# Patient Record
Sex: MALE | Race: WHITE | HISPANIC OR LATINO | Employment: OTHER | URBAN - METROPOLITAN AREA
[De-identification: names, ages, dates, MRNs, and addresses within clinical notes are randomized per-mention and may not be internally consistent; named-entity substitution may affect disease eponyms.]

---

## 2018-02-21 ENCOUNTER — APPOINTMENT (EMERGENCY)
Dept: RADIOLOGY | Facility: HOSPITAL | Age: 48
End: 2018-02-21
Payer: COMMERCIAL

## 2018-02-21 ENCOUNTER — APPOINTMENT (EMERGENCY)
Dept: CT IMAGING | Facility: HOSPITAL | Age: 48
End: 2018-02-21
Payer: COMMERCIAL

## 2018-02-21 ENCOUNTER — HOSPITAL ENCOUNTER (EMERGENCY)
Facility: HOSPITAL | Age: 48
Discharge: HOME | End: 2018-02-21
Attending: EMERGENCY MEDICINE | Admitting: EMERGENCY MEDICINE
Payer: COMMERCIAL

## 2018-02-21 VITALS
SYSTOLIC BLOOD PRESSURE: 120 MMHG | HEIGHT: 66 IN | BODY MASS INDEX: 31.34 KG/M2 | DIASTOLIC BLOOD PRESSURE: 76 MMHG | HEART RATE: 79 BPM | RESPIRATION RATE: 16 BRPM | WEIGHT: 195 LBS | TEMPERATURE: 99.6 F | OXYGEN SATURATION: 97 %

## 2018-02-21 DIAGNOSIS — R07.9 CHEST PAIN: Primary | ICD-10-CM

## 2018-02-21 DIAGNOSIS — S29.011A INTERCOSTAL MUSCLE STRAIN: ICD-10-CM

## 2018-02-21 LAB
ALBUMIN SERPL BCP-MCNC: 3.6 G/DL (ref 3.5–5)
ALP SERPL-CCNC: 117 U/L (ref 46–116)
ALT SERPL W P-5'-P-CCNC: 24 U/L (ref 12–78)
ANION GAP SERPL CALCULATED.3IONS-SCNC: 8 MMOL/L (ref 4–13)
APTT PPP: 32 SECONDS (ref 23–35)
AST SERPL W P-5'-P-CCNC: 21 U/L (ref 5–45)
ATRIAL RATE: 80 BPM
ATRIAL RATE: 80 BPM
ATRIAL RATE: 97 BPM
BASOPHILS # BLD MANUAL: 0.03 THOUSAND/UL (ref 0–0.1)
BASOPHILS NFR MAR MANUAL: 1 % (ref 0–1)
BILIRUB SERPL-MCNC: 0.2 MG/DL (ref 0.2–1)
BUN SERPL-MCNC: 28 MG/DL (ref 5–25)
CALCIUM SERPL-MCNC: 8.7 MG/DL (ref 8.3–10.1)
CHLORIDE SERPL-SCNC: 103 MMOL/L (ref 100–108)
CO2 SERPL-SCNC: 27 MMOL/L (ref 21–32)
CREAT SERPL-MCNC: 1.38 MG/DL (ref 0.6–1.3)
DEPRECATED D DIMER PPP: 885 NG/ML (FEU) (ref 0–424)
EOSINOPHIL # BLD MANUAL: 0.03 THOUSAND/UL (ref 0–0.4)
EOSINOPHIL NFR BLD MANUAL: 1 % (ref 0–6)
ERYTHROCYTE [DISTWIDTH] IN BLOOD BY AUTOMATED COUNT: 13.3 % (ref 11.6–15.1)
GFR SERPL CREATININE-BSD FRML MDRD: 68 ML/MIN/1.73SQ M
GLUCOSE SERPL-MCNC: 115 MG/DL (ref 65–140)
HCT VFR BLD AUTO: 33 % (ref 36.5–49.3)
HGB BLD-MCNC: 11.1 G/DL (ref 12–17)
INR PPP: 1.44 (ref 0.86–1.16)
LACTATE SERPL-SCNC: 0.8 MMOL/L (ref 0.5–2)
LIPASE SERPL-CCNC: 147 U/L (ref 73–393)
LYMPHOCYTES # BLD AUTO: 0.88 THOUSAND/UL (ref 0.6–4.47)
LYMPHOCYTES # BLD AUTO: 27 % (ref 14–44)
MAGNESIUM SERPL-MCNC: 1.5 MG/DL (ref 1.6–2.6)
MCH RBC QN AUTO: 29.7 PG (ref 26.8–34.3)
MCHC RBC AUTO-ENTMCNC: 33.6 G/DL (ref 31.4–37.4)
MCV RBC AUTO: 88 FL (ref 82–98)
MONOCYTES # BLD AUTO: 0.29 THOUSAND/UL (ref 0–1.22)
MONOCYTES NFR BLD: 9 % (ref 4–12)
NEUTROPHILS # BLD MANUAL: 1.86 THOUSAND/UL (ref 1.85–7.62)
NEUTS BAND NFR BLD MANUAL: 2 % (ref 0–8)
NEUTS SEG NFR BLD AUTO: 55 % (ref 43–75)
NRBC BLD AUTO-RTO: 0 /100 WBCS
P AXIS: 54 DEGREES
P AXIS: 60 DEGREES
P AXIS: 64 DEGREES
PLATELET # BLD AUTO: 218 THOUSANDS/UL (ref 149–390)
PLATELET BLD QL SMEAR: ADEQUATE
PMV BLD AUTO: 10.1 FL (ref 8.9–12.7)
POTASSIUM SERPL-SCNC: 3.8 MMOL/L (ref 3.5–5.3)
PR INTERVAL: 154 MS
PR INTERVAL: 162 MS
PR INTERVAL: 166 MS
PROT SERPL-MCNC: 7.3 G/DL (ref 6.4–8.2)
PROTHROMBIN TIME: 17.9 SECONDS (ref 12.1–14.4)
QRS AXIS: 60 DEGREES
QRS AXIS: 68 DEGREES
QRS AXIS: 97 DEGREES
QRSD INTERVAL: 96 MS
QRSD INTERVAL: 96 MS
QRSD INTERVAL: 98 MS
QT INTERVAL: 336 MS
QT INTERVAL: 354 MS
QT INTERVAL: 356 MS
QTC INTERVAL: 408 MS
QTC INTERVAL: 410 MS
QTC INTERVAL: 426 MS
RBC # BLD AUTO: 3.74 MILLION/UL (ref 3.88–5.62)
SODIUM SERPL-SCNC: 138 MMOL/L (ref 136–145)
T WAVE AXIS: 33 DEGREES
T WAVE AXIS: 35 DEGREES
T WAVE AXIS: 36 DEGREES
TOTAL CELLS COUNTED SPEC: 100
TROPONIN I SERPL-MCNC: <0.02 NG/ML
TROPONIN I SERPL-MCNC: <0.02 NG/ML
TSH SERPL DL<=0.05 MIU/L-ACNC: 3.75 UIU/ML (ref 0.36–3.74)
VARIANT LYMPHS # BLD AUTO: 5 %
VENTRICULAR RATE: 80 BPM
VENTRICULAR RATE: 80 BPM
VENTRICULAR RATE: 97 BPM
WBC # BLD AUTO: 3.26 THOUSAND/UL (ref 4.31–10.16)

## 2018-02-21 PROCEDURE — 84443 ASSAY THYROID STIM HORMONE: CPT | Performed by: EMERGENCY MEDICINE

## 2018-02-21 PROCEDURE — 85610 PROTHROMBIN TIME: CPT | Performed by: EMERGENCY MEDICINE

## 2018-02-21 PROCEDURE — 86308 HETEROPHILE ANTIBODY SCREEN: CPT | Performed by: EMERGENCY MEDICINE

## 2018-02-21 PROCEDURE — 93005 ELECTROCARDIOGRAM TRACING: CPT

## 2018-02-21 PROCEDURE — 80053 COMPREHEN METABOLIC PANEL: CPT | Performed by: EMERGENCY MEDICINE

## 2018-02-21 PROCEDURE — 83605 ASSAY OF LACTIC ACID: CPT | Performed by: EMERGENCY MEDICINE

## 2018-02-21 PROCEDURE — 83690 ASSAY OF LIPASE: CPT | Performed by: EMERGENCY MEDICINE

## 2018-02-21 PROCEDURE — 85007 BL SMEAR W/DIFF WBC COUNT: CPT | Performed by: EMERGENCY MEDICINE

## 2018-02-21 PROCEDURE — 84484 ASSAY OF TROPONIN QUANT: CPT | Performed by: EMERGENCY MEDICINE

## 2018-02-21 PROCEDURE — 83735 ASSAY OF MAGNESIUM: CPT | Performed by: EMERGENCY MEDICINE

## 2018-02-21 PROCEDURE — 71046 X-RAY EXAM CHEST 2 VIEWS: CPT

## 2018-02-21 PROCEDURE — 85730 THROMBOPLASTIN TIME PARTIAL: CPT | Performed by: EMERGENCY MEDICINE

## 2018-02-21 PROCEDURE — 85379 FIBRIN DEGRADATION QUANT: CPT | Performed by: EMERGENCY MEDICINE

## 2018-02-21 PROCEDURE — 93010 ELECTROCARDIOGRAM REPORT: CPT | Performed by: INTERNAL MEDICINE

## 2018-02-21 PROCEDURE — 36415 COLL VENOUS BLD VENIPUNCTURE: CPT | Performed by: EMERGENCY MEDICINE

## 2018-02-21 PROCEDURE — 99285 EMERGENCY DEPT VISIT HI MDM: CPT

## 2018-02-21 PROCEDURE — 96361 HYDRATE IV INFUSION ADD-ON: CPT

## 2018-02-21 PROCEDURE — 85027 COMPLETE CBC AUTOMATED: CPT | Performed by: EMERGENCY MEDICINE

## 2018-02-21 PROCEDURE — 96376 TX/PRO/DX INJ SAME DRUG ADON: CPT

## 2018-02-21 PROCEDURE — 71275 CT ANGIOGRAPHY CHEST: CPT

## 2018-02-21 PROCEDURE — 70450 CT HEAD/BRAIN W/O DYE: CPT

## 2018-02-21 PROCEDURE — 96374 THER/PROPH/DIAG INJ IV PUSH: CPT

## 2018-02-21 RX ORDER — KETOROLAC TROMETHAMINE 30 MG/ML
15 INJECTION, SOLUTION INTRAMUSCULAR; INTRAVENOUS ONCE
Status: COMPLETED | OUTPATIENT
Start: 2018-02-21 | End: 2018-02-21

## 2018-02-21 RX ADMIN — SODIUM CHLORIDE 1000 ML: 0.9 INJECTION, SOLUTION INTRAVENOUS at 14:50

## 2018-02-21 RX ADMIN — SODIUM CHLORIDE 1000 ML: 0.9 INJECTION, SOLUTION INTRAVENOUS at 18:18

## 2018-02-21 RX ADMIN — KETOROLAC TROMETHAMINE 15 MG: 30 INJECTION, SOLUTION INTRAMUSCULAR at 14:48

## 2018-02-21 RX ADMIN — IOHEXOL 85 ML: 350 INJECTION, SOLUTION INTRAVENOUS at 19:54

## 2018-02-21 RX ADMIN — KETOROLAC TROMETHAMINE 15 MG: 30 INJECTION, SOLUTION INTRAMUSCULAR at 15:59

## 2018-02-21 NOTE — ED PROVIDER NOTES
History  Chief Complaint   Patient presents with    Chest Pain     Pt chest pain x1 day, radiating to back  Pt reports neck surgery on January 5th, continues to be on thinners  HPI  61-year-old [facility] American male with a chief complaint of chest pain, back pain and head pain  Patient has a history of neck surgery in January  Patient is in a cervical collar and is able to ambulate around the room  Patient is coming from the bathroom and uses a cane for ambulation  None       Past Medical History:   Diagnosis Date    Cervical mass     Lupus        Past Surgical History:   Procedure Laterality Date    NECK SURGERY         History reviewed  No pertinent family history  I have reviewed and agree with the history as documented  Social History   Substance Use Topics    Smoking status: Never Smoker    Smokeless tobacco: Never Used    Alcohol use No        Review of Systems   Constitutional: Negative for chills and fever  HENT: Negative for congestion and rhinorrhea          + C-collar   Eyes: Negative for discharge and visual disturbance  Respiratory: Negative for shortness of breath and wheezing  Cardiovascular: Positive for chest pain  Negative for palpitations  Gastrointestinal: Negative for abdominal pain and vomiting  Endocrine: Negative for polydipsia and polyuria  Genitourinary: Negative for dysuria and hematuria  Musculoskeletal: Positive for back pain  Negative for arthralgias, gait problem and neck stiffness  Skin: Negative for rash and wound  Neurological: Negative for dizziness and headaches  Psychiatric/Behavioral: Negative for confusion and suicidal ideas         Physical Exam  ED Triage Vitals   Temperature Pulse Respirations Blood Pressure SpO2   02/21/18 1329 02/21/18 1329 02/21/18 1329 02/21/18 1332 02/21/18 1329   99 6 °F (37 6 °C) 104 19 123/68 98 %      Temp Source Heart Rate Source Patient Position - Orthostatic VS BP Location FiO2 (%)   02/21/18 1329 02/21/18 1329 02/21/18 1329 02/21/18 1329 --   Oral Monitor Sitting Left arm       Pain Score       02/21/18 1329       7           Orthostatic Vital Signs  Vitals:    02/21/18 1329 02/21/18 1332 02/21/18 1415 02/21/18 1700   BP:  123/68 107/63 127/76   Pulse: 104  97 82   Patient Position - Orthostatic VS: Sitting  Sitting Lying       Physical Exam   Constitutional: He is oriented to person, place, and time  He appears well-developed and well-nourished  75-year-old [facility] American male walking out of the bathroom to the stretcher using a cane for ambulation and in a C-collar  HENT:   Head: Normocephalic and atraumatic  Mouth/Throat: Oropharynx is clear and moist    Eyes: EOM are normal  Pupils are equal, round, and reactive to light  Neck: Normal range of motion  Neck supple  Cardiovascular: Normal rate, regular rhythm and normal heart sounds  Pulmonary/Chest: Effort normal  No respiratory distress  He has no wheezes  He has no rales  He exhibits tenderness (Positive tenderness to the intercostal muscles of the left anterior chest)  Abdominal: Soft  Bowel sounds are normal  He exhibits no distension  There is no tenderness  There is no rebound and no guarding  Musculoskeletal: Normal range of motion  Neurological: He is alert and oriented to person, place, and time  No cranial nerve deficit or sensory deficit  He exhibits normal muscle tone  Coordination ( patient uses a cane to ambulate) abnormal    Patient has a C-collar in place   Skin: Skin is warm and dry  Psychiatric: He has a normal mood and affect  Nursing note and vitals reviewed        ED Medications  Medications   sodium chloride 0 9 % bolus 1,000 mL (1,000 mL Intravenous New Bag 2/21/18 1818)   ketorolac (TORADOL) injection 15 mg (15 mg Intravenous Given 2/21/18 1448)   sodium chloride 0 9 % bolus 1,000 mL (0 mL Intravenous Stopped 2/21/18 1550)   ketorolac (TORADOL) injection 15 mg (15 mg Intravenous Given 2/21/18 1559) Diagnostic Studies  Results Reviewed     Procedure Component Value Units Date/Time    D-Dimer [[id number]]     Lab Status:  No result Specimen:  Blood     Troponin I [[id number]]     Lab Status:  No result Specimen:  Blood     Troponin I [[id number]]  (Normal) Collected:  02/21/18 1708    Lab Status:  Final result Specimen:  Blood from Arm, Right Updated:  02/21/18 1753     Troponin I <0 02 ng/mL     Narrative:         Siemens Chemistry analyzer 99% cutoff is > 0 04 ng/mL in network labs    o cTnI 99% cutoff is useful only when applied to patients in the clinical setting of myocardial ischemia  o cTnI 99% cutoff should be interpreted in the context of clinical history, ECG findings and possibly cardiac imaging to establish correct diagnosis  o cTnI 99% cutoff may be suggestive but clearly not indicative of a coronary event without the clinical setting of myocardial ischemia  Mononucleosis screen [[id number]] Collected:  02/21/18 1548    Lab Status: In process Specimen:  Blood from Arm, Left Updated:  02/21/18 1553    TSH [[id number]]  (Abnormal) Collected:  02/21/18 1409    Lab Status:  Final result Specimen:  Blood from Arm, Right Updated:  02/21/18 1525     TSH 3RD GENERATON 3 750 (H) uIU/mL     Narrative:         Patients undergoing fluorescein dye angiography may retain small amounts of fluorescein in the body for 48-72 hours post procedure  Samples containing fluorescein can produce falsely depressed TSH values  If the patient had this procedure,a specimen should be resubmitted post fluorescein clearance      Magnesium [[id number]]  (Abnormal) Collected:  02/21/18 1409    Lab Status:  Final result Specimen:  Blood from Arm, Right Updated:  02/21/18 1525     Magnesium 1 5 (L) mg/dL     Lipase [[id number]]  (Normal) Collected:  02/21/18 1409    Lab Status:  Final result Specimen:  Blood from Arm, Right Updated:  02/21/18 1525     Lipase 147 u/L     Lactic acid, plasma [[id number]]  (Normal) Collected:  02/21/18 9165 Lab Status:  Final result Specimen:  Blood from Arm, Right Updated:  02/21/18 1521     LACTIC ACID 0 8 mmol/L     Narrative:         Result may be elevated if tourniquet was used during collection  Protime-INR [[id number]]  (Abnormal) Collected:  02/21/18 1409    Lab Status:  Final result Specimen:  Blood from Arm, Right Updated:  02/21/18 1503     Protime 17 9 (H) seconds      INR 1 44 (H)    APTT [[id number]]  (Normal) Collected:  02/21/18 1409    Lab Status:  Final result Specimen:  Blood from Arm, Right Updated:  02/21/18 1503     PTT 32 seconds     Narrative: Therapeutic Heparin Range = 60-90 seconds    CBC and differential [[id number]]  (Abnormal) Collected:  02/21/18 1409    Lab Status:  Final result Specimen:  Blood from Arm, Right Updated:  02/21/18 1502     WBC 3 26 (L) Thousand/uL      RBC 3 74 (L) Million/uL      Hemoglobin 11 1 (L) g/dL      Hematocrit 33 0 (L) %      MCV 88 fL      MCH 29 7 pg      MCHC 33 6 g/dL      RDW 13 3 %      MPV 10 1 fL      Platelets 459 Thousands/uL      nRBC 0 /100 WBCs     Narrative: This is an appended report  These results have been appended to a previously verified report  Troponin I [[id number]]  (Normal) Collected:  02/21/18 1409    Lab Status:  Final result Specimen:  Blood from Arm, Right Updated:  02/21/18 1436     Troponin I <0 02 ng/mL     Narrative:         Siemens Chemistry analyzer 99% cutoff is > 0 04 ng/mL in network labs    o cTnI 99% cutoff is useful only when applied to patients in the clinical setting of myocardial ischemia  o cTnI 99% cutoff should be interpreted in the context of clinical history, ECG findings and possibly cardiac imaging to establish correct diagnosis  o cTnI 99% cutoff may be suggestive but clearly not indicative of a coronary event without the clinical setting of myocardial ischemia      Comprehensive metabolic panel [[id number]]  (Abnormal) Collected:  02/21/18 1409    Lab Status:  Final result Specimen:  Blood from Arm, Right Updated:  02/21/18 1434     Sodium 138 mmol/L      Potassium 3 8 mmol/L      Chloride 103 mmol/L      CO2 27 mmol/L      Anion Gap 8 mmol/L      BUN 28 (H) mg/dL      Creatinine 1 38 (H) mg/dL      Glucose 115 mg/dL      Calcium 8 7 mg/dL      AST 21 U/L      ALT 24 U/L      Alkaline Phosphatase 117 (H) U/L      Total Protein 7 3 g/dL      Albumin 3 6 g/dL      Total Bilirubin 0 20 mg/dL      eGFR 68 ml/min/1 73sq m     Narrative:         National Kidney Disease Education Program recommendations are as follows:  GFR calculation is accurate only with a steady state creatinine  Chronic Kidney disease less than 60 ml/min/1 73 sq  meters  Kidney failure less than 15 ml/min/1 73 sq  meters  CT head without contrast   Final Result by Pollo Verde MD (02/21 1746)      No acute intracranial abnormality  Workstation performed: [id number]         X-ray chest 2 views   Final Result by Len Darden DO (02/21 1526)   Lungs are suboptimally aerated with some vascular crowding noted  No consolidation or effusion  No pulmonary edema  Workstation performed: [id number]                    Procedures  ECG 12 Lead Documentation  Date/Time: 2/21/2018 2:50 PM  Performed by: Blayne Rodriguez by: Priscilla Gandara     ECG reviewed by me, the ED Provider: yes    Patient location:  ED  Previous ECG:     Previous ECG:  Unavailable  Interpretation:     Interpretation: normal    Rate:     ECG rate assessment: normal    Rhythm:     Rhythm: sinus rhythm    ST segments:     ST segments:  Normal  Q waves:     Q waves:  III and aVF               Phone Contacts  ED Phone Contact    ED Course  ED Course as of Feb 21 1836 Wed Feb 21, 2018   1434 XR chest 2 views        5:55 p m :  I re-evaluated patient  Patient states he is feeling much better    Patient was instructed to follow up with his cardiologist at [facility] and to take Tylenol 650 milligrams every 4 hours     6:35 PM:  Care signed out to Dr Chely Meza - will await D-Dimer, and if + will scan                    MDM  CritCare Time     Differential diagnosis includes:  1  Chest pain  2  Back pain  3  Head pain  4   Status post cervical surgery  5  Difficulty ambulating    Disposition  Final diagnoses:   Chest pain   Intercostal muscle strain     Time reflects when diagnosis was documented in both MDM as applicable and the Disposition within this note     Time User Action Codes Description Comment    2/21/2018  5:58 PM Guille Costa [R07 [id number]] Chest pain     2/21/2018  5:58 PM Guille Costa [[id number]] Intercostal muscle strain       ED Disposition     ED Disposition Condition Comment    Discharge  Elena Kumar discharge to home/self care  Condition at discharge: Good        Follow-up Information     Follow up With Specialties Details Why Contact Info    Omar Rose MD Family Medicine In 1 week  [address]  [phone]          Patient's Medications    No medications on file     No discharge procedures on file      ED Provider  Electronically Signed by                     Alyse Carey DO  02/21/18 [address] Mario DO  02/21/18 2478

## 2018-02-21 NOTE — DISCHARGE INSTRUCTIONS
Chest Pain, Ambulatory Care   GENERAL INFORMATION:   Chest pain  can be caused by a range of conditions, from not serious to life-threatening  It may be caused by a heart attack or a blood clot in your lungs  Sometimes chest pain or pressure is caused by poor blood flow to your heart (angina)  Infection, inflammation, or a fracture in the bones or cartilage in your chest can cause pain or discomfort  Chest pain can also be a symptom of a digestive problem, such as acid reflux or a stomach ulcer  Common symptoms include the following:   · Fever or sweating     · Nausea or vomiting     · Shortness of breath     · Discomfort or pressure that spreads from your chest to your back, jaw, or arm     · A racing or slow heartbeat     · Feeling weak, tired, or faint  Seek immediate care for the following symptoms:   · Any of the following signs of a heart attack:      ¨ Squeezing, pressure, or pain in your chest that lasts longer than 5 minutes or returns    ¨ Discomfort or pain in your back, neck, jaw, stomach, or arm     ¨ Trouble breathing     ¨ Nausea or vomiting    ¨ Lightheadedness or a sudden cold sweat, especially with trouble breathing         · Chest discomfort that gets worse, even with medicine    · Coughing or vomiting blood    · Black or bloody bowel movements     · Vomiting that does not stop, or pain when you swallow  Treatment for chest pain  may include medicine to treat your symptoms while he determines the cause of your chest pain  You may also need any of the following:  · Antiplatelets , such as aspirin, help prevent blood clots  Take your antiplatelet medicine exactly as directed  These medicines make it more likely for you to bleed or bruise  If you are told to take aspirin, do not take acetaminophen or ibuprofen instead  · Prescription pain medicine  may be given  Ask how to take this medicine safely  Do not smoke: If you smoke, it is never too late to quit   Smoking increases your risk for a heart attack and other heart and lung conditions  Ask your healthcare provider for information about how to stop smoking if you need help  Follow up with your healthcare provider as directed: You may need more tests  You may be referred to a specialist, such as a cardiologist or gastroenterologist  Write down your questions so you remember to ask them during your visits  CARE AGREEMENT:   You have the right to help plan your care  Learn about your health condition and how it may be treated  Discuss treatment options with your caregivers to decide what care you want to receive  You always have the right to refuse treatment  The above information is an  only  It is not intended as medical advice for individual conditions or treatments  Talk to your doctor, nurse or pharmacist before following any medical regimen to see if it is safe and effective for you  © 2014 [address] is for End User's use only and may not be sold, redistributed or otherwise used for commercial purposes  All illustrations and images included in CareNotes® are the copyrighted property of A D [employer] M [employer]  or Jareth Cabrales  Muscle Strain   WHAT YOU NEED TO KNOW:   A muscle strain is a twist, pull, or tear of a muscle or tendon  A tendon is a strong elastic tissue that connects a muscle to a bone  Signs of a strained muscle include bruising and swelling over the area, pain with movement, and loss of strength  DISCHARGE INSTRUCTIONS:   Return to the emergency department if:   · You suddenly cannot feel or move your injured muscle  Contact your healthcare provider if:   · Your pain and swelling worsen or do not go away  · You have questions or concerns about your condition or care  Medicines:   · NSAIDs  help decrease swelling and pain or fever  This medicine is available with or without a doctor's order   NSAIDs can cause stomach bleeding or kidney problems in certain people  If you take blood thinner medicine, always ask your healthcare provider if NSAIDs are safe for you  Always read the medicine label and follow directions  · Muscle relaxers  help decrease pain and muscle spasms  · Take your medicine as directed  Contact your healthcare provider if you think your medicine is not helping or if you have side effects  Tell him of her if you are allergic to any medicine  Keep a list of the medicines, vitamins, and herbs you take  Include the amounts, and when and why you take them  Bring the list or the pill bottles to follow-up visits  Carry your medicine list with you in case of an emergency  Follow up with your healthcare provider as directed: Your healthcare provider may suggest that you have a follow-up visit before you go back to your usual activity  Write down your questions so you remember to ask them during your visits  Self-care:   · 3 to 7 days after the injury:  Use Rest, Ice, Compression, and Elevation (RICE) to help stop bruising and decrease pain and swelling  ¨ Rest:  Rest your muscle to allow your injury to heal  When the pain decreases, begin normal, slow movements  For mild and moderate muscle strains, you should rest your muscles for about 2 days  However, if you have a severe muscle strain, you should rest for 10 to 14 days  You may need to use crutches to walk if your muscle strain is in your legs or lower body  ¨ Ice:  Put an ice pack on the injured area  Put a towel between the ice pack and your skin  Do not put the ice pack directly on your skin  You can use a package of frozen peas instead of an ice pack  ¨ Compression:  You may need to wrap an elastic bandage around the area to decrease swelling  It should be tight enough for you to feel support  Do not wrap it too tightly  ¨ Elevation:  Keep the injured muscle raised above your heart if possible   For example if you have a strain of your lower leg muscle, lie down and prop your leg up on pillows  This helps decrease pain and swelling  · 3 to 21 days after the injury:  Start to slowly and regularly exercise your muscle  This will help it heal  If you feel pain, decrease how hard you are exercising  · 1 to 6 weeks after the injury:  Stretch the injured muscle  Hold the stretch for about 30 seconds  Do this 4 times a day  You may stretch the muscle until you feel a slight pull  Stop stretching if you feel pain  · 2 weeks to 6 months after the injury:  The goal of this phase is to return to the activity you were doing before the injury happened, without hurting the muscle again  · 3 weeks to 6 months after the injury:  Keep stretching and strengthening your muscles to avoid injury  Slowly increase the time and distance that you exercise  You may have signs and symptoms of muscle strain 6 months after the injury, even if you do things to help it heal  In this case, you may need surgery on the muscle  © 2017 [address] Information is for End User's use only and may not be sold, redistributed or otherwise used for commercial purposes  All illustrations and images included in CareNotes® are the copyrighted property of A [employer] M [employer]  or Jareth Cabrales  The above information is an  only  It is not intended as medical advice for individual conditions or treatments  Talk to your doctor, nurse or pharmacist before following any medical regimen to see if it is safe and effective for you

## 2018-02-22 LAB — HETEROPH AB SER QL: NEGATIVE

## 2018-06-06 ENCOUNTER — HOSPITAL ENCOUNTER (EMERGENCY)
Facility: HOSPITAL | Age: 48
Discharge: HOME | End: 2018-06-06
Attending: EMERGENCY MEDICINE | Admitting: EMERGENCY MEDICINE
Payer: COMMERCIAL

## 2018-06-06 VITALS
HEART RATE: 62 BPM | OXYGEN SATURATION: 100 % | SYSTOLIC BLOOD PRESSURE: 122 MMHG | DIASTOLIC BLOOD PRESSURE: 73 MMHG | WEIGHT: 204.59 LBS | RESPIRATION RATE: 20 BRPM | TEMPERATURE: 98.7 F

## 2018-06-06 DIAGNOSIS — D64.9 ANEMIA: ICD-10-CM

## 2018-06-06 DIAGNOSIS — R79.89 ELEVATED SERUM CREATININE: Primary | ICD-10-CM

## 2018-06-06 LAB
ALBUMIN SERPL BCP-MCNC: 3.4 G/DL (ref 3.5–5)
ALP SERPL-CCNC: 93 U/L (ref 46–116)
ALT SERPL W P-5'-P-CCNC: 15 U/L (ref 12–78)
ANION GAP SERPL CALCULATED.3IONS-SCNC: 12 MMOL/L (ref 4–13)
AST SERPL W P-5'-P-CCNC: 15 U/L (ref 5–45)
BASOPHILS # BLD MANUAL: 0 THOUSAND/UL (ref 0–0.1)
BASOPHILS NFR MAR MANUAL: 0 % (ref 0–1)
BILIRUB SERPL-MCNC: 0.2 MG/DL (ref 0.2–1)
BUN SERPL-MCNC: 29 MG/DL (ref 5–25)
CALCIUM SERPL-MCNC: 8.2 MG/DL (ref 8.3–10.1)
CHLORIDE SERPL-SCNC: 108 MMOL/L (ref 100–108)
CO2 SERPL-SCNC: 22 MMOL/L (ref 21–32)
CREAT SERPL-MCNC: 1.76 MG/DL (ref 0.6–1.3)
EOSINOPHIL # BLD MANUAL: 0.03 THOUSAND/UL (ref 0–0.4)
EOSINOPHIL NFR BLD MANUAL: 1 % (ref 0–6)
ERYTHROCYTE [DISTWIDTH] IN BLOOD BY AUTOMATED COUNT: 13.3 % (ref 11.6–15.1)
GFR SERPL CREATININE-BSD FRML MDRD: 51 ML/MIN/1.73SQ M
GLUCOSE SERPL-MCNC: 84 MG/DL (ref 65–140)
HCT VFR BLD AUTO: 27.2 % (ref 36.5–49.3)
HGB BLD-MCNC: 8.9 G/DL (ref 12–17)
LYMPHOCYTES # BLD AUTO: 0.3 THOUSAND/UL (ref 0.6–4.47)
LYMPHOCYTES # BLD AUTO: 10 % (ref 14–44)
MCH RBC QN AUTO: 29.6 PG (ref 26.8–34.3)
MCHC RBC AUTO-ENTMCNC: 32.7 G/DL (ref 31.4–37.4)
MCV RBC AUTO: 90 FL (ref 82–98)
MONOCYTES # BLD AUTO: 0.68 THOUSAND/UL (ref 0–1.22)
MONOCYTES NFR BLD: 23 % (ref 4–12)
NEUTROPHILS # BLD MANUAL: 1.92 THOUSAND/UL (ref 1.85–7.62)
NEUTS SEG NFR BLD AUTO: 65 % (ref 43–75)
NRBC BLD AUTO-RTO: 0 /100 WBCS
PLATELET # BLD AUTO: 190 THOUSANDS/UL (ref 149–390)
PLATELET BLD QL SMEAR: ADEQUATE
PMV BLD AUTO: 9.9 FL (ref 8.9–12.7)
POTASSIUM SERPL-SCNC: 4.7 MMOL/L (ref 3.5–5.3)
PROT SERPL-MCNC: 6.7 G/DL (ref 6.4–8.2)
RBC # BLD AUTO: 3.01 MILLION/UL (ref 3.88–5.62)
SODIUM SERPL-SCNC: 142 MMOL/L (ref 136–145)
TOTAL CELLS COUNTED SPEC: 100
VARIANT LYMPHS # BLD AUTO: 1 %
WBC # BLD AUTO: 2.95 THOUSAND/UL (ref 4.31–10.16)

## 2018-06-06 PROCEDURE — 85027 COMPLETE CBC AUTOMATED: CPT | Performed by: PHYSICIAN ASSISTANT

## 2018-06-06 PROCEDURE — 96360 HYDRATION IV INFUSION INIT: CPT

## 2018-06-06 PROCEDURE — 85007 BL SMEAR W/DIFF WBC COUNT: CPT | Performed by: PHYSICIAN ASSISTANT

## 2018-06-06 PROCEDURE — 93005 ELECTROCARDIOGRAM TRACING: CPT

## 2018-06-06 PROCEDURE — 82272 OCCULT BLD FECES 1-3 TESTS: CPT

## 2018-06-06 PROCEDURE — 80053 COMPREHEN METABOLIC PANEL: CPT | Performed by: PHYSICIAN ASSISTANT

## 2018-06-06 PROCEDURE — 36415 COLL VENOUS BLD VENIPUNCTURE: CPT | Performed by: PHYSICIAN ASSISTANT

## 2018-06-06 PROCEDURE — 96361 HYDRATE IV INFUSION ADD-ON: CPT

## 2018-06-06 PROCEDURE — 99284 EMERGENCY DEPT VISIT MOD MDM: CPT

## 2018-06-06 RX ADMIN — SODIUM CHLORIDE 1000 ML: 0.9 INJECTION, SOLUTION INTRAVENOUS at 11:45

## 2018-06-06 NOTE — ED PROVIDER NOTES
History  Chief Complaint   Patient presents with    Back Pain     Pt c/o lower back pain and B/L leg pain/numbness/tingling x 1 year  Pt states pain has been worse over last week  Pt denies any recent injury or trauma  Mónica Luna is a 46 y o  male w PMH DVT, HTN, lupus, chronic back pain who presents for evaluation of hypotension  Pt reported blood pressure was low last night  Was 80/53  Normally 220 systolic  He does take lisinopril at night only  This morning blood pressure low 1 100s  He does not feel lightheaded or dizzy  No chest pain or palpitations  No headache  No lightheadedness or dizziness  No abdominal pain, nausea, vomiting, diarrhea or constipation  He reports he has been drinking less water  He is unable to tell me why  He still has a good appetite  He is complaining of chronic back pain that is slightly worse today than usual but he has had this for years  He has dealt with low back pain with radiation into the legs and associated numbness and paresthesias for which she is on multiple medications  There is no bladder or bowel incontinence or saddle anesthesia  There is no new injury or trauma  None       Past Medical History:   Diagnosis Date    Depression     [facility]VT [facility]deep venous thrombosis) ([facility] [address] )     Hypertension     Lupus     Renal disorder        Past Surgical History:   Procedure Laterality Date    CERVICAL SPINE SURGERY         History reviewed  No pertinent family history  I have reviewed and agree with the history as documented  Social History   Substance Use Topics    Smoking status: Never Smoker    Smokeless tobacco: Never Used    Alcohol use No        Review of Systems   Constitutional: Negative for activity change, chills, diaphoresis, fatigue and fever  HENT: Negative for congestion and rhinorrhea  Eyes: Negative for pain  Respiratory: Negative for cough, chest tightness, shortness of breath and wheezing      Cardiovascular: Negative for chest pain and palpitations  Gastrointestinal: Negative for abdominal distention, constipation, diarrhea, nausea and vomiting  Genitourinary: Negative for difficulty urinating and dysuria  Musculoskeletal: Positive for back pain (chronic)  Negative for arthralgias and myalgias  Neurological: Negative for dizziness, weakness, light-headedness and headaches  Psychiatric/Behavioral: The patient is not nervous/anxious  Physical Exam  Physical Exam   Constitutional: He is oriented to person, place, and time  He appears well-developed and well-nourished  No distress  HENT:   Head: Normocephalic and atraumatic  Eyes: Pupils are equal, round, and reactive to light  Neck: Normal range of motion  Neck supple  No tracheal deviation present  Cardiovascular: Normal rate, regular rhythm, normal heart sounds and intact distal pulses  Exam reveals no gallop and no friction rub  No murmur heard  Pulmonary/Chest: Effort normal and breath sounds normal  No respiratory distress  He has no wheezes  He has no rales  Abdominal: Soft  Bowel sounds are normal  He exhibits no distension and no mass  There is no tenderness  There is no guarding  Musculoskeletal: Normal range of motion  He exhibits no edema or deformity  Some lumbar tenderness to palpation but no step-off or deformity, some paraspinal lumbar tenderness to palpation  Neurological: He is alert and oriented to person, place, and time  GCS 15, nonfocal exam, normal motor and sensory function, normal sensation to light touch equal in all extremities  Normal 5/5 strength in all muscle groups of bilateral upper and lower extremities  Normal clear fluent speech  Skin: Skin is warm and dry  He is not diaphoretic  Psychiatric: He has a normal mood and affect  His behavior is normal    Nursing note and vitals reviewed        Vital Signs  ED Triage Vitals [06/06/18 1022]   Temperature Pulse Respirations Blood Pressure SpO2   98 7 °F (37 1 °C) 75 20 110/58 100 %      Temp Source Heart Rate Source Patient Position - Orthostatic VS BP Location FiO2 (%)   Oral Monitor Lying Right arm --      Pain Score       7           Vitals:    06/06/18 1022 06/06/18 1413   BP: 110/58 119/78   Pulse: 75 64   Patient Position - Orthostatic VS: Lying Sitting       Visual Acuity      ED Medications  Medications   sodium chloride 0 9 % bolus 1,000 mL (0 mL Intravenous Stopped 6/6/18 1419)       Diagnostic Studies  Results Reviewed     Procedure Component Value Units Date/Time    CBC and differential [[id number]]  (Abnormal) Collected:  06/06/18 1146    Lab Status:  Final result Specimen:  Blood from Arm, Right Updated:  06/06/18 1233     WBC 2 95 (L) Thousand/uL      RBC 3 01 (L) Million/uL      Hemoglobin 8 9 (L) g/dL      Hematocrit 27 2 (L) %      MCV 90 fL      MCH 29 6 pg      MCHC 32 7 g/dL      RDW 13 3 %      MPV 9 9 fL      Platelets 110 Thousands/uL      nRBC 0 /100 WBCs     Comprehensive metabolic panel [[id number]]  (Abnormal) Collected:  06/06/18 1145    Lab Status:  Final result Specimen:  Blood from Arm, Right Updated:  06/06/18 1206     Sodium 142 mmol/L      Potassium 4 7 mmol/L      Chloride 108 mmol/L      CO2 22 mmol/L      Anion Gap 12 mmol/L      BUN 29 (H) mg/dL      Creatinine 1 76 (H) mg/dL      Glucose 84 mg/dL      Calcium 8 2 (L) mg/dL      AST 15 U/L      ALT 15 U/L      Alkaline Phosphatase 93 U/L      Total Protein 6 7 g/dL      Albumin 3 4 (L) g/dL      Total Bilirubin 0 20 mg/dL      eGFR 51 ml/min/1 73sq m     Narrative:         National Kidney Disease Education Program recommendations are as follows:  GFR calculation is accurate only with a steady state creatinine  Chronic Kidney disease less than 60 ml/min/1 73 sq  meters  Kidney failure less than 15 ml/min/1 73 sq  meters                   No orders to display              Procedures  Procedures       Phone Contacts  ED Phone Contact    ED Course MDM  Number of Diagnoses or Management Options  Anemia:   Elevated serum creatinine:   Diagnosis management comments: DDX includes but not ltd to:   Per nursing triage reports seems like chief complaint is back pain but really on evaluation the patient seems like he is most worried about the blood pressure  The back pain is worse than his usual but it is a chronic issue and there is no new trauma or injury to necessitate workup  No concern for cauda equina  No ambulatory dysfunction  Do not suspect fracture  His blood pressure here is low normal  He does report he has been drinking slightly less than usual so we will check some electrolytes, give IV fluid and re-evaluate patient  Plan is to obtain:  CBC to check for anemia, leukocytosis, hydration status  Chemistry panel to check for lyte abnormalities, organ function   EKG/trop to check for ischemic changes     Based on results:  Checked guiac sample with RN erika Natarajan present  Pt had brown stool that was heme negative  Discussed abnormal kidney function but he does have a hx of renal impairment although I cannot see his old Cr  He also reports history of anemia but denies issue w GI bleed in past and he is unaware of last Hg  Unable to see old records  However he is stable  He is not hypotensive here  Reports able to follow w PCP which is reasonable  Return parameters discussed  Pt requires f/u as an outpt  Pt expresses understanding w above treatment plan  All questions answered prior to d/c  Portions of the record may have been created with voice recognition software   Occasional wrong word or "sound a like" substitutions may have occurred due to the inherent limitations of voice recognition software   Read the chart carefully and recognize, using context, where substitutions have occurred        CritCare Time    Disposition  Final diagnoses:   Elevated serum creatinine   Anemia     Time reflects when diagnosis was documented in both MDM as applicable and the Disposition within this note     Time User Action Codes Description Comment    6/6/2018  3:53 PM Caity Cough Add [R79 89] Elevated serum creatinine     6/6/2018  3:53 PM Caity Cough Add [D64 9] Anemia       ED Disposition     ED Disposition Condition Comment    Discharge  Sandra  discharge to home/self care  Condition at discharge: Good        Follow-up Information     Follow up With Specialties Details Why Contact Info Additional Information    Praful Toro MD  Call in 1 day  [address] [facility] [phone] [facility] Emergency Department Emergency Medicine  If symptoms worsen [address] ED, [address] [place] [address], [facility]          Patient's Medications    No medications on file     No discharge procedures on file      ED Provider  Electronically Signed by           Mckenzie Perez PA-C  06/06/18 6001

## 2018-06-06 NOTE — ED NOTES
D/c and follow up reviewed with pt  Cane use off unit with steady gait        Kelly Nicole RN  78/44/75 7210

## 2018-06-06 NOTE — DISCHARGE INSTRUCTIONS
Anemia   WHAT YOU NEED TO KNOW:   Anemia is a low number of red blood cells or a low amount of hemoglobin in your red blood cells  Hemoglobin is a protein that helps carry oxygen throughout your body  Red blood cells use iron to create hemoglobin  Anemia may develop if your body does not have enough iron  It may also develop if your body does not make enough red blood cells or they die faster than your body can make them  DISCHARGE INSTRUCTIONS:   Call 911 or have someone call 911 for any of the following:   · You lose consciousness  · You have severe chest pain  Return to the emergency department if:   · You have dark or bloody bowel movements  Contact your healthcare provider if:   · Your symptoms are worse, even after treatment  · You have questions or concerns about your condition or care  Medicines:   · Iron or folic acid supplements  help increase your red blood cell and hemoglobin levels  · Vitamin B12 injections  may help boost your red blood cell level and decrease your symptoms  Ask your healthcare provider how to inject B12  · Take your medicine as directed  Contact your healthcare provider if you think your medicine is not helping or if you have side effects  Tell him of her if you are allergic to any medicine  Keep a list of the medicines, vitamins, and herbs you take  Include the amounts, and when and why you take them  Bring the list or the pill bottles to follow-up visits  Carry your medicine list with you in case of an emergency  Prevent anemia:  Eat healthy foods rich in iron and vitamin C  Nuts, meat, dark leafy green vegetables, and beans are high in iron and protein  Vitamin C helps your body absorb iron  Foods rich in vitamin C include oranges and other citrus fruits  Ask your healthcare provider for a list of other foods that are high in iron or vitamin C  Ask if you need to be on a special diet     Follow up with your healthcare provider as directed:  Write down your questions so you remember to ask them during your visits  © 2017 [address] Information is for End User's use only and may not be sold, redistributed or otherwise used for commercial purposes  All illustrations and images included in CareNotes® are the copyrighted property of [employer]  or Jareth Cabrales  The above information is an  only  It is not intended as medical advice for individual conditions or treatments  Talk to your doctor, nurse or pharmacist before following any medical regimen to see if it is safe and effective for you  Chronic Kidney Disease, Ambulatory Care   GENERAL INFORMATION:   Chronic kidney disease  is the gradual and permanent loss of kidney function  Normally, the kidneys turn fluid, chemicals, and waste from your blood into urine  When you have chronic kidney disease (CKD), your kidneys do not function properly  CKD may worsen over time and lead to kidney failure  Common symptoms include the following:   · Changes in how often you need to urinate    · Swelling in your arms, legs, or feet    · Shortness of breath    · Fatigue or weakness    · Bad or bitter taste in your mouth    · Nausea, vomiting, or loss of appetite  Seek immediate care for the following symptoms:   · Heart is beating faster than normal for you    · Confused and drowsiness    · Seizure    · Sudden chest pain or shortness of breath  Treatment for chronic kidney disease:  Medicines may be given to decrease blood pressure and get rid of extra fluid  You may also receive medicine to manage health conditions that may occur with CKD  Dialysis is a treatment to remove chemicals and waste from your blood when your kidneys can no longer do this  Surgery may be needed to create an arteriovenous fistula (AVF) in your arm or insert a catheter into your abdomen so that you can receive dialysis  A kidney transplant may be done if your CKD becomes severe    Manage chronic kidney disease:   · Maintain a healthy weight  Ask your healthcare provider how much you should weigh  Ask him to help you create a weight loss plan if you are overweight  · Exercise 30 to 60 minutes a day, 4 to 7 times a week, or as directed  Ask about the best exercise plan for you  Regular exercise can help you manage CKD, high blood pressure, and diabetes  · Follow your healthcare provider's advice about what to eat and drink  He may tell you to eat food low in sodium (salt), potassium, phosphorus, or protein  You may need to see a dietitian if you need help planning meals  · Limit alcohol  Ask how much alcohol is safe for you to drink  A drink of alcohol is 12 ounces of beer, 5 ounces of wine, or 1½ ounces of liquor  · Do not smoke  Smoking harms your kidneys  If you smoke, it is never too late to quit  Ask for information if you need help quitting  · Ask your healthcare provider if you need vaccines  Infections such as pneumonia, influenza, and hepatitis can be more harmful or more likely to occur when you have CKD  Vaccines reduce your risk of infection with these viruses  Follow up with your healthcare provider as directed:  Write down your questions so you remember to ask them during your visits  CARE AGREEMENT:   You have the right to help plan your care  Learn about your health condition and how it may be treated  Discuss treatment options with your caregivers to decide what care you want to receive  You always have the right to refuse treatment  The above information is an  only  It is not intended as medical advice for individual conditions or treatments  Talk to your doctor, nurse or pharmacist before following any medical regimen to see if it is safe and effective for you  © 2014 [address] is for End User's use only and may not be sold, redistributed or otherwise used for commercial purposes   All illustrations and images included in CareNotes® are the copyrighted property of [employer]  or Jareth Cabrales  Impaired Kidney Function   AMBULATORY CARE:   Impaired kidney function  is when your kidneys are not working as well as they should  Normally, kidneys remove fluid, chemicals, and waste from your blood  These wastes are removed from your body in the urine made by your kidneys  If impaired kidney function is not treated or gets worse, it may lead to long-term kidney disease or kidney failure  Seek care immediately if:   · You have fluid buildup in your legs  · You have trouble breathing  · You urinate less than you normally do  · You have dark colored urine  Contact your healthcare provider if:   · You have a fever  · You have abdominal or low back pain  · Your skin is itchy or you have a rash  · You have nausea, vomit repeatedly, or have severe diarrhea  · You have fatigue or muscle weakness  · You have hiccups that will not stop  · You have questions or concerns about your condition or care  Support kidney function:   · Manage other health conditions  such as diabetes, high blood pressure, or heart disease  These conditions stress your kidneys  · Talk to your healthcare provider before you take over-the-counter-medicine  NSAIDs, stomach medicine, or laxatives may harm your kidneys  · Limit alcohol  Ask how much alcohol is safe for you to drink  A drink of alcohol is 12 ounces of beer, 5 ounces of wine, or 1½ ounces of liquor  · Do not smoke  Nicotine can damage blood vessels and make it more difficult to manage your impaired kidney function  Smoking also harms your kidneys  Do not use e-cigarettes or smokeless tobacco in place of cigarettes or to help you quit  They still contain nicotine  Ask your healthcare provider for information if you currently smoke and need help quitting    Follow up with your healthcare provider as directed:  Write down your questions so you remember to ask them during your visits  © 2017 [address] Information is for End User's use only and may not be sold, redistributed or otherwise used for commercial purposes  All illustrations and images included in CareNotes® are the copyrighted property of A [employer] M [employer] Inc  or Jareth Cabrales  The above information is an  only  It is not intended as medical advice for individual conditions or treatments  Talk to your doctor, nurse or pharmacist before following any medical regimen to see if it is safe and effective for you

## 2018-06-07 LAB
ATRIAL RATE: 66 BPM
P AXIS: 61 DEGREES
PR INTERVAL: 154 MS
QRS AXIS: 69 DEGREES
QRSD INTERVAL: 104 MS
QT INTERVAL: 360 MS
QTC INTERVAL: 377 MS
T WAVE AXIS: 40 DEGREES
VENTRICULAR RATE: 66 BPM

## 2018-06-07 PROCEDURE — 93010 ELECTROCARDIOGRAM REPORT: CPT | Performed by: INTERNAL MEDICINE

## 2018-07-26 ENCOUNTER — APPOINTMENT (EMERGENCY)
Dept: ULTRASOUND IMAGING | Facility: HOSPITAL | Age: 48
End: 2018-07-26
Payer: COMMERCIAL

## 2018-07-26 ENCOUNTER — HOSPITAL ENCOUNTER (EMERGENCY)
Facility: HOSPITAL | Age: 48
Discharge: HOME | End: 2018-07-26
Admitting: EMERGENCY MEDICINE
Payer: COMMERCIAL

## 2018-07-26 VITALS
SYSTOLIC BLOOD PRESSURE: 112 MMHG | OXYGEN SATURATION: 98 % | TEMPERATURE: 99.1 F | HEIGHT: 66 IN | DIASTOLIC BLOOD PRESSURE: 57 MMHG | RESPIRATION RATE: 18 BRPM | WEIGHT: 193.34 LBS | HEART RATE: 89 BPM | BODY MASS INDEX: 31.07 KG/M2

## 2018-07-26 DIAGNOSIS — L03.116 CELLULITIS OF LEFT LEG: ICD-10-CM

## 2018-07-26 DIAGNOSIS — I80.02 THROMBOPHLEBITIS OF SUPERFICIAL VEINS OF LEFT LOWER EXTREMITY: Primary | ICD-10-CM

## 2018-07-26 PROCEDURE — 99284 EMERGENCY DEPT VISIT MOD MDM: CPT

## 2018-07-26 PROCEDURE — 93971 EXTREMITY STUDY: CPT | Performed by: SURGERY

## 2018-07-26 PROCEDURE — 93971 EXTREMITY STUDY: CPT

## 2018-07-26 RX ORDER — LISINOPRIL 40 MG/1
40 TABLET ORAL DAILY
COMMUNITY
End: 2018-07-31 | Stop reason: HOSPADM

## 2018-07-26 RX ORDER — MYCOPHENOLATE MOFETIL 500 MG/1
500 TABLET ORAL EVERY 12 HOURS SCHEDULED
COMMUNITY

## 2018-07-26 RX ORDER — BACLOFEN 20 MG/1
20 TABLET ORAL 3 TIMES DAILY
COMMUNITY

## 2018-07-26 RX ORDER — DIAPER,BRIEF,INFANT-TODD,DISP
1 EACH MISCELLANEOUS 2 TIMES DAILY
Status: ON HOLD | COMMUNITY
End: 2018-07-28 | Stop reason: ALTCHOICE

## 2018-07-26 RX ORDER — TRAMADOL HYDROCHLORIDE 50 MG/1
50 TABLET ORAL EVERY 6 HOURS PRN
COMMUNITY

## 2018-07-26 RX ORDER — ESCITALOPRAM OXALATE 10 MG/1
10 TABLET ORAL DAILY
COMMUNITY

## 2018-07-26 RX ORDER — PREDNISONE 1 MG/1
5 TABLET ORAL DAILY
COMMUNITY

## 2018-07-26 RX ORDER — BETAMETHASONE DIPROPIONATE 0.5 MG/G
1 CREAM TOPICAL 2 TIMES DAILY
COMMUNITY

## 2018-07-26 RX ORDER — FUROSEMIDE 40 MG/1
40 TABLET ORAL 2 TIMES DAILY
Status: ON HOLD | COMMUNITY
End: 2018-07-31

## 2018-07-26 RX ORDER — TRIAMCINOLONE ACETONIDE 1 MG/G
1 CREAM TOPICAL 2 TIMES DAILY
Status: ON HOLD | COMMUNITY
End: 2018-07-28 | Stop reason: ALTCHOICE

## 2018-07-26 RX ORDER — CEPHALEXIN 500 MG/1
500 CAPSULE ORAL 3 TIMES DAILY
Qty: 30 CAPSULE | Refills: 0 | Status: ON HOLD | OUTPATIENT
Start: 2018-07-26 | End: 2018-07-31

## 2018-07-26 RX ORDER — TAMSULOSIN HYDROCHLORIDE 0.4 MG/1
0.8 CAPSULE ORAL
COMMUNITY

## 2018-07-26 RX ORDER — HYDROCODONE BITARTRATE AND ACETAMINOPHEN 5; 325 MG/1; MG/1
1 TABLET ORAL EVERY 6 HOURS PRN
Qty: 12 TABLET | Refills: 0 | Status: ON HOLD | OUTPATIENT
Start: 2018-07-26 | End: 2018-07-28 | Stop reason: SINTOL

## 2018-07-26 RX ORDER — GABAPENTIN 600 MG/1
600 TABLET ORAL 3 TIMES DAILY
COMMUNITY

## 2018-07-26 RX ORDER — POTASSIUM CHLORIDE 20 MEQ/1
20 TABLET, EXTENDED RELEASE ORAL 2 TIMES DAILY
COMMUNITY
End: 2018-07-31 | Stop reason: HOSPADM

## 2018-07-26 RX ORDER — CYCLOBENZAPRINE HCL 10 MG
10 TABLET ORAL 3 TIMES DAILY PRN
COMMUNITY
End: 2018-08-23 | Stop reason: HOSPADM

## 2018-07-26 RX ORDER — CARVEDILOL 12.5 MG/1
12.5 TABLET ORAL 2 TIMES DAILY WITH MEALS
COMMUNITY

## 2018-07-26 RX ORDER — SILDENAFIL CITRATE 20 MG/1
20 TABLET ORAL 3 TIMES DAILY
Status: ON HOLD | COMMUNITY
End: 2019-07-19 | Stop reason: ALTCHOICE

## 2018-07-26 RX ORDER — OXYCODONE HYDROCHLORIDE 5 MG/1
5 TABLET ORAL EVERY 4 HOURS PRN
Status: ON HOLD | COMMUNITY
End: 2018-07-28 | Stop reason: SINTOL

## 2018-07-26 RX ORDER — BETAMETHASONE DIPROPIONATE 0.5 MG/G
1 OINTMENT TOPICAL 2 TIMES DAILY
COMMUNITY

## 2018-07-26 RX ORDER — HYDROXYCHLOROQUINE SULFATE 200 MG/1
200 TABLET, FILM COATED ORAL 2 TIMES DAILY
Status: ON HOLD | COMMUNITY
End: 2019-07-19 | Stop reason: ALTCHOICE

## 2018-07-26 NOTE — ED PROVIDER NOTES
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 presents to the emergency department with chief complaint of left calf pain  Onset of symptoms reported as 2 days ago  Location of symptoms reported as the left calf  Quality is reported as sharp cramping pain  Severity reported as severe listed as 9/10 on the pain scale  Associated symptoms:  Denies chest pain  Denies dizziness or syncope  Denies hemoptysis, cough or shortness of breath  Denies joint swelling or redness  Positive for rash to lower extremities which is chronic from lupus  Denies fevers or chills  Denies inability to walk  Positive for pain with ambulation  Modifying factors:  Patient reports walking and weight-bearing exacerbates pain  Context:  Denies any fall injury or trauma to the area  Reports the has pain to the posterior part of the calf which he has had in the past which was related to DVT  Currently not on anticoagulation  Has been taking baclofen without improvement of symptoms  Medical summary: reviewed past visits via [facility], patient last seen on June 6, 2018 in the emergency department for evaluation treatment of low back pain  Follows with primary care physician through [address] provided by:  Patient   used: No    Leg Pain   Associated symptoms: no back pain, no fatigue, no fever and no neck pain        Prior to Admission Medications   Prescriptions Last Dose Informant Patient Reported?  Taking?   baclofen 20 mg tablet  Self Yes Yes   Sig: Take 20 mg by mouth 2 (two) times a day   betamethasone dipropionate (DIPROSONE) 0 05 % cream  Self Yes Yes   Sig: Apply 1 application topically 2 (two) times a day   betamethasone, augmented, (DIPROLENE) 0 05 % ointment  Self Yes Yes   Sig: Apply 1 application topically 2 (two) times a day   carvedilol (COREG) 12 5 mg tablet  Self Yes Yes   Sig: Take 12 5 mg by mouth 2 (two) times a day with meals   cyclobenzaprine (FLEXERIL) 10 mg tablet  Self Yes Yes   Sig: Take 10 mg by mouth 3 (three) times a day as needed for muscle spasms   escitalopram (LEXAPRO) 10 mg tablet  Self Yes Yes   Sig: Take 10 mg by mouth daily   furosemide (LASIX) 40 mg tablet  Self Yes Yes   Sig: Take 40 mg by mouth 2 (two) times a day   gabapentin (NEURONTIN) 600 MG tablet   Yes Yes   Sig: Take 600 mg by mouth daily   hydrocortisone 1 % cream  Self Yes Yes   Sig: Apply 1 application topically 2 (two) times a day   hydrocortisone 1 % ointment   Yes Yes   Sig: Apply 1 application topically 2 (two) times a day   hydroxychloroquine (PLAQUENIL) 200 mg tablet  Self Yes Yes   Sig: Take 200 mg by mouth 2 (two) times a day   lisinopril (ZESTRIL) 40 mg tablet  Self Yes Yes   Sig: Take 40 mg by mouth daily   mycophenolate (CELLCEPT) 500 mg tablet   Yes Yes   Sig: Take 500 mg by mouth every 12 (twelve) hours   oxyCODONE (ROXICODONE) 5 mg immediate release tablet   Yes Yes   Sig: Take 5 mg by mouth every 4 (four) hours as needed for moderate pain   potassium chloride (K-DUR,KLOR-CON) 20 mEq tablet   Yes Yes   Sig: Take 20 mEq by mouth 2 (two) times a day   predniSONE 5 mg tablet   Yes Yes   Sig: Take 5 mg by mouth daily   rivaroxaban (XARELTO) 20 mg tablet   Yes Yes   Sig: Take 20 mg by mouth daily with dinner   sildenafil (REVATIO) 20 mg tablet   Yes Yes   Sig: Take 20 mg by mouth 3 (three) times a day   tamsulosin (FLOMAX) 0 4 mg  Self Yes Yes   Sig: Take 0 8 mg by mouth Medrol Dose Pack scheduling ONLY   traMADol (ULTRAM) 50 mg tablet   Yes Yes   Sig: Take 50 mg by mouth every 6 (six) hours as needed for moderate pain   triamcinolone (KENALOG) 0 1 % cream   Yes Yes   Sig: Apply 1 application topically 2 (two) times a day Facility-Administered Medications: None       Past Medical History:   Diagnosis Date    Depression     [facility])     Hypertension     Lupus     Renal disorder        Past Surgical History:   Procedure Laterality Date    CERVICAL SPINE SURGERY         No family history on file  I have reviewed and agree with the history as documented  Social History   Substance Use Topics    Smoking status: Never Smoker    Smokeless tobacco: Never Used    Alcohol use No        Review of Systems   Constitutional: Negative for activity change, appetite change, chills, diaphoresis, fatigue, fever and unexpected weight change  HENT: Negative for congestion, dental problem, drooling, ear discharge, ear pain, facial swelling, hearing loss, mouth sores, nosebleeds, postnasal drip, rhinorrhea, sinus pain, sinus pressure, sneezing, sore throat, tinnitus, trouble swallowing and voice change  Eyes: Negative for photophobia, pain, discharge, redness, itching and visual disturbance  Respiratory: Negative for apnea, cough, choking, chest tightness, shortness of breath, wheezing and stridor  Cardiovascular: Negative for chest pain, palpitations and leg swelling  Gastrointestinal: Negative for abdominal distention, abdominal pain, anal bleeding, blood in stool, constipation, diarrhea, nausea, rectal pain and vomiting  Endocrine: Negative for cold intolerance, heat intolerance, polydipsia, polyphagia and polyuria  Genitourinary: Negative for difficulty urinating, dysuria, flank pain, frequency, hematuria and urgency  Musculoskeletal: Positive for myalgias  Negative for arthralgias, back pain, gait problem, joint swelling, neck pain and neck stiffness  Skin: Negative for color change, pallor, rash and wound  Allergic/Immunologic: Negative for environmental allergies, food allergies and immunocompromised state     Neurological: Negative for dizziness, tremors, seizures, syncope, facial asymmetry, speech difficulty, weakness, light-headedness, numbness and headaches  Hematological: Negative for adenopathy  Does not bruise/bleed easily  Psychiatric/Behavioral: Negative for agitation, confusion and hallucinations  The patient is not nervous/anxious  All other systems reviewed and are negative  Physical Exam  Physical Exam   Constitutional: He is oriented to person, place, and time  He appears well-developed and well-nourished  No distress  /57 (BP Location: Right arm)   Pulse 89   Temp 99 1 °F (37 3 °C) (Oral)   Resp 18   Ht 5' 6" (1 676 m)   Wt 87 7 kg (193 lb 5 5 oz)   SpO2 98%   BMI 31 21 kg/m²    HENT:   Head: Normocephalic and atraumatic  Right Ear: External ear normal    Left Ear: External ear normal    Nose: Nose normal    Mouth/Throat: Oropharynx is clear and moist  No oropharyngeal exudate  Eyes: Conjunctivae and EOM are normal  Pupils are equal, round, and reactive to light  Right eye exhibits no discharge  Left eye exhibits no discharge  No scleral icterus  Neck: Normal range of motion  Neck supple  No JVD present  No tracheal deviation present  No thyromegaly present  Cardiovascular: Normal rate, regular rhythm and intact distal pulses  Pulmonary/Chest: Effort normal and breath sounds normal  No stridor  No respiratory distress  He has no wheezes  He has no rales  He exhibits no tenderness  Abdominal: Soft  Bowel sounds are normal  He exhibits no distension and no mass  There is no tenderness  There is no rebound and no guarding  No hernia  Musculoskeletal: Normal range of motion  He exhibits tenderness  He exhibits no edema or deformity  There is tenderness to palpation to the left posterior proximal calf  There are multiple circular discoid type rash lesions, red in color, slightly raised but with notable small superficial ulcerations noted throughout the left and right lower extremity  Appears consistent with lupus rash     Lymphadenopathy: He has no cervical adenopathy  Neurological: He is alert and oriented to person, place, and time  He displays normal reflexes  No cranial nerve deficit or sensory deficit  He exhibits normal muscle tone  Coordination normal    Skin: Skin is warm and dry  Capillary refill takes less than 2 seconds  Rash noted  He is not diaphoretic  No erythema  No pallor  Psychiatric: He has a normal mood and affect  His behavior is normal  Judgment and thought content normal    Nursing note and vitals reviewed  Vital Signs  ED Triage Vitals [07/26/18 0827]   Temperature Pulse Respirations Blood Pressure SpO2   99 1 °F (37 3 °C) 89 18 112/57 98 %      Temp Source Heart Rate Source Patient Position - Orthostatic VS BP Location FiO2 (%)   Oral Monitor Sitting Right arm --      Pain Score       9           Vitals:    07/26/18 0827 07/26/18 1040   BP: 112/57    Pulse: 89 89   Patient Position - Orthostatic VS: Sitting Lying       Visual Acuity      ED Medications  Medications - No data to display    Diagnostic Studies  Results Reviewed     None                 VAS lower limb venous duplex study, unilateral/limited    (Results Pending)              Procedures  Procedures       Phone Contacts  ED Phone Contact    ED Course                               MDM  Number of Diagnoses or Management Options  Cellulitis of left leg: new and requires workup  Thrombophlebitis of superficial veins of left lower extremity: new and requires workup  Diagnosis management comments: Differential diagnosis includes but is not limited to cellulitis, DVT, superficial thrombophlebitis, joint infection, musculoskeletal calf pain, muscle spasm, arthritis, rheumatoid arthritis, flare of lupus, tendinitis  Plan check ultrasound to rule out DVT  Ultrasound results reviewed  There are small chronic appearing superficial system clots  Do not extend proximally above the knee  Nonocclusive and noted in the saphenous vein    Discussed all results with patient at bedside  Patient currently not on blood thinners  Avoiding NSAIDs due to history of stomach problems due to lupus in the past   Discussed will treat with analgesics, warm compresses  Patient was noted to have a left inguinal lymph node  Due to small ulcerations from lupus rash will treat with course of cephalexin for presumed early cellulitis  Discussed outpatientFollow up with primary care physician in 2-3 days for recheck  Discussed warm compresses and elevation  Reviewed reasons to return to ed  Patient verbalized understanding of diagnosis and agreement with discharge plan of care as well as understanding of reasons to return to ed  Standard narcotic precautions given  Amount and/or Complexity of Data Reviewed  Tests in the radiology section of CPT®: ordered and reviewed  Discussion of test results with the performing providers: yes  Obtain history from someone other than the patient: yes (Family member at bedside)  Review and summarize past medical records: yes  Independent visualization of images, tracings, or specimens: yes    Patient Progress  Patient progress: stable    CritCare Time    Disposition  Final diagnoses: Thrombophlebitis of superficial veins of left lower extremity   Cellulitis of left leg     Time reflects when diagnosis was documented in both MDM as applicable and the Disposition within this note     Time User Action Codes Description Comment    7/26/2018 10:27 AM Yany Birch Add [I80 02] Thrombophlebitis of superficial veins of left lower extremity     7/26/2018 10:27 AM Yany Birch Add [E28 367] Cellulitis of left leg       ED Disposition     ED Disposition Condition Comment    Discharge  [facility]st discharge to home/self care      Condition at discharge: Stable        Follow-up Information     Follow up With Specialties Details Why Contact Info Additional Asher Velasco MD  Call in 1 day for further evaluation of symptoms [address]       [address] [place] [address] Emergency Department Emergency Medicine Go to If symptoms worsen [address] [facility] [address] [phone] [place] ED, [facility], [facility], [address] [place] [address], [id number]          Discharge Medication List as of 7/26/2018 10:29 AM      START taking these medications    Details   cephalexin (KEFLEX) 500 mg capsule Take 1 capsule (500 mg total) by mouth 3 (three) times a day for 10 days, Starting Thu 7/26/2018, Until Sun 8/5/2018, Print      HYDROcodone-acetaminophen (NORCO) 5-325 mg per tablet Take 1 tablet by mouth every 6 (six) hours as needed (leg pain/initial rx ) for up to 3 days Max Daily Amount: 4 tablets, Starting Thu 7/26/2018, Until Sun 7/29/2018, Print         CONTINUE these medications which have NOT CHANGED    Details   baclofen 20 mg tablet Take 20 mg by mouth 2 (two) times a day, Historical Med      betamethasone dipropionate (DIPROSONE) 0 05 % cream Apply 1 application topically 2 (two) times a day, Historical Med      betamethasone, augmented, (DIPROLENE) 0 05 % ointment Apply 1 application topically 2 (two) times a day, Historical Med      carvedilol (COREG) 12 5 mg tablet Take 12 5 mg by mouth 2 (two) times a day with meals, Historical Med      cyclobenzaprine (FLEXERIL) 10 mg tablet Take 10 mg by mouth 3 (three) times a day as needed for muscle spasms, Historical Med      escitalopram (LEXAPRO) 10 mg tablet Take 10 mg by mouth daily, Historical Med      furosemide (LASIX) 40 mg tablet Take 40 mg by mouth 2 (two) times a day, Historical Med      gabapentin (NEURONTIN) 600 MG tablet Take 600 mg by mouth daily, Historical Med      hydrocortisone 1 % cream Apply 1 application topically 2 (two) times a day, Historical Med      hydrocortisone 1 % ointment Apply 1 application topically 2 (two) times a day, Historical Med      hydroxychloroquine (PLAQUENIL) 200 mg tablet Take 200 mg by mouth 2 (two) times a day, Historical Med      lisinopril (ZESTRIL) 40 mg tablet Take 40 mg by mouth daily, Historical Med      mycophenolate (CELLCEPT) 500 mg tablet Take 500 mg by mouth every 12 (twelve) hours, Historical Med      oxyCODONE (ROXICODONE) 5 mg immediate release tablet Take 5 mg by mouth every 4 (four) hours as needed for moderate pain, Historical Med      potassium chloride (K-DUR,KLOR-CON) 20 mEq tablet Take 20 mEq by mouth 2 (two) times a day, Historical Med      predniSONE 5 mg tablet Take 5 mg by mouth daily, Historical Med      rivaroxaban (XARELTO) 20 mg tablet Take 20 mg by mouth daily with dinner, Historical Med      sildenafil (REVATIO) 20 mg tablet Take 20 mg by mouth 3 (three) times a day, Historical Med      tamsulosin (FLOMAX) 0 4 mg Take 0 8 mg by mouth Medrol Dose Pack scheduling ONLY, Historical Med      traMADol (ULTRAM) 50 mg tablet Take 50 mg by mouth every 6 (six) hours as needed for moderate pain, Historical Med      triamcinolone (KENALOG) 0 1 % cream Apply 1 application topically 2 (two) times a day, Historical Med           No discharge procedures on file      ED Provider  Electronically Signed by           Ham Brown PA-C  07/26/18 6776

## 2018-07-26 NOTE — ED NOTES
Provider at bedside discussing results with patient and family        Emerson Santos RN  07/26/18 1017

## 2018-07-26 NOTE — DISCHARGE INSTRUCTIONS
Superficial Thrombophlebitis   WHAT YOU NEED TO KNOW:   Superficial thrombophlebitis (STP) is inflammation of a vein just under your skin (superficial vein)  The inflammation causes a blood clot to form in your vein  STP most often happens in your leg but may also happen in your arm  DISCHARGE INSTRUCTIONS:   Call 911 for any of the following:   · You feel lightheaded, short of breath, and have chest pain  · You cough up blood  Return to the emergency department if:   · Your leg or arm turns pale or blue  · Your leg or arm feels hot or cold  · Your arm or leg feels warm, tender, and painful  It may look swollen and red  Contact your healthcare provider or hematologist if:   · Your symptoms return after treatment  · You have questions or concerns about your condition or care  Medicines: You may  need any of the following:  · Antibiotics  may be given to treat a bacterial infection  · NSAIDs , such as ibuprofen, help decrease swelling, pain, and fever  NSAIDs can cause stomach bleeding or kidney problems in certain people  If you take blood thinner medicine, always ask your healthcare provider if NSAIDs are safe for you  Always read the medicine label and follow directions  · Blood thinners    help prevent blood clots  Examples of blood thinners include heparin and warfarin  Clots can cause strokes, heart attacks, and death  The following are general safety guidelines to follow while you are taking a blood thinner:    ¨ Watch for bleeding and bruising while you take blood thinners  Watch for bleeding from your gums or nose  Watch for blood in your urine and bowel movements  Use a soft washcloth on your skin, and a soft toothbrush to brush your teeth  This can keep your skin and gums from bleeding  If you shave, use an electric shaver  Do not play contact sports  ¨ Tell your dentist and other healthcare providers that you take anticoagulants   Wear a bracelet or necklace that says you take this medicine  ¨ Do not start or stop any medicines unless your healthcare provider tells you to  Many medicines cannot be used with blood thinners  ¨ Tell your healthcare provider right away if you forget to take the medicine, or if you take too much  ¨ Warfarin  is a blood thinner that you may need to take  The following are things you should be aware of if you take warfarin  § Foods and medicines can affect the amount of warfarin in your blood  Do not make major changes to your diet while you take warfarin  Warfarin works best when you eat about the same amount of vitamin K every day  Vitamin K is found in green leafy vegetables and certain other foods  Ask for more information about what to eat when you are taking warfarin  § You will need to see your healthcare provider for follow-up visits when you are on warfarin  You will need regular blood tests  These tests are used to decide how much medicine you need  · Antiplatelets , such as aspirin, help prevent blood clots  Take your antiplatelet medicine exactly as directed  These medicines make it more likely for you to bleed or bruise  If you are told to take aspirin, do not take acetaminophen or ibuprofen instead  · Take your medicine as directed  Contact your healthcare provider if you think your medicine is not helping or if you have side effects  Tell him of her if you are allergic to any medicine  Keep a list of the medicines, vitamins, and herbs you take  Include the amounts, and when and why you take them  Bring the list or the pill bottles to follow-up visits  Carry your medicine list with you in case of an emergency  Follow up with your healthcare provider as directed:  Write down your questions so you remember to ask them during your visits  Manage your symptoms and prevent STP:  STP can increase your risk for a blood clot in deeper veins in your arms or legs   It can also increase your risk for a blood clot in your lungs  Do the following to decrease your risk for more blood clots and manage your symptoms:  · Wear pressure stockings as directed  Pressure stockings improve blood flow and help prevent clots in your legs  Wear the stockings during the day  Do not wear them when you sleep  · Elevate your leg or arm above the level of your heart as often as you can  This will help decrease swelling and pain  Prop your leg or arm on pillows or blankets to keep it elevated comfortably  · Apply a warm compress to your arm or leg  This will help decrease swelling and pain  Wet a washcloth in warm water  Do not  use hot water  Apply the warm compress for 10 minutes  Repeat this 4 times each day  · Maintain a healthy weight  This will help decrease your risk for another blood clot  Ask your healthcare provider how much you should weigh  Ask him or her to help you create a weight loss plan if you are overweight  · Do not smoke  Nicotine and other chemicals in cigarettes and cigars can damage blood vessels and increase your risk for blood clots  Ask your healthcare provider for information if you currently smoke and need help to quit  E-cigarettes or smokeless tobacco still contain nicotine  Talk to your healthcare provider before you use these products  · Stay active  Activity helps prevent blood clots  Do not sit for more than an hour  If you travel by car or work at a desk, move and stretch in your seat several times each hour  In an airplane, get up and walk every hour  Exercise your legs while you are sitting by raising and lowering your heels  Keep your toes on the floor while you do this  You can also raise and lower your toes while keeping your heels on the floor  Also tighten and release your leg muscles while you are sitting  · Exercise regularly  Exercise can help increase your blood flow and prevent a blood clot  Walking is a good low-impact exercise   Talk to your healthcare provider about the best exercise plan for you  · Do not inject illegal drugs  Talk to your healthcare provider if you use IV drugs and need help to quit  © 2017 [address] [employer] Information is for End User's use only and may not be sold, redistributed or otherwise used for commercial purposes  All illustrations and images included in CareNotes® are the copyrighted property of [employer]  or Jareth Cabrales  The above information is an  only  It is not intended as medical advice for individual conditions or treatments  Talk to your doctor, nurse or pharmacist before following any medical regimen to see if it is safe and effective for you  Cellulitis   WHAT YOU NEED TO KNOW:   Cellulitis is a skin infection caused by bacteria  Cellulitis may go away on its own or you may need treatment  Your healthcare provider may draw a [place] around the outside edges of your cellulitis  If your cellulitis spreads, your healthcare provider will see it outside of the [place]  DISCHARGE INSTRUCTIONS:   Call 911 if:   · You have sudden trouble breathing or chest pain  Return to the emergency department if:   · Your wound gets larger and more painful  · You feel a crackling under your skin when you touch it  · You have purple dots or bumps on your skin, or you see bleeding under your skin  · You have new swelling and pain in your legs  · The red, warm, swollen area gets larger  · You see red streaks coming from the infected area  Contact your healthcare provider if:   · You have a fever  · Your fever or pain does not go away or gets worse  · The area does not get smaller after 2 days of antibiotics  · Your skin is flaking or peeling off  · You have questions or concerns about your condition or care  Medicines:   · Antibiotics  help treat the bacterial infection  · NSAIDs , such as ibuprofen, help decrease swelling, pain, and fever   NSAIDs can cause stomach bleeding or kidney problems in certain people  If you take blood thinner medicine, always ask if NSAIDs are safe for you  Always read the medicine label and follow directions  Do not give these medicines to children under 10months of age without direction from your child's healthcare provider  · Acetaminophen  decreases pain and fever  It is available without a doctor's order  Ask how much to take and how often to take it  Follow directions  Read the labels of all other medicines you are using to see if they also contain acetaminophen, or ask your doctor or pharmacist  Acetaminophen can cause liver damage if not taken correctly  Do not use more than 4 grams (4,000 milligrams) total of acetaminophen in one day  · Take your medicine as directed  Contact your healthcare provider if you think your medicine is not helping or if you have side effects  Tell him or her if you are allergic to any medicine  Keep a list of the medicines, vitamins, and herbs you take  Include the amounts, and when and why you take them  Bring the list or the pill bottles to follow-up visits  Carry your medicine list with you in case of an emergency  Self-care:   · Elevate the area above the level of your heart  as often as you can  This will help decrease swelling and pain  Prop the area on pillows or blankets to keep it elevated comfortably  · Clean the area daily until the wound scabs over  Gently wash the area with soap and water  Pat dry  Use dressings as directed  · Place cool or warm, wet cloths on the area as directed  Use clean cloths and clean water  Leave it on the area until the cloth is room temperature  Pat the area dry with a clean, dry cloth  The cloths may help decrease pain  Prevent cellulitis:   · Do not scratch bug bites or areas of injury  You increase your risk for cellulitis by scratching these areas  · Do not share personal items, such as towels, clothing, and razors       · Clean exercise equipment  with germ-killing  before and after you use it  · Wash your hands often  Use soap and water  Wash your hands after you use the bathroom, change a child's diapers, or sneeze  Wash your hands before you prepare or eat food  Use lotion to prevent dry, cracked skin  · Wear pressure stockings as directed  You may be told to wear the stockings if you have peripheral edema  The stockings improve blood flow and decrease swelling  · Treat athlete's foot  This can help prevent the spread of a bacterial skin infection  Follow up with your healthcare provider within 3 days, or as directed: Your healthcare provider will check if your cellulitis is getting better  You may need different medicine  Write down your questions so you remember to ask them during your visits  © 2017 [employer] [employer] Information is for End User's use only and may not be sold, redistributed or otherwise used for commercial purposes  All illustrations and images included in CareNotes® are the copyrighted property of [employer]  or Jareth Cabrales  The above information is an  only  It is not intended as medical advice for individual conditions or treatments  Talk to your doctor, nurse or pharmacist before following any medical regimen to see if it is safe and effective for you

## 2018-07-28 ENCOUNTER — HOSPITAL ENCOUNTER (INPATIENT)
Facility: HOSPITAL | Age: 48
LOS: 3 days | Discharge: HOME | DRG: 383 | End: 2018-07-31
Attending: EMERGENCY MEDICINE | Admitting: INTERNAL MEDICINE
Payer: COMMERCIAL

## 2018-07-28 ENCOUNTER — APPOINTMENT (EMERGENCY)
Dept: CT IMAGING | Facility: HOSPITAL | Age: 48
DRG: 383 | End: 2018-07-28
Payer: COMMERCIAL

## 2018-07-28 DIAGNOSIS — Z86.718 HISTORY OF DVT (DEEP VEIN THROMBOSIS): ICD-10-CM

## 2018-07-28 DIAGNOSIS — R05.9 COUGH: ICD-10-CM

## 2018-07-28 DIAGNOSIS — M79.605 PAIN OF LEFT LOWER EXTREMITY: ICD-10-CM

## 2018-07-28 DIAGNOSIS — E87.5 HYPERKALEMIA: Primary | ICD-10-CM

## 2018-07-28 DIAGNOSIS — L03.116 CELLULITIS OF LEFT LEG: ICD-10-CM

## 2018-07-28 LAB
ABO GROUP BLD: NORMAL
ANION GAP SERPL CALCULATED.3IONS-SCNC: 7 MMOL/L (ref 4–13)
ANISOCYTOSIS BLD QL SMEAR: PRESENT
APTT PPP: 37 SECONDS (ref 24–36)
BASOPHILS # BLD MANUAL: 0 THOUSAND/UL (ref 0–0.1)
BASOPHILS NFR MAR MANUAL: 0 % (ref 0–1)
BLD GP AB SCN SERPL QL: NEGATIVE
BUN SERPL-MCNC: 42 MG/DL (ref 5–25)
CALCIUM SERPL-MCNC: 8.9 MG/DL (ref 8.3–10.1)
CHLORIDE SERPL-SCNC: 103 MMOL/L (ref 100–108)
CO2 SERPL-SCNC: 24 MMOL/L (ref 21–32)
CREAT SERPL-MCNC: 1.71 MG/DL (ref 0.6–1.3)
EOSINOPHIL # BLD MANUAL: 0 THOUSAND/UL (ref 0–0.4)
EOSINOPHIL NFR BLD MANUAL: 0 % (ref 0–6)
ERYTHROCYTE [DISTWIDTH] IN BLOOD BY AUTOMATED COUNT: 13.9 % (ref 11.6–15.1)
GFR SERPL CREATININE-BSD FRML MDRD: 53 ML/MIN/1.73SQ M
GLUCOSE SERPL-MCNC: 101 MG/DL (ref 65–140)
GLUCOSE SERPL-MCNC: 106 MG/DL (ref 65–140)
HCT VFR BLD AUTO: 31.9 % (ref 36.5–49.3)
HGB BLD-MCNC: 10.2 G/DL (ref 12–17)
INR PPP: 1.42 (ref 0.86–1.17)
LYMPHOCYTES # BLD AUTO: 0.68 THOUSAND/UL (ref 0.6–4.47)
LYMPHOCYTES # BLD AUTO: 11 % (ref 14–44)
MCH RBC QN AUTO: 30.1 PG (ref 26.8–34.3)
MCHC RBC AUTO-ENTMCNC: 32 G/DL (ref 31.4–37.4)
MCV RBC AUTO: 94 FL (ref 82–98)
MONOCYTES # BLD AUTO: 0.68 THOUSAND/UL (ref 0–1.22)
MONOCYTES NFR BLD: 11 % (ref 4–12)
NEUTROPHILS # BLD MANUAL: 4.83 THOUSAND/UL (ref 1.85–7.62)
NEUTS SEG NFR BLD AUTO: 78 % (ref 43–75)
NRBC BLD AUTO-RTO: 0 /100 WBCS
PLATELET # BLD AUTO: 286 THOUSANDS/UL (ref 149–390)
PLATELET BLD QL SMEAR: ADEQUATE
PMV BLD AUTO: 10.1 FL (ref 8.9–12.7)
POTASSIUM SERPL-SCNC: 6.8 MMOL/L (ref 3.5–5.3)
POTASSIUM SERPL-SCNC: 6.8 MMOL/L (ref 3.5–5.3)
PROTHROMBIN TIME: 17.2 SECONDS (ref 11.8–14.2)
RBC # BLD AUTO: 3.39 MILLION/UL (ref 3.88–5.62)
RH BLD: POSITIVE
SODIUM SERPL-SCNC: 134 MMOL/L (ref 136–145)
SPECIMEN EXPIRATION DATE: NORMAL
TOTAL CELLS COUNTED SPEC: 100
TROPONIN I SERPL-MCNC: <0.02 NG/ML
WBC # BLD AUTO: 6.19 THOUSAND/UL (ref 4.31–10.16)

## 2018-07-28 PROCEDURE — 99285 EMERGENCY DEPT VISIT HI MDM: CPT

## 2018-07-28 PROCEDURE — 71275 CT ANGIOGRAPHY CHEST: CPT

## 2018-07-28 PROCEDURE — 86850 RBC ANTIBODY SCREEN: CPT | Performed by: EMERGENCY MEDICINE

## 2018-07-28 PROCEDURE — 93005 ELECTROCARDIOGRAM TRACING: CPT

## 2018-07-28 PROCEDURE — 86901 BLOOD TYPING SEROLOGIC RH(D): CPT | Performed by: EMERGENCY MEDICINE

## 2018-07-28 PROCEDURE — 85730 THROMBOPLASTIN TIME PARTIAL: CPT | Performed by: EMERGENCY MEDICINE

## 2018-07-28 PROCEDURE — 85027 COMPLETE CBC AUTOMATED: CPT | Performed by: EMERGENCY MEDICINE

## 2018-07-28 PROCEDURE — 85007 BL SMEAR W/DIFF WBC COUNT: CPT | Performed by: EMERGENCY MEDICINE

## 2018-07-28 PROCEDURE — 84132 ASSAY OF SERUM POTASSIUM: CPT | Performed by: EMERGENCY MEDICINE

## 2018-07-28 PROCEDURE — 82948 REAGENT STRIP/BLOOD GLUCOSE: CPT

## 2018-07-28 PROCEDURE — 86900 BLOOD TYPING SEROLOGIC ABO: CPT | Performed by: EMERGENCY MEDICINE

## 2018-07-28 PROCEDURE — 36415 COLL VENOUS BLD VENIPUNCTURE: CPT | Performed by: EMERGENCY MEDICINE

## 2018-07-28 PROCEDURE — 85610 PROTHROMBIN TIME: CPT | Performed by: EMERGENCY MEDICINE

## 2018-07-28 PROCEDURE — 84484 ASSAY OF TROPONIN QUANT: CPT | Performed by: EMERGENCY MEDICINE

## 2018-07-28 PROCEDURE — 80048 BASIC METABOLIC PNL TOTAL CA: CPT | Performed by: EMERGENCY MEDICINE

## 2018-07-28 RX ORDER — DEXTROSE MONOHYDRATE 25 G/50ML
25 INJECTION, SOLUTION INTRAVENOUS ONCE
Status: COMPLETED | OUTPATIENT
Start: 2018-07-28 | End: 2018-07-28

## 2018-07-28 RX ORDER — DEXTROSE MONOHYDRATE 50 MG/ML
50 INJECTION, SOLUTION INTRAVENOUS CONTINUOUS
Status: DISCONTINUED | OUTPATIENT
Start: 2018-07-28 | End: 2018-07-29

## 2018-07-28 RX ORDER — SODIUM POLYSTYRENE SULFONATE 15 G/60ML
15 SUSPENSION ORAL; RECTAL ONCE
Status: COMPLETED | OUTPATIENT
Start: 2018-07-28 | End: 2018-07-28

## 2018-07-28 RX ADMIN — SODIUM POLYSTYRENE SULFONATE 15 G: 15 SUSPENSION ORAL; RECTAL at 22:05

## 2018-07-28 RX ADMIN — IOHEXOL 85 ML: 350 INJECTION, SOLUTION INTRAVENOUS at 21:38

## 2018-07-28 RX ADMIN — INSULIN HUMAN 10 UNITS: 100 INJECTION, SOLUTION PARENTERAL at 22:08

## 2018-07-28 RX ADMIN — DEXTROSE MONOHYDRATE 25 ML: 25 INJECTION, SOLUTION INTRAVENOUS at 22:07

## 2018-07-28 RX ADMIN — DEXTROSE 50 ML/HR: 5 SOLUTION INTRAVENOUS at 22:08

## 2018-07-28 NOTE — ED PROVIDER NOTES
History  Chief Complaint   Patient presents with    Leg Pain     Pt presents to ED with severe L leg pain and bruising x 1 week  Pt states he cannot ambulate on leg      Progressive L leg pain, seen here this week for same although sxs worsened since that time  On xarelto, compliant w same  No fever or chills but c/o cough that is new as well as dyspnea which is worse w exertion  No recent injury or trauma to leg  Pain worse w movement, better w rest  Currently symptomatic  Prior to Admission Medications   Prescriptions Last Dose Informant Patient Reported? Taking?    HYDROcodone-acetaminophen (NORCO) 5-325 mg per tablet   No No   Sig: Take 1 tablet by mouth every 6 (six) hours as needed (leg pain/initial rx ) for up to 3 days Max Daily Amount: 4 tablets   baclofen 20 mg tablet  Self Yes No   Sig: Take 20 mg by mouth 2 (two) times a day   betamethasone dipropionate (DIPROSONE) 0 05 % cream  Self Yes No   Sig: Apply 1 application topically 2 (two) times a day   betamethasone, augmented, (DIPROLENE) 0 05 % ointment  Self Yes No   Sig: Apply 1 application topically 2 (two) times a day   carvedilol (COREG) 12 5 mg tablet  Self Yes No   Sig: Take 12 5 mg by mouth 2 (two) times a day with meals   cephalexin (KEFLEX) 500 mg capsule   No No   Sig: Take 1 capsule (500 mg total) by mouth 3 (three) times a day for 10 days   cyclobenzaprine (FLEXERIL) 10 mg tablet  Self Yes No   Sig: Take 10 mg by mouth 3 (three) times a day as needed for muscle spasms   escitalopram (LEXAPRO) 10 mg tablet  Self Yes No   Sig: Take 10 mg by mouth daily   furosemide (LASIX) 40 mg tablet  Self Yes No   Sig: Take 40 mg by mouth 2 (two) times a day   gabapentin (NEURONTIN) 600 MG tablet   Yes No   Sig: Take 600 mg by mouth daily   hydrocortisone 1 % cream  Self Yes No   Sig: Apply 1 application topically 2 (two) times a day   hydrocortisone 1 % ointment   Yes No   Sig: Apply 1 application topically 2 (two) times a day hydroxychloroquine (PLAQUENIL) 200 mg tablet  Self Yes No   Sig: Take 200 mg by mouth 2 (two) times a day   lisinopril (ZESTRIL) 40 mg tablet  Self Yes No   Sig: Take 40 mg by mouth daily   mycophenolate (CELLCEPT) 500 mg tablet   Yes No   Sig: Take 500 mg by mouth every 12 (twelve) hours   oxyCODONE (ROXICODONE) 5 mg immediate release tablet   Yes No   Sig: Take 5 mg by mouth every 4 (four) hours as needed for moderate pain   potassium chloride (K-DUR,KLOR-CON) 20 mEq tablet   Yes No   Sig: Take 20 mEq by mouth 2 (two) times a day   predniSONE 5 mg tablet   Yes No   Sig: Take 5 mg by mouth daily   rivaroxaban (XARELTO) 20 mg tablet   Yes No   Sig: Take 20 mg by mouth daily with dinner   sildenafil (REVATIO) 20 mg tablet   Yes No   Sig: Take 20 mg by mouth 3 (three) times a day   tamsulosin (FLOMAX) 0 4 mg  Self Yes No   Sig: Take 0 8 mg by mouth Medrol Dose Pack scheduling ONLY   traMADol (ULTRAM) 50 mg tablet   Yes No   Sig: Take 50 mg by mouth every 6 (six) hours as needed for moderate pain   triamcinolone (KENALOG) 0 1 % cream   Yes No   Sig: Apply 1 application topically 2 (two) times a day      Facility-Administered Medications: None       Past Medical History:   Diagnosis Date    Cervical mass     Depression     DVT (deep venous thrombosis) (HCC)     Hypertension     Lupus     Renal disorder        Past Surgical History:   Procedure Laterality Date    CERVICAL SPINE SURGERY      NECK SURGERY         History reviewed  No pertinent family history  I have reviewed and agree with the history as documented  Social History   Substance Use Topics    Smoking status: Never Smoker    Smokeless tobacco: Never Used    Alcohol use No        Review of Systems   Constitutional: Positive for fatigue  Negative for chills, diaphoresis and fever  Respiratory: Positive for cough and shortness of breath  Negative for apnea and chest tightness  Cardiovascular: Positive for leg swelling   Negative for chest pain and palpitations  Gastrointestinal: Negative for nausea and vomiting  Genitourinary: Negative for difficulty urinating, frequency and hematuria  Musculoskeletal: Negative for arthralgias and myalgias  Skin: Negative for color change, pallor and rash  Neurological: Negative for dizziness, seizures, weakness and headaches  Hematological: Negative for adenopathy  Does not bruise/bleed easily  Physical Exam  Physical Exam   Constitutional: He is oriented to person, place, and time  He appears well-developed and well-nourished  No distress  HENT:   Head: Normocephalic and atraumatic  Eyes: EOM are normal  Pupils are equal, round, and reactive to light  Neck: Normal range of motion  Neck supple  No JVD present  Cardiovascular: Normal rate and regular rhythm  Pulmonary/Chest: Effort normal and breath sounds normal  No stridor  Musculoskeletal: Normal range of motion  He exhibits no deformity  Symmetric swelling and tenderness L leg, normal distal perfusion and sensation   Neurological: He is alert and oriented to person, place, and time  No cranial nerve deficit or sensory deficit  He exhibits normal muscle tone  Coordination normal    Skin: Skin is warm and dry  Capillary refill takes less than 2 seconds  He is not diaphoretic  Nursing note and vitals reviewed        Vital Signs  ED Triage Vitals [07/28/18 1840]   Temperature Pulse Respirations Blood Pressure SpO2   98 7 °F (37 1 °C) 94 17 101/64 96 %      Temp Source Heart Rate Source Patient Position - Orthostatic VS BP Location FiO2 (%)   Oral Monitor Lying Right arm --      Pain Score       Worst Possible Pain           Vitals:    07/28/18 1840 07/28/18 2123   BP: 101/64 116/67   Pulse: 94 77   Patient Position - Orthostatic VS: Lying Lying       Visual Acuity      ED Medications  Medications   insulin regular (HumuLIN R,NovoLIN R) injection 10 Units (not administered)   dextrose 50 % IV solution 25 mL (not administered) dextrose 5 % infusion (not administered)   sodium polystyrene sulfonate (KAYEXALATE) oral suspension 15 g (not administered)   iohexol (OMNIPAQUE) 350 MG/ML injection (MULTI-DOSE) 85 mL (85 mL Intravenous Given 7/28/18 2138)       Diagnostic Studies  Results Reviewed     Procedure Component Value Units Date/Time    Fingerstick Glucose (POCT) [[id number]]  (Normal) Collected:  07/28/18 2123    Lab Status:  Final result Updated:  07/28/18 2125     POC Glucose 106 mg/dl     Potassium [[id number]]  (Abnormal) Collected:  07/28/18 2022    Lab Status:  Final result Specimen:  Blood from Arm, Left Updated:  07/28/18 2054     Potassium 6 8 (HH) mmol/L     Troponin I [[id number]]  (Normal) Collected:  07/28/18 1932    Lab Status:  Final result Specimen:  Blood from Arm, Right Updated:  07/28/18 2008     Troponin I <0 02 ng/mL     Basic metabolic panel [[id number]]  (Abnormal) Collected:  07/28/18 1932    Lab Status:  Final result Specimen:  Blood from Arm, Right Updated:  07/28/18 2006     Sodium 134 (L) mmol/L      Potassium 6 8 (HH) mmol/L      Chloride 103 mmol/L      CO2 24 mmol/L      Anion Gap 7 mmol/L      BUN 42 (H) mg/dL      Creatinine 1 71 (H) mg/dL      Glucose 101 mg/dL      Calcium 8 9 mg/dL      eGFR 53 ml/min/1 73sq m     Narrative:         National Kidney Disease Education Program recommendations are as follows:  GFR calculation is accurate only with a steady state creatinine  Chronic Kidney disease less than 60 ml/min/1 73 sq  meters  Kidney failure less than 15 ml/min/1 73 sq  meters      CBC and differential [[id number]]  (Abnormal) Collected:  07/28/18 1932    Lab Status:  Final result Specimen:  Blood from Arm, Right Updated:  07/28/18 2006     WBC 6 19 Thousand/uL      RBC 3 39 (L) Million/uL      Hemoglobin 10 2 (L) g/dL      Hematocrit 31 9 (L) %      MCV 94 fL      MCH 30 1 pg      MCHC 32 0 g/dL      RDW 13 9 %      MPV 10 1 fL      Platelets 817 Thousands/uL      nRBC 0 /100 WBCs     APTT [[id number]] (Abnormal) Collected:  07/28/18 1932    Lab Status:  Final result Specimen:  Blood from Arm, Right Updated:  07/28/18 2000     PTT 37 (H) seconds     Protime-INR [[id number]]  (Abnormal) Collected:  07/28/18 1932    Lab Status:  Final result Specimen:  Blood from Arm, Right Updated:  07/28/18 2000     Protime 17 2 (H) seconds      INR 1 42 (H)                 CTA ED chest PE study    (Results Pending)              Procedures  ECG 12 Lead Documentation  Date/Time: 7/28/2018 8:57 PM  Performed by: Yas Cloud  Authorized by: Kathleen POOL     Indications / Diagnosis:  Cough  ECG reviewed by me, the ED Provider: yes    Patient location:  ED  Previous ECG:     Previous ECG:  Compared to current  Rate:     ECG rate:  85    ECG rate assessment: normal    Comments:      Inferior Q waves, not significantly changed from prior  No acute ischemic changes  CriticalCare Time  Performed by: Yas Cloud  Authorized by: Yas Cloud     Critical care provider statement:     Critical care time (minutes):  35    Critical care was necessary to treat or prevent imminent or life-threatening deterioration of the following conditions:  Metabolic crisis  Comments:      tx for hyperkalemia           Phone Contacts  ED Phone Contact    ED Course                               MDM  Number of Diagnoses or Management Options     Amount and/or Complexity of Data Reviewed  Clinical lab tests: reviewed and ordered  Tests in the radiology section of CPT®: ordered and reviewed  Tests in the medicine section of CPT®: ordered and reviewed  Discuss the patient with other providers: yes      The patient presented with a condition in which there was a high probability of imminent or life-threatening deterioration, and critical care services (excluding separately billable procedures) totalled 30-74 minutes          Disposition  Final diagnoses:   Hyperkalemia   Pain of left lower extremity   History of DVT (deep vein thrombosis)   Cough     Time reflects when diagnosis was documented in both MDM as applicable and the Disposition within this note     Time User Action Codes Description Comment    7/28/2018  9:45 PM Jilda Genevieve Add [E87 5] Hyperkalemia     7/28/2018  9:45 PM Jilda Genevieve Add [M79 605] Pain of left lower extremity     7/28/2018  9:45 PM Jilda Genevieve Add [J03 [id number]] History of DVT (deep vein thrombosis)     7/28/2018  9:45 PM Janny Delgado Add [R05] Cough       ED Disposition     ED Disposition Condition Comment    Admit  Case was discussed with Dr Aaron Gavin and the patient's admission status was agreed to be Admission Status: inpatient status to the service of Dr Aaron Gavin   Follow-up Information    None         Patient's Medications   Discharge Prescriptions    No medications on file     No discharge procedures on file      ED Provider  Electronically Signed by           Magaly Walker MD  07/28/18 0572

## 2018-07-29 PROBLEM — N18.30 CHRONIC KIDNEY DISEASE, STAGE 3 (HCC): Status: ACTIVE | Noted: 2018-07-29

## 2018-07-29 PROBLEM — M32.14 LUPUS NEPHRITIS (HCC): Status: ACTIVE | Noted: 2018-07-29

## 2018-07-29 PROBLEM — R80.1 PERSISTENT PROTEINURIA: Status: ACTIVE | Noted: 2018-07-29

## 2018-07-29 PROBLEM — Z86.718 HISTORY OF DVT (DEEP VEIN THROMBOSIS): Status: ACTIVE | Noted: 2018-07-29

## 2018-07-29 PROBLEM — I10 HYPERTENSION: Status: ACTIVE | Noted: 2018-07-29

## 2018-07-29 PROBLEM — E87.5 HYPERKALEMIA: Status: ACTIVE | Noted: 2018-07-29

## 2018-07-29 PROBLEM — M32.9 LUPUS (SYSTEMIC LUPUS ERYTHEMATOSUS) (HCC): Status: ACTIVE | Noted: 2018-07-29

## 2018-07-29 PROBLEM — L02.416 CELLULITIS AND ABSCESS OF LEFT LOWER EXTREMITY: Status: ACTIVE | Noted: 2018-07-29

## 2018-07-29 PROBLEM — L03.116 CELLULITIS AND ABSCESS OF LEFT LOWER EXTREMITY: Status: ACTIVE | Noted: 2018-07-29

## 2018-07-29 PROBLEM — N17.9 ACUTE KIDNEY INJURY (HCC): Status: ACTIVE | Noted: 2018-07-29

## 2018-07-29 LAB
ANION GAP SERPL CALCULATED.3IONS-SCNC: 9 MMOL/L (ref 4–13)
ANION GAP SERPL CALCULATED.3IONS-SCNC: 9 MMOL/L (ref 4–13)
ATRIAL RATE: 85 BPM
BACTERIA UR QL AUTO: NORMAL /HPF
BILIRUB UR QL STRIP: NEGATIVE
BUN SERPL-MCNC: 37 MG/DL (ref 5–25)
BUN SERPL-MCNC: 39 MG/DL (ref 5–25)
C3 SERPL-MCNC: 123 MG/DL (ref 90–180)
C4 SERPL-MCNC: 53 MG/DL (ref 10–40)
CALCIUM SERPL-MCNC: 8.6 MG/DL (ref 8.3–10.1)
CALCIUM SERPL-MCNC: 9.1 MG/DL (ref 8.3–10.1)
CHLORIDE SERPL-SCNC: 103 MMOL/L (ref 100–108)
CHLORIDE SERPL-SCNC: 103 MMOL/L (ref 100–108)
CK SERPL-CCNC: 107 U/L (ref 39–308)
CLARITY UR: CLEAR
CO2 SERPL-SCNC: 23 MMOL/L (ref 21–32)
CO2 SERPL-SCNC: 23 MMOL/L (ref 21–32)
COLOR UR: YELLOW
CREAT SERPL-MCNC: 1.62 MG/DL (ref 0.6–1.3)
CREAT SERPL-MCNC: 1.77 MG/DL (ref 0.6–1.3)
CREAT UR-MCNC: 129 MG/DL
GFR SERPL CREATININE-BSD FRML MDRD: 51 ML/MIN/1.73SQ M
GFR SERPL CREATININE-BSD FRML MDRD: 56 ML/MIN/1.73SQ M
GLUCOSE SERPL-MCNC: 103 MG/DL (ref 65–140)
GLUCOSE SERPL-MCNC: 98 MG/DL (ref 65–140)
GLUCOSE UR STRIP-MCNC: NEGATIVE MG/DL
HGB UR QL STRIP.AUTO: NEGATIVE
KETONES UR STRIP-MCNC: NEGATIVE MG/DL
LEUKOCYTE ESTERASE UR QL STRIP: NEGATIVE
NITRITE UR QL STRIP: NEGATIVE
NON-SQ EPI CELLS URNS QL MICRO: NORMAL /HPF
P AXIS: 51 DEGREES
PH UR STRIP.AUTO: 6 [PH] (ref 4.5–8)
POTASSIUM SERPL-SCNC: 5.5 MMOL/L (ref 3.5–5.3)
POTASSIUM SERPL-SCNC: 5.5 MMOL/L (ref 3.5–5.3)
POTASSIUM SERPL-SCNC: 5.8 MMOL/L (ref 3.5–5.3)
PR INTERVAL: 146 MS
PROT UR STRIP-MCNC: NEGATIVE MG/DL
PROT UR-MCNC: 18 MG/DL
PROT/CREAT UR: 0.14 MG/G{CREAT} (ref 0–0.1)
QRS AXIS: 74 DEGREES
QRSD INTERVAL: 86 MS
QT INTERVAL: 314 MS
QTC INTERVAL: 373 MS
RBC #/AREA URNS AUTO: NORMAL /HPF
SODIUM SERPL-SCNC: 135 MMOL/L (ref 136–145)
SODIUM SERPL-SCNC: 135 MMOL/L (ref 136–145)
SP GR UR STRIP.AUTO: 1.01 (ref 1–1.03)
T WAVE AXIS: 12 DEGREES
UROBILINOGEN UR QL STRIP.AUTO: 0.2 E.U./DL
VENTRICULAR RATE: 85 BPM
WBC #/AREA URNS AUTO: NORMAL /HPF

## 2018-07-29 PROCEDURE — 86038 ANTINUCLEAR ANTIBODIES: CPT | Performed by: INTERNAL MEDICINE

## 2018-07-29 PROCEDURE — 86225 DNA ANTIBODY NATIVE: CPT | Performed by: INTERNAL MEDICINE

## 2018-07-29 PROCEDURE — 86162 COMPLEMENT TOTAL (CH50): CPT | Performed by: INTERNAL MEDICINE

## 2018-07-29 PROCEDURE — 87205 SMEAR GRAM STAIN: CPT | Performed by: INTERNAL MEDICINE

## 2018-07-29 PROCEDURE — 82550 ASSAY OF CK (CPK): CPT | Performed by: INTERNAL MEDICINE

## 2018-07-29 PROCEDURE — 99232 SBSQ HOSP IP/OBS MODERATE 35: CPT | Performed by: FAMILY MEDICINE

## 2018-07-29 PROCEDURE — 84132 ASSAY OF SERUM POTASSIUM: CPT | Performed by: INTERNAL MEDICINE

## 2018-07-29 PROCEDURE — 80048 BASIC METABOLIC PNL TOTAL CA: CPT | Performed by: INTERNAL MEDICINE

## 2018-07-29 PROCEDURE — 81001 URINALYSIS AUTO W/SCOPE: CPT | Performed by: INTERNAL MEDICINE

## 2018-07-29 PROCEDURE — 99223 1ST HOSP IP/OBS HIGH 75: CPT | Performed by: INTERNAL MEDICINE

## 2018-07-29 PROCEDURE — 86160 COMPLEMENT ANTIGEN: CPT | Performed by: INTERNAL MEDICINE

## 2018-07-29 PROCEDURE — 99255 IP/OBS CONSLTJ NEW/EST HI 80: CPT | Performed by: INTERNAL MEDICINE

## 2018-07-29 PROCEDURE — 84156 ASSAY OF PROTEIN URINE: CPT | Performed by: INTERNAL MEDICINE

## 2018-07-29 PROCEDURE — 82570 ASSAY OF URINE CREATININE: CPT | Performed by: INTERNAL MEDICINE

## 2018-07-29 PROCEDURE — 93010 ELECTROCARDIOGRAM REPORT: CPT | Performed by: INTERNAL MEDICINE

## 2018-07-29 RX ORDER — CARVEDILOL 6.25 MG/1
6.25 TABLET ORAL 2 TIMES DAILY WITH MEALS
Status: DISCONTINUED | OUTPATIENT
Start: 2018-07-29 | End: 2018-07-31 | Stop reason: HOSPADM

## 2018-07-29 RX ORDER — TAMSULOSIN HYDROCHLORIDE 0.4 MG/1
0.8 CAPSULE ORAL
Status: DISCONTINUED | OUTPATIENT
Start: 2018-07-29 | End: 2018-07-31 | Stop reason: HOSPADM

## 2018-07-29 RX ORDER — CARVEDILOL 12.5 MG/1
12.5 TABLET ORAL 2 TIMES DAILY WITH MEALS
Status: DISCONTINUED | OUTPATIENT
Start: 2018-07-29 | End: 2018-07-29

## 2018-07-29 RX ORDER — FUROSEMIDE 10 MG/ML
40 INJECTION INTRAMUSCULAR; INTRAVENOUS ONCE
Status: COMPLETED | OUTPATIENT
Start: 2018-07-29 | End: 2018-07-29

## 2018-07-29 RX ORDER — BACLOFEN 10 MG/1
20 TABLET ORAL 2 TIMES DAILY
Status: DISCONTINUED | OUTPATIENT
Start: 2018-07-29 | End: 2018-07-31 | Stop reason: HOSPADM

## 2018-07-29 RX ORDER — TRAMADOL HYDROCHLORIDE 50 MG/1
50 TABLET ORAL EVERY 6 HOURS PRN
Status: DISCONTINUED | OUTPATIENT
Start: 2018-07-29 | End: 2018-07-31 | Stop reason: HOSPADM

## 2018-07-29 RX ORDER — HYDROXYCHLOROQUINE SULFATE 200 MG/1
200 TABLET, FILM COATED ORAL 2 TIMES DAILY
Status: DISCONTINUED | OUTPATIENT
Start: 2018-07-29 | End: 2018-07-31 | Stop reason: HOSPADM

## 2018-07-29 RX ORDER — MYCOPHENOLATE MOFETIL 250 MG/1
1000 CAPSULE ORAL EVERY 12 HOURS SCHEDULED
Status: DISCONTINUED | OUTPATIENT
Start: 2018-07-29 | End: 2018-07-31 | Stop reason: HOSPADM

## 2018-07-29 RX ORDER — ESCITALOPRAM OXALATE 10 MG/1
10 TABLET ORAL DAILY
Status: DISCONTINUED | OUTPATIENT
Start: 2018-07-29 | End: 2018-07-31 | Stop reason: HOSPADM

## 2018-07-29 RX ORDER — SILDENAFIL CITRATE 20 MG/1
20 TABLET ORAL 3 TIMES DAILY
Status: DISCONTINUED | OUTPATIENT
Start: 2018-07-29 | End: 2018-07-30

## 2018-07-29 RX ORDER — GABAPENTIN 300 MG/1
300 CAPSULE ORAL DAILY
Status: DISCONTINUED | OUTPATIENT
Start: 2018-07-29 | End: 2018-07-31 | Stop reason: HOSPADM

## 2018-07-29 RX ORDER — SODIUM POLYSTYRENE SULFONATE 15 G/60ML
15 SUSPENSION ORAL; RECTAL ONCE
Status: COMPLETED | OUTPATIENT
Start: 2018-07-29 | End: 2018-07-29

## 2018-07-29 RX ORDER — MYCOPHENOLATE MOFETIL 250 MG/1
500 CAPSULE ORAL EVERY 12 HOURS SCHEDULED
Status: DISCONTINUED | OUTPATIENT
Start: 2018-07-29 | End: 2018-07-29

## 2018-07-29 RX ORDER — BETAMETHASONE DIPROPIONATE 0.5 MG/G
1 CREAM TOPICAL 2 TIMES DAILY
Status: DISCONTINUED | OUTPATIENT
Start: 2018-07-29 | End: 2018-07-31 | Stop reason: HOSPADM

## 2018-07-29 RX ORDER — OXYCODONE HYDROCHLORIDE 5 MG/1
5 TABLET ORAL EVERY 4 HOURS PRN
Status: DISCONTINUED | OUTPATIENT
Start: 2018-07-29 | End: 2018-07-31 | Stop reason: HOSPADM

## 2018-07-29 RX ORDER — ACETAMINOPHEN 325 MG/1
650 TABLET ORAL EVERY 6 HOURS PRN
Status: DISCONTINUED | OUTPATIENT
Start: 2018-07-29 | End: 2018-07-31 | Stop reason: HOSPADM

## 2018-07-29 RX ORDER — PREDNISONE 1 MG/1
5 TABLET ORAL DAILY
Status: DISCONTINUED | OUTPATIENT
Start: 2018-07-29 | End: 2018-07-31 | Stop reason: HOSPADM

## 2018-07-29 RX ORDER — CYCLOBENZAPRINE HCL 10 MG
10 TABLET ORAL 3 TIMES DAILY PRN
Status: DISCONTINUED | OUTPATIENT
Start: 2018-07-29 | End: 2018-07-31 | Stop reason: HOSPADM

## 2018-07-29 RX ADMIN — CARVEDILOL 12.5 MG: 12.5 TABLET, FILM COATED ORAL at 10:09

## 2018-07-29 RX ADMIN — TRAMADOL HYDROCHLORIDE 50 MG: 50 TABLET, COATED ORAL at 00:53

## 2018-07-29 RX ADMIN — CEFAZOLIN SODIUM 1000 MG: 1 SOLUTION INTRAVENOUS at 00:55

## 2018-07-29 RX ADMIN — SILDENAFIL 20 MG: 20 TABLET, FILM COATED ORAL at 10:21

## 2018-07-29 RX ADMIN — MYCOPHENOLATE MOFETIL 500 MG: 250 CAPSULE ORAL at 00:53

## 2018-07-29 RX ADMIN — HYDROXYCHLOROQUINE SULFATE 200 MG: 200 TABLET, FILM COATED ENTERAL at 17:07

## 2018-07-29 RX ADMIN — OXYCODONE HYDROCHLORIDE 5 MG: 5 TABLET ORAL at 03:08

## 2018-07-29 RX ADMIN — SILDENAFIL 20 MG: 20 TABLET, FILM COATED ORAL at 22:00

## 2018-07-29 RX ADMIN — BETAMETHASONE DIPROPIONATE 1 APPLICATION: 0.5 CREAM TOPICAL at 17:07

## 2018-07-29 RX ADMIN — HYDROXYCHLOROQUINE SULFATE 200 MG: 200 TABLET, FILM COATED ENTERAL at 10:12

## 2018-07-29 RX ADMIN — TAMSULOSIN HYDROCHLORIDE 0.8 MG: 0.4 CAPSULE ORAL at 00:52

## 2018-07-29 RX ADMIN — SODIUM POLYSTYRENE SULFONATE 15 G: 15 SUSPENSION ORAL; RECTAL at 10:08

## 2018-07-29 RX ADMIN — ESCITALOPRAM OXALATE 10 MG: 10 TABLET ORAL at 10:09

## 2018-07-29 RX ADMIN — MYCOPHENOLATE MOFETIL 500 MG: 250 CAPSULE ORAL at 10:08

## 2018-07-29 RX ADMIN — PREDNISONE 5 MG: 5 TABLET ORAL at 10:09

## 2018-07-29 RX ADMIN — BACLOFEN 20 MG: 10 TABLET ORAL at 17:06

## 2018-07-29 RX ADMIN — FUROSEMIDE 40 MG: 10 INJECTION, SOLUTION INTRAMUSCULAR; INTRAVENOUS at 11:51

## 2018-07-29 RX ADMIN — CEFAZOLIN SODIUM 1000 MG: 1 SOLUTION INTRAVENOUS at 10:10

## 2018-07-29 RX ADMIN — BACLOFEN 20 MG: 10 TABLET ORAL at 10:08

## 2018-07-29 RX ADMIN — OXYCODONE HYDROCHLORIDE 5 MG: 5 TABLET ORAL at 22:03

## 2018-07-29 RX ADMIN — RIVAROXABAN 20 MG: 20 TABLET, FILM COATED ORAL at 17:17

## 2018-07-29 RX ADMIN — SILDENAFIL 20 MG: 20 TABLET, FILM COATED ORAL at 17:17

## 2018-07-29 RX ADMIN — CEFAZOLIN SODIUM 1000 MG: 1 SOLUTION INTRAVENOUS at 17:06

## 2018-07-29 RX ADMIN — GABAPENTIN 300 MG: 300 CAPSULE ORAL at 10:09

## 2018-07-29 RX ADMIN — SILDENAFIL 20 MG: 20 TABLET, FILM COATED ORAL at 01:00

## 2018-07-29 RX ADMIN — TAMSULOSIN HYDROCHLORIDE 0.8 MG: 0.4 CAPSULE ORAL at 22:02

## 2018-07-29 RX ADMIN — BETAMETHASONE DIPROPIONATE 1 APPLICATION: 0.5 CREAM TOPICAL at 10:10

## 2018-07-29 NOTE — ASSESSMENT & PLAN NOTE
Baseline kidney function 1 2 to 1 3 back in February  Currently 1 7  Will hold Lasix  Gentle hydration

## 2018-07-29 NOTE — ASSESSMENT & PLAN NOTE
Patient has a diagnosis of lupus x3 years involving his skin and renal system  Patient sees Dr Suman Ugalde for his nephrology needs  He rheumatologist is also from [facility] but he does not know the name  · Continue Neurontin, CellCept and Plaquenil  · Nephrology consult in a m

## 2018-07-29 NOTE — ASSESSMENT & PLAN NOTE
Patient on Lasix with supplementation unclear how he may have developed worsening hyperkalemia as he states he took only what he was supposed to take  Patient does have a history of lupus likely with nephropathy  Patient given insulin and dextrose and a dose of Kayexalate in the ED  EKG reviewed no EKG changes  · Repeat potassium level now  · Nephrology consult in a m

## 2018-07-29 NOTE — ASSESSMENT & PLAN NOTE
Baseline kidney function 1 2 to 1 3 back in February  Currently 1 7  Looking back in July it was 1 7 as well    Continue to monitor closely

## 2018-07-29 NOTE — ASSESSMENT & PLAN NOTE
Patient has a diagnosis of lupus x3 years involving his skin and renal system  Patient sees Dr Nica Plaza for his nephrology needs  He rheumatologist is also from [facility] but he does not know the name  · Continue Neurontin, CellCept and Plaquenil  · Nephrology consult in a m

## 2018-07-29 NOTE — ASSESSMENT & PLAN NOTE
Present on admission acute kidney injury  Appears that the patient's creatinine is usually down in the 1 2 range  Currently at 1 71  Will hold Lasix and request Nephrology consultation in a m

## 2018-07-29 NOTE — CONSULTS
Consultation - Nephrology   Rory Dahiana 46 y o  male MRN: [id number]  Unit/Bed#: -01 Encounter: [id number]    ASSESSMENT and PLAN:    Hyperkalemia  -likely due to oral potassium chloride and lisinopril in the setting of chronic kidney disease with recent worsening of renal function  Agree with holding both oral potassium chloride as well as lisinopril  Will give Lasix IV 40 mg to enhance renal excretion of potassium  Patient also received oral Kayexalate which should help  Recheck potassium level today afternoon  - If potassium is elevated to more than 6 0 would repeat conservative medical management for hyperkalemia  No indication for hemodialysis at this time   -placed on low-potassium diet  -check CPK level with patient complaining of pain in the left calf  Acute kidney injury on Chronic kidney disease stage 3  - Patient has lupus nephritis diagnosed with biopsy in 2015 November as per rheumatology note from [facility]  Biopsy report not available to me at this time     -Renal function was stable at creatinine 1 3-1 4 till April 2018  Renal function worsened in June 2018 but has been stable since then around creatinine 1 7  Reason for slight worsening of renal function in June is not clear at this time  It could be progression of chronic kidney disease    -will do workup to rule out cause of worsening renal function  Will check postvoid residual, check UA with microscopy, also check C3-C4 anti double-stranded DNA to rule out active lupus and flare of lupus nephritis  Check renal u/s    -patient also underwent CTA after admission which could contribute to slightly worsening of renal function from possible contrast induced nephropathy but does not explain why the creatinine was elevated to 1 7 at the time of admission     Would recommend avoiding further nephrotoxic agent and dose all medication for GFR  Closely monitor renal function      Lupus nephritis  -patient had class 5 lupus nephritis as per rheumatology note  -patient is on CellCept, prednisone and Plaquenil  Dose of cellcept confirmed with patient and he was placed on CellCept 1000 mg twice daily  Continue prednisone 5 mg daily  Primary hypertension  -blood pressure is currently controlled  Recommend avoiding hypotensive episode  Agree with holding ACE inhibitor  Decrease dose of carvedilol as blood pressure currently on the lower side  Anemia   -hemoglobin stable  Last hemoglobin was 10 2  Proteinuria  - P/c ratio was 188 mg in march 2018  Will recheck  Patient had nephrotic range proteinuria in the past   Will consider resuming ACE inhibitor if renal function remains stable and once hyperkalemia resolved  Pain left lower extremity  -left calf is tender  Duplex negative for DVT   Patient is on Xarelto for history of DVT  Patient is currently on antibiotic per primary team     Discussed with primary team       HISTORY OF PRESENT ILLNESS:  Requesting Physician: Ayaz Graham MD  Reason for Consult: MARK/ hyperkalemia     Sravani Marshall is a 46 y o  male past history of chronic kidney disease stage 3 (follows with [facility] Nephrology as well as Rheumatology), SLE with lupus nephritis class 5 with history of nephrotic range proteinuria diagnosed on kidney biopsy in November 2015 and underwent induction with Cytoxan x4 course  finished on May 2016 as per rheumatology note, 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 per rheumatology note), patient maintained on CellCept since that time,  history of CellCept induced neutropenia for which dose of CellCept was decreased, history of anemia, hypertension,   who was admitted to [facility] after presenting with persistent left leg pain  Patient has been on oral Keflex for last 1 week for possible cellulitis of the leg but showing no improvement  Dopplers done after admission were found to be negative   Patient also underwent CTA of chest which was negative for PE  Nephrology consulted for elevated potassium and acute kidney injury  Admission potassium was 6 8  Patient was on oral potassium chloride as well as lisinopril at home  Patient received Kayexalate 15 g last night as well as insulin and dextrose  Potassium today morning was 5 8  Patient has been ordered another dose of Kayexalate by the primary team   No EKG changes suggestive of hyperkalemia  Admission creatinine on July 28 was 1 7, EGFR 53  Baseline creatinine appears to be 1 3-1 4  Renal function has got worse with creatinine elevated since June 2018  Elevated creatinine is moderate severity  Creatinine improved to 1 6 today morning and on repeat blood work it is again 1 77  Baseline creatinine from February 2018 was 1 38, creatinine from [facility] labs from February 2018 to April 2018 was 1 3 -1 4 on multiple blood work, creatinine in June 2018 was 1 76  Creatinine from 2017 was 0 9 to 1 1  Rodo Escobar Patient follows up with Nephrology at [facility]  As per nephrology note from Dr Ariana Champion, dose of cellcept was decreased to 1 5 gm daily in June after discussion with rheumatologist due to finding of anemia but patient mentions that currently his taking 1000 mg twice daily  Will defer dose adjustment to outpatient rheumatology as well as outpatient Nephrology  Out patient rheumatologist at [facility] is Dr Jael Negro  Currently patient complaining of pain in the left calf and not improving with outpatient abx  Denies NSAIDs  Denies any urinary complaints           PAST MEDICAL HISTORY:  Past Medical History:   Diagnosis Date    Cervical mass     Depression     DVT (deep venous thrombosis) (HCC)     Hypertension     Lupus     Renal disorder        PAST SURGICAL HISTORY:  Past Surgical History:   Procedure Laterality Date    CERVICAL SPINE SURGERY      NECK SURGERY         SOCIAL HISTORY:  History   Alcohol Use No     History   Drug Use No     History   Smoking Status    Never Smoker Smokeless Tobacco    Never Used       FAMILY HISTORY:  History reviewed  No pertinent family history      ALLERGIES:  No Known Allergies    MEDICATIONS:    Current Facility-Administered Medications:     acetaminophen (TYLENOL) tablet 650 mg, 650 mg, Oral, Q6H PRN, Blue Adame MD    baclofen tablet 20 mg, 20 mg, Oral, BID, Blue Adame MD    betamethasone dipropionate (DIPROSONE) 0 60 % cream 1 application, 1 application, Topical, BID, Blue Adame MD    carvedilol (COREG) tablet 12 5 mg, 12 5 mg, Oral, BID With Meals, Blue Adame MD    ceFAZolin (ANCEF) IVPB (premix) 1,000 mg, 1,000 mg, Intravenous, Q8H, Michael Ibrahim MD, Stopped at 07/29/18 0125    cyclobenzaprine (FLEXERIL) tablet 10 mg, 10 mg, Oral, TID PRN, Blue Adame MD    escitalopram (LEXAPRO) tablet 10 mg, 10 mg, Oral, Daily, Blue Adame MD    gabapentin (NEURONTIN) capsule 300 mg, 300 mg, Oral, Daily, Blue Adame MD    hydroxychloroquine (PLAQUENIL) tablet 200 mg, 200 mg, Oral, BID, Blue Adame MD    mycophenolate (CELLCEPT) capsule 500 mg, 500 mg, Oral, Q12H [address], Blue Adame MD, 500 mg at 07/29/18 0053    oxyCODONE (ROXICODONE) IR tablet 5 mg, 5 mg, Oral, Q4H PRN, Blue Adame MD, 5 mg at 07/29/18 0308    predniSONE tablet 5 mg, 5 mg, Oral, Daily, Blue Adame MD    rivaroxaban (XARELTO) tablet 20 mg, 20 mg, Oral, Daily With Daylin Ham MD    sildenafil (REVATIO) tablet 20 mg, 20 mg, Oral, TID, Blue Adame MD, 20 mg at 07/29/18 0100    sodium polystyrene sulfonate (KAYEXALATE) oral suspension 15 g, 15 g, Oral, Once, Michael Ibrahim MD    tamsulosin (FLOMAX) capsule 0 8 mg, 0 8 mg, Oral, Once HS, Blue Adame MD, 0 8 mg at 07/29/18 0052    traMADol (ULTRAM) tablet 50 mg, 50 mg, Oral, Q6H PRN, Blue Adame MD, 50 mg at 07/29/18 0053    REVIEW OF SYSTEMS:   Review of Systems   Constitutional: Negative for activity change, appetite change, chills, diaphoresis, fatigue and fever  HENT: Negative for congestion, facial swelling and nosebleeds  Eyes: Negative for pain and visual disturbance  Respiratory: Negative for cough, chest tightness and shortness of breath  Cardiovascular: Negative for chest pain and palpitations  Gastrointestinal: Negative for abdominal distention, abdominal pain, diarrhea, nausea and vomiting  Genitourinary: Negative for difficulty urinating, dysuria, flank pain, frequency and hematuria  Musculoskeletal: Negative for arthralgias, back pain and joint swelling  Pain left leg   Skin: Negative for rash  Neurological: Negative for dizziness, seizures, syncope, weakness and headaches  Psychiatric/Behavioral: Negative for agitation and confusion  The patient is not nervous/anxious  All the systems were reviewed and were negative except as documented on the HPI  PHYSICAL EXAM:  Current Weight: Weight - Scale: 87 7 kg (193 lb 5 5 oz)  First Weight: Weight - Scale: 86 2 kg (190 lb)  Vitals:    07/28/18 2231 07/28/18 2300 07/29/18 0300 07/29/18 0700   BP: 132/68 132/68 116/67 118/59   BP Location: Left arm Left arm Right arm Left arm   Pulse: 81 81 86 (!) 116   Resp: 17 17 17 18   Temp: 99 3 °F (37 4 °C) 99 3 °F (37 4 °C) 98 8 °F (37 1 °C) 98 8 °F (37 1 °C)   TempSrc: Oral Oral Oral Oral   SpO2: 99% 99% 95% 100%   Weight:       Height:           Intake/Output Summary (Last 24 hours) at 07/29/18 0950  Last data filed at 07/29/18 0900   Gross per 24 hour   Intake           528 33 ml   Output              700 ml   Net          -171 67 ml     Physical Exam   Constitutional: He is oriented to person, place, and time  Vital signs are normal  He appears well-developed  No distress  HENT:   Head: Normocephalic and atraumatic  Mouth/Throat: Oropharynx is clear and moist    Eyes: Conjunctivae are normal  Pupils are equal, round, and reactive to light  No scleral icterus  Neck: Neck supple  No thyromegaly present  Cardiovascular: Normal rate, regular rhythm and normal heart sounds  Exam reveals no friction rub  No murmur heard  Pulmonary/Chest: Effort normal and breath sounds normal  No respiratory distress  He has no wheezes  He has no rales  Abdominal: Soft  Bowel sounds are normal  He exhibits no distension  There is no tenderness  Musculoskeletal: He exhibits no edema or deformity  Tenderness over left, slight increased temperature  Lymphadenopathy:     He has no cervical adenopathy  Neurological: He is alert and oriented to person, place, and time  He is not disoriented  Skin: He is not diaphoretic  No cyanosis  No pallor  Nails show no clubbing  Psychiatric: He has a normal mood and affect  His mood appears not anxious  His affect is not inappropriate  Invasive Devices:        Lab Results:     Results from last 7 days  Lab Units 07/29/18  0529 07/29/18  0045 07/28/18 2022 07/28/18  1932   WBC Thousand/uL  --   --   --  6 19   HEMOGLOBIN g/dL  --   --   --  10 2*   HEMATOCRIT %  --   --   --  31 9*   PLATELETS Thousands/uL  --   --   --  286   SODIUM mmol/L 135* 135*  --  134*   POTASSIUM mmol/L 5 8* 5 5* 6 8* 6 8*   CHLORIDE mmol/L 103 103  --  103   CO2 mmol/L 23 23  --  24   BUN mg/dL 37* 39*  --  42*   CREATININE mg/dL 1 77* 1 62*  --  1 71*   CALCIUM mg/dL 8 6 9 1  --  8 9   GLUCOSE RANDOM mg/dL 98 103  --  101       Other Studies: CTA - CHEST WITH IV CONTRAST - PULMONARY ANGIOGRAM     INDICATION:   Chest pain, acute, pulmonary origin  Chest pain, acute, PE suspected, high pretest prob      COMPARISON: 2/21/2018      TECHNIQUE: CTA examination of the chest was performed using angiographic technique according to a protocol specifically tailored to evaluate for pulmonary embolism  Axial, sagittal, and coronal 2D reformatted images were created from the source data and   submitted for interpretation  In addition, coronal 3D MIP postprocessing was performed on the acquisition scanner     Radiation dose length product (DLP) for this visit:  599 mGy-cm   This examination, like all CT scans performed in the [facility], was performed utilizing techniques to minimize radiation dose exposure, including the use of iterative   reconstruction and automated exposure control      IV Contrast:  85 mL of iohexol (OMNIPAQUE)     FINDINGS:     PULMONARY ARTERIAL TREE:  No pulmonary embolus is seen       LUNGS:  Lungs are clear  There is no tracheal or endobronchial lesion      PLEURA:  Unremarkable      HEART/AORTA:  Unremarkable for patient's age      MEDIASTINUM AND JAVIER:  Unremarkable      CHEST WALL AND LOWER NECK:   Unremarkable      VISUALIZED STRUCTURES IN THE UPPER ABDOMEN:  Unremarkable      OSSEOUS STRUCTURES:  No acute fracture or destructive osseous lesion      IMPRESSION:     No pulmonary embolism or aortic dissection  No effusion, airspace disease, or pneumothorax         CONCLUSION:     Impression:  RIGHT LOWER LIMB LIMITED:  Evaluation shows no evidence of thrombus in the common femoral vein  Doppler evaluation shows a normal response to augmentation maneuvers  LEFT LOWER LIMB:  No evidence of acute or chronic deep vein thrombosis  No evidence of superficial thrombophlebitis noted  Doppler evaluation shows a normal response to augmentation maneuvers  Popliteal, posterior tibial and anterior tibial arterial Doppler waveforms are  triphasic  Tech Note:  There is an echogenic structure located in the left inguinal region  This  structure measures approximately 3 cm and is consistent with enlarged lymph  nodes  Portions of the record may have been created with voice recognition software  Occasional wrong word or "sound a like" substitutions may have occurred due to the inherent limitations of voice recognition software  Read the chart carefully and recognize, using context, where substitutions have occurred  If you have any questions, please contact the dictating provider

## 2018-07-29 NOTE — PROGRESS NOTES
Progress Note - Florencio Hassan 1970, 46 y o  male MRN: [id number]    Unit/Bed#: -01 Encounter: [id number]    Primary Care Provider: Candice Guallpa MD   Date and time admitted to hospital: 7/28/2018  6:35 PM        Acute kidney injury [facility])   Assessment & Plan    Baseline kidney function 1 2 to 1 3 back in February  Currently 1 7  Looking back in July it was 1 7 as well  Continue to monitor closely        History of DVT (deep vein thrombosis)   Assessment & Plan    Patient is already on Xarelto  Lupus nephritis [facility])   Assessment & Plan    Present on admission acute kidney injury  Appears that the patient's creatinine is usually down in the 1 2 range  Currently at 1 71  Will hold Lasix and request Nephrology consultation in a   Lupus (systemic lupus erythematosus) ([facility] [address] )   Assessment & Plan    Patient has a diagnosis of lupus x3 years involving his skin and renal system  Patient sees Dr Fidencio Peterson for his nephrology needs  He rheumatologist is also from [facility] but he does not know the name  · Continue Neurontin, CellCept and Plaquenil  · Nephrology consult in a   Hyperkalemia   Assessment & Plan    Patient on Lasix with supplementation unclear how he may have developed worsening hyperkalemia as he states he took only what he was supposed to take  Patient does have a history of lupus likely with nephropathy  Patient given insulin and dextrose and a dose of Kayexalate in the ED  EKG reviewed no EKG changes  · Repeat potassium level now  · Nephrology consult in a   * Cellulitis and abscess of left lower extremity   Assessment & Plan    Patient seen in the emergency room 2 days ago for similar pain and redness of the left limb consistent with cellulitis  He was discharged home on antibiotic therapy, which was Keflex  Patient returns with persistent symptoms found to have elevated potassium    · Ancef  · Vascular studies negative for DVT  · CT negative for PE            VTE Pharmacologic Prophylaxis:   Pharmacologic: Rivaroxaban (Xarelto)  Mechanical VTE Prophylaxis in Place: Yes    Patient Centered Rounds: I have performed bedside rounds with nursing staff today  Discussions with Specialists or Other Care Team Provider:  Nephrology    Education and Discussions with Family / Patient:  Patient    Time Spent for Care: 20 minutes  More than 50% of total time spent on counseling and coordination of care as described above  Current Length of Stay: 1 day(s)    Current Patient Status: Inpatient   Certification Statement: The patient will continue to require additional inpatient hospital stay due to Having cellulitis failing outpatient management as well as hyperkalemia    Discharge Plan:  Patient will likely be here another 1-2 days    Code Status: Level 1 - Full Code      Subjective:   Patient seen and examined  Complains of leg pain and redness    Objective:     Vitals:   Temp (24hrs), Av °F (37 2 °C), Min:98 7 °F (37 1 °C), Max:99 3 °F (37 4 °C)    HR:  [] 116  Resp:  [16-18] 18  BP: (101-132)/(59-68) 118/59  SpO2:  [95 %-100 %] 100 %  Body mass index is 31 21 kg/m²  Input and Output Summary (last 24 hours):        Intake/Output Summary (Last 24 hours) at 18 [id number]  Last data filed at 18 0900   Gross per 24 hour   Intake           528 33 ml   Output              700 ml   Net          -171 67 ml       Physical Exam:     Physical Exam  (   General Appearance:    Alert, cooperative, no distress, appears stated age                               Lungs:     Clear to auscultation bilaterally, respirations unlabored       Heart:    Regular rate and rhythm, S1 and S2 normal, no murmur, rub    or gallop   Abdomen:     Soft, non-tender, bowel sounds active all four quadrants,     no masses, no organomegaly           Extremities:   Extremities normal, atraumatic, no cyanosis or edema   Pulses:   2+ and symmetric all extremities   Skin: Mild redness and erythema in the left lower extremity       Additional Data:     Labs:      Results from last 7 days  Lab Units 07/28/18  1932   WBC Thousand/uL 6 19   HEMOGLOBIN g/dL 10 2*   HEMATOCRIT % 31 9*   PLATELETS Thousands/uL 286   LYMPHO PCT % 11*   MONO PCT MAN % 11   EOSINO PCT MANUAL % 0       Results from last 7 days  Lab Units 07/29/18  0529   SODIUM mmol/L 135*   POTASSIUM mmol/L 5 8*   CHLORIDE mmol/L 103   CO2 mmol/L 23   BUN mg/dL 37*   CREATININE mg/dL 1 77*   CALCIUM mg/dL 8 6   GLUCOSE RANDOM mg/dL 98       Results from last 7 days  Lab Units 07/28/18  1932   INR  1 42*       Results from last 7 days  Lab Units 07/28/18  2123   POC GLUCOSE mg/dl 106             * I Have Reviewed All Lab Data Listed Above  * Additional Pertinent Lab Tests Reviewed:  Jamie 64 Admission Reviewed      Recent Cultures (last 7 days):           Last 24 Hours Medication List:     Current Facility-Administered Medications:  acetaminophen 650 mg Oral Q6H PRN Mayuri Fraire MD    baclofen 20 mg Oral BID Mayuri Fraire MD    betamethasone dipropionate 1 application Topical BID Mayuri Fraire MD    carvedilol 12 5 mg Oral BID With Meals Mayuri Fraire MD    cefazolin 1,000 mg Intravenous Q8H Neno Craig MD Last Rate: Stopped (07/29/18 0125)   cyclobenzaprine 10 mg Oral TID PRN Mayuri Fraire MD    escitalopram 10 mg Oral Daily Mayuri Fraire MD    gabapentin 300 mg Oral Daily Mayuri Fraire MD    hydroxychloroquine 200 mg Oral BID Mayuri Fraire MD    mycophenolate 500 mg Oral Q12H [address] Mayuri Fraire MD    oxyCODONE 5 mg Oral Q4H PRN Mayuri Fraire MD    predniSONE 5 mg Oral Daily Mayuri Fraire MD    rivaroxaban 20 mg Oral Daily With Juan Wells MD    sildenafil 20 mg Oral TID Mayuri Fraire MD    sodium polystyrene sulfonate 15 g Oral Once Neno Craig MD    tamsulosin 0 8 mg Oral Once HS Mayuri Fraire MD    traMADol 50 mg Oral Q6H PRN Tami GUTIERREZ Deondre Mcguire MD         Today, Patient Was Seen By: Olinda Laughlin MD    ** Please Note: Dictation voice to text software may have been used in the creation of this document   **

## 2018-07-29 NOTE — H&P
H&P- Rosalina Paulino 1970, 46 y o  male MRN: [id number]    Unit/Bed#: -01 Encounter: [id number]    Primary Care Provider: Jeimy Spencer MD   Date and time admitted to hospital: 7/28/2018  6:35 PM        * Cellulitis and abscess of left lower extremity   Assessment & Plan    Patient seen in the emergency room 2 days ago for similar pain and redness of the left limb consistent with cellulitis  He was discharged home on antibiotic therapy, which was Keflex  Patient returns with persistent symptoms found to have elevated potassium  · Ancef  · Vascular studies negative for DVT  · CT negative for PE        Lupus (systemic lupus erythematosus) ([facility] [address] )   Assessment & Plan    Patient has a diagnosis of lupus x3 years involving his skin and renal system  Patient sees Dr Precious Zhang for his nephrology needs  He rheumatologist is also from [facility] [place] but he does not know the name  · Continue Neurontin, CellCept and Plaquenil  · Nephrology consult in a   Hyperkalemia   Assessment & Plan    Patient on Lasix with supplementation unclear how he may have developed worsening hyperkalemia as he states he took only what he was supposed to take  Patient does have a history of lupus likely with nephropathy  Patient given insulin and dextrose and a dose of Kayexalate in the ED  EKG reviewed no EKG changes  · Repeat potassium level now  · Nephrology consult in a   Lupus nephritis [facility])   Assessment & Plan    Present on admission acute kidney injury  Appears that the patient's creatinine is usually down in the 1 2 range  Currently at 1 71  Will hold Lasix and request Nephrology consultation in a   Acute kidney injury [facility])   Assessment & Plan    Baseline kidney function 1 2 to 1 3 back in February  Currently 1 7  Will hold Lasix  Gentle hydration              VTE Prophylaxis:  Xarelto  / reason for no mechanical VTE prophylaxis Cellulitis   Code Status:  Full code  POLST: POLST form is not discussed and not completed at this time  Discussion with family:  None    Anticipated Length of Stay:  Patient will be admitted on an Inpatient basis with an anticipated length of stay of  greater than 2 midnights  Justification for Hospital Stay:  Needs workup for cellulitis and acute kidney injury hyperkalemia    Total Time for Visit, including Counseling / Coordination of Care: 1 hour  Greater than 50% of this total time spent on direct patient counseling and coordination of care  Chief Complaint:   Leg pain    History of Present Illness:    Aaron Hill is a 46 y o  male who presents with persistent left leg pain  Patient was seen in the emergency room 1 week prior to admission  He was given Keflex for cellulitis  He showed no improvement and continued to have pain  Dopplers of the lower extremities show no obvious DVT CT scan of the chest showed no obvious PE  Patient also was noted to have hyperkalemia on admission with a potassium of 6 8  Patient was treated with Kayexalate and glucose with insulin  He showed no acute EKG changes and no arrhythmias  Patient has a known history of lupus and follows with [facility]  He was diagnosed 3 years ago and currently is taking oral medications for this condition  Patient was admitted to the hospital for further monitoring of potassium and treatment with Ancef for the left lower extremity       Review of Systems:    Review of Systems   Constitutional: Negative for appetite change, chills, diaphoresis, fatigue, fever and unexpected weight change  HENT: Negative for dental problem, ear discharge, ear pain, facial swelling, hearing loss, mouth sores, nosebleeds and rhinorrhea  Eyes: Negative for pain, discharge, redness and visual disturbance  Respiratory: Negative for cough, chest tightness, shortness of breath and wheezing  Cardiovascular: Negative for chest pain, palpitations and leg swelling  Gastrointestinal: Negative for abdominal distention, abdominal pain, blood in stool, constipation, diarrhea, nausea and vomiting  Endocrine: Negative for cold intolerance, heat intolerance, polydipsia, polyphagia and polyuria  Genitourinary: Negative for dysuria, frequency, hematuria and urgency  Musculoskeletal: Negative for arthralgias and back pain  Skin: Negative for rash and wound  Visualized ago, and multiple skin wounds to the lower extremity consistent with his discoid lupus   Neurological: Negative for dizziness, weakness, light-headedness, numbness and headaches  Hematological: Negative for adenopathy  Does not bruise/bleed easily  Psychiatric/Behavioral: Negative for confusion and dysphoric mood  Past Medical and Surgical History:     Past Medical History:   Diagnosis Date    Cervical mass     Depression     DVT (deep venous thrombosis) (HCC)     Hypertension     Lupus     Renal disorder        Past Surgical History:   Procedure Laterality Date    CERVICAL SPINE SURGERY      NECK SURGERY         Meds/Allergies:    Prior to Admission medications    Medication Sig Start Date End Date Taking?  Authorizing Provider   baclofen 20 mg tablet Take 20 mg by mouth 2 (two) times a day   Yes Historical Provider, MD   betamethasone dipropionate (DIPROSONE) 0 05 % cream Apply 1 application topically 2 (two) times a day   Yes Historical Provider, MD   carvedilol (COREG) 12 5 mg tablet Take 12 5 mg by mouth 2 (two) times a day with meals   Yes Historical Provider, MD   cephalexin (KEFLEX) 500 mg capsule Take 1 capsule (500 mg total) by mouth 3 (three) times a day for 10 days 7/26/18 8/5/18 Yes Lam Ivan PA-C   cyclobenzaprine (FLEXERIL) 10 mg tablet Take 10 mg by mouth 3 (three) times a day as needed for muscle spasms   Yes Historical Provider, MD   escitalopram (LEXAPRO) 10 mg tablet Take 10 mg by mouth daily   Yes Historical Provider, MD   furosemide (LASIX) 40 mg tablet Take 40 mg by mouth 2 (two) times a day   Yes Historical Provider, MD   gabapentin (NEURONTIN) 600 MG tablet Take 600 mg by mouth daily   Yes Historical Provider, MD   lisinopril (ZESTRIL) 40 mg tablet Take 40 mg by mouth daily   Yes Historical Provider, MD   mycophenolate (CELLCEPT) 500 mg tablet Take 500 mg by mouth every 12 (twelve) hours   Yes Historical Provider, MD   potassium chloride (K-DUR,KLOR-CON) 20 mEq tablet Take 20 mEq by mouth 2 (two) times a day   Yes Historical Provider, MD   predniSONE 5 mg tablet Take 5 mg by mouth daily   Yes Historical Provider, MD   rivaroxaban (XARELTO) 20 mg tablet Take 20 mg by mouth daily with dinner   Yes Historical Provider, MD   sildenafil (REVATIO) 20 mg tablet Take 20 mg by mouth 3 (three) times a day   Yes Historical Provider, MD   tamsulosin (FLOMAX) 0 4 mg Take 0 8 mg by mouth Medrol Dose Pack scheduling ONLY   Yes Historical Provider, MD   traMADol (ULTRAM) 50 mg tablet Take 50 mg by mouth every 6 (six) hours as needed for moderate pain   Yes Historical Provider, MD   betamethasone, augmented, (DIPROLENE) 0 05 % ointment Apply 1 application topically 2 (two) times a day    Historical Provider, MD   hydroxychloroquine (PLAQUENIL) 200 mg tablet Take 200 mg by mouth 2 (two) times a day    Historical Provider, MD     I have reviewed home medications with patient personally      Allergies: No Known Allergies    Social History:     Marital Status:   Patient Pre-hospital Living Situation:  At home  Patient Pre-hospital Level of Mobility:  No restrictions  Patient Pre-hospital Diet Restrictions:  No restriction  Substance Use History:   History   Alcohol Use No     History   Smoking Status    Never Smoker   Smokeless Tobacco    Never Used     History   Drug Use No       Family History:    Unknown    Physical Exam:     Vitals:   Blood Pressure: 132/68 (07/28/18 2300)  Pulse: 81 (07/28/18 2300)  Temperature: 99 3 °F (37 4 °C) (07/28/18 2300)  Temp Source: Oral (07/28/18 2300)  Respirations: 17 (07/28/18 2300)  Height: 5' 6" (167 6 cm) (07/28/18 2226)  Weight - Scale: 87 7 kg (193 lb 5 5 oz) (07/28/18 2226)  SpO2: 99 % (07/28/18 2300)    Physical Exam   Constitutional: He is oriented to person, place, and time  He appears well-developed  No distress  HENT:   Head: Normocephalic and atraumatic  Right Ear: External ear normal    Left Ear: External ear normal    Nose: Nose normal    Mouth/Throat: Oropharynx is clear and moist  No oropharyngeal exudate  Eyes: Conjunctivae and EOM are normal  Pupils are equal, round, and reactive to light  Right eye exhibits no discharge  Left eye exhibits no discharge  No scleral icterus  Neck: Normal range of motion  Neck supple  No JVD present  No thyromegaly present  Cardiovascular: Normal rate, regular rhythm, normal heart sounds and intact distal pulses  Exam reveals no gallop and no friction rub  No murmur heard  Pulmonary/Chest: Effort normal and breath sounds normal  No respiratory distress  He has no wheezes  He has no rales  Abdominal: Soft  Bowel sounds are normal  He exhibits no distension  There is no tenderness  There is no rebound and no guarding  Musculoskeletal: Normal range of motion  He exhibits no edema or deformity  Lymphadenopathy:     He has no cervical adenopathy  Neurological: He is alert and oriented to person, place, and time  He has normal reflexes  No cranial nerve deficit  He exhibits normal muscle tone  Skin: Skin is warm and dry  No rash noted  He is not diaphoretic  No erythema  Visualized go in skin changes consistent with discoid lupus on both legs  Left leg however has a cellulitis without drainage   Psychiatric: He has a normal mood and affect  Vitals reviewed  Additional Data:     Lab Results: I have personally reviewed pertinent reports          Results from last 7 days  Lab Units 07/28/18 1932   WBC Thousand/uL 6 19   HEMOGLOBIN g/dL 10 2*   HEMATOCRIT % 31 9*   PLATELETS Thousands/uL 286   LYMPHO PCT % 11*   MONO PCT MAN % 11   EOSINO PCT MANUAL % 0       Results from last 7 days  Lab Units 07/28/18 2022 07/28/18  1932   SODIUM mmol/L  --  134*   POTASSIUM mmol/L 6 8* 6 8*   CHLORIDE mmol/L  --  103   CO2 mmol/L  --  24   BUN mg/dL  --  42*   CREATININE mg/dL  --  1 71*   CALCIUM mg/dL  --  8 9   GLUCOSE RANDOM mg/dL  --  101       Results from last 7 days  Lab Units 07/28/18  1932   INR  1 42*       Results from last 7 days  Lab Units 07/28/18  2123   POC GLUCOSE mg/dl 106           Imaging: I have personally reviewed pertinent reports  CTA ED chest PE study   Final Result by Jimy Brooks MD (07/28 2213)      No pulmonary embolism or aortic dissection  No effusion, airspace disease, or pneumothorax  Workstation performed: [id number]             EKG, Pathology, and Other Studies Reviewed on Admission:   · EKG:  Normal sinus rhythm rate of 80 no acute ST T-wave changes    Allscripts / Epic Records Reviewed: Yes     ** Please Note: This note has been constructed using a voice recognition system   **

## 2018-07-29 NOTE — ASSESSMENT & PLAN NOTE
Patient seen in the emergency room 2 days ago for similar pain and redness of the left limb consistent with cellulitis  He was discharged home on antibiotic therapy, which was Keflex  Patient returns with persistent symptoms found to have elevated potassium    · Ancef  · Vascular studies negative for DVT  · CT negative for PE

## 2018-07-29 NOTE — CASE MANAGEMENT
Initial Clinical Review    Admission: Date/Time/Statement: 7/28/18 @ 2132     Orders Placed This Encounter   Procedures    Inpatient Admission (expected length of stay for this patient is greater than two midnights)     Standing Status:   Standing     Number of Occurrences:   1     Order Specific Question:   Admitting Physician     Answer:   Renetta Purcell [[id number]]     Order Specific Question:   Level of Care     Answer:   Med Surg [[id number]]     Order Specific Question:   Estimated length of stay     Answer:   More than 2 Midnights     Order Specific Question:   Certification     Answer:   I certify that inpatient services are medically necessary for this patient for a duration of greater than two midnights  See H&P and MD Progress Notes for additional information about the patient's course of treatment  ED: Date/Time/Mode of Arrival:   ED Arrival Information     Expected Arrival Acuity Means of Arrival Escorted By Service Admission Type    - 7/28/2018 18:32 Urgent Walk-In Family Member General Medicine Urgent    Arrival Complaint    LEG BRUISING          Chief Complaint:   Chief Complaint   Patient presents with    Leg Pain     Pt presents to ED with severe L leg pain and bruising x 1 week  Pt states he cannot ambulate on leg        History of Illness:     Emily Watkins is a 46 y o  male who presents with persistent left leg pain  Patient was seen in the emergency room 1 week prior to admission  He was given Keflex for cellulitis  He showed no improvement and continued to have pain  Dopplers of the lower extremities show no obvious DVT CT scan of the chest showed no obvious PE  Patient also was noted to have hyperkalemia on admission with a potassium of 6 8  Patient was treated with Kayexalate and glucose with insulin  He showed no acute EKG changes and no arrhythmias  Patient has a known history of lupus and follows with [facility]    He was diagnosed 3 years ago and currently is taking oral medications for this condition  Patient was admitted to the hospital for further monitoring of potassium and treatment with Ancef for the left lower extremity           ED Vital Signs:   ED Triage Vitals [07/28/18 1840]   Temperature Pulse Respirations Blood Pressure SpO2   98 7 °F (37 1 °C) 94 17 101/64 96 %      Worst Possible Pain       07/28/18 87 7 kg (193 lb 5 5 oz)       Abnormal Labs/Diagnostic Test Results:     Lab Units 07/28/18  1932   HEMOGLOBIN g/dL 10 2*   HEMATOCRIT % 31 9*   LYMPHO PCT % 11*     Lab Units 07/28/18 2022 07/28/18 1932   SODIUM mmol/L  --  134*   POTASSIUM mmol/L 6 8* 6 8*   BUN mg/dL  --  42*   CREATININE mg/dL  --  1 71*     ED Treatment:   Medication Administration from 07/28/2018 1831 to 07/28/2018 2226       Date/Time Order Dose Route     07/28/2018 2208 insulin regular (HumuLIN R,NovoLIN R) injection 10 Units 10 Units Intravenous     07/28/2018 2207 dextrose 50 % IV solution 25 mL 25 mL Intravenous     07/28/2018 2208 dextrose 5 % infusion 50 mL/hr Intravenous     07/28/2018 2205 sodium polystyrene sulfonate (KAYEXALATE) oral suspension 15 g 15 g Oral       Past Medical/Surgical History:     Past Medical History:    Cervical mass    Depression    DVT (deep venous thrombosis) (HCC)    Hypertension    Lupus    Renal disorder       Admitting Diagnosis: Cough [R05]  Hyperkalemia [E87 5]  Leg swelling [M79 89]  History of DVT (deep vein thrombosis) [Z86 718]  Pain of left lower extremity [M79 605]    Age/Sex: 46 y o  male    Assessment/Plan:    * Cellulitis and abscess of left lower extremity   Assessment & Plan     Patient seen in the emergency room 2 days ago for similar pain and redness of the left limb consistent with cellulitis  He was discharged home on antibiotic therapy, which was Keflex  Patient returns with persistent symptoms found to have elevated potassium    · Ancef  · Vascular studies negative for DVT  · CT negative for PE          Lupus (systemic lupus erythematosus) ([facility] [address] )   Assessment & Plan     Patient has a diagnosis of lupus x3 years involving his skin and renal system  Patient sees Dr Jumana Wilson for his nephrology needs  He rheumatologist is also from [facility] but he does not know the name  · Continue Neurontin, CellCept and Plaquenil  · Nephrology consult in a            Hyperkalemia   Assessment & Plan     Patient on Lasix with supplementation unclear how he may have developed worsening hyperkalemia as he states he took only what he was supposed to take  Patient does have a history of lupus likely with nephropathy  Patient given insulin and dextrose and a dose of Kayexalate in the ED  EKG reviewed no EKG changes  · Repeat potassium level now  · Nephrology consult in a            Lupus nephritis [facility])   Assessment & Plan     Present on admission acute kidney injury  Appears that the patient's creatinine is usually down in the 1 2 range  Currently at 1 71  Will hold Lasix and request Nephrology consultation in [facility]           Acute kidney injury [facility])   Assessment & Plan     Baseline kidney function 1 2 to 1 3 back in February  Currently 1 7  Will hold Lasix  Gentle hydration  CONSULT NEPHROLOGY    Hyperkalemia  -likely due to oral potassium chloride and lisinopril in the setting of chronic kidney disease with recent worsening of renal function  Agree with holding both oral potassium chloride as well as lisinopril  Will give Lasix IV 40 mg to enhance renal excretion of potassium  Patient also received oral Kayexalate which should help  Recheck potassium level today afternoon  - If potassium is elevated to more than 6 0 would repeat conservative medical management for hyperkalemia    No indication for hemodialysis at this time   -placed on low-potassium diet  -check CPK level with patient complaining of pain in the left calf      Acute kidney injury on Chronic kidney disease stage 3  - Patient has lupus nephritis diagnosed with biopsy in 2015 November as per rheumatology note from [facility] [place]  Biopsy report not available to me at this time     -Renal function was stable at creatinine 1 3-1 4 till April 2018  Renal function worsened in June 2018 but has been stable since then around creatinine 1 7  Reason for slight worsening of renal function in June is not clear at this time  It could be progression of chronic kidney disease    -will do workup to rule out cause of worsening renal function  Will check postvoid residual, check UA with microscopy, also check C3-C4 anti double-stranded DNA to rule out active lupus and flare of lupus nephritis  Check renal u/s    -patient also underwent CTA after admission which could contribute to slightly worsening of renal function from possible contrast induced nephropathy but does not explain why the creatinine was elevated to 1 7 at the time of admission     Would recommend avoiding further nephrotoxic agent and dose all medication for GFR  Closely monitor renal function      Lupus nephritis  -patient had class 5 lupus nephritis as per rheumatology note  -patient is on CellCept, prednisone and Plaquenil  Dose of cellcept confirmed with patient and he was placed on CellCept 1000 mg twice daily  Continue prednisone 5 mg daily      Primary hypertension  -blood pressure is currently controlled  Recommend avoiding hypotensive episode  Agree with holding ACE inhibitor  Decrease dose of carvedilol as blood pressure currently on the lower side      Anemia   -hemoglobin stable  Last hemoglobin was 10 2      Proteinuria  - P/c ratio was 188 mg in march 2018  Will recheck  Patient had nephrotic range proteinuria in the past   Will consider resuming ACE inhibitor if renal function remains stable and once hyperkalemia resolved      Pain left lower extremity  -left calf is tender  Duplex negative for DVT   Patient is on Xarelto for history of DVT    Patient is currently on antibiotic per primary team         Admission Orders:        US RETROPERITONEAL   TELEMETRY  RENAL   POTASSIUM 2 GM DIET       Scheduled Meds:     acetaminophen 650 mg Oral Q6H PRN   baclofen 20 mg Oral BID   betamethasone dipropionate 1 application Topical BID   carvedilol 6 25 mg Oral BID With Meals   cefazolin 1,000 mg Intravenous Q8H   cyclobenzaprine 10 mg Oral TID PRN   escitalopram 10 mg Oral Daily   gabapentin 300 mg Oral Daily   hydroxychloroquine 200 mg Oral BID   mycophenolate 1,000 mg Oral Q12H YOUSUF   oxyCODONE 5 mg Oral Q4H PRN   predniSONE 5 mg Oral Daily   rivaroxaban 20 mg Oral Daily With Dinner   sildenafil 20 mg Oral TID   tamsulosin 0 8 mg Oral Once HS   traMADol 50 mg Oral Q6H PRN

## 2018-07-30 ENCOUNTER — APPOINTMENT (INPATIENT)
Dept: RADIOLOGY | Facility: HOSPITAL | Age: 48
DRG: 383 | End: 2018-07-30
Payer: COMMERCIAL

## 2018-07-30 ENCOUNTER — APPOINTMENT (INPATIENT)
Dept: ULTRASOUND IMAGING | Facility: HOSPITAL | Age: 48
DRG: 383 | End: 2018-07-30
Payer: COMMERCIAL

## 2018-07-30 LAB
ANION GAP SERPL CALCULATED.3IONS-SCNC: 8 MMOL/L (ref 4–13)
ANISOCYTOSIS BLD QL SMEAR: PRESENT
ATRIAL RATE: 64 BPM
BASOPHILS # BLD MANUAL: 0 THOUSAND/UL (ref 0–0.1)
BASOPHILS NFR MAR MANUAL: 0 % (ref 0–1)
BUN SERPL-MCNC: 43 MG/DL (ref 5–25)
CALCIUM SERPL-MCNC: 8.7 MG/DL (ref 8.3–10.1)
CHLORIDE SERPL-SCNC: 101 MMOL/L (ref 100–108)
CO2 SERPL-SCNC: 23 MMOL/L (ref 21–32)
CREAT SERPL-MCNC: 1.92 MG/DL (ref 0.6–1.3)
CRP SERPL QL: 69 MG/L
EOSINOPHIL # BLD MANUAL: 0.06 THOUSAND/UL (ref 0–0.4)
EOSINOPHIL NFR BLD MANUAL: 1 % (ref 0–6)
EOSINOPHIL NFR URNS MANUAL: 0 %
ERYTHROCYTE [DISTWIDTH] IN BLOOD BY AUTOMATED COUNT: 13.3 % (ref 11.6–15.1)
ERYTHROCYTE [SEDIMENTATION RATE] IN BLOOD: 75 MM/HOUR (ref 0–10)
GFR SERPL CREATININE-BSD FRML MDRD: 46 ML/MIN/1.73SQ M
GLUCOSE SERPL-MCNC: 101 MG/DL (ref 65–140)
HCT VFR BLD AUTO: 28.4 % (ref 36.5–49.3)
HGB BLD-MCNC: 9 G/DL (ref 12–17)
LYMPHOCYTES # BLD AUTO: 0.41 THOUSAND/UL (ref 0.6–4.47)
LYMPHOCYTES # BLD AUTO: 7 % (ref 14–44)
MCH RBC QN AUTO: 29.8 PG (ref 26.8–34.3)
MCHC RBC AUTO-ENTMCNC: 31.7 G/DL (ref 31.4–37.4)
MCV RBC AUTO: 94 FL (ref 82–98)
METAMYELOCYTES NFR BLD MANUAL: 1 % (ref 0–1)
MONOCYTES # BLD AUTO: 0.65 THOUSAND/UL (ref 0–1.22)
MONOCYTES NFR BLD: 11 % (ref 4–12)
MYELOCYTES NFR BLD MANUAL: 1 % (ref 0–1)
NEUTROPHILS # BLD MANUAL: 4.65 THOUSAND/UL (ref 1.85–7.62)
NEUTS SEG NFR BLD AUTO: 79 % (ref 43–75)
NRBC BLD AUTO-RTO: 0 /100 WBCS
P AXIS: 61 DEGREES
PLATELET # BLD AUTO: 259 THOUSANDS/UL (ref 149–390)
PLATELET BLD QL SMEAR: ADEQUATE
PMV BLD AUTO: 10 FL (ref 8.9–12.7)
POTASSIUM SERPL-SCNC: 5.2 MMOL/L (ref 3.5–5.3)
PR INTERVAL: 150 MS
PROCALCITONIN SERPL-MCNC: 0.06 NG/ML
QRS AXIS: 33 DEGREES
QRSD INTERVAL: 88 MS
QT INTERVAL: 362 MS
QTC INTERVAL: 373 MS
RBC # BLD AUTO: 3.02 MILLION/UL (ref 3.88–5.62)
RYE IGE QN: NEGATIVE
SODIUM SERPL-SCNC: 132 MMOL/L (ref 136–145)
T WAVE AXIS: 36 DEGREES
TOTAL CELLS COUNTED SPEC: 100
VENTRICULAR RATE: 64 BPM
WBC # BLD AUTO: 5.88 THOUSAND/UL (ref 4.31–10.16)

## 2018-07-30 PROCEDURE — 80048 BASIC METABOLIC PNL TOTAL CA: CPT | Performed by: FAMILY MEDICINE

## 2018-07-30 PROCEDURE — 87147 CULTURE TYPE IMMUNOLOGIC: CPT | Performed by: PHYSICIAN ASSISTANT

## 2018-07-30 PROCEDURE — 85007 BL SMEAR W/DIFF WBC COUNT: CPT | Performed by: PHYSICIAN ASSISTANT

## 2018-07-30 PROCEDURE — 85027 COMPLETE CBC AUTOMATED: CPT | Performed by: PHYSICIAN ASSISTANT

## 2018-07-30 PROCEDURE — 93005 ELECTROCARDIOGRAM TRACING: CPT

## 2018-07-30 PROCEDURE — 83935 ASSAY OF URINE OSMOLALITY: CPT | Performed by: INTERNAL MEDICINE

## 2018-07-30 PROCEDURE — 99232 SBSQ HOSP IP/OBS MODERATE 35: CPT | Performed by: INTERNAL MEDICINE

## 2018-07-30 PROCEDURE — 84145 PROCALCITONIN (PCT): CPT | Performed by: PHYSICIAN ASSISTANT

## 2018-07-30 PROCEDURE — 73630 X-RAY EXAM OF FOOT: CPT

## 2018-07-30 PROCEDURE — 76770 US EXAM ABDO BACK WALL COMP: CPT

## 2018-07-30 PROCEDURE — 99233 SBSQ HOSP IP/OBS HIGH 50: CPT | Performed by: PHYSICIAN ASSISTANT

## 2018-07-30 PROCEDURE — 86140 C-REACTIVE PROTEIN: CPT | Performed by: PHYSICIAN ASSISTANT

## 2018-07-30 PROCEDURE — 73610 X-RAY EXAM OF ANKLE: CPT

## 2018-07-30 PROCEDURE — 84300 ASSAY OF URINE SODIUM: CPT | Performed by: INTERNAL MEDICINE

## 2018-07-30 PROCEDURE — 93010 ELECTROCARDIOGRAM REPORT: CPT | Performed by: INTERNAL MEDICINE

## 2018-07-30 PROCEDURE — 85652 RBC SED RATE AUTOMATED: CPT | Performed by: PHYSICIAN ASSISTANT

## 2018-07-30 PROCEDURE — 87040 BLOOD CULTURE FOR BACTERIA: CPT | Performed by: PHYSICIAN ASSISTANT

## 2018-07-30 PROCEDURE — 82436 ASSAY OF URINE CHLORIDE: CPT | Performed by: INTERNAL MEDICINE

## 2018-07-30 RX ORDER — SODIUM CHLORIDE 9 MG/ML
250 INJECTION, SOLUTION INTRAVENOUS ONCE
Status: COMPLETED | OUTPATIENT
Start: 2018-07-30 | End: 2018-07-30

## 2018-07-30 RX ADMIN — ESCITALOPRAM OXALATE 10 MG: 10 TABLET ORAL at 08:32

## 2018-07-30 RX ADMIN — CARVEDILOL 6.25 MG: 6.25 TABLET, FILM COATED ORAL at 17:43

## 2018-07-30 RX ADMIN — MYCOPHENOLATE MOFETIL 1000 MG: 250 CAPSULE ORAL at 00:24

## 2018-07-30 RX ADMIN — CEFAZOLIN SODIUM 1000 MG: 1 SOLUTION INTRAVENOUS at 08:32

## 2018-07-30 RX ADMIN — CARVEDILOL 6.25 MG: 6.25 TABLET, FILM COATED ORAL at 08:32

## 2018-07-30 RX ADMIN — CEFEPIME HYDROCHLORIDE 2000 MG: 2 INJECTION, POWDER, FOR SOLUTION INTRAVENOUS at 12:33

## 2018-07-30 RX ADMIN — BACLOFEN 20 MG: 10 TABLET ORAL at 17:43

## 2018-07-30 RX ADMIN — ACETAMINOPHEN 650 MG: 325 TABLET, FILM COATED ORAL at 08:37

## 2018-07-30 RX ADMIN — SODIUM CHLORIDE 250 ML/HR: 0.9 INJECTION, SOLUTION INTRAVENOUS at 12:33

## 2018-07-30 RX ADMIN — TAMSULOSIN HYDROCHLORIDE 0.8 MG: 0.4 CAPSULE ORAL at 21:28

## 2018-07-30 RX ADMIN — OXYCODONE HYDROCHLORIDE 5 MG: 5 TABLET ORAL at 11:36

## 2018-07-30 RX ADMIN — MYCOPHENOLATE MOFETIL 1000 MG: 250 CAPSULE ORAL at 21:28

## 2018-07-30 RX ADMIN — MYCOPHENOLATE MOFETIL 1000 MG: 250 CAPSULE ORAL at 08:32

## 2018-07-30 RX ADMIN — CEFAZOLIN SODIUM 1000 MG: 1 SOLUTION INTRAVENOUS at 00:18

## 2018-07-30 RX ADMIN — RIVAROXABAN 20 MG: 20 TABLET, FILM COATED ORAL at 17:51

## 2018-07-30 RX ADMIN — GABAPENTIN 300 MG: 300 CAPSULE ORAL at 08:32

## 2018-07-30 RX ADMIN — PREDNISONE 5 MG: 5 TABLET ORAL at 08:32

## 2018-07-30 RX ADMIN — HYDROXYCHLOROQUINE SULFATE 200 MG: 200 TABLET, FILM COATED ENTERAL at 17:44

## 2018-07-30 RX ADMIN — BACLOFEN 20 MG: 10 TABLET ORAL at 08:32

## 2018-07-30 RX ADMIN — HYDROXYCHLOROQUINE SULFATE 200 MG: 200 TABLET, FILM COATED ENTERAL at 08:30

## 2018-07-30 RX ADMIN — BETAMETHASONE DIPROPIONATE 1 APPLICATION: 0.5 CREAM TOPICAL at 17:44

## 2018-07-30 RX ADMIN — BETAMETHASONE DIPROPIONATE 1 APPLICATION: 0.5 CREAM TOPICAL at 08:31

## 2018-07-30 NOTE — ASSESSMENT & PLAN NOTE
· Patient seen in the emergency room 7/26 for pain and redness of the left limb consistent with cellulitis  He was discharged home on Keflex, presented to the ER 7/28 with persistent symptoms and found to have hyperkalemia  · Patient now with fever, will discontinue Ancef and start cefepime for pseudomonal coverage  I am going to get an x-ray of the ankle/foot, possibly an MRI to rule out osteomyelitis  There is a scab on the medial malleolus, I was not able to express anything, however will consider getting wound culture if able  Will check blood culture x2 now  Monitor fever      · Vascular studies negative for DVT, however did no inguinal lymphadenopathy  · CT negative for PE  · Recheck CBC, obtain ESR/CRP and procalcitonin to guide therapy

## 2018-07-30 NOTE — PROGRESS NOTES
Progress Note - Aaron Come 1970, 46 y o  male MRN: [id number]    Unit/Bed#: -01 Encounter: [id number]    Primary Care Provider: Dariel Mendez MD   Date and time admitted to hospital: 7/28/2018  6:35 PM        * Cellulitis and abscess of left lower extremity   Assessment & Plan    · Patient seen in the emergency room 7/26 for pain and redness of the left limb consistent with cellulitis  He was discharged home on Keflex, presented to the ER 7/28 with persistent symptoms and found to have hyperkalemia  · Patient now with fever, will discontinue Ancef and start cefepime for pseudomonal coverage  I am going to get an x-ray of the ankle/foot, possibly an MRI to rule out osteomyelitis  There is a scab on the medial malleolus, I was not able to express anything, however will consider getting wound culture if able  Will check blood culture x2 now  Monitor fever  · Vascular studies negative for DVT, however did no inguinal lymphadenopathy  · CT negative for PE  · Recheck CBC, obtain ESR/CRP and procalcitonin to guide therapy        Hyperkalemia   Assessment & Plan    · Suspected secondary to ACE-inhibitor with potassium supplement use, patient received insulin/D50 as well as Lasix, potassium now within range  · Monitor, nephrology following  · Telemetry with some peaked Tw still, will continue to monitor and obtain EKG new        Acute kidney injury [facility])   Assessment & Plan    · Baseline kidney function 1 2 to 1 3 back in February  Currently 1 92     · Continue to monitor closely, nephrology following  · Avoid nephrotoxins  · Renally adjust medications        History of DVT (deep vein thrombosis)   Assessment & Plan    · Patient is on Xarelto, venous duplex (-)        Lupus (systemic lupus erythematosus) ([facility] [address] )   Assessment & Plan    · Patient has a diagnosis of lupus x3 years involving his skin and renal system  Patient sees Dr Erwin Blank for his nephrology needs    He rheumatologist is also from [facility] but he does not know the name  · Continue Neurontin, CellCept and Plaquenil  · Nephrology following, renal ultrasound unremarkable        Hypertension   Assessment & Plan    · Blood pressure is actually soft, patient asymptomatic  · Holding parameters for BP meds  · Continue to monitor, will consider bolus          VTE Pharmacologic Prophylaxis:   Pharmacologic: Rivaroxaban (Xarelto)  Mechanical VTE Prophylaxis in Place: No infection    Patient Centered Rounds: I have performed bedside rounds with nursing staff today  Discussions with Specialists or Other Care Team Provider: nephro notes reviewed     Education and Discussions with Family / Patient: d/w patient re: infection and concern given new fever despite abx     Time Spent for Care: 45 minutes  More than 50% of total time spent on counseling and coordination of care as described above  Current Length of Stay: 2 day(s)    Current Patient Status: Inpatient   Certification Statement: The patient will continue to require additional inpatient hospital stay due to await imaging of the LLE for further planning/treatment    Discharge Plan: not cleared     Code Status: Level 1 - Full Code      Subjective:   Patient seen out of bed to chair, he did have a fever this morning and feels that that is better after Tylenol  He still has pain in the left ankle, occasionally and uses some clear fluid  It still more swollen than normal and feels hot  He has had a cough since prior to admission, is nonproductive and unchanged  Denies shortness of breath or palpitations otherwise  Denies any significant abdominal discomfort, no muscle cramps  Objective:     Vitals:   Temp (24hrs), Av 1 °F (37 3 °C), Min:98 1 °F (36 7 °C), Max:101 2 °F (38 4 °C)    HR:  [79-90] 80  Resp:  [16-18] 16  BP: ()/(47-77) 90/47  SpO2:  [94 %-98 %] 94 %  Body mass index is 31 21 kg/m²       Input and Output Summary (last 24 hours): Intake/Output Summary (Last 24 hours) at 07/30/18 1141  Last data filed at 07/30/18 0902   Gross per 24 hour   Intake              560 ml   Output             1100 ml   Net             -540 ml       Physical Exam:     Physical Exam   Constitutional: Vital signs are normal  He appears well-developed and well-nourished  No distress  Cardiovascular: Normal rate, regular rhythm, S1 normal, S2 normal, normal heart sounds and intact distal pulses  No murmur heard  Pulmonary/Chest: Effort normal and breath sounds normal  No respiratory distress  He has no wheezes  He has no rhonchi  He has no rales  Abdominal: Soft  Bowel sounds are normal  He exhibits no distension  There is no tenderness  Musculoskeletal: He exhibits edema  Skin: He is not diaphoretic  Psychiatric: He has a normal mood and affect  Nursing note and vitals reviewed  Additional Data:     Labs:      Results from last 7 days  Lab Units 07/28/18  1932   WBC Thousand/uL 6 19   HEMOGLOBIN g/dL 10 2*   HEMATOCRIT % 31 9*   PLATELETS Thousands/uL 286   LYMPHO PCT % 11*   MONO PCT MAN % 11   EOSINO PCT MANUAL % 0       Results from last 7 days  Lab Units 07/30/18  0435   SODIUM mmol/L 132*   POTASSIUM mmol/L 5 2   CHLORIDE mmol/L 101   CO2 mmol/L 23   BUN mg/dL 43*   CREATININE mg/dL 1 92*   CALCIUM mg/dL 8 7   GLUCOSE RANDOM mg/dL 101       Results from last 7 days  Lab Units 07/28/18  1932   INR  1 42*       Results from last 7 days  Lab Units 07/28/18  2123   POC GLUCOSE mg/dl 106             * I Have Reviewed All Lab Data Listed Above  * Additional Pertinent Lab Tests Reviewed:  All [place] Admission Reviewed    Imaging:    Imaging Reports Reviewed Today Include:  Venous duplex, EKG, bladder/kidney ultrasound, CTA  Imaging Personally Reviewed by Myself Includes:  Telemetry - sinus arrhythmia, questionable peaked Tw    Recent Cultures (last 7 days):           Last 24 Hours Medication List:     Current Facility-Administered Medications:  acetaminophen 650 mg Oral Q6H PRN Scooby Peres MD   baclofen 20 mg Oral BID Scooby Peres MD   betamethasone dipropionate 1 application Topical BID Scooby Peres MD   carvedilol 6 25 mg Oral BID With Meals Hubert Morrow MD   cefepime 2,000 mg Intravenous Q24H Claribel Arreguin PA-C   cyclobenzaprine 10 mg Oral TID PRN Scooby Peres MD   escitalopram 10 mg Oral Daily Scooby Peres MD   gabapentin 300 mg Oral Daily Scooby Peres MD   hydroxychloroquine 200 mg Oral BID Scooby Peres MD   mycophenolate 1,000 mg Oral Q12H Lan German MD   oxyCODONE 5 mg Oral Q4H PRN Scooby Peres MD   predniSONE 5 mg Oral Daily Scooby Peres MD   rivaroxaban 20 mg Oral Daily With Scott Corley MD   tamsulosin 0 8 mg Oral Once HS Scooby Peres MD   traMADol 50 mg Oral Q6H PRN Scooby Peres MD        Today, Patient Was Seen By: Taz Blount PA-C    ** Please Note: Dictation voice to text software may have been used in the creation of this document   **

## 2018-07-30 NOTE — PROGRESS NOTES
NEPHROLOGY PROGRESS NOTE    Patient: Mattie Erwin               Sex: male          DOA: 7/28/2018  6:35 PM   YOB: 1970        Age:  46 y o         LOS:  LOS: 2 days       HPI     Patient admitted with cellulitis of right foot and has history of lupus nephritis    SUBJECTIVE     Feeling better still has swelling of the leg  No other acute complaint    No shortness of breath no chest pain no palpitation    CURRENT MEDICATIONS       Current Facility-Administered Medications:     acetaminophen (TYLENOL) tablet 650 mg, 650 mg, Oral, Q6H PRN, Rosario Mcleod MD, 650 mg at 07/30/18 6151    baclofen tablet 20 mg, 20 mg, Oral, BID, Rosario Mcleod MD, 20 mg at 07/30/18 1180    betamethasone dipropionate (DIPROSONE) 6 63 % cream 1 application, 1 application, Topical, BID, Rosario Mcleod MD, 1 application at 56/60/98 0831    carvedilol (COREG) tablet 6 25 mg, 6 25 mg, Oral, BID With Meals, Sheron Merritt MD, 6 25 mg at 07/30/18 8586    cefepime (MAXIPIME) 2,000 mg in dextrose 5 % 50 mL IVPB, 2,000 mg, Intravenous, Q24H, Claribel Arreugin PA-C, Last Rate: 100 mL/hr at 07/30/18 1233, 2,000 mg at 07/30/18 1233    cyclobenzaprine (FLEXERIL) tablet 10 mg, 10 mg, Oral, TID PRN, Rosario Mcleod MD    escitalopram (LEXAPRO) tablet 10 mg, 10 mg, Oral, Daily, Rosario Mcleod MD, 10 mg at 07/30/18 9888    gabapentin (NEURONTIN) capsule 300 mg, 300 mg, Oral, Daily, Rosario Mcleod MD, 300 mg at 07/30/18 3714    hydroxychloroquine (PLAQUENIL) tablet 200 mg, 200 mg, Oral, BID, Rosario Mcleod MD, 200 mg at 07/30/18 0830    mycophenolate (CELLCEPT) capsule 1,000 mg, 1,000 mg, Oral, Q12H [address], Sheron Merritt MD, 1,000 mg at 07/30/18 9943    oxyCODONE (ROXICODONE) IR tablet 5 mg, 5 mg, Oral, Q4H PRN, Rosario Mcleod MD, 5 mg at 07/30/18 1136    predniSONE tablet 5 mg, 5 mg, Oral, Daily, Rosario Mcleod MD, 5 mg at 07/30/18 7472    rivaroxaban (XARELTO) tablet 20 mg, 20 mg, Oral, Daily With Dinner, Burgess Sophie MD, 20 mg at 07/29/18 1717    tamsulosin (FLOMAX) capsule 0 8 mg, 0 8 mg, Oral, Once HS, Burgess Sophie MD, 0 8 mg at 07/29/18 2202    traMADol (ULTRAM) tablet 50 mg, 50 mg, Oral, Q6H PRN, Burgess Sophie MD, 50 mg at 07/29/18 0053    OBJECTIVE     Current Weight: Weight - Scale: 87 7 kg (193 lb 5 5 oz)  Vitals:    07/30/18 1500   BP: 122/61   Pulse: 76   Resp: 18   Temp: 98 5 °F (36 9 °C)   SpO2: 98%       Intake/Output Summary (Last 24 hours) at 07/30/18 1620  Last data filed at 07/30/18 0902   Gross per 24 hour   Intake              560 ml   Output              700 ml   Net             -140 ml       PHYSICAL EXAMINATION     Physical Exam   Constitutional: He is oriented to person, place, and time  He appears well-developed  No distress  HENT:   Head: Normocephalic and atraumatic  Mouth/Throat: Oropharynx is clear and moist    Eyes: Conjunctivae and EOM are normal  Pupils are equal, round, and reactive to light  No scleral icterus  Neck: Normal range of motion  Neck supple  No JVD present  Cardiovascular: Normal rate, regular rhythm and normal heart sounds  Pulmonary/Chest: Effort normal and breath sounds normal  No respiratory distress  He has no wheezes  He has no rales  Abdominal: Soft  Bowel sounds are normal  There is no tenderness  Musculoskeletal: Normal range of motion  He exhibits edema  Neurological: He is alert and oriented to person, place, and time  Skin: Skin is warm  No rash noted  Psychiatric: He has a normal mood and affect   His behavior is normal         LAB RESULTS       Results from last 7 days  Lab Units 07/30/18  1219 07/30/18  0435 07/29/18  1549 07/29/18  0529 07/29/18  0045 07/28/18 2022 07/28/18  1932   WBC Thousand/uL 5 88  --   --   --   --   --  6 19   HEMOGLOBIN g/dL 9 0*  --   --   --   --   --  10 2*   HEMATOCRIT % 28 4*  --   --   --   --   --  31 9*   PLATELETS Thousands/uL 259  --   --   --   --   --  286   SODIUM mmol/L  --  132* --  135* 135*  --  134*   POTASSIUM mmol/L  --  5 2 5 5* 5 8* 5 5* 6 8* 6 8*   CHLORIDE mmol/L  --  101  --  103 103  --  103   CO2 mmol/L  --  23  --  23 23  --  24   BUN mg/dL  --  43*  --  37* 39*  --  42*   CREATININE mg/dL  --  1 92*  --  1 77* 1 62*  --  1 71*   EGFR ml/min/1 73sq m  --  46  --  51 56  --  53   CALCIUM mg/dL  --  8 7  --  8 6 9 1  --  8 9   GLUCOSE RANDOM mg/dL  --  101  --  98 103  --  101       RADIOLOGY RESULTS      No results found for this or any previous visit  Results for orders placed during the hospital encounter of 02/21/18   X-ray chest 2 views    Narrative CHEST     INDICATION: chest pain    COMPARISON:  None    EXAM PERFORMED/VIEWS:  XR CHEST PA & LATERAL    IMAGES:  2    FINDINGS:  Surgical clips in the right axilla  Lungs are suboptimally aerated  No consolidation or effusion  Cardiac size top normal   Vascular markings are slightly prominent  Findings are likely exaggerated by vascular crowding  Visualized osseous structures appear within normal limits for the patient's age  Postop changes cervical spine  Impression Lungs are suboptimally aerated with some vascular crowding noted  No consolidation or effusion  No pulmonary edema  Workstation performed: [id number]       No results found for this or any previous visit  No results found for this or any previous visit  No results found for this or any previous visit  No results found for this or any previous visit  PLAN / RECOMMENDATIONS      Acute kidney injury:  Likely secondary to contrast ATN  Will check urine electrolytes to rule out any volume related problem    Cellulitis of the leg: On antibiotic which will continue    Lupus nephritis:  Baseline creatinine seems to be 1 4 Will continue to monitor    Hypertension:  Very well controlled    Carmen Lang MD  Nephrology  7/30/2018        Portions of the record may have been created with voice recognition software   Occasional wrong word or "sound a like" substitutions may have occurred due to the inherent limitations of voice recognition software  Read the chart carefully and recognize, using context, where substitutions have occurred

## 2018-07-30 NOTE — ASSESSMENT & PLAN NOTE
· Suspected secondary to ACE-inhibitor with potassium supplement use, patient received insulin/D50 as well as Lasix, potassium now within range  · Monitor, nephrology following  · Telemetry with some peaked Tw still, will continue to monitor and obtain EKG new

## 2018-07-30 NOTE — ASSESSMENT & PLAN NOTE
· Patient has a diagnosis of lupus x3 years involving his skin and renal system  Patient sees Dr Nica Plaza for his nephrology needs  [place] [facility]    · Continue Neurontin, CellCept and Plaquenil  · Nephrology following, renal ultrasound unremarkable

## 2018-07-30 NOTE — ASSESSMENT & PLAN NOTE
· Baseline kidney function 1 2 to 1 3 back in February Currently 1 92     · Continue to monitor closely, nephrology following  · Avoid nephrotoxins  · Renally adjust medications

## 2018-07-30 NOTE — ASSESSMENT & PLAN NOTE
· Blood pressure is actually soft, patient asymptomatic  · Holding parameters for BP meds  · Continue to monitor, will consider bolus

## 2018-07-31 VITALS
WEIGHT: 193.34 LBS | DIASTOLIC BLOOD PRESSURE: 61 MMHG | RESPIRATION RATE: 18 BRPM | TEMPERATURE: 99 F | HEART RATE: 78 BPM | HEIGHT: 66 IN | OXYGEN SATURATION: 94 % | SYSTOLIC BLOOD PRESSURE: 123 MMHG | BODY MASS INDEX: 31.07 KG/M2

## 2018-07-31 LAB
ANION GAP SERPL CALCULATED.3IONS-SCNC: 9 MMOL/L (ref 4–13)
BASOPHILS # BLD AUTO: 0.01 THOUSANDS/ΜL (ref 0–0.1)
BASOPHILS NFR BLD AUTO: 0 % (ref 0–1)
BUN SERPL-MCNC: 40 MG/DL (ref 5–25)
CALCIUM SERPL-MCNC: 8.8 MG/DL (ref 8.3–10.1)
CH50 SERPL-ACNC: >60 U/ML
CHLORIDE SERPL-SCNC: 102 MMOL/L (ref 100–108)
CHLORIDE UR-SCNC: 18 MMOL/L (ref 10–330)
CO2 SERPL-SCNC: 21 MMOL/L (ref 21–32)
CREAT SERPL-MCNC: 1.47 MG/DL (ref 0.6–1.3)
DSDNA AB SER-ACNC: 16 IU/ML (ref 0–9)
EOSINOPHIL # BLD AUTO: 0 THOUSAND/ΜL (ref 0–0.61)
EOSINOPHIL NFR BLD AUTO: 0 % (ref 0–6)
ERYTHROCYTE [DISTWIDTH] IN BLOOD BY AUTOMATED COUNT: 13.2 % (ref 11.6–15.1)
GFR SERPL CREATININE-BSD FRML MDRD: 63 ML/MIN/1.73SQ M
GLUCOSE SERPL-MCNC: 99 MG/DL (ref 65–140)
HCT VFR BLD AUTO: 27.6 % (ref 36.5–49.3)
HGB BLD-MCNC: 9 G/DL (ref 12–17)
IMM GRANULOCYTES # BLD AUTO: 0.04 THOUSAND/UL (ref 0–0.2)
IMM GRANULOCYTES NFR BLD AUTO: 1 % (ref 0–2)
LYMPHOCYTES # BLD AUTO: 0.65 THOUSANDS/ΜL (ref 0.6–4.47)
LYMPHOCYTES NFR BLD AUTO: 13 % (ref 14–44)
MCH RBC QN AUTO: 30.2 PG (ref 26.8–34.3)
MCHC RBC AUTO-ENTMCNC: 32.6 G/DL (ref 31.4–37.4)
MCV RBC AUTO: 93 FL (ref 82–98)
MONOCYTES # BLD AUTO: 0.79 THOUSAND/ΜL (ref 0.17–1.22)
MONOCYTES NFR BLD AUTO: 16 % (ref 4–12)
NEUTROPHILS # BLD AUTO: 3.5 THOUSANDS/ΜL (ref 1.85–7.62)
NEUTS SEG NFR BLD AUTO: 70 % (ref 43–75)
NRBC BLD AUTO-RTO: 0 /100 WBCS
OSMOLALITY UR: 473 MMOL/KG
PLATELET # BLD AUTO: 259 THOUSANDS/UL (ref 149–390)
PMV BLD AUTO: 9.7 FL (ref 8.9–12.7)
POTASSIUM SERPL-SCNC: 4.8 MMOL/L (ref 3.5–5.3)
PROCALCITONIN SERPL-MCNC: 0.08 NG/ML
RBC # BLD AUTO: 2.98 MILLION/UL (ref 3.88–5.62)
SODIUM 24H UR-SCNC: 36 MOL/L
SODIUM SERPL-SCNC: 132 MMOL/L (ref 136–145)
URATE SERPL-MCNC: 9.2 MG/DL (ref 4.2–8)
WBC # BLD AUTO: 4.99 THOUSAND/UL (ref 4.31–10.16)

## 2018-07-31 PROCEDURE — 84145 PROCALCITONIN (PCT): CPT | Performed by: PHYSICIAN ASSISTANT

## 2018-07-31 PROCEDURE — 85025 COMPLETE CBC W/AUTO DIFF WBC: CPT | Performed by: PHYSICIAN ASSISTANT

## 2018-07-31 PROCEDURE — 84550 ASSAY OF BLOOD/URIC ACID: CPT | Performed by: INTERNAL MEDICINE

## 2018-07-31 PROCEDURE — 80048 BASIC METABOLIC PNL TOTAL CA: CPT | Performed by: PHYSICIAN ASSISTANT

## 2018-07-31 PROCEDURE — 99232 SBSQ HOSP IP/OBS MODERATE 35: CPT | Performed by: INTERNAL MEDICINE

## 2018-07-31 PROCEDURE — 99239 HOSP IP/OBS DSCHRG MGMT >30: CPT | Performed by: PHYSICIAN ASSISTANT

## 2018-07-31 RX ORDER — FUROSEMIDE 40 MG/1
20 TABLET ORAL DAILY
Refills: 0
Start: 2018-07-31

## 2018-07-31 RX ORDER — CEPHALEXIN 500 MG/1
500 CAPSULE ORAL EVERY 6 HOURS SCHEDULED
Qty: 30 CAPSULE | Refills: 0
Start: 2018-07-31 | End: 2018-08-01

## 2018-07-31 RX ADMIN — PREDNISONE 5 MG: 5 TABLET ORAL at 09:42

## 2018-07-31 RX ADMIN — GABAPENTIN 300 MG: 300 CAPSULE ORAL at 09:41

## 2018-07-31 RX ADMIN — CARVEDILOL 6.25 MG: 6.25 TABLET, FILM COATED ORAL at 09:41

## 2018-07-31 RX ADMIN — OXYCODONE HYDROCHLORIDE 5 MG: 5 TABLET ORAL at 12:16

## 2018-07-31 RX ADMIN — OXYCODONE HYDROCHLORIDE 5 MG: 5 TABLET ORAL at 06:02

## 2018-07-31 RX ADMIN — CEFEPIME HYDROCHLORIDE 2000 MG: 2 INJECTION, POWDER, FOR SOLUTION INTRAVENOUS at 12:16

## 2018-07-31 RX ADMIN — BETAMETHASONE DIPROPIONATE 1 APPLICATION: 0.5 CREAM TOPICAL at 09:42

## 2018-07-31 RX ADMIN — OXYCODONE HYDROCHLORIDE 5 MG: 5 TABLET ORAL at 00:45

## 2018-07-31 RX ADMIN — BACLOFEN 20 MG: 10 TABLET ORAL at 09:42

## 2018-07-31 RX ADMIN — HYDROXYCHLOROQUINE SULFATE 200 MG: 200 TABLET, FILM COATED ENTERAL at 09:41

## 2018-07-31 RX ADMIN — MYCOPHENOLATE MOFETIL 1000 MG: 250 CAPSULE ORAL at 09:42

## 2018-07-31 RX ADMIN — ESCITALOPRAM OXALATE 10 MG: 10 TABLET ORAL at 09:41

## 2018-07-31 NOTE — PLAN OF CARE
Problem: DISCHARGE PLANNING - CARE MANAGEMENT  Goal: Discharge to post-acute care or home with appropriate resources  INTERVENTIONS:  - Conduct assessment to determine patient/family and health care team treatment goals, and need for post-acute services based on payer coverage, community resources, and patient preferences, and barriers to discharge  - Address psychosocial, clinical, and financial barriers to discharge as identified in assessment in conjunction with the patient/family and health care team  - Arrange appropriate level of post-acute services according to patients   needs and preference and payer coverage in collaboration with the physician and health care team  - Communicate with and update the patient/family, physician, and health care team regarding progress on the discharge plan  - Arrange appropriate transportation to post-acute venues  Outcome: Progressing  Pt reported that he is independent and has family support  Pt has no CM needs at this time  CM will continue to assess needs prior to discharge

## 2018-07-31 NOTE — ASSESSMENT & PLAN NOTE
· Patient has a diagnosis of lupus involving his skin and renal system  Patient sees Dr Rita Dillard at [facility] for his nephrology needs  He rheumatologist is also from [facility] but he does not know the name    · Continue Neurontin, CellCept and Plaquenil  · Nephrology following, renal ultrasound unremarkable

## 2018-07-31 NOTE — PROGRESS NOTES
NEPHROLOGY PROGRESS NOTE    Patient: Verner Saras               Sex: male          DOA: 7/28/2018  6:35 PM   YOB: 1970        Age:  46 y o         LOS:  LOS: 3 days       HPI     Patient with CKD stage 3 due to lupus nephritis admitted with cellulitis of right foot    SUBJECTIVE     Feeling much better  Denies any complaint except some pain at the right foot  Foot is still swollen    No shortness of breath no chest pain no palpitation    CURRENT MEDICATIONS       Current Facility-Administered Medications:     acetaminophen (TYLENOL) tablet 650 mg, 650 mg, Oral, Q6H PRN, Blair Griffiths MD, 650 mg at 07/30/18 3051    baclofen tablet 20 mg, 20 mg, Oral, BID, Blair Griffiths MD, 20 mg at 07/31/18 3973    betamethasone dipropionate (DIPROSONE) 5 83 % cream 1 application, 1 application, Topical, BID, Blair Griffiths MD, 1 application at 25/26/06 6469    carvedilol (COREG) tablet 6 25 mg, 6 25 mg, Oral, BID With Meals, Sheila Carrasquillo MD, 6 25 mg at 07/31/18 0941    cefepime (MAXIPIME) 2,000 mg in dextrose 5 % 50 mL IVPB, 2,000 mg, Intravenous, Q24H, Claribel Arreguin PA-C, Last Rate: 100 mL/hr at 07/30/18 1233, 2,000 mg at 07/30/18 1233    cyclobenzaprine (FLEXERIL) tablet 10 mg, 10 mg, Oral, TID PRN, Blair Griffiths MD    escitalopram (LEXAPRO) tablet 10 mg, 10 mg, Oral, Daily, Blair Griffiths MD, 10 mg at 07/31/18 0941    gabapentin (NEURONTIN) capsule 300 mg, 300 mg, Oral, Daily, Blair Griffiths MD, 300 mg at 07/31/18 0941    hydroxychloroquine (PLAQUENIL) tablet 200 mg, 200 mg, Oral, BID, Blair Griffiths MD, 200 mg at 07/31/18 0941    mycophenolate (CELLCEPT) capsule 1,000 mg, 1,000 mg, Oral, Q12H [facility], Sheila Carrasquillo MD, 1,000 mg at 07/31/18 0438    oxyCODONE (ROXICODONE) IR tablet 5 mg, 5 mg, Oral, Q4H PRN, Blair Griffiths MD, 5 mg at 07/31/18 0602    predniSONE tablet 5 mg, 5 mg, Oral, Daily, Blair Griffiths MD, 5 mg at 07/31/18 0942    rivaroxaban (XARELTO) tablet 20 mg, 20 mg, Oral, Daily With Alvin Parker MD, 20 mg at 07/30/18 1751    tamsulosin [facility]) capsule 0 8 mg, 0 8 mg, Oral, Once HS, Verenice Moreira MD, 0 8 mg at 07/30/18 2128    traMADol (ULTRAM) tablet 50 mg, 50 mg, Oral, Q6H PRN, Verenice Moreira MD, 50 mg at 07/29/18 0053    OBJECTIVE     Current Weight: Weight - Scale: 87 7 kg (193 lb 5 5 oz)  Vitals:    07/31/18 1100   BP: 123/61   Pulse: 78   Resp: 18   Temp: 99 °F (37 2 °C)   SpO2: 94%       Intake/Output Summary (Last 24 hours) at 07/31/18 1202  Last data filed at 07/31/18 1106   Gross per 24 hour   Intake              720 ml   Output             2260 ml   Net            -1540 ml       PHYSICAL EXAMINATION     Physical Exam   Constitutional: He is oriented to person, place, and time  He appears well-developed  No distress  HENT:   Head: Normocephalic  Mouth/Throat: Oropharynx is clear and moist    Eyes: Conjunctivae are normal  No scleral icterus  Neck: Neck supple  No JVD present  Cardiovascular: Normal rate and normal heart sounds  Pulmonary/Chest: Effort normal  He has no wheezes  Abdominal: Soft  There is no tenderness  Musculoskeletal: Normal range of motion  He exhibits edema  Neurological: He is alert and oriented to person, place, and time  Skin: Skin is warm  No rash noted  Psychiatric: He has a normal mood and affect   His behavior is normal         LAB RESULTS       Results from last 7 days  Lab Units 07/31/18  0504 07/30/18  1219 07/30/18  0435 07/29/18  1549 07/29/18  0529 07/29/18  0045 07/28/18  2022 07/28/18  1932   WBC Thousand/uL 4 99 5 88  --   --   --   --   --  6 19   HEMOGLOBIN g/dL 9 0* 9 0*  --   --   --   --   --  10 2*   HEMATOCRIT % 27 6* 28 4*  --   --   --   --   --  31 9*   PLATELETS Thousands/uL 259 259  --   --   --   --   --  286   SODIUM mmol/L 132*  --  132*  --  135* 135*  --  134*   POTASSIUM mmol/L 4 8  --  5 2 5 5* 5 8* 5 5* 6 8* 6 8*   CHLORIDE mmol/L 102  --  101  --  103 103  --  103 CO2 mmol/L 21  --  23  --  23 23  --  24   BUN mg/dL 40*  --  43*  --  37* 39*  --  42*   CREATININE mg/dL 1 47*  --  1 92*  --  1 77* 1 62*  --  1 71*   EGFR ml/min/1 73sq m 63  --  46  --  51 56  --  53   CALCIUM mg/dL 8 8  --  8 7  --  8 6 9 1  --  8 9   GLUCOSE RANDOM mg/dL 99  --  101  --  98 103  --  101       RADIOLOGY RESULTS      No results found for this or any previous visit  Results for orders placed during the hospital encounter of 02/21/18   X-ray chest 2 views    Narrative CHEST     INDICATION: chest pain    COMPARISON:  None    EXAM PERFORMED/VIEWS:  XR CHEST PA & LATERAL    IMAGES:  2    FINDINGS:  Surgical clips in the right axilla  Lungs are suboptimally aerated  No consolidation or effusion  Cardiac size top normal   Vascular markings are slightly prominent  Findings are likely exaggerated by vascular crowding  Visualized osseous structures appear within normal limits for the patient's age  Postop changes cervical spine  Impression Lungs are suboptimally aerated with some vascular crowding noted  No consolidation or effusion  No pulmonary edema  Workstation performed: [id number]       No results found for this or any previous visit  No results found for this or any previous visit  No results found for this or any previous visit  No results found for this or any previous visit  PLAN / RECOMMENDATIONS      Acute kidney injury:  Resolved with hydration  Possibly due to contrast ATN with CTA  Will advised to avoid any more contrast unless it is absolutely necessary    CKD stage 3:  Baseline creatinine is 1 5 and it is at baseline right now  Advised to follow up with his primary doctor and nephrologist    Hyperkalemia resolved likely secondary to ACE-inhibitor +potassium replacement in face of acute kidney injury    Will advised to avoid potassium as well as ACE-inhibitor at this point until being seen by primary nephrologist    Disposition:  Can be discharged renal point of view    Stacy Juarez MD  Nephrology  7/31/2018        Portions of the record may have been created with voice recognition software  Occasional wrong word or "sound a like" substitutions may have occurred due to the inherent limitations of voice recognition software  Read the chart carefully and recognize, using context, where substitutions have occurred

## 2018-07-31 NOTE — DISCHARGE SUMMARY
Discharge- Karthik Proper 1970, 46 y o  male MRN: [id number]    Unit/Bed#: -01 Encounter: [id number]    Primary Care Provider: Mima Malhotra MD   Date and time admitted to hospital: 7/28/2018  6:35 PM        * Cellulitis and abscess of left lower extremity   Assessment & Plan    · Patient seen in the emergency room 7/26 for pain and redness of the left limb consistent with cellulitis  He was discharged home on Keflex, presented to the ER 7/28 with persistent symptoms and found to have hyperkalemia      · Isolated fever 7/30, x-ray ankle and foot shows no evidence for deep infection  · Vascular studies negative for DVT, however did note inguinal lymphadenopathy   · CT negative for PE  · CBC with no leukocytosis, procalcitonin normal, patient afebrile since yesterday and feeling systemically well:  Stable for discharge with outpatient follow-up, patient has received 6 of 7 days total IV antibiotics, complete out 1 more day oral on discharge        Hyperkalemia   Assessment & Plan    · Suspected secondary to ACE-inhibitor with potassium supplement use, patient received insulin/D50 as well as Lasix, potassium now within range  · Follow-up with primary nephrologist outpatient, stable for discharge  · Continue to hold ACE-inhibitor and potassium supplement on discharge        Acute kidney injury [facility])   Assessment & Plan    · Baseline kidney function 1 5 per Nephrology, currently 1 47  · Continue to hold ACE-inhibitor, potassium supplement on discharge; resume Lasix at 20 mg daily, lower dose  · Suspected ATN related to IV contrast  · Avoid nephrotoxins  · Renally adjust medications as needed        Chronic kidney disease, stage 3   Assessment & Plan    · Back to baseline        History of DVT (deep vein thrombosis)   Assessment & Plan    · Patient is on Xarelto, venous duplex neg        Lupus (systemic lupus erythematosus) ([facility] [address] )   Assessment & Plan    · Patient has a diagnosis of lupus involving his skin and renal system  Patient sees Dr Sarita Sommer at [facility] for his nephrology needs  He rheumatologist is also from [facility] but he does not know the name  · Continue Neurontin, CellCept and Plaquenil  · Nephrology following, renal ultrasound unremarkable        Hypertension   Assessment & Plan    · Blood pressure stable at discharge  · Required small IV bolus on 7/30 for soft BP, since resolved            Discharging Physician / Practitioner: Mariana Edouard PA-C  PCP: Evens Lujan MD  Admission Date:   Admission Orders     Ordered        07/28/18 2132  Inpatient Admission (expected length of stay for this patient is greater than two midnights)  Once             Discharge Date: 07/31/18    Disposition:      HOME    Reason for Admission: cellulitis, hyperkalemia    Discharge Diagnoses:     Please see assessment and plan section above for further details regarding discharge diagnoses  Resolved Problems  Date Reviewed: 7/31/2018    None          Consultations During Hospital Stay:  · Nephrology    Procedures Performed:     ·      Medication Adjustments and Discharge Medications:  · Summary of Medication Adjustments made as a result of this hospitalization:  Complete 1 more day of oral Keflex; decrease Lasix to 20 mg daily  · Medications being temporarily held (include recommended restart time):  Lisinopril, potassium  · Discharge Medication List: See after visit summary for reconciled discharge medications  Diet Recommendations at Discharge:  Diet -        Diet Orders            Start     Ordered    07/29/18 0020  Diet Renal; Potassium 2 GM  Diet effective now     Question Answer Comment   Diet Type Renal    Renal Potassium 2 GM    RD to adjust diet per protocol? Yes        07/29/18 0022            Significant Findings / Test Results:     XR foot 3+ vw left   Final Result by Benjamen Bamberger, DO (07/30 1318)   No soft tissue gas to suggest an abscess     No focal bony resorption to confirm osteomyelitis  Workstation performed: [id number]         XR ankle 3+ vw left   Final Result by Jimy Henson DO (07/30 1311)   Bone mineralization is within normal limits for patient's age  No acute fracture or dislocation  Soft tissue swelling at the medial ankle without soft tissue gas to confirm an abscess or focal bony resorption to suggest osteomyelitis  If osteomyelitis    is clinically suspected: Recommend follow-up radioisotope labeled white blood cell scan or MRI  Workstation performed: [id number]         US kidney and bladder   Final Result by Gerald Woods MD (07/30 2980)      1  Right upper pole simple cyst   Otherwise unremarkable kidneys and bladder  2   Tiny echogenic foci within the spleen incidentally noted though normal spleen size and no corresponding lesions seen on prior CT  This is of uncertain etiology and clinical significance  Workstation performed: [id number]         CTA ED chest PE study   Final Result by Edyth Seip, MD (07/28 2213)      No pulmonary embolism or aortic dissection  No effusion, airspace disease, or pneumothorax  Workstation performed: [id number]           · Procalcitonin normal  · Venous duplex negative, he is already on Xarelto  · CTA negative for PE    Incidental Findings:   ·      Test Results Pending at Discharge (will require follow up): · Final blood culture     Outpatient Tests Requested:  ·     Complications:      Hospital Course:     Kelley West is a 46 y o  male patient who originally presented to the hospital on 7/28/2018 due to persistent lower extremity edema, found to be hyperkalemic on presentation  Patient was admitted for cardiac monitoring and reversal of electrolyte abnormalities  Nephrology consult, patient received insulin/D50 as well as Kayexalate with improvement of potassium  He also received Lasix, did developed mild acute kidney injury which improved by time of discharge  Suspected related to IV contrast       Patient will follow up with his outpatient nephrologist and PCP on discharge  He needs 1 more day of oral Keflex  Procalcitonin normal   Suspect patient has chronic edema which leads to chronic skin changes, rather than acute infection  Blood culture still pending at time of discharge, will need to be followed up with his PCP  Condition at Discharge: stable     Discharge Day Visit / Exam:     Subjective:  Patient seen examined, he wants to go home  Denies acute change in the lower extremity swelling, no discharge  Denies subjective fevers or chills  Feels well, seems to be tolerating antibiotics well, denying any GI upset  Vitals: Blood Pressure: 123/61 (07/31/18 1100)  Pulse: 78 (07/31/18 1100)  Temperature: 99 °F (37 2 °C) (07/31/18 1100)  Temp Source: Oral (07/31/18 1100)  Respirations: 18 (07/31/18 1100)  Height: 5' 6" (167 6 cm) (07/28/18 2226)  Weight - Scale: 87 7 kg (193 lb 5 5 oz) (07/28/18 2226)  SpO2: 94 % (07/31/18 1100)  Exam:   Physical Exam   Constitutional: Vital signs are normal  He appears well-developed and well-nourished  No distress  Cardiovascular: Normal rate, regular rhythm, S1 normal, S2 normal, normal heart sounds and intact distal pulses  No murmur heard  Pulmonary/Chest: Effort normal and breath sounds normal  No respiratory distress  He has no wheezes  He has no rhonchi  He has no rales  Abdominal: Soft  Bowel sounds are normal  He exhibits no distension  There is no tenderness  Musculoskeletal: He exhibits edema and tenderness  unchanged   Psychiatric: He has a normal mood and affect  Nursing note and vitals reviewed  Discussion with Family: none present     Goals of Care Discussions:  · Code Status at Discharge: Level 1 - Full Code  · OK to Rehospitalize if Needed?  Yes    Discharge instructions/Information to patient and family:   See after visit summary section titled Discharge Instructions for information provided to patient and family  Planned Readmission: none      Discharge Statement:  I spent approximately 30 minutes discharging the patient  This time was spent on the day of discharge  I had direct contact with the patient on the day of discharge  Greater than 50% of the total time was spent examining patient, answering all patient questions, arranging and discussing plan of care with patient as well as directly providing post-discharge instructions  Additional time then spent on discharge activities      ** Please Note: This note has been constructed using a voice recognition system **

## 2018-07-31 NOTE — ASSESSMENT & PLAN NOTE
· Suspected secondary to ACE-inhibitor with potassium supplement use, patient received insulin/D50 as well as Lasix, potassium now within range  · Follow-up with primary nephrologist outpatient, stable for discharge  · Continue to hold ACE-inhibitor and potassium supplement on discharge

## 2018-07-31 NOTE — ASSESSMENT & PLAN NOTE
· Baseline kidney function 1 5 per Nephrology, currently 1 47  · Continue to hold ACE-inhibitor, potassium supplement on discharge; resume Lasix at 20 mg daily, lower dose  · Suspected ATN related to IV contrast  · Avoid nephrotoxins  · Renally adjust medications as needed

## 2018-07-31 NOTE — ASSESSMENT & PLAN NOTE
· Patient seen in the emergency room 7/26 for pain and redness of the left limb consistent with cellulitis  He was discharged home on Keflex, presented to the ER 7/28 with persistent symptoms and found to have hyperkalemia      · Isolated fever 7/30, x-ray ankle and foot shows no evidence for deep infection  · Vascular studies negative for DVT, however did note inguinal lymphadenopathy   · CT negative for PE  · CBC with no leukocytosis, procalcitonin normal, patient afebrile since yesterday and feeling systemically well:  Stable for discharge with outpatient follow-up, patient has received 6 of 7 days total IV antibiotics, complete out 1 more day oral on discharge

## 2018-07-31 NOTE — SOCIAL WORK
Met with pt at bedside  Pt alert  Pt reported that he lives in a two story home with his wife and daughter  He sated that there about 15 steps to the upper floor  He stated that he uses a cane at home  He said that he is ADL independent  He mentioned that he does not work  He stated that he does not drive  He said his wife will pick him up upon discharge  He said that he has no hx of SNF  He said that he has had [facility] in the past, but unsure of name of agency  He stated that he uses a Pharmacy in [facility] but cannot remember the name at this time  He said that he understands his medications and takes them on his own without assistance  He said that he sees his PCP regularly and makes his own appointments  He stated that he is currently seeing Nephrologist, but is not on dialysis  He stated that he has no depression or anxiety currently  He said that he does not drink or smoke  Consult received for advance directives options  He stated that he does not have a POA  Advanced Directive paperwork provided to pt  CM reviewed discharge planning process including the following: identifying help at home, patient preference for discharge planning needs, pharmacy preference, and availability of treatment team to discuss questions or concerns patient and/or family may have regarding understanding medications and recognizing signs and symptoms once discharged  CM also encouraged patient to follow up with all recommended appointments after discharge  Patient advised of importance for patient and family to participate in managing patients medical well being  CM name and role reviewed and Discharge Checklist provided

## 2018-08-01 ENCOUNTER — TELEPHONE (OUTPATIENT)
Dept: OTHER | Facility: HOSPITAL | Age: 48
End: 2018-08-01

## 2018-08-01 NOTE — PROGRESS NOTES
Overnight covering hospitalist note -     Received call from microbiology lab around 11 PM regarding this patient who was discharged earlier this afternoon by the daytime team   Notified that of blood cultures drawn on 7/30, 1 of 2 bottles growing gram-positive cocci in clusters while 2nd bottle is negative thus far  May likely be a contaminant  Will notify day shift team in the morning  Upon quick review of hospital course, patient was diagnosed with left lower extremity cellulitis and received IV antibiotics with prescription for oral regimen to complete at home  As aforementioned, will notify day shift team in the morning for further consideration of management and/or bring the patient back to the hospital if necessary

## 2018-08-03 LAB
BACTERIA BLD CULT: ABNORMAL
GRAM STN SPEC: ABNORMAL

## 2018-08-03 NOTE — PROGRESS NOTES
F/u to report of positive blood cultures  Coagulase negative staff  Likely contaminant   No further intervention

## 2018-08-04 LAB — BACTERIA BLD CULT: NORMAL

## 2018-08-10 ENCOUNTER — HOSPITAL ENCOUNTER (EMERGENCY)
Facility: HOSPITAL | Age: 48
Discharge: HOME | End: 2018-08-10
Admitting: EMERGENCY MEDICINE
Payer: COMMERCIAL

## 2018-08-10 VITALS
RESPIRATION RATE: 16 BRPM | DIASTOLIC BLOOD PRESSURE: 61 MMHG | WEIGHT: 193.34 LBS | BODY MASS INDEX: 31.21 KG/M2 | HEART RATE: 87 BPM | TEMPERATURE: 99.4 F | OXYGEN SATURATION: 94 % | SYSTOLIC BLOOD PRESSURE: 120 MMHG

## 2018-08-10 DIAGNOSIS — I87.2 CHRONIC STASIS DERMATITIS: ICD-10-CM

## 2018-08-10 DIAGNOSIS — R60.0 LOWER EXTREMITY EDEMA: Primary | ICD-10-CM

## 2018-08-10 DIAGNOSIS — I89.0 LYMPHEDEMA OF LEFT LOWER EXTREMITY: ICD-10-CM

## 2018-08-10 LAB
ALBUMIN SERPL BCP-MCNC: 2.9 G/DL (ref 3.5–5)
ALP SERPL-CCNC: 93 U/L (ref 46–116)
ALT SERPL W P-5'-P-CCNC: 29 U/L (ref 12–78)
ANION GAP SERPL CALCULATED.3IONS-SCNC: 9 MMOL/L (ref 4–13)
AST SERPL W P-5'-P-CCNC: 31 U/L (ref 5–45)
BASOPHILS # BLD AUTO: 0.03 THOUSANDS/ΜL (ref 0–0.1)
BASOPHILS NFR BLD AUTO: 1 % (ref 0–1)
BILIRUB SERPL-MCNC: 0.2 MG/DL (ref 0.2–1)
BUN SERPL-MCNC: 22 MG/DL (ref 5–25)
CALCIUM SERPL-MCNC: 8.7 MG/DL (ref 8.3–10.1)
CHLORIDE SERPL-SCNC: 99 MMOL/L (ref 100–108)
CO2 SERPL-SCNC: 22 MMOL/L (ref 21–32)
CREAT SERPL-MCNC: 1.26 MG/DL (ref 0.6–1.3)
CRP SERPL QL: 33.2 MG/L
EOSINOPHIL # BLD AUTO: 0.01 THOUSAND/ΜL (ref 0–0.61)
EOSINOPHIL NFR BLD AUTO: 0 % (ref 0–6)
ERYTHROCYTE [DISTWIDTH] IN BLOOD BY AUTOMATED COUNT: 13.2 % (ref 11.6–15.1)
ERYTHROCYTE [SEDIMENTATION RATE] IN BLOOD: 89 MM/HOUR (ref 0–10)
GFR SERPL CREATININE-BSD FRML MDRD: 76 ML/MIN/1.73SQ M
GLUCOSE SERPL-MCNC: 93 MG/DL (ref 65–140)
HCT VFR BLD AUTO: 28.8 % (ref 36.5–49.3)
HGB BLD-MCNC: 9.3 G/DL (ref 12–17)
IMM GRANULOCYTES # BLD AUTO: 0.05 THOUSAND/UL (ref 0–0.2)
IMM GRANULOCYTES NFR BLD AUTO: 1 % (ref 0–2)
LACTATE SERPL-SCNC: 1 MMOL/L (ref 0.5–2)
LYMPHOCYTES # BLD AUTO: 0.67 THOUSANDS/ΜL (ref 0.6–4.47)
LYMPHOCYTES NFR BLD AUTO: 15 % (ref 14–44)
MCH RBC QN AUTO: 29.6 PG (ref 26.8–34.3)
MCHC RBC AUTO-ENTMCNC: 32.3 G/DL (ref 31.4–37.4)
MCV RBC AUTO: 92 FL (ref 82–98)
MONOCYTES # BLD AUTO: 0.61 THOUSAND/ΜL (ref 0.17–1.22)
MONOCYTES NFR BLD AUTO: 14 % (ref 4–12)
NEUTROPHILS # BLD AUTO: 3.1 THOUSANDS/ΜL (ref 1.85–7.62)
NEUTS SEG NFR BLD AUTO: 69 % (ref 43–75)
NRBC BLD AUTO-RTO: 0 /100 WBCS
PLATELET # BLD AUTO: 421 THOUSANDS/UL (ref 149–390)
PMV BLD AUTO: 9.5 FL (ref 8.9–12.7)
POTASSIUM SERPL-SCNC: 5.4 MMOL/L (ref 3.5–5.3)
PROCALCITONIN SERPL-MCNC: <0.05 NG/ML
PROT SERPL-MCNC: 7.6 G/DL (ref 6.4–8.2)
RBC # BLD AUTO: 3.14 MILLION/UL (ref 3.88–5.62)
SODIUM SERPL-SCNC: 130 MMOL/L (ref 136–145)
WBC # BLD AUTO: 4.47 THOUSAND/UL (ref 4.31–10.16)

## 2018-08-10 PROCEDURE — 99255 IP/OBS CONSLTJ NEW/EST HI 80: CPT | Performed by: INTERNAL MEDICINE

## 2018-08-10 PROCEDURE — 85652 RBC SED RATE AUTOMATED: CPT | Performed by: NURSE PRACTITIONER

## 2018-08-10 PROCEDURE — 85025 COMPLETE CBC W/AUTO DIFF WBC: CPT | Performed by: NURSE PRACTITIONER

## 2018-08-10 PROCEDURE — 99284 EMERGENCY DEPT VISIT MOD MDM: CPT

## 2018-08-10 PROCEDURE — 84145 PROCALCITONIN (PCT): CPT | Performed by: NURSE PRACTITIONER

## 2018-08-10 PROCEDURE — 86140 C-REACTIVE PROTEIN: CPT | Performed by: NURSE PRACTITIONER

## 2018-08-10 PROCEDURE — 80053 COMPREHEN METABOLIC PANEL: CPT | Performed by: NURSE PRACTITIONER

## 2018-08-10 PROCEDURE — 96374 THER/PROPH/DIAG INJ IV PUSH: CPT

## 2018-08-10 PROCEDURE — 83605 ASSAY OF LACTIC ACID: CPT | Performed by: NURSE PRACTITIONER

## 2018-08-10 PROCEDURE — 36415 COLL VENOUS BLD VENIPUNCTURE: CPT | Performed by: NURSE PRACTITIONER

## 2018-08-10 PROCEDURE — 96361 HYDRATE IV INFUSION ADD-ON: CPT

## 2018-08-10 RX ORDER — KETOROLAC TROMETHAMINE 30 MG/ML
15 INJECTION, SOLUTION INTRAMUSCULAR; INTRAVENOUS ONCE
Status: COMPLETED | OUTPATIENT
Start: 2018-08-10 | End: 2018-08-10

## 2018-08-10 RX ADMIN — SODIUM CHLORIDE 1000 ML: 0.9 INJECTION, SOLUTION INTRAVENOUS at 06:40

## 2018-08-10 RX ADMIN — KETOROLAC TROMETHAMINE 15 MG: 30 INJECTION, SOLUTION INTRAMUSCULAR at 06:40

## 2018-08-10 NOTE — CONSULTS
Consultation - Infectious Disease   Arun Riddle 46 y o  male MRN: [id number]  Unit/Bed#: ED 28 Encounter: [id number]      IMPRESSION & RECOMMENDATIONS:   Impression/Recommendations: This is a 46 y o  male, with underlying SLE, with venous stasis and chronic leg ulcers, came to the ER with the current left leg edema, erythema and pain  He has no fever or leukocytosis  1   Bilateral leg edema, erythema and pain/tenderness, more pronounced on left  Patient has no fever or leukocytosis  Clinical exam is consistent with venous stasis with poorly controlled leg edema rather than cellulitis  Patient is Ronak Myles during recent hospitalization was most likely secondary to aggressive leg elevation as inpatient, rather than due to antibiotic  Patient does not elevate legs at home  He does not use compression stocking  I discussed with him in great detail regarding to keep legs elevated at home and use compression stocking when he is up and about  No indication for antibiotic at the present time  No further antibiotic necessary  2   Venous stasis with chronic leg ulcers and poorly controlled leg edema  Steroid for SLE probably contributes to this  Regardless, patient needs to elevate legs at home and use compression stocking, as in above  Aggressive leg elevation at home  Use compression stocking went up and about  3  SLE  Patient is on stable medication regimen    Previous hospitalization records reviewed in detail  Discussed with patient in detail regarding above plan  Discussed with ER staff  Okay for discharge from ID viewpoint    Thank you for this consultation  We will follow along with you  HISTORY OF PRESENT ILLNESS:  Reason for Consult:  Left leg edema, erythema and tenderness  HPI: Arun Riddle is a 46 y o  male, with underlying SLE and venous stasis with chronic leg ulcers, was admitted here from 07/28 on to 7/31 for left leg edema, erythema and pain    He had no fever or leukocytosis  Procalcitonin was normal  Patient was treated empirically with a course of antibiotic, IV 1st then transition to p o  Keflex at discharge  Also, during hospitalization, he was kept bed-bound with legs elevated  At discharge, his left leg was improved  However, at home, he started having similar symptoms again     Patient saw his PCP and was refer back to the ER earlier today for evaluation  On presentation here, he has similar changes the left leg  Again, he had no fever or leukocytosis  We asked to evaluate the patient  Patient has bilateral leg edema and ulcerations  It is just that the left leg is more pronounced  Patient has compression stockings but does not use some at home  He does not keep his legs elevated in a regular basis  In fact, he often sits with the legs dangling down  No fever or chills  REVIEW OF SYSTEMS:  A complete 12 point system-based review was done  Except for what is noted in HPI above, ROS of systems is otherwise negative  PAST MEDICAL HISTORY:  Past Medical History:   Diagnosis Date    Cervical mass     Depression     DVT (deep venous thrombosis) (HCC)     Hypertension     Lupus     Renal disorder      Past Surgical History:   Procedure Laterality Date    CERVICAL SPINE SURGERY      NECK SURGERY       Problem list reviewed  FAMILY HISTORY:  Non-contributory    SOCIAL HISTORY:  History   Alcohol Use No     History   Drug Use No     History   Smoking Status    Never Smoker   Smokeless Tobacco    Never Used       ALLERGIES:  No Known Allergies    MEDICATIONS:  All current active medications have been reviewed  No antibiotic      PHYSICAL EXAM:  Vitals:  HR:  [] 87  Resp:  [16-19] 16  BP: (114-143)/(58-82) 120/61  SpO2:  [92 %-100 %] 94 %  Temp (24hrs), Av 4 °F (37 4 °C), Min:99 4 °F (37 4 °C), Max:99 4 °F (37 4 °C)  Current: Temperature: 99 4 °F (37 4 °C)     Physical Exam:  General:  Well-nourished, well-developed, in no acute distress  Awake, alert and oriented x 3  Eyes:  Conjunctive clear with no hemorrhages or effusions  Oropharynx:  No ulcers, no lesions, pharynx benign, no tonsillitis  Neck:  Supple, no lymphadenopathy, no mass, nontender  Lungs:  Expansion symmetric, no rales, no wheezing, no accessory muscle use  Cardiac:  Regular rate and rhythm, normal S1, normal S2, no murmurs  Abdomen:  Soft, nondistended, non-tender, no HSM  Extremities:  1+ edema in right leg  2+ edema left leg  Chronic appearing superficial ulcers on leg  No drainage  Mild erythema/edema/tenderness in bilateral legs, more pronounced on left  Skin:  No rashes, no ulcers  Neurological:  Moves all four extremities spontaneously, sensation grossly intact    LABS, IMAGING, & OTHER STUDIES:  Lab Results:  I have personally reviewed pertinent labs  Results from last 7 days  Lab Units 08/10/18  0642   SODIUM mmol/L 130*   POTASSIUM mmol/L 5 4*   CHLORIDE mmol/L 99*   CO2 mmol/L 22   ANION GAP mmol/L 9   BUN mg/dL 22   CREATININE mg/dL 1 26   EGFR ml/min/1 73sq m 76   GLUCOSE RANDOM mg/dL 93   CALCIUM mg/dL 8 7   AST U/L 31   ALT U/L 29   ALK PHOS U/L 93   TOTAL PROTEIN g/dL 7 6   BILIRUBIN TOTAL mg/dL 0 20       Results from last 7 days  Lab Units 08/10/18  0642   WBC Thousand/uL 4 47   HEMOGLOBIN g/dL 9 3*   PLATELETS Thousands/uL 421*           Imaging Studies:   I have personally reviewed pertinent imaging study reports and images in PACS  EKG, Pathology, and Other Studies:   I have personally reviewed pertinent reports

## 2018-08-10 NOTE — ED NOTES
Pt given menu to order breakfast   I & D will be by around noon to evaluate patient      Peterson Nogueira RN  08/10/18 1037

## 2018-08-10 NOTE — DISCHARGE INSTRUCTIONS
Lymphedema   WHAT YOU NEED TO KNOW:   There is no cure for lymphedema  Treatment can relieve symptoms and prevent lymphedema from getting worse  DISCHARGE INSTRUCTIONS:   Contact your healthcare provider or lymphedema specialist if:   · You have a fever or chills  · You have an open area of skin that looks red or swollen, or drains pus  · Your symptoms, such as swelling or pain, get worse  · Your arms or legs feel heavy, or you cannot move them  · Your skin becomes hard, thick, or rough  · You have a skin wound that will not heal      · Your shoes, clothes, or jewelry feel tighter  · You have questions or concerns about your condition or care  Self-care:   · Elevate  your arm or leg above the level of your heart as often as you can  This will help decrease swelling and pain  Prop your arm or leg on pillows or blankets to keep it elevated comfortably  · Wear compression socks, sleeves, or bandages  as directed  Compression devices must be fitted by a healthcare provider  Compression devices may need to be replaced every 3 to 6 months  · Exercise  can help you maintain or regain function of your arm or leg  Ask your healthcare provider what type of exercise to do and how often to do it  Start slow, take breaks, and gradually do more each day  Do not do vigorous, repeated exercises  Watch for changes in your arm or leg during exercise  Stop and rest if you have swelling, increased pain, or heaviness  Elevate your arm or leg above the level of your heart  · Change your position often  to help move lymphatic fluid through your body  Do not sit or  one position for more than 30 minutes  Do not cross your legs when you sit  These actions can cause lymphatic fluid to buildup  · Maintain a healthy weight  Ask your healthcare provider what you should weigh  Weight loss may improve your symptoms   If you need to lose weight, your healthcare provider can help you create a weight loss program   Prevent infection with proper skin care:  A skin infection can make lymphedema worse  Do the following to decrease your risk for a skin infection in your arm or leg with lymphedema:  · Wash your skin gently and dry it well  Use a mild soap to wash your skin  Gently pat your skin dry after you bathe  Apply a mild cream or lotion to moisturize your skin and prevent dryness or cracking  Keep your feet clean and dry  · Protect your skin from injury  Wear gloves when you garden and wash dishes  Cut your nails straight across to prevent injury to your fingers and toes  Use sunscreen and insect repellant to avoid burns and punctures  Wear slippers in the house  Wear shoes when you go outside  · Check your skin every day for signs of infection  Signs of infection include redness, swelling, increased heat, or pus  You may also have a fever or chills  · Care for cuts, scratches or burns  Apply antibiotic ointment to cuts and other small breaks in the skin  Apply a cold pack or cold water to a burn for 15 minutes  Then wash it with soap and water  Cover scratches, cuts, or burns with a clean, dry gauze or bandage as directed  Keep the area clean and dry  · Tell healthcare providers that you have lymphedema  Tell them not to do, IVs, blood draws, and blood pressure readings in the arm or leg with lymphedema  If there is lymphedema in both arms, ask them to take your blood pressure on your leg  Do not allow flu shots or vaccinations in your arm with lymphedema  Follow up with your healthcare provider or lymphedema specialist as directed: You will need regular visits so healthcare providers can examine the affected areas  You may also be referred to a clinic that specializes in lymphedema treatment  Write down your questions so you remember to ask them during your visits    © 2017 [employer] Information is for End User's use only and may not be sold, redistributed or otherwise used for commercial purposes  All illustrations and images included in CareNotes® are the copyrighted property of [employer]  or Jareth Cabrales  The above information is an  only  It is not intended as medical advice for individual conditions or treatments  Talk to your doctor, nurse or pharmacist before following any medical regimen to see if it is safe and effective for you  Venous Insufficiency   AMBULATORY CARE:   Venous insufficiency  is a condition that prevents blood from flowing out of your legs and back to your heart  Veins contain valves that help blood flow in one direction  Venous insufficiency means the valves do not close correctly or fully  Blood flows back and pools in your leg  This can cause problems such as varicose veins  Venous insufficiency may also be called chronic venous insufficiency or venous stasis  Common signs and symptoms:   · Visible veins on your legs that may be small and red or large, thick, and blue    · Swelling in your ankles or calves    · Changes in skin color, such as dark or purple skin    · An ulcer (open sore) on your leg    · Leg pain that is worse when you are menstruating (women) or when you stand, and better when you elevate your legs    · Burning or itching    · Cramps that happen at night    · Thick, hard skin on your legs and ankles    · Feeling of heaviness in your legs  Seek care immediately if:   · You have a wound that does not heal or is infected  · You have an injury that has broken your skin and caused your varicose veins to bleed  · Your leg is swollen and hard  · You have pain in your leg that does not go away or gets worse  · Your legs or feet are turning blue or black  · Your leg feels warm, tender, and painful  It may look swollen and red  Contact your healthcare provider if:   · You have a fever  · You have varicose veins and they are painful      · You have new or worsening leg pain, swelling, or redness  · You have new or worsening ulcers or other sores on your leg  · You have questions or concerns about your condition or care  Treatment  may include any of the following:  · Medicine  may be given to improve blood flow  The medicines may thin your blood or reduce swelling to help blood flow  You may also need medicine to treat a bacterial infection  · Ablation  is a procedure used to close varicose veins  A catheter is guided until it is near the vein  A device will then be guided to the area  The device may produce energy through radiofrequency or a laser  The energy creates heat that will close the blood vessel  · Sclerotherapy  is a procedure used to fade visible veins  Your healthcare provider will inject a liquid into a spider vein or varicose vein  The liquid causes irritation in the vein  The vein swells and sticks together  Your body will then absorb the vein  · Surgery  may be needed if other treatments do not work  Surgery may be used to repair a leg vein valve or to clip or tie off a vein so blood cannot flow through it  You may need to have a veins removed during surgery called stripping  Surgery may be used to bypass (go around) the damaged vein  Blood will flow through a vein transplanted from another part of your body  Manage your symptoms:   · Wear pressure stockings as directed  Pressure stockings help keep blood from pooling in your leg veins  Your healthcare provider can prescribe stockings that are right for you  Do not buy over-the-counter pressure stockings unless your healthcare provider says it is okay  They may not fit correctly or may have elastic that cuts off your circulation  Ask your healthcare provider when to start wearing pressure stockings and how long to wear them each day  · Do not sit or stand for long periods of time  If you have to sit for a long time, flex and extend your legs, feet, and ankles   Do this about 10 times every 30 minutes to help keep blood flowing  If you have to stand for a long time, take breaks and sit with your legs elevated  · Elevate your legs  Elevate your legs above the level of your heart to reduce swelling  Your healthcare provider may recommend that you keep your legs elevated for 30 minutes at a time  You may need to do this 3 to 4 times per day, or more if your healthcare provider recommends  · Do not smoke  Nicotine and other chemicals in cigarettes and cigars can cause blood vessel damage  Ask your healthcare provider for information if you currently smoke and need help to quit  E-cigarettes or smokeless tobacco still contain nicotine  Talk to your healthcare provider before you use these products  · Reach or maintain a healthy weight  Extra weight can make venous insufficiency worse  Ask your healthcare provider how much you should weigh  He can help you create a weight loss plan if you need to lose weight  · Exercise as directed  Walking can help increase blood flow in your calves  Ask your healthcare provider how much exercise you need each day and which exercises are best for you  · Care for your skin  Keep your skin clean  Do not use any soaps or lotions that may dry your skin  For example, do not use products that contain fragrance or alcohol  If you have a skin ulcer, your healthcare provider may recommend a wet-to-dry bandage  To do this, apply a wet bandage to your wound and allow it to dry  This will help remove drainage from your wound each time you change the bandage  Your healthcare provider will tell you how often to change your bandage and which kind of bandage to use  Check your wound for signs of infection, such as swelling or pus  · Go to physical therapy (PT) as directed  A physical therapist can help you increase movement and range of motion in your legs    Follow up with your healthcare provider as directed:  Write down your questions so you remember to ask them during your visits  © 2017 [facility] Information is for End User's use only and may not be sold, redistributed or otherwise used for commercial purposes  All illustrations and images included in CareNotes® are the copyrighted property of [employer]  or Jareth Cabrales  The above information is an  only  It is not intended as medical advice for individual conditions or treatments  Talk to your doctor, nurse or pharmacist before following any medical regimen to see if it is safe and effective for you

## 2018-08-10 NOTE — ED PROVIDER NOTES
History  Chief Complaint   Patient presents with    Leg Swelling     Pt reports of left leg pain with swelling started approx 2 weeks ago  52-year-old male patient presenting here with recurrence of his left lower extremity cellulitis  He has chronic lupus rash that probably has been predisposing him to developing cellulitis  States that his leg has become warm red and swollen over the last few days never completely resolved since his previous admission but now has continued to worsen  He denies systemic symptoms he does have a few areas of weeping  Has had previous DVT but is currently taking Xarelto  He supposed to be wearing compression stockings and he states that he has been but apparently took them off few days ago  Prior to Admission Medications   Prescriptions Last Dose Informant Patient Reported?  Taking?   baclofen 20 mg tablet  Self Yes No   Sig: Take 20 mg by mouth 2 (two) times a day   betamethasone dipropionate (DIPROSONE) 0 05 % cream  Self Yes No   Sig: Apply 1 application topically 2 (two) times a day   betamethasone, augmented, (DIPROLENE) 0 05 % ointment  Self Yes No   Sig: Apply 1 application topically 2 (two) times a day   carvedilol (COREG) 12 5 mg tablet  Self Yes No   Sig: Take 12 5 mg by mouth 2 (two) times a day with meals   cyclobenzaprine (FLEXERIL) 10 mg tablet  Self Yes No   Sig: Take 10 mg by mouth 3 (three) times a day as needed for muscle spasms   escitalopram (LEXAPRO) 10 mg tablet  Self Yes No   Sig: Take 10 mg by mouth daily   furosemide (LASIX) 40 mg tablet   No No   Sig: Take 0 5 tablets (20 mg total) by mouth daily   gabapentin (NEURONTIN) 600 MG tablet   Yes No   Sig: Take 600 mg by mouth daily   hydroxychloroquine (PLAQUENIL) 200 mg tablet  Self Yes No   Sig: Take 200 mg by mouth 2 (two) times a day   mycophenolate (CELLCEPT) 500 mg tablet   Yes No   Sig: Take 1,000 mg by mouth every 12 (twelve) hours     predniSONE 5 mg tablet   Yes No   Sig: Take 5 mg by mouth daily   rivaroxaban (XARELTO) 20 mg tablet   Yes No   Sig: Take 20 mg by mouth daily with dinner   sildenafil (REVATIO) 20 mg tablet   Yes No   Sig: Take 20 mg by mouth 3 (three) times a day   tamsulosin (FLOMAX) 0 4 mg  Self Yes No   Sig: Take 0 8 mg by mouth Medrol Dose Pack scheduling ONLY   traMADol (ULTRAM) 50 mg tablet   Yes No   Sig: Take 50 mg by mouth every 6 (six) hours as needed for moderate pain      Facility-Administered Medications: None       Past Medical History:   Diagnosis Date    Cervical mass     Depression     DVT (deep venous thrombosis) (HCC)     Hypertension     Lupus     Renal disorder        Past Surgical History:   Procedure Laterality Date    CERVICAL SPINE SURGERY      NECK SURGERY         No family history on file  I have reviewed and agree with the history as documented  Social History   Substance Use Topics    Smoking status: Never Smoker    Smokeless tobacco: Never Used    Alcohol use No        Review of Systems   Constitutional: Negative for diaphoresis, fatigue and fever  HENT: Negative for congestion, ear pain, nosebleeds and sore throat  Eyes: Negative for photophobia, pain, discharge and visual disturbance  Respiratory: Negative for cough, choking, chest tightness, shortness of breath and wheezing  Cardiovascular: Negative for chest pain and palpitations  Gastrointestinal: Negative for abdominal distention, abdominal pain, diarrhea and vomiting  Genitourinary: Negative for dysuria, flank pain and frequency  Musculoskeletal: Negative for back pain, gait problem and joint swelling  Skin: Positive for rash  Negative for color change  Neurological: Negative for dizziness, syncope and headaches  Psychiatric/Behavioral: Negative for behavioral problems and confusion  The patient is not nervous/anxious  All other systems reviewed and are negative        Physical Exam  Physical Exam   Constitutional: He is oriented to person, place, and time  He appears well-developed and well-nourished  HENT:   Head: Normocephalic and atraumatic  Eyes: Pupils are equal, round, and reactive to light  Neck: Normal range of motion  Neck supple  Cardiovascular: Normal rate, regular rhythm, normal heart sounds and normal pulses  PMI is not displaced  Pulmonary/Chest: Effort normal and breath sounds normal  No respiratory distress  Abdominal: Soft  He exhibits no distension  There is no guarding  Musculoskeletal: Normal range of motion  Lymphadenopathy:     He has no cervical adenopathy  Neurological: He is alert and oriented to person, place, and time  Skin: Skin is warm and dry  Rash (Left lower extremity cellulitis  Bilateral lower extremity lupus rash) noted  He is not diaphoretic  No pallor  Psychiatric: He has a normal mood and affect  Vitals reviewed  Vital Signs  ED Triage Vitals   Temperature Pulse Respirations Blood Pressure SpO2   08/10/18 0551 08/10/18 0551 08/10/18 0551 08/10/18 0551 08/10/18 0551   99 4 °F (37 4 °C) 104 19 143/82 100 %      Temp Source Heart Rate Source Patient Position - Orthostatic VS BP Location FiO2 (%)   08/10/18 0551 08/10/18 0551 08/10/18 1033 08/10/18 1033 --   Oral Monitor Sitting Right arm       Pain Score       08/10/18 0551       Worst Possible Pain           Vitals:    08/10/18 0551 08/10/18 0815 08/10/18 1033 08/10/18 1229   BP: 143/82 128/66 114/58 120/61   Pulse: 104 90 89 87   Patient Position - Orthostatic VS:   Sitting Sitting       Visual Acuity      ED Medications  Medications   sodium chloride 0 9 % bolus 1,000 mL (0 mL Intravenous Stopped 8/10/18 0817)   ketorolac (TORADOL) injection 15 mg (15 mg Intravenous Given 8/10/18 0640)       Diagnostic Studies  Results Reviewed     Procedure Component Value Units Date/Time    Procalcitonin [[id number]] Collected:  08/10/18 1034    Lab Status:   In process Specimen:  Blood from Arm, Left Updated:  08/10/18 1037    Sedimentation rate, automated [[id number]]  (Abnormal) Collected:  08/10/18 0642    Lab Status:  Final result Specimen:  Blood from Arm, Right Updated:  08/10/18 1032     Sed Rate 89 (H) mm/hour     C-reactive protein [[id number]]  (Abnormal) Collected:  08/10/18 9213    Lab Status:  Final result Specimen:  Blood from Arm, Right Updated:  08/10/18 1011     CRP 33 2 (H) mg/L     Comprehensive metabolic panel [[id number]]  (Abnormal) Collected:  08/10/18 3021    Lab Status:  Final result Specimen:  Blood from Arm, Right Updated:  08/10/18 0716     Sodium 130 (L) mmol/L      Potassium 5 4 (H) mmol/L      Chloride 99 (L) mmol/L      CO2 22 mmol/L      Anion Gap 9 mmol/L      BUN 22 mg/dL      Creatinine 1 26 mg/dL      Glucose 93 mg/dL      Calcium 8 7 mg/dL      AST 31 U/L      ALT 29 U/L      Alkaline Phosphatase 93 U/L      Total Protein 7 6 g/dL      Albumin 2 9 (L) g/dL      Total Bilirubin 0 20 mg/dL      eGFR 76 ml/min/1 73sq m     Narrative:         National Kidney Disease Education Program recommendations are as follows:  GFR calculation is accurate only with a steady state creatinine  Chronic Kidney disease less than 60 ml/min/1 73 sq  meters  Kidney failure less than 15 ml/min/1 73 sq  meters  Lactic acid, plasma [[id number]]  (Normal) Collected:  08/10/18 0642    Lab Status:  Final result Specimen:  Blood from Arm, Right Updated:  08/10/18 0713     LACTIC ACID 1 0 mmol/L     Narrative:         Result may be elevated if tourniquet was used during collection      CBC and differential [[id number]]  (Abnormal) Collected:  08/10/18 0642    Lab Status:  Final result Specimen:  Blood from Arm, Right Updated:  08/10/18 0706     WBC 4 47 Thousand/uL      RBC 3 14 (L) Million/uL      Hemoglobin 9 3 (L) g/dL      Hematocrit 28 8 (L) %      MCV 92 fL      MCH 29 6 pg      MCHC 32 3 g/dL      RDW 13 2 %      MPV 9 5 fL      Platelets 887 (H) Thousands/uL      nRBC 0 /100 WBCs      Neutrophils Relative 69 %      Immat GRANS % 1 %      Lymphocytes Relative 15 %      Monocytes Relative 14 (H) %      Eosinophils Relative 0 %      Basophils Relative 1 %      Neutrophils Absolute 3 10 Thousands/µL      Immature Grans Absolute 0 05 Thousand/uL      Lymphocytes Absolute 0 67 Thousands/µL      Monocytes Absolute 0 61 Thousand/µL      Eosinophils Absolute 0 01 Thousand/µL      Basophils Absolute 0 03 Thousands/µL                  No orders to display              Procedures  Procedures       Phone Contacts  ED Phone Contact    ED Course  ED Course as of Aug 10 1237   Fri Aug 10, 2018   7543 A spoke with Infectious Disease  Dr Kemal Arvizu will come and evaluate the patient between 12 and 1 o'clock  At that point we will make a decision whether to admit or not  MDM  Number of Diagnoses or Management Options  Chronic stasis dermatitis: new and requires workup  Lower extremity edema: new and requires workup  Lymphedema of left lower extremity: new and requires workup  Diagnosis management comments: Infectious disease Dr Latia Maki came to see the patient and we have determined that this is likely not acute cellulitis but rather chronic stasis dermatitis in lymphedema  Patient should be wearing compression stockings at all times when his legs are dangling  Reinforce the importance of compression stockings with the patient         Amount and/or Complexity of Data Reviewed  Clinical lab tests: reviewed and ordered  Review and summarize past medical records: yes  Independent visualization of images, tracings, or specimens: yes    Patient Progress  Patient progress: stable    CritCare Time    Disposition  Final diagnoses:   Lower extremity edema   Chronic stasis dermatitis   Lymphedema of left lower extremity     Time reflects when diagnosis was documented in both MDM as applicable and the Disposition within this note     Time User Action Codes Description Comment    8/10/2018  9:16 AM Flakito Post Add [R60 0] Lower extremity edema     8/10/2018 12:32 PM Flakito Post Add [I87 2] Chronic stasis dermatitis     8/10/2018 12:33 PM Terrence Kerr Add [I89 0] Lymphedema of left lower extremity       ED Disposition     ED Disposition Condition Comment    Discharge  [facility] discharge to home/self care  Condition at discharge: Good        Follow-up Information     Follow up With Specialties Details Why Contact Info    Galo Jimenez MD  Schedule an appointment as soon as possible for a visit For Continued Evaluation [address] [place] [address]  N  [phone]            Patient's Medications   Discharge Prescriptions    No medications on file     No discharge procedures on file      ED Provider  Electronically Signed by           CORBY Soto  08/10/18 5102

## 2018-08-10 NOTE — ED NOTES
Discharge instructions and follow up care reviewed  Pt with no questions or concerns at this time   Pt ambulatory off unit with steady gait      Abisai Gnozalez RN  08/10/18 [id number]

## 2018-08-11 ENCOUNTER — TELEPHONE (OUTPATIENT)
Dept: OTHER | Facility: HOSPITAL | Age: 48
End: 2018-08-11

## 2018-08-20 ENCOUNTER — HOSPITAL ENCOUNTER (INPATIENT)
Facility: HOSPITAL | Age: 48
LOS: 3 days | Discharge: HOME | DRG: 425 | End: 2018-08-23
Attending: EMERGENCY MEDICINE | Admitting: GENERAL PRACTICE
Payer: COMMERCIAL

## 2018-08-20 DIAGNOSIS — K92.1 MELENA: ICD-10-CM

## 2018-08-20 DIAGNOSIS — N17.9 AKI (ACUTE KIDNEY INJURY) (HCC): ICD-10-CM

## 2018-08-20 DIAGNOSIS — N18.30 ANEMIA IN STAGE 3 CHRONIC KIDNEY DISEASE (HCC): ICD-10-CM

## 2018-08-20 DIAGNOSIS — M32.14 LUPUS NEPHRITIS (HCC): ICD-10-CM

## 2018-08-20 DIAGNOSIS — E87.5 HYPERKALEMIA: Primary | ICD-10-CM

## 2018-08-20 DIAGNOSIS — D63.1 ANEMIA IN STAGE 3 CHRONIC KIDNEY DISEASE (HCC): ICD-10-CM

## 2018-08-20 PROBLEM — N18.9 ACUTE-ON-CHRONIC KIDNEY INJURY (HCC): Status: ACTIVE | Noted: 2018-08-20

## 2018-08-20 LAB
ALBUMIN SERPL BCP-MCNC: 3.3 G/DL (ref 3.5–5)
ALP SERPL-CCNC: 116 U/L (ref 46–116)
ALT SERPL W P-5'-P-CCNC: 27 U/L (ref 12–78)
ANION GAP SERPL CALCULATED.3IONS-SCNC: 7 MMOL/L (ref 4–13)
AST SERPL W P-5'-P-CCNC: 21 U/L (ref 5–45)
ATRIAL RATE: 79 BPM
BASOPHILS # BLD MANUAL: 0 THOUSAND/UL (ref 0–0.1)
BASOPHILS NFR MAR MANUAL: 0 % (ref 0–1)
BILIRUB DIRECT SERPL-MCNC: 0.06 MG/DL (ref 0–0.2)
BILIRUB SERPL-MCNC: 0.2 MG/DL (ref 0.2–1)
BUN SERPL-MCNC: 78 MG/DL (ref 5–25)
CALCIUM SERPL-MCNC: 9 MG/DL (ref 8.3–10.1)
CHLORIDE SERPL-SCNC: 102 MMOL/L (ref 100–108)
CO2 SERPL-SCNC: 22 MMOL/L (ref 21–32)
CREAT SERPL-MCNC: 2.09 MG/DL (ref 0.6–1.3)
EOSINOPHIL # BLD MANUAL: 0 THOUSAND/UL (ref 0–0.4)
EOSINOPHIL NFR BLD MANUAL: 0 % (ref 0–6)
ERYTHROCYTE [DISTWIDTH] IN BLOOD BY AUTOMATED COUNT: 13.4 % (ref 11.6–15.1)
GFR SERPL CREATININE-BSD FRML MDRD: 41 ML/MIN/1.73SQ M
GLUCOSE SERPL-MCNC: 100 MG/DL (ref 65–140)
HCT VFR BLD AUTO: 26 % (ref 36.5–49.3)
HGB BLD-MCNC: 8.4 G/DL (ref 12–17)
LYMPHOCYTES # BLD AUTO: 0.48 THOUSAND/UL (ref 0.6–4.47)
LYMPHOCYTES # BLD AUTO: 10 % (ref 14–44)
MCH RBC QN AUTO: 30.2 PG (ref 26.8–34.3)
MCHC RBC AUTO-ENTMCNC: 32.3 G/DL (ref 31.4–37.4)
MCV RBC AUTO: 94 FL (ref 82–98)
MONOCYTES # BLD AUTO: 0.19 THOUSAND/UL (ref 0–1.22)
MONOCYTES NFR BLD: 4 % (ref 4–12)
NEUTROPHILS # BLD MANUAL: 4.12 THOUSAND/UL (ref 1.85–7.62)
NEUTS BAND NFR BLD MANUAL: 6 % (ref 0–8)
NEUTS SEG NFR BLD AUTO: 80 % (ref 43–75)
NRBC BLD AUTO-RTO: 0 /100 WBCS
P AXIS: 66 DEGREES
PLATELET # BLD AUTO: 396 THOUSANDS/UL (ref 149–390)
PLATELET BLD QL SMEAR: ADEQUATE
PMV BLD AUTO: 9.4 FL (ref 8.9–12.7)
POTASSIUM SERPL-SCNC: 6.1 MMOL/L (ref 3.5–5.3)
PR INTERVAL: 148 MS
PROT SERPL-MCNC: 7.9 G/DL (ref 6.4–8.2)
QRS AXIS: 82 DEGREES
QRSD INTERVAL: 84 MS
QT INTERVAL: 312 MS
QTC INTERVAL: 357 MS
RBC # BLD AUTO: 2.78 MILLION/UL (ref 3.88–5.62)
SODIUM SERPL-SCNC: 131 MMOL/L (ref 136–145)
T WAVE AXIS: 55 DEGREES
TOTAL CELLS COUNTED SPEC: 100
TROPONIN I SERPL-MCNC: <0.02 NG/ML
VENTRICULAR RATE: 79 BPM
WBC # BLD AUTO: 4.79 THOUSAND/UL (ref 4.31–10.16)

## 2018-08-20 PROCEDURE — 30233N1 TRANSFUSION OF NONAUTOLOGOUS RED BLOOD CELLS INTO PERIPHERAL VEIN, PERCUTANEOUS APPROACH: ICD-10-PCS | Performed by: FAMILY MEDICINE

## 2018-08-20 PROCEDURE — 96375 TX/PRO/DX INJ NEW DRUG ADDON: CPT

## 2018-08-20 PROCEDURE — 96365 THER/PROPH/DIAG IV INF INIT: CPT

## 2018-08-20 PROCEDURE — 94760 N-INVAS EAR/PLS OXIMETRY 1: CPT

## 2018-08-20 PROCEDURE — 80076 HEPATIC FUNCTION PANEL: CPT | Performed by: PHYSICIAN ASSISTANT

## 2018-08-20 PROCEDURE — 93005 ELECTROCARDIOGRAM TRACING: CPT

## 2018-08-20 PROCEDURE — 84484 ASSAY OF TROPONIN QUANT: CPT | Performed by: PHYSICIAN ASSISTANT

## 2018-08-20 PROCEDURE — 85007 BL SMEAR W/DIFF WBC COUNT: CPT | Performed by: PHYSICIAN ASSISTANT

## 2018-08-20 PROCEDURE — 94644 CONT INHLJ TX 1ST HOUR: CPT

## 2018-08-20 PROCEDURE — 93010 ELECTROCARDIOGRAM REPORT: CPT | Performed by: INTERNAL MEDICINE

## 2018-08-20 PROCEDURE — 36415 COLL VENOUS BLD VENIPUNCTURE: CPT | Performed by: PHYSICIAN ASSISTANT

## 2018-08-20 PROCEDURE — 96361 HYDRATE IV INFUSION ADD-ON: CPT

## 2018-08-20 PROCEDURE — 85027 COMPLETE CBC AUTOMATED: CPT | Performed by: PHYSICIAN ASSISTANT

## 2018-08-20 PROCEDURE — 80048 BASIC METABOLIC PNL TOTAL CA: CPT | Performed by: PHYSICIAN ASSISTANT

## 2018-08-20 RX ORDER — DEXTROSE MONOHYDRATE 25 G/50ML
50 INJECTION, SOLUTION INTRAVENOUS ONCE
Status: COMPLETED | OUTPATIENT
Start: 2018-08-20 | End: 2018-08-20

## 2018-08-20 RX ORDER — SODIUM CHLORIDE FOR INHALATION 0.9 %
3 VIAL, NEBULIZER (ML) INHALATION ONCE
Status: DISCONTINUED | OUTPATIENT
Start: 2018-08-20 | End: 2018-08-23 | Stop reason: HOSPADM

## 2018-08-20 RX ADMIN — INSULIN HUMAN 10 UNITS: 100 INJECTION, SOLUTION PARENTERAL at 22:28

## 2018-08-20 RX ADMIN — DEXTROSE MONOHYDRATE 50 ML: 25 INJECTION, SOLUTION INTRAVENOUS at 22:34

## 2018-08-20 RX ADMIN — Medication 1 G: at 22:47

## 2018-08-20 RX ADMIN — ALBUTEROL SULFATE 10 MG: 2.5 SOLUTION RESPIRATORY (INHALATION) at 22:34

## 2018-08-20 RX ADMIN — SODIUM CHLORIDE 1000 ML: 0.9 INJECTION, SOLUTION INTRAVENOUS at 21:29

## 2018-08-20 NOTE — ED NOTES
Received call from [facility]  [place]    Dr Jostin Alfredo sending patient for follow up of abnormal lab value - potassium 6 8     Sandra Epperson RN  08/20/18 1909

## 2018-08-21 PROBLEM — D63.1 ANEMIA IN CHRONIC KIDNEY DISEASE: Status: ACTIVE | Noted: 2018-08-21

## 2018-08-21 PROBLEM — N18.9 ANEMIA IN CHRONIC KIDNEY DISEASE: Status: ACTIVE | Noted: 2018-08-21

## 2018-08-21 PROBLEM — K92.1 MELENA: Status: ACTIVE | Noted: 2018-08-21

## 2018-08-21 LAB
ABO GROUP BLD: NORMAL
ANION GAP SERPL CALCULATED.3IONS-SCNC: 5 MMOL/L (ref 4–13)
ANION GAP SERPL CALCULATED.3IONS-SCNC: 6 MMOL/L (ref 4–13)
ANION GAP SERPL CALCULATED.3IONS-SCNC: 9 MMOL/L (ref 4–13)
BASOPHILS # BLD AUTO: 0.01 THOUSANDS/ΜL (ref 0–0.1)
BASOPHILS NFR BLD AUTO: 0 % (ref 0–1)
BLD GP AB SCN SERPL QL: NEGATIVE
BUN SERPL-MCNC: 45 MG/DL (ref 5–25)
BUN SERPL-MCNC: 64 MG/DL (ref 5–25)
BUN SERPL-MCNC: 73 MG/DL (ref 5–25)
CALCIUM SERPL-MCNC: 7.1 MG/DL (ref 8.3–10.1)
CALCIUM SERPL-MCNC: 8.7 MG/DL (ref 8.3–10.1)
CALCIUM SERPL-MCNC: 8.8 MG/DL (ref 8.3–10.1)
CHLORIDE SERPL-SCNC: 106 MMOL/L (ref 100–108)
CHLORIDE SERPL-SCNC: 107 MMOL/L (ref 100–108)
CHLORIDE SERPL-SCNC: 115 MMOL/L (ref 100–108)
CHLORIDE UR-SCNC: 97 MMOL/L (ref 10–330)
CO2 SERPL-SCNC: 16 MMOL/L (ref 21–32)
CO2 SERPL-SCNC: 20 MMOL/L (ref 21–32)
CO2 SERPL-SCNC: 21 MMOL/L (ref 21–32)
CREAT SERPL-MCNC: 1.07 MG/DL (ref 0.6–1.3)
CREAT SERPL-MCNC: 1.52 MG/DL (ref 0.6–1.3)
CREAT SERPL-MCNC: 1.76 MG/DL (ref 0.6–1.3)
CREAT UR-MCNC: 50.9 MG/DL
EOSINOPHIL # BLD AUTO: 0.03 THOUSAND/ΜL (ref 0–0.61)
EOSINOPHIL NFR BLD AUTO: 1 % (ref 0–6)
ERYTHROCYTE [DISTWIDTH] IN BLOOD BY AUTOMATED COUNT: 13.8 % (ref 11.6–15.1)
GFR SERPL CREATININE-BSD FRML MDRD: 51 ML/MIN/1.73SQ M
GFR SERPL CREATININE-BSD FRML MDRD: 61 ML/MIN/1.73SQ M
GFR SERPL CREATININE-BSD FRML MDRD: 93 ML/MIN/1.73SQ M
GLUCOSE SERPL-MCNC: 103 MG/DL (ref 65–140)
GLUCOSE SERPL-MCNC: 142 MG/DL (ref 65–140)
GLUCOSE SERPL-MCNC: 87 MG/DL (ref 65–140)
HCT VFR BLD AUTO: 22 % (ref 36.5–49.3)
HCT VFR BLD AUTO: 23.4 % (ref 36.5–49.3)
HEMOCCULT STL QL: NEGATIVE
HGB BLD-MCNC: 6.8 G/DL (ref 12–17)
HGB BLD-MCNC: 7.3 G/DL (ref 12–17)
IMM GRANULOCYTES # BLD AUTO: 0.07 THOUSAND/UL (ref 0–0.2)
IMM GRANULOCYTES NFR BLD AUTO: 2 % (ref 0–2)
LYMPHOCYTES # BLD AUTO: 0.65 THOUSANDS/ΜL (ref 0.6–4.47)
LYMPHOCYTES NFR BLD AUTO: 19 % (ref 14–44)
MCH RBC QN AUTO: 29.7 PG (ref 26.8–34.3)
MCHC RBC AUTO-ENTMCNC: 31.2 G/DL (ref 31.4–37.4)
MCV RBC AUTO: 95 FL (ref 82–98)
MONOCYTES # BLD AUTO: 0.62 THOUSAND/ΜL (ref 0.17–1.22)
MONOCYTES NFR BLD AUTO: 18 % (ref 4–12)
NEUTROPHILS # BLD AUTO: 2.06 THOUSANDS/ΜL (ref 1.85–7.62)
NEUTS SEG NFR BLD AUTO: 60 % (ref 43–75)
NRBC BLD AUTO-RTO: 0 /100 WBCS
OSMOLALITY UR: 453 MMOL/KG
PLATELET # BLD AUTO: 321 THOUSANDS/UL (ref 149–390)
PMV BLD AUTO: 9.2 FL (ref 8.9–12.7)
POTASSIUM SERPL-SCNC: 5.4 MMOL/L (ref 3.5–5.3)
POTASSIUM SERPL-SCNC: 5.9 MMOL/L (ref 3.5–5.3)
POTASSIUM SERPL-SCNC: 5.9 MMOL/L (ref 3.5–5.3)
POTASSIUM UR-SCNC: 35.8 MMOL/L (ref 1–300)
RBC # BLD AUTO: 2.46 MILLION/UL (ref 3.88–5.62)
RH BLD: POSITIVE
SODIUM 24H UR-SCNC: 80 MOL/L
SODIUM SERPL-SCNC: 132 MMOL/L (ref 136–145)
SODIUM SERPL-SCNC: 133 MMOL/L (ref 136–145)
SODIUM SERPL-SCNC: 140 MMOL/L (ref 136–145)
SPECIMEN EXPIRATION DATE: NORMAL
WBC # BLD AUTO: 3.44 THOUSAND/UL (ref 4.31–10.16)

## 2018-08-21 PROCEDURE — 85018 HEMOGLOBIN: CPT | Performed by: FAMILY MEDICINE

## 2018-08-21 PROCEDURE — 99255 IP/OBS CONSLTJ NEW/EST HI 80: CPT | Performed by: INTERNAL MEDICINE

## 2018-08-21 PROCEDURE — 87205 SMEAR GRAM STAIN: CPT | Performed by: INTERNAL MEDICINE

## 2018-08-21 PROCEDURE — C9113 INJ PANTOPRAZOLE SODIUM, VIA: HCPCS | Performed by: FAMILY MEDICINE

## 2018-08-21 PROCEDURE — 80048 BASIC METABOLIC PNL TOTAL CA: CPT | Performed by: FAMILY MEDICINE

## 2018-08-21 PROCEDURE — 99223 1ST HOSP IP/OBS HIGH 75: CPT | Performed by: FAMILY MEDICINE

## 2018-08-21 PROCEDURE — 82272 OCCULT BLD FECES 1-3 TESTS: CPT | Performed by: FAMILY MEDICINE

## 2018-08-21 PROCEDURE — 82570 ASSAY OF URINE CREATININE: CPT | Performed by: INTERNAL MEDICINE

## 2018-08-21 PROCEDURE — 99284 EMERGENCY DEPT VISIT MOD MDM: CPT

## 2018-08-21 PROCEDURE — 86920 COMPATIBILITY TEST SPIN: CPT

## 2018-08-21 PROCEDURE — 86850 RBC ANTIBODY SCREEN: CPT | Performed by: FAMILY MEDICINE

## 2018-08-21 PROCEDURE — 84133 ASSAY OF URINE POTASSIUM: CPT | Performed by: INTERNAL MEDICINE

## 2018-08-21 PROCEDURE — 82436 ASSAY OF URINE CHLORIDE: CPT | Performed by: INTERNAL MEDICINE

## 2018-08-21 PROCEDURE — 85025 COMPLETE CBC W/AUTO DIFF WBC: CPT | Performed by: PHYSICIAN ASSISTANT

## 2018-08-21 PROCEDURE — 86900 BLOOD TYPING SEROLOGIC ABO: CPT | Performed by: FAMILY MEDICINE

## 2018-08-21 PROCEDURE — 83935 ASSAY OF URINE OSMOLALITY: CPT | Performed by: INTERNAL MEDICINE

## 2018-08-21 PROCEDURE — P9021 RED BLOOD CELLS UNIT: HCPCS

## 2018-08-21 PROCEDURE — 80048 BASIC METABOLIC PNL TOTAL CA: CPT | Performed by: PHYSICIAN ASSISTANT

## 2018-08-21 PROCEDURE — 86901 BLOOD TYPING SEROLOGIC RH(D): CPT | Performed by: FAMILY MEDICINE

## 2018-08-21 PROCEDURE — 84300 ASSAY OF URINE SODIUM: CPT | Performed by: INTERNAL MEDICINE

## 2018-08-21 PROCEDURE — 85014 HEMATOCRIT: CPT | Performed by: FAMILY MEDICINE

## 2018-08-21 RX ORDER — ONDANSETRON 2 MG/ML
4 INJECTION INTRAMUSCULAR; INTRAVENOUS EVERY 6 HOURS PRN
Status: DISCONTINUED | OUTPATIENT
Start: 2018-08-21 | End: 2018-08-23 | Stop reason: HOSPADM

## 2018-08-21 RX ORDER — HYDROXYCHLOROQUINE SULFATE 200 MG/1
200 TABLET, FILM COATED ORAL 2 TIMES DAILY
Status: DISCONTINUED | OUTPATIENT
Start: 2018-08-21 | End: 2018-08-23 | Stop reason: HOSPADM

## 2018-08-21 RX ORDER — CARVEDILOL 12.5 MG/1
12.5 TABLET ORAL 2 TIMES DAILY WITH MEALS
Status: DISCONTINUED | OUTPATIENT
Start: 2018-08-21 | End: 2018-08-23 | Stop reason: HOSPADM

## 2018-08-21 RX ORDER — GABAPENTIN 300 MG/1
600 CAPSULE ORAL DAILY
Status: DISCONTINUED | OUTPATIENT
Start: 2018-08-21 | End: 2018-08-23 | Stop reason: HOSPADM

## 2018-08-21 RX ORDER — MYCOPHENOLATE MOFETIL 250 MG/1
500 CAPSULE ORAL EVERY 12 HOURS SCHEDULED
Status: DISCONTINUED | OUTPATIENT
Start: 2018-08-21 | End: 2018-08-21

## 2018-08-21 RX ORDER — ACETAMINOPHEN 325 MG/1
650 TABLET ORAL EVERY 6 HOURS PRN
Status: DISCONTINUED | OUTPATIENT
Start: 2018-08-21 | End: 2018-08-23 | Stop reason: HOSPADM

## 2018-08-21 RX ORDER — FUROSEMIDE 20 MG/1
20 TABLET ORAL DAILY
Status: DISCONTINUED | OUTPATIENT
Start: 2018-08-21 | End: 2018-08-21

## 2018-08-21 RX ORDER — ESCITALOPRAM OXALATE 10 MG/1
10 TABLET ORAL DAILY
Status: DISCONTINUED | OUTPATIENT
Start: 2018-08-21 | End: 2018-08-23 | Stop reason: HOSPADM

## 2018-08-21 RX ORDER — SODIUM CHLORIDE 9 MG/ML
100 INJECTION, SOLUTION INTRAVENOUS CONTINUOUS
Status: DISCONTINUED | OUTPATIENT
Start: 2018-08-21 | End: 2018-08-22

## 2018-08-21 RX ORDER — SILDENAFIL CITRATE 20 MG/1
20 TABLET ORAL 3 TIMES DAILY
Status: DISCONTINUED | OUTPATIENT
Start: 2018-08-21 | End: 2018-08-23 | Stop reason: HOSPADM

## 2018-08-21 RX ORDER — TRAMADOL HYDROCHLORIDE 50 MG/1
50 TABLET ORAL EVERY 6 HOURS PRN
Status: DISCONTINUED | OUTPATIENT
Start: 2018-08-21 | End: 2018-08-23 | Stop reason: HOSPADM

## 2018-08-21 RX ORDER — TAMSULOSIN HYDROCHLORIDE 0.4 MG/1
0.8 CAPSULE ORAL
Status: DISCONTINUED | OUTPATIENT
Start: 2018-08-21 | End: 2018-08-23 | Stop reason: HOSPADM

## 2018-08-21 RX ORDER — BACLOFEN 10 MG/1
20 TABLET ORAL 2 TIMES DAILY
Status: DISCONTINUED | OUTPATIENT
Start: 2018-08-21 | End: 2018-08-21

## 2018-08-21 RX ORDER — CYCLOBENZAPRINE HCL 10 MG
10 TABLET ORAL 3 TIMES DAILY PRN
Status: DISCONTINUED | OUTPATIENT
Start: 2018-08-21 | End: 2018-08-21

## 2018-08-21 RX ORDER — PREDNISONE 1 MG/1
5 TABLET ORAL DAILY
Status: DISCONTINUED | OUTPATIENT
Start: 2018-08-21 | End: 2018-08-21

## 2018-08-21 RX ADMIN — TAMSULOSIN HYDROCHLORIDE 0.8 MG: 0.4 CAPSULE ORAL at 21:26

## 2018-08-21 RX ADMIN — SODIUM CHLORIDE 100 ML/HR: 0.9 INJECTION, SOLUTION INTRAVENOUS at 13:51

## 2018-08-21 RX ADMIN — FUROSEMIDE 20 MG: 20 TABLET ORAL at 09:07

## 2018-08-21 RX ADMIN — SODIUM CHLORIDE 100 ML/HR: 0.9 INJECTION, SOLUTION INTRAVENOUS at 00:59

## 2018-08-21 RX ADMIN — SODIUM CHLORIDE 8 MG/HR: 9 INJECTION, SOLUTION INTRAVENOUS at 19:05

## 2018-08-21 RX ADMIN — ONDANSETRON 4 MG: 2 INJECTION INTRAMUSCULAR; INTRAVENOUS at 11:09

## 2018-08-21 RX ADMIN — SODIUM CHLORIDE, SODIUM LACTATE, POTASSIUM CHLORIDE, AND CALCIUM CHLORIDE 250 ML: .6; .31; .03; .02 INJECTION, SOLUTION INTRAVENOUS at 11:53

## 2018-08-21 RX ADMIN — HYDROXYCHLOROQUINE SULFATE 200 MG: 200 TABLET, FILM COATED ENTERAL at 09:03

## 2018-08-21 RX ADMIN — TAMSULOSIN HYDROCHLORIDE 0.8 MG: 0.4 CAPSULE ORAL at 02:26

## 2018-08-21 RX ADMIN — BACLOFEN 20 MG: 10 TABLET ORAL at 08:50

## 2018-08-21 RX ADMIN — ESCITALOPRAM OXALATE 10 MG: 10 TABLET ORAL at 08:50

## 2018-08-21 RX ADMIN — SILDENAFIL 20 MG: 20 TABLET, FILM COATED ORAL at 08:51

## 2018-08-21 RX ADMIN — SILDENAFIL 20 MG: 20 TABLET, FILM COATED ORAL at 21:26

## 2018-08-21 RX ADMIN — TRAMADOL HYDROCHLORIDE 50 MG: 50 TABLET, COATED ORAL at 06:05

## 2018-08-21 RX ADMIN — PREDNISONE 5 MG: 5 TABLET ORAL at 08:51

## 2018-08-21 RX ADMIN — CARVEDILOL 12.5 MG: 12.5 TABLET, FILM COATED ORAL at 08:50

## 2018-08-21 RX ADMIN — TRAMADOL HYDROCHLORIDE 50 MG: 50 TABLET, COATED ORAL at 18:58

## 2018-08-21 RX ADMIN — GABAPENTIN 600 MG: 300 CAPSULE ORAL at 08:51

## 2018-08-21 RX ADMIN — MYCOPHENOLATE MOFETIL 500 MG: 250 CAPSULE ORAL at 08:49

## 2018-08-21 NOTE — CONSULTS
Consultation - Nephrology   Henrik Munroe 46 y o  male MRN: [id number]  Unit/Bed#: -01 Encounter: [id number]    Referring PHYSICIAN:  Octavia Gonzalez     REASON FOR THE CONSULTATION:  Acute on chronic renal failure as well as hyperkalemia    DATE OF CONSULTATION:  August 21, 2018    ADMISSION DIAGNOSIS: Acute-on-chronic kidney injury ([facility] )     CHIEF COMPLAINT     33-year-old gentleman with history of lupus nephritis stage III CKD being followed by nephrologist in [facility] came to the hospital request of primary care physician because of persistent hyperkalemia and I am being consulted for the same    HPI     Patient was recently hospitalized foot infection and was treated and sent home  At that time also had a CKD  According the patient recently he was started on lisinopril though I cannot find any document  He was also advised to cut back diuretic dose  Recent blood test showed hyperkalemia  Patient still complaining of lot of pain in both lower extremity  Denies taking any nonsteroidal pain killer  No chest pain no palpitation or shortness of Breath    He denies taking any food which is high in potassium  PAST MEDICAL HISTORY     Past Medical History:   Diagnosis Date    Cervical mass     Depression     DVT (deep venous thrombosis) ([facility] )     Hypertension     Lupus     Renal disorder        PAST SURGICAL HISTORY     Past Surgical History:   Procedure Laterality Date    APPENDECTOMY      CERVICAL SPINE SURGERY      NECK SURGERY         ALLERGIES     No Known Allergies    SOCIAL HISTORY     History   Alcohol Use No     History   Drug Use No     History   Smoking Status    Never Smoker   Smokeless Tobacco    Never Used       FAMILY HISTORY     History reviewed  No pertinent family history      CURRENT MEDICATIONS       Current Facility-Administered Medications:     acetaminophen (TYLENOL) tablet 650 mg, 650 mg, Oral, Q6H PRN, Octavia Gonzalez PA-C    baclofen tablet 20 mg, 20 mg, Oral, BID, Benjamen Rice, PA-C, 20 mg at 08/21/18 0850    carvedilol (COREG) tablet 12 5 mg, 12 5 mg, Oral, BID With Meals, Benjamen Rice, PA-C, 12 5 mg at 08/21/18 0850    cyclobenzaprine (FLEXERIL) tablet 10 mg, 10 mg, Oral, TID PRN, Benjamen Rice, PA-C    escitalopram (LEXAPRO) tablet 10 mg, 10 mg, Oral, Daily, Benjamen Rice, PA-C, 10 mg at 08/21/18 0850    furosemide (LASIX) tablet 20 mg, 20 mg, Oral, Daily, Benjamen Rice, PA-C, 20 mg at 08/21/18 0907    gabapentin (NEURONTIN) capsule 600 mg, 600 mg, Oral, Daily, Benjamen Rice, PA-C, 600 mg at 08/21/18 0252    hydroxychloroquine (PLAQUENIL) tablet 200 mg, 200 mg, Oral, BID, Benjamen Rice, PA-C, 200 mg at 08/21/18 0903    [COMPLETED] albuterol inhalation solution 10 mg, 10 mg, Nebulization, Once, 10 mg at 08/20/18 2234 **AND** ipratropium (ATROVENT) 0 02 % inhalation solution 1 mg, 1 mg, Nebulization, Once **AND** sodium chloride 0 9 % inhalation solution 3 mL, 3 mL, Nebulization, Once, Cesar Snyder PA-C    lactated ringers bolus 250 mL, 250 mL, Intravenous, Once, Tamar Talamantes MD, Last Rate: 250 mL/hr at 08/21/18 1153, 250 mL at 08/21/18 1153    mycophenolate (CELLCEPT) capsule 500 mg, 500 mg, Oral, Q12H [address], Benjamen Rice, PA-C, 500 mg at 08/21/18 0849    ondansetron (ZOFRAN) injection 4 mg, 4 mg, Intravenous, Q6H PRN, Benjamen Rice, PA-C, 4 mg at 08/21/18 1109    predniSONE tablet 5 mg, 5 mg, Oral, Daily, Benjamen Rice, PA-C, 5 mg at 08/21/18 5493    rivaroxaban (XARELTO) tablet 20 mg, 20 mg, Oral, Daily With Dinner, Tracey Alejandre PA-C    sildenafil (REVATIO) tablet 20 mg, 20 mg, Oral, TID, Tracey Alejandre PA-C, 20 mg at 08/21/18 0851    sodium chloride 0 9 % infusion, 100 mL/hr, Intravenous, Continuous, Tracey Alejandre PA-C, Last Rate: 100 mL/hr at 08/21/18 0059, 100 mL/hr at 08/21/18 0059    tamsulosin (FLOMAX) capsule 0 8 mg, 0 8 mg, Oral, Once HS, Tracey Alejandre PA-C, 0 8 mg at 08/21/18 0226    traMADol (ULTRAM) tablet 50 mg, 50 mg, Oral, Q6H PRN, Amber Dinero PA-C, 50 mg at 08/21/18 7421    REVIEW OF SYSTEMS     Review of Systems   Constitutional: Positive for fatigue  Negative for activity change and appetite change  HENT: Negative for congestion, ear discharge, rhinorrhea and sinus pain  Eyes: Negative for photophobia, pain and visual disturbance  Respiratory: Negative for apnea, cough, choking, chest tightness and shortness of breath  Cardiovascular: Positive for leg swelling  Negative for chest pain and palpitations  Gastrointestinal: Positive for nausea  Negative for abdominal distention, abdominal pain, blood in stool, constipation, diarrhea and vomiting  Endocrine: Negative for heat intolerance and polyphagia  Genitourinary: Negative for difficulty urinating, flank pain and urgency  Musculoskeletal: Positive for arthralgias, back pain and gait problem  Negative for neck pain and neck stiffness  Skin: Negative for color change and wound  Allergic/Immunologic: Negative for food allergies and immunocompromised state  Neurological: Positive for weakness and light-headedness  Negative for seizures, facial asymmetry and headaches  Hematological: Negative for adenopathy  Does not bruise/bleed easily  Psychiatric/Behavioral: Negative for self-injury and suicidal ideas         LAB RESULTS          Results from last 7 days  Lab Units 08/21/18  0543 08/21/18  0123 08/20/18  2128   WBC Thousand/uL 3 44*  --  4 79   HEMOGLOBIN g/dL 7 3*  --  8 4*   HEMATOCRIT % 23 4*  --  26 0*   PLATELETS Thousands/uL 321  --  396*   SODIUM mmol/L 133* 132* 131*   POTASSIUM mmol/L 5 9* 5 9* 6 1*   CHLORIDE mmol/L 107 106 102   CO2 mmol/L 20* 21 22   BUN mg/dL 64* 73* 78*   CREATININE mg/dL 1 52* 1 76* 2 09*   EGFR ml/min/1 73sq m 61 51 41   CALCIUM mg/dL 8 7 8 8 9 0   TOTAL PROTEIN g/dL  --   --  7 9   GLUCOSE RANDOM mg/dL 103 142* 100       I have personally reviewed the old medical records and patient's previously known baseline creatinine level is ~ 1 26    RADIOLOGY RESULTS     No results found for this or any previous visit  Results for orders placed during the hospital encounter of 02/21/18   X-ray chest 2 views    Narrative CHEST     INDICATION: chest pain    COMPARISON:  None    EXAM PERFORMED/VIEWS:  XR CHEST PA & LATERAL    IMAGES:  2    FINDINGS:  Surgical clips in the right axilla  Lungs are suboptimally aerated  No consolidation or effusion  Cardiac size top normal   Vascular markings are slightly prominent  Findings are likely exaggerated by vascular crowding  Visualized osseous structures appear within normal limits for the patient's age  Postop changes cervical spine  Impression Lungs are suboptimally aerated with some vascular crowding noted  No consolidation or effusion  No pulmonary edema  Workstation performed: [id number]       No results found for this or any previous visit  No results found for this or any previous visit  No results found for this or any previous visit  No results found for this or any previous visit  OBJECTIVE     Current Weight: Weight - Scale: 89 4 kg (197 lb 1 5 oz)  Vitals:    08/21/18 1149   BP: (!) 86/60   Pulse: 72   Resp: 20   Temp: 98 4 °F (36 9 °C)   SpO2:        Intake/Output Summary (Last 24 hours) at 08/21/18 1206  Last data filed at 08/21/18 0900   Gross per 24 hour   Intake             2200 ml   Output              500 ml   Net             1700 ml       PHYSICAL EXAMINATION     Physical Exam   Constitutional: He is oriented to person, place, and time  He appears well-developed  No distress  HENT:   Head: Normocephalic and atraumatic  Mouth/Throat: Oropharynx is clear and moist    Eyes: Conjunctivae and EOM are normal  Pupils are equal, round, and reactive to light  No scleral icterus  Neck: Normal range of motion  Neck supple  No JVD present     Cardiovascular: Normal rate, regular rhythm and normal heart sounds  No murmur heard  Pulmonary/Chest: Effort normal and breath sounds normal  No respiratory distress  He has no wheezes  He has no rales  Abdominal: Soft  Bowel sounds are normal  He exhibits no distension and no mass  There is no tenderness  Musculoskeletal: Normal range of motion  He exhibits no edema  Neurological: He is alert and oriented to person, place, and time  Skin: Skin is warm  No rash noted  Psychiatric: He has a normal mood and affect  His behavior is normal         PLAN / RECOMMENDATIONS      Acute kidney injury:  Etiology unclear possible ACE-inhibitor though I am not sure about history  It is improving and I will continue to monitor  Will check urine electrolytes to assess volume status as well as for etiology of hyperkalemia    Hyperkalemia:  May be secondary to ACE-inhibitor  Will hold any ACE-inhibitor or ARB blocker because of that  Again will check urine electrolytes to assess new potassium loss    Lupus nephritis:  He is seeing outpatient nephrologist at [facility] and on medication for that    Hypertension:  Blood pressure actually running on lower side and may need hydration    Anemia:  Requiring blood transfusion and that may help the kidney by improving volume status  Etiology of any may not clear    Thank you for the consultation to participate in patient's care  I have personally discussed my plan with the referring physician  Little Jones MD  Nephrology  8/21/2018        Portions of the record may have been created with voice recognition software  Occasional wrong word or "sound a like" substitutions may have occurred due to the inherent limitations of voice recognition software  Read the chart carefully and recognize, using context, where substitutions have occurred

## 2018-08-21 NOTE — CASE MANAGEMENT
Initial Clinical Review    Admission: Date/Time/Statement: 8/20/18 @ 2314     Orders Placed This Encounter   Procedures    Inpatient Admission     Standing Status:   Standing     Number of Occurrences:   1     Order Specific Question:   Admitting Physician     Answer:   Asha Pineda [[id number]]     Order Specific Question:   Level of Care     Answer:   Med Surg [16]     Order Specific Question:   Estimated length of stay     Answer:   More than 2 Midnights     Order Specific Question:   Certification     Answer:   I certify that inpatient services are medically necessary for this patient for a duration of greater than two midnights  See H&P and MD Progress Notes for additional information about the patient's course of treatment  ED: Date/Time/Mode of Arrival:   ED Arrival Information     Expected Arrival Acuity Means of Arrival Escorted By Service Admission Type    - 8/20/2018 20:21 Urgent Walk-In Family Member General Medicine Urgent    Arrival Complaint    Abnormal Lab value          Chief Complaint:   Chief Complaint   Patient presents with    Abnormal Lab     Patient sent by physician due to high potassium level on labs  Report lab value is 6 8       History of Illness:   46 y o  male who presents with at the request of his PCP  (seen in office today for c/o weakness both legs, unstable gait, loss of weight)     Outpatient lab testing revealed a potassium level of 6 8  Pt was treated with albuterol, calcium, insulin in the ED     Pt has history of lupus and lupus nephritis    08/21/18 1149  98 4 °F (36 9 °C)  72  20   86/60  --  --  --   08/21/18 1110  98 9 °F (37 2 °C)  55  20   76/46  --  --  --   08/21/18 0900  --  --  --  --  --  None (Room air)  --   08/21/18 0849  --  86  --  112/64  --  --  --   08/21/18 0700  98 2 °F (36 8 °C)  97  18  107/56  97 %  None (Room air)  Lying   08/21/18 0023  98 1 °F (36 7 °C)  96  18  119/60  100 %  None (Room air)  Lying   08/20/18 2330  --   110  -- 112/56  100 %  --  --   08/20/18 2257  --  --  --  --  --  None (Room air)  --   08/20/18 2245  --  85  --  122/69  100 %  --  --   08/20/18 2237  --  --  --  --  99 %  --  --   08/20/18 2030  98 1 °F (36 7 °C)   107  16  108/56  100 %  None (Room air)       89 4 kg (197 lb 1 5 oz)    Abnormal Labs/Diagnostic Test Results:  Na  131  132  133  K  6 1   5 9   5 9  Bun  78    73    64  CRT   2 09   1 76   1 52   hgb  8 4   7 3  hct  26 0   23 4    ED Treatment:   Medication Administration from 08/20/2018 1803 to 08/21/2018 0022       Date/Time Order Dose Route Action Action by Comments                08/20/2018 2129 sodium chloride 0 9 % bolus 1,000 mL 1,000 mL Intravenous [address] Kiki Pascal RN      08/20/2018 2234 albuterol inhalation solution 10 mg 10 mg Nebulization Given Indy Carl RN Given by RT                            08/20/2018 2228 insulin regular (HumuLIN R,NovoLIN R) injection 10 Units 10 Units Intravenous Given Indy Carl RN      08/20/2018 2234 dextrose 50 % IV solution 50 mL 50 mL Intravenous Given Indy Carl RN      08/20/2018 2247 calcium gluconate 1 g in sodium chloride 0 9 % 100 mL IVPB 1 g Intravenous New Bag Indy Carl RN           Past Medical/Surgical History:    Active Ambulatory Problems     Diagnosis Date Noted    Hyperkalemia 07/29/2018    Lupus [facility]systemic lupus erythematosus) ([facility] ) 07/29/2018    Lupus nephritis ([facility] ) 07/29/2018    Cellulitis and abscess of left lower extremity 07/29/2018    Hypertension 07/29/2018    History of DVT (deep vein thrombosis) 07/29/2018    Acute kidney injury ([facility] ) 07/29/2018    Chronic kidney disease, stage 3 07/29/2018    Persistent proteinuria 07/29/2018     Resolved Ambulatory Problems     Diagnosis Date Noted    No Resolved Ambulatory Problems     Past Medical History:   Diagnosis Date    Cervical mass     Depression     DVT (deep venous thrombosis) (HCC)     Hypertension     Lupus     Renal disorder Admitting Diagnosis: Hyperkalemia [E87 5]  Lupus nephritis (HCC) [M32 14]  Abnormal laboratory test [R89 9]  MARK (acute kidney injury) ([facility] ) [N17 9]    Assessment/Plan:   Case of 57-year-old male with significant past medical history lupus nephritis who came to the emergency department and do a an increase of elevation of potassium  At this point patient is complaining of some short of breath and generalized malaise  Hyperkalemia:  Setting of chronic kidney disease  Follow BMP  Anemia of chronic condition with decreased hemoglobin: Patient found to have decrease hemoglobin reason why he will be transfused  Continue Xarelto and Home pain medication      Acute-on-chronic kidney injury ([facility] )   Assessment & Plan     IV fluids, Recheck BMP          Lupus nephritis ([facility] )   Assessment & Plan     Consult nephrology  Continue prednisone, plaquenil, cellcept          Hyperkalemia   Assessment & Plan     Recheck BMP          Hypertension   Assessment & Plan     Continue coreg                VTE Prophylaxis: Heparin  / sequential compression device      Patient will be admitted on an Inpatient basis with an anticipated length of stay of  More than 2 midnights     Justification for Hospital Stay: nephrology evaluation    Admission Orders:  Admit telemetry  Sequential compression device to b/l LE  transufe PRBC  Urine studies  Up w/assist - spot pulse ox  Vs q 3 hrs  IVF NS @ 100      Scheduled Meds:   Current Facility-Administered Medications:  acetaminophen 650 mg Oral Q6H PRN Xenia Moncada PA-C    baclofen 20 mg Oral BID Xenia Moncada PA-C    carvedilol 12 5 mg Oral BID With Meals Xenia Moncada PA-C    cyclobenzaprine 10 mg Oral TID PRN Xenia Moncada PA-C    escitalopram 10 mg Oral Daily Xenia Moncada PA-C    furosemide 20 mg Oral Daily Xenia Moncada PA-C    gabapentin 600 mg Oral Daily Xenia Moncada PA-C    hydroxychloroquine 200 mg Oral BID Xenia Moncada PA-C    ipratropium 1 mg Nebulization Once Hanane Dunham PA-C    And        sodium chloride 3 mL Nebulization Once Hanane Dunham PA-C    lactated ringers 250 mL Intravenous Once Justin Gaines MD Last Rate: 250 mL (08/21/18 1153)   mycophenolate 500 mg Oral Q12H [address] Rise Calk, PA-C    ondansetron 4 mg Intravenous Q6H PRN Rise Calk, PA-C    predniSONE 5 mg Oral Daily Rise Calk, PA-C    rivaroxaban 20 mg Oral Daily With [employer] , PA-C    sildenafil 20 mg Oral TID Rise Calk, PA-C    sodium chloride 100 mL/hr Intravenous Continuous Rise Calk, PA-C Last Rate: 100 mL/hr (08/21/18 0059)   tamsulosin 0 8 mg Oral Once HS Rise Calk, PA-C    traMADol 50 mg Oral Q6H PRN Rise Calk, PA-C      Continuous Infusions:   sodium chloride 100 mL/hr Last Rate: 100 mL/hr (08/21/18 0059)         8/21/2018  250 cc IV LR given @ 1153  zofran IV @ 6927      8/21  NEPHROLOGY  Patient was recently hospitalized foot infection and was treated and sent home  At that time also had a CKD  According the patient recently he was started on lisinopril though I cannot find any document  He was also advised to cut back diuretic dose  Recent blood test showed hyperkalemia     Patient still complaining of lot of pain in both lower extremity  Denies taking any nonsteroidal pain killer  No chest pain no palpitation or shortness of Breath    He denies taking any food which is high in potassium      Acute kidney injury:  Etiology unclear possible ACE-inhibitor though I am not sure about history  It is improving and I will continue to monitor  Will check urine electrolytes to assess volume status as well as for etiology of hyperkalemia    Hyperkalemia:  May be secondary to ACE-inhibitor  Will hold any ACE-inhibitor or ARB blocker because of that    Again will check urine electrolytes to assess new potassium loss    Lupus nephritis:  He is seeing outpatient nephrologist at [facility] and on medication for that    Hypertension:  Blood pressure actually running on lower side and may need hydration    Anemia:  Requiring blood transfusion and that may help the kidney by improving volume status

## 2018-08-21 NOTE — H&P
H&P- Verner Saras 1970, 46 y o  male MRN: [id number]    Unit/Bed#: -01 Encounter: [id number]    Primary Care Provider: Soha Carr MD   Date and time admitted to hospital: 8/20/2018  8:48 PM        * Acute-on-chronic kidney injury [facility])   Assessment & Plan    IV fluids, Recheck BMP        Lupus nephritis ([address] )   Assessment & Plan    Consult nephrology  Continue prednisone, plaquenil, cellcept        Hyperkalemia   Assessment & Plan    Recheck BMP        Hypertension   Assessment & Plan    Continue coreg            VTE Prophylaxis: Heparin  / sequential compression device   Code Status: Full  POLST: There is no POLST form on file for this patient (pre-hospital)  Discussion with family: There is no family present for discussion  Anticipated Length of Stay:  Patient will be admitted on an Inpatient basis with an anticipated length of stay of  More than 2 midnights  Justification for Hospital Stay: nephrology evaluation    Total Time for Visit, including Counseling / Coordination of Care: 30 minutes  Greater than 50% of this total time spent on direct patient counseling and coordination of care  Chief Complaint:   Abnormal lab value    History of Present Illness:    Verner Saras is a 46 y o  male who presents with at the request of his PCP  Outpatient lab testing revealed a potassium level of 6 8  Pt was treated with albuterol, calcium, insulin in the ED  Pt has no complaints of chest pain, SOB, pain, dizziness  Pt has history of lupus and lupus nephritis  Review of Systems:    Review of Systems   Musculoskeletal: Positive for joint swelling  All other systems reviewed and are negative        Past Medical and Surgical History:     Past Medical History:   Diagnosis Date    Cervical mass     Depression     DVT (deep venous thrombosis) (HCC)     Hypertension     Lupus     Renal disorder        Past Surgical History:   Procedure Laterality Date    APPENDECTOMY      CERVICAL SPINE SURGERY      NECK SURGERY         Meds/Allergies:    Prior to Admission medications    Medication Sig Start Date End Date Taking? Authorizing Provider   baclofen 20 mg tablet Take 20 mg by mouth 2 (two) times a day    Historical Provider, MD   betamethasone dipropionate (DIPROSONE) 0 05 % cream Apply 1 application topically 2 (two) times a day    Historical Provider, MD shethasone, augmented, (DIPROLENE) 0 05 % ointment Apply 1 application topically 2 (two) times a day    Historical Provider, MD   carvedilol (COREG) 12 5 mg tablet Take 12 5 mg by mouth 2 (two) times a day with meals    Historical Provider, MD   cyclobenzaprine (FLEXERIL) 10 mg tablet Take 10 mg by mouth 3 (three) times a day as needed for muscle spasms    Historical Provider, MD   escitalopram (LEXAPRO) 10 mg tablet Take 10 mg by mouth daily    Historical Provider, MD   furosemide (LASIX) 40 mg tablet Take 0 5 tablets (20 mg total) by mouth daily 7/31/18   Peter Arreguin PA-C   gabapentin (NEURONTIN) 600 MG tablet Take 600 mg by mouth daily    Historical Provider, MD   hydroxychloroquine (PLAQUENIL) 200 mg tablet Take 200 mg by mouth 2 (two) times a day    Historical Provider, MD   mycophenolate (CELLCEPT) 500 mg tablet Take 1,000 mg by mouth every 12 (twelve) hours      Historical Provider, MD   predniSONE 5 mg tablet Take 5 mg by mouth daily    Historical Provider, MD   rivaroxaban (XARELTO) 20 mg tablet Take 20 mg by mouth daily with dinner    Historical Provider, MD   sildenafil (REVATIO) 20 mg tablet Take 20 mg by mouth 3 (three) times a day    Historical Provider, MD   tamsulosin (FLOMAX) 0 4 mg Take 0 8 mg by mouth Medrol Dose Pack scheduling ONLY    Historical Provider, MD   traMADol (ULTRAM) 50 mg tablet Take 50 mg by mouth every 6 (six) hours as needed for moderate pain    Historical Provider, MD     I have reviewed home medications with patient personally      Allergies: No Known Allergies    Social History:     Marital Status: /Civil Union   Occupation: disabled  Patient Pre-hospital Living Situation: lives at home  Patient Pre-hospital Level of Mobility: ambulatory  Patient Pre-hospital Diet Restrictions: none  Substance Use History:   History   Alcohol Use No     History   Smoking Status    Never Smoker   Smokeless Tobacco    Never Used     History   Drug Use No       Family History:    History reviewed  No pertinent family history  Physical Exam:     Vitals:   Blood Pressure: 119/60 (08/21/18 0023)  Pulse: 96 (08/21/18 0023)  Temperature: 98 1 °F (36 7 °C) (08/21/18 0023)  Temp Source: Oral (08/21/18 0023)  Respirations: 18 (08/21/18 0023)  Height: 5' 6" (167 6 cm) (08/21/18 0023)  Weight - Scale: 89 4 kg (197 lb 1 5 oz) (08/21/18 0023)  SpO2: 100 % (08/21/18 0023)    Physical Exam   Constitutional: He is oriented to person, place, and time  He appears well-developed and well-nourished  HENT:   Head: Normocephalic and atraumatic  Right Ear: External ear normal    Left Ear: External ear normal    Nose: Nose normal    Mouth/Throat: Oropharynx is clear and moist    Eyes: Conjunctivae and EOM are normal  Pupils are equal, round, and reactive to light  Neck: Normal range of motion  Cardiovascular: Normal rate and regular rhythm  Pulmonary/Chest: Effort normal and breath sounds normal    Abdominal: Soft  Bowel sounds are normal    Musculoskeletal: Normal range of motion  He exhibits edema  Neurological: He is alert and oriented to person, place, and time  Skin: Skin is warm and dry  Psychiatric: He has a normal mood and affect  His behavior is normal  Judgment and thought content normal    Vitals reviewed  Additional Data:     Lab Results: I have personally reviewed pertinent reports          Results from last 7 days  Lab Units 08/20/18  2128   WBC Thousand/uL 4 79   HEMOGLOBIN g/dL 8 4*   HEMATOCRIT % 26 0*   PLATELETS Thousands/uL 396*   LYMPHO PCT % 10*   MONO PCT MAN % 4   EOSINO PCT MANUAL % 0 Results from last 7 days  Lab Units 08/21/18  0123 08/20/18  2128   SODIUM mmol/L 132* 131*   POTASSIUM mmol/L 5 9* 6 1*   CHLORIDE mmol/L 106 102   CO2 mmol/L 21 22   BUN mg/dL 73* 78*   CREATININE mg/dL 1 76* 2 09*   CALCIUM mg/dL 8 8 9 0   TOTAL PROTEIN g/dL  --  7 9   BILIRUBIN TOTAL mg/dL  --  0 20   ALK PHOS U/L  --  116   ALT U/L  --  27   AST U/L  --  21   GLUCOSE RANDOM mg/dL 142* 100                   Imaging: I have personally reviewed pertinent reports  No orders to display       EKG, Pathology, and Other Studies Reviewed on Admission:   · EKG: NSR, peaked T waves    Allscripts / Epic Records Reviewed: Yes     ** Please Note: This note has been constructed using a voice recognition system   **

## 2018-08-21 NOTE — PLAN OF CARE
DISCHARGE PLANNING     Discharge to home or other facility with appropriate resources Not Progressing        INFECTION - ADULT     Absence or prevention of progression during hospitalization Not Progressing        Knowledge Deficit     Patient/family/caregiver demonstrates understanding of disease process, treatment plan, medications, and discharge instructions Not Progressing        PAIN - ADULT     Verbalizes/displays adequate comfort level or baseline comfort level Not Progressing        Potential for Falls     Patient will remain free of falls Not Progressing        SAFETY ADULT     Maintain or return to baseline ADL function Not Progressing     Maintain or return mobility status to optimal level Not Progressing

## 2018-08-21 NOTE — ED PROVIDER NOTES
History  Chief Complaint   Patient presents with    Abnormal Lab     Patient sent by physician due to high potassium level on labs  Report lab value is 108     57-year-old male here for abnormal laboratory finding  Had chemistry performed today and reportedly his potassium was elevated at 6 8  He had the same issue about 2-3 weeks ago  At that time the suspect was related to his potassium supplement which she is taking for his Lasix  Since then he has stopped his potassium supplement  He only complains of his hands feeling cold otherwise he has no complaints including any muscle aches cramping chest pain or shortness of breath abdominal pain  No lightheadedness or dizziness  At this time is resting comfortably in bed in no distress  He has CKD stage 3, currently not on dialysis  History provided by:  Patient   used: No    Evaluation of Abnormal Diagnostic Test   Time since result:  Earlier today  Patient referred by:  Specialist  Resulting agency:  External  Result type: chemistry    Chemistry:     Potassium:  High      Prior to Admission Medications   Prescriptions Last Dose Informant Patient Reported?  Taking?   baclofen 20 mg tablet  Self Yes No   Sig: Take 20 mg by mouth 2 (two) times a day   betamethasone dipropionate (DIPROSONE) 0 05 % cream  Self Yes No   Sig: Apply 1 application topically 2 (two) times a day   betamethasone, augmented, (DIPROLENE) 0 05 % ointment  Self Yes No   Sig: Apply 1 application topically 2 (two) times a day   carvedilol (COREG) 12 5 mg tablet  Self Yes No   Sig: Take 12 5 mg by mouth 2 (two) times a day with meals   cyclobenzaprine (FLEXERIL) 10 mg tablet  Self Yes No   Sig: Take 10 mg by mouth 3 (three) times a day as needed for muscle spasms   escitalopram (LEXAPRO) 10 mg tablet  Self Yes No   Sig: Take 10 mg by mouth daily   furosemide (LASIX) 40 mg tablet   No No   Sig: Take 0 5 tablets (20 mg total) by mouth daily   gabapentin (NEURONTIN) 600 MG tablet   Yes No   Sig: Take 600 mg by mouth daily   hydroxychloroquine (PLAQUENIL) 200 mg tablet  Self Yes No   Sig: Take 200 mg by mouth 2 (two) times a day   mycophenolate (CELLCEPT) 500 mg tablet   Yes No   Sig: Take 1,000 mg by mouth every 12 (twelve) hours     predniSONE 5 mg tablet   Yes No   Sig: Take 5 mg by mouth daily   rivaroxaban (XARELTO) 20 mg tablet   Yes No   Sig: Take 20 mg by mouth daily with dinner   sildenafil (REVATIO) 20 mg tablet   Yes No   Sig: Take 20 mg by mouth 3 (three) times a day   tamsulosin (FLOMAX) 0 4 mg  Self Yes No   Sig: Take 0 8 mg by mouth Medrol Dose Pack scheduling ONLY   traMADol (ULTRAM) 50 mg tablet   Yes No   Sig: Take 50 mg by mouth every 6 (six) hours as needed for moderate pain      Facility-Administered Medications: None       Past Medical History:   Diagnosis Date    Cervical mass     Depression     DVT (deep venous thrombosis) (HCC)     Hypertension     Lupus     Renal disorder        Past Surgical History:   Procedure Laterality Date    APPENDECTOMY      CERVICAL SPINE SURGERY      NECK SURGERY         History reviewed  No pertinent family history  I have reviewed and agree with the history as documented  Social History   Substance Use Topics    Smoking status: Never Smoker    Smokeless tobacco: Never Used    Alcohol use No        Review of Systems   Constitutional: Negative for activity change, appetite change, chills, diaphoresis, fatigue, fever and unexpected weight change  HENT: Negative for congestion, rhinorrhea, sinus pressure, sore throat and trouble swallowing  Eyes: Negative for photophobia and visual disturbance  Respiratory: Negative for apnea, cough, choking, chest tightness, shortness of breath, wheezing and stridor  Cardiovascular: Negative for chest pain, palpitations and leg swelling  Gastrointestinal: Negative for abdominal distention, abdominal pain, blood in stool, constipation, diarrhea, nausea and vomiting  Genitourinary: Negative for decreased urine volume, difficulty urinating, dysuria, enuresis, flank pain, frequency, hematuria and urgency  Musculoskeletal: Negative for arthralgias, myalgias, neck pain and neck stiffness  Skin: Negative for color change, pallor, rash and wound  Allergic/Immunologic: Negative  Neurological: Negative for dizziness, tremors, syncope, weakness, light-headedness, numbness and headaches  Hematological: Negative  Psychiatric/Behavioral: Negative  All other systems reviewed and are negative  Physical Exam  Physical Exam   Constitutional: He is oriented to person, place, and time  He appears well-developed and well-nourished  Non-toxic appearance  He does not have a sickly appearance  He does not appear ill  No distress  HENT:   Head: Normocephalic and atraumatic  Eyes: EOM and lids are normal  Pupils are equal, round, and reactive to light  Neck: Normal range of motion  Neck supple  Cardiovascular: Normal rate, regular rhythm, S1 normal, S2 normal, normal heart sounds, intact distal pulses and normal pulses  Exam reveals no gallop, no distant heart sounds, no friction rub and no decreased pulses  No murmur heard  Pulses:       Radial pulses are 2+ on the right side, and 2+ on the left side  Pulmonary/Chest: Effort normal and breath sounds normal  No accessory muscle usage  No apnea, no tachypnea and no bradypnea  No respiratory distress  He has no decreased breath sounds  He has no wheezes  He has no rhonchi  He has no rales  Abdominal: Soft  Normal appearance  He exhibits no distension  There is no tenderness  There is no rigidity, no rebound and no guarding  Musculoskeletal: Normal range of motion  He exhibits no edema, tenderness or deformity  Neurological: He is alert and oriented to person, place, and time  No cranial nerve deficit  GCS eye subscore is 4  GCS verbal subscore is 5  GCS motor subscore is 6  GCS 15  AAOx3   Ambulating in department without difficulty  CN II-XII grossly intact  No focal neuro deficits  Skin: Skin is warm, dry and intact  No rash noted  He is not diaphoretic  No erythema  No pallor  Psychiatric: His speech is normal    Nursing note and vitals reviewed        Vital Signs  ED Triage Vitals [08/20/18 2030]   Temperature Pulse Respirations Blood Pressure SpO2   98 1 °F (36 7 °C) (!) 107 16 108/56 100 %      Temp Source Heart Rate Source Patient Position - Orthostatic VS BP Location FiO2 (%)   Oral Monitor Sitting Left arm --      Pain Score       7           Vitals:    08/20/18 2030 08/20/18 2245 08/20/18 2330 08/21/18 0023   BP: 108/56 122/69 112/56 119/60   Pulse: (!) 107 85 (!) 110 96   Patient Position - Orthostatic VS: Sitting   Lying       Visual Acuity      ED Medications  Medications   albuterol inhalation solution 10 mg (10 mg Nebulization Given 8/20/18 2234)     And   ipratropium (ATROVENT) 0 02 % inhalation solution 1 mg ( Nebulization Canceled Entry 8/20/18 2236)     And   sodium chloride 0 9 % inhalation solution 3 mL ( Nebulization Canceled Entry 8/20/18 2236)   rivaroxaban (XARELTO) tablet 20 mg (not administered)   sildenafil (REVATIO) tablet 20 mg (not administered)   tamsulosin (FLOMAX) capsule 0 8 mg (not administered)   traMADol (ULTRAM) tablet 50 mg (not administered)   predniSONE tablet 5 mg (not administered)   mycophenolate (CELLCEPT) capsule 500 mg (not administered)   hydroxychloroquine (PLAQUENIL) tablet 200 mg (not administered)   gabapentin (NEURONTIN) capsule 600 mg (not administered)   furosemide (LASIX) tablet 20 mg (not administered)   escitalopram (LEXAPRO) tablet 10 mg (not administered)   cyclobenzaprine (FLEXERIL) tablet 10 mg (not administered)   carvedilol (COREG) tablet 12 5 mg (not administered)   baclofen tablet 20 mg (not administered)   sodium chloride 0 9 % infusion (100 mL/hr Intravenous New Bag 8/21/18 0059)   ondansetron (ZOFRAN) injection 4 mg (not administered) acetaminophen (TYLENOL) tablet 650 mg (not administered)   sodium chloride 0 9 % bolus 1,000 mL (0 mL Intravenous Stopped 8/20/18 2229)   insulin regular (HumuLIN R,NovoLIN R) injection 10 Units (10 Units Intravenous Given 8/20/18 2228)   dextrose 50 % IV solution 50 mL (50 mL Intravenous Given 8/20/18 2234)   calcium gluconate 1 g in sodium chloride 0 9 % 100 mL IVPB (1 g Intravenous New Bag 8/20/18 2247)       Diagnostic Studies  Results Reviewed     Procedure Component Value Units Date/Time    CBC and differential [[id number]]  (Abnormal) Collected:  08/20/18 2128    Lab Status:  Final result Specimen:  Blood from Arm, Right Updated:  08/20/18 2216     WBC 4 79 Thousand/uL      RBC 2 78 (L) Million/uL      Hemoglobin 8 4 (L) g/dL      Hematocrit 26 0 (L) %      MCV 94 fL      MCH 30 2 pg      MCHC 32 3 g/dL      RDW 13 4 %      MPV 9 4 fL      Platelets 540 (H) Thousands/uL      nRBC 0 /100 WBCs     Narrative: This is an appended report  These results have been appended to a previously verified report  Troponin I [[id number]]  (Normal) Collected:  08/20/18 2128    Lab Status:  Final result Specimen:  Blood from Arm, Right Updated:  08/20/18 2152     Troponin I <0 02 ng/mL     Basic metabolic panel [[id number]]  (Abnormal) Collected:  08/20/18 2128    Lab Status:  Final result Specimen:  Blood from Arm, Right Updated:  08/20/18 2149     Sodium 131 (L) mmol/L      Potassium 6 1 (H) mmol/L      Chloride 102 mmol/L      CO2 22 mmol/L      Anion Gap 7 mmol/L      BUN 78 (H) mg/dL      Creatinine 2 09 (H) mg/dL      Glucose 100 mg/dL      Calcium 9 0 mg/dL      eGFR 41 ml/min/1 73sq m     Narrative:         National Kidney Disease Education Program recommendations are as follows:  GFR calculation is accurate only with a steady state creatinine  Chronic Kidney disease less than 60 ml/min/1 73 sq  meters  Kidney failure less than 15 ml/min/1 73 sq  meters      Hepatic function panel [[id number]]  (Abnormal) Collected: 08/20/18 2128    Lab Status:  Final result Specimen:  Blood from Arm, Right Updated:  08/20/18 2149     Total Bilirubin 0 20 mg/dL      Bilirubin, Direct 0 06 mg/dL      Alkaline Phosphatase 116 U/L      AST 21 U/L      ALT 27 U/L      Total Protein 7 9 g/dL      Albumin 3 3 (L) g/dL                  No orders to display              Procedures  ECG 12 Lead Documentation  Date/Time: 8/20/2018 10:04 PM  Performed by: Destiny Valencia by: Janessa Mancini     Indications / Diagnosis:  Hyperkalemia  ECG reviewed by me, the ED Provider: yes    Patient location:  ED  Previous ECG:     Previous ECG:  Compared to current    Comparison ECG info:  Jul 30, 2018    Similarity:  Changes noted    Comparison to cardiac monitor: Yes    Interpretation:     Interpretation: abnormal    Quality:     Tracing quality:  Limited by artifact  Rate:     ECG rate:  79    ECG rate assessment: normal    Rhythm:     Rhythm: sinus rhythm    Ectopy:     Ectopy: none    QRS:     QRS axis:  Normal    QRS intervals:  Normal  Conduction:     Conduction: normal    ST segments:     ST segments:  Normal  T waves:     T waves: peaked      Peaked:  II, V5, V6 and V4             Phone Contacts  ED Phone Contact    ED Course         HEART Risk Score      Most Recent Value   History  0 Filed at: 08/20/2018 2230   ECG  1 Filed at: 08/20/2018 2230   Age  1 Filed at: 08/20/2018 2230   Risk Factors  2 Filed at: 08/20/2018 2230   Troponin  0 Filed at: 08/20/2018 2230   Heart Score Risk Calculator   History  0 Filed at: 08/20/2018 2230   ECG  1 Filed at: 08/20/2018 2230   Age  1 Filed at: 08/20/2018 2230   Risk Factors  2 Filed at: 08/20/2018 2230   Troponin  0 Filed at: 08/20/2018 2230   HEART Score  4 Filed at: 08/20/2018 2230   HEART Score  4 Filed at: 08/20/2018 2230                            MDM  Number of Diagnoses or Management Options  MARK (acute kidney injury) ([facility] [address] ): new and requires workup  Hyperkalemia: new and requires workup  Diagnosis management comments: DDX including but not limited to: ACS, MI, rhabdomyolysis, hyperkalemia, falsely elevated potassium, MARK  Plan: cardiac workup, EKG, repeat potassium       Amount and/or Complexity of Data Reviewed  Clinical lab tests: ordered and reviewed    Risk of Complications, Morbidity, and/or Mortality  Presenting problems: moderate  Management options: low  General comments: 43-year-old male with reportedly high potassium  Confirmed here in the ED  He is given insulin, amp of D50, calcium for his EKG changes  Does have achy eye as well  Does have anemia, likely chronic  He has no source of active hemorrhaging  Doubt active bleeding  Given his hyperkalemia and EKG changes will admit to the hospital   Discussed with Internal Medicine who agrees with plan  He is stable at time of admission and he agrees with current plan      Patient Progress  Patient progress: stable    CritCare Time    Disposition  Final diagnoses:   Hyperkalemia   MARK (acute kidney injury) ([place] [address] )     Time reflects when diagnosis was documented in both MDM as applicable and the Disposition within this note     Time User Action Codes Description Comment    8/20/2018 10:05 PM Devan Chu Add [E87 5] Hyperkalemia     8/20/2018 10:06 PM Gemini Rosanna GUTIERREZ Add [N17 9] MARK (acute kidney injury) ([place] [address] )     8/20/2018 11:17 PM Roxi Lai Add [M32 14] Lupus nephritis ([facility] )     8/20/2018 11:17 PM Roxi Lai Modify [M32 14] Lupus nephritis [facility])       ED Disposition     None      Follow-up Information    None         Current Discharge Medication List      CONTINUE these medications which have NOT CHANGED    Details   baclofen 20 mg tablet Take 20 mg by mouth 2 (two) times a day      betamethasone dipropionate (DIPROSONE) 0 05 % cream Apply 1 application topically 2 (two) times a day      betamethasone, augmented, (DIPROLENE) 0 05 % ointment Apply 1 application topically 2 (two) times a day      carvedilol (COREG) 12 5 mg tablet Take 12 5 mg by mouth 2 (two) times a day with meals      cyclobenzaprine (FLEXERIL) 10 mg tablet Take 10 mg by mouth 3 (three) times a day as needed for muscle spasms      escitalopram (LEXAPRO) 10 mg tablet Take 10 mg by mouth daily      furosemide (LASIX) 40 mg tablet Take 0 5 tablets (20 mg total) by mouth daily  Refills: 0    Associated Diagnoses: Hyperkalemia      gabapentin (NEURONTIN) 600 MG tablet Take 600 mg by mouth daily      hydroxychloroquine (PLAQUENIL) 200 mg tablet Take 200 mg by mouth 2 (two) times a day      mycophenolate (CELLCEPT) 500 mg tablet Take 1,000 mg by mouth every 12 (twelve) hours        predniSONE 5 mg tablet Take 5 mg by mouth daily      rivaroxaban (XARELTO) 20 mg tablet Take 20 mg by mouth daily with dinner      sildenafil (REVATIO) 20 mg tablet Take 20 mg by mouth 3 (three) times a day      tamsulosin (FLOMAX) 0 4 mg Take 0 8 mg by mouth Medrol Dose Pack scheduling ONLY      traMADol (ULTRAM) 50 mg tablet Take 50 mg by mouth every 6 (six) hours as needed for moderate pain           No discharge procedures on file      ED Provider  Electronically Signed by           Jensen Fox PA-C  08/21/18 1691

## 2018-08-22 ENCOUNTER — ANESTHESIA EVENT (INPATIENT)
Dept: PERIOP | Facility: HOSPITAL | Age: 48
DRG: 425 | End: 2018-08-22
Payer: COMMERCIAL

## 2018-08-22 ENCOUNTER — ANESTHESIA (INPATIENT)
Dept: PERIOP | Facility: HOSPITAL | Age: 48
DRG: 425 | End: 2018-08-22
Payer: COMMERCIAL

## 2018-08-22 LAB
ABO GROUP BLD BPU: NORMAL
ABO GROUP BLD BPU: NORMAL
ANION GAP SERPL CALCULATED.3IONS-SCNC: 8 MMOL/L (ref 4–13)
BASOPHILS # BLD AUTO: 0.01 THOUSANDS/ΜL (ref 0–0.1)
BASOPHILS NFR BLD AUTO: 0 % (ref 0–1)
BPU ID: NORMAL
BPU ID: NORMAL
BUN SERPL-MCNC: 29 MG/DL (ref 5–25)
CALCIUM SERPL-MCNC: 6 MG/DL (ref 8.3–10.1)
CHLORIDE SERPL-SCNC: 117 MMOL/L (ref 100–108)
CO2 SERPL-SCNC: 15 MMOL/L (ref 21–32)
CREAT SERPL-MCNC: 0.74 MG/DL (ref 0.6–1.3)
CROSSMATCH: NORMAL
CROSSMATCH: NORMAL
EOSINOPHIL # BLD AUTO: 0.04 THOUSAND/ΜL (ref 0–0.61)
EOSINOPHIL NFR BLD AUTO: 1 % (ref 0–6)
EOSINOPHIL NFR URNS MANUAL: 0 %
ERYTHROCYTE [DISTWIDTH] IN BLOOD BY AUTOMATED COUNT: 15 % (ref 11.6–15.1)
GFR SERPL CREATININE-BSD FRML MDRD: 124 ML/MIN/1.73SQ M
GLUCOSE SERPL-MCNC: 63 MG/DL (ref 65–140)
HCT VFR BLD AUTO: 17.5 % (ref 36.5–49.3)
HCT VFR BLD AUTO: 25.7 % (ref 36.5–49.3)
HCT VFR BLD AUTO: 34.3 % (ref 36.5–49.3)
HGB BLD-MCNC: 11.2 G/DL (ref 12–17)
HGB BLD-MCNC: 5.4 G/DL (ref 12–17)
HGB BLD-MCNC: 7.9 G/DL (ref 12–17)
IMM GRANULOCYTES # BLD AUTO: 0.06 THOUSAND/UL (ref 0–0.2)
IMM GRANULOCYTES NFR BLD AUTO: 2 % (ref 0–2)
INR PPP: 1.09 (ref 0.86–1.17)
LYMPHOCYTES # BLD AUTO: 0.54 THOUSANDS/ΜL (ref 0.6–4.47)
LYMPHOCYTES NFR BLD AUTO: 17 % (ref 14–44)
MCH RBC QN AUTO: 29.5 PG (ref 26.8–34.3)
MCHC RBC AUTO-ENTMCNC: 30.7 G/DL (ref 31.4–37.4)
MCV RBC AUTO: 96 FL (ref 82–98)
MONOCYTES # BLD AUTO: 0.57 THOUSAND/ΜL (ref 0.17–1.22)
MONOCYTES NFR BLD AUTO: 18 % (ref 4–12)
NEUTROPHILS # BLD AUTO: 1.99 THOUSANDS/ΜL (ref 1.85–7.62)
NEUTS SEG NFR BLD AUTO: 62 % (ref 43–75)
NRBC BLD AUTO-RTO: 0 /100 WBCS
PHOSPHATE SERPL-MCNC: 2.1 MG/DL (ref 2.7–4.5)
PLATELET # BLD AUTO: 210 THOUSANDS/UL (ref 149–390)
PMV BLD AUTO: 9.4 FL (ref 8.9–12.7)
POTASSIUM SERPL-SCNC: 3.7 MMOL/L (ref 3.5–5.3)
PROTHROMBIN TIME: 14 SECONDS (ref 11.8–14.2)
RBC # BLD AUTO: 2.68 MILLION/UL (ref 3.88–5.62)
SODIUM SERPL-SCNC: 140 MMOL/L (ref 136–145)
UNIT DISPENSE STATUS: NORMAL
UNIT DISPENSE STATUS: NORMAL
UNIT PRODUCT CODE: NORMAL
UNIT PRODUCT CODE: NORMAL
UNIT RH: NORMAL
UNIT RH: NORMAL
URATE SERPL-MCNC: 6.3 MG/DL (ref 4.2–8)
WBC # BLD AUTO: 3.21 THOUSAND/UL (ref 4.31–10.16)

## 2018-08-22 PROCEDURE — 99254 IP/OBS CNSLTJ NEW/EST MOD 60: CPT | Performed by: INTERNAL MEDICINE

## 2018-08-22 PROCEDURE — 80048 BASIC METABOLIC PNL TOTAL CA: CPT | Performed by: INTERNAL MEDICINE

## 2018-08-22 PROCEDURE — 0DB68ZX EXCISION OF STOMACH, VIA NATURAL OR ARTIFICIAL OPENING ENDOSCOPIC, DIAGNOSTIC: ICD-10-PCS | Performed by: INTERNAL MEDICINE

## 2018-08-22 PROCEDURE — 88342 IMHCHEM/IMCYTCHM 1ST ANTB: CPT | Performed by: PATHOLOGY

## 2018-08-22 PROCEDURE — 99232 SBSQ HOSP IP/OBS MODERATE 35: CPT | Performed by: FAMILY MEDICINE

## 2018-08-22 PROCEDURE — 88313 SPECIAL STAINS GROUP 2: CPT | Performed by: PATHOLOGY

## 2018-08-22 PROCEDURE — 43239 EGD BIOPSY SINGLE/MULTIPLE: CPT | Performed by: INTERNAL MEDICINE

## 2018-08-22 PROCEDURE — P9016 RBC LEUKOCYTES REDUCED: HCPCS

## 2018-08-22 PROCEDURE — C9113 INJ PANTOPRAZOLE SODIUM, VIA: HCPCS | Performed by: FAMILY MEDICINE

## 2018-08-22 PROCEDURE — 88305 TISSUE EXAM BY PATHOLOGIST: CPT | Performed by: PATHOLOGY

## 2018-08-22 PROCEDURE — 85014 HEMATOCRIT: CPT | Performed by: PHYSICIAN ASSISTANT

## 2018-08-22 PROCEDURE — 85025 COMPLETE CBC W/AUTO DIFF WBC: CPT | Performed by: INTERNAL MEDICINE

## 2018-08-22 PROCEDURE — 84550 ASSAY OF BLOOD/URIC ACID: CPT | Performed by: INTERNAL MEDICINE

## 2018-08-22 PROCEDURE — 84100 ASSAY OF PHOSPHORUS: CPT | Performed by: INTERNAL MEDICINE

## 2018-08-22 PROCEDURE — 85610 PROTHROMBIN TIME: CPT | Performed by: PHYSICIAN ASSISTANT

## 2018-08-22 PROCEDURE — 85018 HEMOGLOBIN: CPT | Performed by: PHYSICIAN ASSISTANT

## 2018-08-22 RX ORDER — PANTOPRAZOLE SODIUM 40 MG/1
40 TABLET, DELAYED RELEASE ORAL
Status: DISCONTINUED | OUTPATIENT
Start: 2018-08-22 | End: 2018-08-23 | Stop reason: HOSPADM

## 2018-08-22 RX ORDER — METOCLOPRAMIDE HYDROCHLORIDE 5 MG/ML
10 INJECTION INTRAMUSCULAR; INTRAVENOUS ONCE
Status: COMPLETED | OUTPATIENT
Start: 2018-08-22 | End: 2018-08-22

## 2018-08-22 RX ORDER — SUCRALFATE ORAL 1 G/10ML
1000 SUSPENSION ORAL EVERY 6 HOURS SCHEDULED
Status: DISCONTINUED | OUTPATIENT
Start: 2018-08-22 | End: 2018-08-23 | Stop reason: HOSPADM

## 2018-08-22 RX ORDER — DEXTROSE AND SODIUM CHLORIDE 5; .45 G/100ML; G/100ML
100 INJECTION, SOLUTION INTRAVENOUS CONTINUOUS
Status: DISCONTINUED | OUTPATIENT
Start: 2018-08-22 | End: 2018-08-22

## 2018-08-22 RX ORDER — SODIUM CHLORIDE, SODIUM LACTATE, POTASSIUM CHLORIDE, CALCIUM CHLORIDE 600; 310; 30; 20 MG/100ML; MG/100ML; MG/100ML; MG/100ML
INJECTION, SOLUTION INTRAVENOUS CONTINUOUS PRN
Status: DISCONTINUED | OUTPATIENT
Start: 2018-08-22 | End: 2018-08-22 | Stop reason: SURG

## 2018-08-22 RX ORDER — DEXTROSE MONOHYDRATE 25 G/50ML
25 INJECTION, SOLUTION INTRAVENOUS ONCE
Status: COMPLETED | OUTPATIENT
Start: 2018-08-22 | End: 2018-08-22

## 2018-08-22 RX ORDER — FENTANYL CITRATE 50 UG/ML
25 INJECTION, SOLUTION INTRAMUSCULAR; INTRAVENOUS EVERY 4 HOURS PRN
Status: DISCONTINUED | OUTPATIENT
Start: 2018-08-22 | End: 2018-08-23 | Stop reason: HOSPADM

## 2018-08-22 RX ORDER — PROPOFOL 10 MG/ML
INJECTION, EMULSION INTRAVENOUS AS NEEDED
Status: DISCONTINUED | OUTPATIENT
Start: 2018-08-22 | End: 2018-08-22 | Stop reason: SURG

## 2018-08-22 RX ADMIN — SILDENAFIL 20 MG: 20 TABLET, FILM COATED ORAL at 12:50

## 2018-08-22 RX ADMIN — CARVEDILOL 12.5 MG: 12.5 TABLET, FILM COATED ORAL at 15:59

## 2018-08-22 RX ADMIN — PANTOPRAZOLE SODIUM 40 MG: 40 TABLET, DELAYED RELEASE ORAL at 15:59

## 2018-08-22 RX ADMIN — LIDOCAINE HYDROCHLORIDE 100 MG: 20 INJECTION, SOLUTION INTRAVENOUS at 10:27

## 2018-08-22 RX ADMIN — ONDANSETRON 4 MG: 2 INJECTION INTRAMUSCULAR; INTRAVENOUS at 20:37

## 2018-08-22 RX ADMIN — HYDROXYCHLOROQUINE SULFATE 200 MG: 200 TABLET, FILM COATED ENTERAL at 08:56

## 2018-08-22 RX ADMIN — BISACODYL 10 MG: 5 TABLET, COATED ORAL at 15:59

## 2018-08-22 RX ADMIN — PROPOFOL 20 MG: 10 INJECTION, EMULSION INTRAVENOUS at 10:31

## 2018-08-22 RX ADMIN — TAMSULOSIN HYDROCHLORIDE 0.8 MG: 0.4 CAPSULE ORAL at 22:29

## 2018-08-22 RX ADMIN — SODIUM CHLORIDE 100 ML/HR: 0.9 INJECTION, SOLUTION INTRAVENOUS at 00:43

## 2018-08-22 RX ADMIN — SUCRALFATE 1000 MG: 1 SUSPENSION ORAL at 13:05

## 2018-08-22 RX ADMIN — METOCLOPRAMIDE 10 MG: 5 INJECTION, SOLUTION INTRAMUSCULAR; INTRAVENOUS at 09:02

## 2018-08-22 RX ADMIN — SILDENAFIL 20 MG: 20 TABLET, FILM COATED ORAL at 15:59

## 2018-08-22 RX ADMIN — SUCRALFATE 1000 MG: 1 SUSPENSION ORAL at 17:27

## 2018-08-22 RX ADMIN — HYDROXYCHLOROQUINE SULFATE 200 MG: 200 TABLET, FILM COATED ENTERAL at 17:27

## 2018-08-22 RX ADMIN — ESCITALOPRAM OXALATE 10 MG: 10 TABLET ORAL at 12:49

## 2018-08-22 RX ADMIN — TRAMADOL HYDROCHLORIDE 50 MG: 50 TABLET, COATED ORAL at 01:03

## 2018-08-22 RX ADMIN — CARVEDILOL 12.5 MG: 12.5 TABLET, FILM COATED ORAL at 07:55

## 2018-08-22 RX ADMIN — FENTANYL CITRATE 25 MCG: 50 INJECTION, SOLUTION INTRAMUSCULAR; INTRAVENOUS at 00:09

## 2018-08-22 RX ADMIN — PROPOFOL 130 MG: 10 INJECTION, EMULSION INTRAVENOUS at 10:28

## 2018-08-22 RX ADMIN — SODIUM CHLORIDE 8 MG/HR: 9 INJECTION, SOLUTION INTRAVENOUS at 03:16

## 2018-08-22 RX ADMIN — GABAPENTIN 600 MG: 300 CAPSULE ORAL at 12:50

## 2018-08-22 RX ADMIN — DEXTROSE MONOHYDRATE 25 ML: 25 INJECTION, SOLUTION INTRAVENOUS at 07:28

## 2018-08-22 RX ADMIN — TRAMADOL HYDROCHLORIDE 50 MG: 50 TABLET, COATED ORAL at 22:29

## 2018-08-22 RX ADMIN — DEXTROSE AND SODIUM CHLORIDE 100 ML/HR: 5; .45 INJECTION, SOLUTION INTRAVENOUS at 07:54

## 2018-08-22 RX ADMIN — POLYETHYLENE GLYCOL 3350, SODIUM SULFATE ANHYDROUS, SODIUM BICARBONATE, SODIUM CHLORIDE, POTASSIUM CHLORIDE 4000 ML: 236; 22.74; 6.74; 5.86; 2.97 POWDER, FOR SOLUTION ORAL at 17:27

## 2018-08-22 RX ADMIN — SODIUM CHLORIDE, SODIUM LACTATE, POTASSIUM CHLORIDE, AND CALCIUM CHLORIDE: .6; .31; .03; .02 INJECTION, SOLUTION INTRAVENOUS at 10:22

## 2018-08-22 RX ADMIN — FENTANYL CITRATE 25 MCG: 50 INJECTION, SOLUTION INTRAMUSCULAR; INTRAVENOUS at 06:30

## 2018-08-22 RX ADMIN — SILDENAFIL 20 MG: 20 TABLET, FILM COATED ORAL at 22:29

## 2018-08-22 RX ADMIN — PROPOFOL 20 MG: 10 INJECTION, EMULSION INTRAVENOUS at 10:34

## 2018-08-22 RX ADMIN — TRAMADOL HYDROCHLORIDE 50 MG: 50 TABLET, COATED ORAL at 08:04

## 2018-08-22 NOTE — OP NOTE
**** GI/ENDOSCOPY REPORT ****     PATIENT NAME: January Warren - VISIT ID:  Patient ID: [id number] YOB: 1970     INTRODUCTION: Esophagogastroduodenoscopy - A 46 male patient presents for   an inpatient Esophagogastroduodenoscopy at [facility]  INDICATIONS: Melena  CONSENT: The benefits, risks, and alternatives to the procedure were   discussed and informed consent was obtained from the patient  PREPARATION:  EKG, pulse, pulse oximetry and blood pressure were monitored   throughout the procedure  The patient was kept NPO for eight hours prior   to the procedure  ASA Classification: Class 2 - Patient has mild to   moderate systemic disturbance that may or may not be related to the   disorder requiring surgery  MEDICATIONS: MAC anesthesia  PROCEDURE:  The endoscope was passed without difficulty through the mouth   under direct visualization and advanced to the 2nd portion of the   duodenum  The scope was withdrawn and the mucosa was carefully examined  FINDINGS:   Esophagus: LA Class A, erosive, reflux-induced esophagitis was   found in the esophagus  Stomach: Mild non-erosive gastritis was found in   the body of the stomach and antrum  There was multiple medium-sized areas   of erosion in the antrum  They was not bleeding and showed no bleeding   stigmata  Multiple biopsies was taken  Duodenum: There was evidence of   duodenitis in the duodenal bulb and 2nd portion of the duodenum  COMPLICATIONS: There were no complications  IMPRESSIONS: Esophagitis seen  Mild non-erosive gastritis found in the   body of the stomach and antrum  Areas of erosion found in the antrum  Multiple biopsies taken  Duodenitis in the duodenal bulb and 2nd portion   of the duodenum  RECOMMENDATIONS: Colonoscopy tomorrow  Anti-reflux measures: Raise the   head of the bed 4 to 6 inches  Avoid smoking  Avoid excess coffee, tea or   other caffeinated beverages   Avoid garments that fit tightly through the   abdomen  Avoid eating before bed  PPI BID  Monitor CBC  ESTIMATED BLOOD LOSS: None  PATHOLOGY SPECIMENS: Multiple biopsies taken  Associated finding: Erosion  Yes     PROCEDURE CODES: 50637 - EGD flexible; with biopsy     ICD-9 Codes: 578 1 Blood in stool 535 4 Other specified gastritides 535 60   Duodenitis, without mention of hemorrhage     ICD-10 Codes: K92 1 Melena K20 9 Esophagitis, unspecified K29 Gastritis   and duodenitis R19 8 Other specified symptoms and signs involving the   digestive system and abdomen K29 Gastritis and duodenitis     PERFORMED BY: SCOTTIE Rock  on 08/22/2018  Version 1, electronically signed by SCOTTIE Davison  on   08/22/2018 at 10:38

## 2018-08-22 NOTE — ANESTHESIA POSTPROCEDURE EVALUATION
Post-Op Assessment Note      CV Status:  Stable    Hydration Status:  Stable    PONV Controlled:  None    Airway Patency:  Patent    Post Op Vitals Reviewed: Yes          Staff: CRNA           BP   106/55   Temp      Pulse 71   Resp   16   SpO2   98% on 2LNC   Post procedure VS noted above, SV non obstructed

## 2018-08-22 NOTE — ASSESSMENT & PLAN NOTE
Improved after packed red blood cell transfusion with acute exacerbation likely secondary to acute blood loss

## 2018-08-22 NOTE — ASSESSMENT & PLAN NOTE
Stable  Patient home medication was discontinue due acute upper GI bleeding - prednisone  -Continue plaquenil

## 2018-08-22 NOTE — PROGRESS NOTES
Patient returned to room post EGD, AAO X3 no distress noted  Returned to bed at this time vital signs taken and documented  Orders reviewed

## 2018-08-22 NOTE — ASSESSMENT & PLAN NOTE
Significant drop of hemoglobin in the setting most likely acute upper GI bleeding secondary to  -continue Protonix infusion  -on call to upper endoscopy today

## 2018-08-22 NOTE — ANESTHESIA PREPROCEDURE EVALUATION
Review of Systems/Medical History  Patient summary reviewed        Cardiovascular  EKG reviewed, Hypertension , DVT   Pulmonary  Negative pulmonary ROS        GI/Hepatic  Negative GI/hepatic ROS          Negative  ROS        Endo/Other    Obesity    GYN  Negative gynecology ROS          Hematology  Anemia ,     Musculoskeletal  Negative musculoskeletal ROS        Neurology  Negative neurology ROS      Psychology   Depression ,              Physical Exam    Airway    Mallampati score: II  TM Distance: >3 FB  Neck ROM: full     Dental       Cardiovascular  Cardiovascular exam normal    Pulmonary  Pulmonary exam normal     Other Findings        Anesthesia Plan  ASA Score- 2     Anesthesia Type- IV sedation with anesthesia with ASA Monitors  Additional Monitors:   Airway Plan:         Plan Factors-    Induction- intravenous  Postoperative Plan-     Informed Consent- Anesthetic plan and risks discussed with patient  I personally reviewed this patient with the CRNA  Discussed and agreed on the Anesthesia Plan with the CRNA  Galo Kumar

## 2018-08-22 NOTE — PROGRESS NOTES
Progress Note - Rory Talbot 1970, 46 y o  male MRN: [id number]    Unit/Bed#: -01 Encounter: [id number]    Primary Care Provider: Mercedes Poon MD   Date and time admitted to hospital: 2018  8:48 PM        * Acute-on-chronic kidney injury [facility])   Assessment & Plan    Improved        Melena   Assessment & Plan    Significant drop of hemoglobin in the setting most likely acute upper GI bleeding secondary to  -continue Protonix infusion  -on call to upper endoscopy today        Anemia in chronic kidney disease   Assessment & Plan    Improved after packed red blood cell transfusion with acute exacerbation likely secondary to acute blood loss        Hypertension   Assessment & Plan    Continue coreg        Lupus nephritis ([facility] [place] [address] )   Assessment & Plan    Stable  Patient home medication was discontinue due acute upper GI bleeding - prednisone  -Continue plaquenil        Hyperkalemia   Assessment & Plan    Improved          VTE Pharmacologic Prophylaxis:   Pharmacologic: Pharmacologic VTE Prophylaxis contraindicated due to GI Bleeding  Mechanical VTE Prophylaxis in Place: Yes    Patient Centered Rounds: I have performed bedside rounds with nursing staff today  Discussions with Specialists or Other Care Team Provider:     Education and Discussions with Family / Patient: patient     Time Spent for Care: 20 minutes  More than 50% of total time spent on counseling and coordination of care as described above      Current Length of Stay: 2 day(s)    Current Patient Status: Inpatient   Certification Statement: The patient will continue to require additional inpatient hospital stay due to Acute above condition    Discharge Plan:  It depend on clinical course most likely next 24 hours  Code Status: Level 1 - Full Code      Subjective:   Patient said he feels better after blood transfusion, No more episode of melena    Objective:     Vitals:   Temp (24hrs), Av 5 °F (36 9 °C), Min:98 1 °F (36 7 °C), Max:98 9 °F (37 2 °C)    HR:  [55-81] 73  Resp:  [18-20] 20  BP: ()/(46-73) 136/66  SpO2:  [96 %-99 %] 99 %  Body mass index is 31 81 kg/m²  Input and Output Summary (last 24 hours): Intake/Output Summary (Last 24 hours) at 08/22/18 0919  Last data filed at 08/22/18 0700   Gross per 24 hour   Intake          4188 76 ml   Output             1650 ml   Net          2538 76 ml       Physical Exam:     Physical Exam   Constitutional: He is oriented to person, place, and time  No distress  HENT:   Head: Normocephalic and atraumatic  Right Ear: External ear normal    Left Ear: External ear normal    Mouth/Throat: Oropharynx is clear and moist    Neck: Normal range of motion  Neck supple  No JVD present  No tracheal deviation present  No thyromegaly present  Cardiovascular: Normal rate, regular rhythm, normal heart sounds and intact distal pulses  Exam reveals no gallop and no friction rub  No murmur heard  Pulmonary/Chest: No respiratory distress  He has no wheezes  He has no rales  He exhibits no tenderness  Abdominal: He exhibits no distension and no mass  There is tenderness  There is guarding  There is no rebound  Musculoskeletal: He exhibits no edema, tenderness or deformity  Lymphadenopathy:     He has no cervical adenopathy  Neurological: He is alert and oriented to person, place, and time  He has normal reflexes  No cranial nerve deficit  Coordination normal    Skin: Skin is warm  He is not diaphoretic  Psychiatric: He has a normal mood and affect         Additional Data:     Labs:      Results from last 7 days  Lab Units 08/22/18  0434   WBC Thousand/uL 3 21*   HEMOGLOBIN g/dL 7 9*   HEMATOCRIT % 25 7*   PLATELETS Thousands/uL 210   NEUTROS PCT % 62   LYMPHS PCT % 17   MONOS PCT % 18*   EOS PCT % 1       Results from last 7 days  Lab Units 08/22/18  0434  08/20/18  2128   SODIUM mmol/L 140  < > 131*   POTASSIUM mmol/L 3 7  < > 6 1*   CHLORIDE mmol/L 117*  < > 102   CO2 mmol/L 15*  < > 22   BUN mg/dL 29*  < > 78*   CREATININE mg/dL 0 74  < > 2 09*   CALCIUM mg/dL 6 0*  < > 9 0   TOTAL PROTEIN g/dL  --   --  7 9   BILIRUBIN TOTAL mg/dL  --   --  0 20   ALK PHOS U/L  --   --  116   ALT U/L  --   --  27   AST U/L  --   --  21   GLUCOSE RANDOM mg/dL 63*  < > 100   < > = values in this interval not displayed  * I Have Reviewed All Lab Data Listed Above  * Additional Pertinent Lab Tests Reviewed: All Labs Within Last 24 Hours Reviewed    Imaging:    Imaging Reports Reviewed Today Include:  Imaging Personally Reviewed by Myself Includes:      Recent Cultures (last 7 days):           Last 24 Hours Medication List:     Current Facility-Administered Medications:  acetaminophen 650 mg Oral Q6H PRN Megan Card, PA-C    carvedilol 12 5 mg Oral BID With Meals Megan Card, PA-C    dextrose 5 % and sodium chloride 0 45 % 100 mL/hr Intravenous Continuous Megan Card, PA-C Last Rate: 100 mL/hr (08/22/18 0754)   escitalopram 10 mg Oral Daily Megan Card, PA-C    fentanyl citrate (PF) 25 mcg Intravenous Q4H PRN Megan Card, PA-C    gabapentin 600 mg Oral Daily Megan Card, PA-C    hydroxychloroquine 200 mg Oral BID Megan Card, PA-C    ipratropium 1 mg Nebulization Once Vonzella Junk, PA-C    And        sodium chloride 3 mL Nebulization Once Vonzella Junk, PA-C    ondansetron 4 mg Intravenous Q6H PRN Megan Card, PA-C    pantoprozole (PROTONIX) infusion (Continuous) 8 mg/hr Intravenous Continuous Gomez Bay MD Last Rate: 8 mg/hr (08/22/18 0316)   sildenafil 20 mg Oral TID Megan Card, PA-C    tamsulosin 0 8 mg Oral Once HS Megan Card, PA-C    traMADol 50 mg Oral Q6H PRN Megan Card, PA-C         Today, Patient Was Seen By: Jerson Estrella MD    ** Please Note: Dragon 360 Dictation voice to text software may have been used in the creation of this document   **

## 2018-08-22 NOTE — CONSULTS
Consultation -  [facility]  Boo Ace 46 y o  male MRN: [id number]  Unit/Bed#: -01 Encounter: [id number]        Consults    Reason for Consult / Principal Problem: Melena, Acute Blood Loss Anemia    HPI: Mr Fernando Sharma is a 47 yo M with a PMH of lupus nephritis, hx DVT on xarelto, CKD, HTN, presenting due to high potassium noted as outpatient  He was noted to be anemia on admission as well as multiple episodes of melena which he noticed yesterday  He denies associated abdominal pain or vomiting but was nauseous yesterday  He denies any hematochezia  He had a colonoscopy about 3 years ago which was normal  He has never had an EGD  His last dose of xarelto was on Monday  He has been taking NSAIDs regularly  He believes he has had a 10 lb weight loss in the past week  He denies a history of PUD  Currently he feels well  His last melanotic BM was at 4AM  He denies feeling lightheaded or dizzy currently  REVIEW OF SYSTEMS: Negative except for as stated above    Historical Information   Past Medical History:   Diagnosis Date    Cervical mass     Depression     DVT (deep venous thrombosis) (HCC)     Hypertension     Lupus     Renal disorder      Past Surgical History:   Procedure Laterality Date    APPENDECTOMY      CERVICAL SPINE SURGERY      NECK SURGERY       Social History   History   Alcohol Use No     History   Drug Use No     History   Smoking Status    Never Smoker   Smokeless Tobacco    Never Used     History reviewed  No pertinent family history      Meds/Allergies     Prescriptions Prior to Admission   Medication    baclofen 20 mg tablet    betamethasone dipropionate (DIPROSONE) 0 05 % cream    betamethasone, augmented, (DIPROLENE) 0 05 % ointment    carvedilol (COREG) 12 5 mg tablet    cyclobenzaprine (FLEXERIL) 10 mg tablet    escitalopram (LEXAPRO) 10 mg tablet    furosemide (LASIX) 40 mg tablet    gabapentin (NEURONTIN) 600 MG tablet    hydroxychloroquine (PLAQUENIL) 200 mg tablet    mycophenolate (CELLCEPT) 500 mg tablet    predniSONE 5 mg tablet    rivaroxaban (XARELTO) 20 mg tablet    sildenafil (REVATIO) 20 mg tablet    tamsulosin (FLOMAX) 0 4 mg    traMADol (ULTRAM) 50 mg tablet     Current Facility-Administered Medications   Medication Dose Route Frequency    acetaminophen (TYLENOL) tablet 650 mg  650 mg Oral Q6H PRN    carvedilol (COREG) tablet 12 5 mg  12 5 mg Oral BID With Meals    dextrose 5 % and sodium chloride 0 45 % infusion  100 mL/hr Intravenous Continuous    escitalopram (LEXAPRO) tablet 10 mg  10 mg Oral Daily    fentanyl citrate (PF) 100 MCG/2ML 25 mcg  25 mcg Intravenous Q4H PRN    gabapentin (NEURONTIN) capsule 600 mg  600 mg Oral Daily    hydroxychloroquine (PLAQUENIL) tablet 200 mg  200 mg Oral BID    ipratropium (ATROVENT) 0 02 % inhalation solution 1 mg  1 mg Nebulization Once    And    sodium chloride 0 9 % inhalation solution 3 mL  3 mL Nebulization Once    metoclopramide (REGLAN) injection 10 mg  10 mg Intravenous Once    ondansetron (ZOFRAN) injection 4 mg  4 mg Intravenous Q6H PRN    pantoprazole (PROTONIX) 80 mg in sodium chloride 0 9 % 100 mL infusion  8 mg/hr Intravenous Continuous    sildenafil (REVATIO) tablet 20 mg  20 mg Oral TID    tamsulosin (FLOMAX) capsule 0 8 mg  0 8 mg Oral Once HS    traMADol (ULTRAM) tablet 50 mg  50 mg Oral Q6H PRN       No Known Allergies        Objective     Blood pressure 136/66, pulse 73, temperature 98 3 °F (36 8 °C), temperature source Oral, resp  rate 20, height 5' 6" (1 676 m), weight 89 4 kg (197 lb 1 5 oz), SpO2 99 %        Intake/Output Summary (Last 24 hours) at 08/22/18 0859  Last data filed at 08/22/18 0700   Gross per 24 hour   Intake          4428 76 ml   Output             1650 ml   Net          2778 76 ml         PHYSICAL EXAM:      General Appearance:   Alert, oriented x3, no acute distress   HENT[de-identified]  Eyes:   Normocephalic, atraumatic  Anicteric   Neck:  Supple, symmetrical, trachea midline   Lungs:   CTA bilaterally, no respiratory distress   Heart[de-identified]   RRR, no murmur   Abdomen:   Non-distended, soft, BS active, NTTP   Rectal:   Deferred    Extremities:  No cyanosis or LE edema    Pulses:  2+ and symmetric all extremities    Skin:  No jaundice or pallor     Lab Results:     Results from last 7 days  Lab Units 08/22/18  0434   WBC Thousand/uL 3 21*   HEMOGLOBIN g/dL 7 9*   HEMATOCRIT % 25 7*   PLATELETS Thousands/uL 210   NEUTROS PCT % 62   LYMPHS PCT % 17   MONOS PCT % 18*   EOS PCT % 1       Results from last 7 days  Lab Units 08/22/18  0434  08/20/18  2128   SODIUM mmol/L 140  < > 131*   POTASSIUM mmol/L 3 7  < > 6 1*   CHLORIDE mmol/L 117*  < > 102   CO2 mmol/L 15*  < > 22   BUN mg/dL 29*  < > 78*   CREATININE mg/dL 0 74  < > 2 09*   CALCIUM mg/dL 6 0*  < > 9 0   TOTAL PROTEIN g/dL  --   --  7 9   BILIRUBIN TOTAL mg/dL  --   --  0 20   ALK PHOS U/L  --   --  116   ALT U/L  --   --  27   AST U/L  --   --  21   GLUCOSE RANDOM mg/dL 63*  < > 100   < > = values in this interval not displayed  Imaging Studies: I have personally reviewed pertinent imaging studies  No results found  ASSESSMENT and PLAN:      Acute Blood Loss Anemia  Melena  - Hb 7 3 on admission; s/p 4 U PRBC with Hb no 7 9 concerning for active bleeding  - Hypotension overnight which responded to resuscitation with blood and fluid; currently normotensive and HD stable  - Continue protonix drip  - Reglan 10 mg IV now to empty stomach  - EGD this morning for further evaluation; suspect he has an NSAID induced ulcer, the differential also includes dieulafoy lesion v AVM v malignancy given his weight loss  - Keep NPO  - Continue to hold xarelto, last dose on Monday 8/20  - Recheck H/H and check INR now  - Maintain current type and screen  - Avoid NSAIDs      Patient will be seen and examined by Dr Suhail Manley  All griffith medical decisions were made by Dr Suhail Manley   Thank you for allowing us to participate in the care of this patient  We will follow with you closely

## 2018-08-23 ENCOUNTER — ANESTHESIA (INPATIENT)
Dept: PERIOP | Facility: HOSPITAL | Age: 48
DRG: 425 | End: 2018-08-23
Payer: COMMERCIAL

## 2018-08-23 VITALS
BODY MASS INDEX: 31.68 KG/M2 | HEART RATE: 81 BPM | SYSTOLIC BLOOD PRESSURE: 136 MMHG | HEIGHT: 66 IN | TEMPERATURE: 98.1 F | WEIGHT: 197.09 LBS | DIASTOLIC BLOOD PRESSURE: 79 MMHG | RESPIRATION RATE: 18 BRPM | OXYGEN SATURATION: 98 %

## 2018-08-23 LAB
ABO GROUP BLD BPU: NORMAL
ABO GROUP BLD BPU: NORMAL
ANION GAP SERPL CALCULATED.3IONS-SCNC: 10 MMOL/L (ref 4–13)
ANION GAP SERPL CALCULATED.3IONS-SCNC: 9 MMOL/L (ref 4–13)
BASOPHILS # BLD AUTO: 0.01 THOUSANDS/ΜL (ref 0–0.1)
BASOPHILS # BLD AUTO: 0.02 THOUSANDS/ΜL (ref 0–0.1)
BASOPHILS NFR BLD AUTO: 0 % (ref 0–1)
BASOPHILS NFR BLD AUTO: 1 % (ref 0–1)
BPU ID: NORMAL
BPU ID: NORMAL
BUN SERPL-MCNC: 18 MG/DL (ref 5–25)
BUN SERPL-MCNC: 21 MG/DL (ref 5–25)
CALCIUM SERPL-MCNC: 8.9 MG/DL (ref 8.3–10.1)
CALCIUM SERPL-MCNC: 9.1 MG/DL (ref 8.3–10.1)
CHLORIDE SERPL-SCNC: 107 MMOL/L (ref 100–108)
CHLORIDE SERPL-SCNC: 108 MMOL/L (ref 100–108)
CO2 SERPL-SCNC: 20 MMOL/L (ref 21–32)
CO2 SERPL-SCNC: 21 MMOL/L (ref 21–32)
CREAT SERPL-MCNC: 0.97 MG/DL (ref 0.6–1.3)
CREAT SERPL-MCNC: 0.98 MG/DL (ref 0.6–1.3)
CROSSMATCH: NORMAL
CROSSMATCH: NORMAL
EOSINOPHIL # BLD AUTO: 0.02 THOUSAND/ΜL (ref 0–0.61)
EOSINOPHIL # BLD AUTO: 0.03 THOUSAND/ΜL (ref 0–0.61)
EOSINOPHIL NFR BLD AUTO: 1 % (ref 0–6)
EOSINOPHIL NFR BLD AUTO: 1 % (ref 0–6)
ERYTHROCYTE [DISTWIDTH] IN BLOOD BY AUTOMATED COUNT: 14.8 % (ref 11.6–15.1)
ERYTHROCYTE [DISTWIDTH] IN BLOOD BY AUTOMATED COUNT: 15.2 % (ref 11.6–15.1)
GFR SERPL CREATININE-BSD FRML MDRD: 103 ML/MIN/1.73SQ M
GFR SERPL CREATININE-BSD FRML MDRD: 105 ML/MIN/1.73SQ M
GLUCOSE SERPL-MCNC: 105 MG/DL (ref 65–140)
GLUCOSE SERPL-MCNC: 96 MG/DL (ref 65–140)
HCT VFR BLD AUTO: 34.8 % (ref 36.5–49.3)
HCT VFR BLD AUTO: 37.1 % (ref 36.5–49.3)
HGB BLD-MCNC: 11.5 G/DL (ref 12–17)
HGB BLD-MCNC: 12.1 G/DL (ref 12–17)
IMM GRANULOCYTES # BLD AUTO: 0.06 THOUSAND/UL (ref 0–0.2)
IMM GRANULOCYTES # BLD AUTO: 0.06 THOUSAND/UL (ref 0–0.2)
IMM GRANULOCYTES NFR BLD AUTO: 2 % (ref 0–2)
IMM GRANULOCYTES NFR BLD AUTO: 2 % (ref 0–2)
LYMPHOCYTES # BLD AUTO: 0.51 THOUSANDS/ΜL (ref 0.6–4.47)
LYMPHOCYTES # BLD AUTO: 0.54 THOUSANDS/ΜL (ref 0.6–4.47)
LYMPHOCYTES NFR BLD AUTO: 16 % (ref 14–44)
LYMPHOCYTES NFR BLD AUTO: 16 % (ref 14–44)
MCH RBC QN AUTO: 29 PG (ref 26.8–34.3)
MCH RBC QN AUTO: 29.3 PG (ref 26.8–34.3)
MCHC RBC AUTO-ENTMCNC: 32.6 G/DL (ref 31.4–37.4)
MCHC RBC AUTO-ENTMCNC: 33 G/DL (ref 31.4–37.4)
MCV RBC AUTO: 88 FL (ref 82–98)
MCV RBC AUTO: 90 FL (ref 82–98)
MONOCYTES # BLD AUTO: 0.74 THOUSAND/ΜL (ref 0.17–1.22)
MONOCYTES # BLD AUTO: 0.77 THOUSAND/ΜL (ref 0.17–1.22)
MONOCYTES NFR BLD AUTO: 22 % (ref 4–12)
MONOCYTES NFR BLD AUTO: 25 % (ref 4–12)
NEUTROPHILS # BLD AUTO: 1.76 THOUSANDS/ΜL (ref 1.85–7.62)
NEUTROPHILS # BLD AUTO: 2.01 THOUSANDS/ΜL (ref 1.85–7.62)
NEUTS SEG NFR BLD AUTO: 55 % (ref 43–75)
NEUTS SEG NFR BLD AUTO: 59 % (ref 43–75)
NRBC BLD AUTO-RTO: 0 /100 WBCS
NRBC BLD AUTO-RTO: 0 /100 WBCS
PLATELET # BLD AUTO: 274 THOUSANDS/UL (ref 149–390)
PLATELET # BLD AUTO: 278 THOUSANDS/UL (ref 149–390)
PMV BLD AUTO: 9.2 FL (ref 8.9–12.7)
PMV BLD AUTO: 9.4 FL (ref 8.9–12.7)
POTASSIUM SERPL-SCNC: 4.5 MMOL/L (ref 3.5–5.3)
POTASSIUM SERPL-SCNC: 4.7 MMOL/L (ref 3.5–5.3)
RBC # BLD AUTO: 3.96 MILLION/UL (ref 3.88–5.62)
RBC # BLD AUTO: 4.13 MILLION/UL (ref 3.88–5.62)
SODIUM SERPL-SCNC: 137 MMOL/L (ref 136–145)
SODIUM SERPL-SCNC: 138 MMOL/L (ref 136–145)
UNIT DISPENSE STATUS: NORMAL
UNIT DISPENSE STATUS: NORMAL
UNIT PRODUCT CODE: NORMAL
UNIT PRODUCT CODE: NORMAL
UNIT RH: NORMAL
UNIT RH: NORMAL
WBC # BLD AUTO: 3.14 THOUSAND/UL (ref 4.31–10.16)
WBC # BLD AUTO: 3.39 THOUSAND/UL (ref 4.31–10.16)

## 2018-08-23 PROCEDURE — 99232 SBSQ HOSP IP/OBS MODERATE 35: CPT | Performed by: INTERNAL MEDICINE

## 2018-08-23 PROCEDURE — 99232 SBSQ HOSP IP/OBS MODERATE 35: CPT | Performed by: FAMILY MEDICINE

## 2018-08-23 PROCEDURE — 85025 COMPLETE CBC W/AUTO DIFF WBC: CPT | Performed by: FAMILY MEDICINE

## 2018-08-23 PROCEDURE — 80048 BASIC METABOLIC PNL TOTAL CA: CPT | Performed by: INTERNAL MEDICINE

## 2018-08-23 PROCEDURE — 99239 HOSP IP/OBS DSCHRG MGMT >30: CPT | Performed by: FAMILY MEDICINE

## 2018-08-23 PROCEDURE — 85025 COMPLETE CBC W/AUTO DIFF WBC: CPT | Performed by: INTERNAL MEDICINE

## 2018-08-23 PROCEDURE — 0DJD8ZZ INSPECTION OF LOWER INTESTINAL TRACT, VIA NATURAL OR ARTIFICIAL OPENING ENDOSCOPIC: ICD-10-PCS | Performed by: INTERNAL MEDICINE

## 2018-08-23 PROCEDURE — 80048 BASIC METABOLIC PNL TOTAL CA: CPT | Performed by: FAMILY MEDICINE

## 2018-08-23 PROCEDURE — 45378 DIAGNOSTIC COLONOSCOPY: CPT | Performed by: INTERNAL MEDICINE

## 2018-08-23 RX ORDER — SUCRALFATE ORAL 1 G/10ML
1000 SUSPENSION ORAL EVERY 6 HOURS SCHEDULED
Qty: 420 ML | Refills: 0 | Status: SHIPPED | OUTPATIENT
Start: 2018-08-23 | End: 2018-08-24 | Stop reason: HOSPADM

## 2018-08-23 RX ORDER — PANTOPRAZOLE SODIUM 40 MG/1
40 TABLET, DELAYED RELEASE ORAL
Qty: 30 TABLET | Refills: 3 | Status: ON HOLD | OUTPATIENT
Start: 2018-08-23 | End: 2019-07-19 | Stop reason: ALTCHOICE

## 2018-08-23 RX ORDER — PROPOFOL 10 MG/ML
INJECTION, EMULSION INTRAVENOUS AS NEEDED
Status: DISCONTINUED | OUTPATIENT
Start: 2018-08-23 | End: 2018-08-23 | Stop reason: SURG

## 2018-08-23 RX ORDER — LIDOCAINE HYDROCHLORIDE 10 MG/ML
INJECTION, SOLUTION INFILTRATION; PERINEURAL AS NEEDED
Status: DISCONTINUED | OUTPATIENT
Start: 2018-08-23 | End: 2018-08-23 | Stop reason: SURG

## 2018-08-23 RX ORDER — SODIUM CHLORIDE, SODIUM LACTATE, POTASSIUM CHLORIDE, CALCIUM CHLORIDE 600; 310; 30; 20 MG/100ML; MG/100ML; MG/100ML; MG/100ML
INJECTION, SOLUTION INTRAVENOUS CONTINUOUS PRN
Status: DISCONTINUED | OUTPATIENT
Start: 2018-08-23 | End: 2018-08-23 | Stop reason: SURG

## 2018-08-23 RX ADMIN — PROPOFOL 20 MG: 10 INJECTION, EMULSION INTRAVENOUS at 14:54

## 2018-08-23 RX ADMIN — SUCRALFATE 1000 MG: 1 SUSPENSION ORAL at 05:02

## 2018-08-23 RX ADMIN — PANTOPRAZOLE SODIUM 40 MG: 40 TABLET, DELAYED RELEASE ORAL at 15:58

## 2018-08-23 RX ADMIN — GABAPENTIN 600 MG: 300 CAPSULE ORAL at 08:16

## 2018-08-23 RX ADMIN — PANTOPRAZOLE SODIUM 40 MG: 40 TABLET, DELAYED RELEASE ORAL at 05:02

## 2018-08-23 RX ADMIN — SUCRALFATE 1000 MG: 1 SUSPENSION ORAL at 00:00

## 2018-08-23 RX ADMIN — ESCITALOPRAM OXALATE 10 MG: 10 TABLET ORAL at 08:16

## 2018-08-23 RX ADMIN — PROPOFOL 80 MG: 10 INJECTION, EMULSION INTRAVENOUS at 14:47

## 2018-08-23 RX ADMIN — CARVEDILOL 12.5 MG: 12.5 TABLET, FILM COATED ORAL at 15:58

## 2018-08-23 RX ADMIN — SILDENAFIL 20 MG: 20 TABLET, FILM COATED ORAL at 15:58

## 2018-08-23 RX ADMIN — HYDROXYCHLOROQUINE SULFATE 200 MG: 200 TABLET, FILM COATED ENTERAL at 08:16

## 2018-08-23 RX ADMIN — PROPOFOL 20 MG: 10 INJECTION, EMULSION INTRAVENOUS at 14:52

## 2018-08-23 RX ADMIN — SILDENAFIL 20 MG: 20 TABLET, FILM COATED ORAL at 08:15

## 2018-08-23 RX ADMIN — CARVEDILOL 12.5 MG: 12.5 TABLET, FILM COATED ORAL at 07:56

## 2018-08-23 RX ADMIN — SODIUM CHLORIDE, SODIUM LACTATE, POTASSIUM CHLORIDE, AND CALCIUM CHLORIDE: .6; .31; .03; .02 INJECTION, SOLUTION INTRAVENOUS at 14:30

## 2018-08-23 RX ADMIN — PROPOFOL 20 MG: 10 INJECTION, EMULSION INTRAVENOUS at 14:50

## 2018-08-23 RX ADMIN — LIDOCAINE HYDROCHLORIDE 50 MG: 10 INJECTION, SOLUTION INFILTRATION; PERINEURAL at 14:47

## 2018-08-23 NOTE — ANESTHESIA PREPROCEDURE EVALUATION
Review of Systems/Medical History  Patient summary reviewed        Cardiovascular  EKG reviewed, Hypertension , DVT   Pulmonary  Negative pulmonary ROS        GI/Hepatic  Negative GI/hepatic ROS          Negative  ROS        Endo/Other  Negative endo/other ROS   Obesity    GYN  Negative gynecology ROS          Hematology  Negative hematology ROS Anemia ,     Musculoskeletal  Negative musculoskeletal ROS        Neurology  Negative neurology ROS      Psychology   Negative psychology ROS Depression ,              Physical Exam    Airway    Mallampati score: II  TM Distance: >3 FB  Neck ROM: full     Dental       Cardiovascular  Cardiovascular exam normal    Pulmonary  Pulmonary exam normal     Other Findings        Anesthesia Plan  ASA Score- 2     Anesthesia Type- IV sedation with anesthesia with ASA Monitors  Additional Monitors:   Airway Plan:         Plan Factors-    Induction- intravenous  Postoperative Plan-     Informed Consent- Anesthetic plan and risks discussed with patient  I personally reviewed this patient with the CRNA  Discussed and agreed on the Anesthesia Plan with the CRNA  Kellee Bradley

## 2018-08-23 NOTE — PROGRESS NOTES
Pt has approximately 500mL left in bowel prep, bowel movements are watery, cloudy, and yellow     Clif Gaffney RN  9:41 PM  08/22/18

## 2018-08-23 NOTE — ANESTHESIA POSTPROCEDURE EVALUATION
Post-Op Assessment Note      CV Status:  Stable    Mental Status:  Alert and awake    Hydration Status:  Euvolemic    PONV Controlled:  Controlled    Airway Patency:  Patent    Post Op Vitals Reviewed: Yes          Staff: CRNA           /55 (08/23/18 1458)    Temp 99 °F (37 2 °C) (08/23/18 1458)    Pulse 82 (08/23/18 1458)   Resp 16 (08/23/18 1458)    SpO2 96 % (08/23/18 1458)

## 2018-08-23 NOTE — PLAN OF CARE
Problem: DISCHARGE PLANNING - CARE MANAGEMENT  Goal: Discharge to post-acute care or home with appropriate resources  INTERVENTIONS:  - Conduct assessment to determine patient/family and health care team treatment goals, and need for post-acute services based on payer coverage, community resources, and patient preferences, and barriers to discharge  - Address psychosocial, clinical, and financial barriers to discharge as identified in assessment in conjunction with the patient/family and health care team  - Arrange appropriate level of post-acute services according to patient's   needs and preference and payer coverage in collaboration with the physician and health care team  - Communicate with and update the patient/family, physician, and health care team regarding progress on the discharge plan  - Arrange appropriate transportation to post-acute venues   Outcome: Completed Date Met: 08/23/18  No CM needs upon discharge

## 2018-08-23 NOTE — ASSESSMENT & PLAN NOTE
Significant drop of hemoglobin in the setting most likely acute upper GI bleeding secondary   It was likely to Mycophenolate previous   -continue Protonix infusion  -on call to colonoscopy today

## 2018-08-23 NOTE — SOCIAL WORK
Met with pt at bedside  Pt alert  30 day readmit  Pt reported that he came back to the hospital due to legs and feet swelling  Pt reported that he lives at home with his wife  He reported that he is ADL independent  He said that he uses a walker  He said taht he has no hx of SNF or VNA  He uses [facility] in [place]  He said that his wife will pick him up upon discharge  CM reviewed discharge planning process including the following: identifying help at home, patient preference for discharge planning needs, pharmacy preference, and availability of treatment team to discuss questions or concerns patient and/or family may have regarding understanding medications and recognizing signs and symptoms once discharged  CM also encouraged patient to follow up with all recommended appointments after discharge  Patient advised of importance for patient and family to participate in managing patients medical well being  CM name and role reviewed  Discharge Checklist reviewed and CM will continue to monitor for progress toward discharge goals in nursing and provider rounds

## 2018-08-23 NOTE — OP NOTE
COLONOSCOPY    PROCEDURE: Colonoscopy    INDICATIONS: Melena    POST-OP DIAGNOSIS: See the impression below    SEDATION: Monitored anesthesia care, check anesthesia records    PHYSICAL EXAM:  Vitals:    08/23/18 1318   BP: 142/88   Pulse: 73   Resp: 14   Temp: 99 6 °F (37 6 °C)   SpO2: 98%     Body mass index is 31 81 kg/m²  General: NAD  Heart: S1 & S2 normal, RRR  Lungs: CTA, No rales or rhonchi  Abdomen: Soft, nontender, nondistended, good bowel sounds    CONSENT:  Informed consent was obtained for the procedure, including sedation after explaining the risks and benefits of the procedure  Risks including but not limited to bleeding, perforation, infection, aspiration were discussed in detail  Also explained about less than 100%$ sensitivity with the exam and other alternatives  PREPARATION:   EKG tracing, pulse oximetry, blood pressure were monitored throughout the procedure  Patient was identified by myself both verbally and by visual inspection of ID band  DESCRIPTION:   Patient was placed in the left lateral decubitus position and was sedated with the above medication  Digital rectal examination was performed  The colonoscope was introduced in to the anal canal and advanced up to cecum, which was identified by the appendiceal orifice and IC valve  A careful inspection was made as the colonoscope was withdrawn, including a retroflexed view of the rectum; findings and interventions are described below  Appropriate photodocumentation was obtained  The quality of the colonic preparation was excellent      FINDINGS:  The cecum ascending colon hepatic flexure transverse colon splenic flexure descending colon sigmoid colon and rectum were normal  No bleeding was noted and bile was noted throughout the bowel         IMPRESSIONS:    Suspect upper GI bleed or small bowel bleeding that has resolved    RECOMMENDATIONS:  Iron replacement  Diet as tolerated  Restart blood thinner tomorrow  We will plan an outpatient video capsule endoscopy  Discharge per slim; at the present time I will sign off please re-consult as needed    COMPLICATIONS:  None; patient tolerated the procedure well    DISPOSITION: PACU  CONDITION: Stable    Nickolas Vazquez MD  8/23/2018,2:57 PM

## 2018-08-23 NOTE — PROGRESS NOTES
NEPHROLOGY PROGRESS NOTE    Patient: Zena Osman               Sex: male          DOA: 8/20/2018  8:48 PM   YOB: 1970        Age:  46 y o         LOS:  LOS: 3 days       HPI      patient lupus nephritis came in with hyperkalemia and anemia    SUBJECTIVE      feeling much better  Status post EGD and a colonoscopy with negative workup  ACE-inhibitor was hold on admission with improvement in kidney function as well as potassium      CURRENT MEDICATIONS       Current Facility-Administered Medications:     acetaminophen (TYLENOL) tablet 650 mg, 650 mg, Oral, Q6H PRN, Rise Calk, PA-C    carvedilol (COREG) tablet 12 5 mg, 12 5 mg, Oral, BID With Meals, Rise Calk, PA-C, 12 5 mg at 08/23/18 0756    escitalopram (LEXAPRO) tablet 10 mg, 10 mg, Oral, Daily, Rise Calk, PA-C, 10 mg at 08/23/18 0816    fentanyl citrate (PF) 100 MCG/2ML 25 mcg, 25 mcg, Intravenous, Q4H PRN, Rise Calk, PA-C, 25 mcg at 08/22/18 0630    gabapentin (NEURONTIN) capsule 600 mg, 600 mg, Oral, Daily, Rise Calk, PA-C, 600 mg at 08/23/18 0816    hydroxychloroquine (PLAQUENIL) tablet 200 mg, 200 mg, Oral, BID, Rise Calk, PA-C, 200 mg at 08/23/18 0816    [COMPLETED] albuterol inhalation solution 10 mg, 10 mg, Nebulization, Once, 10 mg at 08/20/18 2234 **AND** ipratropium (ATROVENT) 0 02 % inhalation solution 1 mg, 1 mg, Nebulization, Once **AND** sodium chloride 0 9 % inhalation solution 3 mL, 3 mL, Nebulization, Once, Hanane Dunham PA-C    ondansetron [facility]) injection 4 mg, 4 mg, Intravenous, Q6H PRN, Rise Calk, PA-C, 4 mg at 08/22/18 2037    pantoprazole (PROTONIX) EC tablet 40 mg, 40 mg, Oral, BID AC, Harvey Jeans III, MD, 40 mg at 08/23/18 0502    sildenafil (REVATIO) tablet 20 mg, 20 mg, Oral, TID, Rise Calk, PA-C, 20 mg at 08/23/18 0815    sucralfate (CARAFATE) oral suspension 1,000 mg, 1,000 mg, Oral, Q6H [address], Justin Gaines MD, 1,000 mg at 08/23/18 0502    tamsulosin (FLOMAX) capsule 0 8 mg, 0 8 mg, Oral, Once HS, Octavia Gonzalez PA-C, 0 8 mg at 08/22/18 2229    traMADol (ULTRAM) tablet 50 mg, 50 mg, Oral, Q6H PRN, Octavia Gonzalez PA-C, 50 mg at 08/22/18 2229    OBJECTIVE     Current Weight: Weight - Scale: 89 4 kg (197 lb 1 5 oz)  Vitals:    08/23/18 1537   BP: 136/79   Pulse: 81   Resp: 18   Temp: 98 1 °F (36 7 °C)   SpO2: 98%       Intake/Output Summary (Last 24 hours) at 08/23/18 1549  Last data filed at 08/23/18 1454   Gross per 24 hour   Intake             1330 ml   Output                0 ml   Net             1330 ml       PHYSICAL EXAMINATION     Physical Exam   Constitutional: He is oriented to person, place, and time  He appears well-developed  No distress  HENT:   Head: Normocephalic  Mouth/Throat: Oropharynx is clear and moist    Eyes: Conjunctivae are normal  No scleral icterus  Neck: Neck supple  No JVD present  Cardiovascular: Normal rate and normal heart sounds  Pulmonary/Chest: Effort normal  He has no wheezes  Abdominal: Soft  There is no tenderness  Musculoskeletal: Normal range of motion  He exhibits no edema  Neurological: He is alert and oriented to person, place, and time  Skin: Skin is warm  No rash noted  Psychiatric: He has a normal mood and affect   His behavior is normal         LAB RESULTS       Results from last 7 days  Lab Units 08/23/18  0455 08/23/18  0019 08/22/18  6336 08/22/18  0434 08/21/18  2358 08/21/18  1619 08/21/18  0543 08/21/18  0123 08/20/18  2128   WBC Thousand/uL 3 14* 3 39*  --  3 21*  --   --  3 44*  --  4 79   HEMOGLOBIN g/dL 11 5* 12 1 11 2* 7 9* 5 4* 6 8* 7 3*  --  8 4*   HEMATOCRIT % 34 8* 37 1 34 3* 25 7* 17 5* 22 0* 23 4*  --  26 0*   PLATELETS Thousands/uL 274 278  --  210  --   --  321  --  396*   SODIUM mmol/L 138 137  --  140  --  140 133* 132* 131*   POTASSIUM mmol/L 4 5 4 7  --  3 7  --  5 4* 5 9* 5 9* 6 1*   CHLORIDE mmol/L 108 107  --  117*  --  115* 107 106 102   CO2 mmol/L 20* 21  --  15*  --  16* 20* 21 22   BUN mg/dL 18 21  --  29*  --  45* 64* 73* 78*   CREATININE mg/dL 0 98 0 97  --  0 74  --  1 07 1 52* 1 76* 2 09*   EGFR ml/min/1 73sq m 103 105  --  124  --  93 61 51 41   CALCIUM mg/dL 8 9 9 1  --  6 0*  --  7 1* 8 7 8 8 9 0   PHOSPHORUS mg/dL  --   --   --  2 1*  --   --   --   --   --    TOTAL PROTEIN g/dL  --   --   --   --   --   --   --   --  7 9   GLUCOSE RANDOM mg/dL 105 96  --  63*  --  87 103 142* 100       RADIOLOGY RESULTS      No results found for this or any previous visit  Results for orders placed during the hospital encounter of 02/21/18   X-ray chest 2 views    Narrative CHEST     INDICATION: chest pain    COMPARISON:  None    EXAM PERFORMED/VIEWS:  XR CHEST PA & LATERAL    IMAGES:  2    FINDINGS:  Surgical clips in the right axilla  Lungs are suboptimally aerated  No consolidation or effusion  Cardiac size top normal   Vascular markings are slightly prominent  Findings are likely exaggerated by vascular crowding  Visualized osseous structures appear within normal limits for the patient's age  Postop changes cervical spine  Impression Lungs are suboptimally aerated with some vascular crowding noted  No consolidation or effusion  No pulmonary edema  Workstation performed: [id number]       No results found for this or any previous visit  No results found for this or any previous visit  No results found for this or any previous visit  No results found for this or any previous visit  PLAN / RECOMMENDATIONS        Hyperkalemia:  Improved     Acute kidney injury: Better at this point in baseline    Will advise avoiding ACE-inhibitor in view of recurrent hyperkalemia as well as acute kidney injury he is going to follow up with his primary nephrologist in [facility]    Anemia:  Seems to be stable status post EGD and colonoscopy    Saima Simpson MD  Nephrology  8/23/2018        Portions of the record may have been created with voice recognition software  Occasional wrong word or "sound a like" substitutions may have occurred due to the inherent limitations of voice recognition software  Read the chart carefully and recognize, using context, where substitutions have occurred

## 2018-08-23 NOTE — PLAN OF CARE

## 2018-08-23 NOTE — DISCHARGE INSTRUCTIONS
Colonoscopy      FINDINGS:  The cecum ascending colon hepatic flexure transverse colon splenic flexure descending colon sigmoid colon and rectum were normal  No bleeding was noted and bile was noted throughout the bowel         IMPRESSIONS:    Suspect upper GI bleed or small bowel bleeding that has resolved  Colonoscopy   WHAT YOU NEED TO KNOW:   A colonoscopy is a procedure to examine the inside of your colon (intestine) with a scope  Polyps or tissue growths may have been removed during your colonoscopy  It is normal to feel bloated and to have some abdominal discomfort  You should be passing gas  If you have hemorrhoids or you had polyps removed, you may have a small amount of bleeding  DISCHARGE INSTRUCTIONS:   Seek care immediately if:   · You have a large amount of bright red blood in your bowel movements  · Your abdomen is hard and firm and you have severe pain  · You have sudden trouble breathing  Contact your healthcare provider if:   · You develop a rash or hives  · You have a fever within 24 hours of your procedure       · You have not had a bowel movement for 3 days after your procedure  · You have questions or concerns about your condition or care  Activity:   · Do not lift, strain, or run  for 3 days after your procedure  · Rest after your procedure  You have been given medicine to relax you  Do not  drive or make important decisions until the day after your procedure  Return to your normal activity as directed  · Relieve gas and discomfort from bloating  by lying on your right side with a heating pad on your abdomen  You may need to take short walks to help the gas move out  Eat small meals until bloating is relieved  If you had polyps removed: For 7 days after your procedure:  · Do not  take aspirin  · Do not  go on long car rides  Follow up with your healthcare provider as directed:  Write down your questions so you remember to ask them during your visits     © 2017 [employer] is for End User's use only and may not be sold, redistributed or otherwise used for commercial purposes  All illustrations and images included in CareNotes® are the copyrighted property of A [employer] M [employer] Inc  or Jareth Cabrales  The above information is an  only  It is not intended as medical advice for individual conditions or treatments  Talk to your doctor, nurse or pharmacist before following any medical regimen to see if it is safe and effective for you

## 2018-08-23 NOTE — PROGRESS NOTES
Progress Note - Soto Oropeza 1970, 46 y o  male MRN: [id number]    Unit/Bed#: -01 Encounter: [id number]    Primary Care Provider: Evens Lujan MD   Date and time admitted to hospital: 2018  8:48 PM        * Acute-on-chronic kidney injury [facility])   Assessment & Plan    Improved        Melena   Assessment & Plan    Significant drop of hemoglobin in the setting most likely acute upper GI bleeding secondary  It was likely to Mycophenolate previous   -continue Protonix infusion  -on call to colonoscopy today        Anemia in chronic kidney disease   Assessment & Plan    Improved after packed red blood cell transfusion with acute exacerbation likely secondary to acute blood loss        Hypertension   Assessment & Plan    Stable  Continue coreg        Lupus nephritis ([facility] [address] )   Assessment & Plan    Stable  Patient home medication was discontinue due acute upper GI bleeding - prednisone  -Continue plaquenil        Hyperkalemia   Assessment & Plan    Improved          VTE Pharmacologic Prophylaxis:   Pharmacologic: Pharmacologic VTE Prophylaxis contraindicated due to GI Bleeding  Mechanical VTE Prophylaxis in Place: Yes    Patient Centered Rounds: I have performed bedside rounds with nursing staff today  Discussions with Specialists or Other Care Team Provider:     Education and Discussions with Family / Patient: patient     Time Spent for Care: 20 minutes  More than 50% of total time spent on counseling and coordination of care as described above  Current Length of Stay: 3 day(s)    Current Patient Status: Inpatient   Certification Statement: The patient will continue to require additional inpatient hospital stay due to acute above conditions    Discharge Plan: depend on clinical course    Code Status: Level 1 - Full Code      Subjective:   Patient states he is better, no complains       Objective:     Vitals:   Temp (24hrs), Av 3 °F (36 8 °C), Min:97 3 °F (36 3 °C), Max:99 5 °F (37 5 °C)    HR:  [64-87] 87  Resp:  [12-20] 18  BP: (106-139)/(55-83) 135/83  SpO2:  [94 %-100 %] 100 %  Body mass index is 31 81 kg/m²  Input and Output Summary (last 24 hours): Intake/Output Summary (Last 24 hours) at 08/23/18 0950  Last data filed at 08/22/18 2101   Gross per 24 hour   Intake             1330 ml   Output              500 ml   Net              830 ml       Physical Exam:     Physical Exam   Constitutional: He is oriented to person, place, and time  No distress  Cardiovascular: Normal rate, regular rhythm, normal heart sounds and intact distal pulses  Exam reveals no gallop and no friction rub  No murmur heard  Pulmonary/Chest: Effort normal  No respiratory distress  He has no wheezes  He has no rales  He exhibits no tenderness  Abdominal: Soft  He exhibits no distension and no mass  There is no tenderness  There is no rebound and no guarding  Musculoskeletal: He exhibits no edema, tenderness or deformity  Neurological: He is alert and oriented to person, place, and time  He has normal reflexes  No cranial nerve deficit  Coordination normal    Skin: Skin is warm  He is not diaphoretic  Psychiatric: He has a normal mood and affect         Additional Data:     Labs:      Results from last 7 days  Lab Units 08/23/18  0455   WBC Thousand/uL 3 14*   HEMOGLOBIN g/dL 11 5*   HEMATOCRIT % 34 8*   PLATELETS Thousands/uL 274   NEUTROS PCT % 55   LYMPHS PCT % 16   MONOS PCT % 25*   EOS PCT % 1       Results from last 7 days  Lab Units 08/23/18  0455  08/20/18  2128   SODIUM mmol/L 138  < > 131*   POTASSIUM mmol/L 4 5  < > 6 1*   CHLORIDE mmol/L 108  < > 102   CO2 mmol/L 20*  < > 22   BUN mg/dL 18  < > 78*   CREATININE mg/dL 0 98  < > 2 09*   CALCIUM mg/dL 8 9  < > 9 0   TOTAL PROTEIN g/dL  --   --  7 9   BILIRUBIN TOTAL mg/dL  --   --  0 20   ALK PHOS U/L  --   --  116   ALT U/L  --   --  27   AST U/L  --   --  21   GLUCOSE RANDOM mg/dL 105  < > 100   < > = values in this interval not displayed  Results from last 7 days  Lab Units 08/22/18  0918   INR  1 09       * I Have Reviewed All Lab Data Listed Above  * Additional Pertinent Lab Tests Reviewed: All Labs Within Last 24 Hours Reviewed    Imaging:    Imaging Reports Reviewed Today Include:   Imaging Personally Reviewed by Myself Includes:      Recent Cultures (last 7 days):           Last 24 Hours Medication List:     Current Facility-Administered Medications:  acetaminophen 650 mg Oral Q6H PRN Em Lee PA-C   carvedilol 12 5 mg Oral BID With Meals Em Lee PA-C   escitalopram 10 mg Oral Daily Em Lee PA-C   fentanyl citrate (PF) 25 mcg Intravenous Q4H PRN Em Lee PA-C   gabapentin 600 mg Oral Daily Em Lee PA-C   hydroxychloroquine 200 mg Oral BID Em Lee PA-C   ipratropium 1 mg Nebulization Once Vicenta Everett PA-C   And       sodium chloride 3 mL Nebulization Once Vicenta Everett PA-C   ondansetron 4 mg Intravenous Q6H PRN Em Lee PA-C   pantoprazole 40 mg Oral BID AC Padmini Teran III, MD   sildenafil 20 mg Oral TID Em Lee PA-C   sucralfate 1,000 mg Oral Q6H [facility] Napoleon Falcon MD   tamsulosin 0 8 mg Oral Once HS Em Lee PA-C   traMADol 50 mg Oral Q6H PRN Em Lee PA-C        Today, Patient Was Seen By: Napoleon Falcon MD    ** Please Note: Dragon 360 Dictation voice to text software may have been used in the creation of this document   **

## 2018-08-24 ENCOUNTER — TELEPHONE (OUTPATIENT)
Dept: GASTROENTEROLOGY | Facility: CLINIC | Age: 48
End: 2018-08-24

## 2018-08-24 NOTE — TELEPHONE ENCOUNTER
----- Message from Katarzyna Gorman MD sent at 8/24/2018 10:55 AM EDT -----  PLS CALL HIM AND TELL HIM PATH IS NEGATIVE;SCHEDULE VCE

## 2018-08-24 NOTE — ASSESSMENT & PLAN NOTE
Significant drop of hemoglobin in the setting most likely acute upper GI bleeding secondary  It was likely to Mycophenolate previous    Upper endoscopy and colonoscopy was unremarkable  -continue Protonix

## 2018-08-24 NOTE — TELEPHONE ENCOUNTER
lmom on spouses ER contact pt numbers go staight to vm and it's not set up yet      Did ask for a call back for a valid number to schedule pill cam

## 2018-08-24 NOTE — DISCHARGE SUMMARY
Discharge- Tyler Gil 1970, 46 y o  male MRN: [id number]    Unit/Bed#: -01 Encounter: [id number]    Primary Care Provider: Emil Sigala MD   Date and time admitted to hospital: 8/20/2018  8:48 PM        * Acute-on-chronic kidney injury [facility])   Assessment & Plan    Improved        Melena   Assessment & Plan    Significant drop of hemoglobin in the setting most likely acute upper GI bleeding secondary  It was likely to Mycophenolate previous   Upper endoscopy and colonoscopy was unremarkable  -continue Protonix          Anemia in chronic kidney disease   Assessment & Plan    Improved after packed red blood cell transfusion with acute exacerbation likely secondary to acute blood loss        Lupus nephritis (HCC)   Assessment & Plan    Stable  Patient home medication was discontinue due acute upper GI bleeding - prednisone  -Continue plaquenil        Hyperkalemia   Assessment & Plan    Improved            Disposition:     Home       Consultations During Hospital Stay:  · Gastroenterology     Procedures Performed:     · Colonoscopy  · Upper endoscopy     Significant Findings / Test Results:     ·     Incidental Findings:   ·     Test Results Pending at Discharge (will require follow up):   ·      Outpatient Tests Requested:    Complications:      Hospital Course:     Tyler Gil is a 46 y o  male patient who originally presented to the hospital on 8/20/2018 due to MARK and hyperkalemia, patient was found to have low hemoglobin reason why he got different blood transfusion and GI evaluation with upper and colonoscopy evaluation with no positive finding  Patient has been clinical stable  Condition at Discharge: stable     Discharge Day Visit / Exam:     * Please refer to separate progress note for these details *    Discussion with Family:       Discharge instructions/Information to patient and family:   See after visit summary for information provided to patient and family        Provisions for Follow-Up Care:  See after visit summary for information related to follow-up care and any pertinent home health orders  Planned Readmission:   Discharge Statement:  I spent 30 minutes discharging the patient  This time was spent on the day of discharge  I had direct contact with the patient on the day of discharge  Greater than 50% of the total time was spent examining patient, answering all patient questions, arranging and discussing plan of care with patient as well as directly providing post-discharge instructions  Additional time then spent on discharge activities  Discharge Medications:  See after visit summary for reconciled discharge medications provided to patient and family  ** Please Note: This note has been constructed using a voice recognition system   **

## 2018-08-30 ENCOUNTER — TELEPHONE (OUTPATIENT)
Dept: GASTROENTEROLOGY | Facility: CLINIC | Age: 48
End: 2018-08-30

## 2018-08-30 NOTE — TELEPHONE ENCOUNTER
Flaco Ham from [employer] [facility] Case Management and Dr Alexis Shoemaker office would like Dr Elian Jean to fax an order for Bianca Ellington,  1970 for a pill cam procedure and would like the results from the colon and EGD performed on 2018 faxed to [phone] to Flaco Ham attention   Thank you

## 2018-08-30 NOTE — TELEPHONE ENCOUNTER
Tried to call phone number [phone], could not leave a message    Sent fax over with reports, as far as order for pill cam, we can not provide a order, being that we are not following this pt care due to not accepting his insurance  Wrote a note on fax cover sheet explaining since I could not get through to office

## 2019-05-04 ENCOUNTER — APPOINTMENT (EMERGENCY)
Dept: RADIOLOGY | Facility: HOSPITAL | Age: 49
End: 2019-05-04
Payer: COMMERCIAL

## 2019-05-04 ENCOUNTER — HOSPITAL ENCOUNTER (EMERGENCY)
Facility: HOSPITAL | Age: 49
Discharge: HOME | End: 2019-05-04
Attending: EMERGENCY MEDICINE | Admitting: EMERGENCY MEDICINE
Payer: COMMERCIAL

## 2019-05-04 VITALS
TEMPERATURE: 98.8 F | BODY MASS INDEX: 29.48 KG/M2 | OXYGEN SATURATION: 97 % | RESPIRATION RATE: 18 BRPM | DIASTOLIC BLOOD PRESSURE: 64 MMHG | WEIGHT: 183.42 LBS | HEIGHT: 66 IN | SYSTOLIC BLOOD PRESSURE: 107 MMHG | HEART RATE: 104 BPM

## 2019-05-04 DIAGNOSIS — J18.9 CAP (COMMUNITY ACQUIRED PNEUMONIA): Primary | ICD-10-CM

## 2019-05-04 LAB
ALBUMIN SERPL BCP-MCNC: 3.3 G/DL (ref 3.5–5)
ALP SERPL-CCNC: 104 U/L (ref 46–116)
ALT SERPL W P-5'-P-CCNC: 28 U/L (ref 12–78)
ANION GAP SERPL CALCULATED.3IONS-SCNC: 9 MMOL/L (ref 4–13)
ANION GAP SERPL CALCULATED.3IONS-SCNC: 9 MMOL/L (ref 4–13)
APTT PPP: 39 SECONDS (ref 26–38)
AST SERPL W P-5'-P-CCNC: 27 U/L (ref 5–45)
BASE EX.OXY STD BLDV CALC-SCNC: 71.9 % (ref 60–80)
BASE EXCESS BLDV CALC-SCNC: -1.5 MMOL/L
BASOPHILS # BLD AUTO: 0.02 THOUSANDS/ΜL (ref 0–0.1)
BASOPHILS NFR BLD AUTO: 0 % (ref 0–1)
BILIRUB SERPL-MCNC: 0.4 MG/DL (ref 0.2–1)
BILIRUB UR QL STRIP: NEGATIVE
BUN SERPL-MCNC: 19 MG/DL (ref 5–25)
BUN SERPL-MCNC: 21 MG/DL (ref 5–25)
CALCIUM SERPL-MCNC: 8 MG/DL (ref 8.3–10.1)
CALCIUM SERPL-MCNC: 8.3 MG/DL (ref 8.3–10.1)
CHLORIDE SERPL-SCNC: 103 MMOL/L (ref 100–108)
CHLORIDE SERPL-SCNC: 98 MMOL/L (ref 100–108)
CLARITY UR: CLEAR
CO2 SERPL-SCNC: 24 MMOL/L (ref 21–32)
CO2 SERPL-SCNC: 26 MMOL/L (ref 21–32)
COLOR UR: YELLOW
CREAT SERPL-MCNC: 1.29 MG/DL (ref 0.6–1.3)
CREAT SERPL-MCNC: 1.47 MG/DL (ref 0.6–1.3)
EOSINOPHIL # BLD AUTO: 0.01 THOUSAND/ΜL (ref 0–0.61)
EOSINOPHIL NFR BLD AUTO: 0 % (ref 0–6)
ERYTHROCYTE [DISTWIDTH] IN BLOOD BY AUTOMATED COUNT: 14.9 % (ref 11.6–15.1)
GFR SERPL CREATININE-BSD FRML MDRD: 63 ML/MIN/1.73SQ M
GFR SERPL CREATININE-BSD FRML MDRD: 74 ML/MIN/1.73SQ M
GLUCOSE SERPL-MCNC: 122 MG/DL (ref 65–140)
GLUCOSE SERPL-MCNC: 131 MG/DL (ref 65–140)
GLUCOSE UR STRIP-MCNC: NEGATIVE MG/DL
HCO3 BLDV-SCNC: 22.5 MMOL/L (ref 24–30)
HCT VFR BLD AUTO: 28.4 % (ref 36.5–49.3)
HGB BLD-MCNC: 9.2 G/DL (ref 12–17)
HGB UR QL STRIP.AUTO: NEGATIVE
IMM GRANULOCYTES # BLD AUTO: 0.07 THOUSAND/UL (ref 0–0.2)
IMM GRANULOCYTES NFR BLD AUTO: 1 % (ref 0–2)
INR PPP: 1.35 (ref 0.86–1.17)
KETONES UR STRIP-MCNC: NEGATIVE MG/DL
LACTATE SERPL-SCNC: 2 MMOL/L (ref 0.5–2)
LEUKOCYTE ESTERASE UR QL STRIP: NEGATIVE
LYMPHOCYTES # BLD AUTO: 0.66 THOUSANDS/ΜL (ref 0.6–4.47)
LYMPHOCYTES NFR BLD AUTO: 7 % (ref 14–44)
MCH RBC QN AUTO: 29.9 PG (ref 26.8–34.3)
MCHC RBC AUTO-ENTMCNC: 32.4 G/DL (ref 31.4–37.4)
MCV RBC AUTO: 92 FL (ref 82–98)
MONOCYTES # BLD AUTO: 0.74 THOUSAND/ΜL (ref 0.17–1.22)
MONOCYTES NFR BLD AUTO: 8 % (ref 4–12)
NEUTROPHILS # BLD AUTO: 7.39 THOUSANDS/ΜL (ref 1.85–7.62)
NEUTS SEG NFR BLD AUTO: 84 % (ref 43–75)
NITRITE UR QL STRIP: NEGATIVE
NRBC BLD AUTO-RTO: 0 /100 WBCS
O2 CT BLDV-SCNC: 10.7 ML/DL
PCO2 BLDV: 35.2 MM HG (ref 42–50)
PH BLDV: 7.42 [PH] (ref 7.3–7.4)
PH UR STRIP.AUTO: 5.5 [PH]
PLATELET # BLD AUTO: 309 THOUSANDS/UL (ref 149–390)
PMV BLD AUTO: 10.3 FL (ref 8.9–12.7)
PO2 BLDV: 39.2 MM HG (ref 35–45)
POTASSIUM SERPL-SCNC: 3.5 MMOL/L (ref 3.5–5.3)
POTASSIUM SERPL-SCNC: 3.8 MMOL/L (ref 3.5–5.3)
PROT SERPL-MCNC: 7.5 G/DL (ref 6.4–8.2)
PROT UR STRIP-MCNC: NEGATIVE MG/DL
PROTHROMBIN TIME: 16.6 SECONDS (ref 11.8–14.2)
RBC # BLD AUTO: 3.08 MILLION/UL (ref 3.88–5.62)
SODIUM SERPL-SCNC: 133 MMOL/L (ref 136–145)
SODIUM SERPL-SCNC: 136 MMOL/L (ref 136–145)
SP GR UR STRIP.AUTO: <=1.005 (ref 1–1.03)
UROBILINOGEN UR QL STRIP.AUTO: 0.2 E.U./DL
WBC # BLD AUTO: 8.89 THOUSAND/UL (ref 4.31–10.16)

## 2019-05-04 PROCEDURE — 96365 THER/PROPH/DIAG IV INF INIT: CPT

## 2019-05-04 PROCEDURE — 80053 COMPREHEN METABOLIC PANEL: CPT | Performed by: PHYSICIAN ASSISTANT

## 2019-05-04 PROCEDURE — 83605 ASSAY OF LACTIC ACID: CPT | Performed by: PHYSICIAN ASSISTANT

## 2019-05-04 PROCEDURE — 71046 X-RAY EXAM CHEST 2 VIEWS: CPT

## 2019-05-04 PROCEDURE — 81003 URINALYSIS AUTO W/O SCOPE: CPT | Performed by: PHYSICIAN ASSISTANT

## 2019-05-04 PROCEDURE — 99284 EMERGENCY DEPT VISIT MOD MDM: CPT

## 2019-05-04 PROCEDURE — 85025 COMPLETE CBC W/AUTO DIFF WBC: CPT | Performed by: PHYSICIAN ASSISTANT

## 2019-05-04 PROCEDURE — 99283 EMERGENCY DEPT VISIT LOW MDM: CPT | Performed by: PHYSICIAN ASSISTANT

## 2019-05-04 PROCEDURE — 85730 THROMBOPLASTIN TIME PARTIAL: CPT | Performed by: PHYSICIAN ASSISTANT

## 2019-05-04 PROCEDURE — 96361 HYDRATE IV INFUSION ADD-ON: CPT

## 2019-05-04 PROCEDURE — 84145 PROCALCITONIN (PCT): CPT | Performed by: PHYSICIAN ASSISTANT

## 2019-05-04 PROCEDURE — 36415 COLL VENOUS BLD VENIPUNCTURE: CPT | Performed by: PHYSICIAN ASSISTANT

## 2019-05-04 PROCEDURE — 80048 BASIC METABOLIC PNL TOTAL CA: CPT | Performed by: PHYSICIAN ASSISTANT

## 2019-05-04 PROCEDURE — 82805 BLOOD GASES W/O2 SATURATION: CPT | Performed by: PHYSICIAN ASSISTANT

## 2019-05-04 PROCEDURE — 87040 BLOOD CULTURE FOR BACTERIA: CPT | Performed by: PHYSICIAN ASSISTANT

## 2019-05-04 PROCEDURE — 85610 PROTHROMBIN TIME: CPT | Performed by: PHYSICIAN ASSISTANT

## 2019-05-04 RX ORDER — ALBUTEROL SULFATE 90 UG/1
2 AEROSOL, METERED RESPIRATORY (INHALATION) EVERY 6 HOURS PRN
Qty: 1 INHALER | Refills: 0 | Status: ON HOLD | OUTPATIENT
Start: 2019-05-04 | End: 2019-07-19 | Stop reason: ALTCHOICE

## 2019-05-04 RX ORDER — ACETAMINOPHEN 325 MG/1
650 TABLET ORAL ONCE
Status: COMPLETED | OUTPATIENT
Start: 2019-05-04 | End: 2019-05-04

## 2019-05-04 RX ORDER — CEFTRIAXONE 2 G/50ML
2000 INJECTION, SOLUTION INTRAVENOUS ONCE
Status: COMPLETED | OUTPATIENT
Start: 2019-05-04 | End: 2019-05-04

## 2019-05-04 RX ORDER — DOXYCYCLINE HYCLATE 100 MG
100 TABLET ORAL 2 TIMES DAILY
Qty: 14 TABLET | Refills: 0 | Status: SHIPPED | OUTPATIENT
Start: 2019-05-04 | End: 2019-05-11

## 2019-05-04 RX ORDER — BENZONATATE 200 MG/1
200 CAPSULE ORAL 3 TIMES DAILY PRN
Qty: 21 CAPSULE | Refills: 0 | Status: SHIPPED | OUTPATIENT
Start: 2019-05-04 | End: 2019-05-11

## 2019-05-04 RX ADMIN — SODIUM CHLORIDE 2000 ML: 0.9 INJECTION, SOLUTION INTRAVENOUS at 16:37

## 2019-05-04 RX ADMIN — ACETAMINOPHEN 650 MG: 325 TABLET, FILM COATED ORAL at 16:01

## 2019-05-04 RX ADMIN — CEFTRIAXONE 2000 MG: 2 INJECTION, SOLUTION INTRAVENOUS at 16:53

## 2019-05-04 RX ADMIN — AZITHROMYCIN MONOHYDRATE 500 MG: 500 INJECTION, POWDER, LYOPHILIZED, FOR SOLUTION INTRAVENOUS at 18:23

## 2019-05-05 LAB — PROCALCITONIN SERPL-MCNC: 0.06 NG/ML

## 2019-05-08 ENCOUNTER — APPOINTMENT (EMERGENCY)
Dept: RADIOLOGY | Facility: HOSPITAL | Age: 49
End: 2019-05-08
Payer: COMMERCIAL

## 2019-05-08 ENCOUNTER — HOSPITAL ENCOUNTER (EMERGENCY)
Facility: HOSPITAL | Age: 49
Discharge: HOME | End: 2019-05-08
Admitting: EMERGENCY MEDICINE
Payer: COMMERCIAL

## 2019-05-08 VITALS
SYSTOLIC BLOOD PRESSURE: 109 MMHG | BODY MASS INDEX: 30.25 KG/M2 | WEIGHT: 187.39 LBS | TEMPERATURE: 98.8 F | RESPIRATION RATE: 20 BRPM | OXYGEN SATURATION: 94 % | DIASTOLIC BLOOD PRESSURE: 62 MMHG | HEART RATE: 100 BPM

## 2019-05-08 DIAGNOSIS — J20.9 ACUTE BRONCHITIS: Primary | ICD-10-CM

## 2019-05-08 LAB
ANION GAP SERPL CALCULATED.3IONS-SCNC: 12 MMOL/L (ref 4–13)
BASOPHILS # BLD AUTO: 0.02 THOUSANDS/ΜL (ref 0–0.1)
BASOPHILS NFR BLD AUTO: 0 % (ref 0–1)
BUN SERPL-MCNC: 14 MG/DL (ref 5–25)
CALCIUM SERPL-MCNC: 8.4 MG/DL (ref 8.3–10.1)
CHLORIDE SERPL-SCNC: 103 MMOL/L (ref 100–108)
CO2 SERPL-SCNC: 22 MMOL/L (ref 21–32)
CREAT SERPL-MCNC: 1.43 MG/DL (ref 0.6–1.3)
EOSINOPHIL # BLD AUTO: 0.02 THOUSAND/ΜL (ref 0–0.61)
EOSINOPHIL NFR BLD AUTO: 0 % (ref 0–6)
ERYTHROCYTE [DISTWIDTH] IN BLOOD BY AUTOMATED COUNT: 15 % (ref 11.6–15.1)
GFR SERPL CREATININE-BSD FRML MDRD: 65 ML/MIN/1.73SQ M
GLUCOSE SERPL-MCNC: 129 MG/DL (ref 65–140)
HCT VFR BLD AUTO: 27 % (ref 36.5–49.3)
HGB BLD-MCNC: 8.7 G/DL (ref 12–17)
IMM GRANULOCYTES # BLD AUTO: 0.1 THOUSAND/UL (ref 0–0.2)
IMM GRANULOCYTES NFR BLD AUTO: 1 % (ref 0–2)
LACTATE SERPL-SCNC: 2.1 MMOL/L (ref 0.5–2)
LYMPHOCYTES # BLD AUTO: 0.84 THOUSANDS/ΜL (ref 0.6–4.47)
LYMPHOCYTES NFR BLD AUTO: 9 % (ref 14–44)
MCH RBC QN AUTO: 30.1 PG (ref 26.8–34.3)
MCHC RBC AUTO-ENTMCNC: 32.2 G/DL (ref 31.4–37.4)
MCV RBC AUTO: 93 FL (ref 82–98)
MONOCYTES # BLD AUTO: 0.83 THOUSAND/ΜL (ref 0.17–1.22)
MONOCYTES NFR BLD AUTO: 9 % (ref 4–12)
NEUTROPHILS # BLD AUTO: 7.15 THOUSANDS/ΜL (ref 1.85–7.62)
NEUTS SEG NFR BLD AUTO: 81 % (ref 43–75)
NRBC BLD AUTO-RTO: 0 /100 WBCS
PLATELET # BLD AUTO: 272 THOUSANDS/UL (ref 149–390)
PMV BLD AUTO: 10 FL (ref 8.9–12.7)
POTASSIUM SERPL-SCNC: 3.6 MMOL/L (ref 3.5–5.3)
RBC # BLD AUTO: 2.89 MILLION/UL (ref 3.88–5.62)
SODIUM SERPL-SCNC: 137 MMOL/L (ref 136–145)
WBC # BLD AUTO: 8.96 THOUSAND/UL (ref 4.31–10.16)

## 2019-05-08 PROCEDURE — 80048 BASIC METABOLIC PNL TOTAL CA: CPT | Performed by: PHYSICIAN ASSISTANT

## 2019-05-08 PROCEDURE — 71046 X-RAY EXAM CHEST 2 VIEWS: CPT

## 2019-05-08 PROCEDURE — 83605 ASSAY OF LACTIC ACID: CPT | Performed by: PHYSICIAN ASSISTANT

## 2019-05-08 PROCEDURE — 96374 THER/PROPH/DIAG INJ IV PUSH: CPT

## 2019-05-08 PROCEDURE — 94640 AIRWAY INHALATION TREATMENT: CPT

## 2019-05-08 PROCEDURE — 85025 COMPLETE CBC W/AUTO DIFF WBC: CPT | Performed by: PHYSICIAN ASSISTANT

## 2019-05-08 PROCEDURE — 36415 COLL VENOUS BLD VENIPUNCTURE: CPT | Performed by: PHYSICIAN ASSISTANT

## 2019-05-08 PROCEDURE — 99284 EMERGENCY DEPT VISIT MOD MDM: CPT

## 2019-05-08 PROCEDURE — 99284 EMERGENCY DEPT VISIT MOD MDM: CPT | Performed by: PHYSICIAN ASSISTANT

## 2019-05-08 RX ORDER — ALBUTEROL SULFATE 2.5 MG/3ML
2.5 SOLUTION RESPIRATORY (INHALATION) ONCE
Status: COMPLETED | OUTPATIENT
Start: 2019-05-08 | End: 2019-05-08

## 2019-05-08 RX ORDER — PREDNISONE 20 MG/1
40 TABLET ORAL DAILY
Qty: 10 TABLET | Refills: 0 | Status: SHIPPED | OUTPATIENT
Start: 2019-05-08 | End: 2019-05-13

## 2019-05-08 RX ORDER — METHYLPREDNISOLONE SODIUM SUCCINATE 125 MG/2ML
125 INJECTION, POWDER, LYOPHILIZED, FOR SOLUTION INTRAMUSCULAR; INTRAVENOUS ONCE
Status: COMPLETED | OUTPATIENT
Start: 2019-05-08 | End: 2019-05-08

## 2019-05-08 RX ADMIN — ALBUTEROL SULFATE 2.5 MG: 2.5 SOLUTION RESPIRATORY (INHALATION) at 06:50

## 2019-05-08 RX ADMIN — METHYLPREDNISOLONE SODIUM SUCCINATE 125 MG: 125 INJECTION, POWDER, FOR SOLUTION INTRAMUSCULAR; INTRAVENOUS at 06:52

## 2019-05-09 LAB
BACTERIA BLD CULT: NORMAL
BACTERIA BLD CULT: NORMAL

## 2019-07-17 ENCOUNTER — HOSPITAL ENCOUNTER (OUTPATIENT)
Facility: HOSPITAL | Age: 49
Setting detail: OBSERVATION
Discharge: HOME HEALTH | End: 2019-07-19
Attending: EMERGENCY MEDICINE | Admitting: HOSPITALIST
Payer: COMMERCIAL

## 2019-07-17 ENCOUNTER — APPOINTMENT (EMERGENCY)
Dept: CT IMAGING | Facility: HOSPITAL | Age: 49
End: 2019-07-17
Payer: COMMERCIAL

## 2019-07-17 ENCOUNTER — APPOINTMENT (EMERGENCY)
Dept: RADIOLOGY | Facility: HOSPITAL | Age: 49
End: 2019-07-17
Payer: COMMERCIAL

## 2019-07-17 DIAGNOSIS — R07.9 CHEST PAIN: Primary | ICD-10-CM

## 2019-07-17 DIAGNOSIS — M32.9 SYSTEMIC LUPUS ERYTHEMATOSUS, UNSPECIFIED SLE TYPE, UNSPECIFIED ORGAN INVOLVEMENT STATUS (HCC): ICD-10-CM

## 2019-07-17 DIAGNOSIS — M32.14 LUPUS NEPHRITIS (HCC): ICD-10-CM

## 2019-07-17 DIAGNOSIS — R06.02 SHORTNESS OF BREATH: ICD-10-CM

## 2019-07-17 LAB
ALBUMIN SERPL BCP-MCNC: 3.6 G/DL (ref 3.5–5)
ALP SERPL-CCNC: 137 U/L (ref 46–116)
ALT SERPL W P-5'-P-CCNC: 14 U/L (ref 12–78)
ANION GAP SERPL CALCULATED.3IONS-SCNC: 8 MMOL/L (ref 4–13)
AST SERPL W P-5'-P-CCNC: 16 U/L (ref 5–45)
BASOPHILS # BLD MANUAL: 0.07 THOUSAND/UL (ref 0–0.1)
BASOPHILS NFR MAR MANUAL: 2 % (ref 0–1)
BILIRUB SERPL-MCNC: 0.2 MG/DL (ref 0.2–1)
BUN SERPL-MCNC: 36 MG/DL (ref 5–25)
CALCIUM SERPL-MCNC: 8.8 MG/DL (ref 8.3–10.1)
CHLORIDE SERPL-SCNC: 103 MMOL/L (ref 100–108)
CO2 SERPL-SCNC: 27 MMOL/L (ref 21–32)
CREAT SERPL-MCNC: 1.54 MG/DL (ref 0.6–1.3)
EOSINOPHIL # BLD MANUAL: 0.07 THOUSAND/UL (ref 0–0.4)
EOSINOPHIL NFR BLD MANUAL: 2 % (ref 0–6)
ERYTHROCYTE [DISTWIDTH] IN BLOOD BY AUTOMATED COUNT: 14.2 % (ref 11.6–15.1)
GFR SERPL CREATININE-BSD FRML MDRD: 60 ML/MIN/1.73SQ M
GLUCOSE SERPL-MCNC: 71 MG/DL (ref 65–140)
HCT VFR BLD AUTO: 38.5 % (ref 36.5–49.3)
HGB BLD-MCNC: 13.1 G/DL (ref 12–17)
LYMPHOCYTES # BLD AUTO: 0.97 THOUSAND/UL (ref 0.6–4.47)
LYMPHOCYTES # BLD AUTO: 29 % (ref 14–44)
MCH RBC QN AUTO: 29.9 PG (ref 26.8–34.3)
MCHC RBC AUTO-ENTMCNC: 34 G/DL (ref 31.4–37.4)
MCV RBC AUTO: 88 FL (ref 82–98)
MONOCYTES # BLD AUTO: 0.77 THOUSAND/UL (ref 0–1.22)
MONOCYTES NFR BLD: 23 % (ref 4–12)
NEUTROPHILS # BLD MANUAL: 1.41 THOUSAND/UL (ref 1.85–7.62)
NEUTS SEG NFR BLD AUTO: 42 % (ref 43–75)
NRBC BLD AUTO-RTO: 0 /100 WBCS
NT-PROBNP SERPL-MCNC: 49 PG/ML
PLATELET # BLD AUTO: 251 THOUSANDS/UL (ref 149–390)
PLATELET BLD QL SMEAR: ADEQUATE
PMV BLD AUTO: 10.2 FL (ref 8.9–12.7)
POTASSIUM SERPL-SCNC: 3.6 MMOL/L (ref 3.5–5.3)
PROT SERPL-MCNC: 7.7 G/DL (ref 6.4–8.2)
RBC # BLD AUTO: 4.38 MILLION/UL (ref 3.88–5.62)
SODIUM SERPL-SCNC: 138 MMOL/L (ref 136–145)
TOTAL CELLS COUNTED SPEC: 100
TROPONIN I SERPL-MCNC: <0.02 NG/ML
VARIANT LYMPHS # BLD AUTO: 2 %
WBC # BLD AUTO: 3.35 THOUSAND/UL (ref 4.31–10.16)

## 2019-07-17 PROCEDURE — 96375 TX/PRO/DX INJ NEW DRUG ADDON: CPT

## 2019-07-17 PROCEDURE — 85007 BL SMEAR W/DIFF WBC COUNT: CPT | Performed by: EMERGENCY MEDICINE

## 2019-07-17 PROCEDURE — 99285 EMERGENCY DEPT VISIT HI MDM: CPT

## 2019-07-17 PROCEDURE — 71046 X-RAY EXAM CHEST 2 VIEWS: CPT

## 2019-07-17 PROCEDURE — 99284 EMERGENCY DEPT VISIT MOD MDM: CPT | Performed by: EMERGENCY MEDICINE

## 2019-07-17 PROCEDURE — 36415 COLL VENOUS BLD VENIPUNCTURE: CPT

## 2019-07-17 PROCEDURE — 85027 COMPLETE CBC AUTOMATED: CPT | Performed by: EMERGENCY MEDICINE

## 2019-07-17 PROCEDURE — 84484 ASSAY OF TROPONIN QUANT: CPT | Performed by: EMERGENCY MEDICINE

## 2019-07-17 PROCEDURE — 80053 COMPREHEN METABOLIC PANEL: CPT | Performed by: EMERGENCY MEDICINE

## 2019-07-17 PROCEDURE — 83880 ASSAY OF NATRIURETIC PEPTIDE: CPT | Performed by: EMERGENCY MEDICINE

## 2019-07-17 PROCEDURE — 96361 HYDRATE IV INFUSION ADD-ON: CPT

## 2019-07-17 PROCEDURE — 71275 CT ANGIOGRAPHY CHEST: CPT

## 2019-07-17 PROCEDURE — 93005 ELECTROCARDIOGRAM TRACING: CPT

## 2019-07-17 PROCEDURE — 96374 THER/PROPH/DIAG INJ IV PUSH: CPT

## 2019-07-17 RX ORDER — LISINOPRIL 40 MG/1
40 TABLET ORAL DAILY
COMMUNITY

## 2019-07-17 RX ORDER — MORPHINE SULFATE 4 MG/ML
4 INJECTION, SOLUTION INTRAMUSCULAR; INTRAVENOUS ONCE
Status: COMPLETED | OUTPATIENT
Start: 2019-07-17 | End: 2019-07-17

## 2019-07-17 RX ORDER — ONDANSETRON 2 MG/ML
4 INJECTION INTRAMUSCULAR; INTRAVENOUS ONCE
Status: COMPLETED | OUTPATIENT
Start: 2019-07-17 | End: 2019-07-17

## 2019-07-17 RX ADMIN — IOHEXOL 85 ML: 350 INJECTION, SOLUTION INTRAVENOUS at 21:34

## 2019-07-17 RX ADMIN — SODIUM CHLORIDE 1000 ML: 0.9 INJECTION, SOLUTION INTRAVENOUS at 22:34

## 2019-07-17 RX ADMIN — MORPHINE SULFATE 4 MG: 4 INJECTION INTRAVENOUS at 21:43

## 2019-07-17 RX ADMIN — ONDANSETRON 4 MG: 2 INJECTION INTRAMUSCULAR; INTRAVENOUS at 21:43

## 2019-07-18 ENCOUNTER — APPOINTMENT (OUTPATIENT)
Dept: NON INVASIVE DIAGNOSTICS | Facility: HOSPITAL | Age: 49
End: 2019-07-18
Payer: COMMERCIAL

## 2019-07-18 ENCOUNTER — APPOINTMENT (OUTPATIENT)
Dept: NUCLEAR MEDICINE | Facility: HOSPITAL | Age: 49
End: 2019-07-18
Payer: COMMERCIAL

## 2019-07-18 ENCOUNTER — APPOINTMENT (OUTPATIENT)
Dept: ULTRASOUND IMAGING | Facility: HOSPITAL | Age: 49
End: 2019-07-18
Payer: COMMERCIAL

## 2019-07-18 PROBLEM — R07.9 CHEST PAIN: Status: ACTIVE | Noted: 2019-07-18

## 2019-07-18 LAB
ANION GAP SERPL CALCULATED.3IONS-SCNC: 4 MMOL/L (ref 4–13)
ARRHY DURING EX: NORMAL
ATRIAL RATE: 102 BPM
ATRIAL RATE: 68 BPM
BUN SERPL-MCNC: 34 MG/DL (ref 5–25)
CALCIUM SERPL-MCNC: 8.5 MG/DL (ref 8.3–10.1)
CHEST PAIN STATEMENT: NORMAL
CHLORIDE SERPL-SCNC: 107 MMOL/L (ref 100–108)
CHLORIDE UR-SCNC: 135 MMOL/L
CHOLEST SERPL-MCNC: 125 MG/DL (ref 50–200)
CO2 SERPL-SCNC: 28 MMOL/L (ref 21–32)
CREAT SERPL-MCNC: 1.59 MG/DL (ref 0.6–1.3)
CREAT UR-MCNC: 106 MG/DL
GFR SERPL CREATININE-BSD FRML MDRD: 50 ML/MIN/1.73SQ M
GLUCOSE P FAST SERPL-MCNC: 96 MG/DL (ref 65–99)
GLUCOSE SERPL-MCNC: 96 MG/DL (ref 65–140)
HDLC SERPL-MCNC: 28 MG/DL (ref 40–60)
LDLC SERPL CALC-MCNC: 53 MG/DL (ref 0–100)
MAX DIASTOLIC BP: 69 MMHG
MAX HEART RATE: 98 BPM
MAX PREDICTED HEART RATE: 169 BPM
MAX. SYSTOLIC BP: 114 MMHG
NONHDLC SERPL-MCNC: 97 MG/DL
OSMOLALITY UR: 637 MMOL/KG
P AXIS: 65 DEGREES
P AXIS: 69 DEGREES
POTASSIUM SERPL-SCNC: 4.5 MMOL/L (ref 3.5–5.3)
PR INTERVAL: 156 MS
PR INTERVAL: 156 MS
PROTOCOL NAME: NORMAL
QRS AXIS: 138 DEGREES
QRS AXIS: 81 DEGREES
QRSD INTERVAL: 100 MS
QRSD INTERVAL: 96 MS
QT INTERVAL: 332 MS
QT INTERVAL: 402 MS
QTC INTERVAL: 427 MS
QTC INTERVAL: 432 MS
REASON FOR TERMINATION: NORMAL
SODIUM 24H UR-SCNC: 99 MOL/L
SODIUM SERPL-SCNC: 139 MMOL/L (ref 136–145)
T WAVE AXIS: 42 DEGREES
T WAVE AXIS: 56 DEGREES
TARGET HR FORMULA: NORMAL
TEST INDICATION: NORMAL
TIME IN EXERCISE PHASE: NORMAL
TRIGL SERPL-MCNC: 220 MG/DL
TROPONIN I SERPL-MCNC: <0.02 NG/ML
TROPONIN I SERPL-MCNC: <0.02 NG/ML
VENTRICULAR RATE: 102 BPM
VENTRICULAR RATE: 68 BPM

## 2019-07-18 PROCEDURE — 93017 CV STRESS TEST TRACING ONLY: CPT

## 2019-07-18 PROCEDURE — 80061 LIPID PANEL: CPT | Performed by: HOSPITALIST

## 2019-07-18 PROCEDURE — 87205 SMEAR GRAM STAIN: CPT | Performed by: INTERNAL MEDICINE

## 2019-07-18 PROCEDURE — 99203 OFFICE O/P NEW LOW 30 MIN: CPT | Performed by: INTERNAL MEDICINE

## 2019-07-18 PROCEDURE — 36415 COLL VENOUS BLD VENIPUNCTURE: CPT | Performed by: HOSPITALIST

## 2019-07-18 PROCEDURE — 99245 OFF/OP CONSLTJ NEW/EST HI 55: CPT | Performed by: INTERNAL MEDICINE

## 2019-07-18 PROCEDURE — 78452 HT MUSCLE IMAGE SPECT MULT: CPT

## 2019-07-18 PROCEDURE — 93018 CV STRESS TEST I&R ONLY: CPT | Performed by: INTERNAL MEDICINE

## 2019-07-18 PROCEDURE — 93010 ELECTROCARDIOGRAM REPORT: CPT | Performed by: INTERNAL MEDICINE

## 2019-07-18 PROCEDURE — 76770 US EXAM ABDO BACK WALL COMP: CPT

## 2019-07-18 PROCEDURE — 78452 HT MUSCLE IMAGE SPECT MULT: CPT | Performed by: INTERNAL MEDICINE

## 2019-07-18 PROCEDURE — 82570 ASSAY OF URINE CREATININE: CPT | Performed by: INTERNAL MEDICINE

## 2019-07-18 PROCEDURE — 80048 BASIC METABOLIC PNL TOTAL CA: CPT | Performed by: HOSPITALIST

## 2019-07-18 PROCEDURE — 93306 TTE W/DOPPLER COMPLETE: CPT | Performed by: INTERNAL MEDICINE

## 2019-07-18 PROCEDURE — 83935 ASSAY OF URINE OSMOLALITY: CPT | Performed by: INTERNAL MEDICINE

## 2019-07-18 PROCEDURE — 93016 CV STRESS TEST SUPVJ ONLY: CPT | Performed by: INTERNAL MEDICINE

## 2019-07-18 PROCEDURE — 99220 PR INITIAL OBSERVATION CARE/DAY 70 MINUTES: CPT | Performed by: HOSPITALIST

## 2019-07-18 PROCEDURE — 82436 ASSAY OF URINE CHLORIDE: CPT | Performed by: INTERNAL MEDICINE

## 2019-07-18 PROCEDURE — 84484 ASSAY OF TROPONIN QUANT: CPT | Performed by: HOSPITALIST

## 2019-07-18 PROCEDURE — A9502 TC99M TETROFOSMIN: HCPCS

## 2019-07-18 PROCEDURE — 84300 ASSAY OF URINE SODIUM: CPT | Performed by: INTERNAL MEDICINE

## 2019-07-18 PROCEDURE — 93306 TTE W/DOPPLER COMPLETE: CPT

## 2019-07-18 RX ORDER — METOLAZONE 2.5 MG/1
2.5 TABLET ORAL DAILY
COMMUNITY

## 2019-07-18 RX ORDER — LISINOPRIL 20 MG/1
40 TABLET ORAL DAILY
Status: DISCONTINUED | OUTPATIENT
Start: 2019-07-18 | End: 2019-07-18

## 2019-07-18 RX ORDER — POTASSIUM CHLORIDE 20 MEQ/1
20 TABLET, EXTENDED RELEASE ORAL DAILY
COMMUNITY

## 2019-07-18 RX ORDER — BACLOFEN 10 MG/1
20 TABLET ORAL 2 TIMES DAILY
Status: DISCONTINUED | OUTPATIENT
Start: 2019-07-18 | End: 2019-07-19 | Stop reason: HOSPADM

## 2019-07-18 RX ORDER — GABAPENTIN 300 MG/1
600 CAPSULE ORAL DAILY
Status: DISCONTINUED | OUTPATIENT
Start: 2019-07-18 | End: 2019-07-19 | Stop reason: HOSPADM

## 2019-07-18 RX ORDER — HYDROXYCHLOROQUINE SULFATE 200 MG/1
200 TABLET, FILM COATED ORAL 2 TIMES DAILY
Status: DISCONTINUED | OUTPATIENT
Start: 2019-07-18 | End: 2019-07-19 | Stop reason: HOSPADM

## 2019-07-18 RX ORDER — HYDROXYZINE HYDROCHLORIDE 10 MG/1
10 TABLET, FILM COATED ORAL EVERY 6 HOURS PRN
COMMUNITY

## 2019-07-18 RX ORDER — TAMSULOSIN HYDROCHLORIDE 0.4 MG/1
0.8 CAPSULE ORAL
Status: DISCONTINUED | OUTPATIENT
Start: 2019-07-18 | End: 2019-07-19 | Stop reason: HOSPADM

## 2019-07-18 RX ORDER — SODIUM CHLORIDE 9 MG/ML
100 INJECTION, SOLUTION INTRAVENOUS CONTINUOUS
Status: DISCONTINUED | OUTPATIENT
Start: 2019-07-18 | End: 2019-07-19 | Stop reason: HOSPADM

## 2019-07-18 RX ORDER — CARVEDILOL 12.5 MG/1
12.5 TABLET ORAL 2 TIMES DAILY WITH MEALS
Status: DISCONTINUED | OUTPATIENT
Start: 2019-07-18 | End: 2019-07-19 | Stop reason: HOSPADM

## 2019-07-18 RX ORDER — ONDANSETRON 2 MG/ML
4 INJECTION INTRAMUSCULAR; INTRAVENOUS EVERY 6 HOURS PRN
Status: DISCONTINUED | OUTPATIENT
Start: 2019-07-18 | End: 2019-07-19 | Stop reason: HOSPADM

## 2019-07-18 RX ORDER — TRAMADOL HYDROCHLORIDE 50 MG/1
50 TABLET ORAL EVERY 6 HOURS PRN
Status: DISCONTINUED | OUTPATIENT
Start: 2019-07-18 | End: 2019-07-19 | Stop reason: HOSPADM

## 2019-07-18 RX ORDER — MYCOPHENOLATE MOFETIL 250 MG/1
500 CAPSULE ORAL EVERY 12 HOURS SCHEDULED
Status: DISCONTINUED | OUTPATIENT
Start: 2019-07-18 | End: 2019-07-19 | Stop reason: HOSPADM

## 2019-07-18 RX ORDER — ESCITALOPRAM OXALATE 10 MG/1
10 TABLET ORAL DAILY
Status: DISCONTINUED | OUTPATIENT
Start: 2019-07-18 | End: 2019-07-19 | Stop reason: HOSPADM

## 2019-07-18 RX ORDER — PREDNISONE 1 MG/1
5 TABLET ORAL DAILY
Status: DISCONTINUED | OUTPATIENT
Start: 2019-07-18 | End: 2019-07-19 | Stop reason: HOSPADM

## 2019-07-18 RX ADMIN — SODIUM CHLORIDE 100 ML/HR: 0.9 INJECTION, SOLUTION INTRAVENOUS at 11:33

## 2019-07-18 RX ADMIN — HYDROXYCHLOROQUINE SULFATE 200 MG: 200 TABLET, FILM COATED ORAL at 09:27

## 2019-07-18 RX ADMIN — RIVAROXABAN 20 MG: 20 TABLET, FILM COATED ORAL at 01:16

## 2019-07-18 RX ADMIN — GABAPENTIN 600 MG: 300 CAPSULE ORAL at 09:26

## 2019-07-18 RX ADMIN — TRAMADOL HYDROCHLORIDE 50 MG: 50 TABLET, COATED ORAL at 21:24

## 2019-07-18 RX ADMIN — TAMSULOSIN HYDROCHLORIDE 0.8 MG: 0.4 CAPSULE ORAL at 21:18

## 2019-07-18 RX ADMIN — REGADENOSON 0.4 MG: 0.08 INJECTION, SOLUTION INTRAVENOUS at 13:52

## 2019-07-18 RX ADMIN — TAMSULOSIN HYDROCHLORIDE 0.8 MG: 0.4 CAPSULE ORAL at 01:16

## 2019-07-18 RX ADMIN — MYCOPHENOLATE MOFETIL 500 MG: 250 CAPSULE ORAL at 09:26

## 2019-07-18 RX ADMIN — TRAMADOL HYDROCHLORIDE 50 MG: 50 TABLET, COATED ORAL at 01:20

## 2019-07-18 RX ADMIN — ESCITALOPRAM OXALATE 10 MG: 10 TABLET ORAL at 09:26

## 2019-07-18 RX ADMIN — HYDROXYCHLOROQUINE SULFATE 200 MG: 200 TABLET, FILM COATED ORAL at 17:28

## 2019-07-18 RX ADMIN — MYCOPHENOLATE MOFETIL 500 MG: 250 CAPSULE ORAL at 01:16

## 2019-07-18 RX ADMIN — CARVEDILOL 12.5 MG: 12.5 TABLET, FILM COATED ORAL at 16:12

## 2019-07-18 RX ADMIN — SODIUM CHLORIDE 100 ML/HR: 0.9 INJECTION, SOLUTION INTRAVENOUS at 16:13

## 2019-07-18 RX ADMIN — MYCOPHENOLATE MOFETIL 500 MG: 250 CAPSULE ORAL at 21:18

## 2019-07-18 RX ADMIN — RIVAROXABAN 20 MG: 20 TABLET, FILM COATED ORAL at 16:12

## 2019-07-18 RX ADMIN — BACLOFEN 20 MG: 10 TABLET ORAL at 17:28

## 2019-07-18 RX ADMIN — PREDNISONE 5 MG: 5 TABLET ORAL at 09:26

## 2019-07-18 RX ADMIN — BACLOFEN 20 MG: 10 TABLET ORAL at 09:26

## 2019-07-18 NOTE — SOCIAL WORK
Cm met with patient at bedside, patient alert and oriented during interview  Patient reports residing in a private home with his significant other and two children 11Y and 12Y  Patient reports using a rolling walker to ambulate due to having neuropathy  Patient mentioned suffering from neuropathy for 4 years which requires assistance with some ADL's  Patient reports his wife or taxi transports him to MD appointments and he fills his prescriptions [facility]  [place] with no co-payment barriers  Patient reports his wife can make medical decisions on his behalf if needed, denies any MH/SA hx  Patient is interested in vna, no preference in agency, vna referral sent  obs letter reviewed with patient, patient reports understanding did not sign  CM reviewed discharge planning process including the following: identifying help at home, patient preference for discharge planning needs, pharmacy preference, and availability of treatment team to discuss questions or concerns patient and/or family may have regarding understanding medications and recognizing signs and symptoms once discharged  CM also encouraged patient to follow up with all recommended appointments after discharge  Patient advised of importance for patient and family to participate in managing patients medical well being

## 2019-07-18 NOTE — UTILIZATION REVIEW
Initial Clinical Review    Admission: Date/Time/Statement:      ADMIT OBS on  7/17  @  2342       Orders Placed This Encounter   Procedures    Place in Observation     Standing Status:   Standing     Number of Occurrences:   1     Order Specific Question:   Admitting Physician     Answer:   Melodie Melissa [[id number]]     Order Specific Question:   Level of Care     Answer:   Med Surg [[id number]]     Order Specific Question:   Bed request comments     Answer:   tele     ED Arrival Information     Expected Arrival [address] Ale[address]andra Kumar of Arrival Escorted By Service Admission Type    - 7/17/2019 20:00 Urgent Ambulance [address] Urgent    Arrival Complaint    -        Chief Complaint   Patient presents with    Chest Pain     pt states to have new onset of chest pain that started around 1730 tonight  pt denies cardiac hx, states to have had "surgery for DVT's approx one month ago" pt c/o cough, denies SOB  pt received 324 aspirin via EMS     Assessment/Plan:   43 yo male to ED from home via BLS;  Admitted OBS status to [facility] (w/telemetry)  Level Of Care,  For r/o ACS  Pt w/h/o lupus nephritis (on prednisone)  DVT (on xarelto) and CKD stage 3  Admission crt 1 54  (crt 1 0  on 5/10/19)   Will monitor on telemetry, trend trops, consult cardiology, repeat EKG in am;  Will Hold lasix due to elevated Crt on admission and IV dye given in ER       7/18  Cardiology Consult:  Chest pain, concerning for ischemia  Will get nuclear stress test rule out ischemia  Continue beta-blocker for now    7/18  Nephrology Consult  H/o ATN from IV contrast;  received contrast yesterday  Crt abeba from 1 54 to 1 59 today     FUP urine lytes, RENAL US, cont IVF,  Cont HOLD  Lisinopril and Lasix held;  Cont cell cept and prednisone        ED Triage Vitals [07/17/19 2005]   Temperature Pulse Respirations Blood Pressure SpO2   98 4 °F (36 9 °C) 92 18 114/77 99 %      Temp Source Heart Rate Source Patient Position - Orthostatic VS BP Location FiO2 (%)   Oral Monitor Sitting Left arm --      Pain Score       8        Wt Readings from Last 1 Encounters:   07/17/19 88 5 kg (195 lb 1 7 oz)     Additional Vital Signs:   07/18/19 0411 70  18  97/64  98 %   07/17/19 2308 79  18  93/56  97 %   07/17/19 2233 84  16  102/64  98 %  rm air        Pertinent Labs/Diagnostic Test Results:   7/17  CTA  Chest:  Neg for PE,  No acute process,   5 mm Rt lower lobe pulmonary nodule      7/17  EKG:  Normal sinus rhythm, no acute ischemic changes, Q-waves inferiorly  7/18  ECHO - pending  7/18  Chol 125,  Tri 220,  HDL 28,  Non HDL  97,  LDL  53    Results from last 7 days   Lab Units 07/17/19 2010   WBC Thousand/uL 3 35*   HEMOGLOBIN g/dL 13 1   HEMATOCRIT % 38 5   PLATELETS Thousands/uL 251         Results from last 7 days   Lab Units 07/18/19 0410 07/17/19 2010   SODIUM mmol/L 139 138   POTASSIUM mmol/L 4 5 3 6   CHLORIDE mmol/L 107 103   CO2 mmol/L 28 27   ANION GAP mmol/L 4 8   BUN mg/dL 34* 36*   CREATININE mg/dL 1 59* 1 54*   EGFR ml/min/1 73sq m 50 60   CALCIUM mg/dL 8 5 8 8     Results from last 7 days   Lab Units 07/17/19 2010   AST U/L 16   ALT U/L 14   ALK PHOS U/L 137*   TOTAL PROTEIN g/dL 7 7   ALBUMIN g/dL 3 6   TOTAL BILIRUBIN mg/dL 0 20     Results from last 7 days   Lab Units 07/18/19 0410 07/17/19 2010   GLUCOSE RANDOM mg/dL 96 71     Results from last 7 days   Lab Units 07/18/19  0410 07/17/19  2359 07/17/19 2010   TROPONIN I ng/mL <0 02 <0 02 <0 02     Results from last 7 days   Lab Units 07/17/19 2010   NT-PRO BNP pg/mL 49     Results from last 7 days   Lab Units 07/17/19 2010   TOTAL COUNTED  100     ED Treatment:   Medication Administration from 07/17/2019 2000 to 07/18/2019 0846       Date/Time Order Dose Route Action     07/17/2019 2143 ondansetron (ZOFRAN) injection 4 mg 4 mg Intravenous Given     07/17/2019 2143 morphine (PF) 4 mg/mL injection 4 mg 4 mg Intravenous Given     07/17/2019 9617 sodium chloride 0 9 % bolus 1,000 mL 1,000 mL Intravenous New Bag     07/18/2019 0116 mycophenolate (CELLCEPT) capsule 500 mg 500 mg Oral Given     07/18/2019 0116 rivaroxaban (XARELTO) tablet 20 mg 20 mg Oral Given     07/18/2019 0116 tamsulosin (FLOMAX) capsule 0 8 mg 0 8 mg Oral Given     07/18/2019 0120 traMADol (ULTRAM) tablet 50 mg 50 mg Oral Given        Past Medical History:   Diagnosis Date    Cervical mass     Depression     DVT (deep venous thrombosis) (HCC)     Hypertension     Lupus ([facility] )     Renal disorder      Present on Admission:   [facility]   Lupus (systemic lupus erythematosus) (HCC)   Hypertension   Chronic kidney disease, stage 3 (HCC)   Chest pain   Anemia in chronic kidney disease      Admitting Diagnosis: Chest pain [R07 9]  Age/Sex: 44 y o  male  Admission Orders:    Telemetry; Consult cardiology and nephrology;  ECHO  (7/18  Ordered stress test)     Current Facility-Administered Medications:  baclofen 20 mg Oral BID Farhad Teran MD   carvedilol 12 5 mg Oral BID With Meals Farhad Teran MD   escitalopram 10 mg Oral Daily Farhad Teran, MD   gabapentin 600 mg Oral Daily Farhad Teran MD   hydroxychloroquine 200 mg Oral BID Farhad Teran MD   lisinopril 40 mg Oral Daily Farhad Teran MD   mycophenolate 500 mg Oral Q12H Alfred Marrero MD   ondansetron 4 mg Intravenous Q6H PRN Farhad Teran MD   predniSONE 5 mg Oral Daily Farhad Teran MD   rivaroxaban 20 mg Oral Daily With Gabi Echavarria MD   tamsulosin 0 8 mg Oral Once HS Farhad Teran MD   traMADol 50 mg Oral Q6H PRN Farhad Teran MD       Network Utilization Review Department  Phone: [phone]; Fax [phone]  [email or web address]  ATTENTION: Please call with any questions or concerns to [phone]  and carefully listen to the prompts so that you are directed to the right person     Send all requests for admission clinical reviews, approved or denied determinations and any other requests to fax [phone]   All voicemails are confidential

## 2019-07-18 NOTE — ED PROVIDER NOTES
History  Chief Complaint   Patient presents with    Chest Pain     pt states to have new onset of chest pain that started around 1730 tonight  pt denies cardiac hx, states to have had "surgery for DVT's approx one month ago" pt c/o cough, denies SOB  pt received 324 aspirin via EMS     68-year-old male presents with chest pain that started at 17:30 tonight  He denies exertion at the time of chest pain onset  He states that the pain has gotten worse since it came on  Is a central chest pain, radiates into the left arm, jaw  Associated with shortness of breath  Associated with nausea without vomiting  Admits to cough, clear phlegm, no fevers or chills  He has no diaphoresis  Past medical history is concerning that 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 Taking?    albuterol (PROVENTIL HFA,VENTOLIN HFA) 90 mcg/act inhaler   No No   Sig: Inhale 2 puffs every 6 (six) hours as needed for wheezing or shortness of breath   baclofen 20 mg tablet  Self Yes No   Sig: Take 20 mg by mouth 2 (two) times a day   betamethasone dipropionate (DIPROSONE) 0 05 % cream  Self Yes No   Sig: Apply 1 application topically 2 (two) times a day   betamethasone, augmented, (DIPROLENE) 0 05 % ointment  Self Yes No   Sig: Apply 1 application topically 2 (two) times a day   carvedilol (COREG) 12 5 mg tablet  Self Yes Yes   Sig: Take 12 5 mg by mouth 2 (two) times a day with meals   escitalopram (LEXAPRO) 10 mg tablet  Self Yes No   Sig: Take 10 mg by mouth daily   furosemide (LASIX) 40 mg tablet   No No   Sig: Take 0 5 tablets (20 mg total) by mouth daily   gabapentin (NEURONTIN) 600 MG tablet   Yes Yes   Sig: Take 600 mg by mouth daily   hydroxychloroquine (PLAQUENIL) 200 mg tablet  Self Yes No   Sig: Take 200 mg by mouth 2 (two) times a day   lisinopril (ZESTRIL) 40 mg tablet   Yes Yes   Sig: Take 40 mg by mouth daily   mycophenolate (CELLCEPT) 500 mg tablet   Yes Yes   Sig: Take 1,000 mg by mouth every 12 (twelve) hours     pantoprazole (PROTONIX) 40 mg tablet   No No   Sig: Take 1 tablet (40 mg total) by mouth 2 (two) times a day before meals   predniSONE 5 mg tablet   Yes No   Sig: Take 5 mg by mouth daily   rivaroxaban (XARELTO) 20 mg tablet   Yes Yes   Sig: Take 20 mg by mouth daily with dinner   sildenafil (REVATIO) 20 mg tablet   Yes No   Sig: Take 20 mg by mouth 3 (three) times a day   tamsulosin (FLOMAX) 0 4 mg  Self Yes Yes   Sig: Take 0 8 mg by mouth Medrol Dose Pack scheduling ONLY   traMADol (ULTRAM) 50 mg tablet   Yes Yes   Sig: Take 50 mg by mouth every 6 (six) hours as needed for moderate pain      Facility-Administered Medications: None       Past Medical History:   Diagnosis Date    Cervical mass     Depression     DVT (deep venous thrombosis) (HCC)     Hypertension     Lupus (HCC)     Renal disorder        Past Surgical History:   Procedure Laterality Date    APPENDECTOMY      CERVICAL SPINE SURGERY      COLONOSCOPY N/A 8/23/2018    Procedure: COLONOSCOPY;  Surgeon: Jesu Pink MD;  Location: [facility]; Service: Gastroenterology    ESOPHAGOGASTRODUODENOSCOPY N/A 8/22/2018    Procedure: ESOPHAGOGASTRODUODENOSCOPY (EGD); Surgeon: Jesu Pink MD;  Location: [facility]; Service: Gastroenterology    NECK SURGERY         History reviewed  No pertinent family history  I have reviewed and agree with the history as documented  Social History     Tobacco Use    Smoking status: Never Smoker    Smokeless tobacco: Never Used   Substance Use Topics    Alcohol use: No    Drug use: No        Review of Systems   Constitutional: Negative for chills, fatigue and fever     HENT: Negative for congestion and sore throat  Respiratory: Positive for cough, chest tightness and shortness of breath  Negative for wheezing  Cardiovascular: Positive for chest pain and leg swelling (Bilateral, recent DVT removal surgery)  Negative for palpitations  Gastrointestinal: Negative for abdominal pain, constipation, diarrhea, nausea and vomiting  Genitourinary: Negative for dysuria and flank pain  Musculoskeletal: Negative for back pain and neck pain  Skin: Positive for color change ( ecchymosis to bilateral lower extremity)  Negative for rash  Allergic/Immunologic: Negative for immunocompromised state  Neurological: Negative for dizziness, syncope, weakness, light-headedness and headaches  Psychiatric/Behavioral: Negative for confusion  Physical Exam  Physical Exam   Constitutional: He is oriented to person, place, and time  He appears well-developed and well-nourished  Non-toxic appearance  He does not appear ill  He appears distressed  HENT:   Head: Normocephalic and atraumatic  Eyes: Pupils are equal, round, and reactive to light  Conjunctivae and EOM are normal  Right eye exhibits no discharge  Left eye exhibits no discharge  No scleral icterus  Neck: Normal range of motion  Neck supple  No JVD present  Cardiovascular: Regular rhythm, normal heart sounds and intact distal pulses  Tachycardia present  Exam reveals no gallop and no friction rub  No murmur heard  Pulmonary/Chest: Effort normal and breath sounds normal  No respiratory distress  He has no wheezes  He has no rhonchi  He has no rales  He exhibits no tenderness  Abdominal: Soft  Bowel sounds are normal  He exhibits no distension  There is no tenderness  There is no rebound and no guarding  Musculoskeletal: Normal range of motion  He exhibits no deformity  Right lower leg: He exhibits tenderness and edema (Ecchymosis)  Left lower leg: He exhibits tenderness and edema ( ecchymosis)  Neurological: He is alert and oriented to person, place, and time  No cranial nerve deficit  Skin: Skin is warm and dry  Ecchymosis ( bilateral lower extremity) noted  No rash noted  He is not diaphoretic  No erythema  No pallor  Psychiatric: He has a normal mood and affect  His behavior is normal    Vitals reviewed  Vital Signs  ED Triage Vitals [07/17/19 2005]   Temperature Pulse Respirations Blood Pressure SpO2   98 4 °F (36 9 °C) 92 18 114/77 99 %      Temp Source Heart Rate Source Patient Position - Orthostatic VS BP Location FiO2 (%)   Oral Monitor Sitting Left arm --      Pain Score       8           Vitals:    07/17/19 2005 07/17/19 2233 07/17/19 2308   BP: 114/77 102/64 93/56   Pulse: 92 84 79   Patient Position - Orthostatic VS: Sitting Sitting Lying         Visual Acuity      ED Medications  Medications   ondansetron (ZOFRAN) injection 4 mg (4 mg Intravenous Given 7/17/19 2143)   morphine (PF) 4 mg/mL injection 4 mg (4 mg Intravenous Given 7/17/19 2143)   iohexol (OMNIPAQUE) 350 MG/ML injection (MULTI-DOSE) 85 mL (85 mL Intravenous Given 7/17/19 2134)   sodium chloride 0 9 % bolus 1,000 mL (0 mL Intravenous Stopped 7/17/19 2359)       Diagnostic Studies  Results Reviewed     Procedure Component Value Units Date/Time    Troponin I [[id number]] Collected:  07/17/19 2359    Lab Status:   In process Specimen:  Blood from Arm, Right Updated:  07/18/19 0002    NT-BNP PRO (BNP for [place], [place], [place], [place], [place], [facility], , [place] campuses) [[id number]]  (Normal) Collected:  07/17/19 2010    Lab Status:  Final result Specimen:  Blood from Arm, Left Updated:  07/17/19 2328     NT-proBNP 49 pg/mL     CBC and differential [[id number]]  (Abnormal) Collected:  07/17/19 2010    Lab Status:  Final result Specimen:  Blood from Arm, Left Updated:  07/17/19 2104     WBC 3 35 Thousand/uL      RBC 4 38 Million/uL      Hemoglobin 13 1 g/dL      Hematocrit 38 5 %      MCV 88 fL      MCH 29 9 pg      MCHC 34 0 g/dL      RDW 14 2 %      MPV 10 2 fL      Platelets 349 Thousands/uL      nRBC 0 /100 WBCs     Narrative: This is an appended report  These results have been appended to a previously verified report  Troponin I [[id number]]  (Normal) Collected:  07/17/19 2010    Lab Status:  Final result Specimen:  Blood from Arm, Left Updated:  07/17/19 2043     Troponin I <0 02 ng/mL     Comprehensive metabolic panel [[id number]]  (Abnormal) Collected:  07/17/19 2010    Lab Status:  Final result Specimen:  Blood from Arm, Left Updated:  07/17/19 2039     Sodium 138 mmol/L      Potassium 3 6 mmol/L      Chloride 103 mmol/L      CO2 27 mmol/L      ANION GAP 8 mmol/L      BUN 36 mg/dL      Creatinine 1 54 mg/dL      Glucose 71 mg/dL      Calcium 8 8 mg/dL      AST 16 U/L      ALT 14 U/L      Alkaline Phosphatase 137 U/L      Total Protein 7 7 g/dL      Albumin 3 6 g/dL      Total Bilirubin 0 20 mg/dL      eGFR 60 ml/min/1 73sq m     Narrative:       [facility] guidelines for Chronic Kidney Disease (CKD):     Stage 1 with normal or high GFR (GFR > 90 mL/min/1 73 square meters)    Stage 2 Mild CKD (GFR = 60-89 mL/min/1 73 square meters)    Stage 3A Moderate CKD (GFR = 45-59 mL/min/1 73 square meters)    Stage 3B Moderate CKD (GFR = 30-44 mL/min/1 73 square meters)    Stage 4 Severe CKD (GFR = 15-29 mL/min/1 73 square meters)    Stage 5 End Stage CKD (GFR <15 mL/min/1 73 square meters)  Note: GFR calculation is accurate only with a steady state creatinine                 CTA ED chest PE Study   ED Interpretation by Maria Guadalupe Han DO (07/17 2209)       1  No evidence of pulmonary embolism  2   5 mm right lower lobe pulmonary nodule  Based on current Fleischner Society 2017 Guidelines on incidental pulmonary nodule, no routine follow-up is needed if the patient is considered low risk for lung cancer  If the patient is considered high risk    for lung cancer, 12 month follow-up non-contrast chest CT is recommended  Final Result by Carrie Tomlin MD (07/17 2201)      1  No evidence of pulmonary embolism  2   5 mm right lower lobe pulmonary nodule  Based on current Fleischner Society 2017 Guidelines on incidental pulmonary nodule, no routine follow-up is needed if the patient is considered low risk for lung cancer  If the patient is considered high risk    for lung cancer, 12 month follow-up non-contrast chest CT is recommended  The study was marked in EPIC for significant notification  Workstation performed: [id number]         XR chest 2 views    (Results Pending)              Procedures  ECG 12 Lead Documentation Only  Date/Time: 7/17/2019 8:02 AM  Performed by: Amanda Franklin DO  Authorized by: Amanda Franklin DO     Indications / Diagnosis:  Chest pain  ECG reviewed by me, the ED Provider: yes    Patient location:  ED  Previous ECG:     Previous ECG:  Compared to current    Comparison ECG info:  August 20, 2018    Similarity:  Changes noted    Comparison to cardiac monitor: Yes    Interpretation:     Interpretation: abnormal    Rate:     ECG rate:  102    ECG rate assessment: tachycardic    Rhythm:     Rhythm: sinus tachycardia    Ectopy:     Ectopy: none    QRS:     QRS axis:  Normal    QRS intervals:  Normal  Conduction:     Conduction: normal    ST segments:     ST segments:  Normal  T waves:     T waves: normal    Comments:      S1 q3 T3           ED Course  ED Course as of Jul 18 0012   Wed Jul 17, 2019   2320 Patient is offered delta troponin discharge verses admission for cardiac observation  Patient is still having chest pain and feels more comfortable being admitted for cardiac observation  Heart score of 4  page placed to slim for admission              HEART Risk Score      Most Recent Value   History  1 Filed at: 07/17/2019 2209   ECG  1 Filed at: 07/17/2019 2209   Age  1 Filed at: 07/17/2019 2209   Risk Factors  1 Filed at: 07/17/2019 2209   Troponin  0 Filed at: 07/17/2019 2209 Heart Score Risk Calculator   History  1 Filed at: 07/17/2019 2209   ECG  1 Filed at: 07/17/2019 2209   Age  1 Filed at: 07/17/2019 2209   Risk Factors  1 Filed at: 07/17/2019 2209   Troponin  0 Filed at: 07/17/2019 2209   HEART Score  4 Filed at: 07/17/2019 2209   HEART Score  4 Filed at: 07/17/2019 2209                      Wells' Criteria for PE      Most Recent Value   Wells' Criteria for PE   Clinical signs and symptoms of DVT  3 Filed at: 07/17/2019 2058   PE is primary diagnosis or equally likely  3 Filed at: 07/17/2019 2058   HR >100  1 5 Filed at: 07/17/2019 2058   Immobilization at least 3 days or Surgery in the previous 4 weeks  1 5 Filed at: 07/17/2019 2058   Previous, objectively diagnosed PE or DVT  1 5 Filed at: 07/17/2019 2058   Hemoptysis  0 Filed at: 07/17/2019 2058   Malignancy with treatment within 6 months or palliative  0 Filed at: 07/17/2019 2058   Wells' Criteria Total  10 5 Filed at: 07/17/2019 2058            [facility]  Number of Diagnoses or Management Options  Chest pain:   Shortness of breath:   Diagnosis management comments: Chest pain and shortness of breath  PE scan is negative  ACS workup is negative however considering a heart score of 4, the patient will be admitted for continued cardiac observation and continued cardiac testing         Amount and/or Complexity of Data Reviewed  Clinical lab tests: ordered and reviewed  Tests in the radiology section of CPT®: ordered and reviewed        Disposition  Final diagnoses:   Chest pain   Shortness of breath     Time reflects when diagnosis was documented in both MDM as applicable and the Disposition within this note     Time User Action Codes Description Comment    7/17/2019 11:41 PM Rakel Bro Add [R07 9] Chest pain     7/17/2019 11:41 PM Kelley Bro Add [R06 02] Shortness of breath       ED Disposition     ED Disposition Condition Date/Time Comment    Admit Stable Wed Jul 17, 2019 11:41 PM Case was discussed with Sudha Riddle [facility]) and the patient's admission status was agreed to be Admission Status: observation status to the service of Dr Sudha Riddle [facility])   Follow-up Information    None         Patient's Medications   Discharge Prescriptions    No medications on file     No discharge procedures on file      ED Provider  Electronically Signed by           Yolande Day DO  07/18/19 0223

## 2019-07-18 NOTE — PLAN OF CARE
Problem: PAIN - ADULT  Goal: Verbalizes/displays adequate comfort level or baseline comfort level  Description  Interventions:  - Encourage patient to monitor pain and request assistance  - Assess pain using appropriate pain scale  - Administer analgesics based on type and severity of pain and evaluate response  - Implement non-pharmacological measures as appropriate and evaluate response  - Consider cultural and social influences on pain and pain management  - Notify physician/advanced practitioner if interventions unsuccessful or patient reports new pain  Outcome: Progressing     Problem: INFECTION - ADULT  Goal: Absence or prevention of progression during hospitalization  Description  INTERVENTIONS:  - Assess and monitor for signs and symptoms of infection  - Monitor lab/diagnostic results  - Monitor all insertion sites, i e  indwelling lines, tubes, and drains  - Monitor endotracheal (as able) and nasal secretions for changes in amount and color  - [place] appropriate cooling/warming therapies per order  - Administer medications as ordered  - Instruct and encourage patient and family to use good hand hygiene technique  - Identify and instruct in appropriate isolation precautions for identified infection/condition  Outcome: Progressing  Goal: Absence of fever/infection during neutropenic period  Description  INTERVENTIONS:  - Monitor WBC  - Implement neutropenic guidelines  Outcome: Progressing     Problem: SAFETY ADULT  Goal: Patient will remain free of falls  Description  INTERVENTIONS:  - Assess patient frequently for physical needs  -  Identify cognitive and physical deficits and behaviors that affect risk of falls    -  [place] fall precautions as indicated by assessment   - Educate patient/family on patient safety including physical limitations  - Instruct patient to call for assistance with activity based on assessment  - Modify environment to reduce risk of injury  - Consider OT/PT consult to assist with strengthening/mobility  Outcome: Progressing  Goal: Maintain or return to baseline ADL function  Description  INTERVENTIONS:  -  Assess patient's ability to carry out ADLs; assess patient's baseline for ADL function and identify physical deficits which impact ability to perform ADLs (bathing, care of mouth/teeth, toileting, grooming, dressing, etc )  - Assess/evaluate cause of self-care deficits   - Assess range of motion  - Assess patient's mobility; develop plan if impaired  - Assess patient's need for assistive devices and provide as appropriate  - Encourage maximum independence but intervene and supervise when necessary  ¯ Involve family in performance of ADLs  ¯ Assess for home care needs following discharge   ¯ Request OT consult to assist with ADL evaluation and planning for discharge  ¯ Provide patient education as appropriate  Outcome: Progressing  Goal: Maintain or return mobility status to optimal level  Description  INTERVENTIONS:  - Assess patient's baseline mobility status (ambulation, transfers, stairs, etc )    - Identify cognitive and physical deficits and behaviors that affect mobility  - Identify mobility aids required to assist with transfers and/or ambulation (gait belt, sit-to-stand, lift, walker, cane, etc )  - [place] fall precautions as indicated by assessment  - Record patient progress and toleration of activity level on Mobility SBAR; progress patient to next Phase/Stage  - Instruct patient to call for assistance with activity based on assessment  - Request Rehabilitation consult to assist with strengthening/weightbearing, etc   Outcome: Progressing     Problem: DISCHARGE PLANNING  Goal: Discharge to home or other facility with appropriate resources  Description  INTERVENTIONS:  - Identify barriers to discharge w/patient and caregiver  - Arrange for needed discharge resources and transportation as appropriate  - Identify discharge learning needs (meds, wound care, etc )  - Arrange for interpretive services to assist at discharge as needed  - Refer to Case Management Department for coordinating discharge planning if the patient needs post-hospital services based on physician/advanced practitioner order or complex needs related to functional status, cognitive ability, or social support system  Outcome: Progressing     Problem: Knowledge Deficit  Goal: Patient/family/caregiver demonstrates understanding of disease process, treatment plan, medications, and discharge instructions  Description  Complete learning assessment and assess knowledge base  Interventions:  - Provide teaching at level of understanding  - Provide teaching via preferred learning methods  Outcome: Progressing     Problem: CARDIOVASCULAR - ADULT  Goal: Maintains optimal cardiac output and hemodynamic stability  Description  INTERVENTIONS:  - Monitor I/O, vital signs and rhythm  - Monitor for S/S and trends of decreased cardiac output i e  bleeding, hypotension  - Administer and titrate ordered vasoactive medications to optimize hemodynamic stability  - Assess quality of pulses, skin color and temperature  - Assess for signs of decreased coronary artery perfusion - ex   Angina  - Instruct patient to report change in severity of symptoms  Outcome: Progressing  Goal: Absence of cardiac dysrhythmias or at baseline rhythm  Description  INTERVENTIONS:  - Continuous cardiac monitoring, monitor vital signs, obtain 12 lead EKG if indicated  - Administer antiarrhythmic and heart rate control medications as ordered  - Monitor electrolytes and administer replacement therapy as ordered  Outcome: Progressing     Problem: METABOLIC, FLUID AND ELECTROLYTES - ADULT  Goal: Electrolytes maintained within normal limits  Description  INTERVENTIONS:  - Monitor labs and assess patient for signs and symptoms of electrolyte imbalances  - Administer electrolyte replacement as ordered  - Monitor response to electrolyte replacements, including repeat lab results as appropriate  - Instruct patient on fluid and nutrition as appropriate  Outcome: Progressing  Goal: Fluid balance maintained  Description  INTERVENTIONS:  - Monitor labs and assess for signs and symptoms of volume excess or deficit  - Monitor I/O and WT  - Instruct patient on fluid and nutrition as appropriate  Outcome: Progressing  Goal: Glucose maintained within target range  Description  INTERVENTIONS:  - Monitor Blood Glucose as ordered  - Assess for signs and symptoms of hyperglycemia and hypoglycemia  - Administer ordered medications to maintain glucose within target range  - Assess nutritional intake and initiate nutrition service referral as needed  Outcome: Progressing     Problem: SKIN/TISSUE INTEGRITY - ADULT  Goal: Skin integrity remains intact  Description  INTERVENTIONS  - Identify patients at risk for skin breakdown  - Assess and monitor skin integrity  - Assess and monitor nutrition and hydration status  - Monitor labs (i e  albumin)  - Assess for incontinence   - Turn and reposition patient  - Assist with mobility/ambulation  - Relieve pressure over bony prominences  - Avoid friction and shearing  - Provide appropriate hygiene as needed including keeping skin clean and dry  - Evaluate need for skin moisturizer/barrier cream  - Collaborate with interdisciplinary team (i e  Nutrition, Rehabilitation, etc )   - Patient/family teaching  Outcome: Progressing  Goal: Incision(s), wounds(s) or drain site(s) healing without S/S of infection  Description  INTERVENTIONS  - Assess and document risk factors for skin impairment   - Assess and document dressing, incision, wound bed, drain sites and surrounding tissue  - Initiate Nutrition services consult and/or wound management as needed  Outcome: Progressing  Goal: Oral mucous membranes remain intact  Description  INTERVENTIONS  - Assess oral mucosa and hygiene practices  - Implement preventative oral hygiene regimen  - Implement oral medicated treatments as ordered  - Initiate Nutrition services referral as needed  Outcome: Progressing     Problem: HEMATOLOGIC - ADULT  Goal: Maintains hematologic stability  Description  INTERVENTIONS  - Assess for signs and symptoms of bleeding or hemorrhage  - Monitor labs  - Administer supportive blood products/factors as ordered and appropriate  Outcome: Progressing     Problem: MUSCULOSKELETAL - ADULT  Goal: Maintain or return mobility to safest level of function  Description  INTERVENTIONS:  - Assess patient's ability to carry out ADLs; assess patient's baseline for ADL function and identify physical deficits which impact ability to perform ADLs (bathing, care of mouth/teeth, toileting, grooming, dressing, etc )  - Assess/evaluate cause of self-care deficits   - Assess range of motion  - Assess patient's mobility; develop plan if impaired  - Assess patient's need for assistive devices and provide as appropriate  - Encourage maximum independence but intervene and supervise when necessary  - Involve family in performance of ADLs  - Assess for home care needs following discharge   - Request OT consult to assist with ADL evaluation and planning for discharge  - Provide patient education as appropriate  Outcome: Progressing  Goal: Maintain proper alignment of affected body part  Description  INTERVENTIONS:  - Support, maintain and protect limb and body alignment  - Provide pt/fam with appropriate education  Outcome: Progressing

## 2019-07-18 NOTE — H&P
History and Physical - [facility]    Patient Information: Jena Mosqueda 44 y o  male MRN: [id number]  Unit/Bed#: ED 10 Encounter: [id number]  Admitting Physician: India Blanco MD  PCP: Gema Blancas MD  Date of Admission:  07/18/19    Assessment/Plan:    Hospital Problem List:     Principal Problem:    Chest pain  Active Problems:    Lupus [facility]systemic lupus erythematosus) ([facility] )    Lupus nephritis ([facility] )    Hypertension    History of DVT (deep vein thrombosis)    Chronic kidney disease, stage 3 ([facility] )    Anemia in chronic kidney disease    Present on Admission:   [facility] ([facility] )   Lupus (systemic lupus erythematosus) ([facility] )   Hypertension   Chronic kidney disease, stage 3 ([facility] )   Chest pain   Anemia in chronic kidney disease      Plan for the Primary Problem(s):  · Chest pain  · Assigned observation status for further workup and management, initial EKG without ischemic changes common issue troponin negative, check serial cardiac markers, follow-up EKG, given history of lupus will check echo, monitor on telemetry, patient CT PE protocol emergency department that was negative    Plan for Additional Problems:   · Lupus nephritis:  Continue Plaquenil, CellCept and prednisone as taken as an outpatient  · Acute on chronic stage 3 kidney disease:   Will hold Lasix overnight given mild bump in creatinine and IV dye received in the emergency department, repeat BMP in the a m , if creatinine is stable should resume Lasix  · Essential hypertension:  Patient med list reviewed, continue carvedilol as listed, resume lisinopril morning if creatinine stable  · History of DVT:  On Xarelto  · Anemia chronic kidney disease:  Hemoglobin above transfusion threshold    VTE Prophylaxis: Rivaroxaban (Xarelto)  / sequential compression device   Code Status:  Full code  POLST: There is no POLST form on file for this patient (pre-hospital)    Anticipated Length of Stay:  Patient will be admitted on an Observation basis with an anticipated length of stay of  less than 2 midnights  Justification for Hospital Stay:  Rule out ACS, 2D echo    Total Time for Visit, including Counseling / Coordination of Care: 45 minutes  Greater than 50% of this total time spent on direct patient counseling and coordination of care  Chief Complaint:   Chest pain    History of Present Illness:    Kelley West is a 44 y o  male with history of hypertension, DVT, lupus with lupus nephritis, anemia and chronic kidney disease presents emergency department with complaint of midsternal chest pain that began on 7/17 at 5:30 p m  Lynita Ped Patient reports that he was not doing anything in particular when pain started  He denies associated diaphoresis, shortness of breath  He does report lightheadedness  He denies any changes in his medications  He denies fever, chills, sweats, nausea, vomiting, diarrhea, change in urine habits  In the emergency department patient received a CT PE protocol that was negative for PE or acute pulmonary process  His initial troponin was negative, his EKG is without evidence of ischemia and essentially unchanged compared to prior  He has been assigned observation status for chest pain rule out  Review of Systems:  No fever, chills or sweats  No change in urine or bowel habits  No headache, focal weakness  No abdominal pain, nausea or vomiting    All systems are reviewed  Positive as per history of presenting illness  Patient answered no to all other questions  Past Medical and Surgical History:     Past Medical History:   Diagnosis Date    Cervical mass     Depression     [facility]     Hypertension     [facility]     Renal disorder        Past Surgical History:   Procedure Laterality Date    APPENDECTOMY      CERVICAL SPINE SURGERY      COLONOSCOPY N/A 8/23/2018    Procedure: COLONOSCOPY;  Surgeon: Justin Gandara MD;  Location: [facility];   Service: Gastroenterology    ESOPHAGOGASTRODUODENOSCOPY N/A 8/22/2018    Procedure: ESOPHAGOGASTRODUODENOSCOPY (EGD); Surgeon: Taz Voss MD;  Location: [facility]; Service: Gastroenterology    NECK SURGERY         Meds/Allergies:    Prior to Admission medications    Medication Sig Start Date End Date Taking?  Authorizing Provider   carvedilol (COREG) 12 5 mg tablet Take 12 5 mg by mouth 2 (two) times a day with meals   Yes Historical Provider, MD   gabapentin (NEURONTIN) 600 MG tablet Take 600 mg by mouth daily   Yes Historical Provider, MD   lisinopril (ZESTRIL) 40 mg tablet Take 40 mg by mouth daily   Yes Historical Provider, MD   mycophenolate (CELLCEPT) 500 mg tablet Take 1,000 mg by mouth every 12 (twelve) hours     Yes Historical Provider, MD   rivaroxaban (XARELTO) 20 mg tablet Take 20 mg by mouth daily with dinner   Yes Historical Provider, MD   tamsulosin (FLOMAX) 0 4 mg Take 0 8 mg by mouth Medrol Dose Pack scheduling ONLY   Yes Historical Provider, MD   traMADol (ULTRAM) 50 mg tablet Take 50 mg by mouth every 6 (six) hours as needed for moderate pain   Yes Historical Provider, MD   albuterol (PROVENTIL HFA,VENTOLIN HFA) 90 mcg/act inhaler Inhale 2 puffs every 6 (six) hours as needed for wheezing or shortness of breath 5/4/19   Radha Marley PA-C   baclofen 20 mg tablet Take 20 mg by mouth 2 (two) times a day    Historical Provider, MD   betamethasone dipropionate (DIPROSONE) 0 05 % cream Apply 1 application topically 2 (two) times a day    Historical Provider, MD   betamethasone, augmented, (DIPROLENE) 0 05 % ointment Apply 1 application topically 2 (two) times a day    Historical Provider, MD   escitalopram (LEXAPRO) 10 mg tablet Take 10 mg by mouth daily    Historical Provider, MD   furosemide (LASIX) 40 mg tablet Take 0 5 tablets (20 mg total) by mouth daily 7/31/18   Vincent Arreguin PA-C   hydroxychloroquine (PLAQUENIL) 200 mg tablet Take 200 mg by mouth 2 (two) times a day    Historical Provider, MD   pantoprazole (PROTONIX) 40 mg tablet Take 1 tablet (40 mg total) by mouth 2 (two) times a day before meals 8/23/18   Lonnie Schroeder MD   predniSONE 5 mg tablet Take 5 mg by mouth daily    Historical Provider, MD   sildenafil (REVATIO) 20 mg tablet Take 20 mg by mouth 3 (three) times a day    Historical Provider, MD     I have reviewed home medications using allscripts  Allergies: No Known Allergies    Social History:     Marital Status: /Civil Union   Substance Use History:   Social History     Substance and Sexual Activity   Alcohol Use No     Social History     Tobacco Use   Smoking Status Never Smoker   Smokeless Tobacco Never Used     Social History     Substance and Sexual Activity   Drug Use No       Family History:    non-contributory      Physical Exam:  Vitals:   Blood Pressure: 93/56 (07/17/19 2308)  Pulse: 79 (07/17/19 2308)  Temperature: 98 4 °F (36 9 °C) (07/17/19 2005)  Temp Source: Oral (07/17/19 2005)  Respirations: 18 (07/17/19 2308)  Weight - Scale: 88 5 kg (195 lb 1 7 oz) (07/17/19 2005)  SpO2: 97 % (07/17/19 2308)    Vital signs are reviewed as above  No distress, awake and alert  Sclera anicteric  Dry mucous membranes  Butterfly rash involving the cheeks and bridge of his nose  Regular rate rhythm  Clear bilaterally, no wheezes or rhonchi  Soft, positive bowel sounds  No peripheral edema, chronic stasis changes  Distal pulses intact  Calm and cooperative  Move all extremities            Additional Data:     Lab Results: I have personally reviewed pertinent reports        Results from last 7 days   Lab Units 07/17/19 2010   WBC Thousand/uL 3 35*   HEMOGLOBIN g/dL 13 1   HEMATOCRIT % 38 5   PLATELETS Thousands/uL 251   LYMPHO PCT % 29   MONO PCT % 23*   EOS PCT % 2     Results from last 7 days   Lab Units 07/17/19 2010   POTASSIUM mmol/L 3 6   CHLORIDE mmol/L 103   CO2 mmol/L 27   BUN mg/dL 36*   CREATININE mg/dL 1 54*   CALCIUM mg/dL 8 8   ALK PHOS U/L 137* ALT U/L 14   AST U/L 16           Imaging: I have personally reviewed pertinent reports  Cta Ed Chest Pe Study    Result Date: 7/17/2019  Narrative: CTA - CHEST WITH IV CONTRAST - PULMONARY ANGIOGRAM INDICATION:   Chest pain  Recent deep vein thrombosis    COMPARISON: CT of the chest on 7/28/2018  TECHNIQUE: CTA examination of the chest was performed using angiographic technique according to a protocol specifically tailored to evaluate for pulmonary embolism  Axial, sagittal, and coronal 2D reformatted images were created from the source data and  submitted for interpretation  In addition, coronal 3D MIP postprocessing was performed on the acquisition scanner  Radiation dose length product (DLP) for this visit:  501 mGy-cm   This examination, like all CT scans performed in the [facility], was performed utilizing techniques to minimize radiation dose exposure, including the use of iterative reconstruction and automated exposure control  IV Contrast:  85 mL of iohexol (OMNIPAQUE)  was administered intravenously without immediate adverse reaction  FINDINGS: PULMONARY ARTERIAL TREE:  No pulmonary embolus is seen  LUNGS:  5 mm right lower lobe pulmonary nodule (series 2, image 131)  No focal consolidation  The central airways are patent  PLEURA:  Unremarkable  HEART/GREAT VESSELS:  Unremarkable for patient's age  MEDIASTINUM AND JAVIER:  Unremarkable  CHEST WALL AND LOWER NECK:   Right axillary clips  VISUALIZED STRUCTURES IN THE UPPER ABDOMEN:  Right renal cyst  OSSEOUS STRUCTURES:  No acute fracture or destructive osseous lesion  Stable mild compression deformities of T7 and T8  Impression: 1  No evidence of pulmonary embolism  2   5 mm right lower lobe pulmonary nodule  Based on current Fleischner Society 2017 Guidelines on incidental pulmonary nodule, no routine follow-up is needed if the patient is considered low risk for lung cancer    If the patient is considered high risk for lung cancer, 12 month follow-up non-contrast chest CT is recommended  The study was marked in EPIC for significant notification  Workstation performed: [id number]       EKG, Pathology, and Other Studies Reviewed on Admission:   · EKG:  Normal sinus rhythm, no acute ischemic changes, Q-waves inferiorly    Allscripts Records Reviewed: Yes     ** Please Note: Dragon 360 Dictation voice to text software may have been used in the creation of this document   **

## 2019-07-18 NOTE — ASSESSMENT & PLAN NOTE
· Cr up to 1 59 today  · Patient has a history of Lupus nephritis, CKD, and history of ATN from IV contrast   He received contrast yesterday  · Follow up urine lytes, renal ultrasound  · IVFs  · Nephrology input appreciated  · Lisinopril and Lasix held

## 2019-07-18 NOTE — ASSESSMENT & PLAN NOTE
· Patient presented with CP with radiation to the neck and worse with exertion that began yesterday  Currently, he is CP free  · CTA chest negative for PE, 5mm noted pulmonary nodule noted (outpatient follow up recommended to patient)  · Troponins x 3 negative  EKG showed sinus tachycardia  · He has a history of HTN and Lupus  · Continue Telemetry  · Check Echo and Stress test   · Cardiology input appreciated

## 2019-07-18 NOTE — PROGRESS NOTES
Progress Note - Emeterio Pass 1970, 44 y o  male MRN: [id number]    Unit/Bed#: JOHNY Encounter: [id number]    Primary Care Provider: Indira Peralta MD   Date and time admitted to hospital: 7/17/2019  8:01 PM    Post-admission note    * Chest pain  Assessment & Plan  · Patient presented with CP with radiation to the neck and worse with exertion that began yesterday  Currently, he is CP free  · CTA chest negative for PE, 5mm noted pulmonary nodule noted (outpatient follow up recommended to patient)  · Troponins x 3 negative  EKG showed sinus tachycardia  · He has a history of HTN and Lupus  · Continue Telemetry  · Check Echo and Stress test   · Cardiology input appreciated  MARK (acute kidney injury) ([facility] )  Assessment & Plan  · Cr up to 1 59 today  · Patient has a history of Lupus nephritis, CKD, and history of ATN from IV contrast   He received contrast yesterday  · Follow up urine lytes, renal ultrasound  · IVFs  · Nephrology input appreciated  · Lisinopril and Lasix held  History of DVT (deep vein thrombosis)  Assessment & Plan  · On Xarelto  Hypertension  Assessment & Plan  · On Coreg  · Lisinopril and Lasix held due to MARK  Lupus (systemic lupus erythematosus) (HCC)  Assessment & Plan  · On Cellcept and Prednisone  S: Patient reports he developed chest pain yesterday  Described as a pressure and worse with exertion  Radiated to jaw  Currently, he is pain free  No SOB  No nausea or vomiting  No abdominal pain  O: Gen: 45 y/o male in NAD  HEENT: NCAT  CV: S1S2, RRR  Lungs: CTAB  Abdomen: Soft, NT, +BS  Ext: No clubbing/cyanosis  Neuro: AOx3

## 2019-07-18 NOTE — CONSULTS
Consultation - Cardiology   Feng Cr 44 y o  male MRN: [id number]  Unit/Bed#: ED 10 Encounter: [id number]  07/18/19  10:10 AM    Assessment/ Plan:  1- Chest pain  Troponins negative x3  EKG showing sinus tachycardia  CTA PE study negative  TTE pending  Will order pharmacologic stress test to assess for reversible ischemia  Further recommendations will be based on test results  2- Hypertension, soft  Continue Coreg 12 5mg BID and Lisinopril 40mg daily  Continue monitoring  3- Chronic venous insufficiency/ History DVT  Continue Xarelto  PE study negative  History of Present Illness   Physician Requesting Consult: Ranjeet Valdez MD  Reason for Consult / Principal Problem: Chest pain  HPI: Feng Cr is a 44y o  year old male with history of lupus nephritis on immunosuppressants, CKD 3, hypertension, DVT on Xarelto, anemia of chronic disease complains of centralized chest pain occurring at rest yesterday evening  Patient felt sweaty and had some shortness of breath with chest pain  Denies aggravating and allieving factors including exertion  No prior history of CV disease but does see vascular surgery for AAA, DVT and chronic venous insufficiency and chronic leg wounds, states he is not very active at home due to this  Former smoker, denies etoh use  Inpatient consult to Cardiology  Consult performed by: Gee Alexandra PA-C  Consult ordered by: Cherelle Haji PA-C          EKG: Sinus tachycardia      Review of Systems   Constitutional: Positive for diaphoresis  Negative for appetite change, chills, fatigue and fever  Respiratory: Positive for chest tightness and shortness of breath  Negative for cough  Cardiovascular: Negative for chest pain, palpitations and leg swelling  Gastrointestinal: Negative for diarrhea, nausea and vomiting  Endocrine: Negative for cold intolerance and heat intolerance  Genitourinary: Negative for difficulty urinating, dysuria and enuresis  Musculoskeletal: Negative for arthralgias, back pain and gait problem  Allergic/Immunologic: Negative for environmental allergies and food allergies  Neurological: Negative for dizziness, facial asymmetry and headaches  Hematological: Negative for adenopathy  Does not bruise/bleed easily  Psychiatric/Behavioral: Negative for agitation, behavioral problems and confusion  Historical Information   Past Medical History:   Diagnosis Date    Cervical mass     Depression     DVT (deep venous thrombosis) (HCC)     Hypertension     Lupus ([address] )     Renal disorder      Past Surgical History:   Procedure Laterality Date    APPENDECTOMY      CERVICAL SPINE SURGERY      COLONOSCOPY N/A 8/23/2018    Procedure: COLONOSCOPY;  Surgeon: Maryana Marquez MD;  Location: [facility]; Service: Gastroenterology    ESOPHAGOGASTRODUODENOSCOPY N/A 8/22/2018    Procedure: ESOPHAGOGASTRODUODENOSCOPY (EGD); Surgeon: Maryana Marquez MD;  Location: [facility]; Service: Gastroenterology    NECK SURGERY       Social History     Substance and Sexual Activity   Alcohol Use No     Social History     Substance and Sexual Activity   Drug Use No     Social History     Tobacco Use   Smoking Status Never Smoker   Smokeless Tobacco Never Used       Family History: History reviewed  No pertinent family history      Meds/Allergies   current meds:   Current Facility-Administered Medications   Medication Dose Route Frequency    baclofen tablet 20 mg  20 mg Oral BID    carvedilol (COREG) tablet 12 5 mg  12 5 mg Oral BID With Meals    escitalopram (LEXAPRO) tablet 10 mg  10 mg Oral Daily    gabapentin (NEURONTIN) capsule 600 mg  600 mg Oral Daily    hydroxychloroquine (PLAQUENIL) tablet 200 mg  200 mg Oral BID    lisinopril (ZESTRIL) tablet 40 mg  40 mg Oral Daily    mycophenolate (CELLCEPT) capsule 500 mg  500 mg Oral Q12H [facility]    ondansetron (ZOFRAN) injection 4 mg  4 mg Intravenous Q6H PRN    predniSONE tablet 5 mg 5 mg Oral Daily    rivaroxaban (XARELTO) tablet 20 mg  20 mg Oral Daily With Dinner    tamsulosin (FLOMAX) capsule 0 8 mg  0 8 mg Oral Once HS    traMADol (ULTRAM) tablet 50 mg  50 mg Oral Q6H PRN     No Known Allergies    Objective   Vitals: Blood pressure 99/63, pulse 62, temperature 98 4 °F (36 9 °C), temperature source Oral, resp  rate 18, weight 88 5 kg (195 lb 1 7 oz), SpO2 98 %  , Body mass index is 31 49 kg/m² ,   Orthostatic Blood Pressures      Most Recent Value   Blood Pressure  99/63 filed at 07/18/2019 0931   Patient Position - Orthostatic VS  Lying filed at 07/18/2019 0045          Systolic (33IOX), CEQ:056 , Min:93 , QIE:380     Diastolic (39YNS), CPN:33, Min:56, Max:77        Intake/Output Summary (Last 24 hours) at 7/18/2019 1010  Last data filed at 7/18/2019 0416  Gross per 24 hour   Intake    Output 1000 ml   Net -1000 ml       Invasive Devices     Peripheral Intravenous Line            Peripheral IV 07/17/19 Left Antecubital 1 day                    Physical Exam:  GEN: Alert and oriented x 3, in no acute distress  Well appearing and well nourished  HEENT: Sclera anicteric, conjunctivae pink, mucous membranes moist  Oropharynx clear  NECK: Supple, no carotid bruits, no significant JVD  Trachea midline, no thyromegaly  HEART: Regular rhythm, normal S1 and S2, no murmurs, clicks, gallops or rubs  PMI nondisplaced, no thrills  LUNGS: Clear to auscultation bilaterally; no wheezes, rales, or rhonchi  No increased work of breathing or signs of respiratory distress  ABDOMEN: Soft, nontender, nondistended, normoactive bowel sounds  EXTREMITIES: Skin warm and well perfused, no clubbing, cyanosis, or edema  NEURO: No focal findings  Normal speech  Mood and affect normal    SKIN: Normal without suspicious lesions on exposed skin        Lab Results:     Troponins:   Results from last 7 days   Lab Units 07/18/19  0410 07/17/19  2359 07/17/19 2010   TROPONIN I ng/mL <0 02 <0 02 <0 02       CBC with diff:   Results from last 7 days   Lab Units 07/17/19 2010   WBC Thousand/uL 3 35*   HEMOGLOBIN g/dL 13 1   HEMATOCRIT % 38 5   MCV fL 88   PLATELETS Thousands/uL 251   MCH pg 29 9   MCHC g/dL 34 0   RDW % 14 2   MPV fL 10 2   NRBC AUTO /100 WBCs 0         CMP:   Results from last 7 days   Lab Units 07/18/19  0410 07/17/19 2010   POTASSIUM mmol/L 4 5 3 6   CHLORIDE mmol/L 107 103   CO2 mmol/L 28 27   BUN mg/dL 34* 36*   CREATININE mg/dL 1 59* 1 54*   CALCIUM mg/dL 8 5 8 8   AST U/L  --  16   ALT U/L  --  14   ALK PHOS U/L  --  137*   EGFR ml/min/1 73sq m 50 60

## 2019-07-18 NOTE — PHYSICIAN ADVISOR
Current patient class: Observation  The patient is currently on Hospital Day: 2 at [address]        The patient was admitted to the hospital  on N/A at N/A for the following diagnosis:  Shortness of breath [R06 02]  Lupus nephritis (HCC) [M32 14]  Chest pain [R07 9]  Systemic lupus erythematosus, unspecified SLE type, unspecified organ involvement status ([address] ) [M32 9]     After review of the relevant documentation, labs, vital signs and test results, the patient is most appropriate for OBSERVATION STATUS- see below  Rationale is as follows: The patient is a 78-year-old male who presented to the hospital on July 17, 2019  At this point he has been under observation class for less than 24 hours  He presented with chest pain  He has lupus nephritis  CTA was negative for pulmonary embolism  Troponin values have been negative  Today July 18th the patient is chest pain-free  His creatinine did increase to 1 59  Nephrology has been consulted  He is on intravenous fluids  The patient appeared euvolemic but there was possible volume depletion as well as the fact that he had the CTA with contrast   He has chronic kidney disease and has fluctuating creatinine values in the system  I recommend maintaining observation class  If the patient cannot be discharged tomorrow because of acute on chronic kidney insufficiency requiring continued intravenous fluids or hospital management then I recommend change to inpatient class on July 19th  This would also be appropriate if the patient is medically unstable for discharge for other reasons      The patients vitals on arrival were ED Triage Vitals [07/17/19 2005]   Temperature Pulse Respirations Blood Pressure SpO2   98 4 °F (36 9 °C) 92 18 114/77 99 %      Temp Source Heart Rate Source Patient Position - Orthostatic VS BP Location FiO2 (%)   Oral Monitor Sitting Left arm --      Pain Score       8           Past Medical History: Diagnosis Date    Cervical mass     Depression     DVT (deep venous thrombosis) (HCC)     Hypertension     [address] [address]     Renal disorder      Past Surgical History:   Procedure Laterality Date    APPENDECTOMY      CERVICAL SPINE SURGERY      COLONOSCOPY N/A 8/23/2018    Procedure: COLONOSCOPY;  Surgeon: Taz Voss MD;  [facility]; Service: Gastroenterology    ESOPHAGOGASTRODUODENOSCOPY N/A 8/22/2018    Procedure: ESOPHAGOGASTRODUODENOSCOPY (EGD); Surgeon: Taz Voss MD;  [facility]; Service: Gastroenterology    NECK SURGERY      VASCULAR SURGERY             Consults have been placed to:   IP CONSULT TO CARDIOLOGY  IP CONSULT TO NEPHROLOGY    Vitals:    07/18/19 0930 07/18/19 0931 07/18/19 1100 07/18/19 1500   BP: 99/58 99/63 103/65    BP Location:  Left arm Left arm    Pulse:  62 69    Resp:  18 16    Temp:  98 4 °F (36 9 °C)     TempSrc:  Oral     SpO2:  98% 96%    Weight:    85 8 kg (189 lb 2 5 oz)   Height:    5' 6" (1 676 m)       Most recent labs:    Recent Labs     07/17/19 2010 07/18/19  0410   WBC 3 35*  --   --    HGB 13 1  --   --    HCT 38 5  --   --      --   --    K 3 6  --  4 5   CALCIUM 8 8  --  8 5   BUN 36*  --  34*   CREATININE 1 54*  --  1 59*   TROPONINI <0 02   < > <0 02   AST 16  --   --    ALT 14  --   --    ALKPHOS 137*  --   --     < > = values in this interval not displayed         Scheduled Meds:  Current Facility-Administered Medications:  baclofen 20 mg Oral BID Sarah Cabello MD    carvedilol 12 5 mg Oral BID With Meals Sarah Cabello MD    escitalopram 10 mg Oral Daily Sarah Cabello MD    gabapentin 600 mg Oral Daily Sarah Cabello MD    hydroxychloroquine 200 mg Oral BID Sarah Cabello MD    mycophenolate 500 mg Oral Q12H Lillian Vazquez MD    ondansetron 4 mg Intravenous Q6H PRN Sarah Cabello MD    predniSONE 5 mg Oral Daily Sarah Cabello MD    rivaroxaban 20 mg Oral Daily With Tonja Griffin MD    sodium chloride 100 mL/hr Intravenous Continuous David Magallanes MD Last Rate: 100 mL/hr (07/18/19 1133)   tamsulosin 0 8 mg Oral Once HS Geryl Bernheim, MD    traMADol 50 mg Oral Q6H PRN Geryl Bernheim, MD      Continuous Infusions:  sodium chloride 100 mL/hr Last Rate: 100 mL/hr (07/18/19 1133)     PRN Meds: ondansetron    traMADol

## 2019-07-19 VITALS
HEART RATE: 69 BPM | SYSTOLIC BLOOD PRESSURE: 103 MMHG | TEMPERATURE: 98.4 F | OXYGEN SATURATION: 96 % | RESPIRATION RATE: 16 BRPM | BODY MASS INDEX: 30.47 KG/M2 | HEIGHT: 66 IN | WEIGHT: 189.6 LBS | DIASTOLIC BLOOD PRESSURE: 65 MMHG

## 2019-07-19 LAB
25(OH)D3 SERPL-MCNC: 18 NG/ML (ref 30–100)
ANION GAP SERPL CALCULATED.3IONS-SCNC: 6 MMOL/L (ref 4–13)
BACTERIA UR QL AUTO: ABNORMAL /HPF
BASOPHILS # BLD AUTO: 0.02 THOUSANDS/ΜL (ref 0–0.1)
BASOPHILS NFR BLD AUTO: 1 % (ref 0–1)
BILIRUB UR QL STRIP: NEGATIVE
BUN SERPL-MCNC: 23 MG/DL (ref 5–25)
CALCIUM SERPL-MCNC: 8.8 MG/DL (ref 8.3–10.1)
CHLORIDE SERPL-SCNC: 106 MMOL/L (ref 100–108)
CLARITY UR: CLEAR
CO2 SERPL-SCNC: 27 MMOL/L (ref 21–32)
COLOR UR: YELLOW
CREAT SERPL-MCNC: 1.14 MG/DL (ref 0.6–1.3)
EOSINOPHIL # BLD AUTO: 0.03 THOUSAND/ΜL (ref 0–0.61)
EOSINOPHIL NFR BLD AUTO: 1 % (ref 0–6)
EOSINOPHIL NFR URNS MANUAL: 0 %
ERYTHROCYTE [DISTWIDTH] IN BLOOD BY AUTOMATED COUNT: 14.7 % (ref 11.6–15.1)
GFR SERPL CREATININE-BSD FRML MDRD: 74 ML/MIN/1.73SQ M
GLUCOSE P FAST SERPL-MCNC: 107 MG/DL (ref 65–99)
GLUCOSE SERPL-MCNC: 107 MG/DL (ref 65–140)
GLUCOSE UR STRIP-MCNC: NEGATIVE MG/DL
HCT VFR BLD AUTO: 34.9 % (ref 36.5–49.3)
HGB BLD-MCNC: 11.4 G/DL (ref 12–17)
HGB UR QL STRIP.AUTO: ABNORMAL
IMM GRANULOCYTES # BLD AUTO: 0.02 THOUSAND/UL (ref 0–0.2)
IMM GRANULOCYTES NFR BLD AUTO: 1 % (ref 0–2)
KETONES UR STRIP-MCNC: NEGATIVE MG/DL
LEUKOCYTE ESTERASE UR QL STRIP: NEGATIVE
LYMPHOCYTES # BLD AUTO: 0.9 THOUSANDS/ΜL (ref 0.6–4.47)
LYMPHOCYTES NFR BLD AUTO: 35 % (ref 14–44)
MCH RBC QN AUTO: 29.6 PG (ref 26.8–34.3)
MCHC RBC AUTO-ENTMCNC: 32.7 G/DL (ref 31.4–37.4)
MCV RBC AUTO: 91 FL (ref 82–98)
MONOCYTES # BLD AUTO: 0.42 THOUSAND/ΜL (ref 0.17–1.22)
MONOCYTES NFR BLD AUTO: 16 % (ref 4–12)
NEUTROPHILS # BLD AUTO: 1.19 THOUSANDS/ΜL (ref 1.85–7.62)
NEUTS SEG NFR BLD AUTO: 46 % (ref 43–75)
NITRITE UR QL STRIP: NEGATIVE
NON-SQ EPI CELLS URNS QL MICRO: ABNORMAL /HPF
NRBC BLD AUTO-RTO: 0 /100 WBCS
PH UR STRIP.AUTO: 6 [PH]
PHOSPHATE SERPL-MCNC: 3 MG/DL (ref 2.7–4.5)
PLATELET # BLD AUTO: 205 THOUSANDS/UL (ref 149–390)
PMV BLD AUTO: 9.9 FL (ref 8.9–12.7)
POTASSIUM SERPL-SCNC: 4.8 MMOL/L (ref 3.5–5.3)
PROT UR STRIP-MCNC: NEGATIVE MG/DL
PTH-INTACT SERPL-MCNC: 82 PG/ML (ref 18.4–80.1)
RBC # BLD AUTO: 3.85 MILLION/UL (ref 3.88–5.62)
RBC #/AREA URNS AUTO: ABNORMAL /HPF
SODIUM SERPL-SCNC: 139 MMOL/L (ref 136–145)
SP GR UR STRIP.AUTO: 1.01 (ref 1–1.03)
URATE SERPL-MCNC: 9.8 MG/DL (ref 4.2–8)
UROBILINOGEN UR QL STRIP.AUTO: 0.2 E.U./DL
WBC # BLD AUTO: 2.58 THOUSAND/UL (ref 4.31–10.16)
WBC #/AREA URNS AUTO: ABNORMAL /HPF

## 2019-07-19 PROCEDURE — 84100 ASSAY OF PHOSPHORUS: CPT | Performed by: INTERNAL MEDICINE

## 2019-07-19 PROCEDURE — 99225 PR SBSQ OBSERVATION CARE/DAY 25 MINUTES: CPT | Performed by: INTERNAL MEDICINE

## 2019-07-19 PROCEDURE — 83970 ASSAY OF PARATHORMONE: CPT | Performed by: INTERNAL MEDICINE

## 2019-07-19 PROCEDURE — 85025 COMPLETE CBC W/AUTO DIFF WBC: CPT | Performed by: INTERNAL MEDICINE

## 2019-07-19 PROCEDURE — 82306 VITAMIN D 25 HYDROXY: CPT | Performed by: INTERNAL MEDICINE

## 2019-07-19 PROCEDURE — 84550 ASSAY OF BLOOD/URIC ACID: CPT | Performed by: INTERNAL MEDICINE

## 2019-07-19 PROCEDURE — 80048 BASIC METABOLIC PNL TOTAL CA: CPT | Performed by: INTERNAL MEDICINE

## 2019-07-19 PROCEDURE — 99217 PR OBSERVATION CARE DISCHARGE MANAGEMENT: CPT | Performed by: PHYSICIAN ASSISTANT

## 2019-07-19 PROCEDURE — 81001 URINALYSIS AUTO W/SCOPE: CPT | Performed by: INTERNAL MEDICINE

## 2019-07-19 RX ADMIN — SODIUM CHLORIDE 100 ML/HR: 0.9 INJECTION, SOLUTION INTRAVENOUS at 01:57

## 2019-07-19 RX ADMIN — HYDROXYCHLOROQUINE SULFATE 200 MG: 200 TABLET, FILM COATED ORAL at 10:19

## 2019-07-19 RX ADMIN — ESCITALOPRAM OXALATE 10 MG: 10 TABLET ORAL at 08:36

## 2019-07-19 RX ADMIN — CARVEDILOL 12.5 MG: 12.5 TABLET, FILM COATED ORAL at 08:30

## 2019-07-19 RX ADMIN — TRAMADOL HYDROCHLORIDE 50 MG: 50 TABLET, COATED ORAL at 11:36

## 2019-07-19 RX ADMIN — BACLOFEN 20 MG: 10 TABLET ORAL at 08:36

## 2019-07-19 RX ADMIN — PREDNISONE 5 MG: 5 TABLET ORAL at 08:36

## 2019-07-19 RX ADMIN — MYCOPHENOLATE MOFETIL 500 MG: 250 CAPSULE ORAL at 08:36

## 2019-07-19 RX ADMIN — GABAPENTIN 600 MG: 300 CAPSULE ORAL at 08:36

## 2019-07-19 NOTE — ASSESSMENT & PLAN NOTE
· Cr elevated to 1 59 and improved today with IVFs to 1  14   Etiology suspected to be from volume depletion and contrast induced nephropathy  · Patient has a history of Lupus nephritis, CKD  · Renal ultrasound showed a stable small right renal cyst   · Nephrology input appreciated  · He can be discharged today after receiving 500cc of additional IVFs  · Lisinopril and Lasix held during admission but can be resumed tomorrow

## 2019-07-19 NOTE — PLAN OF CARE
Problem: PAIN - ADULT  Goal: Verbalizes/displays adequate comfort level or baseline comfort level  Description  Interventions:  - Encourage patient to monitor pain and request assistance  - Assess pain using appropriate pain scale  - Administer analgesics based on type and severity of pain and evaluate response  - Implement non-pharmacological measures as appropriate and evaluate response  - Consider cultural and social influences on pain and pain management  - Notify physician/advanced practitioner if interventions unsuccessful or patient reports new pain  Outcome: Adequate for Discharge     Problem: INFECTION - ADULT  Goal: Absence or prevention of progression during hospitalization  Description  INTERVENTIONS:  - Assess and monitor for signs and symptoms of infection  - Monitor lab/diagnostic results  - Monitor all insertion sites, i e  indwelling lines, tubes, and drains  - Monitor endotracheal (as able) and nasal secretions for changes in amount and color  - [place] appropriate cooling/warming therapies per order  - Administer medications as ordered  - Instruct and encourage patient and family to use good hand hygiene technique  - Identify and instruct in appropriate isolation precautions for identified infection/condition  Outcome: Adequate for Discharge  Goal: Absence of fever/infection during neutropenic period  Description  INTERVENTIONS:  - Monitor WBC  - Implement neutropenic guidelines  Outcome: Adequate for Discharge     Problem: SAFETY ADULT  Goal: Patient will remain free of falls  Description  INTERVENTIONS:  - Assess patient frequently for physical needs  -  Identify cognitive and physical deficits and behaviors that affect risk of falls    -  [place] fall precautions as indicated by assessment   - Educate patient/family on patient safety including physical limitations  - Instruct patient to call for assistance with activity based on assessment  - Modify environment to reduce risk of injury  - Consider OT/PT consult to assist with strengthening/mobility  Outcome: Adequate for Discharge  Goal: Maintain or return to baseline ADL function  Description  INTERVENTIONS:  -  Assess patient's ability to carry out ADLs; assess patient's baseline for ADL function and identify physical deficits which impact ability to perform ADLs (bathing, care of mouth/teeth, toileting, grooming, dressing, etc )  - Assess/evaluate cause of self-care deficits   - Assess range of motion  - Assess patient's mobility; develop plan if impaired  - Assess patient's need for assistive devices and provide as appropriate  - Encourage maximum independence but intervene and supervise when necessary  ¯ Involve family in performance of ADLs  ¯ Assess for home care needs following discharge   ¯ Request OT consult to assist with ADL evaluation and planning for discharge  ¯ Provide patient education as appropriate  Outcome: Adequate for Discharge  Goal: Maintain or return mobility status to optimal level  Description  INTERVENTIONS:  - Assess patient's baseline mobility status (ambulation, transfers, stairs, etc )    - Identify cognitive and physical deficits and behaviors that affect mobility  - Identify mobility aids required to assist with transfers and/or ambulation (gait belt, sit-to-stand, lift, walker, cane, etc )  - [place] fall precautions as indicated by assessment  - Record patient progress and toleration of activity level on Mobility SBAR; progress patient to next Phase/Stage  - Instruct patient to call for assistance with activity based on assessment  - Request Rehabilitation consult to assist with strengthening/weightbearing, etc   Outcome: Adequate for Discharge     Problem: DISCHARGE PLANNING  Goal: Discharge to home or other facility with appropriate resources  Description  INTERVENTIONS:  - Identify barriers to discharge w/patient and caregiver  - Arrange for needed discharge resources and transportation as appropriate  - Identify discharge learning needs (meds, wound care, etc )  - Arrange for interpretive services to assist at discharge as needed  - Refer to Case Management Department for coordinating discharge planning if the patient needs post-hospital services based on physician/advanced practitioner order or complex needs related to functional status, cognitive ability, or social support system  Outcome: Adequate for Discharge     Problem: Knowledge Deficit  Goal: Patient/family/caregiver demonstrates understanding of disease process, treatment plan, medications, and discharge instructions  Description  Complete learning assessment and assess knowledge base  Interventions:  - Provide teaching at level of understanding  - Provide teaching via preferred learning methods  Outcome: Adequate for Discharge     Problem: CARDIOVASCULAR - ADULT  Goal: Maintains optimal cardiac output and hemodynamic stability  Description  INTERVENTIONS:  - Monitor I/O, vital signs and rhythm  - Monitor for S/S and trends of decreased cardiac output i e  bleeding, hypotension  - Administer and titrate ordered vasoactive medications to optimize hemodynamic stability  - Assess quality of pulses, skin color and temperature  - Assess for signs of decreased coronary artery perfusion - ex   Angina  - Instruct patient to report change in severity of symptoms  Outcome: Adequate for Discharge  Goal: Absence of cardiac dysrhythmias or at baseline rhythm  Description  INTERVENTIONS:  - Continuous cardiac monitoring, monitor vital signs, obtain 12 lead EKG if indicated  - Administer antiarrhythmic and heart rate control medications as ordered  - Monitor electrolytes and administer replacement therapy as ordered  Outcome: Adequate for Discharge     Problem: METABOLIC, FLUID AND ELECTROLYTES - ADULT  Goal: Electrolytes maintained within normal limits  Description  INTERVENTIONS:  - Monitor labs and assess patient for signs and symptoms of electrolyte imbalances  - Administer electrolyte replacement as ordered  - Monitor response to electrolyte replacements, including repeat lab results as appropriate  - Instruct patient on fluid and nutrition as appropriate  Outcome: Adequate for Discharge  Goal: Fluid balance maintained  Description  INTERVENTIONS:  - Monitor labs and assess for signs and symptoms of volume excess or deficit  - Monitor I/O and WT  - Instruct patient on fluid and nutrition as appropriate  Outcome: Adequate for Discharge  Goal: Glucose maintained within target range  Description  INTERVENTIONS:  - Monitor Blood Glucose as ordered  - Assess for signs and symptoms of hyperglycemia and hypoglycemia  - Administer ordered medications to maintain glucose within target range  - Assess nutritional intake and initiate nutrition service referral as needed  Outcome: Adequate for Discharge     Problem: SKIN/TISSUE INTEGRITY - ADULT  Goal: Skin integrity remains intact  Description  INTERVENTIONS  - Identify patients at risk for skin breakdown  - Assess and monitor skin integrity  - Assess and monitor nutrition and hydration status  - Monitor labs (i e  albumin)  - Assess for incontinence   - Turn and reposition patient  - Assist with mobility/ambulation  - Relieve pressure over bony prominences  - Avoid friction and shearing  - Provide appropriate hygiene as needed including keeping skin clean and dry  - Evaluate need for skin moisturizer/barrier cream  - Collaborate with interdisciplinary team (i e  Nutrition, Rehabilitation, etc )   - Patient/family teaching  Outcome: Adequate for Discharge  Goal: Incision(s), wounds(s) or drain site(s) healing without S/S of infection  Description  INTERVENTIONS  - Assess and document risk factors for skin impairment   - Assess and document dressing, incision, wound bed, drain sites and surrounding tissue  - Initiate Nutrition services consult and/or wound management as needed  Outcome: Adequate for Discharge  Goal: Oral mucous membranes remain intact  Description  INTERVENTIONS  - Assess oral mucosa and hygiene practices  - Implement preventative oral hygiene regimen  - Implement oral medicated treatments as ordered  - Initiate Nutrition services referral as needed  Outcome: Adequate for Discharge     Problem: HEMATOLOGIC - ADULT  Goal: Maintains hematologic stability  Description  INTERVENTIONS  - Assess for signs and symptoms of bleeding or hemorrhage  - Monitor labs  - Administer supportive blood products/factors as ordered and appropriate  Outcome: Adequate for Discharge     Problem: MUSCULOSKELETAL - ADULT  Goal: Maintain or return mobility to safest level of function  Description  INTERVENTIONS:  - Assess patient's ability to carry out ADLs; assess patient's baseline for ADL function and identify physical deficits which impact ability to perform ADLs (bathing, care of mouth/teeth, toileting, grooming, dressing, etc )  - Assess/evaluate cause of self-care deficits   - Assess range of motion  - Assess patient's mobility; develop plan if impaired  - Assess patient's need for assistive devices and provide as appropriate  - Encourage maximum independence but intervene and supervise when necessary  - Involve family in performance of ADLs  - Assess for home care needs following discharge   - Request OT consult to assist with ADL evaluation and planning for discharge  - Provide patient education as appropriate  Outcome: Adequate for Discharge  Goal: Maintain proper alignment of affected body part  Description  INTERVENTIONS:  - Support, maintain and protect limb and body alignment  - Provide pt/fam with appropriate education  Outcome: Adequate for Discharge     Problem: Prexisting or High Potential for Compromised Skin Integrity  Goal: Skin integrity is maintained or improved  Description  INTERVENTIONS:  - Identify patients at risk for skin breakdown  - Assess and monitor skin integrity  - Assess and monitor nutrition and hydration status  - Monitor labs (i e  albumin)  - Assess for incontinence   - Turn and reposition patient  - Assist with mobility/ambulation  - Relieve pressure over bony prominences  - Avoid friction and shearing  - Provide appropriate hygiene as needed including keeping skin clean and dry  - Evaluate need for skin moisturizer/barrier cream  - Collaborate with interdisciplinary team (i e  Nutrition, Rehabilitation, etc )   - Patient/family teaching  Outcome: Adequate for Discharge

## 2019-07-19 NOTE — ASSESSMENT & PLAN NOTE
· Patient presented with CP with radiation to the neck which resolved  · CTA was chest negative for PE, a 5mm noted pulmonary nodule noted (outpatient follow up recommended to patient with PCP for repeat CT of the chest in 12 months)  · Troponins x 3 negative  EKG showed sinus tachycardia  · Echo showed an EF of 60%, no RWMA, trace MR and TR  · Stress test was normal and negative for ischemia  · Cardiology input appreciated  · He is stable for discharge to home today  · Consider referral to GI if he develops recurrent CP (has prior history of esophagitis on last EGD)  · Recommended to closely follow up with PCP

## 2019-07-19 NOTE — PLAN OF CARE
Problem: PAIN - ADULT  Goal: Verbalizes/displays adequate comfort level or baseline comfort level  Description  Interventions:  - Encourage patient to monitor pain and request assistance  - Assess pain using appropriate pain scale  - Administer analgesics based on type and severity of pain and evaluate response  - Implement non-pharmacological measures as appropriate and evaluate response  - Consider cultural and social influences on pain and pain management  - Notify physician/advanced practitioner if interventions unsuccessful or patient reports new pain  7/19/2019 1334 by Isai Walker RN  Outcome: Completed  7/19/2019 1333 by Isai Walker RN  Outcome: Adequate for Discharge     Problem: INFECTION - ADULT  Goal: Absence or prevention of progression during hospitalization  Description  INTERVENTIONS:  - Assess and monitor for signs and symptoms of infection  - Monitor lab/diagnostic results  - Monitor all insertion sites, i e  indwelling lines, tubes, and drains  - Monitor endotracheal (as able) and nasal secretions for changes in amount and color  - Harrison appropriate cooling/warming therapies per order  - Administer medications as ordered  - Instruct and encourage patient and family to use good hand hygiene technique  - Identify and instruct in appropriate isolation precautions for identified infection/condition  7/19/2019 1334 by Isai Walker RN  Outcome: Completed  7/19/2019 1333 by Isai Walker RN  Outcome: Adequate for Discharge  Goal: Absence of fever/infection during neutropenic period  Description  INTERVENTIONS:  - Monitor WBC  - Implement neutropenic guidelines  7/19/2019 1334 by Isai Walker RN  Outcome: Completed  7/19/2019 1333 by Isai Walker RN  Outcome: Adequate for Discharge     Problem: SAFETY ADULT  Goal: Patient will remain free of falls  Description  INTERVENTIONS:  - Assess patient frequently for physical needs  -  Identify cognitive and physical deficits and behaviors that affect risk of falls    -  Wing fall precautions as indicated by assessment   - Educate patient/family on patient safety including physical limitations  - Instruct patient to call for assistance with activity based on assessment  - Modify environment to reduce risk of injury  - Consider OT/PT consult to assist with strengthening/mobility  7/19/2019 1334 by Isai Walker RN  Outcome: Completed  7/19/2019 1333 by Isai Walker RN  Outcome: Adequate for Discharge  Goal: Maintain or return to baseline ADL function  Description  INTERVENTIONS:  -  Assess patient's ability to carry out ADLs; assess patient's baseline for ADL function and identify physical deficits which impact ability to perform ADLs (bathing, care of mouth/teeth, toileting, grooming, dressing, etc )  - Assess/evaluate cause of self-care deficits   - Assess range of motion  - Assess patient's mobility; develop plan if impaired  - Assess patient's need for assistive devices and provide as appropriate  - Encourage maximum independence but intervene and supervise when necessary  ¯ Involve family in performance of ADLs  ¯ Assess for home care needs following discharge   ¯ Request OT consult to assist with ADL evaluation and planning for discharge  ¯ Provide patient education as appropriate  7/19/2019 1334 by Isai Walker RN  Outcome: Completed  7/19/2019 1333 by Isai Walker RN  Outcome: Adequate for Discharge  Goal: Maintain or return mobility status to optimal level  Description  INTERVENTIONS:  - Assess patient's baseline mobility status (ambulation, transfers, stairs, etc )    - Identify cognitive and physical deficits and behaviors that affect mobility  - Identify mobility aids required to assist with transfers and/or ambulation (gait belt, sit-to-stand, lift, walker, cane, etc )  - Wing fall precautions as indicated by assessment  - Record patient progress and toleration of activity level on Mobility SBAR; progress patient to next Phase/Stage  - Instruct patient to call for assistance with activity based on assessment  - Request Rehabilitation consult to assist with strengthening/weightbearing, etc   7/19/2019 1334 by Joaquin Vail RN  Outcome: Completed  7/19/2019 1333 by Joaquin Vail RN  Outcome: Adequate for Discharge     Problem: DISCHARGE PLANNING  Goal: Discharge to home or other facility with appropriate resources  Description  INTERVENTIONS:  - Identify barriers to discharge w/patient and caregiver  - Arrange for needed discharge resources and transportation as appropriate  - Identify discharge learning needs (meds, wound care, etc )  - Arrange for interpretive services to assist at discharge as needed  - Refer to Case Management Department for coordinating discharge planning if the patient needs post-hospital services based on physician/advanced practitioner order or complex needs related to functional status, cognitive ability, or social support system  7/19/2019 1334 by Joaquin Vail RN  Outcome: Completed  7/19/2019 1333 by Joaquin Vail RN  Outcome: Adequate for Discharge     Problem: Knowledge Deficit  Goal: Patient/family/caregiver demonstrates understanding of disease process, treatment plan, medications, and discharge instructions  Description  Complete learning assessment and assess knowledge base    Interventions:  - Provide teaching at level of understanding  - Provide teaching via preferred learning methods  7/19/2019 1334 by Joaquin Vail RN  Outcome: Completed  7/19/2019 1333 by Joaquin Vail RN  Outcome: Adequate for Discharge     Problem: CARDIOVASCULAR - ADULT  Goal: Maintains optimal cardiac output and hemodynamic stability  Description  INTERVENTIONS:  - Monitor I/O, vital signs and rhythm  - Monitor for S/S and trends of decreased cardiac output i e  bleeding, hypotension  - Administer and titrate ordered vasoactive medications to optimize hemodynamic stability  - Assess quality of pulses, skin color and temperature  - Assess for signs of decreased coronary artery perfusion - ex   Angina  - Instruct patient to report change in severity of symptoms  7/19/2019 1334 by Víctor Zhu RN  Outcome: Completed  7/19/2019 1333 by Víctor Zhu RN  Outcome: Adequate for Discharge  Goal: Absence of cardiac dysrhythmias or at baseline rhythm  Description  INTERVENTIONS:  - Continuous cardiac monitoring, monitor vital signs, obtain 12 lead EKG if indicated  - Administer antiarrhythmic and heart rate control medications as ordered  - Monitor electrolytes and administer replacement therapy as ordered  7/19/2019 1334 by Víctor Zhu RN  Outcome: Completed  7/19/2019 1333 by Víctor Zhu RN  Outcome: Adequate for Discharge     Problem: METABOLIC, FLUID AND ELECTROLYTES - ADULT  Goal: Electrolytes maintained within normal limits  Description  INTERVENTIONS:  - Monitor labs and assess patient for signs and symptoms of electrolyte imbalances  - Administer electrolyte replacement as ordered  - Monitor response to electrolyte replacements, including repeat lab results as appropriate  - Instruct patient on fluid and nutrition as appropriate  7/19/2019 1334 by Víctor Zhu RN  Outcome: Completed  7/19/2019 1333 by Víctor Zhu RN  Outcome: Adequate for Discharge  Goal: Fluid balance maintained  Description  INTERVENTIONS:  - Monitor labs and assess for signs and symptoms of volume excess or deficit  - Monitor I/O and WT  - Instruct patient on fluid and nutrition as appropriate  7/19/2019 1334 by Víctor Zhu RN  Outcome: Completed  7/19/2019 1333 by Víctor Zhu RN  Outcome: Adequate for Discharge  Goal: Glucose maintained within target range  Description  INTERVENTIONS:  - Monitor Blood Glucose as ordered  - Assess for signs and symptoms of hyperglycemia and hypoglycemia  - Administer ordered medications to maintain glucose within target range  - Assess nutritional intake and initiate nutrition service referral as needed  7/19/2019 1334 by Angelina Muñoz RN  Outcome: Completed  7/19/2019 1333 by Angelina Muñoz RN  Outcome: Adequate for Discharge     Problem: SKIN/TISSUE INTEGRITY - ADULT  Goal: Skin integrity remains intact  Description  INTERVENTIONS  - Identify patients at risk for skin breakdown  - Assess and monitor skin integrity  - Assess and monitor nutrition and hydration status  - Monitor labs (i e  albumin)  - Assess for incontinence   - Turn and reposition patient  - Assist with mobility/ambulation  - Relieve pressure over bony prominences  - Avoid friction and shearing  - Provide appropriate hygiene as needed including keeping skin clean and dry  - Evaluate need for skin moisturizer/barrier cream  - Collaborate with interdisciplinary team (i e  Nutrition, Rehabilitation, etc )   - Patient/family teaching  7/19/2019 1334 by Angelina Muñoz RN  Outcome: Completed  7/19/2019 1333 by Angelina Muñoz RN  Outcome: Adequate for Discharge  Goal: Incision(s), wounds(s) or drain site(s) healing without S/S of infection  Description  INTERVENTIONS  - Assess and document risk factors for skin impairment   - Assess and document dressing, incision, wound bed, drain sites and surrounding tissue  - Initiate Nutrition services consult and/or wound management as needed  7/19/2019 1334 by Angelina Muñoz RN  Outcome: Completed  7/19/2019 1333 by Angelina Muñoz RN  Outcome: Adequate for Discharge  Goal: Oral mucous membranes remain intact  Description  INTERVENTIONS  - Assess oral mucosa and hygiene practices  - Implement preventative oral hygiene regimen  - Implement oral medicated treatments as ordered  - Initiate Nutrition services referral as needed  7/19/2019 1334 by Angelina Muñoz RN  Outcome: Completed  7/19/2019 1333 by Angelina Muñoz RN  Outcome: Adequate for Discharge     Problem: HEMATOLOGIC - ADULT  Goal: Maintains hematologic stability  Description  INTERVENTIONS  - Assess for signs and symptoms of bleeding or hemorrhage  - Monitor labs  - Administer supportive blood products/factors as ordered and appropriate  7/19/2019 1334 by Bridgette Ku RN  Outcome: Completed  7/19/2019 1333 by Bridgette Ku RN  Outcome: Adequate for Discharge     Problem: MUSCULOSKELETAL - ADULT  Goal: Maintain or return mobility to safest level of function  Description  INTERVENTIONS:  - Assess patient's ability to carry out ADLs; assess patient's baseline for ADL function and identify physical deficits which impact ability to perform ADLs (bathing, care of mouth/teeth, toileting, grooming, dressing, etc )  - Assess/evaluate cause of self-care deficits   - Assess range of motion  - Assess patient's mobility; develop plan if impaired  - Assess patient's need for assistive devices and provide as appropriate  - Encourage maximum independence but intervene and supervise when necessary  - Involve family in performance of ADLs  - Assess for home care needs following discharge   - Request OT consult to assist with ADL evaluation and planning for discharge  - Provide patient education as appropriate  7/19/2019 1334 by Bridgette Ku RN  Outcome: Completed  7/19/2019 1333 by Bridgette Ku RN  Outcome: Adequate for Discharge  Goal: Maintain proper alignment of affected body part  Description  INTERVENTIONS:  - Support, maintain and protect limb and body alignment  - Provide pt/fam with appropriate education  7/19/2019 1334 by Bridgette uK RN  Outcome: Completed  7/19/2019 1333 by Bridgette Ku RN  Outcome: Adequate for Discharge     Problem: Prexisting or High Potential for Compromised Skin Integrity  Goal: Skin integrity is maintained or improved  Description  INTERVENTIONS:  - Identify patients at risk for skin breakdown  - Assess and monitor skin integrity  - Assess and monitor nutrition and hydration status  - Monitor labs (i e  albumin)  - Assess for incontinence   - Turn and reposition patient  - Assist with mobility/ambulation  - Relieve pressure over bony prominences  - Avoid friction and shearing  - Provide appropriate hygiene as needed including keeping skin clean and dry  - Evaluate need for skin moisturizer/barrier cream  - Collaborate with interdisciplinary team (i e  Nutrition, Rehabilitation, etc )   - Patient/family teaching  7/19/2019 1334 by Ade Escobar RN  Outcome: Completed  7/19/2019 1333 by Ade Escobar, RN  Outcome: Adequate for Discharge

## 2019-07-19 NOTE — PROGRESS NOTES
NEPHROLOGY PROGRESS NOTE    Patient: Beatris Valdes               Sex: male          DOA: 7/17/2019  8:01 PM   YOB: 1970        Age:  44 y o         LOS:  LOS: 0 days   7/19/2019    REASON FOR THE CONSULTATION:    Acute kidney injury      SUBJECTIVE     Patient is seen and examined next to the bedside  Feels well  Remains on IV fluids  Happy that serum creatinine has improved  CURRENT MEDICATIONS       Current Facility-Administered Medications:     baclofen tablet 20 mg, 20 mg, Oral, BID, Sarah Tariq MD, 20 mg at 07/19/19 0836    carvedilol (COREG) tablet 12 5 mg, 12 5 mg, Oral, BID With Meals, Sarah Tariq MD, 12 5 mg at 07/19/19 0830    escitalopram (LEXAPRO) tablet 10 mg, 10 mg, Oral, Daily, Sarah Tariq MD, 10 mg at 07/19/19 0836    gabapentin (NEURONTIN) capsule 600 mg, 600 mg, Oral, Daily, Sarah Tariq MD, 600 mg at 07/19/19 0836    hydroxychloroquine (PLAQUENIL) tablet 200 mg, 200 mg, Oral, BID, Sarah Tariq MD, 200 mg at 07/18/19 1728    mycophenolate (CELLCEPT) capsule 500 mg, 500 mg, Oral, Q12H [address], Sarah Tariq MD, 500 mg at 07/19/19 0836    ondansetron (ZOFRAN) injection 4 mg, 4 mg, Intravenous, Q6H PRN, Sarah Tariq MD    predniSONE tablet 5 mg, 5 mg, Oral, Daily, Sarah Tariq MD, 5 mg at 07/19/19 0836    rivaroxaban (XARELTO) tablet 20 mg, 20 mg, Oral, Daily With Henrik Perez MD, 20 mg at 07/18/19 1612    sodium chloride 0 9 % infusion, 100 mL/hr, Intravenous, Continuous, Jerrod Carrizales MD, Last Rate: 100 mL/hr at 07/19/19 0157, 100 mL/hr at 07/19/19 0157    tamsulosin (FLOMAX) capsule 0 8 mg, 0 8 mg, Oral, Once HS, Sarah Tariq MD, 0 8 mg at 07/18/19 2118    traMADol (ULTRAM) tablet 50 mg, 50 mg, Oral, Q6H PRN, Sarah Tariq MD, 50 mg at 07/18/19 2124    REVIEW OF SYSTEMS     Review of Systems   Constitutional: Negative  HENT: Negative  Eyes: Negative  Respiratory: Negative  Cardiovascular: Negative  Gastrointestinal: Negative  Endocrine: Negative  Genitourinary: Negative  Musculoskeletal: Negative  Skin: Negative  Allergic/Immunologic: Negative  Neurological: Negative  Hematological: Negative  All other systems reviewed and are negative  OBJECTIVE     Current Weight: Weight - Scale: 86 kg (189 lb 9 5 oz)  Vitals:    07/18/19 1100   BP: 103/65   Pulse: 69   Resp: 16   Temp:    SpO2: 96%     Body mass index is 30 6 kg/m²  Intake/Output Summary (Last 24 hours) at 7/19/2019 1013  Last data filed at 7/19/2019 0300  Gross per 24 hour   Intake 300 ml   Output 625 ml   Net -325 ml       PHYSICAL EXAMINATION     Physical Exam   Constitutional: He is oriented to person, place, and time  He appears well-developed and well-nourished  HENT:   Head: Normocephalic and atraumatic  Eyes: Pupils are equal, round, and reactive to light  Neck: Neck supple  Cardiovascular: Normal rate, regular rhythm and normal heart sounds  Pulmonary/Chest: Effort normal    Abdominal: Soft  Bowel sounds are normal    Musculoskeletal: Normal range of motion  Neurological: He is alert and oriented to person, place, and time  Skin: Skin is warm  Psychiatric: He has a normal mood and affect  LAB RESULTS     Results from last 7 days   Lab Units 07/19/19  0632 07/18/19  0410 07/17/19 2010   WBC Thousand/uL 2 58*  --  3 35*   HEMOGLOBIN g/dL 11 4*  --  13 1   HEMATOCRIT % 34 9*  --  38 5   PLATELETS Thousands/uL 205  --  251   POTASSIUM mmol/L 4 8 4 5 3 6   CHLORIDE mmol/L 106 107 103   CO2 mmol/L 27 28 27   BUN mg/dL 23 34* 36*   CREATININE mg/dL 1 14 1 59* 1 54*   EGFR ml/min/1 73sq m 74 50 60   CALCIUM mg/dL 8 8 8 5 8 8   PHOSPHORUS mg/dL 3 0  --   --            RADIOLOGY RESULTS      No results found for this or any previous visit    Results for orders placed during the hospital encounter of 07/17/19   XR chest 2 views    Narrative CHEST     INDICATION:   Chest Pain     COMPARISON:  Chest    5/8/2019    EXAM PERFORMED/VIEWS:  XR CHEST PA & LATERAL      FINDINGS:    Cardiomediastinal silhouette appears unremarkable  The lungs are clear  No pneumothorax or pleural effusion  Osseous structures appear within normal limits for patient age  There are postsurgical changes of the lower cervical spine  Impression No acute cardiopulmonary disease  Workstation performed: [id number]         ASSESSMENT/PLAN     61-year-old male with past medical history of lupus nephritis, hypertension who presents with chest pain and was found to have Rick  1  Acute kidney injury:  Etiology may have been loss of autoregulation induced by lisinopril in the setting of volume depletion due to Lasix along with contrast induced nephropathy  Patient was aggressively hydrated with IV fluids resulting in improvement in renal function back to baseline  Serum creatinine stands at 1 1     2  Chest pain:  Resolved  CTA did not reveal any pulmonary embolism  3  Lupus nephritis:  Remains on CellCept and prednisone  4  Hypertension:  Blood pressure noted to be soft  Holding Coreg for systolic blood pressure less than 110  Can resume lisinopril Lasix in a m  Antonieta Ormond Patient can be discharged from my point of view after receiving an additional 500 cc fluids          Christo Morales MD  Nephrology  7/19/2019

## 2019-07-19 NOTE — DISCHARGE SUMMARY
Discharge- Arun Riddle 1970, 44 y o  male MRN: [id number]    Unit/Bed#: -01 Encounter: [id number]    Primary Care Provider: Ramila Madera MD   Date and time admitted to hospital: 7/17/2019  8:01 PM        * Chest pain  Assessment & Plan  · Patient presented with CP with radiation to the neck which resolved  · CTA was chest negative for PE, a 5mm noted pulmonary nodule noted (outpatient follow up recommended to patient with PCP for repeat CT of the chest in 12 months)  · Troponins x 3 negative  EKG showed sinus tachycardia  · Echo showed an EF of 60%, no RWMA, trace MR and TR  · Stress test was normal and negative for ischemia  · Cardiology input appreciated  · He is stable for discharge to home today  · Consider referral to GI if he develops recurrent CP (has prior history of esophagitis on last EGD)  · Recommended to closely follow up with PCP  MARK (acute kidney injury) ([facility] [address] )  Assessment & Plan  · Cr elevated to 1 59 and improved today with IVFs to 1  14   Etiology suspected to be from volume depletion and contrast induced nephropathy  · Patient has a history of Lupus nephritis, CKD  · Renal ultrasound showed a stable small right renal cyst   · Nephrology input appreciated  · He can be discharged today after receiving 500cc of additional IVFs  · Lisinopril and Lasix held during admission but can be resumed tomorrow  History of DVT (deep vein thrombosis)  Assessment & Plan  · On Xarelto  Hypertension  Assessment & Plan  · On Coreg  · Lisinopril and Lasix held due to MARK  He can resume these medications tomorrow  Lupus (systemic lupus erythematosus) (HCC)  Assessment & Plan  · On Cellcept and Prednisone      Discharging Physician / Practitioner: Everlyn Sandhoff, PA-C  PCP: Ramila Madera MD  Admission Date:   Admission Orders (From admission, onward)    Ordered        07/17/19 2341  Place in Observation  Once             Discharge Date: 07/19/19    Resolved Problems Date Reviewed: 7/19/2019    None          Consultations During Hospital Stay:  · Cardiology  · Nephrology    Procedures Performed:   · NM Stress test which was normal and negative for ischemia  Significant Findings / Test Results:   · CTA chest was negative for PE, a 5mm noted pulmonary nodule noted (outpatient follow up recommended to patient with PCP for repeat CT of the chest in 12 months)  · Troponins x 3 negative  EKG showed sinus tachycardia  · Echo showed an EF of 60%, no RWMA, trace MR and TR  · Stress test was normal and negative for ischemia  · Renal ultrasound showed a small stable right renal cyst   · Cr of 1 59 improved to 1 14 on discharge  Incidental Findings:   · 5 mm pulmonary nodule on CT scan - repeat CT in 12 months by PCP recommended and personally discussed with patient  Test Results Pending at Discharge (will require follow up): · None     Outpatient Tests Requested:  · Follow up with PCP and Nephrology  · Repeat Chest CT in 12 months for pulmonary nodule  Complications:  Possible component of contrast induced nephropathy which improved    Reason for Admission: Chest pain    Hospital Course:     Florencio Hassan is a 44 y o  male patient who originally presented to the hospital on 7/17/2019 due to chest pain  CTA was performed which showed no evidence of a pulmonary embolism  He was monitored on Telemetry and serial troponins were negative  Cardiology was consulted  Patient underwent stress test which was normal and negative for ischemia  He also had an echo which showed left ventricular function to be normal and no significant valvular disease  His chest pain resolved with no recurrences  Patient was also known to have MARK likely in setting of volume depletion contrast induced nephropathy  Nephrology was consulted  He received IV fluids and his creatinine improved  He is stable for discharge to home today with close follow-up as an outpatient      Please see above list of diagnoses and related plan for additional information  Condition at Discharge: stable     Discharge Day Visit / Exam:     Subjective:  Patient reports he feels well and is eager for discharge home  No further chest pain  No SOB  No nausea or vomiting  No abdominal pain  Vitals: Blood Pressure: 103/65 (07/18/19 1100)  Pulse: 69 (07/18/19 1100)  Temperature: 98 4 °F (36 9 °C) (07/18/19 0931)  Temp Source: Oral (07/18/19 0931)  Respirations: 16 (07/18/19 1100)  Height: 5' 6" (167 6 cm) (07/18/19 1500)  Weight - Scale: 86 kg (189 lb 9 5 oz) (07/19/19 0600)  SpO2: 96 % (07/18/19 1100)  Exam:   Physical Exam   Constitutional: He is oriented to person, place, and time  No distress  HENT:   Head: Normocephalic and atraumatic  Cardiovascular: Normal rate and regular rhythm  No murmur heard  Pulmonary/Chest: Effort normal and breath sounds normal  No respiratory distress  He has no wheezes  Abdominal: Soft  Bowel sounds are normal  He exhibits no distension  There is no tenderness  Musculoskeletal:   Chronic venous insufficiency changes  Neurological: He is alert and oriented to person, place, and time  Skin: He is not diaphoretic  Vitals reviewed  Discharge instructions/Information to patient and family:   See after visit summary for information provided to patient and family  Provisions for Follow-Up Care:  See after visit summary for information related to follow-up care and any pertinent home health orders  Disposition:     Home    For Discharges to [facility]:   · Not Applicable to this Patient - Not Applicable to this Patient    Planned Readmission: None     Discharge Statement:  I spent 45 minutes discharging the patient  This time was spent on the day of discharge  I had direct contact with the patient on the day of discharge   Greater than 50% of the total time was spent examining patient, answering all patient questions, arranging and discussing plan of care with patient as well as directly providing post-discharge instructions  Additional time then spent on discharge activities  Discharge Medications:  See after visit summary for reconciled discharge medications provided to patient and family        ** Please Note: This note has been constructed using a voice recognition system **

## 2019-09-20 ENCOUNTER — TRANSCRIBE ORDERS (OUTPATIENT)
Dept: NEUROSURGERY | Facility: CLINIC | Age: 49
End: 2019-09-20

## 2019-09-20 DIAGNOSIS — M54.50 LOWER BACK PAIN: Primary | ICD-10-CM

## 2019-11-04 ENCOUNTER — CONSULT (OUTPATIENT)
Dept: NEUROSURGERY | Facility: CLINIC | Age: 49
End: 2019-11-04
Payer: COMMERCIAL

## 2019-11-04 VITALS
WEIGHT: 106 LBS | HEIGHT: 66 IN | TEMPERATURE: 99 F | BODY MASS INDEX: 17.04 KG/M2 | DIASTOLIC BLOOD PRESSURE: 70 MMHG | SYSTOLIC BLOOD PRESSURE: 118 MMHG

## 2019-11-04 DIAGNOSIS — M54.50 LOWER BACK PAIN: ICD-10-CM

## 2019-11-04 DIAGNOSIS — M62.81 MUSCLE WEAKNESS (GENERALIZED): Primary | ICD-10-CM

## 2019-11-04 PROCEDURE — 99204 OFFICE O/P NEW MOD 45 MIN: CPT | Performed by: PHYSICIAN ASSISTANT

## 2019-11-04 NOTE — ASSESSMENT & PLAN NOTE
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

## 2019-11-04 NOTE — PROGRESS NOTES
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"  · Left pronator drift on exam, abnormal left finger to nose  · Proximal muscle weakness; legs>arms  · Tried PT and injections with no benefit    Imaging:  · MRI lumbar spine, 10/9/19: moderate generalized DDD and levoscoliosis    Plan:  · There is no pathology on his lumbar MRI that is contributing to his symptoms  · Recommend MRI brain with and without contrast to evaluate for demyelinating disease and/or cerebellar lesion  · Return after imaging to review; patient will likely need a referral to a new neurologist for a full workup       Diagnoses and all orders for this visit:    Muscle weakness (generalized)  -     MRI brain with and without contrast; Future    Lower back pain  -     Ambulatory referral to Neurosurgery  -     MRI brain with and without contrast; Future            CHIEF COMPLAINT    Chief Complaint   Patient presents with    Consult     Low back pain        HISTORY    45 yo male with a 3 year history of progressive muscle weakness and pain  He states his pain is from his low back and radiates down both legs to his feet  He experiences numbness and tingling in his feet  He uses a walker for ambulation assistance but still falls very frequently, fall twice in the past 2 months  He does have a history of TIA 6 months ago but no stroke  He was diagnosed with spastic paraparesis by a neurologist at [facility] and is on gabapentin and baclofen   He sought a second opinion at [facility] who agreed with the original diagnosis and offered no further plan  On MRI lumbar spine, there is no pathology to explain his proximal muscle pain and weakness, and his inability to walk  In addition, he has a left pronator drift and left abnormal finger to nose  I would like to order an MRI brain with and without contrast to evaluate for a cerebellar lesion as well as demyelinating disease given the rapid nature of his disability  We will see him back after his MRI  REVIEW OF SYSTEMS    Review of Systems   Musculoskeletal: Positive for arthralgias (pain in both legs ), back pain (low back pain ) and gait problem (walker)  Pain in both feet      Neurological: Positive for tremors, weakness and numbness  Negative for dizziness, seizures, syncope, facial asymmetry, speech difficulty, light-headedness and headaches  Tingling both feet      All other systems reviewed and are negative          Meds/Allergies     Current Outpatient Medications   Medication Sig Dispense Refill    baclofen 20 mg tablet Take 20 mg by mouth 3 (three) times a day       betamethasone dipropionate (DIPROSONE) 0 05 % cream Apply 1 application topically 2 (two) times a day      betamethasone, augmented, (DIPROLENE) 0 05 % ointment Apply 1 application topically 2 (two) times a day      carvedilol (COREG) 12 5 mg tablet Take 12 5 mg by mouth 2 (two) times a day with meals      escitalopram (LEXAPRO) 10 mg tablet Take 10 mg by mouth daily      furosemide (LASIX) 40 mg tablet Take 0 5 tablets (20 mg total) by mouth daily  0    gabapentin (NEURONTIN) 600 MG tablet Take 600 mg by mouth 3 (three) times a day       hydrOXYzine HCL (ATARAX) 10 mg tablet Take 10 mg by mouth every 6 (six) hours as needed for itching      lisinopril (ZESTRIL) 40 mg tablet Take 40 mg by mouth daily      metolazone (ZAROXOLYN) 2 5 mg tablet Take 2 5 mg by mouth daily      mycophenolate (CELLCEPT) 500 mg tablet Take 500 mg by mouth every 12 (twelve) hours       potassium chloride (K-DUR,KLOR-CON) 20 mEq tablet Take 20 mEq by mouth daily      predniSONE 5 mg tablet Take 5 mg by mouth daily      rivaroxaban (XARELTO) 20 mg tablet Take 20 mg by mouth daily with dinner      tamsulosin (FLOMAX) 0 4 mg Take 0 8 mg by mouth Medrol Dose Pack scheduling ONLY      traMADol (ULTRAM) 50 mg tablet Take 50 mg by mouth every 6 (six) hours as needed for moderate pain       No current facility-administered medications for this visit  No Known Allergies    PAST HISTORY    Past Medical History:   Diagnosis Date    Cervical mass     Depression     DVT (deep venous thrombosis) (HCC)     Hypertension     Lupus (HCC)     Renal disorder        Past Surgical History:   Procedure Laterality Date    APPENDECTOMY      CERVICAL SPINE SURGERY      COLONOSCOPY N/A 8/23/2018    Procedure: COLONOSCOPY;  Surgeon: Demetrice Gordillo MD;  Location: [facility]; Service: Gastroenterology    ESOPHAGOGASTRODUODENOSCOPY N/A 8/22/2018    Procedure: ESOPHAGOGASTRODUODENOSCOPY (EGD); Surgeon: Demetrice Gordillo MD;  Location: [facility]; Service: Gastroenterology    NECK SURGERY      VASCULAR SURGERY         Social History     Tobacco Use    Smoking status: Never Smoker    Smokeless tobacco: Never Used   Substance Use Topics    Alcohol use: Never     Frequency: Never    Drug use: Never       Family History   Problem Relation Age of Onset    Heart disease Mother          Above history personally reviewed  EXAM    Vitals:Blood pressure 118/70, temperature 99 °F (37 2 °C), temperature source Temporal, height 5' 6" (1 676 m), weight 48 1 kg (106 lb)  ,Body mass index is 17 11 kg/m²  Physical Exam   Constitutional: He is oriented to person, place, and time  He appears well-developed and well-nourished  HENT:   Head: Normocephalic and atraumatic  Eyes: Pupils are equal, round, and reactive to light  EOM are normal    Neck: Normal range of motion  Cardiovascular: Normal rate     Pulmonary/Chest: Effort normal  No respiratory distress  Abdominal: Soft  Musculoskeletal: Normal range of motion  Neurological: He is alert and oriented to person, place, and time  He has an abnormal Finger-Nose-Finger Test (abnormal left side)  Reflex Scores:       Tricep reflexes are 2+ on the right side and 2+ on the left side  Bicep reflexes are 2+ on the right side and 2+ on the left side  Brachioradialis reflexes are 2+ on the right side and 2+ on the left side  Patellar reflexes are 2+ on the right side and 2+ on the left side  Achilles reflexes are 2+ on the right side and 2+ on the left side  Skin: Skin is warm and dry  Psychiatric: He has a normal mood and affect  His speech is normal and behavior is normal  Judgment and thought content normal    Nursing note and vitals reviewed  Neurologic Exam     Mental Status   Oriented to person, place, and time  Recall at 5 minutes: recalls 3 of 3 objects  Follows 2 step commands  Attention: normal  Concentration: normal    Speech: speech is normal   Level of consciousness: alert  Knowledge: good  Able to perform simple calculations  Able to name object  Able to repeat  Normal comprehension  Cranial Nerves   Cranial nerves II through XII intact  CN III, IV, VI   Pupils are equal, round, and reactive to light    Extraocular motions are normal      Motor Exam   Muscle bulk: decreased  Overall muscle tone: decreased  Right arm pronator drift: absent  Left arm pronator drift: present    Strength   Right deltoid: 4/5  Left deltoid: 4/5  Right biceps: 4/5  Left biceps: 4/5  Right triceps: 4/5  Left triceps: 4/5  Right wrist flexion: 5/5  Left wrist flexion: 5/5  Right wrist extension: 5/5  Left wrist extension: 5/5  Right interossei: 5/5  Left interossei: 5/5  Right iliopsoas: 2/5  Left iliopsoas: 2/5  Right quadriceps: 3/5  Left quadriceps: 3/5  Right hamstring: 3/5  Left hamstring: 3/5  Right anterior tibial: 5/5  Left anterior tibial: 5/5  Right posterior tibial: 5/5  Left posterior tibial: 5/5    Sensory Exam   Light touch normal    Pinprick normal      Gait, Coordination, and Reflexes     Gait  Gait: shuffling (narrow gait, shuffling, dragging both feet, uses walker)    Coordination   Finger to nose coordination: abnormal (abnormal left side)    Tremor   Intention tremor: present    Reflexes   Right brachioradialis: 2+  Left brachioradialis: 2+  Right biceps: 2+  Left biceps: 2+  Right triceps: 2+  Left triceps: 2+  Right patellar: 2+  Left patellar: 2+  Right achilles: 2+  Left achilles: 2+  Right : 2+  Left : 2+  Right Rendon: absent  Left Rendon: absent  Right ankle clonus: absent  Left ankle clonus: absent        MEDICAL DECISION MAKING    Imaging Studies:     MRI lumbar spine w/o, 10/9/19: moderate DDD with levoscoliosis    I have personally reviewed pertinent reports     and I have personally reviewed pertinent films in PACS

## 2019-11-21 ENCOUNTER — DOCUMENTATION (OUTPATIENT)
Dept: NEUROSURGERY | Facility: CLINIC | Age: 49
End: 2019-11-21

## 2019-11-21 ENCOUNTER — HOSPITAL ENCOUNTER (OUTPATIENT)
Dept: MRI IMAGING | Facility: HOSPITAL | Age: 49
Discharge: HOME | End: 2019-11-21
Payer: COMMERCIAL

## 2019-11-21 DIAGNOSIS — M54.50 LOWER BACK PAIN: ICD-10-CM

## 2019-11-21 DIAGNOSIS — M62.81 MUSCLE WEAKNESS (GENERALIZED): ICD-10-CM

## 2019-11-21 PROCEDURE — A9585 GADOBUTROL INJECTION: HCPCS | Performed by: PHYSICIAN ASSISTANT

## 2019-11-21 PROCEDURE — 70553 MRI BRAIN STEM W/O & W/DYE: CPT

## 2019-11-21 RX ADMIN — GADOBUTROL 8 ML: 604.72 INJECTION INTRAVENOUS at 12:00

## 2019-12-06 ENCOUNTER — TELEPHONE (OUTPATIENT)
Dept: NEUROLOGY | Facility: CLINIC | Age: 49
End: 2019-12-06

## 2019-12-06 ENCOUNTER — OFFICE VISIT (OUTPATIENT)
Dept: NEUROSURGERY | Facility: CLINIC | Age: 49
End: 2019-12-06
Payer: COMMERCIAL

## 2019-12-06 VITALS
BODY MASS INDEX: 32.02 KG/M2 | DIASTOLIC BLOOD PRESSURE: 78 MMHG | SYSTOLIC BLOOD PRESSURE: 100 MMHG | WEIGHT: 199.2 LBS | HEIGHT: 66 IN | HEART RATE: 104 BPM | TEMPERATURE: 99 F

## 2019-12-06 DIAGNOSIS — R26.9 UNSPECIFIED ABNORMALITIES OF GAIT AND MOBILITY: ICD-10-CM

## 2019-12-06 DIAGNOSIS — M62.81 MUSCLE WEAKNESS (GENERALIZED): Primary | ICD-10-CM

## 2019-12-06 PROCEDURE — 99214 OFFICE O/P EST MOD 30 MIN: CPT | Performed by: PHYSICIAN ASSISTANT

## 2019-12-06 NOTE — TELEPHONE ENCOUNTER
Best contact number for patient:  [phone]    Emergency Contact name and number:[phone]     Referring provider:    Referring provider Telephone:________Neuro surgery________    Primary Care Provider Name:     Reason for Appointment/Dx: gait/movement disorder     Neurology Location patient would like to be seen: [facility] received? Yes                                                 Records Received? Yes    Have you ever seen another Neurologist? No    Insurance Information    Insurance Name: [employer]     ID/Policy #:    Secondary Insurance:    ID/Policy#: Workman's Comp/ Accident/ School  Information      Workman's Comp/Accident/School related?  No    If yes name of Insurance company:    Date of Injury:    Open Claim:no    [address] Name and Telephone Number:    Notes:                   Appointment date: _________________02/27/2020____________

## 2019-12-06 NOTE — ASSESSMENT & PLAN NOTE
2-3 year history of chronic low back pain and bilateral leg pain, progressive proximal muscle weakness, inability to walk, and falls  · Patient follows with neurology at [address] [place] [address], diagnosed with spastic paraplegia  · Uses walker with continued frequent falls  · Has tried PT and injections with no relief  · MRI C/T/L with no pathology to explain his symptoms  · Left pronator drift and abnormal finger to nose left side    Imaging:  · MRI brain w/wo, 11/21/19:  White matter changes suggestive of chronic microangiopathy    Plan:  · There is no pathology on his imaging to explain his deteriorating exam   · Referral to Dr Floyd Fernandez in neurology for workup of movement disorder  · Return as needed

## 2019-12-06 NOTE — PROGRESS NOTES
Neurosurgery Office Note  Isaias Reveles 44 y o  male MRN: [id number]      Assessment/Plan     Muscle weakness (generalized)  2-3 year history of chronic low back pain and bilateral leg pain, progressive proximal muscle weakness, inability to walk, and falls  · Patient follows with neurology at [facility], diagnosed with spastic paraplegia  · Uses walker with continued frequent falls  · Has tried PT and injections with no relief  · MRI C/T/L with no pathology to explain his symptoms  · Left pronator drift and abnormal finger to nose left side    Imaging:  · MRI brain w/wo, 11/21/19: White matter changes suggestive of chronic microangiopathy    Plan:  · There is no pathology on his imaging to explain his deteriorating exam   · Referral to Dr Abhinav Lam in neurology for workup of movement disorder  · Return as needed       Diagnoses and all orders for this visit:    Muscle weakness (generalized)  -     Ambulatory referral to Neurology; Future    Unspecified abnormalities of gait and mobility  -     Ambulatory referral to Neurology; Future            CHIEF COMPLAINT    Chief Complaint   Patient presents with    Follow-up     Muscle weakness        HISTORY    43 yo male with a 3 year history of progressive muscle weakness and pain  He states his pain is from his low back and radiates down both legs to his feet  He experiences numbness and tingling in his feet  He uses a walker for ambulation assistance but still falls very frequently, fall twice in the past 2 months  He does have a history of TIA 6 months ago but no stroke  He was diagnosed with spastic paraparesis by a neurologist at [facility] and is on gabapentin and baclofen  He sought a second opinion at [facility] who agreed with the original diagnosis and offered no further plan       On MRI C/T/L spine, there is no pathology to explain his proximal muscle pain and weakness, and his inability to walk  In addition, he has a left pronator drift and left abnormal finger to nose   At his last visit I ordered an MRI brain to evaluate for a cerebellar lesion or some other intracranial pathology  This was completed and is essentially normal apart from some chronic white matter changes  Unfortunately there is nothing more I can offer him  I would like him to see a movement disorder specialist in our neurology group for further workup  He agreed with this plan and we will set him up to see Dr Tiffani Alaniz  He will return to our office as needed  REVIEW OF SYSTEMS    Review of Systems   Constitutional: Negative  HENT: Negative  Eyes: Negative  Respiratory: Negative  Cardiovascular: Negative  Gastrointestinal: Negative  Endocrine: Negative  Genitourinary: Negative  Musculoskeletal: Positive for back pain (low back pain ) and gait problem (uses walker )  Negative for arthralgias, joint swelling, myalgias, neck pain and neck stiffness  Pain both feet      Allergic/Immunologic: Negative  Neurological: Positive for tremors, light-headedness and numbness (both feet )  Negative for dizziness, seizures, syncope, facial asymmetry, speech difficulty, weakness and headaches  Hematological: Negative  Psychiatric/Behavioral: Negative            Meds/Allergies     Current Outpatient Medications   Medication Sig Dispense Refill    baclofen 20 mg tablet Take 20 mg by mouth 3 (three) times a day       betamethasone dipropionate (DIPROSONE) 0 05 % cream Apply 1 application topically 2 (two) times a day      betamethasone, augmented, (DIPROLENE) 0 05 % ointment Apply 1 application topically 2 (two) times a day      carvedilol (COREG) 12 5 mg tablet Take 12 5 mg by mouth 2 (two) times a day with meals      escitalopram (LEXAPRO) 10 mg tablet Take 10 mg by mouth daily      furosemide (LASIX) 40 mg tablet Take 0 5 tablets (20 mg total) by mouth daily  0    gabapentin (NEURONTIN) 600 MG tablet Take 600 mg by mouth 3 (three) times a day       hydrOXYzine HCL (ATARAX) 10 mg tablet Take 10 mg by mouth every 6 (six) hours as needed for itching      lisinopril (ZESTRIL) 40 mg tablet Take 40 mg by mouth daily      metolazone (ZAROXOLYN) 2 5 mg tablet Take 2 5 mg by mouth daily      mycophenolate (CELLCEPT) 500 mg tablet Take 500 mg by mouth every 12 (twelve) hours       potassium chloride (K-DUR,KLOR-CON) 20 mEq tablet Take 20 mEq by mouth daily      predniSONE 5 mg tablet Take 5 mg by mouth daily      rivaroxaban (XARELTO) 20 mg tablet Take 20 mg by mouth daily with dinner      tamsulosin (FLOMAX) 0 4 mg Take 0 8 mg by mouth Medrol Dose Pack scheduling ONLY      traMADol (ULTRAM) 50 mg tablet Take 50 mg by mouth every 6 (six) hours as needed for moderate pain       No current facility-administered medications for this visit  No Known Allergies    PAST HISTORY    Past Medical History:   Diagnosis Date    Cervical mass     Depression     DVT (deep venous thrombosis) (HCC)     Hypertension     Lupus (HCC)     Renal disorder        Past Surgical History:   Procedure Laterality Date    APPENDECTOMY      CERVICAL SPINE SURGERY      COLONOSCOPY N/A 8/23/2018    Procedure: COLONOSCOPY;  Surgeon: Diogo Ramirez MD;  Location: [facility]; Service: Gastroenterology    ESOPHAGOGASTRODUODENOSCOPY N/A 8/22/2018    Procedure: ESOPHAGOGASTRODUODENOSCOPY (EGD); Surgeon: Diogo Ramirez MD;  Location: [facility]; Service: Gastroenterology    NECK SURGERY      VASCULAR SURGERY         Social History     Tobacco Use    Smoking status: Never Smoker    Smokeless tobacco: Never Used   Substance Use Topics    Alcohol use: Never     Frequency: Never    Drug use: Never       Family History   Problem Relation Age of Onset    Heart disease Mother          Above history personally reviewed  EXAM    Vitals:Blood pressure 100/78, pulse 104, temperature 99 °F (37 2 °C), temperature source Temporal, height 5' 6" (1 676 m), weight 90 4 kg (199 lb 3 2 oz)  ,Body mass index is 32 15 kg/m²  Physical Exam   Constitutional: He is oriented to person, place, and time  He appears well-developed and well-nourished  HENT:   Head: Normocephalic and atraumatic  Eyes: Pupils are equal, round, and reactive to light  EOM are normal    Neck: Normal range of motion  Cardiovascular: Normal rate  Pulmonary/Chest: Effort normal  No respiratory distress  Abdominal: Soft  Musculoskeletal: Normal range of motion  Neurological: He is alert and oriented to person, place, and time  He has an abnormal Finger-Nose-Finger Test (left side)  Reflex Scores:       Tricep reflexes are 2+ on the right side and 2+ on the left side  Bicep reflexes are 2+ on the right side and 2+ on the left side  Brachioradialis reflexes are 2+ on the right side and 2+ on the left side  Patellar reflexes are 2+ on the right side and 2+ on the left side  Achilles reflexes are 2+ on the right side and 2+ on the left side  Skin: Skin is warm and dry  Psychiatric: He has a normal mood and affect  His speech is normal and behavior is normal  Judgment and thought content normal    Nursing note and vitals reviewed  Neurologic Exam     Mental Status   Oriented to person, place, and time  Recall at 5 minutes: recalls 3 of 3 objects  Follows 2 step commands  Attention: normal  Concentration: normal    Speech: speech is normal   Level of consciousness: alert  Knowledge: good  Able to perform simple calculations  Able to name object  Able to repeat  Normal comprehension  Cranial Nerves   Cranial nerves II through XII intact  CN III, IV, VI   Pupils are equal, round, and reactive to light    Extraocular motions are normal      Motor Exam   Muscle bulk: decreased  Overall muscle tone: decreased  Right arm pronator drift: absent  Left arm pronator drift: present    Strength   Right deltoid: 4/5  Left deltoid: 4/5  Right biceps: 4/5  Left biceps: 4/5  Right triceps: 4/5  Left triceps: 4/5  Right wrist flexion: 5/5  Left wrist flexion: 5/5  Right wrist extension: 5/5  Left wrist extension: 5/5  Right interossei: 5/5  Left interossei: 5/5  Right iliopsoas: 2/5  Left iliopsoas: 2/5  Right quadriceps: 3/5  Left quadriceps: 3/5  Right hamstring: 3/5  Left hamstring: 3/5  Right anterior tibial: 5/5  Left anterior tibial: 5/5  Right posterior tibial: 5/5  Left posterior tibial: 5/5    Sensory Exam   Light touch normal    Pinprick normal    DST and JPS intact bilaterally     Gait, Coordination, and Reflexes     Gait  Gait: shuffling (narrow, crosses feet, drags feet, uses walker)    Coordination   Finger to nose coordination: abnormal (left side)    Tremor   Resting tremor: absent    Reflexes   Right brachioradialis: 2+  Left brachioradialis: 2+  Right biceps: 2+  Left biceps: 2+  Right triceps: 2+  Left triceps: 2+  Right patellar: 2+  Left patellar: 2+  Right achilles: 2+  Left achilles: 2+  Right : 2+  Left : 2+  Right Rendon: absent  Left Rendon: absent  Right ankle clonus: absent  Left ankle clonus: absent        MEDICAL DECISION MAKING    Imaging Studies:     Mri Brain With And Without Contrast    Result Date: 11/22/2019  Narrative: MRI BRAIN WITH AND WITHOUT CONTRAST INDICATION: M54 5: Low back pain M62 81: Muscle weakness (generalized)  COMPARISON:  CT brain dated 2/21/2018 TECHNIQUE: Sagittal T1, axial T2, axial FLAIR, axial T1, axial Steward, axial diffusion  Sagittal, axial T1 postcontrast   Axial bravo postcontrast with coronal reconstructions  IV Contrast:  8 mL of gadobutrol injection (MULTI-DOSE)  IMAGE QUALITY:   Diagnostic  FINDINGS: BRAIN PARENCHYMA:  There is no discrete mass, mass effect or midline shift  There is no intracranial hemorrhage  Normal posterior fossa  Diffusion imaging is unremarkable    Scattered hyperintensities on T2/FLAIR imaging are noted in the periventricular and subcortical white matter demonstrating an appearance that is statistically most likely to represent moderate microangiopathic change for patient of this age  Postcontrast imaging of the brain demonstrates no abnormal enhancement  VENTRICLES:  Ventricles and extra-axial CSF spaces are prominent commensurate with the degree of volume loss  No hydrocephalus  No acute extra-axial hemorrhage  SELLA AND PITUITARY GLAND:  Normal  ORBITS:  Normal  PARANASAL SINUSES:  Moderate mucosal thickening of the ethmoid air cells  VASCULATURE:  Evaluation of the major intracranial vasculature demonstrates appropriate flow voids  CALVARIUM AND SKULL BASE:  Normal  EXTRACRANIAL SOFT TISSUES:  Normal      Impression: White matter changes suggestive of chronic microangiopathy  No acute intracranial pathology  Workstation performed: [id number]       I have personally reviewed pertinent reports     and I have personally reviewed pertinent films in PACS

## 2019-12-13 ENCOUNTER — APPOINTMENT (EMERGENCY)
Dept: RADIOLOGY | Facility: HOSPITAL | Age: 49
End: 2019-12-13
Payer: COMMERCIAL

## 2019-12-13 ENCOUNTER — HOSPITAL ENCOUNTER (EMERGENCY)
Facility: HOSPITAL | Age: 49
Discharge: HOME | End: 2019-12-13
Attending: EMERGENCY MEDICINE | Admitting: EMERGENCY MEDICINE
Payer: COMMERCIAL

## 2019-12-13 VITALS
RESPIRATION RATE: 18 BRPM | WEIGHT: 195 LBS | OXYGEN SATURATION: 97 % | BODY MASS INDEX: 31.34 KG/M2 | TEMPERATURE: 98 F | HEIGHT: 66 IN | HEART RATE: 94 BPM | DIASTOLIC BLOOD PRESSURE: 66 MMHG | SYSTOLIC BLOOD PRESSURE: 100 MMHG

## 2019-12-13 DIAGNOSIS — S62.001A NONDISPLACED FRACTURE OF RIGHT SCAPHOID BONE: Primary | ICD-10-CM

## 2019-12-13 PROCEDURE — 73110 X-RAY EXAM OF WRIST: CPT

## 2019-12-13 PROCEDURE — 99283 EMERGENCY DEPT VISIT LOW MDM: CPT

## 2019-12-13 PROCEDURE — 99284 EMERGENCY DEPT VISIT MOD MDM: CPT | Performed by: EMERGENCY MEDICINE

## 2019-12-13 NOTE — ED PROVIDER NOTES
History  Chief Complaint   Patient presents with    Hand Pain     Pt co bilateral hand pain  Pt reports having a fall few months ago in which he landed on his hands  Pt states "I am supposed to have surgery in January I just can't take the pain "     HPI  Patient is a 49-year-old male, presents emergency department with bilateral hand pain, actually a when you discussed with patient he apparently fell months ago injured his left wrist and apparently is having surgery in January for a ligament repair of his left wrist   Patient reports he fell again yesterday injuring his right hand this time  Patient points to his right wrist over the snuffbox area and says he is very tender there  He reports he fell and injured that area yesterday  He reports he fell in the bathroom at home falling on his right side and landing on his right wrist   He complains again of pain at the distal radius and snuffbox area  He denies any numbness or weakness  He denies injuring his left hand  He reports this is from a previous injury for which she is going to have surgery  Past medical history of hypertension depression DVT, left wrist chronic injury having surgery  Family history noncontributory  Social history, nonsmoker no history of drug abuse  Prior to Admission Medications   Prescriptions Last Dose Informant Patient Reported?  Taking?   baclofen 20 mg tablet  Self Yes No   Sig: Take 20 mg by mouth 3 (three) times a day    betamethasone dipropionate (DIPROSONE) 0 05 % cream  Self Yes No   Sig: Apply 1 application topically 2 (two) times a day   betamethasone, augmented, (DIPROLENE) 0 05 % ointment  Self Yes No   Sig: Apply 1 application topically 2 (two) times a day   carvedilol (COREG) 12 5 mg tablet  Self Yes No   Sig: Take 12 5 mg by mouth 2 (two) times a day with meals   escitalopram (LEXAPRO) 10 mg tablet  Self Yes No   Sig: Take 10 mg by mouth daily   furosemide (LASIX) 40 mg tablet   No No   Sig: Take 0 5 tablets (20 mg total) by mouth daily   gabapentin (NEURONTIN) 600 MG tablet   Yes No   Sig: Take 600 mg by mouth 3 (three) times a day    hydrOXYzine HCL (ATARAX) 10 mg tablet   Yes No   Sig: Take 10 mg by mouth every 6 (six) hours as needed for itching   lisinopril (ZESTRIL) 40 mg tablet   Yes No   Sig: Take 40 mg by mouth daily   metolazone (ZAROXOLYN) 2 5 mg tablet   Yes No   Sig: Take 2 5 mg by mouth daily   mycophenolate (CELLCEPT) 500 mg tablet   Yes No   Sig: Take 500 mg by mouth every 12 (twelve) hours    potassium chloride (K-DUR,KLOR-CON) 20 mEq tablet   Yes No   Sig: Take 20 mEq by mouth daily   predniSONE 5 mg tablet   Yes No   Sig: Take 5 mg by mouth daily   rivaroxaban (XARELTO) 20 mg tablet   Yes No   Sig: Take 20 mg by mouth daily with dinner   tamsulosin (FLOMAX) 0 4 mg  Self Yes No   Sig: Take 0 8 mg by mouth Medrol Dose Pack scheduling ONLY   traMADol (ULTRAM) 50 mg tablet Not Taking at Unknown time  Yes No   Sig: Take 50 mg by mouth every 6 (six) hours as needed for moderate pain      Facility-Administered Medications: None       Past Medical History:   Diagnosis Date    Cervical mass     Depression     DVT (deep venous thrombosis) (HCC)     Hypertension     Lupus (HCC)     Renal disorder        Past Surgical History:   Procedure Laterality Date    APPENDECTOMY      CERVICAL SPINE SURGERY      COLONOSCOPY N/A 8/23/2018    Procedure: COLONOSCOPY;  Surgeon: Tien Bergeron MD;  Location: [facility]; Service: Gastroenterology    ESOPHAGOGASTRODUODENOSCOPY N/A 8/22/2018    Procedure: ESOPHAGOGASTRODUODENOSCOPY (EGD); Surgeon: Tien Bergeron MD;  Location: [facility]; Service: Gastroenterology    NECK SURGERY      VASCULAR SURGERY         Family History   Problem Relation Age of Onset    Heart disease Mother      I have reviewed and agree with the history as documented      Social History     Tobacco Use    Smoking status: Never Smoker    Smokeless tobacco: Never Used   Substance Use Topics    Alcohol use: Never     Frequency: Never    Drug use: Never        Review of Systems   Constitutional: Negative for fever  Neurological: Negative for weakness and numbness  reports chronic left wrist pain going to have surgery  Reports no right wrist pain    Physical Exam  Physical Exam   Constitutional: He is oriented to person, place, and time  He appears well-developed and well-nourished  HENT:   Head: Normocephalic  Right Ear: External ear normal    Left Ear: External ear normal    Nose: Nose normal    Mouth/Throat: Oropharynx is clear and moist    Eyes: Pupils are equal, round, and reactive to light  EOM and lids are normal    Neck: Normal range of motion  Neck supple  Pulmonary/Chest: Effort normal  No respiratory distress  Musculoskeletal: Normal range of motion  He exhibits no deformity  Patient presents with bilateral wrist splints, removal of a left wrist splint shows no tenderness  Patient reports a ligamentous injury which will require repair on that wrist   Patient does have right wrist tenderness over the right clavicular area snuffbox, full range of motion is possible, good distal circulation, good capillary refill, neurovascular tendon intact  Neurological: He is alert and oriented to person, place, and time  Skin: Skin is warm and dry  Psychiatric: He has a normal mood and affect  Nursing note and vitals reviewed        Vital Signs  ED Triage Vitals [12/13/19 1307]   Temperature Pulse Respirations Blood Pressure SpO2   98 °F (36 7 °C) 94 18 100/66 97 %      Temp Source Heart Rate Source Patient Position - Orthostatic VS BP Location FiO2 (%)   Oral Monitor Sitting Left arm --      Pain Score       9           Vitals:    12/13/19 1307   BP: 100/66   Pulse: 94   Patient Position - Orthostatic VS: Sitting         Visual Acuity      ED Medications  Medications - No data to display    Diagnostic Studies  Results Reviewed     None                 XR wrist 3+ vw right (Results Pending)              Procedures  Procedures         ED Course        x-ray the right wrist shows no fracture no dislocation no bony lesions, discussed with patient, may have an navicular or scaphoid fracture of his right wrist, by splinting he agrees  X-ray initially interpreted by me                        MDM medical decision making 38-year-old male presents emergency department, reports a fall yesterday injuring his right wrist, patient has pain over the right navicular scaphoid area, discussed with patient cannot rule out a scaphoid fracture  Discussed splinting discussed follow-up  Patient does have a surgeon who was operating on his left wrist he may follow up with that person  Disposition  Final diagnoses:   Nondisplaced fracture of right scaphoid bone     Time reflects when diagnosis was documented in both MDM as applicable and the Disposition within this note     Time User Action Codes Description Comment    12/13/2019  1:47 PM Rema Costa [[id number]] Nondisplaced fracture of right scaphoid bone       ED Disposition     ED Disposition Condition Date/Time Comment    Discharge Stable Fri Dec 13, 2019  1:47 PM Emmanuel Farris discharge to home/self care              Follow-up Information     Follow up With Specialties Details Why [address], MD Orthopedic Surgery   [address] [facility]  [address]  [facility] [address]  [phone]            Discharge Medication List as of 12/13/2019  1:48 PM      CONTINUE these medications which have NOT CHANGED    Details   baclofen 20 mg tablet Take 20 mg by mouth 3 (three) times a day , Historical Med      betamethasone dipropionate (DIPROSONE) 0 05 % cream Apply 1 application topically 2 (two) times a day, Historical Med      betamethasone, augmented, (DIPROLENE) 0 05 % ointment Apply 1 application topically 2 (two) times a day, Historical Med      carvedilol (COREG) 12 5 mg tablet Take 12 5 mg by mouth 2 (two) times a day with meals, Historical Med      escitalopram (LEXAPRO) 10 mg tablet Take 10 mg by mouth daily, Historical Med      furosemide (LASIX) 40 mg tablet Take 0 5 tablets (20 mg total) by mouth daily, Starting Tue 7/31/2018, No Print      gabapentin (NEURONTIN) 600 MG tablet Take 600 mg by mouth 3 (three) times a day , Historical Med      hydrOXYzine HCL (ATARAX) 10 mg tablet Take 10 mg by mouth every 6 (six) hours as needed for itching, Historical Med      lisinopril (ZESTRIL) 40 mg tablet Take 40 mg by mouth daily, Historical Med      metolazone (ZAROXOLYN) 2 5 mg tablet Take 2 5 mg by mouth daily, Historical Med      mycophenolate (CELLCEPT) 500 mg tablet Take 500 mg by mouth every 12 (twelve) hours , Historical Med      potassium chloride (K-DUR,KLOR-CON) 20 mEq tablet Take 20 mEq by mouth daily, Historical Med      predniSONE 5 mg tablet Take 5 mg by mouth daily, Historical Med      rivaroxaban (XARELTO) 20 mg tablet Take 20 mg by mouth daily with dinner, Historical Med      tamsulosin (FLOMAX) 0 4 mg Take 0 8 mg by mouth Medrol Dose Pack scheduling ONLY, Historical Med      traMADol (ULTRAM) 50 mg tablet Take 50 mg by mouth every 6 (six) hours as needed for moderate pain, Historical Med           No discharge procedures on file      ED Provider  Electronically Signed by           Bhavesh West MD  12/13/19 8673

## 2019-12-13 NOTE — DISCHARGE INSTRUCTIONS
Your presentation is consistent with a right navicular also  calledscaphoid bone fracture  Ice and elevation  Keep the splint on  Follow up with your orthopedist or with Dr Sheba Mckeon

## 2020-02-25 ENCOUNTER — TELEPHONE (OUTPATIENT)
Dept: NEUROLOGY | Facility: CLINIC | Age: 50
End: 2020-02-25

## 2020-02-25 NOTE — TELEPHONE ENCOUNTER
2/25/2020 FAXED [facility] TO PCP    [facility] JOHNNY [facility]NTL 8/25/2021

## 2020-02-27 ENCOUNTER — CONSULT (OUTPATIENT)
Dept: NEUROLOGY | Facility: CLINIC | Age: 50
End: 2020-02-27
Payer: COMMERCIAL

## 2020-02-27 VITALS
SYSTOLIC BLOOD PRESSURE: 114 MMHG | BODY MASS INDEX: 31.34 KG/M2 | DIASTOLIC BLOOD PRESSURE: 86 MMHG | WEIGHT: 195 LBS | HEIGHT: 66 IN

## 2020-02-27 DIAGNOSIS — M32.9 SYSTEMIC LUPUS ERYTHEMATOSUS, UNSPECIFIED SLE TYPE, UNSPECIFIED ORGAN INVOLVEMENT STATUS (HCC): ICD-10-CM

## 2020-02-27 DIAGNOSIS — R25.2 SPASTICITY: ICD-10-CM

## 2020-02-27 DIAGNOSIS — R25.1 TREMOR OF BOTH HANDS: ICD-10-CM

## 2020-02-27 DIAGNOSIS — M79.2 NEUROPATHIC PAIN: Primary | ICD-10-CM

## 2020-02-27 PROBLEM — R26.9 GAIT DIFFICULTY: Status: ACTIVE | Noted: 2020-02-27

## 2020-02-27 PROCEDURE — 99244 OFF/OP CNSLTJ NEW/EST MOD 40: CPT | Performed by: PSYCHIATRY & NEUROLOGY

## 2020-02-27 RX ORDER — GABAPENTIN 300 MG/1
300 CAPSULE ORAL
Qty: 30 CAPSULE | Refills: 5 | Status: SHIPPED | OUTPATIENT
Start: 2020-02-27 | End: 2020-09-20

## 2020-02-27 NOTE — PROGRESS NOTES
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  He reports having had an EMG years ago at [facility] but I do not see these results  He was told he has neuropathy  MRI brain with scattered hyperintensities in the periventricular and subcortical white matter demonstrating an appearance that is statistically most likely to represent moderate microangiopathic change  No abnormal enhancement  Ventricles and extra-axial CSF spaces are prominent commensurate with the degree of volume loss  MRI lumbar 8/2019- Moderate degenerative disc and bony changes of the lumbosacral spine associated with mild levoscoliosis, as described above  MRI thoracic 1/2019- No acute abnormality of the thoracic spine on non-contrast MR imaging  Mild anterior wedging of the T7 and T8 vertebral bodies, which appears chronic  Prominent endplate irregularity of the T7 vertebral body, likely represent the presence of Schmorl's nodes and or endplate degenerative change  Multilevel degenerative changes of the thoracic spine  He developed a tremor in 2015 when he was diagnosed with Lupus  The diagnosis came about because he had swelling of the legs that send him to be evaluated by a renal who made the diagnosis   He is now on Cellcept  He is managed by a rheumatologist at [facility]  Tremors are present with action in both hands  At the time he worked at a [employer]  No tremor prior to his diagnosis of Lupus  He currently can still eat and drink with tremors   He can have trouble passing a spoon between hands  He dresses and performs hygiene acts independently  There is no family history of tremors  The following portions of the patient's history were reviewed and updated as appropriate: allergies, current medications, past family history, past medical history, past social history, past surgical history and problem list          Objective:    Blood pressure 114/86, height 5' 6" (1 676 m), weight 88 5 kg (195 lb)  Physical Exam   Constitutional: He appears well-developed  Eyes: Pupils are equal, round, and reactive to light  Cardiovascular: Foot and ankle edema   Neurological:   Reflex Scores:       Tricep reflexes are 1+ on the right side and 1+ on the left side  Bicep reflexes are 1+ on the right side and 1+ on the left side  Patellar reflexes are 3+ on the right side and 3+ on the left side  Achilles reflexes are 2+ on the right side and 2+ on the left side  Psychiatric: His speech is normal    Vitals reviewed  Neurological Exam  Mental Status   Oriented to person, place, time and situation  Recent and remote memory are intact  Speech is normal  Follows complex commands  Attention and concentration are normal     Cranial Nerves  CN II: Visual fields full to confrontation  Right funduscopic exam: not visualized  Left funduscopic exam: not visualized  CN III, IV, VI: Extraocular movements intact bilaterally  Pupils equal round and reactive to light bilaterally  CN V: Facial sensation is normal   CN VII: Full and symmetric facial movement  CN VIII: Hearing is normal   CN IX, X: Palate elevates symmetrically  CN XI: Shoulder shrug strength is normal   CN XII: Tongue midline without atrophy or fasciculations  Motor  Normal muscle bulk throughout  Normal muscle tone  Strength is 5/5 in all four extremities except as noted  IP 4/5 bilaterally, quads 4+/5, hams 4+/5, AT 4+/5,   Sensory  Light touch is normal in upper and lower extremities   Pinprick is normal in upper and lower extremities  Decreased vibration at toes L>R  Reflexes                                           Right                      Left  Biceps                                 1+                         1+  Triceps                                1+                         1+  Patellar                                3+                         3+  Achilles                                2+                         2+  Plantar                           Equivocal                Equivocal    Right pathological reflexes: Karl's absent  Ankle clonus absent  Left pathological reflexes: Ankle clonus absent  Coordination  Right: Finger-to-nose abnormality: Rapid alternating movement normal   Left: Finger-to-nose abnormality: Rapid alternating movement normal   Moderate intentional tremors in both hands  No rest tremor  No bradykinesia on Ft, KRUPA or handgrips       Gait  Casual gait: Unable to rise from chair without using arms  Narrow based spatic gait with him dragging hte left foot, leans forward onto the cane  Slow           ROS:    Review of Systems   Constitutional: Negative  Negative for appetite change and fever  HENT: Negative  Negative for hearing loss, tinnitus, trouble swallowing and voice change  Eyes: Negative  Negative for photophobia and pain  Respiratory: Negative  Negative for shortness of breath  Cardiovascular: Negative  Negative for palpitations  Gastrointestinal: Negative  Negative for nausea and vomiting  Endocrine: Negative  Negative for cold intolerance and heat intolerance  Genitourinary: Negative  Negative for dysuria, frequency and urgency  Musculoskeletal: Positive for gait problem  Negative for myalgias and neck pain  Heat pain in bot feet also swelling    Skin: Negative  Negative for rash  Neurological: Negative for dizziness, tremors, seizures, syncope, facial asymmetry, speech difficulty, weakness, light-headedness, numbness and headaches  Hematological: Negative  Does not bruise/bleed easily  Psychiatric/Behavioral: Negative  Negative for confusion, hallucinations and sleep disturbance  Review of system was personally reviewed

## 2020-02-27 NOTE — ASSESSMENT & PLAN NOTE
Neuropathic pain in the lower extremities, along with symptoms consistent with residual cervical myelopathy, and gait changes  He also has weakness in more proximal muscles  Prior imaging reviewed  He was sent to me for further evaluation of a movement disorder  His cliinical picture does not fit this although he also has action tremors for about 5 years now  Will obtain NCV/EMG of lower extremities  Will follow up after studies  Will increase gabapentin to 600mg bid and 900mg qhs to see if this may help with nighttime neuropathic pain

## 2020-07-01 ENCOUNTER — EVALUATION (OUTPATIENT)
Dept: PHYSICAL THERAPY | Facility: CLINIC | Age: 50
End: 2020-07-01
Payer: COMMERCIAL

## 2020-07-01 DIAGNOSIS — R26.2 IMPAIRED AMBULATION: Primary | ICD-10-CM

## 2020-07-01 PROCEDURE — 97161 PT EVAL LOW COMPLEX 20 MIN: CPT | Performed by: PHYSICAL THERAPIST

## 2020-07-01 PROCEDURE — 97110 THERAPEUTIC EXERCISES: CPT | Performed by: PHYSICAL THERAPIST

## 2020-07-01 NOTE — LETTER
2020    Eduardo Rincon MD  [facility] [address] [facility]  [address]    Patient: Aide Crews   YOB: 1970   Date of Visit: 2020     Encounter Diagnosis     ICD-10-CM    1  Impaired ambulation R26 2        Dear Dr Dominick Begum: Thank you for your recent referral of Aide Crews  Please review the attached evaluation summary from [facility] recent visit  Please verify that you agree with the plan of care by signing the attached order  If you have any questions or concerns, please do not hesitate to call  I sincerely appreciate the opportunity to share in the care of one of your patients and hope to have another opportunity to work with you in the near future  Sincerely,    Eliane Warner, PT      Referring Provider:      I certify that I have read the below Plan of Care and certify the need for these services furnished under this plan of treatment while under my care  Eduardo Rincon MD  [address] [place] [id number]  VIA Facsimile: [phone]          PT EVALUATION    Today's date: 2020  Patient name: Aide Crews  : 1970  MRN: [id number]  Referring provider: Mckay Shaw MD  Dx:   Encounter Diagnosis     ICD-10-CM    1  Impaired ambulation R26 2                   Assessment  Assessment details: Patient presents with severe ambulation dysfunction starting 3 years ago  He reports severe burning pain in his feet due to neuropathy  He had decompression in his neck, but it did not help his gait  Patient also had a recent fall and fx his wrist   He had a carpalectomy in Feb 20  Pt has generalized hip and knee weakness  Also core weakness for upright sitting  Patient reports he has been referred to get orthotics for his feet  Reviewed and issued home stretching program  Patient can benefit from skilled PT to improve LE strength for  gait and decrease risk for falls    Impairments: abnormal gait  Understanding of Dx/Px/POC: fair Prognosis: fair    Goals  3 weeks   Increase LE strength by  5 grade  Improve ankle DF ROM to 0  6 weeks  No falls  MMT strength hips improve by 1 grade    Plan  Planned therapy interventions: manual therapy, strengthening, stretching, home exercise program, therapeutic activities and balance  Frequency: 2x week  Duration in weeks: 6        Subjective Evaluation    History of Present Illness  Mechanism of injury: HNP low back 3 years effected ambulation  Severe neuropathy in feet    Had cervical fusion to help LE but it did not help  Pain  Current pain ratin  At worst pain ratin  Quality: burning  Aggravating factors: walking and standing  Progression: no change    Patient Goals  Patient goals for therapy: improved balance and decreased pain          Objective     Active Range of Motion   Left Hip   Normal active range of motion    Right Hip   Normal active range of motion  Left Ankle/Foot   Dorsiflexion (ke): 0 degrees     Right Ankle/Foot   Dorsiflexion (ke): 5 degrees     Strength/Myotome Testing     Left Shoulder   Normal muscle strength    Right Shoulder   Normal muscle strength    Left Hip   Planes of Motion   Flexion: 4-    Left Knee   Flexion: 4    Right Knee   Flexion: 4    Left Ankle/Foot   Dorsiflexion: 3    Right Ankle/Foot   Dorsiflexion: 4             Precautions: Stage 3 Kidney Disease, Lupus, Cervical myelopathy      Manuals                                                                 Neuro Re-Ed             //bars hip 4-ways             foam             Sit to stand                                                                 Ther Ex             HEP stretching 15            bike             Circuit legs             Step ups                                                                 Ther Activity                                       Gait Training             Trial AFO                          Modalities

## 2020-07-01 NOTE — PROGRESS NOTES
PT EVALUATION    Today's date: 2020  Patient name: Mat Armenta  : 1970  MRN: [id number]  Referring provider: Edison Gupta MD  Dx:   Encounter Diagnosis     ICD-10-CM    1  Impaired ambulation R26 2                   Assessment  Assessment details: Patient presents with severe ambulation dysfunction starting 3 years ago  He reports severe burning pain in his feet due to neuropathy  He had decompression in his neck, but it did not help his gait  Patient also had a recent fall and fx his wrist   He had a carpalectomy in Feb 20  Pt has generalized hip and knee weakness  Also core weakness for upright sitting  Patient reports he has been referred to get orthotics for his feet  Reviewed and issued home stretching program  Patient can benefit from skilled PT to improve LE strength for  gait and decrease risk for falls  Impairments: abnormal gait  Understanding of Dx/Px/POC: fair   Prognosis: fair    Goals  3 weeks   Increase LE strength by  5 grade  Improve ankle DF ROM to 0  6 weeks  No falls  MMT strength hips improve by 1 grade    Plan  Planned therapy interventions: manual therapy, strengthening, stretching, home exercise program, therapeutic activities and balance  Frequency: 2x week  Duration in weeks: 6        Subjective Evaluation    History of Present Illness  Mechanism of injury: HNP low back 3 years effected ambulation  Severe neuropathy in feet    Had cervical fusion to help LE but it did not help  Pain  Current pain ratin  At worst pain ratin  Quality: burning  Aggravating factors: walking and standing  Progression: no change    Patient Goals  Patient goals for therapy: improved balance and decreased pain          Objective     Active Range of Motion   Left Hip   Normal active range of motion    Right Hip   Normal active range of motion  Left Ankle/Foot   Dorsiflexion (ke): 0 degrees     Right Ankle/Foot   Dorsiflexion (ke): 5 degrees     Strength/Myotome Testing     Left Shoulder   Normal muscle strength    Right Shoulder   Normal muscle strength    Left Hip   Planes of Motion   Flexion: 4-    Left Knee   Flexion: 4    Right Knee   Flexion: 4    Left Ankle/Foot   Dorsiflexion: 3    Right Ankle/Foot   Dorsiflexion: 4             Precautions: Stage 3 Kidney Disease, Lupus, Cervical myelopathy      Manuals 7/1                                                                Neuro Re-Ed             //bars hip 4-ways             foam             Sit to stand                                                                 Ther Ex             HEP stretching 15            bike             Circuit legs             Step ups                                                                 Ther Activity                                       Gait Training             Trial AFO                          Modalities

## 2020-07-02 ENCOUNTER — OFFICE VISIT (OUTPATIENT)
Dept: PHYSICAL THERAPY | Facility: CLINIC | Age: 50
End: 2020-07-02
Payer: COMMERCIAL

## 2020-07-02 ENCOUNTER — TRANSCRIBE ORDERS (OUTPATIENT)
Dept: PHYSICAL THERAPY | Facility: CLINIC | Age: 50
End: 2020-07-02

## 2020-07-02 DIAGNOSIS — R26.2 IMPAIRED AMBULATION: Primary | ICD-10-CM

## 2020-07-02 DIAGNOSIS — R26.2 AMBULATORY DYSFUNCTION: Primary | ICD-10-CM

## 2020-07-02 PROCEDURE — 97110 THERAPEUTIC EXERCISES: CPT

## 2020-07-02 PROCEDURE — 97116 GAIT TRAINING THERAPY: CPT

## 2020-07-02 NOTE — PROGRESS NOTES
Daily Note     Today's date: 2020  Patient name: Garth Camacho  : 1970  MRN: [id number]  Referring provider: Papo Estrada MD  Dx:   Encounter Diagnosis     ICD-10-CM    1  Impaired ambulation R26 2                   Subjective: Pt reports being compliant with HEP  He states "ready to exercise "      Objective: See treatment diary below      Assessment: Trialed AFO on L foot; improved gait by allowing better foot clearance  Tolerated exercises well having no increases in pain; verbal cueing for correct technique; some assistance needed to lift L LE into correct position on circuit equipment  Rest was needed between exercises  Plan: Continue with current POC to address pt deficits        Precautions: Stage 3 Kidney Disease, Lupus, Cervical myelopathy      Manuals                                                                Neuro Re-Ed             //bars hip 4-ways             foam             Sit to stand                                                                 Ther Ex             HEP stretching 15            bike  5'           Circuit legs  2x10 abd/add, ext           Step ups                                                                 Ther Activity                                       Gait Training             Trial AFO  10'                        Modalities

## 2020-07-07 ENCOUNTER — APPOINTMENT (OUTPATIENT)
Dept: PHYSICAL THERAPY | Facility: CLINIC | Age: 50
End: 2020-07-07
Payer: COMMERCIAL

## 2020-07-09 ENCOUNTER — OFFICE VISIT (OUTPATIENT)
Dept: PHYSICAL THERAPY | Facility: CLINIC | Age: 50
End: 2020-07-09
Payer: COMMERCIAL

## 2020-07-09 DIAGNOSIS — R26.2 IMPAIRED AMBULATION: Primary | ICD-10-CM

## 2020-07-09 PROCEDURE — 97110 THERAPEUTIC EXERCISES: CPT

## 2020-07-09 PROCEDURE — 97116 GAIT TRAINING THERAPY: CPT

## 2020-07-09 NOTE — PROGRESS NOTES
Daily Note     Today's date: 2020  Patient name: Shamar Chinchilla  : 1970  MRN: [id number]  Referring provider: Cherelle Peres MD  Dx:   Encounter Diagnosis     ICD-10-CM    1  Impaired ambulation R26 2                   Subjective: Pt reports no pain in LB today just pain in BL feet from neuropathy  There is less edema noted in BL LE today compared to last session  Objective: See treatment diary below      Assessment: Tolerated treatment session well having no increases in pain  Verbal cueing needed for patient to initiate dorsiflexion; pt presents with "scissor" gait pattern and has a difficulty with TKE BL  Assistance from therapist needed to lift L LE into position on recumbent bike and circuit equipment  Stretching of BL hamstrings and adductors to improve flexibility  Plan: Continue with current POC to address pt deficits        Precautions: Stage 3 Kidney Disease, Lupus, Cervical myelopathy      Manuals                                                               Neuro Re-Ed             //bars hip 4-ways             foam             Sit to stand                                                                 Ther Ex             HEP stretching 15            bike  5' 5'          Circuit legs  2x10 abd/add, ext 2x10 ea          Step ups             LE Stretching hams, adductors   20"x3 ea                                                 Ther Activity                                       Gait Training             Trial AFO  10'           Gym   8'          Modalities

## 2020-07-14 ENCOUNTER — OFFICE VISIT (OUTPATIENT)
Dept: PHYSICAL THERAPY | Facility: CLINIC | Age: 50
End: 2020-07-14
Payer: COMMERCIAL

## 2020-07-14 DIAGNOSIS — R26.2 IMPAIRED AMBULATION: Primary | ICD-10-CM

## 2020-07-14 PROCEDURE — 97110 THERAPEUTIC EXERCISES: CPT

## 2020-07-14 PROCEDURE — 97112 NEUROMUSCULAR REEDUCATION: CPT

## 2020-07-14 NOTE — PROGRESS NOTES
Daily Note     Today's date: 2020  Patient name: Mk Rosa  : 1970  MRN: [id number]  Referring provider: Naomy Huston MD  Dx:   Encounter Diagnosis     ICD-10-CM    1  Impaired ambulation R26 2                 Subjective: Pt reports BL feet are feeling better today  Objective: See treatment diary below      Assessment: Pt was challenged by standing hip 3-way (flexion, abd, extension) but was able to complete in sets of 5  BL stretching to hamstrings to improve knee extension during gait  Verbal and visual cueing to avoid scissor gait pattern and increase dorsiflexion/hip flexion to clear feet  Plan: Continue with current POC to address pt deficits        Precautions: Stage 3 Kidney Disease, Lupus, Cervical myelopathy      Manuals                                                              Neuro Re-Ed             //bars hip 4-ways    3 way 3x5 ea         foam             Sit to stand                                                                 Ther Ex             HEP stretching 15            bike  5' 5' 5'         Circuit legs  2x10 abd/add, ext 2x10 ea          Step ups             LE Stretching hams, adductors   20"x3 ea 20"x3 ea hams                                                Ther Activity                                       Gait Training             Trial AFO  10'           Gym   8' 5'         Modalities

## 2020-07-16 ENCOUNTER — OFFICE VISIT (OUTPATIENT)
Dept: PHYSICAL THERAPY | Facility: CLINIC | Age: 50
End: 2020-07-16
Payer: COMMERCIAL

## 2020-07-16 DIAGNOSIS — R26.2 IMPAIRED AMBULATION: Primary | ICD-10-CM

## 2020-07-16 PROCEDURE — 97110 THERAPEUTIC EXERCISES: CPT

## 2020-07-16 NOTE — PROGRESS NOTES
Daily Note     Today's date: 2020  Patient name: Bulmaro Antonio  : 1970  MRN: [id number]  Referring provider: Marie Bocanegra MD  Dx:   Encounter Diagnosis     ICD-10-CM    1  Impaired ambulation R26 2                   Subjective: Pt reports neuropathy in BL feet causing some pain today  Objective: See treatment diary below      Assessment: Tolerated therapy well, endurance is improving, less rest breaks needed  Pt experienced muscle spasm in L lumbar paraspinals during standing on foam after a couple minutes, did subside with rest  Verbal/visual cueing during gait to increase heel strike and hip flexion for foot clearance  Plan: Continue with current POC to address pt deficits        Precautions: Stage 3 Kidney Disease, Lupus, Cervical myelopathy      Manuals                                                             Neuro Re-Ed             //bars hip 4-ways    3 way 3x5 ea         foam     3'        Sit to stand                                                                 Ther Ex             HEP stretching 15            bike  5' 5' 5' 8'        Circuit legs  2x10 abd/add, ext 2x10 ea  2x10 ea        Step ups             LE Stretching hams, adductors   20"x3 ea 20"x3 ea hams                                                Ther Activity                                       Gait Training             Trial AFO  10'           Gym   8' 5' 5'        Modalities

## 2020-07-23 ENCOUNTER — OFFICE VISIT (OUTPATIENT)
Dept: PHYSICAL THERAPY | Facility: CLINIC | Age: 50
End: 2020-07-23
Payer: COMMERCIAL

## 2020-07-23 DIAGNOSIS — R26.2 IMPAIRED AMBULATION: Primary | ICD-10-CM

## 2020-07-23 PROCEDURE — 97112 NEUROMUSCULAR REEDUCATION: CPT

## 2020-07-23 PROCEDURE — 97110 THERAPEUTIC EXERCISES: CPT

## 2020-07-23 NOTE — PROGRESS NOTES
Daily Note     Today's date: 2020  Patient name: Dior Freeman  : 1970  MRN: [id number]  Referring provider: Kylee Laguna MD  Dx:   Encounter Diagnosis     ICD-10-CM    1  Impaired ambulation R26 2                   Subjective: Pt reports that he feels "good" today  Objective: See treatment diary below      Assessment: Tolerated treatment therapy well, pt able to facilitate and improve AROM in L LE  Added tandem stance to further challenge pt balance more challenged with R LE in front of L LE  Plan: Continue with current POC to address pt deficits        Precautions: Stage 3 Kidney Disease, Lupus, Cervical myelopathy      Manuals                                                            Neuro Re-Ed             //bars hip 4-ways    3 way 3x5 ea         foam     3' 3' NOBOS       Sit to stand             Tandem stance      5'                                              Ther Ex             HEP stretching 15            bike  5' 5' 5' 8' 5'       Circuit legs  2x10 abd/add, ext 2x10 ea  2x10 ea 2x10       Step ups             LE Stretching hams, adductors   20"x3 ea 20"x3 ea hams                                                Ther Activity                                       Gait Training             Trial AFO  10'           Gym   8' 5' 5'        Modalities

## 2020-09-16 DIAGNOSIS — M79.2 NEUROPATHIC PAIN: ICD-10-CM

## 2020-09-20 RX ORDER — GABAPENTIN 300 MG/1
CAPSULE ORAL
Qty: 30 CAPSULE | Refills: 5 | Status: SHIPPED | OUTPATIENT
Start: 2020-09-20 | End: 2021-03-08

## 2020-09-23 ENCOUNTER — APPOINTMENT (EMERGENCY)
Dept: CT IMAGING | Facility: HOSPITAL | Age: 50
End: 2020-09-23
Payer: COMMERCIAL

## 2020-09-23 ENCOUNTER — HOSPITAL ENCOUNTER (EMERGENCY)
Facility: HOSPITAL | Age: 50
Discharge: HOME | End: 2020-09-23
Attending: EMERGENCY MEDICINE | Admitting: EMERGENCY MEDICINE
Payer: COMMERCIAL

## 2020-09-23 VITALS
SYSTOLIC BLOOD PRESSURE: 142 MMHG | RESPIRATION RATE: 18 BRPM | HEART RATE: 71 BPM | DIASTOLIC BLOOD PRESSURE: 90 MMHG | TEMPERATURE: 98 F | BODY MASS INDEX: 31.39 KG/M2 | HEIGHT: 66 IN | OXYGEN SATURATION: 100 % | WEIGHT: 195.33 LBS

## 2020-09-23 DIAGNOSIS — R07.89 CHEST WALL PAIN: ICD-10-CM

## 2020-09-23 DIAGNOSIS — G62.9 NEUROPATHY: Primary | ICD-10-CM

## 2020-09-23 LAB
ALBUMIN SERPL BCP-MCNC: 4 G/DL (ref 3.5–5)
ALP SERPL-CCNC: 143 U/L (ref 46–116)
ALT SERPL W P-5'-P-CCNC: 26 U/L (ref 12–78)
ANION GAP SERPL CALCULATED.3IONS-SCNC: 9 MMOL/L (ref 4–13)
AST SERPL W P-5'-P-CCNC: 28 U/L (ref 5–45)
ATRIAL RATE: 84 BPM
BACTERIA UR QL AUTO: NORMAL /HPF
BASOPHILS # BLD AUTO: 0.04 THOUSANDS/ΜL (ref 0–0.1)
BASOPHILS NFR BLD AUTO: 1 % (ref 0–1)
BILIRUB SERPL-MCNC: 0.3 MG/DL (ref 0.2–1)
BILIRUB UR QL STRIP: NEGATIVE
BUN SERPL-MCNC: 21 MG/DL (ref 5–25)
CALCIUM SERPL-MCNC: 8.9 MG/DL (ref 8.3–10.1)
CHLORIDE SERPL-SCNC: 101 MMOL/L (ref 100–108)
CLARITY UR: CLEAR
CO2 SERPL-SCNC: 29 MMOL/L (ref 21–32)
COLOR UR: YELLOW
CREAT SERPL-MCNC: 1.58 MG/DL (ref 0.6–1.3)
EOSINOPHIL # BLD AUTO: 0.02 THOUSAND/ΜL (ref 0–0.61)
EOSINOPHIL NFR BLD AUTO: 1 % (ref 0–6)
ERYTHROCYTE [DISTWIDTH] IN BLOOD BY AUTOMATED COUNT: 14.7 % (ref 11.6–15.1)
GFR SERPL CREATININE-BSD FRML MDRD: 57 ML/MIN/1.73SQ M
GLUCOSE SERPL-MCNC: 101 MG/DL (ref 65–140)
GLUCOSE UR STRIP-MCNC: NEGATIVE MG/DL
HCT VFR BLD AUTO: 38.9 % (ref 36.5–49.3)
HGB BLD-MCNC: 12.7 G/DL (ref 12–17)
HGB UR QL STRIP.AUTO: ABNORMAL
IMM GRANULOCYTES # BLD AUTO: 0.06 THOUSAND/UL (ref 0–0.2)
IMM GRANULOCYTES NFR BLD AUTO: 2 % (ref 0–2)
KETONES UR STRIP-MCNC: NEGATIVE MG/DL
LEUKOCYTE ESTERASE UR QL STRIP: NEGATIVE
LIPASE SERPL-CCNC: 140 U/L (ref 73–393)
LYMPHOCYTES # BLD AUTO: 0.66 THOUSANDS/ΜL (ref 0.6–4.47)
LYMPHOCYTES NFR BLD AUTO: 17 % (ref 14–44)
MCH RBC QN AUTO: 30 PG (ref 26.8–34.3)
MCHC RBC AUTO-ENTMCNC: 32.6 G/DL (ref 31.4–37.4)
MCV RBC AUTO: 92 FL (ref 82–98)
MONOCYTES # BLD AUTO: 0.81 THOUSAND/ΜL (ref 0.17–1.22)
MONOCYTES NFR BLD AUTO: 21 % (ref 4–12)
NEUTROPHILS # BLD AUTO: 2.26 THOUSANDS/ΜL (ref 1.85–7.62)
NEUTS SEG NFR BLD AUTO: 58 % (ref 43–75)
NITRITE UR QL STRIP: NEGATIVE
NON-SQ EPI CELLS URNS QL MICRO: NORMAL /HPF
NRBC BLD AUTO-RTO: 0 /100 WBCS
NT-PROBNP SERPL-MCNC: 57 PG/ML
P AXIS: 60 DEGREES
PH UR STRIP.AUTO: 6.5 [PH]
PLATELET # BLD AUTO: 271 THOUSANDS/UL (ref 149–390)
PMV BLD AUTO: 10 FL (ref 8.9–12.7)
POTASSIUM SERPL-SCNC: 4.4 MMOL/L (ref 3.5–5.3)
PR INTERVAL: 162 MS
PROT SERPL-MCNC: 8.5 G/DL (ref 6.4–8.2)
PROT UR STRIP-MCNC: NEGATIVE MG/DL
QRS AXIS: -64 DEGREES
QRSD INTERVAL: 94 MS
QT INTERVAL: 358 MS
QTC INTERVAL: 423 MS
RBC # BLD AUTO: 4.23 MILLION/UL (ref 3.88–5.62)
RBC #/AREA URNS AUTO: NORMAL /HPF
SODIUM SERPL-SCNC: 139 MMOL/L (ref 136–145)
SP GR UR STRIP.AUTO: <=1.005 (ref 1–1.03)
T WAVE AXIS: 42 DEGREES
TROPONIN I SERPL-MCNC: <0.02 NG/ML
UROBILINOGEN UR QL STRIP.AUTO: 0.2 E.U./DL
VENTRICULAR RATE: 84 BPM
WBC # BLD AUTO: 3.85 THOUSAND/UL (ref 4.31–10.16)
WBC #/AREA URNS AUTO: NORMAL /HPF

## 2020-09-23 PROCEDURE — 83690 ASSAY OF LIPASE: CPT | Performed by: EMERGENCY MEDICINE

## 2020-09-23 PROCEDURE — 74177 CT ABD & PELVIS W/CONTRAST: CPT

## 2020-09-23 PROCEDURE — 83880 ASSAY OF NATRIURETIC PEPTIDE: CPT | Performed by: EMERGENCY MEDICINE

## 2020-09-23 PROCEDURE — 96361 HYDRATE IV INFUSION ADD-ON: CPT

## 2020-09-23 PROCEDURE — 93005 ELECTROCARDIOGRAM TRACING: CPT

## 2020-09-23 PROCEDURE — 36415 COLL VENOUS BLD VENIPUNCTURE: CPT | Performed by: EMERGENCY MEDICINE

## 2020-09-23 PROCEDURE — 84484 ASSAY OF TROPONIN QUANT: CPT | Performed by: EMERGENCY MEDICINE

## 2020-09-23 PROCEDURE — 85025 COMPLETE CBC W/AUTO DIFF WBC: CPT | Performed by: EMERGENCY MEDICINE

## 2020-09-23 PROCEDURE — 71275 CT ANGIOGRAPHY CHEST: CPT

## 2020-09-23 PROCEDURE — 99284 EMERGENCY DEPT VISIT MOD MDM: CPT

## 2020-09-23 PROCEDURE — 80053 COMPREHEN METABOLIC PANEL: CPT | Performed by: EMERGENCY MEDICINE

## 2020-09-23 PROCEDURE — 99285 EMERGENCY DEPT VISIT HI MDM: CPT | Performed by: EMERGENCY MEDICINE

## 2020-09-23 PROCEDURE — 96360 HYDRATION IV INFUSION INIT: CPT

## 2020-09-23 PROCEDURE — 93010 ELECTROCARDIOGRAM REPORT: CPT | Performed by: INTERNAL MEDICINE

## 2020-09-23 PROCEDURE — 81001 URINALYSIS AUTO W/SCOPE: CPT | Performed by: EMERGENCY MEDICINE

## 2020-09-23 PROCEDURE — G1004 CDSM NDSC: HCPCS

## 2020-09-23 RX ORDER — GABAPENTIN 300 MG/1
CAPSULE ORAL
Qty: 90 CAPSULE | Refills: 0 | Status: SHIPPED | OUTPATIENT
Start: 2020-09-23 | End: 2022-03-26 | Stop reason: HOSPADM

## 2020-09-23 RX ADMIN — SODIUM CHLORIDE 500 ML: 0.9 INJECTION, SOLUTION INTRAVENOUS at 17:11

## 2020-09-23 RX ADMIN — IOHEXOL 100 ML: 350 INJECTION, SOLUTION INTRAVENOUS at 18:14

## 2020-09-23 NOTE — ED PROVIDER NOTES
History  Chief Complaint   Patient presents with    Foot Pain     Pt states he has bilateral foot pain "for a long time"  Pt states he has neuropathy in both feet but has increased pain   Flank Pain     Pt states he also has right sided flank pain  Pt states he normally has pain there      Patient is a 57-year-old male  He has a history lupus  He presents to the emergency room with 2 complaints  He is complaining of a chronic history of bilateral foot pain  He was told he had neuropathy  He has chronic bilateral swelling to his lower extremities  One is not more swollen than the other  He does have a history of DVT  He has no longer on oral anticoagulants  He had been on gabapentin before  He is no longer on that  The pain in his legs is worse at night  In addition he is complaining of pain to the right lateral lower chest wall  He is not short of breath  No cough or hemoptysis  No fever or chills  He has no urinary complaints  No nausea or vomiting  Denies any trauma  That pain is been going on for an unclear amount of time  He does report having a negative COVID test   Symptoms are moderate in intensity without relieving factors  Patient had a cholecystectomy about a week ago  Prior to Admission Medications   Prescriptions Last Dose Informant Patient Reported?  Taking?   baclofen 20 mg tablet  Self Yes No   Sig: Take 20 mg by mouth 3 (three) times a day    betamethasone dipropionate (DIPROSONE) 0 05 % cream  Self Yes No   Sig: Apply 1 application topically 2 (two) times a day   betamethasone, augmented, (DIPROLENE) 0 05 % ointment  Self Yes No   Sig: Apply 1 application topically 2 (two) times a day   carvedilol (COREG) 12 5 mg tablet  Self Yes No   Sig: Take 12 5 mg by mouth 2 (two) times a day with meals   escitalopram (LEXAPRO) 10 mg tablet  Self Yes No   Sig: Take 10 mg by mouth daily   furosemide (LASIX) 40 mg tablet   No No   Sig: Take 0 5 tablets (20 mg total) by mouth daily   gabapentin (NEURONTIN) 300 mg capsule   No No   Sig: TAKE 1 CAPSULE BY MOUTH AT BEDTIME IN ADDITION TO 600MG   gabapentin (NEURONTIN) 600 MG tablet   Yes No   Sig: Take 600 mg by mouth 3 (three) times a day    hydrOXYzine HCL (ATARAX) 10 mg tablet   Yes No   Sig: Take 10 mg by mouth every 6 (six) hours as needed for itching   lisinopril (ZESTRIL) 40 mg tablet   Yes No   Sig: Take 40 mg by mouth daily   metolazone (ZAROXOLYN) 2 5 mg tablet   Yes No   Sig: Take 2 5 mg by mouth daily   mycophenolate (CELLCEPT) 500 mg tablet   Yes No   Sig: Take 500 mg by mouth every 12 (twelve) hours    potassium chloride (K-DUR,KLOR-CON) 20 mEq tablet   Yes No   Sig: Take 20 mEq by mouth daily   predniSONE 5 mg tablet   Yes No   Sig: Take 5 mg by mouth daily   rivaroxaban (XARELTO) 20 mg tablet   Yes No   Sig: Take 20 mg by mouth daily with dinner   tamsulosin (FLOMAX) 0 4 mg  Self Yes No   Sig: Take 0 8 mg by mouth Medrol Dose Pack scheduling ONLY   traMADol (ULTRAM) 50 mg tablet   Yes No   Sig: Take 50 mg by mouth every 6 (six) hours as needed for moderate pain      Facility-Administered Medications: None       Past Medical History:   Diagnosis Date    Cervical mass     Depression     DVT (deep venous thrombosis) (HCC)     Hypertension     Lupus (HCC)     Renal disorder        Past Surgical History:   Procedure Laterality Date    APPENDECTOMY      CERVICAL SPINE SURGERY      CHOLECYSTECTOMY      COLONOSCOPY N/A 8/23/2018    Procedure: COLONOSCOPY;  Surgeon: Alexis Patton MD;  Location: [facility]; Service: Gastroenterology    ESOPHAGOGASTRODUODENOSCOPY N/A 8/22/2018    Procedure: ESOPHAGOGASTRODUODENOSCOPY (EGD); Surgeon: Alexis Patton MD;  Location: [facility]; Service: Gastroenterology    NECK SURGERY      VASCULAR SURGERY         Family History   Problem Relation Age of Onset    Heart disease Mother      I have reviewed and agree with the history as documented      E-Cigarette/Vaping E-Cigarette/Vaping Substances     Social History     Tobacco Use    Smoking status: Never Smoker    Smokeless tobacco: Never Used   Substance Use Topics    Alcohol use: Never     Frequency: Never    Drug use: Never       Review of Systems   Constitutional: Negative for chills and fever  HENT: Negative for rhinorrhea and sore throat  Eyes: Negative for pain, redness and visual disturbance  Respiratory: Negative for cough and shortness of breath  Cardiovascular: Positive for chest pain and leg swelling  Gastrointestinal: Positive for abdominal pain  Negative for diarrhea and vomiting  Endocrine: Negative for polydipsia and polyuria  Genitourinary: Negative for dysuria, frequency and hematuria  Musculoskeletal: Negative for back pain and neck pain  Skin: Negative for rash and wound  Allergic/Immunologic: Negative for immunocompromised state  Neurological: Negative for weakness, numbness and headaches  Psychiatric/Behavioral: Negative for hallucinations and suicidal ideas  All other systems reviewed and are negative  Physical Exam  Physical Exam  Vitals signs reviewed  Constitutional:       General: He is not in acute distress  Appearance: He is not toxic-appearing  HENT:      Head: Normocephalic and atraumatic  Nose: Nose normal       Mouth/Throat:      Mouth: Mucous membranes are moist    Eyes:      General:         Right eye: No discharge  Left eye: No discharge  Conjunctiva/sclera: Conjunctivae normal    Neck:      Musculoskeletal: Normal range of motion and neck supple  No neck rigidity  Cardiovascular:      Rate and Rhythm: Normal rate and regular rhythm  Pulses: Normal pulses  Heart sounds: Normal heart sounds  No murmur  No friction rub  No gallop  Pulmonary:      Effort: Pulmonary effort is normal  No respiratory distress  Breath sounds: Normal breath sounds  No stridor  No wheezing, rhonchi or rales  Comments:  There is tenderness to the right lateral lower chest wall  No crepitus  No bruising  Chest:      Chest wall: Tenderness present  Abdominal:      General: Bowel sounds are normal  There is no distension  Palpations: Abdomen is soft  Tenderness: There is no abdominal tenderness  There is no right CVA tenderness, left CVA tenderness, guarding or rebound  Comments: There is periumbilical ecchymosis secondary to recent cholecystectomy  Musculoskeletal: Normal range of motion  General: No swelling, tenderness, deformity or signs of injury  Right lower leg: Edema present  Left lower leg: Edema present  Comments: No calf pain or unilateral leg swelling  There is 1+ pedal edema to both ankles bilaterally  Skin:     General: Skin is warm and dry  Coloration: Skin is not jaundiced or pale  Findings: No bruising, erythema or rash  Neurological:      General: No focal deficit present  Mental Status: He is alert and oriented to person, place, and time  Cranial Nerves: No facial asymmetry  Sensory: No sensory deficit  Motor: Motor function is intact     Psychiatric:         Mood and Affect: Mood normal          Behavior: Behavior normal          Vital Signs  ED Triage Vitals [09/23/20 1527]   Temperature Pulse Respirations Blood Pressure SpO2   98 °F (36 7 °C) (!) 114 18 115/86 100 %      Temp Source Heart Rate Source Patient Position - Orthostatic VS BP Location FiO2 (%)   Oral Monitor Sitting Left arm --      Pain Score       --           Vitals:    09/23/20 1527 09/23/20 1800 09/23/20 1900   BP: 115/86 119/74 142/90   Pulse: (!) 114 71 71   Patient Position - Orthostatic VS: Sitting           Visual Acuity      ED Medications  Medications   sodium chloride 0 9 % bolus 500 mL (0 mL Intravenous Stopped 9/23/20 1959)   iohexol (OMNIPAQUE) 350 MG/ML injection (SINGLE-DOSE) 100 mL (100 mL Intravenous Given 9/23/20 1814)       Diagnostic Studies  Results Reviewed Procedure Component Value Units Date/Time    Urine Microscopic [[id number]]  (Normal) Collected:  09/23/20 2020    Lab Status:  Final result Specimen:  Urine, Clean Catch Updated:  09/23/20 2057     RBC, UA 2-4 /hpf      WBC, UA 0-1 /hpf      Epithelial Cells None Seen /hpf      Bacteria, UA None Seen /hpf     UA w Reflex to Microscopic w Reflex to Culture [[id number]]  (Abnormal) Collected:  09/23/20 2020    Lab Status:  Final result Specimen:  Urine, Clean Catch Updated:  09/23/20 2043     Color, UA Yellow     Clarity, UA Clear     Specific Gravity, UA <=1 005     pH, UA 6 5     Leukocytes, UA Negative     Nitrite, UA Negative     Protein, UA Negative mg/dl      Glucose, UA Negative mg/dl      Ketones, UA Negative mg/dl      Urobilinogen, UA 0 2 E U /dl      Bilirubin, UA Negative     Blood, UA Small    Comprehensive metabolic panel [[id number]]  (Abnormal) Collected:  09/23/20 1711    Lab Status:  Final result Specimen:  Blood from Arm, Right Updated:  09/23/20 1742     Sodium 139 mmol/L      Potassium 4 4 mmol/L      Chloride 101 mmol/L      CO2 29 mmol/L      ANION GAP 9 mmol/L      BUN 21 mg/dL      Creatinine 1 58 mg/dL      Glucose 101 mg/dL      Calcium 8 9 mg/dL      AST 28 U/L      ALT 26 U/L      Alkaline Phosphatase 143 U/L      Total Protein 8 5 g/dL      Albumin 4 0 g/dL      Total Bilirubin 0 30 mg/dL      eGFR 57 ml/min/1 73sq m     Narrative:       Wes guidelines for Chronic Kidney Disease (CKD):     Stage 1 with normal or high GFR (GFR > 90 mL/min/1 73 square meters)    Stage 2 Mild CKD (GFR = 60-89 mL/min/1 73 square meters)    Stage 3A Moderate CKD (GFR = 45-59 mL/min/1 73 square meters)    Stage 3B Moderate CKD (GFR = 30-44 mL/min/1 73 square meters)    Stage 4 Severe CKD (GFR = 15-29 mL/min/1 73 square meters)    Stage 5 End Stage CKD (GFR <15 mL/min/1 73 square meters)  Note: GFR calculation is accurate only with a steady state creatinine    Lipase [[id number]]  (Normal) Collected:  09/23/20 1711    Lab Status:  Final result Specimen:  Blood from Arm, Right Updated:  09/23/20 1742     Lipase 140 u/L     NT-BNP PRO [[id number]]  (Normal) Collected:  09/23/20 1711    Lab Status:  Final result Specimen:  Blood from Arm, Right Updated:  09/23/20 1742     NT-proBNP 57 pg/mL     Troponin I [[id number]]  (Normal) Collected:  09/23/20 1711    Lab Status:  Final result Specimen:  Blood from Arm, Right Updated:  09/23/20 1737     Troponin I <0 02 ng/mL     CBC and differential [[id number]]  (Abnormal) Collected:  09/23/20 1711    Lab Status:  Final result Specimen:  Blood from Arm, Right Updated:  09/23/20 1718     WBC 3 85 Thousand/uL      RBC 4 23 Million/uL      Hemoglobin 12 7 g/dL      Hematocrit 38 9 %      MCV 92 fL      MCH 30 0 pg      MCHC 32 6 g/dL      RDW 14 7 %      MPV 10 0 fL      Platelets 709 Thousands/uL      nRBC 0 /100 WBCs      Neutrophils Relative 58 %      Immat GRANS % 2 %      Lymphocytes Relative 17 %      Monocytes Relative 21 %      Eosinophils Relative 1 %      Basophils Relative 1 %      Neutrophils Absolute 2 26 Thousands/µL      Immature Grans Absolute 0 06 Thousand/uL      Lymphocytes Absolute 0 66 Thousands/µL      Monocytes Absolute 0 81 Thousand/µL      Eosinophils Absolute 0 02 Thousand/µL      Basophils Absolute 0 04 Thousands/µL                  PE Study with CT Abdomen and Pelvis with contrast   Final Result by Robinson Shaffer MD (09/23 1857)      No acute findings in the chest, no pulmonary arterial and was or pulmonary infiltrate/consolidation  Fat-containing paraumbilical hernia measuring approximately 26 mm in greatest diameter with extensive surrounding inflammatory changes possibly secondary to strangulation of the herniated fat  No bowel component  The study was marked in [facility] for immediate notification                 Workstation performed: [id number]                    Procedures  ECG 12 Lead Documentation Only    Date/Time: 9/23/2020 5:02 PM  Performed by: Tabby Chawla MD  Authorized by: Tabby Chawla MD     ECG reviewed by me, the ED Provider: yes    Patient location:  ED  Interpretation:     Interpretation: abnormal    Rate:     ECG rate assessment: normal    Rhythm:     Rhythm: sinus rhythm    Ectopy:     Ectopy: none    QRS:     QRS axis:  Left  Conduction:     Conduction: normal    ST segments:     ST segments:  Normal  T waves:     T waves: normal    Comments:      Inferior scar  Unchanged from old EKG  No acute ischemic ST or T-wave changes  Left axis deviation is new  ED Course                           SBIRT 22yo+      Most Recent Value   SBIRT (24 yo +)   In order to provide better care to our patients, we are screening all of our patients for alcohol and drug use  Would it be okay to ask you these screening questions? Yes Filed at: 09/23/2020 1621   Initial Alcohol Screen: US AUDIT-C    1  How often do you have a drink containing alcohol?  0 Filed at: 09/23/2020 1621   2  How many drinks containing alcohol do you have on a typical day you are drinking? 0 Filed at: 09/23/2020 1621   3a  Male UNDER 65: How often do you have five or more drinks on one occasion? 0 Filed at: 09/23/2020 1621   3b  FEMALE Any Age, or MALE 65+: How often do you have 4 or more drinks on one occassion? 0 Filed at: 09/23/2020 1621   Audit-C Score  0 Filed at: 09/23/2020 1621   NARCISA: How many times in the past year have you    Used an illegal drug or used a prescription medication for non-medical reasons? Never Filed at: 09/23/2020 1621                  MDM  Number of Diagnoses or Management Options  Diagnosis management comments: Patient has good peripheral pulses  His leg pain is likely neuropathy  Will restart gabapentin and outpatient follow-up with his primary MD   As far as the right side pain  CTA of the chest was negative for pulmonary embolism  There was no pneumonia  No pneumothorax    It is probably musculoskeletal   Patient recently had a cholecystectomy  Patient might have had some diaphragmatic irritation that might have resulted in his symptoms  No significant intra-abdominal pathology  No kidney stone  No AAA  Radiology did comment on inflammatory stranding in the umbilical area possibly related to a strangulated fat hernia  Patient was examined  He has no tenderness or mass at the umbilicus  Perhaps the radiologist is seeing postsurgical changes  Patient is appropriate for discharge and outpatient management  Amount and/or Complexity of Data Reviewed  Clinical lab tests: reviewed and ordered  Tests in the radiology section of CPT®: ordered and reviewed  Independent visualization of images, tracings, or specimens: yes        Disposition  Final diagnoses:   Neuropathy   Chest wall pain     Time reflects when diagnosis was documented in both MDM as applicable and the Disposition within this note     Time User Action Codes Description Comment    9/23/2020  7:51 PM Abby Costa [G62 9] Neuropathy     9/23/2020  7:51 PM Abby Costa [R07 [id number]] Chest wall pain       ED Disposition     ED Disposition Condition Date/Time Comment    Discharge Stable Wed Sep 23, 2020  7:51 PM Salima Zamora discharge to home/self care  Follow-up Information     Follow up With Specialties Details Why Sue Roth MD  In 1 week  [address] [place] [address]  N  [phone]            Discharge Medication List as of 9/23/2020  7:54 PM      START taking these medications    Details   !! gabapentin (NEURONTIN) 300 mg capsule 300 mg p o  q day on day 1  300 mg p o  b i d  On day 2  Then 300 mg p o  T i d  Thereafter , Normal       !! - Potential duplicate medications found  Please discuss with provider        CONTINUE these medications which have NOT CHANGED    Details   baclofen 20 mg tablet Take 20 mg by mouth 3 (three) times a day , Historical Med betamethasone dipropionate (DIPROSONE) 0 05 % cream Apply 1 application topically 2 (two) times a day, Historical Med      betamethasone, augmented, (DIPROLENE) 0 05 % ointment Apply 1 application topically 2 (two) times a day, Historical Med      carvedilol (COREG) 12 5 mg tablet Take 12 5 mg by mouth 2 (two) times a day with meals, Historical Med      escitalopram (LEXAPRO) 10 mg tablet Take 10 mg by mouth daily, Historical Med      furosemide (LASIX) 40 mg tablet Take 0 5 tablets (20 mg total) by mouth daily, Starting Tue 7/31/2018, No Print      !! gabapentin (NEURONTIN) 300 mg capsule TAKE 1 CAPSULE BY MOUTH AT BEDTIME IN ADDITION TO 600MG, Normal      gabapentin (NEURONTIN) 600 MG tablet Take 600 mg by mouth 3 (three) times a day , Historical Med      hydrOXYzine HCL (ATARAX) 10 mg tablet Take 10 mg by mouth every 6 (six) hours as needed for itching, Historical Med      lisinopril (ZESTRIL) 40 mg tablet Take 40 mg by mouth daily, Historical Med      metolazone (ZAROXOLYN) 2 5 mg tablet Take 2 5 mg by mouth daily, Historical Med      mycophenolate (CELLCEPT) 500 mg tablet Take 500 mg by mouth every 12 (twelve) hours , Historical Med      potassium chloride (K-DUR,KLOR-CON) 20 mEq tablet Take 20 mEq by mouth daily, Historical Med      predniSONE 5 mg tablet Take 5 mg by mouth daily, Historical Med      rivaroxaban (XARELTO) 20 mg tablet Take 20 mg by mouth daily with dinner, Historical Med      tamsulosin (FLOMAX) 0 4 mg Take 0 8 mg by mouth Medrol Dose Pack scheduling ONLY, Historical Med      traMADol (ULTRAM) 50 mg tablet Take 50 mg by mouth every 6 (six) hours as needed for moderate pain, Historical Med       !! - Potential duplicate medications found  Please discuss with provider  No discharge procedures on file      PDMP Review     None          ED Provider  Electronically Signed by           Calos Chen MD  09/24/20 0743

## 2021-03-05 DIAGNOSIS — M79.2 NEUROPATHIC PAIN: ICD-10-CM

## 2021-03-08 RX ORDER — GABAPENTIN 300 MG/1
CAPSULE ORAL
Qty: 30 CAPSULE | Refills: 5 | Status: SHIPPED | OUTPATIENT
Start: 2021-03-08

## 2022-02-04 ENCOUNTER — NURSE TRIAGE (OUTPATIENT)
Dept: PHYSICAL THERAPY | Facility: OTHER | Age: 52
End: 2022-02-04

## 2022-02-04 DIAGNOSIS — M54.42 CHRONIC BILATERAL LOW BACK PAIN WITH LEFT-SIDED SCIATICA: Primary | ICD-10-CM

## 2022-02-04 DIAGNOSIS — G89.29 CHRONIC BILATERAL LOW BACK PAIN WITH LEFT-SIDED SCIATICA: Primary | ICD-10-CM

## 2022-02-04 NOTE — TELEPHONE ENCOUNTER
Additional Information   Negative: Is this related to a work injury?  Negative: Is this related to an MVA?  Negative: Are you currently recieving homecare services?  Negative: Has the patient had unexplained weight loss?  Negative: Does the patient have a fever?  Negative: Is the patient experiencing urine retention?  Negative: Is the patient experiencing acute drop foot or paralysis?  Negative: Has the patient experienced major trauma? (fall from height, high speed collision, direct blow to spine) and is also experiencing nausea, light-headedness, or loss of consciousness?  Negative: Is the patient experiencing blood in sputum?  Affirmative: Is this a chronic condition? Background - Initial Assessment  Clinical complaint: left lower back pain radiates down the left leg to the foot with numbness and tingling  States the pain has been present for 2 years  States he had done PT in the past   Date of onset: 2 yrs  Frequency of pain: intermittent  Quality of pain: sharp and stabbing    Protocols used: [facility] COMPREHENSIVE SPINE PROGRAM PROTOCOL    This RN did review in detail the Comprehensive Spine Program and what we can provide for their back pain  Patient is agreeable to being triaged by this RN and would like to proceed with Physical Therapy  Referral was placed for Physical Therapy at the [facility] site  Patients information was sent to the  to make evaluation appointment  Patient made aware that the PT office  will be calling to schedule the appointment  Patient was provided with the phone number to the PT office  No further questions and/or concerns were voiced by the patient at this time  Patient states understanding of the referral that was placed

## 2022-03-24 ENCOUNTER — HOSPITAL ENCOUNTER (INPATIENT)
Facility: HOSPITAL | Age: 52
LOS: 2 days | Discharge: HOME HEALTH | DRG: 383 | End: 2022-03-26
Attending: EMERGENCY MEDICINE | Admitting: INTERNAL MEDICINE
Payer: COMMERCIAL

## 2022-03-24 ENCOUNTER — APPOINTMENT (EMERGENCY)
Dept: RADIOLOGY | Facility: HOSPITAL | Age: 52
DRG: 383 | End: 2022-03-24
Payer: COMMERCIAL

## 2022-03-24 DIAGNOSIS — E87.1 HYPONATREMIA: ICD-10-CM

## 2022-03-24 DIAGNOSIS — E87.6 HYPOKALEMIA: ICD-10-CM

## 2022-03-24 DIAGNOSIS — L03.90 CELLULITIS: Primary | ICD-10-CM

## 2022-03-24 DIAGNOSIS — M79.2 NEUROPATHIC PAIN: ICD-10-CM

## 2022-03-24 DIAGNOSIS — R25.2 SPASTICITY: ICD-10-CM

## 2022-03-24 DIAGNOSIS — L03.116 CELLULITIS OF LEFT LOWER EXTREMITY: ICD-10-CM

## 2022-03-24 DIAGNOSIS — R26.9 GAIT DIFFICULTY: ICD-10-CM

## 2022-03-24 DIAGNOSIS — M62.81 MUSCLE WEAKNESS (GENERALIZED): ICD-10-CM

## 2022-03-24 PROBLEM — R65.10 SIRS (SYSTEMIC INFLAMMATORY RESPONSE SYNDROME) (HCC): Status: ACTIVE | Noted: 2022-03-24

## 2022-03-24 PROBLEM — M79.89 PAIN AND SWELLING OF LEFT LOWER EXTREMITY: Status: ACTIVE | Noted: 2022-03-24

## 2022-03-24 PROBLEM — M79.605 PAIN AND SWELLING OF LEFT LOWER EXTREMITY: Status: ACTIVE | Noted: 2022-03-24

## 2022-03-24 LAB
ALBUMIN SERPL BCP-MCNC: 4 G/DL (ref 3.5–5)
ALP SERPL-CCNC: 121 U/L (ref 46–116)
ALT SERPL W P-5'-P-CCNC: 28 U/L (ref 12–78)
ANION GAP SERPL CALCULATED.3IONS-SCNC: 11 MMOL/L (ref 4–13)
AST SERPL W P-5'-P-CCNC: 34 U/L (ref 5–45)
BASOPHILS # BLD AUTO: 0.02 THOUSANDS/ΜL (ref 0–0.1)
BASOPHILS NFR BLD AUTO: 0 % (ref 0–1)
BILIRUB SERPL-MCNC: 0.5 MG/DL (ref 0.2–1)
BUN SERPL-MCNC: 45 MG/DL (ref 5–25)
CALCIUM SERPL-MCNC: 9.4 MG/DL (ref 8.3–10.1)
CHLORIDE SERPL-SCNC: 91 MMOL/L (ref 100–108)
CO2 SERPL-SCNC: 27 MMOL/L (ref 21–32)
CREAT SERPL-MCNC: 1.67 MG/DL (ref 0.6–1.3)
EOSINOPHIL # BLD AUTO: 0.01 THOUSAND/ΜL (ref 0–0.61)
EOSINOPHIL NFR BLD AUTO: 0 % (ref 0–6)
ERYTHROCYTE [DISTWIDTH] IN BLOOD BY AUTOMATED COUNT: 13.3 % (ref 11.6–15.1)
GFR SERPL CREATININE-BSD FRML MDRD: 45 ML/MIN/1.73SQ M
GLUCOSE SERPL-MCNC: 102 MG/DL (ref 65–140)
HCT VFR BLD AUTO: 36.5 % (ref 36.5–49.3)
HGB BLD-MCNC: 12.8 G/DL (ref 12–17)
IMM GRANULOCYTES # BLD AUTO: 0.06 THOUSAND/UL (ref 0–0.2)
IMM GRANULOCYTES NFR BLD AUTO: 1 % (ref 0–2)
LACTATE SERPL-SCNC: 0.9 MMOL/L (ref 0.5–2)
LYMPHOCYTES # BLD AUTO: 0.9 THOUSANDS/ΜL (ref 0.6–4.47)
LYMPHOCYTES NFR BLD AUTO: 17 % (ref 14–44)
MCH RBC QN AUTO: 29.1 PG (ref 26.8–34.3)
MCHC RBC AUTO-ENTMCNC: 35.1 G/DL (ref 31.4–37.4)
MCV RBC AUTO: 83 FL (ref 82–98)
MONOCYTES # BLD AUTO: 0.68 THOUSAND/ΜL (ref 0.17–1.22)
MONOCYTES NFR BLD AUTO: 13 % (ref 4–12)
NEUTROPHILS # BLD AUTO: 3.51 THOUSANDS/ΜL (ref 1.85–7.62)
NEUTS SEG NFR BLD AUTO: 69 % (ref 43–75)
NRBC BLD AUTO-RTO: 0 /100 WBCS
PLATELET # BLD AUTO: 295 THOUSANDS/UL (ref 149–390)
PMV BLD AUTO: 10.1 FL (ref 8.9–12.7)
POTASSIUM SERPL-SCNC: 3.3 MMOL/L (ref 3.5–5.3)
PROT SERPL-MCNC: 9.6 G/DL (ref 6.4–8.2)
RBC # BLD AUTO: 4.4 MILLION/UL (ref 3.88–5.62)
SODIUM SERPL-SCNC: 129 MMOL/L (ref 136–145)
WBC # BLD AUTO: 5.18 THOUSAND/UL (ref 4.31–10.16)

## 2022-03-24 PROCEDURE — 96361 HYDRATE IV INFUSION ADD-ON: CPT

## 2022-03-24 PROCEDURE — 87040 BLOOD CULTURE FOR BACTERIA: CPT | Performed by: EMERGENCY MEDICINE

## 2022-03-24 PROCEDURE — 80053 COMPREHEN METABOLIC PANEL: CPT | Performed by: EMERGENCY MEDICINE

## 2022-03-24 PROCEDURE — 96365 THER/PROPH/DIAG IV INF INIT: CPT

## 2022-03-24 PROCEDURE — 36415 COLL VENOUS BLD VENIPUNCTURE: CPT | Performed by: EMERGENCY MEDICINE

## 2022-03-24 PROCEDURE — 99284 EMERGENCY DEPT VISIT MOD MDM: CPT

## 2022-03-24 PROCEDURE — 99223 1ST HOSP IP/OBS HIGH 75: CPT

## 2022-03-24 PROCEDURE — 73630 X-RAY EXAM OF FOOT: CPT

## 2022-03-24 PROCEDURE — 99285 EMERGENCY DEPT VISIT HI MDM: CPT | Performed by: EMERGENCY MEDICINE

## 2022-03-24 PROCEDURE — 85025 COMPLETE CBC W/AUTO DIFF WBC: CPT | Performed by: EMERGENCY MEDICINE

## 2022-03-24 PROCEDURE — 83605 ASSAY OF LACTIC ACID: CPT | Performed by: EMERGENCY MEDICINE

## 2022-03-24 PROCEDURE — 73590 X-RAY EXAM OF LOWER LEG: CPT

## 2022-03-24 RX ORDER — FUROSEMIDE 20 MG/1
20 TABLET ORAL DAILY
Status: DISCONTINUED | OUTPATIENT
Start: 2022-03-25 | End: 2022-03-26 | Stop reason: HOSPADM

## 2022-03-24 RX ORDER — POTASSIUM CHLORIDE 20 MEQ/1
20 TABLET, EXTENDED RELEASE ORAL DAILY
Status: DISCONTINUED | OUTPATIENT
Start: 2022-03-25 | End: 2022-03-26 | Stop reason: HOSPADM

## 2022-03-24 RX ORDER — SODIUM CHLORIDE, SODIUM GLUCONATE, SODIUM ACETATE, POTASSIUM CHLORIDE, MAGNESIUM CHLORIDE, SODIUM PHOSPHATE, DIBASIC, AND POTASSIUM PHOSPHATE .53; .5; .37; .037; .03; .012; .00082 G/100ML; G/100ML; G/100ML; G/100ML; G/100ML; G/100ML; G/100ML
75 INJECTION, SOLUTION INTRAVENOUS CONTINUOUS
Status: DISCONTINUED | OUTPATIENT
Start: 2022-03-24 | End: 2022-03-26 | Stop reason: HOSPADM

## 2022-03-24 RX ORDER — PREDNISONE 1 MG/1
5 TABLET ORAL DAILY
Status: DISCONTINUED | OUTPATIENT
Start: 2022-03-25 | End: 2022-03-26 | Stop reason: HOSPADM

## 2022-03-24 RX ORDER — TAMSULOSIN HYDROCHLORIDE 0.4 MG/1
0.8 CAPSULE ORAL
Status: DISCONTINUED | OUTPATIENT
Start: 2022-03-24 | End: 2022-03-26 | Stop reason: HOSPADM

## 2022-03-24 RX ORDER — GABAPENTIN 300 MG/1
900 CAPSULE ORAL 3 TIMES DAILY
Status: DISCONTINUED | OUTPATIENT
Start: 2022-03-24 | End: 2022-03-26 | Stop reason: HOSPADM

## 2022-03-24 RX ORDER — ESCITALOPRAM OXALATE 10 MG/1
10 TABLET ORAL DAILY
Status: DISCONTINUED | OUTPATIENT
Start: 2022-03-25 | End: 2022-03-26 | Stop reason: HOSPADM

## 2022-03-24 RX ORDER — METOLAZONE 5 MG/1
2.5 TABLET ORAL DAILY
Status: DISCONTINUED | OUTPATIENT
Start: 2022-03-25 | End: 2022-03-26 | Stop reason: HOSPADM

## 2022-03-24 RX ORDER — HYDROXYCHLOROQUINE SULFATE 200 MG/1
400 TABLET, FILM COATED ORAL
Status: DISCONTINUED | OUTPATIENT
Start: 2022-03-24 | End: 2022-03-26 | Stop reason: HOSPADM

## 2022-03-24 RX ORDER — HYDROXYCHLOROQUINE SULFATE 200 MG/1
400 TABLET, FILM COATED ORAL
COMMUNITY

## 2022-03-24 RX ORDER — BACLOFEN 10 MG/1
20 TABLET ORAL 3 TIMES DAILY PRN
Status: DISCONTINUED | OUTPATIENT
Start: 2022-03-24 | End: 2022-03-26 | Stop reason: HOSPADM

## 2022-03-24 RX ORDER — ACETAMINOPHEN 325 MG/1
650 TABLET ORAL EVERY 6 HOURS PRN
Status: DISCONTINUED | OUTPATIENT
Start: 2022-03-24 | End: 2022-03-26 | Stop reason: HOSPADM

## 2022-03-24 RX ORDER — CEFAZOLIN SODIUM 2 G/50ML
2000 SOLUTION INTRAVENOUS EVERY 8 HOURS
Status: DISCONTINUED | OUTPATIENT
Start: 2022-03-25 | End: 2022-03-26 | Stop reason: HOSPADM

## 2022-03-24 RX ORDER — CEFAZOLIN SODIUM 2 G/50ML
2000 SOLUTION INTRAVENOUS ONCE
Status: COMPLETED | OUTPATIENT
Start: 2022-03-24 | End: 2022-03-24

## 2022-03-24 RX ORDER — HYDROXYZINE HYDROCHLORIDE 10 MG/1
10 TABLET, FILM COATED ORAL EVERY 6 HOURS PRN
Status: DISCONTINUED | OUTPATIENT
Start: 2022-03-24 | End: 2022-03-26 | Stop reason: HOSPADM

## 2022-03-24 RX ADMIN — SODIUM CHLORIDE 1000 ML: 0.9 INJECTION, SOLUTION INTRAVENOUS at 19:29

## 2022-03-24 RX ADMIN — GABAPENTIN 900 MG: 300 CAPSULE ORAL at 22:48

## 2022-03-24 RX ADMIN — BACLOFEN 20 MG: 10 TABLET ORAL at 22:48

## 2022-03-24 RX ADMIN — CEFAZOLIN SODIUM 2000 MG: 2 SOLUTION INTRAVENOUS at 19:27

## 2022-03-24 RX ADMIN — HYDROXYCHLOROQUINE SULFATE 400 MG: 200 TABLET, FILM COATED ORAL at 22:48

## 2022-03-24 RX ADMIN — SODIUM CHLORIDE, SODIUM GLUCONATE, SODIUM ACETATE, POTASSIUM CHLORIDE, MAGNESIUM CHLORIDE, SODIUM PHOSPHATE, DIBASIC, AND POTASSIUM PHOSPHATE 75 ML/HR: .53; .5; .37; .037; .03; .012; .00082 INJECTION, SOLUTION INTRAVENOUS at 22:49

## 2022-03-24 RX ADMIN — TAMSULOSIN HYDROCHLORIDE 0.8 MG: 0.4 CAPSULE ORAL at 22:48

## 2022-03-24 NOTE — ED PROVIDER NOTES
Pt Name: Skyler Villareal  MRN: [id number]  [place] 1970  Age/Sex: 45 y o  male  Date of evaluation: 3/24/2022  PCP: Dafne Syed MD    [address] [place] [address]    Chief Complaint   Patient presents with    Leg Swelling     LLE, onset sat, worsening swelling  Redness noted  Pt denies any other complaints  HPI and MDM    45 y o  male presenting with left foot redness and pain  Patient has bilateral lower extremity swelling at baseline, however left foot has been more swollen and red and painful since last Saturday  Has been getting worse  Denies any fevers or chills  No nausea or vomiting  No trauma or other injuries  Patient has history of DVT on Xarelto, also has history of lupus  He is on chronic steroids  States he has been taking doxycycline for the possible infection  Exam is concerning for cellulitis, failure of outpatient antibiotic treatment  Not concerned for necrotizing fasciitis  No free air on x-rays  Blood work reveals a mildly elevated creatinine, mild hyponatremia and hypokalemia  Patient given IV fluids and IV antibiotics  Discussed with internal medicine for hospitalization  Medications   ceFAZolin (ANCEF) IVPB (premix in dextrose) 2,000 mg 50 mL (0 mg Intravenous Stopped 3/24/22 1957)   sodium chloride 0 9 % bolus 1,000 mL (1,000 mL Intravenous New Bag 3/24/22 1929)         Past Medical and Surgical History    Past Medical History:   Diagnosis Date    Cervical mass     Depression     DVT (deep venous thrombosis) (HCC)     Hypertension     Lupus ([facility] [address] )     Renal disorder        Past Surgical History:   Procedure Laterality Date    APPENDECTOMY      CERVICAL SPINE SURGERY      CHOLECYSTECTOMY      COLONOSCOPY N/A 8/23/2018    Procedure: COLONOSCOPY;  Surgeon: Tien Bergeron MD;  Location: [facility]; Service: Gastroenterology    ESOPHAGOGASTRODUODENOSCOPY N/A 8/22/2018    Procedure: ESOPHAGOGASTRODUODENOSCOPY (EGD);   Surgeon: Elena Oleary III, MD;  Location: [facility]; Service: Gastroenterology    NECK SURGERY      VASCULAR SURGERY         Family History   Problem Relation Age of Onset    Heart disease Mother        Social History     Tobacco Use    Smoking status: Never Smoker    Smokeless tobacco: Never Used   Substance Use Topics    Alcohol use: Never    Drug use: Never           Allergies    No Known Allergies    Home Medications    Prior to Admission medications    Medication Sig Start Date End Date Taking? Authorizing Provider   baclofen 20 mg tablet Take 20 mg by mouth 3 (three) times a day     Historical Provider, MD   betamethasone dipropionate (DIPROSONE) 0 05 % cream Apply 1 application topically 2 (two) times a day    Historical Provider, MD   betamethasone, augmented, (DIPROLENE) 0 05 % ointment Apply 1 application topically 2 (two) times a day    Historical Provider, MD   carvedilol (COREG) 12 5 mg tablet Take 12 5 mg by mouth 2 (two) times a day with meals    Historical Provider, MD   escitalopram (LEXAPRO) 10 mg tablet Take 10 mg by mouth daily    Historical Provider, MD   furosemide (LASIX) 40 mg tablet Take 0 5 tablets (20 mg total) by mouth daily 7/31/18   Mercedes Arreguin PA-C   gabapentin (NEURONTIN) 300 mg capsule 300 mg p o  q day on day 1  300 mg p o  b i d  On day 2  Then 300 mg p o  T i d  Thereafter   9/23/20   Ary Erickson MD   gabapentin (NEURONTIN) 300 mg capsule TAKE 1 CAPSULE BY MOUTH AT BEDTIME IN ADDITION TO 600MG 3/8/21   Shun Castillo MD   gabapentin (NEURONTIN) 600 MG tablet Take 600 mg by mouth 3 (three) times a day     Historical Provider, MD   hydrOXYzine HCL (ATARAX) 10 mg tablet Take 10 mg by mouth every 6 (six) hours as needed for itching    Historical Provider, MD   lisinopril (ZESTRIL) 40 mg tablet Take 40 mg by mouth daily    Historical Provider, MD   metolazone (ZAROXOLYN) 2 5 mg tablet Take 2 5 mg by mouth daily    Historical Provider, MD   mycophenolate (CELLCEPT) 500 mg tablet Take 500 mg by mouth every 12 (twelve) hours     Historical Provider, MD   potassium chloride (K-DUR,KLOR-CON) 20 mEq tablet Take 20 mEq by mouth daily    Historical Provider, MD   predniSONE 5 mg tablet Take 5 mg by mouth daily    Historical Provider, MD   rivaroxaban (XARELTO) 20 mg tablet Take 20 mg by mouth daily with dinner    Historical Provider, MD   tamsulosin (FLOMAX) 0 4 mg Take 0 8 mg by mouth Medrol Dose Pack scheduling ONLY    Historical Provider, MD   traMADol (ULTRAM) 50 mg tablet Take 50 mg by mouth every 6 (six) hours as needed for moderate pain    Historical Provider, MD           Review of Systems    Review of Systems   Constitutional: Negative for chills and fever  HENT: Negative for rhinorrhea and sore throat  Eyes: Negative for pain and visual disturbance  Respiratory: Negative for cough and shortness of breath  Cardiovascular: Positive for leg swelling  Negative for chest pain  Gastrointestinal: Negative for abdominal pain, nausea and vomiting  Genitourinary: Negative for dysuria and hematuria  Musculoskeletal: Negative for back pain  Left foot/lower leg pain   Skin: Positive for color change and rash  Neurological: Negative for syncope and headaches  Physical Exam      ED Triage Vitals [03/24/22 1800]   Temperature Pulse Respirations Blood Pressure SpO2   98 4 °F (36 9 °C) 87 20 115/72 99 %      Temp Source Heart Rate Source Patient Position - Orthostatic VS BP Location FiO2 (%)   Oral Monitor -- Right arm --      Pain Score       --               Physical Exam  Constitutional:       General: He is not in acute distress  Appearance: He is not toxic-appearing  HENT:      Head: Normocephalic and atraumatic  Nose: Nose normal       Mouth/Throat:      Mouth: Mucous membranes are moist    Eyes:      Extraocular Movements: Extraocular movements intact  Pupils: Pupils are equal, round, and reactive to light     Cardiovascular:      Rate and Rhythm: Normal rate and regular rhythm  Pulmonary:      Effort: Pulmonary effort is normal  No respiratory distress  Abdominal:      General: There is no distension  Palpations: Abdomen is soft  Tenderness: There is no abdominal tenderness  Musculoskeletal:      Cervical back: Normal range of motion  No rigidity  Comments: Bilateral lower extremity pitting edema   Skin:     General: Skin is warm  Comments: Left foot erythema and tenderness primarily on the medial aspect, no crepitus   Neurological:      Mental Status: He is alert and oriented to person, place, and time  Mental status is at baseline                Diagnostic Results      Labs:    Results Reviewed     Procedure Component Value Units Date/Time    Blood culture #1 [[id number]] Collected: 03/24/22 2107    Lab Status: No result Specimen: Blood from Arm, Left     Blood culture #2 [[id number]] Collected: 03/24/22 2107    Lab Status: No result Specimen: Blood from Arm, Right     Comprehensive metabolic panel [[id number]]  (Abnormal) Collected: 03/24/22 1824    Lab Status: Final result Specimen: Blood from Arm, Left Updated: 03/24/22 1854     Sodium 129 mmol/L      Potassium 3 3 mmol/L      Chloride 91 mmol/L      CO2 27 mmol/L      ANION GAP 11 mmol/L      BUN 45 mg/dL      Creatinine 1 67 mg/dL      Glucose 102 mg/dL      Calcium 9 4 mg/dL      AST 34 U/L      ALT 28 U/L      Alkaline Phosphatase 121 U/L      Total Protein 9 6 g/dL      Albumin 4 0 g/dL      Total Bilirubin 0 50 mg/dL      eGFR 45 ml/min/1 73sq m     Narrative:      [place] guidelines for Chronic Kidney Disease (CKD):     Stage 1 with normal or high GFR (GFR > 90 mL/min/1 73 square meters)    Stage 2 Mild CKD (GFR = 60-89 mL/min/1 73 square meters)    Stage 3A Moderate CKD (GFR = 45-59 mL/min/1 73 square meters)    Stage 3B Moderate CKD (GFR = 30-44 mL/min/1 73 square meters)    Stage 4 Severe CKD (GFR = 15-29 mL/min/1 73 square meters)    Stage 5 End Stage CKD (GFR <15 mL/min/1 73 square meters)  Note: GFR calculation is accurate only with a steady state creatinine    Lactic acid [[id number]]  (Normal) Collected: 03/24/22 1824    Lab Status: Final result Specimen: Blood from Arm, Left Updated: 03/24/22 1853     LACTIC ACID 0 9 mmol/L     Narrative:      Result may be elevated if tourniquet was used during collection      CBC and differential [[id number]]  (Abnormal) Collected: 03/24/22 1824    Lab Status: Final result Specimen: Blood from Arm, Left Updated: 03/24/22 1829     WBC 5 18 Thousand/uL      RBC 4 40 Million/uL      Hemoglobin 12 8 g/dL      Hematocrit 36 5 %      MCV 83 fL      MCH 29 1 pg      MCHC 35 1 g/dL      RDW 13 3 %      MPV 10 1 fL      Platelets 113 Thousands/uL      nRBC 0 /100 WBCs      Neutrophils Relative 69 %      Immat GRANS % 1 %      Lymphocytes Relative 17 %      Monocytes Relative 13 %      Eosinophils Relative 0 %      Basophils Relative 0 %      Neutrophils Absolute 3 51 Thousands/µL      Immature Grans Absolute 0 06 Thousand/uL      Lymphocytes Absolute 0 90 Thousands/µL      Monocytes Absolute 0 68 Thousand/µL      Eosinophils Absolute 0 01 Thousand/µL      Basophils Absolute 0 02 Thousands/µL           All labs reviewed and utilized in the medical decision making process    Radiology:    XR foot 3+ views LEFT    (Results Pending)   XR tibia fibula 2 views LEFT    (Results Pending)       All radiology studies independently viewed by me and interpreted by the radiologist     Procedure    Procedures        FINAL IMPRESSION    Final diagnoses:   Cellulitis   Hyponatremia   Hypokalemia         DISPOSITION    Time reflects when diagnosis was documented in both MDM as applicable and the Disposition within this note     Time User Action Codes Description Comment    3/24/2022  9:03 PM Vertis Gaetano Add [X02 08] Cellulitis     3/24/2022  9:03 PM Vertis Gaetano Add [E87 1] Hyponatremia     3/24/2022  9:05 PM Beatriz Chroman [E87 6] Hypokalemia       ED Disposition     ED Disposition Condition Date/Time Comment    Admit Stable Thu Mar 24, 2022  9:03 PM Case was discussed with Verena Melton and the patient's admission status was agreed to be Admission Status: inpatient status to the service of Dr Travis Castro   Follow-up Information    None           PATIENT REFERRED TO:    No follow-up provider specified  DISCHARGE MEDICATIONS:    Patient's Medications   Discharge Prescriptions    No medications on file       No discharge procedures on file  Dawna Herrera DO        This note was partially completed using voice recognition technology, and was scanned for gross errors; however some errors may still exist  Please contact the author with any questions or requests for clarification        Dawna Herrera DO  03/24/22 3841

## 2022-03-25 ENCOUNTER — APPOINTMENT (INPATIENT)
Dept: VASCULAR ULTRASOUND | Facility: HOSPITAL | Age: 52
DRG: 383 | End: 2022-03-25
Payer: COMMERCIAL

## 2022-03-25 PROBLEM — L03.116 CELLULITIS OF LEFT LOWER EXTREMITY: Status: ACTIVE | Noted: 2022-03-24

## 2022-03-25 LAB
ANION GAP SERPL CALCULATED.3IONS-SCNC: 7 MMOL/L (ref 4–13)
ANION GAP SERPL CALCULATED.3IONS-SCNC: 8 MMOL/L (ref 4–13)
ANISOCYTOSIS BLD QL SMEAR: PRESENT
BASOPHILS # BLD MANUAL: 0.04 THOUSAND/UL (ref 0–0.1)
BASOPHILS NFR MAR MANUAL: 1 % (ref 0–1)
BUN SERPL-MCNC: 33 MG/DL (ref 5–25)
BUN SERPL-MCNC: 36 MG/DL (ref 5–25)
CALCIUM SERPL-MCNC: 8.6 MG/DL (ref 8.3–10.1)
CALCIUM SERPL-MCNC: 9 MG/DL (ref 8.3–10.1)
CHLORIDE SERPL-SCNC: 97 MMOL/L (ref 100–108)
CHLORIDE SERPL-SCNC: 98 MMOL/L (ref 100–108)
CO2 SERPL-SCNC: 26 MMOL/L (ref 21–32)
CO2 SERPL-SCNC: 27 MMOL/L (ref 21–32)
CREAT SERPL-MCNC: 1.5 MG/DL (ref 0.6–1.3)
CREAT SERPL-MCNC: 1.51 MG/DL (ref 0.6–1.3)
EOSINOPHIL # BLD MANUAL: 0 THOUSAND/UL (ref 0–0.4)
EOSINOPHIL NFR BLD MANUAL: 0 % (ref 0–6)
ERYTHROCYTE [DISTWIDTH] IN BLOOD BY AUTOMATED COUNT: 13.1 % (ref 11.6–15.1)
GFR SERPL CREATININE-BSD FRML MDRD: 51 ML/MIN/1.73SQ M
GFR SERPL CREATININE-BSD FRML MDRD: 52 ML/MIN/1.73SQ M
GLUCOSE SERPL-MCNC: 112 MG/DL (ref 65–140)
GLUCOSE SERPL-MCNC: 93 MG/DL (ref 65–140)
HCT VFR BLD AUTO: 28.7 % (ref 36.5–49.3)
HGB BLD-MCNC: 10.1 G/DL (ref 12–17)
LYMPHOCYTES # BLD AUTO: 0.7 THOUSAND/UL (ref 0.6–4.47)
LYMPHOCYTES # BLD AUTO: 18 % (ref 14–44)
MCH RBC QN AUTO: 29 PG (ref 26.8–34.3)
MCHC RBC AUTO-ENTMCNC: 35.2 G/DL (ref 31.4–37.4)
MCV RBC AUTO: 83 FL (ref 82–98)
MONOCYTES # BLD AUTO: 0.93 THOUSAND/UL (ref 0–1.22)
MONOCYTES NFR BLD: 24 % (ref 4–12)
NEUTROPHILS # BLD MANUAL: 2.22 THOUSAND/UL (ref 1.85–7.62)
NEUTS SEG NFR BLD AUTO: 57 % (ref 43–75)
PLATELET # BLD AUTO: 245 THOUSANDS/UL (ref 149–390)
PLATELET BLD QL SMEAR: ADEQUATE
PMV BLD AUTO: 9.9 FL (ref 8.9–12.7)
POTASSIUM SERPL-SCNC: 3.8 MMOL/L (ref 3.5–5.3)
POTASSIUM SERPL-SCNC: 4.6 MMOL/L (ref 3.5–5.3)
RBC # BLD AUTO: 3.48 MILLION/UL (ref 3.88–5.62)
SODIUM SERPL-SCNC: 131 MMOL/L (ref 136–145)
SODIUM SERPL-SCNC: 132 MMOL/L (ref 136–145)
WBC # BLD AUTO: 3.89 THOUSAND/UL (ref 4.31–10.16)

## 2022-03-25 PROCEDURE — 97163 PT EVAL HIGH COMPLEX 45 MIN: CPT

## 2022-03-25 PROCEDURE — 80048 BASIC METABOLIC PNL TOTAL CA: CPT

## 2022-03-25 PROCEDURE — 85027 COMPLETE CBC AUTOMATED: CPT

## 2022-03-25 PROCEDURE — 93971 EXTREMITY STUDY: CPT | Performed by: SURGERY

## 2022-03-25 PROCEDURE — 87081 CULTURE SCREEN ONLY: CPT

## 2022-03-25 PROCEDURE — 85007 BL SMEAR W/DIFF WBC COUNT: CPT

## 2022-03-25 PROCEDURE — 93971 EXTREMITY STUDY: CPT

## 2022-03-25 PROCEDURE — 99233 SBSQ HOSP IP/OBS HIGH 50: CPT | Performed by: STUDENT IN AN ORGANIZED HEALTH CARE EDUCATION/TRAINING PROGRAM

## 2022-03-25 PROCEDURE — 80048 BASIC METABOLIC PNL TOTAL CA: CPT | Performed by: STUDENT IN AN ORGANIZED HEALTH CARE EDUCATION/TRAINING PROGRAM

## 2022-03-25 RX ADMIN — CEFAZOLIN SODIUM 2000 MG: 2 SOLUTION INTRAVENOUS at 19:22

## 2022-03-25 RX ADMIN — GABAPENTIN 900 MG: 300 CAPSULE ORAL at 09:29

## 2022-03-25 RX ADMIN — GABAPENTIN 900 MG: 300 CAPSULE ORAL at 15:38

## 2022-03-25 RX ADMIN — SODIUM CHLORIDE, SODIUM GLUCONATE, SODIUM ACETATE, POTASSIUM CHLORIDE, MAGNESIUM CHLORIDE, SODIUM PHOSPHATE, DIBASIC, AND POTASSIUM PHOSPHATE 75 ML/HR: .53; .5; .37; .037; .03; .012; .00082 INJECTION, SOLUTION INTRAVENOUS at 22:13

## 2022-03-25 RX ADMIN — BACLOFEN 20 MG: 10 TABLET ORAL at 22:12

## 2022-03-25 RX ADMIN — TAMSULOSIN HYDROCHLORIDE 0.8 MG: 0.4 CAPSULE ORAL at 22:12

## 2022-03-25 RX ADMIN — CEFAZOLIN SODIUM 2000 MG: 2 SOLUTION INTRAVENOUS at 04:15

## 2022-03-25 RX ADMIN — RIVAROXABAN 20 MG: 20 TABLET, FILM COATED ORAL at 15:38

## 2022-03-25 RX ADMIN — PREDNISONE 5 MG: 5 TABLET ORAL at 09:29

## 2022-03-25 RX ADMIN — ACETAMINOPHEN 650 MG: 325 TABLET ORAL at 15:38

## 2022-03-25 RX ADMIN — FUROSEMIDE 20 MG: 20 TABLET ORAL at 09:29

## 2022-03-25 RX ADMIN — GABAPENTIN 900 MG: 300 CAPSULE ORAL at 22:12

## 2022-03-25 RX ADMIN — ESCITALOPRAM OXALATE 10 MG: 10 TABLET ORAL at 09:29

## 2022-03-25 RX ADMIN — HYDROXYCHLOROQUINE SULFATE 400 MG: 200 TABLET, FILM COATED ORAL at 22:12

## 2022-03-25 RX ADMIN — METOLAZONE 2.5 MG: 5 TABLET ORAL at 09:29

## 2022-03-25 RX ADMIN — POTASSIUM CHLORIDE 20 MEQ: 1500 TABLET, EXTENDED RELEASE ORAL at 09:29

## 2022-03-25 NOTE — H&P
[facility]  H&P- A[place] 1970, 45 y o  male MRN: [id number]  Unit/Bed#: -[place] Encounter: [id number]  Primary Care Provider: Mita Guerra MD   Date and time admitted to hospital: 3/24/2022  5:51 PM    * Pain and swelling of left lower extremity  Assessment & Plan  Patient with history of chronic bilateral lower extremity swelling and pain reporting increasing left foot swelling, redness, and pain since Saturday  Was given oral doxycycline as an outpatient without improvement in symptoms  Denies fever, chill, recent sick contact, headache, visual disturbance, dizzy/lightheadedness, chest pain, palpitations, SOB, cough, URI symptoms, ABD pain, N/V/D, new numbness/tingling/weakness, or other symptom  /72 (BP Location: Right arm)   Pulse 91   Temp 98 4 °F (36 9 °C) (Oral)   Resp 22   Ht 5' 6" (1 676 m)   Wt 96 8 kg (213 lb 6 5 oz)   SpO2 98%   BMI 34 44 kg/m²     · History of LUPUS and DVT hx (around 2yrs ago; currently on xarelto)   · Has chronic BL LE swelling and pain though to be in the setting of CKD, venous insufficiency, and sodium intake per Epic review (ECHO 10/2021 EF 55%)  · Reporting worsening redness, swelling, and pain of the left foot since Saturday  · Seen by outpatient Family Medicine on 03/16 and was giving oral doxy 100 mg b i d  X 10d, which did not provide relief   · Meets SIRS criteria as below   · Afebrile;  No leukocytosis; lactic acid WNL  · Blood cultures pending  · Physical exam reveals BL LE edema with scattered scabbed wounds, left midfoot and ankle is erythematous, warm, and tender without underlying crepitus or fluid collection felt  · XR LLE wet read without free air  · ED gave IV ancef  · Procal, MRSA swab, and LLE DVT US study ordered   · Per chart review patient has chronic on/off swelling and pain of LE's and persistent on/off rashes  · With patient being afebrile and without leukocytosis infectious source is less likely, however as physical exam is suggestive of infection with patient having failed outpatient PO Abx will continue IV ancef at this time   · Follow up with pending infectious labs  · Worsening/fluctuating chronic LE issues vs cellulitis vs DVT  · Trend CBC and f/u with pending infectious labs, continue IV ancef (depending on infectious studys/MRSA swab escalate vs deescalate), f/u with pending DVT study, continue home xarelto for DVT prophylaxis at this time    SIRS (systemic inflammatory response syndrome) (HCC)  Assessment & Plan  · Meets SIRS criteria on admission HR 91 RR22  · Possibly in the setting of pain (BL feet) vs infection  · Patient is currently afebrile and without leukocytosis so infectious cause less likely  · However patient experiencing unilateral left lower extremity redness and pain as above which may be suggestive of cellulitis vs DVT vs fluctuation of chronic findings   · Infectious lab work ordered, if positive for patient begins to develop fever or leukocytosis would consider escalating IV abx treatment     Lupus (systemic lupus erythematosus) ([facility] [address] )  Assessment & Plan  · Follows with [facility] as an outpatient  · Per chart review was taking CellCept as an outpatient but stopped taking it on his own  · Per chart review appears as though rheumatology is considering restarting the CellCept  · Currently taking plaquenil 400mg qHS, and 5mg daily prednisone; continue both  · Continue outpatient follow-up    History of DVT (deep vein thrombosis)  Assessment & Plan  · History of DVTaround 2yr ago, currently on xarelto   · Presenting with left lower extremity redness, swelling, and tenderness; see A/P's as above  · Continue home xarelto     Chronic kidney disease, stage 3 [facility])  Assessment & Plan  Lab Results   Component Value Date    EGFR 45 03/24/2022    EGFR 57 09/23/2020    EGFR 74 07/19/2019    CREATININE 1 67 (H) 03/24/2022    CREATININE 1 58 (H) 09/23/2020    CREATININE 1 14 07/19/2019 · History of Lupus nephritis   · Creatinine 1 67; was 1 9 on 03/01, with baseline difficult to establish however may be around 1 5  · This elevated value likely represents a resolving MARK   · Continuous gentle IVF hydration overnight, AM BMP ordered     Hypertension  Assessment & Plan  · /68 HR 91  · Reports not taking his carvedilol or lisinopril anymore  · Continue home Lasix   · Monitor BP per unit protocol    VTE Pharmacologic Prophylaxis: VTE Score: 5 High Risk (Score >/= 5) - Pharmacological DVT Prophylaxis Ordered: rivaroxaban (Xarelto)  Sequential Compression Devices Ordered  Code Status: Level 1 - Full Code   Discussion with family: update in AM      Anticipated Length of Stay: Patient will be admitted on an inpatient basis with an anticipated length of stay of greater than 2 midnights secondary to left lower extremity swelling, redness, and meeting SIRS criteria   Total Time for Visit, including Counseling / Coordination of Care: 45 minutes Greater than 50% of this total time spent on direct patient counseling and coordination of care  Chief Complaint: Left foot, redness, swelling, and pain    History of Present Illness:  Siobhan Morales is a 45 y o  male with a PMH of Lupus, DVT hx, and CKD who presents with left foot redness, swelling, and pain  Patient with history of chronic bilateral lower extremity swelling and pain reporting increasing left foot swelling, redness, and pain since Saturday  Was given oral doxycycline as an outpatient without improvement in symptoms  Denies fever, chill, recent sick contact, headache, visual disturbance, dizzy/lightheadedness, chest pain, palpitations, SOB, cough, URI symptoms, ABD pain, N/V/D, new numbness/tingling/weakness, or other symptom  All questions answered at the bedside to the patient's satisfaction  Review of Systems:  Review of Systems   Constitutional: Negative for activity change, appetite change, chills, diaphoresis, fatigue and fever  HENT: Negative for congestion, ear pain, nosebleeds and trouble swallowing  Eyes: Negative for pain and visual disturbance  Respiratory: Negative for apnea, cough, chest tightness, shortness of breath and wheezing  Cardiovascular: Positive for leg swelling (BL)  Negative for chest pain and palpitations  Gastrointestinal: Negative for abdominal distention, abdominal pain, blood in stool, constipation, diarrhea, nausea and vomiting  Endocrine: Negative for cold intolerance, heat intolerance and polyuria  Genitourinary: Negative for difficulty urinating, dysuria, flank pain and hematuria  Musculoskeletal: Negative for arthralgias, neck pain and neck stiffness  +Pain in BL LE's (chronic, worse in left foot)   Skin: Positive for color change (Redness left foot/ankle)  Negative for rash and wound  Neurological: Negative for dizziness, tremors, syncope, weakness, light-headedness, numbness and headaches  Hematological: Negative for adenopathy  All other systems reviewed and are negative  Past Medical and Surgical History:   Past Medical History:   Diagnosis Date    Cervical mass     Coronary artery disease     Depression     DVT (deep venous thrombosis) (HCC)     Hypertension     Lupus (HCC)     Renal disorder        Past Surgical History:   Procedure Laterality Date    APPENDECTOMY      CERVICAL SPINE SURGERY      CHOLECYSTECTOMY      COLONOSCOPY N/A 8/23/2018    Procedure: COLONOSCOPY;  Surgeon: Venessa Leon MD;  Location: [facility]; Service: Gastroenterology    ESOPHAGOGASTRODUODENOSCOPY N/A 8/22/2018    Procedure: ESOPHAGOGASTRODUODENOSCOPY (EGD); Surgeon: Venessa Leon MD;  Location: [facility]; Service: Gastroenterology    NECK SURGERY      VASCULAR SURGERY         Meds/Allergies:  Prior to Admission medications    Medication Sig Start Date End Date Taking?  Authorizing Provider   baclofen 20 mg tablet Take 20 mg by mouth 3 (three) times a day Historical Provider, MD   betamethasone dipropionate (DIPROSONE) 0 05 % cream Apply 1 application topically 2 (two) times a day    Historical Provider, MD   betamethasone, augmented, (DIPROLENE) 0 05 % ointment Apply 1 application topically 2 (two) times a day    Historical Provider, MD   carvedilol (COREG) 12 5 mg tablet Take 12 5 mg by mouth 2 (two) times a day with meals    Historical Provider, MD   escitalopram (LEXAPRO) 10 mg tablet Take 10 mg by mouth daily    Historical Provider, MD   furosemide (LASIX) 40 mg tablet Take 0 5 tablets (20 mg total) by mouth daily 7/31/18   Christal Arreguin PA-C   gabapentin (NEURONTIN) 300 mg capsule 300 mg p o  q day on day 1  300 mg p o  b i d  On day 2  Then 300 mg p o  T i d  Thereafter   9/23/20   Chaya Knight MD   gabapentin (NEURONTIN) 300 mg capsule TAKE 1 CAPSULE BY MOUTH AT BEDTIME IN ADDITION TO 600MG 3/8/21   Michael Paez MD   gabapentin (NEURONTIN) 600 MG tablet Take 600 mg by mouth 3 (three) times a day     Historical Provider, MD   hydrOXYzine HCL (ATARAX) 10 mg tablet Take 10 mg by mouth every 6 (six) hours as needed for itching    Historical Provider, MD   lisinopril (ZESTRIL) 40 mg tablet Take 40 mg by mouth daily    Historical Provider, MD   metolazone (ZAROXOLYN) 2 5 mg tablet Take 2 5 mg by mouth daily    Historical Provider, MD   mycophenolate (CELLCEPT) 500 mg tablet Take 500 mg by mouth every 12 (twelve) hours     Historical Provider, MD   potassium chloride (K-DUR,KLOR-CON) 20 mEq tablet Take 20 mEq by mouth daily    Historical Provider, MD   predniSONE 5 mg tablet Take 5 mg by mouth daily    Historical Provider, MD   rivaroxaban (XARELTO) 20 mg tablet Take 20 mg by mouth daily with dinner    Historical Provider, MD   tamsulosin (FLOMAX) 0 4 mg Take 0 8 mg by mouth Medrol Dose Pack scheduling ONLY    Historical Provider, MD   traMADol (ULTRAM) 50 mg tablet Take 50 mg by mouth every 6 (six) hours as needed for moderate pain    Historical Provider, MD     I have reviewed home medications with patient personally  Allergies: No Known Allergies    Social History:  Marital Status: /Civil Union   Occupation: N/A  Patient Pre-hospital Living Situation: Home  Patient Pre-hospital Level of Mobility: walks  Patient Pre-hospital Diet Restrictions: N/A  Substance Use History:   Social History     Substance and Sexual Activity   Alcohol Use Never     Social History     Tobacco Use   Smoking Status Never Smoker   Smokeless Tobacco Never Used     Social History     Substance and Sexual Activity   Drug Use Never       Family History:  Family History   Problem Relation Age of Onset    Heart disease Mother        Physical Exam:     Vitals:   Blood Pressure: 116/68 (03/24/22 2142)  Pulse: 91 (03/24/22 2142)  Temperature: 98 8 °F (37 1 °C) (03/24/22 2142)  Temp Source: Oral (03/24/22 1800)  Respirations: 22 (03/24/22 2015)  Height: 5' 6" (167 6 cm) (03/24/22 1855)  Weight - Scale: 96 8 kg (213 lb 6 5 oz) (03/24/22 1855)  SpO2: 97 % (03/24/22 2142)    Physical Exam  Vitals and nursing note reviewed  Constitutional:       General: He is not in acute distress  Appearance: Normal appearance  HENT:      Head: Normocephalic and atraumatic  Right Ear: External ear normal       Left Ear: External ear normal       Nose: Nose normal       Mouth/Throat:      Mouth: Mucous membranes are moist    Eyes:      Pupils: Pupils are equal, round, and reactive to light  Cardiovascular:      Rate and Rhythm: Normal rate and regular rhythm  Pulses: Normal pulses  Heart sounds: Normal heart sounds  No murmur heard  Pulmonary:      Effort: Pulmonary effort is normal  No respiratory distress  Breath sounds: Normal breath sounds  No wheezing or rales  Chest:      Chest wall: No tenderness  Abdominal:      General: Bowel sounds are normal  There is no distension  Palpations: Abdomen is soft  There is no mass  Tenderness:  There is no abdominal tenderness  There is no guarding  Musculoskeletal:         General: Tenderness (BL LE's, worse in left foot) present  No swelling  Cervical back: Normal range of motion and neck supple  No rigidity or tenderness  Right lower leg: Edema present  Left lower leg: Edema present  Skin:     General: Skin is warm and dry  Capillary Refill: Capillary refill takes less than 2 seconds  Findings: Erythema (left medial foot/ankle) present  No lesion or rash  Neurological:      General: No focal deficit present  Mental Status: He is alert  Motor: No weakness  Psychiatric:         Mood and Affect: Mood normal          Behavior: Behavior normal          Thought Content: Thought content normal           Additional Data:     Lab Results:  Results from last 7 days   Lab Units 03/24/22  1824   WBC Thousand/uL 5 18   HEMOGLOBIN g/dL 12 8   HEMATOCRIT % 36 5   PLATELETS Thousands/uL 295   NEUTROS PCT % 69   LYMPHS PCT % 17   MONOS PCT % 13*   EOS PCT % 0     Results from last 7 days   Lab Units 03/24/22  1824   SODIUM mmol/L 129*   POTASSIUM mmol/L 3 3*   CHLORIDE mmol/L 91*   CO2 mmol/L 27   BUN mg/dL 45*   CREATININE mg/dL 1 67*   ANION GAP mmol/L 11   CALCIUM mg/dL 9 4   ALBUMIN g/dL 4 0   TOTAL BILIRUBIN mg/dL 0 50   ALK PHOS U/L 121*   ALT U/L 28   AST U/L 34   GLUCOSE RANDOM mg/dL 102                 Results from last 7 days   Lab Units 03/24/22  1824   LACTIC ACID mmol/L 0 9       Imaging: Reviewed radiology reports from this admission including: xray(s)  XR foot 3+ views LEFT    (Results Pending)   XR tibia fibula 2 views LEFT    (Results Pending)   VAS upper limb venous duplex scan, unilateral/limited    (Results Pending)       EKG and Other Studies Reviewed on Admission:   · EKG: No EKG obtained  ** Please Note: This note has been constructed using a voice recognition system   **

## 2022-03-25 NOTE — ASSESSMENT & PLAN NOTE
· Follows with Denys as an outpatient  · Per chart review was taking CellCept as an outpatient but stopped taking it on his own  · Per chart review appears as though rheumatology is considering restarting the CellCept  · Currently taking plaquenil 400mg qHS, and 5mg daily prednisone; continue both  · Continue outpatient follow-up

## 2022-03-25 NOTE — PLAN OF CARE
Problem: PHYSICAL THERAPY ADULT  Goal: Performs mobility at highest level of function for planned discharge setting  See evaluation for individualized goals  Description: Treatment/Interventions: Functional transfer training,LE strengthening/ROM,Elevations,Therapeutic exercise,Endurance training,Bed mobility,Gait training  Equipment Recommended: John Carlisle       See flowsheet documentation for full assessment, interventions and recommendations  Outcome: Progressing  Note: Prognosis: Fair  Problem List: Decreased strength,Decreased endurance,Impaired balance,Decreased mobility,Decreased coordination,Decreased safety awareness,Impaired sensation,Pain,Decreased skin integrity  Assessment: Pt is 45 y o  male seen for PT evaluation s/p admit to [facility] on 3/24/2022 w/ Pain and swelling of left lower extremity  PT consulted to assess pt's functional mobility and d/c needs  Order placed for PT eval and tx, w/ up w/ A order  Pt agreeable to PT  session upon arrival, pt found supine in bed  PTA, pt was independent w/ all functional mobility w/ RW, ambulates household distances, has 5 HOSSEIN, lives w/ wife in 1 level home and unemployed  Pt to benefit from continued PT tx to address deficits and maximize level of functional independent mobility and consistency  From PT/mobility standpoint, recommendation at time of d/c would be post acute rehabilitation services pending progress in order to facilitate return to PLOF  Upon conclusion pt  seated in recliner  Complexity: Comorbidities affecting pt's physical performance at time of assessment include: depression and lupus and SIRS  Personal factors affecting pt at time of IE include: ambulating w/ assistive device, stairs to enter home, inability to ambulate household distances, inability to navigate level surfaces w/o external assistance and depression   Please find objective findings from PT assessment regarding body systems outlined above with impairments and limitations including weakness, impaired balance, decreased endurance, impaired coordination, gait deviations, pain, decreased activity tolerance, decreased functional mobility tolerance, altered sensation, decreased safety awareness, fall risk and decreased skin integrity  Pt's clinical presentation is currently unstable/unpredictable seen in pt's presentation of abnormal renal values, abnormal H&H, abnormal WBC count, pain and wounds  The patient's AM-PAC Basic Mobility Inpatient Short Form Raw Score is 11  A Raw score of less than or equal to 16 suggests the patient may benefit from discharge to post-acute rehabilitation services  Please also refer to the recommendation of the Physical Therapist for safe discharge planning  Barriers to Discharge: Decreased caregiver support        PT Discharge Recommendation: Return to facility with rehabilitation services          See flowsheet documentation for full assessment   Rosmery Perry, PT

## 2022-03-25 NOTE — ASSESSMENT & PLAN NOTE
· Patient presented to the ED with complaints of left foot swelling, redness and pain x 6 days  Was seen by PCP and treated with oral doxy on 3/16 without improvement of symptoms  · Does have history of chronic bilateral lower extremity swelling/pain due to venous insufficiency  · Left foot x-ray (3/24/22): Soft tissue swelling; No acute osseous abnormality  · Left tibia/fibula x-ray (3/24/22): No acute osseous abnormality  · Bilateral lower extremity venous duplex completed (3/24/22): Negative for DVT    Patient does report improvement since he has been on IV antibiotics  Currently febrile  acute abnormal significantly  Blood cultures x 2 pending  MRSA swab pending  Continue IV Cefazolin at this time    Continue with supportive care,

## 2022-03-25 NOTE — ASSESSMENT & PLAN NOTE
· Chronic  · Reports not taking his carvedilol or lisinopril anymore  · Continue home Lasix   · Monitor BP per unit protocol

## 2022-03-25 NOTE — ASSESSMENT & PLAN NOTE
· History of DVTaround 2yr ago, currently on xarelto   · Presenting with left lower extremity redness, swelling, and tenderness; see A/P's as above  · Continue home xarelto

## 2022-03-25 NOTE — ASSESSMENT & PLAN NOTE
· Meets SIRS criteria on admission HR 91 RR22  · Possibly in the setting of pain (BL feet) vs infection  · Patient is currently afebrile and without leukocytosis so infectious cause less likely  · However patient experiencing unilateral left lower extremity redness and pain as above which may be suggestive of cellulitis vs DVT vs fluctuation of chronic findings   · Infectious lab work ordered, if positive for patient begins to develop fever or leukocytosis would consider escalating IV abx treatment

## 2022-03-25 NOTE — ASSESSMENT & PLAN NOTE
· /68 HR 91  · Reports not taking his carvedilol or lisinopril anymore  · Continue home Lasix   · Monitor BP per unit protocol

## 2022-03-25 NOTE — ASSESSMENT & PLAN NOTE
Patient with history of chronic bilateral lower extremity swelling and pain reporting increasing left foot swelling, redness, and pain since Saturday  Was given oral doxycycline as an outpatient without improvement in symptoms  Denies fever, chill, recent sick contact, headache, visual disturbance, dizzy/lightheadedness, chest pain, palpitations, SOB, cough, URI symptoms, ABD pain, N/V/D, new numbness/tingling/weakness, or other symptom  /72 (BP Location: Right arm)   Pulse 91   Temp 98 4 °F (36 9 °C) (Oral)   Resp 22   Ht 5' 6" (1 676 m)   Wt 96 8 kg (213 lb 6 5 oz)   SpO2 98%   BMI 34 44 kg/m²     · History of LUPUS and DVT hx (around 2yrs ago; currently on xarelto)   · Has chronic BL LE swelling and pain though to be in the setting of CKD, venous insufficiency, and sodium intake per Epic review (ECHO 10/2021 EF 55%)  · Reporting worsening redness, swelling, and pain of the left foot since Saturday  · Seen by outpatient Family Medicine on 03/16 and was giving oral doxy 100 mg b i d  X 10d, which did not provide relief   · Meets SIRS criteria as below   · Afebrile;  No leukocytosis; lactic acid WNL  · Blood cultures pending  · Physical exam reveals BL LE edema with scattered scabbed wounds, left midfoot and ankle is erythematous, warm, and tender without underlying crepitus or fluid collection felt  · XR LLE wet read without free air  · ED gave IV ancef  · Procal, MRSA swab, and LLE DVT US study ordered   · Per chart review patient has chronic on/off swelling and pain of LE's and persistent on/off rashes  · With patient being afebrile and without leukocytosis infectious source is less likely, however as physical exam is suggestive of infection with patient having failed outpatient PO Abx will continue IV ancef at this time   · Follow up with pending infectious labs  · Worsening/fluctuating chronic LE issues vs cellulitis vs DVT  · Trend CBC and f/u with pending infectious labs, continue IV ancef (depending on infectious studys/MRSA swab escalate vs deescalate), f/u with pending DVT study, continue home xarelto for DVT prophylaxis at this time

## 2022-03-25 NOTE — ASSESSMENT & PLAN NOTE
Lab Results   Component Value Date    EGFR 52 03/25/2022    EGFR 45 03/24/2022    EGFR 57 09/23/2020    CREATININE 1 50 (H) 03/25/2022    CREATININE 1 67 (H) 03/24/2022    CREATININE 1 58 (H) 09/23/2020     · History of Lupus nephritis  · Creatinine 1 67; was 1 9 on 03/01, with baseline difficult to establish however may be around 1 5  · Creatinine stable

## 2022-03-25 NOTE — UTILIZATION REVIEW
Initial Clinical Review    Admission: Date/Time/Statement:   Admission Orders (From admission, onward)     Ordered        03/24/22 2105  Inpatient Admission  Once                      Orders Placed This Encounter   Procedures    Inpatient Admission     Standing Status:   Standing     Number of Occurrences:   1     Order Specific Question:   Level of Care     Answer:   Med Surg [16]     Order Specific Question:   Estimated length of stay     Answer:   More than 2 Midnights     Order Specific Question:   Certification     Answer:   I certify that inpatient services are medically necessary for this patient for a duration of greater than two midnights  See H&P and MD Progress Notes for additional information about the patient's course of treatment  ED Arrival Information     Expected Arrival Acuity    - 3/24/2022 17:51 Urgent         Means of arrival Escorted by Service Admission type    Ambulance [address] Urgent         Arrival complaint            Chief Complaint   Patient presents with    Leg Swelling     LLE, onset sat, worsening swelling  Redness noted  Pt denies any other complaints  Initial Presentation: 45 y o  male to  Ed from home via EMS with complaints of left foot redness and pain, bilateral lower extremity swelling  Admitted to inpatient for pain and swelling LLE, SIRS  H/O Lupus, DVT , CKD on chronic steroids, lupus  Has been taking DOxycycline for possible infection without improvement  Arrives with bilateral lower extremity tenderness, edema, worse in left foot  LEft medial foot/ankle with erythema  BLood cultures pending  IV abx started in the ED  Check Doppler studies  Trend CBC  Currently afebrile  Creat 1 67 on arrival   Continue with gentle IV fluids, recheck BMP  Resolving MARK  Date: 3/25   Day 2:   Continue with IV abx  Blood cultures pending  Creat stable  LLE pain improving       ED Triage Vitals   Temperature Pulse Respirations Blood Pressure SpO2   03/24/22 1800 03/24/22 1800 03/24/22 1800 03/24/22 1800 03/24/22 1800   98 4 °F (36 9 °C) 87 20 115/72 99 %      Temp Source Heart Rate Source Patient Position - Orthostatic VS BP Location FiO2 (%)   03/24/22 1800 03/24/22 1800 -- 03/24/22 1800 --   Oral Monitor  Right arm       Pain Score       03/24/22 2142       No Pain          Wt Readings from Last 1 Encounters:   03/24/22 96 8 kg (213 lb 6 5 oz)     Additional Vital Signs:   /Time Temp Pulse Resp BP MAP (mmHg) SpO2 O2 Device   03/25/22 07:13:39 -- 86 -- 140/86 -- 94 % --   03/24/22 21:42:43 98 8 °F (37 1 °C) 91 -- 116/68 84 97 % None (Room air)   03/24/22 2015 -- 91 22 -- -- 98 % --   03/24/22 1945 -- 90 14 -- -- 99 % --   03/24/22 1800 98 4 °F (36 9 °C) 87 20 115/72 89 99 % None (Room air)       Pertinent Labs/Diagnostic Test Results:   XR foot 3+ views LEFT   Final Result by Mattie Mosqueda MD (03/25 9602)   Soft tissue swelling      No acute osseous abnormality  Workstation performed: [id number]         XR tibia fibula 2 views LEFT   Final Result by Mattie Mosqueda MD (03/25 0818)      No acute osseous abnormality  Workstation performed: [id number]         VAS lower limb venous duplex study, unilateral/limited    3/25  Impression:  RIGHT LOWER LIMB LIMITED:  Evaluation shows no evidence of thrombus in the common femoral vein  Doppler evaluation shows a normal response to augmentation maneuvers  LEFT LOWER LIMB:  No evidence gross acute deep vein thrombosis  No evidence of superficial thrombophlebitis noted  Doppler evaluation shows a normal response to augmentation maneuvers  Popliteal, posterior tibial and anterior tibial arterial Doppler waveforms are  biphasic/hyperemic    Tech Note:  There are an echogenic structure located in the inguinal region and are  consistent with enlarged lymph node and channels         Results from last 7 days   Lab Units 03/25/22  0507 03/24/22  1824   WBC Thousand/uL 3 89* 5 18 HEMOGLOBIN g/dL 10 1* 12 8   HEMATOCRIT % 28 7* 36 5   PLATELETS Thousands/uL 245 295   NEUTROS ABS Thousands/µL  --  3 51         Results from last 7 days   Lab Units 03/25/22  0507 03/24/22  1824   SODIUM mmol/L 132* 129*   POTASSIUM mmol/L 3 8 3 3*   CHLORIDE mmol/L 98* 91*   CO2 mmol/L 26 27   ANION GAP mmol/L 8 11   BUN mg/dL 36* 45*   CREATININE mg/dL 1 50* 1 67*   EGFR ml/min/1 73sq m 52 45   CALCIUM mg/dL 8 6 9 4     Results from last 7 days   Lab Units 03/24/22  1824   AST U/L 34   ALT U/L 28   ALK PHOS U/L 121*   TOTAL PROTEIN g/dL 9 6*   ALBUMIN g/dL 4 0   TOTAL BILIRUBIN mg/dL 0 50         Results from last 7 days   Lab Units 03/25/22  0507 03/24/22  1824   GLUCOSE RANDOM mg/dL 93 102       Results from last 7 days   Lab Units 03/24/22  1824   LACTIC ACID mmol/L 0 9       Results from last 7 days   Lab Units 03/24/22  2107   BLOOD CULTURE  Received in Microbiology Lab  Culture in Progress  Received in Microbiology Lab  Culture in Progress         ED Treatment:   Medication Administration from 03/24/2022 1751 to 03/24/2022 2132       Date/Time Order Dose Route Action Action by Comments     03/24/2022 1927 ceFAZolin (ANCEF) IVPB (premix in dextrose) 2,000 mg 50 mL 2,000 mg Intravenous New Bag Elis Rosales RN      03/24/2022 1929 sodium chloride 0 9 % bolus 1,000 mL 1,000 mL Intravenous New Bag Elis Rosales RN         Past Medical History:   Diagnosis Date    Cervical mass     Coronary artery disease     Depression     DVT (deep venous thrombosis) (HCC)     Hypertension     Lupus ([facility] )     Renal disorder        Admitting Diagnosis: Hypokalemia [E87 6]  Cellulitis [L03 90]  Hyponatremia [E87 1]  Leg swelling [M79 89]  Age/Sex: 45 y o  male  Admission Orders:  SCDs   Up with assist  MRSA    Scheduled Medications:  cefazolin, 2,000 mg, Intravenous, Q8H  escitalopram, 10 mg, Oral, Daily  furosemide, 20 mg, Oral, Daily  gabapentin, 900 mg, Oral, TID  hydroxychloroquine, 400 mg, Oral, HS  metolazone, 2 5 mg, Oral, Daily  potassium chloride, 20 mEq, Oral, Daily  predniSONE, 5 mg, Oral, Daily  rivaroxaban, 20 mg, Oral, Daily With Dinner  tamsulosin, 0 8 mg, Oral, Once HS      Continuous IV Infusions:  multi-electrolyte, 75 mL/hr, Intravenous, Continuous      PRN Meds:  acetaminophen, 650 mg, Oral, Q6H PRN  baclofen, 20 mg, Oral, TID PRN  hydrOXYzine HCL, 10 mg, Oral, Q6H PRN          Network Utilization Review Department  ATTENTION: Please call with any questions or concerns to [phone] and carefully listen to the prompts so that you are directed to the right person  All voicemails are confidential   Nasrin Che all requests for admission clinical reviews, approved or denied determinations and any other requests to dedicated fax number below belonging to the campus where the patient is receiving treatment   List of dedicated fax numbers for the Facilities:  [address] DENIALS (Administrative/Medical Necessity) [phone]   [address] (Maternity/NICU/Pediatrics) [phone]   [address]  [address] [facility] [place] [phone] [place] [address] [address] [facility] [place] [address] [phone]

## 2022-03-25 NOTE — ED NOTES
Pt repositioned for pain, TT sent to provider requesting pain meds  Bagged lunch given to pt        Matty Tyler RN  03/24/22 2030

## 2022-03-25 NOTE — PHYSICAL THERAPY NOTE
PHYSICAL THERAPY EVALUATION  NAME:  Emmanuel Farris  DATE: 03/25/22    AGE:   45 y o  Mrn:   [id number]  ADMIT DX:  Hypokalemia [E87 6]  Cellulitis [L03 90]  Hyponatremia [E87 1]  Leg swelling [M79 89]  Problem List:   Patient Active Problem List   Diagnosis    Hyperkalemia    Lupus [facility]systemic lupus erythematosus) ([facility] )    [facility] ([facility])    Cellulitis and abscess of left lower extremity    Hypertension    History of DVT (deep vein thrombosis)    MARK (acute kidney injury) ([place] [address] )    Chronic kidney disease, stage 3 ([facility])    Persistent proteinuria    Acute-on-chronic kidney injury ([place] [address] )    Anemia in chronic kidney disease    Melena    Acute bronchitis    Chest pain    Muscle weakness (generalized)    Lower back pain    Tremor of both hands    Spasticity    Gait difficulty    Neuropathic pain    Pain and swelling of left lower extremity    SIRS (systemic inflammatory response syndrome) ([facility])       Past Medical History  Past Medical History:   Diagnosis Date    Cervical mass     Coronary artery disease     Depression     DVT (deep venous thrombosis) ([place] [address] )     Hypertension     Lupus ([place] [address] )     Renal disorder        Past Surgical History  Past Surgical History:   Procedure Laterality Date    APPENDECTOMY      CERVICAL SPINE SURGERY      CHOLECYSTECTOMY      COLONOSCOPY N/A 8/23/2018    Procedure: COLONOSCOPY;  Surgeon: Concepción Helms MD;  Location: [facility]; Service: Gastroenterology    ESOPHAGOGASTRODUODENOSCOPY N/A 8/22/2018    Procedure: ESOPHAGOGASTRODUODENOSCOPY (EGD); Surgeon: Concepción Helms MD;  Location: [facility];   Service: Gastroenterology    NECK SURGERY      VASCULAR SURGERY         Length Of Stay: 1  Performed at least 2 patient identifiers during session: Name and Birthday       03/25/22 1027   PT Last Visit   PT Visit Date 03/25/22   Note Type   Note type Evaluation   Pain Assessment   Pain Assessment Tool 0-10   Pain Score 6   Pain Location/Orientation Orientation: Bilateral;Location: Foot   Pain Onset/Description   (burning, numb)   Effect of Pain on Daily Activities   (started in R LE and moved to LLE)   Restrictions/Precautions   Weight Bearing Precautions Per Order No   Braces or Orthoses   (none at baseline)   Other Precautions Fall Risk;Pain;Multiple lines;Contact/isolation   Home Living   Type of [address] [place] [address] One level;Stairs to enter with rails  (3 HOSSEIN)   Bathroom Shower/Tub Tub/shower unit   Bathroom Toilet Standard   Bathroom Equipment Grab bars in shower;Grab bars around toilet; Shower chair   [address] Walker  (used at baseline)   Additional Comments pt enjoy reading   Prior Function   Level of [place] Independent with ADLs and functional mobility   Lives With Spouse   ADL Assistance Independent   IADLs Independent   Vocational Unemployed   Comments +    General   Additional Pertinent History BLE edema   Family/Caregiver Present No   Cognition   Overall Cognitive Status WFL   Arousal/Participation Alert   Orientation Level Oriented X4   Memory Within functional limits   Following Commands Follows all commands and directions without difficulty   Subjective   Subjective pt very pleasant and appreciative   RLE Assessment   RLE Assessment X   Strength RLE   RLE Overall Strength 2+/5   LLE Assessment   LLE Assessment X   Strength LLE   LLE Overall Strength 2+/5   Vision-Basic Assessment   Current Vision Wears glasses only for reading   Coordination   Sensation X   Light Touch   RLE Light Touch Impaired   RLE Light Touch Comments   (BLEs dark in color with multi skin impairments)   LLE Light Touch Impaired   Bed Mobility   Supine to Sit 4  Minimal assistance   Additional items Verbal cues; Increased time required;HOB elevated; Bedrails   Additional Comments pt able to sit EOB Indep; denied dizziness   Transfers   Sit to Stand 2  Maximal assistance   Additional items Assist x 2;Verbal cues; Increased time required  (bed raised)   Stand to Sit 2  Maximal assistance   Additional items Assist x 2; Increased time required; Bedrails;Verbal cues   Stand pivot 2  Maximal assistance   Additional items Assist x 2;Verbal cues; Increased time required   Additional Comments used RW; pt required cues for proper handplacement; pt very flexed and unable to extend knees during stance phase; also diffiuclty with weight shifts and required cues to "kick" LEs forward to promote swing phase with minimal height   Balance   Static Sitting Good   Dynamic Sitting Good   Static Standing Poor   Dynamic Standing Poor -   Ambulatory Poor -   Endurance Deficit   Endurance Deficit Yes   Endurance Deficit Description unable to tolerate further mobility   Activity Tolerance   Activity Tolerance Patient limited by fatigue;Patient limited by pain   Nurse Made Aware RN Shereen verebalized pt appropriate for session and present during session   Assessment   Prognosis Fair   Problem List Decreased strength;Decreased endurance; Impaired balance;Decreased mobility; Decreased coordination;Decreased safety awareness; Impaired sensation;Pain;Decreased skin integrity   Assessment Pt is 45 y o  male seen for PT evaluation s/p admit to [facility] on [facility]24/2022 w/ Pain and swelling of left lower extremity  PT consulted to assess pt's functional mobility and d/c needs  Order placed for PT eval and tx, w/ up w/ A order  Pt agreeable to PT  session upon arrival, pt found supine in bed  PTA, pt was independent w/ all functional mobility w/ RW, ambulates household distances, has 5 HOSSEIN, lives w/ wife in 1 level home and unemployed  Pt to benefit from continued PT tx to address deficits and maximize level of functional independent mobility and consistency  From PT/mobility standpoint, recommendation at time of d/c would be post acute rehabilitation services pending progress in order to facilitate return to PLOF  Upon conclusion pt  seated in recliner   Complexity: Comorbidities affecting pt's physical performance at time of assessment include: depression and lupus and SIRS  Personal factors affecting pt at time of IE include: ambulating w/ assistive device, stairs to enter home, inability to ambulate household distances, inability to navigate level surfaces w/o external assistance and depression  Please find objective findings from PT assessment regarding body systems outlined above with impairments and limitations including weakness, impaired balance, decreased endurance, impaired coordination, gait deviations, pain, decreased activity tolerance, decreased functional mobility tolerance, altered sensation, decreased safety awareness, fall risk and decreased skin integrity  Pt's clinical presentation is currently unstable/unpredictable seen in pt's presentation of abnormal renal values, abnormal H&H, abnormal WBC count, pain and wounds  The patient's AM-PAC Basic Mobility Inpatient Short Form Raw Score is 11  A Raw score of less than or equal to 16 suggests the patient may benefit from discharge to post-acute rehabilitation services  Please also refer to the recommendation of the Physical Therapist for safe discharge planning  Barriers to Discharge Decreased caregiver support   Goals   Patient Goals to get better   LTG Expiration Date 04/04/22   Long Term Goal #1 Pt will: Perform bed mobility tasks to modified I to improve ease of bed mobility  Perform transfers to A Of 1 to improve ease of transfers  Perform ambulation with AX1 and RW for 100 ft to  decrease burden of care  Increase dynamic standing balance to F- to decrease fall risk  Increase BLE strength to 4-/5 to improve functional mobility  Increase OOB activity tolerance to 10 minutes without s/s of exertion to decrease fall risk  Navigate up and down 5 steps with A Of 1 so patient can enter and exit home      PT Treatment Day 0   Plan   Treatment/Interventions Functional transfer training;SHIRA strengthening/ROM; Elevations; Therapeutic exercise; Endurance training;Bed mobility;Gait training   PT Frequency 3-5x/wk   Recommendation   PT Discharge Recommendation Return to facility with rehabilitation services   Equipment Recommended [facility] Recommended Wheeled walker   Change/add to [employer]?  No   AM-PAC Basic Mobility Inpatient   Turning in Bed Without Bedrails 4   Lying on Back to Sitting on Edge of Flat Bed 3   Moving Bed to Chair 1   Standing Up From Chair 1   Walk in Room 1   Climb 3-5 Stairs 1   Basic Mobility Inpatient Raw Score 11   Basic Mobility Standardized Score 30 25   Highest Level Of Mobility   -[facility] Goal 4: Move to chair/commode       Time In: 1127  Time Out: 1149  Total Evaluation Minutes: [address], PT

## 2022-03-25 NOTE — UTILIZATION REVIEW
Inpatient Admission Authorization Request   NOTIFICATION OF INPATIENT ADMISSION/INPATIENT AUTHORIZATION REQUEST   SERVICING FACILITY:   [address] [place] [address]  Tax ID: [id number]  NPI: [id number]  Place of Service: Inpatient [facility]  [address]  Place of Service Code: 24     ATTENDING PROVIDER:  Attending Name and NPI#: Shari Ocampo [[id number]]  Address: [address] [place] [address]  Phone: [phone]     UTILIZATION REVIEW CONTACT:  [facility] Utilization   Network Utilization Review Department  Phone: [phone]  Fax [phone]  Email: [email or web address]     PHYSICIAN ADVISORY SERVICES:  FOR [facility] REVIEW - MEDICAL NECESSITY DENIAL  Phone: [phone]  Fax: [phone]  Email: [email or web address]     TYPE OF REQUEST:  Inpatient Status     ADMISSION INFORMATION:  ADMISSION DATE/TIME: 3/24/22  9:05 PM  PATIENT DIAGNOSIS CODE/DESCRIPTION:  Hypokalemia [E87 6]  Cellulitis [L03 90]  Hyponatremia [E87 1]  Leg swelling [M79 89]  DISCHARGE DATE/TIME: No discharge date for patient encounter  IMPORTANT INFORMATION:  Please contact the [facility] directly with any questions or concerns regarding this request  Department voicemails are confidential     Send requests for admission clinical reviews, concurrent reviews, approvals, and administrative denials due to lack of clinical to fax [phone]

## 2022-03-25 NOTE — ASSESSMENT & PLAN NOTE
Lab Results   Component Value Date    EGFR 45 03/24/2022    EGFR 57 09/23/2020    EGFR 74 07/19/2019    CREATININE 1 67 (H) 03/24/2022    CREATININE 1 58 (H) 09/23/2020    CREATININE 1 14 07/19/2019     · History of Lupus nephritis   · Creatinine 1 67; was 1 9 on 03/01, with baseline difficult to establish however may be around 1 5  · This elevated value likely represents a resolving MARK   · Continuous gentle IVF hydration overnight, AM BMP ordered

## 2022-03-25 NOTE — ASSESSMENT & PLAN NOTE
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

## 2022-03-25 NOTE — PLAN OF CARE
Problem: MOBILITY - ADULT  Goal: Maintain or return to baseline ADL function  Description: INTERVENTIONS:  -  Assess patient's ability to carry out ADLs; assess patient's baseline for ADL function and identify physical deficits which impact ability to perform ADLs (bathing, care of mouth/teeth, toileting, grooming, dressing, etc )  - Assess/evaluate cause of self-care deficits   - Assess range of motion  - Assess patient's mobility; develop plan if impaired  - Assess patient's need for assistive devices and provide as appropriate  - Encourage maximum independence but intervene and supervise when necessary  - Involve family in performance of ADLs  - Assess for home care needs following discharge   - Consider OT consult to assist with ADL evaluation and planning for discharge  - Provide patient education as appropriate  Outcome: Progressing  Goal: Maintains/Returns to pre admission functional level  Description: INTERVENTIONS:  - Perform BMAT or MOVE assessment daily    - Set and communicate daily mobility goal to care team and patient/family/caregiver  - Collaborate with rehabilitation services on mobility goals if consulted  - Perform Range of Motion  times a day  - Reposition patient every  hours    - Dangle patient  times a day  - Stand patient  times a day  - Ambulate patient  times a day  - Out of bed to chair  times a day   - Out of bed for meals  times a day  - Out of bed for toileting  - Record patient progress and toleration of activity level   Outcome: Progressing     Problem: PAIN - ADULT  Goal: Verbalizes/displays adequate comfort level or baseline comfort level  Description: Interventions:  - Encourage patient to monitor pain and request assistance  - Assess pain using appropriate pain scale  - Administer analgesics based on type and severity of pain and evaluate response  - Implement non-pharmacological measures as appropriate and evaluate response  - Consider cultural and social influences on pain and pain management  - Notify physician/advanced practitioner if interventions unsuccessful or patient reports new pain  Outcome: Progressing     Problem: INFECTION - ADULT  Goal: Absence or prevention of progression during hospitalization  Description: INTERVENTIONS:  - Assess and monitor for signs and symptoms of infection  - Monitor lab/diagnostic results  - Monitor all insertion sites, i e  indwelling lines, tubes, and drains  - Monitor endotracheal if appropriate and nasal secretions for changes in amount and color  - [place] appropriate cooling/warming therapies per order  - Administer medications as ordered  - Instruct and encourage patient and family to use good hand hygiene technique  - Identify and instruct in appropriate isolation precautions for identified infection/condition  Outcome: Progressing     Problem: SAFETY ADULT  Goal: Patient will remain free of falls  Description: INTERVENTIONS:  - Educate patient/family on patient safety including physical limitations  - Instruct patient to call for assistance with activity   - Consult OT/PT to assist with strengthening/mobility   - Keep Call bell within reach  - Keep bed low and locked with side rails adjusted as appropriate  - Keep care items and personal belongings within reach  - Initiate and maintain comfort rounds  - Make Fall Risk Sign visible to staff  - Offer Toileting every  Hours, in advance of need  - Initiate/Maintain alarm  - Obtain necessary fall risk management equipment:   - Apply yellow socks and bracelet for high fall risk patients  - Consider moving patient to room near nurses station  Outcome: Progressing     Problem: Knowledge Deficit  Goal: Patient/family/caregiver demonstrates understanding of disease process, treatment plan, medications, and discharge instructions  Description: Complete learning assessment and assess knowledge base    Interventions:  - Provide teaching at level of understanding  - Provide teaching via preferred learning methods  Outcome: Progressing     Problem: CARDIOVASCULAR - ADULT  Goal: Maintains optimal cardiac output and hemodynamic stability  Description: INTERVENTIONS:  - Monitor I/O, vital signs and rhythm  - Monitor for S/S and trends of decreased cardiac output  - Administer and titrate ordered vasoactive medications to optimize hemodynamic stability  - Assess quality of pulses, skin color and temperature  - Assess for signs of decreased coronary artery perfusion  - Instruct patient to report change in severity of symptoms  Outcome: Progressing     Problem: METABOLIC, FLUID AND ELECTROLYTES - ADULT  Goal: Electrolytes maintained within normal limits  Description: INTERVENTIONS:  - Monitor labs and assess patient for signs and symptoms of electrolyte imbalances  - Administer electrolyte replacement as ordered  - Monitor response to electrolyte replacements, including repeat lab results as appropriate  - Instruct patient on fluid and nutrition as appropriate  Outcome: Progressing     Problem: SKIN/TISSUE INTEGRITY - ADULT  Goal: Skin Integrity remains intact(Skin Breakdown Prevention)  Description: Assess:  -Perform Gautam assessment every   -Clean and moisturize skin every   -Inspect skin when repositioning, toileting, and assisting with ADLS  -Assess under medical devices such as  every   -Assess extremities for adequate circulation and sensation     Bed Management:  -Have minimal linens on bed & keep smooth, unwrinkled  -Change linens as needed when moist or perspiring  -Avoid sitting or lying in one position for more than  hours while in bed  -Keep HOB at degrees     Toileting:  -Offer bedside commode  -Assess for incontinence every   -Use incontinent care products after each incontinent episode such as     Activity:  -Mobilize patient  times a day  -Encourage activity and walks on unit  -Encourage or provide ROM exercises   -Turn and reposition patient every  Hours  -Use appropriate equipment to lift or move patient in bed  -Instruct/ Assist with weight shifting every  when out of bed in chair  -Consider limitation of chair time  hour intervals    Skin Care:  -Avoid use of baby powder, tape, friction and shearing, hot water or constrictive clothing  -Relieve pressure over bony prominences using   -Do not massage red bony areas    Next Steps:  -Teach patient strategies to minimize risks such as    -Consider consults to  interdisciplinary teams such a  Outcome: Progressing     Problem: HEMATOLOGIC - ADULT  Goal: Maintains hematologic stability  Description: INTERVENTIONS  - Assess for signs and symptoms of bleeding or hemorrhage  - Monitor labs  - Administer supportive blood products/factors as ordered and appropriate  Outcome: Progressing     Problem: MUSCULOSKELETAL - ADULT  Goal: Maintain or return mobility to safest level of function  Description: INTERVENTIONS:  - Assess patient's ability to carry out ADLs; assess patient's baseline for ADL function and identify physical deficits which impact ability to perform ADLs (bathing, care of mouth/teeth, toileting, grooming, dressing, etc )  - Assess/evaluate cause of self-care deficits   - Assess range of motion  - Assess patient's mobility  - Assess patient's need for assistive devices and provide as appropriate  - Encourage maximum independence but intervene and supervise when necessary  - Involve family in performance of ADLs  - Assess for home care needs following discharge   - Consider OT consult to assist with ADL evaluation and planning for discharge  - Provide patient education as appropriate  Outcome: Progressing

## 2022-03-25 NOTE — ASSESSMENT & PLAN NOTE
· Meets SIRS criteria on admission with tachycardia/tachypnea  · Etiology unknown - consider pain vs infection  · Currently on IV Cefazolin at this time  Remains afebrile, no leukocytosis

## 2022-03-25 NOTE — PLAN OF CARE
Problem: MOBILITY - ADULT  Goal: Maintain or return to baseline ADL function  Description: INTERVENTIONS:  -  Assess patient's ability to carry out ADLs; assess patient's baseline for ADL function and identify physical deficits which impact ability to perform ADLs (bathing, care of mouth/teeth, toileting, grooming, dressing, etc )  - Assess/evaluate cause of self-care deficits   - Assess range of motion  - Assess patient's mobility; develop plan if impaired  - Assess patient's need for assistive devices and provide as appropriate  - Encourage maximum independence but intervene and supervise when necessary  - Involve family in performance of ADLs  - Assess for home care needs following discharge   - Consider OT consult to assist with ADL evaluation and planning for discharge  - Provide patient education as appropriate  Outcome: Progressing  Goal: Maintains/Returns to pre admission functional level  Description: INTERVENTIONS:  - Perform BMAT or MOVE assessment daily    - Set and communicate daily mobility goal to care team and patient/family/caregiver  - Collaborate with rehabilitation services on mobility goals if consulted  - Perform Range of Motion  times a day  - Reposition patient every  hours    - Dangle patient  times a day  - Stand patient  times a day  - Ambulate patient  times a day  - Out of bed to chair  times a day   - Out of bed for meals  times a day  - Out of bed for toileting  - Record patient progress and toleration of activity level   Outcome: Progressing     Problem: PAIN - ADULT  Goal: Verbalizes/displays adequate comfort level or baseline comfort level  Description: Interventions:  - Encourage patient to monitor pain and request assistance  - Assess pain using appropriate pain scale  - Administer analgesics based on type and severity of pain and evaluate response  - Implement non-pharmacological measures as appropriate and evaluate response  - Consider cultural and social influences on pain and pain management  - Notify physician/advanced practitioner if interventions unsuccessful or patient reports new pain  Outcome: Progressing     Problem: INFECTION - ADULT  Goal: Absence or prevention of progression during hospitalization  Description: INTERVENTIONS:  - Assess and monitor for signs and symptoms of infection  - Monitor lab/diagnostic results  - Monitor all insertion sites, i e  indwelling lines, tubes, and drains  - Monitor endotracheal if appropriate and nasal secretions for changes in amount and color  - [place] appropriate cooling/warming therapies per order  - Administer medications as ordered  - Instruct and encourage patient and family to use good hand hygiene technique  - Identify and instruct in appropriate isolation precautions for identified infection/condition  Outcome: Progressing     Problem: SAFETY ADULT  Goal: Patient will remain free of falls  Description: INTERVENTIONS:  - Educate patient/family on patient safety including physical limitations  - Instruct patient to call for assistance with activity   - Consult OT/PT to assist with strengthening/mobility   - Keep Call bell within reach  - Keep bed low and locked with side rails adjusted as appropriate  - Keep care items and personal belongings within reach  - Initiate and maintain comfort rounds  - Make Fall Risk Sign visible to staff  - Offer Toileting every  Hours, in advance of need  - Initiate/Maintain alarm  - Obtain necessary fall risk management equipment: - Apply yellow socks and bracelet for high fall risk patients  - Consider moving patient to room near nurses station  Outcome: Progressing     Problem: Knowledge Deficit  Goal: Patient/family/caregiver demonstrates understanding of disease process, treatment plan, medications, and discharge instructions  Description: Complete learning assessment and assess knowledge base    Interventions:  - Provide teaching at level of understanding  - Provide teaching via preferred learning methods  Outcome: Progressing     Problem: CARDIOVASCULAR - ADULT  Goal: Maintains optimal cardiac output and hemodynamic stability  Description: INTERVENTIONS:  - Monitor I/O, vital signs and rhythm  - Monitor for S/S and trends of decreased cardiac output  - Administer and titrate ordered vasoactive medications to optimize hemodynamic stability  - Assess quality of pulses, skin color and temperature  - Assess for signs of decreased coronary artery perfusion  - Instruct patient to report change in severity of symptoms  Outcome: Progressing     Problem: METABOLIC, FLUID AND ELECTROLYTES - ADULT  Goal: Electrolytes maintained within normal limits  Description: INTERVENTIONS:  - Monitor labs and assess patient for signs and symptoms of electrolyte imbalances  - Administer electrolyte replacement as ordered  - Monitor response to electrolyte replacements, including repeat lab results as appropriate  - Instruct patient on fluid and nutrition as appropriate  Outcome: Progressing     Problem: SKIN/TISSUE INTEGRITY - ADULT  Goal: Skin Integrity remains intact(Skin Breakdown Prevention)  Description: Assess:  -Perform Gautam assessment every   -Clean and moisturize skin every   -Inspect skin when repositioning, toileting, and assisting with ADLS  -Assess under medical devices such as  every   -Assess extremities for adequate circulation and sensation     Bed Management:  -Have minimal linens on bed & keep smooth, unwrinkled  -Change linens as needed when moist or perspiring  -Avoid sitting or lying in one position for more than  hours while in bed  -Keep HOB at egrees     Toileting:  -Offer bedside commode  -Assess for incontinence every   -Use incontinent care products after each incontinent episode such as     Activity:  -Mobilize patient  times a day  -Encourage activity and walks on unit  -Encourage or provide ROM exercises   -Turn and reposition patient every  Hours  -Use appropriate equipment to lift or move patient in bed  -Instruct/ Assist with weight shifting every  when out of bed in chair  -Consider limitation of chair time  hour intervals    Skin Care:  -Avoid use of baby powder, tape, friction and shearing, hot water or constrictive clothing  -Relieve pressure over bony prominences using   -Do not massage red bony areas    Next Steps:  -Teach patient strategies to minimize risks such as    -Consider consults to  interdisciplinary teams such as outcome: Progressing     Problem: HEMATOLOGIC - ADULT  Goal: Maintains hematologic stability  Description: INTERVENTIONS  - Assess for signs and symptoms of bleeding or hemorrhage  - Monitor labs  - Administer supportive blood products/factors as ordered and appropriate  Outcome: Progressing     Problem: MUSCULOSKELETAL - ADULT  Goal: Maintain or return mobility to safest level of function  Description: INTERVENTIONS:  - Assess patient's ability to carry out ADLs; assess patient's baseline for ADL function and identify physical deficits which impact ability to perform ADLs (bathing, care of mouth/teeth, toileting, grooming, dressing, etc )  - Assess/evaluate cause of self-care deficits   - Assess range of motion  - Assess patient's mobility  - Assess patient's need for assistive devices and provide as appropriate  - Encourage maximum independence but intervene and supervise when necessary  - Involve family in performance of ADLs  - Assess for home care needs following discharge   - Consider OT consult to assist with ADL evaluation and planning for discharge  - Provide patient education as appropriate  Outcome: Progressing     Problem: Prexisting or High Potential for Compromised Skin Integrity  Goal: Skin integrity is maintained or improved  Description: INTERVENTIONS:  - Identify patients at risk for skin breakdown  - Assess and monitor skin integrity  - Assess and monitor nutrition and hydration status  - Monitor labs   - Assess for incontinence   - Turn and reposition patient  - Assist with mobility/ambulation  - Relieve pressure over bony prominences  - Avoid friction and shearing  - Provide appropriate hygiene as needed including keeping skin clean and dry  - Evaluate need for skin moisturizer/barrier cream  - Collaborate with interdisciplinary team   - Patient/family teaching  - Consider wound care consult   Outcome: Progressing

## 2022-03-26 VITALS
HEIGHT: 66 IN | DIASTOLIC BLOOD PRESSURE: 73 MMHG | SYSTOLIC BLOOD PRESSURE: 121 MMHG | TEMPERATURE: 97.9 F | BODY MASS INDEX: 34.3 KG/M2 | OXYGEN SATURATION: 97 % | HEART RATE: 72 BPM | WEIGHT: 213.41 LBS | RESPIRATION RATE: 16 BRPM

## 2022-03-26 LAB
ANION GAP SERPL CALCULATED.3IONS-SCNC: 6 MMOL/L (ref 4–13)
BASOPHILS # BLD AUTO: 0.02 THOUSANDS/ΜL (ref 0–0.1)
BASOPHILS NFR BLD AUTO: 1 % (ref 0–1)
BUN SERPL-MCNC: 27 MG/DL (ref 5–25)
CALCIUM SERPL-MCNC: 9.1 MG/DL (ref 8.3–10.1)
CHLORIDE SERPL-SCNC: 98 MMOL/L (ref 100–108)
CO2 SERPL-SCNC: 29 MMOL/L (ref 21–32)
CREAT SERPL-MCNC: 1.19 MG/DL (ref 0.6–1.3)
EOSINOPHIL # BLD AUTO: 0.01 THOUSAND/ΜL (ref 0–0.61)
EOSINOPHIL NFR BLD AUTO: 0 % (ref 0–6)
ERYTHROCYTE [DISTWIDTH] IN BLOOD BY AUTOMATED COUNT: 13.6 % (ref 11.6–15.1)
GFR SERPL CREATININE-BSD FRML MDRD: 68 ML/MIN/1.73SQ M
GLUCOSE SERPL-MCNC: 103 MG/DL (ref 65–140)
HCT VFR BLD AUTO: 32.5 % (ref 36.5–49.3)
HGB BLD-MCNC: 11 G/DL (ref 12–17)
IMM GRANULOCYTES # BLD AUTO: 0.02 THOUSAND/UL (ref 0–0.2)
IMM GRANULOCYTES NFR BLD AUTO: 1 % (ref 0–2)
LYMPHOCYTES # BLD AUTO: 0.92 THOUSANDS/ΜL (ref 0.6–4.47)
LYMPHOCYTES NFR BLD AUTO: 22 % (ref 14–44)
MCH RBC QN AUTO: 29.5 PG (ref 26.8–34.3)
MCHC RBC AUTO-ENTMCNC: 33.8 G/DL (ref 31.4–37.4)
MCV RBC AUTO: 87 FL (ref 82–98)
MONOCYTES # BLD AUTO: 0.81 THOUSAND/ΜL (ref 0.17–1.22)
MONOCYTES NFR BLD AUTO: 20 % (ref 4–12)
MRSA NOSE QL CULT: NORMAL
NEUTROPHILS # BLD AUTO: 2.33 THOUSANDS/ΜL (ref 1.85–7.62)
NEUTS SEG NFR BLD AUTO: 56 % (ref 43–75)
NRBC BLD AUTO-RTO: 0 /100 WBCS
PLATELET # BLD AUTO: 262 THOUSANDS/UL (ref 149–390)
PMV BLD AUTO: 9.8 FL (ref 8.9–12.7)
POTASSIUM SERPL-SCNC: 4.1 MMOL/L (ref 3.5–5.3)
RBC # BLD AUTO: 3.73 MILLION/UL (ref 3.88–5.62)
SODIUM SERPL-SCNC: 133 MMOL/L (ref 136–145)
WBC # BLD AUTO: 4.11 THOUSAND/UL (ref 4.31–10.16)

## 2022-03-26 PROCEDURE — 85025 COMPLETE CBC W/AUTO DIFF WBC: CPT | Performed by: STUDENT IN AN ORGANIZED HEALTH CARE EDUCATION/TRAINING PROGRAM

## 2022-03-26 PROCEDURE — 99239 HOSP IP/OBS DSCHRG MGMT >30: CPT | Performed by: STUDENT IN AN ORGANIZED HEALTH CARE EDUCATION/TRAINING PROGRAM

## 2022-03-26 PROCEDURE — 80048 BASIC METABOLIC PNL TOTAL CA: CPT | Performed by: STUDENT IN AN ORGANIZED HEALTH CARE EDUCATION/TRAINING PROGRAM

## 2022-03-26 RX ORDER — CEPHALEXIN 500 MG/1
500 CAPSULE ORAL EVERY 6 HOURS SCHEDULED
Qty: 20 CAPSULE | Refills: 0 | Status: SHIPPED | OUTPATIENT
Start: 2022-03-26 | End: 2022-03-31

## 2022-03-26 RX ADMIN — ESCITALOPRAM OXALATE 10 MG: 10 TABLET ORAL at 08:33

## 2022-03-26 RX ADMIN — CEFAZOLIN SODIUM 2000 MG: 2 SOLUTION INTRAVENOUS at 11:31

## 2022-03-26 RX ADMIN — POTASSIUM CHLORIDE 20 MEQ: 1500 TABLET, EXTENDED RELEASE ORAL at 08:33

## 2022-03-26 RX ADMIN — CEFAZOLIN SODIUM 2000 MG: 2 SOLUTION INTRAVENOUS at 03:15

## 2022-03-26 RX ADMIN — FUROSEMIDE 20 MG: 20 TABLET ORAL at 08:33

## 2022-03-26 RX ADMIN — PREDNISONE 5 MG: 5 TABLET ORAL at 08:33

## 2022-03-26 RX ADMIN — SODIUM CHLORIDE, SODIUM GLUCONATE, SODIUM ACETATE, POTASSIUM CHLORIDE, MAGNESIUM CHLORIDE, SODIUM PHOSPHATE, DIBASIC, AND POTASSIUM PHOSPHATE 75 ML/HR: .53; .5; .37; .037; .03; .012; .00082 INJECTION, SOLUTION INTRAVENOUS at 11:31

## 2022-03-26 RX ADMIN — METOLAZONE 2.5 MG: 5 TABLET ORAL at 08:33

## 2022-03-26 RX ADMIN — GABAPENTIN 900 MG: 300 CAPSULE ORAL at 08:33

## 2022-03-26 NOTE — PLAN OF CARE
Problem: MOBILITY - ADULT  Goal: Maintain or return to baseline ADL function  Description: INTERVENTIONS:  -  Assess patient's ability to carry out ADLs; assess patient's baseline for ADL function and identify physical deficits which impact ability to perform ADLs (bathing, care of mouth/teeth, toileting, grooming, dressing, etc )  - Assess/evaluate cause of self-care deficits   - Assess range of motion  - Assess patient's mobility; develop plan if impaired  - Assess patient's need for assistive devices and provide as appropriate  - Encourage maximum independence but intervene and supervise when necessary  - Involve family in performance of ADLs  - Assess for home care needs following discharge   - Consider OT consult to assist with ADL evaluation and planning for discharge  - Provide patient education as appropriate  3/26/2022 1432 by Josiah Doe RN  Outcome: Adequate for Discharge  3/26/2022 0933 by Josiah Doe RN  Outcome: Progressing  Goal: Maintains/Returns to pre admission functional level  Description: INTERVENTIONS:  - Perform BMAT or MOVE assessment daily    - Set and communicate daily mobility goal to care team and patient/family/caregiver  - Collaborate with rehabilitation services on mobility goals if consulted  - Perform Range of Motion  times a day  - Reposition patient every  hours    - Dangle patient  times a day  - Stand patient  times a day  - Ambulate patient  times a day  - Out of bed to chair  times a day   - Out of bed for meals  times a day  - Out of bed for toileting  - Record patient progress and toleration of activity level   3/26/2022 1432 by Josiah Doe RN  Outcome: Adequate for Discharge  3/26/2022 0933 by Josiah Doe RN  Outcome: Progressing     Problem: PAIN - ADULT  Goal: Verbalizes/displays adequate comfort level or baseline comfort level  Description: Interventions:  - Encourage patient to monitor pain and request assistance  - Assess pain using appropriate pain scale  - Administer analgesics based on type and severity of pain and evaluate response  - Implement non-pharmacological measures as appropriate and evaluate response  - Consider cultural and social influences on pain and pain management  - Notify physician/advanced practitioner if interventions unsuccessful or patient reports new pain  3/26/2022 1432 by Jesus Doshi RN  Outcome: Adequate for Discharge  3/26/2022 0933 by Jesus Doshi RN  Outcome: Progressing     Problem: INFECTION - ADULT  Goal: Absence or prevention of progression during hospitalization  Description: INTERVENTIONS:  - Assess and monitor for signs and symptoms of infection  - Monitor lab/diagnostic results  - Monitor all insertion sites, i e  indwelling lines, tubes, and drains  - Monitor endotracheal if appropriate and nasal secretions for changes in amount and color  - Enochs appropriate cooling/warming therapies per order  - Administer medications as ordered  - Instruct and encourage patient and family to use good hand hygiene technique  - Identify and instruct in appropriate isolation precautions for identified infection/condition  3/26/2022 1432 by Jesus Doshi RN  Outcome: Adequate for Discharge  3/26/2022 0933 by Jesus Doshi RN  Outcome: Progressing     Problem: SAFETY ADULT  Goal: Patient will remain free of falls  Description: INTERVENTIONS:  - Educate patient/family on patient safety including physical limitations  - Instruct patient to call for assistance with activity   - Consult OT/PT to assist with strengthening/mobility   - Keep Call bell within reach  - Keep bed low and locked with side rails adjusted as appropriate  - Keep care items and personal belongings within reach  - Initiate and maintain comfort rounds  - Make Fall Risk Sign visible to staff  - Offer Toileting every  Hours, in advance of need  - Initiate/Maintain alarm  - Obtain necessary fall risk management equipment:   - Apply yellow socks and bracelet for high fall risk patients  - Consider moving patient to room near nurses station  3/26/2022 1432 by Javon Hodgson RN  Outcome: Adequate for Discharge  3/26/2022 0933 by Javon Hodgson RN  Outcome: Progressing     Problem: Knowledge Deficit  Goal: Patient/family/caregiver demonstrates understanding of disease process, treatment plan, medications, and discharge instructions  Description: Complete learning assessment and assess knowledge base    Interventions:  - Provide teaching at level of understanding  - Provide teaching via preferred learning methods  3/26/2022 1432 by Javon Hodgson RN  Outcome: Adequate for Discharge  3/26/2022 0933 by Javon Hodgson RN  Outcome: Progressing     Problem: CARDIOVASCULAR - ADULT  Goal: Maintains optimal cardiac output and hemodynamic stability  Description: INTERVENTIONS:  - Monitor I/O, vital signs and rhythm  - Monitor for S/S and trends of decreased cardiac output  - Administer and titrate ordered vasoactive medications to optimize hemodynamic stability  - Assess quality of pulses, skin color and temperature  - Assess for signs of decreased coronary artery perfusion  - Instruct patient to report change in severity of symptoms  3/26/2022 1432 by Javon Hodgson RN  Outcome: Adequate for Discharge  3/26/2022 0933 by Javon Hodgson RN  Outcome: Progressing     Problem: METABOLIC, FLUID AND ELECTROLYTES - ADULT  Goal: Electrolytes maintained within normal limits  Description: INTERVENTIONS:  - Monitor labs and assess patient for signs and symptoms of electrolyte imbalances  - Administer electrolyte replacement as ordered  - Monitor response to electrolyte replacements, including repeat lab results as appropriate  - Instruct patient on fluid and nutrition as appropriate  3/26/2022 1432 by Javon Hodgson RN  Outcome: Adequate for Discharge  3/26/2022 0933 by Javon Hodgson RN  Outcome: Progressing     Problem: SKIN/TISSUE INTEGRITY - ADULT  Goal: Skin Integrity remains intact(Skin Breakdown Prevention)  Description: Assess:  -Perform Gautam assessment every   -Clean and moisturize skin every   -Inspect skin when repositioning, toileting, and assisting with ADLS  -Assess under medical devices such as  every   -Assess extremities for adequate circulation and sensation     Bed Management:  -Have minimal linens on bed & keep smooth, unwrinkled  -Change linens as needed when moist or perspiring  -Avoid sitting or lying in one position for more than  hours while in bed  -Keep HOB at degrees     Toileting:  -Offer bedside commode  -Assess for incontinence every   -Use incontinent care products after each incontinent episode such as     Activity:  -Mobilize patient  times a day  -Encourage activity and walks on unit  -Encourage or provide ROM exercises   -Turn and reposition patient every  Hours  -Use appropriate equipment to lift or move patient in bed  -Instruct/ Assist with weight shifting every  when out of bed in chair  -Consider limitation of chair time  hour intervals    Skin Care:  -Avoid use of baby powder, tape, friction and shearing, hot water or constrictive clothing  -Relieve pressure over bony prominences using   -Do not massage red bony areas    Next Steps:  -Teach patient strategies to minimize risks such as    -Consider consults to  interdisciplinary teams such as   3/26/2022 1432 by Feng Abarca RN  Outcome: Adequate for Discharge  3/26/2022 0933 by Feng Abarca RN  Outcome: Progressing     Problem: HEMATOLOGIC - ADULT  Goal: Maintains hematologic stability  Description: INTERVENTIONS  - Assess for signs and symptoms of bleeding or hemorrhage  - Monitor labs  - Administer supportive blood products/factors as ordered and appropriate  3/26/2022 1432 by Feng Abarca RN  Outcome: Adequate for Discharge  3/26/2022 0933 by Feng Abarca RN  Outcome: Progressing     Problem: MUSCULOSKELETAL - ADULT  Goal: Maintain or return mobility to safest level of function  Description: INTERVENTIONS:  - Assess patient's ability to carry out ADLs; assess patient's baseline for ADL function and identify physical deficits which impact ability to perform ADLs (bathing, care of mouth/teeth, toileting, grooming, dressing, etc )  - Assess/evaluate cause of self-care deficits   - Assess range of motion  - Assess patient's mobility  - Assess patient's need for assistive devices and provide as appropriate  - Encourage maximum independence but intervene and supervise when necessary  - Involve family in performance of ADLs  - Assess for home care needs following discharge   - Consider OT consult to assist with ADL evaluation and planning for discharge  - Provide patient education as appropriate  3/26/2022 1432 by Rossy Adkins RN  Outcome: Adequate for Discharge  3/26/2022 0933 by Rossy Adkins RN  Outcome: Progressing     Problem: Prexisting or High Potential for Compromised Skin Integrity  Goal: Skin integrity is maintained or improved  Description: INTERVENTIONS:  - Identify patients at risk for skin breakdown  - Assess and monitor skin integrity  - Assess and monitor nutrition and hydration status  - Monitor labs   - Assess for incontinence   - Turn and reposition patient  - Assist with mobility/ambulation  - Relieve pressure over bony prominences  - Avoid friction and shearing  - Provide appropriate hygiene as needed including keeping skin clean and dry  - Evaluate need for skin moisturizer/barrier cream  - Collaborate with interdisciplinary team   - Patient/family teaching  - Consider wound care consult   3/26/2022 1432 by Rossy Adkins RN  Outcome: Adequate for Discharge  3/26/2022 0933 by Rossy Adkins RN  Outcome: Progressing     Problem: Potential for Falls  Goal: Patient will remain free of falls  Description: INTERVENTIONS:  - Educate patient/family on patient safety including physical limitations  - Instruct patient to call for assistance with activity   - Consult OT/PT to assist with strengthening/mobility - Keep Call bell within reach  - Keep bed low and locked with side rails adjusted as appropriate  - Keep care items and personal belongings within reach  - Initiate and maintain comfort rounds  - Make Fall Risk Sign visible to staff  - Offer Toileting every  Hours, in advance of need  - Initiate/Maintain alarm  - Obtain necessary fall risk management equipment:  - Apply yellow socks and bracelet for high fall risk patients  - Consider moving patient to room near nurses station  3/26/2022 1432 by Marquis Wally RN  Outcome: Adequate for Discharge  3/26/2022 0933 by Marquis Wally RN  Outcome: Progressing

## 2022-03-26 NOTE — DISCHARGE SUMMARY
[address]     Discharge- Kendal Joppa 1970, 45 y o  male MRN: [id number]  Unit/Bed#: -01 Encounter: [id number]  Primary Care Provider: Sravani Smith MD   Date and time admitted to hospital: 3/24/2022  5:51 PM    * Cellulitis of left lower extremity  Assessment & Plan  · Patient presented to the ED with complaints of left foot swelling, redness and pain x 6 days  Was seen by PCP and treated with oral doxy on 3/16  · Does have history of chronic bilateral lower extremity swelling/pain due to venous insufficiency  · Left foot x-ray (3/24/22): Soft tissue swelling; No acute osseous abnormality  · Left tibia/fibula x-ray (3/24/22): No acute osseous abnormality  · Bilateral lower extremity venous duplex completed (3/24/22): Negative for DVT  · Noted with improvement s/p IV Ancef  · Two sets of blood culture without growth  ·  Discharge home with PO Keflex for 5 more days to complete 7 day course  · Patient is agreeable with above plan  SIRS (systemic inflammatory response syndrome) (HCC)  Assessment & Plan  · Meets SIRS criteria on admission with tachycardia/tachypnea  · Etiology unknown - consider pain vs infection  · Currently on IV Cefazolin at this time  · Remains afebrile, no leukocytosis  · Two sets of blood culture was good  · Will discharge home on Keflex for 5 more days to complete 7 day course  Chronic kidney disease, stage 3 [facility])  Assessment & Plan  Lab Results   Component Value Date    EGFR 68 03/26/2022    EGFR 51 03/25/2022    EGFR 52 03/25/2022    CREATININE 1 19 03/26/2022    CREATININE 1 51 (H) 03/25/2022    CREATININE 1 50 (H) 03/25/2022     · History of Lupus nephritis  · Creatinine 1 67; was 1 9 on 03/01, with baseline difficult to establish however may be around 1 5  · Creatinine stable, 1 19      History of DVT (deep vein thrombosis)  Assessment & Plan  · History of DVTaround 2yr ago, currently on xarelto   · Continue home xarelto · Bilateral venous duplex negative for DVT on this admission  Hypertension  Assessment & Plan  · Chronic  · Reports not taking his carvedilol or lisinopril anymore  · Continue home Lasix   · Monitor BP per unit protocol    Lupus (systemic lupus erythematosus) ([address] )  Assessment & Plan  · Follows with [facility] as an outpatient  · Per chart review was taking CellCept as an outpatient but stopped taking it on his own  · Per chart review appears as though rheumatology is considering restarting the CellCept  · Currently taking plaquenil 400mg qHS, and 5mg daily prednisone; continue both  · Continue outpatient follow-up    Medical Problems             Resolved Problems  Date Reviewed: 3/26/2022    None              Discharging Physician / Practitioner: Kira Bal MD  PCP: Teresa Garcia MD  Admission Date:   Admission Orders (From admission, onward)     Ordered        03/24/22 2105  Inpatient Admission  Once                      Discharge Date: 03/26/22    Consultations During Hospital Stay:  None    Procedures Performed:   · None    Significant Findings / Test Results:   VAS lower limb venous duplex study, unilateral/limited   Final Result by Rachelle Roberts MD (03/25 1342)      XR foot 3+ views LEFT   Final Result by Marisela Mi MD (03/25 2402)   Soft tissue swelling      No acute osseous abnormality  Workstation performed: [id number]         XR tibia fibula 2 views LEFT   Final Result by Marisela Mi MD (03/25 0818)      No acute osseous abnormality  Workstation performed: [id number]             Incidental Findings:   · None     Test Results Pending at Discharge (will require follow up): · None     Outpatient Tests Requested:  · Follow up with PCP within 1 week    Complications:  None    Reason for Admission: Left lower extremity swelling, redness and pain; reports not significant improvement with treatment on doxycycline      Hospital Course:   Mickie Duran is a 45 y o  male patient with past medical history of cervical mass, CAD, Depression, DVT, HTN, Lupus and Lupus Nephritis who originally presented to the hospital on 3/24/2022 due to worsening left lower extremity swelling, pain and redness x 6 days despite outpatient oral doxycycline treatment  Patient noted to meet SIRS criteria on admission evidenced by tachycardia and tachypnea  Initiated on IV Ancef with improvement of his symptoms  Patient also had a venous duplex study which did rule out DVT  Blood cultures x 2 negative x 24 hours; no leukocytosis noted  Procalcitonin negative x1  Symptomatically patient improved  He was transitioned to oral Keflex and stable for discharge  Discharge home on 5 days of oral Keflex to complete 7 day course  Patient is agreeable with above plan  No other events reviewed he is hemodynamically stable for discharge  Refer to earlier notes for further clarification  Please see above list of diagnoses and related plan for additional information  Condition at Discharge: good    Discharge Day Visit / Exam:   Subjective:  Afebrile, hemodynamically stable  Seen sitting in a chair appears overall reports feeling much better since on IV Ancef  Denies chest pain, dyspnea, fever, chills, nausea, vomiting, diarrhea, any new complaints  No other events reported  Reports feeling well and eager to go home  Vitals: Blood Pressure: 121/73 (03/26/22 0624)  Pulse: 72 (03/26/22 0624)  Temperature: 97 9 °F (36 6 °C) (03/26/22 0624)  Temp Source: Oral (03/24/22 1800)  Respirations: 16 (03/26/22 0624)  Height: 5' 6" (167 6 cm) (03/24/22 1855)  Weight - Scale: 96 8 kg (213 lb 6 5 oz) (03/24/22 1855)  SpO2: 97 % (03/26/22 0624)  Exam:   Physical Exam  Constitutional:       General: He is not in acute distress  Appearance: Normal appearance  He is not ill-appearing  HENT:      Head: Normocephalic and atraumatic     Eyes:      Pupils: Pupils are equal, round, and reactive to light  Cardiovascular:      Rate and Rhythm: Normal rate  Pulses: Normal pulses  Pulmonary:      Effort: Pulmonary effort is normal  No respiratory distress  Breath sounds: Normal breath sounds  No stridor  No wheezing or rhonchi  Abdominal:      General: Bowel sounds are normal  There is no distension  Palpations: Abdomen is soft  Tenderness: There is no abdominal tenderness  Musculoskeletal:      Cervical back: Normal range of motion and neck supple  Right lower leg: Edema present  Left lower leg: Edema present  Comments: Chronic bilateral extremity lymphedema  Left lower extremity note with mile tenderness on exam, no ertyhema noted, no open wound noted  Able to move all toes and capillary refill intact  Neurological:      General: No focal deficit present  Mental Status: He is alert and oriented to person, place, and time  Mental status is at baseline  Psychiatric:         Mood and Affect: Mood normal          Behavior: Behavior normal          Discharge instructions/Information to patient and family:   See after visit summary for information provided to patient and family  Provisions for Follow-Up Care:  See after visit summary for information related to follow-up care and any pertinent home health orders  Disposition:   Home with [facility] (Reminder: Complete face to face encounter)   Patient reports that he does have a wheelchair at home  Planned Readmission:      Discharge Statement:  I spent 35 minutes discharging the patient  This time was spent on the day of discharge  I had direct contact with the patient on the day of discharge  Greater than 50% of the total time was spent examining patient, answering all patient questions, arranging and discussing plan of care with patient as well as directly providing post-discharge instructions  Additional time then spent on discharge activities      Discharge Medications:  See after visit summary for reconciled discharge medications provided to patient and/or family        **Please Note: This note may have been constructed using a voice recognition system**

## 2022-03-26 NOTE — ASSESSMENT & PLAN NOTE
· Patient presented to the ED with complaints of left foot swelling, redness and pain x 6 days  Was seen by PCP and treated with oral doxy on 3/16 without improvement of symptoms  · Does have history of chronic bilateral lower extremity swelling/pain due to venous insufficiency  · Left foot x-ray (3/24/22): Soft tissue swelling; No acute osseous abnormality  · Left tibia/fibula x-ray (3/24/22): No acute osseous abnormality  · Bilateral lower extremity venous duplex completed (3/24/22): Negative for DVT  · Noted improvement s/p IV Ancef  Discharge home with PO Keflex  · Blood cultures negative x 24 hrs  · MRSA swab pending

## 2022-03-26 NOTE — DISCHARGE INSTR - AVS FIRST PAGE
Recommended close follow-up with primary care provider in 3-5 days of discharge  Recommended repeat blood work for BMP in 1 week monitor sodium level with primary care provider

## 2022-03-26 NOTE — CASE MANAGEMENT
Case Management Discharge Planning Note    Patient name Laxmi Scales  Location /-24 MRN [id number]  : 1970 Date 3/26/2022       Current Admission Date: 3/24/2022  Current Admission Diagnosis:Cellulitis of left lower extremity   Patient Active Problem List    Diagnosis Date Noted    Cellulitis of left lower extremity 2022    SIRS (systemic inflammatory response syndrome) ([facility] ) 2022    Tremor of both hands 2020    Spasticity 2020    Gait difficulty 2020    Neuropathic pain 2020    Muscle weakness (generalized) 2019    Lower back pain 2019    Chest pain 2019    Acute bronchitis 2019    Anemia in chronic kidney disease 2018    Melena 2018    Acute-on-chronic kidney injury ([facility] ) 2018    Hyperkalemia 2018    Lupus (systemic lupus erythematosus) ([facility] ) 2018    Lupus nephritis ([facility] ) 2018    Cellulitis and abscess of left lower extremity 2018    Hypertension 2018    History of DVT (deep vein thrombosis) 2018    MARK (acute kidney injury) ([facility] ) 2018    Chronic kidney disease, stage 3 ([facility] ) 2018    Persistent proteinuria 2018      LOS (days): 2  Geometric Mean LOS (GMLOS) (days): 2 60  Days to GMLOS:1     OBJECTIVE:  Risk of Unplanned Readmission Score: 15         Current admission status: Inpatient   Preferred Pharmacy:   [address] [address]  Phone: [phone] Fax: [phone]    Primary Care Provider: Donna Herrera MD    Primary Insurance: [address] [place] [address]  Secondary Insurance:     DISCHARGE DETAILS:    Discharge planning discussed with[de-identified] Patient  Freedom of Choice: Yes  Comments - Freedom of Choice: CM met with patient at bedside to discuss [facility] recommendation   Patient agreeable and denied any preferences as to which agency he wishes to use  Patient agreeable with CM sending a blanket referral   CM contacted family/caregiver?: No- see comments (Patient declined phone call at this time and reported that his wife will be here around 1 or 2 pm )  Were Treatment Team discharge recommendations reviewed with patient/caregiver?: Yes  Did patient/caregiver verbalize understanding of patient care needs?: Yes  Were patient/caregiver advised of the risks associated with not following Treatment Team discharge recommendations?: Yes    [address]         Is the patient interested in [facility] at discharge?: Yes  [address] requested[de-identified] Physical [facility] Agency Name[de-identified] Other ([place] referrals sent )  [facility] [place]note 2][facility] Provider[de-identified] PCP  Home Health Services Needed[de-identified] Evaluate Functional Status and Safety,Strengthening/Theraputic Exercises to Improve Function,Other (comment) (cellulitis)  Homebound Criteria Met[de-identified] Requires the Assistance of Another Person for Safe Ambulation or to Leave the Home,Uses an Assist Device (i e  cane, walker, etc)  Supporting Clincal Findings[de-identified] Fatigues Easliy in Short Distances,Limited Endurance    DME Referral Provided  Referral made for DME?: No    Other Referral/Resources/Interventions Provided:  Interventions: [facility]  Referral Comments: CM sent blanket referral to [facility] agencies to determine who can accept for PT and likely SN      Would you like to participate in our [address] service program?  : No - Declined    Treatment Team Recommendation: [facility]  Discharge Destination Plan[de-identified] Home with [place] at Discharge : Family

## 2022-03-26 NOTE — ASSESSMENT & PLAN NOTE
Lab Results   Component Value Date    EGFR 68 03/26/2022    EGFR 51 03/25/2022    EGFR 52 03/25/2022    CREATININE 1 19 03/26/2022    CREATININE 1 51 (H) 03/25/2022    CREATININE 1 50 (H) 03/25/2022     · History of Lupus nephritis  · Creatinine 1 67; was 1 9 on 03/01, with baseline difficult to establish however may be around 1 5  · Creatinine stable, 1 19

## 2022-03-26 NOTE — PLAN OF CARE
Problem: MOBILITY - ADULT  Goal: Maintain or return to baseline ADL function  Description: INTERVENTIONS:  -  Assess patient's ability to carry out ADLs; assess patient's baseline for ADL function and identify physical deficits which impact ability to perform ADLs (bathing, care of mouth/teeth, toileting, grooming, dressing, etc )  - Assess/evaluate cause of self-care deficits   - Assess range of motion  - Assess patient's mobility; develop plan if impaired  - Assess patient's need for assistive devices and provide as appropriate  - Encourage maximum independence but intervene and supervise when necessary  - Involve family in performance of ADLs  - Assess for home care needs following discharge   - Consider OT consult to assist with ADL evaluation and planning for discharge  - Provide patient education as appropriate  Outcome: Progressing  Goal: Maintains/Returns to pre admission functional level  Description: INTERVENTIONS:  - Perform BMAT or MOVE assessment daily    - Set and communicate daily mobility goal to care team and patient/family/caregiver  - Collaborate with rehabilitation services on mobility goals if consulted  - Perform Range of Motion  times a day  - Reposition patient every  hours    - Dangle patient  times a day  - Stand patient  times a day  - Ambulate patient  times a day  - Out of bed to chair  times a day   - Out of bed for meals  times a day  - Out of bed for toileting  - Record patient progress and toleration of activity level   Outcome: Progressing     Problem: PAIN - ADULT  Goal: Verbalizes/displays adequate comfort level or baseline comfort level  Description: Interventions:  - Encourage patient to monitor pain and request assistance  - Assess pain using appropriate pain scale  - Administer analgesics based on type and severity of pain and evaluate response  - Implement non-pharmacological measures as appropriate and evaluate response  - Consider cultural and social influences on pain and pain management  - Notify physician/advanced practitioner if interventions unsuccessful or patient reports new pain  Outcome: Progressing     Problem: INFECTION - ADULT  Goal: Absence or prevention of progression during hospitalization  Description: INTERVENTIONS:  - Assess and monitor for signs and symptoms of infection  - Monitor lab/diagnostic results  - Monitor all insertion sites, i e  indwelling lines, tubes, and drains  - Monitor endotracheal if appropriate and nasal secretions for changes in amount and color  - Cascade appropriate cooling/warming therapies per order  - Administer medications as ordered  - Instruct and encourage patient and family to use good hand hygiene technique  - Identify and instruct in appropriate isolation precautions for identified infection/condition  Outcome: Progressing     Problem: SAFETY ADULT  Goal: Patient will remain free of falls  Description: INTERVENTIONS:  - Educate patient/family on patient safety including physical limitations  - Instruct patient to call for assistance with activity   - Consult OT/PT to assist with strengthening/mobility   - Keep Call bell within reach  - Keep bed low and locked with side rails adjusted as appropriate  - Keep care items and personal belongings within reach  - Initiate and maintain comfort rounds  - Make Fall Risk Sign visible to staff  - Offer Toileting every  Hours, in advance of need  - Initiate/Maintain alarm  - Obtain necessary fall risk management equipment:   - Apply yellow socks and bracelet for high fall risk patients  - Consider moving patient to room near nurses station  Outcome: Progressing     Problem: Knowledge Deficit  Goal: Patient/family/caregiver demonstrates understanding of disease process, treatment plan, medications, and discharge instructions  Description: Complete learning assessment and assess knowledge base    Interventions:  - Provide teaching at level of understanding  - Provide teaching via preferred learning methods  Outcome: Progressing     Problem: CARDIOVASCULAR - ADULT  Goal: Maintains optimal cardiac output and hemodynamic stability  Description: INTERVENTIONS:  - Monitor I/O, vital signs and rhythm  - Monitor for S/S and trends of decreased cardiac output  - Administer and titrate ordered vasoactive medications to optimize hemodynamic stability  - Assess quality of pulses, skin color and temperature  - Assess for signs of decreased coronary artery perfusion  - Instruct patient to report change in severity of symptoms  Outcome: Progressing     Problem: METABOLIC, FLUID AND ELECTROLYTES - ADULT  Goal: Electrolytes maintained within normal limits  Description: INTERVENTIONS:  - Monitor labs and assess patient for signs and symptoms of electrolyte imbalances  - Administer electrolyte replacement as ordered  - Monitor response to electrolyte replacements, including repeat lab results as appropriate  - Instruct patient on fluid and nutrition as appropriate  Outcome: Progressing     Problem: SKIN/TISSUE INTEGRITY - ADULT  Goal: Skin Integrity remains intact(Skin Breakdown Prevention)  Description: Assess:  -Perform Gautam assessment every  -Clean and moisturize skin every   -Inspect skin when repositioning, toileting, and assisting with ADLS  -Assess under medical devices such as  every   -Assess extremities for adequate circulation and sensation     Bed Management:  -Have minimal linens on bed & keep smooth, unwrinkled  -Change linens as needed when moist or perspiring  -Avoid sitting or lying in one position for more than  hours while in bed  -Keep HOB at degrees     Toileting:  -Offer bedside commode  -Assess for incontinence every   -Use incontinent care products after each incontinent episode such as    Activity:  -Mobilize patient  times a day  -Encourage activity and walks on unit  -Encourage or provide ROM exercises   -Turn and reposition patient every  Hours  -Use appropriate equipment to lift or move patient in bed  -Instruct/ Assist with weight shifting every  when out of bed in chair  -Consider limitation of chair time  hour intervals    Skin Care:  -Avoid use of baby powder, tape, friction and shearing, hot water or constrictive clothing  -Relieve pressure over bony prominences using   -Do not massage red bony areas    Next Steps:  -Teach patient strategies to minimize risks such as    -Consider consults to  interdisciplinary teams such as   Outcome: Progressing     Problem: HEMATOLOGIC - ADULT  Goal: Maintains hematologic stability  Description: INTERVENTIONS  - Assess for signs and symptoms of bleeding or hemorrhage  - Monitor labs  - Administer supportive blood products/factors as ordered and appropriate  Outcome: Progressing     Problem: MUSCULOSKELETAL - ADULT  Goal: Maintain or return mobility to safest level of function  Description: INTERVENTIONS:  - Assess patient's ability to carry out ADLs; assess patient's baseline for ADL function and identify physical deficits which impact ability to perform ADLs (bathing, care of mouth/teeth, toileting, grooming, dressing, etc )  - Assess/evaluate cause of self-care deficits   - Assess range of motion  - Assess patient's mobility  - Assess patient's need for assistive devices and provide as appropriate  - Encourage maximum independence but intervene and supervise when necessary  - Involve family in performance of ADLs  - Assess for home care needs following discharge   - Consider OT consult to assist with ADL evaluation and planning for discharge  - Provide patient education as appropriate  Outcome: Progressing     Problem: Prexisting or High Potential for Compromised Skin Integrity  Goal: Skin integrity is maintained or improved  Description: INTERVENTIONS:  - Identify patients at risk for skin breakdown  - Assess and monitor skin integrity  - Assess and monitor nutrition and hydration status  - Monitor labs   - Assess for incontinence   - Turn and reposition patient  - Assist with mobility/ambulation  - Relieve pressure over bony prominences  - Avoid friction and shearing  - Provide appropriate hygiene as needed including keeping skin clean and dry  - Evaluate need for skin moisturizer/barrier cream  - Collaborate with interdisciplinary team   - Patient/family teaching  - Consider wound care consult   Outcome: Progressing     Problem: Potential for Falls  Goal: Patient will remain free of falls  Description: INTERVENTIONS:  - Educate patient/family on patient safety including physical limitations  - Instruct patient to call for assistance with activity   - Consult OT/PT to assist with strengthening/mobility   - Keep Call bell within reach  - Keep bed low and locked with side rails adjusted as appropriate  - Keep care items and personal belongings within reach  - Initiate and maintain comfort rounds  - Make Fall Risk Sign visible to staff  - Offer Toileting every  Hours, in advance of need  - Initiate/Maintain alarm  - Obtain necessary fall risk management equipment  - Apply yellow socks and bracelet for high fall risk patients  - Consider moving patient to room near nurses station  Outcome: Progressing

## 2022-03-26 NOTE — CASE MANAGEMENT
Case Management Discharge Planning Note    Patient name Skyler Villareal  Location [phone] MRN [id number]  : 1970 Date 3/26/2022       Current Admission Date: 3/24/2022  Current Admission Diagnosis:Cellulitis of left lower extremity   Patient Active Problem List    Diagnosis Date Noted    Cellulitis of left lower extremity 2022    SIRS (systemic inflammatory response syndrome) ([facility] [address] ) 2022    Tremor of both hands 2020    Spasticity 2020    Gait difficulty 2020    Neuropathic pain 2020    Muscle weakness (generalized) 2019    Lower back pain 2019    Chest pain 2019    Acute bronchitis 2019    Anemia in chronic kidney disease 2018    Melena 2018    Acute-on-chronic kidney injury ([facility] [address] ) 2018    Hyperkalemia 2018    Lupus (systemic lupus erythematosus) ([facility] [address] ) 2018    [facility] 2018    Cellulitis and abscess of left lower extremity 2018    Hypertension 2018    History of DVT (deep vein thrombosis) 2018    MARK (acute kidney injury) ([facility] [address] ) 2018    Chronic kidney disease, stage 3 ([facility] [address] ) 2018    Persistent proteinuria 2018      LOS (days): 2  Geometric Mean LOS (GMLOS) (days): 2 60  Days to GMLOS:0 9     OBJECTIVE:  Risk of Unplanned Readmission Score: 15         Current admission status: Inpatient   Preferred Pharmacy:   [address] [address]  Phone: [phone] Fax: [phone]    Primary Care Provider: Dafne Syed MD    Primary Insurance: [address] [place] [address]  Secondary Insurance:     DISCHARGE DETAILS:    [address]         Is the patient interested in [facility] at discharge?: Yes  [address] requested[de-identified] Physical [address] Name[de-identified] [address] Provider[de-identified] PCP  Home Health Services Needed[de-identified] Evaluate Functional Status and Safety,Strengthening/Theraputic Exercises to Improve Function,Other (comment)  Homebound Criteria Met[de-identified] Requires the Assistance of Another Person for Safe Ambulation or to Leave the Home,Uses an Assist Device (i e  cane, walker, etc)  Supporting Clincal Findings[de-identified] Fatigues Easliy in Short Distances,Limited Endurance    Other Referral/Resources/Interventions Provided:  Interventions: [facility]  Referral Comments: Per ecin, [facility] is the only agency able to accept at this time  SOC date is 3/30  CM updated AVS to include their contact information

## 2022-03-28 NOTE — UTILIZATION REVIEW
Shira Litten, MD   Physician   Hospitalist   Discharge Summary      Signed   Date of Service:  3/26/2022 10:20 AM                 Signed              Show:Clear all  [x]Manual[x]Template[x]Copied    Added by:  [x]Epifanio Gonzalez MD[x]CORBY Marks      []Papa for details    [address] [facility]      Discharge- Lou Davey 1970, 45 y o  male MRN: [id number]  Unit/Bed#: -01 Encounter: [id number]  Primary Care Provider: Nancy Jackson MD   Date and time admitted to hospital: 3/24/2022  5:51 PM     * Cellulitis of left lower extremity  Assessment & Plan  · Patient presented to the ED with complaints of left foot swelling, redness and pain x 6 days  Was seen by PCP and treated with oral doxy on 3/16  · Does have history of chronic bilateral lower extremity swelling/pain due to venous insufficiency  · Left foot x-ray (3/24/22): Soft tissue swelling; No acute osseous abnormality  · Left tibia/fibula x-ray (3/24/22): No acute osseous abnormality  · Bilateral lower extremity venous duplex completed (3/24/22): Negative for DVT  · Noted with improvement s/p IV Ancef  · Two sets of blood culture without growth  ·  Discharge home with PO Keflex for 5 more days to complete 7 day course  · Patient is agreeable with above plan         SIRS (systemic inflammatory response syndrome) (HCC)  Assessment & Plan  · Meets SIRS criteria on admission with tachycardia/tachypnea  · Etiology unknown - consider pain vs infection  · Currently on IV Cefazolin at this time  · Remains afebrile, no leukocytosis  · Two sets of blood culture was good    · Will discharge home on Keflex for 5 more days to complete 7 day course      Chronic kidney disease, stage 3 [facility])  Assessment & Plan        Lab Results   Component Value Date     EGFR 68 03/26/2022     EGFR 51 03/25/2022     EGFR 52 03/25/2022     CREATININE 1 19 03/26/2022     CREATININE 1 51 (H) 03/25/2022     CREATININE 1 50 (H) 03/25/2022      · History of Lupus nephritis  · Creatinine 1 67; was 1 9 on 03/01, with baseline difficult to establish however may be around 1 5  · Creatinine stable, 1 19      History of DVT (deep vein thrombosis)  Assessment & Plan  · History of DVTaround 2yr ago, currently on xarelto   · Continue home xarelto   · Bilateral venous duplex negative for DVT on this admission      Hypertension  Assessment & Plan  · Chronic  · Reports not taking his carvedilol or lisinopril anymore  · Continue home Lasix   · Monitor BP per unit protocol     Lupus (systemic lupus erythematosus) ([facility] [address] )  Assessment & Plan  · Follows with Denys as an outpatient  · Per chart review was taking CellCept as an outpatient but stopped taking it on his own  · Per chart review appears as though rheumatology is considering restarting the CellCept  · Currently taking plaquenil 400mg qHS, and 5mg daily prednisone; continue both  · Continue outpatient follow-up         Medical Problems                        Resolved Problems  Date Reviewed: 3/26/2022           None                  Discharging Physician / Practitioner: Otilia Payne MD  PCP: Sravani Smith MD  Admission Date:       Admission Orders (From admission, onward)              Ordered          03/24/22 2105   Inpatient Admission  Once                          Discharge Date: 03/26/22     Consultations During Hospital Stay:  · None     Procedures Performed:   · None     Significant Findings / Test Results:   · VAS lower limb venous duplex study, unilateral/limited ·   Final Result by Milton Ball MD (03/25 1342) ·     ·   XR foot 3+ views LEFT ·   Final Result by Abdi Child MD (03/25 6486) ·   Soft tissue swelling ·     ·   No acute osseous abnormality  ·     ·     ·     ·   Workstation performed: [id number] ·     ·     ·   XR tibia fibula 2 views LEFT ·   Final Result by Abdi Child MD (03/25 8616) ·     ·   No acute osseous abnormality   ·     ·     ·     ·   Workstation performed: [id number] ·     ·     ·         Incidental Findings:   · None      Test Results Pending at Discharge (will require follow up): · None     Outpatient Tests Requested:  · Follow up with PCP within 1 week     Complications:  None     Reason for Admission: Left lower extremity swelling, redness and pain; reports not significant improvement with treatment on doxycycline      Hospital Course:   Jarad Ellington is a 45 y o  male patient with past medical history of cervical mass, CAD, Depression, DVT, HTN, Lupus and Lupus Nephritis who originally presented to the hospital on 3/24/2022 due to worsening left lower extremity swelling, pain and redness x 6 days despite outpatient oral doxycycline treatment  Patient noted to meet SIRS criteria on admission evidenced by tachycardia and tachypnea  Initiated on IV Ancef with improvement of his symptoms  Patient also had a venous duplex study which did rule out DVT  Blood cultures x 2 negative x 24 hours; no leukocytosis noted  Procalcitonin negative x1  Symptomatically patient improved  He was transitioned to oral Keflex and stable for discharge  Discharge home on 5 days of oral Keflex to complete 7 day course  Patient is agreeable with above plan  No other events reviewed he is hemodynamically stable for discharge  Refer to earlier notes for further clarification      Please see above list of diagnoses and related plan for additional information       Condition at Discharge: good     Discharge Day Visit / Exam:   Subjective:  Afebrile, hemodynamically stable  Seen sitting in a chair appears overall reports feeling much better since on IV Ancef  Denies chest pain, dyspnea, fever, chills, nausea, vomiting, diarrhea, any new complaints  No other events reported    Reports feeling well and eager to go home      Vitals: Blood Pressure: 121/73 (03/26/22 0624)  Pulse: 72 (03/26/22 0624)  Temperature: 97 9 °F (36 6 °C) (03/26/22 0624)  Temp Source: Oral (03/24/22 1800)  Respirations: 16 (03/26/22 0624)  Height: 5' 6" (167 6 cm) (03/24/22 1855)  Weight - Scale: 96 8 kg (213 lb 6 5 oz) (03/24/22 1855)  SpO2: 97 % (03/26/22 0624)  Exam:   Physical Exam  Constitutional:       General: He is not in acute distress  Appearance: Normal appearance  He is not ill-appearing  HENT:      Head: Normocephalic and atraumatic  Eyes:      Pupils: Pupils are equal, round, and reactive to light  Cardiovascular:      Rate and Rhythm: Normal rate  Pulses: Normal pulses  Pulmonary:      Effort: Pulmonary effort is normal  No respiratory distress  Breath sounds: Normal breath sounds  No stridor  No wheezing or rhonchi  Abdominal:      General: Bowel sounds are normal  There is no distension  Palpations: Abdomen is soft  Tenderness: There is no abdominal tenderness  Musculoskeletal:      Cervical back: Normal range of motion and neck supple  Right lower leg: Edema present  Left lower leg: Edema present  Comments: Chronic bilateral extremity lymphedema  Left lower extremity note with mile tenderness on exam, no ertyhema noted, no open wound noted  Able to move all toes and capillary refill intact  Neurological:      General: No focal deficit present  Mental Status: He is alert and oriented to person, place, and time  Mental status is at baseline  Psychiatric:         Mood and Affect: Mood normal          Behavior: Behavior normal             Discharge instructions/Information to patient and family:   See after visit summary for information provided to patient and family        Provisions for Follow-Up Care:  See after visit summary for information related to follow-up care and any pertinent home health orders         Disposition:   Home with [facility] Services (Reminder: Complete face to face encounter)   Patient reports that he does have a wheelchair at home      Planned Readmission:      Discharge Statement:  I spent 35 minutes discharging the patient  This time was spent on the day of discharge  I had direct contact with the patient on the day of discharge  Greater than 50% of the total time was spent examining patient, answering all patient questions, arranging and discussing plan of care with patient as well as directly providing post-discharge instructions    Additional time then spent on discharge activities      Discharge Medications:  See after visit summary for reconciled discharge medications provided to patient and/or family        **Please Note: This note may have been constructed using a voice recognition system**                  Revision History

## 2022-03-30 LAB
BACTERIA BLD CULT: NORMAL
BACTERIA BLD CULT: NORMAL

## 2022-04-21 ENCOUNTER — LAB REQUISITION (OUTPATIENT)
Dept: LAB | Facility: HOSPITAL | Age: 52
End: 2022-04-21
Payer: COMMERCIAL

## 2022-04-21 DIAGNOSIS — N18.31 CHRONIC KIDNEY DISEASE, STAGE 3A (HCC): ICD-10-CM

## 2022-04-21 LAB
ALBUMIN SERPL BCP-MCNC: 3.8 G/DL (ref 3.5–5)
ALP SERPL-CCNC: 129 U/L (ref 46–116)
ALT SERPL W P-5'-P-CCNC: 34 U/L (ref 12–78)
ANION GAP SERPL CALCULATED.3IONS-SCNC: 12 MMOL/L (ref 4–13)
AST SERPL W P-5'-P-CCNC: 44 U/L (ref 5–45)
BILIRUB SERPL-MCNC: 0.32 MG/DL (ref 0.2–1)
BUN SERPL-MCNC: 53 MG/DL (ref 5–25)
CALCIUM SERPL-MCNC: 9 MG/DL (ref 8.3–10.1)
CHLORIDE SERPL-SCNC: 95 MMOL/L (ref 100–108)
CO2 SERPL-SCNC: 26 MMOL/L (ref 21–32)
CREAT SERPL-MCNC: 1.72 MG/DL (ref 0.6–1.3)
GFR SERPL CREATININE-BSD FRML MDRD: 44 ML/MIN/1.73SQ M
GLUCOSE SERPL-MCNC: 76 MG/DL (ref 65–140)
POTASSIUM SERPL-SCNC: 3.7 MMOL/L (ref 3.5–5.3)
PROT SERPL-MCNC: 8.4 G/DL (ref 6.4–8.2)
SODIUM SERPL-SCNC: 133 MMOL/L (ref 136–145)

## 2022-04-21 PROCEDURE — 80053 COMPREHEN METABOLIC PANEL: CPT | Performed by: INTERNAL MEDICINE

## 2022-06-22 ENCOUNTER — HOSPITAL ENCOUNTER (EMERGENCY)
Facility: HOSPITAL | Age: 52
Discharge: HOME | End: 2022-06-22
Attending: EMERGENCY MEDICINE
Payer: COMMERCIAL

## 2022-06-22 ENCOUNTER — APPOINTMENT (EMERGENCY)
Dept: RADIOLOGY | Facility: HOSPITAL | Age: 52
End: 2022-06-22
Payer: COMMERCIAL

## 2022-06-22 VITALS
RESPIRATION RATE: 18 BRPM | WEIGHT: 203.48 LBS | BODY MASS INDEX: 32.84 KG/M2 | OXYGEN SATURATION: 100 % | TEMPERATURE: 98.5 F | DIASTOLIC BLOOD PRESSURE: 68 MMHG | HEART RATE: 89 BPM | SYSTOLIC BLOOD PRESSURE: 117 MMHG

## 2022-06-22 DIAGNOSIS — M54.30 SCIATICA: Primary | ICD-10-CM

## 2022-06-22 PROCEDURE — 99284 EMERGENCY DEPT VISIT MOD MDM: CPT | Performed by: EMERGENCY MEDICINE

## 2022-06-22 PROCEDURE — 99284 EMERGENCY DEPT VISIT MOD MDM: CPT

## 2022-06-22 PROCEDURE — 72100 X-RAY EXAM L-S SPINE 2/3 VWS: CPT

## 2022-06-22 RX ORDER — DIAZEPAM 5 MG/1
5 TABLET ORAL EVERY 8 HOURS PRN
Qty: 15 TABLET | Refills: 0 | Status: SHIPPED | OUTPATIENT
Start: 2022-06-22 | End: 2022-09-27 | Stop reason: ALTCHOICE

## 2022-06-22 RX ORDER — PREDNISONE 20 MG/1
40 TABLET ORAL DAILY
Qty: 15 TABLET | Refills: 0 | Status: SHIPPED | OUTPATIENT
Start: 2022-06-22 | End: 2022-06-27

## 2022-06-22 RX ORDER — DIAZEPAM 5 MG/1
5 TABLET ORAL ONCE
Status: COMPLETED | OUTPATIENT
Start: 2022-06-22 | End: 2022-06-22

## 2022-06-22 RX ADMIN — DIAZEPAM 5 MG: 5 TABLET ORAL at 17:51

## 2022-06-22 NOTE — ED PROVIDER NOTES
History  Chief Complaint   Patient presents with    Leg Pain     Pt c/o right leg pain radiating down the leg     45year old male pt comes to the ED with cc of right side sciatic pain with history of the same  He states that it hurts more now than it has in the past   Will xray for degeneration or other changes and treat with antispasmodics and anti-inflammatories  History provided by:  Patient   used: No    Back Pain  Location:  Lumbar spine  Quality:  Aching  Radiates to:  R thigh and R knee  Pain severity:  Mild  Onset quality:  Gradual  Timing:  Constant  Progression:  Worsening  Chronicity:  New  Relieved by:  Nothing  Worsened by:  Nothing  Ineffective treatments:  None tried  Associated symptoms: no abdominal swelling, no numbness and no tingling        Prior to Admission Medications   Prescriptions Last Dose Informant Patient Reported?  Taking?   baclofen 20 mg tablet  Self Yes No   Sig: Take 20 mg by mouth 3 (three) times a day    betamethasone dipropionate (DIPROSONE) 0 05 % cream  Self Yes No   Sig: Apply 1 application topically 2 (two) times a day   betamethasone, augmented, (DIPROLENE) 0 05 % ointment  Self Yes No   Sig: Apply 1 application topically 2 (two) times a day   carvedilol (COREG) 12 5 mg tablet  Self Yes No   Sig: Take 12 5 mg by mouth 2 (two) times a day with meals   escitalopram (LEXAPRO) 10 mg tablet  Self Yes No   Sig: Take 10 mg by mouth daily   furosemide (LASIX) 40 mg tablet   No No   Sig: Take 0 5 tablets (20 mg total) by mouth daily   gabapentin (NEURONTIN) 300 mg capsule   No No   Sig: TAKE 1 CAPSULE BY MOUTH AT BEDTIME IN ADDITION TO 600MG   gabapentin (NEURONTIN) 600 MG tablet   Yes No   Sig: Take 600 mg by mouth 3 (three) times a day    hydrOXYzine HCL (ATARAX) 10 mg tablet   Yes No   Sig: Take 10 mg by mouth every 6 (six) hours as needed for itching   hydroxychloroquine (PLAQUENIL) 200 mg tablet   Yes No   Sig: Take 400 mg by mouth daily at bedtime lisinopril (ZESTRIL) 40 mg tablet   Yes No   Sig: Take 40 mg by mouth daily   metolazone (ZAROXOLYN) 2 5 mg tablet   Yes No   Sig: Take 2 5 mg by mouth daily   mycophenolate (CELLCEPT) 500 mg tablet   Yes No   Sig: Take 500 mg by mouth every 12 (twelve) hours    potassium chloride (K-DUR,KLOR-CON) 20 mEq tablet   Yes No   Sig: Take 20 mEq by mouth daily   predniSONE 5 mg tablet   Yes No   Sig: Take 5 mg by mouth daily   rivaroxaban (XARELTO) 20 mg tablet   Yes No   Sig: Take 20 mg by mouth daily with dinner   tamsulosin (FLOMAX) 0 4 mg  Self Yes No   Sig: Take 0 8 mg by mouth Medrol Dose Pack scheduling ONLY   traMADol (ULTRAM) 50 mg tablet   Yes No   Sig: Take 50 mg by mouth every 6 (six) hours as needed for moderate pain      Facility-Administered Medications: None       Past Medical History:   Diagnosis Date    Cervical mass     Coronary artery disease     Depression     DVT (deep venous thrombosis) (HCC)     Hypertension     Lupus ([facility] [address] )     Renal disorder        Past Surgical History:   Procedure Laterality Date    APPENDECTOMY      CERVICAL SPINE SURGERY      CHOLECYSTECTOMY      COLONOSCOPY N/A 8/23/2018    Procedure: COLONOSCOPY;  Surgeon: Bran Wills MD;  Location: [facility]; Service: Gastroenterology    ESOPHAGOGASTRODUODENOSCOPY N/A 8/22/2018    Procedure: ESOPHAGOGASTRODUODENOSCOPY (EGD); Surgeon: Bran Wills MD;  Location: [facility]; Service: Gastroenterology    NECK SURGERY      VASCULAR SURGERY         Family History   Problem Relation Age of Onset    Heart disease Mother      I have reviewed and agree with the history as documented      E-Cigarette/Vaping    E-Cigarette Use Never User      E-Cigarette/Vaping Substances    Nicotine No     THC No     CBD No     Flavoring No     Other No     Unknown No      Social History     Tobacco Use    Smoking status: Never Smoker    Smokeless tobacco: Never Used   Vaping Use    Vaping Use: Never used   Substance Use Topics    Alcohol use: Never    Drug use: Never       Review of Systems   Musculoskeletal: Positive for back pain  Neurological: Negative for tingling and numbness  All other systems reviewed and are negative  Physical Exam  Physical Exam  Vitals and nursing note reviewed  Constitutional:       General: He is not in acute distress  Appearance: He is well-developed  He is not diaphoretic  HENT:      Head: Normocephalic and atraumatic  Right Ear: External ear normal       Left Ear: External ear normal    Eyes:      General: No scleral icterus  Right eye: No discharge  Left eye: No discharge  Conjunctiva/sclera: Conjunctivae normal    Neck:      Thyroid: No thyromegaly  Vascular: No JVD  Trachea: No tracheal deviation  Cardiovascular:      Rate and Rhythm: Normal rate and regular rhythm  Pulmonary:      Effort: Pulmonary effort is normal  No respiratory distress  Breath sounds: Normal breath sounds  No stridor  No wheezing or rales  Abdominal:      General: Bowel sounds are normal  There is no distension  Palpations: Abdomen is soft  Tenderness: There is no abdominal tenderness  Musculoskeletal:         General: No tenderness or deformity  Normal range of motion  Cervical back: Normal range of motion and neck supple  Skin:     General: Skin is warm and dry  Neurological:      Mental Status: He is alert and oriented to person, place, and time  Cranial Nerves: No cranial nerve deficit        Coordination: Coordination normal    Psychiatric:         Behavior: Behavior normal          Vital Signs  ED Triage Vitals [06/22/22 1718]   Temperature Pulse Respirations Blood Pressure SpO2   98 5 °F (36 9 °C) 93 18 135/76 100 %      Temp Source Heart Rate Source Patient Position - Orthostatic VS BP Location FiO2 (%)   Oral Monitor Lying Right arm --      Pain Score       7           Vitals:    06/22/22 1830 06/22/22 2000 06/22/22 2030 06/22/22 2130   BP: 129/56 124/77 120/68 117/68   Pulse: 91 87 86 89   Patient Position - Orthostatic VS:  Lying Sitting Sitting         Visual Acuity      ED Medications  Medications   diazepam (VALIUM) tablet 5 mg (5 mg Oral Given 6/22/22 1751)       Diagnostic Studies  Results Reviewed     None                 XR spine lumbar 2 or 3 views injury   Final Result by Noe Gore MD (06/23 0686)      No acute osseous injury  Workstation performed: [id number]                    Procedures  Procedures         ED Course                                             MDM    Disposition  Final diagnoses:   Sciatica     Time reflects when diagnosis was documented in both MDM as applicable and the Disposition within this note     Time User Action Codes Description Comment    6/22/2022  8:39 PM Connielisa Phelps Add [M54 30] Sciatica       ED Disposition     ED Disposition   Discharge    Condition   Stable    Date/Time   Wed Jun 22, 2022  8:39 PM    Comment   Marty Current discharge to home/self care  Follow-up Information     Follow up With Specialties Details Why Contact Info    Abelino Scheuermann, DO Internal Medicine   [facility] [address]  N  [phone]            Discharge Medication List as of 6/22/2022  8:41 PM      START taking these medications    Details   diazepam (VALIUM) 5 mg tablet Take 1 tablet (5 mg total) by mouth every 8 (eight) hours as needed for anxiety for up to 5 days, Starting Wed 6/22/2022, Until Mon 6/27/2022 at 2359, Print      !! predniSONE 20 mg tablet Take 2 tablets (40 mg total) by mouth daily for 5 days, Starting Wed 6/22/2022, Until Mon 6/27/2022, Normal       !! - Potential duplicate medications found  Please discuss with provider        CONTINUE these medications which have NOT CHANGED    Details   baclofen 20 mg tablet Take 20 mg by mouth 3 (three) times a day , Historical Med      betamethasone dipropionate (DIPROSONE) 0 05 % cream Apply 1 application topically 2 (two) times a day, Historical Med      betamethasone, augmented, (DIPROLENE) 0 05 % ointment Apply 1 application topically 2 (two) times a day, Historical Med      carvedilol (COREG) 12 5 mg tablet Take 12 5 mg by mouth 2 (two) times a day with meals, Historical Med      escitalopram (LEXAPRO) 10 mg tablet Take 10 mg by mouth daily, Historical Med      furosemide (LASIX) 40 mg tablet Take 0 5 tablets (20 mg total) by mouth daily, Starting Tue 7/31/2018, No Print      gabapentin (NEURONTIN) 300 mg capsule TAKE 1 CAPSULE BY MOUTH AT BEDTIME IN ADDITION TO 600MG, Normal      gabapentin (NEURONTIN) 600 MG tablet Take 600 mg by mouth 3 (three) times a day , Historical Med      hydroxychloroquine (PLAQUENIL) 200 mg tablet Take 400 mg by mouth daily at bedtime, Historical Med      hydrOXYzine HCL (ATARAX) 10 mg tablet Take 10 mg by mouth every 6 (six) hours as needed for itching, Historical Med      lisinopril (ZESTRIL) 40 mg tablet Take 40 mg by mouth daily, Historical Med      metolazone (ZAROXOLYN) 2 5 mg tablet Take 2 5 mg by mouth daily, Historical Med      mycophenolate (CELLCEPT) 500 mg tablet Take 500 mg by mouth every 12 (twelve) hours , Historical Med      potassium chloride (K-DUR,KLOR-CON) 20 mEq tablet Take 20 mEq by mouth daily, Historical Med      !! predniSONE 5 mg tablet Take 5 mg by mouth daily, Historical Med      rivaroxaban (XARELTO) 20 mg tablet Take 20 mg by mouth daily with dinner, Historical Med      tamsulosin (FLOMAX) 0 4 mg Take 0 8 mg by mouth Medrol Dose Pack scheduling ONLY, Historical Med      traMADol (ULTRAM) 50 mg tablet Take 50 mg by mouth every 6 (six) hours as needed for moderate pain, Historical Med       !! - Potential duplicate medications found  Please discuss with provider  No discharge procedures on file      PDMP Review     None          ED Provider  Electronically Signed by           Yanira Huston DO  06/23/22 0473

## 2022-06-30 DIAGNOSIS — Z86.718 HISTORY OF DVT OF LOWER EXTREMITY: Primary | ICD-10-CM

## 2022-07-13 NOTE — CONSULTS
Consultation - Nephrology   Chasity Basilio 44 y o  male MRN: [id number]  Unit/Bed#: ED 10 Encounter: [id number]    Referring PHYSICIAN: Therese Hull    REASON FOR THE CONSULTATION:  Acute kidney injury    DATE OF CONSULTATION:  July 18, 2019    ADMISSION DIAGNOSIS: Chest pain     CHIEF COMPLAINT     Patient with just lupus and acute kidney injury came to the hospital with chest pain    HPI     Patient known to me with last admission due to acute kidney injury  Apparently he was doing well until yesterday evening when he starting chest pain and left side  It was associated with some dizziness and left-sided numbness also  No nausea no vomiting no diaphoresis pain was recurrent in nature so he decided to come to emergency room no was admitted in emergency room he was found to acute kidney injury  He also got CTA to rule out any PE    When I saw the patient today seems to be comfortable  Denies any chest pain no shortness of breath  He claims he never at that type of chest pain before  Denies any coronary artery disease  He does have fluctuating kidney function usually it is normal   He claims he is being seen by kidney doctor in [facility]    He does history of lupus for a long time in being treated for that    He does have history of hypertension according to him but it is quite normal at this point  He also history of DVT in the past    No headache no dizziness denies any urinary problem denies any overt hematuria  No abdominal discomfort nausea or vomiting    PAST MEDICAL HISTORY     Past Medical History:   Diagnosis Date    Cervical mass     Depression     [facility])     Hypertension     Lupus ([address] )     Renal disorder        PAST SURGICAL HISTORY     Past Surgical History:   Procedure Laterality Date    APPENDECTOMY      CERVICAL SPINE SURGERY      COLONOSCOPY N/A 8/23/2018    Procedure: COLONOSCOPY;  Surgeon: Taz Voss MD;  Location: [facility];   Service: Gastroenterology    ESOPHAGOGASTRODUODENOSCOPY N/A 8/22/2018    Procedure: ESOPHAGOGASTRODUODENOSCOPY (EGD); Surgeon: Sirisha Nichols MD;  Location: [facility]; Service: Gastroenterology    NECK SURGERY         ALLERGIES     No Known Allergies    SOCIAL HISTORY     Social History     Substance and Sexual Activity   Alcohol Use No     Social History     Substance and Sexual Activity   Drug Use No     Social History     Tobacco Use   Smoking Status Never Smoker   Smokeless Tobacco Never Used       FAMILY HISTORY     History reviewed  No pertinent family history      CURRENT MEDICATIONS       Current Facility-Administered Medications:     baclofen tablet 20 mg, 20 mg, Oral, BID, Carmen Cary MD, 20 mg at 07/18/19 0926    carvedilol (COREG) tablet 12 5 mg, 12 5 mg, Oral, BID With Meals, Carmen Cary MD    escitalopram (LEXAPRO) tablet 10 mg, 10 mg, Oral, Daily, Carmen Cary MD, 10 mg at 07/18/19 6870    gabapentin (NEURONTIN) capsule 600 mg, 600 mg, Oral, Daily, Carmen Cary MD, 600 mg at 07/18/19 0926    hydroxychloroquine (PLAQUENIL) tablet 200 mg, 200 mg, Oral, BID, Carmen Cary MD, 200 mg at 07/18/19 0927    lisinopril (ZESTRIL) tablet 40 mg, 40 mg, Oral, Daily, Carmen Cary MD    mycophenolate (CELLCEPT) capsule 500 mg, 500 mg, Oral, Q12H [address], Carmen Cary MD, 500 mg at 07/18/19 0926    ondansetron (ZOFRAN) injection 4 mg, 4 mg, Intravenous, Q6H PRN, Carmen Cary MD    predniSONE tablet 5 mg, 5 mg, Oral, Daily, Carmen Cary MD, 5 mg at 07/18/19 2569    rivaroxaban (XARELTO) tablet 20 mg, 20 mg, Oral, Daily With Susan Pollack MD, 20 mg at 07/18/19 0116    sodium chloride 0 9 % infusion, 100 mL/hr, Intravenous, Continuous, Barbara Navarrete MD, Last Rate: 100 mL/hr at 07/18/19 1133, 100 mL/hr at 07/18/19 1133    tamsulosin (FLOMAX) capsule 0 8 mg, 0 8 mg, Oral, Once HS, Carmen Cary MD, 0 8 mg at 07/18/19 0116    traMADol (ULTRAM) tablet 50 mg, 50 mg, Oral, Q6H PRN, Becky Reid MD, 50 mg at 07/18/19 0120    Current Outpatient Medications:     carvedilol (COREG) 12 5 mg tablet, Take 12 5 mg by mouth 2 (two) times a day with meals, Disp: , Rfl:     gabapentin (NEURONTIN) 600 MG tablet, Take 600 mg by mouth daily, Disp: , Rfl:     lisinopril (ZESTRIL) 40 mg tablet, Take 40 mg by mouth daily, Disp: , Rfl:     mycophenolate (CELLCEPT) 500 mg tablet, Take 1,000 mg by mouth every 12 (twelve) hours  , Disp: , Rfl:     rivaroxaban (XARELTO) 20 mg tablet, Take 20 mg by mouth daily with dinner, Disp: , Rfl:     tamsulosin (FLOMAX) 0 4 mg, Take 0 8 mg by mouth Medrol Dose Pack scheduling ONLY, Disp: , Rfl:     traMADol (ULTRAM) 50 mg tablet, Take 50 mg by mouth every 6 (six) hours as needed for moderate pain, Disp: , Rfl:     albuterol (PROVENTIL HFA,VENTOLIN HFA) 90 mcg/act inhaler, Inhale 2 puffs every 6 (six) hours as needed for wheezing or shortness of breath, Disp: 1 Inhaler, Rfl: 0    baclofen 20 mg tablet, Take 20 mg by mouth 2 (two) times a day, Disp: , Rfl:     betamethasone dipropionate (DIPROSONE) 0 05 % cream, Apply 1 application topically 2 (two) times a day, Disp: , Rfl:     betamethasone, augmented, (DIPROLENE) 0 05 % ointment, Apply 1 application topically 2 (two) times a day, Disp: , Rfl:     escitalopram (LEXAPRO) 10 mg tablet, Take 10 mg by mouth daily, Disp: , Rfl:     furosemide (LASIX) 40 mg tablet, Take 0 5 tablets (20 mg total) by mouth daily, Disp: , Rfl: 0    hydroxychloroquine (PLAQUENIL) 200 mg tablet, Take 200 mg by mouth 2 (two) times a day, Disp: , Rfl:     pantoprazole (PROTONIX) 40 mg tablet, Take 1 tablet (40 mg total) by mouth 2 (two) times a day before meals, Disp: 30 tablet, Rfl: 3    predniSONE 5 mg tablet, Take 5 mg by mouth daily, Disp: , Rfl:     sildenafil (REVATIO) 20 mg tablet, Take 20 mg by mouth 3 (three) times a day, Disp: , Rfl:     REVIEW OF SYSTEMS     Review of Systems Constitutional: Negative for activity change, appetite change and fatigue  HENT: Negative for congestion, ear discharge, postnasal drip and rhinorrhea  Eyes: Negative for photophobia, pain and visual disturbance  Respiratory: Positive for chest tightness and shortness of breath  Negative for apnea, cough and choking  Cardiovascular: Negative for chest pain, palpitations and leg swelling  Gastrointestinal: Negative for abdominal distention, abdominal pain, blood in stool, diarrhea and nausea  Endocrine: Negative for heat intolerance, polyphagia and polyuria  Genitourinary: Negative for decreased urine volume, difficulty urinating, flank pain and urgency  Musculoskeletal: Positive for arthralgias  Negative for neck pain and neck stiffness  Skin: Negative for color change and wound  Allergic/Immunologic: Negative for food allergies and immunocompromised state  Neurological: Positive for dizziness and numbness  Negative for seizures, facial asymmetry and weakness  Hematological: Negative for adenopathy  Does not bruise/bleed easily  Psychiatric/Behavioral: Negative for self-injury and suicidal ideas  LAB RESULTS        Results from last 7 days   Lab Units 07/18/19  0410 07/17/19 2010   WBC Thousand/uL  --  3 35*   HEMOGLOBIN g/dL  --  13 1   HEMATOCRIT %  --  38 5   PLATELETS Thousands/uL  --  251   POTASSIUM mmol/L 4 5 3 6   CHLORIDE mmol/L 107 103   CO2 mmol/L 28 27   BUN mg/dL 34* 36*   CREATININE mg/dL 1 59* 1 54*   EGFR ml/min/1 73sq m 50 60   CALCIUM mg/dL 8 5 8 8       I have personally reviewed the old medical records and patient's previously known baseline creatinine level is ~ 1 0    RADIOLOGY RESULTS     No results found for this or any previous visit  Results for orders placed during the hospital encounter of 07/17/19   XR chest 2 views    Narrative CHEST     INDICATION:   Chest Pain      COMPARISON:  Chest    5/8/2019    EXAM PERFORMED/VIEWS:  XR CHEST PA & LATERAL      FINDINGS:    Cardiomediastinal silhouette appears unremarkable  The lungs are clear  No pneumothorax or pleural effusion  Osseous structures appear within normal limits for patient age  There are postsurgical changes of the lower cervical spine  Impression No acute cardiopulmonary disease  Workstation performed: [id number]       No results found for this or any previous visit  No results found for this or any previous visit  No results found for this or any previous visit  No results found for this or any previous visit  OBJECTIVE     Current Weight: Weight - Scale: 88 5 kg (195 lb 1 7 oz)  Vitals:    07/18/19 1100   BP: 103/65   Pulse: 69   Resp: 16   Temp:    SpO2: 96%       Intake/Output Summary (Last 24 hours) at 7/18/2019 1157  Last data filed at 7/18/2019 0416  Gross per 24 hour   Intake    Output 1000 ml   Net -1000 ml       PHYSICAL EXAMINATION     Physical Exam   Constitutional: He is oriented to person, place, and time  He appears well-developed and well-nourished  No distress  HENT:   Head: Normocephalic and atraumatic  Nose: Nose normal    Mouth/Throat: Oropharynx is clear and moist  No oropharyngeal exudate  Eyes: Pupils are equal, round, and reactive to light  Conjunctivae and EOM are normal  No scleral icterus  Neck: Normal range of motion  Neck supple  No JVD present  Cardiovascular: Normal rate, regular rhythm, normal heart sounds and intact distal pulses  No murmur heard  Pulmonary/Chest: Effort normal and breath sounds normal  No respiratory distress  He has no wheezes  Abdominal: Soft  Bowel sounds are normal  He exhibits no distension and no mass  There is no tenderness  Musculoskeletal: Normal range of motion  He exhibits no edema  Lymphadenopathy:     He has no cervical adenopathy  Neurological: He is alert and oriented to person, place, and time  Skin: Skin is warm  No rash noted  Psychiatric: He has a normal mood and affect  His behavior is normal         PLAN / RECOMMENDATIONS      Acute kidney injury:  Possible volume depletion though patient clinically does appear euvolemic  Will give IV hydration  Will check urine electrolytes to assess volume status  In past patient does have fluctuating kidney function also  Patient also got CT on admission that will make kidney function worse in view of acute kidney injury  I will hydrate the patient as part of the protection    Chest pain:  Workup in progress  CT a negative cardiology to see the patient for any further testing    Hypertension:  Blood pressure is very well controlled and  I will hold any nephrotoxic medicine at this point    Lupus  Patient seems to be asymptomatic without any joint pain looks like it does have skin manifestations only  He is on CellCept which I will continue and prednisone which I will continue also    I discussed everything at length with the patient  Will do some basic workup for acute kidney injury including kidney ultrasound  Will advised avoiding any nephrotoxic medicine  Will continue to monitor patient with you    Thank you for the consultation to participate in patient's care  I have personally discussed my plan with the referring physician  Keely Chavez MD  Nephrology  7/18/2019        Portions of the record may have been created with voice recognition software  Occasional wrong word or "sound a like" substitutions may have occurred due to the inherent limitations of voice recognition software  Read the chart carefully and recognize, using context, where substitutions have occurred  no

## 2022-08-15 ENCOUNTER — HOSPITAL ENCOUNTER (OUTPATIENT)
Dept: VASCULAR ULTRASOUND | Facility: HOSPITAL | Age: 52
Discharge: HOME | End: 2022-08-15
Payer: COMMERCIAL

## 2022-08-15 DIAGNOSIS — Z86.718 HISTORY OF DVT OF LOWER EXTREMITY: ICD-10-CM

## 2022-08-15 PROCEDURE — 93970 EXTREMITY STUDY: CPT | Performed by: SURGERY

## 2022-08-15 PROCEDURE — 93970 EXTREMITY STUDY: CPT

## 2022-08-30 ENCOUNTER — APPOINTMENT (OUTPATIENT)
Dept: RADIOLOGY | Facility: CLINIC | Age: 52
End: 2022-08-30
Payer: COMMERCIAL

## 2022-08-30 ENCOUNTER — OFFICE VISIT (OUTPATIENT)
Dept: URGENT CARE | Facility: CLINIC | Age: 52
End: 2022-08-30
Payer: COMMERCIAL

## 2022-08-30 VITALS
RESPIRATION RATE: 18 BRPM | SYSTOLIC BLOOD PRESSURE: 102 MMHG | TEMPERATURE: 98.9 F | HEART RATE: 67 BPM | DIASTOLIC BLOOD PRESSURE: 66 MMHG | OXYGEN SATURATION: 98 %

## 2022-08-30 DIAGNOSIS — S66.911A MUSCLE STRAIN OF RIGHT WRIST, INITIAL ENCOUNTER: ICD-10-CM
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
## 2022-08-30 DIAGNOSIS — S43.102A AC SEPARATION, LEFT, INITIAL ENCOUNTER: ICD-10-CM
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
## 2022-08-30 DIAGNOSIS — R42 DIZZINESS AND GIDDINESS: ICD-10-CM

## 2022-08-30 PROCEDURE — 73110 X-RAY EXAM OF WRIST: CPT

## 2022-08-30 PROCEDURE — 73000 X-RAY EXAM OF COLLAR BONE: CPT

## 2022-08-30 PROCEDURE — 99203 OFFICE O/P NEW LOW 30 MIN: CPT | Performed by: EMERGENCY MEDICINE

## 2022-08-30 PROCEDURE — G0463 HOSPITAL OUTPT CLINIC VISIT: HCPCS | Performed by: EMERGENCY MEDICINE

## 2022-08-30 PROCEDURE — 73090 X-RAY EXAM OF FOREARM: CPT

## 2022-08-30 NOTE — PATIENT INSTRUCTIONS
Wear Sling on L arm for 2-3 days until seen by Orthopedic Dr Tellez Starring wrist brace until seen by Orthopedic Dr   F/u with Orthopedic Dr  In 2-3 days  Take 2 Tylenol 650mg every 4 hours for pain  Ice/elev    Pt  Instructed to go to the ER for work-up of dizziness/head pain - pt   And wife understood

## 2022-08-30 NOTE — PROGRESS NOTES
[facility]        NAME: Little Guadalupe is a 45 y o  male  : 1970    MRN: [id number]  DATE: 2022  TIME: 11:37 AM    Assessment and Plan   Fall, initial encounter [W19  XXXA]  1  Fall, initial encounter  XR wrist 3+ vw right    XR forearm 2 vw right    XR clavicle left   2  Muscle strain of right wrist, initial encounter     3  AC separation, left, initial encounter     4  Dizziness and giddiness       X-Rays reviewed: I do not appreciate any fracture; clinically it appears to be an [facility] separation vs clavicular contusion    Patient Instructions   Patient Instructions   1  Wear Sling on L arm for 2-3 days until seen by Orthopedic Dr   2  Wear wrist brace until seen by Orthopedic Dr   3  F/u with Orthopedic Dr  In 2-3 days  4  Take 2 Tylenol 650mg every 4 hours for pain  5  Ice/elev    Pt  Instructed to go to the ER for work-up of dizziness/head pain - pt  And wife understood        Follow up with PCP in 3-5 days  Proceed to  ER if symptoms worsen  Chief Complaint     Chief Complaint   Patient presents with    left shoulder pain    right wrist pain     Patient states he fell at home in his bathroom one week ago, patient denies dizziness, just states he tripped  Patient walks with a walker at baseline  He has poor balance per wife  Patient states he fell on his right side  Patient denies hitting head  Patient is on eliquis  His left shoulder hit the toilet  Today is the first day he is seeking care post fall  History of Present Illness       46 yo male with cc pain in his L shoulder and R wrist   Pt  Lane Garg one week ago and hit his shoulder/collarbone on the toilet  (-) LOC  Pt  Has Lupus, and uses a walker to ambulate  Denies hitting his head  Pt  Is c/o of dizziness  Review of Systems   Review of Systems   Constitutional: Negative for chills and fever  HENT: Negative for congestion and rhinorrhea  Eyes: Negative for discharge and visual disturbance     Respiratory: Negative for shortness of breath and wheezing  Cardiovascular: Negative for chest pain and palpitations  Gastrointestinal: Negative for abdominal pain and vomiting  Endocrine: Negative for polydipsia and polyuria  Genitourinary: Negative for dysuria and hematuria  Musculoskeletal: Positive for arthralgias, back pain, gait problem, joint swelling and myalgias  Negative for neck stiffness  Skin: Negative for rash and wound  Neurological: Positive for dizziness  Negative for headaches  Psychiatric/Behavioral: Negative for confusion and suicidal ideas           Current Medications       Current Outpatient Medications:     apixaban (ELIQUIS) 5 mg, Take 5 mg by mouth 2 (two) times a day, Disp: , Rfl:     baclofen 20 mg tablet, Take 20 mg by mouth 3 (three) times a day , Disp: , Rfl:     betamethasone dipropionate (DIPROSONE) 0 05 % cream, Apply 1 application topically 2 (two) times a day, Disp: , Rfl:     betamethasone, augmented, (DIPROLENE) 0 05 % ointment, Apply 1 application topically 2 (two) times a day, Disp: , Rfl:     carvedilol (COREG) 12 5 mg tablet, Take 12 5 mg by mouth 2 (two) times a day with meals, Disp: , Rfl:     escitalopram (LEXAPRO) 10 mg tablet, Take 10 mg by mouth daily, Disp: , Rfl:     furosemide (LASIX) 40 mg tablet, Take 0 5 tablets (20 mg total) by mouth daily, Disp: , Rfl: 0    gabapentin (NEURONTIN) 300 mg capsule, TAKE 1 CAPSULE BY MOUTH AT BEDTIME IN ADDITION TO 600MG, Disp: 30 capsule, Rfl: 5    gabapentin (NEURONTIN) 600 MG tablet, Take 600 mg by mouth 3 (three) times a day , Disp: , Rfl:     hydroxychloroquine (PLAQUENIL) 200 mg tablet, Take 400 mg by mouth daily at bedtime, Disp: , Rfl:     hydrOXYzine HCL (ATARAX) 10 mg tablet, Take 10 mg by mouth every 6 (six) hours as needed for itching, Disp: , Rfl:     lisinopril (ZESTRIL) 40 mg tablet, Take 40 mg by mouth daily, Disp: , Rfl:     metolazone (ZAROXOLYN) 2 5 mg tablet, Take 2 5 mg by mouth daily, Disp: , Rfl:     mycophenolate (CELLCEPT) 500 mg tablet, Take 500 mg by mouth every 12 (twelve) hours , Disp: , Rfl:     potassium chloride (K-DUR,KLOR-CON) 20 mEq tablet, Take 20 mEq by mouth daily, Disp: , Rfl:     predniSONE 5 mg tablet, Take 5 mg by mouth daily, Disp: , Rfl:     tamsulosin (FLOMAX) 0 4 mg, Take 0 8 mg by mouth Medrol Dose Pack scheduling ONLY, Disp: , Rfl:     traMADol (ULTRAM) 50 mg tablet, Take 50 mg by mouth every 6 (six) hours as needed for moderate pain, Disp: , Rfl:     diazepam (VALIUM) 5 mg tablet, Take 1 tablet (5 mg total) by mouth every 8 (eight) hours as needed for anxiety for up to 5 days, Disp: 15 tablet, Rfl: 0    rivaroxaban (XARELTO) 20 mg tablet, Take 20 mg by mouth daily with dinner (Patient not taking: Reported on 8/30/2022), Disp: , Rfl:     Current Allergies     Allergies as of 08/30/2022 - Reviewed 08/30/2022   Allergen Reaction Noted    Sulfa antibiotics Rash 10/27/2020            The following portions of the patient's history were reviewed and updated as appropriate: allergies, current medications, past family history, past medical history, past social history, past surgical history and problem list      Past Medical History:   Diagnosis Date    Cervical mass     Coronary artery disease     Depression     DVT (deep venous thrombosis) (HCC)     Hypertension     Lupus ([facility] [address] )     Renal disorder        Past Surgical History:   Procedure Laterality Date    APPENDECTOMY      CERVICAL SPINE SURGERY      CHOLECYSTECTOMY      COLONOSCOPY N/A 8/23/2018    Procedure: COLONOSCOPY;  Surgeon: Dale Ernandez MD;  Location: [facility]; Service: Gastroenterology    ESOPHAGOGASTRODUODENOSCOPY N/A 8/22/2018    Procedure: ESOPHAGOGASTRODUODENOSCOPY (EGD); Surgeon: Dale Ernandez MD;  Location: [facility];   Service: Gastroenterology    NECK SURGERY      VASCULAR SURGERY         Family History   Problem Relation Age of Onset    Heart disease Mother Medications have been verified  Objective   /66   Pulse 67   Temp 98 9 °F (37 2 °C)   Resp 18   SpO2 98%        Physical Exam     Physical Exam  Vitals and nursing note reviewed  Constitutional:       Appearance: Normal appearance  Comments: 46 yo male sitting in the wheelchair c/o pain in his L collarbone and R wrist    HENT:      Head: Normocephalic and atraumatic  Nose: Nose normal    Eyes:      Extraocular Movements: Extraocular movements intact  Conjunctiva/sclera: Conjunctivae normal    Cardiovascular:      Rate and Rhythm: Normal rate  Pulmonary:      Effort: Pulmonary effort is normal    Musculoskeletal:      Cervical back: Neck supple  Comments: L clavicle:  + deformity to distal clavicle and A/C joint with tenderness to palp; no pain on palp of shoulder; unable to lift his arm above his head    R wrist:  + tenderness to volar aspect of medial wrist; + tenderness to dorsum of distal radius; no pain on supination or pronation; + pain on flexion / extension of wrist   Skin:     General: Skin is warm and dry  Neurological:      Mental Status: He is alert and oriented to person, place, and time  Motor: Weakness present  Coordination: Coordination abnormal       Gait: Gait abnormal       Comments: + tremors and weakness of b/l LE's ; hx of Lupus and ambulates with a walker  Pt  Is able to lift both legs, but with difficulty and tremors      Psychiatric:         Mood and Affect: Mood normal

## 2022-09-01 ENCOUNTER — HOSPITAL ENCOUNTER (EMERGENCY)
Facility: HOSPITAL | Age: 52
Discharge: HOME | End: 2022-09-01
Attending: EMERGENCY MEDICINE
Payer: COMMERCIAL

## 2022-09-01 ENCOUNTER — EVALUATION (OUTPATIENT)
Dept: PHYSICAL THERAPY | Facility: CLINIC | Age: 52
End: 2022-09-01
Payer: COMMERCIAL

## 2022-09-01 VITALS
HEART RATE: 91 BPM | RESPIRATION RATE: 13 BRPM | TEMPERATURE: 98.8 F | SYSTOLIC BLOOD PRESSURE: 130 MMHG | DIASTOLIC BLOOD PRESSURE: 75 MMHG | OXYGEN SATURATION: 96 %

## 2022-09-01 DIAGNOSIS — T88.7XXA MEDICATION SIDE EFFECT: Primary | ICD-10-CM

## 2022-09-01 DIAGNOSIS — M51.36 DDD (DEGENERATIVE DISC DISEASE), LUMBAR: Primary | ICD-10-CM
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
## 2022-09-01 PROCEDURE — 99284 EMERGENCY DEPT VISIT MOD MDM: CPT | Performed by: EMERGENCY MEDICINE

## 2022-09-01 PROCEDURE — 97162 PT EVAL MOD COMPLEX 30 MIN: CPT | Performed by: PHYSICAL THERAPIST

## 2022-09-01 PROCEDURE — 97110 THERAPEUTIC EXERCISES: CPT | Performed by: PHYSICAL THERAPIST

## 2022-09-01 PROCEDURE — 99283 EMERGENCY DEPT VISIT LOW MDM: CPT

## 2022-09-01 PROCEDURE — 93005 ELECTROCARDIOGRAM TRACING: CPT

## 2022-09-01 PROCEDURE — 97140 MANUAL THERAPY 1/> REGIONS: CPT | Performed by: PHYSICAL THERAPIST

## 2022-09-01 NOTE — LETTER
2022    Clarice Tellez DO  [address] [facility] [address] [facility] [address]    Patient: Miladis Bains   YOB: 1970   Date of Visit: 2022     Encounter Diagnosis     ICD-10-CM    1  DDD (degenerative disc disease), lumbar  M51 36    2  Fall, initial encounter  [facility] [employer]  Chuckes Yeison        Dear Dr Ml Barraza: Thank you for your recent referral of Miladis Bains  Please review the attached evaluation summary from [address] [place] [address] recent visit  Please verify that you agree with the plan of care by signing the attached order  If you have any questions or concerns, please do not hesitate to call  I sincerely appreciate the opportunity to share in the care of one of your patients and hope to have another opportunity to work with you in the near future  Sincerely,    Alessandro Lucas, PT      Referring Provider:      I certify that I have read the below Plan of Care and certify the need for these services furnished under this plan of treatment while under my care  Clarice Tellez DO  [address] [place] [address] [facility] [address]  Via Fax: [phone]          PT Evaluation     Today's date: 2022  Patient name: Miladis Bains  : 1970  MRN: [id number]  Referring provider: Roxie Germain DO  Dx:   Encounter Diagnosis     ICD-10-CM    1  DDD (degenerative disc disease), lumbar  M51 36    2  Fall, initial encounter  [address] [employer] [address]  XXXA        Start Time: 46  Stop Time: 930  Total time in clinic (min): 45 minutes    Assessment  Assessment details: Miladis Bains is a 45 y o  male who presents with pain, decreased strength, decreased ROM, ambulatory dysfunction, postural dysfunction and balance dysfunction  Due to these impairments, Patient has difficulty performing a/iadls  Patient's clinical presentation is consistent with their referring diagnosis of low back pain   Patient would benefit from skilled physical therapy to address their aforementioned impairments, improve their level of function and to improve their overall quality of life  Impairments: abnormal coordination, abnormal gait, abnormal muscle tone, abnormal or restricted ROM, abnormal movement, activity intolerance, impaired balance, impaired physical strength, lacks appropriate home exercise program, pain with function, safety issue, weight-bearing intolerance, poor posture  and poor body mechanics  Understanding of Dx/Px/POC: good   Prognosis: fair    Goals  ST-3 WEEKS  1  Decrease pain by 2 points on VAS at its worst   2   Increase ROM by > 5 deg in all deficients planes  3   Increase CORE/LE by 1/2 MMT grade in all deficient planes  LT-6 WEEKS  1  Patient to be independent with a/iadls especially walking and progress to least restrictive device  2  Increase functional activities for leisure and home activities to previous LOF    3  Independent with HEP and/or fitness program     Plan  Patient would benefit from: skilled physical therapy  Planned modality interventions: cryotherapy, electrical stimulation/Russian stimulation, thermotherapy: hydrocollator packs and unattended electrical stimulation  Planned therapy interventions: activity modification, behavior modification, body mechanics training, aquatic therapy, flexibility, functional ROM exercises, home exercise program, IADL retraining, joint mobilization, manual therapy, neuromuscular re-education, patient education, postural training, strengthening, stretching, therapeutic activities and therapeutic exercise  Frequency: 2x week (2-3x week)  Duration in weeks: 8  Plan of Care beginning date: 2022  Plan of Care expiration date: 2022  Treatment plan discussed with: patient        Subjective Evaluation    History of Present Illness  Date of onset: 3/4/2017  Mechanism of injury: 5 year history of low back pain secondary to unknown, complains of right low back pain with right LE radiculopathy and paraesthesia , ambulates with RW and uses stockings and has a neurologic gait          Not a recurrent problem   Quality of life: fair    Pain  Current pain ratin  At best pain rating: 3  At worst pain ratin  Quality: burning and radiating  Relieving factors: rest and medications  Aggravating factors: walking  Progression: worsening    Social Support  Steps to enter house: yes  Stairs in house: yes   Lives in: multiple-level home  Lives with: spouse      Diagnostic Tests  X-ray: abnormal  MRI studies: abnormal  Treatments  Previous treatment: injection treatment and home therapy  Patient Goals  Patient goals for therapy: decreased pain, increased motion, increased strength and independence with ADLs/IADLs          Objective     Static Posture     Thoracic Spine  Hyperkyphosis  Palpation     Right   Hypertonic in the erector spinae, lumbar paraspinals and quadratus lumborum  Tenderness of the erector spinae, lumbar paraspinals and quadratus lumborum  Tenderness     Right Hip   Tenderness in the PSIS  Active Range of Motion   Cervical/Thoracic Spine       Thoracic    Extension:  Restriction level: moderate    Lumbar   Flexion: 50 degrees   Extension: 10 degrees  with pain  Left lateral flexion: 15 degrees       Right lateral flexion: 10 degrees     Strength/Myotome Testing     Left Hip   Planes of Motion   Flexion: 3+  Extension: 3+  Abduction: 4-  Adduction: 4-    Right Hip   Planes of Motion   Flexion: 3+  Extension: 3+  Abduction: 4-  Adduction: 4-    Left Knee   Flexion: 4-  Extension: 3+    Right Knee   Flexion: 4-  Extension: 3+    Left Ankle/Foot   Dorsiflexion: 3  Plantar flexion: 4-    Right Ankle/Foot   Dorsiflexion: 3+  Plantar flexion: 4-    Additional Strength Details  CORE is 3/5    Tests     Lumbar     Left   Negative passive SLR and slump test      Right   Positive passive SLR  Negative slump test      Ambulation     Observational Gait   Gait: antalgic, asymmetric and crouched   Decreased walking speed and stride length     Left foot contact pattern: toe to heel  Base of support: increased    Additional Observational Gait Details  Uses RW with tremors with left drop foot  Neuro Exam:     Transfers Sit to stand: minimum assist              Precautions: HTN, fall risk, DVT, cervical SX, Lupus    Manuals 9/1                         MT  ham, piriformis stretch, calf 10                                      Neuro Re-Ed                                                                                                        Ther Ex             Nu step 5 min            Core ball rolls 20x            TB hip abd Blue TB  30x            [place] squeeze 20x            Ankle pumps 10x            Sit to stands nv                                      Ther Activity                                       Gait Training                                       Modalities

## 2022-09-01 NOTE — PROGRESS NOTES
PT Evaluation     Today's date: 2022  Patient name: Carlos Emerson  : 1970  MRN: [id number]  Referring provider: Sixto Tse DO  Dx:   Encounter Diagnosis     ICD-10-CM    1  DDD (degenerative disc disease), lumbar  M51 36    2  Fall, initial encounter  [address]  XXXA        Start Time: 46  Stop Time: 0930  Total time in clinic (min): 45 minutes    Assessment  Assessment details: Carlos Emerson is a 45 y o  male who presents with pain, decreased strength, decreased ROM, ambulatory dysfunction, postural dysfunction and balance dysfunction  Due to these impairments, Patient has difficulty performing a/iadls  Patient's clinical presentation is consistent with their referring diagnosis of low back pain  Patient would benefit from skilled physical therapy to address their aforementioned impairments, improve their level of function and to improve their overall quality of life  Impairments: abnormal coordination, abnormal gait, abnormal muscle tone, abnormal or restricted ROM, abnormal movement, activity intolerance, impaired balance, impaired physical strength, lacks appropriate home exercise program, pain with function, safety issue, weight-bearing intolerance, poor posture  and poor body mechanics  Understanding of Dx/Px/POC: good   Prognosis: fair    Goals  ST-3 WEEKS  1  Decrease pain by 2 points on VAS at its worst   2   Increase ROM by > 5 deg in all deficients planes  3   Increase CORE/LE by 1/2 MMT grade in all deficient planes  LT-6 WEEKS  1  Patient to be independent with a/iadls especially walking and progress to least restrictive device  2  Increase functional activities for leisure and home activities to previous LOF    3  Independent with HEP and/or fitness program     Plan  Patient would benefit from: skilled physical therapy  Planned modality interventions: cryotherapy, electrical stimulation/Russian stimulation, thermotherapy: hydrocollator packs and unattended electrical stimulation  Planned therapy interventions: activity modification, behavior modification, body mechanics training, aquatic therapy, flexibility, functional ROM exercises, home exercise program, IADL retraining, joint mobilization, manual therapy, neuromuscular re-education, patient education, postural training, strengthening, stretching, therapeutic activities and therapeutic exercise  Frequency: 2x week (2-3x week)  Duration in weeks: 8  Plan of Care beginning date: 2022  Plan of Care expiration date: 2022  Treatment plan discussed with: patient        Subjective Evaluation    History of Present Illness  Date of onset: 3/4/2017  Mechanism of injury: 5 year history of low back pain secondary to unknown, complains of right low back pain with right LE radiculopathy and paraesthesia , ambulates with RW and uses stockings and has a neurologic gait          Not a recurrent problem   Quality of life: fair    Pain  Current pain ratin  At best pain rating: 3  At worst pain ratin  Quality: burning and radiating  Relieving factors: rest and medications  Aggravating factors: walking  Progression: worsening    Social Support  Steps to enter house: yes  Stairs in house: yes   Lives in: multiple-level home  Lives with: spouse      Diagnostic Tests  X-ray: abnormal  MRI studies: abnormal  Treatments  Previous treatment: injection treatment and home therapy  Patient Goals  Patient goals for therapy: decreased pain, increased motion, increased strength and independence with ADLs/IADLs          Objective     Static Posture     Thoracic Spine  Hyperkyphosis  Palpation     Right   Hypertonic in the erector spinae, lumbar paraspinals and quadratus lumborum  Tenderness of the erector spinae, lumbar paraspinals and quadratus lumborum  Tenderness     Right Hip   Tenderness in the PSIS       Active Range of Motion   Cervical/Thoracic Spine       Thoracic    Extension:  Restriction level: moderate    Lumbar Flexion: 50 degrees   Extension: 10 degrees  with pain  Left lateral flexion: 15 degrees       Right lateral flexion: 10 degrees     Strength/Myotome Testing     Left Hip   Planes of Motion   Flexion: 3+  Extension: 3+  Abduction: 4-  Adduction: 4-    Right Hip   Planes of Motion   Flexion: 3+  Extension: 3+  Abduction: 4-  Adduction: 4-    Left Knee   Flexion: 4-  Extension: 3+    Right Knee   Flexion: 4-  Extension: 3+    Left Ankle/Foot   Dorsiflexion: 3  Plantar flexion: 4-    Right Ankle/Foot   Dorsiflexion: 3+  Plantar flexion: 4-    Additional Strength Details  CORE is 3/5    Tests     Lumbar     Left   Negative passive SLR and slump test      Right   Positive passive SLR  Negative slump test      Ambulation     Observational Gait   Gait: antalgic, asymmetric and crouched   Decreased walking speed and stride length     Left foot contact pattern: toe to heel  Base of support: increased    Additional Observational Gait Details  Uses RW with tremors with left drop foot  Neuro Exam:     Transfers Sit to stand: minimum assist              Precautions: HTN, fall risk, DVT, cervical SX, Lupus    Manuals 9/1                         MT  ham, piriformis stretch, calf 10                                      Neuro Re-Ed                                                                                                        Ther Ex             Nu step 5 min            Core ball rolls 20x            TB hip abd Blue TB  30x            [place] squeeze 20x            Ankle pumps 10x            Sit to stands nv                                      Ther Activity                                       Gait Training                                       Modalities

## 2022-09-01 NOTE — PROGRESS NOTES
HEMATOLOGY CLINIC NOTE    Primary Care Provider: Chris Julio DO  Referring Provider: Keira Ingram  MRN: [id number]  : 1970    Assessment / Plan:   1  History of DVT of lower extremity  This is a 53-year-old male with a history of multiple DVT, superficial thrombophlebitis  He is a poor historian  Him and his girlfriend provided some of history today along with prior hematology notes from [facility]  Patient has not had a thrombosis since about 2 years ago  He has had multiple venous Doppler since showing negative findings  Patient did have thrombosis on top of Xarelto a few years ago which patient was then switched to Eliquis 5 mg twice daily  He has tolerated this well without side effect  He did not have a thrombosis panel that I can view in the chart  My recommendation is patient's stay on lifelong anticoagulation as long as he can tolerate Eliquis  Patient will hold Eliquis for 3 days prior to obtaining thrombosis panel and then go back on Eliquis once this is drawn  We will have patient return to our clinic about 1 month to review these results  Patient states he would like to follow our office for hematology and discontinue follow-ups at [facility]  - Thrombosis Panel; Future    2  Poor historian  As above  3  Systemic lupus erythematosus, unspecified SLE type, unspecified organ involvement status ([facility] [address] )  4  Lupus nephritis [facility])  Patient following Nephrology as well as Rheumatology regarding above  5  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  Patient has history of leukopenia and anemia  Per Hematology notes at outside networks, patient has had workup prior and it seemed this was related to immunosuppressive agents including CellCept and could also be related to Plaquenil use  We will monitor this for now and consider further workup in the future if needed        · Discussion of decision making    I personally reviewed the following lab results, the image studies, pathology, other specialty/physicians consult notes and recommendations, and outside medical records from [facility]  I had a lengthy discussion with the patient and shared the work-up findings  I spent 40 minutes reviewing the records (labs, clinician notes, outside records, medical history, ordering medicine/tests/procedures, interpreting the imaging/labs previously done) and coordination of care as well as direct time with the patient today, of which greater than 50% of the time was spent in counseling and coordination of care with the patient/family  · Plan/Labs  · Lifelong anticoagulation with Eliquis 5 mg twice daily  · Will obtain thrombosis panel with holding Eliquis prior to see if patient would benefit from different anticoagulation such as Coumadin if he is positive for APLS  · Continue following Rheumatology and Nephrology closely  · If thrombosis panel is unrevealing, patient will remain on Eliquis and will not need to follow our office regarding long-term anticoagulation  Follow Up: 1 month  All questions were answered to the patient's satisfaction during this encounter  The patient knows the contact information for our office and knows to reach out for any relevant concerns related to this encounter  They are to call for any temperature 100 4 or higher, new symptoms including but not restricted to shaking chills, decreased appetite, nausea, vomiting, diarrhea, increased fatigue, shortness of breath or chest pain, confusion, and not feeling the strength to come to the clinic  For all other listed problems and medical diagnosis in their chart - they are managed by PCP and/or other specialists, which the patient acknowledges  Thank you very much for your consultation and making us a part of this patient's care  We are continuing to follow closely with you  Please do not hesitate to reach out to me with any additional questions or concerns         Reason for visit:       Chief Complaint   Patient presents with    Consult     DVT       History of Hematology Illness:     Aide Crews is a 45 y o  male who came in for consultation  1  History of DVT, Superficial Thrombophlebitis  - Patient and wife are present for visit  They are poor historians  Able to provide only some history  However, note from hematology [facility] (4/2022) provided most of history  - 7/2017 --> RLE DVT which was diagnosed off of venous Doppler  Duplex showed DVT in the posterior tibial vein  Patient was placed on Xarelto  Thrombosis was unprovoked - no hospitalization, surgery, trauma, accident, testosterone use prior  Patient has had several episodes of superficial venous thrombosis since and left GSV ablation and bilateral LE  vein ablation by [facility] vascular surgery  Patient has had thrombosis on top of Xarelto medication  At visit with [facility] Hematology 04/2022, it was advised patient would be placed on a an alternative anticoagulant  Patient was placed on Eliquis 5 mg twice daily  Per patient, his last thrombosis was approximately 2019/2020      - there is no thrombosis panel that I can view in the chart  Patient has no family history of blood clots  Never had a CVA/MI  2  SLE, Lupus Nephritis history  - initial diagnosis was 2014/2015  Following rheumatology     Interval History:   09/02/22: This is a 58-year-old male with a history of acute bronchitis, hypertension, lupus nephritis, MARK, CKD stage 3, hyperkalemia, cellulitis, history DVT, and more presenting for consultation  Patient denies any chest pain, shortness of breath, bleeding while on Eliquis  He is tolerating Eliquis without any significant side effect  He does have chronic right lower extremity swelling intermittently which waxes and wanes  Again, has been about 2 years since his last blood clot  He has had a few venous Dopplers which have been negative in the last few months    Patient has never had cancer before  He does not smoke  No other autoimmune conditions other than lupus  He was initially diagnosed in 2014, 2015  Following a rheumatologist   Patient does not drink  He is on disability  He uses a walker due to loss of balance  No history of cancer in family  He is up-to-date on colonoscopy  Problem list:       Patient Active Problem List   Diagnosis    Hyperkalemia    Lupus (systemic lupus erythematosus) ([facility])    Lupus nephritis ([facility])    Cellulitis and abscess of left lower extremity    Hypertension    History of DVT (deep vein thrombosis)    MARK [facility]acute kidney injury) ([facility] [address] )    Chronic kidney disease, stage 3 ([facility])    Persistent proteinuria    [facility]    Anemia in chronic kidney disease    Melena    Acute bronchitis    Chest pain    Muscle weakness (generalized)    Lower back pain    Tremor of both hands    Spasticity    Gait difficulty    Neuropathic pain    Cellulitis of left lower extremity    SIRS (systemic inflammatory response syndrome) ([facility])       REVIEW OF SYMPTOMS:   Review of Systems   Constitutional: Negative for activity change, appetite change, chills, diaphoresis, fatigue, fever and unexpected weight change  HENT: Negative for nosebleeds  Respiratory: Negative for apnea, cough, chest tightness and shortness of breath  Cardiovascular: Positive for leg swelling ( chronic right lower extremity swelling, intermittent since 1st thrombosis in right lower extremity 5 years ago)  Negative for chest pain and palpitations  Gastrointestinal: Negative for abdominal distention, abdominal pain, anal bleeding, blood in stool, constipation, diarrhea, nausea and vomiting  Endocrine: Negative for cold intolerance  Genitourinary: Negative for hematuria  Musculoskeletal: Positive for arthralgias (Chronic), back pain and gait problem ( uses walker)  Skin: Negative for color change and pallor     Neurological: Negative for dizziness, weakness, light-headedness and headaches  Hematological: Negative for adenopathy  Does not bruise/bleed easily  PHYSICAL EXAMINATION:     Vital Signs:   /82 (BP Location: Left arm, Patient Position: Sitting, Cuff Size: Adult)   Pulse 98   Temp 97 9 °F (36 6 °C)   Resp 16   SpO2 98%   There is no height or weight on file to calculate BSA  Ht Readings from Last 8 Encounters:   03/24/22 5' 6" (1 676 m)   09/23/20 5' 6" (1 676 m)   02/27/20 5' 6" (1 676 m)   12/13/19 5' 6" (1 676 m)   12/06/19 5' 6" (1 676 m)   11/04/19 5' 6" (1 676 m)   07/18/19 5' 6" (1 676 m)   05/04/19 5' 6" (1 676 m)       Wt Readings from Last 8 Encounters:   06/22/22 92 3 kg (203 lb 7 8 oz)   03/24/22 96 8 kg (213 lb 6 5 oz)   09/23/20 88 6 kg (195 lb 5 2 oz)   02/27/20 88 5 kg (195 lb)   12/13/19 88 5 kg (195 lb)   12/06/19 90 4 kg (199 lb 3 2 oz)   11/21/19 88 5 kg (195 lb)   11/04/19 48 1 kg (106 lb)          Physical Exam  Constitutional:       General: He is not in acute distress  Appearance: Normal appearance  He is normal weight  He is not ill-appearing, toxic-appearing or diaphoretic  HENT:      Head: Normocephalic and atraumatic  Eyes:      General: No scleral icterus  Extraocular Movements: Extraocular movements intact  Conjunctiva/sclera: Conjunctivae normal    Cardiovascular:      Rate and Rhythm: Normal rate and regular rhythm  Pulmonary:      Effort: Pulmonary effort is normal  No respiratory distress  Abdominal:      General: Bowel sounds are normal       Tenderness: There is no abdominal tenderness  Musculoskeletal:      Cervical back: Normal range of motion and neck supple  Right lower leg: No edema  Left lower leg: No edema  Lymphadenopathy:      Cervical: No cervical adenopathy  Skin:     General: Skin is warm and dry  Coloration: Skin is not jaundiced or pale  Findings: No bruising, erythema, lesion or rash     Neurological:      General: No focal deficit present  Mental Status: He is alert and oriented to person, place, and time  Mental status is at baseline  Motor: No weakness  Psychiatric:         Mood and Affect: Mood normal          Behavior: Behavior normal          Thought Content: Thought content normal          Judgment: Judgment normal        Reviewed historical information  PAST MEDICAL HISTORY:    Past Medical History:   Diagnosis Date    Cervical mass     Coronary artery disease     Depression     DVT (deep venous thrombosis) (HCC)     Hypertension     Lupus (HCC)     Renal disorder        PAST SURGICAL HISTORY:    Past Surgical History:   Procedure Laterality Date    APPENDECTOMY      CERVICAL SPINE SURGERY      CHOLECYSTECTOMY      COLONOSCOPY N/A 8/23/2018    Procedure: COLONOSCOPY;  Surgeon: Barnie Gosselin, MD;  Location: [facility]; Service: Gastroenterology    ESOPHAGOGASTRODUODENOSCOPY N/A 8/22/2018    Procedure: ESOPHAGOGASTRODUODENOSCOPY (EGD); Surgeon: Barnie Gosselin, MD;  Location: [facility];   Service: Gastroenterology    NECK SURGERY      VASCULAR SURGERY           CURRENT MEDICATIONS:     Current Outpatient Medications:     apixaban (ELIQUIS) 5 mg, Take 5 mg by mouth 2 (two) times a day, Disp: , Rfl:     baclofen 20 mg tablet, Take 20 mg by mouth 3 (three) times a day , Disp: , Rfl:     betamethasone dipropionate (DIPROSONE) 0 05 % cream, Apply 1 application topically 2 (two) times a day, Disp: , Rfl:     betamethasone, augmented, (DIPROLENE) 0 05 % ointment, Apply 1 application topically 2 (two) times a day, Disp: , Rfl:     carvedilol (COREG) 12 5 mg tablet, Take 12 5 mg by mouth 2 (two) times a day with meals, Disp: , Rfl:     escitalopram (LEXAPRO) 10 mg tablet, Take 10 mg by mouth daily, Disp: , Rfl:     furosemide (LASIX) 40 mg tablet, Take 0 5 tablets (20 mg total) by mouth daily, Disp: , Rfl: 0    gabapentin (NEURONTIN) 300 mg capsule, TAKE 1 CAPSULE BY MOUTH AT BEDTIME IN ADDITION TO 600MG, Disp: 30 capsule, Rfl: 5    gabapentin (NEURONTIN) 600 MG tablet, Take 600 mg by mouth 3 (three) times a day , Disp: , Rfl:     hydroxychloroquine (PLAQUENIL) 200 mg tablet, Take 400 mg by mouth daily at bedtime, Disp: , Rfl:     hydrOXYzine HCL (ATARAX) 10 mg tablet, Take 10 mg by mouth every 6 (six) hours as needed for itching, Disp: , Rfl:     lisinopril (ZESTRIL) 40 mg tablet, Take 40 mg by mouth daily, Disp: , Rfl:     metolazone (ZAROXOLYN) 2 5 mg tablet, Take 2 5 mg by mouth daily, Disp: , Rfl:     mycophenolate (CELLCEPT) 500 mg tablet, Take 500 mg by mouth every 12 (twelve) hours , Disp: , Rfl:     potassium chloride (K-DUR,KLOR-CON) 20 mEq tablet, Take 20 mEq by mouth daily, Disp: , Rfl:     predniSONE 5 mg tablet, Take 5 mg by mouth daily, Disp: , Rfl:     rivaroxaban (XARELTO) 20 mg tablet, Take 20 mg by mouth daily with dinner, Disp: , Rfl:     tamsulosin (FLOMAX) 0 4 mg, Take 0 8 mg by mouth Medrol Dose Pack scheduling ONLY, Disp: , Rfl:     traMADol (ULTRAM) 50 mg tablet, Take 50 mg by mouth every 6 (six) hours as needed for moderate pain, Disp: , Rfl:     diazepam (VALIUM) 5 mg tablet, Take 1 tablet (5 mg total) by mouth every 8 (eight) hours as needed for anxiety for up to 5 days, Disp: 15 tablet, Rfl: 0    SOCIAL HISTORY:    Social History     Tobacco Use    Smoking status: Never Smoker    Smokeless tobacco: Never Used   Vaping Use    Vaping Use: Never used   Substance Use Topics    Alcohol use: Never    Drug use: Never       FAMILY HISTORY:    Family History   Problem Relation Age of Onset    Heart disease Mother        ALLERGIES:    Allergies   Allergen Reactions    Sulfa Antibiotics Rash         LAB:    Lab Results   Component Value Date    WBC 4 11 (L) 03/26/2022    HGB 11 0 (L) 03/26/2022    HCT 32 5 (L) 03/26/2022    MCV 87 03/26/2022     03/26/2022       Lab Results   Component Value Date    SODIUM 133 (L) 04/21/2022    K 3 7 04/21/2022    CL 95 (L) 04/21/2022    CO2 26 04/21/2022    AGAP 12 04/21/2022    BUN 53 (H) 04/21/2022    CREATININE 1 72 (H) 04/21/2022    GLUC 76 04/21/2022    GLUF 107 (H) 07/19/2019    CALCIUM 9 0 04/21/2022    AST 44 04/21/2022    ALT 34 04/21/2022    ALKPHOS 129 (H) 04/21/2022    TP 8 4 (H) 04/21/2022    TBILI 0 32 04/21/2022    EGFR 44 04/21/2022       IMAGING:  XR clavicle left  Narrative: LEFT CLAVICLE    INDICATION:   W19  XXXA: Unspecified fall, initial encounter  COMPARISON:  None    VIEWS:  XR CLAVICLE LEFT     FINDINGS:    There is no acute fracture or dislocation  AC joint osteoarthritis  Metallic artifact  Cervical ACDF  No lytic or blastic osseous lesion  Soft tissues are unremarkable  Impression: No acute osseous abnormality  Workstation performed: [id number]  XR forearm 2 vw right  Narrative: RIGHT WRIST    INDICATION:   W19  XXXA: Unspecified fall, initial encounter  COMPARISON:  Wrist 12/14/2019    VIEWS:  XR WRIST 3+ VW RIGHT, XR FOREARM 2 VW RIGHT     FINDINGS:    There is no acute fracture or dislocation  No significant degenerative changes  No lytic or blastic osseous lesion  Vascular calcifications  Impression: No acute osseous abnormality  Workstation performed: [id number]  XR wrist 3+ vw right  Narrative: RIGHT WRIST    INDICATION:   W19  XXXA: Unspecified fall, initial encounter  COMPARISON:  Wrist 12/14/2019    VIEWS:  XR WRIST 3+ VW RIGHT, XR FOREARM 2 VW RIGHT     FINDINGS:    There is no acute fracture or dislocation  No significant degenerative changes  No lytic or blastic osseous lesion  Vascular calcifications  Impression: No acute osseous abnormality      Workstation performed: [id number]

## 2022-09-02 ENCOUNTER — CONSULT (OUTPATIENT)
Dept: HEMATOLOGY ONCOLOGY | Facility: CLINIC | Age: 52
End: 2022-09-02
Payer: COMMERCIAL

## 2022-09-02 VITALS
TEMPERATURE: 97.9 F | HEART RATE: 98 BPM | DIASTOLIC BLOOD PRESSURE: 82 MMHG | RESPIRATION RATE: 16 BRPM | SYSTOLIC BLOOD PRESSURE: 132 MMHG | OXYGEN SATURATION: 98 %

## 2022-09-02 DIAGNOSIS — Z78.9 POOR HISTORIAN: ICD-10-CM

## 2022-09-02 DIAGNOSIS — M32.9 SYSTEMIC LUPUS ERYTHEMATOSUS, UNSPECIFIED SLE TYPE, UNSPECIFIED ORGAN INVOLVEMENT STATUS (HCC): ICD-10-CM

## 2022-09-02 DIAGNOSIS — D75.9 CYTOPENIA DUE TO IMMUNOSUPRESSIVE AGENT: ICD-10-CM

## 2022-09-02 DIAGNOSIS — M32.14 LUPUS NEPHRITIS (HCC): ICD-10-CM

## 2022-09-02 DIAGNOSIS — Z86.718 HISTORY OF DVT OF LOWER EXTREMITY: Primary | ICD-10-CM

## 2022-09-02 DIAGNOSIS — T45.1X5A CYTOPENIA DUE TO IMMUNOSUPRESSIVE AGENT: ICD-10-CM

## 2022-09-02 LAB
ATRIAL RATE: 92 BPM
P AXIS: 30 DEGREES
PR INTERVAL: 202 MS
QRS AXIS: 198 DEGREES
QRSD INTERVAL: 100 MS
QT INTERVAL: 380 MS
QTC INTERVAL: 469 MS
T WAVE AXIS: 28 DEGREES
VENTRICULAR RATE: 92 BPM

## 2022-09-02 PROCEDURE — 99203 OFFICE O/P NEW LOW 30 MIN: CPT | Performed by: INTERNAL MEDICINE

## 2022-09-02 PROCEDURE — 93010 ELECTROCARDIOGRAM REPORT: CPT | Performed by: INTERNAL MEDICINE

## 2022-09-03 NOTE — ED PROVIDER NOTES
History  Chief Complaint   Patient presents with    Medication Problem     Pt states he was put on lyrica 2 days ago and is c/o shaking and anxiety from it  12-year-old male presenting to emergency department for evaluation of generalized shaking  Patient states he started about 2 days ago, since then has had shaking and lightheadedness with some dizziness  Patient is placed on for peripheral neuropathy  He has had no shortness of breath or chest pain  He has had no syncope or presyncope  Has been no fevers chills or cough, he has no abdominal pain nausea vomiting, reports no changes in activity or appetite otherwise  Prior to Admission Medications   Prescriptions Last Dose Informant Patient Reported? Taking?    apixaban (ELIQUIS) 5 mg   Yes No   Sig: Take 5 mg by mouth 2 (two) times a day   baclofen 20 mg tablet  Self Yes No   Sig: Take 20 mg by mouth 3 (three) times a day    betamethasone dipropionate (DIPROSONE) 0 05 % cream  Self Yes No   Sig: Apply 1 application topically 2 (two) times a day   betamethasone, augmented, (DIPROLENE) 0 05 % ointment  Self Yes No   Sig: Apply 1 application topically 2 (two) times a day   carvedilol (COREG) 12 5 mg tablet  Self Yes No   Sig: Take 12 5 mg by mouth 2 (two) times a day with meals   diazepam (VALIUM) 5 mg tablet   No No   Sig: Take 1 tablet (5 mg total) by mouth every 8 (eight) hours as needed for anxiety for up to 5 days   escitalopram (LEXAPRO) 10 mg tablet  Self Yes No   Sig: Take 10 mg by mouth daily   furosemide (LASIX) 40 mg tablet   No No   Sig: Take 0 5 tablets (20 mg total) by mouth daily   gabapentin (NEURONTIN) 300 mg capsule   No No   Sig: TAKE 1 CAPSULE BY MOUTH AT BEDTIME IN ADDITION TO 600MG   gabapentin (NEURONTIN) 600 MG tablet   Yes No   Sig: Take 600 mg by mouth 3 (three) times a day    hydrOXYzine HCL (ATARAX) 10 mg tablet   Yes No   Sig: Take 10 mg by mouth every 6 (six) hours as needed for itching   hydroxychloroquine (PLAQUENIL) 200 mg tablet   Yes No   Sig: Take 400 mg by mouth daily at bedtime   lisinopril (ZESTRIL) 40 mg tablet   Yes No   Sig: Take 40 mg by mouth daily   metolazone (ZAROXOLYN) 2 5 mg tablet   Yes No   Sig: Take 2 5 mg by mouth daily   mycophenolate (CELLCEPT) 500 mg tablet   Yes No   Sig: Take 500 mg by mouth every 12 (twelve) hours    potassium chloride (K-DUR,KLOR-CON) 20 mEq tablet   Yes No   Sig: Take 20 mEq by mouth daily   predniSONE 5 mg tablet   Yes No   Sig: Take 5 mg by mouth daily   rivaroxaban (XARELTO) 20 mg tablet   Yes No   Sig: Take 20 mg by mouth daily with dinner   tamsulosin (FLOMAX) 0 4 mg  Self Yes No   Sig: Take 0 8 mg by mouth Medrol Dose Pack scheduling ONLY   traMADol (ULTRAM) 50 mg tablet   Yes No   Sig: Take 50 mg by mouth every 6 (six) hours as needed for moderate pain      Facility-Administered Medications: None       Past Medical History:   Diagnosis Date    Cervical mass     Coronary artery disease     Depression     DVT (deep venous thrombosis) (HCC)     Hypertension     Lupus (HCC)     Renal disorder        Past Surgical History:   Procedure Laterality Date    APPENDECTOMY      CERVICAL SPINE SURGERY      CHOLECYSTECTOMY      COLONOSCOPY N/A 8/23/2018    Procedure: COLONOSCOPY;  Surgeon: Amarjit Clarke MD;  Location: [facility]; Service: Gastroenterology    ESOPHAGOGASTRODUODENOSCOPY N/A 8/22/2018    Procedure: ESOPHAGOGASTRODUODENOSCOPY (EGD); Surgeon: Amarjit Clarke MD;  Location: [facility]; Service: Gastroenterology    NECK SURGERY      VASCULAR SURGERY         Family History   Problem Relation Age of Onset    Heart disease Mother      I have reviewed and agree with the history as documented      E-Cigarette/Vaping    E-Cigarette Use Never User      E-Cigarette/Vaping Substances    Nicotine No     THC No     CBD No     Flavoring No     Other No     Unknown No      Social History     Tobacco Use    Smoking status: Never Smoker    Smokeless tobacco: Never Used   Vaping Use    Vaping Use: Never used   Substance Use Topics    Alcohol use: Never    Drug use: Never       Review of Systems   Constitutional: Negative for appetite change, chills, fatigue and fever  HENT: Negative for sneezing and sore throat  Eyes: Negative for visual disturbance  Respiratory: Negative for cough, choking, chest tightness, shortness of breath and wheezing  Cardiovascular: Negative for chest pain and palpitations  Gastrointestinal: Negative for abdominal pain, constipation, diarrhea, nausea and vomiting  Genitourinary: Negative for difficulty urinating and dysuria  Neurological: Positive for dizziness and light-headedness  Negative for weakness, numbness and headaches  All other systems reviewed and are negative  Physical Exam  Physical Exam  Vitals and nursing note reviewed  Constitutional:       General: He is not in acute distress  Appearance: He is well-developed  He is not diaphoretic  HENT:      Head: Normocephalic and atraumatic  Eyes:      Pupils: Pupils are equal, round, and reactive to light  Neck:      Vascular: No JVD  Trachea: No tracheal deviation  Cardiovascular:      Rate and Rhythm: Normal rate and regular rhythm  Heart sounds: Normal heart sounds  No murmur heard  No friction rub  No gallop  Pulmonary:      Effort: Pulmonary effort is normal  No respiratory distress  Breath sounds: Normal breath sounds  No wheezing or rales  Abdominal:      General: Bowel sounds are normal  There is no distension  Palpations: Abdomen is soft  Tenderness: There is no abdominal tenderness  There is no guarding or rebound  Skin:     General: Skin is warm and dry  Coloration: Skin is not pale  Neurological:      Mental Status: He is alert and oriented to person, place, and time  Cranial Nerves: No cranial nerve deficit  Motor: No abnormal muscle tone     Psychiatric:         Behavior: Behavior normal  Vital Signs  ED Triage Vitals [09/01/22 1800]   Temperature Pulse Respirations Blood Pressure SpO2   98 8 °F (37 1 °C) 105 18 131/76 100 %      Temp Source Heart Rate Source Patient Position - Orthostatic VS BP Location FiO2 (%)   Temporal Monitor Sitting Left arm --      Pain Score       --           Vitals:    09/01/22 1800 09/01/22 1957   BP: 131/76 130/75   Pulse: 105 91   Patient Position - Orthostatic VS: Sitting          Visual Acuity      ED Medications  Medications - No data to display    Diagnostic Studies  Results Reviewed     None                 No orders to display              Procedures  Procedures         ED Course                               SBIRT 20yo+    Flowsheet Row Most Recent Value   SBIRT (25 yo +)    In order to provide better care to our patients, we are screening all of our patients for alcohol and drug use  Would it be okay to ask you these screening questions? Unable to answer at this time Filed at: 09/01/2022 1919                    [facility]  Number of Diagnoses or Management Options  Medication side effect  Diagnosis management comments: 78-year-old male lightheadedness dizziness, likely side effect of the Lyrica, vital signs are normal here, physical exam is normal, did check EKG which shows intervals is otherwise normal, suggest patient refrain from taking medicine, call his PCP, return for worsening symptoms  Patient and patient's family expressed understanding  Disposition  Final diagnoses:   Medication side effect     Time reflects when diagnosis was documented in both MDM as applicable and the Disposition within this note     Time User Action Codes Description Comment    9/1/2022  8:05 PM Noemy Aquino [facility]  [id number]] Medication side effect       ED Disposition     ED Disposition   Discharge    Condition   Stable    Date/Time   Thu Sep 1, 2022  8:04 PM    Comment   Abigail Vallejo discharge to home/self care                 Follow-up Information    None         Discharge Medication List as of 9/1/2022  8:09 PM      CONTINUE these medications which have NOT CHANGED    Details   apixaban (ELIQUIS) 5 mg Take 5 mg by mouth 2 (two) times a day, Starting Fri 4/8/2022, Historical Med      baclofen 20 mg tablet Take 20 mg by mouth 3 (three) times a day , Historical Med      betamethasone dipropionate (DIPROSONE) 0 05 % cream Apply 1 application topically 2 (two) times a day, Historical Med      betamethasone, augmented, (DIPROLENE) 0 05 % ointment Apply 1 application topically 2 (two) times a day, Historical Med      carvedilol (COREG) 12 5 mg tablet Take 12 5 mg by mouth 2 (two) times a day with meals, Historical Med      diazepam (VALIUM) 5 mg tablet Take 1 tablet (5 mg total) by mouth every 8 (eight) hours as needed for anxiety for up to 5 days, Starting Wed 6/22/2022, Until Mon 6/27/2022 at 2359, Print      escitalopram (LEXAPRO) 10 mg tablet Take 10 mg by mouth daily, Historical Med      furosemide (LASIX) 40 mg tablet Take 0 5 tablets (20 mg total) by mouth daily, Starting Tue 7/31/2018, No Print      gabapentin (NEURONTIN) 300 mg capsule TAKE 1 CAPSULE BY MOUTH AT BEDTIME IN ADDITION TO 600MG, Normal      gabapentin (NEURONTIN) 600 MG tablet Take 600 mg by mouth 3 (three) times a day , Historical Med      hydroxychloroquine (PLAQUENIL) 200 mg tablet Take 400 mg by mouth daily at bedtime, Historical Med      hydrOXYzine HCL (ATARAX) 10 mg tablet Take 10 mg by mouth every 6 (six) hours as needed for itching, Historical Med      lisinopril (ZESTRIL) 40 mg tablet Take 40 mg by mouth daily, Historical Med      metolazone (ZAROXOLYN) 2 5 mg tablet Take 2 5 mg by mouth daily, Historical Med      mycophenolate (CELLCEPT) 500 mg tablet Take 500 mg by mouth every 12 (twelve) hours , Historical Med      potassium chloride (K-DUR,KLOR-CON) 20 mEq tablet Take 20 mEq by mouth daily, Historical Med      predniSONE 5 mg tablet Take 5 mg by mouth daily, Historical Med      rivaroxaban (XARELTO) 20 mg tablet Take 20 mg by mouth daily with dinner, Historical Med      tamsulosin (FLOMAX) 0 4 mg Take 0 8 mg by mouth Medrol Dose Pack scheduling ONLY, Historical Med      traMADol (ULTRAM) 50 mg tablet Take 50 mg by mouth every 6 (six) hours as needed for moderate pain, Historical Med             No discharge procedures on file      PDMP Review     None          ED Provider  Electronically Signed by           Marivel Rice MD  09/03/22 1958

## 2022-09-07 ENCOUNTER — OFFICE VISIT (OUTPATIENT)
Dept: PHYSICAL THERAPY | Facility: CLINIC | Age: 52
End: 2022-09-07
Payer: COMMERCIAL
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
## 2022-09-07 DIAGNOSIS — M51.36 DDD (DEGENERATIVE DISC DISEASE), LUMBAR: Primary | ICD-10-CM

## 2022-09-07 PROCEDURE — 97140 MANUAL THERAPY 1/> REGIONS: CPT | Performed by: PHYSICAL THERAPIST

## 2022-09-07 PROCEDURE — 97112 NEUROMUSCULAR REEDUCATION: CPT | Performed by: PHYSICAL THERAPIST

## 2022-09-07 PROCEDURE — 97530 THERAPEUTIC ACTIVITIES: CPT | Performed by: PHYSICAL THERAPIST

## 2022-09-07 PROCEDURE — 97110 THERAPEUTIC EXERCISES: CPT | Performed by: PHYSICAL THERAPIST

## 2022-09-07 NOTE — PROGRESS NOTES
Daily Note     Today's date: 2022  Patient name: Ana Reynaga  : 1970  MRN: [id number]  Referring provider: Dafne Palafox DO  Dx:   Encounter Diagnosis     ICD-10-CM    1  DDD (degenerative disc disease), lumbar  M51 36    2  Fall, subsequent encounter  W19  XXXD        Start Time: 1020  Stop Time: 1100  Total time in clinic (min): 40 minutes    Subjective: Pt reports feeling better after his initial evaluation by feeling more "stretched out"  Objective: See treatment diary below      Assessment: Tolerated treatment fair  Patient demonstrated fatigue post treatment, exhibited good technique with therapeutic exercises and would benefit from continued PT  Pt was able to progress today with inclusion of bridges and STS into pt's exercise program  Pt required min verbal cues to facilitate performance of proper technique  Assess pt response to treatment at next visit  Plan: Continue per plan of care        Precautions: HTN, fall risk, DVT, cervical SX, Lupus    Manuals            SKTC  1x5 20"           MT  ham, piriformis stretch, calf 10 8'                                     Neuro Re-Ed             bridges  2x10           Pt education  5'                                                                            Ther Ex             Nu step 5 min 6 mins           3-way phyioball rollouts 20x 10x ea 3"           TB hip abd Blue TB  30x            Pillow squeeze 20x            Ankle pumps 10x            LTR  1x15 ea                                     Ther Activity             Sit to stands   2x10           Cuing for proper technique  4'           Gait Training                                       Modalities

## 2022-09-14 ENCOUNTER — OFFICE VISIT (OUTPATIENT)
Dept: PHYSICAL THERAPY | Facility: CLINIC | Age: 52
End: 2022-09-14
Payer: COMMERCIAL
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
## 2022-09-14 DIAGNOSIS — M51.36 DDD (DEGENERATIVE DISC DISEASE), LUMBAR: Primary | ICD-10-CM

## 2022-09-14 PROCEDURE — 97110 THERAPEUTIC EXERCISES: CPT

## 2022-09-14 PROCEDURE — 97140 MANUAL THERAPY 1/> REGIONS: CPT

## 2022-09-14 PROCEDURE — 97112 NEUROMUSCULAR REEDUCATION: CPT

## 2022-09-14 PROCEDURE — 97530 THERAPEUTIC ACTIVITIES: CPT

## 2022-09-14 NOTE — PROGRESS NOTES
Daily Note     Today's date: 2022  Patient name: Tex Díaz  : 1970  MRN: [id number]  Referring provider: Sofia Porras DO  Dx:   Encounter Diagnosis     ICD-10-CM    1  DDD (degenerative disc disease), lumbar  M51 36    2  Fall, subsequent encounter  W19  XXXD    3  Fall, initial encounter  [address]  XXXA                   Subjective: Pt reports that he is doing slightly better compared to last session with moderate pain in his low back on both sides 4/10  Objective: See treatment diary below      Assessment: Tolerated treatment fair  Pt was able to perform all exercises this session with min A needed to get into correct position to perform exercise  Educated was given on the importance of his self stretching and HEP at home with compliance present  Increased HS and piriformis tightness was present on both sides today  Patient demonstrated fatigue post treatment, exhibited good technique with therapeutic exercises and would benefit from continued PT  Assess pt response to treatment at next visit  Plan: Continue per plan of care        Precautions: HTN, fall risk, DVT, cervical SX, Lupus    Manuals           SKTC  1x5 20" 1x5 20"          MT  ham, piriformis stretch, calf 10 8' 8'                                    Neuro Re-Ed             bridges  2x10 2x10          Pt education  5' 5'                                                                           Ther Ex             Nu step 5 min 6 mins 6 mins          3-way phyioball rollouts 20x 10x ea 3" 10x 3" ea          TB hip abd Blue TB  30x            Pillow squeeze 20x            Ankle pumps 10x            LTR  1x15 ea 1x15 ea                                    Ther Activity             Sit to stands   2x10 2x10          Cuing for proper technique  4' 4'          Gait Training                                       Modalities

## 2022-09-22 ENCOUNTER — OFFICE VISIT (OUTPATIENT)
Dept: PHYSICAL THERAPY | Facility: CLINIC | Age: 52
End: 2022-09-22
Payer: COMMERCIAL

## 2022-09-22 DIAGNOSIS — M51.36 DDD (DEGENERATIVE DISC DISEASE), LUMBAR: Primary | ICD-10-CM
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
## 2022-09-22 PROCEDURE — 97110 THERAPEUTIC EXERCISES: CPT | Performed by: PHYSICAL THERAPIST

## 2022-09-22 PROCEDURE — 97112 NEUROMUSCULAR REEDUCATION: CPT | Performed by: PHYSICAL THERAPIST

## 2022-09-22 PROCEDURE — 97140 MANUAL THERAPY 1/> REGIONS: CPT | Performed by: PHYSICAL THERAPIST

## 2022-09-22 NOTE — PROGRESS NOTES
Daily Note     Today's date: 2022  Patient name: Jodi Tinajero  : 1970  MRN: [id number]  Referring provider: Corinne Lund DO  Dx:   Encounter Diagnosis     ICD-10-CM    1  DDD (degenerative disc disease), lumbar  M51 36    2  Fall, subsequent encounter  W19  XXXD                   Subjective: patient reports back pain is 2/10 today      Objective: See treatment diary below      Assessment: Tolerated treatment fair  Patient demonstrated fatigue post treatment, exhibited good technique with therapeutic exercises and would benefit from continued PT, patient still with complaints of back pain with walking as he continues to use RW and tends to drag left foot due to drop foot      Plan: Progress treatment as tolerated         Precautions: HTN, fall risk, DVT, cervical SX, Lupus    Manuals          SKTC  1x5 20" 1x5 20" 1x5  20"         MT  ham, piriformis stretch, calf 10 8' 8' 8'                                   Neuro Re-Ed             bridges  2x10 2x10 2x10         Pt education  5' 5'                                                                           Ther Ex             Nu step 5 min 6 mins 6 mins 6 min         3-way phyioball rollouts 20x 10x ea 3" 10x 3" ea 10x3"  ea         TB hip abd Blue TB  30x   BTB  30x         Pillow squeeze 20x   30x         Ankle pumps 10x   30x         LTR  1x15 ea 1x15 ea 1x15  ea         talha rows  sitting    18#  30x                      Ther Activity             Sit to stands   2x10 2x10 2x10         Cuing for proper technique  4' 4'          Gait Training                                       Modalities

## 2022-09-27 ENCOUNTER — OFFICE VISIT (OUTPATIENT)
Dept: OBGYN CLINIC | Facility: CLINIC | Age: 52
End: 2022-09-27
Payer: COMMERCIAL

## 2022-09-27 ENCOUNTER — OFFICE VISIT (OUTPATIENT)
Dept: PHYSICAL THERAPY | Facility: CLINIC | Age: 52
End: 2022-09-27
Payer: COMMERCIAL

## 2022-09-27 ENCOUNTER — APPOINTMENT (OUTPATIENT)
Dept: RADIOLOGY | Facility: CLINIC | Age: 52
End: 2022-09-27
Payer: COMMERCIAL

## 2022-09-27 VITALS
WEIGHT: 204 LBS | HEART RATE: 93 BPM | HEIGHT: 66 IN | BODY MASS INDEX: 32.78 KG/M2 | RESPIRATION RATE: 18 BRPM | DIASTOLIC BLOOD PRESSURE: 64 MMHG | OXYGEN SATURATION: 98 % | SYSTOLIC BLOOD PRESSURE: 128 MMHG

## 2022-09-27 DIAGNOSIS — M25.512 LEFT SHOULDER PAIN, UNSPECIFIED CHRONICITY: ICD-10-CM
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
## 2022-09-27 DIAGNOSIS — M51.36 DDD (DEGENERATIVE DISC DISEASE), LUMBAR: Primary | ICD-10-CM

## 2022-09-27 DIAGNOSIS — M19.012 ARTHRITIS OF LEFT ACROMIOCLAVICULAR JOINT: Primary | ICD-10-CM
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
## 2022-09-27 PROCEDURE — 97140 MANUAL THERAPY 1/> REGIONS: CPT

## 2022-09-27 PROCEDURE — 97110 THERAPEUTIC EXERCISES: CPT

## 2022-09-27 PROCEDURE — 73030 X-RAY EXAM OF SHOULDER: CPT

## 2022-09-27 PROCEDURE — 99244 OFF/OP CNSLTJ NEW/EST MOD 40: CPT | Performed by: FAMILY MEDICINE

## 2022-09-27 PROCEDURE — 97112 NEUROMUSCULAR REEDUCATION: CPT

## 2022-09-27 NOTE — LETTER
September 27, 2022     Exeteraxel GonzalezDO huang  [address] [facility] [address] [facility]  [address]    Patient: Abigail Vallejo   YOB: 1970   Date of Visit: 9/27/2022       Dear Dr Dee Spears: Thank you for referring Abigail Vallejo to me for evaluation  Below are my notes for this consultation  If you have questions, please do not hesitate to call me  I look forward to following your patient along with you  Sincerely,        Madai Dial DO        CC: No Recipients  Madai Dial DO  9/27/2022  1:02 PM  Signed       Subjective:  Chief Complaint   Patient presents with    Left Shoulder - Pain       Abigail Vallejo is a 45 y o  male complains of left shoulder pain  Onset of the symptoms was several months ago  Mechanism of injury: fell onto the left shoulder  Aggravating factors: across body motion and overhead activity  Treatment to date: avoidance of offending activity and rest  Symptoms have progressed to a point and plateaued  The following portions of the patient's history were reviewed and updated as appropriate: allergies, current medications, past family history, past medical history, past social history, past surgical history and problem list         Review of Systems   Constitutional: Negative for fever  HENT: Negative for dental problem and headaches  Eyes: Negative for vision loss  Respiratory: Negative for cough and shortness of breath  Cardiovascular: Negative for leg swelling and palpitations  Gastrointestinal: Negative for constipation and diarrhea  Genitourinary: Negative for bladder incontinence and difficulty urinating  Musculoskeletal: Negative for back pain and difficulty walking  Skin: Negative for rash and ulcer  Neurological: Negative for dizziness and headaches  Hem/Lymph/Immuno: Negative for blood clots  Does not bruise/bleed easily  Psychiatric/Behavioral: Negative for confusion           Objective:  /64   Pulse 93   Resp 18   Ht 5' 6" (1 676 m)   Wt 92 5 kg (204 lb)   SpO2 98%   BMI 32 93 kg/m²     Skin: no rashes, lesions, skin discolorations, lacerations  Vasculature: normal radial and ulnar pulse,  normal skin color, normal capillary refill in extremity, no upper extremity edema  Neurologic: Neurologic exam is normal throughout upper extremities, Awake, alert, and oriented x3, no apparent distress  Musculoskeletal:   left SHOULDER EXAM    Intact skin  No erythema, no induration, no signs of infection  No gross deformity    AROM FF: 90 degrees limited 2/2 to pain  AROM ER with arm at its side: 80 degrees  AROM IR: 65 degrees    Supraspinatus strength testing: could not be assessed 2/2 to pain  Infraspinatus strength testin/5    Belly press: negative      Empty can: not assessed     Tenderness to palpation of AC joint: positive  Appreciable distal clavicle deformity anteriorly  Pain with cross-body adduction: positive            Imaging:    See final report from XR performed in office today     XR clavicle left    Result Date: 2022  Narrative: LEFT CLAVICLE INDICATION:   W19  XXXA: Unspecified fall, initial encounter  COMPARISON:  None VIEWS:  XR CLAVICLE LEFT FINDINGS: There is no acute fracture or dislocation  AC joint osteoarthritis  Metallic artifact  Cervical ACDF  No lytic or blastic osseous lesion  Soft tissues are unremarkable  Impression: No acute osseous abnormality  Workstation performed: [id number]        Assessment/Plan:  1  Left shoulder pain, unspecified chronicity  2  Arthritis of left acromioclavicular joint  - Diclofenac Sodium (VOLTAREN) 1 %; Apply 2 g topically 4 (four) times a day  Dispense: 2 g; Refill: 0  - Ambulatory Referral to Physical Therapy; Future      >45min devoted to review of previous, pertinent medical records, imaging, discussion of treatment options, counseling and documentation  Imaging independently reviewed and discussed with patient  Degenerative changes noted, no acute fractures appreciated    Follow-up official reading  We discussed the nature of AC joint arthritis at length and detailed the treatment approach  Directed to ice the area daily for 20 minutes at a time using a barrier to protect the skin- stressed specifically icing after activity to address inflammation  Patient cannot tolerate oral anti-inflammatory medications secondary to chronic kidney disease-advised patient to try topical Voltaren gel  Discussed role for corticosteroid injection her osteoarthritis of left AC joint-patient would like to try conservative treatments with physical therapy before proceeding with corticosteroid injection  Recommending formal PT- Rx provided for PT  We discussed a HEP and literature was provided for reference  It was explained that this does not supplement formal PT but should serve to bridge the gap, while they wait to get into PT  Advised to avoid any exercises which exacerbate their pain  Follow up in 4-5 weeks  Should sx's worsen or any concerns arise, they were advised to follow up sooner or seek more immediate medical attention  All of the patient's concerns were addressed and questions answered  They verbalized agreement with and understanding of the treatment plan      Patient will return for evaluation of the right wrist

## 2022-09-27 NOTE — PROGRESS NOTES
Subjective:  Chief Complaint   Patient presents with    Left Shoulder - Pain       Toni Castro is a 45 y o  male complains of left shoulder pain  Onset of the symptoms was several months ago  Mechanism of injury: fell onto the left shoulder  Aggravating factors: across body motion and overhead activity  Treatment to date: avoidance of offending activity and rest  Symptoms have progressed to a point and plateaued  The following portions of the patient's history were reviewed and updated as appropriate: allergies, current medications, past family history, past medical history, past social history, past surgical history and problem list         Review of Systems   Constitutional: Negative for fever  HENT: Negative for dental problem and headaches  Eyes: Negative for vision loss  Respiratory: Negative for cough and shortness of breath  Cardiovascular: Negative for leg swelling and palpitations  Gastrointestinal: Negative for constipation and diarrhea  Genitourinary: Negative for bladder incontinence and difficulty urinating  Musculoskeletal: Negative for back pain and difficulty walking  Skin: Negative for rash and ulcer  Neurological: Negative for dizziness and headaches  Hem/Lymph/Immuno: Negative for blood clots  Does not bruise/bleed easily  Psychiatric/Behavioral: Negative for confusion  Objective:  /64   Pulse 93   Resp 18   Ht 5' 6" (1 676 m)   Wt 92 5 kg (204 lb)   SpO2 98%   BMI 32 93 kg/m²     Skin: no rashes, lesions, skin discolorations, lacerations  Vasculature: normal radial and ulnar pulse,  normal skin color, normal capillary refill in extremity, no upper extremity edema  Neurologic: Neurologic exam is normal throughout upper extremities, Awake, alert, and oriented x3, no apparent distress  Musculoskeletal:   left SHOULDER EXAM    Intact skin    No erythema, no induration, no signs of infection  No gross deformity    AROM FF: 90 degrees limited 2/2 to pain  AROM ER with arm at its side: 80 degrees  AROM IR: 65 degrees    Supraspinatus strength testing: could not be assessed 2/2 to pain  Infraspinatus strength testin/5    Belly press: negative      Empty can: not assessed     Tenderness to palpation of AC joint: positive  Appreciable distal clavicle deformity anteriorly  Pain with cross-body adduction: positive            Imaging:    See final report from XR performed in office today     XR clavicle left    Result Date: 2022  Narrative: LEFT CLAVICLE INDICATION:   W19  XXXA: Unspecified fall, initial encounter  COMPARISON:  None VIEWS:  XR CLAVICLE LEFT FINDINGS: There is no acute fracture or dislocation  AC joint osteoarthritis  Metallic artifact  Cervical ACDF  No lytic or blastic osseous lesion  Soft tissues are unremarkable  Impression: No acute osseous abnormality  Workstation performed: [id number]        Assessment/Plan:  1  Left shoulder pain, unspecified chronicity  2  Arthritis of left acromioclavicular joint  - Diclofenac Sodium (VOLTAREN) 1 %; Apply 2 g topically 4 (four) times a day  Dispense: 2 g; Refill: 0  - Ambulatory Referral to Physical Therapy; Future      >45min devoted to review of previous, pertinent medical records, imaging, discussion of treatment options, counseling and documentation  Imaging independently reviewed and discussed with patient  Degenerative changes noted, no acute fractures appreciated  Follow-up official reading  We discussed the nature of AC joint arthritis at length and detailed the treatment approach  Directed to ice the area daily for 20 minutes at a time using a barrier to protect the skin- stressed specifically icing after activity to address inflammation  Patient cannot tolerate oral anti-inflammatory medications secondary to chronic kidney disease-advised patient to try topical Voltaren gel    Discussed role for corticosteroid injection her osteoarthritis of left AC joint-patient would like to try conservative treatments with physical therapy before proceeding with corticosteroid injection  Recommending formal PT- Rx provided for PT  We discussed a HEP and literature was provided for reference  It was explained that this does not supplement formal PT but should serve to bridge the gap, while they wait to get into PT  Advised to avoid any exercises which exacerbate their pain  Follow up in 4-5 weeks  Should sx's worsen or any concerns arise, they were advised to follow up sooner or seek more immediate medical attention  All of the patient's concerns were addressed and questions answered  They verbalized agreement with and understanding of the treatment plan      Patient will return for evaluation of the right wrist

## 2022-09-27 NOTE — PROGRESS NOTES
Daily Note     Today's date: 2022  Patient name: Yulissa Bautista  : 1970  MRN: [id number]  Referring provider: Yamilex Schmid DO  Dx:   Encounter Diagnosis     ICD-10-CM    1  DDD (degenerative disc disease), lumbar  M51 36    2  Fall, initial encounter  [address]  XXXA    3  Fall, subsequent encounter  W19  XXXD                   Subjective: Pt reports no pain currently  Objective: See treatment diary below      Assessment: Tolerated treatment well  His R LE presents with more tightness vs L  Good tolerance for exercises, he denies pain but appears to be in more pain than he lets on  Patient demonstrated fatigue post treatment and would benefit from continued PT      Plan: Continue per plan of care        Precautions: HTN, fall risk, DVT, cervical SX, Lupus    Manuals         SKTC  1x5 20" 1x5 20" 1x5  20" 1x5 20"        MT  ham, piriformis stretch, calf 10 8' 8' 8' 1x5 20"                                  Neuro Re-Ed             bridges  2x10 2x10 2x10 2x10        Pt education  5' 5'                                                                           Ther Ex             Nu step 5 min 6 mins 6 mins 6 min 7 min         3-way phyioball rollouts 20x 10x ea 3" 10x 3" ea 10x3"  ea 10x3" ea        TB hip abd Blue TB  30x   BTB  30x BTB 30x        Adductor squeeze 20x   30x 30x        Ankle pumps 10x   30x 30x        LTR  1x15 ea 1x15 ea 1x15  ea 1x15        talha rows  sitting    18#  30x 18# 30x                     Ther Activity             Sit to stands   2x10 2x10 2x10 2x10        Cuing for proper technique  4' 4'          Gait Training                                       Modalities

## 2022-09-30 ENCOUNTER — OFFICE VISIT (OUTPATIENT)
Dept: PHYSICAL THERAPY | Facility: CLINIC | Age: 52
End: 2022-09-30
Payer: COMMERCIAL
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
## 2022-09-30 DIAGNOSIS — M51.36 DDD (DEGENERATIVE DISC DISEASE), LUMBAR: Primary | ICD-10-CM

## 2022-09-30 PROCEDURE — 97110 THERAPEUTIC EXERCISES: CPT | Performed by: PHYSICAL THERAPIST

## 2022-09-30 PROCEDURE — 97140 MANUAL THERAPY 1/> REGIONS: CPT | Performed by: PHYSICAL THERAPIST

## 2022-09-30 PROCEDURE — 97530 THERAPEUTIC ACTIVITIES: CPT | Performed by: PHYSICAL THERAPIST

## 2022-09-30 NOTE — PROGRESS NOTES
Daily Note     Today's date: 2022  Patient name: Indy Mccrary  : 1970  MRN: [id number]  Referring provider: Valentin Parson DO  Dx:   Encounter Diagnosis     ICD-10-CM    1  DDD (degenerative disc disease), lumbar  M51 36    2  Fall, subsequent encounter  W19  XXXD        Start Time: 270  Stop Time: 8977  Total time in clinic (min): 48 minutes    Subjective: Pt reports having increased pain in his low back at the start of therapy today  Objective: See treatment diary below      Assessment: Tolerated treatment fair  Patient demonstrated fatigue post treatment, exhibited good technique with therapeutic exercises and would benefit from continued PT  Pt able to complete all exercises with no increased pain of his lumbar spine, pt continues to be limited with progressions secondary to co-morbidities  Pt required min verbal cues to facilitate performance of proper technique  Assess pt response to treatment at next visit  Plan: Continue per plan of care        Precautions: HTN, fall risk, DVT, cervical SX, Lupus    Manuals        SKTC  1x5 20" 1x5 20" 1x5  20" 1x5 20" 1x5 20"       MT  ham, piriformis stretch, calf 10 8' 8' 8' 1x5 20" 1x5 20"                                 Neuro Re-Ed             bridges  2x10 2x10 2x10 2x10        Pt education  5' 5'   3'                                                                        Ther Ex             Nu step 5 min 6 mins 6 mins 6 min 7 min  7'       3-way phyioball rollouts 20x 10x ea 3" 10x 3" ea 10x3"  ea 10x3" ea 10x3" ea       TB hip abd Blue TB  30x   BTB  30x BTB 30x BTB 30x       Adductor squeeze 20x   30x 30x 30x       Ankle pumps 10x   30x 30x        LTR  1x15 ea 1x15 ea 1x15  ea 1x15 1x15       talha rows  sitting    18#  30x 18# 30x                     Ther Activity             Sit to stands   2x10 2x10 2x10 2x10 2x10       Cuing for proper technique  4' 4'   4'       Gait Training Modalities

## 2022-10-03 ENCOUNTER — APPOINTMENT (OUTPATIENT)
Dept: LAB | Facility: CLINIC | Age: 52
End: 2022-10-03
Payer: COMMERCIAL

## 2022-10-03 ENCOUNTER — TELEPHONE (OUTPATIENT)
Dept: HEMATOLOGY ONCOLOGY | Facility: CLINIC | Age: 52
End: 2022-10-03

## 2022-10-03 ENCOUNTER — OFFICE VISIT (OUTPATIENT)
Dept: INTERNAL MEDICINE CLINIC | Facility: CLINIC | Age: 52
End: 2022-10-03
Payer: COMMERCIAL

## 2022-10-03 VITALS
RESPIRATION RATE: 18 BRPM | BODY MASS INDEX: 32.78 KG/M2 | HEIGHT: 66 IN | HEART RATE: 102 BPM | DIASTOLIC BLOOD PRESSURE: 70 MMHG | TEMPERATURE: 98 F | WEIGHT: 204 LBS | SYSTOLIC BLOOD PRESSURE: 108 MMHG | OXYGEN SATURATION: 96 %

## 2022-10-03 DIAGNOSIS — I10 PRIMARY HYPERTENSION: ICD-10-CM

## 2022-10-03 DIAGNOSIS — Z11.59 ENCOUNTER FOR HEPATITIS C SCREENING TEST FOR LOW RISK PATIENT: ICD-10-CM

## 2022-10-03 DIAGNOSIS — M32.14 LUPUS NEPHRITIS (HCC): ICD-10-CM

## 2022-10-03 DIAGNOSIS — Z11.4 SCREENING FOR HIV WITHOUT PRESENCE OF RISK FACTORS: ICD-10-CM

## 2022-10-03 DIAGNOSIS — N40.0 BENIGN PROSTATIC HYPERPLASIA WITHOUT LOWER URINARY TRACT SYMPTOMS: ICD-10-CM

## 2022-10-03 DIAGNOSIS — M32.9 SYSTEMIC LUPUS ERYTHEMATOSUS, UNSPECIFIED SLE TYPE, UNSPECIFIED ORGAN INVOLVEMENT STATUS (HCC): Primary | ICD-10-CM

## 2022-10-03 DIAGNOSIS — N18.30 STAGE 3 CHRONIC KIDNEY DISEASE, UNSPECIFIED WHETHER STAGE 3A OR 3B CKD (HCC): ICD-10-CM

## 2022-10-03 DIAGNOSIS — Z86.718 HISTORY OF DVT (DEEP VEIN THROMBOSIS): ICD-10-CM

## 2022-10-03 DIAGNOSIS — Z86.718 HISTORY OF DVT OF LOWER EXTREMITY: ICD-10-CM

## 2022-10-03 DIAGNOSIS — M62.81 MUSCLE WEAKNESS (GENERALIZED): ICD-10-CM

## 2022-10-03 PROBLEM — L03.116 CELLULITIS AND ABSCESS OF LEFT LOWER EXTREMITY: Status: RESOLVED | Noted: 2018-07-29 | Resolved: 2022-10-03

## 2022-10-03 PROBLEM — N17.9 ACUTE-ON-CHRONIC KIDNEY INJURY (HCC): Status: RESOLVED | Noted: 2018-08-20 | Resolved: 2022-10-03

## 2022-10-03 PROBLEM — J20.9 ACUTE BRONCHITIS: Status: RESOLVED | Noted: 2019-05-08 | Resolved: 2022-10-03

## 2022-10-03 PROBLEM — N17.9 AKI (ACUTE KIDNEY INJURY) (HCC): Status: RESOLVED | Noted: 2018-07-29 | Resolved: 2022-10-03

## 2022-10-03 PROBLEM — E87.5 HYPERKALEMIA: Status: RESOLVED | Noted: 2018-07-29 | Resolved: 2022-10-03

## 2022-10-03 PROBLEM — M54.50 LOWER BACK PAIN: Status: RESOLVED | Noted: 2019-11-04 | Resolved: 2022-10-03

## 2022-10-03 PROBLEM — R07.9 CHEST PAIN: Status: RESOLVED | Noted: 2019-07-18 | Resolved: 2022-10-03

## 2022-10-03 PROBLEM — L02.416 CELLULITIS AND ABSCESS OF LEFT LOWER EXTREMITY: Status: RESOLVED | Noted: 2018-07-29 | Resolved: 2022-10-03

## 2022-10-03 PROBLEM — N18.9 ACUTE-ON-CHRONIC KIDNEY INJURY (HCC): Status: RESOLVED | Noted: 2018-08-20 | Resolved: 2022-10-03

## 2022-10-03 PROBLEM — K92.1 MELENA: Status: RESOLVED | Noted: 2018-08-21 | Resolved: 2022-10-03

## 2022-10-03 PROBLEM — L03.116 CELLULITIS OF LEFT LOWER EXTREMITY: Status: RESOLVED | Noted: 2022-03-24 | Resolved: 2022-10-03

## 2022-10-03 PROBLEM — R65.10 SIRS (SYSTEMIC INFLAMMATORY RESPONSE SYNDROME) (HCC): Status: RESOLVED | Noted: 2022-03-24 | Resolved: 2022-10-03

## 2022-10-03 LAB
ALBUMIN SERPL BCP-MCNC: 4.1 G/DL (ref 3.5–5)
ALP SERPL-CCNC: 149 U/L (ref 46–116)
ALT SERPL W P-5'-P-CCNC: 21 U/L (ref 12–78)
ANION GAP SERPL CALCULATED.3IONS-SCNC: 10 MMOL/L (ref 4–13)
AST SERPL W P-5'-P-CCNC: 23 U/L (ref 5–45)
BASOPHILS # BLD AUTO: 0.03 THOUSANDS/ΜL (ref 0–0.1)
BASOPHILS NFR BLD AUTO: 1 % (ref 0–1)
BILIRUB SERPL-MCNC: 0.5 MG/DL (ref 0.2–1)
BUN SERPL-MCNC: 39 MG/DL (ref 5–25)
CALCIUM SERPL-MCNC: 9.4 MG/DL (ref 8.3–10.1)
CHLORIDE SERPL-SCNC: 96 MMOL/L (ref 96–108)
CHOLEST SERPL-MCNC: 143 MG/DL
CO2 SERPL-SCNC: 24 MMOL/L (ref 21–32)
CREAT SERPL-MCNC: 2 MG/DL (ref 0.6–1.3)
EOSINOPHIL # BLD AUTO: 0.03 THOUSAND/ΜL (ref 0–0.61)
EOSINOPHIL NFR BLD AUTO: 1 % (ref 0–6)
ERYTHROCYTE [DISTWIDTH] IN BLOOD BY AUTOMATED COUNT: 14.6 % (ref 11.6–15.1)
EST. AVERAGE GLUCOSE BLD GHB EST-MCNC: 123 MG/DL
GFR SERPL CREATININE-BSD FRML MDRD: 36 ML/MIN/1.73SQ M
GLUCOSE P FAST SERPL-MCNC: 107 MG/DL (ref 65–99)
HBA1C MFR BLD: 5.9 %
HCT VFR BLD AUTO: 41.4 % (ref 36.5–49.3)
HCV AB SER QL: NORMAL
HDLC SERPL-MCNC: 42 MG/DL
HGB BLD-MCNC: 13.6 G/DL (ref 12–17)
IMM GRANULOCYTES # BLD AUTO: 0.04 THOUSAND/UL (ref 0–0.2)
IMM GRANULOCYTES NFR BLD AUTO: 1 % (ref 0–2)
LDLC SERPL CALC-MCNC: 74 MG/DL (ref 0–100)
LYMPHOCYTES # BLD AUTO: 0.66 THOUSANDS/ΜL (ref 0.6–4.47)
LYMPHOCYTES NFR BLD AUTO: 17 % (ref 14–44)
MCH RBC QN AUTO: 27.9 PG (ref 26.8–34.3)
MCHC RBC AUTO-ENTMCNC: 32.9 G/DL (ref 31.4–37.4)
MCV RBC AUTO: 85 FL (ref 82–98)
MONOCYTES # BLD AUTO: 0.59 THOUSAND/ΜL (ref 0.17–1.22)
MONOCYTES NFR BLD AUTO: 15 % (ref 4–12)
NEUTROPHILS # BLD AUTO: 2.52 THOUSANDS/ΜL (ref 1.85–7.62)
NEUTS SEG NFR BLD AUTO: 65 % (ref 43–75)
NRBC BLD AUTO-RTO: 0 /100 WBCS
PLATELET # BLD AUTO: 243 THOUSANDS/UL (ref 149–390)
PMV BLD AUTO: 10.5 FL (ref 8.9–12.7)
POTASSIUM SERPL-SCNC: 3.5 MMOL/L (ref 3.5–5.3)
PROT SERPL-MCNC: 9.1 G/DL (ref 6.4–8.4)
RBC # BLD AUTO: 4.88 MILLION/UL (ref 3.88–5.62)
SODIUM SERPL-SCNC: 130 MMOL/L (ref 135–147)
TRIGL SERPL-MCNC: 135 MG/DL
TSH SERPL DL<=0.05 MIU/L-ACNC: 2.95 UIU/ML (ref 0.45–4.5)
WBC # BLD AUTO: 3.87 THOUSAND/UL (ref 4.31–10.16)

## 2022-10-03 PROCEDURE — 81240 F2 GENE: CPT

## 2022-10-03 PROCEDURE — 99214 OFFICE O/P EST MOD 30 MIN: CPT | Performed by: INTERNAL MEDICINE

## 2022-10-03 PROCEDURE — 80053 COMPREHEN METABOLIC PANEL: CPT

## 2022-10-03 PROCEDURE — 85303 CLOT INHIBIT PROT C ACTIVITY: CPT

## 2022-10-03 PROCEDURE — 85306 CLOT INHIBIT PROT S FREE: CPT

## 2022-10-03 PROCEDURE — 99204 OFFICE O/P NEW MOD 45 MIN: CPT | Performed by: INTERNAL MEDICINE

## 2022-10-03 PROCEDURE — 85670 THROMBIN TIME PLASMA: CPT

## 2022-10-03 PROCEDURE — 85732 THROMBOPLASTIN TIME PARTIAL: CPT

## 2022-10-03 PROCEDURE — 81241 F5 GENE: CPT

## 2022-10-03 PROCEDURE — 86147 CARDIOLIPIN ANTIBODY EA IG: CPT

## 2022-10-03 PROCEDURE — 36415 COLL VENOUS BLD VENIPUNCTURE: CPT

## 2022-10-03 PROCEDURE — 86803 HEPATITIS C AB TEST: CPT

## 2022-10-03 PROCEDURE — 80061 LIPID PANEL: CPT

## 2022-10-03 PROCEDURE — 83036 HEMOGLOBIN GLYCOSYLATED A1C: CPT

## 2022-10-03 PROCEDURE — 84443 ASSAY THYROID STIM HORMONE: CPT

## 2022-10-03 PROCEDURE — 85025 COMPLETE CBC W/AUTO DIFF WBC: CPT

## 2022-10-03 PROCEDURE — 84153 ASSAY OF PSA TOTAL: CPT

## 2022-10-03 PROCEDURE — 84154 ASSAY OF PSA FREE: CPT

## 2022-10-03 PROCEDURE — 87389 HIV-1 AG W/HIV-1&-2 AB AG IA: CPT

## 2022-10-03 PROCEDURE — 85300 ANTITHROMBIN III ACTIVITY: CPT

## 2022-10-03 PROCEDURE — 86146 BETA-2 GLYCOPROTEIN ANTIBODY: CPT

## 2022-10-03 PROCEDURE — 85305 CLOT INHIBIT PROT S TOTAL: CPT

## 2022-10-03 PROCEDURE — 85613 RUSSELL VIPER VENOM DILUTED: CPT

## 2022-10-03 PROCEDURE — 85705 THROMBOPLASTIN INHIBITION: CPT

## 2022-10-03 NOTE — PROGRESS NOTES
Assessment/Plan:     [facility]  His wife is a patient of mine  He has a PMH for lupus nephritis, chronic DVT on eliquis after failing xarelto, HTN, BPH, DDD; he is using a walker to ambulate in the office  Attending PT  Follows with nephrology, neurology  Symptoms are stable  Denies any issues or complaints today  Will obtain labs and follow up in 3 months  Quality Measures:     BMI Counseling: Body mass index is 32 93 kg/m²  The BMI is above normal  Nutrition recommendations include decreasing portion sizes and encouraging healthy choices of fruits and vegetables  Exercise recommendations include moderate physical activity 150 minutes/week  Rationale for BMI follow-up plan is due to patient being overweight or obese  Depression Screening and Follow-up Plan: Patient was screened for depression during today's encounter  They screened negative with a PHQ-2 score of 0  Return in about 3 months (around 1/3/2023)  No problem-specific Assessment & Plan notes found for this encounter  Diagnoses and all orders for this visit:    Systemic lupus erythematosus, unspecified SLE type, unspecified organ involvement status ([facility] )    Lupus nephritis ([facility] )    Primary hypertension  -     CBC and differential; Future  -     Comprehensive metabolic panel; Future  -     TSH, 3rd generation with Free T4 reflex; Future  -     Hemoglobin A1C; Future  -     Lipid Panel with Direct LDL reflex; Future    Stage 3 chronic kidney disease, unspecified whether stage 3a or 3b CKD (HCC)  -     CBC and differential; Future  -     Comprehensive metabolic panel; Future    History of DVT (deep vein thrombosis)    Encounter for hepatitis C screening test for low risk patient  -     Hepatitis C antibody;  Future    Screening for HIV without presence of risk factors  -     HIV 1/2 Antigen/Antibody (4th Generation) w Reflex [facility]; Future    Muscle weakness (generalized)    Benign prostatic hyperplasia without lower urinary tract symptoms  -     PSA, total and free; Future          Subjective:      Patient ID: Little Guadalupe is a 45 y o  male  Here to establish care        ALLERGIES:  Allergies   Allergen Reactions    Sulfa Antibiotics Rash       CURRENT MEDICATIONS:    Current Outpatient Medications:     apixaban (ELIQUIS) 5 mg, Take 5 mg by mouth 2 (two) times a day, Disp: , Rfl:     baclofen 20 mg tablet, Take 20 mg by mouth 3 (three) times a day , Disp: , Rfl:     betamethasone dipropionate (DIPROSONE) 0 05 % cream, Apply 1 application topically 2 (two) times a day, Disp: , Rfl:     betamethasone, augmented, (DIPROLENE) 0 05 % ointment, Apply 1 application topically 2 (two) times a day, Disp: , Rfl:     carvedilol (COREG) 12 5 mg tablet, Take 12 5 mg by mouth 2 (two) times a day with meals, Disp: , Rfl:     Diclofenac Sodium (VOLTAREN) 1 %, Apply 2 g topically 4 (four) times a day, Disp: 2 g, Rfl: 0    escitalopram (LEXAPRO) 10 mg tablet, Take 10 mg by mouth daily, Disp: , Rfl:     furosemide (LASIX) 40 mg tablet, Take 0 5 tablets (20 mg total) by mouth daily, Disp: , Rfl: 0    gabapentin (NEURONTIN) 300 mg capsule, TAKE 1 CAPSULE BY MOUTH AT BEDTIME IN ADDITION TO 600MG, Disp: 30 capsule, Rfl: 5    gabapentin (NEURONTIN) 600 MG tablet, Take 600 mg by mouth 3 (three) times a day , Disp: , Rfl:     hydroxychloroquine (PLAQUENIL) 200 mg tablet, Take 400 mg by mouth daily at bedtime, Disp: , Rfl:     hydrOXYzine HCL (ATARAX) 10 mg tablet, Take 10 mg by mouth every 6 (six) hours as needed for itching, Disp: , Rfl:     metolazone (ZAROXOLYN) 2 5 mg tablet, Take 2 5 mg by mouth daily, Disp: , Rfl:     mycophenolate (CELLCEPT) 500 mg tablet, Take 500 mg by mouth every 12 (twelve) hours , Disp: , Rfl:     potassium chloride (K-DUR,KLOR-CON) 20 mEq tablet, Take 20 mEq by mouth daily, Disp: , Rfl:     traMADol (ULTRAM) 50 mg tablet, Take 50 mg by mouth every 6 (six) hours as needed for moderate pain, Disp: , Rfl: ACTIVE PROBLEM LIST:  Patient Active Problem List   Diagnosis    Lupus (systemic lupus erythematosus) [facility] )    Lupus nephritis ([place] [address] )    Hypertension    History of DVT (deep vein thrombosis)    MARK (acute kidney injury) ([place] [address] )    Chronic kidney disease, stage 3 (HCC)    Persistent proteinuria    Anemia in chronic kidney disease    Muscle weakness (generalized)    Tremor of both hands    Spasticity    Gait difficulty    Neuropathic pain       PAST MEDICAL HISTORY:  Past Medical History:   Diagnosis Date    Cervical mass     Coronary artery disease     Depression     [facility]     Hypertension     Lupus ([place] [address] )     Renal disorder        PAST SURGICAL HISTORY:  Past Surgical History:   Procedure Laterality Date    APPENDECTOMY      CERVICAL SPINE SURGERY      CHOLECYSTECTOMY      COLONOSCOPY N/A 8/23/2018    Procedure: COLONOSCOPY;  Surgeon: Elieser Bashir MD;  Location: [facility]; Service: Gastroenterology    ESOPHAGOGASTRODUODENOSCOPY N/A 8/22/2018    Procedure: ESOPHAGOGASTRODUODENOSCOPY (EGD); Surgeon: Elieser Bashir MD;  Location: [facility];   Service: Gastroenterology    NECK SURGERY      VASCULAR SURGERY         FAMILY HISTORY:  Family History   Problem Relation Age of Onset    Heart disease Mother     No Known Problems Father        SOCIAL HISTORY:  Social History     Socioeconomic History    Marital status: /Civil Union     Spouse name: Not on file    Number of children: Not on file    Years of education: Not on file    Highest education level: Not on file   Occupational History    Not on file   Tobacco Use    Smoking status: Never Smoker    Smokeless tobacco: Never Used   Vaping Use    Vaping Use: Never used   Substance and Sexual Activity    Alcohol use: Never    Drug use: Never    Sexual activity: Yes     Partners: Female     Birth control/protection: Rhythm   Other Topics Concern    Not on file   Social History Narrative    ** Merged History Encounter **          Social Determinants of Health     Financial Resource Strain: Not on file   Food Insecurity: Not on file   Transportation Needs: Not on file   Physical Activity: Not on file   Stress: Not on file   Social Connections: Not on file   Intimate Partner Violence: Not on file   Housing Stability: Not on file       Review of Systems   Genitourinary: Positive for frequency  Musculoskeletal: Positive for arthralgias, back pain and gait problem  Allergic/Immunologic: Positive for immunocompromised state  All other systems reviewed and are negative  Objective:  Vitals:    10/03/22 0910   BP: 108/70   BP Location: Left arm   Patient Position: Sitting   Cuff Size: Standard   Pulse: 102   Resp: 18   Temp: 98 °F (36 7 °C)   TempSrc: Tympanic   SpO2: 96%   Weight: 92 5 kg (204 lb)   Height: 5' 6" (1 676 m)     Body mass index is 32 93 kg/m²  Physical Exam  Vitals and nursing note reviewed  Constitutional:       Appearance: Normal appearance  He is obese  HENT:      Head: Normocephalic and atraumatic  Cardiovascular:      Rate and Rhythm: Normal rate and regular rhythm  Heart sounds: Normal heart sounds  Pulmonary:      Effort: Pulmonary effort is normal       Breath sounds: Normal breath sounds  Abdominal:      General: Abdomen is flat  Bowel sounds are normal       Palpations: Abdomen is soft  Musculoskeletal:         General: Normal range of motion  Cervical back: Normal range of motion  Right lower leg: Edema present  Left lower leg: Edema present  Lymphadenopathy:      Cervical: No cervical adenopathy  Skin:     General: Skin is warm and dry  Neurological:      General: No focal deficit present  Mental Status: He is alert and oriented to person, place, and time  Motor: Weakness present        Gait: Gait abnormal    Psychiatric:         Mood and Affect: Mood normal          Behavior: Behavior normal  RESULTS:    Recent Results (from the past 1008 hour(s))   ECG 12 lead    Collection Time: 09/01/22  7:26 PM   Result Value Ref Range    Ventricular Rate 92 BPM    Atrial Rate 92 BPM    WA Interval 202 ms    QRSD Interval 100 ms    QT Interval 380 ms    QTC Interval 469 ms    P Axis 30 degrees    QRS Axis 198 degrees    T Wave Axis 28 degrees       This note was created with voice recognition software  Phonic, grammatical and spelling errors may be present within the note as a result

## 2022-10-03 NOTE — TELEPHONE ENCOUNTER
Appointment Cancellation Or Reschedule     Person calling in Patient    Provider YUMIKO DAMON [facility]   Office Visit Date and Time 10/04 at [facility]   Did patient want to reschedule their office appointment? If so, when was it scheduled to? Yes, 10/24 at 10:30am   Did you have STAR scheduled for this appointment? no   Do you need STAR set up for your new appointment? If yes, please send to "PATIENT RIDESHARE" pool for STAR rescheduling no   If you are cancelling appointment, can we notify STAR to cancel ride? If yes, please send to "PATIENT RIDESHARE" pool for STAR to cancel service no   Is this patient calling to reschedule an infusion appointment? no   When is their next infusion appointment? n/a   Is this patient a Chemo patient? no   Reason for Cancellation or Reschedule Provider      If the patient is a treatment patient, please route this to the office nurse  If the patient is not on treatment, please route to the office MA  If the patient is a surgical oncology patient, please route to surg/onc clinical pool

## 2022-10-04 ENCOUNTER — APPOINTMENT (OUTPATIENT)
Dept: PHYSICAL THERAPY | Facility: CLINIC | Age: 52
End: 2022-10-04

## 2022-10-04 LAB
APTT SCREEN TO CONFIRM RATIO: 1.07 RATIO (ref 0–1.34)
B2 GLYCOPROT1 IGA SERPL IA-ACNC: 3.7
B2 GLYCOPROT1 IGG SERPL IA-ACNC: 1.3
B2 GLYCOPROT1 IGM SERPL IA-ACNC: <2.9
CARDIOLIPIN IGA SER IA-ACNC: 5.6
CARDIOLIPIN IGG SER IA-ACNC: 1.6
CARDIOLIPIN IGM SER IA-ACNC: 1
CONFIRM APTT/NORMAL: 45.7 SEC (ref 0–47.6)
DEPRECATED AT III PPP: 97 % OF NORMAL (ref 92–136)
DRVVT IMM 1:2 NP PPP: 54.6 SEC (ref 0–40.4)
DRVVT SCREEN TO CONFIRM RATIO: 1.2 RATIO (ref 0.8–1.2)
HIV 1+2 AB+HIV1 P24 AG SERPL QL IA: NORMAL
LA PPP-IMP: ABNORMAL
PSA FREE MFR SERPL: 39.1 %
PSA FREE SERPL-MCNC: 0.9 NG/ML
PSA SERPL-MCNC: 2.3 NG/ML (ref 0–4)
SCREEN APTT: 33.6 SEC (ref 0–51.9)
SCREEN DRVVT: 72.7 SEC (ref 0–47)
THROMBIN TIME: 17.6 SEC (ref 0–23)

## 2022-10-05 LAB
PROT S ACT/NOR PPP: 94 % (ref 61–136)
PROT S PPP-ACNC: 116 % (ref 60–150)

## 2022-10-06 ENCOUNTER — OFFICE VISIT (OUTPATIENT)
Dept: PHYSICAL THERAPY | Facility: CLINIC | Age: 52
End: 2022-10-06
Payer: COMMERCIAL

## 2022-10-06 DIAGNOSIS — M51.36 DDD (DEGENERATIVE DISC DISEASE), LUMBAR: Primary | ICD-10-CM
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
## 2022-10-06 LAB
F2 GENE MUT ANL BLD/T: NORMAL
PROT C AG ACT/NOR PPP IA: 107 % OF NORMAL (ref 60–150)
PROT S ACT/NOR PPP: 67 % (ref 71–117)

## 2022-10-06 PROCEDURE — 97530 THERAPEUTIC ACTIVITIES: CPT | Performed by: PHYSICAL THERAPIST

## 2022-10-06 PROCEDURE — 97112 NEUROMUSCULAR REEDUCATION: CPT | Performed by: PHYSICAL THERAPIST

## 2022-10-06 PROCEDURE — 97110 THERAPEUTIC EXERCISES: CPT | Performed by: PHYSICAL THERAPIST

## 2022-10-06 PROCEDURE — 97140 MANUAL THERAPY 1/> REGIONS: CPT | Performed by: PHYSICAL THERAPIST

## 2022-10-06 NOTE — PROGRESS NOTES
Daily Note     Today's date: 10/6/2022  Patient name: Abigail Vallejo  : 1970  MRN: [id number]  Referring provider: Kristin Rogers DO  Dx:   Encounter Diagnosis     ICD-10-CM    1  DDD (degenerative disc disease), lumbar  M51 36    2  Fall, subsequent encounter  W19  XXXD        Start Time: 6520  Stop Time: 9627  Total time in clinic (min): 50 minutes    Subjective: Pt reports his low back is feeling better with decreased pain at the start of therapy today  Objective: See treatment diary below      Assessment: Tolerated treatment well  Patient demonstrated fatigue post treatment, exhibited good technique with therapeutic exercises and would benefit from continued PT  Pt was able to progress with inclusion of talha extensions into pt's exercise program  Pt required min verbal cues to facilitate performance of proper technique  Assess pt response to treatment at next visit  Plan: Continue per plan of care        Precautions: HTN, fall risk, DVT, cervical SX, Lupus    Manuals 9/1 9/7 9/14 9/22 9/27 9/30 10/6      SKTC  1x5 20" 1x5 20" 1x5  20" 1x5 20" 1x5 20" 1x5 20"      MT  ham, piriformis stretch, calf 10 8' 8' 8' 1x5 20" 1x5 20" 1x5 20"                                Neuro Re-Ed             bridges  2x10 2x10 2x10 2x10  2x10      Pt education  5' 5'   3' 5'      talha rows sitting       3x10 18#      talha extensions sitting       2x10 12#                                             Ther Ex             Nu step 5 min 6 mins 6 mins 6 min 7 min  7' 7'      3-way phyioball rollouts 20x 10x ea 3" 10x 3" ea 10x3"  ea 10x3" ea 10x3" ea 10x3" ea      TB hip abd Blue TB  30x   BTB  30x BTB 30x BTB 30x BTB 30x      Adductor squeeze 20x   30x 30x 30x 30x      Ankle pumps 10x   30x 30x        LTR  1x15 ea 1x15 ea 1x15  ea 1x15 1x15 1x15                                Ther Activity             Sit to stands   2x10 2x10 2x10 2x10 2x10 2x10      Cuing for proper technique  4' 4'   4' 4'      Gait Training Modalities

## 2022-10-07 LAB — F5 GENE MUT ANL BLD/T: NORMAL

## 2022-10-08 ENCOUNTER — APPOINTMENT (OUTPATIENT)
Dept: RADIOLOGY | Facility: CLINIC | Age: 52
End: 2022-10-08
Payer: COMMERCIAL

## 2022-10-08 ENCOUNTER — OFFICE VISIT (OUTPATIENT)
Dept: OBGYN CLINIC | Facility: CLINIC | Age: 52
End: 2022-10-08
Payer: COMMERCIAL

## 2022-10-08 VITALS
SYSTOLIC BLOOD PRESSURE: 104 MMHG | RESPIRATION RATE: 18 BRPM | OXYGEN SATURATION: 98 % | HEART RATE: 80 BPM | WEIGHT: 203 LBS | BODY MASS INDEX: 32.62 KG/M2 | HEIGHT: 66 IN | DIASTOLIC BLOOD PRESSURE: 62 MMHG

## 2022-10-08 DIAGNOSIS — M79.641 RIGHT HAND PAIN: ICD-10-CM

## 2022-10-08 DIAGNOSIS — M79.641 RIGHT HAND PAIN: Primary | ICD-10-CM

## 2022-10-08 DIAGNOSIS — M25.531 PAIN IN RIGHT WRIST: ICD-10-CM

## 2022-10-08 PROCEDURE — 99214 OFFICE O/P EST MOD 30 MIN: CPT | Performed by: FAMILY MEDICINE

## 2022-10-08 PROCEDURE — 73130 X-RAY EXAM OF HAND: CPT

## 2022-10-08 NOTE — PROGRESS NOTES
Subjective:  Chief Complaint   Patient presents with    Left Shoulder - Follow-up    Left Wrist - Follow-up, Pain       Little Guadalupe is a 45 y o  male complains of right 5th digit pain  Onset of the symptoms was several months ago  Mechanism of injury: states that it worsened since fall on 8/30  Aggravating factors: bending the finger  Treatment to date: brace which is ineffective  Symptoms have progressed to a point and plateaued  Patient had initial radiographs done on August 30th which revealed no acute fractures or dislocations  Degenerative changes were appreciated at the distal radial ulnar joint with vascular calcifications appreciated  Patient does report left shoulder pain has improved with use of the Voltaren gel    The following portions of the patient's history were reviewed and updated as appropriate: allergies, current medications, past family history, past medical history, past social history, past surgical history and problem list         Review of Systems   Constitutional: Negative for fever  Musculoskeletal: positive for hand pain  Skin: Negative for rash and ulcer  Neurological: Negative for numbness or tingling in hand       Objective:  /62   Pulse 80   Resp 18   Ht 5' 6" (1 676 m)   Wt 92 1 kg (203 lb)   SpO2 98%   BMI 32 77 kg/m²     Skin: no rashes, lesions, skin discolorations, lacerations  Vasculature: normal radial and ulnar pulse, normal skin color, normal capillary refill in extremity, no upper extremity edema  Neurologic:  Notable spasticity  Patient ambulates with walker a notable tremor  Tremor appreciated in the right hand  Musculoskeletal:   Right hand   inspection: no gross deformity    Palpation:  Patient does exhibit some tenderness to palpation over the PIP joint of the right 5th digit  ROM:  FROM a 5/5 strength with finger flexion and extension            Imaging:    XR shoulder 2+ vw left    Result Date: 10/1/2022  Narrative: LEFT SHOULDER INDICATION:   M25 [id number]: Pain in left shoulder  COMPARISON:  August 30, 2022  VIEWS:  XR SHOULDER 2+ VW LEFT FINDINGS: There is no acute fracture or dislocation  Moderate osteoarthritis acromioclavicular joint  No lytic or blastic osseous lesion  Soft tissues are unremarkable  Prior lower cervical fusion  Impression: No acute osseous abnormality  Workstation performed: [id number]        Assessment/Plan:    1  Pain in right wrist  2  Right hand pain    - XR hand 3+ vw right; Future  - Ambulatory Referral to Occupational Therapy; Future    >45min devoted to review of previous, pertinent medical records, imaging, discussion of treatment options, counseling and documentation  Imaging independently reviewed and discussed with patient  Degenerative changes noted the radioulnar joint, no acute fractures appreciated  Follow-up official reading  Unclear etiology behind patient's right 5th digit pain  No discernible degenerative changes at the PIP joint where he reports pain in no notable fractures or dislocations appreciated  Suspicion for possible flexor tendinopathy from longstanding walker use  Advised patient that we will initiate with OT and follow-up with him in 4-6 week for re-evaluation    If his pain persists at that time we can consider advanced imaging for further evaluation

## 2022-10-11 ENCOUNTER — APPOINTMENT (OUTPATIENT)
Dept: PHYSICAL THERAPY | Facility: CLINIC | Age: 52
End: 2022-10-11

## 2022-10-17 ENCOUNTER — APPOINTMENT (OUTPATIENT)
Dept: PHYSICAL THERAPY | Facility: CLINIC | Age: 52
End: 2022-10-17

## 2022-10-20 ENCOUNTER — OFFICE VISIT (OUTPATIENT)
Dept: PHYSICAL THERAPY | Facility: CLINIC | Age: 52
End: 2022-10-20
Payer: COMMERCIAL
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
## 2022-10-20 DIAGNOSIS — M51.36 DDD (DEGENERATIVE DISC DISEASE), LUMBAR: Primary | ICD-10-CM

## 2022-10-20 PROCEDURE — 97110 THERAPEUTIC EXERCISES: CPT | Performed by: PHYSICAL THERAPIST

## 2022-10-20 PROCEDURE — 97112 NEUROMUSCULAR REEDUCATION: CPT | Performed by: PHYSICAL THERAPIST

## 2022-10-20 PROCEDURE — 97530 THERAPEUTIC ACTIVITIES: CPT | Performed by: PHYSICAL THERAPIST

## 2022-10-20 NOTE — PROGRESS NOTES
Daily Note     Today's date: 10/20/2022  Patient name: Jolynn Bueno  : 1970  MRN: [id number]  Referring provider: Vania Edmonds DO  Dx:   Encounter Diagnosis     ICD-10-CM    1  DDD (degenerative disc disease), lumbar  M51 36    2  Fall, subsequent encounter  W19  XXXD        Start Time:   Stop Time: 931  Total time in clinic (min): 48 minutes    Subjective: Pt reports his low back has been "so-so" over the past few days after missing his last therapy session  Objective: See treatment diary below      Assessment: Tolerated treatment well  Patient demonstrated fatigue post treatment, exhibited good technique with therapeutic exercises and would benefit from continued PT  Pt was able to progress today with inclusion of LAQs and bridges c hip adduction into pt's exercise program  Pt continues to demonstrate a limited tolerance, however, he is able to complete all exercises with adequate rest breaks  Pt required min verbal cues to facilitate performance of proper technique  Assess pt response to treatment at next visit  Plan: Continue per plan of care        Precautions: HTN, fall risk, DVT, cervical SX, Lupus    Manuals 9/1 9/7 9/14 9/22 9/27 9/30 10/6 10/20     SKTC  1x5 20" 1x5 20" 1x5  20" 1x5 20" 1x5 20" 1x5 20" 1x5 20"     MT  ham, piriformis stretch, calf 10 8' 8' 8' 1x5 20" 1x5 20" 1x5 20" 1x5 20"                               Neuro Re-Ed             Bridges c hip add  2x10 2x10 2x10 2x10  2x10 2x10     Pt education  5' 5'   3' 5' 5'     talha rows sitting       3x10 18# 3x10 18#     talha extensions sitting       2x10 12# 2x10 12#                                            Ther Ex             Nu step 5 min 6 mins 6 mins 6 min 7 min  7' 7' 8' lvl 9     3-way phyioball rollouts 20x 10x ea 3" 10x 3" ea 10x3"  ea 10x3" ea 10x3" ea 10x3" ea 10x3" ea     TB hip abd Blue TB  30x   BTB  30x BTB 30x BTB 30x BTB 30x Blue 30x                  Ankle pumps 10x   30x 30x        LTR  1x15 ea 1x15 ea 1x15  ea 1x15 1x15 1x15 1x15     LAQs        3x10 5#                  Ther Activity             Sit to stands   2x10 2x10 2x10 2x10 2x10 2x10 2x10     Cuing for proper technique  4' 4'   4' 4' 4'     Gait Training                                       Modalities

## 2022-10-23 NOTE — PROGRESS NOTES
HEMATOLOGY CLINIC NOTE    Primary Care Provider: Eddie Rodriguez MD  Referring Provider:    MRN: [id number]  : 1970    Assessment / Plan:   1  History of DVT of lower extremity  This is a 66-year-old male with a history of multiple DVT, superficial thrombophlebitis  He is a poor historian  Him and his girlfriend provided some of history today along with prior hematology notes from [facility]  Patient has not had a thrombosis since about 2 years ago  He has had multiple venous Doppler since showing negative findings  Patient did have thrombosis on top of Xarelto a few years ago which patient was then switched to Eliquis 5 mg twice daily  He has tolerated this well without side effect  He had a thrombosis panel recently which was negative except for mildly low protein S activity at 67 as well as abnormal lupus anticoagulant testing  However, patient did not hold anticoagulation despite instructions to hold prior to testing  We will have patient hold Eliquis 5 mg twice daily at least 3 days prior to obtaining testing  Afterwards, he will have lupus anticoagulant and protein S activity redrawn  He will then return back on Eliquis after this is drawn  Follow-up in 1 month to review results  However, afterwards, patient likely can be seen as needed as he will just stay on lifelong anticoagulation     - Lupus anticoagulant; Future  - Protein S activity; Future    2  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  Patient has history of chronic leukopenia and anemia  Likely related to immunosuppressive agents using CellCept, also could be related to Plaquenil use  Will monitor for now  Most recent CBC with differential showed improvement in counts  10/03/2022:  WBC 3 87, hemoglobin 13 6, MCV 85, platelet 088 K, differential unrevealing  3  Systemic lupus erythematosus, unspecified SLE type, unspecified organ involvement status ([facility] [address] )  4   Lupus nephritis [facility])  Patient following Rheumatology and Nephrology  · Discussion of decision making    I personally reviewed the following lab results, the image studies, pathology, other specialty/physicians consult notes and recommendations, and outside medical records from [facility]  I had a lengthy discussion with the patient and shared the work-up findings  I spent 20 minutes reviewing the records (labs, clinician notes, outside records, medical history, ordering medicine/tests/procedures, interpreting the imaging/labs previously done) and coordination of care as well as direct time with the patient today, of which greater than 50% of the time was spent in counseling and coordination of care with the patient/family  · Plan/Labs  · Lifelong anticoagulation with Eliquis 5 mg twice daily  · Will repeat lupus anticoagulant and protein S activity testing with holding Eliquis prior  Afterwards, he will return back on Eliquis  If repeat testing is unrevealing, patient can be seen as needed and will stay on lifelong anticoagulation  · Continue following Rheumatology and Nephrology closely  Follow Up: 1 month  All questions were answered to the patient's satisfaction during this encounter  The patient knows the contact information for our office and knows to reach out for any relevant concerns related to this encounter  They are to call for any temperature 100 4 or higher, new symptoms including but not restricted to shaking chills, decreased appetite, nausea, vomiting, diarrhea, increased fatigue, shortness of breath or chest pain, confusion, and not feeling the strength to come to the clinic  For all other listed problems and medical diagnosis in their chart - they are managed by PCP and/or other specialists, which the patient acknowledges  Thank you very much for your consultation and making us a part of this patient's care  We are continuing to follow closely with you   Please do not hesitate to reach out to me with any additional questions or concerns  Reason for visit:       No chief complaint on file  History of Hematology Illness:     Tiffanie Hopkins is a 45 y o  male who came in for consultation  1  History of DVT, Superficial Thrombophlebitis  - Patient and wife are present for visit  They are poor historians  Able to provide only some history  However, note from hematology [facility] (4/2022) provided most of history  - 7/2017 --> RLE DVT which was diagnosed off of venous Doppler  Duplex showed DVT in the posterior tibial vein  Patient was placed on Xarelto  Thrombosis was unprovoked - no hospitalization, surgery, trauma, accident, testosterone use prior  Patient has had several episodes of superficial venous thrombosis since and left GSV ablation and bilateral LE  vein ablation by [facility] vascular surgery  Patient has had thrombosis on top of Xarelto medication  At visit with [facility] Hematology 04/2022, it was advised patient would be placed on a an alternative anticoagulant  Patient was placed on Eliquis 5 mg twice daily  Per patient, his last thrombosis was approximately 2019/2020      - there is no thrombosis panel that I can view in the chart  Patient has no family history of blood clots  Never had a CVA/MI      - 10/2022: thrombosis panel negative except for Protein S Activity 67%, Lupus Anticoagulant DVVT 72 7 seconds  No LA detected  DRVVT mix-LA 54 6 seconds  DRVVT/confirm ratio 1 2  Patient did not hold AC prior to thrombosis panel being drawn  2  SLE, Lupus Nephritis history  - initial diagnosis was 2014/2015  Following rheumatology     Interval History:   9/2/2022: This is a 66-year-old male with a history of acute bronchitis, hypertension, lupus nephritis, MARK, CKD stage 3, hyperkalemia, cellulitis, history DVT, and more presenting for consultation  Patient denies any chest pain, shortness of breath, bleeding while on Eliquis    He is tolerating Eliquis without any significant side effect  He does have chronic right lower extremity swelling intermittently which waxes and wanes  Again, has been about 2 years since his last blood clot  He has had a few venous Dopplers which have been negative in the last few months  Patient has never had cancer before  He does not smoke  No other autoimmune conditions other than lupus  He was initially diagnosed in 2014, 2015  Following a rheumatologist   Patient does not drink  He is on disability  He uses a walker due to loss of balance  No history of cancer in family  He is up-to-date on colonoscopy  10/24/2022:  Patient came in for follow-up  Patient has no other history of VTE since last seen  He has no new leg swelling, chest pain, shortness of breath  He did get his blood work done  However, he did not hold his anticoagulation as instructed  No bleeding anywhere  Problem list:       Patient Active Problem List   Diagnosis   • Lupus (systemic lupus erythematosus) ([facility])   • Lupus nephritis (HCC)   • Hypertension   • History of DVT (deep vein thrombosis)   • Chronic kidney disease, stage 3 ([facility])   • Persistent proteinuria   • Anemia in chronic kidney disease   • Muscle weakness (generalized)   • Tremor of both hands   • Spasticity   • Gait difficulty   • Neuropathic pain       REVIEW OF SYMPTOMS:   Review of Systems   Constitutional: Negative for activity change, appetite change, chills, diaphoresis, fatigue, fever and unexpected weight change  Using walker with ambulation    HENT: Negative for nosebleeds  Respiratory: Negative for apnea, cough, chest tightness and shortness of breath  Cardiovascular: Negative for chest pain, palpitations and leg swelling  Gastrointestinal: Negative for abdominal distention, abdominal pain, anal bleeding, blood in stool, constipation, diarrhea, nausea and vomiting  Endocrine: Negative for cold intolerance  Genitourinary: Negative for hematuria     Musculoskeletal: Positive for arthralgias (chronic)  Skin: Negative for color change and pallor  Neurological: Negative for dizziness, weakness, light-headedness and headaches  Hematological: Negative for adenopathy  Does not bruise/bleed easily  PHYSICAL EXAMINATION:     Vital Signs: There were no vitals taken for this visit  There is no height or weight on file to calculate BSA  Ht Readings from Last 8 Encounters:   10/08/22 5' 6" (1 676 m)   10/03/22 5' 6" (1 676 m)   09/27/22 5' 6" (1 676 m)   03/24/22 5' 6" (1 676 m)   09/23/20 5' 6" (1 676 m)   02/27/20 5' 6" (1 676 m)   12/13/19 5' 6" (1 676 m)   12/06/19 5' 6" (1 676 m)       Wt Readings from Last 8 Encounters:   10/08/22 92 1 kg (203 lb)   10/03/22 92 5 kg (204 lb)   09/27/22 92 5 kg (204 lb)   06/22/22 92 3 kg (203 lb 7 8 oz)   03/24/22 96 8 kg (213 lb 6 5 oz)   09/23/20 88 6 kg (195 lb 5 2 oz)   02/27/20 88 5 kg (195 lb)   12/13/19 88 5 kg (195 lb)          Physical Exam  Constitutional:       General: He is not in acute distress  Appearance: Normal appearance  He is normal weight  He is not ill-appearing, toxic-appearing or diaphoretic  HENT:      Head: Normocephalic and atraumatic  Eyes:      General: No scleral icterus  Extraocular Movements: Extraocular movements intact  Conjunctiva/sclera: Conjunctivae normal    Cardiovascular:      Rate and Rhythm: Normal rate and regular rhythm  Pulmonary:      Effort: Pulmonary effort is normal  No respiratory distress  Abdominal:      Tenderness: There is no abdominal tenderness  Musculoskeletal:      Cervical back: Normal range of motion and neck supple  Right lower leg: No edema (no new leg swelling)  Left lower leg: No edema  Lymphadenopathy:      Cervical: No cervical adenopathy  Skin:     General: Skin is warm and dry  Coloration: Skin is not jaundiced or pale  Findings: No bruising, erythema, lesion or rash  Neurological:      General: No focal deficit present  Mental Status: He is alert and oriented to person, place, and time  Mental status is at baseline  Motor: No weakness  Psychiatric:         Mood and Affect: Mood normal          Behavior: Behavior normal          Thought Content: Thought content normal          Judgment: Judgment normal        Reviewed historical information  PAST MEDICAL HISTORY:    Past Medical History:   Diagnosis Date   • Cervical mass    • Coronary artery disease    • Depression    • DVT (deep venous thrombosis) (HCC)    • Hypertension    • Lupus ([address] )    • Renal disorder        PAST SURGICAL HISTORY:    Past Surgical History:   Procedure Laterality Date   • APPENDECTOMY     • CERVICAL SPINE SURGERY     • CHOLECYSTECTOMY     • COLONOSCOPY N/A 8/23/2018    Procedure: COLONOSCOPY;  Surgeon: Fátima Alegre MD;  Location: [facility]; Service: Gastroenterology   • ESOPHAGOGASTRODUODENOSCOPY N/A 8/22/2018    Procedure: ESOPHAGOGASTRODUODENOSCOPY (EGD); Surgeon: Fátima Alegre MD;  Location: [facility];   Service: Gastroenterology   • NECK SURGERY     • VASCULAR SURGERY           CURRENT MEDICATIONS:     Current Outpatient Medications:   •  apixaban (ELIQUIS) 5 mg, Take 5 mg by mouth 2 (two) times a day, Disp: , Rfl:   •  baclofen 20 mg tablet, Take 20 mg by mouth 3 (three) times a day , Disp: , Rfl:   •  betamethasone dipropionate (DIPROSONE) 0 05 % cream, Apply 1 application topically 2 (two) times a day, Disp: , Rfl:   •  betamethasone, augmented, (DIPROLENE) 0 05 % ointment, Apply 1 application topically 2 (two) times a day, Disp: , Rfl:   •  carvedilol (COREG) 12 5 mg tablet, Take 12 5 mg by mouth 2 (two) times a day with meals, Disp: , Rfl:   •  Diclofenac Sodium (VOLTAREN) 1 %, Apply 2 g topically 4 (four) times a day, Disp: 2 g, Rfl: 0  •  escitalopram (LEXAPRO) 10 mg tablet, Take 10 mg by mouth daily, Disp: , Rfl:   •  furosemide (LASIX) 40 mg tablet, Take 0 5 tablets (20 mg total) by mouth daily, Disp: , Rfl: 0  • gabapentin (NEURONTIN) 300 mg capsule, TAKE 1 CAPSULE BY MOUTH AT BEDTIME IN ADDITION TO 600MG, Disp: 30 capsule, Rfl: 5  •  gabapentin (NEURONTIN) 600 MG tablet, Take 600 mg by mouth 3 (three) times a day , Disp: , Rfl:   •  hydroxychloroquine (PLAQUENIL) 200 mg tablet, Take 400 mg by mouth daily at bedtime, Disp: , Rfl:   •  hydrOXYzine HCL (ATARAX) 10 mg tablet, Take 10 mg by mouth every 6 (six) hours as needed for itching, Disp: , Rfl:   •  metolazone (ZAROXOLYN) 2 5 mg tablet, Take 2 5 mg by mouth daily, Disp: , Rfl:   •  mycophenolate (CELLCEPT) 500 mg tablet, Take 500 mg by mouth every 12 (twelve) hours , Disp: , Rfl:   •  potassium chloride (K-DUR,KLOR-CON) 20 mEq tablet, Take 20 mEq by mouth daily, Disp: , Rfl:   •  traMADol (ULTRAM) 50 mg tablet, Take 50 mg by mouth every 6 (six) hours as needed for moderate pain, Disp: , Rfl:     Family History   Social History     Tobacco Use   • Smoking status: Never Smoker   • Smokeless tobacco: Never Used   Vaping Use   • Vaping Use: Never used   Substance Use Topics   • Alcohol use: Never   • Drug use: Never       HCT 41 4 10/03/20  Family History   Problem Relation Age of Onset   • Heart disease Mother    • No Known Problems Father    22    MCV 85 10/03/2022     10/03/2022   sults   Component Value Date    WBC 4 11 (L) 03/26/2022    HGB 11 0 (L) 03/26/2022    HCT 32 5 (L) 03/26/2022    MCV 87 03/26/2022     03/26/2022      Component Value Date    SODIUM 130 (L) 10/03/2022    K 3 5 10/03/2022    CL 96 10/03/2022    CO2 24 10/03/2022    AGAP 10 10/03/2022    BUN 39 (H) 10/03/2022    CREATININE 2 00 (H) 10/03/2022    GLUC 76 04/21/2022    GLUF 107 (H) 10/03/2022    CALCIUM 9 4 10/03/2022    AST 23 10/03/2022    ALT 21 10/03/2022    ALKPHOS 149 (H) 10/03/2022    TP 9 1 (H) 10/03/2022    TBILI 0 50 10/03/2022    EGFR 36 10/03/2022       IMAGING:  XR hand 3+ vw right  Narrative: RIGHT HAND    INDICATION:   [id number]: Pain in right hand      COMPARISON: 8/30/2022    VIEWS:  XR HAND 3+ VW RIGHT     For the purposes of institution wide universal language the following terms will apply: (thumb=1st digit/finger, index finger=2nd digit/finger, long finger=3rd digit/finger, ring=4th digit/finger and small finger=5th digit/finger)    FINDINGS:    There is no acute fracture or dislocation  No significant degenerative changes  No lytic or blastic osseous lesion  Atherosclerotic calcifications  Impression: No osseous abnormality      Workstation performed: [id number]

## 2022-10-24 ENCOUNTER — OFFICE VISIT (OUTPATIENT)
Dept: HEMATOLOGY ONCOLOGY | Facility: CLINIC | Age: 52
End: 2022-10-24
Payer: COMMERCIAL

## 2022-10-24 ENCOUNTER — EVALUATION (OUTPATIENT)
Dept: PHYSICAL THERAPY | Facility: CLINIC | Age: 52
End: 2022-10-24
Payer: COMMERCIAL

## 2022-10-24 VITALS
TEMPERATURE: 98.7 F | DIASTOLIC BLOOD PRESSURE: 64 MMHG | SYSTOLIC BLOOD PRESSURE: 118 MMHG | HEIGHT: 66 IN | RESPIRATION RATE: 16 BRPM | HEART RATE: 84 BPM | BODY MASS INDEX: 32.47 KG/M2 | WEIGHT: 202 LBS | OXYGEN SATURATION: 98 %

## 2022-10-24 DIAGNOSIS — Z86.718 HISTORY OF DVT OF LOWER EXTREMITY: Primary | ICD-10-CM

## 2022-10-24 DIAGNOSIS — M51.36 DDD (DEGENERATIVE DISC DISEASE), LUMBAR: Primary | ICD-10-CM

## 2022-10-24 DIAGNOSIS — M32.14 LUPUS NEPHRITIS (HCC): ICD-10-CM

## 2022-10-24 DIAGNOSIS — D75.9 CYTOPENIA DUE TO IMMUNOSUPRESSIVE AGENT: ICD-10-CM
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
## 2022-10-24 DIAGNOSIS — Z78.9 POOR HISTORIAN: ICD-10-CM

## 2022-10-24 DIAGNOSIS — T45.1X5A CYTOPENIA DUE TO IMMUNOSUPRESSIVE AGENT: ICD-10-CM

## 2022-10-24 DIAGNOSIS — M32.9 SYSTEMIC LUPUS ERYTHEMATOSUS, UNSPECIFIED SLE TYPE, UNSPECIFIED ORGAN INVOLVEMENT STATUS (HCC): ICD-10-CM

## 2022-10-24 PROCEDURE — 97140 MANUAL THERAPY 1/> REGIONS: CPT | Performed by: PHYSICAL THERAPIST

## 2022-10-24 PROCEDURE — 97112 NEUROMUSCULAR REEDUCATION: CPT | Performed by: PHYSICAL THERAPIST

## 2022-10-24 PROCEDURE — 97110 THERAPEUTIC EXERCISES: CPT | Performed by: PHYSICAL THERAPIST

## 2022-10-24 PROCEDURE — 97530 THERAPEUTIC ACTIVITIES: CPT | Performed by: PHYSICAL THERAPIST

## 2022-10-24 PROCEDURE — 99213 OFFICE O/P EST LOW 20 MIN: CPT | Performed by: INTERNAL MEDICINE

## 2022-10-24 RX ORDER — TAMSULOSIN HYDROCHLORIDE 0.4 MG/1
CAPSULE ORAL
COMMUNITY
Start: 2022-10-13

## 2022-10-24 NOTE — LETTER
2022    Bree Stone DO  [address] [facility] [address] [facility] [address]  N    Patient: Keena Alvarez   YOB: 1970   Date of Visit: 10/24/2022     Encounter Diagnosis     ICD-10-CM    1  DDD (degenerative disc disease), lumbar  M51 36    2  Fall, subsequent encounter  W19  XXXD        Dear Dr Susie Ruff: Thank you for your recent referral of Keena Alvarez  Please review the attached evaluation summary from 2 [facility] recent visit  Please verify that you agree with the plan of care by signing the attached order  If you have any questions or concerns, please do not hesitate to call  I sincerely appreciate the opportunity to share in the care of one of your patients and hope to have another opportunity to work with you in the near future  Sincerely,    Tatiana Romo, PT      Referring Provider:      I certify that I have read the below Plan of Care and certify the need for these services furnished under this plan of treatment while under my care  Bree Stone   [address] [place] [address] [facility] [address]  [address]  Via Fax: [phone]          PT Re-Evaluation     Today's date: 10/24/2022  Patient name: Keena Alvarez  : 1970  MRN: [id number]  Referring provider: Mirna Curran DO  Dx:   Encounter Diagnosis     ICD-10-CM    1  DDD (degenerative disc disease), lumbar  M51 36    2  Fall, subsequent encounter  W19  XXXD        Start Time: 845  Stop Time: 930  Total time in clinic (min): 45 minutes    Assessment  Assessment details: Upon re-examination, pt has improved strength and ROM of his lumbar spine and LEs as well as decreased pain of his lumbar spine  Pt also demonstrates improved walking, standing, and activity tolerance with decreased pain of his lumbar spine  However, pt continues to have deficits in strength and ROM of his lumbar spine and LEs as well as pain and difficulty with functional activities   Pt also continues to ambulate with a RW secondary to a high fall risk  Pt plan of care will focus on improving the previously mentioned deficits and limitations  Pt would benefit from skilled physical therapy to improve pt overall functional mobility and quality of life  Impairments: abnormal coordination, abnormal gait, abnormal muscle tone, abnormal or restricted ROM, abnormal movement, activity intolerance, impaired balance, impaired physical strength, lacks appropriate home exercise program, pain with function, safety issue, weight-bearing intolerance, poor posture  and poor body mechanics  Functional limitations: self-care/ADLs, household activities, ambulation, and transfers  Symptom irritability: moderateUnderstanding of Dx/Px/POC: good   Prognosis: fair    Goals  ST-3 WEEKS  1  Decrease pain by 2 points on VAS at its worst  MET  2  Increase ROM by > 5 deg in all deficients planes  Ongoing  3  Increase CORE/LE by 1/2 MMT grade in all deficient planes  MET    LT-6 WEEKS  1  Patient to be independent with a/iadls especially walking and progress to least restrictive device Ongoing  2  Increase functional activities for leisure and home activities to previous LOF  Ongoing  3   Independent with HEP and/or fitness program  MET    Plan  Patient would benefit from: skilled physical therapy  Planned modality interventions: cryotherapy, electrical stimulation/Russian stimulation, thermotherapy: hydrocollator packs and unattended electrical stimulation  Planned therapy interventions: activity modification, behavior modification, body mechanics training, aquatic therapy, flexibility, functional ROM exercises, home exercise program, IADL retraining, joint mobilization, manual therapy, neuromuscular re-education, patient education, postural training, strengthening, stretching, therapeutic activities and therapeutic exercise  Frequency: 2x week (2-3x week)  Duration in weeks: 8  Plan of Care beginning date: 10/24/2022  Plan of Care expiration date: 2022  Treatment plan discussed with: patient        Subjective Evaluation    History of Present Illness  Date of onset: 3/4/2017  Mechanism of injury: Pt is a 44 y/o male presenting to physical therapy with B lumbar pain  Pt reports the pain in his low back has decreased at rest and with activities such as walking and standing  Pt reports he is now able to walk and stand for longer periods as well as having increased strength in his LEs  Pt reports he is also able to maneuver around his home easier  However, pt reports he continues to have pain with standing as well as having a limited tolerance with standing and walking  Pt reports he continues to use a RW for balance purposes  Pt reports he has overall improved since starting therapy  Not a recurrent problem   Quality of life: fair    Pain  Current pain ratin  At best pain ratin  At worst pain ratin  Location: B lumbar  Quality: burning and radiating  Relieving factors: rest and medications  Aggravating factors: walking  Progression: worsening    Social Support  Steps to enter house: yes  Stairs in house: yes   Lives in: multiple-level home  Lives with: spouse      Diagnostic Tests  X-ray: abnormal  MRI studies: abnormal  Treatments  Previous treatment: injection treatment and home therapy  Patient Goals  Patient goals for therapy: decreased pain, increased motion, increased strength and independence with ADLs/IADLs          Objective     Static Posture     Thoracic Spine  Hyperkyphosis  Palpation     Right   Hypertonic in the erector spinae and lumbar paraspinals  Tenderness of the erector spinae and lumbar paraspinals  Tenderness     Right Hip   Tenderness in the PSIS       Active Range of Motion   Cervical/Thoracic Spine       Thoracic    Extension:  Restriction level: moderate    Lumbar   Flexion: 50 degrees   Extension: 20 degrees  with pain  Left lateral flexion: 15 degrees       Right lateral flexion: 15 degrees Strength/Myotome Testing     Left Hip   Planes of Motion   Flexion: 3+  Extension: 3+  Abduction: 4-  Adduction: 4-    Right Hip   Planes of Motion   Flexion: 3+  Extension: 3+  Abduction: 4-  Adduction: 4-    Left Knee   Flexion: 4-  Extension: 4-    Right Knee   Flexion: 4-  Extension: 4-    Left Ankle/Foot   Dorsiflexion: 3  Plantar flexion: 4-    Right Ankle/Foot   Dorsiflexion: 3+  Plantar flexion: 4-    Additional Strength Details  CORE is 3+/5    Tests     Lumbar     Left   Negative passive SLR and slump test      Right   Positive passive SLR  Negative slump test      Ambulation     Observational Gait   Gait: antalgic, asymmetric and crouched   Decreased walking speed and stride length     Left foot contact pattern: toe to heel  Base of support: increased    Additional Observational Gait Details  Uses RW with tremors with left drop foot  Neuro Exam:     Transfers Sit to stand: independent              Precautions: HTN, fall risk, DVT, cervical SX, Lupus    Manuals 9/1 9/7 9/14 9/22 9/27 9/30 10/6 10/20 10/24    SKTC  1x5 20" 1x5 20" 1x5  20" 1x5 20" 1x5 20" 1x5 20" 1x5 20" 1x5 20"    MT  ham, piriformis stretch, calf 10 8' 8' 8' 1x5 20" 1x5 20" 1x5 20" 1x5 20" 1x5 20"                              Neuro Re-Ed             Bridges c hip add  2x10 2x10 2x10 2x10  2x10 2x10 2x10    Pt education  5' 5'   3' 5' 5' 5'    talha rows sitting       3x10 18# 3x10 18# 3x10 18#    talha extensions sitting       2x10 12# 2x10 12# 2x10 12#                                           Ther Ex             Nu step 5 min 6 mins 6 mins 6 min 7 min  7' 7' 8' lvl 9 8' lvl 8     3-way phyioball rollouts 20x 10x ea 3" 10x 3" ea 10x3"  ea 10x3" ea 10x3" ea 10x3" ea 10x3" ea 10x3" ea    TB hip abd Blue TB  30x   BTB  30x BTB 30x BTB 30x BTB 30x Blue 30x Blue 30x                 Ankle pumps 10x   30x 30x        LTR  1x15 ea 1x15 ea 1x15  ea 1x15 1x15 1x15 1x15 1x15    LAQs        3x10 5# 3x10 5#                 Ther Activity Sit to stands   2x10 2x10 2x10 2x10 2x10 2x10 2x10 2x10    Cuing for proper technique  4' 4'   4' 4' 4' 4'    Gait Training                                       Modalities

## 2022-10-24 NOTE — PROGRESS NOTES
PT Re-Evaluation     Today's date: 10/24/2022  Patient name: Job López  : 1970  MRN: [id number]  Referring provider: Zane Turk DO  Dx:   Encounter Diagnosis     ICD-10-CM    1  DDD (degenerative disc disease), lumbar  M51 36    2  Fall, subsequent encounter  W19  XXXD        Start Time: 0845  Stop Time: 930  Total time in clinic (min): 45 minutes    Assessment  Assessment details: Upon re-examination, pt has improved strength and ROM of his lumbar spine and LEs as well as decreased pain of his lumbar spine  Pt also demonstrates improved walking, standing, and activity tolerance with decreased pain of his lumbar spine  However, pt continues to have deficits in strength and ROM of his lumbar spine and LEs as well as pain and difficulty with functional activities  Pt also continues to ambulate with a RW secondary to a high fall risk  Pt plan of care will focus on improving the previously mentioned deficits and limitations  Pt would benefit from skilled physical therapy to improve pt overall functional mobility and quality of life  Impairments: abnormal coordination, abnormal gait, abnormal muscle tone, abnormal or restricted ROM, abnormal movement, activity intolerance, impaired balance, impaired physical strength, lacks appropriate home exercise program, pain with function, safety issue, weight-bearing intolerance, poor posture  and poor body mechanics  Functional limitations: self-care/ADLs, household activities, ambulation, and transfers  Symptom irritability: moderateUnderstanding of Dx/Px/POC: good   Prognosis: fair    Goals  ST-3 WEEKS  1  Decrease pain by 2 points on VAS at its worst  MET  2  Increase ROM by > 5 deg in all deficients planes  Ongoing  3  Increase CORE/LE by 1/2 MMT grade in all deficient planes  MET    LT-6 WEEKS  1  Patient to be independent with a/iadls especially walking and progress to least restrictive device Ongoing  2   Increase functional activities for leisure and home activities to previous LOF  Ongoing  3  Independent with HEP and/or fitness program  MET    Plan  Patient would benefit from: skilled physical therapy  Planned modality interventions: cryotherapy, electrical stimulation/Russian stimulation, thermotherapy: hydrocollator packs and unattended electrical stimulation  Planned therapy interventions: activity modification, behavior modification, body mechanics training, aquatic therapy, flexibility, functional ROM exercises, home exercise program, IADL retraining, joint mobilization, manual therapy, neuromuscular re-education, patient education, postural training, strengthening, stretching, therapeutic activities and therapeutic exercise  Frequency: 2x week (2-3x week)  Duration in weeks: 8  Plan of Care beginning date: 10/24/2022  Plan of Care expiration date: 2022  Treatment plan discussed with: patient        Subjective Evaluation    History of Present Illness  Date of onset: 3/4/2017  Mechanism of injury: Pt is a 44 y/o male presenting to physical therapy with B lumbar pain  Pt reports the pain in his low back has decreased at rest and with activities such as walking and standing  Pt reports he is now able to walk and stand for longer periods as well as having increased strength in his LEs  Pt reports he is also able to maneuver around his home easier  However, pt reports he continues to have pain with standing as well as having a limited tolerance with standing and walking  Pt reports he continues to use a RW for balance purposes  Pt reports he has overall improved since starting therapy             Not a recurrent problem   Quality of life: fair    Pain  Current pain ratin  At best pain ratin  At worst pain ratin  Location: B lumbar  Quality: burning and radiating  Relieving factors: rest and medications  Aggravating factors: walking  Progression: worsening    Social Support  Steps to enter house: yes  Stairs in house: yes   Lives in: multiple-level home  Lives with: spouse      Diagnostic Tests  X-ray: abnormal  MRI studies: abnormal  Treatments  Previous treatment: injection treatment and home therapy  Patient Goals  Patient goals for therapy: decreased pain, increased motion, increased strength and independence with ADLs/IADLs          Objective     Static Posture     Thoracic Spine  Hyperkyphosis  Palpation     Right   Hypertonic in the erector spinae and lumbar paraspinals  Tenderness of the erector spinae and lumbar paraspinals  Tenderness     Right Hip   Tenderness in the PSIS  Active Range of Motion   Cervical/Thoracic Spine       Thoracic    Extension:  Restriction level: moderate    Lumbar   Flexion: 50 degrees   Extension: 20 degrees  with pain  Left lateral flexion: 15 degrees       Right lateral flexion: 15 degrees     Strength/Myotome Testing     Left Hip   Planes of Motion   Flexion: 3+  Extension: 3+  Abduction: 4-  Adduction: 4-    Right Hip   Planes of Motion   Flexion: 3+  Extension: 3+  Abduction: 4-  Adduction: 4-    Left Knee   Flexion: 4-  Extension: 4-    Right Knee   Flexion: 4-  Extension: 4-    Left Ankle/Foot   Dorsiflexion: 3  Plantar flexion: 4-    Right Ankle/Foot   Dorsiflexion: 3+  Plantar flexion: 4-    Additional Strength Details  CORE is 3+/5    Tests     Lumbar     Left   Negative passive SLR and slump test      Right   Positive passive SLR  Negative slump test      Ambulation     Observational Gait   Gait: antalgic, asymmetric and crouched   Decreased walking speed and stride length     Left foot contact pattern: toe to heel  Base of support: increased    Additional Observational Gait Details  Uses RW with tremors with left drop foot  Neuro Exam:     Transfers Sit to stand: independent              Precautions: HTN, fall risk, DVT, cervical SX, Lupus    Manuals 9/1 9/7 9/14 9/22 9/27 9/30 10/6 10/20 10/24    SKTC  1x5 20" 1x5 20" 1x5  20" 1x5 20" 1x5 20" 1x5 20" 1x5 20" 1x5 20"    MT  ham, piriformis stretch, calf 10 8' 8' 8' 1x5 20" 1x5 20" 1x5 20" 1x5 20" 1x5 20"                              Neuro Re-Ed             Bridges c hip add  2x10 2x10 2x10 2x10  2x10 2x10 2x10    Pt education  5' 5'   3' 5' 5' 5'    talha rows sitting       3x10 18# 3x10 18# 3x10 18#    talha extensions sitting       2x10 12# 2x10 12# 2x10 12#                                           Ther Ex             Nu step 5 min 6 mins 6 mins 6 min 7 min  7' 7' 8' lvl 9 8' lvl 8     3-way phyioball rollouts 20x 10x ea 3" 10x 3" ea 10x3"  ea 10x3" ea 10x3" ea 10x3" ea 10x3" ea 10x3" ea    TB hip abd Blue TB  30x   BTB  30x BTB 30x BTB 30x BTB 30x Blue 30x Blue 30x                 Ankle pumps 10x   30x 30x        LTR  1x15 ea 1x15 ea 1x15  ea 1x15 1x15 1x15 1x15 1x15    LAQs        3x10 5# 3x10 5#                 Ther Activity             Sit to stands   2x10 2x10 2x10 2x10 2x10 2x10 2x10 2x10    Cuing for proper technique  4' 4'   4' 4' 4' 4'    Gait Training                                       Modalities

## 2022-10-24 NOTE — LETTER
2022    Mariana Brown DO  [address] [address] [facility] [address]  [address]    Patient: Rashaad Crisostomo   YOB: 1970   Date of Visit: 10/24/2022     Encounter Diagnosis     ICD-10-CM    1  DDD (degenerative disc disease), lumbar  M51 36    2  Fall, subsequent encounter  W19  XXXD        Dear Dr Mayuri Hartman: Thank you for your recent referral of Rashaad Crisostomo  Please review the attached evaluation summary from 2 [facility] recent visit  Please verify that you agree with the plan of care by signing the attached order  If you have any questions or concerns, please do not hesitate to call  I sincerely appreciate the opportunity to share in the care of one of your patients and hope to have another opportunity to work with you in the near future  Sincerely,    Guillaume Oneal, PT      Referring Provider:      I certify that I have read the below Plan of Care and certify the need for these services furnished under this plan of treatment while under my care  Mariana Brown DO  [address] [address] [facility] [address]  [address]  Via Fax: [phone]          PT Evaluation     Today's date: 10/24/2022  Patient name: Rashaad Crisostomo  : 1970  MRN: [id number]  Referring provider: Mann Clemens DO  Dx:   Encounter Diagnosis     ICD-10-CM    1  DDD (degenerative disc disease), lumbar  M51 36    2  Fall, subsequent encounter  W19  XXXD        Start Time: 0845  Stop Time: 0930  Total time in clinic (min): 45 minutes    Assessment  Assessment details: Upon re-examination, pt has improved strength and ROM of his lumbar spine and LEs as well as decreased pain of his lumbar spine  Pt also demonstrates improved walking, standing, and activity tolerance with decreased pain of his lumbar spine  However, pt continues to have deficits in strength and ROM of his lumbar spine and LEs as well as pain and difficulty with functional activities   Pt also continues to ambulate with a RW secondary to a high fall risk  Pt plan of care will focus on improving the previously mentioned deficits and limitations  Pt would benefit from skilled physical therapy to improve pt overall functional mobility and quality of life  Impairments: abnormal coordination, abnormal gait, abnormal muscle tone, abnormal or restricted ROM, abnormal movement, activity intolerance, impaired balance, impaired physical strength, lacks appropriate home exercise program, pain with function, safety issue, weight-bearing intolerance, poor posture  and poor body mechanics  Functional limitations: self-care/ADLs, household activities, ambulation, and transfers  Symptom irritability: moderateUnderstanding of Dx/Px/POC: good   Prognosis: fair    Goals  ST-3 WEEKS  1  Decrease pain by 2 points on VAS at its worst  MET  2  Increase ROM by > 5 deg in all deficients planes  Ongoing  3  Increase CORE/LE by 1/2 MMT grade in all deficient planes  MET    LT-6 WEEKS  1  Patient to be independent with a/iadls especially walking and progress to least restrictive device Ongoing  2  Increase functional activities for leisure and home activities to previous LOF  Ongoing  3   Independent with HEP and/or fitness program  MET    Plan  Patient would benefit from: skilled physical therapy  Planned modality interventions: cryotherapy, electrical stimulation/Russian stimulation, thermotherapy: hydrocollator packs and unattended electrical stimulation  Planned therapy interventions: activity modification, behavior modification, body mechanics training, aquatic therapy, flexibility, functional ROM exercises, home exercise program, IADL retraining, joint mobilization, manual therapy, neuromuscular re-education, patient education, postural training, strengthening, stretching, therapeutic activities and therapeutic exercise  Frequency: 2x week (2-3x week)  Duration in weeks: 8  Plan of Care beginning date: 10/24/2022  Plan of Care expiration date: 2022  Treatment plan discussed with: patient        Subjective Evaluation    History of Present Illness  Date of onset: 3/4/2017  Mechanism of injury: Pt is a 46 y/o male presenting to physical therapy with B lumbar pain  Pt reports the pain in his low back has decreased at rest and with activities such as walking and standing  Pt reports he is now able to walk and stand for longer periods as well as having increased strength in his LEs  Pt reports he is also able to maneuver around his home easier  However, pt reports he continues to have pain with standing as well as having a limited tolerance with standing and walking  Pt reports he continues to use a RW for balance purposes  Pt reports he has overall improved since starting therapy  Not a recurrent problem   Quality of life: fair    Pain  Current pain ratin  At best pain ratin  At worst pain ratin  Location: B lumbar  Quality: burning and radiating  Relieving factors: rest and medications  Aggravating factors: walking  Progression: worsening    Social Support  Steps to enter house: yes  Stairs in house: yes   Lives in: multiple-level home  Lives with: spouse      Diagnostic Tests  X-ray: abnormal  MRI studies: abnormal  Treatments  Previous treatment: injection treatment and home therapy  Patient Goals  Patient goals for therapy: decreased pain, increased motion, increased strength and independence with ADLs/IADLs          Objective     Static Posture     Thoracic Spine  Hyperkyphosis  Palpation     Right   Hypertonic in the erector spinae and lumbar paraspinals  Tenderness of the erector spinae and lumbar paraspinals  Tenderness     Right Hip   Tenderness in the PSIS       Active Range of Motion   Cervical/Thoracic Spine       Thoracic    Extension:  Restriction level: moderate    Lumbar   Flexion: 50 degrees   Extension: 20 degrees  with pain  Left lateral flexion: 15 degrees       Right lateral flexion: 15 degrees Strength/Myotome Testing     Left Hip   Planes of Motion   Flexion: 3+  Extension: 3+  Abduction: 4-  Adduction: 4-    Right Hip   Planes of Motion   Flexion: 3+  Extension: 3+  Abduction: 4-  Adduction: 4-    Left Knee   Flexion: 4-  Extension: 4-    Right Knee   Flexion: 4-  Extension: 4-    Left Ankle/Foot   Dorsiflexion: 3  Plantar flexion: 4-    Right Ankle/Foot   Dorsiflexion: 3+  Plantar flexion: 4-    Additional Strength Details  CORE is 3+/5    Tests     Lumbar     Left   Negative passive SLR and slump test      Right   Positive passive SLR  Negative slump test      Ambulation     Observational Gait   Gait: antalgic, asymmetric and crouched   Decreased walking speed and stride length     Left foot contact pattern: toe to heel  Base of support: increased    Additional Observational Gait Details  Uses RW with tremors with left drop foot  Neuro Exam:     Transfers Sit to stand: independent              Precautions: HTN, fall risk, DVT, cervical SX, Lupus    Manuals 9/1 9/7 9/14 9/22 9/27 9/30 10/6 10/20 10/24    SKTC  1x5 20" 1x5 20" 1x5  20" 1x5 20" 1x5 20" 1x5 20" 1x5 20" 1x5 20"    MT  ham, piriformis stretch, calf 10 8' 8' 8' 1x5 20" 1x5 20" 1x5 20" 1x5 20" 1x5 20"                              Neuro Re-Ed             Bridges c hip add  2x10 2x10 2x10 2x10  2x10 2x10 2x10    Pt education  5' 5'   3' 5' 5' 5'    talha rows sitting       3x10 18# 3x10 18# 3x10 18#    talha extensions sitting       2x10 12# 2x10 12# 2x10 12#                                           Ther Ex             Nu step 5 min 6 mins 6 mins 6 min 7 min  7' 7' 8' lvl 9 8' lvl 8     3-way phyioball rollouts 20x 10x ea 3" 10x 3" ea 10x3"  ea 10x3" ea 10x3" ea 10x3" ea 10x3" ea 10x3" ea    TB hip abd Blue TB  30x   BTB  30x BTB 30x BTB 30x BTB 30x Blue 30x Blue 30x                 Ankle pumps 10x   30x 30x        LTR  1x15 ea 1x15 ea 1x15  ea 1x15 1x15 1x15 1x15 1x15    LAQs        3x10 5# 3x10 5#                 Ther Activity Sit to stands   2x10 2x10 2x10 2x10 2x10 2x10 2x10 2x10    Cuing for proper technique  4' 4'   4' 4' 4' 4'    Gait Training                                       Modalities

## 2022-10-31 ENCOUNTER — OFFICE VISIT (OUTPATIENT)
Dept: INTERNAL MEDICINE CLINIC | Facility: CLINIC | Age: 52
End: 2022-10-31

## 2022-10-31 ENCOUNTER — HOSPITAL ENCOUNTER (OUTPATIENT)
Dept: CT IMAGING | Facility: CLINIC | Age: 52
Discharge: HOME | End: 2022-10-31

## 2022-10-31 VITALS
DIASTOLIC BLOOD PRESSURE: 64 MMHG | SYSTOLIC BLOOD PRESSURE: 96 MMHG | HEART RATE: 79 BPM | OXYGEN SATURATION: 97 % | RESPIRATION RATE: 16 BRPM | HEIGHT: 66 IN | WEIGHT: 202 LBS | BODY MASS INDEX: 32.47 KG/M2

## 2022-10-31 DIAGNOSIS — J30.1 SEASONAL ALLERGIC RHINITIS DUE TO POLLEN: ICD-10-CM

## 2022-10-31 DIAGNOSIS — M32.9 SYSTEMIC LUPUS ERYTHEMATOSUS, UNSPECIFIED SLE TYPE, UNSPECIFIED ORGAN INVOLVEMENT STATUS (HCC): ICD-10-CM

## 2022-10-31 DIAGNOSIS — R20.0 NUMBNESS OF FACE: Primary | ICD-10-CM

## 2022-10-31 DIAGNOSIS — R20.0 NUMBNESS OF FACE: ICD-10-CM

## 2022-10-31 DIAGNOSIS — I10 PRIMARY HYPERTENSION: ICD-10-CM

## 2022-10-31 DIAGNOSIS — M79.2 NEUROPATHIC PAIN: ICD-10-CM

## 2022-10-31 DIAGNOSIS — R26.9 GAIT DIFFICULTY: ICD-10-CM

## 2022-10-31 RX ORDER — LORATADINE 10 MG/1
10 TABLET ORAL DAILY
Qty: 30 TABLET | Refills: 2 | Status: SHIPPED | OUTPATIENT
Start: 2022-10-31 | End: 2023-01-29

## 2022-10-31 NOTE — PROGRESS NOTES
Assessment/Plan:     See HPI for more details  Quality Measures:     BMI Counseling: Body mass index is 32 6 kg/m²  The BMI is above normal  Nutrition recommendations include decreasing portion sizes and encouraging healthy choices of fruits and vegetables  Rationale for BMI follow-up plan is due to patient being overweight or obese  Depression Screening and Follow-up Plan: Patient was screened for depression during today's encounter  They screened negative with a PHQ-2 score of 0  Return in about 4 weeks (around 11/28/2022)  No problem-specific Assessment & Plan notes found for this encounter  Diagnoses and all orders for this visit:    Numbness of face  -     CT head wo contrast; Future    Primary hypertension  -     CT head wo contrast; Future    Systemic lupus erythematosus, unspecified SLE type, unspecified organ involvement status ([facility] [address] )  -     CT head wo contrast; Future    Seasonal allergic rhinitis due to pollen  -     loratadine (CLARITIN) 10 mg tablet; Take 1 tablet (10 mg total) by mouth daily          Subjective:      Patient ID: Erna Soriano is a 45 y o  male  Here for numbness of face x half hour; to his whole face; no weakness to his extremities; no slurred speech or falls; given his history of foot drop; DVT; SLE; will obtain STAT CT head  Does not want to go to ER  Neuro exam is normal  He has baseline foot drop; he is on eliquis for DVT        ALLERGIES:  Allergies   Allergen Reactions   • Sulfa Antibiotics Rash       CURRENT MEDICATIONS:    Current Outpatient Medications:   •  loratadine (CLARITIN) 10 mg tablet, Take 1 tablet (10 mg total) by mouth daily, Disp: 30 tablet, Rfl: 2  •  apixaban (ELIQUIS) 5 mg, Take 5 mg by mouth 2 (two) times a day, Disp: , Rfl:   •  baclofen 20 mg tablet, Take 20 mg by mouth 3 (three) times a day , Disp: , Rfl:   •  betamethasone dipropionate (DIPROSONE) 0 05 % cream, Apply 1 application topically 2 (two) times a day, Disp: , Rfl:   • betamethasone, augmented, (DIPROLENE) 0 05 % ointment, Apply 1 application topically 2 (two) times a day, Disp: , Rfl:   •  carvedilol (COREG) 12 5 mg tablet, Take 12 5 mg by mouth 2 (two) times a day with meals, Disp: , Rfl:   •  Diclofenac Sodium (VOLTAREN) 1 %, Apply 2 g topically 4 (four) times a day, Disp: 2 g, Rfl: 0  •  escitalopram (LEXAPRO) 10 mg tablet, Take 10 mg by mouth daily, Disp: , Rfl:   •  furosemide (LASIX) 40 mg tablet, Take 0 5 tablets (20 mg total) by mouth daily, Disp: , Rfl: 0  •  gabapentin (NEURONTIN) 300 mg capsule, TAKE 1 CAPSULE BY MOUTH AT BEDTIME IN ADDITION TO 600MG, Disp: 30 capsule, Rfl: 5  •  gabapentin (NEURONTIN) 600 MG tablet, Take 600 mg by mouth 3 (three) times a day , Disp: , Rfl:   •  hydroxychloroquine (PLAQUENIL) 200 mg tablet, Take 400 mg by mouth daily at bedtime, Disp: , Rfl:   •  hydrOXYzine HCL (ATARAX) 10 mg tablet, Take 10 mg by mouth every 6 (six) hours as needed for itching, Disp: , Rfl:   •  metolazone (ZAROXOLYN) 2 5 mg tablet, Take 2 5 mg by mouth daily, Disp: , Rfl:   •  mycophenolate (CELLCEPT) 500 mg tablet, Take 500 mg by mouth every 12 (twelve) hours , Disp: , Rfl:   •  potassium chloride (K-DUR,KLOR-CON) 20 mEq tablet, Take 20 mEq by mouth daily, Disp: , Rfl:   •  tamsulosin (FLOMAX) 0 4 mg, , Disp: , Rfl:   •  traMADol (ULTRAM) 50 mg tablet, Take 50 mg by mouth every 6 (six) hours as needed for moderate pain, Disp: , Rfl:     ACTIVE PROBLEM LIST:  Patient Active Problem List   Diagnosis   • Lupus (systemic lupus erythematosus) (HCC)   • Lupus nephritis (HCC)   • Hypertension   • History of DVT (deep vein thrombosis)   • Chronic kidney disease, stage 3 (HCC)   • Persistent proteinuria   • Anemia in chronic kidney disease   • Muscle weakness (generalized)   • Tremor of both hands   • Spasticity   • Gait difficulty   • Neuropathic pain       PAST MEDICAL HISTORY:  Past Medical History:   Diagnosis Date   • Cervical mass    • Coronary artery disease    • Depression    • DVT (deep venous thrombosis) (HCC)    • Hypertension    • Lupus ([address] )    • Renal disorder        PAST SURGICAL HISTORY:  Past Surgical History:   Procedure Laterality Date   • APPENDECTOMY     • CERVICAL SPINE SURGERY     • CHOLECYSTECTOMY     • COLONOSCOPY N/A 8/23/2018    Procedure: COLONOSCOPY;  Surgeon: Bradley Melvin MD;  Location: [facility]; Service: Gastroenterology   • ESOPHAGOGASTRODUODENOSCOPY N/A 8/22/2018    Procedure: ESOPHAGOGASTRODUODENOSCOPY (EGD); Surgeon: Bradley Melvin MD;  Location: [facility]; Service: Gastroenterology   • NECK SURGERY     • VASCULAR SURGERY         FAMILY HISTORY:  Family History   Problem Relation Age of Onset   • Heart disease Mother    • No Known Problems Father        SOCIAL HISTORY:  Social History     Socioeconomic History   • Marital status: /Civil Union     Spouse name: Not on file   • Number of children: Not on file   • Years of education: Not on file   • Highest education level: Not on file   Occupational History   • Not on file   Tobacco Use   • Smoking status: Never Smoker   • Smokeless tobacco: Never Used   Vaping Use   • Vaping Use: Never used   Substance and Sexual Activity   • Alcohol use: Never   • Drug use: Never   • Sexual activity: Yes     Partners: Female     Birth control/protection: Rhythm   Other Topics Concern   • Not on file   Social History Narrative    ** Merged History Encounter **          Social Determinants of Health     Financial Resource Strain: Not on file   Food Insecurity: Not on file   Transportation Needs: Not on file   Physical Activity: Not on file   Stress: Not on file   Social Connections: Not on file   Intimate Partner Violence: Not on file   Housing Stability: Not on file       Review of Systems   All other systems reviewed and are negative          Objective:  Vitals:    10/31/22 0941   BP: 96/64   BP Location: Left arm   Patient Position: Sitting   Cuff Size: Adult   Pulse: 79   Resp: 16 SpO2: 97%   Weight: 91 6 kg (202 lb)   Height: 5' 6" (1 676 m)     Body mass index is 32 6 kg/m²  Physical Exam  Vitals and nursing note reviewed  Constitutional:       Appearance: Normal appearance  HENT:      Head: Normocephalic and atraumatic  Cardiovascular:      Rate and Rhythm: Normal rate and regular rhythm  Heart sounds: Normal heart sounds  Pulmonary:      Effort: Pulmonary effort is normal       Breath sounds: Normal breath sounds  Musculoskeletal:         General: Deformity and signs of injury present  Normal range of motion  Cervical back: Normal range of motion  Neurological:      Mental Status: He is alert and oriented to person, place, and time  Mental status is at baseline  Sensory: No sensory deficit  Motor: No weakness        Coordination: Coordination abnormal       Gait: Gait abnormal    Psychiatric:         Mood and Affect: Mood normal            RESULTS:    Recent Results (from the past 1008 hour(s))   CBC and differential    Collection Time: 10/03/22 10:35 AM   Result Value Ref Range    WBC 3 87 (L) 4 31 - 10 16 Thousand/uL    RBC 4 88 3 88 - 5 62 Million/uL    Hemoglobin 13 6 12 0 - 17 0 g/dL    Hematocrit 41 4 36 5 - 49 3 %    MCV 85 82 - 98 fL    MCH 27 9 26 8 - 34 3 pg    MCHC 32 9 31 4 - 37 4 g/dL    RDW 14 6 11 6 - 15 1 %    MPV 10 5 8 9 - 12 7 fL    Platelets 928 773 - 944 Thousands/uL    nRBC 0 /100 WBCs    Neutrophils Relative 65 43 - 75 %    Immat GRANS % 1 0 - 2 %    Lymphocytes Relative 17 14 - 44 %    Monocytes Relative 15 (H) 4 - 12 %    Eosinophils Relative 1 0 - 6 %    Basophils Relative 1 0 - 1 %    Neutrophils Absolute 2 52 1 85 - 7 62 Thousands/µL    Immature Grans Absolute 0 04 0 00 - 0 20 Thousand/uL    Lymphocytes Absolute 0 66 0 60 - 4 47 Thousands/µL    Monocytes Absolute 0 59 0 17 - 1 22 Thousand/µL    Eosinophils Absolute 0 03 0 00 - 0 61 Thousand/µL    Basophils Absolute 0 03 0 00 - 0 10 Thousands/µL   Comprehensive metabolic panel    Collection Time: 10/03/22 10:35 AM   Result Value Ref Range    Sodium 130 (L) 135 - 147 mmol/L    Potassium 3 5 3 5 - 5 3 mmol/L    Chloride 96 96 - 108 mmol/L    CO2 24 21 - 32 mmol/L    ANION GAP 10 4 - 13 mmol/L    BUN 39 (H) 5 - 25 mg/dL    Creatinine 2 00 (H) 0 60 - 1 30 mg/dL    Glucose, Fasting 107 (H) 65 - 99 mg/dL    Calcium 9 4 8 3 - 10 1 mg/dL    AST 23 5 - 45 U/L    ALT 21 12 - 78 U/L    Alkaline Phosphatase 149 (H) 46 - 116 U/L    Total Protein 9 1 (H) 6 4 - 8 4 g/dL    Albumin 4 1 3 5 - 5 0 g/dL    Total Bilirubin 0 50 0 20 - 1 00 mg/dL    eGFR 36 ml/min/1 73sq m   TSH, 3rd generation with Free T4 reflex    Collection Time: 10/03/22 10:35 AM   Result Value Ref Range    TSH 3RD GENERATON 2 950 0 450 - 4 500 uIU/mL   Hepatitis C antibody    Collection Time: 10/03/22 10:35 AM   Result Value Ref Range    Hepatitis C Ab Non-reactive Non-reactive   HIV 1/2 Antigen/Antibody (4th Generation) w Reflex [facility]    Collection Time: 10/03/22 10:35 AM   Result Value Ref Range    HIV-1/HIV-2 Ab Non-Reactive Non-Reactive   Hemoglobin A1C    Collection Time: 10/03/22 10:35 AM   Result Value Ref Range    Hemoglobin A1C 5 9 (H) Normal 3 8-5 6%; PreDiabetic 5 7-6 4%;  Diabetic >=6 5%; Glycemic control for adults with diabetes <7 0% %     mg/dl   Lipid Panel with Direct LDL reflex    Collection Time: 10/03/22 10:35 AM   Result Value Ref Range    Cholesterol 143 See Comment mg/dL    Triglycerides 135 See Comment mg/dL    HDL, Direct 42 >=40 mg/dL    LDL Calculated 74 0 - 100 mg/dL   PSA, total and free    Collection Time: 10/03/22 10:35 AM   Result Value Ref Range    Prostate Specific Antigen Total 2 3 0 0 - 4 0 ng/mL    PSA, Free 0 90 N/A ng/mL    PSA, Free Pct 39 1 %   Antithrombin III Activity    Collection Time: 10/03/22 10:35 AM   Result Value Ref Range    AntiThrombIN III Activity 97 92 - 136 % of Normal   Cardiolipin antibody    Collection Time: 10/03/22 10:35 AM   Result Value Ref Range ANTICARDIOLIPIN IGG ANTIBODY 1 6 See comment    ANTICARDIOLIPIN IGA ANTIBODY 5 6 See comment    ANTICARDIOLIPIN IGM ANTIBODY 1 0 See comment   Factor 5 leiden    Collection Time: 10/03/22 10:35 AM   Result Value Ref Range    Factor V Leiden Comment    Lupus anticoagulant    Collection Time: 10/03/22 10:35 AM   Result Value Ref Range    PTT Lupus Anticoagulant 33 6 0 0 - 51 9 sec    Dilute Viper Venom Time 72 7 (H) 0 0 - 47 0 sec    DILUTE PROTHROMBIN TIME(DPT) 45 7 0 0 - 47 6 sec    THROMBIN TIME (DRVW) 17 6 0 0 - 23 0 sec    DPT CONFIRM RATIO 1 07 0 00 - 1 34 Ratio    LUPUS REFLEX INTERPRETATION Comment:    Protein C activity    Collection Time: 10/03/22 10:35 AM   Result Value Ref Range    Protein C Activity 107 0 60 - 150 % of Normal   Protein S activity    Collection Time: 10/03/22 10:35 AM   Result Value Ref Range    PROTEIN S ACTIVITY 67 (L) 71 - 117 %   Protein S Antigen, Total & Free    Collection Time: 10/03/22 10:35 AM   Result Value Ref Range    Protein S Ag, Total 116 60 - 150 %    Protein S Ag, Free 94 61 - 136 %   Prothrombin gene mutation    Collection Time: 10/03/22 10:35 AM   Result Value Ref Range    Prothrombin Mutation Comment    Beta-2 glycoprotein antibodies    Collection Time: 10/03/22 10:35 AM   Result Value Ref Range    BETA 2 GLYCO 1 IGG 1 3 See comment    BETA 2 GLYCO 1 IGA 3 7 See comment    BETA 2 GLYCO 1 IGM <2 9 See comment   DRVVT Mix-LA    Collection Time: 10/03/22 10:35 AM   Result Value Ref Range    dRVVT Mix Interp  54 6 (H) 0 0 - 40 4 sec   DRVVT Confirm-LA    Collection Time: 10/03/22 10:35 AM   Result Value Ref Range    DRVVT/Confirm Ratio 1 2 0 8 - 1 2 ratio       This note was created with voice recognition software  Phonic, grammatical and spelling errors may be present within the note as a result

## 2022-11-04 ENCOUNTER — OFFICE VISIT (OUTPATIENT)
Dept: PHYSICAL THERAPY | Facility: CLINIC | Age: 52
End: 2022-11-04

## 2022-11-04 DIAGNOSIS — M51.36 DDD (DEGENERATIVE DISC DISEASE), LUMBAR: Primary | ICD-10-CM
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
## 2022-11-04 NOTE — PROGRESS NOTES
Daily Note     Today's date: 2022  Patient name: Kenan Dye  : 1970  MRN: [id number]  Referring provider: Meliza Perez DO  Dx:   Encounter Diagnosis     ICD-10-CM    1  DDD (degenerative disc disease), lumbar  M51 36    2  Fall, subsequent encounter  W19  XXXD        Start Time: 0845  Stop Time: 09  Total time in clinic (min): 45 minutes    Subjective: Pt reports having a significant increase in pain of his R low back when getting out of bed and turning to his right  Pt reports the pain has persisted into today at the start of therapy  Objective: See treatment diary below      Assessment: Tolerated treatment well  Patient demonstrated fatigue post treatment, exhibited good technique with therapeutic exercises and would benefit from continued PT  Pt was able to complete all of his required exercises with a minimal increase in her R lumbar pain  Pt required min verbal cues to facilitate performance of proper technique  Assess pt response to treatment at next visit  Plan: Continue per plan of care        Precautions: HTN, fall risk, DVT, cervical SX, Lupus    Manuals 9/1 9/7 9/14 9/22 9/27 9/30 10/6 10/20 10/24 11/4   SKTC  1x5 20" 1x5 20" 1x5  20" 1x5 20" 1x5 20" 1x5 20" 1x5 20" 1x5 20" 1x5 20"   MT  ham, piriformis stretch, calf 10 8' 8' 8' 1x5 20" 1x5 20" 1x5 20" 1x5 20" 1x5 20" 1x5 20"                             Neuro Re-Ed             Bridges c hip add  2x10 2x10 2x10 2x10  2x10 2x10 2x10 3x10   Pt education  5' 5'   3' 5' 5' 5' 5'   talha rows sitting       3x10 18# 3x10 18# 3x10 18# 3x10 18#   talha extensions sitting       2x10 12# 2x10 12# 2x10 12# 3x10 12#                                          Ther Ex             Nu step 5 min 6 mins 6 mins 6 min 7 min  7' 7' 8' lvl 9 8' lvl 8  7' lvl 8   3-way phyioball rollouts 20x 10x ea 3" 10x 3" ea 10x3"  ea 10x3" ea 10x3" ea 10x3" ea 10x3" ea 10x3" ea 10x3" ea   TB hip abd Blue TB  30x   BTB  30x BTB 30x BTB 30x BTB 30x Blue 30x Blue 30x Blue 30x                Ankle pumps 10x   30x 30x        LTR  1x15 ea 1x15 ea 1x15  ea 1x15 1x15 1x15 1x15 1x15 1x15   LAQs        3x10 5# 3x10 5# 3x10 5#                Ther Activity             Sit to stands   2x10 2x10 2x10 2x10 2x10 2x10 2x10 2x10 4x5   Cuing for proper technique  4' 4'   4' 4' 4' 4' 4'   Gait Training                                       Modalities

## 2022-11-08 ENCOUNTER — APPOINTMENT (OUTPATIENT)
Dept: PHYSICAL THERAPY | Facility: CLINIC | Age: 52
End: 2022-11-08

## 2022-11-10 ENCOUNTER — APPOINTMENT (OUTPATIENT)
Dept: PHYSICAL THERAPY | Facility: CLINIC | Age: 52
End: 2022-11-10
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
## 2022-11-10 DIAGNOSIS — M51.36 DDD (DEGENERATIVE DISC DISEASE), LUMBAR: Primary | ICD-10-CM

## 2022-11-10 NOTE — PROGRESS NOTES
Daily Note     Today's date: 11/10/2022  Patient name: Jarad Ellington  : 1970  MRN: [id number]  Referring provider: Rommel Barger DO  Dx:   Encounter Diagnosis     ICD-10-CM    1  DDD (degenerative disc disease), lumbar  M51 36    2  Fall, subsequent encounter  W19  XXXD                   Subjective: ***      Objective: See treatment diary below      Assessment: Tolerated treatment {Tolerated treatment :9699417710}   Patient {assessment:[id number]}      Plan: {PLAN:6899865333}     Precautions: HTN, fall risk, DVT, cervical SX, Lupus    Manuals 11/10 9/7 9/14 9/22 9/27 9/30 10/6 10/20 10/24 11/4   SKTC  1x5 20" 1x5 20" 1x5  20" 1x5 20" 1x5 20" 1x5 20" 1x5 20" 1x5 20" 1x5 20"   MT  ham, piriformis stretch, calf 10 8' 8' 8' 1x5 20" 1x5 20" 1x5 20" 1x5 20" 1x5 20" 1x5 20"                             Neuro Re-Ed             Bridges c hip add  2x10 2x10 2x10 2x10  2x10 2x10 2x10 3x10   Pt education  5' 5'   3' 5' 5' 5' 5'   talha rows sitting       3x10 18# 3x10 18# 3x10 18# 3x10 18#   talha extensions sitting       2x10 12# 2x10 12# 2x10 12# 3x10 12#                                          Ther Ex             Nu step 5 min 6 mins 6 mins 6 min 7 min  7' 7' 8' lvl 9 8' lvl 8  7' lvl 8   3-way phyioball rollouts 20x 10x ea 3" 10x 3" ea 10x3"  ea 10x3" ea 10x3" ea 10x3" ea 10x3" ea 10x3" ea 10x3" ea   TB hip abd Blue TB  30x   BTB  30x BTB 30x BTB 30x BTB 30x Blue 30x Blue 30x Blue 30x                Ankle pumps 10x   30x 30x        LTR  1x15 ea 1x15 ea 1x15  ea 1x15 1x15 1x15 1x15 1x15 1x15   LAQs        3x10 5# 3x10 5# 3x10 5#                Ther Activity             Sit to stands   2x10 2x10 2x10 2x10 2x10 2x10 2x10 2x10 4x5   Cuing for proper technique  4' 4'   4' 4' 4' 4' 4'   Gait Training                                       Modalities

## 2022-11-15 ENCOUNTER — APPOINTMENT (OUTPATIENT)
Dept: PHYSICAL THERAPY | Facility: CLINIC | Age: 52
End: 2022-11-15

## 2022-11-17 ENCOUNTER — OFFICE VISIT (OUTPATIENT)
Dept: PHYSICAL THERAPY | Facility: CLINIC | Age: 52
End: 2022-11-17

## 2022-11-17 DIAGNOSIS — M51.36 DDD (DEGENERATIVE DISC DISEASE), LUMBAR: Primary | ICD-10-CM
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
## 2022-11-17 NOTE — PROGRESS NOTES
Daily Note     Today's date: 2022  Patient name: Gomez Isidro  : 1970  MRN: [id number]  Referring provider: Percy Coates DO  Dx:   Encounter Diagnosis     ICD-10-CM    1  DDD (degenerative disc disease), lumbar  M51 36       2  Fall, subsequent encounter  W19  XXXD           Start Time: 0845  Stop Time: 09  Total time in clinic (min): 45 minutes    Subjective: patient reports feet are numb today at 3/10    Objective: See treatment diary below      Assessment: Tolerated treatment fair  Patient demonstrated fatigue post treatment, exhibited good technique with therapeutic exercises and would benefit from continued PT, patient continues to ambulate with RW with drop foot on the left, slowly progressing with strength but left side still weaker      Plan: Progress treatment as tolerated         Precautions: HTN, fall risk, DVT, cervical SX, Lupus    Manuals 11/10 11/17 9/14 9/22 9/27 9/30 10/6 10/20 10/24 11/4   SKTC  1x5 20" 1x5 20" 1x5  20" 1x5 20" 1x5 20" 1x5 20" 1x5 20" 1x5 20" 1x5 20"   MT  ham, piriformis stretch, calf 10 8' 8' 8' 1x5 20" 1x5 20" 1x5 20" 1x5 20" 1x5 20" 1x5 20"                             Neuro Re-Ed             Bridges c hip add  2x10 2x10 2x10 2x10  2x10 2x10 2x10 3x10   Pt education  5' 5'   3' 5' 5' 5' 5'   talha rows sitting  18#  3x10     3x10 18# 3x10 18# 3x10 18# 3x10 18#   talha extensions sitting  12#  3x10     2x10 12# 2x10 12# 2x10 12# 3x10 12#                                          Ther Ex             Nu step 5 min 6 mins 6 mins 6 min 7 min  7' 7' 8' lvl 9 8' lvl 8  7' lvl 8   3-way phyioball rollouts 20x 10x ea 3" 10x 3" ea 10x3"  ea 10x3" ea 10x3" ea 10x3" ea 10x3" ea 10x3" ea 10x3" ea   TB hip abd Blue TB  30x BTB  30x  BTB  30x BTB 30x BTB 30x BTB 30x Blue 30x Blue 30x Blue 30x                Ankle pumps 10x 10x  30x 30x        LTR  1x15 ea 1x15 ea 1x15  ea 1x15 1x15 1x15 1x15 1x15 1x15   LAQs  5#  3x10      3x10 5# 3x10 5# 3x10 5#                Ther Activity             Sit to stands   2x10 2x10 2x10 2x10 2x10 2x10 2x10 2x10 4x5   Cuing for proper technique  4' 4'   4' 4' 4' 4' 4'   Gait Training                                       Modalities

## 2022-11-23 ENCOUNTER — TELEPHONE (OUTPATIENT)
Dept: NEUROLOGY | Facility: CLINIC | Age: 52
End: 2022-11-23

## 2022-11-25 ENCOUNTER — APPOINTMENT (OUTPATIENT)
Dept: PHYSICAL THERAPY | Facility: CLINIC | Age: 52
End: 2022-11-25

## 2022-11-30 ENCOUNTER — APPOINTMENT (OUTPATIENT)
Dept: PHYSICAL THERAPY | Facility: CLINIC | Age: 52
End: 2022-11-30

## 2022-12-06 ENCOUNTER — TELEPHONE (OUTPATIENT)
Dept: NEUROLOGY | Facility: CLINIC | Age: 52
End: 2022-12-06

## 2022-12-06 NOTE — TELEPHONE ENCOUNTER
ERICA/LOV 2/2020 w  Fabio/Gait difficulty/Neuropathic pain/[facility]    Schedule:12/8/22 at 10am w/ Daysi Bryant in [facility]

## 2022-12-08 ENCOUNTER — TELEPHONE (OUTPATIENT)
Dept: NEUROLOGY | Facility: CLINIC | Age: 52
End: 2022-12-08

## 2022-12-08 NOTE — TELEPHONE ENCOUNTER
DARIELAOM to return call back to office if he would like to reschedule his appointment from today with CORINE Ramos that was no showed

## 2022-12-29 ENCOUNTER — APPOINTMENT (EMERGENCY)
Dept: VASCULAR ULTRASOUND | Facility: HOSPITAL | Age: 52
End: 2022-12-29

## 2022-12-29 ENCOUNTER — APPOINTMENT (EMERGENCY)
Dept: CT IMAGING | Facility: HOSPITAL | Age: 52
End: 2022-12-29

## 2022-12-29 ENCOUNTER — HOSPITAL ENCOUNTER (INPATIENT)
Facility: HOSPITAL | Age: 52
LOS: 12 days | Discharge: HOME HEALTH | End: 2023-01-10
Attending: EMERGENCY MEDICINE | Admitting: FAMILY MEDICINE

## 2022-12-29 ENCOUNTER — APPOINTMENT (EMERGENCY)
Dept: RADIOLOGY | Facility: HOSPITAL | Age: 52
End: 2022-12-29

## 2022-12-29 DIAGNOSIS — L03.115 CELLULITIS OF RIGHT LOWER EXTREMITY: ICD-10-CM

## 2022-12-29 DIAGNOSIS — L03.115 CELLULITIS OF RIGHT LEG: Primary | ICD-10-CM

## 2022-12-29 DIAGNOSIS — E87.1 HYPONATREMIA: ICD-10-CM

## 2022-12-29 DIAGNOSIS — N18.30 STAGE 3 CHRONIC KIDNEY DISEASE, UNSPECIFIED WHETHER STAGE 3A OR 3B CKD (HCC): ICD-10-CM

## 2022-12-29 LAB
ALBUMIN SERPL BCP-MCNC: 3.4 G/DL (ref 3.5–5)
ALP SERPL-CCNC: 89 U/L (ref 46–116)
ALT SERPL W P-5'-P-CCNC: 13 U/L (ref 12–78)
ANION GAP SERPL CALCULATED.3IONS-SCNC: 9 MMOL/L (ref 4–13)
APTT PPP: 38 SECONDS (ref 23–37)
AST SERPL W P-5'-P-CCNC: 18 U/L (ref 5–45)
BASOPHILS # BLD AUTO: 0.02 THOUSANDS/ÂΜL (ref 0–0.1)
BASOPHILS NFR BLD AUTO: 0 % (ref 0–1)
BILIRUB SERPL-MCNC: 0.34 MG/DL (ref 0.2–1)
BUN SERPL-MCNC: 16 MG/DL (ref 5–25)
CALCIUM ALBUM COR SERPL-MCNC: 9.3 MG/DL (ref 8.3–10.1)
CALCIUM SERPL-MCNC: 8.8 MG/DL (ref 8.3–10.1)
CHLORIDE SERPL-SCNC: 99 MMOL/L (ref 96–108)
CO2 SERPL-SCNC: 25 MMOL/L (ref 21–32)
CREAT SERPL-MCNC: 1.52 MG/DL (ref 0.6–1.3)
EOSINOPHIL # BLD AUTO: 0.01 THOUSAND/ÂΜL (ref 0–0.61)
EOSINOPHIL NFR BLD AUTO: 0 % (ref 0–6)
ERYTHROCYTE [DISTWIDTH] IN BLOOD BY AUTOMATED COUNT: 14.1 % (ref 11.6–15.1)
GFR SERPL CREATININE-BSD FRML MDRD: 51 ML/MIN/1.73SQ M
GLUCOSE SERPL-MCNC: 103 MG/DL (ref 65–140)
HCT VFR BLD AUTO: 34 % (ref 36.5–49.3)
HGB BLD-MCNC: 11.5 G/DL (ref 12–17)
IMM GRANULOCYTES # BLD AUTO: 0.03 THOUSAND/UL (ref 0–0.2)
IMM GRANULOCYTES NFR BLD AUTO: 1 % (ref 0–2)
INR PPP: 1.26 (ref 0.84–1.19)
LYMPHOCYTES # BLD AUTO: 0.75 THOUSANDS/ÂΜL (ref 0.6–4.47)
LYMPHOCYTES NFR BLD AUTO: 12 % (ref 14–44)
MCH RBC QN AUTO: 28.8 PG (ref 26.8–34.3)
MCHC RBC AUTO-ENTMCNC: 33.8 G/DL (ref 31.4–37.4)
MCV RBC AUTO: 85 FL (ref 82–98)
MONOCYTES # BLD AUTO: 0.93 THOUSAND/ÂΜL (ref 0.17–1.22)
MONOCYTES NFR BLD AUTO: 15 % (ref 4–12)
NEUTROPHILS # BLD AUTO: 4.47 THOUSANDS/ÂΜL (ref 1.85–7.62)
NEUTS SEG NFR BLD AUTO: 72 % (ref 43–75)
NRBC BLD AUTO-RTO: 0 /100 WBCS
PLATELET # BLD AUTO: 225 THOUSANDS/UL (ref 149–390)
PMV BLD AUTO: 9.8 FL (ref 8.9–12.7)
POTASSIUM SERPL-SCNC: 4.2 MMOL/L (ref 3.5–5.3)
PROCALCITONIN SERPL-MCNC: 0.11 NG/ML
PROT SERPL-MCNC: 7.8 G/DL (ref 6.4–8.4)
PROTHROMBIN TIME: 15.5 SECONDS (ref 11.6–14.5)
RBC # BLD AUTO: 4 MILLION/UL (ref 3.88–5.62)
SODIUM SERPL-SCNC: 133 MMOL/L (ref 135–147)
WBC # BLD AUTO: 6.21 THOUSAND/UL (ref 4.31–10.16)

## 2022-12-29 RX ORDER — ACETAMINOPHEN 325 MG/1
650 TABLET ORAL EVERY 6 HOURS PRN
Status: DISCONTINUED | OUTPATIENT
Start: 2022-12-29 | End: 2023-01-10 | Stop reason: HOSPADM

## 2022-12-29 RX ORDER — SODIUM CHLORIDE, SODIUM GLUCONATE, SODIUM ACETATE, POTASSIUM CHLORIDE, MAGNESIUM CHLORIDE, SODIUM PHOSPHATE, DIBASIC, AND POTASSIUM PHOSPHATE .53; .5; .37; .037; .03; .012; .00082 G/100ML; G/100ML; G/100ML; G/100ML; G/100ML; G/100ML; G/100ML
75 INJECTION, SOLUTION INTRAVENOUS CONTINUOUS
Status: DISCONTINUED | OUTPATIENT
Start: 2022-12-29 | End: 2023-01-04

## 2022-12-29 RX ORDER — DOCUSATE SODIUM 100 MG/1
100 CAPSULE, LIQUID FILLED ORAL 2 TIMES DAILY
Status: DISCONTINUED | OUTPATIENT
Start: 2022-12-29 | End: 2023-01-04

## 2022-12-29 RX ORDER — MYCOPHENOLATE MOFETIL 250 MG/1
500 CAPSULE ORAL EVERY 12 HOURS SCHEDULED
Status: DISCONTINUED | OUTPATIENT
Start: 2022-12-29 | End: 2023-01-10 | Stop reason: HOSPADM

## 2022-12-29 RX ORDER — HYDROMORPHONE HCL/PF 1 MG/ML
0.5 SYRINGE (ML) INJECTION ONCE
Status: COMPLETED | OUTPATIENT
Start: 2022-12-29 | End: 2022-12-29

## 2022-12-29 RX ORDER — HYDROXYCHLOROQUINE SULFATE 200 MG/1
400 TABLET, FILM COATED ORAL
Status: DISCONTINUED | OUTPATIENT
Start: 2022-12-29 | End: 2023-01-10 | Stop reason: HOSPADM

## 2022-12-29 RX ORDER — TRIAMCINOLONE ACETONIDE 1 MG/G
CREAM TOPICAL 2 TIMES DAILY
Status: DISCONTINUED | OUTPATIENT
Start: 2022-12-29 | End: 2023-01-10 | Stop reason: HOSPADM

## 2022-12-29 RX ORDER — TAMSULOSIN HYDROCHLORIDE 0.4 MG/1
0.4 CAPSULE ORAL
Status: DISCONTINUED | OUTPATIENT
Start: 2022-12-29 | End: 2023-01-10 | Stop reason: HOSPADM

## 2022-12-29 RX ORDER — OXYCODONE HYDROCHLORIDE 5 MG/1
5 TABLET ORAL EVERY 6 HOURS PRN
Status: DISCONTINUED | OUTPATIENT
Start: 2022-12-29 | End: 2022-12-31

## 2022-12-29 RX ORDER — BACLOFEN 10 MG/1
20 TABLET ORAL 3 TIMES DAILY
Status: DISCONTINUED | OUTPATIENT
Start: 2022-12-29 | End: 2023-01-10 | Stop reason: HOSPADM

## 2022-12-29 RX ORDER — HYDROXYZINE HYDROCHLORIDE 10 MG/1
10 TABLET, FILM COATED ORAL EVERY 6 HOURS PRN
Status: DISCONTINUED | OUTPATIENT
Start: 2022-12-29 | End: 2023-01-10 | Stop reason: HOSPADM

## 2022-12-29 RX ORDER — FUROSEMIDE 20 MG/1
20 TABLET ORAL DAILY
Status: DISCONTINUED | OUTPATIENT
Start: 2022-12-30 | End: 2023-01-04

## 2022-12-29 RX ORDER — GABAPENTIN 300 MG/1
600 CAPSULE ORAL 3 TIMES DAILY
Status: DISCONTINUED | OUTPATIENT
Start: 2022-12-29 | End: 2023-01-10 | Stop reason: HOSPADM

## 2022-12-29 RX ORDER — OXYCODONE HYDROCHLORIDE AND ACETAMINOPHEN 5; 325 MG/1; MG/1
1 TABLET ORAL EVERY 8 HOURS PRN
Qty: 15 TABLET | Refills: 0 | Status: SHIPPED | OUTPATIENT
Start: 2022-12-29 | End: 2023-01-08

## 2022-12-29 RX ORDER — CEFAZOLIN SODIUM 2 G/50ML
2000 SOLUTION INTRAVENOUS ONCE
Status: COMPLETED | OUTPATIENT
Start: 2022-12-29 | End: 2022-12-29

## 2022-12-29 RX ORDER — LORATADINE 10 MG/1
10 TABLET ORAL DAILY
Status: DISCONTINUED | OUTPATIENT
Start: 2022-12-30 | End: 2023-01-10 | Stop reason: HOSPADM

## 2022-12-29 RX ORDER — CEFAZOLIN SODIUM 1 G/50ML
1000 SOLUTION INTRAVENOUS EVERY 8 HOURS
Status: DISCONTINUED | OUTPATIENT
Start: 2022-12-29 | End: 2023-01-05

## 2022-12-29 RX ORDER — METOLAZONE 5 MG/1
2.5 TABLET ORAL DAILY
Status: DISCONTINUED | OUTPATIENT
Start: 2022-12-30 | End: 2023-01-04

## 2022-12-29 RX ORDER — CARVEDILOL 12.5 MG/1
12.5 TABLET ORAL 2 TIMES DAILY WITH MEALS
Status: DISCONTINUED | OUTPATIENT
Start: 2022-12-29 | End: 2023-01-10 | Stop reason: HOSPADM

## 2022-12-29 RX ORDER — CEPHALEXIN 500 MG/1
500 CAPSULE ORAL EVERY 6 HOURS SCHEDULED
Qty: 28 CAPSULE | Refills: 0 | Status: SHIPPED | OUTPATIENT
Start: 2022-12-29 | End: 2023-01-05

## 2022-12-29 RX ORDER — ESCITALOPRAM OXALATE 10 MG/1
10 TABLET ORAL DAILY
Status: DISCONTINUED | OUTPATIENT
Start: 2022-12-30 | End: 2023-01-10 | Stop reason: HOSPADM

## 2022-12-29 RX ORDER — HYDROMORPHONE HCL IN WATER/PF 6 MG/30 ML
0.2 PATIENT CONTROLLED ANALGESIA SYRINGE INTRAVENOUS EVERY 4 HOURS PRN
Status: DISCONTINUED | OUTPATIENT
Start: 2022-12-29 | End: 2023-01-06

## 2022-12-29 RX ADMIN — TRIAMCINOLONE ACETONIDE: 1 CREAM TOPICAL at 18:00

## 2022-12-29 RX ADMIN — BACLOFEN 20 MG: 10 TABLET ORAL at 17:59

## 2022-12-29 RX ADMIN — HYDROMORPHONE HYDROCHLORIDE 0.5 MG: 1 INJECTION, SOLUTION INTRAMUSCULAR; INTRAVENOUS; SUBCUTANEOUS at 15:29

## 2022-12-29 RX ADMIN — IOHEXOL 100 ML: 350 INJECTION, SOLUTION INTRAVENOUS at 14:47

## 2022-12-29 RX ADMIN — HYDROMORPHONE HYDROCHLORIDE 0.2 MG: 0.2 INJECTION, SOLUTION INTRAMUSCULAR; INTRAVENOUS; SUBCUTANEOUS at 17:56

## 2022-12-29 RX ADMIN — CEFAZOLIN SODIUM 1000 MG: 1 SOLUTION INTRAVENOUS at 17:57

## 2022-12-29 RX ADMIN — TAMSULOSIN HYDROCHLORIDE 0.4 MG: 0.4 CAPSULE ORAL at 17:59

## 2022-12-29 RX ADMIN — GABAPENTIN 600 MG: 300 CAPSULE ORAL at 22:03

## 2022-12-29 RX ADMIN — HYDROXYCHLOROQUINE SULFATE 400 MG: 200 TABLET, FILM COATED ORAL at 22:04

## 2022-12-29 RX ADMIN — HYDROMORPHONE HYDROCHLORIDE 0.5 MG: 1 INJECTION, SOLUTION INTRAMUSCULAR; INTRAVENOUS; SUBCUTANEOUS at 10:59

## 2022-12-29 RX ADMIN — GABAPENTIN 600 MG: 300 CAPSULE ORAL at 17:59

## 2022-12-29 RX ADMIN — APIXABAN 5 MG: 5 TABLET, FILM COATED ORAL at 17:59

## 2022-12-29 RX ADMIN — SODIUM CHLORIDE, SODIUM GLUCONATE, SODIUM ACETATE, POTASSIUM CHLORIDE, MAGNESIUM CHLORIDE, SODIUM PHOSPHATE, DIBASIC, AND POTASSIUM PHOSPHATE 75 ML/HR: .53; .5; .37; .037; .03; .012; .00082 INJECTION, SOLUTION INTRAVENOUS at 18:04

## 2022-12-29 RX ADMIN — CEFAZOLIN SODIUM 2000 MG: 2 SOLUTION INTRAVENOUS at 09:42

## 2022-12-29 RX ADMIN — MYCOPHENOLATE MOFETIL 500 MG: 250 CAPSULE ORAL at 22:03

## 2022-12-29 RX ADMIN — OXYCODONE HYDROCHLORIDE 5 MG: 5 TABLET ORAL at 17:56

## 2022-12-29 RX ADMIN — BACLOFEN 20 MG: 10 TABLET ORAL at 22:04

## 2022-12-29 NOTE — ASSESSMENT & PLAN NOTE
Lab Results   Component Value Date    EGFR 51 12/29/2022    EGFR 36 10/03/2022    EGFR 44 04/21/2022    CREATININE 1 52 (H) 12/29/2022    CREATININE 2 00 (H) 10/03/2022    CREATININE 1 72 (H) 04/21/2022     - stable renal function  - cont to monitor closely in lieu of this infection

## 2022-12-29 NOTE — PLAN OF CARE
Problem: PHYSICAL THERAPY ADULT  Goal: Performs mobility at highest level of function for planned discharge setting  See evaluation for individualized goals  Description: Treatment/Interventions: Functional transfer training, LE strengthening/ROM, ADL retraining, Therapeutic exercise, Endurance training, Bed mobility, Gait training, Compensatory technique education, Spoke to nursing          See flowsheet documentation for full assessment, interventions and recommendations  Outcome: Progressing  Note: Prognosis: Good  Problem List: Decreased strength, Decreased endurance, Impaired balance, Decreased mobility, Pain, Decreased skin integrity, Impaired sensation  Assessment: Pt is 45 y o  male seen for PT evaluation s/p admit to [facility] on 12/29/2022 w/ <principal problem not specified>  PT consulted to assess pt's functional mobility and d/c needs  Order placed for PT eval and tx, w/ up w/ A order  Comorbidities affecting pt's physical performance at time of assessment include: redness and swelling of R foot, lupus, CAD, HTN, gait difficulty  PTA, pt was independent w/ all functional mobility w/ RW, ambulates household distances, has 2 HOSSEIN, lives w/ spouse and daughter in one level house and on disability  Personal factors affecting pt at time of IE include: inaccessible home environment, stairs to enter home, inability to ambulate household distances, inability to navigate community distances, inability to navigate level surfaces w/o external assistance, unable to perform dynamic tasks in community, positive fall history, inability to perform IADLs and inability to perform ADLs   Please find objective findings from PT assessment regarding body systems outlined above with impairments and limitations including weakness, impaired balance, decreased endurance, gait deviations, pain, decreased activity tolerance, decreased functional mobility tolerance, altered sensation, fall risk and decreased skin integrity  The following objective measures performed on IE also reveal limitations: Barthel Index: 45/100, Modified Dougherty: 4 (moderate/severe disability) and AM-PAC 6-Clicks: 8/27  Pt's clinical presentation is currently unstable/unpredictable seen in pt's presentation of continued need for medical management and monitoring, decreased strength and balance resulting in an increased risk for falls, decreased endurance and balance limiting overall mobility, increased pain, decreased skin integrity  Pt to benefit from continued PT tx to address deficits as defined above and maximize level of functional independent mobility and consistency  From PT/mobility standpoint, recommendation at time of d/c would be post acute rehabilitation services pending progress in order to facilitate return to PLOF  Barriers to Discharge: Inaccessible home environment, Other (Comment) (decline in mobility status)     PT Discharge Recommendation: Post acute rehabilitation services    See flowsheet documentation for full assessment

## 2022-12-29 NOTE — ASSESSMENT & PLAN NOTE
/69   Pulse 84   Temp 98 6 °F (37 °C) (Oral)   Resp 20   SpO2 93%   Stable pressures  Cont coreg, lasix and metolazone

## 2022-12-29 NOTE — H&P
[address] [facility]  H&P- [facility] 1970, 45 y o  male MRN: [id number]  Unit/Bed#: ED 14 Encounter: [id number]  Primary Care Provider: Rich Thao MD   Date and time admitted to hospital: 12/29/2022  8:46 AM    * Cellulitis of right lower extremity  Assessment & Plan  · Redness and pain x 2-3 days, worsening  · Underlying lupus, no open draining wounds  · No fever , no SIRS criteria  · But secondary to non amb status due to severe pain in RLE, decision to admit the patient was made and likely he will need STR considering his current ambulatory status  · Start cefazolin IV 1g Q8h  · CT scan reviewed, shows extensive cellulitis, no bony fractures  · IV fluids  · Pain control  · Leg elevation    History of DVT (deep vein thrombosis)  Assessment & Plan  · Cont eliquis  · Venous duplex done today: no DVT    Chronic kidney disease, stage 3 [facility])  Assessment & Plan  Lab Results   Component Value Date    EGFR 51 12/29/2022    EGFR 36 10/03/2022    EGFR 44 04/21/2022    CREATININE 1 52 (H) 12/29/2022    CREATININE 2 00 (H) 10/03/2022    CREATININE 1 72 (H) 04/21/2022     - stable renal function  - cont to monitor closely in lieu of this infection    Hypertension  Assessment & Plan  /69   Pulse 84   Temp 98 6 °F (37 °C) (Oral)   Resp 20   SpO2 93%   Stable pressures  Cont coreg, lasix and metolazone    Lupus (systemic lupus erythematosus) (HCC)  Assessment & Plan  · Has generalized rash over his body  · Cont OP rheum followup  · Cont home meds      VTE Pharmacologic Prophylaxis: VTE Score: 4 Moderate Risk (Score 3-4) - Pharmacological DVT Prophylaxis Ordered: apixaban (Eliquis)  Code Status: Level 1 - Full Code   Discussion with family: Patient declined call to        Anticipated Length of Stay: Patient will be admitted on an inpatient basis with an anticipated length of stay of greater than 2 midnights secondary to need for IV antibiotics and further PT eval recommending STR     Total Time for Visit, including Counseling / Coordination of Care: 60 minutes Greater than 50% of this total time spent on direct patient counseling and coordination of care  Chief Complaint: RLE swelling redness and tenderness and inability of bear weight causing ambulatory dysfunction    History of Present Illness:  Little Guadalupe is a 45 y o  male with a PMH of lupus, DVT, tremors who presents with worsening redness, swelling, tenderness in the right lower extremity which started few days ago about 3 days ago and gradually worsened  Patient has had problem with swelling of his extremity for some time but this redness and pain and tenderness is fairly new  He denies taking any antibiotics on the outpatient side of things  He denies any fever, chest pain, shortness of breath, syncope, nausea, vomiting, diarrhea  He denies any trauma or having any open wounds  Though he has multiple rashes from lupus which could be the causative factor  Review of Systems:  Review of Systems   Constitutional: Negative for chills and fever  HENT: Negative for ear pain and sore throat  Eyes: Negative for pain and visual disturbance  Respiratory: Negative for cough and shortness of breath  Cardiovascular: Negative for chest pain and palpitations  Gastrointestinal: Negative for abdominal pain and vomiting  Genitourinary: Negative for dysuria and hematuria  Musculoskeletal: Positive for arthralgias, gait problem and myalgias  Negative for back pain  Skin: Positive for color change and rash  Neurological: Positive for tremors  Negative for seizures and syncope  Psychiatric/Behavioral: Negative for agitation, confusion and decreased concentration  The patient is not nervous/anxious  All other systems reviewed and are negative        Past Medical and Surgical History:   Past Medical History:   Diagnosis Date   • Cervical mass    • Coronary artery disease    • Depression    • [facility]    • Hypertension    • Lupus ([facility] )    • Renal disorder        Past Surgical History:   Procedure Laterality Date   • APPENDECTOMY     • CERVICAL SPINE SURGERY     • CHOLECYSTECTOMY     • COLONOSCOPY N/A 8/23/2018    Procedure: COLONOSCOPY;  Surgeon: Ivelisse Laguna MD;  Location: [facility]; Service: Gastroenterology   • ESOPHAGOGASTRODUODENOSCOPY N/A 8/22/2018    Procedure: ESOPHAGOGASTRODUODENOSCOPY (EGD); Surgeon: Ivelisse Laguna MD;  Location: [facility]; Service: Gastroenterology   • NECK SURGERY     • VASCULAR SURGERY         Meds/Allergies:  Prior to Admission medications    Medication Sig Start Date End Date Taking?  Authorizing Provider   cephalexin (KEFLEX) 500 mg capsule Take 1 capsule (500 mg total) by mouth every 6 (six) hours for 7 days 12/29/22 1/5/23 Yes Merly Logan PA-C   oxyCODONE-acetaminophen (Percocet) 5-325 mg per tablet Take 1 tablet by mouth every 8 (eight) hours as needed for moderate pain for up to 10 days Max Daily Amount: 3 tablets 12/29/22 1/8/23 Yes Teo Farrell PA-C   apixaban (ELIQUIS) 5 mg Take 5 mg by mouth 2 (two) times a day 4/8/22   Historical Provider, MD   azelastine (ASTELIN) 0 1 % nasal spray ADMINISTER 1 SPRAY INTO NOSTRIL TWICE DAILY 11/26/21 1/12/23  Historical Provider, MD   bacitracin topical ointment 500 units/g topical ointment APPLY TOPICALLY TO AFFECTED AREA THREE TIMES A DAY AS DIRECTED 4/27/22 4/27/23  Historical Provider, MD   baclofen 20 mg tablet Take 20 mg by mouth 3 (three) times a day     Historical Provider, MD   betamethasone dipropionate (DIPROSONE) 0 05 % cream Apply 1 application topically 2 (two) times a day    Historical Provider, MD   betamethasone, augmented, (DIPROLENE) 0 05 % ointment Apply 1 application topically 2 (two) times a day    Historical Provider, MD   carvedilol (COREG) 12 5 mg tablet Take 12 5 mg by mouth 2 (two) times a day with meals    Historical Provider, MD   Diclofenac Sodium (VOLTAREN) 1 % Apply 2 g topically 4 (four) times a day 9/27/22   Alivia Archer DO   escitalopram (LEXAPRO) 10 mg tablet Take 10 mg by mouth daily    Historical Provider, MD   furosemide (LASIX) 40 mg tablet Take 0 5 tablets (20 mg total) by mouth daily 7/31/18   Meliza Arreguin PA-C   gabapentin (NEURONTIN) 300 mg capsule TAKE 1 CAPSULE BY MOUTH AT BEDTIME IN ADDITION TO 600MG 3/8/21   Jeanine Mckeon MD   gabapentin (NEURONTIN) 600 MG tablet Take 600 mg by mouth 3 (three) times a day     Historical Provider, MD   hydroxychloroquine (PLAQUENIL) 200 mg tablet Take 400 mg by mouth daily at bedtime    Historical Provider, MD   hydrOXYzine HCL (ATARAX) 10 mg tablet Take 10 mg by mouth every 6 (six) hours as needed for itching    Historical Provider, MD   lidocaine-prilocaine (EMLA) cream  10/26/22   Historical Provider, MD   loratadine (CLARITIN) 10 mg tablet Take 1 tablet (10 mg total) by mouth daily 10/31/22 1/29/23  Jay Coad, MD   metolazone (ZAROXOLYN) 2 5 mg tablet Take 2 5 mg by mouth daily    Historical Provider, MD   metolazone (ZAROXOLYN) 5 mg tablet  11/1/22   Historical Provider, MD   mycophenolate (CELLCEPT) 500 mg tablet Take 500 mg by mouth every 12 (twelve) hours     Historical Provider, MD   potassium chloride (K-DUR,KLOR-CON) 20 mEq tablet Take 20 mEq by mouth daily    Historical Provider, MD   tamsulosin (FLOMAX) 0 4 mg  10/13/22   Historical Provider, MD   traMADol (ULTRAM) 50 mg tablet Take 50 mg by mouth every 6 (six) hours as needed for moderate pain    Historical Provider, MD     I have reviewed home medications with patient personally  Allergies:    Allergies   Allergen Reactions   • Sulfa Antibiotics Rash       Social History:  Marital Status: /Civil Union     Substance Use History:   Social History     Substance and Sexual Activity   Alcohol Use Never     Social History     Tobacco Use   Smoking Status Never   Smokeless Tobacco Never     Social History     Substance and Sexual Activity Drug Use Never       Family History:  Family History   Problem Relation Age of Onset   • Heart disease Mother    • No Known Problems Father        Physical Exam:     Vitals:   Blood Pressure: 128/69 (12/29/22 1700)  Pulse: 84 (12/29/22 1700)  Temperature: 98 6 °F (37 °C) (12/29/22 0921)  Temp Source: Oral (12/29/22 0921)  Respirations: 20 (12/29/22 1700)  SpO2: 93 % (12/29/22 1700)    Physical Exam General- Awake, alert and oriented x 3, looks uncomfortable due to pain  HEENT- Normocephalic, atraumatic, oral mucosa- moist  Neck- Supple, No carotid bruit, no JVD  CVS- Normal S1/ S2, Regular rate and rhythm, No murmur, RLE pitting pedal edema  Respiratory system- B/L clear breath sounds, no wheezing  Abdomen- Soft, Non distended, no tenderness, Bowel sound- present 4 quads  Genitourinary- No suprapubic tenderness, No CVA tenderness  Skin- RLE swelling, redness, chronic skin discoloration  Musculoskeletal- No gross deformity, severe tenderness in the RLE, unable to bear weight on theRLE, pulses intact, no severe pain on passive extension  Psych- No acute psychosis  CNS- CN II- XII grossly intact, No acute focal neurologic deficit noted      Additional Data:     Lab Results:  Results from last 7 days   Lab Units 12/29/22  0937   WBC Thousand/uL 6 21   HEMOGLOBIN g/dL 11 5*   HEMATOCRIT % 34 0*   PLATELETS Thousands/uL 225   NEUTROS PCT % 72   LYMPHS PCT % 12*   MONOS PCT % 15*   EOS PCT % 0     Results from last 7 days   Lab Units 12/29/22  0937   SODIUM mmol/L 133*   POTASSIUM mmol/L 4 2   CHLORIDE mmol/L 99   CO2 mmol/L 25   BUN mg/dL 16   CREATININE mg/dL 1 52*   ANION GAP mmol/L 9   CALCIUM mg/dL 8 8   ALBUMIN g/dL 3 4*   TOTAL BILIRUBIN mg/dL 0 34   ALK PHOS U/L 89   ALT U/L 13   AST U/L 18   GLUCOSE RANDOM mg/dL 103     Results from last 7 days   Lab Units 12/29/22  0937   INR  1 26*                   Lines/Drains:  Invasive Devices     Peripheral Intravenous Line  Duration           Peripheral IV 12/29/22 Right Antecubital <1 day                    Imaging: Reviewed radiology reports from this admission including: venous duplex and CT LE  CT lower extremity w contrast right   Final Result by Lance Bashir MD (12/29 1531)      No acute osseous abnormality  Mild medial and lateral compartment of the knee with chondrocalcinosis  Circumferential edema lower leg extending into the ankle with areas of skin thickening as above likely cellulitis  No discrete abscess or soft tissue gas  Extensive subcutaneous calcifications  This is of uncertain etiology but could be due to prior infectious etiology such as disseminated cysticercosis  Arterial calcifications  Workstation performed: [id number]         VAS lower limb venous duplex study, unilateral/limited   Final Result by Genna Olivas MD (12/29 1120)          EKG and Other Studies Reviewed on Admission:   · EKG: No EKG obtained  ** Please Note: This note has been constructed using a voice recognition system   **

## 2022-12-29 NOTE — ED PROVIDER NOTES
History  Chief Complaint   Patient presents with   • Foot Pain     Presents by EMS with R foot redness, swelling and pain x1 week  Unable to bare weight or ambulate as of this morning  Tex Díaz is a 66-year-old male with past medical history including SLE, and prior DVT on Eliquis, arriving to the emergency department today for evaluation with chief complaint of right calf pain and leg swelling  Patient states that symptoms had started 2 days ago, and have been progressively worsening from time of symptom onset  The overlying skin is red and warm, with patient noting that he has not noticed any open wounds or active drainage  He has no systemic complaints denying any fevers, chills, sweats  Patient states that due to the pain and swelling in the right lower extremity he has been unable to ambulate or bear weight onto the right lower extremity  He denies any chest pain or shortness of breath  He reports compliance with Eliquis  Denies recent travel or recent surgical procedures  He offers no other complaints or concerns at this time  Prior to Admission Medications   Prescriptions Last Dose Informant Patient Reported? Taking?    Diclofenac Sodium (VOLTAREN) 1 %   No No   Sig: Apply 2 g topically 4 (four) times a day   apixaban (ELIQUIS) 5 mg   Yes No   Sig: Take 5 mg by mouth 2 (two) times a day   azelastine (ASTELIN) 0 1 % nasal spray   Yes No   Sig: ADMINISTER 1 SPRAY INTO NOSTRIL TWICE DAILY   bacitracin topical ointment 500 units/g topical ointment   Yes No   Sig: APPLY TOPICALLY TO AFFECTED AREA THREE TIMES A DAY AS DIRECTED   baclofen 20 mg tablet   Yes No   Sig: Take 20 mg by mouth 3 (three) times a day    betamethasone dipropionate (DIPROSONE) 0 05 % cream   Yes No   Sig: Apply 1 application topically 2 (two) times a day   betamethasone, augmented, (DIPROLENE) 0 05 % ointment   Yes No   Sig: Apply 1 application topically 2 (two) times a day   carvedilol (COREG) 12 5 mg tablet   Yes No Sig: Take 12 5 mg by mouth 2 (two) times a day with meals   escitalopram (LEXAPRO) 10 mg tablet   Yes No   Sig: Take 10 mg by mouth daily   furosemide (LASIX) 40 mg tablet   No No   Sig: Take 0 5 tablets (20 mg total) by mouth daily   gabapentin (NEURONTIN) 300 mg capsule   No No   Sig: TAKE 1 CAPSULE BY MOUTH AT BEDTIME IN ADDITION TO 600MG   gabapentin (NEURONTIN) 600 MG tablet   Yes No   Sig: Take 600 mg by mouth 3 (three) times a day    hydrOXYzine HCL (ATARAX) 10 mg tablet   Yes No   Sig: Take 10 mg by mouth every 6 (six) hours as needed for itching   hydroxychloroquine (PLAQUENIL) 200 mg tablet   Yes No   Sig: Take 400 mg by mouth daily at bedtime   lidocaine-prilocaine (EMLA) cream   Yes No   loratadine (CLARITIN) 10 mg tablet   No No   Sig: Take 1 tablet (10 mg total) by mouth daily   metolazone (ZAROXOLYN) 2 5 mg tablet   Yes No   Sig: Take 2 5 mg by mouth daily   metolazone (ZAROXOLYN) 5 mg tablet   Yes No   mycophenolate (CELLCEPT) 500 mg tablet   Yes No   Sig: Take 500 mg by mouth every 12 (twelve) hours    potassium chloride (K-DUR,KLOR-CON) 20 mEq tablet   Yes No   Sig: Take 20 mEq by mouth daily   tamsulosin (FLOMAX) 0 4 mg   Yes No   traMADol (ULTRAM) 50 mg tablet   Yes No   Sig: Take 50 mg by mouth every 6 (six) hours as needed for moderate pain      Facility-Administered Medications: None       Past Medical History:   Diagnosis Date   • Cervical mass    • Coronary artery disease    • Depression    • DVT (deep venous thrombosis) (HCC)    • Hypertension    • Lupus (HCC)    • Renal disorder        Past Surgical History:   Procedure Laterality Date   • APPENDECTOMY     • CERVICAL SPINE SURGERY     • CHOLECYSTECTOMY     • COLONOSCOPY N/A 8/23/2018    Procedure: COLONOSCOPY;  Surgeon: Gwen Clay MD;  Location: [facility]; Service: Gastroenterology   • ESOPHAGOGASTRODUODENOSCOPY N/A 8/22/2018    Procedure: ESOPHAGOGASTRODUODENOSCOPY (EGD);   Surgeon: Gwen Clay MD;  Location: [facility];  Service: Gastroenterology   • NECK SURGERY     • VASCULAR SURGERY         Family History   Problem Relation Age of Onset   • Heart disease Mother    • No Known Problems Father      I have reviewed and agree with the history as documented  E-Cigarette/Vaping   • E-Cigarette Use Never User      E-Cigarette/Vaping Substances   • Nicotine No    • THC No    • CBD No    • Flavoring No    • Other No    • Unknown No      Social History     Tobacco Use   • Smoking status: Never   • Smokeless tobacco: Never   Vaping Use   • Vaping Use: Never used   Substance Use Topics   • Alcohol use: Never   • Drug use: Never       Review of Systems   Constitutional: Negative for chills, diaphoresis, fatigue and fever  Cardiovascular: Positive for leg swelling (right lower extremity)  Gastrointestinal: Negative for nausea and vomiting  Musculoskeletal: Positive for gait problem  Skin: Positive for color change (redness, right leg)  Negative for rash  Neurological: Negative for weakness and numbness  All other systems reviewed and are negative  Physical Exam  Physical Exam  Vitals and nursing note reviewed  Constitutional:       General: He is not in acute distress  Appearance: Normal appearance  He is well-developed  He is not ill-appearing, toxic-appearing or diaphoretic  HENT:      Head: Normocephalic and atraumatic  Right Ear: External ear normal       Left Ear: External ear normal    Eyes:      Conjunctiva/sclera: Conjunctivae normal    Cardiovascular:      Rate and Rhythm: Normal rate and regular rhythm  Pulses: Normal pulses  Pulmonary:      Effort: Pulmonary effort is normal  No respiratory distress  Breath sounds: Normal breath sounds  No wheezing, rhonchi or rales  Chest:      Chest wall: No tenderness  Abdominal:      General: There is no distension  Palpations: Abdomen is soft  Tenderness: There is no abdominal tenderness     Musculoskeletal:      Cervical back: Normal range of motion and neck supple  Right lower leg: Edema present  Left lower leg: Edema present  Comments: Peripheral edema in both lower extremities, right calf circumference greater than left  There is overlying erythema and warmth of the right calf, no palpable fluctuance or crepitus, no palpable cords  Distal sensation and pulses intact  Gait not assessed  Photo posted below  Skin:     General: Skin is warm and dry  Capillary Refill: Capillary refill takes less than 2 seconds  Neurological:      Mental Status: He is alert  Sensory: Sensation is intact  No sensory deficit  Motor: Motor function is intact  No weakness     Psychiatric:         Mood and Affect: Mood normal                  Vital Signs  ED Triage Vitals   Temperature Pulse Respirations Blood Pressure SpO2   12/29/22 0921 12/29/22 0847 12/29/22 0847 12/29/22 0847 12/29/22 0847   98 6 °F (37 °C) 85 20 127/70 100 %      Temp Source Heart Rate Source Patient Position - Orthostatic VS BP Location FiO2 (%)   12/29/22 0921 12/29/22 0847 12/29/22 0847 12/29/22 0847 --   Oral Monitor Sitting Right arm       Pain Score       --                  Vitals:    12/29/22 0847   BP: 127/70   Pulse: 85   Patient Position - Orthostatic VS: Sitting         Visual Acuity      ED Medications  Medications   ceFAZolin (ANCEF) IVPB (premix in dextrose) 2,000 mg 50 mL (2,000 mg Intravenous New Bag 12/29/22 0942)   HYDROmorphone (DILAUDID) injection 0 5 mg (has no administration in time range)       Diagnostic Studies  Results Reviewed     Procedure Component Value Units Date/Time    Protime-INR [[id number]]  (Abnormal) Collected: 12/29/22 0937    Lab Status: Final result Specimen: Blood from Arm, Right Updated: 12/29/22 1005     Protime 15 5 seconds      INR 1 26    APTT [[id number]]  (Abnormal) Collected: 12/29/22 0937    Lab Status: Final result Specimen: Blood from Arm, Right Updated: 12/29/22 1005     PTT 38 seconds     CBC and differential [[id number]]  (Abnormal) Collected: 12/29/22 0937    Lab Status: Final result Specimen: Blood from Arm, Right Updated: 12/29/22 0948     WBC 6 21 Thousand/uL      RBC 4 00 Million/uL      Hemoglobin 11 5 g/dL      Hematocrit 34 0 %      MCV 85 fL      MCH 28 8 pg      MCHC 33 8 g/dL      RDW 14 1 %      MPV 9 8 fL      Platelets 474 Thousands/uL      nRBC 0 /100 WBCs      Neutrophils Relative 72 %      Immat GRANS % 1 %      Lymphocytes Relative 12 %      Monocytes Relative 15 %      Eosinophils Relative 0 %      Basophils Relative 0 %      Neutrophils Absolute 4 47 Thousands/µL      Immature Grans Absolute 0 03 Thousand/uL      Lymphocytes Absolute 0 75 Thousands/µL      Monocytes Absolute 0 93 Thousand/µL      Eosinophils Absolute 0 01 Thousand/µL      Basophils Absolute 0 02 Thousands/µL     Comprehensive metabolic panel [[id number]] Collected: 12/29/22 0937    Lab Status: In process Specimen: Blood from Arm, Right Updated: 12/29/22 0943    Blood culture #2 [[id number]] Collected: 12/29/22 0940    Lab Status: In process Specimen: Blood from Hand, Right Updated: 12/29/22 0943    Blood culture #1 [[id number]] Collected: 12/29/22 0940    Lab Status: In process Specimen: Blood from Arm, Right Updated: 12/29/22 0943                 CT lower extremity w contrast right   Final Result by Rodney Fox MD (12/29 1531)      No acute osseous abnormality  Mild medial and lateral compartment of the knee with chondrocalcinosis  Circumferential edema lower leg extending into the ankle with areas of skin thickening as above likely cellulitis  No discrete abscess or soft tissue gas  Extensive subcutaneous calcifications  This is of uncertain etiology but could be due to prior infectious etiology such as disseminated cysticercosis  Arterial calcifications                 Workstation performed: [id number]         VAS lower limb venous duplex study, unilateral/limited   Final Result by Sarah Wilkins, MD (12/29 1120)                 Procedures  Procedures         ED Course     ED Course   Thu Dec 29, 2022   1208 Patient evaluated at bedside by pedro Jacques  He states that the patient does not require admission at this time as he does not meet sepsis criteria  Recommends discharge home on oral antibiotics and use of walker for assistance with ambulating at home  1430 PT/OT evaluated at bedside and patient is unable to bear any weight onto the right lower extremity, with recommendation for skilled rehab  Dr Louie Nelson made aware, recommending ct of the right lower extremity for further evaluation  Care signed out to elma weber PA-C, to follow up results of CT and admit patient                         SBIRT 22yo+    Flowsheet Row Most Recent Value   SBIRT (23 yo +)    In order to provide better care to our patients, we are screening all of our patients for alcohol and drug use  Would it be okay to ask you these screening questions? Yes Filed at: 12/29/2022 2084   Initial Alcohol Screen: US AUDIT-C     1  How often do you have a drink containing alcohol? 0 Filed at: 12/29/2022 0921   2  How many drinks containing alcohol do you have on a typical day you are drinking? 0 Filed at: 12/29/2022 0921   3a  Male UNDER 65: How often do you have five or more drinks on one occasion? 0 Filed at: 12/29/2022 0921   3b  FEMALE Any Age, or MALE 65+: How often do you have 4 or more drinks on one occassion? 0 Filed at: 12/29/2022 1740   Audit-C Score 0 Filed at: 12/29/2022 9083   NARCISA: How many times in the past year have you    Used an illegal drug or used a prescription medication for non-medical reasons? Never Filed at: 12/29/2022 8783                    MDM  Number of Diagnoses or Management Options  Cellulitis of right leg: new and requires workup  Diagnosis management comments:  This is a 68-year-old male presenting for evaluation of progressive pain and swelling of the right lower extremity over the past 48 hours  No systemic complaints  He does have prior history of DVT in the right lower extremity and has been compliant on Eliquis  Denies any chest pain or shortness of breath  Differential diagnosis includes but is not limited to: DVT, cellulitis, lymphedema, nsti/abscess less likely    Initial ED plan: labs, imaging, anticipate admission    Final ED Assessment: Vital signs reviewed on ED presentation, examination as above  All labs and imaging independently reviewed with imaging interpreted by the Radiologist   There is no leukocytosis, electrolyte derangements, or anemia  Duplex negative for DVT per ultrasound technician  IV Ancef given while the patient was in the emergency department  Spoke with Dr Alin Piper, recommending CT of the right lower extremity for further evaluation  [facility], to follow-up results of CT and admit patient for further evaluation and management  Patient comfortable with plan for admission         Amount and/or Complexity of Data Reviewed  Clinical lab tests: ordered and reviewed  Tests in the radiology section of CPT®: ordered and reviewed  Review and summarize past medical records: yes  Discuss the patient with other providers: yes  Independent visualization of images, tracings, or specimens: yes    Risk of Complications, Morbidity, and/or Mortality  Presenting problems: moderate  Diagnostic procedures: moderate  Management options: moderate        Disposition  Final diagnoses:   Cellulitis of right leg     Time reflects when diagnosis was documented in both MDM as applicable and the Disposition within this note     Time User Action Codes Description Comment    12/29/2022 12:08 PM Gene Ceballos Add [[id number]] Cellulitis of right leg       ED Disposition     ED Disposition   Admit    Condition   Stable    Date/Time   u Dec 29, 2022  2:50 PM    Comment   Case was discussed with radha and the patient's admission status was agreed to be Admission Status: observation status to the service of Dr Eva Romero   Follow-up Information     Follow up With Specialties Details Why Contact Info Additional Information    Mg Maier MD Internal Medicine In 3 days  [address]  HOSSEIN 2  [facility] [address] [facility] Emergency Medicine  If symptoms worsen [address] [facility] [place] [id number] Emergency Department, [address] [place] [address], [id number]          Patient's Medications   Discharge Prescriptions    No medications on file       No discharge procedures on file      PDMP Review     None          ED Provider  Electronically Signed by           Shreyas Strauss PA-C  12/30/22 9389

## 2022-12-29 NOTE — PHYSICAL THERAPY NOTE
Physical Therapy Evaluation     Patient's Name: Toni Castro    Admitting Diagnosis  Foot pain [M79 [id number]]    Problem List  Patient Active Problem List   Diagnosis    Lupus (systemic lupus erythematosus) ([facility] )    Lupus nephritis ([facility] )    Hypertension    History of DVT (deep vein thrombosis)    Chronic kidney disease, stage 3 (HCC)    Persistent proteinuria    Anemia in chronic kidney disease    Muscle weakness (generalized)    Tremor of both hands    Spasticity    Gait difficulty    Neuropathic pain       Past Medical History  Past Medical History:   Diagnosis Date    Cervical mass     Coronary artery disease     Depression     DVT (deep venous thrombosis) ([facility])     Hypertension     Lupus ([facility] )     Renal disorder        Past Surgical History  Past Surgical History:   Procedure Laterality Date    APPENDECTOMY      CERVICAL SPINE SURGERY      CHOLECYSTECTOMY      COLONOSCOPY N/A 8/23/2018    Procedure: COLONOSCOPY;  Surgeon: Madisyn Hastings MD;  Location: [facility]; Service: Gastroenterology    ESOPHAGOGASTRODUODENOSCOPY N/A 8/22/2018    Procedure: ESOPHAGOGASTRODUODENOSCOPY (EGD); Surgeon: Madisyn Hastings MD;  [facility]; Service: Gastroenterology    NECK SURGERY      VASCULAR SURGERY          12/29/22 1320   PT Last Visit   PT Visit Date 12/29/22   Note Type   Note type Evaluation   Pain Assessment   Pain Assessment Tool 0-10   Pain Score 9   Pain Location/Orientation Orientation: Right;Location: Foot   Pain Onset/Description Onset: Ongoing;Frequency: Constant/Continuous   Patient's Stated Pain Goal No pain   Hospital Pain Intervention(s) Repositioned   Restrictions/Precautions   Weight Bearing Precautions Per Order No   Braces or Orthoses Other (Comment)  (none reported)   Other Precautions Fall Risk;Pain   Home Living   Type of [address] [place] [address] One level; Able to live on main level with bedroom/bathroom; Performs ADLs on one level;Stairs to enter with rails  (2 HOSSEIN)   Bathroom Shower/Tub Tub/shower unit   Bathroom Toilet Standard   Bathroom Equipment Grab bars in shower; Shower chair;Grab bars around toilet   [address] Walker;Quad cane   Additional Comments Ambulates with RW at baseline   Prior Function   Level of [place] Independent with functional mobility; Independent with ADLs; Needs assistance with IADLS   Lives With Spouse;Daughter   Receives Help From Family   IADLs Independent with driving;Family/Friend/Other provides meals; Independent with medication management   Falls in the last 6 months 1 to 4  (2 falls)   Vocational On disability   General   Family/Caregiver Present No   Cognition   Overall Cognitive Status WFL   Arousal/Participation Alert   Orientation Level Oriented X4   Memory Within functional limits   Following Commands Follows all commands and directions without difficulty   Comments Pt agreeable to PT   RLE Assessment   RLE Assessment X   Strength RLE   RLE Overall Strength 3+/5   LLE Assessment   LLE Assessment X   Strength LLE   LLE Overall Strength 3+/5   Light Touch   RLE Light Touch Impaired   RLE Light Touch Comments neuropathy   LLE Light Touch Impaired   LLE Light Touch Comments neuropathy   Bed Mobility   Supine to Sit 3  Moderate assistance   Additional items Assist x 1;Bedrails;HOB elevated; Increased time required;LE management;Verbal cues   Sit to Supine 3  Moderate assistance   Additional items Assist x 1;HOB elevated; Bedrails; Increased time required;Verbal cues;LE management   Additional Comments Pt received at start of session supine in bed with HOB elevated  Following session, pt remained in supine with HOB elevated, needs met, call bell within reach   Transfers   Sit to Stand 2  Maximal assistance   Additional items Assist x 2;Armrests; Increased time required;Verbal cues   Stand to Sit 2  Maximal assistance   Additional items Assist x 2;Armrests; Increased time required;Verbal cues   Additional Comments with use of RW, pt demonstrates decreased WBing through RLE due to reports of severe pain  Unable to ambulate  Standing tolerance approx 5 seconds prior to requesting to return to seated position   Ambulation/Elevation   Gait pattern Not tested   Balance   Static Sitting Fair   Dynamic Sitting Fair -   Static Standing Poor   Endurance Deficit   Endurance Deficit Yes   Endurance Deficit Description decreased activity tolerance   Activity Tolerance   Activity Tolerance Patient limited by pain   Nurse Made Aware SIOBHAN Wills present to assist   Assessment   Prognosis Good   Problem List Decreased strength;Decreased endurance; Impaired balance;Decreased mobility;Pain;Decreased skin integrity; Impaired sensation   Assessment Pt is 45 y o  male seen for PT evaluation s/p admit to [facility] on 12/29/2022 w/ <principal problem not specified>  PT consulted to assess pt's functional mobility and d/c needs  Order placed for PT eval and tx, w/ up w/ A order  Comorbidities affecting pt's physical performance at time of assessment include: redness and swelling of R foot, lupus, CAD, HTN, gait difficulty  PTA, pt was independent w/ all functional mobility w/ RW, ambulates household distances, has 2 HOSSEIN, lives w/ spouse and daughter in one level house and on disability  Personal factors affecting pt at time of IE include: inaccessible home environment, stairs to enter home, inability to ambulate household distances, inability to navigate community distances, inability to navigate level surfaces w/o external assistance, unable to perform dynamic tasks in community, positive fall history, inability to perform IADLs and inability to perform ADLs   Please find objective findings from PT assessment regarding body systems outlined above with impairments and limitations including weakness, impaired balance, decreased endurance, gait deviations, pain, decreased activity tolerance, decreased functional mobility tolerance, altered sensation, fall risk and decreased skin integrity  The following objective measures performed on IE also reveal limitations: Barthel Index: 45/100, Modified Taylor: 4 (moderate/severe disability) and AM-PAC 6-Clicks: 3/09  Pt's clinical presentation is currently unstable/unpredictable seen in pt's presentation of continued need for medical management and monitoring, decreased strength and balance resulting in an increased risk for falls, decreased endurance and balance limiting overall mobility, increased pain, decreased skin integrity  Pt to benefit from continued PT tx to address deficits as defined above and maximize level of functional independent mobility and consistency  From PT/mobility standpoint, recommendation at time of d/c would be post acute rehabilitation services pending progress in order to facilitate return to PLOF  Barriers to Discharge Inaccessible home environment; Other (Comment)  (decline in mobility status)   Goals   Patient Goals To have less pain   STG Expiration Date 01/08/23   Short Term Goal #1 In 7-10 days: Perform all bed mobility tasks modified independent to decrease caregiver burden, Perform all transfers with min A of 1 to improve independence, Tolerate 4 hr OOB to faciliate upright tolerance, Tolerate standing 5 minutes to facilitate functional task performance and PT to see and establish goals for ambulation and elevations when appropriate   Plan   Treatment/Interventions Functional transfer training;LE strengthening/ROM;ADL retraining; Therapeutic exercise; Endurance training;Bed mobility;Gait training; Compensatory technique education;Spoke to nursing   PT Frequency 3-5x/wk   Recommendation   PT Discharge Recommendation Post acute rehabilitation services   AM-PAC Basic Mobility Inpatient   Turning in Bed Without Bedrails 2   Lying on Back to Sitting on Edge of Flat Bed 2   Moving Bed to Chair 1   Standing Up From Chair 2   Walk in Room 1   Climb 3-5 Stairs 1   Basic Mobility Inpatient Raw Score 9 Turning Head Towards Sound 4   Follow Simple Instructions 4   Low Function Basic Mobility Raw Score 17   Low Function Basic Mobility Standardized Score 27 46   Highest Level Of Mobility   -[facility] Goal 3: Sit at edge of bed   -[facility] Achieved 3: Sit at edge of bed   Modified Norwood Scale   Modified [place] Scale 4   Barthel Index   Feeding 10   Bathing 0   Grooming Score 0   Dressing Score 5   Bladder Score 10   Bowels Score 10   Toilet Use Score 5   Transfers (Bed/Chair) Score 5   Mobility (Level Surface) Score 0   Stairs Score 0   Barthel Index Score 45       Jackie Triplett, PT

## 2022-12-29 NOTE — ASSESSMENT & PLAN NOTE
· Redness and pain x 2-3 days, worsening  · Underlying lupus, no open draining wounds  · No fever , no SIRS criteria  · But secondary to non amb status due to severe pain in RLE, decision to admit the patient was made and likely he will need STR considering his current ambulatory status  · Start cefazolin IV 1g Q8h  · CT scan reviewed, shows extensive cellulitis, no bony fractures  · IV fluids  · Pain control  · Leg elevation

## 2022-12-30 LAB
ANION GAP SERPL CALCULATED.3IONS-SCNC: 8 MMOL/L (ref 4–13)
BASOPHILS # BLD AUTO: 0.04 THOUSANDS/ÂΜL (ref 0–0.1)
BASOPHILS NFR BLD AUTO: 1 % (ref 0–1)
BUN SERPL-MCNC: 15 MG/DL (ref 5–25)
CALCIUM SERPL-MCNC: 8.6 MG/DL (ref 8.3–10.1)
CHLORIDE SERPL-SCNC: 99 MMOL/L (ref 96–108)
CO2 SERPL-SCNC: 25 MMOL/L (ref 21–32)
CREAT SERPL-MCNC: 1.53 MG/DL (ref 0.6–1.3)
EOSINOPHIL # BLD AUTO: 0.02 THOUSAND/ÂΜL (ref 0–0.61)
EOSINOPHIL NFR BLD AUTO: 0 % (ref 0–6)
ERYTHROCYTE [DISTWIDTH] IN BLOOD BY AUTOMATED COUNT: 14.4 % (ref 11.6–15.1)
GFR SERPL CREATININE-BSD FRML MDRD: 50 ML/MIN/1.73SQ M
GLUCOSE SERPL-MCNC: 97 MG/DL (ref 65–140)
HCT VFR BLD AUTO: 32.7 % (ref 36.5–49.3)
HGB BLD-MCNC: 10.8 G/DL (ref 12–17)
IMM GRANULOCYTES # BLD AUTO: 0.03 THOUSAND/UL (ref 0–0.2)
IMM GRANULOCYTES NFR BLD AUTO: 1 % (ref 0–2)
LYMPHOCYTES # BLD AUTO: 0.88 THOUSANDS/ÂΜL (ref 0.6–4.47)
LYMPHOCYTES NFR BLD AUTO: 18 % (ref 14–44)
MAGNESIUM SERPL-MCNC: 2 MG/DL (ref 1.6–2.6)
MCH RBC QN AUTO: 28.7 PG (ref 26.8–34.3)
MCHC RBC AUTO-ENTMCNC: 33 G/DL (ref 31.4–37.4)
MCV RBC AUTO: 87 FL (ref 82–98)
MONOCYTES # BLD AUTO: 0.8 THOUSAND/ÂΜL (ref 0.17–1.22)
MONOCYTES NFR BLD AUTO: 16 % (ref 4–12)
NEUTROPHILS # BLD AUTO: 3.2 THOUSANDS/ÂΜL (ref 1.85–7.62)
NEUTS SEG NFR BLD AUTO: 64 % (ref 43–75)
NRBC BLD AUTO-RTO: 0 /100 WBCS
PLATELET # BLD AUTO: 227 THOUSANDS/UL (ref 149–390)
PMV BLD AUTO: 9.8 FL (ref 8.9–12.7)
POTASSIUM SERPL-SCNC: 3.5 MMOL/L (ref 3.5–5.3)
RBC # BLD AUTO: 3.76 MILLION/UL (ref 3.88–5.62)
SODIUM SERPL-SCNC: 132 MMOL/L (ref 135–147)
WBC # BLD AUTO: 4.97 THOUSAND/UL (ref 4.31–10.16)

## 2022-12-30 RX ADMIN — HYDROXYCHLOROQUINE SULFATE 400 MG: 200 TABLET, FILM COATED ORAL at 22:11

## 2022-12-30 RX ADMIN — CEFAZOLIN SODIUM 1000 MG: 1 SOLUTION INTRAVENOUS at 08:46

## 2022-12-30 RX ADMIN — GABAPENTIN 600 MG: 300 CAPSULE ORAL at 16:59

## 2022-12-30 RX ADMIN — GABAPENTIN 600 MG: 300 CAPSULE ORAL at 20:28

## 2022-12-30 RX ADMIN — SODIUM CHLORIDE, SODIUM GLUCONATE, SODIUM ACETATE, POTASSIUM CHLORIDE, MAGNESIUM CHLORIDE, SODIUM PHOSPHATE, DIBASIC, AND POTASSIUM PHOSPHATE 75 ML/HR: .53; .5; .37; .037; .03; .012; .00082 INJECTION, SOLUTION INTRAVENOUS at 07:48

## 2022-12-30 RX ADMIN — GABAPENTIN 600 MG: 300 CAPSULE ORAL at 08:38

## 2022-12-30 RX ADMIN — APIXABAN 5 MG: 5 TABLET, FILM COATED ORAL at 08:38

## 2022-12-30 RX ADMIN — OXYCODONE HYDROCHLORIDE 5 MG: 5 TABLET ORAL at 22:11

## 2022-12-30 RX ADMIN — ACETAMINOPHEN 650 MG: 325 TABLET, FILM COATED ORAL at 07:44

## 2022-12-30 RX ADMIN — BACLOFEN 20 MG: 10 TABLET ORAL at 17:01

## 2022-12-30 RX ADMIN — MYCOPHENOLATE MOFETIL 500 MG: 250 CAPSULE ORAL at 20:29

## 2022-12-30 RX ADMIN — Medication 7.5 MG: at 07:44

## 2022-12-30 RX ADMIN — FUROSEMIDE 20 MG: 20 TABLET ORAL at 08:39

## 2022-12-30 RX ADMIN — BACLOFEN 20 MG: 10 TABLET ORAL at 20:28

## 2022-12-30 RX ADMIN — HYDROMORPHONE HYDROCHLORIDE 0.2 MG: 0.2 INJECTION, SOLUTION INTRAMUSCULAR; INTRAVENOUS; SUBCUTANEOUS at 16:57

## 2022-12-30 RX ADMIN — HYDROMORPHONE HYDROCHLORIDE 0.2 MG: 0.2 INJECTION, SOLUTION INTRAMUSCULAR; INTRAVENOUS; SUBCUTANEOUS at 20:28

## 2022-12-30 RX ADMIN — MYCOPHENOLATE MOFETIL 500 MG: 250 CAPSULE ORAL at 08:39

## 2022-12-30 RX ADMIN — HYDROMORPHONE HYDROCHLORIDE 0.2 MG: 0.2 INJECTION, SOLUTION INTRAMUSCULAR; INTRAVENOUS; SUBCUTANEOUS at 13:26

## 2022-12-30 RX ADMIN — ESCITALOPRAM OXALATE 10 MG: 10 TABLET ORAL at 08:38

## 2022-12-30 RX ADMIN — LORATADINE 10 MG: 10 TABLET ORAL at 08:38

## 2022-12-30 RX ADMIN — BACLOFEN 20 MG: 10 TABLET ORAL at 08:38

## 2022-12-30 RX ADMIN — CEFAZOLIN SODIUM 1000 MG: 1 SOLUTION INTRAVENOUS at 01:13

## 2022-12-30 RX ADMIN — DOCUSATE SODIUM 100 MG: 100 CAPSULE, LIQUID FILLED ORAL at 08:39

## 2022-12-30 RX ADMIN — TRIAMCINOLONE ACETONIDE: 1 CREAM TOPICAL at 17:22

## 2022-12-30 RX ADMIN — CARVEDILOL 12.5 MG: 12.5 TABLET, FILM COATED ORAL at 17:01

## 2022-12-30 RX ADMIN — DOCUSATE SODIUM 100 MG: 100 CAPSULE, LIQUID FILLED ORAL at 17:00

## 2022-12-30 RX ADMIN — HYDROMORPHONE HYDROCHLORIDE 0.2 MG: 0.2 INJECTION, SOLUTION INTRAMUSCULAR; INTRAVENOUS; SUBCUTANEOUS at 06:16

## 2022-12-30 RX ADMIN — APIXABAN 5 MG: 5 TABLET, FILM COATED ORAL at 17:25

## 2022-12-30 RX ADMIN — Medication 7.5 MG: at 14:01

## 2022-12-30 RX ADMIN — CEFAZOLIN SODIUM 1000 MG: 1 SOLUTION INTRAVENOUS at 16:59

## 2022-12-30 RX ADMIN — ACETAMINOPHEN 650 MG: 325 TABLET, FILM COATED ORAL at 22:11

## 2022-12-30 RX ADMIN — TAMSULOSIN HYDROCHLORIDE 0.4 MG: 0.4 CAPSULE ORAL at 17:00

## 2022-12-30 RX ADMIN — CARVEDILOL 12.5 MG: 12.5 TABLET, FILM COATED ORAL at 07:46

## 2022-12-30 RX ADMIN — Medication 7.5 MG: at 01:12

## 2022-12-30 NOTE — PROGRESS NOTES
[address] [place] [address]  Progress Note - Miladis Cord 1970, 45 y o  male MRN: [id number]  Unit/Bed#: -01 Encounter: [id number]  Primary Care Provider: Sergio Rudolph MD   Date and time admitted to hospital: 12/29/2022  8:46 AM    * Cellulitis of right lower extremity  Assessment & Plan  · CT showed extensive cellulitis  · Now on IV cefazolin  · Improving   · Continues to have pain on weight bearing, no osseus abnormality  · PT  OT : rehab  · Pain control ordered    History of DVT (deep vein thrombosis)  Assessment & Plan  · Cont eliquis  · Venous duplex done today: no DVT    Chronic kidney disease, stage 3 [facility])  Assessment & Plan  Lab Results   Component Value Date    EGFR 50 12/30/2022    EGFR 51 12/29/2022    EGFR 36 10/03/2022    CREATININE 1 53 (H) 12/30/2022    CREATININE 1 52 (H) 12/29/2022    CREATININE 2 00 (H) 10/03/2022     stable  - cont to monitor closely in lieu of this infection    Hypertension  Assessment & Plan  /74   Pulse 72   Temp 99 1 °F (37 3 °C) (Oral)   Resp 19   SpO2 99%   stable  Cont coreg, lasix and metolazone    Lupus (systemic lupus erythematosus) (HCC)  Assessment & Plan  · Has generalized rash over his body  · Cont OP rheum followup  · Cont home meds  · Stable, no flare          VTE Pharmacologic Prophylaxis: VTE Score: 4 Moderate Risk (Score 3-4) - Pharmacological DVT Prophylaxis Ordered: apixaban (Eliquis)  Patient Centered Rounds: I performed bedside rounds with nursing staff today  Discussions with Specialists or Other Care Team Provider: CM    Education and Discussions with Family / Patient: Patient declined call to   Time Spent for Care: 30 minutes  More than 50% of total time spent on counseling and coordination of care as described above      Current Length of Stay: 1 day(s)  Current Patient Status: Inpatient   Certification Statement: The patient will continue to require additional inpatient hospital stay due to IV antibiotics  Discharge Plan: Anticipate discharge in 24-48 hrs to rehab facility  Code Status: Level 1 - Full Code    Subjective:   Seen and examiined at bedside  Pain is better but still present  No fever  Warmth is better    Objective:     Vitals:   Temp (24hrs), Av 6 °F (37 °C), Min:98 1 °F (36 7 °C), Max:99 1 °F (37 3 °C)    Temp:  [98 1 °F (36 7 °C)-99 1 °F (37 3 °C)] 99 1 °F (37 3 °C)  HR:  [72-98] 72  Resp:  [18-21] 19  BP: (108-153)/(62-81) 115/74  SpO2:  [92 %-99 %] 99 %  There is no height or weight on file to calculate BMI  Input and Output Summary (last 24 hours):      Intake/Output Summary (Last 24 hours) at 2022 1440  Last data filed at 2022 1030  Gross per 24 hour   Intake 1622 5 ml   Output 600 ml   Net 1022 5 ml       Physical Exam:   Physical Exam General- Awake, alert and oriented x 3, looks comfortable  HEENT- Normocephalic, atraumatic, oral mucosa- moist  Neck- Supple, No carotid bruit, no JVD  CVS- Normal S1/ S2, Regular rate and rhythm, No murmur, No edema  Respiratory system- B/L clear breath sounds, no wheezing  Abdomen- Soft, Non distended, no tenderness, Bowel sound- present 4 quads  Genitourinary- No suprapubic tenderness, No CVA tenderness  Skin- RLE chronic discoloration but has redness which is slightly improved than yesterday, tenderness has decreased, no open wounds  Musculoskeletal- No gross deformity  Psych- No acute psychosis  CNS- CN II- XII grossly intact, No acute focal neurologic deficit noted      Additional Data:     Labs:  Results from last 7 days   Lab Units 22  0453   WBC Thousand/uL 4 97   HEMOGLOBIN g/dL 10 8*   HEMATOCRIT % 32 7*   PLATELETS Thousands/uL 227   NEUTROS PCT % 64   LYMPHS PCT % 18   MONOS PCT % 16*   EOS PCT % 0     Results from last 7 days   Lab Units 22  0453 22  0937   SODIUM mmol/L 132* 133*   POTASSIUM mmol/L 3 5 4 2   CHLORIDE mmol/L 99 99   CO2 mmol/L 25 25   BUN mg/dL 15 16   CREATININE mg/dL 1 53* 1 52* ANION GAP mmol/L 8 9   CALCIUM mg/dL 8 6 8 8   ALBUMIN g/dL  --  3 4*   TOTAL BILIRUBIN mg/dL  --  0 34   ALK PHOS U/L  --  89   ALT U/L  --  13   AST U/L  --  18   GLUCOSE RANDOM mg/dL 97 103     Results from last 7 days   Lab Units 12/29/22  0937   INR  1 26*             Results from last 7 days   Lab Units 12/29/22  1749   PROCALCITONIN ng/ml 0 11       Lines/Drains:  Invasive Devices     Peripheral Intravenous Line  Duration           Peripheral IV 12/29/22 Right Antecubital 1 day                      Imaging: No pertinent imaging reviewed  Recent Cultures (last 7 days):   Results from last 7 days   Lab Units 12/29/22  0940   BLOOD CULTURE  Received in Microbiology Lab  Culture in Progress  Received in Microbiology Lab  Culture in Progress         Last 24 Hours Medication List:   Current Facility-Administered Medications   Medication Dose Route Frequency Provider Last Rate   • acetaminophen  650 mg Oral Q6H PRN Anna Marie Zelaya MD     • apixaban  5 mg Oral BID Anna Marie Zelaya MD     • baclofen  20 mg Oral TID Anna Marie Zelaya MD     • carvedilol  12 5 mg Oral BID With Meals Anna Marie Zelaya MD     • cefazolin  1,000 mg Intravenous Norma Plata  mL/hr at 12/30/22 1030   • docusate sodium  100 mg Oral BID Anna Marie Zelyaa MD     • escitalopram  10 mg Oral Daily Anna Marie Zelaya MD     • furosemide  20 mg Oral Daily Anna Marie Zelaya MD     • gabapentin  600 mg Oral TID Anna Marie Zelaya MD     • HYDROmorphone  0 2 mg Intravenous Q4H PRN Anna Marie Zelaya MD     • hydroxychloroquine  400 mg Oral HS Anna Marie Zelaya MD     • hydrOXYzine HCL  10 mg Oral Q6H PRN Anna Marie Zelaya MD     • loratadine  10 mg Oral Daily Anna Marie Zelaya MD     • metolazone  2 5 mg Oral Daily Anna Marie Zelaya MD     • multi-electrolyte  75 mL/hr Intravenous Continuous Anna Marie Zelaya MD 75 mL/hr (12/30/22 1030)   • mycophenolate  500 mg Oral Q12H Kevin Nagel MD     • oxyCODONE  5 mg Oral Q6H PRN Anna Marie Zelaya MD      Or   • oxyCODONE  7 5 mg Oral Q6H PRN Anna Marie Zelaya MD     • tamsulosin  0 4 mg Oral Daily With Wolfgang Roberts MD     • triamcinolone   Topical BID Trace Fabian MD          Today, Patient Was Seen By: Trace Fabian MD    **Please Note: This note may have been constructed using a voice recognition system  **

## 2022-12-30 NOTE — ASSESSMENT & PLAN NOTE
Lab Results   Component Value Date    EGFR 50 12/30/2022    EGFR 51 12/29/2022    EGFR 36 10/03/2022    CREATININE 1 53 (H) 12/30/2022    CREATININE 1 52 (H) 12/29/2022    CREATININE 2 00 (H) 10/03/2022     stable  - cont to monitor closely in lieu of this infection

## 2022-12-30 NOTE — ASSESSMENT & PLAN NOTE
· CT showed extensive cellulitis  · Now on IV cefazolin  · Improving   · Continues to have pain on weight bearing, no osseus abnormality  · PT  OT : rehab  · Pain control ordered

## 2022-12-30 NOTE — PHYSICAL THERAPY NOTE
Physical Therapy Treatment Note    Patient's Name: Elvira Winn    Admitting Diagnosis  Foot pain [M79 [id number]]  Cellulitis of right leg [A30 [id number]]    Problem List  Patient Active Problem List   Diagnosis    Lupus (systemic lupus erythematosus) ([facility])    Lupus nephritis ([facility] [address] )    Hypertension    History of DVT (deep vein thrombosis)    Chronic kidney disease, stage 3 ([facility])    Persistent proteinuria    Anemia in chronic kidney disease    Muscle weakness (generalized)    Tremor of both hands    Spasticity    Gait difficulty    Neuropathic pain    Cellulitis of right lower extremity        12/30/22 1324   PT Last Visit   PT Visit Date 12/30/22   Note Type   Note Type Treatment   Pain Assessment   Pain Assessment Tool 0-10   Pain Score No Pain  (at rest)   Restrictions/Precautions   Weight Bearing Precautions Per Order No   Braces or Orthoses Other (Comment)  (wrist splint)   Other Precautions Fall Risk;Pain; Chair Alarm; Bed Alarm   General   Chart Reviewed Yes   Response to Previous Treatment Patient with no complaints from previous session  Family/Caregiver Present No   Cognition   Overall Cognitive Status WFL   Arousal/Participation Alert   Orientation Level Oriented X4   Memory Within functional limits   Following Commands Follows all commands and directions without difficulty   Comments Pt agreeable to PT   Bed Mobility   Supine to Sit 4  Minimal assistance   Additional items Assist x 1;Bedrails;HOB elevated;Verbal cues   Sit to Supine 3  Moderate assistance   Additional items Assist x 2;Bedrails;HOB elevated; Increased time required;Verbal cues;LE management   Additional Comments Pt received at start of session supine in bed with HOB elevated  Following session, pt returned to supine and remained with needs met, alarm activated and call bell within reach   Transfers   Sit to Stand 3  Moderate assistance   Additional items Assist x 2;Armrests; Increased time required;Verbal cues   Stand to Sit 3  Moderate assistance Additional items Assist x 2;Armrests; Increased time required;Verbal cues   Additional Comments with use of RW, VCs for proper hand placements  Pt reporting increased pain in R ankle with standing, unable to take anterior or lateral steps  Pt also reported increased nausea with attempts to move   Ambulation/Elevation   Gait pattern Not tested   Balance   Static Sitting Fair   Dynamic Sitting Fair -   Static Standing Poor   Endurance Deficit   Endurance Deficit Yes   Endurance Deficit Description decreased activity tolerance   Activity Tolerance   Activity Tolerance Patient limited by pain   Nurse Made Aware LPN [facility]   Exercises   Balance training  Pt able to maintain static sitting at EOB x10 mins with vitals stable  Pt able to maintain static standing 2x10 seconds however limited due to pain and nausea   Assessment   Prognosis Good   Problem List Decreased strength;Decreased endurance; Impaired balance;Decreased mobility;Pain;Decreased skin integrity; Impaired sensation   Assessment Pt seen for PT treatment session this date with interventions consisting of therapeutic activity consisting of training: bed mobility, supine<>sit transfers, sit<>stand transfers, static sitting tolerance at EOB for 10 minutes w/ bilateral UE support, static standing tolerance for 2 x 10 seconds w/ bilateral UE support and vc and tactile cues for static standing posture faciliation  Pt agreeable to PT treatment session upon arrival, pt found supine in bed w/ HOB elevated, in no apparent distress and responsive  In comparison to previous session, pt with no improvements as evidenced by continued reports of increased pain in R ankle  Post session: pt returned BTB, bed alarm engaged, all needs in reach and RN notified of session findings/recommendations  Continue to recommend post acute rehabilitation services at time of d/c in order to maximize pt's functional independence and safety w/ mobility   Pt continues to be functioning below baseline level, and remains limited 2* factors listed above and including continued need for medical management and monitoring, decreased strength and balance resulting in an increased risk for falls, decreased endurance and activity tolerance limiting mobility, increased pain  PT will continue to see pt during current hospitalization in order to address the deficits listed above and provide interventions consistent w/ POC in effort to achieve STGs  Barriers to Discharge Inaccessible home environment; Other (Comment)  (decline in mobility status)   Goals   STG Expiration Date 01/08/23   PT Treatment Day 1   Plan   Treatment/Interventions Functional transfer training;LE strengthening/ROM;ADL retraining; Therapeutic exercise; Endurance training;Bed mobility;Gait training; Compensatory technique education;Spoke to nursing;OT   Progress Progressing toward goals   PT Frequency 3-5x/wk   Recommendation   PT Discharge Recommendation Post acute rehabilitation services   AM-PAC Basic Mobility Inpatient   Turning in Bed Without Bedrails 2   Lying on Back to Sitting on Edge of Flat Bed 2   Moving Bed to Chair 1   Standing Up From Chair 2   Walk in Room 1   Climb 3-5 Stairs 1   Basic Mobility Inpatient Raw Score 9   Turning Head Towards Sound 4   Follow Simple Instructions 4   Low Function Basic Mobility Raw Score 17   Low Function Basic Mobility Standardized Score 27 46   Highest Level Of Mobility   JH-HLM Goal 3: Sit at edge of bed   JH-HLM Achieved 3: Sit at edge of bed   Education   Education Provided Mobility training;Assistive device   Patient Reinforcement needed   End of Consult   Patient Position at End of Consult Supine;Bed/Chair alarm activated; All needs within reach         Mallorie Montero PT    Time In: 13:09  Time Out: 13:24  Total Time: 15 mins

## 2022-12-30 NOTE — PLAN OF CARE
Problem: Prexisting or High Potential for Compromised Skin Integrity  Goal: Skin integrity is maintained or improved  Description: INTERVENTIONS:  - Identify patients at risk for skin breakdown  - Assess and monitor skin integrity  - Assess and monitor nutrition and hydration status  - Monitor labs   - Assess for incontinence   - Turn and reposition patient  - Assist with mobility/ambulation  - Relieve pressure over bony prominences  - Avoid friction and shearing  - Provide appropriate hygiene as needed including keeping skin clean and dry  - Evaluate need for skin moisturizer/barrier cream  - Collaborate with interdisciplinary team   - Patient/family teaching  - Consider wound care consult   Outcome: Progressing     Problem: PAIN - ADULT  Goal: Verbalizes/displays adequate comfort level or baseline comfort level  Description: Interventions:  - Encourage patient to monitor pain and request assistance  - Assess pain using appropriate pain scale  - Administer analgesics based on type and severity of pain and evaluate response  - Implement non-pharmacological measures as appropriate and evaluate response  - Consider cultural and social influences on pain and pain management  - Notify physician/advanced practitioner if interventions unsuccessful or patient reports new pain  Outcome: Progressing     Problem: INFECTION - ADULT  Goal: Absence or prevention of progression during hospitalization  Description: INTERVENTIONS:  - Assess and monitor for signs and symptoms of infection  - Monitor lab/diagnostic results  - Monitor all insertion sites, i e  indwelling lines, tubes, and drains  - Monitor endotracheal if appropriate and nasal secretions for changes in amount and color  - [place] appropriate cooling/warming therapies per order  - Administer medications as ordered  - Instruct and encourage patient and family to use good hand hygiene technique  - Identify and instruct in appropriate isolation precautions for identified infection/condition  Outcome: Progressing  Goal: Absence of fever/infection during neutropenic period  Description: INTERVENTIONS:  - Monitor WBC    Outcome: Progressing     P

## 2022-12-30 NOTE — PLAN OF CARE
Problem: OCCUPATIONAL THERAPY ADULT  Goal: Performs self-care activities at highest level of function for planned discharge setting  See evaluation for individualized goals  Description: Treatment Interventions: ADL retraining, Functional transfer training, Endurance training, Patient/family training, Equipment evaluation/education          See flowsheet documentation for full assessment, interventions and recommendations  Outcome: Progressing  Note: Limitation: Decreased ADL status, Decreased endurance, Decreased high-level ADLs     Assessment: Pt is a 45 y o  male seen for OT evaluation s/p admit to [facility] on 12/29/2022 w/ Cellulitis of right lower extremity  Comorbidities affecting pt's functional performance at time of assessment include: HTN, obesity, previous surgery and CAD, Lupus, renal disorder, tremor of BUEs, gait difficulty  Personal factors affecting pt at time of IE include:steps to enter environment, difficulty performing ADLS, difficulty performing IADLS  and health management   Prior to admission, pt was independent with functional mobility with RW, independent with ADLs  Upon evaluation: Pt requires at least Moderate Assistance x2 person assist with RW for mobility, and Maximal Assistance for some basic self care 2* the following deficits impacting occupational performance: decreased strength, decreased balance, decreased tolerance and increased pain  Pt to benefit from continued skilled OT tx while in the hospital to address deficits as defined above and maximize level of functional independence w ADL's and functional mobility  Occupational Performance areas to address include: grooming, bathing/shower, toilet hygiene, dressing and functional mobility  From OT standpoint, recommendation at time of d/c would be inpatient rehab       OT Discharge Recommendation: Post acute rehabilitation services        Jewel Singer OTR/L

## 2022-12-30 NOTE — UTILIZATION REVIEW
Initial Clinical Review    Admission: Date/Time/Statement:   Admission Orders (From admission, onward)     Ordered        12/29/22 1702  Inpatient Admission  Once                      Orders Placed This Encounter   Procedures   • Inpatient Admission     Standing Status:   Standing     Number of Occurrences:   1     Order Specific Question:   Level of Care     Answer:   Med Surg [16]     Order Specific Question:   Estimated length of stay     Answer:   More than 2 Midnights     Order Specific Question:   Certification     Answer:   I certify that inpatient services are medically necessary for this patient for a duration of greater than two midnights  See H&P and MD Progress Notes for additional information about the patient's course of treatment  ED Arrival Information     Expected   -    Arrival   12/29/2022 08:45    Acuity   Urgent            Means of arrival   Ambulance    Escorted by   [facility]   Hospitalist    Admission type   Emergency            Arrival complaint   Foot Pain           Chief Complaint   Patient presents with   • Foot Pain     Presents by EMS with R foot redness, swelling and pain x1 week  Unable to bare weight or ambulate as of this morning  Initial Presentation: 45 y o  male w/ pmh lupus, dvt,ckd3,, htn, tremors presents to ED from home with worsening redness, swelling, tenderness right lower extremity x 3 days; unable to bear weight rle  Findings: redness, swelling rle, multiple rashes from lupus -no open wounds;  na 133, cr 1 52  CT shows extensive cellulitis, no bony fractures  Admitted to Inpatient Medsurg with RLE Cellulitis  IVF's , pain control, elevate leg, continue eliquis, venous duplex (neg for dvt), pt/ot eval, monitor renal fx in lieu of infection, cont coreg, lasix, metolazone, cont home meds for lupus  Date: 12/30  Day 2: improving on cefazolin -redness/tenderness decreased  Continue IV Abx    Continues to have pain on weight bearing  Requiring IV Dilaudid & oxycodone  PT recommening rehab    ED Triage Vitals   Temperature Pulse Respirations Blood Pressure SpO2   12/29/22 0921 12/29/22 0847 12/29/22 0847 12/29/22 0847 12/29/22 0847   98 6 °F (37 °C) 85 20 127/70 100 %      Temp Source Heart Rate Source Patient Position - Orthostatic VS BP Location FiO2 (%)   12/29/22 0921 12/29/22 0847 12/29/22 0847 12/29/22 0847 --   Oral Monitor Sitting Right arm       Pain Score       12/29/22 1059       7          Wt Readings from Last 1 Encounters:   10/31/22 91 6 kg (202 lb)     Additional Vital Signs:   12/30/22 15:02:51 99 1 °F (37 3 °C) 80 18 114/73 87 95 % -- --   12/30/22 13:23:08 -- 72 -- 115/74 88 99 % -- --   12/30/22 07:45:07 99 1 °F (37 3 °C) 97 -- 149/81 104 99 % None (Room air) --   12/29/22 2300 -- -- -- -- -- 93 % None (Room air) --   12/29/22 21:01:37 98 1 °F (36 7 °C) 84 19 153/81 105 97 % -- --   12/29/22 2000 -- 86 21 111/62 79 95 % None (Room air) --   12/29/22 1900 -- 78 19 111/63 81 92 % None (Room air) --   12/29/22 1800 -- 98 18 108/71 83 96 % None (Room air) --   12/29/22 1700 -- 84 20 128/69 92 93 % None (Room air) --   12/29/22 1500 -- 86 20 120/68 89 96 % None (Room air) --   12/29/22 1400 -- 74 20 109/64 80 96 % None (Room air) Sitting   12/29/22 1200 -- 91 18 124/76 92 97 % None (Room air) Sitting       Pertinent Labs/Diagnostic Test Results:   CT lower extremity w contrast right   Final Result by Evita Brooks MD (12/29 1531)      No acute osseous abnormality  Mild medial and lateral compartment of the knee with chondrocalcinosis  Circumferential edema lower leg extending into the ankle with areas of skin thickening as above likely cellulitis  No discrete abscess or soft tissue gas  Extensive subcutaneous calcifications  This is of uncertain etiology but could be due to prior infectious etiology such as disseminated cysticercosis  Arterial calcifications                 Workstation performed: [id number] VAS lower limb venous duplex study, unilateral/limited   Final Result by Ping Tirado MD (12/29 1120)         12/29 Vascular Duplex:   RIGHT LOWER LIMB  No evidence of gross acute deep vein thrombosis   No evidence of superficial thrombophlebitis noted  Doppler evaluation shows a normal response to augmentation maneuvers  Popliteal, posterior tibial and anterior tibial arterial Doppler waveforms are  biphasic/hyperemic  There are an echogenic structures located in the inguinal region and are  consistent with enlarged lymph node and channels  LEFT LOWER LIMB LIMITED  Evaluation shows no evidence of thrombus in the common femoral vein  Doppler evaluation shows a normal response to augmentation maneuvers    Results from last 7 days   Lab Units 12/30/22  0453 12/29/22  0937   WBC Thousand/uL 4 97 6 21   HEMOGLOBIN g/dL 10 8* 11 5*   HEMATOCRIT % 32 7* 34 0*   PLATELETS Thousands/uL 227 225   NEUTROS ABS Thousands/µL 3 20 4 47     Results from last 7 days   Lab Units 12/30/22  0453 12/29/22  0937   SODIUM mmol/L 132* 133*   POTASSIUM mmol/L 3 5 4 2   CHLORIDE mmol/L 99 99   CO2 mmol/L 25 25   ANION GAP mmol/L 8 9   BUN mg/dL 15 16   CREATININE mg/dL 1 53* 1 52*   EGFR ml/min/1 73sq m 50 51   CALCIUM mg/dL 8 6 8 8   MAGNESIUM mg/dL 2 0  --      Results from last 7 days   Lab Units 12/29/22  0937   AST U/L 18   ALT U/L 13   ALK PHOS U/L 89   TOTAL PROTEIN g/dL 7 8   ALBUMIN g/dL 3 4*   TOTAL BILIRUBIN mg/dL 0 34     Results from last 7 days   Lab Units 12/30/22  0453 12/29/22  0937   GLUCOSE RANDOM mg/dL 97 103     Results from last 7 days   Lab Units 12/29/22  0937   PROTIME seconds 15 5*   INR  1 26*   PTT seconds 38*     Results from last 7 days   Lab Units 12/29/22  1749   PROCALCITONIN ng/ml 0 11     Results from last 7 days   Lab Units 12/29/22  0940   BLOOD CULTURE  Received in Microbiology Lab  Culture in Progress  Received in Microbiology Lab  Culture in Progress       ED Treatment:   Medication Administration from 12/29/2022 0845 to 12/29/2022 2037       Date/Time Order Dose Route Action     12/29/2022 0942 EST ceFAZolin (ANCEF) IVPB (premix in dextrose) 2,000 mg 50 mL 2,000 mg Intravenous New Bag     12/29/2022 1059 EST HYDROmorphone (DILAUDID) injection 0 5 mg 0 5 mg Intravenous Given     12/29/2022 1529 EST HYDROmorphone (DILAUDID) injection 0 5 mg 0 5 mg Intravenous Given     12/29/2022 1759 EST apixaban (ELIQUIS) tablet 5 mg 5 mg Oral Given     12/29/2022 1759 EST baclofen tablet 20 mg 20 mg Oral Given     12/29/2022 1800 EST triamcinolone (KENALOG) 0 1 % cream -- Topical Given     12/29/2022 1759 EST gabapentin (NEURONTIN) capsule 600 mg 600 mg Oral Given     12/29/2022 1759 EST tamsulosin (FLOMAX) capsule 0 4 mg 0 4 mg Oral Given     12/29/2022 1757 EST ceFAZolin (ANCEF) IVPB (premix in dextrose) 1,000 mg 50 mL 1,000 mg Intravenous New Bag     12/29/2022 1804 EST multi-electrolyte (PLASMALYTE-A/ISOLYTE-S PH 7 4) IV solution 75 mL/hr Intravenous New Bag     12/29/2022 1756 EST oxyCODONE (ROXICODONE) IR tablet 5 mg 5 mg Oral Given     12/29/2022 1756 EST oxyCODONE (ROXICODONE) split tablet 7 5 mg -- Oral See Alternative     12/29/2022 1756 EST HYDROmorphone HCl (DILAUDID) injection 0 2 mg 0 2 mg Intravenous Given        Past Medical History:   Diagnosis Date   • Cervical mass    • Coronary artery disease    • Depression    • DVT (deep venous thrombosis) ([facility])    • Hypertension    • Lupus ([facility] [address] )    • Renal disorder      Present on Admission:  • Chronic kidney disease, stage 3 (HCC)  • Lupus (systemic lupus erythematosus) ([facility])  • Hypertension  • Cellulitis of right lower extremity      Admitting Diagnosis: Foot pain [M79 673]  Cellulitis of right leg [L03 [id number]]  Age/Sex: 45 y o  male  Admission Orders:  Scheduled Medications:  apixaban, 5 mg, Oral, BID  baclofen, 20 mg, Oral, TID  carvedilol, 12 5 mg, Oral, BID With Meals  cefazolin, 1,000 mg, Intravenous, Q8H  docusate sodium, 100 mg, Oral, BID  escitalopram, 10 mg, Oral, Daily  furosemide, 20 mg, Oral, Daily  gabapentin, 600 mg, Oral, TID  hydroxychloroquine, 400 mg, Oral, HS  loratadine, 10 mg, Oral, Daily  metolazone, 2 5 mg, Oral, Daily  mycophenolate, 500 mg, Oral, Q12H YOUSUF  tamsulosin, 0 4 mg, Oral, Daily With Dinner  triamcinolone, , Topical, BID    Continuous IV Infusions:  multi-electrolyte, 75 mL/hr, Intravenous, Continuous    PRN Meds:  acetaminophen, 650 mg, Oral, Q6H PRN x 1 12/30  HYDROmorphone, 0 2 mg, Intravenous, Q4H PRN x 1 12/29  &  X 2  12/30  hydrOXYzine HCL, 10 mg, Oral, Q6H PRN  oxyCODONE, 5 mg, Oral, Q6H PRNx 1 12/29   Or  oxyCODONE, 7 5 mg, Oral, Q6H PRN x  3 12/30      Network Utilization Review Department  ATTENTION: Please call with any questions or concerns to [phone] and carefully listen to the prompts so that you are directed to the right person  All voicemails are confidential   Blake Delude all requests for admission clinical reviews, approved or denied determinations and any other requests to dedicated fax number below belonging to the campus where the patient is receiving treatment   List of dedicated fax numbers for the Facilities:  [address] DENIALS (Administrative/Medical Necessity) [phone]   [address] (Maternity/NICU/Pediatrics) [phone]   [address] [phone]   [facility] [address] [phone]   [address] [place] [id number] [place] [address] Luciano [facility] 28 [phone]   [address] Aminah Aldana [facility] [place] 134 815 [facility] [phone]

## 2022-12-30 NOTE — PLAN OF CARE
Problem: MOBILITY - ADULT  Goal: Maintain or return to baseline ADL function  Description: INTERVENTIONS:  -  Assess patient's ability to carry out ADLs; assess patient's baseline for ADL function and identify physical deficits which impact ability to perform ADLs (bathing, care of mouth/teeth, toileting, grooming, dressing, etc )  - Assess/evaluate cause of self-care deficits   - Assess range of motion  - Assess patient's mobility; develop plan if impaired  - Assess patient's need for assistive devices and provide as appropriate  - Encourage maximum independence but intervene and supervise when necessary  - Involve family in performance of ADLs  - Assess for home care needs following discharge   - Consider OT consult to assist with ADL evaluation and planning for discharge  - Provide patient education as appropriate  12/29/2022 2339 by Latia Crowley RN  Outcome: Progressing  12/29/2022 2338 by Latia Crowley RN  Outcome: Progressing  Goal: Maintains/Returns to pre admission functional level  Description: INTERVENTIONS:  - Perform BMAT or MOVE assessment daily    - Set and communicate daily mobility goal to care team and patient/family/caregiver  - Collaborate with rehabilitation services on mobility goals if consulted  - Perform Range of Motion 3 times a day  - Reposition patient every 2 hours    - Dangle patient 3 times a day  - Stand patient 3 times a day  - Ambulate patient 3 times a day  - Out of bed to chair 3 times a day   - Out of bed for meals 3 times a day  - Out of bed for toileting  - Record patient progress and toleration of activity level   12/29/2022 2339 by Latia Crowley RN  Outcome: Progressing  12/29/2022 2338 by Latia Crowley RN  Outcome: Progressing     Problem: Potential for Falls  Goal: Patient will remain free of falls  Description: INTERVENTIONS:  - Educate patient/family on patient safety including physical limitations  - Instruct patient to call for assistance with activity   - Consult OT/PT to assist with strengthening/mobility   - Keep Call bell within reach  - Keep bed low and locked with side rails adjusted as appropriate  - Keep care items and personal belongings within reach  - Initiate and maintain comfort rounds  - Make Fall Risk Sign visible to staff  - Offer Toileting every 2 Hours, in advance of need  - Initiate/Maintain bed alarm  - Obtain necessary fall risk management equipment  - Apply yellow socks and bracelet for high fall risk patients  - Consider moving patient to room near nurses station  12/29/2022 2339 by Maira Schwartz RN  Outcome: Progressing  12/29/2022 2338 by Maira Schwartz RN  Outcome: Progressing     Problem: Prexisting or High Potential for Compromised Skin Integrity  Goal: Skin integrity is maintained or improved  Description: INTERVENTIONS:  - Identify patients at risk for skin breakdown  - Assess and monitor skin integrity  - Assess and monitor nutrition and hydration status  - Monitor labs   - Assess for incontinence   - Turn and reposition patient  - Assist with mobility/ambulation  - Relieve pressure over bony prominences  - Avoid friction and shearing  - Provide appropriate hygiene as needed including keeping skin clean and dry  - Evaluate need for skin moisturizer/barrier cream  - Collaborate with interdisciplinary team   - Patient/family teaching  - Consider wound care consult   12/29/2022 2339 by Maira Schwartz RN  Outcome: Progressing  12/29/2022 2338 by Maira Schwartz RN  Outcome: Progressing     Problem: PAIN - ADULT  Goal: Verbalizes/displays adequate comfort level or baseline comfort level  Description: Interventions:  - Encourage patient to monitor pain and request assistance  - Assess pain using appropriate pain scale  - Administer analgesics based on type and severity of pain and evaluate response  - Implement non-pharmacological measures as appropriate and evaluate response  - Consider cultural and social influences on pain and pain management  - Notify physician/advanced practitioner if interventions unsuccessful or patient reports new pain  Outcome: Progressing     Problem: INFECTION - ADULT  Goal: Absence or prevention of progression during hospitalization  Description: INTERVENTIONS:  - Assess and monitor for signs and symptoms of infection  - Monitor lab/diagnostic results  - Monitor all insertion sites, i e  indwelling lines, tubes, and drains  - Monitor endotracheal if appropriate and nasal secretions for changes in amount and color  - [place] appropriate cooling/warming therapies per order  - Administer medications as ordered  - Instruct and encourage patient and family to use good hand hygiene technique  - Identify and instruct in appropriate isolation precautions for identified infection/condition  Outcome: Progressing  Goal: Absence of fever/infection during neutropenic period  Description: INTERVENTIONS:  - Monitor WBC    Outcome: Progressing     Problem: SAFETY ADULT  Goal: Maintain or return to baseline ADL function  Description: INTERVENTIONS:  -  Assess patient's ability to carry out ADLs; assess patient's baseline for ADL function and identify physical deficits which impact ability to perform ADLs (bathing, care of mouth/teeth, toileting, grooming, dressing, etc )  - Assess/evaluate cause of self-care deficits   - Assess range of motion  - Assess patient's mobility; develop plan if impaired  - Assess patient's need for assistive devices and provide as appropriate  - Encourage maximum independence but intervene and supervise when necessary  - Involve family in performance of ADLs  - Assess for home care needs following discharge   - Consider OT consult to assist with ADL evaluation and planning for discharge  - Provide patient education as appropriate  12/29/2022 2339 by Arabella Acharya RN  Outcome: Progressing  12/29/2022 2338 by Arabella Acharya RN  Outcome: Progressing  Goal: Maintains/Returns to pre admission functional level  Description: INTERVENTIONS:  - Perform BMAT or MOVE assessment daily    - Set and communicate daily mobility goal to care team and patient/family/caregiver  - Collaborate with rehabilitation services on mobility goals if consulted  - Perform Range of Motion 3 times a day  - Reposition patient every 2 hours    - Dangle patient 3 times a day  - Stand patient 3 times a day  - Ambulate patient 3 times a day  - Out of bed to chair 3 times a day   - Out of bed for meals 3 times a day  - Out of bed for toileting  - Record patient progress and toleration of activity level   12/29/2022 2339 by Gage Brady RN  Outcome: Progressing  12/29/2022 2338 by Gage Brady RN  Outcome: Progressing  Goal: Patient will remain free of falls  Description: INTERVENTIONS:  - Educate patient/family on patient safety including physical limitations  - Instruct patient to call for assistance with activity   - Consult OT/PT to assist with strengthening/mobility   - Keep Call bell within reach  - Keep bed low and locked with side rails adjusted as appropriate  - Keep care items and personal belongings within reach  - Initiate and maintain comfort rounds  - Make Fall Risk Sign visible to staff  - Offer Toileting every 2 Hours, in advance of need  - Initiate/Maintain bed alarm  - Obtain necessary fall risk management equipment  - Apply yellow socks and bracelet for high fall risk patients  - Consider moving patient to room near nurses station  12/29/2022 2339 by Gage Brady, RN  Outcome: Progressing  12/29/2022 2338 by Gage Brady, RN  Outcome: Progressing     Problem: DISCHARGE PLANNING  Goal: Discharge to home or other facility with appropriate resources  Description: INTERVENTIONS:  - Identify barriers to discharge w/patient and caregiver  - Arrange for needed discharge resources and transportation as appropriate  - Identify discharge learning needs (meds, wound care, etc )  - Arrange for interpretive services to assist at discharge as needed  - Refer to Case Management Department for coordinating discharge planning if the patient needs post-hospital services based on physician/advanced practitioner order or complex needs related to functional status, cognitive ability, or social support system  Outcome: Progressing     Problem: Knowledge Deficit  Goal: Patient/family/caregiver demonstrates understanding of disease process, treatment plan, medications, and discharge instructions  Description: Complete learning assessment and assess knowledge base    Interventions:  - Provide teaching at level of understanding  - Provide teaching via preferred learning methods  Outcome: Progressing     Problem: SKIN/TISSUE INTEGRITY - ADULT  Goal: Skin Integrity remains intact(Skin Breakdown Prevention)  Description: Assess:  -Perform Gautam assessment   -Clean and moisturize skin   -Inspect skin when repositioning, toileting, and assisting with ADLS  -Assess under medical devices   -Assess extremities for adequate circulation and sensation     Bed Management:  -Have minimal linens on bed & keep smooth, unwrinkled  -Change linens as needed when moist or perspiring  -Avoid sitting or lying in one position for more than 2 hours while in bed  -Keep HOB at 30 degrees     Toileting:  -Offer bedside commode  -Assess for incontinence   -Use incontinent care products after each incontinent episode     Activity:  -Mobilize patient 3 times a day  -Encourage activity and walks on unit  -Encourage or provide ROM exercises   -Turn and reposition patient every 2 Hours  -Use appropriate equipment to lift or move patient in bed  -Instruct/ Assist with weight shifting when out of bed in chair  -Consider limitation of chair time 2 hour intervals    Skin Care:  -Avoid use of baby powder, tape, friction and shearing, hot water or constrictive clothing  -Relieve pressure over bony prominences   -Do not massage red bony areas    Next Steps:  -Teach patient strategies to minimize risks    -Consider consults to  interdisciplinary teams   Outcome: Progressing  Goal: Incision(s), wounds(s) or drain site(s) healing without S/S of infection  Description: INTERVENTIONS  - Assess and document dressing, incision, wound bed, drain sites and surrounding tissue  - Provide patient and family education  - Perform skin care/dressing changes   Outcome: Progressing  Goal: Pressure injury heals and does not worsen  Description: Interventions:  - Implement low air loss mattress or specialty surface (Criteria met)  - Apply silicone foam dressing  - Instruct/assist with weight shifting every 120 minutes when in chair   - Limit chair time to 2 hour intervals  - Use special pressure reducing interventions  when in chair   - Apply fecal or urinary incontinence containment device   - Perform passive or active ROM   - Turn and reposition patient & offload bony prominences every 2 hours   - Utilize friction reducing device or surface for transfers   - Consider consults to  interdisciplinary teams   - Use incontinent care products after each incontinent episode   - Consider nutrition services referral as needed  Outcome: Progressing

## 2022-12-30 NOTE — ASSESSMENT & PLAN NOTE
/74   Pulse 72   Temp 99 1 °F (37 3 °C) (Oral)   Resp 19   SpO2 99%   stable  Cont coreg, lasix and metolazone

## 2022-12-30 NOTE — OCCUPATIONAL THERAPY NOTE
Occupational Therapy Evaluation     Patient Name: Lisa HESS Date: 12/30/2022    Problem List  Principal Problem:    Cellulitis of right lower extremity  Active Problems:    Lupus (systemic lupus erythematosus) ([facility] [address] )    Hypertension    History of DVT (deep vein thrombosis)    Chronic kidney disease, stage 3 (HCC)    Past Medical History  Past Medical History:   Diagnosis Date    Cervical mass     Coronary artery disease     Depression     DVT (deep venous thrombosis) (HCC)     Hypertension     Lupus ([facility] [address] )     Renal disorder      Past Surgical History  Past Surgical History:   Procedure Laterality Date    APPENDECTOMY      CERVICAL SPINE SURGERY      CHOLECYSTECTOMY      COLONOSCOPY N/A 8/23/2018    Procedure: COLONOSCOPY;  Surgeon: Jefferson Carrera MD;  Location: [facility]; Service: Gastroenterology    ESOPHAGOGASTRODUODENOSCOPY N/A 8/22/2018    Procedure: ESOPHAGOGASTRODUODENOSCOPY (EGD); Surgeon: Jefferson Carrera MD;  Location: [facility]; Service: Gastroenterology    NECK SURGERY      VASCULAR SURGERY           12/30/22 1308   OT Last Visit   OT Visit Date 12/30/22   Note Type   Note type Evaluation   Pain Assessment   Pain Assessment Tool 0-10   Pain Score No Pain  (at rest)   Restrictions/Precautions   Weight Bearing Precautions Per Order No   Braces or Orthoses   (wrist splint)   Other Precautions Chair Alarm; Bed Alarm;Multiple lines; Fall Risk;Pain   Home Living   Type of [address] [place] [address] One level  (2 HOSSEIN)   Bathroom Shower/Tub Tub/shower unit   Bathroom Toilet Standard   Bathroom Equipment Grab bars in shower; Shower chair;Grab bars around toilet   [address] Walker;Quad cane   Prior Function   Level of [place] Independent with ADLs; Independent with functional mobility; Needs assistance with IADLS  (Pt ambulates with RW at baseline)   Lives With Spouse;Daughter   Receives Help From Family   IADLs Independent with driving; Independent with medication management; Family/Friend/Other provides meals   Falls in the last 6 months 1 to 4  (2 falls)   Vocational On disability   General   Family/Caregiver Present Yes  (spouse and daughter)   Subjective   Subjective Pt agreeable to therapy evaluation   ADL   Eating Assistance 4  Minimal Assistance   Eating Deficit Setup;Supervision/safety; Increased time to complete   Grooming Assistance 4  Minimal Assistance   Grooming Deficit Setup;Supervision/safety; Increased time to complete  (while seated EOB)   UB Dressing Assistance 4  Minimal Assistance   LB Dressing Assistance 2  Maximal Assistance   Toileting Assistance  2  Maximal Assistance   Bed Mobility   Supine to Sit 4  Minimal assistance   Additional items Assist x 1;HOB elevated; Bedrails; Increased time required   Sit to Supine 3  Moderate assistance   Additional items Assist x 2;Bedrails; Increased time required;Verbal cues;LE management   Transfers   Sit to Stand 3  Moderate assistance   Additional items Assist x 2;Bedrails; Increased time required;Verbal cues  (with RW)   Stand to Sit 3  Moderate assistance   Additional items Assist x 2;Bedrails; Increased time required;Verbal cues   Functional Mobility   Functional Mobility 3  Moderate assistance   Additional Comments assist x2   Additional items Rolling walker   Activity Tolerance   Activity Tolerance Patient limited by pain   Medical Staff Made Aware Lorromulo Cates PT  Pt seen for co-evaluation/treatment with Physical Therapist due to pt's medical complexity, functional limitations and limited activity tolerance     Nurse Made Aware Ja Del Valle LPN   RUE Assessment   RUE Assessment WFL  (limited at hand, pt reports injury to 4th digit and wears wrist splint for support)   LUE Assessment   LUE Assessment WFL   Hand Function   Gross Motor Coordination Functional   Fine Motor Coordination   (imapired R hand 2/2  pain from 4th digit)   Sensation   Light Touch No apparent deficits   Psychosocial   Psychosocial (WDL) WDL Cognition   Overall Cognitive Status WFL   Arousal/Participation Alert; Cooperative   Attention Within functional limits   Orientation Level Oriented X4   Memory Within functional limits   Following Commands Follows all commands and directions without difficulty   Assessment   Limitation Decreased ADL status; Decreased endurance;Decreased high-level ADLs   Assessment Pt is a 45 y o  male seen for OT evaluation s/p admit to [facility] on 12/29/2022 w/ Cellulitis of right lower extremity  Comorbidities affecting pt's functional performance at time of assessment include: HTN, obesity, previous surgery and CAD, Lupus, renal disorder, tremor of BUEs, gait difficulty  Personal factors affecting pt at time of IE include:steps to enter environment, difficulty performing ADLS, difficulty performing IADLS  and health management   Prior to admission, pt was independent with functional mobility with RW, independent with ADLs  Upon evaluation: Pt requires at least Moderate Assistance x2 person assist with RW for mobility, and Maximal Assistance for some basic self care 2* the following deficits impacting occupational performance: decreased strength, decreased balance, decreased tolerance and increased pain  Pt to benefit from continued skilled OT tx while in the hospital to address deficits as defined above and maximize level of functional independence w ADL's and functional mobility  Occupational Performance areas to address include: grooming, bathing/shower, toilet hygiene, dressing and functional mobility  From OT standpoint, recommendation at time of d/c would be inpatient rehab  Goals   Patient Goals to have less pain   Plan   Treatment Interventions ADL retraining;Functional transfer training; Endurance training;Patient/family training;Equipment evaluation/education   Goal Expiration Date 01/13/23   OT Treatment Day 0   OT Frequency 3-5x/wk   Recommendation   OT Discharge Recommendation Post acute rehabilitation services Additional Comments  The patient's raw score on the AM-PAC Daily Activity inpatient short form is 15, standardized score is 34 69, less than 39 4  Patients at this level are likely to benefit from discharge to post-acute rehabilitation services  Please refer to the recommendation of the Occupational Therapist for safe discharge planning     AM-PAC Daily Activity Inpatient   Lower Body Dressing 2   Bathing 2   Toileting 2   Upper Body Dressing 3   Grooming 3   Eating 3   Daily Activity Raw Score 15   Daily Activity Standardized Score (Calc for Raw Score >=11) 34 69   AM-[facility] Applied Cognition Inpatient   Following a Speech/Presentation 4   Understanding Ordinary Conversation 4   Taking Medications 3   Remembering Where Things Are Placed or Put Away 3   Remembering List of 4-5 Errands 3   Taking Care of Complicated Tasks 3   Applied Cognition Raw Score 20   Applied Cognition Standardized Score 41 76       Goals: to be met by 1/13/23    Patient will perform functional bed mobility with Standby Assistance, with HOB flat, no rails  Patient will perform functional transfers with Minimal Assistance using LRAD in preparation for ADL tasks, with good safety awareness  Patient will perform UB dressing task with [address] while seated, with set up  Patient will perform LB dressing task with Minimal Assistance  Patient will perform toilet transfer with Minimal Assistance to [facility]  Patient will perform toileting with Minimal Assistance, including hygiene and clothing management   Patient will increase BUE strength by 1 MM grade in preparation to increase ability to participate in ADL tasks  Patient will improve activity tolerance by participating in 20 minutes of session at a time in preparation for participation in ADL tasks  Patient will identify 3 potential fall hazards and identify compensatory techniques to decrease fall risk in the home environment       SCOTTIE Cooper/L

## 2022-12-30 NOTE — PLAN OF CARE
Problem: PHYSICAL THERAPY ADULT  Goal: Performs mobility at highest level of function for planned discharge setting  See evaluation for individualized goals  Description: Treatment/Interventions: Functional transfer training, LE strengthening/ROM, ADL retraining, Therapeutic exercise, Endurance training, Bed mobility, Gait training, Compensatory technique education, Spoke to nursing, OT          See flowsheet documentation for full assessment, interventions and recommendations  Outcome: Progressing  Note: Prognosis: Good  Problem List: Decreased strength, Decreased endurance, Impaired balance, Decreased mobility, Pain, Decreased skin integrity, Impaired sensation  Assessment: Pt seen for PT treatment session this date with interventions consisting of therapeutic activity consisting of training: bed mobility, supine<>sit transfers, sit<>stand transfers, static sitting tolerance at EOB for 10 minutes w/ bilateral UE support, static standing tolerance for 2 x 10 seconds w/ bilateral UE support and vc and tactile cues for static standing posture faciliation  Pt agreeable to PT treatment session upon arrival, pt found supine in bed w/ HOB elevated, in no apparent distress and responsive  In comparison to previous session, pt with no improvements as evidenced by continued reports of increased pain in R ankle  Post session: pt returned BTB, bed alarm engaged, all needs in reach and RN notified of session findings/recommendations  Continue to recommend post acute rehabilitation services at time of d/c in order to maximize pt's functional independence and safety w/ mobility  Pt continues to be functioning below baseline level, and remains limited 2* factors listed above and including continued need for medical management and monitoring, decreased strength and balance resulting in an increased risk for falls, decreased endurance and activity tolerance limiting mobility, increased pain   PT will continue to see pt during current hospitalization in order to address the deficits listed above and provide interventions consistent w/ POC in effort to achieve STGs  Barriers to Discharge: Inaccessible home environment, Other (Comment) (decline in mobility status)     PT Discharge Recommendation: Post acute rehabilitation services    See flowsheet documentation for full assessment

## 2022-12-30 NOTE — ASSESSMENT & PLAN NOTE
· Has generalized rash over his body  · Cont OP rheum followup  · Cont home meds  · Stable, no flare

## 2022-12-31 RX ORDER — ONDANSETRON 2 MG/ML
4 INJECTION INTRAMUSCULAR; INTRAVENOUS EVERY 4 HOURS PRN
Status: DISCONTINUED | OUTPATIENT
Start: 2022-12-31 | End: 2023-01-10 | Stop reason: HOSPADM

## 2022-12-31 RX ORDER — SENNOSIDES 8.6 MG
2 TABLET ORAL
Status: DISCONTINUED | OUTPATIENT
Start: 2022-12-31 | End: 2023-01-04

## 2022-12-31 RX ORDER — OXYCODONE HYDROCHLORIDE 10 MG/1
10 TABLET ORAL EVERY 6 HOURS PRN
Status: DISCONTINUED | OUTPATIENT
Start: 2022-12-31 | End: 2023-01-10 | Stop reason: HOSPADM

## 2022-12-31 RX ORDER — POLYETHYLENE GLYCOL 3350 17 G/17G
17 POWDER, FOR SOLUTION ORAL DAILY
Status: DISCONTINUED | OUTPATIENT
Start: 2022-12-31 | End: 2023-01-10 | Stop reason: HOSPADM

## 2022-12-31 RX ADMIN — ESCITALOPRAM OXALATE 10 MG: 10 TABLET ORAL at 08:03

## 2022-12-31 RX ADMIN — SODIUM CHLORIDE, SODIUM GLUCONATE, SODIUM ACETATE, POTASSIUM CHLORIDE, MAGNESIUM CHLORIDE, SODIUM PHOSPHATE, DIBASIC, AND POTASSIUM PHOSPHATE 75 ML/HR: .53; .5; .37; .037; .03; .012; .00082 INJECTION, SOLUTION INTRAVENOUS at 14:19

## 2022-12-31 RX ADMIN — BACLOFEN 20 MG: 10 TABLET ORAL at 20:59

## 2022-12-31 RX ADMIN — HYDROXYCHLOROQUINE SULFATE 400 MG: 200 TABLET, FILM COATED ORAL at 21:00

## 2022-12-31 RX ADMIN — MYCOPHENOLATE MOFETIL 500 MG: 250 CAPSULE ORAL at 20:59

## 2022-12-31 RX ADMIN — POLYETHYLENE GLYCOL 3350 17 G: 17 POWDER, FOR SOLUTION ORAL at 11:40

## 2022-12-31 RX ADMIN — OXYCODONE HYDROCHLORIDE 10 MG: 10 TABLET ORAL at 13:52

## 2022-12-31 RX ADMIN — BACLOFEN 20 MG: 10 TABLET ORAL at 15:52

## 2022-12-31 RX ADMIN — CEFAZOLIN SODIUM 1000 MG: 1 SOLUTION INTRAVENOUS at 15:55

## 2022-12-31 RX ADMIN — LORATADINE 10 MG: 10 TABLET ORAL at 08:03

## 2022-12-31 RX ADMIN — TRIAMCINOLONE ACETONIDE: 1 CREAM TOPICAL at 18:07

## 2022-12-31 RX ADMIN — SODIUM CHLORIDE, SODIUM GLUCONATE, SODIUM ACETATE, POTASSIUM CHLORIDE, MAGNESIUM CHLORIDE, SODIUM PHOSPHATE, DIBASIC, AND POTASSIUM PHOSPHATE 75 ML/HR: .53; .5; .37; .037; .03; .012; .00082 INJECTION, SOLUTION INTRAVENOUS at 00:49

## 2022-12-31 RX ADMIN — GABAPENTIN 600 MG: 300 CAPSULE ORAL at 08:02

## 2022-12-31 RX ADMIN — HYDROMORPHONE HYDROCHLORIDE 0.2 MG: 0.2 INJECTION, SOLUTION INTRAMUSCULAR; INTRAVENOUS; SUBCUTANEOUS at 03:43

## 2022-12-31 RX ADMIN — GABAPENTIN 600 MG: 300 CAPSULE ORAL at 15:52

## 2022-12-31 RX ADMIN — DOCUSATE SODIUM 100 MG: 100 CAPSULE, LIQUID FILLED ORAL at 18:07

## 2022-12-31 RX ADMIN — OXYCODONE HYDROCHLORIDE 5 MG: 5 TABLET ORAL at 05:20

## 2022-12-31 RX ADMIN — MYCOPHENOLATE MOFETIL 500 MG: 250 CAPSULE ORAL at 08:02

## 2022-12-31 RX ADMIN — TRIAMCINOLONE ACETONIDE: 1 CREAM TOPICAL at 08:05

## 2022-12-31 RX ADMIN — SENNOSIDES 17.2 MG: 8.6 TABLET, FILM COATED ORAL at 21:00

## 2022-12-31 RX ADMIN — DOCUSATE SODIUM 100 MG: 100 CAPSULE, LIQUID FILLED ORAL at 08:03

## 2022-12-31 RX ADMIN — CEFAZOLIN SODIUM 1000 MG: 1 SOLUTION INTRAVENOUS at 00:49

## 2022-12-31 RX ADMIN — CARVEDILOL 12.5 MG: 12.5 TABLET, FILM COATED ORAL at 15:52

## 2022-12-31 RX ADMIN — APIXABAN 5 MG: 5 TABLET, FILM COATED ORAL at 18:07

## 2022-12-31 RX ADMIN — BACLOFEN 20 MG: 10 TABLET ORAL at 08:02

## 2022-12-31 RX ADMIN — ONDANSETRON 4 MG: 2 INJECTION INTRAMUSCULAR; INTRAVENOUS at 11:40

## 2022-12-31 RX ADMIN — GABAPENTIN 600 MG: 300 CAPSULE ORAL at 21:00

## 2022-12-31 RX ADMIN — OXYCODONE HYDROCHLORIDE 10 MG: 10 TABLET ORAL at 20:59

## 2022-12-31 RX ADMIN — APIXABAN 5 MG: 5 TABLET, FILM COATED ORAL at 08:04

## 2022-12-31 RX ADMIN — CEFAZOLIN SODIUM 1000 MG: 1 SOLUTION INTRAVENOUS at 07:56

## 2022-12-31 RX ADMIN — Medication 7.5 MG: at 11:39

## 2022-12-31 RX ADMIN — ACETAMINOPHEN 650 MG: 325 TABLET, FILM COATED ORAL at 05:20

## 2022-12-31 RX ADMIN — TAMSULOSIN HYDROCHLORIDE 0.4 MG: 0.4 CAPSULE ORAL at 15:52

## 2022-12-31 NOTE — ASSESSMENT & PLAN NOTE
Lab Results   Component Value Date    EGFR 50 12/30/2022    EGFR 51 12/29/2022    EGFR 36 10/03/2022    CREATININE 1 53 (H) 12/30/2022    CREATININE 1 52 (H) 12/29/2022    CREATININE 2 00 (H) 10/03/2022     Stable, avoid hypotension NSAIDs and other nephrotoxins  - cont to monitor closely in lieu of this infection

## 2022-12-31 NOTE — PLAN OF CARE
Problem: PAIN - ADULT  Goal: Verbalizes/displays adequate comfort level or baseline comfort level  Description: Interventions:  - Encourage patient to monitor pain and request assistance  - Assess pain using appropriate pain scale  - Administer analgesics based on type and severity of pain and evaluate response  - Implement non-pharmacological measures as appropriate and evaluate response  - Consider cultural and social influences on pain and pain management  - Notify physician/advanced practitioner if interventions unsuccessful or patient reports new pain  Outcome: Progressing     Problem: INFECTION - ADULT  Goal: Absence or prevention of progression during hospitalization  Description: INTERVENTIONS:  - Assess and monitor for signs and symptoms of infection  - Monitor lab/diagnostic results  - Monitor all insertion sites, i e  indwelling lines, tubes, and drains  - Monitor endotracheal if appropriate and nasal secretions for changes in amount and color  - [place] appropriate cooling/warming therapies per order  - Administer medications as ordered  - Instruct and encourage patient and family to use good hand hygiene technique  - Identify and instruct in appropriate isolation precautions for identified infection/condition  Outcome: Progressing  Goal: Absence of fever/infection during neutropenic period  Description: INTERVENTIONS:  - Monitor WBC    Outcome: Progressing     Problem: DISCHARGE PLANNING  Goal: Discharge to home or other facility with appropriate resources  Description: INTERVENTIONS:  - Identify barriers to discharge w/patient and caregiver  - Arrange for needed discharge resources and transportation as appropriate  - Identify discharge learning needs (meds, wound care, etc )  - Arrange for interpretive services to assist at discharge as needed  - Refer to Case Management Department for coordinating discharge planning if the patient needs post-hospital services based on physician/advanced practitioner order or complex needs related to functional status, cognitive ability, or social support system  Outcome: Progressing

## 2022-12-31 NOTE — ASSESSMENT & PLAN NOTE
· CT showed extensive cellulitis  · Now on IV cefazolin day 3/7  · Improving minimal, will be delayed due to associated lymphedema  · Continues to have pain on weight bearing, no osseus abnormality  · PT  OT : rehab  · Pain control ordered: increased mg today

## 2022-12-31 NOTE — PROGRESS NOTES
[address] [place] [address]  Progress Note - Toni Castro 1970, 45 y o  male MRN: [id number]  Unit/Bed#: -[facility] Encounter: [id number]  Primary Care Provider: Carmen Montelongo MD   Date and time admitted to hospital: 12/29/2022  8:46 AM    * Cellulitis of right lower extremity  Assessment & Plan  · CT showed extensive cellulitis  · Now on IV cefazolin day 3/7  · Improving minimal, will be delayed due to associated lymphedema  · Continues to have pain on weight bearing, no osseus abnormality  · PT  OT : rehab  · Pain control ordered: increased mg today     History of DVT (deep vein thrombosis)  Assessment & Plan  · Cont eliquis  · Venous duplex done today: no DVT    Chronic kidney disease, stage 3 [facility])  Assessment & Plan  Lab Results   Component Value Date    EGFR 50 12/30/2022    EGFR 51 12/29/2022    EGFR 36 10/03/2022    CREATININE 1 53 (H) 12/30/2022    CREATININE 1 52 (H) 12/29/2022    CREATININE 2 00 (H) 10/03/2022     Stable, avoid hypotension NSAIDs and other nephrotoxins  - cont to monitor closely in lieu of this infection    Hypertension  Assessment & Plan  /58   Pulse 62   Temp 97 8 °F (36 6 °C)   Resp 18   Ht 5' 5 98" (1 676 m)   Wt 91 6 kg (201 lb 15 1 oz)   SpO2 95%   BMI 32 61 kg/m²   stable  Cont coreg, lasix and metolazone    Lupus (systemic lupus erythematosus) (HCC)  Assessment & Plan  · Has generalized rash over his body  · Cont OP rheum followup  · Cont home meds  · Stable, no flare          VTE Pharmacologic Prophylaxis: VTE Score: 4 Moderate Risk (Score 3-4) - Pharmacological DVT Prophylaxis Ordered: apixaban (Eliquis)  Patient Centered Rounds: I performed bedside rounds with nursing staff today  Discussions with Specialists or Other Care Team Provider: n/a    Education and Discussions with Family / Patient: Patient declined call to   Time Spent for Care: 45 minutes   More than 50% of total time spent on counseling and coordination of care as described above  Current Length of Stay: 2 day(s)  Current Patient Status: Inpatient   Certification Statement: The patient will continue to require additional inpatient hospital stay due to onoging IV antbiotics and then placement  Discharge Plan: Anticipate discharge in 48 hrs to rehab facility  Code Status: Level 1 - Full Code    Subjective:   Seen and examined at bedside  Pain only slightly better  Unable to bear weight due to pain causing amb dysfucntion    Objective:     Vitals:   Temp (24hrs), Av 9 °F (37 2 °C), Min:97 8 °F (36 6 °C), Max:99 8 °F (37 7 °C)    Temp:  [97 8 °F (36 6 °C)-99 8 °F (37 7 °C)] 97 8 °F (36 6 °C)  HR:  [62-80] 62  Resp:  [18] 18  BP: (103-115)/(58-74) 103/58  SpO2:  [94 %-99 %] 95 %  Body mass index is 32 61 kg/m²  Input and Output Summary (last 24 hours):      Intake/Output Summary (Last 24 hours) at 2022 1306  Last data filed at 2022 1100  Gross per 24 hour   Intake 1840 ml   Output 1175 ml   Net 665 ml       Physical Exam:   Physical Exam General- Awake, alert and oriented x 3, looks comfortable  HEENT- Normocephalic, atraumatic, oral mucosa- moist  Neck- Supple, No carotid bruit, no JVD  CVS- Normal S1/ S2, Regular rate and rhythm, No murmur, No edema  Respiratory system- B/L clear breath sounds, no wheezing  Abdomen- Soft, Non distended, no tenderness, Bowel sound- present 4 quads  Genitourinary- No suprapubic tenderness, No CVA tenderness  Skin- warmth better, tenderness improved, swelling no improvement, lymphedema and venous stasis changes present  Musculoskeletal- No gross deformity  Psych- No acute psychosis  CNS- CN II- XII grossly intact, No acute focal neurologic deficit noted      Additional Data:     Labs:  Results from last 7 days   Lab Units 22  0453   WBC Thousand/uL 4 97   HEMOGLOBIN g/dL 10 8*   HEMATOCRIT % 32 7*   PLATELETS Thousands/uL 227   NEUTROS PCT % 64   LYMPHS PCT % 18   MONOS PCT % 16*   EOS PCT % 0     Results from last 7 days   Lab Units 12/30/22  0453 12/29/22  0937   SODIUM mmol/L 132* 133*   POTASSIUM mmol/L 3 5 4 2   CHLORIDE mmol/L 99 99   CO2 mmol/L 25 25   BUN mg/dL 15 16   CREATININE mg/dL 1 53* 1 52*   ANION GAP mmol/L 8 9   CALCIUM mg/dL 8 6 8 8   ALBUMIN g/dL  --  3 4*   TOTAL BILIRUBIN mg/dL  --  0 34   ALK PHOS U/L  --  89   ALT U/L  --  13   AST U/L  --  18   GLUCOSE RANDOM mg/dL 97 103     Results from last 7 days   Lab Units 12/29/22  0937   INR  1 26*             Results from last 7 days   Lab Units 12/29/22  1749   PROCALCITONIN ng/ml 0 11       Lines/Drains:  Invasive Devices     Peripheral Intravenous Line  Duration           Peripheral IV 12/29/22 Right Antecubital 2 days                      Imaging: No pertinent imaging reviewed  Recent Cultures (last 7 days):   Results from last 7 days   Lab Units 12/29/22  0940   BLOOD CULTURE  No Growth at 24 hrs  No Growth at 24 hrs         Last 24 Hours Medication List:   Current Facility-Administered Medications   Medication Dose Route Frequency Provider Last Rate   • acetaminophen  650 mg Oral Q6H PRN Noreen Dc MD     • apixaban  5 mg Oral BID Noreen Dc MD     • baclofen  20 mg Oral TID Noreen Dc MD     • carvedilol  12 5 mg Oral BID With Meals Noreen Dc MD     • cefazolin  1,000 mg Intravenous Norberto Che MD 1,000 mg (12/31/22 4616)   • docusate sodium  100 mg Oral BID Noreen Dc MD     • escitalopram  10 mg Oral Daily Noreen Dc MD     • furosemide  20 mg Oral Daily Noreen Dc MD     • gabapentin  600 mg Oral TID Noreen Dc MD     • HYDROmorphone  0 2 mg Intravenous Q4H PRN Noreen Dc MD     • hydroxychloroquine  400 mg Oral HS Noreen Dc MD     • hydrOXYzine HCL  10 mg Oral Q6H PRN Noreen Dc MD     • loratadine  10 mg Oral Daily Noreen Dc MD     • metolazone  2 5 mg Oral Daily Noreen Dc MD     • multi-electrolyte  75 mL/hr Intravenous Continuous Noreen Dc MD 75 mL/hr (12/31/22 0049)   • mycophenolate  500 mg Oral Q12H Sydnee Kelly MD • ondansetron  4 mg Intravenous Q4H PRN Malinda Gallardo MD     • oxyCODONE  7 5 mg Oral Q6H PRN Malinda Gallardo MD      Or   • oxyCODONE  10 mg Oral Q6H PRN Malinda Gallardo MD     • polyethylene glycol  17 g Oral Daily Malinda Gallardo MD     • senna  2 tablet Oral HS Malinda Gallardo MD     • tamsulosin  0 4 mg Oral Daily With Alejandro Bain MD     • triamcinolone   Topical BID Malinda Gallardo MD          Today, Patient Was Seen By: Malinda Gallardo MD    **Please Note: This note may have been constructed using a voice recognition system  **

## 2022-12-31 NOTE — ASSESSMENT & PLAN NOTE
/58   Pulse 62   Temp 97 8 °F (36 6 °C)   Resp 18   Ht 5' 5 98" (1 676 m)   Wt 91 6 kg (201 lb 15 1 oz)   SpO2 95%   BMI 32 61 kg/m²   stable  Cont coreg, lasix and metolazone

## 2023-01-01 LAB
ANION GAP SERPL CALCULATED.3IONS-SCNC: 9 MMOL/L (ref 4–13)
ATRIAL RATE: 64 BPM
BUN SERPL-MCNC: 16 MG/DL (ref 5–25)
CALCIUM SERPL-MCNC: 8.3 MG/DL (ref 8.3–10.1)
CHLORIDE SERPL-SCNC: 98 MMOL/L (ref 96–108)
CO2 SERPL-SCNC: 25 MMOL/L (ref 21–32)
CREAT SERPL-MCNC: 1.16 MG/DL (ref 0.6–1.3)
ERYTHROCYTE [DISTWIDTH] IN BLOOD BY AUTOMATED COUNT: 14 % (ref 11.6–15.1)
FLUAV RNA RESP QL NAA+PROBE: NEGATIVE
FLUBV RNA RESP QL NAA+PROBE: NEGATIVE
GFR SERPL CREATININE-BSD FRML MDRD: 70 ML/MIN/1.73SQ M
GLUCOSE SERPL-MCNC: 91 MG/DL (ref 65–140)
HCT VFR BLD AUTO: 29.3 % (ref 36.5–49.3)
HGB BLD-MCNC: 9.8 G/DL (ref 12–17)
MCH RBC QN AUTO: 28.5 PG (ref 26.8–34.3)
MCHC RBC AUTO-ENTMCNC: 33.4 G/DL (ref 31.4–37.4)
MCV RBC AUTO: 85 FL (ref 82–98)
P AXIS: 73 DEGREES
PLATELET # BLD AUTO: 211 THOUSANDS/UL (ref 149–390)
PMV BLD AUTO: 9.6 FL (ref 8.9–12.7)
POTASSIUM SERPL-SCNC: 3.9 MMOL/L (ref 3.5–5.3)
PR INTERVAL: 158 MS
QRS AXIS: 21 DEGREES
QRSD INTERVAL: 94 MS
QT INTERVAL: 406 MS
QTC INTERVAL: 418 MS
RBC # BLD AUTO: 3.44 MILLION/UL (ref 3.88–5.62)
RSV RNA RESP QL NAA+PROBE: NEGATIVE
SARS-COV-2 RNA RESP QL NAA+PROBE: NEGATIVE
SODIUM SERPL-SCNC: 132 MMOL/L (ref 135–147)
T WAVE AXIS: 45 DEGREES
VENTRICULAR RATE: 64 BPM
WBC # BLD AUTO: 4.17 THOUSAND/UL (ref 4.31–10.16)

## 2023-01-01 RX ADMIN — BACLOFEN 20 MG: 10 TABLET ORAL at 16:26

## 2023-01-01 RX ADMIN — GABAPENTIN 600 MG: 300 CAPSULE ORAL at 21:01

## 2023-01-01 RX ADMIN — HYDROMORPHONE HYDROCHLORIDE 0.2 MG: 0.2 INJECTION, SOLUTION INTRAMUSCULAR; INTRAVENOUS; SUBCUTANEOUS at 00:35

## 2023-01-01 RX ADMIN — HYDROMORPHONE HYDROCHLORIDE 0.2 MG: 0.2 INJECTION, SOLUTION INTRAMUSCULAR; INTRAVENOUS; SUBCUTANEOUS at 08:16

## 2023-01-01 RX ADMIN — CEFAZOLIN SODIUM 1000 MG: 1 SOLUTION INTRAVENOUS at 00:30

## 2023-01-01 RX ADMIN — ESCITALOPRAM OXALATE 10 MG: 10 TABLET ORAL at 08:19

## 2023-01-01 RX ADMIN — HYDROXYCHLOROQUINE SULFATE 400 MG: 200 TABLET, FILM COATED ORAL at 21:24

## 2023-01-01 RX ADMIN — DOCUSATE SODIUM 100 MG: 100 CAPSULE, LIQUID FILLED ORAL at 08:17

## 2023-01-01 RX ADMIN — BACLOFEN 20 MG: 10 TABLET ORAL at 21:01

## 2023-01-01 RX ADMIN — DOCUSATE SODIUM 100 MG: 100 CAPSULE, LIQUID FILLED ORAL at 17:35

## 2023-01-01 RX ADMIN — MYCOPHENOLATE MOFETIL 500 MG: 250 CAPSULE ORAL at 08:19

## 2023-01-01 RX ADMIN — GABAPENTIN 600 MG: 300 CAPSULE ORAL at 08:19

## 2023-01-01 RX ADMIN — BACLOFEN 20 MG: 10 TABLET ORAL at 08:18

## 2023-01-01 RX ADMIN — POLYETHYLENE GLYCOL 3350 17 G: 17 POWDER, FOR SOLUTION ORAL at 08:19

## 2023-01-01 RX ADMIN — APIXABAN 5 MG: 5 TABLET, FILM COATED ORAL at 08:19

## 2023-01-01 RX ADMIN — GABAPENTIN 600 MG: 300 CAPSULE ORAL at 16:26

## 2023-01-01 RX ADMIN — TAMSULOSIN HYDROCHLORIDE 0.4 MG: 0.4 CAPSULE ORAL at 16:26

## 2023-01-01 RX ADMIN — OXYCODONE HYDROCHLORIDE 10 MG: 10 TABLET ORAL at 21:00

## 2023-01-01 RX ADMIN — OXYCODONE HYDROCHLORIDE 10 MG: 10 TABLET ORAL at 14:50

## 2023-01-01 RX ADMIN — SENNOSIDES 17.2 MG: 8.6 TABLET, FILM COATED ORAL at 21:01

## 2023-01-01 RX ADMIN — CARVEDILOL 12.5 MG: 12.5 TABLET, FILM COATED ORAL at 16:26

## 2023-01-01 RX ADMIN — CEFAZOLIN SODIUM 1000 MG: 1 SOLUTION INTRAVENOUS at 18:15

## 2023-01-01 RX ADMIN — MYCOPHENOLATE MOFETIL 500 MG: 250 CAPSULE ORAL at 21:01

## 2023-01-01 RX ADMIN — TRIAMCINOLONE ACETONIDE: 1 CREAM TOPICAL at 08:20

## 2023-01-01 RX ADMIN — CEFAZOLIN SODIUM 1000 MG: 1 SOLUTION INTRAVENOUS at 08:19

## 2023-01-01 RX ADMIN — LORATADINE 10 MG: 10 TABLET ORAL at 08:17

## 2023-01-01 RX ADMIN — VANCOMYCIN HYDROCHLORIDE 1750 MG: 1 INJECTION, POWDER, LYOPHILIZED, FOR SOLUTION INTRAVENOUS at 16:25

## 2023-01-01 RX ADMIN — SODIUM CHLORIDE, SODIUM GLUCONATE, SODIUM ACETATE, POTASSIUM CHLORIDE, MAGNESIUM CHLORIDE, SODIUM PHOSPHATE, DIBASIC, AND POTASSIUM PHOSPHATE 75 ML/HR: .53; .5; .37; .037; .03; .012; .00082 INJECTION, SOLUTION INTRAVENOUS at 00:36

## 2023-01-01 RX ADMIN — OXYCODONE HYDROCHLORIDE 10 MG: 10 TABLET ORAL at 04:40

## 2023-01-01 RX ADMIN — APIXABAN 5 MG: 5 TABLET, FILM COATED ORAL at 17:35

## 2023-01-01 NOTE — ASSESSMENT & PLAN NOTE
/60   Pulse 71   Temp 98 4 °F (36 9 °C)   Resp 16   Ht 5' 5 98" (1 676 m)   Wt 91 6 kg (201 lb 15 1 oz)   SpO2 97%   BMI 32 61 kg/m²   · Blood pressures soft  · Cont coreg, lasix and metolazone with hold parameters

## 2023-01-01 NOTE — ASSESSMENT & PLAN NOTE
Lab Results   Component Value Date    EGFR 70 01/01/2023    EGFR 50 12/30/2022    EGFR 51 12/29/2022    CREATININE 1 16 01/01/2023    CREATININE 1 53 (H) 12/30/2022    CREATININE 1 52 (H) 12/29/2022     · Baseline appears to be around 1 5  · Has been receiving IVF with improvement to his creatinine at 1 16  · Avoid nephrotoxins; hypotension  · Monitor BMP

## 2023-01-01 NOTE — PLAN OF CARE
Problem: INFECTION - ADULT  Goal: Absence or prevention of progression during hospitalization  Description: INTERVENTIONS:  - Assess and monitor for signs and symptoms of infection  - Monitor lab/diagnostic results  - Monitor all insertion sites, i e  indwelling lines, tubes, and drains  - Monitor endotracheal if appropriate and nasal secretions for changes in amount and color  - [place] appropriate cooling/warming therapies per order  - Administer medications as ordered  - Instruct and encourage patient and family to use good hand hygiene technique  - Identify and instruct in appropriate isolation precautions for identified infection/condition  Outcome: Progressing  Goal: Absence of fever/infection during neutropenic period  Description: INTERVENTIONS:  - Monitor WBC    Outcome: Progressing

## 2023-01-01 NOTE — ASSESSMENT & PLAN NOTE
· Add vancomycin as satisfactory response is not achieved with cefazolin  · ID consult for further recs on antibiotics  · Patient presented with complaints of right lower extremity pain; swelling, and erythema  · CT RLE (12/29/22): Circumferential edema lower leg extending into the ankle with areas of skin thickening as above likely cellulitis  No discrete abscess or soft tissue gas    · Continue IV Cefazolin - on Day 4/7, vancomycin day 1  · Improving minimal, will be delayed due to associated lymphedema  · Continues to have pain on weight bearing, no osseus abnormality  · PT/OT: rehab  · Continue pain management

## 2023-01-01 NOTE — PROGRESS NOTES
Jesse Enriquez is a 45 y o  male who is currently ordered Vancomycin IV with management by the Pharmacy Consult service  Relevant clinical data and objective / subjective history reviewed  Vancomycin Assessment:  Indication and Goal AUC/Trough: Soft tissue (goal -600, trough >10)  Clinical Status: stable, minimal improvement of cellulitis on 4 days of ancef  Micro:    Blood Cultre x 2: no growth at 48hr  Renal Function:  SCr: 1 16mg/dL (baseline 1 5 mg/dL)  CrCl: 76 mL/min  Days of Therapy: 1  Current Dose: 1750mg IV x1 LD  Vancomycin Plan:  New Dosinmg IV Q12H  Estimated AUC:  402 mcg*hr/mL  Estimated Trough: 13 2 mcg/mL  Next Level: 1/3 @ 1600  Renal Function Monitoring: Daily BMP and [place] will continue to follow closely for s/sx of nephrotoxicity, infusion reactions and appropriateness of therapy  BMP and CBC will be ordered per protocol  We will continue to follow the patient’s culture results and clinical progress daily      [id number], Pharmacist

## 2023-01-01 NOTE — PROGRESS NOTES
[facility]  Progress Note - Elena Kumar 1970, 45 y o  male MRN: [id number]  Unit/Bed#: -01 Encounter: [id number]  Primary Care Provider: Juan Diego Perez MD   Date and time admitted to hospital: 12/29/2022  8:46 AM    * Cellulitis of right lower extremity  Assessment & Plan  · Add vancomycin as satisfactory response is not achieved with cefazolin  · ID consult for further recs on antibiotics  · Patient presented with complaints of right lower extremity pain; swelling, and erythema  · CT RLE (12/29/22): Circumferential edema lower leg extending into the ankle with areas of skin thickening as above likely cellulitis  No discrete abscess or soft tissue gas    · Continue IV Cefazolin - on Day 4/7, vancomycin day 1  · Improving minimal, will be delayed due to associated lymphedema  · Continues to have pain on weight bearing, no osseus abnormality  · PT/OT: rehab  · Continue pain management    History of DVT (deep vein thrombosis)  Assessment & Plan  · Cont eliquis  · Venous duplex done today: no DVT    Chronic kidney disease, stage 3 [facility])  Assessment & Plan  Lab Results   Component Value Date    EGFR 70 01/01/2023    EGFR 50 12/30/2022    EGFR 51 12/29/2022    CREATININE 1 16 01/01/2023    CREATININE 1 53 (H) 12/30/2022    CREATININE 1 52 (H) 12/29/2022     · Baseline appears to be around 1 5  · Has been receiving IVF with improvement to his creatinine at 1 16  · Avoid nephrotoxins; hypotension  · Monitor BMP    Hypertension  Assessment & Plan  /60   Pulse 71   Temp 98 4 °F (36 9 °C)   Resp 16   Ht 5' 5 98" (1 676 m)   Wt 91 6 kg (201 lb 15 1 oz)   SpO2 97%   BMI 32 61 kg/m²   · Blood pressures soft  · Cont coreg, lasix and metolazone with hold parameters    Lupus (systemic lupus erythematosus) (HCC)  Assessment & Plan  · Has generalized rash over his body  · Cont OP rheum followup  · Cont home meds  · Stable, no flare      VTE Pharmacologic Prophylaxis: VTE Score: 4 Moderate Risk (Score 3-4) - Pharmacological DVT Prophylaxis Ordered: apixaban (Eliquis)  Patient Centered Rounds: I performed bedside rounds with nursing staff today  Discussions with Specialists or Other Care Team Provider: n/a    Education and Discussions with Family / Patient: Patient declined call to   Time Spent for Care: 30 minutes  More than 50% of total time spent on counseling and coordination of care as described above  Current Length of Stay: 3 day(s)  Current Patient Status: Inpatient   Certification Statement: The patient will continue to require additional inpatient hospital stay due to need for iimporovement in cellulitis and then placement  Discharge Plan: Anticipate discharge in 48 hrs to rehab facility  Code Status: Level 1 - Full Code    Subjective:   Seen and examined at bedside  Redness and warmth improved  Pain continues to be the same  Pt not able to bear weight ont he right LE    Objective:     Vitals:   Temp (24hrs), Av 5 °F (36 9 °C), Min:98 1 °F (36 7 °C), Max:99 °F (37 2 °C)    Temp:  [98 1 °F (36 7 °C)-99 °F (37 2 °C)] 98 4 °F (36 9 °C)  HR:  [68-73] 71  Resp:  [16] 16  BP: (103-118)/(60-69) 103/60  SpO2:  [93 %-98 %] 97 %  Body mass index is 32 61 kg/m²  Input and Output Summary (last 24 hours):      Intake/Output Summary (Last 24 hours) at 2023 1444  Last data filed at 2023 1342  Gross per 24 hour   Intake 1950 ml   Output 1800 ml   Net 150 ml       Physical Exam:   Physical Exam General- Awake, alert and oriented x 3, looks comfortable  HEENT- Normocephalic, atraumatic, oral mucosa- moist  Neck- Supple, No carotid bruit, no JVD  CVS- Normal S1/ S2, Regular rate and rhythm, No murmur, No edema  Respiratory system- B/L clear breath sounds, no wheezing  Abdomen- Soft, Non distended, no tenderness, Bowel sound- present 4 quads  Genitourinary- No suprapubic tenderness, No CVA tenderness  Skin- redness slightly better, tenderness improved, no improvement in swelling and pain on weight bearing, concern for lymphedema  Musculoskeletal- No gross deformity  Psych- No acute psychosis  CNS- CN II- XII grossly intact, No acute focal neurologic deficit noted      Additional Data:     Labs:  Results from last 7 days   Lab Units 01/01/23  0606 12/30/22  0453   WBC Thousand/uL 4 17* 4 97   HEMOGLOBIN g/dL 9 8* 10 8*   HEMATOCRIT % 29 3* 32 7*   PLATELETS Thousands/uL 211 227   NEUTROS PCT %  --  64   LYMPHS PCT %  --  18   MONOS PCT %  --  16*   EOS PCT %  --  0     Results from last 7 days   Lab Units 01/01/23  0606 12/30/22  0453 12/29/22  0937   SODIUM mmol/L 132*   < > 133*   POTASSIUM mmol/L 3 9   < > 4 2   CHLORIDE mmol/L 98   < > 99   CO2 mmol/L 25   < > 25   BUN mg/dL 16   < > 16   CREATININE mg/dL 1 16   < > 1 52*   ANION GAP mmol/L 9   < > 9   CALCIUM mg/dL 8 3   < > 8 8   ALBUMIN g/dL  --   --  3 4*   TOTAL BILIRUBIN mg/dL  --   --  0 34   ALK PHOS U/L  --   --  89   ALT U/L  --   --  13   AST U/L  --   --  18   GLUCOSE RANDOM mg/dL 91   < > 103    < > = values in this interval not displayed  Results from last 7 days   Lab Units 12/29/22  0937   INR  1 26*             Results from last 7 days   Lab Units 12/29/22  1749   PROCALCITONIN ng/ml 0 11       Lines/Drains:  Invasive Devices     Peripheral Intravenous Line  Duration           Peripheral IV 12/29/22 Right Antecubital 3 days                      Imaging: No pertinent imaging reviewed  Recent Cultures (last 7 days):   Results from last 7 days   Lab Units 12/29/22  0940   BLOOD CULTURE  No Growth at 48 hrs  No Growth at 48 hrs         Last 24 Hours Medication List:   Current Facility-Administered Medications   Medication Dose Route Frequency Provider Last Rate   • acetaminophen  650 mg Oral Q6H PRN Lucho Conklin MD     • apixaban  5 mg Oral BID Lucho Conklin MD     • baclofen  20 mg Oral TID Lucho Conklin MD     • carvedilol  12 5 mg Oral BID With Meals Lucho Conklin MD     • cefazolin  1,000 mg Intravenous Francisco Brink MD 1,000 mg (01/01/23 9688)   • docusate sodium  100 mg Oral BID Tobi Chapman MD     • escitalopram  10 mg Oral Daily Tobi Chapman MD     • furosemide  20 mg Oral Daily Tobi Chapman MD     • gabapentin  600 mg Oral TID Tobi Chapman MD     • HYDROmorphone  0 2 mg Intravenous Q4H PRN Tobi Chapman MD     • hydroxychloroquine  400 mg Oral HS Tobi Chapman MD     • hydrOXYzine HCL  10 mg Oral Q6H PRN Tobi Chapman MD     • loratadine  10 mg Oral Daily Tobi Chapman MD     • metolazone  2 5 mg Oral Daily Tobi Chapman MD     • multi-electrolyte  75 mL/hr Intravenous Continuous Tobi Chapman MD 75 mL/hr (01/01/23 0036)   • mycophenolate  500 mg Oral Q12H Maureen Ramsey MD     • ondansetron  4 mg Intravenous Q4H PRN Tobi Chapman MD     • oxyCODONE  7 5 mg Oral Q6H PRN Tobi Chapman MD      Or   • oxyCODONE  10 mg Oral Q6H PRN Tobi Chapman MD     • polyethylene glycol  17 g Oral Daily Tobi Chapman MD     • senna  2 tablet Oral HS Tobi Chapman MD     • tamsulosin  0 4 mg Oral Daily With Ronda Ghotra MD     • triamcinolone   Topical BID Tobi Chapman MD     • vancomycin  25 mg/kg (Adjusted) Intravenous Once Tobi Chapman MD          Today, Patient Was Seen By: Tobi Chapman MD    **Please Note: This note may have been constructed using a voice recognition system  **

## 2023-01-01 NOTE — CASE MANAGEMENT
Case Management Assessment & Discharge Planning Note    Patient name Toby Young  Location [id number] MRN [id number]  : 1970 Date 2023       Current Admission Date: 2022  Current Admission Diagnosis:Cellulitis of right lower extremity   Patient Active Problem List    Diagnosis Date Noted   • Cellulitis of right lower extremity 2022   • Tremor of both hands 2020   • Spasticity 2020   • Gait difficulty 2020   • Neuropathic pain 2020   • Muscle weakness (generalized) 2019   • Anemia in chronic kidney disease 2018   • Lupus (systemic lupus erythematosus) ([facility] ) 2018   • Lupus nephritis ([facility] ) 2018   • Hypertension 2018   • History of DVT (deep vein thrombosis) 2018   • Chronic kidney disease, stage 3 ([facility] [facility] ) 2018   • Persistent proteinuria 2018      LOS (days): 3  Geometric Mean LOS (GMLOS) (days):   Days to GMLOS:     OBJECTIVE:    Risk of Unplanned Readmission Score: 18 71         Current admission status: Inpatient       Preferred Pharmacy:   [address] [place] [address] - [address]  [address] [place] [address]  Phone: [phone] Fax: [phone]    Primary Care Provider: Jose Blum MD    Primary Insurance: [address] [place] [address]  Secondary Insurance:     ASSESSMENT:  Readmission Root Cause  30 Day Readmission: No    Patient Information  Admitted from[de-identified] Home  Mental Status: Alert  During Assessment patient was accompanied by: Not accompanied during assessment  Assessment information provided by[de-identified] Patient  Primary Caregiver: Self  Support Systems: Self, Spouse/significant other, Family members  [place] of Residence: [place] [address] do you live in?: [address] entry access options   Select all that apply : Stairs  Number of steps to enter home : 2  Do the steps have railings?: Yes  Type [facility]  In the last 12 months, was there a time when you were not able to pay the mortgage or rent on time?: No  In the last 12 months, how many places have you lived?: 1  In the last 12 months, was there a time when you did not have a steady place to sleep or slept in a shelter (including now)?: No  Homeless/housing insecurity resource given?: No  Living Arrangements: Lives w/ Spouse/significant other, Lives w/ Daughter  Is patient a ?: No    Activities of Daily Living Prior to Admission  Functional Status: Independent  Completes ADLs independently?: Yes  Ambulates independently?: Yes  Does patient use assisted devices?: Yes  Assisted Devices (DME) used: [employer]  Does patient currently own DME?: Yes  What DME does the patient currently own?: [employer]  Does patient have a history of Outpatient Therapy (PT/OT)?: No  Does the patient have a history of Short-Term Rehab?: No  Does patient have a history of HHC?: Yes (w/ [facility])  Does patient currently have [place] ?: No    Patient Information Continued  Income Source: SSI/SSD  Does patient have prescription coverage?: Yes  Within the past 12 months, you worried that your food would run out before you got the money to buy more : Never true  Within the past 12 months, the food you bought just didn't last and you didn't have money to get more : Never true  Food insecurity resource given?: No  Does patient receive dialysis treatments?: No  Does patient have a history of substance abuse?: No  Does patient have a history of Mental Health Diagnosis?: No    PHQ 2/9 Screening   Reviewed PHQ 2/9 Depression Screening Score?: No    Means of Transportation  Means of Transport to Appts[de-identified] Family transport  In the past 12 months, has lack of transportation kept you from medical appointments or from getting medications?: No  In the past 12 months, has lack of transportation kept you from meetings, work, or from getting things needed for daily living?: No  Was application for public transport provided?: No    DISCHARGE DETAILS:    Discharge planning discussed with[de-identified] Patient  Freedom of Choice: Yes  Comments - Freedom of Choice: Choice is for blanket referrals to dtermine available options  CM contacted family/caregiver?: No- see comments (VM left for spouse)  Were Treatment Team discharge recommendations reviewed with patient/caregiver?: Yes  Did patient/caregiver verbalize understanding of patient care needs?: N/A- going to facility  Were patient/caregiver advised of the risks associated with not following Treatment Team discharge recommendations?: Yes    Requested [address]         Is the patient interested in [facility] at discharge?: No    DME Referral Provided  Referral made for DME?: No    Other Referral/Resources/Interventions Provided:  Interventions: Short Term Rehab  Referral Comments: STR referrals opened via [facility]  Patient will need insurance auth to admit to STR      Would you like to participate in our [address] service program?  : No - Declined    Treatment Team Recommendation: Short Term Rehab  Discharge Destination Plan[de-identified] Short Term Rehab (REFERRALS PENDING)    Lyssa Voss, DIOGENES, ACSW, ACM, CCM  01/01/23 2:27 PM

## 2023-01-01 NOTE — PLAN OF CARE
Problem: Potential for Falls  Goal: Patient will remain free of falls  Description: INTERVENTIONS:  - Educate patient/family on patient safety including physical limitations  - Instruct patient to call for assistance with activity   - Consult OT/PT to assist with strengthening/mobility   - Keep Call bell within reach  - Keep bed low and locked with side rails adjusted as appropriate  - Keep care items and personal belongings within reach  - Initiate and maintain comfort rounds  - Make Fall Risk Sign visible to staff  - Offer Toileting every 2 Hours, in advance of need  - Initiate/Maintain alarm  - Obtain necessary fall risk management equipment  - Apply yellow socks and bracelet for high fall risk patients  - Consider moving patient to room near nurses station  Outcome: Progressing     Problem: INFECTION - ADULT  Goal: Absence or prevention of progression during hospitalization  Description: INTERVENTIONS:  - Assess and monitor for signs and symptoms of infection  - Monitor lab/diagnostic results  - Monitor all insertion sites, i e  indwelling lines, tubes, and drains  - Monitor endotracheal if appropriate and nasal secretions for changes in amount and color  - [place] appropriate cooling/warming therapies per order  - Administer medications as ordered  - Instruct and encourage patient and family to use good hand hygiene technique  - Identify and instruct in appropriate isolation precautions for identified infection/condition  Outcome: Progressing  Goal: Absence of fever/infection during neutropenic period  Description: INTERVENTIONS:  - Monitor WBC    Outcome: Progressing     Problem: SAFETY ADULT  Goal: Patient will remain free of falls  Description: INTERVENTIONS:  - Educate patient/family on patient safety including physical limitations  - Instruct patient to call for assistance with activity   - Consult OT/PT to assist with strengthening/mobility   - Keep Call bell within reach  - Keep bed low and locked with side rails adjusted as appropriate  - Keep care items and personal belongings within reach  - Initiate and maintain comfort rounds  - Make Fall Risk Sign visible to staff  - Offer Toileting every 2 Hours, in advance of need  - Initiate/Maintain alarm  - Obtain necessary fall risk management equipment  - Apply yellow socks and bracelet for high fall risk patients  - Consider moving patient to room near nurses station  Outcome: Progressing

## 2023-01-02 RX ADMIN — FUROSEMIDE 20 MG: 20 TABLET ORAL at 08:19

## 2023-01-02 RX ADMIN — TAMSULOSIN HYDROCHLORIDE 0.4 MG: 0.4 CAPSULE ORAL at 16:25

## 2023-01-02 RX ADMIN — CEFAZOLIN SODIUM 1000 MG: 1 SOLUTION INTRAVENOUS at 01:01

## 2023-01-02 RX ADMIN — BACLOFEN 20 MG: 10 TABLET ORAL at 16:25

## 2023-01-02 RX ADMIN — TRIAMCINOLONE ACETONIDE: 1 CREAM TOPICAL at 08:26

## 2023-01-02 RX ADMIN — VANCOMYCIN HYDROCHLORIDE 750 MG: 750 INJECTION, SOLUTION INTRAVENOUS at 06:00

## 2023-01-02 RX ADMIN — GABAPENTIN 600 MG: 300 CAPSULE ORAL at 21:40

## 2023-01-02 RX ADMIN — CARVEDILOL 12.5 MG: 12.5 TABLET, FILM COATED ORAL at 08:19

## 2023-01-02 RX ADMIN — SENNOSIDES 17.2 MG: 8.6 TABLET, FILM COATED ORAL at 21:40

## 2023-01-02 RX ADMIN — GABAPENTIN 600 MG: 300 CAPSULE ORAL at 16:25

## 2023-01-02 RX ADMIN — TRIAMCINOLONE ACETONIDE: 1 CREAM TOPICAL at 16:26

## 2023-01-02 RX ADMIN — APIXABAN 5 MG: 5 TABLET, FILM COATED ORAL at 08:19

## 2023-01-02 RX ADMIN — MYCOPHENOLATE MOFETIL 500 MG: 250 CAPSULE ORAL at 21:40

## 2023-01-02 RX ADMIN — LORATADINE 10 MG: 10 TABLET ORAL at 08:19

## 2023-01-02 RX ADMIN — CEFAZOLIN SODIUM 1000 MG: 1 SOLUTION INTRAVENOUS at 16:21

## 2023-01-02 RX ADMIN — BACLOFEN 20 MG: 10 TABLET ORAL at 21:40

## 2023-01-02 RX ADMIN — OXYCODONE HYDROCHLORIDE 10 MG: 10 TABLET ORAL at 13:43

## 2023-01-02 RX ADMIN — DOCUSATE SODIUM 100 MG: 100 CAPSULE, LIQUID FILLED ORAL at 16:25

## 2023-01-02 RX ADMIN — OXYCODONE HYDROCHLORIDE 10 MG: 10 TABLET ORAL at 03:22

## 2023-01-02 RX ADMIN — APIXABAN 5 MG: 5 TABLET, FILM COATED ORAL at 16:25

## 2023-01-02 RX ADMIN — POLYETHYLENE GLYCOL 3350 17 G: 17 POWDER, FOR SOLUTION ORAL at 08:21

## 2023-01-02 RX ADMIN — MYCOPHENOLATE MOFETIL 500 MG: 250 CAPSULE ORAL at 08:17

## 2023-01-02 RX ADMIN — ESCITALOPRAM OXALATE 10 MG: 10 TABLET ORAL at 08:19

## 2023-01-02 RX ADMIN — HYDROMORPHONE HYDROCHLORIDE 0.2 MG: 0.2 INJECTION, SOLUTION INTRAMUSCULAR; INTRAVENOUS; SUBCUTANEOUS at 16:20

## 2023-01-02 RX ADMIN — CARVEDILOL 12.5 MG: 12.5 TABLET, FILM COATED ORAL at 16:25

## 2023-01-02 RX ADMIN — OXYCODONE HYDROCHLORIDE 10 MG: 10 TABLET ORAL at 21:41

## 2023-01-02 RX ADMIN — GABAPENTIN 600 MG: 300 CAPSULE ORAL at 08:18

## 2023-01-02 RX ADMIN — DOCUSATE SODIUM 100 MG: 100 CAPSULE, LIQUID FILLED ORAL at 08:19

## 2023-01-02 RX ADMIN — CEFAZOLIN SODIUM 1000 MG: 1 SOLUTION INTRAVENOUS at 08:16

## 2023-01-02 RX ADMIN — METOLAZONE 2.5 MG: 5 TABLET ORAL at 08:18

## 2023-01-02 RX ADMIN — HYDROXYCHLOROQUINE SULFATE 400 MG: 200 TABLET, FILM COATED ORAL at 21:40

## 2023-01-02 RX ADMIN — BACLOFEN 20 MG: 10 TABLET ORAL at 08:18

## 2023-01-02 NOTE — ASSESSMENT & PLAN NOTE
· Has generalized rash over his body  · recomend OP rheum followup  · Cont home meds  · Stable, no flare

## 2023-01-02 NOTE — PLAN OF CARE
Problem: MOBILITY - ADULT  Goal: Maintain or return to baseline ADL function  Description: INTERVENTIONS:  -  Assess patient's ability to carry out ADLs; assess patient's baseline for ADL function and identify physical deficits which impact ability to perform ADLs (bathing, care of mouth/teeth, toileting, grooming, dressing, etc )  - Assess/evaluate cause of self-care deficits   - Assess range of motion  - Assess patient's mobility; develop plan if impaired  - Assess patient's need for assistive devices and provide as appropriate  - Encourage maximum independence but intervene and supervise when necessary  - Involve family in performance of ADLs  - Assess for home care needs following discharge   - Consider OT consult to assist with ADL evaluation and planning for discharge  - Provide patient education as appropriate  Outcome: Not Progressing     Problem: INFECTION - ADULT  Goal: Absence or prevention of progression during hospitalization  Description: INTERVENTIONS:  - Assess and monitor for signs and symptoms of infection  - Monitor lab/diagnostic results  - Monitor all insertion sites, i e  indwelling lines, tubes, and drains  - Monitor endotracheal if appropriate and nasal secretions for changes in amount and color  - [place] appropriate cooling/warming therapies per order  - Administer medications as ordered  - Instruct and encourage patient and family to use good hand hygiene technique  - Identify and instruct in appropriate isolation precautions for identified infection/condition  Outcome: Not Progressing

## 2023-01-02 NOTE — PROGRESS NOTES
[facility]  Progress Note - Raghavendra Locus 1970, 45 y o  male MRN: [id number]  Unit/Bed#: -[place] Encounter: [id number]  Primary Care Provider: Jay Tan MD   Date and time admitted to hospital: 12/29/2022  8:46 AM    * Cellulitis of right lower extremity  Assessment & Plan  · Day 5/14 cefazolin  Plan to switch to keflex in 24-48 h  · Discontinued vancomycin  · Pain is better but still has difficulty ambulating due to pain on weight bearing  · ID following  · Will need rehab at the end of hospitalization    History of DVT (deep vein thrombosis)  Assessment & Plan  · Cont eliquis  · Venous duplex done today: no DVT    Chronic kidney disease, stage 3 [facility])  Assessment & Plan  Lab Results   Component Value Date    EGFR 70 01/01/2023    EGFR 50 12/30/2022    EGFR 51 12/29/2022    CREATININE 1 16 01/01/2023    CREATININE 1 53 (H) 12/30/2022    CREATININE 1 52 (H) 12/29/2022     · Baseline appears to be around 1 5  · Has been receiving IVF with improvement to his creatinine at 1 16  · Avoid nephrotoxins; hypotension  · Monitor BMP    Hypertension  Assessment & Plan  /76   Pulse 72   Temp 99 4 °F (37 4 °C)   Resp 19   Ht 5' 5 98" (1 676 m)   Wt 91 6 kg (201 lb 15 1 oz)   SpO2 97%   BMI 32 61 kg/m²   · Blood pressures stable  · Cont coreg, lasix and metolazone with hold parameters    Lupus (systemic lupus erythematosus) (HCC)  Assessment & Plan  · Has generalized rash over his body  · recomend OP rheum followup  · Cont home meds  · Stable, no flare          VTE Pharmacologic Prophylaxis: VTE Score: 4 Moderate Risk (Score 3-4) - Pharmacological DVT Prophylaxis Ordered: apixaban (Eliquis)  Patient Centered Rounds: I performed bedside rounds with nursing staff today  Discussions with Specialists or Other Care Team Provider: yes ID    Education and Discussions with Family / Patient: Patient declined call to   Time Spent for Care: 30 minutes   More than 50% of total time spent on counseling and coordination of care as described above  Current Length of Stay: 4 day(s)  Current Patient Status: Inpatient   Certification Statement: The patient will continue to require additional inpatient hospital stay due to likely transition to PO keflex in 24 hours from IV cefazoliin  Discharge Plan: Anticipate discharge in 24-48 hrs to rehab facility  Code Status: Level 1 - Full Code    Subjective:   Seen and examined at bedside  Pt says pain is btter but unable to walk properly due to pain on weight bearing  No cp or sob or fever    Objective:     Vitals:   Temp (24hrs), Av 8 °F (37 1 °C), Min:98 1 °F (36 7 °C), Max:99 4 °F (37 4 °C)    Temp:  [98 1 °F (36 7 °C)-99 4 °F (37 4 °C)] 99 4 °F (37 4 °C)  HR:  [72-81] 72  Resp:  [18-19] 19  BP: (106-125)/(62-76) 115/76  SpO2:  [93 %-97 %] 97 %  Body mass index is 32 61 kg/m²  Input and Output Summary (last 24 hours):      Intake/Output Summary (Last 24 hours) at 2023 1608  Last data filed at 2023 1526  Gross per 24 hour   Intake 760 ml   Output 2100 ml   Net -1340 ml       Physical Exam:   Physical Exam General- Awake, alert and oriented x 3, looks comfortable  HEENT- Normocephalic, atraumatic, oral mucosa- moist  Neck- Supple, No carotid bruit, no JVD  CVS- Normal S1/ S2, Regular rate and rhythm, No murmur, No edema  Respiratory system- B/L clear breath sounds, no wheezing  Abdomen- Soft, Non distended, no tenderness, Bowel sound- present 4 quads  Genitourinary- No suprapubic tenderness, No CVA tenderness  Skin- lymphedema changes b/l LE R>L  Musculoskeletal- No gross deformity, reduced amb function due to pain on weight bearing  Psych- No acute psychosis  CNS- CN II- XII grossly intact, No acute focal neurologic deficit noted      Additional Data:     Labs:  Results from last 7 days   Lab Units 23  0606 22  0453   WBC Thousand/uL 4 17* 4 97   HEMOGLOBIN g/dL 9 8* 10 8*   HEMATOCRIT % 29 3* 32 7* PLATELETS Thousands/uL 211 227   NEUTROS PCT %  --  64   LYMPHS PCT %  --  18   MONOS PCT %  --  16*   EOS PCT %  --  0     Results from last 7 days   Lab Units 01/01/23  0606 12/30/22  0453 12/29/22  0937   SODIUM mmol/L 132*   < > 133*   POTASSIUM mmol/L 3 9   < > 4 2   CHLORIDE mmol/L 98   < > 99   CO2 mmol/L 25   < > 25   BUN mg/dL 16   < > 16   CREATININE mg/dL 1 16   < > 1 52*   ANION GAP mmol/L 9   < > 9   CALCIUM mg/dL 8 3   < > 8 8   ALBUMIN g/dL  --   --  3 4*   TOTAL BILIRUBIN mg/dL  --   --  0 34   ALK PHOS U/L  --   --  89   ALT U/L  --   --  13   AST U/L  --   --  18   GLUCOSE RANDOM mg/dL 91   < > 103    < > = values in this interval not displayed  Results from last 7 days   Lab Units 12/29/22  0937   INR  1 26*             Results from last 7 days   Lab Units 12/29/22  1749   PROCALCITONIN ng/ml 0 11       Lines/Drains:  Invasive Devices     Peripheral Intravenous Line  Duration           Peripheral IV 12/29/22 Right Antecubital 4 days                      Imaging: No pertinent imaging reviewed  Recent Cultures (last 7 days):   Results from last 7 days   Lab Units 12/29/22  0940   BLOOD CULTURE  No Growth at 72 hrs  No Growth at 72 hrs         Last 24 Hours Medication List:   Current Facility-Administered Medications   Medication Dose Route Frequency Provider Last Rate   • acetaminophen  650 mg Oral Q6H PRN Elmer Cruz MD     • apixaban  5 mg Oral BID Elmer Cruz MD     • baclofen  20 mg Oral TID Elmer Cruz MD     • carvedilol  12 5 mg Oral BID With Meals Elmer Cruz MD     • cefazolin  1,000 mg Intravenous Amos Burrell MD 1,000 mg (01/02/23 3765)   • docusate sodium  100 mg Oral BID Elmer Cruz MD     • escitalopram  10 mg Oral Daily Elmer Cruz MD     • furosemide  20 mg Oral Daily Elmer Cruz MD     • gabapentin  600 mg Oral TID Elmer Cruz MD     • HYDROmorphone  0 2 mg Intravenous Q4H PRN Elmer Cruz MD     • hydroxychloroquine  400 mg Oral HS Elmer Cruz MD     • hydrOXYzine HCL 10 mg Oral Q6H PRN Devin Orta MD     • loratadine  10 mg Oral Daily Devin Orta MD     • metolazone  2 5 mg Oral Daily Devin Orta MD     • multi-electrolyte  75 mL/hr Intravenous Continuous Devin Orta MD 75 mL/hr (01/01/23 0036)   • mycophenolate  500 mg Oral Q12H Loyd Bello MD     • ondansetron  4 mg Intravenous Q4H PRN Devin Orta MD     • oxyCODONE  7 5 mg Oral Q6H PRN Devin Orta MD      Or   • oxyCODONE  10 mg Oral Q6H PRN Devin Orta MD     • polyethylene glycol  17 g Oral Daily Devin Orta MD     • senna  2 tablet Oral HS Devin Orta MD     • tamsulosin  0 4 mg Oral Daily With Shawn Erickson MD     • triamcinolone   Topical BID Devin Orta MD          Today, Patient Was Seen By: Devin Orta MD    **Please Note: This note may have been constructed using a voice recognition system  **

## 2023-01-02 NOTE — CONSULTS
Consultation - Infectious Disease   Ebbidestiny Eleonora 45 y o  male MRN: [id number]  Unit/Bed#: -01 Encounter: [id number]      IMPRESSION & RECOMMENDATIONS:   Impression/Recommendations: This is a 45 y o  male, with history of untreated SLE and venous stasis, admitted on 12/29 with right leg cellulitis  Due to slow improvement on IV cefazolin, IV vancomycin was added yesterday  1   Right lower leg cellulitis, superimposed on underlying venous stasis  Cellulitis has the appearance of streptococcal infection  Cellulitis appears to be superficial, without abscess or involvement of deeper structure  Leg CT is without abscess  As typical with patient with cellulitis superimposed on venous stasis, response to antibiotic tends to be slow  Although patient has persistent pain in his right leg with ambulation/weightbearing, pain at rest is improved  He remains clinically and systemically well  Admission blood cultures have no growth thus far  Patient probably does not need IV vancomycin  Will continue IV cefazolin, with anticipation of transitioning to p o  Keflex in the next 24 to 48 hours, if patient continues to improve clinically  Source of cellulitis is most likely the chronic leg rash  Although there is no obvious active ulceration, there is likely loss of skin integrity at the rash site  Continue IV cefazolin  No further need for IV vancomycin  Serial leg exams  Anticipate transition to p o  Keflex in the next 24 to 48 hours, if patient continues to improve clinically  Likely treat x14 days total antibiotic  2   Venous stasis, with poorly controlled leg edema  Patient counseled regarding the need to keep legs elevated to control edema, to help cellulitis resolved quicker and prevent recurrent cellulitis  Continue aggressive leg elevation  3   SLE, with chronic rash and not on treatment  Patient will benefit from rheumatology evaluation and possible treatment to control rash    Recommend outpatient rheumatology evaluation  Hospitalization records reviewed in detail  Discussed with patient in detail regarding the above plan  Discussed with Dr Jagdish Mi from [facility] service  Thank you for this consultation  We will follow along with you  HISTORY OF PRESENT ILLNESS:  Reason for Consult: Right leg cellulitis  HPI: Shamar Chinchilla is a 45 y o  male, with history of untreated SLE and venous stasis, came to ER on 12/29 with right leg pain, redness and swelling  On presentation, patient did not have fever or leukocytosis  He was admitted and started on IV cefazolin for right leg cellulitis  Due to lack of improvement of pain, IV vancomycin was added yesterday  We are asked to evaluate the patient  Overall, patient states that he still has pain in his right lower leg but he feels that it is slowly getting better  He still has pain with weightbearing but at rest, pain is quite improved  No fever or chills  No pain in left leg  Patient states that he has had recurrent leg infection, more often in the right leg than left leg  In addition, he has more edema in right leg than left leg  With regard to patient's SLE, he is not being treated for it  REVIEW OF SYSTEMS:  A complete system-based review was done  Except for what is noted in HPI above, ROS of systems is otherwise negative  PAST MEDICAL HISTORY:  Past Medical History:   Diagnosis Date   • Cervical mass    • Coronary artery disease    • Depression    • DVT (deep venous thrombosis) (HCC)    • Hypertension    • Lupus ([address] )    • Renal disorder      Past Surgical History:   Procedure Laterality Date   • APPENDECTOMY     • CERVICAL SPINE SURGERY     • CHOLECYSTECTOMY     • COLONOSCOPY N/A 8/23/2018    Procedure: COLONOSCOPY;  Surgeon: Selina Astudillo MD;  Location: [facility]; Service: Gastroenterology   • ESOPHAGOGASTRODUODENOSCOPY N/A 8/22/2018    Procedure: ESOPHAGOGASTRODUODENOSCOPY (EGD);   Surgeon: Adamaris Chang III, MD;  Location: [facility]; Service: Gastroenterology   • NECK SURGERY     • VASCULAR SURGERY       Problem list reviewed  FAMILY HISTORY:  Non-contributory    SOCIAL HISTORY:  Social History     Substance and Sexual Activity   Alcohol Use Never     Social History     Substance and Sexual Activity   Drug Use Never     Social History     Tobacco Use   Smoking Status Never   Smokeless Tobacco Never       ALLERGIES:  Allergies   Allergen Reactions   • Sulfa Antibiotics Rash       MEDICATIONS:  All current active medications have been reviewed  Patient is currently on cefazolin/vancomycin  PHYSICAL EXAM:  Vitals:  Temp:  [98 1 °F (36 7 °C)-98 8 °F (37 1 °C)] 98 8 °F (37 1 °C)  HR:  [72-81] 74  Resp:  [18-19] 19  BP: (106-125)/(62-75) 116/75  SpO2:  [93 %-96 %] 95 %  Temp (24hrs), Av 6 °F (37 °C), Min:98 1 °F (36 7 °C), Max:98 8 °F (37 1 °C)  Current: Temperature: 98 8 °F (37 1 °C)     Physical Exam:  General:  Well-nourished, well-developed, acute on chronically ill-appearing, nontoxic, in no acute distress  Awake, alert and oriented x 3  Eyes:  Conjunctive clear with no hemorrhages or effusions  Oropharynx:  No ulcers, no lesions, pharynx benign, no tonsillitis  Neck:  Supple, no lymphadenopathy, no mass, nontender  Lungs:  Expansion symmetric, no rales, no wheezing, no accessory muscle use  Cardiac:  Regular rate and rhythm, normal S1, normal S2, no murmurs  Abdomen:  Soft, nondistended, non-tender, no HSM  Extremities: Right lower leg with 2+ edema  Diffuse chronic rash with extensive venous stasis changes  No open wound  Mild diffuse erythema/warmth  Mild diffuse tenderness  Left lower leg with 1+ edema and similar chronic rash and venous stasis changes  Skin:  No rashes, no ulcers  Neurological:  Moves all four extremities spontaneously, sensation grossly intact    LABS, IMAGING, & OTHER STUDIES:  Lab Results:  I have personally reviewed pertinent labs    Results from last 7 days   Lab Units 01/01/23  0606 12/30/22  0453 12/29/22  0937   POTASSIUM mmol/L 3 9 3 5 4 2   CHLORIDE mmol/L 98 99 99   CO2 mmol/L 25 25 25   BUN mg/dL 16 15 16   CREATININE mg/dL 1 16 1 53* 1 52*   EGFR ml/min/1 73sq m 70 50 51   CALCIUM mg/dL 8 3 8 6 8 8   AST U/L  --   --  18   ALT U/L  --   --  13   ALK PHOS U/L  --   --  89     Results from last 7 days   Lab Units 01/01/23  0606 12/30/22  0453 12/29/22  0937   WBC Thousand/uL 4 17* 4 97 6 21   HEMOGLOBIN g/dL 9 8* 10 8* 11 5*   PLATELETS Thousands/uL 211 227 225     Results from last 7 days   Lab Units 12/29/22  0940   BLOOD CULTURE  No Growth at 72 hrs  No Growth at 72 hrs  Imaging Studies:   I have personally reviewed pertinent imaging study reports and images in PACS  Right leg CT reviewed personally  No bony normalities  Soft tissue edema  No abscess  EKG, Pathology, and Other Studies:   I have personally reviewed pertinent reports  [FreeTextEntry1] : CPE [de-identified] : \par Mr. Agee is a 65 y/o female presents as a new patient for CPE\par \par PMH of Rheumatic Heat Disease (Mitral stenosis/Aortic Insuff; s/p replacement mechanical valve Aortic 1996; on Coumadin for life), HTN, Primary Hyperpara (s/p resection 1996), Basal Cell Ca (s/p resection x 7), Squamous Cell Ca (x 3 s/p resection)\par \par Acute concerns:\par Recent arrhythmias - has loop recorder embedded that is being monitored by cardiologist in [place]; also started on mexiletine\par \par Incidental Thrombocytopenia at last check; no ecchymoses, gingival bleed, epistaxis; was supposed to be repeated before COVID19\par \par INR 2.9 - Coumadin 5mg every day except 2.5mg on M, W, F\par \par Household: currently living in  with daughter, grandchildren, wife - recently moved [place] to help daughter with kids in COVID19\par immigrated from [place] for PhD program \par \par Occupation: [employer] - [employer]\par \par Hobbies: Chess, Classical music, reading\par \par Denies: chest pain, palpitations, headaches, shortness of breath (w/ or w/o exertion), vision or hearing changes, peripheral edema, fever, chills, coughs, joint pains, hematochezia, melena, nausea, vomiting, rashes, moles changing color.

## 2023-01-02 NOTE — ASSESSMENT & PLAN NOTE
/76   Pulse 72   Temp 99 4 °F (37 4 °C)   Resp 19   Ht 5' 5 98" (1 676 m)   Wt 91 6 kg (201 lb 15 1 oz)   SpO2 97%   BMI 32 61 kg/m²   · Blood pressures stable  · Cont coreg, lasix and metolazone with hold parameters

## 2023-01-02 NOTE — PROGRESS NOTES
Aide Crews is a 45 y o  male who is currently ordered Vancomycin IV with management by the Pharmacy Consult service  Relevant clinical data and objective / subjective history reviewed  Vancomycin Assessment:  Indication and Goal AUC/Trough: Soft tissue (goal -600, trough >10), -600, trough >10  Clinical Status: stable  Micro:     Renal Function:  SCr: 1 16 mg/dL  CrCl: 77 1 mL/min  Renal replacement: Not on dialysis  Days of Therapy: 2  Current Dose: Patient was given a loading dose on 1/1/23 @ 1800 then 750mg IV q12h  Vancomycin Plan:  New Dosing: Continue 750mg IV q12h  Estimated AUC: 406 mcg*hr/mL  Estimated Trough: 13 4 mcg/mL  Next Level: 1/3/23 @ 1600  Renal Function Monitoring: Daily BMP and [place] will continue to follow closely for s/sx of nephrotoxicity, infusion reactions and appropriateness of therapy  BMP and CBC will be ordered per protocol  We will continue to follow the patient’s culture results and clinical progress daily      Courtney Mccoy, Pharmacist

## 2023-01-02 NOTE — ASSESSMENT & PLAN NOTE
· Day 5/14 cefazolin   Plan to switch to keflex in 24-48 h  · Discontinued vancomycin  · Pain is better but still has difficulty ambulating due to pain on weight bearing  · ID following  · Will need rehab at the end of hospitalization

## 2023-01-03 LAB
BACTERIA BLD CULT: NORMAL
BACTERIA BLD CULT: NORMAL

## 2023-01-03 RX ADMIN — APIXABAN 5 MG: 5 TABLET, FILM COATED ORAL at 08:53

## 2023-01-03 RX ADMIN — TAMSULOSIN HYDROCHLORIDE 0.4 MG: 0.4 CAPSULE ORAL at 16:05

## 2023-01-03 RX ADMIN — LORATADINE 10 MG: 10 TABLET ORAL at 08:58

## 2023-01-03 RX ADMIN — TRIAMCINOLONE ACETONIDE: 1 CREAM TOPICAL at 16:07

## 2023-01-03 RX ADMIN — GABAPENTIN 600 MG: 300 CAPSULE ORAL at 08:52

## 2023-01-03 RX ADMIN — HYDROMORPHONE HYDROCHLORIDE 0.2 MG: 0.2 INJECTION, SOLUTION INTRAMUSCULAR; INTRAVENOUS; SUBCUTANEOUS at 16:04

## 2023-01-03 RX ADMIN — APIXABAN 5 MG: 5 TABLET, FILM COATED ORAL at 16:06

## 2023-01-03 RX ADMIN — CEFAZOLIN SODIUM 1000 MG: 1 SOLUTION INTRAVENOUS at 01:51

## 2023-01-03 RX ADMIN — DOCUSATE SODIUM 100 MG: 100 CAPSULE, LIQUID FILLED ORAL at 08:52

## 2023-01-03 RX ADMIN — OXYCODONE HYDROCHLORIDE 10 MG: 10 TABLET ORAL at 03:52

## 2023-01-03 RX ADMIN — BACLOFEN 20 MG: 10 TABLET ORAL at 08:53

## 2023-01-03 RX ADMIN — CARVEDILOL 12.5 MG: 12.5 TABLET, FILM COATED ORAL at 16:05

## 2023-01-03 RX ADMIN — POLYETHYLENE GLYCOL 3350 17 G: 17 POWDER, FOR SOLUTION ORAL at 08:48

## 2023-01-03 RX ADMIN — OXYCODONE HYDROCHLORIDE 10 MG: 10 TABLET ORAL at 20:04

## 2023-01-03 RX ADMIN — BACLOFEN 20 MG: 10 TABLET ORAL at 16:06

## 2023-01-03 RX ADMIN — GABAPENTIN 600 MG: 300 CAPSULE ORAL at 16:05

## 2023-01-03 RX ADMIN — CEFAZOLIN SODIUM 1000 MG: 1 SOLUTION INTRAVENOUS at 08:50

## 2023-01-03 RX ADMIN — SENNOSIDES 17.2 MG: 8.6 TABLET, FILM COATED ORAL at 21:39

## 2023-01-03 RX ADMIN — GABAPENTIN 600 MG: 300 CAPSULE ORAL at 20:04

## 2023-01-03 RX ADMIN — BACLOFEN 20 MG: 10 TABLET ORAL at 20:04

## 2023-01-03 RX ADMIN — MYCOPHENOLATE MOFETIL 500 MG: 250 CAPSULE ORAL at 08:54

## 2023-01-03 RX ADMIN — ESCITALOPRAM OXALATE 10 MG: 10 TABLET ORAL at 08:52

## 2023-01-03 RX ADMIN — SODIUM CHLORIDE, SODIUM GLUCONATE, SODIUM ACETATE, POTASSIUM CHLORIDE, MAGNESIUM CHLORIDE, SODIUM PHOSPHATE, DIBASIC, AND POTASSIUM PHOSPHATE 75 ML/HR: .53; .5; .37; .037; .03; .012; .00082 INJECTION, SOLUTION INTRAVENOUS at 14:09

## 2023-01-03 RX ADMIN — HYDROXYCHLOROQUINE SULFATE 400 MG: 200 TABLET, FILM COATED ORAL at 21:39

## 2023-01-03 RX ADMIN — MYCOPHENOLATE MOFETIL 500 MG: 250 CAPSULE ORAL at 20:04

## 2023-01-03 RX ADMIN — CEFAZOLIN SODIUM 1000 MG: 1 SOLUTION INTRAVENOUS at 16:02

## 2023-01-03 RX ADMIN — OXYCODONE HYDROCHLORIDE 10 MG: 10 TABLET ORAL at 11:50

## 2023-01-03 RX ADMIN — DOCUSATE SODIUM 100 MG: 100 CAPSULE, LIQUID FILLED ORAL at 16:06

## 2023-01-03 RX ADMIN — HYDROMORPHONE HYDROCHLORIDE 0.2 MG: 0.2 INJECTION, SOLUTION INTRAMUSCULAR; INTRAVENOUS; SUBCUTANEOUS at 06:58

## 2023-01-03 RX ADMIN — TRIAMCINOLONE ACETONIDE: 1 CREAM TOPICAL at 08:58

## 2023-01-03 NOTE — PROGRESS NOTES
[facility]  Progress Note - Roshni Verde 1970, 45 y o  male MRN: [id number]  Unit/Bed#: -[place] Encounter: [id number]  Primary Care Provider: Martin Mckeon MD   Date and time admitted to hospital: 12/29/2022  8:46 AM    Chronic kidney disease, stage 3 [facility])  Assessment & Plan  Lab Results   Component Value Date    EGFR 70 01/01/2023    EGFR 50 12/30/2022    EGFR 51 12/29/2022    CREATININE 1 16 01/01/2023    CREATININE 1 53 (H) 12/30/2022    CREATININE 1 52 (H) 12/29/2022     · Baseline appears to be around 1 5  · Has been receiving IVF with improvement to his creatinine at 1 16  · Repeat bmp tomorrow    History of DVT (deep vein thrombosis)  Assessment & Plan  · Cont eliquis  · Venous duplex done : no DVT    Hypertension  Assessment & Plan  /60   Pulse 74   Temp 99 4 °F (37 4 °C)   Resp 18   Ht 5' 5 98" (1 676 m)   Wt 91 6 kg (201 lb 15 1 oz)   SpO2 97%   BMI 32 61 kg/m²   · Blood pressures stable  · Cont coreg, lasix and metolazone with hold parameters    Lupus (systemic lupus erythematosus) (HCC)  Assessment & Plan  · Has generalized rash over his body  · recomend OP rheum followup  · Cont home meds  · Stable, no flare    * Cellulitis of right lower extremity  Assessment & Plan  · Day 6/14 cefazolin  Plan to switch to keflex in 24-48 h  · Discontinued vancomycin  · Since pain is improving but he still has difficulty ambulating, will continue working with PT OT  · ID following  · Will need rehab at the end of hospitalization        VTE Pharmacologic Prophylaxis: VTE Score: 4 Moderate Risk (Score 3-4) - Pharmacological DVT Prophylaxis Ordered: apixaban (Eliquis)  Patient Centered Rounds: I performed bedside rounds with nursing staff today  Discussions with Specialists or Other Care Team Provider: ID    Education and Discussions with Family / Patient: Updated  (significant other) at bedside  Time Spent for Care: 30 minutes   More than 50% of total time spent on counseling and coordination of care as described above  Current Length of Stay: 5 day(s)  Current Patient Status: Inpatient   Certification Statement: The patient will continue to require additional inpatient hospital stay due to IV antibiotics  Discharge Plan: Anticipate discharge in 48-72 hrs to rehab facility  Code Status: Level 1 - Full Code    Subjective:   Patient reports pain in his lower extremities is improving over the past several days    Objective:     Vitals:   Temp (24hrs), Av 4 °F (37 4 °C), Min:99 4 °F (37 4 °C), Max:99 4 °F (37 4 °C)    Temp:  [99 4 °F (37 4 °C)] 99 4 °F (37 4 °C)  HR:  [72-74] 74  Resp:  [18] 18  BP: (103-115)/(59-76) 106/60  SpO2:  [95 %-97 %] 97 %  Body mass index is 32 61 kg/m²  Input and Output Summary (last 24 hours): Intake/Output Summary (Last 24 hours) at 1/3/2023 1446  Last data filed at 1/3/2023 0737  Gross per 24 hour   Intake 310 ml   Output 3400 ml   Net -3090 ml       Physical Exam:   Physical Exam  Vitals and nursing note reviewed  Constitutional:       General: He is not in acute distress  Appearance: He is well-developed  He is not ill-appearing, toxic-appearing or diaphoretic  HENT:      Head: Normocephalic and atraumatic  Eyes:      General: No scleral icterus  Conjunctiva/sclera: Conjunctivae normal    Cardiovascular:      Rate and Rhythm: Normal rate and regular rhythm  Heart sounds: No murmur heard  No friction rub  No gallop  Pulmonary:      Effort: Pulmonary effort is normal  No respiratory distress  Breath sounds: Normal breath sounds  No stridor  No wheezing, rhonchi or rales  Chest:      Chest wall: No tenderness  Abdominal:      General: There is no distension  Palpations: Abdomen is soft  There is no mass  Tenderness: There is no abdominal tenderness  There is no guarding or rebound  Hernia: No hernia is present  Musculoskeletal:         General: No swelling or tenderness  Cervical back: Neck supple  Right lower leg: Edema present  Left lower leg: Edema present  Skin:     General: Skin is warm and dry  Capillary Refill: Capillary refill takes less than 2 seconds  Coloration: Skin is not jaundiced or pale  Comments: Lower extremity venous stasis skin changes with the right lower extremity erythema and discoloration from cellulitis   Neurological:      Mental Status: He is alert and oriented to person, place, and time  Psychiatric:         Mood and Affect: Mood normal           Additional Data:     Labs:  Results from last 7 days   Lab Units 01/01/23  0606 12/30/22  0453   WBC Thousand/uL 4 17* 4 97   HEMOGLOBIN g/dL 9 8* 10 8*   HEMATOCRIT % 29 3* 32 7*   PLATELETS Thousands/uL 211 227   NEUTROS PCT %  --  64   LYMPHS PCT %  --  18   MONOS PCT %  --  16*   EOS PCT %  --  0     Results from last 7 days   Lab Units 01/01/23  0606 12/30/22  0453 12/29/22  0937   SODIUM mmol/L 132*   < > 133*   POTASSIUM mmol/L 3 9   < > 4 2   CHLORIDE mmol/L 98   < > 99   CO2 mmol/L 25   < > 25   BUN mg/dL 16   < > 16   CREATININE mg/dL 1 16   < > 1 52*   ANION GAP mmol/L 9   < > 9   CALCIUM mg/dL 8 3   < > 8 8   ALBUMIN g/dL  --   --  3 4*   TOTAL BILIRUBIN mg/dL  --   --  0 34   ALK PHOS U/L  --   --  89   ALT U/L  --   --  13   AST U/L  --   --  18   GLUCOSE RANDOM mg/dL 91   < > 103    < > = values in this interval not displayed  Results from last 7 days   Lab Units 12/29/22  0937   INR  1 26*             Results from last 7 days   Lab Units 12/29/22  1749   PROCALCITONIN ng/ml 0 11       Lines/Drains:  Invasive Devices     Peripheral Intravenous Line  Duration           Peripheral IV 01/03/23 Left Forearm <1 day                      Imaging: No pertinent imaging reviewed  Recent Cultures (last 7 days):   Results from last 7 days   Lab Units 12/29/22  0940   BLOOD CULTURE  No Growth After 4 Days  No Growth After 4 Days         Last 24 Hours Medication List: Current Facility-Administered Medications   Medication Dose Route Frequency Provider Last Rate   • acetaminophen  650 mg Oral Q6H PRN Li Blank MD     • apixaban  5 mg Oral BID Li Blank MD     • baclofen  20 mg Oral TID Li Blank MD     • carvedilol  12 5 mg Oral BID With Meals Li Blank MD     • cefazolin  1,000 mg Intravenous Fang De Jesus MD 1,000 mg (01/03/23 0850)   • docusate sodium  100 mg Oral BID Li Blank MD     • escitalopram  10 mg Oral Daily Li Blank MD     • furosemide  20 mg Oral Daily Li Blank MD     • gabapentin  600 mg Oral TID Li Blank MD     • HYDROmorphone  0 2 mg Intravenous Q4H PRN Li Blank MD     • hydroxychloroquine  400 mg Oral HS Li Blank MD     • hydrOXYzine HCL  10 mg Oral Q6H PRN Li Blank MD     • loratadine  10 mg Oral Daily Li Blank MD     • metolazone  2 5 mg Oral Daily Li Blank MD     • multi-electrolyte  75 mL/hr Intravenous Continuous Li Blank MD 75 mL/hr (01/03/23 1409)   • mycophenolate  500 mg Oral Q12H Gertrudis Maria MD     • ondansetron  4 mg Intravenous Q4H PRN Li Blank MD     • oxyCODONE  7 5 mg Oral Q6H PRN Li Blank MD      Or   • oxyCODONE  10 mg Oral Q6H PRN Li Blank MD     • polyethylene glycol  17 g Oral Daily Li Blank MD     • senna  2 tablet Oral HS Li Blank MD     • tamsulosin  0 4 mg Oral Daily With Reji Coffey MD     • triamcinolone   Topical BID Li Blank MD          Today, Patient Was Seen By: Yumiko Mckeon DO    **Please Note: This note may have been constructed using a voice recognition system  **

## 2023-01-03 NOTE — ASSESSMENT & PLAN NOTE
· Day 6/14 cefazolin   Plan to switch to keflex in 24-48 h  · Discontinued vancomycin  · Since pain is improving but he still has difficulty ambulating, will continue working with PT OT  · ID following  · Will need rehab at the end of hospitalization

## 2023-01-03 NOTE — PROGRESS NOTES
Progress Note - Infectious Disease   Mervat Castellano 45 y o  male MRN: [id number]  Unit/Bed#: -01 Encounter: [id number]      Impression/Recommendations:  1  Right lower leg cellulitis, superimposed on underlying venous stasis  Cellulitis has the appearance of streptococcal infection  Cellulitis appears to be superficial, without abscess or involvement of deeper structure  Leg CT is without abscess  Cellulitis is slowly improving  As typical with patient with cellulitis superimposed on venous stasis, response to antibiotic tends to be slow  He remains clinically and systemically well  Admission blood cultures have no growth thus far  Source of cellulitis is most likely the chronic leg rash  Although there is no obvious active ulceration, there is likely loss of skin integrity at the rash site  Continue IV cefazolin  Serial leg exams  Anticipate transition to p o  Keflex in the next 1 to 2 days, if patient continues to improve clinically  Likely treat x14 days total antibiotic      2   Venous stasis, with poorly controlled leg edema  Patient counseled regarding the need to keep legs elevated to control edema, to help cellulitis resolved quicker and prevent recurrent cellulitis  Continue aggressive leg elevation      3  SLE, with chronic rash and not on treatment  Patient will benefit from rheumatology evaluation and possible treatment to control rash  Recommend outpatient rheumatology evaluation      Discussed with patient in detail regarding the above plan  Discussed with Dr Gloria Dozier from [facility] service  Antibiotics:  Cefazolin # 6    Subjective:  Patient states pain in right foot and lower leg slowly improving every day  He still has pain with weightbearing  Temperature stays down  No chills  He is tolerating antibiotic well  No nausea, vomiting or diarrhea      Objective:  Vitals:  Temp:  [99 4 °F (37 4 °C)] 99 4 °F (37 4 °C)  HR:  [72-74] 74  Resp:  [18] 18  BP: (103-115)/(59-76) 106/60  SpO2:  [95 %-97 %] 97 %  Temp (24hrs), Av 4 °F (37 4 °C), Min:99 4 °F (37 4 °C), Max:99 4 °F (37 4 °C)  Current: Temperature: 99 4 °F (37 4 °C)    Physical Exam:     General: Awake, alert, cooperative, no distress  Neck:  Supple  No mass  No lymphadenopathy  Lungs: Expansion symmetric, no rales, no wheezing, respirations unlabored  Heart:  Regular rate and rhythm, S1 and S2 normal, no murmur  Abdomen: Soft, nondistended, non-tender, bowel sounds active all four quadrants, no masses, no organomegaly  Extremities: Stable right leg edema  Stable chronic venous stasis changes and rash  Slowly improving erythema/warmth  Improving tenderness  No draining wound  Skin:  Stable chronic rash  Neuro: Moves all extremities  Invasive Devices     Peripheral Intravenous Line  Duration           Peripheral IV 23 Left Forearm <1 day                Labs studies:   I have personally reviewed pertinent labs  Results from last 7 days   Lab Units 23  0606 22  0453 22  0937   POTASSIUM mmol/L 3 9 3 5 4 2   CHLORIDE mmol/L 98 99 99   CO2 mmol/L 25 25 25   BUN mg/dL 16 15 16   CREATININE mg/dL 1 16 1 53* 1 52*   EGFR ml/min/1 73sq m 70 50 51   CALCIUM mg/dL 8 3 8 6 8 8   AST U/L  --   --  18   ALT U/L  --   --  13   ALK PHOS U/L  --   --  89     Results from last 7 days   Lab Units 23  0606 22  0453 22  0937   WBC Thousand/uL 4 17* 4 97 6 21   HEMOGLOBIN g/dL 9 8* 10 8* 11 5*   PLATELETS Thousands/uL 211 227 225     Results from last 7 days   Lab Units 22  0940   BLOOD CULTURE  No Growth After 4 Days  No Growth After 4 Days  Imaging Studies:   I have personally reviewed pertinent imaging study reports and images in PACS  EKG, Pathology, and Other Studies:   I have personally reviewed pertinent reports

## 2023-01-03 NOTE — PLAN OF CARE
Problem: PAIN - ADULT  Goal: Verbalizes/displays adequate comfort level or baseline comfort level  Description: Interventions:  - Encourage patient to monitor pain and request assistance  - Assess pain using appropriate pain scale  - Administer analgesics based on type and severity of pain and evaluate response  - Implement non-pharmacological measures as appropriate and evaluate response  - Consider cultural and social influences on pain and pain management  - Notify physician/advanced practitioner if interventions unsuccessful or patient reports new pain  Outcome: Progressing     Problem: INFECTION - ADULT  Goal: Absence or prevention of progression during hospitalization  Description: INTERVENTIONS:  - Assess and monitor for signs and symptoms of infection  - Monitor lab/diagnostic results  - Monitor all insertion sites, i e  indwelling lines, tubes, and drains  - Monitor endotracheal if appropriate and nasal secretions for changes in amount and color  - [place] appropriate cooling/warming therapies per order  - Administer medications as ordered  - Instruct and encourage patient and family to use good hand hygiene technique  - Identify and instruct in appropriate isolation precautions for identified infection/condition  Outcome: Progressing     Problem: MOBILITY - ADULT  Goal: Maintain or return to baseline ADL function  Description: INTERVENTIONS:  -  Assess patient's ability to carry out ADLs; assess patient's baseline for ADL function and identify physical deficits which impact ability to perform ADLs (bathing, care of mouth/teeth, toileting, grooming, dressing, etc )  - Assess/evaluate cause of self-care deficits   - Assess range of motion  - Assess patient's mobility; develop plan if impaired  - Assess patient's need for assistive devices and provide as appropriate  - Encourage maximum independence but intervene and supervise when necessary  - Involve family in performance of ADLs  - Assess for home care needs following discharge   - Consider OT consult to assist with ADL evaluation and planning for discharge  - Provide patient education as appropriate  Outcome: Not Progressing

## 2023-01-03 NOTE — ASSESSMENT & PLAN NOTE
Lab Results   Component Value Date    EGFR 70 01/01/2023    EGFR 50 12/30/2022    EGFR 51 12/29/2022    CREATININE 1 16 01/01/2023    CREATININE 1 53 (H) 12/30/2022    CREATININE 1 52 (H) 12/29/2022     · Baseline appears to be around 1 5  · Has been receiving IVF with improvement to his creatinine at 1 16  · Repeat bmp tomorrow

## 2023-01-03 NOTE — ASSESSMENT & PLAN NOTE
/60   Pulse 74   Temp 99 4 °F (37 4 °C)   Resp 18   Ht 5' 5 98" (1 676 m)   Wt 91 6 kg (201 lb 15 1 oz)   SpO2 97%   BMI 32 61 kg/m²   · Blood pressures stable  · Cont coreg, lasix and metolazone with hold parameters

## 2023-01-03 NOTE — PHYSICAL THERAPY NOTE
Physical Therapy Treatment Note    Patient's Name: Mk Rosa    Admitting Diagnosis  Foot pain [M79 673]  Cellulitis of right leg [E29 [id number]]    Problem List  Patient Active Problem List   Diagnosis    Lupus (systemic lupus erythematosus) ([facility])    Lupus nephritis ([facility] [address] )    Hypertension    History of DVT (deep vein thrombosis)    Chronic kidney disease, stage 3 ([facility])    Persistent proteinuria    Anemia in chronic kidney disease    Muscle weakness (generalized)    Tremor of both hands    Spasticity    Gait difficulty    Neuropathic pain    Cellulitis of right lower extremity        01/03/23 1038   PT Last Visit   PT Visit Date 01/03/23   Note Type   Note Type Treatment   Pain Assessment   Pain Assessment Tool 0-10   Pain Score 7   Pain Location/Orientation Orientation: Right;Location: Foot   Pain Onset/Description Onset: Ongoing;Frequency: Constant/Continuous   Patient's Stated Pain Goal No pain   Hospital Pain Intervention(s) Repositioned   Restrictions/Precautions   Weight Bearing Precautions Per Order No   Braces or Orthoses Other (Comment)  (none reported)   Other Precautions Chair Alarm; Bed Alarm; Fall Risk;Pain   General   Chart Reviewed Yes   Response to Previous Treatment Patient with no complaints from previous session  Family/Caregiver Present No   Cognition   Overall Cognitive Status WFL   Arousal/Participation Alert   Attention Within functional limits   Orientation Level Oriented X4   Memory Within functional limits   Following Commands Follows all commands and directions without difficulty   Comments Pt agreeable to PT   Bed Mobility   Supine to Sit 5  Supervision   Additional items Assist x 1;Bedrails;HOB elevated;Verbal cues   Sit to Supine 5  Supervision   Additional items Assist x 1;Bedrails;HOB elevated;Verbal cues   Additional Comments Pt received at start of session supine in bed with HOB elevated   Following session pt returned to bed and remained with needs met, alarm activated and call bell within reach   Transfers   Sit to Stand 3  Moderate assistance   Additional items Assist x 2;Armrests; Increased time required;Verbal cues   Stand to Sit 3  Moderate assistance   Additional items Assist x 2;Armrests; Increased time required;Verbal cues   Additional Comments with use of RW, VCs for proper hand placements  Pt unable to maintain static standing >20 seconds due to reports of severe pain in R ankle   Ambulation/Elevation   Gait pattern Not tested   Ambulation/Elevation Additional Comments Ambulation unable to be performed due to severe pain in R ankle   Balance   Static Sitting Fair   Dynamic Sitting Fair -   Static Standing Poor   Endurance Deficit   Endurance Deficit Yes   Endurance Deficit Description decreased activity tolerance   Activity Tolerance   Activity Tolerance Patient limited by pain; Patient limited by fatigue   Nurse Made Aware RN Iris Lau   Exercises   Heelslides Supine;20 reps;AROM; Bilateral   Hip Abduction Supine;10 reps;AROM; Bilateral   Ankle Pumps Supine;20 reps;AROM; Bilateral   Assessment   Prognosis Good   Problem List Decreased strength;Decreased endurance; Impaired balance;Decreased mobility;Pain;Decreased skin integrity; Impaired sensation   Assessment Pt seen for PT treatment session this date with interventions consisting of Therapeutic exercise consisting of: BLE exercises performed supine in bed and therapeutic activity consisting of training: bed mobility, supine<>sit transfers, sit<>stand transfers and vc and tactile cues for static standing posture faciliation  Pt agreeable to PT treatment session upon arrival, pt found supine in bed w/ HOB elevated, in no apparent distress and responsive  In comparison to previous session, pt with improvements in bed mobility and activity tolerance  Post session: pt returned BTB, bed alarm engaged, all needs in reach and RN notified of session findings/recommendations   Continue to recommend post acute rehabilitation services at time of d/c in order to maximize pt's functional independence and safety w/ mobility  Pt continues to be functioning below baseline level, and remains limited 2* factors listed above and including continued need for medical management and monitoring, decreased strength and balance resulting in an increased risk for falls, decreased endurance and activity tolerance limiting mobility, increased pain  PT will continue to see pt during current hospitalization in order to address the deficits listed above and provide interventions consistent w/ POC in effort to achieve STGs  Barriers to Discharge Inaccessible home environment; Other (Comment)  (decline in mobility status)   Goals   STG Expiration Date 01/08/23   PT Treatment Day 2   Plan   Treatment/Interventions Functional transfer training;LE strengthening/ROM;ADL retraining   Progress Progressing toward goals   PT Frequency 3-5x/wk   Recommendation   PT Discharge Recommendation Post acute rehabilitation services   AM-PAC Basic Mobility Inpatient   Turning in Bed Without Bedrails 3   Lying on Back to Sitting on Edge of Flat Bed 3   Moving Bed to Chair 1   Standing Up From Chair 2   Walk in Room 1   Climb 3-5 Stairs 1   Basic Mobility Inpatient Raw Score 11   Basic Mobility Standardized Score 30 25   Highest Level Of Mobility   JH-HLM Goal 4: Move to chair/commode   JH-HLM Achieved 3: Sit at edge of bed   Education   Education Provided Mobility training;Assistive device   Patient Reinforcement needed   End of Consult   Patient Position at End of Consult Supine;Bed/Chair alarm activated; All needs within reach       Cielo Silverman PT    Time In: 10:38  Time Out: 11:01  Total Time: 23 mins

## 2023-01-03 NOTE — PLAN OF CARE
Problem: PHYSICAL THERAPY ADULT  Goal: Performs mobility at highest level of function for planned discharge setting  See evaluation for individualized goals  Description: Treatment/Interventions: Functional transfer training, LE strengthening/ROM, ADL retraining          See flowsheet documentation for full assessment, interventions and recommendations  Outcome: Progressing  Note: Prognosis: Good  Problem List: Decreased strength, Decreased endurance, Impaired balance, Decreased mobility, Pain, Decreased skin integrity, Impaired sensation  Assessment: Pt seen for PT treatment session this date with interventions consisting of Therapeutic exercise consisting of: BLE exercises performed supine in bed and therapeutic activity consisting of training: bed mobility, supine<>sit transfers, sit<>stand transfers and vc and tactile cues for static standing posture faciliation  Pt agreeable to PT treatment session upon arrival, pt found supine in bed w/ HOB elevated, in no apparent distress and responsive  In comparison to previous session, pt with improvements in bed mobility and activity tolerance  Post session: pt returned BTB, bed alarm engaged, all needs in reach and RN notified of session findings/recommendations  Continue to recommend post acute rehabilitation services at time of d/c in order to maximize pt's functional independence and safety w/ mobility  Pt continues to be functioning below baseline level, and remains limited 2* factors listed above and including continued need for medical management and monitoring, decreased strength and balance resulting in an increased risk for falls, decreased endurance and activity tolerance limiting mobility, increased pain  PT will continue to see pt during current hospitalization in order to address the deficits listed above and provide interventions consistent w/ POC in effort to achieve STGs    Barriers to Discharge: Inaccessible home environment, Other (Comment) (decline in mobility status)     PT Discharge Recommendation: Post acute rehabilitation services    See flowsheet documentation for full assessment

## 2023-01-03 NOTE — UTILIZATION REVIEW
NOTIFICATION OF INPATIENT ADMISSION   AUTHORIZATION REQUEST   SERVICING FACILITY:   [address] [place] [address]  Tax ID: [id number]  NPI: [id number] ATTENDING PROVIDER:  Attending Name and NPI#: Connie Oscar [[id number]]  Address: [address] [place] [address]  Phone: [phone]     ADMISSION INFORMATION:  Place of Service: Inpatient [facility]  [address]  Place of Service Code: 21  Inpatient Admission Date/Time: 12/29/22  5:03 PM  Discharge Date/Time: No discharge date for patient encounter  Admitting Diagnosis Code/Description:  Foot pain [M79 673]  Cellulitis of right leg [F96 [id number]]     UTILIZATION REVIEW CONTACT:  Dipti Wilkins Utilization   Network Utilization Review Department  Phone: [phone]  Fax [phone]  Email: [email or web address]  Contact for approvals/pending authorizations, clinical reviews, and discharge  PHYSICIAN ADVISORY SERVICES:  Medical Necessity Denial & [facility] Review  Phone: [phone]  Fax: [phone]  Email: [email or web address]

## 2023-01-04 PROBLEM — E87.1 HYPONATREMIA: Status: ACTIVE | Noted: 2023-01-04

## 2023-01-04 LAB
ANION GAP SERPL CALCULATED.3IONS-SCNC: 8 MMOL/L (ref 4–13)
ANION GAP SERPL CALCULATED.3IONS-SCNC: 8 MMOL/L (ref 4–13)
ANION GAP SERPL CALCULATED.3IONS-SCNC: 9 MMOL/L (ref 4–13)
BASOPHILS # BLD AUTO: 0.02 THOUSANDS/ÂΜL (ref 0–0.1)
BASOPHILS NFR BLD AUTO: 0 % (ref 0–1)
BUN SERPL-MCNC: 14 MG/DL (ref 5–25)
BUN SERPL-MCNC: 15 MG/DL (ref 5–25)
BUN SERPL-MCNC: 20 MG/DL (ref 5–25)
CALCIUM SERPL-MCNC: 8.2 MG/DL (ref 8.3–10.1)
CALCIUM SERPL-MCNC: 8.2 MG/DL (ref 8.3–10.1)
CALCIUM SERPL-MCNC: 8.4 MG/DL (ref 8.3–10.1)
CHLORIDE SERPL-SCNC: 89 MMOL/L (ref 96–108)
CHLORIDE SERPL-SCNC: 90 MMOL/L (ref 96–108)
CHLORIDE SERPL-SCNC: 90 MMOL/L (ref 96–108)
CO2 SERPL-SCNC: 24 MMOL/L (ref 21–32)
CO2 SERPL-SCNC: 25 MMOL/L (ref 21–32)
CO2 SERPL-SCNC: 26 MMOL/L (ref 21–32)
CREAT SERPL-MCNC: 1.12 MG/DL (ref 0.6–1.3)
CREAT SERPL-MCNC: 1.12 MG/DL (ref 0.6–1.3)
CREAT SERPL-MCNC: 1.22 MG/DL (ref 0.6–1.3)
EOSINOPHIL # BLD AUTO: 0.03 THOUSAND/ÂΜL (ref 0–0.61)
EOSINOPHIL NFR BLD AUTO: 1 % (ref 0–6)
ERYTHROCYTE [DISTWIDTH] IN BLOOD BY AUTOMATED COUNT: 13.6 % (ref 11.6–15.1)
GFR SERPL CREATININE-BSD FRML MDRD: 66 ML/MIN/1.73SQ M
GFR SERPL CREATININE-BSD FRML MDRD: 74 ML/MIN/1.73SQ M
GFR SERPL CREATININE-BSD FRML MDRD: 74 ML/MIN/1.73SQ M
GLUCOSE SERPL-MCNC: 105 MG/DL (ref 65–140)
GLUCOSE SERPL-MCNC: 91 MG/DL (ref 65–140)
GLUCOSE SERPL-MCNC: 98 MG/DL (ref 65–140)
HCT VFR BLD AUTO: 31.3 % (ref 36.5–49.3)
HGB BLD-MCNC: 10.6 G/DL (ref 12–17)
IMM GRANULOCYTES # BLD AUTO: 0.06 THOUSAND/UL (ref 0–0.2)
IMM GRANULOCYTES NFR BLD AUTO: 1 % (ref 0–2)
LYMPHOCYTES # BLD AUTO: 0.74 THOUSANDS/ÂΜL (ref 0.6–4.47)
LYMPHOCYTES NFR BLD AUTO: 16 % (ref 14–44)
MCH RBC QN AUTO: 28.3 PG (ref 26.8–34.3)
MCHC RBC AUTO-ENTMCNC: 33.9 G/DL (ref 31.4–37.4)
MCV RBC AUTO: 84 FL (ref 82–98)
MONOCYTES # BLD AUTO: 0.78 THOUSAND/ÂΜL (ref 0.17–1.22)
MONOCYTES NFR BLD AUTO: 17 % (ref 4–12)
NEUTROPHILS # BLD AUTO: 2.91 THOUSANDS/ÂΜL (ref 1.85–7.62)
NEUTS SEG NFR BLD AUTO: 65 % (ref 43–75)
NRBC BLD AUTO-RTO: 0 /100 WBCS
OSMOLALITY UR/SERPL-RTO: 259 MMOL/KG (ref 282–298)
OSMOLALITY UR: 392 MMOL/KG
PLATELET # BLD AUTO: 260 THOUSANDS/UL (ref 149–390)
PMV BLD AUTO: 10 FL (ref 8.9–12.7)
POTASSIUM SERPL-SCNC: 4.2 MMOL/L (ref 3.5–5.3)
POTASSIUM SERPL-SCNC: 4.3 MMOL/L (ref 3.5–5.3)
POTASSIUM SERPL-SCNC: 4.6 MMOL/L (ref 3.5–5.3)
RBC # BLD AUTO: 3.74 MILLION/UL (ref 3.88–5.62)
SODIUM 24H UR-SCNC: 104 MOL/L
SODIUM SERPL-SCNC: 123 MMOL/L (ref 135–147)
WBC # BLD AUTO: 4.54 THOUSAND/UL (ref 4.31–10.16)

## 2023-01-04 RX ORDER — AMOXICILLIN 250 MG
1 CAPSULE ORAL
Status: DISCONTINUED | OUTPATIENT
Start: 2023-01-04 | End: 2023-01-10 | Stop reason: HOSPADM

## 2023-01-04 RX ADMIN — ESCITALOPRAM OXALATE 10 MG: 10 TABLET ORAL at 08:58

## 2023-01-04 RX ADMIN — CARVEDILOL 12.5 MG: 12.5 TABLET, FILM COATED ORAL at 15:49

## 2023-01-04 RX ADMIN — CEFAZOLIN SODIUM 1000 MG: 1 SOLUTION INTRAVENOUS at 09:02

## 2023-01-04 RX ADMIN — HYDROMORPHONE HYDROCHLORIDE 0.2 MG: 0.2 INJECTION, SOLUTION INTRAMUSCULAR; INTRAVENOUS; SUBCUTANEOUS at 08:56

## 2023-01-04 RX ADMIN — CEFAZOLIN SODIUM 1000 MG: 1 SOLUTION INTRAVENOUS at 02:13

## 2023-01-04 RX ADMIN — HYDROXYZINE HYDROCHLORIDE 10 MG: 10 TABLET ORAL at 22:22

## 2023-01-04 RX ADMIN — HYDROXYCHLOROQUINE SULFATE 400 MG: 200 TABLET, FILM COATED ORAL at 22:43

## 2023-01-04 RX ADMIN — BACLOFEN 20 MG: 10 TABLET ORAL at 15:50

## 2023-01-04 RX ADMIN — DOCUSATE SODIUM 100 MG: 100 CAPSULE, LIQUID FILLED ORAL at 09:01

## 2023-01-04 RX ADMIN — MYCOPHENOLATE MOFETIL 500 MG: 250 CAPSULE ORAL at 08:57

## 2023-01-04 RX ADMIN — BACLOFEN 20 MG: 10 TABLET ORAL at 09:00

## 2023-01-04 RX ADMIN — OXYCODONE HYDROCHLORIDE 10 MG: 10 TABLET ORAL at 19:42

## 2023-01-04 RX ADMIN — TRIAMCINOLONE ACETONIDE: 1 CREAM TOPICAL at 09:02

## 2023-01-04 RX ADMIN — OXYCODONE HYDROCHLORIDE 10 MG: 10 TABLET ORAL at 05:37

## 2023-01-04 RX ADMIN — TRIAMCINOLONE ACETONIDE: 1 CREAM TOPICAL at 20:21

## 2023-01-04 RX ADMIN — GABAPENTIN 600 MG: 300 CAPSULE ORAL at 22:00

## 2023-01-04 RX ADMIN — CARVEDILOL 12.5 MG: 12.5 TABLET, FILM COATED ORAL at 09:04

## 2023-01-04 RX ADMIN — METHYLNALTREXONE BROMIDE 12 MG: 12 INJECTION, SOLUTION SUBCUTANEOUS at 16:49

## 2023-01-04 RX ADMIN — APIXABAN 5 MG: 5 TABLET, FILM COATED ORAL at 09:00

## 2023-01-04 RX ADMIN — SODIUM CHLORIDE, SODIUM GLUCONATE, SODIUM ACETATE, POTASSIUM CHLORIDE, MAGNESIUM CHLORIDE, SODIUM PHOSPHATE, DIBASIC, AND POTASSIUM PHOSPHATE 75 ML/HR: .53; .5; .37; .037; .03; .012; .00082 INJECTION, SOLUTION INTRAVENOUS at 05:37

## 2023-01-04 RX ADMIN — SENNOSIDES AND DOCUSATE SODIUM 1 TABLET: 50; 8.6 TABLET ORAL at 22:00

## 2023-01-04 RX ADMIN — BACLOFEN 20 MG: 10 TABLET ORAL at 22:00

## 2023-01-04 RX ADMIN — TAMSULOSIN HYDROCHLORIDE 0.4 MG: 0.4 CAPSULE ORAL at 15:50

## 2023-01-04 RX ADMIN — HYDROMORPHONE HYDROCHLORIDE 0.2 MG: 0.2 INJECTION, SOLUTION INTRAMUSCULAR; INTRAVENOUS; SUBCUTANEOUS at 22:22

## 2023-01-04 RX ADMIN — GABAPENTIN 600 MG: 300 CAPSULE ORAL at 15:49

## 2023-01-04 RX ADMIN — GABAPENTIN 600 MG: 300 CAPSULE ORAL at 08:58

## 2023-01-04 RX ADMIN — CEFAZOLIN SODIUM 1000 MG: 1 SOLUTION INTRAVENOUS at 17:26

## 2023-01-04 RX ADMIN — MYCOPHENOLATE MOFETIL 500 MG: 250 CAPSULE ORAL at 22:00

## 2023-01-04 RX ADMIN — SODIUM CHLORIDE 500 ML: 0.9 INJECTION, SOLUTION INTRAVENOUS at 15:48

## 2023-01-04 RX ADMIN — LORATADINE 10 MG: 10 TABLET ORAL at 09:01

## 2023-01-04 RX ADMIN — FUROSEMIDE 20 MG: 20 TABLET ORAL at 09:01

## 2023-01-04 RX ADMIN — APIXABAN 5 MG: 5 TABLET, FILM COATED ORAL at 17:26

## 2023-01-04 RX ADMIN — POLYETHYLENE GLYCOL 3350 17 G: 17 POWDER, FOR SOLUTION ORAL at 09:00

## 2023-01-04 RX ADMIN — METOLAZONE 2.5 MG: 5 TABLET ORAL at 08:58

## 2023-01-04 NOTE — ASSESSMENT & PLAN NOTE
Noted overnight drop in sodium  Will check urine awesome, serum nneka, urine sodium  Will bolus 500 cc normal saline and if improved suspect secondary to hypovolemia versus if sodium level worsens or stays the same suspect SIADH secondary to pain-as the change is acute low concern for overcorrection

## 2023-01-04 NOTE — PROGRESS NOTES
Progress Note - Infectious Disease   Miladis Bains 45 y o  male MRN: [id number]  Unit/Bed#: -01 Encounter: [id number]      Impression/Recommendations:  1   Right lower leg cellulitis, superimposed on underlying venous stasis   Cellulitis has the appearance of streptococcal infection   Cellulitis appears to be superficial, without abscess or involvement of deeper structure   Leg CT is without abscess  Cellulitis is slowly improving  As typical with patient with cellulitis superimposed on venous stasis, response to antibiotic tends to be slow  He remains clinically and systemically well  Germaine Arellano blood cultures have no growth thus far   Source of cellulitis is most likely the chronic leg rash   Although there is no obvious active ulceration, there is likely loss of skin integrity at the rash site  Continue IV cefazolin  Serial leg exams  Anticipate transition to p o  Keflex in the next  24 hours, if patient continues to improve clinically  Likely treat x14 days total antibiotic      2   Venous stasis, with poorly controlled leg edema   Patient counseled regarding the need to keep legs elevated to control edema, to help cellulitis resolved quicker and prevent recurrent cellulitis  Continue aggressive leg elevation      3   SLE, with chronic rash and not on treatment   Patient will benefit from rheumatology evaluation and possible treatment to control rash  Recommend outpatient rheumatology evaluation      Discussed with patient in detail regarding the above plan  Discussed with Dr Brittani Morrison from [place] [address]      Antibiotics:  Cefazolin # 7     Subjective:  Pain in right foot and lower leg continues to improve  Temperature stays down  No chills  He is tolerating antibiotic well    No nausea, vomiting or diarrhea      Objective:  Vitals:  Temp:  [98 7 °F (37 1 °C)-99 2 °F (37 3 °C)] 98 7 °F (37 1 °C)  HR:  [66-81] 71  Resp:  [13] 13  BP: (114-126)/(66-72) 114/66  SpO2:  [93 %-96 %] 96 %  Temp (24hrs), Av 9 °F (37 2 °C), Min:98 7 °F (37 1 °C), Max:99 2 °F (37 3 °C)  Current: Temperature: 98 7 °F (37 1 °C)    Physical Exam:     General: Awake, alert, cooperative, no distress  Neck:  Supple  No mass  No lymphadenopathy  Lungs: Expansion symmetric, no rales, no wheezing, respirations unlabored  Heart:  Regular rate and rhythm, S1 and S2 normal, no murmur  Abdomen: Soft, nondistended, non-tender, bowel sounds active all four quadrants, no masses, no organomegaly  Extremities: Stable leg edema  Slowly improving erythema/warmth  Improving tenderness  Stable rash  Skin:  Stable chronic rash  Neuro: Moves all extremities  Invasive Devices     Peripheral Intravenous Line  Duration           Peripheral IV 23 Left Forearm 1 day                Labs studies:   I have personally reviewed pertinent labs  Results from last 7 days   Lab Units 23  1011 23  0531 23  0606 22  0453 22  0937   POTASSIUM mmol/L 4 3 4 6 3 9   < > 4 2   CHLORIDE mmol/L 89* 90* 98   < > 99   CO2 mmol/L 26 24 25   < > 25   BUN mg/dL 15 14 16   < > 16   CREATININE mg/dL 1 12 1 12 1 16   < > 1 52*   EGFR ml/min/1 73sq m 74 74 70   < > 51   CALCIUM mg/dL 8 2* 8 2* 8 3   < > 8 8   AST U/L  --   --   --   --  18   ALT U/L  --   --   --   --  13   ALK PHOS U/L  --   --   --   --  89    < > = values in this interval not displayed  Results from last 7 days   Lab Units 23  0531 23  0606 22  0453   WBC Thousand/uL 4 54 4 17* 4 97   HEMOGLOBIN g/dL 10 6* 9 8* 10 8*   PLATELETS Thousands/uL 260 211 227     Results from last 7 days   Lab Units 22  0940   BLOOD CULTURE  No Growth After 5 Days  No Growth After 5 Days  Imaging Studies:   I have personally reviewed pertinent imaging study reports and images in PACS  EKG, Pathology, and Other Studies:   I have personally reviewed pertinent reports

## 2023-01-04 NOTE — PROGRESS NOTES
[facility]  Progress Note - Mk Rosa 1970, 45 y o  male MRN: [id number]  Unit/Bed#: -01 Encounter: [id number]  Primary Care Provider: Donta Pritchett MD   Date and time admitted to hospital: 12/29/2022  8:46 AM    Hyponatremia  Assessment & Plan  Noted overnight drop in sodium  Will check urine awesome, serum nneka, urine sodium  Will bolus 500 cc normal saline and if improved suspect secondary to hypovolemia versus if sodium level worsens or stays the same suspect SIADH secondary to pain-as the change is acute low concern for overcorrection    Chronic kidney disease, stage 3 [facility])  Assessment & Plan  Lab Results   Component Value Date    EGFR 74 01/04/2023    EGFR 74 01/04/2023    EGFR 70 01/01/2023    CREATININE 1 12 01/04/2023    CREATININE 1 12 01/04/2023    CREATININE 1 16 01/01/2023     · Baseline appears to be around 1 5  · Cr of 1 12    History of DVT (deep vein thrombosis)  Assessment & Plan  · Cont eliquis  · Venous duplex done : no DVT    Hypertension  Assessment & Plan  /65   Pulse 71   Temp 98 7 °F (37 1 °C)   Resp 13   Ht 5' 5 98" (1 676 m)   Wt 91 6 kg (201 lb 15 1 oz)   SpO2 96%   BMI 32 61 kg/m²   · Blood pressures stable  · Cont coreg, holding Lasix and metolazone with onset of hyponatremia today    Lupus (systemic lupus erythematosus) ([facility])  Assessment & Plan  · Has generalized rash over his body  · recomend OP rheum followup  · Cont home meds  · Stable, no flare    * Cellulitis of right lower extremity  Assessment & Plan  · Day 7/14 cefazolin  Plan to switch to keflex in 24 hours  · Discontinued vancomycin  · Since pain is improving but he still has difficulty ambulating, will continue working with PT OT  · ID following  · Will need rehab at the end of hospitalization      VTE Pharmacologic Prophylaxis: VTE Score: 4 Moderate Risk (Score 3-4) - Pharmacological DVT Prophylaxis Ordered: apixaban (Eliquis)      Patient Centered Rounds: I performed bedside rounds with nursing staff today  Discussions with Specialists or Other Care Team Provider: ID    Education and Discussions with Family / Patient: Patient declined call to   Time Spent for Care: 30 minutes  More than 50% of total time spent on counseling and coordination of care as described above  Current Length of Stay: 6 day(s)  Current Patient Status: Inpatient   Certification Statement: The patient will continue to require additional inpatient hospital stay due to pEnding transition to oral antibiotics likely tomorrow and then rehab placement  Discharge Plan: Anticipate discharge in 24-48 hrs to rehab facility  Code Status: Level 1 - Full Code    Subjective:   Patient is tolerating antibiotics well denies any nausea vomiting fevers chills abdominal pain  He reports steady improvement in his lower extremity/foot pain    Objective:     Vitals:   Temp (24hrs), Av 9 °F (37 2 °C), Min:98 7 °F (37 1 °C), Max:99 2 °F (37 3 °C)    Temp:  [98 7 °F (37 1 °C)-99 2 °F (37 3 °C)] 98 7 °F (37 1 °C)  HR:  [66-75] 71  Resp:  [13] 13  BP: (112-126)/(65-70) 112/65  SpO2:  [93 %-96 %] 96 %  Body mass index is 32 61 kg/m²  Input and Output Summary (last 24 hours): Intake/Output Summary (Last 24 hours) at 2023 1517  Last data filed at 2023 0537  Gross per 24 hour   Intake 1230 ml   Output 1500 ml   Net -270 ml       Physical Exam:   Physical Exam  Vitals and nursing note reviewed  Constitutional:       General: He is not in acute distress  Appearance: He is well-developed  He is not ill-appearing, toxic-appearing or diaphoretic  HENT:      Head: Normocephalic and atraumatic  Eyes:      General: No scleral icterus  Conjunctiva/sclera: Conjunctivae normal    Cardiovascular:      Rate and Rhythm: Normal rate and regular rhythm  Heart sounds: No murmur heard  No friction rub  No gallop     Pulmonary:      Effort: Pulmonary effort is normal  No respiratory distress  Breath sounds: Normal breath sounds  No stridor  No wheezing, rhonchi or rales  Chest:      Chest wall: No tenderness  Abdominal:      General: There is no distension  Palpations: Abdomen is soft  There is no mass  Tenderness: There is no abdominal tenderness  There is no guarding or rebound  Hernia: No hernia is present  Musculoskeletal:         General: No swelling or tenderness  Cervical back: Neck supple  Right lower leg: Edema present  Left lower leg: Edema present  Skin:     General: Skin is warm and dry  Capillary Refill: Capillary refill takes less than 2 seconds  Coloration: Skin is not jaundiced or pale  Comments: Lower extremity venous stasis skin changes with the right lower extremity erythema and discoloration from cellulitis   Neurological:      Mental Status: He is alert and oriented to person, place, and time  Psychiatric:         Mood and Affect: Mood normal           Additional Data:     Labs:  Results from last 7 days   Lab Units 01/04/23  0531   WBC Thousand/uL 4 54   HEMOGLOBIN g/dL 10 6*   HEMATOCRIT % 31 3*   PLATELETS Thousands/uL 260   NEUTROS PCT % 65   LYMPHS PCT % 16   MONOS PCT % 17*   EOS PCT % 1     Results from last 7 days   Lab Units 01/04/23  1011 12/30/22  0453 12/29/22  0937   SODIUM mmol/L 123*   < > 133*   POTASSIUM mmol/L 4 3   < > 4 2   CHLORIDE mmol/L 89*   < > 99   CO2 mmol/L 26   < > 25   BUN mg/dL 15   < > 16   CREATININE mg/dL 1 12   < > 1 52*   ANION GAP mmol/L 8   < > 9   CALCIUM mg/dL 8 2*   < > 8 8   ALBUMIN g/dL  --   --  3 4*   TOTAL BILIRUBIN mg/dL  --   --  0 34   ALK PHOS U/L  --   --  89   ALT U/L  --   --  13   AST U/L  --   --  18   GLUCOSE RANDOM mg/dL 98   < > 103    < > = values in this interval not displayed       Results from last 7 days   Lab Units 12/29/22  0937   INR  1 26*             Results from last 7 days   Lab Units 12/29/22  1749   PROCALCITONIN ng/ml 0 11 Lines/Drains:  Invasive Devices     Peripheral Intravenous Line  Duration           Peripheral IV 01/03/23 Left Forearm 1 day                      Imaging: No pertinent imaging reviewed  Recent Cultures (last 7 days):   Results from last 7 days   Lab Units 12/29/22  0940   BLOOD CULTURE  No Growth After 5 Days  No Growth After 5 Days  Last 24 Hours Medication List:   Current Facility-Administered Medications   Medication Dose Route Frequency Provider Last Rate   • acetaminophen  650 mg Oral Q6H PRN Reanna Badillo MD     • apixaban  5 mg Oral BID Reanna Badillo MD     • baclofen  20 mg Oral TID Reanna Badillo MD     • carvedilol  12 5 mg Oral BID With Meals Reanna Badillo MD     • cefazolin  1,000 mg Intravenous Jenn Hartman MD 1,000 mg (01/04/23 0902)   • docusate sodium  100 mg Oral BID Reanna Badillo MD     • escitalopram  10 mg Oral Daily Reanna Badillo MD     • gabapentin  600 mg Oral TID Reanna Badillo MD     • HYDROmorphone  0 2 mg Intravenous Q4H PRN Reanna Badillo MD     • hydroxychloroquine  400 mg Oral HS Reanna Badillo MD     • hydrOXYzine HCL  10 mg Oral Q6H PRN Reanna Badillo MD     • loratadine  10 mg Oral Daily Reanna Badillo MD     • mycophenolate  500 mg Oral Q12H Karen Franz MD     • ondansetron  4 mg Intravenous Q4H PRN Reanna Badillo MD     • oxyCODONE  7 5 mg Oral Q6H PRN Reanna Badillo MD      Or   • oxyCODONE  10 mg Oral Q6H PRN Reanna Badillo MD     • polyethylene glycol  17 g Oral Daily Reanna Badillo MD     • senna  2 tablet Oral HS Reanna Badillo MD     • sodium chloride  500 mL Intravenous Once Abram Delong DO     • tamsulosin  0 4 mg Oral Daily With Jessy De Los Santos MD     • triamcinolone   Topical BID Reanna Badillo MD          Today, Patient Was Seen By: Raquel Munguia DO    **Please Note: This note may have been constructed using a voice recognition system  **

## 2023-01-04 NOTE — ASSESSMENT & PLAN NOTE
· Day 7/14 cefazolin   Plan to switch to keflex in 24 hours  · Discontinued vancomycin  · Since pain is improving but he still has difficulty ambulating, will continue working with PT OT  · ID following  · Will need rehab at the end of hospitalization

## 2023-01-04 NOTE — ASSESSMENT & PLAN NOTE
/65   Pulse 71   Temp 98 7 °F (37 1 °C)   Resp 13   Ht 5' 5 98" (1 676 m)   Wt 91 6 kg (201 lb 15 1 oz)   SpO2 96%   BMI 32 61 kg/m²   · Blood pressures stable  · Cont coreg, holding Lasix and metolazone with onset of hyponatremia today

## 2023-01-04 NOTE — ASSESSMENT & PLAN NOTE
Lab Results   Component Value Date    EGFR 74 01/04/2023    EGFR 74 01/04/2023    EGFR 70 01/01/2023    CREATININE 1 12 01/04/2023    CREATININE 1 12 01/04/2023    CREATININE 1 16 01/01/2023     · Baseline appears to be around 1 5  · Cr of 1 12

## 2023-01-05 LAB
ANION GAP SERPL CALCULATED.3IONS-SCNC: 10 MMOL/L (ref 4–13)
ANION GAP SERPL CALCULATED.3IONS-SCNC: 6 MMOL/L (ref 4–13)
BASOPHILS # BLD AUTO: 0.03 THOUSANDS/ÂΜL (ref 0–0.1)
BASOPHILS NFR BLD AUTO: 1 % (ref 0–1)
BUN SERPL-MCNC: 17 MG/DL (ref 5–25)
BUN SERPL-MCNC: 19 MG/DL (ref 5–25)
CALCIUM SERPL-MCNC: 8.5 MG/DL (ref 8.3–10.1)
CALCIUM SERPL-MCNC: 8.6 MG/DL (ref 8.3–10.1)
CHLORIDE SERPL-SCNC: 89 MMOL/L (ref 96–108)
CHLORIDE SERPL-SCNC: 93 MMOL/L (ref 96–108)
CO2 SERPL-SCNC: 24 MMOL/L (ref 21–32)
CO2 SERPL-SCNC: 26 MMOL/L (ref 21–32)
CREAT SERPL-MCNC: 1.13 MG/DL (ref 0.6–1.3)
CREAT SERPL-MCNC: 1.15 MG/DL (ref 0.6–1.3)
EOSINOPHIL # BLD AUTO: 0.01 THOUSAND/ÂΜL (ref 0–0.61)
EOSINOPHIL NFR BLD AUTO: 0 % (ref 0–6)
ERYTHROCYTE [DISTWIDTH] IN BLOOD BY AUTOMATED COUNT: 13.4 % (ref 11.6–15.1)
GFR SERPL CREATININE-BSD FRML MDRD: 71 ML/MIN/1.73SQ M
GFR SERPL CREATININE-BSD FRML MDRD: 73 ML/MIN/1.73SQ M
GLUCOSE SERPL-MCNC: 102 MG/DL (ref 65–140)
GLUCOSE SERPL-MCNC: 98 MG/DL (ref 65–140)
HCT VFR BLD AUTO: 32.1 % (ref 36.5–49.3)
HGB BLD-MCNC: 10.9 G/DL (ref 12–17)
IMM GRANULOCYTES # BLD AUTO: 0.03 THOUSAND/UL (ref 0–0.2)
IMM GRANULOCYTES NFR BLD AUTO: 1 % (ref 0–2)
LYMPHOCYTES # BLD AUTO: 0.73 THOUSANDS/ÂΜL (ref 0.6–4.47)
LYMPHOCYTES NFR BLD AUTO: 17 % (ref 14–44)
MCH RBC QN AUTO: 28.2 PG (ref 26.8–34.3)
MCHC RBC AUTO-ENTMCNC: 34 G/DL (ref 31.4–37.4)
MCV RBC AUTO: 83 FL (ref 82–98)
MONOCYTES # BLD AUTO: 0.73 THOUSAND/ÂΜL (ref 0.17–1.22)
MONOCYTES NFR BLD AUTO: 17 % (ref 4–12)
NEUTROPHILS # BLD AUTO: 2.86 THOUSANDS/ÂΜL (ref 1.85–7.62)
NEUTS SEG NFR BLD AUTO: 64 % (ref 43–75)
NRBC BLD AUTO-RTO: 0 /100 WBCS
PLATELET # BLD AUTO: 298 THOUSANDS/UL (ref 149–390)
PMV BLD AUTO: 10 FL (ref 8.9–12.7)
POTASSIUM SERPL-SCNC: 4.2 MMOL/L (ref 3.5–5.3)
POTASSIUM SERPL-SCNC: 4.4 MMOL/L (ref 3.5–5.3)
RBC # BLD AUTO: 3.87 MILLION/UL (ref 3.88–5.62)
SODIUM SERPL-SCNC: 123 MMOL/L (ref 135–147)
SODIUM SERPL-SCNC: 125 MMOL/L (ref 135–147)
WBC # BLD AUTO: 4.39 THOUSAND/UL (ref 4.31–10.16)

## 2023-01-05 RX ORDER — SODIUM CHLORIDE 9 MG/ML
75 INJECTION, SOLUTION INTRAVENOUS CONTINUOUS
Status: DISCONTINUED | OUTPATIENT
Start: 2023-01-05 | End: 2023-01-07

## 2023-01-05 RX ORDER — CEPHALEXIN 500 MG/1
500 CAPSULE ORAL EVERY 6 HOURS SCHEDULED
Status: DISCONTINUED | OUTPATIENT
Start: 2023-01-05 | End: 2023-01-10 | Stop reason: HOSPADM

## 2023-01-05 RX ADMIN — TRIAMCINOLONE ACETONIDE: 1 CREAM TOPICAL at 10:15

## 2023-01-05 RX ADMIN — CEPHALEXIN 500 MG: 500 CAPSULE ORAL at 12:32

## 2023-01-05 RX ADMIN — SENNOSIDES AND DOCUSATE SODIUM 1 TABLET: 50; 8.6 TABLET ORAL at 21:27

## 2023-01-05 RX ADMIN — SODIUM CHLORIDE 75 ML/HR: 0.9 INJECTION, SOLUTION INTRAVENOUS at 12:34

## 2023-01-05 RX ADMIN — MYCOPHENOLATE MOFETIL 500 MG: 250 CAPSULE ORAL at 10:02

## 2023-01-05 RX ADMIN — CEFAZOLIN SODIUM 1000 MG: 1 SOLUTION INTRAVENOUS at 10:13

## 2023-01-05 RX ADMIN — TRIAMCINOLONE ACETONIDE: 1 CREAM TOPICAL at 18:40

## 2023-01-05 RX ADMIN — BACLOFEN 20 MG: 10 TABLET ORAL at 10:02

## 2023-01-05 RX ADMIN — HYDROXYCHLOROQUINE SULFATE 400 MG: 200 TABLET, FILM COATED ORAL at 21:35

## 2023-01-05 RX ADMIN — GABAPENTIN 600 MG: 300 CAPSULE ORAL at 21:27

## 2023-01-05 RX ADMIN — CARVEDILOL 12.5 MG: 12.5 TABLET, FILM COATED ORAL at 16:51

## 2023-01-05 RX ADMIN — MYCOPHENOLATE MOFETIL 500 MG: 250 CAPSULE ORAL at 21:27

## 2023-01-05 RX ADMIN — BACLOFEN 20 MG: 10 TABLET ORAL at 21:27

## 2023-01-05 RX ADMIN — LORATADINE 10 MG: 10 TABLET ORAL at 10:03

## 2023-01-05 RX ADMIN — OXYCODONE HYDROCHLORIDE 10 MG: 10 TABLET ORAL at 06:31

## 2023-01-05 RX ADMIN — TAMSULOSIN HYDROCHLORIDE 0.4 MG: 0.4 CAPSULE ORAL at 16:51

## 2023-01-05 RX ADMIN — APIXABAN 5 MG: 5 TABLET, FILM COATED ORAL at 10:03

## 2023-01-05 RX ADMIN — GABAPENTIN 600 MG: 300 CAPSULE ORAL at 16:51

## 2023-01-05 RX ADMIN — BACLOFEN 20 MG: 10 TABLET ORAL at 16:51

## 2023-01-05 RX ADMIN — ESCITALOPRAM OXALATE 10 MG: 10 TABLET ORAL at 10:02

## 2023-01-05 RX ADMIN — GABAPENTIN 600 MG: 300 CAPSULE ORAL at 10:02

## 2023-01-05 RX ADMIN — CEFAZOLIN SODIUM 1000 MG: 1 SOLUTION INTRAVENOUS at 02:13

## 2023-01-05 RX ADMIN — CARVEDILOL 12.5 MG: 12.5 TABLET, FILM COATED ORAL at 09:00

## 2023-01-05 RX ADMIN — APIXABAN 5 MG: 5 TABLET, FILM COATED ORAL at 18:38

## 2023-01-05 RX ADMIN — CEPHALEXIN 500 MG: 500 CAPSULE ORAL at 18:38

## 2023-01-05 NOTE — PROGRESS NOTES
Progress Note - Infectious Disease   Mervat Castellano 45 y o  male MRN: [id number]  Unit/Bed#: -01 Encounter: [id number]      Impression/Recommendations:  1   Right lower leg cellulitis, superimposed on underlying venous stasis   Cellulitis has the appearance of streptococcal infection   Cellulitis appears to be superficial, without abscess or involvement of deeper structure   Leg CT is without abscess  As typical with patient with cellulitis superimposed on venous stasis, response to antibiotic tends to be slow  Cellulitis is now much improved  He remains clinically and systemically well  Arnel Jimena blood cultures have no growth thus far   Source of cellulitis is most likely the chronic leg rash   Although there is no obvious active ulceration, there is likely loss of skin integrity at the rash site  Change antibiotic to p o  Keflex  Serial leg exams  Treat x14 days total antibiotic, through       2   Venous stasis, with poorly controlled leg edema   Patient counseled regarding the need to keep legs elevated to control edema, to help cellulitis resolve quicker and prevent recurrent cellulitis  Continue aggressive leg elevation      3   SLE, with chronic rash and not on treatment   Patient will benefit from rheumatology evaluation and possible treatment to control rash  Recommend outpatient rheumatology evaluation      Discussed with patient in detail regarding the above plan  Discussed with Dr Ozuna from [facility] service      Antibiotics:  Cefazolin # 8     Subjective:  Pain in right foot and lower leg continues to improve  Temperature stays down   No chills    He is tolerating antibiotic well   No nausea, vomiting or diarrhea      Objective:  Vitals:  Temp:  [98 9 °F (37 2 °C)] 98 9 °F (37 2 °C)  HR:  [73] 73  Resp:  [15] 15  BP: (108-112)/(65-69) 108/69  SpO2:  [96 %-98 %] 96 %  Temp (24hrs), Av 9 °F (37 2 °C), Min:98 9 °F (37 2 °C), Max:98 9 °F (37 2 °C)  Current: Temperature: 98 9 °F (37 2 °C)    Physical Exam:     General: Awake, alert, cooperative, no distress  Neck:  Supple  No mass  No lymphadenopathy  Lungs: Expansion symmetric, no rales, no wheezing, respirations unlabored  Heart:  Regular rate and rhythm, S1 and S2 normal, no murmur  Abdomen: Soft, nondistended, non-tender, bowel sounds active all four quadrants, no masses, no organomegaly  Extremities: Stable leg edema  Improving erythema/warmth  No ulcer drainage  Improved tenderness  Stable chronic rash  Skin:  Stable chronic rash  Neuro: Moves all extremities  Invasive Devices     Peripheral Intravenous Line  Duration           Peripheral IV 01/03/23 Left Forearm 2 days                Labs studies:   I have personally reviewed pertinent labs  Results from last 7 days   Lab Units 01/05/23  0500 01/04/23  1958 01/04/23  1011   POTASSIUM mmol/L 4 2 4 2 4 3   CHLORIDE mmol/L 89* 90* 89*   CO2 mmol/L 24 25 26   BUN mg/dL 17 20 15   CREATININE mg/dL 1 15 1 22 1 12   EGFR ml/min/1 73sq m 71 66 74   CALCIUM mg/dL 8 6 8 4 8 2*     Results from last 7 days   Lab Units 01/05/23  0500 01/04/23  0531 01/01/23  0606   WBC Thousand/uL 4 39 4 54 4 17*   HEMOGLOBIN g/dL 10 9* 10 6* 9 8*   PLATELETS Thousands/uL 298 260 211           Imaging Studies:   I have personally reviewed pertinent imaging study reports and images in PACS  EKG, Pathology, and Other Studies:   I have personally reviewed pertinent reports

## 2023-01-05 NOTE — PLAN OF CARE
Problem: MOBILITY - ADULT  Goal: Maintain or return to baseline ADL function  Description: INTERVENTIONS:  -  Assess patient's ability to carry out ADLs; assess patient's baseline for ADL function and identify physical deficits which impact ability to perform ADLs (bathing, care of mouth/teeth, toileting, grooming, dressing, etc )  - Assess/evaluate cause of self-care deficits   - Assess range of motion  - Assess patient's mobility; develop plan if impaired  - Assess patient's need for assistive devices and provide as appropriate  - Encourage maximum independence but intervene and supervise when necessary  - Involve family in performance of ADLs  - Assess for home care needs following discharge   - Consider OT consult to assist with ADL evaluation and planning for discharge  - Provide patient education as appropriate  Outcome: Progressing     Problem: Prexisting or High Potential for Compromised Skin Integrity  Goal: Skin integrity is maintained or improved  Description: INTERVENTIONS:  - Identify patients at risk for skin breakdown  - Assess and monitor skin integrity  - Assess and monitor nutrition and hydration status  - Monitor labs   - Assess for incontinence   - Turn and reposition patient  - Assist with mobility/ambulation  - Relieve pressure over bony prominences  - Avoid friction and shearing  - Provide appropriate hygiene as needed including keeping skin clean and dry  - Evaluate need for skin moisturizer/barrier cream  - Collaborate with interdisciplinary team   - Patient/family teaching  - Consider wound care consult   Outcome: Progressing     Problem: INFECTION - ADULT  Goal: Absence or prevention of progression during hospitalization  Description: INTERVENTIONS:  - Assess and monitor for signs and symptoms of infection  - Monitor lab/diagnostic results  - Monitor all insertion sites, i e  indwelling lines, tubes, and drains  - Monitor endotracheal if appropriate and nasal secretions for changes in amount and color  - [place] appropriate cooling/warming therapies per order  - Administer medications as ordered  - Instruct and encourage patient and family to use good hand hygiene technique  - Identify and instruct in appropriate isolation precautions for identified infection/condition  Outcome: Progressing     Problem: SAFETY ADULT  Goal: Maintain or return to baseline ADL function  Description: INTERVENTIONS:  -  Assess patient's ability to carry out ADLs; assess patient's baseline for ADL function and identify physical deficits which impact ability to perform ADLs (bathing, care of mouth/teeth, toileting, grooming, dressing, etc )  - Assess/evaluate cause of self-care deficits   - Assess range of motion  - Assess patient's mobility; develop plan if impaired  - Assess patient's need for assistive devices and provide as appropriate  - Encourage maximum independence but intervene and supervise when necessary  - Involve family in performance of ADLs  - Assess for home care needs following discharge   - Consider OT consult to assist with ADL evaluation and planning for discharge  - Provide patient education as appropriate  Outcome: Progressing     Problem: PAIN - ADULT  Goal: Verbalizes/displays adequate comfort level or baseline comfort level  Description: Interventions:  - Encourage patient to monitor pain and request assistance  - Assess pain using appropriate pain scale  - Administer analgesics based on type and severity of pain and evaluate response  - Implement non-pharmacological measures as appropriate and evaluate response  - Consider cultural and social influences on pain and pain management  - Notify physician/advanced practitioner if interventions unsuccessful or patient reports new pain  Outcome: Not Progressing

## 2023-01-05 NOTE — CASE MANAGEMENT
Case Management Progress Note    Patient name Mat Armenta  Location [id number] MRN [id number]  : 1970 Date 2023       LOS (days): 7  Geometric Mean LOS (GMLOS) (days):   Days to GMLOS:        OBJECTIVE:        Current admission status: Inpatient  Preferred Pharmacy:   [address] [place], [address]  [address] [place] [address]  Phone: [phone] Fax: [phone]    Primary Care Provider: Braydon Chase MD    Primary Insurance: [employer]  Secondary Insurance:     PROGRESS NOTE:  CM continues to work on D/C planning option referrals  to both Acute Rehab and STR  PM&R consult pending and Acute rehab will need in order to support insur auth  Therapy alerted to need for updated notes   [facility] STR options should be clarified tomorrow- expecting male openings at two of their facilities per Marilia Negrete [facility]  Plan CM will continue to coordiante disposition based on outcomes of above

## 2023-01-05 NOTE — ASSESSMENT & PLAN NOTE
· Day 7/14 cefazolin   Plan to switch to keflex in 24 hours  · Discontinued vancomycin  · Since pain is improving but he still has difficulty ambulating, will continue working with PT OT  · ID following  · Will transition to p o  antibiotics

## 2023-01-05 NOTE — PLAN OF CARE
Problem: Prexisting or High Potential for Compromised Skin Integrity  Goal: Skin integrity is maintained or improved  Description: INTERVENTIONS:  - Identify patients at risk for skin breakdown  - Assess and monitor skin integrity  - Assess and monitor nutrition and hydration status  - Monitor labs   - Assess for incontinence   - Turn and reposition patient  - Assist with mobility/ambulation  - Relieve pressure over bony prominences  - Avoid friction and shearing  - Provide appropriate hygiene as needed including keeping skin clean and dry  - Evaluate need for skin moisturizer/barrier cream  - Collaborate with interdisciplinary team   - Patient/family teaching  - Consider wound care consult   Outcome: Progressing     Problem: PAIN - ADULT  Goal: Verbalizes/displays adequate comfort level or baseline comfort level  Description: Interventions:  - Encourage patient to monitor pain and request assistance  - Assess pain using appropriate pain scale  - Administer analgesics based on type and severity of pain and evaluate response  - Implement non-pharmacological measures as appropriate and evaluate response  - Consider cultural and social influences on pain and pain management  - Notify physician/advanced practitioner if interventions unsuccessful or patient reports new pain  Outcome: Progressing     Problem: INFECTION - ADULT  Goal: Absence or prevention of progression during hospitalization  Description: INTERVENTIONS:  - Assess and monitor for signs and symptoms of infection  - Monitor lab/diagnostic results  - Monitor all insertion sites, i e  indwelling lines, tubes, and drains  - Monitor endotracheal if appropriate and nasal secretions for changes in amount and color  - [place] appropriate cooling/warming therapies per order  - Administer medications as ordered  - Instruct and encourage patient and family to use good hand hygiene technique  - Identify and instruct in appropriate isolation precautions for identified infection/condition  Outcome: Progressing

## 2023-01-05 NOTE — ASSESSMENT & PLAN NOTE
/69   Pulse 73   Temp 98 9 °F (37 2 °C)   Resp 15   Ht 5' 5 98" (1 676 m)   Wt 91 6 kg (201 lb 15 1 oz)   SpO2 96%   BMI 32 61 kg/m²   · Blood pressures stable  · Continue Coreg holding Lasix and metolazone with onset of hyponatremia today

## 2023-01-05 NOTE — ASSESSMENT & PLAN NOTE
Lab Results   Component Value Date    EGFR 71 01/05/2023    EGFR 66 01/04/2023    EGFR 74 01/04/2023    CREATININE 1 15 01/05/2023    CREATININE 1 22 01/04/2023    CREATININE 1 12 01/04/2023     · Creatinine at baseline

## 2023-01-05 NOTE — PROGRESS NOTES
[address] [place] [address]  Progress Note - Miladis Cord 1970, 45 y o  male MRN: [id number]  Unit/Bed#: -M[place] Encounter: [id number]  Primary Care Provider: Sergio Rudolph MD   Date and time admitted to hospital: 12/29/2022  8:46 AM    * Cellulitis of right lower extremity  Assessment & Plan  · Day 7/14 cefazolin  Plan to switch to keflex in 24 hours  · Discontinued vancomycin  · Since pain is improving but he still has difficulty ambulating, will continue working with PT OT  · ID following  · Will transition to p o  antibiotics    Chronic kidney disease, stage 3 [facility])  Assessment & Plan  Lab Results   Component Value Date    EGFR 71 01/05/2023    EGFR 66 01/04/2023    EGFR 74 01/04/2023    CREATININE 1 15 01/05/2023    CREATININE 1 22 01/04/2023    CREATININE 1 12 01/04/2023     · Creatinine at baseline    History of DVT (deep vein thrombosis)  Assessment & Plan  · Cont eliquis      Hypertension  Assessment & Plan  /69   Pulse 73   Temp 98 9 °F (37 2 °C)   Resp 15   Ht 5' 5 98" (1 676 m)   Wt 91 6 kg (201 lb 15 1 oz)   SpO2 96%   BMI 32 61 kg/m²   · Blood pressures stable  · Continue Coreg holding Lasix and metolazone with onset of hyponatremia today    Lupus (systemic lupus erythematosus) (HCC)  Assessment & Plan  · Has generalized rash over his body  · recomend OP rheum followup  · Continue home meds      VTE Pharmacologic Prophylaxis:   Pharmacologic: Heparin  Mechanical VTE Prophylaxis in Place: Yes    Patient Centered Rounds: I have performed bedside rounds with nursing staff today  Discussions with Specialists or Other Care Team Provider: jyoti rm    Education and Discussions with Family / Patient: pt    Time Spent for Care: 30 minutes  More than 50% of total time spent on counseling and coordination of care as described above      Current Length of Stay: 7 day(s)    Current Patient Status: Inpatient   Certification Statement: The patient will continue to require additional inpatient hospital stay due to see below    Discharge Plan: Pending rehab and awaiting improvement of sodium    Code Status: Level 1 - Full Code      Subjective:   Denies chest pain, sob, cough, fevers  Objective:     Vitals:   Temp (24hrs), Av 9 °F (37 2 °C), Min:98 9 °F (37 2 °C), Max:98 9 °F (37 2 °C)    Temp:  [98 9 °F (37 2 °C)] 98 9 °F (37 2 °C)  HR:  [73] 73  Resp:  [15] 15  BP: (108-112)/(65-69) 108/69  SpO2:  [96 %-98 %] 96 %  Body mass index is 32 61 kg/m²  Input and Output Summary (last 24 hours): Intake/Output Summary (Last 24 hours) at 2023 1117  Last data filed at 2023 3271  Gross per 24 hour   Intake 480 ml   Output 1900 ml   Net -1420 ml       Physical Exam:     Physical Exam  Constitutional:       General: He is not in acute distress  Appearance: He is well-developed  He is not diaphoretic  HENT:      Head: Normocephalic and atraumatic  Nose: Nose normal       Mouth/Throat:      Pharynx: No oropharyngeal exudate  Eyes:      General: No scleral icterus  Conjunctiva/sclera: Conjunctivae normal    Cardiovascular:      Rate and Rhythm: Normal rate and regular rhythm  Heart sounds: Normal heart sounds  No murmur heard  No friction rub  No gallop  Pulmonary:      Effort: Pulmonary effort is normal  No respiratory distress  Breath sounds: Normal breath sounds  No wheezing or rales  Chest:      Chest wall: No tenderness  Abdominal:      General: Bowel sounds are normal  There is no distension  Palpations: Abdomen is soft  Tenderness: There is no abdominal tenderness  There is no guarding  Musculoskeletal:         General: No tenderness or deformity  Normal range of motion  Cervical back: Normal range of motion and neck supple  Skin:     General: Skin is warm and dry  Findings: No erythema  Neurological:      Mental Status: He is alert  Mental status is at baseline             Additional Data:     Labs:    Results from last 7 days   Lab Units 01/05/23  0500   WBC Thousand/uL 4 39   HEMOGLOBIN g/dL 10 9*   HEMATOCRIT % 32 1*   PLATELETS Thousands/uL 298   NEUTROS PCT % 64   LYMPHS PCT % 17   MONOS PCT % 17*   EOS PCT % 0     Results from last 7 days   Lab Units 01/05/23  0500   SODIUM mmol/L 123*   POTASSIUM mmol/L 4 2   CHLORIDE mmol/L 89*   CO2 mmol/L 24   BUN mg/dL 17   CREATININE mg/dL 1 15   ANION GAP mmol/L 10   CALCIUM mg/dL 8 6   GLUCOSE RANDOM mg/dL 98                 Results from last 7 days   Lab Units 12/29/22  1749   PROCALCITONIN ng/ml 0 11           * I Have Reviewed All Lab Data Listed Above  * Additional Pertinent Lab Tests Reviewed:  All Labs Within Last 24 Hours Reviewed    Imaging:    Imaging Reports Reviewed Today Include: na  Imaging Personally Reviewed by Myself Includes:  na    Recent Cultures (last 7 days):           Last 24 Hours Medication List:   Current Facility-Administered Medications   Medication Dose Route Frequency Provider Last Rate   • acetaminophen  650 mg Oral Q6H PRN Mervat Weller MD     • apixaban  5 mg Oral BID Mervat Weller MD     • baclofen  20 mg Oral TID Mervat Weller MD     • carvedilol  12 5 mg Oral BID With Meals Mervat Weller MD     • cephalexin  500 mg Oral Q6H [facility] Mary Mulligan MD     • escitalopram  10 mg Oral Daily Mervat Weller MD     • gabapentin  600 mg Oral TID Mervat Weller MD     • HYDROmorphone  0 2 mg Intravenous Q4H PRN Mervat Weller MD     • hydroxychloroquine  400 mg Oral HS Mervat Weller MD     • hydrOXYzine HCL  10 mg Oral Q6H PRN Mervat Weller MD     • loratadine  10 mg Oral Daily Mervat Weller MD     • mycophenolate  500 mg Oral Q12H Serafin Walls MD     • ondansetron  4 mg Intravenous Q4H PRN Mervat Weller MD     • oxyCODONE  7 5 mg Oral Q6H PRN Mervat Weller MD      Or   • oxyCODONE  10 mg Oral Q6H PRN Mervat Weller MD     • polyethylene glycol  17 g Oral Daily Mervat Weller MD     • senna-docusate sodium  1 tablet Oral HS Abram Delong DO     • sodium chloride  75 mL/hr Intravenous Continuous Yony Ozuna MD     • tamsulosin  0 4 mg Oral Daily With Jessy De Los Santos MD     • triamcinolone   Topical BID Reanna Badillo MD          Today, Patient Was Seen By: Alex Nathan MD    ** Please Note: Dictation voice to text software may have been used in the creation of this document   **

## 2023-01-06 LAB
ANION GAP SERPL CALCULATED.3IONS-SCNC: 8 MMOL/L (ref 4–13)
ANION GAP SERPL CALCULATED.3IONS-SCNC: 9 MMOL/L (ref 4–13)
BUN SERPL-MCNC: 15 MG/DL (ref 5–25)
BUN SERPL-MCNC: 19 MG/DL (ref 5–25)
CALCIUM SERPL-MCNC: 8.3 MG/DL (ref 8.3–10.1)
CALCIUM SERPL-MCNC: 8.6 MG/DL (ref 8.3–10.1)
CHLORIDE SERPL-SCNC: 95 MMOL/L (ref 96–108)
CHLORIDE SERPL-SCNC: 95 MMOL/L (ref 96–108)
CO2 SERPL-SCNC: 24 MMOL/L (ref 21–32)
CO2 SERPL-SCNC: 24 MMOL/L (ref 21–32)
CREAT SERPL-MCNC: 1.08 MG/DL (ref 0.6–1.3)
CREAT SERPL-MCNC: 1.16 MG/DL (ref 0.6–1.3)
GFR SERPL CREATININE-BSD FRML MDRD: 70 ML/MIN/1.73SQ M
GFR SERPL CREATININE-BSD FRML MDRD: 77 ML/MIN/1.73SQ M
GLUCOSE SERPL-MCNC: 93 MG/DL (ref 65–140)
GLUCOSE SERPL-MCNC: 99 MG/DL (ref 65–140)
POTASSIUM SERPL-SCNC: 4.3 MMOL/L (ref 3.5–5.3)
POTASSIUM SERPL-SCNC: 4.4 MMOL/L (ref 3.5–5.3)
SODIUM SERPL-SCNC: 127 MMOL/L (ref 135–147)
SODIUM SERPL-SCNC: 128 MMOL/L (ref 135–147)

## 2023-01-06 RX ADMIN — BACLOFEN 20 MG: 10 TABLET ORAL at 22:10

## 2023-01-06 RX ADMIN — OXYCODONE HYDROCHLORIDE 10 MG: 10 TABLET ORAL at 06:23

## 2023-01-06 RX ADMIN — TRIAMCINOLONE ACETONIDE: 1 CREAM TOPICAL at 17:51

## 2023-01-06 RX ADMIN — BACLOFEN 20 MG: 10 TABLET ORAL at 16:09

## 2023-01-06 RX ADMIN — TAMSULOSIN HYDROCHLORIDE 0.4 MG: 0.4 CAPSULE ORAL at 16:09

## 2023-01-06 RX ADMIN — ESCITALOPRAM OXALATE 10 MG: 10 TABLET ORAL at 09:43

## 2023-01-06 RX ADMIN — SODIUM CHLORIDE 75 ML/HR: 0.9 INJECTION, SOLUTION INTRAVENOUS at 01:04

## 2023-01-06 RX ADMIN — HYDROXYCHLOROQUINE SULFATE 400 MG: 200 TABLET, FILM COATED ORAL at 22:16

## 2023-01-06 RX ADMIN — GABAPENTIN 600 MG: 300 CAPSULE ORAL at 22:10

## 2023-01-06 RX ADMIN — OXYCODONE HYDROCHLORIDE 10 MG: 10 TABLET ORAL at 22:21

## 2023-01-06 RX ADMIN — TRIAMCINOLONE ACETONIDE: 1 CREAM TOPICAL at 09:49

## 2023-01-06 RX ADMIN — CEPHALEXIN 500 MG: 500 CAPSULE ORAL at 01:03

## 2023-01-06 RX ADMIN — SODIUM CHLORIDE 100 ML/HR: 0.9 INJECTION, SOLUTION INTRAVENOUS at 11:24

## 2023-01-06 RX ADMIN — GABAPENTIN 600 MG: 300 CAPSULE ORAL at 16:09

## 2023-01-06 RX ADMIN — HYDROMORPHONE HYDROCHLORIDE 0.2 MG: 0.2 INJECTION, SOLUTION INTRAMUSCULAR; INTRAVENOUS; SUBCUTANEOUS at 09:44

## 2023-01-06 RX ADMIN — MYCOPHENOLATE MOFETIL 500 MG: 250 CAPSULE ORAL at 09:43

## 2023-01-06 RX ADMIN — APIXABAN 5 MG: 5 TABLET, FILM COATED ORAL at 17:51

## 2023-01-06 RX ADMIN — CEPHALEXIN 500 MG: 500 CAPSULE ORAL at 06:19

## 2023-01-06 RX ADMIN — CEPHALEXIN 500 MG: 500 CAPSULE ORAL at 22:11

## 2023-01-06 RX ADMIN — CARVEDILOL 12.5 MG: 12.5 TABLET, FILM COATED ORAL at 16:09

## 2023-01-06 RX ADMIN — OXYCODONE HYDROCHLORIDE 10 MG: 10 TABLET ORAL at 13:59

## 2023-01-06 RX ADMIN — APIXABAN 5 MG: 5 TABLET, FILM COATED ORAL at 09:43

## 2023-01-06 RX ADMIN — LORATADINE 10 MG: 10 TABLET ORAL at 09:44

## 2023-01-06 RX ADMIN — SENNOSIDES AND DOCUSATE SODIUM 1 TABLET: 50; 8.6 TABLET ORAL at 22:10

## 2023-01-06 RX ADMIN — MYCOPHENOLATE MOFETIL 500 MG: 250 CAPSULE ORAL at 22:11

## 2023-01-06 RX ADMIN — CEPHALEXIN 500 MG: 500 CAPSULE ORAL at 16:09

## 2023-01-06 RX ADMIN — BACLOFEN 20 MG: 10 TABLET ORAL at 09:43

## 2023-01-06 RX ADMIN — GABAPENTIN 600 MG: 300 CAPSULE ORAL at 09:43

## 2023-01-06 NOTE — ASSESSMENT & PLAN NOTE
/67   Pulse 68   Temp 98 6 °F (37 °C)   Resp 17   Ht 5' 5 98" (1 676 m)   Wt 91 6 kg (201 lb 15 1 oz)   SpO2 98%   BMI 32 61 kg/m²   · Blood pressures stable  · Continue Coreg  · Continue to hold diuretics

## 2023-01-06 NOTE — PLAN OF CARE
Problem: Prexisting or High Potential for Compromised Skin Integrity  Goal: Skin integrity is maintained or improved  Description: INTERVENTIONS:  - Identify patients at risk for skin breakdown  - Assess and monitor skin integrity  - Assess and monitor nutrition and hydration status  - Monitor labs   - Assess for incontinence   - Turn and reposition patient  - Assist with mobility/ambulation  - Relieve pressure over bony prominences  - Avoid friction and shearing  - Provide appropriate hygiene as needed including keeping skin clean and dry  - Evaluate need for skin moisturizer/barrier cream  - Collaborate with interdisciplinary team   - Patient/family teaching  - Consider wound care consult   Outcome: Progressing     Problem: PAIN - ADULT  Goal: Verbalizes/displays adequate comfort level or baseline comfort level  Description: Interventions:  - Encourage patient to monitor pain and request assistance  - Assess pain using appropriate pain scale  - Administer analgesics based on type and severity of pain and evaluate response  - Implement non-pharmacological measures as appropriate and evaluate response  - Consider cultural and social influences on pain and pain management  - Notify physician/advanced practitioner if interventions unsuccessful or patient reports new pain  Outcome: Progressing     Problem: INFECTION - ADULT  Goal: Absence or prevention of progression during hospitalization  Description: INTERVENTIONS:  - Assess and monitor for signs and symptoms of infection  - Monitor lab/diagnostic results  - Monitor all insertion sites, i e  indwelling lines, tubes, and drains  - Monitor endotracheal if appropriate and nasal secretions for changes in amount and color  - [place] appropriate cooling/warming therapies per order  - Administer medications as ordered  - Instruct and encourage patient and family to use good hand hygiene technique  - Identify and instruct in appropriate isolation precautions for identified infection/condition  Outcome: Progressing

## 2023-01-06 NOTE — ASSESSMENT & PLAN NOTE
· Has generalized rash over his body  · Will follow-up outpatient with rheumatology  · Continue home meds

## 2023-01-06 NOTE — PROGRESS NOTES
Progress Note - Infectious Disease   Shamar Renée 45 y o  male MRN: [id number]  Unit/Bed#: -01 Encounter: [id number]      Impression/Recommendations:  1   Right lower leg cellulitis, superimposed on underlying venous stasis   Cellulitis has the appearance of streptococcal infection   Cellulitis appears to be superficial, without abscess or involvement of deeper structure   Leg CT is without abscess  As typical with patient with cellulitis superimposed on venous stasis, response to antibiotic tends to be slow  Cellulitis is now much improved  He remains clinically and systemically well  Nicolas Freeman blood cultures have no growth thus far   Source of cellulitis is most likely the chronic leg rash   Although there is no obvious active ulceration, there is likely loss of skin integrity at the rash site  Continue p o  Keflex  Serial leg exams  Treat x14 days total antibiotic, through       2   Venous stasis, with poorly controlled leg edema   Patient counseled regarding the need to keep legs elevated to control edema, to help cellulitis resolve quicker and prevent recurrent cellulitis  Continue aggressive leg elevation      3   SLE, with chronic rash and not on treatment   Patient will benefit from rheumatology evaluation and possible treatment to control rash  Recommend outpatient rheumatology evaluation      Discussed with patient in detail regarding the above plan  Discussed with Dr Ozuna from [facility] service  Okay for discharge from ID viewpoint      Antibiotics:  Keflex  Antibiotic # 9     Subjective:  Pain in right foot and lower leg continues to improve mild now  Temperature stays down   No chills    He is tolerating antibiotic well   No nausea, vomiting or diarrhea      Objective:  Vitals:  Temp:  [98 2 °F (36 8 °C)-98 9 °F (37 2 °C)] 98 2 °F (36 8 °C)  HR:  [67-70] 70  Resp:  [17-18] 18  BP: (102-120)/(66-74) 102/67  SpO2:  [95 %-99 %] 98 %  Temp (24hrs), Av 6 °F (37 °C), Min:98 2 °F (36 8 °C), Max:98 9 °F (37 2 °C)  Current: Temperature: 98 2 °F (36 8 °C)    Physical Exam:     General: Awake, alert, cooperative, no distress  Neck:  Supple  No mass  No lymphadenopathy  Lungs: Expansion symmetric, no rales, no wheezing, respirations unlabored  Heart:  Regular rate and rhythm, S1 and S2 normal, no murmur  Abdomen: Soft, nondistended, non-tender, bowel sounds active all four quadrants, no masses, no organomegaly  Extremities: Improved leg edema  Stable chronic rash and chronic venous stasis changes  Improved erythema/warmth  No drainage ulcer  Improved tenderness  Skin:  No rash  Neuro: Moves all extremities  Invasive Devices     Peripheral Intravenous Line  Duration           Peripheral IV 01/03/23 Left Forearm 3 days                Labs studies:   I have personally reviewed pertinent labs  Results from last 7 days   Lab Units 01/06/23  0834 01/06/23  0009 01/05/23  1711   POTASSIUM mmol/L 4 4 4 3 4 4   CHLORIDE mmol/L 95* 95* 93*   CO2 mmol/L 24 24 26   BUN mg/dL 15 19 19   CREATININE mg/dL 1 08 1 16 1 13   EGFR ml/min/1 73sq m 77 70 73   CALCIUM mg/dL 8 6 8 3 8 5     Results from last 7 days   Lab Units 01/05/23  0500 01/04/23  0531 01/01/23  0606   WBC Thousand/uL 4 39 4 54 4 17*   HEMOGLOBIN g/dL 10 9* 10 6* 9 8*   PLATELETS Thousands/uL 298 260 211           Imaging Studies:   I have personally reviewed pertinent imaging study reports and images in PACS  EKG, Pathology, and Other Studies:   I have personally reviewed pertinent reports

## 2023-01-06 NOTE — PROGRESS NOTES
[facility]  Progress Note - Martha Eric 1970, 45 y o  male MRN: [id number]  Unit/Bed#: -[place] Encounter: [id number]  Primary Care Provider: Ananda Morfin MD   Date and time admitted to hospital: 12/29/2022  8:46 AM    * Cellulitis of right lower extremity  Assessment & Plan  · Day 7/14 cefazolin  Plan to switch to keflex in 24 hours  · Discontinued vancomycin  · Since pain is improving but he still has difficulty ambulating, will continue working with PT OT  · ID following  · Transition to p o  antibiotics    Hyponatremia  Assessment & Plan  Likely from volume depletion  Continues to improve with IV fluids we will continue for a day    Chronic kidney disease, stage 3 [facility])  Assessment & Plan  Lab Results   Component Value Date    EGFR 77 01/06/2023    EGFR 70 01/06/2023    EGFR 73 01/05/2023    CREATININE 1 08 01/06/2023    CREATININE 1 16 01/06/2023    CREATININE 1 13 01/05/2023     · Cr at baseline    History of DVT (deep vein thrombosis)  Assessment & Plan  Continue Eliquis    Hypertension  Assessment & Plan  /67   Pulse 68   Temp 98 6 °F (37 °C)   Resp 17   Ht 5' 5 98" (1 676 m)   Wt 91 6 kg (201 lb 15 1 oz)   SpO2 98%   BMI 32 61 kg/m²   · Blood pressures stable  · Continue Coreg  · Continue to hold diuretics    Lupus (systemic lupus erythematosus) (HCC)  Assessment & Plan  · Has generalized rash over his body  · Will follow-up outpatient with rheumatology  · Continue home meds      VTE Pharmacologic Prophylaxis:   Pharmacologic: Heparin  Mechanical VTE Prophylaxis in Place: Yes    Patient Centered Rounds: I have performed bedside rounds with nursing staff today  Discussions with Specialists or Other Care Team Provider: cm, nursing    Education and Discussions with Family / Patient: pt    Time Spent for Care: 30 minutes  More than 50% of total time spent on counseling and coordination of care as described above      Current Length of Stay: 8 day(s)    Current Patient Status: Inpatient   Certification Statement: The patient will continue to require additional inpatient hospital stay due to see below    Discharge Plan: Medically clear awaiting rehab    Code Status: Level 1 - Full Code      Subjective:   Denies chest pain, sob, cough, fevers    Objective:     Vitals:   Temp (24hrs), Av 8 °F (37 1 °C), Min:98 6 °F (37 °C), Max:98 9 °F (37 2 °C)    Temp:  [98 6 °F (37 °C)-98 9 °F (37 2 °C)] 98 6 °F (37 °C)  HR:  [67-76] 68  Resp:  [16-17] 17  BP: (104-120)/(66-74) 104/67  SpO2:  [95 %-99 %] 98 %  Body mass index is 32 61 kg/m²  Input and Output Summary (last 24 hours): Intake/Output Summary (Last 24 hours) at 2023 1004  Last data filed at 2023 1207  Gross per 24 hour   Intake 1977 5 ml   Output 2150 ml   Net -172 5 ml       Physical Exam:     Physical Exam  Constitutional:       General: He is not in acute distress  Appearance: He is well-developed  He is not diaphoretic  HENT:      Head: Normocephalic and atraumatic  Nose: Nose normal       Mouth/Throat:      Pharynx: No oropharyngeal exudate  Eyes:      General: No scleral icterus  Conjunctiva/sclera: Conjunctivae normal    Cardiovascular:      Rate and Rhythm: Normal rate and regular rhythm  Heart sounds: Normal heart sounds  No murmur heard  No friction rub  No gallop  Pulmonary:      Effort: Pulmonary effort is normal  No respiratory distress  Breath sounds: Normal breath sounds  No wheezing or rales  Chest:      Chest wall: No tenderness  Abdominal:      General: Bowel sounds are normal  There is no distension  Palpations: Abdomen is soft  Tenderness: There is no abdominal tenderness  There is no guarding  Musculoskeletal:         General: No tenderness or deformity  Normal range of motion  Cervical back: Normal range of motion and neck supple  Skin:     General: Skin is warm and dry  Findings: No erythema  Neurological:      Mental Status: He is alert  Mental status is at baseline  (    Additional Data:     Labs:    Results from last 7 days   Lab Units 01/05/23  0500   WBC Thousand/uL 4 39   HEMOGLOBIN g/dL 10 9*   HEMATOCRIT % 32 1*   PLATELETS Thousands/uL 298   NEUTROS PCT % 64   LYMPHS PCT % 17   MONOS PCT % 17*   EOS PCT % 0     Results from last 7 days   Lab Units 01/06/23  0834   SODIUM mmol/L 127*   POTASSIUM mmol/L 4 4   CHLORIDE mmol/L 95*   CO2 mmol/L 24   BUN mg/dL 15   CREATININE mg/dL 1 08   ANION GAP mmol/L 8   CALCIUM mg/dL 8 6   GLUCOSE RANDOM mg/dL 99                           * I Have Reviewed All Lab Data Listed Above  * Additional Pertinent Lab Tests Reviewed:  All Labs Within Last 24 Hours Reviewed    Imaging:    Imaging Reports Reviewed Today Include: na  Imaging Personally Reviewed by Myself Includes:  na    Recent Cultures (last 7 days):           Last 24 Hours Medication List:   Current Facility-Administered Medications   Medication Dose Route Frequency Provider Last Rate   • acetaminophen  650 mg Oral Q6H PRN Callum Rey MD     • apixaban  5 mg Oral BID Callum Rey MD     • baclofen  20 mg Oral TID Callum Rey MD     • carvedilol  12 5 mg Oral BID With Meals Callum Rey MD     • cephalexin  500 mg Oral Q6H [address] Rafita Franklin MD     • escitalopram  10 mg Oral Daily Callum Rey MD     • gabapentin  600 mg Oral TID Callum Rey MD     • HYDROmorphone  0 2 mg Intravenous Q4H PRN Callum Rey MD     • hydroxychloroquine  400 mg Oral HS Callum Rey MD     • hydrOXYzine HCL  10 mg Oral Q6H PRN Callum Rey MD     • loratadine  10 mg Oral Daily Callum Rey MD     • mycophenolate  500 mg Oral Q12H Leo Lomas MD     • ondansetron  4 mg Intravenous Q4H PRN Callum Rey MD     • oxyCODONE  7 5 mg Oral Q6H PRN Callum Rey MD      Or   • oxyCODONE  10 mg Oral Q6H PRN Callum Rey MD     • polyethylene glycol  17 g Oral Daily Callum Rey MD     • senna-docusate sodium  1 tablet Oral HS Abram Delong DO • sodium chloride  100 mL/hr Intravenous Continuous Yony Ozuna  mL/hr (01/06/23 0948)   • tamsulosin  0 4 mg Oral Daily With Ronda Ghotra MD     • triamcinolone   Topical BID Tobi Chapman MD          Today, Patient Was Seen By: Carroll Scanlon MD    ** Please Note: Dictation voice to text software may have been used in the creation of this document   **

## 2023-01-06 NOTE — CASE MANAGEMENT
Case Management Progress Note    Patient name Jesse Enriquez  Location [id number] MRN [id number]  : 1970 Date 2023       LOS (days): 8  Geometric Mean LOS (GMLOS) (days):   Days to [address]:        OBJECTIVE:        Current admission status: Inpatient  Preferred Pharmacy:   [id number] [facility], [facility] [address]  [address] [address]  Phone: [phone] Fax: [phone]    Primary Care Provider: Hany Lombardi MD    Primary Insurance: [employer]!  Secondary Insurance:     PROGRESS NOTE:  CM coordinated process for team intervention ref- requested interventions and notes for ARC eval   Patient clinically approved and agreeable to [facility] insurance approves  [facility] not have a bed at this time  ARC Liaison submitting for auth  Patient is aware insur may deny and then STR options will be pursed

## 2023-01-06 NOTE — OCCUPATIONAL THERAPY NOTE
Occupational Therapy Treatment Note     Patient Name: Elvira Winn  ZYMFS'X Date: 1/6/2023  Problem List  Principal Problem:    Cellulitis of right lower extremity  Active Problems:    Lupus (systemic lupus erythematosus) ([facility] [address] )    Hypertension    History of DVT (deep vein thrombosis)    Chronic kidney disease, stage 3 (HCC)    Hyponatremia          01/06/23 0939   OT Last Visit   OT Visit Date 01/06/23   Note Type   Note Type Treatment for insurance authorization   Pain Assessment   Pain Assessment Tool 0-10   Pain Score 5   Pain Location/Orientation Orientation: Right;Location: Leg   Pain Onset/Description Onset: Ongoing;Frequency: Constant/Continuous   Hospital Pain Intervention(s) Ambulation/increased activity;Repositioned;Medication (See MAR)   Restrictions/Precautions   Weight Bearing Precautions Per Order No   Other Precautions Chair Alarm; Bed Alarm;Multiple lines;Pain; Fall Risk   Lifestyle   Autonomy At baseline, Patient reporting being independent with ADLs/IADLs and lives with family in a one level house   Reciprocal Relationships Supportive family   Service to Others On disability   ADL   Where Assessed Edge of bed   Grooming Assistance 5  Supervision/Setup   UB Bathing Assistance 5  Supervision/Setup   UB Bathing Deficit Setup;Verbal cueing; Increased time to complete   LB Bathing Assistance 3  Moderate Assistance   LB Bathing Deficit Setup;Steadying;Verbal cueing;Supervision/safety; Increased time to complete; Buttocks;Right lower leg including foot; Left lower leg including foot   UB Dressing Assistance 4  Minimal Assistance   UB Dressing Deficit Setup;Verbal cueing;Supervision/safety; Increased time to complete;Pull around back;Pull down in back   LB Dressing Assistance 2  Maximal Assistance   LB Dressing Deficit Setup;Steadying; Requires assistive device for steadying;Verbal cueing;Supervision/safety; Increased time to complete; Don/doff R sock; Don/doff L sock; Thread LLE into pants; Thread RLE into pants;Thread RLE into underwear; Thread LLE into underwear;Pull up over hips   Toileting Assistance  2  Maximal Assistance   Toileting Deficit Setup;Steadying;Verbal cueing;Supervison/safety; Increased time to complete;Grab bar use;Perineal hygiene;Clothing management up;Clothing management down   Bed Mobility   Supine to Sit   (DNT: pt seated at EOB upon arrival)   Sit to Supine 5  Supervision   Additional items Assist x 1;Bedrails; Increased time required;Verbal cues   Transfers   Sit to Stand 3  Moderate assistance   Additional items Assist x 2; Increased time required;Verbal cues   Stand to Sit 3  Moderate assistance   Additional items Assist x 2;Bedrails; Increased time required;Verbal cues   Additional Comments Pt completed 6 STS transfers at EOB  Pt tolerated standing for 30-60 minutes each standing trial  Pt requires verbal cues for proper body mechanics  Pt limited d/t pain  Functional Mobility   Functional Mobility 3  Moderate assistance  (Assist of 2)   Additional Comments Pt ambulated short side-steps at EOB  Pt grossly unsteady and limits WB through RLE   Additional items Rolling walker   Cognition   Overall Cognitive Status WFL   Arousal/Participation Alert   Attention Within functional limits   Orientation Level Oriented X4   Memory Within functional limits   Following Commands Follows all commands and directions without difficulty   Comments Pt agreeable to OT session   Activity Tolerance   Activity Tolerance Patient limited by pain   Medical Staff Made Aware Pt seen with PT due to the patient's co-morbidities, clinically unstable presentation, and present impairments which are a regression from the patient's baseline     Assessment   Assessment Patient participated in Skilled OT session this date with interventions consisting of ADL re training with the use of correct body mechnaics,  therapeutic activities to: increase activity tolerance and increase dynamic sit/ stand balance during functional activity    Patient agreeable to OT treatment session, upon arrival patient was found seated at edge of bed and in no apparent distress  Patient completed functional transfers with mod assist of 2 and ambulated side-steps towards [facility] with mod assist of 2  While seated at EOB, pt demonstrated the ability to complete UB ADLs with sup-min assist and mod-max assist for LB ADLs  In comparison to previous session, patient with improvements in standing tolerance and functional reach  Patient requiring one step directives and frequent rest periods  Patient continues to be functioning below baseline level, occupational performance remains limited secondary to factors listed above and increased risk for falls and injury  From OT standpoint, recommendation at time of d/c would be Post acute rehabilitation services  Patient to benefit from continued Occupational Therapy treatment while in the hospital to address deficits as defined above and maximize level of functional independence with ADLs and functional mobility  Plan   Treatment Interventions ADL retraining;Functional transfer training; Endurance training;Patient/family training   Goal Expiration Date 01/13/23   OT Treatment Day 1   OT Frequency 3-5x/wk   Recommendation   OT Discharge Recommendation Post acute rehabilitation services   Additional Comments  The patient's raw score on the AM-PAC Daily Activity inpatient short form is 15, standardized score is 34 69, less than 39 4  Patients at this level are likely to benefit from discharge to post-acute rehabilitation services  Please refer to the recommendation of the Occupational Therapist for safe discharge planning     AM-PAC Daily Activity Inpatient   Lower Body Dressing 2   Bathing 2   Toileting 2   Upper Body Dressing 3   Grooming 3   Eating 3   Daily Activity Raw Score 15   Daily Activity Standardized Score (Calc for Raw Score >=11) 34 69   AM-PAC Applied Cognition Inpatient   Following a Speech/Presentation 4 Understanding Ordinary Conversation 4   Taking Medications 3   Remembering Where Things Are Placed or Put Away 3   Remembering List of 4-5 Errands 3   Taking Care of Complicated Tasks 3   Applied Cognition Raw Score 20   Applied Cognition Standardized Score 41 76   Modified [place] Scale   Modified [place] Scale 4   End of Consult   Patient Position at End of Consult Supine; All needs within reach   Nurse Communication Nurse aware of consult     Nate Duarte OTCORINE

## 2023-01-06 NOTE — ARC ADMISSION
Referral received for [facility]  After review, patient is accepted at [facility] once IV Dilaudid is discontinued and sodium has improved

## 2023-01-06 NOTE — PLAN OF CARE
Problem: PHYSICAL THERAPY ADULT  Goal: Performs mobility at highest level of function for planned discharge setting  See evaluation for individualized goals  Description: Treatment/Interventions: Functional transfer training, LE strengthening/ROM, ADL retraining, Therapeutic exercise, Endurance training, Bed mobility, Gait training, Compensatory technique education, Spoke to nursing, OT          See flowsheet documentation for full assessment, interventions and recommendations  Outcome: Progressing  Note: Prognosis: Good  Problem List: Decreased strength, Decreased endurance, Impaired balance, Decreased mobility, Pain, Decreased skin integrity, Impaired sensation  Assessment: Pt seen for PT treatment session this date with interventions consisting of gait training w/ emphasis on improving pt's ability to ambulate level surfaces x 2' with mod A of 2 provided by therapist with RW, Therapeutic exercise consisting of: BLE exercises performed supine and seated and therapeutic activity consisting of training: bed mobility, supine<>sit transfers, sit<>stand transfers and vc and tactile cues for static standing posture faciliation  Pt agreeable to PT treatment session upon arrival, pt found supine in bed w/ HOB elevated, in no apparent distress and responsive  In comparison to previous session, pt with improvements in LE strength, mobility and activity tolerance  Post session: pt returned BTB, bed alarm engaged, all needs in reach and RN notified of session findings/recommendations  Continue to recommend post acute rehabilitation services at time of d/c in order to maximize pt's functional independence and safety w/ mobility   Pt continues to be functioning below baseline level, and remains limited 2* factors listed above and including continued need for medical management and monitoring, decreased strength and balance resulting in an increased risk for falls, decreased endurance and activity tolerance limiting mobility, increased pain and edema in LEs, poor skin integrity  PT will continue to see pt during current hospitalization in order to address the deficits listed above and provide interventions consistent w/ POC in effort to achieve STGs  Barriers to Discharge: Inaccessible home environment, Other (Comment) (decline in mobility status)     PT Discharge Recommendation: Post acute rehabilitation services    See flowsheet documentation for full assessment

## 2023-01-06 NOTE — PLAN OF CARE
Problem: Potential for Falls  Goal: Patient will remain free of falls  Description: INTERVENTIONS:  - Educate patient/family on patient safety including physical limitations  - Instruct patient to call for assistance with activity   - Consult OT/PT to assist with strengthening/mobility   - Keep Call bell within reach  - Keep bed low and locked with side rails adjusted as appropriate  - Keep care items and personal belongings within reach  - Initiate and maintain comfort rounds  - Make Fall Risk Sign visible to staff  - Offer Toileting every 2 Hours, in advance of need  - Initiate/Maintain bed/chair alarm  - Apply yellow socks and bracelet for high fall risk patients  - Consider moving patient to room near nurses station  Outcome: Progressing     Problem: Prexisting or High Potential for Compromised Skin Integrity  Goal: Skin integrity is maintained or improved  Description: INTERVENTIONS:  - Identify patients at risk for skin breakdown  - Assess and monitor skin integrity  - Assess and monitor nutrition and hydration status  - Monitor labs   - Assess for incontinence   - Turn and reposition patient  - Assist with mobility/ambulation  - Relieve pressure over bony prominences  - Avoid friction and shearing  - Provide appropriate hygiene as needed including keeping skin clean and dry  - Evaluate need for skin moisturizer/barrier cream  - Collaborate with interdisciplinary team   - Patient/family teaching  - Consider wound care consult   Outcome: Progressing     Problem: PAIN - ADULT  Goal: Verbalizes/displays adequate comfort level or baseline comfort level  Description: Interventions:  - Encourage patient to monitor pain and request assistance  - Assess pain using appropriate pain scale  - Administer analgesics based on type and severity of pain and evaluate response  - Implement non-pharmacological measures as appropriate and evaluate response  - Consider cultural and social influences on pain and pain management  - Notify physician/advanced practitioner if interventions unsuccessful or patient reports new pain  Outcome: Progressing

## 2023-01-06 NOTE — ASSESSMENT & PLAN NOTE
· Day 7/14 cefazolin   Plan to switch to keflex in 24 hours  · Discontinued vancomycin  · Since pain is improving but he still has difficulty ambulating, will continue working with PT OT  · ID following  · Transition to p o  antibiotics

## 2023-01-06 NOTE — ASSESSMENT & PLAN NOTE
Lab Results   Component Value Date    EGFR 77 01/06/2023    EGFR 70 01/06/2023    EGFR 73 01/05/2023    CREATININE 1 08 01/06/2023    CREATININE 1 16 01/06/2023    CREATININE 1 13 01/05/2023     · Cr at baseline

## 2023-01-06 NOTE — PHYSICAL THERAPY NOTE
Physical Therapy Treatment Note    Patient's Name: Yolis Hernández    Admitting Diagnosis  Foot pain [M79 [id number]]  Cellulitis of right leg [[id number]]    Problem List  Patient Active Problem List   Diagnosis    Lupus (systemic lupus erythematosus) ([facility] [address] )    Lup[facility] nephritis ([facility] )    Hypertension    History of DVT (deep vein thrombosis)    Chronic kidney disease, stage 3 (HCC)    Persistent proteinuria    Anemia in chronic kidney disease    Muscle weakness (generalized)    Tremor of both hands    Spasticity    Gait difficulty    Neuropathic pain    Cellulitis of right lower extremity    Hyponatremia        01/06/23 0915   PT Last Visit   PT Visit Date 01/06/23   Note Type   Note Type Treatment for insurance authorization   Pain Assessment   Pain Assessment Tool 0-10   Pain Score 5   Pain Location/Orientation Orientation: Right;Other (Comment)  (ankle)   Pain Onset/Description Onset: Ongoing;Frequency: Constant/Continuous   Patient's Stated Pain Goal No pain   Hospital Pain Intervention(s) Repositioned   Restrictions/Precautions   Weight Bearing Precautions Per Order No   Braces or Orthoses Other (Comment)  (none reported)   Other Precautions Chair Alarm; Bed Alarm;Pain; Fall Risk;Multiple lines   General   Chart Reviewed Yes   Response to Previous Treatment Patient with no complaints from previous session  Family/Caregiver Present No   Cognition   Overall Cognitive Status WFL   Arousal/Participation Alert   Attention Within functional limits   Orientation Level Oriented X4   Memory Within functional limits   Following Commands Follows all commands and directions without difficulty   Comments Pt agreeable to PT   Bed Mobility   Supine to Sit 4  Minimal assistance   Additional items Assist x 1;Bedrails;HOB elevated; Increased time required;Verbal cues   Sit to Supine 5  Supervision   Additional items Assist x 1;HOB elevated; Bedrails; Increased time required;Verbal cues   Additional Comments Pt received at start of session supine in bed with HOB elevated  Following session, pt returned to bed and remained with needs met, alarm activated and call bell within reach   Transfers   Sit to Stand 3  Moderate assistance   Additional items Assist x 2;Armrests; Increased time required;Verbal cues   Stand to Sit 3  Moderate assistance   Additional items Assist x 2;Armrests; Increased time required;Verbal cues   Additional Comments with use of RW, VCs for hand placements  Pt performed STS transfers x6 throughout session   Ambulation/Elevation   Gait pattern Decreased foot clearance; Inconsistent ana; Short stride; Foward flexed;Decreased heel strike   Gait Assistance 3  Moderate assist   Additional items Assist x 2;Verbal cues   Assistive Device Rolling walker   Distance 2' lateral side steps towards [facility]   Stair Management Assistance Not tested   Ambulation/Elevation Additional Comments Ambulation limited due to reports of increased pain in R ankle   Balance   Static Sitting Fair   Dynamic Sitting Fair -   Static Standing Poor   Dynamic Standing Poor   Ambulatory Poor   Endurance Deficit   Endurance Deficit Yes   Endurance Deficit Description decreased activity tolerance   Activity Tolerance   Activity Tolerance Patient limited by pain   Nurse Made Aware SIOBHAN Martinez Staff Made Aware OT [place]   Exercises   Heelslides Supine;10 reps;AROM; Bilateral   Hip Flexion Sitting;10 reps;AROM; Bilateral   Hip Abduction Supine;10 reps;AROM; Bilateral   Knee AROM Long Arc Quad Sitting;20 reps;AROM; Bilateral   Ankle Pumps Supine;20 reps;AROM; Bilateral   Assessment   Prognosis Good   Problem List Decreased strength;Decreased endurance; Impaired balance;Decreased mobility;Pain;Decreased skin integrity; Impaired sensation   Assessment Pt seen for PT treatment session this date with interventions consisting of gait training w/ emphasis on improving pt's ability to ambulate level surfaces x 2' with mod A of 2 provided by therapist with RW, Therapeutic exercise consisting of: BLE exercises performed supine and seated and therapeutic activity consisting of training: bed mobility, supine<>sit transfers, sit<>stand transfers and vc and tactile cues for static standing posture faciliation  Pt agreeable to PT treatment session upon arrival, pt found supine in bed w/ HOB elevated, in no apparent distress and responsive  In comparison to previous session, pt with improvements in LE strength, mobility and activity tolerance  Post session: pt returned BTB, bed alarm engaged, all needs in reach and RN notified of session findings/recommendations  Continue to recommend post acute rehabilitation services at time of d/c in order to maximize pt's functional independence and safety w/ mobility  Pt continues to be functioning below baseline level, and remains limited 2* factors listed above and including continued need for medical management and monitoring, decreased strength and balance resulting in an increased risk for falls, decreased endurance and activity tolerance limiting mobility, increased pain and edema in LEs, poor skin integrity  PT will continue to see pt during current hospitalization in order to address the deficits listed above and provide interventions consistent w/ POC in effort to achieve STGs  Barriers to Discharge Inaccessible home environment; Other (Comment)  (decline in mobility status)   Goals   STG Expiration Date 01/08/23   PT Treatment Day 3   Plan   Treatment/Interventions Functional transfer training;LE strengthening/ROM;ADL retraining; Therapeutic exercise; Endurance training;Bed mobility;Gait training; Compensatory technique education;Spoke to nursing;OT   Progress Progressing toward goals   PT Frequency 3-5x/wk   Recommendation   PT Discharge Recommendation Post acute rehabilitation services   AM-PAC Basic Mobility Inpatient   Turning in Flat Bed Without Bedrails 3   Lying on Back to Sitting on Edge of Flat Bed Without Bedrails 3   Moving Bed to Chair 1   Standing Up From Chair Using Arms 2   Walk in Room 1   Climb 3-5 Stairs With Railing 1   Basic Mobility Inpatient Raw Score 11   Basic Mobility Standardized Score 30 25   Highest Level Of Mobility   -HLM Goal 4: Move to chair/commode   -HLM Achieved 5: Stand (1 or more minutes)   Education   Education Provided Mobility training;Assistive device   Patient Reinforcement needed   End of Consult   Patient Position at End of Consult Supine;Bed/Chair alarm activated; All needs within reach       Neville Nicholson PT    Time In: 09:15  Time Out: 09:40  Total Time: 25 mins

## 2023-01-06 NOTE — PLAN OF CARE
Problem: OCCUPATIONAL THERAPY ADULT  Goal: Performs self-care activities at highest level of function for planned discharge setting  See evaluation for individualized goals  Description: Treatment Interventions: ADL retraining, Functional transfer training, Endurance training, Patient/family training          See flowsheet documentation for full assessment, interventions and recommendations  Outcome: Progressing  Note: Limitation: Decreased ADL status, Decreased endurance, Decreased high-level ADLs     Assessment: Patient participated in Skilled OT session this date with interventions consisting of ADL re training with the use of correct body mechnaics,  therapeutic activities to: increase activity tolerance and increase dynamic sit/ stand balance during functional activity    Patient agreeable to OT treatment session, upon arrival patient was found seated at edge of bed and in no apparent distress  Patient completed functional transfers with mod assist of 2 and ambulated side-steps towards [facility] with mod assist of 2  While seated at EOB, pt demonstrated the ability to complete UB ADLs with sup-min assist and mod-max assist for LB ADLs  In comparison to previous session, patient with improvements in standing tolerance and functional reach  Patient requiring one step directives and frequent rest periods  Patient continues to be functioning below baseline level, occupational performance remains limited secondary to factors listed above and increased risk for falls and injury  From OT standpoint, recommendation at time of d/c would be Post acute rehabilitation services  Patient to benefit from continued Occupational Therapy treatment while in the hospital to address deficits as defined above and maximize level of functional independence with ADLs and functional mobility       OT Discharge Recommendation: Post acute rehabilitation services        Kavya Smaller OTD

## 2023-01-07 LAB
ANION GAP SERPL CALCULATED.3IONS-SCNC: 6 MMOL/L (ref 4–13)
BUN SERPL-MCNC: 15 MG/DL (ref 5–25)
CALCIUM SERPL-MCNC: 8.1 MG/DL (ref 8.3–10.1)
CHLORIDE SERPL-SCNC: 99 MMOL/L (ref 96–108)
CO2 SERPL-SCNC: 24 MMOL/L (ref 21–32)
CREAT SERPL-MCNC: 1.09 MG/DL (ref 0.6–1.3)
GFR SERPL CREATININE-BSD FRML MDRD: 76 ML/MIN/1.73SQ M
GLUCOSE SERPL-MCNC: 107 MG/DL (ref 65–140)
POTASSIUM SERPL-SCNC: 5 MMOL/L (ref 3.5–5.3)
SODIUM SERPL-SCNC: 129 MMOL/L (ref 135–147)

## 2023-01-07 RX ORDER — SODIUM CHLORIDE 1000 MG
3 TABLET, SOLUBLE MISCELLANEOUS
Status: DISCONTINUED | OUTPATIENT
Start: 2023-01-07 | End: 2023-01-10 | Stop reason: HOSPADM

## 2023-01-07 RX ORDER — SODIUM CHLORIDE 1000 MG
1 TABLET, SOLUBLE MISCELLANEOUS
Status: DISCONTINUED | OUTPATIENT
Start: 2023-01-07 | End: 2023-01-07

## 2023-01-07 RX ADMIN — CEPHALEXIN 500 MG: 500 CAPSULE ORAL at 15:36

## 2023-01-07 RX ADMIN — ESCITALOPRAM OXALATE 10 MG: 10 TABLET ORAL at 09:21

## 2023-01-07 RX ADMIN — GABAPENTIN 600 MG: 300 CAPSULE ORAL at 15:37

## 2023-01-07 RX ADMIN — MYCOPHENOLATE MOFETIL 500 MG: 250 CAPSULE ORAL at 09:21

## 2023-01-07 RX ADMIN — OXYCODONE HYDROCHLORIDE 10 MG: 10 TABLET ORAL at 06:29

## 2023-01-07 RX ADMIN — Medication 3 G: at 15:37

## 2023-01-07 RX ADMIN — APIXABAN 5 MG: 5 TABLET, FILM COATED ORAL at 09:21

## 2023-01-07 RX ADMIN — BACLOFEN 20 MG: 10 TABLET ORAL at 15:36

## 2023-01-07 RX ADMIN — GABAPENTIN 600 MG: 300 CAPSULE ORAL at 09:21

## 2023-01-07 RX ADMIN — TRIAMCINOLONE ACETONIDE 1 APPLICATION: 1 CREAM TOPICAL at 22:52

## 2023-01-07 RX ADMIN — APIXABAN 5 MG: 5 TABLET, FILM COATED ORAL at 20:27

## 2023-01-07 RX ADMIN — CEPHALEXIN 500 MG: 500 CAPSULE ORAL at 22:50

## 2023-01-07 RX ADMIN — OXYCODONE HYDROCHLORIDE 10 MG: 10 TABLET ORAL at 15:36

## 2023-01-07 RX ADMIN — HYDROXYCHLOROQUINE SULFATE 400 MG: 200 TABLET, FILM COATED ORAL at 22:50

## 2023-01-07 RX ADMIN — TRIAMCINOLONE ACETONIDE: 1 CREAM TOPICAL at 09:24

## 2023-01-07 RX ADMIN — SENNOSIDES AND DOCUSATE SODIUM 1 TABLET: 50; 8.6 TABLET ORAL at 22:50

## 2023-01-07 RX ADMIN — CEPHALEXIN 500 MG: 500 CAPSULE ORAL at 05:47

## 2023-01-07 RX ADMIN — CARVEDILOL 12.5 MG: 12.5 TABLET, FILM COATED ORAL at 08:30

## 2023-01-07 RX ADMIN — LORATADINE 10 MG: 10 TABLET ORAL at 09:21

## 2023-01-07 RX ADMIN — GABAPENTIN 600 MG: 300 CAPSULE ORAL at 20:27

## 2023-01-07 RX ADMIN — CEPHALEXIN 500 MG: 500 CAPSULE ORAL at 12:07

## 2023-01-07 RX ADMIN — BACLOFEN 20 MG: 10 TABLET ORAL at 09:21

## 2023-01-07 RX ADMIN — BACLOFEN 20 MG: 10 TABLET ORAL at 20:27

## 2023-01-07 RX ADMIN — Medication 3 G: at 12:07

## 2023-01-07 RX ADMIN — TAMSULOSIN HYDROCHLORIDE 0.4 MG: 0.4 CAPSULE ORAL at 15:37

## 2023-01-07 RX ADMIN — MYCOPHENOLATE MOFETIL 500 MG: 250 CAPSULE ORAL at 20:27

## 2023-01-07 NOTE — ASSESSMENT & PLAN NOTE
/68 (BP Location: Right arm)   Pulse 70   Temp 98 4 °F (36 9 °C)   Resp 17   Ht 5' 5 98" (1 676 m)   Wt 91 6 kg (201 lb 15 1 oz)   SpO2 96%   BMI 32 61 kg/m²   · Blood pressure controlled  · Continue beta-blocker

## 2023-01-07 NOTE — ASSESSMENT & PLAN NOTE
No significant improvement with IV fluids suspect SIADH  Started on salt tabs  Follow-up nephro recs

## 2023-01-07 NOTE — CONSULTS
NEPHROLOGY CONSULTATION NOTE    Patient: Dior Freeman               Sex: male          DOA: 12/29/2022  8:46 AM   YOB: 1970         Age: 45 y o          LOS:  LOS: 9 days   Encounter Date: 1/7/2023    REFERRING PHYSICIAN: Mary Bustillo MD     REASON FOR THE REFERRAL / CONSULTATION: Further management of hyponatremia    DATE OF CONSULTATION / SERVICE: 1/7/2023    ADMISSION DIAGNOSIS: Cellulitis of right lower extremity     Chief Complaint   Patient presents with   • Foot Pain     Presents by EMS with R foot redness, swelling and pain x1 week  Unable to bare weight or ambulate as of this morning  HPI     This is 52-year-old male with past medical history significant for lupus nephritis, hypertension, initially admitted with foot pain  During the hospital stay, patient sodium level has remained low and nephrology were consulted for further management of hyponatremia  I have personally seen and examined patient earlier this morning  Patient was initially admitted with foot pain and currently being treated for right lower extremity cellulitis  Upon review of old medical record from epic chart review and also from care everywhere, patient seems to have chronic hyponatremia and was initially admitted with Sodium 133 and currently receiving IV fluid  Current sodium level is 127  Patient is currently being followed by nephrologist at [facility] and I have personally reviewed patient's primary nephrologist note from care everywhere  Patient was diagnosed with lupus nephritis based on kidney biopsy in November 2015 and had received induction therapy with Cytoxan  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  Patient is currently receiving  mg p o  twice daily      Upon review of old medical record from epic chart review and also from care everywhere, previously known baseline creatinine seems to be around 1 4-1 5 but during the hospital stay, patient's renal function seems to have improved and current creatinine is 1 08 which is better and below to previously known baseline creatinine  Patient has underlying hypertension which currently seems to be under good control with current antihypertensive medications  Patient was receiving diuretics as outpatient which is currently on hold  Currently patient denies nausea, vomiting, headache, dizziness, abdominal pain, constipation or rash  PAST MEDICAL HISTORY     Past Medical History:   Diagnosis Date   • Cervical mass    • Coronary artery disease    • Depression    • DVT (deep venous thrombosis) ([facility] )    • Hypertension    • Lupus ([facility] )    • Renal disorder        PAST SURGICAL HISTORY     Past Surgical History:   Procedure Laterality Date   • APPENDECTOMY     • CERVICAL SPINE SURGERY     • CHOLECYSTECTOMY     • COLONOSCOPY N/A 8/23/2018    Procedure: COLONOSCOPY;  Surgeon: Yamilet Morales MD;  Location: [facility]; Service: Gastroenterology   • ESOPHAGOGASTRODUODENOSCOPY N/A 8/22/2018    Procedure: ESOPHAGOGASTRODUODENOSCOPY (EGD); Surgeon: Yamilet Morales MD;  Location: [facility];   Service: Gastroenterology   • NECK SURGERY     • VASCULAR SURGERY         ALLERGIES     Allergies   Allergen Reactions   • Sulfa Antibiotics Rash       SOCIAL HISTORY     Social History     Substance and Sexual Activity   Alcohol Use Never     Social History     Substance and Sexual Activity   Drug Use Never     Social History     Tobacco Use   Smoking Status Never   Smokeless Tobacco Never       FAMILY HISTORY     Family History   Problem Relation Age of Onset   • Heart disease Mother    • No Known Problems Father        CURRENT MEDICATIONS       Current Facility-Administered Medications:   •  acetaminophen (TYLENOL) tablet 650 mg, 650 mg, Oral, Q6H PRN, Ora Gonzáles MD, 650 mg at 12/31/22 1897  •  apixaban (ELIQUIS) tablet 5 mg, 5 mg, Oral, BID, Ora Gonzáles MD, 5 mg at 01/07/23 4650  •  baclofen tablet 20 mg, 20 mg, Oral, TID, Li Blank MD, 20 mg at 01/07/23 7684  •  carvedilol (COREG) tablet 12 5 mg, 12 5 mg, Oral, BID With Meals, Li Blank MD, 12 5 mg at 01/07/23 0830  •  cephalexin (KEFLEX) capsule 500 mg, 500 mg, Oral, Q6H [address], Latisha Martin MD, 500 mg at 01/07/23 0547  •  escitalopram (LEXAPRO) tablet 10 mg, 10 mg, Oral, Daily, Li Blank MD, 10 mg at 01/07/23 0913  •  gabapentin (NEURONTIN) capsule 600 mg, 600 mg, Oral, TID, Li Blank MD, 600 mg at 01/07/23 4770  •  hydroxychloroquine (PLAQUENIL) tablet 400 mg, 400 mg, Oral, HS, Li Blank MD, 400 mg at 01/06/23 2216  •  hydrOXYzine HCL (ATARAX) tablet 10 mg, 10 mg, Oral, Q6H PRN, Li Blank MD, 10 mg at 01/04/23 2222  •  loratadine (CLARITIN) tablet 10 mg, 10 mg, Oral, Daily, Li Blank MD, 10 mg at 01/07/23 9720  •  mycophenolate (CELLCEPT) capsule 500 mg, 500 mg, Oral, Q12H [address], Li Blank MD, 500 mg at 01/07/23 2434  •  ondansetron [facility]) injection 4 mg, 4 mg, Intravenous, Q4H PRN, Li Blank MD, 4 mg at 12/31/22 1140  •  oxyCODONE (ROXICODONE) split tablet 7 5 mg, 7 5 mg, Oral, Q6H PRN **OR** oxyCODONE (ROXICODONE) immediate release tablet 10 mg, 10 mg, Oral, Q6H PRN, Li Blank MD, 10 mg at 01/07/23 2368  •  polyethylene glycol (MIRALAX) packet 17 g, 17 g, Oral, Daily, Li Blank MD, 17 g at 01/04/23 0900  •  senna-docusate sodium (SENOKOT S) 8 6-50 mg per tablet 1 tablet, 1 tablet, Oral, HS, Abram Delong DO, 1 tablet at 01/06/23 2210  •  sodium chloride tablet 3 g, 3 g, Oral, TID With Meals, Samy Jordan MD  •  tamsulosin (FLOMAX) capsule 0 4 mg, 0 4 mg, Oral, Daily With Reji Coffey MD, 0 4 mg at 01/06/23 1609  •  triamcinolone (KENALOG) 0 1 % cream, , Topical, BID, Li Blank MD, Given at 01/07/23 1781    REVIEW OF SYSTEMS     Complete 10 points of review of systems were obtained and discussed in length with patient today    Complete 10 points of review of systems were negative/unremarkable except mentioned in the HPI section  OBJECTIVE     Current Weight: Weight - Scale: 91 6 kg (201 lb 15 1 oz)  /68 (BP Location: Right arm)   Pulse 70   Temp 98 4 °F (36 9 °C)   Resp 17   Ht 5' 5 98" (1 676 m)   Wt 91 6 kg (201 lb 15 1 oz)   SpO2 96%   BMI 32 61 kg/m²   Vitals:    01/07/23 0814   BP: 110/68   Pulse: 70   Resp: 17   Temp:    SpO2: 96%     Body mass index is 32 61 kg/m²  Intake/Output Summary (Last 24 hours) at 1/7/2023 1017  Last data filed at 1/7/2023 0630  Gross per 24 hour   Intake 1200 ml   Output 1850 ml   Net -650 ml       PHYSICAL EXAMINATION     Physical Exam  Constitutional:       General: He is not in acute distress  HENT:      Right Ear: External ear normal    Eyes:      Conjunctiva/sclera:      Right eye: No hemorrhage  Neck:      Thyroid: No thyromegaly  Pulmonary:      Effort: No accessory muscle usage or respiratory distress  Abdominal:      General: There is no distension  Skin:     General: Skin is warm  Coloration: Skin is not jaundiced  Psychiatric:         Behavior: Behavior is not combative             LAB RESULTS        Results from last 7 days   Lab Units 01/06/23  0834 01/06/23  0009 01/05/23  1711 01/05/23  0500 01/04/23  1958 01/04/23  1011 01/04/23  0531 01/01/23  0606   WBC Thousand/uL  --   --   --  4 39  --   --  4 54 4 17*   HEMOGLOBIN g/dL  --   --   --  10 9*  --   --  10 6* 9 8*   HEMATOCRIT %  --   --   --  32 1*  --   --  31 3* 29 3*   PLATELETS Thousands/uL  --   --   --  298  --   --  260 211   SODIUM mmol/L 127* 128* 125* 123* 123* 123* 123* 132*   POTASSIUM mmol/L 4 4 4 3 4 4 4 2 4 2 4 3 4 6 3 9   CHLORIDE mmol/L 95* 95* 93* 89* 90* 89* 90* 98   CO2 mmol/L 24 24 26 24 25 26 24 25   BUN mg/dL 15 19 19 17 20 15 14 16   CREATININE mg/dL 1 08 1 16 1 13 1 15 1 22 1 12 1 12 1 16   EGFR ml/min/1 73sq m 77 70 73 71 66 74 74 70   CALCIUM mg/dL 8 6 8 3 8 5 8 6 8 4 8 2* 8 2* 8 3       I have personally reviewed the old medical records and patient's previously known baseline creatinine level is ~1 4-1 5    RADIOLOGY RESULTS     CT lower extremity w contrast right   Final Result by Samira Aburto MD (12/29 1531)      No acute osseous abnormality  Mild medial and lateral compartment of the knee with chondrocalcinosis  Circumferential edema lower leg extending into the ankle with areas of skin thickening as above likely cellulitis  No discrete abscess or soft tissue gas  Extensive subcutaneous calcifications  This is of uncertain etiology but could be due to prior infectious etiology such as disseminated cysticercosis  Arterial calcifications  Workstation performed: [id number]         VAS lower limb venous duplex study, unilateral/limited   Final Result by Luis Astudillo MD (12/29 1120)          PLAN / RECOMMENDATIONS      1  Hyponatremia  Chronic since duration is more than 48 hours  Urine sodium was 104 with urine osmolality of 392 [done on 1/4/2023] suggestive of SIADH like etiology leading to hyponatremia  Exact etiology of SIADH is currently unclear  We will plan to recheck urine sodium and urine osmolality today  We will plan to discontinue IV fluid and plan to start patient tentatively on salt tablet 3 g p o  3 times daily  Plan to check BMP every 12 hours today  We will also plan to check TSH and cortisol level  2   Lupus nephritis class V  Patient is currently being followed by nephrologist at [facility] and I have personally reviewed that note from care everywhere  Patient was initially diagnosed with lupus nephritis class V based on renal biopsy in November 2015 and had received induction therapy with Cytoxan  Patient was also 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 and as a part of immunosuppressant medication, patient is currently receiving CellCept 500 mg p o  twice daily    Patient's previously known baseline creatinine thought to be around 1 4-1 5 but current creatinine is better and below to previously known baseline creatinine at 1 08 and for time being plan to continue CellCept at current dose  3   Hypertension in chronic kidney disease  Blood pressure is currently acceptable and monitor hypertension with Coreg 12 5 mg p o  twice daily today  Thank you for the consultation to participate in patient's care  I have personally discussed my overall above mentioned plan with the referring physician  Gabriel Tripp MD  Nephrology  1/7/2023        Portions of the record may have been created with voice recognition software  Occasional wrong word or "sound a like" substitutions may have occurred due to the inherent limitations of voice recognition software  Read the chart carefully and recognize, using context, where substitutions have occurred

## 2023-01-07 NOTE — ASSESSMENT & PLAN NOTE
Lab Results   Component Value Date    EGFR 77 01/06/2023    EGFR 70 01/06/2023    EGFR 73 01/05/2023    CREATININE 1 08 01/06/2023    CREATININE 1 16 01/06/2023    CREATININE 1 13 01/05/2023   · Creatinine stable and at baseline

## 2023-01-07 NOTE — PLAN OF CARE
Problem: MOBILITY - ADULT  Goal: Maintain or return to baseline ADL function  Description: INTERVENTIONS:  -  Assess patient's ability to carry out ADLs; assess patient's baseline for ADL function and identify physical deficits which impact ability to perform ADLs (bathing, care of mouth/teeth, toileting, grooming, dressing, etc )  - Assess/evaluate cause of self-care deficits   - Assess range of motion  - Assess patient's mobility; develop plan if impaired  - Assess patient's need for assistive devices and provide as appropriate  - Encourage maximum independence but intervene and supervise when necessary  - Involve family in performance of ADLs  - Assess for home care needs following discharge   - Consider OT consult to assist with ADL evaluation and planning for discharge  - Provide patient education as appropriate  Outcome: Progressing  Goal: Maintains/Returns to pre admission functional level  Description: INTERVENTIONS:  - Perform BMAT or MOVE assessment daily    - Set and communicate daily mobility goal to care team and patient/family/caregiver  - Collaborate with rehabilitation services on mobility goals if consulted  - Perform Range of Motion 2 times a day  - Reposition patient every 2 hours    - Dangle patient 2 times a day  - Stand patient 2 times a day  - Ambulate patient 2 times a day  - Out of bed to chair 2 times a day   - Out of bed for meals 2 times a day  - Out of bed for toileting  - Record patient progress and toleration of activity level   Outcome: Progressing     Problem: Potential for Falls  Goal: Patient will remain free of falls  Description: INTERVENTIONS:  - Educate patient/family on patient safety including physical limitations  - Instruct patient to call for assistance with activity   - Consult OT/PT to assist with strengthening/mobility   - Keep Call bell within reach  - Keep bed low and locked with side rails adjusted as appropriate  - Keep care items and personal belongings within reach  - Initiate and maintain comfort rounds  - Make Fall Risk Sign visible to staff  - Offer Toileting every 2 Hours, in advance of need  - Apply yellow socks and bracelet for high fall risk patients  - Consider moving patient to room near nurses station  Outcome: Progressing     Problem: Prexisting or High Potential for Compromised Skin Integrity  Goal: Skin integrity is maintained or improved  Description: INTERVENTIONS:  - Identify patients at risk for skin breakdown  - Assess and monitor skin integrity  - Assess and monitor nutrition and hydration status  - Monitor labs   - Assess for incontinence   - Turn and reposition patient  - Assist with mobility/ambulation  - Relieve pressure over bony prominences  - Avoid friction and shearing  - Provide appropriate hygiene as needed including keeping skin clean and dry  - Evaluate need for skin moisturizer/barrier cream  - Collaborate with interdisciplinary team   - Patient/family teaching  - Consider wound care consult   Outcome: Progressing     Problem: PAIN - ADULT  Goal: Verbalizes/displays adequate comfort level or baseline comfort level  Description: Interventions:  - Encourage patient to monitor pain and request assistance  - Assess pain using appropriate pain scale  - Administer analgesics based on type and severity of pain and evaluate response  - Implement non-pharmacological measures as appropriate and evaluate response  - Consider cultural and social influences on pain and pain management  - Notify physician/advanced practitioner if interventions unsuccessful or patient reports new pain  Outcome: Progressing     Problem: INFECTION - ADULT  Goal: Absence or prevention of progression during hospitalization  Description: INTERVENTIONS:  - Assess and monitor for signs and symptoms of infection  - Monitor lab/diagnostic results  - Monitor all insertion sites, i e  indwelling lines, tubes, and drains  - Monitor endotracheal if appropriate and nasal secretions for changes in amount and color  - [place] appropriate cooling/warming therapies per order  - Administer medications as ordered  - Instruct and encourage patient and family to use good hand hygiene technique  - Identify and instruct in appropriate isolation precautions for identified infection/condition  Outcome: Progressing  Goal: Absence of fever/infection during neutropenic period  Description: INTERVENTIONS:  - Monitor WBC    Outcome: Progressing     Problem: SAFETY ADULT  Goal: Maintain or return to baseline ADL function  Description: INTERVENTIONS:  -  Assess patient's ability to carry out ADLs; assess patient's baseline for ADL function and identify physical deficits which impact ability to perform ADLs (bathing, care of mouth/teeth, toileting, grooming, dressing, etc )  - Assess/evaluate cause of self-care deficits   - Assess range of motion  - Assess patient's mobility; develop plan if impaired  - Assess patient's need for assistive devices and provide as appropriate  - Encourage maximum independence but intervene and supervise when necessary  - Involve family in performance of ADLs  - Assess for home care needs following discharge   - Consider OT consult to assist with ADL evaluation and planning for discharge  - Provide patient education as appropriate  Outcome: Progressing  Goal: Maintains/Returns to pre admission functional level  Description: INTERVENTIONS:  - Perform BMAT or MOVE assessment daily    - Set and communicate daily mobility goal to care team and patient/family/caregiver  - Collaborate with rehabilitation services on mobility goals if consulted  - Perform Range of Motion 2 times a day  - Reposition patient every 2 hours    - Dangle patient 2 times a day  - Stand patient 2 times a day  - Ambulate patient 2 times a day  - Out of bed to chair 2 times a day   - Out of bed for meals 2 times a day  - Out of bed for toileting  - Record patient progress and toleration of activity level Outcome: Progressing  Goal: Patient will remain free of falls  Description: INTERVENTIONS:  - Educate patient/family on patient safety including physical limitations  - Instruct patient to call for assistance with activity   - Consult OT/PT to assist with strengthening/mobility   - Keep Call bell within reach  - Keep bed low and locked with side rails adjusted as appropriate  - Keep care items and personal belongings within reach  - Initiate and maintain comfort rounds  - Make Fall Risk Sign visible to staff  - Offer Toileting every 2 Hours, in advance of need  - Apply yellow socks and bracelet for high fall risk patients  - Consider moving patient to room near nurses station  Outcome: Progressing     Problem: DISCHARGE PLANNING  Goal: Discharge to home or other facility with appropriate resources  Description: INTERVENTIONS:  - Identify barriers to discharge w/patient and caregiver  - Arrange for needed discharge resources and transportation as appropriate  - Identify discharge learning needs (meds, wound care, etc )  - Arrange for interpretive services to assist at discharge as needed  - Refer to Case Management Department for coordinating discharge planning if the patient needs post-hospital services based on physician/advanced practitioner order or complex needs related to functional status, cognitive ability, or social support system  Outcome: Progressing     Problem: Knowledge Deficit  Goal: Patient/family/caregiver demonstrates understanding of disease process, treatment plan, medications, and discharge instructions  Description: Complete learning assessment and assess knowledge base    Interventions:  - Provide teaching at level of understanding  - Provide teaching via preferred learning methods  Outcome: Progressing     Problem: SKIN/TISSUE INTEGRITY - ADULT  Goal: Skin Integrity remains intact(Skin Breakdown Prevention)  Description: Assess:  -Perform Gautam assessment every shift  -Clean and moisturize skin every 8 hr and prn  -Inspect skin when repositioning, toileting, and assisting with ADLS    -Assess extremities for adequate circulation and sensation     Bed Management:  -Have minimal linens on bed & keep smooth, unwrinkled  -Change linens as needed when moist or perspiring  -Avoid sitting or lying in one position for more than 2 hours while in bed  -Keep HOB at 45 degrees or greater while awake     Toileting:  -Offer bedside commode    Activity:  -Mobilize patient 2 times a day  -Encourage activity and walks on unit  -Encourage or provide ROM exercises   -Turn and reposition patient every 2 Hours  -Use appropriate equipment to lift or move patient in bed  -Instruct/ Assist with weight shifting every 2 hr  when out of bed in chair  -Consider limitation of chair time 1 hour intervals    Skin Care:  -Avoid use of baby powder, tape, friction and shearing, hot water or constrictive clothing  -Relieve pressure over bony prominences using ehob cushion when oob, allevyn preventatively, accumax to bed   -Do not massage red bony areas    Next Steps:  -Teach patient strategies to minimize risks such as shifting weight q2h   Outcome: Progressing  Goal: Incision(s), wounds(s) or drain site(s) healing without S/S of infection  Description: INTERVENTIONS  - Assess and document dressing, incision, wound bed, drain sites and surrounding tissue  - Provide patient and family education  - Perform skin care/dressing changes every shift and prn  Outcome: Progressing  Goal: Pressure injury heals and does not worsen  Description: Interventions:  - Implement low air loss mattress or specialty surface (Criteria met)  - Apply silicone foam dressing  - Instruct/assist with weight shifting every 120 minutes when in chair   - Limit chair time to 2 hour intervals  - Consider nutrition services referral as needed  Outcome: Progressing     Problem: Nutrition/Hydration-ADULT  Goal: Nutrient/Hydration intake appropriate for improving, restoring or maintaining nutritional needs  Description: Monitor and assess patient's nutrition/hydration status for malnutrition  Collaborate with interdisciplinary team and initiate plan and interventions as ordered  Monitor patient's weight and dietary intake as ordered or per policy  Utilize nutrition screening tool and intervene as necessary  Determine patient's food preferences and provide high-protein, high-caloric foods as appropriate       INTERVENTIONS:  - Monitor oral intake, urinary output, labs, and treatment plans  - Assess nutrition and hydration status and recommend course of action  - Evaluate amount of meals eaten  - Assist patient with eating if necessary   - Allow adequate time for meals  - Recommend/ encourage appropriate diets, oral nutritional supplements, and vitamin/mineral supplements  - Order, calculate, and assess calorie counts as needed  - Recommend, monitor, and adjust tube feedings and TPN/PPN based on assessed needs  - Assess need for intravenous fluids  - Provide specific nutrition/hydration education as appropriate  - Include patient/family/caregiver in decisions related to nutrition  Outcome: Progressing

## 2023-01-07 NOTE — CASE MANAGEMENT
Case Management Progress Note    Patient name Ana Reynaga  Location [id number] MRN [id number]  : 1970 Date 2023       LOS (days): 9  Geometric Mean LOS (GMLOS) (days):   Days to [address]:        OBJECTIVE:        Current admission status: Inpatient  Preferred Pharmacy:   [id number] [facility], [address] [place], [place] [address] [place] [address]  Phone: [phone] Fax: [phone]    Primary Care Provider: Tex Sol MD    Primary Insurance: [employer] [facility]  Secondary Insurance:     PROGRESS NOTE:    Message received from Alcira at [facility] that insurance Rosalia Wilkins is still pending

## 2023-01-07 NOTE — ASSESSMENT & PLAN NOTE
· Day 7/14 cefazolin   Plan to switch to keflex in 24 hours  · Discontinued vancomycin  · Since pain is improving but he still has difficulty ambulating  · Will need rehab  · ID following  ·  continue oral antibiotics clinically improved

## 2023-01-07 NOTE — PROGRESS NOTES
[facility]  Progress Note - Indy Seats 1970, 45 y o  male MRN: [id number]  Unit/Bed#: -[facility] Encounter: [id number]  Primary Care Provider: Alea Sanders MD   Date and time admitted to hospital: 12/29/2022  8:46 AM    * Cellulitis of right lower extremity  Assessment & Plan  · Day 7/14 cefazolin  Plan to switch to keflex in 24 hours  · Discontinued vancomycin  · Since pain is improving but he still has difficulty ambulating  · Will need rehab  · ID following  ·  continue oral antibiotics clinically improved    Hyponatremia  Assessment & Plan  No significant improvement with IV fluids suspect SIADH  Started on salt tabs  Follow-up nephro recs    Chronic kidney disease, stage 3 [facility])  Assessment & Plan  Lab Results   Component Value Date    EGFR 77 01/06/2023    EGFR 70 01/06/2023    EGFR 73 01/05/2023    CREATININE 1 08 01/06/2023    CREATININE 1 16 01/06/2023    CREATININE 1 13 01/05/2023   · Creatinine stable and at baseline    History of DVT (deep vein thrombosis)  Assessment & Plan  Continue Eliquis    Hypertension  Assessment & Plan  /68 (BP Location: Right arm)   Pulse 70   Temp 98 4 °F (36 9 °C)   Resp 17   Ht 5' 5 98" (1 676 m)   Wt 91 6 kg (201 lb 15 1 oz)   SpO2 96%   BMI 32 61 kg/m²   · Blood pressure controlled  · Continue beta-blocker    Lupus (systemic lupus erythematosus) (HCC)  Assessment & Plan  · Has generalized rash over his body  · Will follow-up outpatient with rheumatology  · Continue home meds      VTE Pharmacologic Prophylaxis:   Pharmacologic: Apixaban (Eliquis)  Mechanical VTE Prophylaxis in Place: Yes    Patient Centered Rounds: I have performed bedside rounds with nursing staff today  Discussions with Specialists or Other Care Team Provider: cm, nursing    Education and Discussions with Family / Patient: pt    Time Spent for Care: 30 minutes    More than 50% of total time spent on counseling and coordination of care as described above     Current Length of Stay: 9 day(s)    Current Patient Status: Inpatient   Certification Statement: The patient will continue to require additional inpatient hospital stay due to see below    Discharge Plan: Anticipate medically clear in 1 day will need rehab    Code Status: Level 1 - Full Code      Subjective:   Denies fevers, chills, cough, chest pain    Objective:     Vitals:   Temp (24hrs), Av 7 °F (37 1 °C), Min:98 2 °F (36 8 °C), Max:99 5 °F (37 5 °C)    Temp:  [98 2 °F (36 8 °C)-99 5 °F (37 5 °C)] 98 4 °F (36 9 °C)  HR:  [67-75] 70  Resp:  [16-17] 17  BP: (102-116)/(67-72) 110/68  SpO2:  [95 %-98 %] 96 %  Body mass index is 32 61 kg/m²  Input and Output Summary (last 24 hours): Intake/Output Summary (Last 24 hours) at 2023 1046  Last data filed at 2023 0630  Gross per 24 hour   Intake 1200 ml   Output 1850 ml   Net -650 ml       Physical Exam:     Physical Exam  Constitutional:       General: He is not in acute distress  Appearance: He is well-developed  He is not diaphoretic  HENT:      Head: Normocephalic and atraumatic  Nose: Nose normal       Mouth/Throat:      Pharynx: No oropharyngeal exudate  Eyes:      General: No scleral icterus  Conjunctiva/sclera: Conjunctivae normal    Cardiovascular:      Rate and Rhythm: Normal rate and regular rhythm  Heart sounds: Normal heart sounds  No murmur heard  No friction rub  No gallop  Pulmonary:      Effort: Pulmonary effort is normal  No respiratory distress  Breath sounds: Normal breath sounds  No wheezing or rales  Chest:      Chest wall: No tenderness  Abdominal:      General: Bowel sounds are normal  There is no distension  Palpations: Abdomen is soft  Tenderness: There is no abdominal tenderness  There is no guarding  Musculoskeletal:         General: No tenderness or deformity  Normal range of motion  Cervical back: Normal range of motion and neck supple     Skin:     General: Skin is warm and dry  Findings: No erythema  Neurological:      Mental Status: He is alert  Mental status is at baseline  Additional Data:     Labs:    Results from last 7 days   Lab Units 01/05/23  0500   WBC Thousand/uL 4 39   HEMOGLOBIN g/dL 10 9*   HEMATOCRIT % 32 1*   PLATELETS Thousands/uL 298   NEUTROS PCT % 64   LYMPHS PCT % 17   MONOS PCT % 17*   EOS PCT % 0     Results from last 7 days   Lab Units 01/06/23  0834   SODIUM mmol/L 127*   POTASSIUM mmol/L 4 4   CHLORIDE mmol/L 95*   CO2 mmol/L 24   BUN mg/dL 15   CREATININE mg/dL 1 08   ANION GAP mmol/L 8   CALCIUM mg/dL 8 6   GLUCOSE RANDOM mg/dL 99                           * I Have Reviewed All Lab Data Listed Above  * Additional Pertinent Lab Tests Reviewed:  Jamie 64 Admission Reviewed    Imaging:    Imaging Reports Reviewed Today Include: na  Imaging Personally Reviewed by Myself Includes:  na    Recent Cultures (last 7 days):           Last 24 Hours Medication List:   Current Facility-Administered Medications   Medication Dose Route Frequency Provider Last Rate   • acetaminophen  650 mg Oral Q6H PRN Sabina Olsen MD     • apixaban  5 mg Oral BID Sabina Olsen MD     • baclofen  20 mg Oral TID Sabina Olsen MD     • carvedilol  12 5 mg Oral BID With Meals Sabina Olsen MD     • cephalexin  500 mg Oral Q6H [facility] Molly Henning MD     • escitalopram  10 mg Oral Daily Sabina Olsen MD     • gabapentin  600 mg Oral TID Sabina Olsen MD     • hydroxychloroquine  400 mg Oral HS Sabina Olsen MD     • hydrOXYzine HCL  10 mg Oral Q6H PRN Sabina Olsen MD     • loratadine  10 mg Oral Daily Sabina Olsen MD     • mycophenolate  500 mg Oral Q12H Zachary Polk MD     • ondansetron  4 mg Intravenous Q4H PRN Sabina Olsen MD     • oxyCODONE  7 5 mg Oral Q6H PRN Sabina Olsen MD      Or   • oxyCODONE  10 mg Oral Q6H PRN Sabina Olsen MD     • polyethylene glycol  17 g Oral Daily Sabina Olsen MD     • senna-docusate sodium  1 tablet Oral HS Abram Delong DO • sodium chloride  3 g Oral TID With Meals Dae Lowry MD     • tamsulosin  0 4 mg Oral Daily With Felisa Rinaldi MD     • triamcinolone   Topical BID Rae Allen MD          Today, Patient Was Seen By: Leah Mittal MD    ** Please Note: Dictation voice to text software may have been used in the creation of this document   **

## 2023-01-08 LAB
ANION GAP SERPL CALCULATED.3IONS-SCNC: 10 MMOL/L (ref 4–13)
ANION GAP SERPL CALCULATED.3IONS-SCNC: 6 MMOL/L (ref 4–13)
BUN SERPL-MCNC: 11 MG/DL (ref 5–25)
BUN SERPL-MCNC: 13 MG/DL (ref 5–25)
CALCIUM SERPL-MCNC: 8.7 MG/DL (ref 8.3–10.1)
CALCIUM SERPL-MCNC: 8.9 MG/DL (ref 8.3–10.1)
CHLORIDE SERPL-SCNC: 104 MMOL/L (ref 96–108)
CHLORIDE SERPL-SCNC: 97 MMOL/L (ref 96–108)
CO2 SERPL-SCNC: 21 MMOL/L (ref 21–32)
CO2 SERPL-SCNC: 25 MMOL/L (ref 21–32)
CORTIS AM PEAK SERPL-MCNC: 22 UG/DL (ref 4.2–22.4)
CREAT SERPL-MCNC: 1.03 MG/DL (ref 0.6–1.3)
CREAT SERPL-MCNC: 1.09 MG/DL (ref 0.6–1.3)
GFR SERPL CREATININE-BSD FRML MDRD: 76 ML/MIN/1.73SQ M
GFR SERPL CREATININE-BSD FRML MDRD: 81 ML/MIN/1.73SQ M
GLUCOSE SERPL-MCNC: 120 MG/DL (ref 65–140)
GLUCOSE SERPL-MCNC: 98 MG/DL (ref 65–140)
OSMOLALITY UR: 405 MMOL/KG
POTASSIUM SERPL-SCNC: 4.6 MMOL/L (ref 3.5–5.3)
POTASSIUM SERPL-SCNC: 4.8 MMOL/L (ref 3.5–5.3)
SODIUM 24H UR-SCNC: 74 MOL/L
SODIUM SERPL-SCNC: 128 MMOL/L (ref 135–147)
SODIUM SERPL-SCNC: 135 MMOL/L (ref 135–147)
TSH SERPL DL<=0.05 MIU/L-ACNC: 2.17 UIU/ML (ref 0.45–4.5)

## 2023-01-08 RX ORDER — TOLVAPTAN 30 MG/1
30 TABLET ORAL ONCE
Status: COMPLETED | OUTPATIENT
Start: 2023-01-08 | End: 2023-01-08

## 2023-01-08 RX ADMIN — ESCITALOPRAM OXALATE 10 MG: 10 TABLET ORAL at 08:58

## 2023-01-08 RX ADMIN — LORATADINE 10 MG: 10 TABLET ORAL at 08:58

## 2023-01-08 RX ADMIN — TRIAMCINOLONE ACETONIDE: 1 CREAM TOPICAL at 08:59

## 2023-01-08 RX ADMIN — BACLOFEN 20 MG: 10 TABLET ORAL at 15:35

## 2023-01-08 RX ADMIN — POLYETHYLENE GLYCOL 3350 17 G: 17 POWDER, FOR SOLUTION ORAL at 08:58

## 2023-01-08 RX ADMIN — CEPHALEXIN 500 MG: 500 CAPSULE ORAL at 09:01

## 2023-01-08 RX ADMIN — HYDROXYCHLOROQUINE SULFATE 400 MG: 200 TABLET, FILM COATED ORAL at 21:43

## 2023-01-08 RX ADMIN — BACLOFEN 20 MG: 10 TABLET ORAL at 20:00

## 2023-01-08 RX ADMIN — GABAPENTIN 600 MG: 300 CAPSULE ORAL at 08:57

## 2023-01-08 RX ADMIN — MYCOPHENOLATE MOFETIL 500 MG: 250 CAPSULE ORAL at 20:00

## 2023-01-08 RX ADMIN — APIXABAN 5 MG: 5 TABLET, FILM COATED ORAL at 17:13

## 2023-01-08 RX ADMIN — GABAPENTIN 600 MG: 300 CAPSULE ORAL at 15:35

## 2023-01-08 RX ADMIN — Medication 3 G: at 12:38

## 2023-01-08 RX ADMIN — Medication 3 G: at 15:35

## 2023-01-08 RX ADMIN — CEPHALEXIN 500 MG: 500 CAPSULE ORAL at 15:35

## 2023-01-08 RX ADMIN — OXYCODONE HYDROCHLORIDE 10 MG: 10 TABLET ORAL at 07:45

## 2023-01-08 RX ADMIN — Medication 7.5 MG: at 20:00

## 2023-01-08 RX ADMIN — BACLOFEN 20 MG: 10 TABLET ORAL at 08:58

## 2023-01-08 RX ADMIN — TAMSULOSIN HYDROCHLORIDE 0.4 MG: 0.4 CAPSULE ORAL at 15:35

## 2023-01-08 RX ADMIN — CEPHALEXIN 500 MG: 500 CAPSULE ORAL at 05:53

## 2023-01-08 RX ADMIN — OXYCODONE HYDROCHLORIDE 10 MG: 10 TABLET ORAL at 02:09

## 2023-01-08 RX ADMIN — TRIAMCINOLONE ACETONIDE: 1 CREAM TOPICAL at 17:13

## 2023-01-08 RX ADMIN — SENNOSIDES AND DOCUSATE SODIUM 1 TABLET: 50; 8.6 TABLET ORAL at 21:43

## 2023-01-08 RX ADMIN — MYCOPHENOLATE MOFETIL 500 MG: 250 CAPSULE ORAL at 08:57

## 2023-01-08 RX ADMIN — TOLVAPTAN 30 MG: 30 TABLET ORAL at 09:58

## 2023-01-08 RX ADMIN — APIXABAN 5 MG: 5 TABLET, FILM COATED ORAL at 08:58

## 2023-01-08 RX ADMIN — CARVEDILOL 12.5 MG: 12.5 TABLET, FILM COATED ORAL at 15:35

## 2023-01-08 RX ADMIN — Medication 3 G: at 07:44

## 2023-01-08 RX ADMIN — GABAPENTIN 600 MG: 300 CAPSULE ORAL at 20:00

## 2023-01-08 RX ADMIN — ONDANSETRON 4 MG: 2 INJECTION INTRAMUSCULAR; INTRAVENOUS at 08:57

## 2023-01-08 RX ADMIN — CEPHALEXIN 500 MG: 500 CAPSULE ORAL at 21:43

## 2023-01-08 NOTE — ASSESSMENT & PLAN NOTE
Lab Results   Component Value Date    EGFR 81 01/08/2023    EGFR 76 01/07/2023    EGFR 77 01/06/2023    CREATININE 1 03 01/08/2023    CREATININE 1 09 01/07/2023    CREATININE 1 08 01/06/2023   · Creatinine stable

## 2023-01-08 NOTE — PROGRESS NOTES
[facility]  Progress Note - Abigail Vallejo 1970, 45 y o  male MRN: [id number]  Unit/Bed#: -[facility] Encounter: [id number]  Primary Care Provider: Mague Soler MD   Date and time admitted to hospital: 12/29/2022  8:46 AM    * Cellulitis of right lower extremity  Assessment & Plan  · Day 7/14 cefazolin  Plan to switch to keflex in 24 hours  · Discontinued vancomycin  · Since pain is improving but he still has difficulty ambulating  · Will need rehab  · ID following  ·  continue oral antibiotics will complete therapy January 11th    Chronic kidney disease, stage 3 [facility])  Assessment & Plan  Lab Results   Component Value Date    EGFR 81 01/08/2023    EGFR 76 01/07/2023    EGFR 77 01/06/2023    CREATININE 1 03 01/08/2023    CREATININE 1 09 01/07/2023    CREATININE 1 08 01/06/2023   · Creatinine stable    History of DVT (deep vein thrombosis)  Assessment & Plan  Continue Eliquis    Hypertension  Assessment & Plan  /73   Pulse 73   Temp 99 3 °F (37 4 °C)   Resp 17   Ht 5' 5 98" (1 676 m)   Wt 91 6 kg (201 lb 15 1 oz)   SpO2 98%   BMI 32 61 kg/m²   Continue BP meds    Lupus (systemic lupus erythematosus) (HCC)  Assessment & Plan  · Has generalized rash over his body  · Will follow-up outpatient with rheumatology  · C/w home meds      VTE Pharmacologic Prophylaxis:   Pharmacologic: Heparin  Mechanical VTE Prophylaxis in Place: Yes    Patient Centered Rounds: I have performed bedside rounds with nursing staff today  Discussions with Specialists or Other Care Team Provider: cm, nursing    Education and Discussions with Family / Patient: pt    Time Spent for Care: 30 minutes  More than 50% of total time spent on counseling and coordination of care as described above      Current Length of Stay: 10 day(s)    Current Patient Status: Inpatient   Certification Statement: The patient will continue to require additional inpatient hospital stay due to see below    Discharge Plan: Anticipate medically clear in 24 hours pending further improvement of sodium  Planning for rehab placement as well    Code Status: Level 1 - Full Code      Subjective:   Denies chest pain, shortness of breath, cough, fevers    Objective:     Vitals:   Temp (24hrs), Av 3 °F (37 4 °C), Min:99 2 °F (37 3 °C), Max:99 3 °F (37 4 °C)    Temp:  [99 2 °F (37 3 °C)-99 3 °F (37 4 °C)] 99 3 °F (37 4 °C)  HR:  [68-75] 73  BP: (115-120)/(65-75) 120/73  SpO2:  [97 %-98 %] 98 %  Body mass index is 32 61 kg/m²  Input and Output Summary (last 24 hours): Intake/Output Summary (Last 24 hours) at 2023 1023  Last data filed at 2023 0500  Gross per 24 hour   Intake 240 ml   Output 1200 ml   Net -960 ml       Physical Exam:     Physical Exam  Constitutional:       General: He is not in acute distress  Appearance: He is well-developed  He is not diaphoretic  HENT:      Head: Normocephalic and atraumatic  Nose: Nose normal       Mouth/Throat:      Pharynx: No oropharyngeal exudate  Eyes:      General: No scleral icterus  Conjunctiva/sclera: Conjunctivae normal    Cardiovascular:      Rate and Rhythm: Normal rate and regular rhythm  Heart sounds: Normal heart sounds  No murmur heard  No friction rub  No gallop  Pulmonary:      Effort: Pulmonary effort is normal  No respiratory distress  Breath sounds: Normal breath sounds  No wheezing or rales  Chest:      Chest wall: No tenderness  Abdominal:      General: Bowel sounds are normal  There is no distension  Palpations: Abdomen is soft  Tenderness: There is no abdominal tenderness  There is no guarding  Musculoskeletal:         General: No tenderness or deformity  Normal range of motion  Cervical back: Normal range of motion and neck supple  Skin:     General: Skin is warm and dry  Findings: No erythema  Neurological:      Mental Status: He is alert  Mental status is at baseline             Additional Data: Labs:    Results from last 7 days   Lab Units 01/05/23  0500   WBC Thousand/uL 4 39   HEMOGLOBIN g/dL 10 9*   HEMATOCRIT % 32 1*   PLATELETS Thousands/uL 298   NEUTROS PCT % 64   LYMPHS PCT % 17   MONOS PCT % 17*   EOS PCT % 0     Results from last 7 days   Lab Units 01/08/23  0513   SODIUM mmol/L 128*   POTASSIUM mmol/L 4 6   CHLORIDE mmol/L 97   CO2 mmol/L 21   BUN mg/dL 11   CREATININE mg/dL 1 03   ANION GAP mmol/L 10   CALCIUM mg/dL 8 7   GLUCOSE RANDOM mg/dL 98                           * I Have Reviewed All Lab Data Listed Above  * Additional Pertinent Lab Tests Reviewed:  All Labs Within Last 24 Hours Reviewed    Imaging:    Imaging Reports Reviewed Today Include: na  Imaging Personally Reviewed by Myself Includes:  na    Recent Cultures (last 7 days):           Last 24 Hours Medication List:   Current Facility-Administered Medications   Medication Dose Route Frequency Provider Last Rate   • acetaminophen  650 mg Oral Q6H PRN Anna Marie Zelaya MD     • apixaban  5 mg Oral BID Anna Marie Zelaya MD     • baclofen  20 mg Oral TID Anna Marie Zelaya MD     • carvedilol  12 5 mg Oral BID With Meals Anna Marie Zelaya MD     • cephalexin  500 mg Oral Q6H [address] Syed Portillo MD     • escitalopram  10 mg Oral Daily Ann aMarie Zelaya MD     • gabapentin  600 mg Oral TID Anna Marie Zelaya MD     • hydroxychloroquine  400 mg Oral HS Anna Marie Zelaya MD     • hydrOXYzine HCL  10 mg Oral Q6H PRN Anna Marie Zelaya MD     • loratadine  10 mg Oral Daily Anna Marie Zelaya MD     • mycophenolate  500 mg Oral Q12H Kevin Nagel MD     • ondansetron  4 mg Intravenous Q4H PRN Anna Marie Zelaya MD     • oxyCODONE  7 5 mg Oral Q6H PRN Anna Marie Zelaya MD      Or   • oxyCODONE  10 mg Oral Q6H PRN Anna Marie Zelaya MD     • polyethylene glycol  17 g Oral Daily Anna Marie Zelaya MD     • senna-docusate sodium  1 tablet Oral HS Abram Delong DO     • sodium chloride  3 g Oral TID With Meals Narda Gill MD     • tamsulosin  0 4 mg Oral Daily With Alex Sharma MD     • triamcinolone   Topical BID Charla Mccann Malcolm Kent MD          Today, Patient Was Seen By: Keanu Ibarra MD    ** Please Note: Dictation voice to text software may have been used in the creation of this document   **

## 2023-01-08 NOTE — PROGRESS NOTES
NEPHROLOGY PROGRESS NOTE    Patient: Gomez Isidro               Sex: male          DOA: 12/29/2022  8:46 AM   YOB: 1970        Age: 45 y o          LOS:  LOS: 10 days   Encounter Date: 1/8/2023    REASON FOR THE CONSULTATION: Further management of hyponatremia    HPI     This is a 45 y o  male admitted for Cellulitis of right lower extremity     SUBJECTIVE     -Tolerated salt tablet in last 24 hours  Patient denies nausea, vomiting or headache today    - Reviewed last 24 hrs events     CURRENT MEDICATIONS       Current Facility-Administered Medications:   •  acetaminophen (TYLENOL) tablet 650 mg, 650 mg, Oral, Q6H PRN, Alin Piper MD, 650 mg at 12/31/22 5609  •  apixaban (ELIQUIS) tablet 5 mg, 5 mg, Oral, BID, Alin Piper MD, 5 mg at 01/08/23 1392  •  baclofen tablet 20 mg, 20 mg, Oral, TID, Alin Piper MD, 20 mg at 01/08/23 4242  •  carvedilol (COREG) tablet 12 5 mg, 12 5 mg, Oral, BID With Meals, Alin Piper MD, 12 5 mg at 01/07/23 0830  •  cephalexin (KEFLEX) capsule 500 mg, 500 mg, Oral, Q6H [address], Zaid Smith MD, 500 mg at 01/08/23 0901  •  escitalopram (LEXAPRO) tablet 10 mg, 10 mg, Oral, Daily, Alin Piper MD, 10 mg at 01/08/23 6137  •  gabapentin (NEURONTIN) capsule 600 mg, 600 mg, Oral, TID, Alin Piper MD, 600 mg at 01/08/23 0857  •  hydroxychloroquine (PLAQUENIL) tablet 400 mg, 400 mg, Oral, HS, Alin Piper MD, 400 mg at 01/07/23 2250  •  hydrOXYzine HCL (ATARAX) tablet 10 mg, 10 mg, Oral, Q6H PRN, Alin Piper MD, 10 mg at 01/04/23 2222  •  loratadine (CLARITIN) tablet 10 mg, 10 mg, Oral, Daily, Alin Piper MD, 10 mg at 01/08/23 0858  •  mycophenolate (CELLCEPT) capsule 500 mg, 500 mg, Oral, Q12H [address], Alin Piper MD, 500 mg at 01/08/23 0857  •  ondansetron [facility]) injection 4 mg, 4 mg, Intravenous, Q4H PRN, Alin Piper MD, 4 mg at 01/08/23 0857  •  oxyCODONE (ROXICODONE) split tablet 7 5 mg, 7 5 mg, Oral, Q6H PRN **OR** oxyCODONE (ROXICODONE) immediate release tablet 10 mg, 10 mg, Oral, Q6H PRN, Trace Fabian MD, 10 mg at 01/08/23 0745  •  polyethylene glycol (MIRALAX) packet 17 g, 17 g, Oral, Daily, Trace Fabian MD, 17 g at 01/08/23 0858  •  senna-docusate sodium (SENOKOT S) 8 6-50 mg per tablet 1 tablet, 1 tablet, Oral, HS, Abram Delong, , 1 tablet at 01/07/23 2250  •  sodium chloride tablet 3 g, 3 g, Oral, TID With Meals, Marycruz Mendoza MD, 3 g at 01/08/23 0744  •  tamsulosin (FLOMAX) capsule 0 4 mg, 0 4 mg, Oral, Daily With Rebecca Purcell MD, 0 4 mg at 01/07/23 1537  •  triamcinolone (KENALOG) 0 1 % cream, , Topical, BID, Trace Fabian MD, Given at 01/08/23 0859    OBJECTIVE     Current Weight: Weight - Scale: 91 6 kg (201 lb 15 1 oz)  /73   Pulse 73   Temp 99 3 °F (37 4 °C)   Resp 17   Ht 5' 5 98" (1 676 m)   Wt 91 6 kg (201 lb 15 1 oz)   SpO2 98%   BMI 32 61 kg/m²   Vitals:    01/08/23 0743   BP: 120/73   Pulse: 73   Resp:    Temp:    SpO2: 98%     Body mass index is 32 61 kg/m²  Intake/Output Summary (Last 24 hours) at 1/8/2023 1030  Last data filed at 1/8/2023 0500  Gross per 24 hour   Intake 240 ml   Output 1200 ml   Net -960 ml       PHYSICAL EXAMINATION     Physical Exam  Constitutional:       General: He is not in acute distress  HENT:      Right Ear: External ear normal    Eyes:      Conjunctiva/sclera:      Right eye: No hemorrhage  Neck:      Thyroid: No thyromegaly  Pulmonary:      Effort: No accessory muscle usage or respiratory distress  Abdominal:      General: There is no distension  Skin:     General: Skin is warm  Coloration: Skin is not jaundiced  Psychiatric:         Behavior: Behavior is not combative             LAB RESULTS     Results from last 7 days   Lab Units 01/08/23  0513 01/07/23  1813 01/06/23  0834 01/06/23  0009 01/05/23  1711 01/05/23  0500 01/04/23  1958 01/04/23  1011 01/04/23  0531   WBC Thousand/uL  --   --   --   --   --  4 39  --   --  4 54   HEMOGLOBIN g/dL  --   --   --   --   --  10 9*  --   --  10 6*   HEMATOCRIT %  --   -- --   --   --  32 1*  --   --  31 3*   PLATELETS Thousands/uL  --   --   --   --   --  298  --   --  260   SODIUM mmol/L 128* 129* 127* 128* 125* 123* 123*   < > 123*   POTASSIUM mmol/L 4 6 5 0 4 4 4 3 4 4 4 2 4 2   < > 4 6   CHLORIDE mmol/L 97 99 95* 95* 93* 89* 90*   < > 90*   CO2 mmol/L 21 24 24 24 26 24 25   < > 24   BUN mg/dL 11 15 15 19 19 17 20   < > 14   CREATININE mg/dL 1 03 1 09 1 08 1 16 1 13 1 15 1 22   < > 1 12   EGFR ml/min/1 73sq m 81 76 77 70 73 71 66   < > 74   CALCIUM mg/dL 8 7 8 1* 8 6 8 3 8 5 8 6 8 4   < > 8 2*    < > = values in this interval not displayed  I have personally reviewed the old medical records and patient's previously known baseline creatinine level is ~1 4-1 5    RADIOLOGY RESULTS      CT lower extremity w contrast right   Final Result by Lupillo Guillaume MD (12/29 1531)      No acute osseous abnormality  Mild medial and lateral compartment of the knee with chondrocalcinosis  Circumferential edema lower leg extending into the ankle with areas of skin thickening as above likely cellulitis  No discrete abscess or soft tissue gas  Extensive subcutaneous calcifications  This is of uncertain etiology but could be due to prior infectious etiology such as disseminated cysticercosis  Arterial calcifications  Workstation performed: [id number]         VAS lower limb venous duplex study, unilateral/limited   Final Result by Asya Vance MD (12/29 1120)          PLAN / RECOMMENDATIONS      1  Hyponatremia, chronic since duration is more than 48 hours  Repeat urine sodium from yesterday was 74 with urine osmolality of 405 suggestive of SIADH like etiology  TSH was normal this morning  Pending a m  cortisol level  Patient is off IV fluid and started on salt tablet 3 g p o  3 times daily yesterday    Current sodium level is 128 which is stable in last 24 hours but still below the goal   Plan to continue salt tablet at current dose and plan to give tolvaptan 30 mg p o  x1 dose today  Check sodium level every 12 hours today  2   Lupus nephritis class V  Currently been followed by nephrologist at [facility] and according to their note, patient had underwent kidney biopsy in November 2015 and diagnosed with lupus nephritis class V and received induction therapy with Cytoxan  Patient was also found to have 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 and currently receiving CellCept 500 mg p o  twice daily  Previously known baseline creatinine is around 1 4-1 5 but renal function seems to have improved during the hospital stay and current creatinine is below 2 previously known baseline creatinine at 1 03  Continue current dose of CellCept today  3   Hypertension in chronic kidney disease  Blood pressure is currently acceptable and monitor hypertension with Coreg 12 5 mg p o  twice daily today  Overall above mentioned plan was also d/w current Mariaa Eric MD  Nephrology  1/8/2023        Portions of the record may have been created with voice recognition software  Occasional wrong word or "sound a like" substitutions may have occurred due to the inherent limitations of voice recognition software  Read the chart carefully and recognize, using context, where substitutions have occurred

## 2023-01-08 NOTE — ASSESSMENT & PLAN NOTE
· Day 7/14 cefazolin   Plan to switch to keflex in 24 hours  · Discontinued vancomycin  · Since pain is improving but he still has difficulty ambulating  · Will need rehab  · ID following  ·  continue oral antibiotics will complete therapy January 11th

## 2023-01-08 NOTE — ASSESSMENT & PLAN NOTE
· Has generalized rash over his body  · Will follow-up outpatient with rheumatology  · C/w home meds

## 2023-01-08 NOTE — ASSESSMENT & PLAN NOTE
/73   Pulse 73   Temp 99 3 °F (37 4 °C)   Resp 17   Ht 5' 5 98" (1 676 m)   Wt 91 6 kg (201 lb 15 1 oz)   SpO2 98%   BMI 32 61 kg/m²   Continue BP meds

## 2023-01-09 LAB
ANION GAP SERPL CALCULATED.3IONS-SCNC: 5 MMOL/L (ref 4–13)
ANION GAP SERPL CALCULATED.3IONS-SCNC: 9 MMOL/L (ref 4–13)
BASOPHILS # BLD MANUAL: 0.04 THOUSAND/UL (ref 0–0.1)
BASOPHILS NFR MAR MANUAL: 1 % (ref 0–1)
BUN SERPL-MCNC: 12 MG/DL (ref 5–25)
BUN SERPL-MCNC: 15 MG/DL (ref 5–25)
CALCIUM SERPL-MCNC: 8.8 MG/DL (ref 8.3–10.1)
CALCIUM SERPL-MCNC: 9 MG/DL (ref 8.3–10.1)
CHLORIDE SERPL-SCNC: 102 MMOL/L (ref 96–108)
CHLORIDE SERPL-SCNC: 103 MMOL/L (ref 96–108)
CO2 SERPL-SCNC: 24 MMOL/L (ref 21–32)
CO2 SERPL-SCNC: 26 MMOL/L (ref 21–32)
CREAT SERPL-MCNC: 0.98 MG/DL (ref 0.6–1.3)
CREAT SERPL-MCNC: 0.98 MG/DL (ref 0.6–1.3)
EOSINOPHIL # BLD MANUAL: 0.16 THOUSAND/UL (ref 0–0.4)
EOSINOPHIL NFR BLD MANUAL: 4 % (ref 0–6)
ERYTHROCYTE [DISTWIDTH] IN BLOOD BY AUTOMATED COUNT: 14 % (ref 11.6–15.1)
GFR SERPL CREATININE-BSD FRML MDRD: 87 ML/MIN/1.73SQ M
GFR SERPL CREATININE-BSD FRML MDRD: 87 ML/MIN/1.73SQ M
GLUCOSE SERPL-MCNC: 108 MG/DL (ref 65–140)
GLUCOSE SERPL-MCNC: 115 MG/DL (ref 65–140)
HCT VFR BLD AUTO: 31.3 % (ref 36.5–49.3)
HGB BLD-MCNC: 10.6 G/DL (ref 12–17)
LYMPHOCYTES # BLD AUTO: 0.87 THOUSAND/UL (ref 0.6–4.47)
LYMPHOCYTES # BLD AUTO: 22 % (ref 14–44)
MCH RBC QN AUTO: 28.8 PG (ref 26.8–34.3)
MCHC RBC AUTO-ENTMCNC: 33.9 G/DL (ref 31.4–37.4)
MCV RBC AUTO: 85 FL (ref 82–98)
METAMYELOCYTES NFR BLD MANUAL: 1 % (ref 0–1)
MONOCYTES # BLD AUTO: 0.6 THOUSAND/UL (ref 0–1.22)
MONOCYTES NFR BLD: 15 % (ref 4–12)
MYELOCYTES NFR BLD MANUAL: 1 % (ref 0–1)
NEUTROPHILS # BLD MANUAL: 2.22 THOUSAND/UL (ref 1.85–7.62)
NEUTS SEG NFR BLD AUTO: 56 % (ref 43–75)
OVALOCYTES BLD QL SMEAR: PRESENT
PLATELET # BLD AUTO: 398 THOUSANDS/UL (ref 149–390)
PLATELET BLD QL SMEAR: ABNORMAL
PMV BLD AUTO: 9.6 FL (ref 8.9–12.7)
POTASSIUM SERPL-SCNC: 4.7 MMOL/L (ref 3.5–5.3)
POTASSIUM SERPL-SCNC: 4.7 MMOL/L (ref 3.5–5.3)
RBC # BLD AUTO: 3.68 MILLION/UL (ref 3.88–5.62)
SODIUM SERPL-SCNC: 134 MMOL/L (ref 135–147)
SODIUM SERPL-SCNC: 135 MMOL/L (ref 135–147)
WBC # BLD AUTO: 3.97 THOUSAND/UL (ref 4.31–10.16)

## 2023-01-09 RX ADMIN — Medication 3 G: at 12:09

## 2023-01-09 RX ADMIN — LORATADINE 10 MG: 10 TABLET ORAL at 09:15

## 2023-01-09 RX ADMIN — BACLOFEN 20 MG: 10 TABLET ORAL at 22:10

## 2023-01-09 RX ADMIN — HYDROXYCHLOROQUINE SULFATE 400 MG: 200 TABLET, FILM COATED ORAL at 22:15

## 2023-01-09 RX ADMIN — CARVEDILOL 12.5 MG: 12.5 TABLET, FILM COATED ORAL at 09:22

## 2023-01-09 RX ADMIN — TRIAMCINOLONE ACETONIDE: 1 CREAM TOPICAL at 09:22

## 2023-01-09 RX ADMIN — MYCOPHENOLATE MOFETIL 500 MG: 250 CAPSULE ORAL at 22:10

## 2023-01-09 RX ADMIN — CEPHALEXIN 500 MG: 500 CAPSULE ORAL at 22:10

## 2023-01-09 RX ADMIN — Medication 3 G: at 18:02

## 2023-01-09 RX ADMIN — APIXABAN 5 MG: 5 TABLET, FILM COATED ORAL at 09:15

## 2023-01-09 RX ADMIN — MYCOPHENOLATE MOFETIL 500 MG: 250 CAPSULE ORAL at 09:15

## 2023-01-09 RX ADMIN — CEPHALEXIN 500 MG: 500 CAPSULE ORAL at 09:15

## 2023-01-09 RX ADMIN — CEPHALEXIN 500 MG: 500 CAPSULE ORAL at 18:02

## 2023-01-09 RX ADMIN — Medication 7.5 MG: at 02:43

## 2023-01-09 RX ADMIN — TAMSULOSIN HYDROCHLORIDE 0.4 MG: 0.4 CAPSULE ORAL at 18:03

## 2023-01-09 RX ADMIN — GABAPENTIN 600 MG: 300 CAPSULE ORAL at 09:14

## 2023-01-09 RX ADMIN — SENNOSIDES AND DOCUSATE SODIUM 1 TABLET: 50; 8.6 TABLET ORAL at 22:10

## 2023-01-09 RX ADMIN — GABAPENTIN 600 MG: 300 CAPSULE ORAL at 18:03

## 2023-01-09 RX ADMIN — Medication 3 G: at 09:22

## 2023-01-09 RX ADMIN — ESCITALOPRAM OXALATE 10 MG: 10 TABLET ORAL at 09:15

## 2023-01-09 RX ADMIN — APIXABAN 5 MG: 5 TABLET, FILM COATED ORAL at 18:02

## 2023-01-09 RX ADMIN — CEPHALEXIN 500 MG: 500 CAPSULE ORAL at 05:34

## 2023-01-09 RX ADMIN — BACLOFEN 20 MG: 10 TABLET ORAL at 18:02

## 2023-01-09 RX ADMIN — GABAPENTIN 600 MG: 300 CAPSULE ORAL at 22:10

## 2023-01-09 RX ADMIN — OXYCODONE HYDROCHLORIDE 10 MG: 10 TABLET ORAL at 17:15

## 2023-01-09 RX ADMIN — CARVEDILOL 12.5 MG: 12.5 TABLET, FILM COATED ORAL at 18:03

## 2023-01-09 RX ADMIN — BACLOFEN 20 MG: 10 TABLET ORAL at 09:14

## 2023-01-09 NOTE — PROGRESS NOTES
Progress Note - Infectious Disease   Jesse Enriquez 45 y o  male MRN: [id number]  Unit/Bed#: -01 Encounter: [id number]      Impression/Recommendations:  1   Right lower leg cellulitis, superimposed on underlying venous stasis   Cellulitis has the appearance of streptococcal infection   Cellulitis appears to be superficial, without abscess or involvement of deeper structure   Leg CT is without abscess  As typical with patient with cellulitis superimposed on venous stasis, response to antibiotic tends to be slow   Cellulitis is much improved   He remains clinically and systemically well   Admission blood cultures have no growth thus far   Source of cellulitis is most likely the chronic leg rash   Although there is no obvious active ulceration, there is likely loss of skin integrity at the rash site  Continue p o  Keflex  Serial leg exams  Treat x14 days total antibiotic, through       2   Venous stasis, with poorly controlled leg edema   Patient counseled regarding the need to keep legs elevated to control edema, to help cellulitis resolve quicker and prevent recurrent cellulitis  Continue aggressive leg elevation      3   SLE, with chronic rash and not on treatment   Patient will benefit from rheumatology evaluation and possible treatment to control rash  Recommend outpatient rheumatology evaluation      Discussed with patient in detail regarding the above plan  Discussed with slim service  Okay for discharge from ID viewpoint      Antibiotics:  Keflex  Antibiotic # 12     Subjective:  Pain in right foot and lower leg mild  Temperature stays down   No chills    He is tolerating antibiotic well   No nausea, vomiting or diarrhea      Objective:  Vitals:  Temp:  [98 °F (36 7 °C)-98 1 °F (36 7 °C)] 98 1 °F (36 7 °C)  HR:  [65-67] 67  BP: (119-135)/(74-84) 135/84  SpO2:  [98 %] 98 %  Temp (24hrs), Av 1 °F (36 7 °C), Min:98 °F (36 7 °C), Max:98 1 °F (36 7 °C)  Current: Temperature: 98 1 °F (36 7 °C)    Physical Exam:     General: Awake, alert, cooperative, no distress  Neck:  Supple  No mass  No lymphadenopathy  Lungs: Expansion symmetric, no rales, no wheezing, respirations unlabored  Heart:  Regular rate and rhythm, S1 and S2 normal, no murmur  Abdomen: Soft, nondistended, non-tender, bowel sounds active all four quadrants, no masses, no organomegaly  Extremities: Improved foot/leg edema  Stable rash and chronic changes  Improved erythema/warmth  Mild and improved tenderness  Skin:  Stable chronic rash  Neuro: Moves all extremities  Invasive Devices     Peripheral Intravenous Line  Duration           Peripheral IV 01/08/23 Left;Proximal;Ventral (anterior) Forearm 1 day                Labs studies:   I have personally reviewed pertinent labs  Results from last 7 days   Lab Units 01/09/23  0600 01/08/23  1824 01/08/23  0513   POTASSIUM mmol/L 4 7 4 8 4 6   CHLORIDE mmol/L 102 104 97   CO2 mmol/L 24 25 21   BUN mg/dL 12 13 11   CREATININE mg/dL 0 98 1 09 1 03   EGFR ml/min/1 73sq m 87 76 81   CALCIUM mg/dL 9 0 8 9 8 7     Results from last 7 days   Lab Units 01/09/23  0600 01/05/23  0500 01/04/23  0531   WBC Thousand/uL 3 97* 4 39 4 54   HEMOGLOBIN g/dL 10 6* 10 9* 10 6*   PLATELETS Thousands/uL 398* 298 260           Imaging Studies:   I have personally reviewed pertinent imaging study reports and images in PACS  EKG, Pathology, and Other Studies:   I have personally reviewed pertinent reports

## 2023-01-09 NOTE — ASSESSMENT & PLAN NOTE
/84   Pulse 67   Temp 98 1 °F (36 7 °C)   Resp 17   Ht 5' 5 98" (1 676 m)   Wt 91 6 kg (201 lb 15 1 oz)   SpO2 98%   BMI 32 61 kg/m²   · Continue BP meds

## 2023-01-09 NOTE — PROGRESS NOTES
NEPHROLOGY PROGRESS NOTE    Patient: Shamika Batista               Sex: male          DOA: 12/29/2022  8:46 AM   YOB: 1970        Age: 45 y o          LOS:  LOS: 11 days   Encounter Date: 1/9/2023    REASON FOR THE CONSULTATION: Further management of hyponatremia    HPI     This is a 45 y o  male admitted for Cellulitis of right lower extremity     SUBJECTIVE     -Breathing is currently stable  Reviewed infectious disease note with recommendation from this morning  Patient denies nausea, vomiting, headache or dizziness today    - Reviewed last 24 hrs events     CURRENT MEDICATIONS       Current Facility-Administered Medications:   •  acetaminophen (TYLENOL) tablet 650 mg, 650 mg, Oral, Q6H PRN, Malinda Gallardo MD, 650 mg at 12/31/22 9942  •  apixaban (ELIQUIS) tablet 5 mg, 5 mg, Oral, BID, Malinda Gallardo MD, 5 mg at 01/09/23 0915  •  baclofen tablet 20 mg, 20 mg, Oral, TID, Malinda Gallardo MD, 20 mg at 01/09/23 0704  •  carvedilol (COREG) tablet 12 5 mg, 12 5 mg, Oral, BID With Meals, Malinda Gallardo MD, 12 5 mg at 01/09/23 0641  •  cephalexin (KEFLEX) capsule 500 mg, 500 mg, Oral, Q6H [address], Pamella Aden MD, 500 mg at 01/09/23 0915  •  escitalopram (LEXAPRO) tablet 10 mg, 10 mg, Oral, Daily, Malinda Gallardo MD, 10 mg at 01/09/23 0915  •  gabapentin (NEURONTIN) capsule 600 mg, 600 mg, Oral, TID, Malinda Gallardo MD, 600 mg at 01/09/23 6456  •  hydroxychloroquine (PLAQUENIL) tablet 400 mg, 400 mg, Oral, HS, Malinda Gallardo MD, 400 mg at 01/08/23 2143  •  hydrOXYzine HCL (ATARAX) tablet 10 mg, 10 mg, Oral, Q6H PRN, Malinda Gallardo MD, 10 mg at 01/04/23 2222  •  loratadine (CLARITIN) tablet 10 mg, 10 mg, Oral, Daily, Malinda Gallardo MD, 10 mg at 01/09/23 0915  •  mycophenolate (CELLCEPT) capsule 500 mg, 500 mg, Oral, Q12H [address], Malinda Gallardo MD, 500 mg at 01/09/23 0915  •  ondansetron (ZOFRAN) injection 4 mg, 4 mg, Intravenous, Q4H PRN, Malinda Gallardo MD, 4 mg at 01/08/23 0857  •  oxyCODONE (ROXICODONE) split tablet 7 5 mg, 7 5 mg, Oral, Q6H PRN, 7 5 mg at 01/09/23 0243 **OR** oxyCODONE (ROXICODONE) immediate release tablet 10 mg, 10 mg, Oral, Q6H PRN, Mo Newberry MD, 10 mg at 01/08/23 0745  •  polyethylene glycol (MIRALAX) packet 17 g, 17 g, Oral, Daily, Mo Newberry MD, 17 g at 01/08/23 4205  •  senna-docusate sodium (SENOKOT S) 8 6-50 mg per tablet 1 tablet, 1 tablet, Oral, HS, Abram Delong DO, 1 tablet at 01/08/23 2143  •  sodium chloride tablet 3 g, 3 g, Oral, TID With Meals, Nena Murillo MD, 3 g at 01/09/23 1209  •  tamsulosin (FLOMAX) capsule 0 4 mg, 0 4 mg, Oral, Daily With Lilia Nick MD, 0 4 mg at 01/08/23 1535  •  triamcinolone (KENALOG) 0 1 % cream, , Topical, BID, Mo Newberry MD, Given at 01/09/23 9447    OBJECTIVE     Current Weight: Weight - Scale: 91 6 kg (201 lb 15 1 oz)  /84   Pulse 67   Temp 98 1 °F (36 7 °C)   Resp 17   Ht 5' 5 98" (1 676 m)   Wt 91 6 kg (201 lb 15 1 oz)   SpO2 98%   BMI 32 61 kg/m²   Vitals:    01/09/23 0734   BP: 135/84   Pulse: 67   Resp:    Temp: 98 1 °F (36 7 °C)   SpO2: 98%     Body mass index is 32 61 kg/m²  Intake/Output Summary (Last 24 hours) at 1/9/2023 1305  Last data filed at 1/9/2023 0354  Gross per 24 hour   Intake 120 ml   Output 2475 ml   Net -2355 ml       PHYSICAL EXAMINATION     Physical Exam  Constitutional:       General: He is not in acute distress  HENT:      Right Ear: External ear normal    Eyes:      Conjunctiva/sclera:      Right eye: No hemorrhage  Neck:      Thyroid: No thyromegaly  Pulmonary:      Effort: No accessory muscle usage or respiratory distress  Abdominal:      General: There is no distension  Skin:     General: Skin is warm  Coloration: Skin is not jaundiced  Psychiatric:         Behavior: Behavior is not combative             LAB RESULTS     Results from last 7 days   Lab Units 01/09/23  0600 01/08/23  1824 01/08/23  0513 01/07/23  1813 01/06/23  0834 01/06/23  0009 01/05/23  1711 01/05/23  0500 01/04/23  1011 01/04/23  0531   WBC Thousand/uL 3 97* --   --   --   --   --   --  4 39  --  4 54   HEMOGLOBIN g/dL 10 6*  --   --   --   --   --   --  10 9*  --  10 6*   HEMATOCRIT % 31 3*  --   --   --   --   --   --  32 1*  --  31 3*   PLATELETS Thousands/uL 398*  --   --   --   --   --   --  298  --  260   SODIUM mmol/L 135 135 128* 129* 127* 128* 125* 123*   < > 123*   POTASSIUM mmol/L 4 7 4 8 4 6 5 0 4 4 4 3 4 4 4 2   < > 4 6   CHLORIDE mmol/L 102 104 97 99 95* 95* 93* 89*   < > 90*   CO2 mmol/L 24 25 21 24 24 24 26 24   < > 24   BUN mg/dL 12 13 11 15 15 19 19 17   < > 14   CREATININE mg/dL 0 98 1 09 1 03 1 09 1 08 1 16 1 13 1 15   < > 1 12   EGFR ml/min/1 73sq m 87 76 81 76 77 70 73 71   < > 74   CALCIUM mg/dL 9 0 8 9 8 7 8 1* 8 6 8 3 8 5 8 6   < > 8 2*    < > = values in this interval not displayed  RADIOLOGY RESULTS      CT lower extremity w contrast right   Final Result by Riley Bobo MD (12/29 1531)      No acute osseous abnormality  Mild medial and lateral compartment of the knee with chondrocalcinosis  Circumferential edema lower leg extending into the ankle with areas of skin thickening as above likely cellulitis  No discrete abscess or soft tissue gas  Extensive subcutaneous calcifications  This is of uncertain etiology but could be due to prior infectious etiology such as disseminated cysticercosis  Arterial calcifications  Workstation performed: [id number]         VAS lower limb venous duplex study, unilateral/limited   Final Result by Jas Nath MD (12/29 1120)          PLAN / RECOMMENDATIONS      1  Hyponatremia  Chronic since duration is more than 48 hours    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  normal TSH and cortisol level  Patient is currently on salt tablet and yesterday also received tolvaptan  Sodium level has improved overnight 235  Continue salt tablet 3 g p o  3 times daily today and monitor sodium level every 12 hours today  2   Lupus nephritis class V    Patient is currently being followed by nephrologist at [facility] and according to their note, patient was diagnosed with class V lupus based on biopsy in November 2015 and also found to have 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  Initially received induction therapy with Cytoxan  Now patient is on  mg p o  twice daily  Patient's previously known baseline creatinine was thought to be around 1 4-1 5 but during the hospital stay, renal function seems to have improved to current creatinine below to baseline at 0 98  Continue current dose of CellCept and monitor renal function  3   Hypertension in chronic kidney disease  Blood pressure has been acceptable in last 24 hours and monitor hypertension with Coreg 12 5 mg p o  twice daily today  Disposition: Stable from renal standpoint for discharge with current dose of salt tablet  Recommend checking BMP in 3-5 days upon discharge and outpatient nephrology follow-up in around 2 weeks    Overall above mentioned plan was also d/w current Syeda Pryor MD  Nephrology  1/9/2023        Portions of the record may have been created with voice recognition software  Occasional wrong word or "sound a like" substitutions may have occurred due to the inherent limitations of voice recognition software  Read the chart carefully and recognize, using context, where substitutions have occurred

## 2023-01-09 NOTE — ASSESSMENT & PLAN NOTE
· No significant improvement with IV fluids, labs reviewed suggestive of SIADH  · Nephrology consult appreciated,  · s/p tolvaptan x1 on 1/8/23  · Continue sodium tablets on discharge  · Outpatient follow up

## 2023-01-09 NOTE — PLAN OF CARE
Problem: MOBILITY - ADULT  Goal: Maintain or return to baseline ADL function  Description: INTERVENTIONS:  -  Assess patient's ability to carry out ADLs; assess patient's baseline for ADL function and identify physical deficits which impact ability to perform ADLs (bathing, care of mouth/teeth, toileting, grooming, dressing, etc )  - Assess/evaluate cause of self-care deficits   - Assess range of motion  - Assess patient's mobility; develop plan if impaired  - Assess patient's need for assistive devices and provide as appropriate  - Encourage maximum independence but intervene and supervise when necessary  - Involve family in performance of ADLs  - Assess for home care needs following discharge   - Consider OT consult to assist with ADL evaluation and planning for discharge  - Provide patient education as appropriate  Outcome: Progressing  Goal: Maintains/Returns to pre admission functional level  Description: INTERVENTIONS:  - Perform BMAT or MOVE assessment daily    - Set and communicate daily mobility goal to care team and patient/family/caregiver  - Collaborate with rehabilitation services on mobility goals if consulted  - Perform Range of Motion  times a day  - Reposition patient every  hours    - Dangle patient  times a day  - Stand patient  times a day  - Ambulate patient  times a day  - Out of bed to chair  times a day   - Out of bed for meals  times a day  - Out of bed for toileting  - Record patient progress and toleration of activity level   Outcome: Progressing     Problem: Potential for Falls  Goal: Patient will remain free of falls  Description: INTERVENTIONS:  - Educate patient/family on patient safety including physical limitations  - Instruct patient to call for assistance with activity   - Consult OT/PT to assist with strengthening/mobility   - Keep Call bell within reach  - Keep bed low and locked with side rails adjusted as appropriate  - Keep care items and personal belongings within reach  - Initiate and maintain comfort rounds  - Make Fall Risk Sign visible to staff  - Offer Toileting every  Hours, in advance of need  - Initiate/Maintain alarm  - Obtain necessary fall risk management equipment:   - Apply yellow socks and bracelet for high fall risk patients  - Consider moving patient to room near nurses station  Outcome: Progressing     Problem: Prexisting or High Potential for Compromised Skin Integrity  Goal: Skin integrity is maintained or improved  Description: INTERVENTIONS:  - Identify patients at risk for skin breakdown  - Assess and monitor skin integrity  - Assess and monitor nutrition and hydration status  - Monitor labs   - Assess for incontinence   - Turn and reposition patient  - Assist with mobility/ambulation  - Relieve pressure over bony prominences  - Avoid friction and shearing  - Provide appropriate hygiene as needed including keeping skin clean and dry  - Evaluate need for skin moisturizer/barrier cream  - Collaborate with interdisciplinary team   - Patient/family teaching  - Consider wound care consult   Outcome: Progressing     Problem: PAIN - ADULT  Goal: Verbalizes/displays adequate comfort level or baseline comfort level  Description: Interventions:  - Encourage patient to monitor pain and request assistance  - Assess pain using appropriate pain scale  - Administer analgesics based on type and severity of pain and evaluate response  - Implement non-pharmacological measures as appropriate and evaluate response  - Consider cultural and social influences on pain and pain management  - Notify physician/advanced practitioner if interventions unsuccessful or patient reports new pain  Outcome: Progressing     Problem: INFECTION - ADULT  Goal: Absence or prevention of progression during hospitalization  Description: INTERVENTIONS:  - Assess and monitor for signs and symptoms of infection  - Monitor lab/diagnostic results  - Monitor all insertion sites, i e  indwelling lines, tubes, and drains  - Monitor endotracheal if appropriate and nasal secretions for changes in amount and color  - [place] appropriate cooling/warming therapies per order  - Administer medications as ordered  - Instruct and encourage patient and family to use good hand hygiene technique  - Identify and instruct in appropriate isolation precautions for identified infection/condition  Outcome: Progressing  Goal: Absence of fever/infection during neutropenic period  Description: INTERVENTIONS:  - Monitor WBC    Outcome: Progressing     Problem: SAFETY ADULT  Goal: Maintain or return to baseline ADL function  Description: INTERVENTIONS:  -  Assess patient's ability to carry out ADLs; assess patient's baseline for ADL function and identify physical deficits which impact ability to perform ADLs (bathing, care of mouth/teeth, toileting, grooming, dressing, etc )  - Assess/evaluate cause of self-care deficits   - Assess range of motion  - Assess patient's mobility; develop plan if impaired  - Assess patient's need for assistive devices and provide as appropriate  - Encourage maximum independence but intervene and supervise when necessary  - Involve family in performance of ADLs  - Assess for home care needs following discharge   - Consider OT consult to assist with ADL evaluation and planning for discharge  - Provide patient education as appropriate  Outcome: Progressing  Goal: Maintains/Returns to pre admission functional level  Description: INTERVENTIONS:  - Perform BMAT or MOVE assessment daily    - Set and communicate daily mobility goal to care team and patient/family/caregiver  - Collaborate with rehabilitation services on mobility goals if consulted  - Perform Range of Motion times a day  - Reposition patient every  hours    - Dangle patient  times a day  - Stand patient  times a day  - Ambulate patient  times a day  - Out of bed to chair  times a day   - Out of bed for meals  times a day  - Out of bed for toileting  - Record patient progress and toleration of activity level   Outcome: Progressing  Goal: Patient will remain free of falls  Description: INTERVENTIONS:  - Educate patient/family on patient safety including physical limitations  - Instruct patient to call for assistance with activity   - Consult OT/PT to assist with strengthening/mobility   - Keep Call bell within reach  - Keep bed low and locked with side rails adjusted as appropriate  - Keep care items and personal belongings within reach  - Initiate and maintain comfort rounds  - Make Fall Risk Sign visible to staff  - Offer Toileting every  Hours, in advance of need  - Initiate/Maintain alarm  - Obtain necessary fall risk management equipment:   - Apply yellow socks and bracelet for high fall risk patients  - Consider moving patient to room near nurses station  Outcome: Progressing     Problem: DISCHARGE PLANNING  Goal: Discharge to home or other facility with appropriate resources  Description: INTERVENTIONS:  - Identify barriers to discharge w/patient and caregiver  - Arrange for needed discharge resources and transportation as appropriate  - Identify discharge learning needs (meds, wound care, etc )  - Arrange for interpretive services to assist at discharge as needed  - Refer to Case Management Department for coordinating discharge planning if the patient needs post-hospital services based on physician/advanced practitioner order or complex needs related to functional status, cognitive ability, or social support system  Outcome: Progressing     Problem: Knowledge Deficit  Goal: Patient/family/caregiver demonstrates understanding of disease process, treatment plan, medications, and discharge instructions  Description: Complete learning assessment and assess knowledge base    Interventions:  - Provide teaching at level of understanding  - Provide teaching via preferred learning methods  Outcome: Progressing     Problem: SKIN/TISSUE INTEGRITY - ADULT  Goal: Skin Integrity remains intact(Skin Breakdown Prevention)  Description: Assess:  -Perform Gautam assessment every   -Clean and moisturize skin every   -Inspect skin when repositioning, toileting, and assisting with ADLS  -Assess under medical devices such as  every   -Assess extremities for adequate circulation and sensation     Bed Management:  -Have minimal linens on bed & keep smooth, unwrinkled  -Change linens as needed when moist or perspiring  -Avoid sitting or lying in one position for more than hours while in bed  -Keep HOB at degrees     Toileting:  -Offer bedside commode  -Assess for incontinence every   -Use incontinent care products after each incontinent episode such as     Activity:  -Mobilize patient  times a day  -Encourage activity and walks on unit  -Encourage or provide ROM exercises   -Turn and reposition patient every  Hours  -Use appropriate equipment to lift or move patient in bed  -Instruct/ Assist with weight shifting every  when out of bed in chair  -Consider limitation of chair time  hour intervals    Skin Care:  -Avoid use of baby powder, tape, friction and shearing, hot water or constrictive clothing  -Relieve pressure over bony prominences using  -Do not massage red bony areas    Next Steps:  -Teach patient strategies to minimize risks such as    -Consider consults to  interdisciplinary teams such as   Outcome: Progressing  Goal: Incision(s), wounds(s) or drain site(s) healing without S/S of infection  Description: INTERVENTIONS  - Assess and document dressing, incision, wound bed, drain sites and surrounding tissue  - Provide patient and family education  - Perform skin care/dressing changes every   Outcome: Progressing  Goal: Pressure injury heals and does not worsen  Description: Interventions:  - Implement low air loss mattress or specialty surface (Criteria met)  - Apply silicone foam dressing  - Instruct/assist with weight shifting every  minutes when in chair   - Limit chair time to  hour intervals  - Use special pressure reducing interventions such as  when in chair   - Apply fecal or urinary incontinence containment device   - Perform passive or active ROM every  - Turn and reposition patient & offload bony prominences every  hours   - Utilize friction reducing device or surface for transfers   - Consider consults to  interdisciplinary teams such as   - Use incontinent care products after each incontinent episode such   - Consider nutrition services referral as needed  Outcome: Progressing     Problem: Nutrition/Hydration-ADULT  Goal: Nutrient/Hydration intake appropriate for improving, restoring or maintaining nutritional needs  Description: Monitor and assess patient's nutrition/hydration status for malnutrition  Collaborate with interdisciplinary team and initiate plan and interventions as ordered  Monitor patient's weight and dietary intake as ordered or per policy  Utilize nutrition screening tool and intervene as necessary  Determine patient's food preferences and provide high-protein, high-caloric foods as appropriate       INTERVENTIONS:  - Monitor oral intake, urinary output, labs, and treatment plans  - Assess nutrition and hydration status and recommend course of action  - Evaluate amount of meals eaten  - Assist patient with eating if necessary   - Allow adequate time for meals  - Recommend/ encourage appropriate diets, oral nutritional supplements, and vitamin/mineral supplements  - Order, calculate, and assess calorie counts as needed  - Recommend, monitor, and adjust tube feedings and TPN/PPN based on assessed needs  - Assess need for intravenous fluids  - Provide specific nutrition/hydration education as appropriate  - Include patient/family/caregiver in decisions related to nutrition  Outcome: Progressing Yes

## 2023-01-09 NOTE — ASSESSMENT & PLAN NOTE
Lab Results   Component Value Date    EGFR 87 01/09/2023    EGFR 76 01/08/2023    EGFR 81 01/08/2023    CREATININE 0 98 01/09/2023    CREATININE 1 09 01/08/2023    CREATININE 1 03 01/08/2023     · Creatinine stable at baseline

## 2023-01-09 NOTE — PLAN OF CARE
Problem: MOBILITY - ADULT  Goal: Maintain or return to baseline ADL function  Description: INTERVENTIONS:  -  Assess patient's ability to carry out ADLs; assess patient's baseline for ADL function and identify physical deficits which impact ability to perform ADLs (bathing, care of mouth/teeth, toileting, grooming, dressing, etc )  - Assess/evaluate cause of self-care deficits   - Assess range of motion  - Assess patient's mobility; develop plan if impaired  - Assess patient's need for assistive devices and provide as appropriate  - Encourage maximum independence but intervene and supervise when necessary  - Involve family in performance of ADLs  - Assess for home care needs following discharge   - Consider OT consult to assist with ADL evaluation and planning for discharge  - Provide patient education as appropriate  1/9/2023 5188 by Audrey Ferrari RN  Outcome: Progressing  1/9/2023 8961 by Audrey Ferrari RN  Outcome: Progressing  Goal: Maintains/Returns to pre admission functional level  Description: INTERVENTIONS:  - Perform BMAT or MOVE assessment daily    - Set and communicate daily mobility goal to care team and patient/family/caregiver  - Collaborate with rehabilitation services on mobility goals if consulted  - Perform Range of Motion *** times a day  - Reposition patient every *** hours    - Dangle patient *** times a day  - Stand patient *** times a day  - Ambulate patient *** times a day  - Out of bed to chair *** times a day   - Out of bed for meals *** times a day  - Out of bed for toileting  - Record patient progress and toleration of activity level   1/9/2023 6806 by Audrey Ferrari RN  Outcome: Progressing  1/9/2023 4630 by Audrey Ferrari RN  Outcome: Progressing     Problem: Potential for Falls  Goal: Patient will remain free of falls  Description: INTERVENTIONS:  - Educate patient/family on patient safety including physical limitations  - Instruct patient to call for assistance with activity   - Consult OT/PT to assist with strengthening/mobility   - Keep Call bell within reach  - Keep bed low and locked with side rails adjusted as appropriate  - Keep care items and personal belongings within reach  - Initiate and maintain comfort rounds  - Make Fall Risk Sign visible to staff  - Offer Toileting every *** Hours, in advance of need  - Initiate/Maintain ***alarm  - Obtain necessary fall risk management equipment: ***  - Apply yellow socks and bracelet for high fall risk patients  - Consider moving patient to room near nurses station  1/9/2023 9269 by Chang Trevizo RN  Outcome: Progressing  1/9/2023 0639 by Chang Trevizo RN  Outcome: Progressing     Problem: Prexisting or High Potential for Compromised Skin Integrity  Goal: Skin integrity is maintained or improved  Description: INTERVENTIONS:  - Identify patients at risk for skin breakdown  - Assess and monitor skin integrity  - Assess and monitor nutrition and hydration status  - Monitor labs   - Assess for incontinence   - Turn and reposition patient  - Assist with mobility/ambulation  - Relieve pressure over bony prominences  - Avoid friction and shearing  - Provide appropriate hygiene as needed including keeping skin clean and dry  - Evaluate need for skin moisturizer/barrier cream  - Collaborate with interdisciplinary team   - Patient/family teaching  - Consider wound care consult   1/9/2023 1701 by Chang Trevizo RN  Outcome: Progressing  1/9/2023 5000 by Chang Trevizo RN  Outcome: Progressing     Problem: PAIN - ADULT  Goal: Verbalizes/displays adequate comfort level or baseline comfort level  Description: Interventions:  - Encourage patient to monitor pain and request assistance  - Assess pain using appropriate pain scale  - Administer analgesics based on type and severity of pain and evaluate response  - Implement non-pharmacological measures as appropriate and evaluate response  - Consider cultural and social influences on pain and pain management  - Notify physician/advanced practitioner if interventions unsuccessful or patient reports new pain  1/9/2023 7733 by Jaime Gunter RN  Outcome: Progressing  1/9/2023 6358 by Jaime Gunter RN  Outcome: Progressing     Problem: INFECTION - ADULT  Goal: Absence or prevention of progression during hospitalization  Description: INTERVENTIONS:  - Assess and monitor for signs and symptoms of infection  - Monitor lab/diagnostic results  - Monitor all insertion sites, i e  indwelling lines, tubes, and drains  - Monitor endotracheal if appropriate and nasal secretions for changes in amount and color  - [place] appropriate cooling/warming therapies per order  - Administer medications as ordered  - Instruct and encourage patient and family to use good hand hygiene technique  - Identify and instruct in appropriate isolation precautions for identified infection/condition  1/9/2023 7450 by Jaime Gunter RN  Outcome: Progressing  1/9/2023 0140 by Jaime Gunter RN  Outcome: Progressing  Goal: Absence of fever/infection during neutropenic period  Description: INTERVENTIONS:  - Monitor WBC    1/9/2023 1609 by Jaime Gunter RN  Outcome: Progressing  1/9/2023 0446 by Jaime Gunter RN  Outcome: Progressing     Problem: SAFETY ADULT  Goal: Maintain or return to baseline ADL function  Description: INTERVENTIONS:  -  Assess patient's ability to carry out ADLs; assess patient's baseline for ADL function and identify physical deficits which impact ability to perform ADLs (bathing, care of mouth/teeth, toileting, grooming, dressing, etc )  - Assess/evaluate cause of self-care deficits   - Assess range of motion  - Assess patient's mobility; develop plan if impaired  - Assess patient's need for assistive devices and provide as appropriate  - Encourage maximum independence but intervene and supervise when necessary  - Involve family in performance of ADLs  - Assess for home care needs following discharge   - Consider OT consult to assist with ADL evaluation and planning for discharge  - Provide patient education as appropriate  1/9/2023 9625 by Emmie Blizzard, RN  Outcome: Progressing  1/9/2023 7163 by Emmie Blizzard, RN  Outcome: Progressing  Goal: Maintains/Returns to pre admission functional level  Description: INTERVENTIONS:  - Perform BMAT or MOVE assessment daily    - Set and communicate daily mobility goal to care team and patient/family/caregiver  - Collaborate with rehabilitation services on mobility goals if consulted  - Perform Range of Motion *** times a day  - Reposition patient every *** hours    - Dangle patient *** times a day  - Stand patient *** times a day  - Ambulate patient *** times a day  - Out of bed to chair *** times a day   - Out of bed for meals *** times a day  - Out of bed for toileting  - Record patient progress and toleration of activity level   1/9/2023 3908 by Emmie Blizzard, RN  Outcome: Progressing  1/9/2023 1135 by Emmie Blizzard, RN  Outcome: Progressing  Goal: Patient will remain free of falls  Description: INTERVENTIONS:  - Educate patient/family on patient safety including physical limitations  - Instruct patient to call for assistance with activity   - Consult OT/PT to assist with strengthening/mobility   - Keep Call bell within reach  - Keep bed low and locked with side rails adjusted as appropriate  - Keep care items and personal belongings within reach  - Initiate and maintain comfort rounds  - Make Fall Risk Sign visible to staff  - Offer Toileting every *** Hours, in advance of need  - Initiate/Maintain ***alarm  - Obtain necessary fall risk management equipment: ***  - Apply yellow socks and bracelet for high fall risk patients  - Consider moving patient to room near nurses station  1/9/2023 0319 by Emmie Blizzard, RN  Outcome: Progressing  1/9/2023 0092 by Emmie Blizzard, RN  Outcome: Progressing     Problem: DISCHARGE PLANNING  Goal: Discharge to home or other facility with appropriate resources  Description: INTERVENTIONS:  - Identify barriers to discharge w/patient and caregiver  - Arrange for needed discharge resources and transportation as appropriate  - Identify discharge learning needs (meds, wound care, etc )  - Arrange for interpretive services to assist at discharge as needed  - Refer to Case Management Department for coordinating discharge planning if the patient needs post-hospital services based on physician/advanced practitioner order or complex needs related to functional status, cognitive ability, or social support system  1/9/2023 3359 by Chang Trevizo RN  Outcome: Progressing  1/9/2023 4168 by Chang Trevizo RN  Outcome: Progressing     Problem: Knowledge Deficit  Goal: Patient/family/caregiver demonstrates understanding of disease process, treatment plan, medications, and discharge instructions  Description: Complete learning assessment and assess knowledge base    Interventions:  - Provide teaching at level of understanding  - Provide teaching via preferred learning methods  1/9/2023 8439 by Chang Trevizo RN  Outcome: Progressing  1/9/2023 6850 by Chang Trevizo RN  Outcome: Progressing     Problem: SKIN/TISSUE INTEGRITY - ADULT  Goal: Skin Integrity remains intact(Skin Breakdown Prevention)  Description: Assess:  -Perform Gautam assessment every ***  -Clean and moisturize skin every ***  -Inspect skin when repositioning, toileting, and assisting with ADLS  -Assess under medical devices such as *** every ***  -Assess extremities for adequate circulation and sensation     Bed Management:  -Have minimal linens on bed & keep smooth, unwrinkled  -Change linens as needed when moist or perspiring  -Avoid sitting or lying in one position for more than *** hours while in bed  -Keep HOB at ***degrees     Toileting:  -Offer bedside commode  -Assess for incontinence every ***  -Use incontinent care products after each incontinent episode such as ***    Activity:  -Mobilize patient *** times a day  -Encourage activity and walks on unit  -Encourage or provide ROM exercises   -Turn and reposition patient every *** Hours  -Use appropriate equipment to lift or move patient in bed  -Instruct/ Assist with weight shifting every *** when out of bed in chair  -Consider limitation of chair time *** hour intervals    Skin Care:  -Avoid use of baby powder, tape, friction and shearing, hot water or constrictive clothing  -Relieve pressure over bony prominences using ***  -Do not massage red bony areas    Next Steps:  -Teach patient strategies to minimize risks such as ***   -Consider consults to  interdisciplinary teams such as ***  1/9/2023 3146 by Padmini Mata RN  Outcome: Progressing  1/9/2023 9835 by Padmini Mata RN  Outcome: Progressing  Goal: Incision(s), wounds(s) or drain site(s) healing without S/S of infection  Description: INTERVENTIONS  - Assess and document dressing, incision, wound bed, drain sites and surrounding tissue  - Provide patient and family education  - Perform skin care/dressing changes every ***  1/9/2023 3339 by Padmini Mata RN  Outcome: Progressing  1/9/2023 0682 by Padmini Mata RN  Outcome: Progressing  Goal: Pressure injury heals and does not worsen  Description: Interventions:  - Implement low air loss mattress or specialty surface (Criteria met)  - Apply silicone foam dressing  - Instruct/assist with weight shifting every *** minutes when in chair   - Limit chair time to *** hour intervals  - Use special pressure reducing interventions such as *** when in chair   - Apply fecal or urinary incontinence containment device   - Perform passive or active ROM every ***  - Turn and reposition patient & offload bony prominences every *** hours   - Utilize friction reducing device or surface for transfers   - Consider consults to  interdisciplinary teams such as ***  - Use incontinent care products after each incontinent episode such as ***  - Consider nutrition services referral as needed  1/9/2023 9560 by Jessica De Santiago RN  Outcome: Progressing  1/9/2023 9301 by Jessica De Santiago RN  Outcome: Progressing     Problem: Nutrition/Hydration-ADULT  Goal: Nutrient/Hydration intake appropriate for improving, restoring or maintaining nutritional needs  Description: Monitor and assess patient's nutrition/hydration status for malnutrition  Collaborate with interdisciplinary team and initiate plan and interventions as ordered  Monitor patient's weight and dietary intake as ordered or per policy  Utilize nutrition screening tool and intervene as necessary  Determine patient's food preferences and provide high-protein, high-caloric foods as appropriate       INTERVENTIONS:  - Monitor oral intake, urinary output, labs, and treatment plans  - Assess nutrition and hydration status and recommend course of action  - Evaluate amount of meals eaten  - Assist patient with eating if necessary   - Allow adequate time for meals  - Recommend/ encourage appropriate diets, oral nutritional supplements, and vitamin/mineral supplements  - Order, calculate, and assess calorie counts as needed  - Recommend, monitor, and adjust tube feedings and TPN/PPN based on assessed needs  - Assess need for intravenous fluids  - Provide specific nutrition/hydration education as appropriate  - Include patient/family/caregiver in decisions related to nutrition  1/9/2023 4047 by Jessica De Santiago, RN  Outcome: Progressing  1/9/2023 0618 by Jessica De Santiago RN  Outcome: Progressing

## 2023-01-09 NOTE — PROGRESS NOTES
[facility]  Progress Note - Toni Matthew 1970, 45 y o  male MRN: [id number]  Unit/Bed#: -[facility] Encounter: [id number]  Primary Care Provider: Carmen Montelongo MD   Date and time admitted to hospital: 12/29/2022  8:46 AM    * Cellulitis of right lower extremity  Assessment & Plan  · Present on admission, c/o erythema and pain with associated ambulatory dysfunction; h/o DVT   · CT leg 12/29/22:  No abscess or soft tissue gas; circumferential edema   · ID consult appreciated,  · Transitioned from IV to oral antibiotics, continue course through 1/11/23  · Medically stable for discharge from ID standpoint     Hyponatremia  Assessment & Plan  · No significant improvement with IV fluids, labs reviewed suggestive of SIADH  · Nephrology consult appreciated,  · s/p tolvaptan x1 on 1/8/23  · Continue sodium tablets on discharge  · Outpatient follow up     Chronic kidney disease, stage 3 [facility])  Assessment & Plan  Lab Results   Component Value Date    EGFR 87 01/09/2023    EGFR 76 01/08/2023    EGFR 81 01/08/2023    CREATININE 0 98 01/09/2023    CREATININE 1 09 01/08/2023    CREATININE 1 03 01/08/2023     · Creatinine stable at baseline    Lupus (systemic lupus erythematosus) ([facility] [address] )  Assessment & Plan  · Will follow-up outpatient with rheumatology  · C/w home meds    History of DVT (deep vein thrombosis)  Assessment & Plan  · Continue Eliquis    Hypertension  Assessment & Plan  /84   Pulse 67   Temp 98 1 °F (36 7 °C)   Resp 17   Ht 5' 5 98" (1 676 m)   Wt 91 6 kg (201 lb 15 1 oz)   SpO2 98%   BMI 32 61 kg/m²   · Continue BP meds      VTE Pharmacologic Prophylaxis: VTE Score: 4 Moderate Risk (Score 3-4) - Pharmacological DVT Prophylaxis Ordered: apixaban (Eliquis)  Patient Centered Rounds: I performed bedside rounds with nursing staff today    Discussions with Specialists or Other Care Team Provider: ID, nephro, CM     Education and Discussions with Family / Patient: Updated contact person (wife) via phone  Time Spent for Care: 20 minutes  More than 50% of total time spent on counseling and coordination of care as described above  Current Length of Stay: 11 day(s)  Current Patient Status: Inpatient   Certification Statement: The patient will continue to require additional inpatient hospital stay due to pending STR placement  Discharge Plan: medically stable for STR placement    Code Status: Level 1 - Full Code    Subjective:   Patient examined this morning, doing well  Denies any chest pain or shortness of breath  Denies any abdominal pain  Reports good urine output  Discussed awaiting rehab placement, the patient is medically stable  Objective:     Vitals:   Temp (24hrs), Av 1 °F (36 7 °C), Min:98 °F (36 7 °C), Max:98 1 °F (36 7 °C)    Temp:  [98 °F (36 7 °C)-98 1 °F (36 7 °C)] 98 1 °F (36 7 °C)  HR:  [65-67] 67  BP: (119-135)/(74-84) 135/84  SpO2:  [98 %] 98 %  Body mass index is 32 61 kg/m²  Input and Output Summary (last 24 hours): Intake/Output Summary (Last 24 hours) at 2023 1454  Last data filed at 2023 0354  Gross per 24 hour   Intake --   Output 1475 ml   Net -1475 ml       Physical Exam:   Physical Exam  Vitals and nursing note reviewed  Constitutional:       General: He is not in acute distress  Appearance: He is obese  He is not ill-appearing or toxic-appearing  Cardiovascular:      Rate and Rhythm: Normal rate  Pulmonary:      Effort: Pulmonary effort is normal  No respiratory distress  Breath sounds: No wheezing  Musculoskeletal:      Right lower leg: No edema  Left lower leg: No edema  Neurological:      Mental Status: He is alert and oriented to person, place, and time     Psychiatric:         Mood and Affect: Mood normal          Behavior: Behavior normal           Additional Data:     Labs:  Results from last 7 days   Lab Units 23  0600 23  0500   WBC Thousand/uL 3 97* 4 39   HEMOGLOBIN g/dL 10 6* 10 9* HEMATOCRIT % 31 3* 32 1*   PLATELETS Thousands/uL 398* 298   NEUTROS PCT %  --  64   LYMPHS PCT %  --  17   LYMPHO PCT % 22  --    MONOS PCT %  --  17*   MONO PCT % 15*  --    EOS PCT % 4 0     Results from last 7 days   Lab Units 01/09/23  0600   SODIUM mmol/L 135   POTASSIUM mmol/L 4 7   CHLORIDE mmol/L 102   CO2 mmol/L 24   BUN mg/dL 12   CREATININE mg/dL 0 98   ANION GAP mmol/L 9   CALCIUM mg/dL 9 0   GLUCOSE RANDOM mg/dL 108                       Lines/Drains:  Invasive Devices     Peripheral Intravenous Line  Duration           Peripheral IV 01/08/23 Left;Proximal;Ventral (anterior) Forearm 1 day                  Recent Cultures (last 7 days):         Last 24 Hours Medication List:   Current Facility-Administered Medications   Medication Dose Route Frequency Provider Last Rate   • acetaminophen  650 mg Oral Q6H PRN Rae Allen MD     • apixaban  5 mg Oral BID Rae Allen MD     • baclofen  20 mg Oral TID Rae Allen MD     • carvedilol  12 5 mg Oral BID With Meals Rae Allen MD     • cephalexin  500 mg Oral Q6H [facility] Brittani Miles MD     • escitalopram  10 mg Oral Daily Rae Allen MD     • gabapentin  600 mg Oral TID Rae Allen MD     • hydroxychloroquine  400 mg Oral HS Rae Allen MD     • hydrOXYzine HCL  10 mg Oral Q6H PRN Rae Allen MD     • loratadine  10 mg Oral Daily Rae Allen MD     • mycophenolate  500 mg Oral Q12H [facility] Rae Allen MD     • ondansetron  4 mg Intravenous Q4H PRN Rae Allen MD     • oxyCODONE  7 5 mg Oral Q6H PRN Rae Allen MD      Or   • oxyCODONE  10 mg Oral Q6H PRN Rae Allen MD     • polyethylene glycol  17 g Oral Daily Rae Allen MD     • senna-docusate sodium  1 tablet Oral HS Abram Delong DO     • sodium chloride  3 g Oral TID With Meals Sirisha Rodriguez MD     • tamsulosin  0 4 mg Oral Daily With Felisa Rinaldi MD     • triamcinolone   Topical BID Rae Allen MD          Today, Patient Was Seen By: Marily Barnes PA-C    **Please Note: This note may have been constructed using a voice recognition system  **

## 2023-01-09 NOTE — CONSULTS
PHYSICAL MEDICINE AND REHABILITATION CONSULT NOTE  Emerson Shannon 45 y o  male MRN: [id number]  Unit/Bed#: -01 Encounter: [id number]    Requested by (Physician/Service): Verenice Guevara DO  Reason for Consultation:  Assessment of rehabilitation needs  Reason for Hospitalization:  Foot pain [M79 673]  Cellulitis of right leg [U17 150]      History of Present Illness:  Emerson Shannon is a 45 y o  male who  has a past medical history of Cervical mass, Coronary artery disease, Depression, DVT (deep venous thrombosis) ([facility] [address] ), Hypertension, Lupus ([facility] [facility] ), and Renal disorder  who presented to the [address] [place] [address] on 12/29 with complaints of worsening redness, swelling and tenderness in the right lower extremity which started few days ago and gradually worsened  He has a problem with swelling of his extremities for some time but the redness and pain are fairly new  Patient was admitted for further work-up and management and started on IV antibiotics secondary to extensive cellulitis  Venous Doppler completed on 12/30 revealed no DVT  PM&R consulted for rehabilitation recommendations  Restrictions include:  none    Hospital Complications/Comorbidities:   Complications: As above  Comorbidities: As above    Morbid Obesity (BMI > 40) No     Last Weight Last BMI   Wt Readings from Last 1 Encounters:   12/30/22 91 6 kg (201 lb 15 1 oz)    Body mass index is 32 61 kg/m²  Functional History:     Prior to Admission: independent with ADLs, functional mobility  Needs assistance with IADLs  Present:  Physical Therapy Occupational Therapy Speech Therapy   Mod assist with sit to stand and stand to sit transfers, supervision with supine to sit and sit to supine transfers, ambulation not performed due to severe pain in the right ankle on 1/6   Supervision with grooming, upper body bathing, mod assist with lower body bathing, minimal assistance with upper body dressing, max assist with lower body dressing and toileting  Past Medical History:   Past Surgical History:   Family History:     Past Medical History:   Diagnosis Date   • Cervical mass    • Coronary artery disease    • Depression    • DVT (deep venous thrombosis) (HCC)    • Hypertension    • Lupus ([address] )    • Renal disorder     Past Surgical History:   Procedure Laterality Date   • APPENDECTOMY     • CERVICAL SPINE SURGERY     • CHOLECYSTECTOMY     • COLONOSCOPY N/A 8/23/2018    Procedure: COLONOSCOPY;  Surgeon: Ivelisse Laguna MD;  Location: [facility]; Service: Gastroenterology   • ESOPHAGOGASTRODUODENOSCOPY N/A 8/22/2018    Procedure: ESOPHAGOGASTRODUODENOSCOPY (EGD); Surgeon: Ivelisse Laguna MD;  Location: [facility]; Service: Gastroenterology   • NECK SURGERY     • VASCULAR SURGERY       Family History   Problem Relation Age of Onset   • Heart disease Mother    • No Known Problems Father      - No family history of neurologic diseases        Medications:    Current Facility-Administered Medications:   •  acetaminophen (TYLENOL) tablet 650 mg, 650 mg, Oral, Q6H PRN, Deysi Melvin MD, 650 mg at 12/31/22 7037  •  apixaban (ELIQUIS) tablet 5 mg, 5 mg, Oral, BID, Deysi Melvin MD, 5 mg at 01/09/23 0915  •  baclofen tablet 20 mg, 20 mg, Oral, TID, Deysi Melvin MD, 20 mg at 01/09/23 5522  •  carvedilol (COREG) tablet 12 5 mg, 12 5 mg, Oral, BID With Meals, Deysi Melvin MD, 12 5 mg at 01/09/23 3533  •  cephalexin (KEFLEX) capsule 500 mg, 500 mg, Oral, Q6H [facility], Noreen Hogan MD, 500 mg at 01/09/23 0915  •  escitalopram (LEXAPRO) tablet 10 mg, 10 mg, Oral, Daily, Deysi Melvin MD, 10 mg at 01/09/23 0915  •  gabapentin (NEURONTIN) capsule 600 mg, 600 mg, Oral, TID, Deysi Melvin MD, 600 mg at 01/09/23 1203  •  hydroxychloroquine (PLAQUENIL) tablet 400 mg, 400 mg, Oral, HS, Deysi Melvin MD, 400 mg at 01/08/23 2143  •  hydrOXYzine HCL (ATARAX) tablet 10 mg, 10 mg, Oral, Q6H PRN, Deysi Melvin MD, 10 mg at 01/04/23 2222  •  loratadine (CLARITIN) tablet 10 mg, 10 mg, Oral, Daily, Tobi Chapman MD, 10 mg at 01/09/23 0915  •  mycophenolate (CELLCEPT) capsule 500 mg, 500 mg, Oral, Q12H [address], Tobi Chapman MD, 500 mg at 01/09/23 0915  •  ondansetron [facility]) injection 4 mg, 4 mg, Intravenous, Q4H PRN, Tobi Chapman MD, 4 mg at 01/08/23 0857  •  oxyCODONE (ROXICODONE) split tablet 7 5 mg, 7 5 mg, Oral, Q6H PRN, 7 5 mg at 01/09/23 0243 **OR** oxyCODONE (ROXICODONE) immediate release tablet 10 mg, 10 mg, Oral, Q6H PRN, Tobi Chapman MD, 10 mg at 01/08/23 0745  •  polyethylene glycol (MIRALAX) packet 17 g, 17 g, Oral, Daily, Tobi Chapman MD, 17 g at 01/08/23 0858  •  senna-docusate sodium (SENOKOT S) 8 6-50 mg per tablet 1 tablet, 1 tablet, Oral, HS, Abram Delong, DO, 1 tablet at 01/08/23 2143  •  sodium chloride tablet 3 g, 3 g, Oral, TID With Meals, India Garza MD, 3 g at 01/09/23 1209  •  tamsulosin (FLOMAX) capsule 0 4 mg, 0 4 mg, Oral, Daily With Dinner, Tobi Chapman MD, 0 4 mg at 01/08/23 1535  •  triamcinolone (KENALOG) 0 1 % cream, , Topical, BID, Tobi Chapman MD, Given at 01/09/23 1673    Allergies:    Allergies   Allergen Reactions   • Sulfa Antibiotics Rash        Social History:    Social History     Socioeconomic History   • Marital status: /Civil Union     Spouse name: None   • Number of children: None   • Years of education: None   • Highest education level: None   Occupational History   • None   Tobacco Use   • Smoking status: Never   • Smokeless tobacco: Never   Vaping Use   • Vaping Use: Never used   Substance and Sexual Activity   • Alcohol use: Never   • Drug use: Never   • Sexual activity: Yes     Partners: Female     Birth control/protection: Rhythm   Other Topics Concern   • None   Social History Narrative    ** Merged History Encounter **          Social Determinants of Health     Financial Resource Strain: Not on file   Food Insecurity: No Food Insecurity   • Worried About Running Out of Food in the Last Year: Never true   • Ran Out of Food in the Last Year: Never true   Transportation Needs: No Transportation Needs   • Lack of Transportation (Medical): No   • Lack of Transportation (Non-Medical): No   Physical Activity: Not on file   Stress: Not on file   Social Connections: Not on file   Intimate Partner Violence: Not on file   Housing Stability: Low Risk    • Unable to Pay for Housing in the Last Year: No   • Number of Places Lived in the Last Year: 1   • Unstable Housing in the Last Year: No        Elena José lives in a 1 level house with 2 steps to enter  Review of Systems: A 10-point review of systems was performed  Negative except as listed above  Physical Exam:  Vital Signs:      Temp:  [98 °F (36 7 °C)-98 1 °F (36 7 °C)] 98 1 °F (36 7 °C)  HR:  [65-67] 67  BP: (119-135)/(74-84) 135/84   Intake/Output Summary (Last 24 hours) at 1/9/2023 1456  Last data filed at 1/9/2023 0354  Gross per 24 hour   Intake --   Output 1475 ml   Net -1475 ml        Laboratory:      Lab Results   Component Value Date    HGB 10 6 (L) 01/09/2023    HCT 31 3 (L) 01/09/2023    WBC 3 97 (L) 01/09/2023     Lab Results   Component Value Date    BUN 12 01/09/2023    K 4 7 01/09/2023     01/09/2023    CREATININE 0 98 01/09/2023     Lab Results   Component Value Date    PROTIME 15 5 (H) 12/29/2022    INR 1 26 (H) 12/29/2022        General: alert, no apparent distress, cooperative and comfortable  Head: Normal, normocephalic, atraumatic  Eye: Normal external eye, conjunctiva, lids   Ears: Normal external ears  Nose: Normal external nose, mucus membranes  Pharynx: Dental Hygiene adequate  Normal buccal mucosa  Normal pharynx  Neck / Thyroid: Supple, no masses, nodes, nodules or enlargement  Abdomen: soft, nontender, nondistended, no masses or organomegaly  Skin/Extremity: Chronic skin discoloration secondary to venous stasis skin changes, right lower extremity edema 2+ with redness  Neurologic: Cranial nerves II to XII are intact  Speech is fluent    Normal tone and bulk on motor examination  Strength is 4/5 bilaterally in the lower extremities  Psych: Appropriate affect, alert and oriented to person, place and time       Imaging: Reviewed  CT lower extremity w contrast right    Result Date: 12/29/2022  Impression: No acute osseous abnormality  Mild medial and lateral compartment of the knee with chondrocalcinosis  Circumferential edema lower leg extending into the ankle with areas of skin thickening as above likely cellulitis  No discrete abscess or soft tissue gas  Extensive subcutaneous calcifications  This is of uncertain etiology but could be due to prior infectious etiology such as disseminated cysticercosis  Arterial calcifications  Workstation performed: [id number]       Assessment and Recommendations:  52-year-old male admitted with cellulitis of the right lower extremity  CT leg 12/9 negative for abscess or soft tissue gas  Impairments:  Impaired functional mobility and ability to perform ADL's      Recommendations:  - Chart reviewed  - Imaging reviewed   - Continue PT/OT while inpatient  - Pt is unable to safely return home until he is at the supervision/contact-guard functional level (25% assistance level with or without a device)  modified-independent functional level (0% assistance with a device) independent functional level (0% assistance without a device)          - Based upon patient's current functional level, we recommend referral to acute rehabilitation facility to allow for achievement of modified-independent functional level  Patient prefers going home with home care      - Disposition unclear at this point in time but inpatient rehab a possibility in the near future pending achievement of clinically stability, potential for functional progress  Thank you for allowing the PM&R service to participate in the care of this patient  We will continue to follow Jillian Martinez progress with you   Please do not hesitate to call with questions or concerns    ** Please Note: Fluency Direct voice to text software may have been used in the creation of this document   **

## 2023-01-09 NOTE — CASE MANAGEMENT
Case Management Discharge Planning Note    Patient name Gomez Isidro  Location [id number] MRN [id number]  : 1970 Date 2023       Current Admission Date: 2022  Current Admission Diagnosis:Cellulitis of right lower extremity   Patient Active Problem List    Diagnosis Date Noted   • Hyponatremia 2023   • Cellulitis of right lower extremity 2022   • Tremor of both hands 2020   • Spasticity 2020   • Gait difficulty 2020   • Neuropathic pain 2020   • Muscle weakness (generalized) 2019   • Anemia in chronic kidney disease 2018   • [facility] 2018   • Lupus nephritis ([facility] ) 2018   • Hypertension 2018   • History of DVT (deep vein thrombosis) 2018   • Chronic kidney disease, stage 3 ([facility] [address] ) 2018   • Persistent proteinuria 2018      LOS (days): 11  Geometric Mean LOS (GMLOS) (days):   Days to GMLOS:     OBJECTIVE:  Risk of Unplanned Readmission Score: 22 02         Current admission status: Inpatient   Preferred Pharmacy:   [address] [place] [address]  Phone: [phone] Fax: [phone]    Primary Care Provider: Dione Moreland MD    Primary Insurance: Thedora Gene  Secondary Insurance:     DISCHARGE DETAILS:    Discharge planning discussed with[de-identified] Patient at bedside     Comments - [place] of Choice: CM met with patient at bedside  Cm discussed freedom of choice as it pertains to discharge planning  Patient stated that he would like to go home on [facility] that he has been in the hospital long enough  CM contacted family/caregiver?: Yes (CM contacted patients significant other but she did not    CM left a voicemail )  Were Treatment Team discharge recommendations reviewed with patient/caregiver?: Yes  Did patient/caregiver verbalize understanding of patient care needs?: Yes  Were patient/caregiver advised of the risks associated with not following Treatment Team discharge recommendations?: Yes         [address]         Is the patient interested in [facility] at discharge?: Yes  [address] requested[de-identified] Nursing, Occupational Therapy, Physical [address] Name[de-identified] Other (TBD)  [address] [facility] Provider[de-identified] PCP  Home Health Services Needed[de-identified] Evaluate Functional Status and Safety, Strengthening/Theraputic Exercises to Improve Function, Gait/ADL Training  Homebound Criteria Met[de-identified] Uses an Assist Device (i e  cane, walker, etc), Requires the Assistance of Another Person for Safe Ambulation or to Leave the Home  Supporting Clincal Findings[de-identified] Fatigues Easliy in [employer], Limited Endurance    DME Referral Provided  Referral made for DME?: No    Other Referral/Resources/Interventions Provided:  Referral Comments: REFERRAL SENT FOR [facility]    Would you like to participate in our [address] service program?  : No - Declined    Treatment Team Recommendation: Acute Rehab  Discharge Destination Plan[de-identified] Home with [place] at Discharge : [address] [facility]] (TBD)

## 2023-01-09 NOTE — ASSESSMENT & PLAN NOTE
· Present on admission, c/o erythema and pain with associated ambulatory dysfunction; h/o DVT   · CT leg 12/29/22:  No abscess or soft tissue gas; circumferential edema   · ID consult appreciated,  · Transitioned from IV to oral antibiotics, continue course through 1/11/23  · Medically stable for discharge from ID standpoint

## 2023-01-09 NOTE — UTILIZATION REVIEW
Continued Stay Review    Date: 1/8/23                          Current Patient Class: IP  Current Level of Care: Med/Surg    HPI:52 y o  male initially admitted on 12/29 for RLE cellulitis  Assessment/Plan: Tolerating salt tabs  On exam, unremarkable  Plan: IV ABX, discontinue vancomycin, continue PTA meds, supportive care, will need rehab  Trend labs, replete electrolytes as needed      Vital Signs:   Date/Time Temp Pulse Resp BP MAP (mmHg) SpO2   01/08/23 07:43:25 -- 73 -- 120/73 89 98 %   01/08/23 00:32:31 99 3 °F (37 4 °C) 75 -- 120/75 90 98 %   01/08/23 00:31:51 99 3 °F (37 4 °C) -- -- 120/75 90 --   01/07/23 15:38:23 99 2 °F (37 3 °C) 68 -- 115/65 82 97 %   01/07/23 1538 -- 68 -- 115/75 -- --   01/07/23 08:14:57 -- 70 17 110/68 84 96 %   01/07/23 00:04:50 98 4 °F (36 9 °C) 67 -- 111/67 82 95 %       Pertinent Labs/Diagnostic Results:     Results from last 7 days   Lab Units 01/09/23  0600 01/05/23  0500 01/04/23  0531   WBC Thousand/uL 3 97* 4 39 4 54   HEMOGLOBIN g/dL 10 6* 10 9* 10 6*   HEMATOCRIT % 31 3* 32 1* 31 3*   PLATELETS Thousands/uL 398* 298 260   NEUTROS ABS Thousands/µL  --  2 86 2 91         Results from last 7 days   Lab Units 01/09/23  0600 01/08/23  1824 01/08/23  0513 01/07/23  1813 01/06/23  0834   SODIUM mmol/L 135 135 128* 129* 127*   POTASSIUM mmol/L 4 7 4 8 4 6 5 0 4 4   CHLORIDE mmol/L 102 104 97 99 95*   CO2 mmol/L 24 25 21 24 24   ANION GAP mmol/L 9 6 10 6 8   BUN mg/dL 12 13 11 15 15   CREATININE mg/dL 0 98 1 09 1 03 1 09 1 08   EGFR ml/min/1 73sq m 87 76 81 76 77   CALCIUM mg/dL 9 0 8 9 8 7 8 1* 8 6     Results from last 7 days   Lab Units 01/09/23  0600 01/08/23  1824 01/08/23  0513 01/07/23  1813 01/06/23  0834 01/06/23  0009 01/05/23  1711 01/05/23  0500 01/04/23  1958 01/04/23  1011 01/04/23  0531   GLUCOSE RANDOM mg/dL 108 120 98 107 99 93 102 98 105 98 91     Results from last 7 days   Lab Units 01/04/23  1207   OSMOLALITY, SERUM mmol/*     Results from last 7 days Lab Units 01/08/23  0513   TSH 3RD GENERATON uIU/mL 2 167     Results from last 7 days   Lab Units 01/07/23  0939 01/04/23  1518 01/04/23  1207   OSMOLALITY, SERUM mmol/KG  --   --  259*   OSMO UR mmol/ 392  --      Results from last 7 days   Lab Units 01/07/23  0939 01/04/23  1518   SODIUM UR  74 104       Medications:   Scheduled Medications:  apixaban, 5 mg, Oral, BID  baclofen, 20 mg, Oral, TID  carvedilol, 12 5 mg, Oral, BID With Meals  cephalexin, 500 mg, Oral, Q6H [facility]F  escitalopram, 10 mg, Oral, Daily  gabapentin, 600 mg, Oral, TID  hydroxychloroquine, 400 mg, Oral, HS  loratadine, 10 mg, Oral, Daily  mycophenolate, 500 mg, Oral, Q12H YOUSUF  polyethylene glycol, 17 g, Oral, Daily  senna-docusate sodium, 1 tablet, Oral, HS  sodium chloride, 3 g, Oral, TID With Meals  tamsulosin, 0 4 mg, Oral, Daily With Dinner  triamcinolone, , Topical, BID    Continuous IV Infusions: none     PRN Meds:  acetaminophen, 650 mg, Oral, Q6H PRN  hydrOXYzine HCL, 10 mg, Oral, Q6H PRN  ondansetron, 4 mg, Intravenous, Q4H PRN; 1/8 x1  oxyCODONE, 7 5 mg, Oral, Q6H PRN; 1/8 x1  oxyCODONE, 10 mg, Oral, Q6H PRN; 1/8 x2  tolvaptan, 30 mg, Oral; 1/8 x1        Discharge Plan: [facility]    Network Utilization Review Department  ATTENTION: Please call with any questions or concerns to [phone] and carefully listen to the prompts so that you are directed to the right person  All voicemails are confidential   Charles Chacon all requests for admission clinical reviews, approved or denied determinations and any other requests to dedicated fax number below belonging to the campus where the patient is receiving treatment   List of dedicated fax numbers for the Facilities:  [address] DENIALS (Administrative/Medical Necessity) [phone]   [address] (Maternity/NICU/Pediatrics) [phone]   [facility] Madison Torres [phone]   [facility] [address] [place] [phone] [address] [place] [address] Everett Davis  [phone] [address] [facility] [address] [facility] [phone]

## 2023-01-10 ENCOUNTER — TRANSITIONAL CARE MANAGEMENT (OUTPATIENT)
Dept: INTERNAL MEDICINE CLINIC | Facility: CLINIC | Age: 53
End: 2023-01-10

## 2023-01-10 VITALS
HEIGHT: 66 IN | OXYGEN SATURATION: 99 % | DIASTOLIC BLOOD PRESSURE: 87 MMHG | BODY MASS INDEX: 32.45 KG/M2 | WEIGHT: 201.94 LBS | SYSTOLIC BLOOD PRESSURE: 139 MMHG | RESPIRATION RATE: 20 BRPM | HEART RATE: 75 BPM | TEMPERATURE: 98.4 F

## 2023-01-10 RX ORDER — SODIUM CHLORIDE 1000 MG
3 TABLET, SOLUBLE MISCELLANEOUS
Qty: 270 TABLET | Refills: 0 | Status: SHIPPED | OUTPATIENT
Start: 2023-01-10 | End: 2023-02-09

## 2023-01-10 RX ORDER — METOLAZONE 5 MG/1
2.5 TABLET ORAL DAILY PRN
Refills: 0
Start: 2023-01-10

## 2023-01-10 RX ADMIN — TRIAMCINOLONE ACETONIDE: 1 CREAM TOPICAL at 09:29

## 2023-01-10 RX ADMIN — BACLOFEN 20 MG: 10 TABLET ORAL at 09:23

## 2023-01-10 RX ADMIN — MYCOPHENOLATE MOFETIL 500 MG: 250 CAPSULE ORAL at 09:23

## 2023-01-10 RX ADMIN — GABAPENTIN 600 MG: 300 CAPSULE ORAL at 09:23

## 2023-01-10 RX ADMIN — APIXABAN 5 MG: 5 TABLET, FILM COATED ORAL at 09:23

## 2023-01-10 RX ADMIN — CEPHALEXIN 500 MG: 500 CAPSULE ORAL at 06:06

## 2023-01-10 RX ADMIN — CEPHALEXIN 500 MG: 500 CAPSULE ORAL at 09:23

## 2023-01-10 RX ADMIN — LORATADINE 10 MG: 10 TABLET ORAL at 09:23

## 2023-01-10 RX ADMIN — Medication 3 G: at 09:28

## 2023-01-10 RX ADMIN — CARVEDILOL 12.5 MG: 12.5 TABLET, FILM COATED ORAL at 09:28

## 2023-01-10 RX ADMIN — POLYETHYLENE GLYCOL 3350 17 G: 17 POWDER, FOR SOLUTION ORAL at 09:23

## 2023-01-10 RX ADMIN — ESCITALOPRAM OXALATE 10 MG: 10 TABLET ORAL at 09:23

## 2023-01-10 NOTE — ASSESSMENT & PLAN NOTE
· No significant improvement with IV fluids, labs reviewed suggestive of SIADH  · Nephrology consult appreciated,  · s/p tolvaptan x1 on 1/8/23  · Continue sodium tablets on discharge  · Outpatient follow up, BMP in 1 week  · Resume lasix

## 2023-01-10 NOTE — ASSESSMENT & PLAN NOTE
Lab Results   Component Value Date    EGFR 87 01/09/2023    EGFR 87 01/09/2023    EGFR 76 01/08/2023    CREATININE 0 98 01/09/2023    CREATININE 0 98 01/09/2023    CREATININE 1 09 01/08/2023     · Creatinine stable at baseline

## 2023-01-10 NOTE — PROGRESS NOTES
NEPHROLOGY PROGRESS NOTE    Patient: Carlos Emerson               Sex: male          DOA: 12/29/2022  8:46 AM   YOB: 1970        Age: 45 y o          LOS:  LOS: 12 days   Encounter Date: 1/10/2023    REASON FOR THE CONSULTATION: Further management of hyponatremia    HPI     This is a 45 y o  male admitted for Cellulitis of right lower extremity     SUBJECTIVE     -Patient was seen earlier this morning  Patient's wife was also present at bedside  Patient now have decided to go home rather than rehab  Patient denies nausea, vomiting, headache or dizziness today    - Reviewed last 24 hrs events     CURRENT MEDICATIONS       Current Facility-Administered Medications:   •  acetaminophen (TYLENOL) tablet 650 mg, 650 mg, Oral, Q6H PRN, Ora Gonzáles MD, 650 mg at 12/31/22 6655  •  apixaban (ELIQUIS) tablet 5 mg, 5 mg, Oral, BID, Ora Gonzáles MD, 5 mg at 01/10/23 8664  •  baclofen tablet 20 mg, 20 mg, Oral, TID, Ora Gonzáles MD, 20 mg at 01/10/23 1722  •  carvedilol (COREG) tablet 12 5 mg, 12 5 mg, Oral, BID With Meals, Ora Gonzáles MD, 12 5 mg at 01/10/23 4287  •  cephalexin (KEFLEX) capsule 500 mg, 500 mg, Oral, Q6H [facility], Kerri Cabrera MD, 500 mg at 01/10/23 1545  •  escitalopram (LEXAPRO) tablet 10 mg, 10 mg, Oral, Daily, Ora Gonzáles MD, 10 mg at 01/10/23 6116  •  gabapentin (NEURONTIN) capsule 600 mg, 600 mg, Oral, TID, Ora Gonzáles MD, 600 mg at 01/10/23 8397  •  hydroxychloroquine (PLAQUENIL) tablet 400 mg, 400 mg, Oral, HS, Ora Gonzáles MD, 400 mg at 01/09/23 2215  •  hydrOXYzine HCL (ATARAX) tablet 10 mg, 10 mg, Oral, Q6H PRN, Ora Gonzáles MD, 10 mg at 01/04/23 2222  •  loratadine (CLARITIN) tablet 10 mg, 10 mg, Oral, Daily, Ora Gonzáles MD, 10 mg at 01/10/23 3954  •  mycophenolate (CELLCEPT) capsule 500 mg, 500 mg, Oral, Q12H [facility], Ora Gonzáles MD, 500 mg at 01/10/23 2947  •  ondansetron [facility]) injection 4 mg, 4 mg, Intravenous, Q4H PRN, Ora Gonzáles MD, 4 mg at 01/08/23 0829  •  oxyCODONE (ROXICODONE) split tablet 7 5 mg, 7 5 mg, Oral, Q6H PRN, 7 5 mg at 01/09/23 0243 **OR** oxyCODONE (ROXICODONE) immediate release tablet 10 mg, 10 mg, Oral, Q6H PRN, Kevin Cook MD, 10 mg at 01/09/23 1715  •  polyethylene glycol (MIRALAX) packet 17 g, 17 g, Oral, Daily, Kevin Cook MD, 17 g at 01/10/23 4834  •  senna-docusate sodium (SENOKOT S) 8 6-50 mg per tablet 1 tablet, 1 tablet, Oral, HS, Abram Delong DO, 1 tablet at 01/09/23 2210  •  sodium chloride tablet 3 g, 3 g, Oral, TID With Meals, Yefri Lynn MD, 3 g at 01/10/23 3109  •  tamsulosin (FLOMAX) capsule 0 4 mg, 0 4 mg, Oral, Daily With Oliverio Izquierdo MD, 0 4 mg at 01/09/23 1803  •  triamcinolone (KENALOG) 0 1 % cream, , Topical, BID, Kevin Cook MD, Given at 01/10/23 5001    Current Outpatient Medications:   •  apixaban (ELIQUIS) 5 mg, Take 5 mg by mouth 2 (two) times a day, Disp: , Rfl:   •  baclofen 20 mg tablet, Take 20 mg by mouth 3 (three) times a day , Disp: , Rfl:   •  escitalopram (LEXAPRO) 10 mg tablet, Take 10 mg by mouth daily, Disp: , Rfl:   •  gabapentin (NEURONTIN) 300 mg capsule, TAKE 1 CAPSULE BY MOUTH AT BEDTIME IN ADDITION TO 600MG, Disp: 30 capsule, Rfl: 5  •  gabapentin (NEURONTIN) 600 MG tablet, Take 600 mg by mouth 3 (three) times a day , Disp: , Rfl:   •  metolazone (ZAROXOLYN) 5 mg tablet, Take 0 5 tablets (2 5 mg total) by mouth daily as needed (2-3 lb weight gain overnight, 5 lb weight gain in 1 week), Disp: , Rfl: 0  •  sodium chloride 1 g tablet, Take 3 tablets (3 g total) by mouth 3 (three) times a day with meals, Disp: 270 tablet, Rfl: 0  •  azelastine (ASTELIN) 0 1 % nasal spray, ADMINISTER 1 SPRAY INTO NOSTRIL TWICE DAILY, Disp: , Rfl:   •  bacitracin topical ointment 500 units/g topical ointment, APPLY TOPICALLY TO AFFECTED AREA THREE TIMES A DAY AS DIRECTED, Disp: , Rfl:   •  betamethasone dipropionate (DIPROSONE) 0 05 % cream, Apply 1 application topically 2 (two) times a day, Disp: , Rfl:   •  betamethasone, augmented, (DIPROLENE) 0 05 % ointment, Apply 1 application topically 2 (two) times a day, Disp: , Rfl:   •  carvedilol (COREG) 12 5 mg tablet, Take 12 5 mg by mouth 2 (two) times a day with meals, Disp: , Rfl:   •  Diclofenac Sodium (VOLTAREN) 1 %, Apply 2 g topically 4 (four) times a day, Disp: 2 g, Rfl: 0  •  furosemide (LASIX) 40 mg tablet, Take 0 5 tablets (20 mg total) by mouth daily, Disp: , Rfl: 0  •  hydroxychloroquine (PLAQUENIL) 200 mg tablet, Take 400 mg by mouth daily at bedtime, Disp: , Rfl:   •  hydrOXYzine HCL (ATARAX) 10 mg tablet, Take 10 mg by mouth every 6 (six) hours as needed for itching, Disp: , Rfl:   •  lidocaine-prilocaine (EMLA) cream, , Disp: , Rfl:   •  loratadine (CLARITIN) 10 mg tablet, Take 1 tablet (10 mg total) by mouth daily, Disp: 30 tablet, Rfl: 2  •  mycophenolate (CELLCEPT) 500 mg tablet, Take 500 mg by mouth every 12 (twelve) hours , Disp: , Rfl:   •  potassium chloride (K-DUR,KLOR-CON) 20 mEq tablet, Take 20 mEq by mouth daily, Disp: , Rfl:   •  tamsulosin (FLOMAX) 0 4 mg, , Disp: , Rfl:     OBJECTIVE     Current Weight: Weight - Scale: 91 6 kg (201 lb 15 1 oz)  /87   Pulse 75   Temp 98 4 °F (36 9 °C)   Resp 20   Ht 5' 5 98" (1 676 m)   Wt 91 6 kg (201 lb 15 1 oz)   SpO2 99%   BMI 32 61 kg/m²   Vitals:    01/10/23 0732   BP: 139/87   Pulse: 75   Resp:    Temp: 98 4 °F (36 9 °C)   SpO2: 99%     Body mass index is 32 61 kg/m²  Intake/Output Summary (Last 24 hours) at 1/10/2023 1130  Last data filed at 1/10/2023 0410  Gross per 24 hour   Intake 730 ml   Output 1200 ml   Net -470 ml       PHYSICAL EXAMINATION     Physical Exam  Constitutional:       General: He is not in acute distress  HENT:      Right Ear: External ear normal    Eyes:      Conjunctiva/sclera:      Right eye: No hemorrhage  Neck:      Thyroid: No thyromegaly  Pulmonary:      Effort: No accessory muscle usage or respiratory distress  Abdominal:      General: There is no distension  Skin:     General: Skin is warm  Coloration: Skin is not jaundiced  Psychiatric:         Behavior: Behavior is not combative  LAB RESULTS     Results from last 7 days   Lab Units 01/09/23  1944 01/09/23  0600 01/08/23  1824 01/08/23  0513 01/07/23  1813 01/06/23  0834 01/06/23  0009 01/05/23  1711 01/05/23  0500 01/04/23  1011 01/04/23  0531   WBC Thousand/uL  --  3 97*  --   --   --   --   --   --  4 39  --  4 54   HEMOGLOBIN g/dL  --  10 6*  --   --   --   --   --   --  10 9*  --  10 6*   HEMATOCRIT %  --  31 3*  --   --   --   --   --   --  32 1*  --  31 3*   PLATELETS Thousands/uL  --  398*  --   --   --   --   --   --  298  --  260   SODIUM mmol/L 134* 135 135 128* 129* 127* 128*   < > 123*   < > 123*   POTASSIUM mmol/L 4 7 4 7 4 8 4 6 5 0 4 4 4 3   < > 4 2   < > 4 6   CHLORIDE mmol/L 103 102 104 97 99 95* 95*   < > 89*   < > 90*   CO2 mmol/L 26 24 25 21 24 24 24   < > 24   < > 24   BUN mg/dL 15 12 13 11 15 15 19   < > 17   < > 14   CREATININE mg/dL 0 98 0 98 1 09 1 03 1 09 1 08 1 16   < > 1 15   < > 1 12   EGFR ml/min/1 73sq m 87 87 76 81 76 77 70   < > 71   < > 74   CALCIUM mg/dL 8 8 9 0 8 9 8 7 8 1* 8 6 8 3   < > 8 6   < > 8 2*    < > = values in this interval not displayed  RADIOLOGY RESULTS      CT lower extremity w contrast right   Final Result by Daniel Vasquez MD (12/29 1531)      No acute osseous abnormality  Mild medial and lateral compartment of the knee with chondrocalcinosis  Circumferential edema lower leg extending into the ankle with areas of skin thickening as above likely cellulitis  No discrete abscess or soft tissue gas  Extensive subcutaneous calcifications  This is of uncertain etiology but could be due to prior infectious etiology such as disseminated cysticercosis  Arterial calcifications                 Workstation performed: [id number]         VAS lower limb venous duplex study, unilateral/limited   Final Result by Suzie Mujica MD (12/29 1120)          PLAN / RECOMMENDATIONS 1   Hyponatremia  Chronic since duration is more than 48 hours  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 and patient is currently on salt tablet 3 g p o  3 times daily  Current sodium is 134 which is acceptable and will plan to continue salt tablet at current dose and monitor sodium level while in the hospital     2   Lupus nephritis class V   Previously been followed by nephrologist at [facility] and reviewing their office note, patient had underwent renal biopsy in November 2015 and was diagnosed with 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 which 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  Patient had received induction therapy with Cytoxan and now on CellCept 500 mg p o  twice daily  Baseline creatinine was around 1 4-1 50 during the hospital stay, renal function has improved and current creatinine is 0 98 which is better and below to previously known baseline creatinine  Plan to continue current dose of CellCept for time being and monitor renal function    3  Hypertension in chronic kidney disease  Blood pressure has been acceptable in last 24 hours and monitor hypertension with Coreg 12 5 mg p o  twice daily today  Disposition: Stable from renal standpoint for discharge with current dose of salt tablet  Okay to resume Lasix upon discharge  Recommend outpatient nephrology follow-up in around 2 weeks with BMP  Overall above mentioned plan was also d/w current Brittny Carrillo MD  Nephrology  1/10/2023        Portions of the record may have been created with voice recognition software  Occasional wrong word or "sound a like" substitutions may have occurred due to the inherent limitations of voice recognition software  Read the chart carefully and recognize, using context, where substitutions have occurred

## 2023-01-10 NOTE — DISCHARGE SUMMARY
[facility]  Discharge- Ana Reynaga 1970, 45 y o  male MRN: [id number]  Unit/Bed#: -[facility] Encounter: [id number]  Primary Care Provider: Tex Sol MD   Date and time admitted to hospital: 12/29/2022  8:46 AM    * Cellulitis of right lower extremity  Assessment & Plan  · Present on admission, c/o erythema and pain with associated ambulatory dysfunction; h/o DVT   · CT leg 12/29/22:  No abscess or soft tissue gas; circumferential edema   · ID consult appreciated,  · Transitioned from IV to oral antibiotics, continue course through 1/11/23  · Medically stable for discharge from ID standpoint     Hyponatremia  Assessment & Plan  · No significant improvement with IV fluids, labs reviewed suggestive of SIADH  · Nephrology consult appreciated,  · s/p tolvaptan x1 on 1/8/23  · Continue sodium tablets on discharge  · Outpatient follow up, BMP in 1 week  · Resume lasix     Chronic kidney disease, stage 3 [facility])  Assessment & Plan  Lab Results   Component Value Date    EGFR 87 01/09/2023    EGFR 87 01/09/2023    EGFR 76 01/08/2023    CREATININE 0 98 01/09/2023    CREATININE 0 98 01/09/2023    CREATININE 1 09 01/08/2023     · Creatinine stable at baseline    Lupus (systemic lupus erythematosus) ([facility] [address] )  Assessment & Plan  · Will follow-up outpatient with rheumatology  · C/w home meds    History of DVT (deep vein thrombosis)  Assessment & Plan  · Continue Eliquis    Hypertension  Assessment & Plan  /87   Pulse 75   Temp 98 4 °F (36 9 °C)   Resp 20   Ht 5' 5 98" (1 676 m)   Wt 91 6 kg (201 lb 15 1 oz)   SpO2 99%   BMI 32 61 kg/m²   · Resume home BP medications on discharge         Medical Problems     Resolved Problems  Date Reviewed: 1/10/2023   None       Discharging Physician / Practitioner: Donna Allen, PA-C  PCP: Tex Sol MD  Admission Date:   Admission Orders (From admission, onward)     Ordered        12/29/22 1702  Inpatient Admission  Once Discharge Date: 01/10/23    Consultations During Hospital Stay:  [address] TO INFECTIOUS DISEASES  IP CONSULT TO PHYSICAL MEDICINE REHAB  · IP CONSULT TO NEPHROLOGY     Procedures Performed:   · None     Significant Findings / Test Results:   CT lower extremity w contrast right   Final Result by Marisa House MD (12/29 1531)      No acute osseous abnormality  Mild medial and lateral compartment of the knee with chondrocalcinosis  Circumferential edema lower leg extending into the ankle with areas of skin thickening as above likely cellulitis  No discrete abscess or soft tissue gas  Extensive subcutaneous calcifications  This is of uncertain etiology but could be due to prior infectious etiology such as disseminated cysticercosis  Arterial calcifications  VAS lower limb venous duplex study, unilateral/limited   Final Result by Lynette Nguyễn MD (12/29 1120)   RIGHT LOWER LIMB  No evidence of gross acute deep vein thrombosis   No evidence of superficial thrombophlebitis noted  Doppler evaluation shows a normal response to augmentation maneuvers  Popliteal, posterior tibial and anterior tibial arterial Doppler waveforms are  biphasic/hyperemic  There are an echogenic structures located in the inguinal region and are  consistent with enlarged lymph node and channels  LEFT LOWER LIMB LIMITED  Evaluation shows no evidence of thrombus in the common femoral vein  Doppler evaluation shows a normal response to augmentation maneuvers  Incidental Findings:   · None      Test Results Pending at Discharge (will require follow up): · None      Outpatient Tests Requested:  · BMP 1 week     Complications:  None     Reason for Admission: cellulitis, hyponatremia     Hospital Course:   Emerson Shannon is a 45 y o  male patient who originally presented to the hospital on 12/29/2022 due to right lower extremity edema, erythema, pain with ambulatory dysfunction  Patient has past medical history most significant for lupus, DVT, tremors, CKD  He was found to have right lower extremity cellulitis along with hyponatremia  Patient was treated for cellulitis with significant improvement  He was also seen by nephrology and treated for hyponatremia, suspected SIADH and was started on sodium tablets with improvement and now stability of his sodium level  Patient is hemodynamically stable this time for discharge home, continue with sodium tablets on discharge and referral to Nephrology for follow-up  Patient previously a patient of [facility]  All questions answered to the best of my ability at the bedside with the patient and his wife by phone  Please see above list of diagnoses and related plan for additional information  Condition at Discharge: good    Discharge Day Visit / Exam:   Subjective: Patient seen this morning feeling well  Denies any chest pain or shortness of breath  Reports he is eating and drinking well  Spoke to his wife on the phone in his room, advised on stability for discharge with outpatient follow-up  Vitals: Blood Pressure: 139/87 (01/10/23 0732)  Pulse: 75 (01/10/23 0732)  Temperature: 98 4 °F (36 9 °C) (01/10/23 0732)  Temp Source: Oral (01/09/23 1630)  Respirations: 20 (01/09/23 2342)  Height: 5' 5 98" (167 6 cm) (12/30/22 2300)  Weight - Scale: 91 6 kg (201 lb 15 1 oz) (12/30/22 2300)  SpO2: 99 % (01/10/23 0732)  Exam:   Physical Exam  Vitals and nursing note reviewed  Constitutional:       General: He is not in acute distress  Appearance: He is not ill-appearing or toxic-appearing  Cardiovascular:      Rate and Rhythm: Normal rate and regular rhythm  Pulmonary:      Effort: Pulmonary effort is normal  No respiratory distress  Musculoskeletal:         General: No tenderness  Right lower leg: No edema  Left lower leg: No edema  Skin:     General: Skin is warm and dry  Findings: No erythema  Neurological:      Mental Status: He is alert and oriented to person, place, and time  Psychiatric:         Mood and Affect: Mood normal          Behavior: Behavior normal            Discussion with Family: Updated  (wife) via phone  Discharge instructions/Information to patient and family:   See after visit summary for information provided to patient and family  Provisions for Follow-Up Care:  See after visit summary for information related to follow-up care and any pertinent home health orders  Disposition:   Home with VNA Services (Reminder: Complete face to face encounter)    Planned Readmission: none     Discharge Statement:  I spent approx 40 minutes discharging the patient  This time was spent on the day of discharge  I had direct contact with the patient on the day of discharge  Greater than 50% of the total time was spent examining patient, answering all patient questions, arranging and discussing plan of care with patient as well as directly providing post-discharge instructions  Additional time then spent on discharge activities  Discharge Medications:  See after visit summary for reconciled discharge medications provided to patient and/or family        **Please Note: This note may have been constructed using a voice recognition system**

## 2023-01-10 NOTE — CASE MANAGEMENT
Case Management Discharge Planning Note    Patient name Shamar Chinchilla  Location [id number] [id number] MRN [id number]  : 1970 Date 1/10/2023       Current Admission Date: 2022  Current Admission Diagnosis:Cellulitis of right lower extremity   Patient Active Problem List    Diagnosis Date Noted   • Hyponatremia 2023   • Cellulitis of right lower extremity 2022   • Tremor of both hands 2020   • Spasticity 2020   • Gait difficulty 2020   • Neuropathic pain 2020   • Muscle weakness (generalized) 2019   • Anemia in chronic kidney disease 2018   • [facility] 2018   • Lupus nephritis ([facility] ) 2018   • Hypertension 2018   • History of DVT (deep vein thrombosis) 2018   • Chronic kidney disease, stage 3 ([facility] [address] ) 2018   • Persistent proteinuria 2018      LOS (days): 12  Geometric Mean LOS (GMLOS) (days):   Days to GMLOS:     OBJECTIVE:  Risk of Unplanned Readmission Score: 22 44         Current admission status: Inpatient   Preferred Pharmacy:   [address] [place] [address]  Phone: [phone] Fax: [phone]    Primary Care Provider: Camelia Nunez MD    Primary Insurance: [address] [place] [address]  Secondary Insurance:     DISCHARGE DETAILS:    Discharge planning discussed with[de-identified] Patient and spouse via phone  Freedom of Choice: Yes  Comments - Freedom of Choice: CM discussed rfeedom of choice as it pertains to discharge planning   Patient was agreeable to [facility] and therapy was set to start on   CM contacted family/caregiver?: Yes  Were Treatment Team discharge recommendations reviewed with patient/caregiver?: Yes  Did patient/caregiver verbalize understanding of patient care needs?: Yes  Were patient/caregiver advised of the risks associated with not following Treatment Team discharge recommendations?: Yes         [address]         Is the patient interested in [facility] at discharge?: Yes  [address] requested[de-identified] Nursing, Occupational Therapy, Physical [address] Name[de-identified] [address] Provider[de-identified] PCP  Home Health Services Needed[de-identified] Strengthening/Theraputic Exercises to Improve Function, Gait/ADL Training, Evaluate Functional Status and Safety  Homebound Criteria Met[de-identified] Requires the Assistance of Another Person for Safe Ambulation or to Leave the Home    DME Referral Provided  Referral made for DME?: No    Other Referral/Resources/Interventions Provided:  Interventions: Short Term Rehab  Referral Comments: [facility] reserved in aidin    Would you like to participate in our [address] service program?  : No - Declined    Treatment Team Recommendation: Acute Rehab  Discharge Destination Plan[de-identified] Home with [place] at Discharge : Family

## 2023-01-10 NOTE — ASSESSMENT & PLAN NOTE
/87   Pulse 75   Temp 98 4 °F (36 9 °C)   Resp 20   Ht 5' 5 98" (1 676 m)   Wt 91 6 kg (201 lb 15 1 oz)   SpO2 99%   BMI 32 61 kg/m²   · Resume home BP medications on discharge

## 2023-01-10 NOTE — PLAN OF CARE
Problem: PAIN - ADULT  Goal: Verbalizes/displays adequate comfort level or baseline comfort level  Description: Interventions:  - Encourage patient to monitor pain and request assistance  - Assess pain using appropriate pain scale  - Administer analgesics based on type and severity of pain and evaluate response  - Implement non-pharmacological measures as appropriate and evaluate response  - Consider cultural and social influences on pain and pain management  - Notify physician/advanced practitioner if interventions unsuccessful or patient reports new pain  Outcome: Progressing     Problem: INFECTION - ADULT  Goal: Absence or prevention of progression during hospitalization  Description: INTERVENTIONS:  - Assess and monitor for signs and symptoms of infection  - Monitor lab/diagnostic results  - Monitor all insertion sites, i e  indwelling lines, tubes, and drains  - Monitor endotracheal if appropriate and nasal secretions for changes in amount and color  - [place] appropriate cooling/warming therapies per order  - Administer medications as ordered  - Instruct and encourage patient and family to use good hand hygiene technique  - Identify and instruct in appropriate isolation precautions for identified infection/condition  Outcome: Progressing  Goal: Absence of fever/infection during neutropenic period  Description: INTERVENTIONS:  - Monitor WBC    Outcome: Progressing     Problem: DISCHARGE PLANNING  Goal: Discharge to home or other facility with appropriate resources  Description: INTERVENTIONS:  - Identify barriers to discharge w/patient and caregiver  - Arrange for needed discharge resources and transportation as appropriate  - Identify discharge learning needs (meds, wound care, etc )  - Arrange for interpretive services to assist at discharge as needed  - Refer to Case Management Department for coordinating discharge planning if the patient needs post-hospital services based on physician/advanced practitioner order or complex needs related to functional status, cognitive ability, or social support system  Outcome: Progressing     Problem: Knowledge Deficit  Goal: Patient/family/caregiver demonstrates understanding of disease process, treatment plan, medications, and discharge instructions  Description: Complete learning assessment and assess knowledge base    Interventions:  - Provide teaching at level of understanding  - Provide teaching via preferred learning methods  Outcome: Progressing

## 2023-01-11 ENCOUNTER — TELEPHONE (OUTPATIENT)
Dept: INTERNAL MEDICINE CLINIC | Facility: CLINIC | Age: 53
End: 2023-01-11

## 2023-01-12 ENCOUNTER — TELEPHONE (OUTPATIENT)
Dept: INTERNAL MEDICINE CLINIC | Facility: CLINIC | Age: 53
End: 2023-01-12

## 2023-01-12 LAB
DME PARACHUTE DELIVERY DATE REQUESTED: NORMAL
DME PARACHUTE ITEM DESCRIPTION: NORMAL
DME PARACHUTE ORDER STATUS: NORMAL
DME PARACHUTE SUPPLIER NAME: NORMAL
DME PARACHUTE SUPPLIER PHONE: NORMAL

## 2023-01-12 NOTE — TELEPHONE ENCOUNTER
Called Nasir lujan a message notified that if the wheelchair needs to be a powerized one then it has to go through [facility] we would be able to order a regular wheel chair for patient

## 2023-01-12 NOTE — TELEPHONE ENCOUNTER
Vinayak, my name is Nasir  I'm an occupational therapist from [address] [place] [address]  I'm calling about patient Yolis Hernández  That's a TIFFANIE's date of birth is [id number]  I'm calling to notify the doctor that I did an evaluation today with Jessica Lockwood and I plan to see him once a week for four weeks  I'd also like to request an order for both a commode and a power wheelchair  The DME supplier they use is [employer]  Any questions, please call me back  My number is [phone]  Thanks      Copied from phone message

## 2023-01-17 LAB
DME PARACHUTE DELIVERY DATE ACTUAL: NORMAL
DME PARACHUTE DELIVERY DATE REQUESTED: NORMAL
DME PARACHUTE ITEM DESCRIPTION: NORMAL
DME PARACHUTE ORDER STATUS: NORMAL
DME PARACHUTE SUPPLIER NAME: NORMAL
DME PARACHUTE SUPPLIER PHONE: NORMAL

## 2023-01-19 ENCOUNTER — OFFICE VISIT (OUTPATIENT)
Dept: INTERNAL MEDICINE CLINIC | Facility: CLINIC | Age: 53
End: 2023-01-19

## 2023-01-19 VITALS — BODY MASS INDEX: 32.46 KG/M2 | WEIGHT: 201 LBS

## 2023-01-19 DIAGNOSIS — R26.9 GAIT DIFFICULTY: ICD-10-CM

## 2023-01-19 DIAGNOSIS — L03.115 CELLULITIS OF RIGHT LOWER EXTREMITY: ICD-10-CM

## 2023-01-19 DIAGNOSIS — I10 PRIMARY HYPERTENSION: ICD-10-CM

## 2023-01-19 DIAGNOSIS — M32.9 SYSTEMIC LUPUS ERYTHEMATOSUS, UNSPECIFIED SLE TYPE, UNSPECIFIED ORGAN INVOLVEMENT STATUS (HCC): Primary | ICD-10-CM

## 2023-01-19 DIAGNOSIS — E87.1 HYPONATREMIA: ICD-10-CM

## 2023-01-19 NOTE — PROGRESS NOTES
Virtual TCM Visit:    Verification of patient location:    Patient is located in the following state in which I hold an active license [place]    Assessment/Plan:        Problem List Items Addressed This Visit        Cardiovascular and Mediastinum    Hypertension    Relevant Orders    CBC and differential    Comprehensive metabolic panel       Other    Lupus (systemic lupus erythematosus) ([facility] [address] ) - Primary    Relevant Orders    Ambulatory Referral to Rheumatology    Gait difficulty    RESOLVED: Cellulitis of right lower extremity    RESOLVED: Hyponatremia    Relevant Orders    Comprehensive metabolic panel        Reason for visit is TRUNG  Visit  Encounter provider Dar Crawford MD     Provider located at [address] [place] [address]  [facility]  [address] [facility] [address] [phone]    Recent Visits  Date Type Provider Dept   01/12/23 Telephone [address] [facility] [place]   Showing recent visits within past 7 days and meeting all other requirements  Today's Visits  Date Type Provider Dept   01/19/23 Office Visit Dar Crawford MD [facility] [place]   Showing today's visits and meeting all other requirements  Future Appointments  No visits were found meeting these conditions  Showing future appointments within next 150 days and meeting all other requirements       After connecting through [employer], the patient was identified by name and date of birth  Milka Butts was informed that this is a telemedicine visit and that the visit is being conducted through the [employer]  He agrees to proceed     My office door was closed  No one else was in the room  He acknowledged consent and understanding of privacy and security of the video platform  The patient has agreed to participate and understands they can discontinue the visit at any time  Patient is aware this is a billable service      Transitional Care Management Review:  Katarzyna Hobbs is a 45 y o  male here for TCM follow up  During the TCM phone call patient stated:    TCM Call     Date and time call was made  1/10/2023 10:19 AM    Hospital care reviewed  Records reviewed    Patient was hospitialized at  [facility]    Date of Admission  12/29/22    Date of discharge  01/10/23    Diagnosis  Cellulitis of right lower extremity    Disposition  Home    Were the patients medications reviewed and updated  Yes      TCM Call     Scheduled for follow up? Yes    I have advised the patient to call PCP with any new or worsening symptoms  Sum[place]  RMA    Living Arrangements  Spouse or Significiant other    Are you recieving home care services  Yes    Types of home care services  Home PT        Subjective:     Patient ID: Katarzyna Hobbs is a 45 y o  male  Katarzyna Hobbs is seen is a 45 y o  male being seen virtually for [facility] visit; he presented to the hospital on 12/29/2022 due to right lower extremity edema, erythema, pain with ambulatory dysfunction; found to have a RLE cellulitis along with hyponatremia; PMH includes lupus, DVT, tremors, CKD  His cellulitis improved and he was also seen by nephrology and treated for hyponatremia, suspected SIADH and was started on sodium tablets; He reports pain to his RLE but not as bad as when he went to the hospital  Needs to see rheumatoloy; needs new labs        Review of Systems   All other systems reviewed and are negative  Objective:    Vitals:    01/19/23 1000   Weight: 91 2 kg (201 lb)       Physical Exam  Vitals and nursing note reviewed  Constitutional:       Appearance: Normal appearance  HENT:      Head: Normocephalic and atraumatic  Cardiovascular:      Rate and Rhythm: Tachycardia present  Pulmonary:      Effort: Pulmonary effort is normal    Neurological:      Mental Status: He is alert and oriented to person, place, and time     Psychiatric:         Mood and Affect: Mood normal          Medications have been reviewed by provider in current encounter    I spent 20 minutes with the patient during this visit  Agnieszka Andrews MD      VIRTUAL VISIT [address] [facility] verbally agrees to participate in [facility]  Pt is aware that [facility] could be limited without vital signs or the ability to perform a full hands-on physical Amanda Higuera understands he or the provider may request at any time to terminate the video visit and request the patient to seek care or treatment in person

## 2023-02-06 ENCOUNTER — TELEPHONE (OUTPATIENT)
Dept: NEPHROLOGY | Facility: CLINIC | Age: 53
End: 2023-02-06

## 2023-02-06 DIAGNOSIS — E87.1 HYPONATREMIA: Primary | ICD-10-CM

## 2023-02-06 NOTE — TELEPHONE ENCOUNTER
----- Message from Reji Archibald MD sent at 2/6/2023 11:59 AM EST -----  Regarding: RE:  Can we get CBC, comprehensive metabolic panel, Anti nuclear antibody , Anti Double stranded DNA, complement C3, complement C4, urine analysis with urine micro albumin to creatinine ratio   ----- Message -----  From: Alejandra Rod MA  Sent: 2/6/2023  11:31 AM EST  To: Reji Archibald MD    Please advise if more labs prior to 02/14 appt    TY

## 2023-02-08 ENCOUNTER — TELEPHONE (OUTPATIENT)
Dept: INTERNAL MEDICINE CLINIC | Facility: CLINIC | Age: 53
End: 2023-02-08

## 2023-02-08 NOTE — TELEPHONE ENCOUNTER
Rolando Ellington, Occupational Therapist, discharged patient from OT today as he met all his goals  Will continue with physical therapy and nurse visits      Any questions Rolando Ellington can be reached at [phone]

## 2023-02-13 ENCOUNTER — APPOINTMENT (OUTPATIENT)
Dept: LAB | Facility: CLINIC | Age: 53
End: 2023-02-13

## 2023-02-13 DIAGNOSIS — Z86.718 HISTORY OF DVT OF LOWER EXTREMITY: ICD-10-CM

## 2023-02-13 DIAGNOSIS — N18.30 STAGE 3 CHRONIC KIDNEY DISEASE, UNSPECIFIED WHETHER STAGE 3A OR 3B CKD (HCC): ICD-10-CM

## 2023-02-13 DIAGNOSIS — E87.1 HYPONATREMIA: ICD-10-CM

## 2023-02-13 DIAGNOSIS — I10 PRIMARY HYPERTENSION: ICD-10-CM

## 2023-02-13 LAB
ALBUMIN SERPL BCP-MCNC: 3.8 G/DL (ref 3.5–5)
ALP SERPL-CCNC: 127 U/L (ref 46–116)
ALT SERPL W P-5'-P-CCNC: 18 U/L (ref 12–78)
ANION GAP SERPL CALCULATED.3IONS-SCNC: 8 MMOL/L (ref 4–13)
AST SERPL W P-5'-P-CCNC: 19 U/L (ref 5–45)
BASOPHILS # BLD AUTO: 0.03 THOUSANDS/ÂΜL (ref 0–0.1)
BASOPHILS NFR BLD AUTO: 1 % (ref 0–1)
BILIRUB SERPL-MCNC: 0.4 MG/DL (ref 0.2–1)
BUN SERPL-MCNC: 18 MG/DL (ref 5–25)
C3 SERPL-MCNC: 122 MG/DL (ref 90–180)
C4 SERPL-MCNC: 43 MG/DL (ref 10–40)
CALCIUM SERPL-MCNC: 9.6 MG/DL (ref 8.3–10.1)
CHLORIDE SERPL-SCNC: 102 MMOL/L (ref 96–108)
CO2 SERPL-SCNC: 25 MMOL/L (ref 21–32)
CREAT SERPL-MCNC: 1.64 MG/DL (ref 0.6–1.3)
EOSINOPHIL # BLD AUTO: 0.09 THOUSAND/ÂΜL (ref 0–0.61)
EOSINOPHIL NFR BLD AUTO: 2 % (ref 0–6)
ERYTHROCYTE [DISTWIDTH] IN BLOOD BY AUTOMATED COUNT: 16.8 % (ref 11.6–15.1)
GFR SERPL CREATININE-BSD FRML MDRD: 46 ML/MIN/1.73SQ M
GLUCOSE P FAST SERPL-MCNC: 96 MG/DL (ref 65–99)
HCT VFR BLD AUTO: 36.5 % (ref 36.5–49.3)
HGB BLD-MCNC: 11.4 G/DL (ref 12–17)
IMM GRANULOCYTES # BLD AUTO: 0.02 THOUSAND/UL (ref 0–0.2)
IMM GRANULOCYTES NFR BLD AUTO: 1 % (ref 0–2)
LYMPHOCYTES # BLD AUTO: 1.38 THOUSANDS/ÂΜL (ref 0.6–4.47)
LYMPHOCYTES NFR BLD AUTO: 36 % (ref 14–44)
MCH RBC QN AUTO: 27.4 PG (ref 26.8–34.3)
MCHC RBC AUTO-ENTMCNC: 31.2 G/DL (ref 31.4–37.4)
MCV RBC AUTO: 88 FL (ref 82–98)
MONOCYTES # BLD AUTO: 0.56 THOUSAND/ÂΜL (ref 0.17–1.22)
MONOCYTES NFR BLD AUTO: 15 % (ref 4–12)
NEUTROPHILS # BLD AUTO: 1.76 THOUSANDS/ÂΜL (ref 1.85–7.62)
NEUTS SEG NFR BLD AUTO: 45 % (ref 43–75)
NRBC BLD AUTO-RTO: 0 /100 WBCS
PLATELET # BLD AUTO: 234 THOUSANDS/UL (ref 149–390)
PMV BLD AUTO: 10.1 FL (ref 8.9–12.7)
POTASSIUM SERPL-SCNC: 3.9 MMOL/L (ref 3.5–5.3)
PROT SERPL-MCNC: 8.7 G/DL (ref 6.4–8.4)
RBC # BLD AUTO: 4.16 MILLION/UL (ref 3.88–5.62)
SODIUM SERPL-SCNC: 135 MMOL/L (ref 135–147)
WBC # BLD AUTO: 3.84 THOUSAND/UL (ref 4.31–10.16)

## 2023-02-14 ENCOUNTER — CONSULT (OUTPATIENT)
Dept: NEPHROLOGY | Facility: CLINIC | Age: 53
End: 2023-02-14

## 2023-02-14 VITALS
SYSTOLIC BLOOD PRESSURE: 94 MMHG | HEART RATE: 97 BPM | OXYGEN SATURATION: 99 % | HEIGHT: 66 IN | DIASTOLIC BLOOD PRESSURE: 72 MMHG | WEIGHT: 188 LBS | BODY MASS INDEX: 30.22 KG/M2 | TEMPERATURE: 97.4 F | RESPIRATION RATE: 16 BRPM

## 2023-02-14 DIAGNOSIS — N18.30 HYPERTENSIVE KIDNEY DISEASE WITH STAGE 3 CHRONIC KIDNEY DISEASE, UNSPECIFIED WHETHER STAGE 3A OR 3B CKD (HCC): ICD-10-CM

## 2023-02-14 DIAGNOSIS — E87.1 HYPONATREMIA: ICD-10-CM

## 2023-02-14 DIAGNOSIS — M32.14 LUPUS NEPHRITIS (HCC): ICD-10-CM

## 2023-02-14 DIAGNOSIS — I12.9 HYPERTENSIVE KIDNEY DISEASE WITH STAGE 3 CHRONIC KIDNEY DISEASE, UNSPECIFIED WHETHER STAGE 3A OR 3B CKD (HCC): ICD-10-CM

## 2023-02-14 DIAGNOSIS — E87.5 HYPERKALEMIA: ICD-10-CM

## 2023-02-14 DIAGNOSIS — N18.30 STAGE 3 CHRONIC KIDNEY DISEASE, UNSPECIFIED WHETHER STAGE 3A OR 3B CKD (HCC): Primary | ICD-10-CM

## 2023-02-14 LAB
DSDNA AB SER-ACNC: 22 IU/ML (ref 0–9)
ENA RNP AB SER-ACNC: <0.2 AI (ref 0–0.9)
ENA SM AB SER-ACNC: <0.2 AI (ref 0–0.9)

## 2023-02-14 RX ORDER — METOLAZONE 5 MG/1
2.5 TABLET ORAL 3 TIMES WEEKLY
Start: 2023-02-14

## 2023-02-14 RX ORDER — FUROSEMIDE 40 MG/1
40 TABLET ORAL 2 TIMES DAILY
Refills: 0
Start: 2023-02-14

## 2023-02-14 NOTE — PROGRESS NOTES
NEPHROLOGY OFFICE FOLLOW UP  Lucius Hood 52 y  o male YOB: 1970 MRN: [id number]    Encounter: [id number] DATE: 2/14/2023    REASON FOR VISIT: Lucius Hood is a 52 y o  male who is here on 2/14/2023 for further management of chronic kidney disease and lupus nephritis  HPI:    This is a 75-year-old male with past medical history significant for lupus nephritis, hypertension, DVT, returns to nephrology office for hospital follow-up for further management of lupus nephritis with hyponatremia and chronic kidney disease  Patient was admitted to [address] [place] [address] in December 2022 where he was found to have hyponatremia    Work-up suggested that hyponatremia was secondary to SIADH although exact etiology of SIADH has remained unclear  Patient was started on salt tablet 3 g p o  3 times daily during the hospital stay  According to patient he was taking salt tablet until 5 days back when he decided to stop taking salt tablet  Patient has underlying hypertension which currently seems to be under good control with current antihypertensive medication  Patient was diagnosed with lupus nephritis based on renal biopsy in November 2015  Patient was previously being followed by nephrologist at [address] [place] [address] and I have personally reviewed their office note and patient was diagnosed with class V membranous lupus nephritis and initially received induction therapy with Cytoxan and later once started on CellCept but due to neutropenia, CellCept dose was reduced  Patient also has underlying SLE and currently also taking Plaquenil  Patient had developed leg swelling in the past and currently taking Lasix along with metolazone    Patient also have DVT and currently taking Eliquis  Patient takes all the medications as prescribed and denies use of NSAIDs  REVIEW OF SYSTEMS:    Review of Systems   Constitutional: Negative for chills and fever     HENT: Negative for nosebleeds and sore throat  Eyes: Negative for photophobia and pain  Respiratory: Negative for choking and wheezing  Cardiovascular: Negative for chest pain and palpitations  Gastrointestinal: Negative for blood in stool  Genitourinary: Negative for hematuria  Musculoskeletal: Negative for neck stiffness  Skin: Negative for pallor  Neurological: Negative for seizures and syncope  Psychiatric/Behavioral: Negative for confusion and suicidal ideas  PAST MEDICAL HISTORY:  Past Medical History:   Diagnosis Date   • Cervical mass    • Chronic kidney disease    • Coronary artery disease    • Depression    • DVT (deep venous thrombosis) ([facility])    • Hypertension    • Lupus ([facility] [address] )    • Renal disorder        PAST SURGICAL HISTORY:  Past Surgical History:   Procedure Laterality Date   • APPENDECTOMY     • CERVICAL SPINE SURGERY     • CHOLECYSTECTOMY     • COLONOSCOPY N/A 8/23/2018    Procedure: COLONOSCOPY;  Surgeon: Michelle Fuller MD;  Location: [facility]; Service: Gastroenterology   • ESOPHAGOGASTRODUODENOSCOPY N/A 8/22/2018    Procedure: ESOPHAGOGASTRODUODENOSCOPY (EGD); Surgeon: Michelle Fuller MD;  Location: [facility];   Service: Gastroenterology   • NECK SURGERY     • VASCULAR SURGERY         SOCIAL HISTORY:  Social History     Substance and Sexual Activity   Alcohol Use Never     Social History     Substance and Sexual Activity   Drug Use Never     Social History     Tobacco Use   Smoking Status Never   Smokeless Tobacco Never       FAMILY HISTORY:  Family History   Problem Relation Age of Onset   • Heart disease Mother    • No Known Problems Father        ALLERGY:  Allergies   Allergen Reactions   • Sulfa Antibiotics Rash       MEDICATIONS:    Current Outpatient Medications:   •  apixaban (ELIQUIS) 5 mg, Take 5 mg by mouth 2 (two) times a day, Disp: , Rfl:   •  azelastine (ASTELIN) 0 1 % nasal spray, ADMINISTER 1 SPRAY INTO NOSTRIL TWICE DAILY, Disp: , Rfl:   •  bacitracin topical ointment 500 units/g topical ointment, APPLY TOPICALLY TO AFFECTED AREA THREE TIMES A DAY AS DIRECTED, Disp: , Rfl:   •  baclofen 20 mg tablet, Take 20 mg by mouth 3 (three) times a day , Disp: , Rfl:   •  betamethasone dipropionate (DIPROSONE) 0 05 % cream, Apply 1 application topically 2 (two) times a day, Disp: , Rfl:   •  betamethasone, augmented, (DIPROLENE) 0 05 % ointment, Apply 1 application topically 2 (two) times a day, Disp: , Rfl:   •  carvedilol (COREG) 12 5 mg tablet, Take 12 5 mg by mouth 2 (two) times a day with meals, Disp: , Rfl:   •  Diclofenac Sodium (VOLTAREN) 1 %, Apply 2 g topically 4 (four) times a day, Disp: 2 g, Rfl: 0  •  escitalopram (LEXAPRO) 10 mg tablet, Take 10 mg by mouth daily, Disp: , Rfl:   •  furosemide (LASIX) 40 mg tablet, Take 1 tablet (40 mg total) by mouth 2 (two) times a day, Disp: , Rfl: 0  •  gabapentin (NEURONTIN) 300 mg capsule, TAKE 1 CAPSULE BY MOUTH AT BEDTIME IN ADDITION TO 600MG, Disp: 30 capsule, Rfl: 5  •  gabapentin (NEURONTIN) 600 MG tablet, Take 600 mg by mouth 3 (three) times a day , Disp: , Rfl:   •  hydroxychloroquine (PLAQUENIL) 200 mg tablet, Take 400 mg by mouth daily at bedtime, Disp: , Rfl:   •  hydrOXYzine HCL (ATARAX) 10 mg tablet, Take 10 mg by mouth every 6 (six) hours as needed for itching, Disp: , Rfl:   •  lidocaine-prilocaine (EMLA) cream, , Disp: , Rfl:   •  loratadine (CLARITIN) 10 mg tablet, Take 1 tablet (10 mg total) by mouth daily, Disp: 30 tablet, Rfl: 2  •  metolazone (ZAROXOLYN) 5 mg tablet, Take 0 5 tablets (2 5 mg total) by mouth 3 (three) times a week, Disp: , Rfl:   •  mycophenolate (CELLCEPT) 500 mg tablet, Take 500 mg by mouth every 12 (twelve) hours , Disp: , Rfl:   •  potassium chloride (K-DUR,KLOR-CON) 20 mEq tablet, Take 20 mEq by mouth daily, Disp: , Rfl:   •  tamsulosin (FLOMAX) 0 4 mg, , Disp: , Rfl:     PHYSICAL EXAM:  Vitals:    02/14/23 0855   BP: 94/72   BP Location: Left arm   Patient Position: Sitting   Cuff Size: Standard Pulse: 97   Resp: 16   Temp: (!) 97 4 °F (36 3 °C)   TempSrc: Temporal   SpO2: 99%   Weight: 85 3 kg (188 lb)   Height: 5' 6" (1 676 m)     Body mass index is 30 34 kg/m²  Physical Exam  Constitutional:       General: He is not in acute distress  HENT:      Right Ear: External ear normal    Eyes:      General: No scleral icterus  Conjunctiva/sclera:      Right eye: No hemorrhage  Neck:      Thyroid: No thyromegaly  Vascular: No JVD  Cardiovascular:      Rate and Rhythm: Normal rate  Pulmonary:      Effort: Pulmonary effort is normal  No accessory muscle usage or respiratory distress  Breath sounds: No wheezing  Abdominal:      General: There is no distension  Musculoskeletal:      Right ankle: No swelling  Left ankle: No swelling  Skin:     General: Skin is warm  Coloration: Skin is not jaundiced  Neurological:      Mental Status: He is alert  He is not disoriented  Psychiatric:         Speech: He is communicative  Behavior: Behavior is not combative           LAB RESULTS:  Results for orders placed or performed in visit on 02/13/23   CBC and differential   Result Value Ref Range    WBC 3 84 (L) 4 31 - 10 16 Thousand/uL    RBC 4 16 3 88 - 5 62 Million/uL    Hemoglobin 11 4 (L) 12 0 - 17 0 g/dL    Hematocrit 36 5 36 5 - 49 3 %    MCV 88 82 - 98 fL    MCH 27 4 26 8 - 34 3 pg    MCHC 31 2 (L) 31 4 - 37 4 g/dL    RDW 16 8 (H) 11 6 - 15 1 %    MPV 10 1 8 9 - 12 7 fL    Platelets 403 771 - 627 Thousands/uL    nRBC 0 /100 WBCs    Neutrophils Relative 45 43 - 75 %    Immat GRANS % 1 0 - 2 %    Lymphocytes Relative 36 14 - 44 %    Monocytes Relative 15 (H) 4 - 12 %    Eosinophils Relative 2 0 - 6 %    Basophils Relative 1 0 - 1 %    Neutrophils Absolute 1 76 (L) 1 85 - 7 62 Thousands/µL    Immature Grans Absolute 0 02 0 00 - 0 20 Thousand/uL    Lymphocytes Absolute 1 38 0 60 - 4 47 Thousands/µL    Monocytes Absolute 0 56 0 17 - 1 22 Thousand/µL    Eosinophils Absolute 0 09 0 00 - 0 61 Thousand/µL    Basophils Absolute 0 03 0 00 - 0 10 Thousands/µL   Comprehensive metabolic panel   Result Value Ref Range    Sodium 135 135 - 147 mmol/L    Potassium 3 9 3 5 - 5 3 mmol/L    Chloride 102 96 - 108 mmol/L    CO2 25 21 - 32 mmol/L    ANION GAP 8 4 - 13 mmol/L    BUN 18 5 - 25 mg/dL    Creatinine 1 64 (H) 0 60 - 1 30 mg/dL    Glucose, Fasting 96 65 - 99 mg/dL    Calcium 9 6 8 3 - 10 1 mg/dL    AST 19 5 - 45 U/L    ALT 18 12 - 78 U/L    Alkaline Phosphatase 127 (H) 46 - 116 U/L    Total Protein 8 7 (H) 6 4 - 8 4 g/dL    Albumin 3 8 3 5 - 5 0 g/dL    Total Bilirubin 0 40 0 20 - 1 00 mg/dL    eGFR 46 ml/min/1 73sq m   C3 complement   Result Value Ref Range    C3 Complement 122 0 90 0 - 180 0 mg/dL   C4 complement   Result Value Ref Range    C4, COMPLEMENT 43 0 (H) 10 0 - 40 0 mg/dL       ASSESSMENT and PLAN:  Neville Alcala was seen today for chronic kidney disease and consult  Diagnoses and all orders for this visit:    Stage 3 chronic kidney disease, unspecified whether stage 3a or 3b CKD ([facility] )  -     Ambulatory Referral to Nephrology  -     CBC and differential; Future  -     Basic metabolic panel; Future  -     Urinalysis with microscopic; Future  -     Microalbumin / creatinine urine ratio; Future  -     Phosphorus; Future  -     Uric acid; Future  -     PTH, intact; Future  -     Vitamin D 25 hydroxy; Future  -     Hepatic function panel; Future  -     metolazone (ZAROXOLYN) 5 mg tablet; Take 0 5 tablets (2 5 mg total) by mouth 3 (three) times a week    Hyponatremia  -     Ambulatory Referral to Nephrology  -     Basic metabolic panel; Future    Lupus nephritis (HCC)  -     Basic metabolic panel; Future  -     Urinalysis with microscopic; Future  -     Microalbumin / creatinine urine ratio; Future  -     Hepatic function panel; Future  -     YANIRA Screen w/ Reflex to Titer/Pattern; Future  -     Anti-DNA antibody, double-stranded; Future  -     C3 complement;  Future  -     C4 complement; Future    Hypertensive kidney disease with stage 3 chronic kidney disease, unspecified whether stage 3a or 3b CKD (HCC)  -     Urinalysis with microscopic; Future  -     Microalbumin / creatinine urine ratio; Future    Hyperkalemia  -     furosemide (LASIX) 40 mg tablet; Take 1 tablet (40 mg total) by mouth 2 (two) times a day      1  CKD stage III  Multifactorial and suspected due to underlying lupus nephritis on top of hypertensive nephrosclerosis  Upon review of old medical record from epic chart review and also from care everywhere, previously known baseline creatinine was thought to be around 1 4-1 5  During recent hospital stay at [address] [place] [address], patient's creatinine was below baseline and was restarted on diuretics at the time of discharge  Patient's current creatinine is 1 64 with a EGFR of 46  According to patient he is currently taking Lasix 80 mg p o  twice daily along with metolazone 2 5 mg 3 times a week  Clinically patient looks euvolemic and since renal function has worsened compared to recent hospital stay, I will plan to decrease Lasix to 40 mg p o  twice daily today  Management of lupus nephritis and hypertension as mentioned below  Plan to avoid NSAIDs and recheck renal function before next visit  2   Lupus nephritis class V  Patient was previously being followed by nephrologist at [facility] and I have personally reviewed their last office note  Patient was diagnosed with class V membranous lupus nephritis in November 2015 and had received induction therapy with Cytoxan and lateron started on CellCept but due to neutropenia, CellCept dose was reduced  Currently patient is taking CellCept 500 mg p o  twice daily  Complement level done yesterday were normal suggesting worsening renal function is less likely due to lupus nephritis flareup  Pending YANIRA and anti-double-stranded DNA level  Patient has not submitted urine specimen during this office visit  Patient is also noticed to have worsening renal function compared to previous check which I would suspect due to use of diuretics and plan to decrease Lasix dose as mentioned earlier  Plan to continue CellCept at current dose today  3   Hypertension and chronic kidney disease  Today's blood pressure was on the lower side but patient is currently asymptomatic and monitor hypertension with carvedilol 12 5 mg p o  twice daily today  4   Hyponatremia    Patient was admitted to [address] [place] [address] in December 2022 and work-up for hyponatremia suggested due to SIADH although exact etiology of SIADH is remains unclear  Patient was discharged on salt tablet but for last 5 days patient has stopped taking salt tablet  Patient's current sodium level is 135 and we will plan to monitor sodium level for time being without salt tablet  Returns to nephrology office in 6 months  Future labs + LFT + complements, YANIRA and anti-double-stranded DNA ordered  Portions of the record may have been created with voice recognition software  Occasional wrong word or "sound a like" substitutions may have occurred due to the inherent limitations of voice recognition software  Read the chart carefully and recognize, using context, where substitutions have occurred  If you have any questions, please contact the dictating provider

## 2023-02-15 LAB
APTT SCREEN TO CONFIRM RATIO: 1.09 RATIO (ref 0–1.34)
CONFIRM APTT/NORMAL: 47.2 SEC (ref 0–47.6)
DRVVT IMM 1:2 NP PPP: 50.7 SEC (ref 0–40.4)
DRVVT SCREEN TO CONFIRM RATIO: 1.2 RATIO (ref 0.8–1.2)
LA PPP-IMP: ABNORMAL
SCREEN APTT: 35.9 SEC (ref 0–43.5)
SCREEN DRVVT: 69.5 SEC (ref 0–47)
THROMBIN TIME: 17.1 SEC (ref 0–23)

## 2023-02-16 LAB — PROT S ACT/NOR PPP: 79 % (ref 71–117)

## 2023-02-20 ENCOUNTER — TELEPHONE (OUTPATIENT)
Dept: INTERNAL MEDICINE CLINIC | Facility: CLINIC | Age: 53
End: 2023-02-20

## 2023-02-20 DIAGNOSIS — M19.012 ARTHRITIS OF LEFT ACROMIOCLAVICULAR JOINT: Primary | ICD-10-CM

## 2023-02-20 NOTE — TELEPHONE ENCOUNTER
Hi, my name is Argelia Caban  I need to Refill For physical therapy  My [facility]  Twenty Three  Night in [de-identified], Eight my number phone is [phone] cero  Fight Hairy Seven five hairy to thank you    OK to place?

## 2023-02-27 ENCOUNTER — OFFICE VISIT (OUTPATIENT)
Dept: INTERNAL MEDICINE CLINIC | Facility: CLINIC | Age: 53
End: 2023-02-27

## 2023-02-27 VITALS
RESPIRATION RATE: 18 BRPM | HEIGHT: 66 IN | TEMPERATURE: 98.1 F | HEART RATE: 73 BPM | SYSTOLIC BLOOD PRESSURE: 116 MMHG | DIASTOLIC BLOOD PRESSURE: 64 MMHG | OXYGEN SATURATION: 99 % | BODY MASS INDEX: 30.34 KG/M2

## 2023-02-27 DIAGNOSIS — R26.9 GAIT DIFFICULTY: ICD-10-CM

## 2023-02-27 DIAGNOSIS — N40.0 BENIGN PROSTATIC HYPERPLASIA WITHOUT LOWER URINARY TRACT SYMPTOMS: ICD-10-CM

## 2023-02-27 DIAGNOSIS — I10 PRIMARY HYPERTENSION: ICD-10-CM

## 2023-02-27 DIAGNOSIS — M19.012 ARTHRITIS OF LEFT ACROMIOCLAVICULAR JOINT: ICD-10-CM

## 2023-02-27 DIAGNOSIS — M79.671 RIGHT FOOT PAIN: Primary | ICD-10-CM

## 2023-02-27 DIAGNOSIS — L85.3 DRY SKIN: ICD-10-CM

## 2023-02-27 DIAGNOSIS — N18.30 STAGE 3 CHRONIC KIDNEY DISEASE, UNSPECIFIED WHETHER STAGE 3A OR 3B CKD (HCC): ICD-10-CM

## 2023-02-27 DIAGNOSIS — M32.9 SYSTEMIC LUPUS ERYTHEMATOSUS, UNSPECIFIED SLE TYPE, UNSPECIFIED ORGAN INVOLVEMENT STATUS (HCC): ICD-10-CM

## 2023-02-27 RX ORDER — LIDOCAINE AND PRILOCAINE 25; 25 MG/G; MG/G
CREAM TOPICAL AS NEEDED
Qty: 30 G | Refills: 3 | Status: SHIPPED | OUTPATIENT
Start: 2023-02-27

## 2023-02-27 NOTE — PROGRESS NOTES
Assessment/Plan:     Noy Avila is here with gait difficulty which is chronic but it is improving; he continues to work with PT  Reports right dorsum foot pain; will have him see podiatry; on gabapentin and reports burning  Will try orthotics  Advised him to use diclofenac sparingly  Right foot appears swollen; he was hospitalized for right leg cellulitis; he is on lasix bid  Saw nephrology  Needs to see rheumatology; they did not call him back; Need updated labs; Quality Measures:     BMI Counseling: Body mass index is 30 34 kg/m²  The BMI is above normal  Nutrition recommendations include decreasing portion sizes and encouraging healthy choices of fruits and vegetables  Exercise recommendations include moderate physical activity 150 minutes/week  Rationale for BMI follow-up plan is due to patient being overweight or obese  Depression Screening and Follow-up Plan: Patient was screened for depression during today's encounter  They screened negative with a PHQ-2 score of 0  Return in about 4 weeks (around 3/27/2023)  No problem-specific Assessment & Plan notes found for this encounter  Diagnoses and all orders for this visit:    Right foot pain  -     Ambulatory Referral to Podiatry; Future  -     Uric acid; Future  -     lidocaine-prilocaine (EMLA) cream; Apply topically as needed for mild pain    Dry skin  -     Ambulatory Referral to Dermatology; Future    Primary hypertension  -     Comprehensive metabolic panel; Future  -     CBC and differential; Future  -     TSH, 3rd generation with Free T4 reflex; Future  -     Lipid Panel with Direct LDL reflex; Future  -     Hemoglobin A1C; Future    Stage 3 chronic kidney disease, unspecified whether stage 3a or 3b CKD (HCC)    Gait difficulty    Benign prostatic hyperplasia without lower urinary tract symptoms  -     PSA, total and free;  Future    Systemic lupus erythematosus, unspecified SLE type, unspecified organ involvement status ([facility] )  - Ambulatory Referral to Rheumatology; Future    Arthritis of left acromioclavicular joint  -     Diclofenac Sodium (VOLTAREN) 1 %; Apply 2 g topically 4 (four) times a day          Subjective:      Patient ID: Winsome King is a 52 y o  male  Here with right foot pain; gait problem        ALLERGIES:  Allergies   Allergen Reactions   • Sulfa Antibiotics Rash       CURRENT MEDICATIONS:    Current Outpatient Medications:   •  apixaban (ELIQUIS) 5 mg, Take 5 mg by mouth 2 (two) times a day, Disp: , Rfl:   •  azelastine (ASTELIN) 0 1 % nasal spray, ADMINISTER 1 SPRAY INTO NOSTRIL TWICE DAILY, Disp: , Rfl:   •  bacitracin topical ointment 500 units/g topical ointment, APPLY TOPICALLY TO AFFECTED AREA THREE TIMES A DAY AS DIRECTED, Disp: , Rfl:   •  baclofen 20 mg tablet, Take 20 mg by mouth 3 (three) times a day , Disp: , Rfl:   •  betamethasone dipropionate (DIPROSONE) 0 05 % cream, Apply 1 application topically 2 (two) times a day, Disp: , Rfl:   •  betamethasone, augmented, (DIPROLENE) 0 05 % ointment, Apply 1 application topically 2 (two) times a day, Disp: , Rfl:   •  carvedilol (COREG) 12 5 mg tablet, Take 12 5 mg by mouth 2 (two) times a day with meals, Disp: , Rfl:   •  Diclofenac Sodium (VOLTAREN) 1 %, Apply 2 g topically 4 (four) times a day, Disp: 2 g, Rfl: 0  •  escitalopram (LEXAPRO) 10 mg tablet, Take 10 mg by mouth daily, Disp: , Rfl:   •  furosemide (LASIX) 40 mg tablet, Take 1 tablet (40 mg total) by mouth 2 (two) times a day, Disp: , Rfl: 0  •  gabapentin (NEURONTIN) 300 mg capsule, TAKE 1 CAPSULE BY MOUTH AT BEDTIME IN ADDITION TO 600MG, Disp: 30 capsule, Rfl: 5  •  gabapentin (NEURONTIN) 600 MG tablet, Take 600 mg by mouth 3 (three) times a day , Disp: , Rfl:   •  hydroxychloroquine (PLAQUENIL) 200 mg tablet, Take 400 mg by mouth daily at bedtime, Disp: , Rfl:   •  hydrOXYzine HCL (ATARAX) 10 mg tablet, Take 10 mg by mouth every 6 (six) hours as needed for itching, Disp: , Rfl:   • lidocaine-prilocaine (EMLA) cream, Apply topically as needed for mild pain, Disp: 30 g, Rfl: 3  •  loratadine (CLARITIN) 10 mg tablet, Take 1 tablet (10 mg total) by mouth daily, Disp: 30 tablet, Rfl: 2  •  metolazone (ZAROXOLYN) 5 mg tablet, Take 0 5 tablets (2 5 mg total) by mouth 3 (three) times a week, Disp: , Rfl:   •  mycophenolate (CELLCEPT) 500 mg tablet, Take 500 mg by mouth every 12 (twelve) hours , Disp: , Rfl:   •  potassium chloride (K-DUR,KLOR-CON) 20 mEq tablet, Take 20 mEq by mouth daily, Disp: , Rfl:   •  tamsulosin (FLOMAX) 0 4 mg, , Disp: , Rfl:     ACTIVE PROBLEM LIST:  Patient Active Problem List   Diagnosis   • Lupus (systemic lupus erythematosus) (HCC)   • Lupus nephritis (HCC)   • Hypertension   • History of DVT (deep vein thrombosis)   • Stage 3 chronic kidney disease (HCC)   • Persistent proteinuria   • Anemia in chronic kidney disease   • Muscle weakness (generalized)   • Tremor of both hands   • Spasticity   • Gait difficulty   • Neuropathic pain   • Hypertensive CKD (chronic kidney disease)       PAST MEDICAL HISTORY:  Past Medical History:   Diagnosis Date   • Anemia    • Arthritis    • Cervical mass    • Chronic kidney disease    • Coronary artery disease    • Depression    • DVT (deep venous thrombosis) (HCC)    • Hypertension    • Lupus ([facility] )    • Renal disorder        PAST SURGICAL HISTORY:  Past Surgical History:   Procedure Laterality Date   • APPENDECTOMY     • CERVICAL SPINE SURGERY     • CHOLECYSTECTOMY     • COLONOSCOPY N/A 8/23/2018    Procedure: COLONOSCOPY;  Surgeon: Danielle Santos MD;  Location: [facility]; Service: Gastroenterology   • ESOPHAGOGASTRODUODENOSCOPY N/A 8/22/2018    Procedure: ESOPHAGOGASTRODUODENOSCOPY (EGD); Surgeon: Danielle Santos MD;  Location: [facility];   Service: Gastroenterology   • NECK SURGERY     • VASCULAR SURGERY         FAMILY HISTORY:  Family History   Problem Relation Age of Onset   • Heart disease Mother    • No Known Problems Father        SOCIAL HISTORY:  Social History     Socioeconomic History   • Marital status: /Civil Union     Spouse name: Not on file   • Number of children: Not on file   • Years of education: Not on file   • Highest education level: Not on file   Occupational History   • Not on file   Tobacco Use   • Smoking status: Never   • Smokeless tobacco: Never   Vaping Use   • Vaping Use: Never used   Substance and Sexual Activity   • Alcohol use: Never   • Drug use: Never   • Sexual activity: Yes     Partners: Female, Male     Birth control/protection: Rhythm   Other Topics Concern   • Not on file   Social History Narrative    ** Merged History Encounter **          Social Determinants of Health     Financial Resource Strain: Not on file   Food Insecurity: No Food Insecurity   • Worried About Running Out of Food in the Last Year: Never true   • Ran Out of Food in the Last Year: Never true   Transportation Needs: No Transportation Needs   • Lack of Transportation (Medical): No   • Lack of Transportation (Non-Medical): No   Physical Activity: Not on file   Stress: Not on file   Social Connections: Not on file   Intimate Partner Violence: Not on file   Housing Stability: Low Risk    • Unable to Pay for Housing in the Last Year: No   • Number of Places Lived in the Last Year: 1   • Unstable Housing in the Last Year: No       Review of Systems   Musculoskeletal: Positive for arthralgias and gait problem  All other systems reviewed and are negative  Objective:  Vitals:    02/27/23 1122   BP: 116/64   BP Location: Right arm   Patient Position: Sitting   Cuff Size: Adult   Pulse: 73   Resp: 18   Temp: 98 1 °F (36 7 °C)   TempSrc: Tympanic   SpO2: 99%   Height: 5' 6" (1 676 m)     Body mass index is 30 34 kg/m²  Physical Exam  Vitals and nursing note reviewed  Constitutional:       Appearance: Normal appearance  HENT:      Head: Normocephalic and atraumatic     Cardiovascular:      Rate and Rhythm: Normal rate and regular rhythm  Pulmonary:      Effort: Pulmonary effort is normal    Musculoskeletal:         General: Normal range of motion  Cervical back: Normal range of motion  Lymphadenopathy:      Cervical: No cervical adenopathy  Skin:     General: Skin is warm and dry  Neurological:      General: No focal deficit present  Mental Status: He is alert and oriented to person, place, and time  Mental status is at baseline  Motor: Weakness present  Coordination: Coordination abnormal       Gait: Gait abnormal    Psychiatric:         Mood and Affect: Mood normal            RESULTS:    In chart    This note was created with voice recognition software  Phonic, grammatical and spelling errors may be present within the note as a result

## 2023-03-09 ENCOUNTER — HOSPITAL ENCOUNTER (OUTPATIENT)
Dept: RADIOLOGY | Facility: HOSPITAL | Age: 53
End: 2023-03-09
Attending: PODIATRIST

## 2023-03-09 ENCOUNTER — OFFICE VISIT (OUTPATIENT)
Dept: PODIATRY | Facility: CLINIC | Age: 53
End: 2023-03-09

## 2023-03-09 VITALS
OXYGEN SATURATION: 98 % | HEIGHT: 66 IN | DIASTOLIC BLOOD PRESSURE: 72 MMHG | HEART RATE: 86 BPM | SYSTOLIC BLOOD PRESSURE: 110 MMHG | BODY MASS INDEX: 30.34 KG/M2

## 2023-03-09 DIAGNOSIS — M19.071 DJD (DEGENERATIVE JOINT DISEASE), ANKLE AND FOOT, RIGHT: Primary | ICD-10-CM

## 2023-03-09 DIAGNOSIS — M19.071 DJD (DEGENERATIVE JOINT DISEASE), ANKLE AND FOOT, RIGHT: ICD-10-CM

## 2023-03-09 DIAGNOSIS — M79.671 RIGHT FOOT PAIN: ICD-10-CM

## 2023-03-09 NOTE — LETTER
March 10, 2023     [facility], [address]  [address]  [facility]    Patient: Leigh Ann Ceballos   YOB: 1970   Date of Visit: 3/9/2023       Dear Dr Truddie Opitz: Thank you for referring Leigh Ann Ceballos to me for evaluation  Below are my notes for this consultation  If you have questions, please do not hesitate to call me  I look forward to following your patient along with you  Sincerely,        Frankie Graham DPM        CC: No Recipients  ANNA Price [facility]  3/9/2023  9:47 PM  Signed  Assessment/Plan:    The patient's x-rays of the right foot taken today were personally reviewed and interpreted  Findings were consistent with mild scattered midfoot arthritis  Calcified vessels are noted throughout the foot  No fractures or dislocations are noted  The clinical examination is consistent with mild to moderate diffuse tenderness to palpation at the midfoot joints of the right foot  Given x-ray findings this is most likely secondary to arthritic changes to the midfoot  This can also be a systemic effect of his  There is no erythema nor ecchymosis nor active edema of the right lower extremity  Treatment options were also discussed  Patient defers injection therapy today  We will proceed with splinting of the right foot by means of a lace up ankle-foot orthosis  Was fitted and dispensed today  Recommend follow-up in 3 to 4 weeks  If the patient fails to improve with bracing therapy, we will reconsider injection therapy at that time  Diagnoses and all orders for this visit:    DJD (degenerative joint disease), ankle and foot, right  -     X-ray foot right 3+ views; Future  -     Ankle Cude ankle/Ankle Brace    Right foot pain  -     Ambulatory Referral to Podiatry  -     X-ray foot right 3+ views; Future  -     Ankle Cude ankle/Ankle Brace         Subjective:     Patient ID: Leigh Ann Ceballos is a 52 y o  male      The patient presents today for his initial consultation with [facility] podiatry with a chief complaint of right midfoot pain that has been present for several weeks  The patient can not recall any specific injury or trauma to the right foot  He does note an unsteady gait for which he ambulates with a walker  He states he is currently going to physical therapy to assist with gait stabilization  Pain is exacerbated with periods of weightbearing and ambulation and is alleviated by getting off of his feet  The following portions of the patient's history were reviewed and updated as appropriate: allergies, current medications, past family history, past medical history, past social history, past surgical history and problem list       PAST MEDICAL HISTORY:  Past Medical History:   Diagnosis Date   • Anemia    • Arthritis    • Cervical mass    • Chronic kidney disease    • Coronary artery disease    • Depression    • DVT (deep venous thrombosis) ([facility] )    • Hypertension    • Lupus ([facility] )    • Renal disorder        PAST SURGICAL HISTORY:  Past Surgical History:   Procedure Laterality Date   • APPENDECTOMY     • CERVICAL SPINE SURGERY     • CHOLECYSTECTOMY     • COLONOSCOPY N/A 8/23/2018    Procedure: COLONOSCOPY;  Surgeon: Parth Peters MD;  Location: [facility]; Service: Gastroenterology   • ESOPHAGOGASTRODUODENOSCOPY N/A 8/22/2018    Procedure: ESOPHAGOGASTRODUODENOSCOPY (EGD); Surgeon: Parth Peters MD;  Location: [facility];   Service: Gastroenterology   • NECK SURGERY     • VASCULAR SURGERY          ALLERGIES:  Sulfa antibiotics    MEDICATIONS:  Current Outpatient Medications   Medication Sig Dispense Refill   • apixaban (ELIQUIS) 5 mg Take 5 mg by mouth 2 (two) times a day     • bacitracin topical ointment 500 units/g topical ointment APPLY TOPICALLY TO AFFECTED AREA THREE TIMES A DAY AS DIRECTED     • baclofen 20 mg tablet Take 20 mg by mouth 3 (three) times a day      • betamethasone dipropionate (DIPROSONE) 0 05 % cream Apply 1 application topically 2 (two) times a day     • betamethasone, augmented, (DIPROLENE) 0 05 % ointment Apply 1 application topically 2 (two) times a day     • carvedilol (COREG) 12 5 mg tablet Take 12 5 mg by mouth 2 (two) times a day with meals     • Diclofenac Sodium (VOLTAREN) 1 % Apply 2 g topically 4 (four) times a day 2 g 0   • escitalopram (LEXAPRO) 10 mg tablet Take 10 mg by mouth daily     • furosemide (LASIX) 40 mg tablet Take 1 tablet (40 mg total) by mouth 2 (two) times a day  0   • gabapentin (NEURONTIN) 300 mg capsule TAKE 1 CAPSULE BY MOUTH AT BEDTIME IN ADDITION TO 600MG 30 capsule 5   • gabapentin (NEURONTIN) 600 MG tablet Take 600 mg by mouth 3 (three) times a day      • hydroxychloroquine (PLAQUENIL) 200 mg tablet Take 400 mg by mouth daily at bedtime     • hydrOXYzine HCL (ATARAX) 10 mg tablet Take 10 mg by mouth every 6 (six) hours as needed for itching     • lidocaine-prilocaine (EMLA) cream Apply topically as needed for mild pain 30 g 3   • metolazone (ZAROXOLYN) 5 mg tablet Take 0 5 tablets (2 5 mg total) by mouth 3 (three) times a week     • mycophenolate (CELLCEPT) 500 mg tablet Take 500 mg by mouth every 12 (twelve) hours      • potassium chloride (K-DUR,KLOR-CON) 20 mEq tablet Take 20 mEq by mouth daily     • tamsulosin (FLOMAX) 0 4 mg      • azelastine (ASTELIN) 0 1 % nasal spray ADMINISTER 1 SPRAY INTO NOSTRIL TWICE DAILY     • loratadine (CLARITIN) 10 mg tablet Take 1 tablet (10 mg total) by mouth daily 30 tablet 2     No current facility-administered medications for this visit         SOCIAL HISTORY:  Social History     Socioeconomic History   • Marital status: /Civil Union     Spouse name: None   • Number of children: None   • Years of education: None   • Highest education level: None   Occupational History   • None   Tobacco Use   • Smoking status: Never   • Smokeless tobacco: Never   Vaping Use   • Vaping Use: Never used   Substance and Sexual Activity   • Alcohol use: Never   • Drug use: Never   • Sexual activity: Yes     Partners: Female, Male     Birth control/protection: Rhythm   Other Topics Concern   • None   Social History Narrative    ** Merged History Encounter **          Social Determinants of Health     Financial Resource Strain: Not on file   Food Insecurity: No Food Insecurity   • Worried About Running Out of Food in the Last Year: Never true   • Ran Out of Food in the Last Year: Never true   Transportation Needs: No Transportation Needs   • Lack of Transportation (Medical): No   • Lack of Transportation (Non-Medical): No   Physical Activity: Not on file   Stress: Not on file   Social Connections: Not on file   Intimate Partner Violence: Not on file   Housing Stability: Low Risk    • Unable to Pay for Housing in the Last Year: No   • Number of Places Lived in the Last Year: 1   • Unstable Housing in the Last Year: No        Review of Systems   Constitutional: Negative  HENT: Negative  Eyes: Negative  Respiratory: Negative  Cardiovascular: Negative  Endocrine: Negative  Musculoskeletal: Positive for arthralgias and gait problem  Skin: Negative  Hematological: Negative  Psychiatric/Behavioral: Negative  Objective:      /72 (BP Location: Left arm, Patient Position: Sitting, Cuff Size: Standard)   Pulse 86   Ht 5' 6" (1 676 m)   SpO2 98%   BMI 30 34 kg/m²         Physical Exam  Vitals reviewed  Constitutional:       Appearance: Normal appearance  HENT:      Head: Normocephalic and atraumatic  Nose: Nose normal    Eyes:      Conjunctiva/sclera: Conjunctivae normal       Pupils: Pupils are equal, round, and reactive to light  Cardiovascular:      Pulses:           Dorsalis pedis pulses are 1+ on the right side  Posterior tibial pulses are 0 on the right side     Pulmonary:      Effort: Pulmonary effort is normal    Musculoskeletal:        Feet:    Feet:      Right foot:      Skin integrity: Skin integrity normal       Comments: There is diffuse tenderness with palpation of the medial midfoot joints over the dorsum of the right foot; there is no erythema nor ecchymosis there is no edema nor calor noted to the right midfoot; range of motion of the hindfoot and midfoot joints does appear to be reduced; there are no open skin lesions  Skin:     General: Skin is warm  Capillary Refill: Capillary refill takes less than 2 seconds  Neurological:      General: No focal deficit present  Mental Status: He is alert and oriented to person, place, and time  Psychiatric:         Mood and Affect: Mood normal          Behavior: Behavior normal          Thought Content:  Thought content normal

## 2023-03-09 NOTE — PROGRESS NOTES
Assessment/Plan:    The patient's x-rays of the right foot taken today were personally reviewed and interpreted  Findings were consistent with mild scattered midfoot arthritis  Calcified vessels are noted throughout the foot  No fractures or dislocations are noted  The clinical examination is consistent with mild to moderate diffuse tenderness to palpation at the midfoot joints of the right foot  Given x-ray findings this is most likely secondary to arthritic changes to the midfoot  This can also be a systemic effect of his  There is no erythema nor ecchymosis nor active edema of the right lower extremity  Treatment options were also discussed  Patient defers injection therapy today  We will proceed with splinting of the right foot by means of a lace up ankle-foot orthosis  Was fitted and dispensed today  Recommend follow-up in 3 to 4 weeks  If the patient fails to improve with bracing therapy, we will reconsider injection therapy at that time  Diagnoses and all orders for this visit:    DJD (degenerative joint disease), ankle and foot, right  -     X-ray foot right 3+ views; Future  -     Ankle Cude ankle/Ankle Brace    Right foot pain  -     Ambulatory Referral to Podiatry  -     X-ray foot right 3+ views; Future  -     Ankle Cude ankle/Ankle Brace          Subjective:      Patient ID: Elaina Jean is a 52 y o  male  The patient presents today for his initial consultation with [facility] podiatry with a chief complaint of right midfoot pain that has been present for several weeks  The patient can not recall any specific injury or trauma to the right foot  He does note an unsteady gait for which he ambulates with a walker  He states he is currently going to physical therapy to assist with gait stabilization  Pain is exacerbated with periods of weightbearing and ambulation and is alleviated by getting off of his feet        The following portions of the patient's history were reviewed and updated as appropriate: allergies, current medications, past family history, past medical history, past social history, past surgical history and problem list       PAST MEDICAL HISTORY:  Past Medical History:   Diagnosis Date   • Anemia    • Arthritis    • Cervical mass    • Chronic kidney disease    • Coronary artery disease    • Depression    • DVT (deep venous thrombosis) ([facility] [address] )    • Hypertension    • Lupus ([facility] )    • Renal disorder        PAST SURGICAL HISTORY:  Past Surgical History:   Procedure Laterality Date   • APPENDECTOMY     • CERVICAL SPINE SURGERY     • CHOLECYSTECTOMY     • COLONOSCOPY N/A 8/23/2018    Procedure: COLONOSCOPY;  Surgeon: Ashly Vázquez MD;  Location: [facility]; Service: Gastroenterology   • ESOPHAGOGASTRODUODENOSCOPY N/A 8/22/2018    Procedure: ESOPHAGOGASTRODUODENOSCOPY (EGD); Surgeon: Ashly Vázquez MD;  Location: [facility];   Service: Gastroenterology   • NECK SURGERY     • VASCULAR SURGERY          ALLERGIES:  Sulfa antibiotics    MEDICATIONS:  Current Outpatient Medications   Medication Sig Dispense Refill   • apixaban (ELIQUIS) 5 mg Take 5 mg by mouth 2 (two) times a day     • bacitracin topical ointment 500 units/g topical ointment APPLY TOPICALLY TO AFFECTED AREA THREE TIMES A DAY AS DIRECTED     • baclofen 20 mg tablet Take 20 mg by mouth 3 (three) times a day      • betamethasone dipropionate (DIPROSONE) 0 05 % cream Apply 1 application topically 2 (two) times a day     • betamethasone, augmented, (DIPROLENE) 0 05 % ointment Apply 1 application topically 2 (two) times a day     • carvedilol (COREG) 12 5 mg tablet Take 12 5 mg by mouth 2 (two) times a day with meals     • Diclofenac Sodium (VOLTAREN) 1 % Apply 2 g topically 4 (four) times a day 2 g 0   • escitalopram (LEXAPRO) 10 mg tablet Take 10 mg by mouth daily     • furosemide (LASIX) 40 mg tablet Take 1 tablet (40 mg total) by mouth 2 (two) times a day  0   • gabapentin (NEURONTIN) 300 mg capsule TAKE 1 CAPSULE BY MOUTH AT BEDTIME IN ADDITION TO 600MG 30 capsule 5   • gabapentin (NEURONTIN) 600 MG tablet Take 600 mg by mouth 3 (three) times a day      • hydroxychloroquine (PLAQUENIL) 200 mg tablet Take 400 mg by mouth daily at bedtime     • hydrOXYzine HCL (ATARAX) 10 mg tablet Take 10 mg by mouth every 6 (six) hours as needed for itching     • lidocaine-prilocaine (EMLA) cream Apply topically as needed for mild pain 30 g 3   • metolazone (ZAROXOLYN) 5 mg tablet Take 0 5 tablets (2 5 mg total) by mouth 3 (three) times a week     • mycophenolate (CELLCEPT) 500 mg tablet Take 500 mg by mouth every 12 (twelve) hours      • potassium chloride (K-DUR,KLOR-CON) 20 mEq tablet Take 20 mEq by mouth daily     • tamsulosin (FLOMAX) 0 4 mg      • azelastine (ASTELIN) 0 1 % nasal spray ADMINISTER 1 SPRAY INTO NOSTRIL TWICE DAILY     • loratadine (CLARITIN) 10 mg tablet Take 1 tablet (10 mg total) by mouth daily 30 tablet 2     No current facility-administered medications for this visit  SOCIAL HISTORY:  Social History     Socioeconomic History   • Marital status: /Civil Union     Spouse name: None   • Number of children: None   • Years of education: None   • Highest education level: None   Occupational History   • None   Tobacco Use   • Smoking status: Never   • Smokeless tobacco: Never   Vaping Use   • Vaping Use: Never used   Substance and Sexual Activity   • Alcohol use: Never   • Drug use: Never   • Sexual activity: Yes     Partners: Female, Male     Birth control/protection: Rhythm   Other Topics Concern   • None   Social History Narrative    ** Merged History Encounter **          Social Determinants of Health     Financial Resource Strain: Not on file   Food Insecurity: No Food Insecurity   • Worried About Running Out of Food in the Last Year: Never true   • Ran Out of Food in the Last Year: Never true   Transportation Needs: No Transportation Needs   • Lack of Transportation (Medical):  No   • Lack of Transportation (Non-Medical): No   Physical Activity: Not on file   Stress: Not on file   Social Connections: Not on file   Intimate Partner Violence: Not on file   Housing Stability: Low Risk    • Unable to Pay for Housing in the Last Year: No   • Number of Places Lived in the Last Year: 1   • Unstable Housing in the Last Year: No        Review of Systems   Constitutional: Negative  HENT: Negative  Eyes: Negative  Respiratory: Negative  Cardiovascular: Negative  Endocrine: Negative  Musculoskeletal: Positive for arthralgias and gait problem  Skin: Negative  Hematological: Negative  Psychiatric/Behavioral: Negative  Objective:      /72 (BP Location: Left arm, Patient Position: Sitting, Cuff Size: Standard)   Pulse 86   Ht 5' 6" (1 676 m)   SpO2 98%   BMI 30 34 kg/m²          Physical Exam  Vitals reviewed  Constitutional:       Appearance: Normal appearance  HENT:      Head: Normocephalic and atraumatic  Nose: Nose normal    Eyes:      Conjunctiva/sclera: Conjunctivae normal       Pupils: Pupils are equal, round, and reactive to light  Cardiovascular:      Pulses:           Dorsalis pedis pulses are 1+ on the right side  Posterior tibial pulses are 0 on the right side  Pulmonary:      Effort: Pulmonary effort is normal    Musculoskeletal:        Feet:    Feet:      Right foot:      Skin integrity: Skin integrity normal       Comments: There is diffuse tenderness with palpation of the medial midfoot joints over the dorsum of the right foot; there is no erythema nor ecchymosis there is no edema nor calor noted to the right midfoot; range of motion of the hindfoot and midfoot joints does appear to be reduced; there are no open skin lesions  Skin:     General: Skin is warm  Capillary Refill: Capillary refill takes less than 2 seconds  Neurological:      General: No focal deficit present        Mental Status: He is alert and oriented to person, place, and time  Psychiatric:         Mood and Affect: Mood normal          Behavior: Behavior normal          Thought Content:  Thought content normal

## 2023-03-16 ENCOUNTER — TELEPHONE (OUTPATIENT)
Dept: INTERNAL MEDICINE CLINIC | Facility: CLINIC | Age: 53
End: 2023-03-16

## 2023-03-16 DIAGNOSIS — M19.012 ARTHRITIS OF LEFT ACROMIOCLAVICULAR JOINT: Primary | ICD-10-CM

## 2023-03-16 RX ORDER — GABAPENTIN 600 MG/1
600 TABLET ORAL 3 TIMES DAILY
Qty: 90 TABLET | Refills: 0 | Status: SHIPPED | OUTPATIENT
Start: 2023-03-16 | End: 2023-03-20 | Stop reason: SDUPTHER

## 2023-03-16 NOTE — TELEPHONE ENCOUNTER
Gabapentin 300 mg capsule-take 1 capsule by mouth at bedtime in addition to 600 mg    [facility]

## 2023-03-17 DIAGNOSIS — M79.2 NEUROPATHIC PAIN: ICD-10-CM

## 2023-03-17 RX ORDER — GABAPENTIN 300 MG/1
300 CAPSULE ORAL DAILY
Qty: 30 CAPSULE | Refills: 5 | Status: SHIPPED | OUTPATIENT
Start: 2023-03-17 | End: 2023-03-20 | Stop reason: SDUPTHER

## 2023-03-17 NOTE — TELEPHONE ENCOUNTER
Patient needs the gabapentin 300 mg capsule, take 1 capsule by mouth at bedtime in addition to 600 mg      Abdirahman Dejesus at [facility] back #[phone]

## 2023-03-20 DIAGNOSIS — M79.2 NEUROPATHIC PAIN: ICD-10-CM

## 2023-03-20 DIAGNOSIS — M19.012 ARTHRITIS OF LEFT ACROMIOCLAVICULAR JOINT: ICD-10-CM

## 2023-03-20 RX ORDER — GABAPENTIN 300 MG/1
300 CAPSULE ORAL 3 TIMES DAILY
Qty: 90 CAPSULE | Refills: 2 | Status: SHIPPED | OUTPATIENT
Start: 2023-03-20

## 2023-03-20 RX ORDER — GABAPENTIN 600 MG/1
600 TABLET ORAL 3 TIMES DAILY
Qty: 90 TABLET | Refills: 2 | Status: SHIPPED | OUTPATIENT
Start: 2023-03-20

## 2023-03-20 NOTE — TELEPHONE ENCOUNTER
Patient is stating he takes the gabapentin 300 mg 3 times a day  He states he is taking 900 mg of gabapentin 3 times a day   He did  the gabapentin 300 mg      Abdirahman Dejesus at [facility] back #[phone]

## 2023-03-20 NOTE — TELEPHONE ENCOUNTER
"[facility]    Discharge Summary  Hospitalist    Date of Admission:  5/24/2020  Date of Discharge:  5/28/2020  Discharging Provider: Oliver Vasquez  Date of Service (when I saw the patient): 05/28/20    Discharge Diagnoses   Active Problems:   Hyponatremia (4/3/2020)   Osteoarthritis of spine with radiculopathy, lumbar region (2/3/2016)   urinary tract infection without hematuria, not present on admission     Anemia (4/18/2016)    Essential hypertension (1/17/2018)    Chronic lymphocytic leukemia (H) (4/14/2011)    Hip pain, right (5/24/2020)    Scoliosis of thoracolumbar spine (5/24/2020)    Lumbar radiculopathy (5/24/2020)      History of Present Illness   Tenisha Cagle is an 84 year old female who presented with severe leg pain and confusion.  Per the H&P by Dr. Álvarez:  \"Tenisha Cagle is a 84 year old female who presents with right hip and leg pain.  Patient initially presented as a possible STEMI, because of severe pain and an episode of vomiting and clutching her chest.  This is detailed in Dr. Saenz's ED note.  Subsequently it was found that her symptoms were caused by severe pain when she attempted to move at home.  Her daughter states that she started having severe pain about 6 days ago but has a long history of chronic back pain with radiculopathy.  He was seen several days ago and underwent x-rays which did not demonstrate any hip fracture.  Her daughter states that she has been unable to bear weight and unable to move on her own.  The pain is been excruciating on the right side.  She was seen in the clinic recently and received an injection in the right greater trochanter.  This is been of no benefit.  The pain is been excruciating and she cries out in pain.     She has had previous lumbar spine x-rays which show significant degenerative disease, and thoracolumbar scoliosis.  She had been scheduled for a low back MRI and possibly a steroid injection.     She denies chest " Is the patient supposed to be on 2700MG daily? "pain, shortness of breath, but her answers are quite unreliable due to dementia.  Her daughter states that she is not had any of this previously and no GI symptoms.  She has not had a recent fall.     She was evaluated in the ED for a possible STEMI which was ruled out.  Subsequently work-up was done and she was found to have significant hip pain.  She had previous negative x-rays and so a CT scan was done which was negative for fracture.  He does demonstrate a disc protrusion and possible herniation at L5-S1 on the right.\"    Hospital Course   Tenisha Cagle was admitted on 5/24/2020.  The following problems were addressed during her hospitalization:    Hyponatremia  Acute on chronic.  This corrected with normal saline.    Lumbar radiculopathy  CT imaging showed an L5-S1 herniation.  A CT of the back shows multiple levels of degenerative disease and nerve root compression.  She is scheduled for a injection with Dr. Meza on June 1.  Her pain was controlled with a combination of oxycodone, acetaminophen, and Lidoderm patch.  She was prescribed 100 oxycodone tablets on discharge.  She will participate with physical and occupational therapy home care.    Dementia  Patient had significant sundowning behavior.  This improved with Seroquel.    Urinary tract infection  She complained of urinary frequency on day 3.  Urinalysis shows evidence of UTI.  She has had enterococcus in the past.  She was discharged on amoxicillin for 5 days.    Oliver Vasquez MD    Pending Results   These results will be followed up by Dr. Ori Kapoor   Unresulted Labs Ordered in the Past 30 Days of this Admission     Date and Time Order Name Status Description    5/28/2020 0718 Urine Culture In process           Code Status   DNR / DNI       Primary Care Physician   Godwin Kapoor    Physical Exam   Temp: 96.8  F (36  C) Temp src: Tympanic BP: (!) 178/80 Pulse: 71 Heart Rate: 85 Resp: 18 SpO2: 98 % O2 Device: None (Room air)    Vitals:    05/24/20 " 1111 05/24/20 1851 05/28/20 0231   Weight: 43.1 kg (95 lb) 45.8 kg (101 lb) 47 kg (103 lb 9.6 oz)     Vital Signs with Ranges  Temp:  [96  F (35.6  C)-97  F (36.1  C)] 96.8  F (36  C)  Pulse:  [71-72] 71  Heart Rate:  [77-90] 85  Resp:  [16-20] 18  BP: (168-190)/(65-80) 178/80  SpO2:  [96 %-99 %] 98 %  I/O last 3 completed shifts:  In: 100 [P.O.:100]  Out: 1200 [Urine:1200]    GENERAL: Comfortable, no apparent distress.  CARDIOVASCULAR: regular rate and rhythm, no murmur. No lower extremity edema   RESPIRATORY: Clear to auscultation bilaterally, no wheezes or crackles.  GI: non-tender, non-distended, normal bowel sounds.   SKIN: warm periphery, no rashes    Discharge Disposition   Admited to home care:   Agency: unknown  Discharged to home  Condition at discharge: Stable    Consultations This Hospital Stay   SOCIAL WORK IP CONSULT  PHYSICAL THERAPY ADULT IP CONSULT  OCCUPATIONAL THERAPY ADULT IP CONSULT  SPEECH LANGUAGE PATH ADULT IP CONSULT    Time Spent on this Encounter   I, Oliver Vasquez MD, personally saw the patient today and spent greater than 30 minutes discharging this patient.    Discharge Orders      CT Lumbar Spine w/o Contrast     Home care nursing referral      Home Care PT Referral for Hospital Discharge      Home Care OT Referral for Hospital Discharge      Reason for your hospital stay    Hyponatremia and back pain     Follow-up and recommended labs and tests    Follow up on June 1 for back injection at 1015  Follow up with Dr. Ori Kapoor on June 2 at 940     Activity    Your activity upon discharge: activity as tolerated     MD face to face encounter    Documentation of Face to Face and Certification for Home Health Services    I certify that patient: Tenisha Cagle is under my care and that I, or a nurse practitioner or physician's assistant working with me, had a face-to-face encounter that meets the physician face-to-face encounter requirements with this patient on: 5/28/2020.    This encounter  with the patient was in whole, or in part, for the following medical condition, which is the primary reason for home health care: dementia and back pain.    I certify that, based on my findings, the following services are medically necessary home health services: Nursing, Occupational Therapy and Physical Therapy.    My clinical findings support the need for the above services because: Nurse is needed: To provide assessment and oversight required in the home to assure adherence to the medical plan due to: opiates in home..    Further, I certify that my clinical findings support that this patient is homebound (i.e. absences from home require considerable and taxing effort and are for medical reasons or [employer] services or infrequently or of short duration when for other reasons) because: Requires assistance of another person or specialized equipment to access medical services because patient: Has prohibitive pain during ambulation...    Based on the above findings. I certify that this patient is confined to the home and needs intermittent skilled nursing care, physical therapy and/or speech therapy.  The patient is under my care, and I have initiated the establishment of the plan of care.  This patient will be followed by a physician who will periodically review the plan of care.  Physician/Provider to provide follow up care: Godwin Kapoor    Attending hospital physician (the Medicare certified [place] provider): Oliver Vasquez MD  Physician Signature: See electronic signature associated with these discharge orders.  Date: 5/28/2020     DNR/DNI     Diet    Follow this diet upon discharge: Orders Placed This Encounter      Regular Diet Adult       Discharge Medications   Current Discharge Medication List      START taking these medications    Details   amoxicillin (AMOXIL) 500 MG capsule Take 1 capsule (500 mg) by mouth 2 times daily for 5 days  Qty: 10 capsule, Refills: 0    Associated Diagnoses: Acute cystitis  without hematuria      lidocaine (LIDODERM) 5 % patch Place 1 patch onto the skin every 24 hours To prevent lidocaine toxicity, patient should be patch free for 12 hrs daily.  Qty: 30 patch, Refills: 3    Associated Diagnoses: Osteoarthritis of spine with radiculopathy, lumbar region      methylPREDNISolone (MEDROL) 2 MG tablet Take 1 tablet (2 mg) by mouth 3 times daily for 5 days  Qty: 15 tablet, Refills: 0    Associated Diagnoses: Osteoarthritis of spine with radiculopathy, lumbar region      QUEtiapine (SEROQUEL) 50 MG tablet Take 1 tablet (50 mg) by mouth daily At suppertime  Qty: 30 tablet, Refills: 3    Associated Diagnoses: Osteoarthritis of spine with radiculopathy, lumbar region         CONTINUE these medications which have CHANGED    Details   acetaminophen (TYLENOL) 500 MG tablet Take 2 tablets (1,000 mg) by mouth 3 times daily    Associated Diagnoses: Osteoarthritis of spine with radiculopathy, lumbar region      oxyCODONE (ROXICODONE) 5 MG tablet Take 1-2 tablets (5-10 mg) by mouth every 6 hours as needed for moderate to severe pain  Qty: 100 tablet, Refills: 0    Associated Diagnoses: Osteoarthritis of spine with radiculopathy, lumbar region      predniSONE (DELTASONE) 10 MG tablet Take 1 tablet (10 mg) by mouth every other day Resume when done with the 5 days of 2 mg medrol  Qty:           CONTINUE these medications which have NOT CHANGED    Details   docusate sodium (COLACE) 100 MG capsule Take 100 mg by mouth every morning      folic acid (FOLVITE) 400 MCG tablet Take 400 mcg by mouth daily      lactulose 20 GM/30ML SOLN Take 30 mLs (20 g) by mouth 2 times daily as needed    Associated Diagnoses: Chronic pain syndrome      Probiotic Product (DIGESTIVE ADVANTAGE PO) Take 1 tablet by mouth every morning      Calcium Carbonate-Vitamin D (CALCIUM 500 + D) 500-125 MG-UNIT TABS Take 1 tablet by mouth daily      magnesium hydroxide (MILK OF MAGNESIA) 400 MG/5ML suspension Take 15 mLs by mouth nightly as  needed for constipation or heartburn      metoprolol tartrate (LOPRESSOR) 25 MG tablet Take 0.5 tablets (12.5 mg) by mouth 2 times daily  Qty: 180 tablet, Refills: 3    Associated Diagnoses: Paroxysmal atrial fibrillation (H); Essential (primary) hypertension      Multiple Vitamins-Minerals (WOMENS MULTIVITAMIN  PLUS) TABS Take 1 tablet by mouth every morning      pantoprazole (PROTONIX) 40 MG EC tablet Take 1 tablet (40 mg) by mouth every morning (before breakfast)  Qty: 30 tablet, Refills: 3    Associated Diagnoses: Iron deficiency anemia due to chronic blood loss      sucralfate (CARAFATE) 1 GM tablet Take 1 tablet (1 g) by mouth 4 times daily (before meals and nightly)  Qty: 120 tablet, Refills: 3    Associated Diagnoses: Iron deficiency anemia due to chronic blood loss         STOP taking these medications       furosemide (LASIX) 20 MG tablet Comments:   Reason for Stopping:         trolamine salicylate (ASPERCREME) 10 % external cream Comments:   Reason for Stopping:             Allergies   Allergies   Allergen Reactions     Lactose      Lansoprazole Other (See Comments) and Unknown     Patient states that medication didn't work for her.  Daughter states she got delusional, water did not taste right     Lisinopril Cough     Data   Most Recent 3 CBC's:  Recent Labs   Lab Test 05/25/20  0404 05/24/20  1551 04/07/20  0424   WBC 14.4* 12.9* 11.8*   HGB 8.0* 8.8* 8.3*   MCV 82 84 86    374 446      Most Recent 3 BMP's:  Recent Labs   Lab Test 05/25/20  0404 05/24/20  1125 04/07/20  0424   * 126* 131*   POTASSIUM 4.4 4.2 3.7   CHLORIDE 97* 92* 97*   CO2 23 26 28   BUN 32* 32* 22   CR 0.95 1.15 1.04   ANIONGAP 10 8 6   MATI 8.9 9.2 7.8*   * 108* 94     Most Recent 2 LFT's:  Recent Labs   Lab Test 04/03/20 2015 02/06/20  0142   AST 11* 16   ALT 8 11   ALKPHOS 45 35   BILITOTAL 0.1* 0.4     Most Recent INR's and Anticoagulation Dosing History:  Anticoagulation Dose History     Recent Dosing and  Labs Latest Ref Rng & Units 1/16/2019 1/31/2020 2/6/2020 5/24/2020    INR 0 - 1.3 0.82 0.91 0.85 0.83        Most Recent 3 Troponin's:  Recent Labs   Lab Test 06/13/19  1131   TROPI <0.030     Most Recent Cholesterol Panel:  Recent Labs   Lab Test 01/04/18  1445   *   HDL 58   TRIG 112     Most Recent 6 Bacteria Isolates From Any Culture (See EPIC Reports for Culture Details):  Recent Labs   Lab Test 04/03/20  2110 04/03/20 2015 02/20/20  1313 01/31/20  1115   CULT >100,000 colonies/mL  Streptococcus mitis  No further identification or sensitivity done  *  >100,000 colonies/mL  Enterococcus faecalis  * No growth after 6 days  No growth after 6 days >100,000 colonies/mL  Escherichia coli  * No growth after 6 days  No growth after 6 days     Most Recent TSH, T4 and A1c Labs:No lab results found.  Results for orders placed or performed during the hospital encounter of 05/24/20   XR Chest Port 1 View    Narrative    XR CHEST PORT 1 VW    HISTORY: 84 yearsFemale CP    TECHNIQUE: A single view of the chest was performed    COMPARISON: 4/3/2020    FINDINGS: Heart size and pulmonary vascularity are within normal  limits. Lungs are clear. No consolidating airspace opacities are  present.        Impression    IMPRESSION: Clear chest    WILL LAW MD   CT Hip Right w/o Contrast    Narrative    CT HIP RIGHT W/O CONTRAST    HISTORY: 84 years Female persistent pain right hip depsite negative  xrays    COMPARISON: Plain films dated 5/19/2020    TECHNIQUE: Axial CT imaging of the pelvis and right hip was performed.  Coronal sagittal reconstructions were obtained.    FINDINGS: There is osteopenia. There is mild-to-moderate joint space  narrowing the right hip.    The sacroiliac joints and pubic symphysis are congruent. There is no  evidence of pelvic or right hip fracture.    There is degenerative change of the lower lumbar spine. There is a  disc protrusion versus herniation on the right at L5-S1 protruding  into  the right neuroforamen and causing moderate to severe right  neuroforaminal narrowing. See series 6 image 86. It is uncertain if  this has relevance to the patient's symptoms.      Impression    IMPRESSION: No evidence of right hip fracture. There is no evidence of  pelvic or sacral fracture.    Degenerative disc disease with moderate to severe right neuroforaminal  narrowing at L5-S1    WILL LAW MD

## 2023-03-24 ENCOUNTER — TELEPHONE (OUTPATIENT)
Dept: NEPHROLOGY | Facility: CLINIC | Age: 53
End: 2023-03-24

## 2023-03-24 NOTE — TELEPHONE ENCOUNTER
I called and left message on patients answering machine regarding rescheduling the 08/22/2023 appointment  Patient was rescheduled for 09/26/2023 @ 10:30 with Dr Dariel English  Asked patient to call back the office to confirm appointment   Letter sent to patient

## 2023-03-27 ENCOUNTER — HOSPITAL ENCOUNTER (EMERGENCY)
Facility: HOSPITAL | Age: 53
Discharge: HOME | End: 2023-03-27
Attending: EMERGENCY MEDICINE

## 2023-03-27 ENCOUNTER — APPOINTMENT (EMERGENCY)
Dept: CT IMAGING | Facility: HOSPITAL | Age: 53
End: 2023-03-27

## 2023-03-27 VITALS
OXYGEN SATURATION: 99 % | RESPIRATION RATE: 22 BRPM | DIASTOLIC BLOOD PRESSURE: 93 MMHG | HEART RATE: 86 BPM | TEMPERATURE: 98.7 F | SYSTOLIC BLOOD PRESSURE: 154 MMHG

## 2023-03-27 DIAGNOSIS — I10 ASYMPTOMATIC HYPERTENSION: Primary | ICD-10-CM

## 2023-03-27 LAB
ALBUMIN SERPL BCP-MCNC: 4.2 G/DL (ref 3.5–5)
ALP SERPL-CCNC: 102 U/L (ref 34–104)
ALT SERPL W P-5'-P-CCNC: 10 U/L (ref 7–52)
ANION GAP SERPL CALCULATED.3IONS-SCNC: 8 MMOL/L (ref 4–13)
AST SERPL W P-5'-P-CCNC: 17 U/L (ref 13–39)
ATRIAL RATE: 82 BPM
BASOPHILS # BLD AUTO: 0.03 THOUSANDS/ÂΜL (ref 0–0.1)
BASOPHILS NFR BLD AUTO: 1 % (ref 0–1)
BILIRUB SERPL-MCNC: 0.39 MG/DL (ref 0.2–1)
BUN SERPL-MCNC: 26 MG/DL (ref 5–25)
CALCIUM SERPL-MCNC: 9 MG/DL (ref 8.4–10.2)
CARDIAC TROPONIN I PNL SERPL HS: 5 NG/L
CHLORIDE SERPL-SCNC: 95 MMOL/L (ref 96–108)
CO2 SERPL-SCNC: 28 MMOL/L (ref 21–32)
CREAT SERPL-MCNC: 1.38 MG/DL (ref 0.6–1.3)
EOSINOPHIL # BLD AUTO: 0.05 THOUSAND/ÂΜL (ref 0–0.61)
EOSINOPHIL NFR BLD AUTO: 1 % (ref 0–6)
ERYTHROCYTE [DISTWIDTH] IN BLOOD BY AUTOMATED COUNT: 15.9 % (ref 11.6–15.1)
GFR SERPL CREATININE-BSD FRML MDRD: 57 ML/MIN/1.73SQ M
GLUCOSE SERPL-MCNC: 100 MG/DL (ref 65–140)
HCT VFR BLD AUTO: 36.6 % (ref 36.5–49.3)
HGB BLD-MCNC: 12.3 G/DL (ref 12–17)
IMM GRANULOCYTES # BLD AUTO: 0.03 THOUSAND/UL (ref 0–0.2)
IMM GRANULOCYTES NFR BLD AUTO: 1 % (ref 0–2)
INR PPP: 1.17 (ref 0.84–1.19)
LYMPHOCYTES # BLD AUTO: 0.76 THOUSANDS/ÂΜL (ref 0.6–4.47)
LYMPHOCYTES NFR BLD AUTO: 16 % (ref 14–44)
MCH RBC QN AUTO: 29.2 PG (ref 26.8–34.3)
MCHC RBC AUTO-ENTMCNC: 33.6 G/DL (ref 31.4–37.4)
MCV RBC AUTO: 87 FL (ref 82–98)
MONOCYTES # BLD AUTO: 0.68 THOUSAND/ÂΜL (ref 0.17–1.22)
MONOCYTES NFR BLD AUTO: 14 % (ref 4–12)
NEUTROPHILS # BLD AUTO: 3.31 THOUSANDS/ÂΜL (ref 1.85–7.62)
NEUTS SEG NFR BLD AUTO: 67 % (ref 43–75)
NRBC BLD AUTO-RTO: 0 /100 WBCS
P AXIS: 59 DEGREES
PLATELET # BLD AUTO: 217 THOUSANDS/UL (ref 149–390)
PMV BLD AUTO: 9.4 FL (ref 8.9–12.7)
POTASSIUM SERPL-SCNC: 3.7 MMOL/L (ref 3.5–5.3)
PR INTERVAL: 178 MS
PROT SERPL-MCNC: 8.1 G/DL (ref 6.4–8.4)
PROTHROMBIN TIME: 14.7 SECONDS (ref 11.6–14.5)
QRS AXIS: 1 DEGREES
QRSD INTERVAL: 96 MS
QT INTERVAL: 364 MS
QTC INTERVAL: 425 MS
RBC # BLD AUTO: 4.21 MILLION/UL (ref 3.88–5.62)
SODIUM SERPL-SCNC: 131 MMOL/L (ref 135–147)
T WAVE AXIS: 50 DEGREES
VENTRICULAR RATE: 82 BPM
WBC # BLD AUTO: 4.86 THOUSAND/UL (ref 4.31–10.16)

## 2023-03-27 NOTE — ED PROVIDER NOTES
History  Chief Complaint   Patient presents with   • Hypertension     Pt came in by EMS with c/o having a high BP at home     15-year-old male patient presents to the emergency department for evaluation of transient dizziness  The patient states that he was walking with his walker, dropped cell phone, bent over to  his walker, became dizzy momentarily, upon standing straight up again, the dizziness however did fully resolve  Throughout the rest of the day today however the patient is been checking his blood pressure and noted it to be slightly higher than his normal he came in for evaluation  Currently the patient is asymptomatic with slight hypertension from his baseline however not meeting criteria for hypertensive emergency or urgency  Some basic labs will be done to assess for any endorgan damage and if none is present the patient be discharged home with follow-up to his primary care physician for evaluation of the patient's antihypertensives      History provided by:  Patient   used: No    Medical Problem  Severity:  Mild  Onset quality:  Gradual  Timing:  Constant  Progression:  Worsening  Chronicity:  New  Associated symptoms: no abdominal pain, no congestion, no headaches, no rhinorrhea and no sore throat        Prior to Admission Medications   Prescriptions Last Dose Informant Patient Reported? Taking?    Diclofenac Sodium (VOLTAREN) 1 %   No No   Sig: Apply 2 g topically 4 (four) times a day   apixaban (ELIQUIS) 5 mg   Yes No   Sig: Take 5 mg by mouth 2 (two) times a day   azelastine (ASTELIN) 0 1 % nasal spray   Yes No   Sig: ADMINISTER 1 SPRAY INTO NOSTRIL TWICE DAILY   bacitracin topical ointment 500 units/g topical ointment   Yes No   Sig: APPLY TOPICALLY TO AFFECTED AREA THREE TIMES A DAY AS DIRECTED   baclofen 20 mg tablet   Yes No   Sig: Take 20 mg by mouth 3 (three) times a day    betamethasone dipropionate (DIPROSONE) 0 05 % cream   Yes No   Sig: Apply 1 application topically 2 (two) times a day   betamethasone, augmented, (DIPROLENE) 0 05 % ointment   Yes No   Sig: Apply 1 application topically 2 (two) times a day   carvedilol (COREG) 12 5 mg tablet   Yes No   Sig: Take 12 5 mg by mouth 2 (two) times a day with meals   escitalopram (LEXAPRO) 10 mg tablet   Yes No   Sig: Take 10 mg by mouth daily   furosemide (LASIX) 40 mg tablet   No No   Sig: Take 1 tablet (40 mg total) by mouth 2 (two) times a day   gabapentin (NEURONTIN) 300 mg capsule   No No   Sig: Take 1 capsule (300 mg total) by mouth 3 (three) times a day   gabapentin (NEURONTIN) 600 MG tablet   No No   Sig: Take 1 tablet (600 mg total) by mouth 3 (three) times a day   hydrOXYzine HCL (ATARAX) 10 mg tablet   Yes No   Sig: Take 10 mg by mouth every 6 (six) hours as needed for itching   hydroxychloroquine (PLAQUENIL) 200 mg tablet   Yes No   Sig: Take 400 mg by mouth daily at bedtime   lidocaine-prilocaine (EMLA) cream   No No   Sig: Apply topically as needed for mild pain   loratadine (CLARITIN) 10 mg tablet   No No   Sig: Take 1 tablet (10 mg total) by mouth daily   metolazone (ZAROXOLYN) 5 mg tablet   No No   Sig: Take 0 5 tablets (2 5 mg total) by mouth 3 (three) times a week   mycophenolate (CELLCEPT) 500 mg tablet   Yes No   Sig: Take 500 mg by mouth every 12 (twelve) hours    potassium chloride (K-DUR,KLOR-CON) 20 mEq tablet   Yes No   Sig: Take 20 mEq by mouth daily   tamsulosin (FLOMAX) 0 4 mg   Yes No      Facility-Administered Medications: None       Past Medical History:   Diagnosis Date   • Anemia    • Arthritis    • Cervical mass    • Chronic kidney disease    • Coronary artery disease    • Depression    • DVT (deep venous thrombosis) (HCC)    • Hypertension    • Lupus ([address] )    • Renal disorder        Past Surgical History:   Procedure Laterality Date   • APPENDECTOMY     • CERVICAL SPINE SURGERY     • CHOLECYSTECTOMY     • COLONOSCOPY N/A 8/23/2018    Procedure: COLONOSCOPY;  Surgeon: Dino Jordan MD;  Location: [facility]; Service: Gastroenterology   • ESOPHAGOGASTRODUODENOSCOPY N/A 8/22/2018    Procedure: ESOPHAGOGASTRODUODENOSCOPY (EGD); Surgeon: Yinka Haji MD;  Location: [facility]; Service: Gastroenterology   • NECK SURGERY     • VASCULAR SURGERY         Family History   Problem Relation Age of Onset   • Heart disease Mother    • No Known Problems Father      I have reviewed and agree with the history as documented  E-Cigarette/Vaping   • E-Cigarette Use Never User      E-Cigarette/Vaping Substances   • Nicotine No    • THC No    • CBD No    • Flavoring No    • Other No    • Unknown No      Social History     Tobacco Use   • Smoking status: Never   • Smokeless tobacco: Never   Vaping Use   • Vaping Use: Never used   Substance Use Topics   • Alcohol use: Never   • Drug use: Never       Review of Systems   HENT: Negative for congestion, rhinorrhea and sore throat  Gastrointestinal: Negative for abdominal pain  Neurological: Negative for headaches  Physical Exam  Physical Exam  Vitals and nursing note reviewed  Constitutional:       General: He is not in acute distress  Appearance: He is well-developed  He is not diaphoretic  HENT:      Head: Normocephalic and atraumatic  Right Ear: External ear normal       Left Ear: External ear normal    Eyes:      General: No scleral icterus  Right eye: No discharge  Left eye: No discharge  Conjunctiva/sclera: Conjunctivae normal    Neck:      Thyroid: No thyromegaly  Vascular: No JVD  Trachea: No tracheal deviation  Cardiovascular:      Rate and Rhythm: Normal rate and regular rhythm  Pulmonary:      Effort: Pulmonary effort is normal  No respiratory distress  Breath sounds: Normal breath sounds  No stridor  No wheezing or rales  Abdominal:      General: Bowel sounds are normal  There is no distension  Palpations: Abdomen is soft  Tenderness: There is no abdominal tenderness  Musculoskeletal:         General: No tenderness or deformity  Normal range of motion  Cervical back: Normal range of motion and neck supple  Skin:     General: Skin is warm and dry  Neurological:      Mental Status: He is alert and oriented to person, place, and time  Cranial Nerves: No cranial nerve deficit  Motor: Weakness present  Coordination: Coordination normal       Gait: Gait abnormal       Comments:  The patient states his weakness is his baseline weakness     Psychiatric:         Behavior: Behavior normal          Vital Signs  ED Triage Vitals   Temperature Pulse Respirations Blood Pressure SpO2   03/27/23 1509 03/27/23 1500 03/27/23 1500 03/27/23 1500 03/27/23 1500   98 7 °F (37 1 °C) 86 19 156/78 100 %      Temp Source Heart Rate Source Patient Position - Orthostatic VS BP Location FiO2 (%)   03/27/23 1509 -- 03/27/23 1500 03/27/23 1500 --   Oral  Sitting Right arm       Pain Score       --                  Vitals:    03/27/23 1500 03/27/23 1600   BP: 156/78 154/93   Pulse: 86 86   Patient Position - Orthostatic VS: Sitting          Visual Acuity      ED Medications  Medications - No data to display    Diagnostic Studies  Results Reviewed     Procedure Component Value Units Date/Time    HS Troponin 0hr (reflex protocol) [[id number]]  (Normal) Collected: 03/27/23 1454    Lab Status: Final result Specimen: Blood from Arm, Right Updated: 03/27/23 1525     hs TnI 0hr 5 ng/L     Comprehensive metabolic panel [[id number]]  (Abnormal) Collected: 03/27/23 1454    Lab Status: Final result Specimen: Blood from Arm, Right Updated: 03/27/23 1516     Sodium 131 mmol/L      Potassium 3 7 mmol/L      Chloride 95 mmol/L      CO2 28 mmol/L      ANION GAP 8 mmol/L      BUN 26 mg/dL      Creatinine 1 38 mg/dL      Glucose 100 mg/dL      Calcium 9 0 mg/dL      AST 17 U/L      ALT 10 U/L      Alkaline Phosphatase 102 U/L      Total Protein 8 1 g/dL      Albumin 4 2 g/dL      Total Bilirubin 0 39 mg/dL eGFR 57 ml/min/1 73sq m     Narrative:      [place] guidelines for Chronic Kidney Disease (CKD):   •  Stage 1 with normal or high GFR (GFR > 90 mL/min/1 73 square meters)  •  Stage 2 Mild CKD (GFR = 60-89 mL/min/1 73 square meters)  •  Stage 3A Moderate CKD (GFR = 45-59 mL/min/1 73 square meters)  •  Stage 3B Moderate CKD (GFR = 30-44 mL/min/1 73 square meters)  •  Stage 4 Severe CKD (GFR = 15-29 mL/min/1 73 square meters)  •  Stage 5 End Stage CKD (GFR <15 mL/min/1 73 square meters)  Note: GFR calculation is accurate only with a steady state creatinine    Protime-INR [[id number]]  (Abnormal) Collected: 03/27/23 1454    Lab Status: Final result Specimen: Blood from Arm, Right Updated: 03/27/23 1511     Protime 14 7 seconds      INR 1 17    CBC and differential [[id number]]  (Abnormal) Collected: 03/27/23 1454    Lab Status: Final result Specimen: Blood from Arm, Right Updated: 03/27/23 1459     WBC 4 86 Thousand/uL      RBC 4 21 Million/uL      Hemoglobin 12 3 g/dL      Hematocrit 36 6 %      MCV 87 fL      MCH 29 2 pg      MCHC 33 6 g/dL      RDW 15 9 %      MPV 9 4 fL      Platelets 491 Thousands/uL      nRBC 0 /100 WBCs      Neutrophils Relative 67 %      Immat GRANS % 1 %      Lymphocytes Relative 16 %      Monocytes Relative 14 %      Eosinophils Relative 1 %      Basophils Relative 1 %      Neutrophils Absolute 3 31 Thousands/µL      Immature Grans Absolute 0 03 Thousand/uL      Lymphocytes Absolute 0 76 Thousands/µL      Monocytes Absolute 0 68 Thousand/µL      Eosinophils Absolute 0 05 Thousand/µL      Basophils Absolute 0 03 Thousands/µL                  CT head without contrast   Final Result by Zachary Huston DO (03/27 1548)      No acute intracranial abnormality  Stable chronic microangiopathic changes within the brain                    Workstation performed: [id number]                    Procedures  Procedures         ED Course Medical Decision Making  Amount and/or Complexity of Data Reviewed  Labs: ordered  Radiology: ordered  Disposition  Final diagnoses:   Asymptomatic hypertension     Time reflects when diagnosis was documented in both MDM as applicable and the Disposition within this note     Time User Action Codes Description Comment    3/27/2023  4:14 PM RoopaSarah guan Asymptomatic hypertension       ED Disposition     ED Disposition   Discharge    Condition   Stable    Date/Time   Mon Mar 27, 2023  4:14 PM    [facility] 3073 discharge to home/self care                 Follow-up Information     Follow up With Specialties Details Why [address] Renee Torres MD Internal Medicine   [address]  [address] [address]  [phone]            Discharge Medication List as of 3/27/2023  4:15 PM      CONTINUE these medications which have NOT CHANGED    Details   apixaban (ELIQUIS) 5 mg Take 5 mg by mouth 2 (two) times a day, Starting Fri 4/8/2022, Historical Med      azelastine (ASTELIN) 0 1 % nasal spray ADMINISTER 1 SPRAY INTO NOSTRIL TWICE DAILY, Historical Med      bacitracin topical ointment 500 units/g topical ointment APPLY TOPICALLY TO AFFECTED AREA THREE TIMES A DAY AS DIRECTED, Historical Med      baclofen 20 mg tablet Take 20 mg by mouth 3 (three) times a day , Historical Med      betamethasone dipropionate (DIPROSONE) 0 05 % cream Apply 1 application topically 2 (two) times a day, Historical Med      betamethasone, augmented, (DIPROLENE) 0 05 % ointment Apply 1 application topically 2 (two) times a day, Historical Med      carvedilol (COREG) 12 5 mg tablet Take 12 5 mg by mouth 2 (two) times a day with meals, Historical Med      Diclofenac Sodium (VOLTAREN) 1 % Apply 2 g topically 4 (four) times a day, Starting Mon 2/27/2023, Normal      escitalopram (LEXAPRO) 10 mg tablet Take 10 mg by mouth daily, Historical Med      furosemide (LASIX) 40 mg tablet Take 1 tablet (40 mg total) by mouth 2 (two) times a day, Starting Tue 2/14/2023, No Print      gabapentin (NEURONTIN) 300 mg capsule Take 1 capsule (300 mg total) by mouth 3 (three) times a day, Starting Mon 3/20/2023, Normal      gabapentin (NEURONTIN) 600 MG tablet Take 1 tablet (600 mg total) by mouth 3 (three) times a day, Starting Mon 3/20/2023, Normal      hydroxychloroquine (PLAQUENIL) 200 mg tablet Take 400 mg by mouth daily at bedtime, Historical Med      hydrOXYzine HCL (ATARAX) 10 mg tablet Take 10 mg by mouth every 6 (six) hours as needed for itching, Historical Med      lidocaine-prilocaine (EMLA) cream Apply topically as needed for mild pain, Starting Mon 2/27/2023, Normal      loratadine (CLARITIN) 10 mg tablet Take 1 tablet (10 mg total) by mouth daily, Starting Mon 10/31/2022, Until Mon 2/27/2023, Normal      metolazone (ZAROXOLYN) 5 mg tablet Take 0 5 tablets (2 5 mg total) by mouth 3 (three) times a week, Starting Tue 2/14/2023, No Print      mycophenolate (CELLCEPT) 500 mg tablet Take 500 mg by mouth every 12 (twelve) hours , Historical Med      potassium chloride (K-DUR,KLOR-CON) 20 mEq tablet Take 20 mEq by mouth daily, Historical Med      tamsulosin (FLOMAX) 0 4 mg Starting Thu 10/13/2022, Historical Med             No discharge procedures on file      PDMP Review       Value Time User    PDMP Reviewed  Yes 12/29/2022 12:10 PM Monique Al PA-C          ED Provider  Electronically Signed by           [place] DO  03/28/23 [id number]

## 2023-03-29 NOTE — TELEPHONE ENCOUNTER
Pt calling in stating she is confused about lab results.    Patient requesting to speak to RN or MD about results.    Please call patient at pt phone #[phone]   Updated referral placed

## 2023-03-30 ENCOUNTER — APPOINTMENT (OUTPATIENT)
Dept: LAB | Facility: CLINIC | Age: 53
End: 2023-03-30

## 2023-03-30 DIAGNOSIS — N40.0 BENIGN PROSTATIC HYPERPLASIA WITHOUT LOWER URINARY TRACT SYMPTOMS: ICD-10-CM

## 2023-03-30 DIAGNOSIS — M79.671 RIGHT FOOT PAIN: ICD-10-CM

## 2023-03-30 DIAGNOSIS — I10 PRIMARY HYPERTENSION: ICD-10-CM

## 2023-03-30 LAB
ALBUMIN SERPL BCP-MCNC: 4 G/DL (ref 3.5–5)
ALP SERPL-CCNC: 108 U/L (ref 46–116)
ALT SERPL W P-5'-P-CCNC: 19 U/L (ref 12–78)
ANION GAP SERPL CALCULATED.3IONS-SCNC: 6 MMOL/L (ref 4–13)
AST SERPL W P-5'-P-CCNC: 22 U/L (ref 5–45)
BASOPHILS # BLD AUTO: 0.03 THOUSANDS/ÂΜL (ref 0–0.1)
BASOPHILS NFR BLD AUTO: 1 % (ref 0–1)
BILIRUB SERPL-MCNC: 0.32 MG/DL (ref 0.2–1)
BUN SERPL-MCNC: 25 MG/DL (ref 5–25)
CALCIUM SERPL-MCNC: 9 MG/DL (ref 8.3–10.1)
CHLORIDE SERPL-SCNC: 105 MMOL/L (ref 96–108)
CHOLEST SERPL-MCNC: 128 MG/DL
CO2 SERPL-SCNC: 23 MMOL/L (ref 21–32)
CREAT SERPL-MCNC: 1.46 MG/DL (ref 0.6–1.3)
CREAT UR-MCNC: 152 MG/DL
EOSINOPHIL # BLD AUTO: 0.03 THOUSAND/ÂΜL (ref 0–0.61)
EOSINOPHIL NFR BLD AUTO: 1 % (ref 0–6)
ERYTHROCYTE [DISTWIDTH] IN BLOOD BY AUTOMATED COUNT: 15.7 % (ref 11.6–15.1)
GFR SERPL CREATININE-BSD FRML MDRD: 53 ML/MIN/1.73SQ M
GLUCOSE P FAST SERPL-MCNC: 107 MG/DL (ref 65–99)
HCT VFR BLD AUTO: 39.2 % (ref 36.5–49.3)
HDLC SERPL-MCNC: 44 MG/DL
HGB BLD-MCNC: 13.1 G/DL (ref 12–17)
IMM GRANULOCYTES # BLD AUTO: 0.02 THOUSAND/UL (ref 0–0.2)
IMM GRANULOCYTES NFR BLD AUTO: 0 % (ref 0–2)
LDLC SERPL CALC-MCNC: 59 MG/DL (ref 0–100)
LYMPHOCYTES # BLD AUTO: 1.07 THOUSANDS/ÂΜL (ref 0.6–4.47)
LYMPHOCYTES NFR BLD AUTO: 24 % (ref 14–44)
MCH RBC QN AUTO: 29.1 PG (ref 26.8–34.3)
MCHC RBC AUTO-ENTMCNC: 33.4 G/DL (ref 31.4–37.4)
MCV RBC AUTO: 87 FL (ref 82–98)
MICROALBUMIN UR-MCNC: 10.1 MG/L (ref 0–20)
MICROALBUMIN/CREAT 24H UR: 7 MG/G CREATININE (ref 0–30)
MONOCYTES # BLD AUTO: 0.71 THOUSAND/ÂΜL (ref 0.17–1.22)
MONOCYTES NFR BLD AUTO: 16 % (ref 4–12)
NEUTROPHILS # BLD AUTO: 2.64 THOUSANDS/ÂΜL (ref 1.85–7.62)
NEUTS SEG NFR BLD AUTO: 58 % (ref 43–75)
NRBC BLD AUTO-RTO: 0 /100 WBCS
PLATELET # BLD AUTO: 236 THOUSANDS/UL (ref 149–390)
PMV BLD AUTO: 10.1 FL (ref 8.9–12.7)
POTASSIUM SERPL-SCNC: 3.6 MMOL/L (ref 3.5–5.3)
PROT SERPL-MCNC: 8.5 G/DL (ref 6.4–8.4)
RBC # BLD AUTO: 4.5 MILLION/UL (ref 3.88–5.62)
SODIUM SERPL-SCNC: 134 MMOL/L (ref 135–147)
TRIGL SERPL-MCNC: 126 MG/DL
TSH SERPL DL<=0.05 MIU/L-ACNC: 1.98 UIU/ML (ref 0.45–4.5)
URATE SERPL-MCNC: 11.7 MG/DL (ref 3.5–8.5)
WBC # BLD AUTO: 4.5 THOUSAND/UL (ref 4.31–10.16)

## 2023-03-31 LAB
PSA FREE MFR SERPL: 46.7 %
PSA FREE SERPL-MCNC: 0.28 NG/ML
PSA SERPL-MCNC: 0.6 NG/ML (ref 0–4)

## 2023-04-01 LAB
EST. AVERAGE GLUCOSE BLD GHB EST-MCNC: 94 MG/DL
HBA1C MFR BLD: 4.9 %

## 2023-04-04 ENCOUNTER — OFFICE VISIT (OUTPATIENT)
Dept: INTERNAL MEDICINE CLINIC | Facility: CLINIC | Age: 53
End: 2023-04-04

## 2023-04-04 VITALS
RESPIRATION RATE: 16 BRPM | WEIGHT: 195 LBS | HEIGHT: 66 IN | SYSTOLIC BLOOD PRESSURE: 98 MMHG | HEART RATE: 89 BPM | BODY MASS INDEX: 31.34 KG/M2 | TEMPERATURE: 98 F | DIASTOLIC BLOOD PRESSURE: 68 MMHG | OXYGEN SATURATION: 99 %

## 2023-04-04 DIAGNOSIS — M79.672 LEFT FOOT PAIN: ICD-10-CM

## 2023-04-04 DIAGNOSIS — F32.A DEPRESSION, UNSPECIFIED DEPRESSION TYPE: ICD-10-CM

## 2023-04-04 DIAGNOSIS — M79.2 NEUROPATHIC PAIN: ICD-10-CM

## 2023-04-04 DIAGNOSIS — R00.2 PALPITATIONS: Primary | ICD-10-CM

## 2023-04-04 DIAGNOSIS — H53.9 VISUAL DISTURBANCE: ICD-10-CM

## 2023-04-04 DIAGNOSIS — M19.012 ARTHRITIS OF LEFT ACROMIOCLAVICULAR JOINT: ICD-10-CM

## 2023-04-04 DIAGNOSIS — M25.561 ACUTE PAIN OF RIGHT KNEE: ICD-10-CM

## 2023-04-04 RX ORDER — ESCITALOPRAM OXALATE 10 MG/1
10 TABLET ORAL DAILY
Qty: 90 TABLET | Refills: 3 | Status: SHIPPED | OUTPATIENT
Start: 2023-04-04 | End: 2023-07-03

## 2023-04-04 RX ORDER — GABAPENTIN 300 MG/1
300 CAPSULE ORAL 3 TIMES DAILY
Qty: 270 CAPSULE | Refills: 3 | Status: SHIPPED | OUTPATIENT
Start: 2023-04-04 | End: 2023-07-03

## 2023-04-04 RX ORDER — GABAPENTIN 600 MG/1
600 TABLET ORAL 3 TIMES DAILY
Qty: 270 TABLET | Refills: 3 | Status: SHIPPED | OUTPATIENT
Start: 2023-04-04 | End: 2023-07-03

## 2023-04-04 NOTE — PROGRESS NOTES
Assessment/Plan:     Here for follow up; needs me to fill out a form for continuation of his disability which is definitely appropriate;     Reports right knee pain and left foot dorsal and lateral pain; worse when he lays down; he walks with a walker; saw podiatry for right foot pain reveal arthritis for which he received an injection  Will obtain xray of foot; he will f/u with podiatry  Reports palpitations which is new; last EKG was normal; will have him see cardiology given his complex medical problems  Denies any cp, sob  He continues follow up with nephrology; will see rheumatology; refusing to decrease gabapentin - he is taking 900 mg tid  Quality Measures:     BMI Counseling: Body mass index is 31 47 kg/m²  The BMI is above normal  Nutrition recommendations include decreasing portion sizes and encouraging healthy choices of fruits and vegetables  Exercise recommendations include moderate physical activity 150 minutes/week  Rationale for BMI follow-up plan is due to patient being overweight or obese  Depression Screening and Follow-up Plan: Patient was screened for depression during today's encounter  They screened negative with a PHQ-2 score of 0  Return in about 3 months (around 7/4/2023)  No problem-specific Assessment & Plan notes found for this encounter  Diagnoses and all orders for this visit:    Palpitations  -     Ambulatory Referral to Cardiology; Future  -     CBC and differential; Future  -     Comprehensive metabolic panel; Future    Left foot pain  -     XR foot 3+ vw left; Future    Acute pain of right knee  -     Ambulatory Referral to Orthopedic Surgery; Future  -     XR knee 3 vw right non injury; Future    Depression, unspecified depression type  -     escitalopram (LEXAPRO) 10 mg tablet; Take 1 tablet (10 mg total) by mouth daily    Neuropathic pain  -     gabapentin (NEURONTIN) 300 mg capsule;  Take 1 capsule (300 mg total) by mouth 3 (three) times a day    Arthritis of left acromioclavicular joint  -     gabapentin (NEURONTIN) 600 MG tablet; Take 1 tablet (600 mg total) by mouth 3 (three) times a day    Visual disturbance  -     Ambulatory Referral to Ophthalmology; Future          Subjective:      Patient ID: Esperanza Arnold is a 52 y o  male  Here for f/u        ALLERGIES:  Allergies   Allergen Reactions   • Sulfa Antibiotics Rash       CURRENT MEDICATIONS:    Current Outpatient Medications:   •  apixaban (ELIQUIS) 5 mg, Take 5 mg by mouth 2 (two) times a day, Disp: , Rfl:   •  azelastine (ASTELIN) 0 1 % nasal spray, ADMINISTER 1 SPRAY INTO NOSTRIL TWICE DAILY, Disp: , Rfl:   •  bacitracin topical ointment 500 units/g topical ointment, APPLY TOPICALLY TO AFFECTED AREA THREE TIMES A DAY AS DIRECTED, Disp: , Rfl:   •  baclofen 20 mg tablet, Take 20 mg by mouth 3 (three) times a day , Disp: , Rfl:   •  betamethasone dipropionate (DIPROSONE) 0 05 % cream, Apply 1 application topically 2 (two) times a day, Disp: , Rfl:   •  betamethasone, augmented, (DIPROLENE) 0 05 % ointment, Apply 1 application topically 2 (two) times a day, Disp: , Rfl:   •  carvedilol (COREG) 12 5 mg tablet, Take 12 5 mg by mouth 2 (two) times a day with meals, Disp: , Rfl:   •  Diclofenac Sodium (VOLTAREN) 1 %, Apply 2 g topically 4 (four) times a day, Disp: 2 g, Rfl: 0  •  escitalopram (LEXAPRO) 10 mg tablet, Take 1 tablet (10 mg total) by mouth daily, Disp: 90 tablet, Rfl: 3  •  furosemide (LASIX) 40 mg tablet, Take 1 tablet (40 mg total) by mouth 2 (two) times a day, Disp: , Rfl: 0  •  gabapentin (NEURONTIN) 300 mg capsule, Take 1 capsule (300 mg total) by mouth 3 (three) times a day, Disp: 270 capsule, Rfl: 3  •  gabapentin (NEURONTIN) 600 MG tablet, Take 1 tablet (600 mg total) by mouth 3 (three) times a day, Disp: 270 tablet, Rfl: 3  •  hydroxychloroquine (PLAQUENIL) 200 mg tablet, Take 400 mg by mouth daily at bedtime, Disp: , Rfl:   •  hydrOXYzine HCL (ATARAX) 10 mg tablet, Take 10 mg by mouth every 6 (six) hours as needed for itching, Disp: , Rfl:   •  lidocaine-prilocaine (EMLA) cream, Apply topically as needed for mild pain, Disp: 30 g, Rfl: 3  •  loratadine (CLARITIN) 10 mg tablet, Take 1 tablet (10 mg total) by mouth daily, Disp: 30 tablet, Rfl: 2  •  metolazone (ZAROXOLYN) 5 mg tablet, Take 0 5 tablets (2 5 mg total) by mouth 3 (three) times a week, Disp: , Rfl:   •  mycophenolate (CELLCEPT) 500 mg tablet, Take 500 mg by mouth every 12 (twelve) hours , Disp: , Rfl:   •  potassium chloride (K-DUR,KLOR-CON) 20 mEq tablet, Take 20 mEq by mouth daily, Disp: , Rfl:   •  tamsulosin (FLOMAX) 0 4 mg, , Disp: , Rfl:     ACTIVE PROBLEM LIST:  Patient Active Problem List   Diagnosis   • Lupus (systemic lupus erythematosus) ([facility])   • Lupus nephritis (HCC)   • Hypertension   • History of DVT (deep vein thrombosis)   • Stage 3 chronic kidney disease (HCC)   • Persistent proteinuria   • Anemia in chronic kidney disease   • Muscle weakness (generalized)   • Tremor of both hands   • Spasticity   • Gait difficulty   • Neuropathic pain   • Hypertensive CKD (chronic kidney disease)   • Right foot pain   • DJD (degenerative joint disease), ankle and foot, right       PAST MEDICAL HISTORY:  Past Medical History:   Diagnosis Date   • Anemia    • Arthritis    • Cervical mass    • Chronic kidney disease    • Coronary artery disease    • Depression    • DVT (deep venous thrombosis) ([facility])    • Hypertension    • Lupus ([address] )    • Renal disorder        PAST SURGICAL HISTORY:  Past Surgical History:   Procedure Laterality Date   • APPENDECTOMY     • CERVICAL SPINE SURGERY     • CHOLECYSTECTOMY     • COLONOSCOPY N/A 8/23/2018    Procedure: COLONOSCOPY;  Surgeon: Miguel A Hanks MD;  Location: [facility]; Service: Gastroenterology   • ESOPHAGOGASTRODUODENOSCOPY N/A 8/22/2018    Procedure: ESOPHAGOGASTRODUODENOSCOPY (EGD); Surgeon: Miguel A Hanks MD;  Location: [facility];   Service: Gastroenterology   • NECK "SURGERY     • VASCULAR SURGERY         FAMILY HISTORY:  Family History   Problem Relation Age of Onset   • Heart disease Mother    • No Known Problems Father        SOCIAL HISTORY:  Social History     Socioeconomic History   • Marital status: /Civil Union     Spouse name: Not on file   • Number of children: Not on file   • Years of education: Not on file   • Highest education level: Not on file   Occupational History   • Not on file   Tobacco Use   • Smoking status: Never   • Smokeless tobacco: Never   Vaping Use   • Vaping Use: Never used   Substance and Sexual Activity   • Alcohol use: Never   • Drug use: Never   • Sexual activity: Yes     Partners: Female, Male     Birth control/protection: Rhythm   Other Topics Concern   • Not on file   Social History Narrative    ** Merged History Encounter **          Social Determinants of Health     Financial Resource Strain: Not on file   Food Insecurity: No Food Insecurity   • Worried About Running Out of Food in the Last Year: Never true   • Ran Out of Food in the Last Year: Never true   Transportation Needs: No Transportation Needs   • Lack of Transportation (Medical): No   • Lack of Transportation (Non-Medical): No   Physical Activity: Not on file   Stress: Not on file   Social Connections: Not on file   Intimate Partner Violence: Not on file   Housing Stability: Low Risk    • Unable to Pay for Housing in the Last Year: No   • Number of Places Lived in the Last Year: 1   • Unstable Housing in the Last Year: No       Review of Systems   Musculoskeletal: Positive for arthralgias, back pain, gait problem and joint swelling  All other systems reviewed and are negative          Objective:  Vitals:    04/04/23 0816   BP: 98/68   BP Location: Right arm   Patient Position: Sitting   Cuff Size: Standard   Pulse: 89   Resp: 16   Temp: 98 °F (36 7 °C)   TempSrc: Tympanic   SpO2: 99%   Weight: 88 5 kg (195 lb)   Height: 5' 6\" (1 676 m)     Body mass index is 31 47 " kg/m²  Physical Exam  Vitals and nursing note reviewed  Constitutional:       Appearance: Normal appearance  HENT:      Head: Normocephalic and atraumatic  Cardiovascular:      Rate and Rhythm: Normal rate and regular rhythm  Heart sounds: Normal heart sounds  Pulmonary:      Effort: Pulmonary effort is normal       Breath sounds: Normal breath sounds  Musculoskeletal:         General: Deformity present  Cervical back: Normal range of motion  Right lower leg: Edema present  Left lower leg: Edema present  Skin:     General: Skin is warm  Neurological:      Mental Status: He is alert and oriented to person, place, and time  Mental status is at baseline  Gait: Gait abnormal            RESULTS:    In chart    This note was created with voice recognition software  Phonic, grammatical and spelling errors may be present within the note as a result

## 2023-04-05 ENCOUNTER — TELEPHONE (OUTPATIENT)
Dept: INTERNAL MEDICINE CLINIC | Facility: CLINIC | Age: 53
End: 2023-04-05

## 2023-04-05 NOTE — TELEPHONE ENCOUNTER
Pt is asking if papers he dropped off at appointment for life insurance were completed if anyone knows yet?

## 2023-04-24 ENCOUNTER — OFFICE VISIT (OUTPATIENT)
Dept: PHYSICAL THERAPY | Facility: CLINIC | Age: 53
End: 2023-04-24

## 2023-04-24 DIAGNOSIS — M19.012 ARTHRITIS OF LEFT ACROMIOCLAVICULAR JOINT: Primary | ICD-10-CM

## 2023-04-24 NOTE — PROGRESS NOTES
"Daily Note     Today's date: 2023  Patient name: Jenn Agudelo  : 1970  MRN: [id number]  Referring provider: Minesh Wu MD  Dx:   Encounter Diagnosis     ICD-10-CM    1  Arthritis of left acromioclavicular joint  M19 012                      Subjective: Patient states on Friday his shoulder was sore from the exercises but today it feels better  SPR 1-2/10 at start of session  Objective: See treatment diary below      Assessment:  Initiated POC as charted below  Patient was progressed with reps and resistance with no increase in pain or discomfort  Sig challenge maintaining scapular stability during rhythmic stab especially in the transverse plane  ABC's added to progress scapular stabilization  Rows, Ext and no money's were added to progress strengthening and tolerated well  Webslide exercises performed seated for safety due to patients instability in standing without AD  Discussed DOMS, patient provided verbal understanding  Patient demonstrated fatigue post treatment and would benefit from continued PT      Plan: Progress treatment as tolerated         Precautions: HTN, fall risk, DVT, Lupus      Manuals            Rhythmic stab 90 deg 2x20\" 2x20\"           PROM  4'                                     Neuro Re-Ed             Pt education about pathology, POC, HEP 5' 3' DOMS           Serratus punch 2x10 5# 2x10 5#           Seated scap retraction 1x10 5\" 2x10 5\"           ABC's  1x 1#           No Money's  seated OTB  1x15                                     Ther Ex             S/L shoulder flex x15 2x10           S/L ER x15 1# 2x10           S/L Abd  2x10           Rows  Grn 1x15           Shoulder Ext  Grn 1x15           Pulleys- for ROM  2 min scaption                                                                            Ther Activity                                       Gait Training                                       Modalities                                     "

## 2023-04-28 ENCOUNTER — APPOINTMENT (OUTPATIENT)
Dept: PHYSICAL THERAPY | Facility: CLINIC | Age: 53
End: 2023-04-28

## 2023-05-01 ENCOUNTER — OFFICE VISIT (OUTPATIENT)
Dept: PHYSICAL THERAPY | Facility: CLINIC | Age: 53
End: 2023-05-01

## 2023-05-01 DIAGNOSIS — M19.012 ARTHRITIS OF LEFT ACROMIOCLAVICULAR JOINT: Primary | ICD-10-CM

## 2023-05-01 NOTE — PROGRESS NOTES
"Daily Note     Today's date: 2023  Patient name: Dale Peters  : 1970  MRN: [id number]  Referring provider: Nemesio Blair MD  Dx:   Encounter Diagnosis     ICD-10-CM    1  Arthritis of left acromioclavicular joint  M19 012           Start Time: 0845  Stop Time: 930  Total time in clinic (min): 45 minutes    Subjective: Pt reports feeling fine after previous session, no new complaints  Objective: See treatment diary below      Assessment: Tolerated treatment well  Patient demonstrated fatigue post treatment, exhibited good technique with therapeutic exercises and would benefit from continued PT  Progressions made with added resistance for previously completed exercises and addition of new stabilization exercises  Initiated UBE to further build endurance of LUE  Pt demonstrated scapular restriction during performance of sidelying shoulder flexion, initiated scapular joint mobs which pt tolerated well  Plan: Continue per plan of care  Progress treatment as tolerated  Precautions: HTN, fall risk, DVT, Lupus      Manuals  5          Rhythmic stab 90 deg and 120 deg 2x20\" 2x20\" 2x20\" ea            PROM  4'           R scapular mobs   5'                       Neuro Re-Ed             Pt education about pathology, POC, HEP 5' 3' DOMS           Serratus punch 2x10 5# 2x10 5# 2x10 7#          Seated scap retraction 1x10 5\" 2x10 5\" HEP          ABC's  1x 1# 1x 1#          No Money's  seated OTB  1x15 2x10 grn          TB diagonals   2x10 blue          TB supine flexion   2x10 blue          Ther Ex             S/L shoulder flex x15 2x10 2x10 1#          S/L ER x15 1# 2x10 2x10 2#          S/L Abd  2x10 2x10 1#          Rows  Grn 1x15 x20 grn          Shoulder Ext  Grn 1x15 x20 grn          Pulleys- for ROM  2 min scaption           UBE for endurance   2'/2'                                                              Ther Activity                                       Gait Training   " Modalities

## 2023-05-05 ENCOUNTER — APPOINTMENT (OUTPATIENT)
Dept: PHYSICAL THERAPY | Facility: CLINIC | Age: 53
End: 2023-05-05
Payer: COMMERCIAL

## 2023-05-08 ENCOUNTER — OFFICE VISIT (OUTPATIENT)
Dept: PHYSICAL THERAPY | Facility: CLINIC | Age: 53
End: 2023-05-08

## 2023-05-08 DIAGNOSIS — M19.012 ARTHRITIS OF LEFT ACROMIOCLAVICULAR JOINT: Primary | ICD-10-CM

## 2023-05-08 NOTE — PROGRESS NOTES
"Daily Note     Today's date: 2023  Patient name: Feng Singh  : 1970  MRN: [id number]  Referring provider: Kristin Hernandez MD  Dx:   Encounter Diagnosis     ICD-10-CM    1  Arthritis of left acromioclavicular joint  M19 012           Start Time: 0800  Stop Time: 08  Total time in clinic (min): 41 minutes    Subjective: Pt reports his L shoulder has been feeling stronger since starting therapy, does still note some pain in the mornings though  Objective: See treatment diary below      Assessment: Tolerated treatment well  Patient demonstrated fatigue post treatment, exhibited good technique with therapeutic exercises and would benefit from continued PT  Pt able to progress resistance and repetitions as tolerated for progressive strengthening and stabilization of L shoulder  Pt demonstrated increased muscle fatigue in L shoulder/scapular muscles this session and continues to report no discomfort in L AC joint throughout session  Plan: Continue per plan of care  Progress treatment as tolerated  Precautions: HTN, fall risk, DVT, Lupus      Manuals          Rhythmic stab 90 deg and 120 deg 2x20\" 2x20\" 2x20\" ea  2x20\" ea           PROM  4'           R scapular mobs   5' 5'                      Neuro Re-Ed             Pt education about pathology, POC, HEP 5' 3' DOMS           Serratus punch 2x10 5# 2x10 5# 2x10 7# x30 7#         Seated scap retraction 1x10 5\" 2x10 5\" HEP          ABC's  1x 1# 1x 1# 1x 2#         Seated No Money's  seated OTB  1x15 2x10 grn 3x10 grn         TB diagonals   2x10 blue 2x10 blue         TB supine flexion   2x10 blue 2x10 blue         Ther Ex             S/L shoulder flex x15 2x10 2x10 1# 2x10 1#         S/L ER x15 1# 2x10 2x10 2# 2x10 2#         S/L Abd  2x10 2x10 1# 2x10 2#         Seated TB Rows  Grn 1x15 x20 grn x30 grn         Seated TB Ext  Grn 1x15 x20 grn x30 grn         Pulleys- for ROM  2 min scaption           UBE for endurance   " 2'/2' 2'/2'                                                             Ther Activity                                       Gait Training                                       Modalities

## 2023-05-12 ENCOUNTER — APPOINTMENT (OUTPATIENT)
Dept: PHYSICAL THERAPY | Facility: CLINIC | Age: 53
End: 2023-05-12
Payer: COMMERCIAL

## 2023-05-15 ENCOUNTER — APPOINTMENT (OUTPATIENT)
Dept: PHYSICAL THERAPY | Facility: CLINIC | Age: 53
End: 2023-05-15
Payer: COMMERCIAL

## 2023-06-30 ENCOUNTER — APPOINTMENT (OUTPATIENT)
Dept: LAB | Facility: CLINIC | Age: 53
End: 2023-06-30
Payer: COMMERCIAL

## 2023-06-30 DIAGNOSIS — E87.1 HYPONATREMIA: ICD-10-CM

## 2023-06-30 DIAGNOSIS — N18.30 STAGE 3 CHRONIC KIDNEY DISEASE, UNSPECIFIED WHETHER STAGE 3A OR 3B CKD (HCC): ICD-10-CM

## 2023-06-30 DIAGNOSIS — I12.9 HYPERTENSIVE KIDNEY DISEASE WITH STAGE 3 CHRONIC KIDNEY DISEASE, UNSPECIFIED WHETHER STAGE 3A OR 3B CKD (HCC): ICD-10-CM

## 2023-06-30 DIAGNOSIS — N18.30 HYPERTENSIVE KIDNEY DISEASE WITH STAGE 3 CHRONIC KIDNEY DISEASE, UNSPECIFIED WHETHER STAGE 3A OR 3B CKD (HCC): ICD-10-CM

## 2023-06-30 DIAGNOSIS — M32.14 LUPUS NEPHRITIS (HCC): ICD-10-CM

## 2023-06-30 DIAGNOSIS — R00.2 PALPITATIONS: ICD-10-CM

## 2023-06-30 LAB
25(OH)D3 SERPL-MCNC: 44.2 NG/ML (ref 30–100)
ALBUMIN SERPL BCP-MCNC: 4.1 G/DL (ref 3.5–5)
ALP SERPL-CCNC: 112 U/L (ref 46–116)
ALT SERPL W P-5'-P-CCNC: 22 U/L (ref 12–78)
ANA SER QL IA: POSITIVE
ANION GAP SERPL CALCULATED.3IONS-SCNC: 7 MMOL/L
AST SERPL W P-5'-P-CCNC: 21 U/L (ref 5–45)
BASOPHILS # BLD AUTO: 0.03 THOUSANDS/ÂΜL (ref 0–0.1)
BASOPHILS NFR BLD AUTO: 1 % (ref 0–1)
BILIRUB DIRECT SERPL-MCNC: 0.17 MG/DL (ref 0–0.2)
BILIRUB SERPL-MCNC: 0.4 MG/DL (ref 0.2–1)
BUN SERPL-MCNC: 7 MG/DL (ref 5–25)
C3 SERPL-MCNC: 97.8 MG/DL (ref 90–180)
C4 SERPL-MCNC: 38 MG/DL (ref 10–40)
CALCIUM SERPL-MCNC: 9.1 MG/DL (ref 8.3–10.1)
CHLORIDE SERPL-SCNC: 111 MMOL/L (ref 96–108)
CO2 SERPL-SCNC: 21 MMOL/L (ref 21–32)
CREAT SERPL-MCNC: 1.08 MG/DL (ref 0.6–1.3)
EOSINOPHIL # BLD AUTO: 0.04 THOUSAND/ÂΜL (ref 0–0.61)
EOSINOPHIL NFR BLD AUTO: 1 % (ref 0–6)
ERYTHROCYTE [DISTWIDTH] IN BLOOD BY AUTOMATED COUNT: 14.4 % (ref 11.6–15.1)
GFR SERPL CREATININE-BSD FRML MDRD: 76 ML/MIN/1.73SQ M
GLUCOSE P FAST SERPL-MCNC: 88 MG/DL (ref 65–99)
HCT VFR BLD AUTO: 42.3 % (ref 36.5–49.3)
HGB BLD-MCNC: 14 G/DL (ref 12–17)
IMM GRANULOCYTES # BLD AUTO: 0.02 THOUSAND/UL (ref 0–0.2)
IMM GRANULOCYTES NFR BLD AUTO: 1 % (ref 0–2)
LYMPHOCYTES # BLD AUTO: 1.16 THOUSANDS/ÂΜL (ref 0.6–4.47)
LYMPHOCYTES NFR BLD AUTO: 35 % (ref 14–44)
MCH RBC QN AUTO: 29.6 PG (ref 26.8–34.3)
MCHC RBC AUTO-ENTMCNC: 33.1 G/DL (ref 31.4–37.4)
MCV RBC AUTO: 89 FL (ref 82–98)
MONOCYTES # BLD AUTO: 0.5 THOUSAND/ÂΜL (ref 0.17–1.22)
MONOCYTES NFR BLD AUTO: 15 % (ref 4–12)
NEUTROPHILS # BLD AUTO: 1.54 THOUSANDS/ÂΜL (ref 1.85–7.62)
NEUTS SEG NFR BLD AUTO: 47 % (ref 43–75)
NRBC BLD AUTO-RTO: 0 /100 WBCS
PHOSPHATE SERPL-MCNC: 2.7 MG/DL (ref 2.7–4.5)
PLATELET # BLD AUTO: 233 THOUSANDS/UL (ref 149–390)
PMV BLD AUTO: 10.9 FL (ref 8.9–12.7)
POTASSIUM SERPL-SCNC: 4.2 MMOL/L (ref 3.5–5.3)
PROT SERPL-MCNC: 7.5 G/DL (ref 6.4–8.4)
PTH-INTACT SERPL-MCNC: 76 PG/ML (ref 12–88)
RBC # BLD AUTO: 4.73 MILLION/UL (ref 3.88–5.62)
SODIUM SERPL-SCNC: 139 MMOL/L (ref 135–147)
URATE SERPL-MCNC: 6 MG/DL (ref 3.5–8.5)
WBC # BLD AUTO: 3.29 THOUSAND/UL (ref 4.31–10.16)

## 2023-06-30 PROCEDURE — 82248 BILIRUBIN DIRECT: CPT

## 2023-06-30 PROCEDURE — 86039 ANTINUCLEAR ANTIBODIES (ANA): CPT

## 2023-06-30 PROCEDURE — 86160 COMPLEMENT ANTIGEN: CPT

## 2023-06-30 PROCEDURE — 84100 ASSAY OF PHOSPHORUS: CPT

## 2023-06-30 PROCEDURE — 86038 ANTINUCLEAR ANTIBODIES: CPT

## 2023-06-30 PROCEDURE — 84550 ASSAY OF BLOOD/URIC ACID: CPT

## 2023-06-30 PROCEDURE — 83970 ASSAY OF PARATHORMONE: CPT

## 2023-06-30 PROCEDURE — 86225 DNA ANTIBODY NATIVE: CPT

## 2023-06-30 PROCEDURE — 82306 VITAMIN D 25 HYDROXY: CPT

## 2023-06-30 PROCEDURE — 80053 COMPREHEN METABOLIC PANEL: CPT

## 2023-06-30 PROCEDURE — 36415 COLL VENOUS BLD VENIPUNCTURE: CPT

## 2023-06-30 PROCEDURE — 85025 COMPLETE CBC W/AUTO DIFF WBC: CPT

## 2023-07-01 LAB — DSDNA AB SER-ACNC: 16 IU/ML (ref 0–9)

## 2023-07-03 LAB
ANA HOMOGEN SER QL IF: NORMAL
ANA HOMOGEN TITR SER: NORMAL {TITER}

## 2023-07-05 ENCOUNTER — APPOINTMENT (EMERGENCY)
Dept: CT IMAGING | Facility: HOSPITAL | Age: 53
End: 2023-07-05
Payer: COMMERCIAL

## 2023-07-05 ENCOUNTER — TELEPHONE (OUTPATIENT)
Dept: INTERNAL MEDICINE CLINIC | Facility: CLINIC | Age: 53
End: 2023-07-05

## 2023-07-05 ENCOUNTER — TELEPHONE (OUTPATIENT)
Dept: NEPHROLOGY | Facility: CLINIC | Age: 53
End: 2023-07-05

## 2023-07-05 ENCOUNTER — HOSPITAL ENCOUNTER (EMERGENCY)
Facility: HOSPITAL | Age: 53
Discharge: HOME | End: 2023-07-05
Attending: EMERGENCY MEDICINE
Payer: COMMERCIAL

## 2023-07-05 VITALS
TEMPERATURE: 97.3 F | HEART RATE: 83 BPM | HEIGHT: 66 IN | OXYGEN SATURATION: 97 % | SYSTOLIC BLOOD PRESSURE: 149 MMHG | BODY MASS INDEX: 29.73 KG/M2 | DIASTOLIC BLOOD PRESSURE: 71 MMHG | WEIGHT: 185 LBS | RESPIRATION RATE: 18 BRPM

## 2023-07-05 DIAGNOSIS — R07.9 CHEST PAIN: ICD-10-CM

## 2023-07-05 DIAGNOSIS — R42 DIZZINESS: Primary | ICD-10-CM

## 2023-07-05 LAB
ANION GAP SERPL CALCULATED.3IONS-SCNC: 5 MMOL/L
BASOPHILS # BLD MANUAL: 0.03 THOUSAND/UL (ref 0–0.1)
BASOPHILS NFR MAR MANUAL: 1 % (ref 0–1)
BUN SERPL-MCNC: 8 MG/DL (ref 5–25)
CALCIUM SERPL-MCNC: 9.1 MG/DL (ref 8.4–10.2)
CARDIAC TROPONIN I PNL SERPL HS: 4 NG/L
CHLORIDE SERPL-SCNC: 106 MMOL/L (ref 96–108)
CO2 SERPL-SCNC: 26 MMOL/L (ref 21–32)
CREAT SERPL-MCNC: 1.29 MG/DL (ref 0.6–1.3)
EOSINOPHIL # BLD MANUAL: 0.03 THOUSAND/UL (ref 0–0.4)
EOSINOPHIL NFR BLD MANUAL: 1 % (ref 0–6)
ERYTHROCYTE [DISTWIDTH] IN BLOOD BY AUTOMATED COUNT: 14.3 % (ref 11.6–15.1)
GFR SERPL CREATININE-BSD FRML MDRD: 62 ML/MIN/1.73SQ M
GLUCOSE SERPL-MCNC: 86 MG/DL (ref 65–140)
HCT VFR BLD AUTO: 40 % (ref 36.5–49.3)
HGB BLD-MCNC: 13.5 G/DL (ref 12–17)
LYMPHOCYTES # BLD AUTO: 1.1 THOUSAND/UL (ref 0.6–4.47)
LYMPHOCYTES # BLD AUTO: 35 % (ref 14–44)
MCH RBC QN AUTO: 30.2 PG (ref 26.8–34.3)
MCHC RBC AUTO-ENTMCNC: 33.8 G/DL (ref 31.4–37.4)
MCV RBC AUTO: 90 FL (ref 82–98)
MONOCYTES # BLD AUTO: 0.22 THOUSAND/UL (ref 0–1.22)
MONOCYTES NFR BLD: 7 % (ref 4–12)
NEUTROPHILS # BLD MANUAL: 1.61 THOUSAND/UL (ref 1.85–7.62)
NEUTS SEG NFR BLD AUTO: 51 % (ref 43–75)
PLATELET # BLD AUTO: 190 THOUSANDS/UL (ref 149–390)
PLATELET BLD QL SMEAR: ADEQUATE
PMV BLD AUTO: 10.2 FL (ref 8.9–12.7)
POTASSIUM SERPL-SCNC: 4.4 MMOL/L (ref 3.5–5.3)
RBC # BLD AUTO: 4.47 MILLION/UL (ref 3.88–5.62)
RBC MORPH BLD: NORMAL
SODIUM SERPL-SCNC: 137 MMOL/L (ref 135–147)
VARIANT LYMPHS # BLD AUTO: 5 %
WBC # BLD AUTO: 3.15 THOUSAND/UL (ref 4.31–10.16)

## 2023-07-05 PROCEDURE — 84484 ASSAY OF TROPONIN QUANT: CPT | Performed by: EMERGENCY MEDICINE

## 2023-07-05 PROCEDURE — 99284 EMERGENCY DEPT VISIT MOD MDM: CPT | Performed by: EMERGENCY MEDICINE

## 2023-07-05 PROCEDURE — 96361 HYDRATE IV INFUSION ADD-ON: CPT

## 2023-07-05 PROCEDURE — 70450 CT HEAD/BRAIN W/O DYE: CPT

## 2023-07-05 PROCEDURE — 80048 BASIC METABOLIC PNL TOTAL CA: CPT | Performed by: EMERGENCY MEDICINE

## 2023-07-05 PROCEDURE — 36415 COLL VENOUS BLD VENIPUNCTURE: CPT | Performed by: EMERGENCY MEDICINE

## 2023-07-05 PROCEDURE — 85007 BL SMEAR W/DIFF WBC COUNT: CPT | Performed by: EMERGENCY MEDICINE

## 2023-07-05 PROCEDURE — G1004 CDSM NDSC: HCPCS

## 2023-07-05 PROCEDURE — 93005 ELECTROCARDIOGRAM TRACING: CPT

## 2023-07-05 PROCEDURE — 99284 EMERGENCY DEPT VISIT MOD MDM: CPT

## 2023-07-05 PROCEDURE — 96374 THER/PROPH/DIAG INJ IV PUSH: CPT

## 2023-07-05 PROCEDURE — 85027 COMPLETE CBC AUTOMATED: CPT | Performed by: EMERGENCY MEDICINE

## 2023-07-05 RX ORDER — MECLIZINE HYDROCHLORIDE 25 MG/1
25 TABLET ORAL 3 TIMES DAILY PRN
Qty: 30 TABLET | Refills: 0 | Status: SHIPPED | OUTPATIENT
Start: 2023-07-05

## 2023-07-05 RX ORDER — METOCLOPRAMIDE HYDROCHLORIDE 5 MG/ML
10 INJECTION INTRAMUSCULAR; INTRAVENOUS ONCE
Status: COMPLETED | OUTPATIENT
Start: 2023-07-05 | End: 2023-07-05

## 2023-07-05 RX ORDER — MECLIZINE HYDROCHLORIDE 25 MG/1
25 TABLET ORAL ONCE
Status: COMPLETED | OUTPATIENT
Start: 2023-07-05 | End: 2023-07-05

## 2023-07-05 RX ADMIN — SODIUM CHLORIDE 1000 ML: 0.9 INJECTION, SOLUTION INTRAVENOUS at 20:23

## 2023-07-05 RX ADMIN — METOCLOPRAMIDE 10 MG: 5 INJECTION, SOLUTION INTRAMUSCULAR; INTRAVENOUS at 20:23

## 2023-07-05 RX ADMIN — MECLIZINE HYDROCHLORIDE 25 MG: 25 TABLET ORAL at 20:16

## 2023-07-05 NOTE — TELEPHONE ENCOUNTER
Patient called wants to know if the doctor has reviewed his most recent lab results. Says that he is not feeling well - dizziness, fatigue. Cannot be on his feet for very long, needs assistance going to bathroom. Wants to know if the doctor has any suggestions or if new meds are needed or meds need to be adjusted based on results.

## 2023-07-05 NOTE — TELEPHONE ENCOUNTER
Good Afternoon,    Dr. Laurel Lenz patient called the office in regards of his YANIRA Titer test he had done on 06/30/2023. As per patient he stated his Lupus came out very high. Patient would like to know his test results.     Please contact patient at [phone]    Thank you

## 2023-07-05 NOTE — TELEPHONE ENCOUNTER
Called and spoke to pt. Advised that YANIRA alone can’t determine the lupus status. Advised to submit urine specimen. Pt stated that will do it tomorrow. Is that the Microalbumin /creatinine urine test in pt's chart or something else that you want me to order.  Please advise

## 2023-07-05 NOTE — TELEPHONE ENCOUNTER
As per Dr. Cortes Riggs, "He had an appointment today but rescheduled. We can talk about everything on July 11 at his next appointment."    Patient made aware via [employer] message.

## 2023-07-06 DIAGNOSIS — E87.1 HYPONATREMIA: Primary | ICD-10-CM

## 2023-07-06 DIAGNOSIS — M32.14 LUPUS NEPHRITIS (HCC): ICD-10-CM

## 2023-07-06 LAB
ATRIAL RATE: 85 BPM
P AXIS: 64 DEGREES
PR INTERVAL: 162 MS
QRS AXIS: -28 DEGREES
QRSD INTERVAL: 96 MS
QT INTERVAL: 342 MS
QTC INTERVAL: 406 MS
T WAVE AXIS: 60 DEGREES
VENTRICULAR RATE: 85 BPM

## 2023-07-06 PROCEDURE — 93010 ELECTROCARDIOGRAM REPORT: CPT | Performed by: INTERNAL MEDICINE

## 2023-07-06 NOTE — ED PROVIDER NOTES
History  Chief Complaint   Patient presents with   • Dizziness     Pt reports feeling room spinning dizziness for three days when he stands up and walks. He had a fall due to the dizziness yesterday denies head strike. Pt is c/o heaviness in is chest      57-year-old male history of lupus, hypertension, CAD on Eliquis presenting with dizziness. Patient describes room spinning dizziness worse with movement and positional changes and improved with lying flat and closing his eyes. Reports history of similar. Patient denies any head strike. Reports intermittent chest pressure and heaviness over the past few days. Denies any exertional component, shortness of breath, diaphoresis, nausea/vomiting, lightheadedness/syncope. Denies abdominal pain. Denies any focal neurological changes such as motor or sensory deficits. Denies any other complaints. Chart reviewed. Past Medical History:  No date: Anemia  No date: Arthritis  No date: Cervical mass  No date: Chronic kidney disease  No date: Coronary artery disease  No date: Depression  No date: DVT (deep venous thrombosis) ([facility])  No date: Hypertension  No date: Lupus ([address])  No date: Renal disorder  Family History: non-contributory  Social History            Prior to Admission Medications   Prescriptions Last Dose Informant Patient Reported? Taking?    Diclofenac Sodium (VOLTAREN) 1 %  Self No No   Sig: Apply 2 g topically 4 (four) times a day   Diclofenac Sodium (VOLTAREN) 1 %   No No   Sig: Apply 2 g topically 4 (four) times a day   apixaban (ELIQUIS) 5 mg  Self Yes No   Sig: Take 5 mg by mouth 2 (two) times a day   azelastine (ASTELIN) 0.1 % nasal spray  Self Yes No   Sig: ADMINISTER 1 SPRAY INTO NOSTRIL TWICE DAILY   bacitracin topical ointment 500 units/g topical ointment  Self Yes No   Sig: APPLY TOPICALLY TO AFFECTED AREA THREE TIMES A DAY AS DIRECTED   baclofen 20 mg tablet  Self Yes No   Sig: Take 20 mg by mouth 3 (three) times a day    betamethasone dipropionate (DIPROSONE) 0.05 % cream  Self Yes No   Sig: Apply 1 application topically 2 (two) times a day   betamethasone, augmented, (DIPROLENE) 0.05 % ointment  Self Yes No   Sig: Apply 1 application topically 2 (two) times a day   carvedilol (COREG) 12.5 mg tablet  Self Yes No   Sig: Take 12.5 mg by mouth 2 (two) times a day with meals   escitalopram (LEXAPRO) 10 mg tablet  Self No No   Sig: Take 1 tablet (10 mg total) by mouth daily   furosemide (LASIX) 40 mg tablet  Self No No   Sig: Take 1 tablet (40 mg total) by mouth 2 (two) times a day   gabapentin (NEURONTIN) 300 mg capsule  Self No No   Sig: Take 1 capsule (300 mg total) by mouth 3 (three) times a day   gabapentin (NEURONTIN) 600 MG tablet  Self No No   Sig: Take 1 tablet (600 mg total) by mouth 3 (three) times a day   hydrOXYzine HCL (ATARAX) 10 mg tablet  Self Yes No   Sig: Take 10 mg by mouth every 6 (six) hours as needed for itching   hydroxychloroquine (PLAQUENIL) 200 mg tablet  Self Yes No   Sig: Take 400 mg by mouth daily at bedtime   lidocaine-prilocaine (EMLA) cream  Self No No   Sig: Apply topically as needed for mild pain   loratadine (CLARITIN) 10 mg tablet  Self No No   Sig: Take 1 tablet (10 mg total) by mouth daily   metolazone (ZAROXOLYN) 5 mg tablet  Self No No   Sig: Take 0.5 tablets (2.5 mg total) by mouth 3 (three) times a week   mycophenolate (CELLCEPT) 500 mg tablet  Self Yes No   Sig: Take 500 mg by mouth every 12 (twelve) hours    potassium chloride (K-DUR,KLOR-CON) 20 mEq tablet  Self Yes No   Sig: Take 20 mEq by mouth daily   tamsulosin (FLOMAX) 0.4 mg  Self Yes No      Facility-Administered Medications: None       Past Medical History:   Diagnosis Date   • Anemia    • Arthritis    • Cervical mass    • Chronic kidney disease    • Coronary artery disease    • Depression    • DVT (deep venous thrombosis) (HCC)    • Hypertension    • Lupus ([facility])    • Renal disorder        Past Surgical History:   Procedure Laterality Date   • APPENDECTOMY     • CERVICAL SPINE SURGERY     • CHOLECYSTECTOMY     • COLONOSCOPY N/A 8/23/2018    Procedure: COLONOSCOPY;  Surgeon: Becky Avila MD;  Location: [facility]; Service: Gastroenterology   • ESOPHAGOGASTRODUODENOSCOPY N/A 8/22/2018    Procedure: ESOPHAGOGASTRODUODENOSCOPY (EGD); Surgeon: Becky Avila MD;  Location: [facility]; Service: Gastroenterology   • IR IVC FILTER PLACEMENT PERMANENT  9/7/2017   • IR IVC FILTER REMOVAL  4/23/2018   • IR LUMBAR PUNCTURE  11/23/2015   • NECK SURGERY     • US GUIDED INJECTION FOR RESEARCH STUDY  4/23/2018   • US GUIDED INJECTION FOR RESEARCH STUDY  9/7/2017   • VASCULAR SURGERY         Family History   Problem Relation Age of Onset   • Heart disease Mother    • No Known Problems Father      I have reviewed and agree with the history as documented. E-Cigarette/Vaping   • E-Cigarette Use Never User      E-Cigarette/Vaping Substances   • Nicotine No    • THC No    • CBD No    • Flavoring No    • Other No    • Unknown No      Social History     Tobacco Use   • Smoking status: Never   • Smokeless tobacco: Never   Vaping Use   • Vaping Use: Never used   Substance Use Topics   • Alcohol use: Never   • Drug use: Never       Review of Systems   Constitutional: Negative for appetite change, chills, diaphoresis, fever and unexpected weight change. HENT: Negative for congestion and rhinorrhea. Eyes: Negative for photophobia and visual disturbance. Respiratory: Negative for cough, chest tightness and shortness of breath. Cardiovascular: Positive for chest pain. Negative for palpitations and leg swelling. Gastrointestinal: Negative for abdominal distention, abdominal pain, blood in stool, constipation, diarrhea, nausea and vomiting. Genitourinary: Negative for dysuria and hematuria. Musculoskeletal: Negative for back pain, joint swelling, neck pain and neck stiffness. Skin: Negative for color change, pallor, rash and wound.    Neurological: Positive for dizziness. Negative for syncope, weakness, light-headedness and headaches. Psychiatric/Behavioral: Negative for agitation. All other systems reviewed and are negative. Physical Exam  Physical Exam  Vitals and nursing note reviewed. Constitutional:       General: He is not in acute distress. Appearance: Normal appearance. He is well-developed. He is not ill-appearing, toxic-appearing or diaphoretic. HENT:      Head: Normocephalic and atraumatic. Nose: Nose normal. No congestion or rhinorrhea. Mouth/Throat:      Mouth: Mucous membranes are moist.      Pharynx: Oropharynx is clear. No oropharyngeal exudate or posterior oropharyngeal erythema. Eyes:      General: No scleral icterus. Right eye: No discharge. Left eye: No discharge. Extraocular Movements: Extraocular movements intact. Conjunctiva/sclera: Conjunctivae normal.      Pupils: Pupils are equal, round, and reactive to light. Neck:      Vascular: No JVD. Trachea: No tracheal deviation. Comments: Supple. Normal range of motion. Cardiovascular:      Rate and Rhythm: Normal rate and regular rhythm. Heart sounds: Normal heart sounds. No murmur heard. No friction rub. No gallop. Comments: Normal rate and regular rhythm  Pulmonary:      Effort: Pulmonary effort is normal. No respiratory distress. Breath sounds: Normal breath sounds. No stridor. No wheezing or rales. Comments: Clear to auscultation bilaterally  Chest:      Chest wall: No tenderness. Abdominal:      General: Bowel sounds are normal. There is no distension. Palpations: Abdomen is soft. Tenderness: There is no abdominal tenderness. There is no right CVA tenderness, left CVA tenderness, guarding or rebound. Comments: Soft, nontender, nondistended. Normal bowel sounds throughout   Musculoskeletal:         General: No swelling, tenderness, deformity or signs of injury.  Normal range of motion. Cervical back: Normal range of motion and neck supple. No rigidity. No muscular tenderness. Right lower leg: No edema. Left lower leg: No edema. Lymphadenopathy:      Cervical: No cervical adenopathy. Skin:     General: Skin is warm and dry. Coloration: Skin is not pale. Findings: No erythema or rash. Neurological:      General: No focal deficit present. Mental Status: He is alert and oriented to person, place, and time. Mental status is at baseline. Cranial Nerves: No cranial nerve deficit. Sensory: No sensory deficit. Motor: No weakness or abnormal muscle tone. Coordination: Coordination normal.      Gait: Gait normal.      Comments: A&Ox3 to person, place, and time. CN 2-12 intact. Strength 5/5 throughout. Sensation intact throughout. Cerebellar exam including gait intact. Psychiatric:         Behavior: Behavior normal.         Thought Content:  Thought content normal.         Vital Signs  ED Triage Vitals [07/05/23 1835]   Temperature Pulse Respirations Blood Pressure SpO2   (!) 97.3 °F (36.3 °C) 83 18 149/71 97 %      Temp Source Heart Rate Source Patient Position - Orthostatic VS BP Location FiO2 (%)   Temporal Monitor Sitting Left arm --      Pain Score       --           Vitals:    07/05/23 1835   BP: 149/71   Pulse: 83   Patient Position - Orthostatic VS: Sitting         Visual Acuity  Visual Acuity    Flowsheet Row Most Recent Value   L Pupil Size (mm) 3   R Pupil Size (mm) 3          ED Medications  Medications   meclizine (ANTIVERT) tablet 25 mg (has no administration in time range)   metoclopramide (REGLAN) injection 10 mg (has no administration in time range)   sodium chloride 0.9 % bolus 1,000 mL (has no administration in time range)       Diagnostic Studies  Results Reviewed     Procedure Component Value Units Date/Time    CBC and differential [[id number]]     Lab Status: No result Specimen: Blood     Basic metabolic panel [[id number]] Lab Status: No result Specimen: Blood     HS Troponin 0hr (reflex protocol) [[id number]]     Lab Status: No result Specimen: Blood                  CT head without contrast    (Results Pending)              Procedures  Procedures         ED Course                               SBIRT 20yo+    Flowsheet Row Most Recent Value   Initial Alcohol Screen: US AUDIT-C     1. How often do you have a drink containing alcohol? 0 Filed at: 07/05/2023 1840   2. How many drinks containing alcohol do you have on a typical day you are drinking? 0 Filed at: 07/05/2023 1840   3a. Male UNDER 65: How often do you have five or more drinks on one occasion? 0 Filed at: 07/05/2023 1840   3b. FEMALE Any Age, or MALE 65+: How often do you have 4 or more drinks on one occassion? 0 Filed at: 07/05/2023 1840   Audit-C Score 0 Filed at: 07/05/2023 1840   NARCISA: How many times in the past year have you. .. Used an illegal drug or used a prescription medication for non-medical reasons? Never Filed at: 07/05/2023 [address] Making  17-year-old male history of lupus, hypertension, CAD on Eliquis presenting with dizziness. Positional room spinning dizziness worse with standing and improved with lying flat. Suspect likely peripheral vertigo. However given risk factors and blood thinners, plan for CT imaging of head. Also plan for cardiac evaluation including EKG and troponin. Basic labs. Symptom management with oral and IV medications plus fluids. Reassess. EKG, heart score    EKG interpreted by me with normal sinus rhythm without acute ST abnormality. Amount and/or Complexity of Data Reviewed  Labs: ordered. Radiology: ordered. Risk  Prescription drug management. Disposition  Final diagnoses:   None     ED Disposition     None      Follow-up Information    None         Patient's Medications   Discharge Prescriptions    No medications on file       No discharge procedures on file.     PDMP Review       Value Time User    PDMP Reviewed  Yes 12/29/2022 12:10 PM Bailey Morataya PA-C          ED Provider  Electronically Signed by including EKG and troponin. Basic labs. Symptom management with oral and IV medications plus fluids. Reassess. EKG interpreted by me with normal sinus rhythm without acute ST abnormality. Labs interpreted by me without significant acute process. Imaging no acute process. Symptoms resolved after medications. Prescriptions sent to pharmacy. Discussed results and recommendations. Advised follow up PCP. Medication recommendations. Given instructions and return precautions. Patient/family at bedside acknowledged understanding of all written and verbal instructions and return precautions. Discharged. Amount and/or Complexity of Data Reviewed  Labs: ordered. Radiology: ordered. Risk  Prescription drug management. Disposition  Final diagnoses:   Dizziness   Chest pain     Time reflects when diagnosis was documented in both MDM as applicable and the Disposition within this note     Time User Action Codes Description Comment    7/5/2023  8:12 PM Mora Brewer Add [R42] Dizziness     7/5/2023  8:12 PM Mora Brewer Add [R07.9] Chest pain       ED Disposition     ED Disposition   Discharge    Condition   Stable    Date/Time   Wed Jul 5, 2023  9:42 PM    [facility] discharge to home/self care.                Follow-up Information     Follow up With Specialties Details Why Cisco Wu MD Internal Medicine Schedule an appointment as soon as possible for a visit in 1 week  [address] [place] [address]  [phone]            Discharge Medication List as of 7/5/2023  9:43 PM      START taking these medications    Details   meclizine (ANTIVERT) 25 mg tablet Take 1 tablet (25 mg total) by mouth 3 (three) times a day as needed for dizziness, Starting Wed 7/5/2023, Normal         CONTINUE these medications which have NOT CHANGED    Details   apixaban (ELIQUIS) 5 mg Take 5 mg by mouth 2 (two) times a day, Starting Fri 4/8/2022, Historical Med      baclofen 20 mg tablet Take 20 mg by mouth 3 (three) times a day , Historical Med      betamethasone dipropionate (DIPROSONE) 0.05 % cream Apply 1 application topically 2 (two) times a day, Historical Med      betamethasone, augmented, (DIPROLENE) 0.05 % ointment Apply 1 application topically 2 (two) times a day, Historical Med      carvedilol (COREG) 12.5 mg tablet Take 12.5 mg by mouth 2 (two) times a day with meals, Historical Med      hydroxychloroquine (PLAQUENIL) 200 mg tablet Take 400 mg by mouth daily at bedtime, Historical Med      hydrOXYzine HCL (ATARAX) 10 mg tablet Take 10 mg by mouth every 6 (six) hours as needed for itching, Historical Med      mycophenolate (CELLCEPT) 500 mg tablet Take 500 mg by mouth every 12 (twelve) hours , Historical Med      potassium chloride (K-DUR,KLOR-CON) 20 mEq tablet Take 20 mEq by mouth daily, Historical Med      azelastine (ASTELIN) 0.1 % nasal spray ADMINISTER 1 SPRAY INTO NOSTRIL TWICE DAILY, Historical Med      bacitracin topical ointment 500 units/g topical ointment APPLY TOPICALLY TO AFFECTED AREA THREE TIMES A DAY AS DIRECTED, Historical Med      !!  Diclofenac Sodium (VOLTAREN) 1 % Apply 2 g topically 4 (four) times a day, Starting Mon 2/27/2023, Normal      escitalopram (LEXAPRO) 10 mg tablet Take 1 tablet (10 mg total) by mouth daily, Starting Tue 4/4/2023, Until Mon 7/3/2023, Normal      furosemide (LASIX) 40 mg tablet Take 1 tablet (40 mg total) by mouth 2 (two) times a day, Starting Tue 2/14/2023, No Print      gabapentin (NEURONTIN) 300 mg capsule Take 1 capsule (300 mg total) by mouth 3 (three) times a day, Starting Tue 4/4/2023, Until Mon 7/3/2023, Normal      gabapentin (NEURONTIN) 600 MG tablet Take 1 tablet (600 mg total) by mouth 3 (three) times a day, Starting Tue 4/4/2023, Until Mon 7/3/2023, Normal      lidocaine-prilocaine (EMLA) cream Apply topically as needed for mild pain, Starting Mon 2/27/2023, Normal      loratadine (CLARITIN) 10 mg tablet Take 1 tablet (10 mg total) by mouth daily, Starting Mon 10/31/2022, Until Tue 4/4/2023, Normal      metolazone (ZAROXOLYN) 5 mg tablet Take 0.5 tablets (2.5 mg total) by mouth 3 (three) times a week, Starting Tue 2/14/2023, No Print      tamsulosin (FLOMAX) 0.4 mg Starting Thu 10/13/2022, Historical Med      !! Diclofenac Sodium (VOLTAREN) 1 % Apply 2 g topically 4 (four) times a day, Starting Fri 4/14/2023, Normal       !! - Potential duplicate medications found. Please discuss with provider. No discharge procedures on file.     PDMP Review       Value Time User    PDMP Reviewed  Yes 12/29/2022 12:10 PM Chayo Alcala PA-C          ED Provider  Electronically Signed by           Brennan Hurst MD  07/15/23 6330

## 2023-07-06 NOTE — ED NOTES
PT ambulated with writer per provider Ca's order. PT denies any dizziness during walk. Provider made aware.       Alan Lopez RN  07/05/23 0342

## 2023-07-06 NOTE — DISCHARGE INSTRUCTIONS
Please follow up PCP. Recommend meclizine every 8 hours for the next 2 days and then use as needed according to medication instructions. Recommend tylenol 650 mg and ibuprofen 600 mg every 6 hours as needed for pain. Please return for severe chest pain, significant shortness of breath, severely worsening symptoms, or any other concerning signs or symptoms. Please refer to the following documents for additional instructions and return precautions.

## 2023-07-11 ENCOUNTER — OFFICE VISIT (OUTPATIENT)
Dept: INTERNAL MEDICINE CLINIC | Facility: CLINIC | Age: 53
End: 2023-07-11
Payer: COMMERCIAL

## 2023-07-11 VITALS
WEIGHT: 185 LBS | RESPIRATION RATE: 16 BRPM | DIASTOLIC BLOOD PRESSURE: 58 MMHG | OXYGEN SATURATION: 98 % | HEIGHT: 66 IN | SYSTOLIC BLOOD PRESSURE: 100 MMHG | HEART RATE: 68 BPM | BODY MASS INDEX: 29.73 KG/M2

## 2023-07-11 DIAGNOSIS — R25.2 SPASTICITY: ICD-10-CM

## 2023-07-11 DIAGNOSIS — N18.30 HYPERTENSIVE KIDNEY DISEASE WITH STAGE 3 CHRONIC KIDNEY DISEASE, UNSPECIFIED WHETHER STAGE 3A OR 3B CKD (HCC): ICD-10-CM

## 2023-07-11 DIAGNOSIS — M62.81 MUSCLE WEAKNESS (GENERALIZED): ICD-10-CM

## 2023-07-11 DIAGNOSIS — I12.9 HYPERTENSIVE KIDNEY DISEASE WITH STAGE 3 CHRONIC KIDNEY DISEASE, UNSPECIFIED WHETHER STAGE 3A OR 3B CKD (HCC): ICD-10-CM

## 2023-07-11 DIAGNOSIS — I10 PRIMARY HYPERTENSION: Primary | ICD-10-CM

## 2023-07-11 DIAGNOSIS — M32.14 LUPUS NEPHRITIS (HCC): ICD-10-CM

## 2023-07-11 DIAGNOSIS — M32.9 SYSTEMIC LUPUS ERYTHEMATOSUS, UNSPECIFIED SLE TYPE, UNSPECIFIED ORGAN INVOLVEMENT STATUS (HCC): ICD-10-CM

## 2023-07-11 DIAGNOSIS — R26.9 GAIT DIFFICULTY: ICD-10-CM

## 2023-07-11 DIAGNOSIS — N18.31 STAGE 3A CHRONIC KIDNEY DISEASE (HCC): ICD-10-CM

## 2023-07-11 DIAGNOSIS — M19.071 DJD (DEGENERATIVE JOINT DISEASE), ANKLE AND FOOT, RIGHT: ICD-10-CM

## 2023-07-11 DIAGNOSIS — M79.2 NEUROPATHIC PAIN: ICD-10-CM

## 2023-07-11 DIAGNOSIS — R25.1 TREMOR OF BOTH HANDS: ICD-10-CM

## 2023-07-11 DIAGNOSIS — Z86.718 HISTORY OF DVT (DEEP VEIN THROMBOSIS): ICD-10-CM

## 2023-07-11 PROCEDURE — 99214 OFFICE O/P EST MOD 30 MIN: CPT | Performed by: INTERNAL MEDICINE

## 2023-07-11 NOTE — PROGRESS NOTES
Assessment/Plan:     Adalberto Michaud is here for routine f/u, he looks well, was recently in the ED with fatigue, dizziness, chest pain. All imaging and EKG were unremarkable. Says his symptoms have mostly resolved. He does have meclizine to take as needed. We discussed his chronic medical problems. He is following with multiple specialties, following with rheumatology for lupus and is on hydroxychloroquine, needs an eye exam.  Also on CellCept. Following with nephrology for chronic kidney disease. Following with hematology for history of DVT and he is on Eliquis. Was on Xarelto. Will be seeing physiatry July 18. Has chronic back pain that radiates down posterior thighs both legs. On gabapentin. We will do a prostate exam next visit. Quality Measures:     BMI Counseling: There is no height or weight on file to calculate BMI. The BMI is above normal. Nutrition recommendations include decreasing portion sizes and encouraging healthy choices of fruits and vegetables. Exercise recommendations include moderate physical activity 150 minutes/week. Rationale for BMI follow-up plan is due to patient being overweight or obese. Depression Screening and Follow-up Plan: Clincally patient does not have depression. No treatment is required. Return in about 3 months (around 10/11/2023). No problem-specific Assessment & Plan notes found for this encounter. Diagnoses and all orders for this visit:    Primary hypertension  -     CBC and differential; Future  -     TSH, 3rd generation with Free T4 reflex; Future    Lupus nephritis (HCC)    Stage 3a chronic kidney disease ([address])    Hypertensive kidney disease with stage 3 chronic kidney disease, unspecified whether stage 3a or 3b CKD (HCC)  -     CBC and differential; Future  -     Comprehensive metabolic panel; Future  -     Hemoglobin A1C; Future  -     Lipid Panel with Direct LDL reflex;  Future    Systemic lupus erythematosus, unspecified SLE type, unspecified organ involvement status ([address])    Spasticity    Tremor of both hands    Muscle weakness (generalized)    History of DVT (deep vein thrombosis)    Gait difficulty    Neuropathic pain    DJD (degenerative joint disease), ankle and foot, right          Subjective:      Patient ID: Lamonte Agee is a 52 y.o. male. Here for routine f/u.       ALLERGIES:  Allergies   Allergen Reactions   • Sulfa Antibiotics Rash       CURRENT MEDICATIONS:    Current Outpatient Medications:   •  apixaban (ELIQUIS) 5 mg, Take 5 mg by mouth 2 (two) times a day, Disp: , Rfl:   •  azelastine (ASTELIN) 0.1 % nasal spray, ADMINISTER 1 SPRAY INTO NOSTRIL TWICE DAILY, Disp: , Rfl:   •  bacitracin topical ointment 500 units/g topical ointment, APPLY TOPICALLY TO AFFECTED AREA THREE TIMES A DAY AS DIRECTED, Disp: , Rfl:   •  baclofen 20 mg tablet, Take 20 mg by mouth 3 (three) times a day , Disp: , Rfl:   •  betamethasone dipropionate (DIPROSONE) 0.05 % cream, Apply 1 application topically 2 (two) times a day, Disp: , Rfl:   •  betamethasone, augmented, (DIPROLENE) 0.05 % ointment, Apply 1 application topically 2 (two) times a day, Disp: , Rfl:   •  carvedilol (COREG) 12.5 mg tablet, Take 12.5 mg by mouth 2 (two) times a day with meals, Disp: , Rfl:   •  Diclofenac Sodium (VOLTAREN) 1 %, Apply 2 g topically 4 (four) times a day, Disp: 2 g, Rfl: 0  •  furosemide (LASIX) 40 mg tablet, Take 1 tablet (40 mg total) by mouth 2 (two) times a day, Disp: , Rfl: 0  •  hydroxychloroquine (PLAQUENIL) 200 mg tablet, Take 400 mg by mouth daily at bedtime, Disp: , Rfl:   •  hydrOXYzine HCL (ATARAX) 10 mg tablet, Take 10 mg by mouth every 6 (six) hours as needed for itching, Disp: , Rfl:   •  lidocaine-prilocaine (EMLA) cream, Apply topically as needed for mild pain, Disp: 30 g, Rfl: 3  •  loratadine (CLARITIN) 10 mg tablet, Take 1 tablet (10 mg total) by mouth daily, Disp: 30 tablet, Rfl: 2  •  meclizine (ANTIVERT) 25 mg tablet, Take 1 tablet (25 mg total) by mouth 3 (three) times a day as needed for dizziness, Disp: 30 tablet, Rfl: 0  •  metolazone (ZAROXOLYN) 5 mg tablet, Take 0.5 tablets (2.5 mg total) by mouth 3 (three) times a week, Disp: , Rfl:   •  mycophenolate (CELLCEPT) 500 mg tablet, Take 500 mg by mouth every 12 (twelve) hours , Disp: , Rfl:   •  potassium chloride (K-DUR,KLOR-CON) 20 mEq tablet, Take 20 mEq by mouth daily, Disp: , Rfl:   •  tamsulosin (FLOMAX) 0.4 mg, , Disp: , Rfl:   •  escitalopram (LEXAPRO) 10 mg tablet, Take 1 tablet (10 mg total) by mouth daily, Disp: 90 tablet, Rfl: 3  •  gabapentin (NEURONTIN) 300 mg capsule, Take 1 capsule (300 mg total) by mouth 3 (three) times a day, Disp: 270 capsule, Rfl: 3  •  gabapentin (NEURONTIN) 600 MG tablet, Take 1 tablet (600 mg total) by mouth 3 (three) times a day, Disp: 270 tablet, Rfl: 3    ACTIVE PROBLEM LIST:  Patient Active Problem List   Diagnosis   • Lupus (systemic lupus erythematosus) (HCC)   • Lupus nephritis (HCC)   • Hypertension   • History of DVT (deep vein thrombosis)   • Stage 3 chronic kidney disease (HCC)   • Persistent proteinuria   • Anemia in chronic kidney disease   • Muscle weakness (generalized)   • Tremor of both hands   • Spasticity   • Gait difficulty   • Neuropathic pain   • Hypertensive CKD (chronic kidney disease)   • Right foot pain   • DJD (degenerative joint disease), ankle and foot, right       PAST MEDICAL HISTORY:  Past Medical History:   Diagnosis Date   • Anemia    • Arthritis    • Cervical mass    • Chronic kidney disease    • Coronary artery disease    • Depression    • DVT (deep venous thrombosis) ([facility])    • Hypertension    • Lupus ([address])    • Renal disorder        PAST SURGICAL HISTORY:  Past Surgical History:   Procedure Laterality Date   • APPENDECTOMY     • CERVICAL SPINE SURGERY     • CHOLECYSTECTOMY     • COLONOSCOPY N/A 8/23/2018    Procedure: COLONOSCOPY;  Surgeon: Elaine Juares MD;  Location: [facility];   Service: Gastroenterology   • ESOPHAGOGASTRODUODENOSCOPY N/A 8/22/2018    Procedure: ESOPHAGOGASTRODUODENOSCOPY (EGD); Surgeon: Percy Chase MD;  Location: [facility]; Service: Gastroenterology   • IR IVC FILTER PLACEMENT PERMANENT  9/7/2017   • IR IVC FILTER REMOVAL  4/23/2018   • IR LUMBAR PUNCTURE  11/23/2015   • NECK SURGERY     • US GUIDED INJECTION FOR RESEARCH STUDY  4/23/2018   • US GUIDED INJECTION FOR RESEARCH STUDY  9/7/2017   • VASCULAR SURGERY         FAMILY HISTORY:  Family History   Problem Relation Age of Onset   • Heart disease Mother    • No Known Problems Father        SOCIAL HISTORY:  Social History     Socioeconomic History   • Marital status: /Civil Union     Spouse name: Not on file   • Number of children: Not on file   • Years of education: Not on file   • Highest education level: Not on file   Occupational History   • Not on file   Tobacco Use   • Smoking status: Never   • Smokeless tobacco: Never   Vaping Use   • Vaping Use: Never used   Substance and Sexual Activity   • Alcohol use: Never   • Drug use: Never   • Sexual activity: Yes     Partners: Female, Male     Birth control/protection: Rhythm   Other Topics Concern   • Not on file   Social History Narrative    ** Merged History Encounter **          Social Determinants of Health     Financial Resource Strain: Not on file   Food Insecurity: No Food Insecurity (1/1/2023)    Hunger Vital Sign    • Worried About Running Out of Food in the Last Year: Never true    • Ran Out of Food in the Last Year: Never true   Transportation Needs: No Transportation Needs (1/1/2023)    PRAPARE - Transportation    • Lack of Transportation (Medical): No    • Lack of Transportation (Non-Medical):  No   Physical Activity: Not on file   Stress: Not on file   Social Connections: Not on file   Intimate Partner Violence: Not on file   Housing Stability: Low Risk  (1/1/2023)    Housing Stability Vital Sign    • Unable to Pay for Housing in the Last Year: No    • Number of Places Lived in the Last Year: 1    • Unstable Housing in the Last Year: No       Review of Systems   Constitutional: Negative for chills and fever. HENT: Negative for ear pain and sore throat. Eyes: Negative for pain and visual disturbance. Respiratory: Negative for cough and shortness of breath. Cardiovascular: Negative for chest pain and palpitations. Gastrointestinal: Negative for abdominal pain and vomiting. Genitourinary: Negative for dysuria and hematuria. Musculoskeletal: Positive for arthralgias, back pain and gait problem (using walker). Right low back pain, low back pain,  Posterior thighs, numbness, to calves and to feet,  2 months, ,  Urinate too much /BPH   Skin: Negative for color change and rash. Neurological: Negative for seizures and syncope. All other systems reviewed and are negative. Objective:  Vitals:    07/11/23 1014   BP: 100/58   BP Location: Left arm   Patient Position: Sitting   Cuff Size: Adult   Pulse: 68   Resp: 16   SpO2: 98%   Weight: 83.9 kg (185 lb)   Height: 5' 6" (1.676 m)     Body mass index is 29.86 kg/m². Physical Exam  Vitals and nursing note reviewed. Constitutional:       Appearance: Normal appearance. HENT:      Head: Normocephalic and atraumatic. Cardiovascular:      Rate and Rhythm: Normal rate and regular rhythm. Heart sounds: Normal heart sounds. Pulmonary:      Effort: Pulmonary effort is normal.      Breath sounds: Normal breath sounds. Musculoskeletal:      Cervical back: Normal range of motion. Comments: Left foot drop   Skin:     General: Skin is warm and dry. Neurological:      General: No focal deficit present. Mental Status: He is alert and oriented to person, place, and time. Mental status is at baseline. Gait: Gait abnormal (Using a walker). Psychiatric:         Mood and Affect: Mood normal.           RESULTS:    In chart    This note was created with voice recognition software.   Phonic, grammatical and spelling errors may be present within the note as a result.

## 2023-07-18 ENCOUNTER — TELEPHONE (OUTPATIENT)
Dept: INTERNAL MEDICINE CLINIC | Facility: CLINIC | Age: 53
End: 2023-07-18

## 2023-07-18 NOTE — TELEPHONE ENCOUNTER
Pt called with pain in both feet, a little swollen, I put him in for Monday where I could. He is elevating his legs also     I saw he is on water pill by Carlita Larkin,   Should a nurse triage him furthur and maybe have him talk to Carlita Larkin office too?

## 2023-07-18 NOTE — TELEPHONE ENCOUNTER
Contacted patient. Patient stated he has pain in both feet, little swelling, numbness and hot on top of his feet. Pt denies any redness and shortness of breath. Patient stated this has been going on for over a week. Patient has an appointment on Monday. Please advise.

## 2023-07-24 ENCOUNTER — OFFICE VISIT (OUTPATIENT)
Dept: INTERNAL MEDICINE CLINIC | Facility: CLINIC | Age: 53
End: 2023-07-24
Payer: COMMERCIAL

## 2023-07-24 VITALS
WEIGHT: 185 LBS | HEART RATE: 71 BPM | DIASTOLIC BLOOD PRESSURE: 80 MMHG | RESPIRATION RATE: 14 BRPM | HEIGHT: 66 IN | OXYGEN SATURATION: 98 % | BODY MASS INDEX: 29.73 KG/M2 | SYSTOLIC BLOOD PRESSURE: 124 MMHG

## 2023-07-24 DIAGNOSIS — L03.116 CELLULITIS OF LEFT FOOT: Primary | ICD-10-CM

## 2023-07-24 DIAGNOSIS — M79.671 RIGHT FOOT PAIN: ICD-10-CM

## 2023-07-24 DIAGNOSIS — R26.9 GAIT DIFFICULTY: ICD-10-CM

## 2023-07-24 DIAGNOSIS — M79.672 LEFT FOOT PAIN: ICD-10-CM

## 2023-07-24 PROCEDURE — 99214 OFFICE O/P EST MOD 30 MIN: CPT | Performed by: INTERNAL MEDICINE

## 2023-07-24 RX ORDER — CEPHALEXIN 500 MG/1
500 CAPSULE ORAL EVERY 8 HOURS SCHEDULED
Qty: 15 CAPSULE | Refills: 0 | Status: SHIPPED | OUTPATIENT
Start: 2023-07-24 | End: 2023-07-29

## 2023-07-24 NOTE — PROGRESS NOTES
Assessment/Plan:     He has left heel pain and redness, some dry skin; concern for cellulitis; ABT sent; follow up with podiatry. Quality Measures:     BMI Counseling: There is no height or weight on file to calculate BMI. The BMI is above normal. Nutrition recommendations include decreasing portion sizes and encouraging healthy choices of fruits and vegetables. Exercise recommendations include moderate physical activity 150 minutes/week. Rationale for BMI follow-up plan is due to patient being overweight or obese. Depression Screening and Follow-up Plan: Clincally patient does not have depression. No treatment is required. Return for Next scheduled follow up. No problem-specific Assessment & Plan notes found for this encounter. Diagnoses and all orders for this visit:    Cellulitis of left foot  -     cephalexin (KEFLEX) 500 mg capsule; Take 1 capsule (500 mg total) by mouth every 8 (eight) hours for 5 days    Gait difficulty          Subjective:      Patient ID: Jean Carlos Forrester is a 52 y.o. male. Here with acute complaints.       ALLERGIES:  Allergies   Allergen Reactions   • Sulfa Antibiotics Rash       CURRENT MEDICATIONS:    Current Outpatient Medications:   •  apixaban (ELIQUIS) 5 mg, Take 5 mg by mouth 2 (two) times a day, Disp: , Rfl:   •  baclofen 20 mg tablet, Take 20 mg by mouth 3 (three) times a day , Disp: , Rfl:   •  betamethasone dipropionate (DIPROSONE) 0.05 % cream, Apply 1 application topically 2 (two) times a day, Disp: , Rfl:   •  betamethasone, augmented, (DIPROLENE) 0.05 % ointment, Apply 1 application topically 2 (two) times a day, Disp: , Rfl:   •  carvedilol (COREG) 12.5 mg tablet, Take 12.5 mg by mouth 2 (two) times a day with meals, Disp: , Rfl:   •  cephalexin (KEFLEX) 500 mg capsule, Take 1 capsule (500 mg total) by mouth every 8 (eight) hours for 5 days, Disp: 15 capsule, Rfl: 0  •  Diclofenac Sodium (VOLTAREN) 1 %, Apply 2 g topically 4 (four) times a day, Disp: 2 g, Rfl: 0  •  escitalopram (LEXAPRO) 10 mg tablet, Take 1 tablet (10 mg total) by mouth daily, Disp: 90 tablet, Rfl: 3  •  furosemide (LASIX) 40 mg tablet, Take 1 tablet (40 mg total) by mouth 2 (two) times a day, Disp: , Rfl: 0  •  gabapentin (NEURONTIN) 300 mg capsule, Take 1 capsule (300 mg total) by mouth 3 (three) times a day, Disp: 270 capsule, Rfl: 3  •  gabapentin (NEURONTIN) 600 MG tablet, Take 1 tablet (600 mg total) by mouth 3 (three) times a day, Disp: 270 tablet, Rfl: 3  •  hydroxychloroquine (PLAQUENIL) 200 mg tablet, Take 400 mg by mouth daily at bedtime, Disp: , Rfl:   •  hydrOXYzine HCL (ATARAX) 10 mg tablet, Take 10 mg by mouth every 6 (six) hours as needed for itching, Disp: , Rfl:   •  lidocaine-prilocaine (EMLA) cream, Apply topically as needed for mild pain, Disp: 30 g, Rfl: 3  •  meclizine (ANTIVERT) 25 mg tablet, Take 1 tablet (25 mg total) by mouth 3 (three) times a day as needed for dizziness, Disp: 30 tablet, Rfl: 0  •  metolazone (ZAROXOLYN) 5 mg tablet, Take 0.5 tablets (2.5 mg total) by mouth 3 (three) times a week, Disp: , Rfl:   •  mycophenolate (CELLCEPT) 500 mg tablet, Take 500 mg by mouth every 12 (twelve) hours , Disp: , Rfl:   •  potassium chloride (K-DUR,KLOR-CON) 20 mEq tablet, Take 20 mEq by mouth daily, Disp: , Rfl:   •  tamsulosin (FLOMAX) 0.4 mg, , Disp: , Rfl:   •  azelastine (ASTELIN) 0.1 % nasal spray, ADMINISTER 1 SPRAY INTO NOSTRIL TWICE DAILY, Disp: , Rfl:   •  bacitracin topical ointment 500 units/g topical ointment, APPLY TOPICALLY TO AFFECTED AREA THREE TIMES A DAY AS DIRECTED, Disp: , Rfl:   •  loratadine (CLARITIN) 10 mg tablet, Take 1 tablet (10 mg total) by mouth daily, Disp: 30 tablet, Rfl: 2    ACTIVE PROBLEM LIST:  Patient Active Problem List   Diagnosis   • Lupus (systemic lupus erythematosus) (HCC)   • Lupus nephritis (HCC)   • Hypertension   • History of DVT (deep vein thrombosis)   • Stage 3 chronic kidney disease (HCC)   • Persistent proteinuria   • Anemia in chronic kidney disease   • Muscle weakness (generalized)   • Tremor of both hands   • Spasticity   • Gait difficulty   • Neuropathic pain   • Hypertensive CKD (chronic kidney disease)   • Right foot pain   • DJD (degenerative joint disease), ankle and foot, right       PAST MEDICAL HISTORY:  Past Medical History:   Diagnosis Date   • Anemia    • Arthritis    • Cervical mass    • Chronic kidney disease    • Coronary artery disease    • Depression    • DVT (deep venous thrombosis) ([facility])    • Hypertension    • Lupus ([address])    • Renal disorder        PAST SURGICAL HISTORY:  Past Surgical History:   Procedure Laterality Date   • APPENDECTOMY     • CERVICAL SPINE SURGERY     • CHOLECYSTECTOMY     • COLONOSCOPY N/A 8/23/2018    Procedure: COLONOSCOPY;  Surgeon: Jagdeep Apodaca MD;  Location: [facility]; Service: Gastroenterology   • ESOPHAGOGASTRODUODENOSCOPY N/A 8/22/2018    Procedure: ESOPHAGOGASTRODUODENOSCOPY (EGD); Surgeon: Jagdeep Apodaca MD;  Location: [facility];   Service: Gastroenterology   • IR IVC FILTER PLACEMENT PERMANENT  9/7/2017   • IR IVC FILTER REMOVAL  4/23/2018   • IR LUMBAR PUNCTURE  11/23/2015   • NECK SURGERY     • US GUIDED INJECTION FOR RESEARCH STUDY  4/23/2018   • US GUIDED INJECTION FOR RESEARCH STUDY  9/7/2017   • VASCULAR SURGERY         FAMILY HISTORY:  Family History   Problem Relation Age of Onset   • Heart disease Mother    • No Known Problems Father        SOCIAL HISTORY:  Social History     Socioeconomic History   • Marital status: /Civil Union     Spouse name: Not on file   • Number of children: Not on file   • Years of education: Not on file   • Highest education level: Not on file   Occupational History   • Not on file   Tobacco Use   • Smoking status: Never   • Smokeless tobacco: Never   Vaping Use   • Vaping Use: Never used   Substance and Sexual Activity   • Alcohol use: Never   • Drug use: Never   • Sexual activity: Yes     Partners: Female, Male Birth control/protection: Rhythm   Other Topics Concern   • Not on file   Social History Narrative    ** Merged History Encounter **          Social Determinants of Health     Financial Resource Strain: Not on file   Food Insecurity: No Food Insecurity (1/1/2023)    Hunger Vital Sign    • Worried About Running Out of Food in the Last Year: Never true    • Ran Out of Food in the Last Year: Never true   Transportation Needs: No Transportation Needs (1/1/2023)    PRAPARE - Transportation    • Lack of Transportation (Medical): No    • Lack of Transportation (Non-Medical): No   Physical Activity: Not on file   Stress: Not on file   Social Connections: Not on file   Intimate Partner Violence: Not on file   Housing Stability: Low Risk  (1/1/2023)    Housing Stability Vital Sign    • Unable to Pay for Housing in the Last Year: No    • Number of Places Lived in the Last Year: 1    • Unstable Housing in the Last Year: No       Review of Systems   Constitutional: Negative for chills and fever. HENT: Negative for ear pain and sore throat. Eyes: Negative for pain and visual disturbance. Respiratory: Negative for cough and shortness of breath. Cardiovascular: Negative for chest pain and palpitations. Gastrointestinal: Negative for abdominal pain and vomiting. Genitourinary: Negative for dysuria and hematuria. Musculoskeletal: Positive for arthralgias and gait problem. Negative for back pain. Skin: Negative for color change and rash. Neurological: Negative for seizures and syncope. All other systems reviewed and are negative. Objective:  Vitals:    07/24/23 1342   BP: 124/80   BP Location: Left arm   Patient Position: Sitting   Pulse: 71   Resp: 14   SpO2: 98%   Weight: 83.9 kg (185 lb)   Height: 5' 6" (1.676 m)     Body mass index is 29.86 kg/m². Physical Exam  Vitals and nursing note reviewed. Constitutional:       Appearance: Normal appearance. He is obese.    HENT:      Head: Normocephalic and atraumatic. Cardiovascular:      Rate and Rhythm: Normal rate. Pulmonary:      Effort: Pulmonary effort is normal.   Musculoskeletal:         General: Normal range of motion. Cervical back: Normal range of motion. Skin:     General: Skin is warm and dry. Neurological:      General: No focal deficit present. Mental Status: He is alert and oriented to person, place, and time. Mental status is at baseline. Gait: Gait abnormal (using walker). Psychiatric:         Mood and Affect: Mood normal.           RESULTS:    In chart    This note was created with voice recognition software. Phonic, grammatical and spelling errors may be present within the note as a result.

## 2023-08-03 ENCOUNTER — HOSPITAL ENCOUNTER (EMERGENCY)
Facility: HOSPITAL | Age: 53
Discharge: HOME | End: 2023-08-03
Attending: EMERGENCY MEDICINE
Payer: COMMERCIAL

## 2023-08-03 VITALS
OXYGEN SATURATION: 98 % | DIASTOLIC BLOOD PRESSURE: 73 MMHG | TEMPERATURE: 98 F | HEART RATE: 84 BPM | RESPIRATION RATE: 16 BRPM | SYSTOLIC BLOOD PRESSURE: 132 MMHG

## 2023-08-03 DIAGNOSIS — M79.89 FINGER SWELLING: Primary | ICD-10-CM

## 2023-08-03 PROCEDURE — 99283 EMERGENCY DEPT VISIT LOW MDM: CPT

## 2023-08-03 PROCEDURE — 99284 EMERGENCY DEPT VISIT MOD MDM: CPT | Performed by: EMERGENCY MEDICINE

## 2023-08-03 RX ORDER — IBUPROFEN 600 MG/1
600 TABLET ORAL ONCE
Status: DISCONTINUED | OUTPATIENT
Start: 2023-08-03 | End: 2023-08-03 | Stop reason: HOSPADM

## 2023-08-03 RX ORDER — PREDNISONE 20 MG/1
40 TABLET ORAL DAILY
Qty: 10 TABLET | Refills: 0 | Status: SHIPPED | OUTPATIENT
Start: 2023-08-04

## 2023-08-03 NOTE — ED PROVIDER NOTES
History  Chief Complaint   Patient presents with   • Finger Swelling     Reports waking up with R thumb and R pinky swelling and redness, no known injury. 53 yo male who ambulates with a walker and has a h/o ckd and lupus as well as arthritis who presents to the ED c/o pain and swelling of the proximal R pinky finger. He states this has happened before due to chronic use of his walker, previously resolved with ibuprofen which helps the pain. He requests a dose of ibuprofen here. He denies fever. He denies an inability to flex or extend the finger. He has no other injuries or concerns. Prior to Admission Medications   Prescriptions Last Dose Informant Patient Reported? Taking?    Diclofenac Sodium (VOLTAREN) 1 %  Self No No   Sig: Apply 2 g topically 4 (four) times a day   apixaban (ELIQUIS) 5 mg  Self Yes No   Sig: Take 5 mg by mouth 2 (two) times a day   azelastine (ASTELIN) 0.1 % nasal spray  Self Yes No   Sig: ADMINISTER 1 SPRAY INTO NOSTRIL TWICE DAILY   bacitracin topical ointment 500 units/g topical ointment  Self Yes No   Sig: APPLY TOPICALLY TO AFFECTED AREA THREE TIMES A DAY AS DIRECTED   baclofen 20 mg tablet  Self Yes No   Sig: Take 20 mg by mouth 3 (three) times a day    betamethasone dipropionate (DIPROSONE) 0.05 % cream  Self Yes No   Sig: Apply 1 application topically 2 (two) times a day   betamethasone, augmented, (DIPROLENE) 0.05 % ointment  Self Yes No   Sig: Apply 1 application topically 2 (two) times a day   carvedilol (COREG) 12.5 mg tablet  Self Yes No   Sig: Take 12.5 mg by mouth 2 (two) times a day with meals   escitalopram (LEXAPRO) 10 mg tablet  Self No No   Sig: Take 1 tablet (10 mg total) by mouth daily   furosemide (LASIX) 40 mg tablet  Self No No   Sig: Take 1 tablet (40 mg total) by mouth 2 (two) times a day   gabapentin (NEURONTIN) 300 mg capsule  Self No No   Sig: Take 1 capsule (300 mg total) by mouth 3 (three) times a day   gabapentin (NEURONTIN) 600 MG tablet  Self No No   Sig: Take 1 tablet (600 mg total) by mouth 3 (three) times a day   hydrOXYzine HCL (ATARAX) 10 mg tablet  Self Yes No   Sig: Take 10 mg by mouth every 6 (six) hours as needed for itching   hydroxychloroquine (PLAQUENIL) 200 mg tablet  Self Yes No   Sig: Take 400 mg by mouth daily at bedtime   lidocaine-prilocaine (EMLA) cream  Self No No   Sig: Apply topically as needed for mild pain   loratadine (CLARITIN) 10 mg tablet  Self No No   Sig: Take 1 tablet (10 mg total) by mouth daily   meclizine (ANTIVERT) 25 mg tablet   No No   Sig: Take 1 tablet (25 mg total) by mouth 3 (three) times a day as needed for dizziness   metolazone (ZAROXOLYN) 5 mg tablet  Self No No   Sig: Take 0.5 tablets (2.5 mg total) by mouth 3 (three) times a week   mycophenolate (CELLCEPT) 500 mg tablet  Self Yes No   Sig: Take 500 mg by mouth every 12 (twelve) hours    potassium chloride (K-DUR,KLOR-CON) 20 mEq tablet  Self Yes No   Sig: Take 20 mEq by mouth daily   tamsulosin (FLOMAX) 0.4 mg  Self Yes No      Facility-Administered Medications: None       Past Medical History:   Diagnosis Date   • Anemia    • Arthritis    • Cervical mass    • Chronic kidney disease    • Coronary artery disease    • Depression    • DVT (deep venous thrombosis) (HCC)    • Hypertension    • Lupus (HCC)    • Renal disorder        Past Surgical History:   Procedure Laterality Date   • APPENDECTOMY     • CERVICAL SPINE SURGERY     • CHOLECYSTECTOMY     • COLONOSCOPY N/A 8/23/2018    Procedure: COLONOSCOPY;  Surgeon: Supa Doran MD;  Location: [facility]; Service: Gastroenterology   • ESOPHAGOGASTRODUODENOSCOPY N/A 8/22/2018    Procedure: ESOPHAGOGASTRODUODENOSCOPY (EGD); Surgeon: Supa Doran MD;  Location: [facility];   Service: Gastroenterology   • IR IVC FILTER PLACEMENT PERMANENT  9/7/2017   • IR IVC FILTER REMOVAL  4/23/2018   • IR LUMBAR PUNCTURE  11/23/2015   • NECK SURGERY     • US GUIDED INJECTION FOR RESEARCH STUDY  4/23/2018   • US GUIDED INJECTION FOR RESEARCH STUDY  9/7/2017   • VASCULAR SURGERY         Family History   Problem Relation Age of Onset   • Heart disease Mother    • No Known Problems Father      I have reviewed and agree with the history as documented. E-Cigarette/Vaping   • E-Cigarette Use Never User      E-Cigarette/Vaping Substances   • Nicotine No    • THC No    • CBD No    • Flavoring No    • Other No    • Unknown No      Social History     Tobacco Use   • Smoking status: Never   • Smokeless tobacco: Never   Vaping Use   • Vaping Use: Never used   Substance Use Topics   • Alcohol use: Never   • Drug use: Never       Review of Systems   Musculoskeletal: Positive for arthralgias. Physical Exam  Physical Exam  Vitals and nursing note reviewed. Constitutional:       General: He is not in acute distress. Appearance: Normal appearance. He is well-developed. He is not ill-appearing, toxic-appearing or diaphoretic. HENT:      Head: Normocephalic and atraumatic. Eyes:      General:         Right eye: No discharge. Left eye: No discharge. Conjunctiva/sclera: Conjunctivae normal.      Pupils: Pupils are equal, round, and reactive to light. Neck:      Vascular: No JVD. Pulmonary:      Breath sounds: No stridor. Musculoskeletal:         General: Swelling present. No deformity. Normal range of motion. Cervical back: Normal range of motion and neck supple. Comments: There is mild swelling of the R pinky finger just distal to the mcp joint. There is no fusiform swelling. The finger is not held in flexion. There is no pain with passive extension. There is no tenderness over the flexor tendon sheath. There is no crepitus, fluctuance or skin breakdown. The perfusion and sensation of the pinky and the rest of the hand and fingers are normal.    Skin:     General: Skin is warm and dry. Capillary Refill: Capillary refill takes less than 2 seconds. Coloration: Skin is not pale. Findings: No erythema or rash. Neurological:      Mental Status: He is alert and oriented to person, place, and time. Cranial Nerves: No cranial nerve deficit. Sensory: No sensory deficit. Motor: No weakness or abnormal muscle tone. Coordination: Coordination normal.      Comments: Ambulates with a walker. Vital Signs  ED Triage Vitals [08/03/23 1235]   Temperature Pulse Respirations Blood Pressure SpO2   98 °F (36.7 °C) 84 16 132/73 98 %      Temp Source Heart Rate Source Patient Position - Orthostatic VS BP Location FiO2 (%)   Temporal Monitor Sitting Left arm --      Pain Score       --           Vitals:    08/03/23 1235   BP: 132/73   Pulse: 84   Patient Position - Orthostatic VS: Sitting         Visual Acuity      ED Medications  Medications   ibuprofen (MOTRIN) tablet 600 mg (has no administration in time range)       Diagnostic Studies  Results Reviewed     None                 No orders to display              Procedures  Procedures         ED Course                               SBIRT 20yo+    Flowsheet Row Most Recent Value   Initial Alcohol Screen: US AUDIT-C     1. How often do you have a drink containing alcohol? 0 Filed at: 08/03/2023 1317   2. How many drinks containing alcohol do you have on a typical day you are drinking? 0 Filed at: 08/03/2023 1317   3a. Male UNDER 65: How often do you have five or more drinks on one occasion? 0 Filed at: 08/03/2023 1317   Audit-C Score 0 Filed at: 08/03/2023 1317   NARCISA: How many times in the past year have you. .. Used an illegal drug or used a prescription medication for non-medical reasons? Never Filed at: 08/03/2023 1317                    Medical Decision Making  Finger swelling: acute illness or injury     Details: finger swelling and pain, ddx most concerning for overuse injury vs arthritis given his history. he requests a dose of nsaid. his last GFR was above 60 therefore a single dose was ordered here.  because i feel there is likely a compononent of arthritis given significant h/o same i have prescribed prednisone. i have a low suspicion for infection at this time, 0/4 kanavel signs are positive. return precautions were discussed by me with pt, he expresses understanding to return if symptoms progress/change/worsen or if he has any other concerns. Risk  Prescription drug management. Disposition  Final diagnoses:   Finger swelling     Time reflects when diagnosis was documented in both MDM as applicable and the Disposition within this note     Time User Action Codes Description Comment    8/3/2023  1:31 PM Tyree Mauricejesus Add [U07.50] Finger swelling       ED Disposition     ED Disposition   Discharge    Condition   Stable    Date/Time   Thu Aug 3, 2023  1:31 PM    Barb discharge to home/self care. Follow-up Information     Follow up With Specialties Details Why Contact Info Additional Information    [facility] Emergency Department Emergency Medicine  If symptoms worsen or if you have a fever or more difficulty bending or staightening your finger [address] Emergency Department, [place], [place], [address]          Patient's Medications   Discharge Prescriptions    PREDNISONE 20 MG TABLET    Take 2 tablets (40 mg total) by mouth daily Do not start before August 4, 2023. Start Date: 8/4/2023  End Date: --       Order Dose: 40 mg       Quantity: 10 tablet    Refills: 0       No discharge procedures on file.     PDMP Review       Value Time User    PDMP Reviewed  Yes 12/29/2022 12:10 PM Pao Chiang PA-C          ED Provider  Electronically Signed by           Abby Gould MD  08/03/23 7798

## 2023-08-10 DIAGNOSIS — M19.012 ARTHRITIS OF LEFT ACROMIOCLAVICULAR JOINT: ICD-10-CM

## 2023-09-13 ENCOUNTER — TELEPHONE (OUTPATIENT)
Dept: NEPHROLOGY | Facility: CLINIC | Age: 53
End: 2023-09-13

## 2023-09-13 NOTE — TELEPHONE ENCOUNTER
I called [employer] [facility] @ [phone] and spoke to Erendira Lin () to check eligible for the patient and as of 9/13/2023 the patient has current active coverage as of 5/1/2020. The reference number for this call is: [id number].   Doc Sunil, .

## 2023-09-19 ENCOUNTER — TELEPHONE (OUTPATIENT)
Dept: NEPHROLOGY | Facility: CLINIC | Age: 53
End: 2023-09-19

## 2023-09-19 DIAGNOSIS — N18.30 STAGE 3 CHRONIC KIDNEY DISEASE, UNSPECIFIED WHETHER STAGE 3A OR 3B CKD (HCC): Primary | ICD-10-CM

## 2023-09-22 ENCOUNTER — APPOINTMENT (OUTPATIENT)
Dept: LAB | Facility: CLINIC | Age: 53
End: 2023-09-22
Payer: COMMERCIAL

## 2023-09-22 DIAGNOSIS — N18.30 STAGE 3 CHRONIC KIDNEY DISEASE, UNSPECIFIED WHETHER STAGE 3A OR 3B CKD (HCC): ICD-10-CM

## 2023-09-22 DIAGNOSIS — N18.30 HYPERTENSIVE KIDNEY DISEASE WITH STAGE 3 CHRONIC KIDNEY DISEASE, UNSPECIFIED WHETHER STAGE 3A OR 3B CKD (HCC): ICD-10-CM

## 2023-09-22 DIAGNOSIS — I12.9 HYPERTENSIVE KIDNEY DISEASE WITH STAGE 3 CHRONIC KIDNEY DISEASE, UNSPECIFIED WHETHER STAGE 3A OR 3B CKD (HCC): ICD-10-CM

## 2023-09-22 DIAGNOSIS — I10 PRIMARY HYPERTENSION: ICD-10-CM

## 2023-09-22 LAB
25(OH)D3 SERPL-MCNC: 53.1 NG/ML (ref 30–100)
ALBUMIN SERPL BCP-MCNC: 4.4 G/DL (ref 3.5–5)
ALP SERPL-CCNC: 107 U/L (ref 34–104)
ALT SERPL W P-5'-P-CCNC: 18 U/L (ref 7–52)
ANION GAP SERPL CALCULATED.3IONS-SCNC: 6 MMOL/L
AST SERPL W P-5'-P-CCNC: 31 U/L (ref 13–39)
BACTERIA UR QL AUTO: NORMAL /HPF
BASOPHILS # BLD AUTO: 0.02 THOUSANDS/ÂΜL (ref 0–0.1)
BASOPHILS NFR BLD AUTO: 1 % (ref 0–1)
BILIRUB SERPL-MCNC: 0.4 MG/DL (ref 0.2–1)
BILIRUB UR QL STRIP: NEGATIVE
BUN SERPL-MCNC: 6 MG/DL (ref 5–25)
CALCIUM SERPL-MCNC: 9.5 MG/DL (ref 8.4–10.2)
CHLORIDE SERPL-SCNC: 105 MMOL/L (ref 96–108)
CHOLEST SERPL-MCNC: 106 MG/DL
CLARITY UR: CLEAR
CO2 SERPL-SCNC: 25 MMOL/L (ref 21–32)
COLOR UR: COLORLESS
CREAT SERPL-MCNC: 1.07 MG/DL (ref 0.6–1.3)
CREAT UR-MCNC: 43.8 MG/DL
EOSINOPHIL # BLD AUTO: 0.02 THOUSAND/ÂΜL (ref 0–0.61)
EOSINOPHIL NFR BLD AUTO: 1 % (ref 0–6)
ERYTHROCYTE [DISTWIDTH] IN BLOOD BY AUTOMATED COUNT: 14 % (ref 11.6–15.1)
EST. AVERAGE GLUCOSE BLD GHB EST-MCNC: 103 MG/DL
GFR SERPL CREATININE-BSD FRML MDRD: 77 ML/MIN/1.73SQ M
GLUCOSE P FAST SERPL-MCNC: 96 MG/DL (ref 65–99)
GLUCOSE UR STRIP-MCNC: NEGATIVE MG/DL
HBA1C MFR BLD: 5.2 %
HCT VFR BLD AUTO: 41.9 % (ref 36.5–49.3)
HDLC SERPL-MCNC: 37 MG/DL
HGB BLD-MCNC: 14.7 G/DL (ref 12–17)
HGB UR QL STRIP.AUTO: NEGATIVE
IMM GRANULOCYTES # BLD AUTO: 0.01 THOUSAND/UL (ref 0–0.2)
IMM GRANULOCYTES NFR BLD AUTO: 0 % (ref 0–2)
KETONES UR STRIP-MCNC: NEGATIVE MG/DL
LDLC SERPL CALC-MCNC: 52 MG/DL (ref 0–100)
LEUKOCYTE ESTERASE UR QL STRIP: NEGATIVE
LYMPHOCYTES # BLD AUTO: 0.63 THOUSANDS/ÂΜL (ref 0.6–4.47)
LYMPHOCYTES NFR BLD AUTO: 26 % (ref 14–44)
MCH RBC QN AUTO: 30.8 PG (ref 26.8–34.3)
MCHC RBC AUTO-ENTMCNC: 35.1 G/DL (ref 31.4–37.4)
MCV RBC AUTO: 88 FL (ref 82–98)
MICROALBUMIN UR-MCNC: 26.1 MG/L
MICROALBUMIN/CREAT 24H UR: 60 MG/G CREATININE (ref 0–30)
MONOCYTES # BLD AUTO: 0.41 THOUSAND/ÂΜL (ref 0.17–1.22)
MONOCYTES NFR BLD AUTO: 17 % (ref 4–12)
NEUTROPHILS # BLD AUTO: 1.35 THOUSANDS/ÂΜL (ref 1.85–7.62)
NEUTS SEG NFR BLD AUTO: 55 % (ref 43–75)
NITRITE UR QL STRIP: NEGATIVE
NON-SQ EPI CELLS URNS QL MICRO: NORMAL /HPF
NRBC BLD AUTO-RTO: 0 /100 WBCS
PH UR STRIP.AUTO: 6 [PH]
PHOSPHATE SERPL-MCNC: 2.3 MG/DL (ref 2.7–4.5)
PLATELET # BLD AUTO: 196 THOUSANDS/UL (ref 149–390)
PMV BLD AUTO: 10.7 FL (ref 8.9–12.7)
POTASSIUM SERPL-SCNC: 4.4 MMOL/L (ref 3.5–5.3)
PROT SERPL-MCNC: 7.9 G/DL (ref 6.4–8.4)
PROT UR STRIP-MCNC: NEGATIVE MG/DL
PTH-INTACT SERPL-MCNC: 65.6 PG/ML (ref 12–88)
RBC # BLD AUTO: 4.78 MILLION/UL (ref 3.88–5.62)
RBC #/AREA URNS AUTO: NORMAL /HPF
SODIUM SERPL-SCNC: 136 MMOL/L (ref 135–147)
SP GR UR STRIP.AUTO: 1.01 (ref 1–1.03)
TRIGL SERPL-MCNC: 86 MG/DL
TSH SERPL DL<=0.05 MIU/L-ACNC: 3.33 UIU/ML (ref 0.45–4.5)
URATE SERPL-MCNC: 5.7 MG/DL (ref 3.5–8.5)
UROBILINOGEN UR STRIP-ACNC: <2 MG/DL
WBC # BLD AUTO: 2.44 THOUSAND/UL (ref 4.31–10.16)
WBC #/AREA URNS AUTO: NORMAL /HPF

## 2023-09-22 PROCEDURE — 84443 ASSAY THYROID STIM HORMONE: CPT

## 2023-09-22 PROCEDURE — 81001 URINALYSIS AUTO W/SCOPE: CPT

## 2023-09-22 PROCEDURE — 84100 ASSAY OF PHOSPHORUS: CPT

## 2023-09-22 PROCEDURE — 84550 ASSAY OF BLOOD/URIC ACID: CPT

## 2023-09-22 PROCEDURE — 80053 COMPREHEN METABOLIC PANEL: CPT

## 2023-09-22 PROCEDURE — 83036 HEMOGLOBIN GLYCOSYLATED A1C: CPT

## 2023-09-22 PROCEDURE — 83970 ASSAY OF PARATHORMONE: CPT

## 2023-09-22 PROCEDURE — 85025 COMPLETE CBC W/AUTO DIFF WBC: CPT

## 2023-09-22 PROCEDURE — 82306 VITAMIN D 25 HYDROXY: CPT

## 2023-09-22 PROCEDURE — 36415 COLL VENOUS BLD VENIPUNCTURE: CPT

## 2023-09-22 PROCEDURE — 80061 LIPID PANEL: CPT

## 2023-09-25 ENCOUNTER — TELEPHONE (OUTPATIENT)
Dept: NEPHROLOGY | Facility: CLINIC | Age: 53
End: 2023-09-25

## 2023-09-26 ENCOUNTER — OFFICE VISIT (OUTPATIENT)
Dept: NEPHROLOGY | Facility: CLINIC | Age: 53
End: 2023-09-26
Payer: COMMERCIAL

## 2023-09-26 VITALS
HEART RATE: 80 BPM | SYSTOLIC BLOOD PRESSURE: 110 MMHG | TEMPERATURE: 97.3 F | BODY MASS INDEX: 29.73 KG/M2 | WEIGHT: 185 LBS | DIASTOLIC BLOOD PRESSURE: 70 MMHG | RESPIRATION RATE: 16 BRPM | HEIGHT: 66 IN | OXYGEN SATURATION: 99 %

## 2023-09-26 DIAGNOSIS — N18.2 STAGE 2 CHRONIC KIDNEY DISEASE: Primary | ICD-10-CM

## 2023-09-26 DIAGNOSIS — I12.9 HYPERTENSIVE KIDNEY DISEASE WITH STAGE 2 CHRONIC KIDNEY DISEASE: ICD-10-CM

## 2023-09-26 DIAGNOSIS — M32.14 LUPUS NEPHRITIS (HCC): ICD-10-CM

## 2023-09-26 DIAGNOSIS — N18.2 HYPERTENSIVE KIDNEY DISEASE WITH STAGE 2 CHRONIC KIDNEY DISEASE: ICD-10-CM

## 2023-09-26 PROCEDURE — 99214 OFFICE O/P EST MOD 30 MIN: CPT | Performed by: INTERNAL MEDICINE

## 2023-09-26 NOTE — PROGRESS NOTES
NEPHROLOGY OFFICE FOLLOW UP  Amanda Soni 52 y. o.male YOB: 1970 MRN: [id number]    Encounter: [id number] DATE: 9/26/2023    REASON FOR VISIT: Amanda Soni is a 52 y.o. male who is here on 9/26/2023 for further management of chronic kidney disease and lupus nephritis. HPI:    This is a 61-year-old male with past medical history significant for lupus nephritis, hypertension, DVT, returns to nephrology office for further management of lupus nephritis and chronic kidney disease stage II. Patient was admitted to [address] [place] [address] in December 2022 where he was found to have hyponatremia. 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 Patient was started on salt tablet 3 g p.o. 3 times daily during the hospital stay. According to patient he was taking salt tablet until 5 days back when he decided to stop taking salt tablet. Upon review of old medical record from [facility] chart, new baseline creatinine seems to be fluctuating around 1.0-1.1. Patient has underlying hypertension which currently seems to be under good control with current antihypertensive medication. Patient was diagnosed with lupus nephritis based on renal biopsy in November 2015. Patient was previously being followed by nephrologist at [facility] and I have personally reviewed their office note and patient was diagnosed with class V membranous lupus nephritis and initially received induction therapy with Cytoxan and later once started on CellCept but due to neutropenia, CellCept dose was reduced. Patient also has underlying SLE and currently also taking Plaquenil. Patient had developed leg swelling in the past and currently taking Lasix along with metolazone    Patient also have DVT and currently taking Eliquis. Patient takes all the medications as prescribed and denies use of NSAIDs.           REVIEW OF SYSTEMS:    Review of Systems Constitutional: Negative for chills and fever. HENT: Negative for nosebleeds and sore throat. Eyes: Negative for photophobia and pain. Respiratory: Negative for choking and wheezing. Cardiovascular: Negative for chest pain and palpitations. Gastrointestinal: Negative for blood in stool. Genitourinary: Negative for hematuria. Musculoskeletal: Negative for neck stiffness. Skin: Negative for pallor. Neurological: Negative for seizures and syncope. Psychiatric/Behavioral: Negative for confusion and suicidal ideas. PAST MEDICAL HISTORY:  Past Medical History:   Diagnosis Date   • Anemia    • Arthritis    • Cervical mass    • Chronic kidney disease    • Coronary artery disease    • Depression    • DVT (deep venous thrombosis) (HCC)    • Hypertension    • Lupus ([address])    • Renal disorder        PAST SURGICAL HISTORY:  Past Surgical History:   Procedure Laterality Date   • APPENDECTOMY     • CERVICAL SPINE SURGERY     • CHOLECYSTECTOMY     • COLONOSCOPY N/A 8/23/2018    Procedure: COLONOSCOPY;  Surgeon: Jody Garcia MD;  Location: [facility]; Service: Gastroenterology   • ESOPHAGOGASTRODUODENOSCOPY N/A 8/22/2018    Procedure: ESOPHAGOGASTRODUODENOSCOPY (EGD); Surgeon: Jody Garcia MD;  Location: [facility];   Service: Gastroenterology   • IR IVC FILTER PLACEMENT PERMANENT  9/7/2017   • IR IVC FILTER REMOVAL  4/23/2018   • IR LUMBAR PUNCTURE  11/23/2015   • NECK SURGERY     • US GUIDED INJECTION FOR RESEARCH STUDY  4/23/2018   • US GUIDED INJECTION FOR RESEARCH STUDY  9/7/2017   • VASCULAR SURGERY         SOCIAL HISTORY:  Social History     Substance and Sexual Activity   Alcohol Use Never     Social History     Substance and Sexual Activity   Drug Use Never     Social History     Tobacco Use   Smoking Status Never   Smokeless Tobacco Never       FAMILY HISTORY:  Family History   Problem Relation Age of Onset   • Heart disease Mother    • No Known Problems Father ALLERGY:  Allergies   Allergen Reactions   • Sulfa Antibiotics Rash       MEDICATIONS:    Current Outpatient Medications:   •  apixaban (ELIQUIS) 5 mg, Take 5 mg by mouth 2 (two) times a day, Disp: , Rfl:   •  azelastine (ASTELIN) 0.1 % nasal spray, ADMINISTER 1 SPRAY INTO NOSTRIL TWICE DAILY, Disp: , Rfl:   •  bacitracin topical ointment 500 units/g topical ointment, APPLY TOPICALLY TO AFFECTED AREA THREE TIMES A DAY AS DIRECTED, Disp: , Rfl:   •  baclofen 20 mg tablet, Take 20 mg by mouth 3 (three) times a day , Disp: , Rfl:   •  betamethasone dipropionate (DIPROSONE) 0.05 % cream, Apply 1 application topically 2 (two) times a day, Disp: , Rfl:   •  betamethasone, augmented, (DIPROLENE) 0.05 % ointment, Apply 1 application topically 2 (two) times a day, Disp: , Rfl:   •  carvedilol (COREG) 12.5 mg tablet, Take 12.5 mg by mouth 2 (two) times a day with meals, Disp: , Rfl:   •  Diclofenac Sodium (VOLTAREN) 1 %, Apply 2 g topically 4 (four) times a day, Disp: 350 g, Rfl: 1  •  escitalopram (LEXAPRO) 10 mg tablet, Take 1 tablet (10 mg total) by mouth daily, Disp: 90 tablet, Rfl: 3  •  furosemide (LASIX) 40 mg tablet, Take 1 tablet (40 mg total) by mouth 2 (two) times a day, Disp: , Rfl: 0  •  gabapentin (NEURONTIN) 300 mg capsule, Take 1 capsule (300 mg total) by mouth 3 (three) times a day, Disp: 270 capsule, Rfl: 3  •  gabapentin (NEURONTIN) 600 MG tablet, Take 1 tablet (600 mg total) by mouth 3 (three) times a day, Disp: 270 tablet, Rfl: 3  •  hydroxychloroquine (PLAQUENIL) 200 mg tablet, Take 400 mg by mouth daily at bedtime, Disp: , Rfl:   •  hydrOXYzine HCL (ATARAX) 10 mg tablet, Take 10 mg by mouth every 6 (six) hours as needed for itching, Disp: , Rfl:   •  lidocaine-prilocaine (EMLA) cream, Apply topically as needed for mild pain, Disp: 30 g, Rfl: 3  •  loratadine (CLARITIN) 10 mg tablet, Take 1 tablet (10 mg total) by mouth daily, Disp: 30 tablet, Rfl: 2  •  meclizine (ANTIVERT) 25 mg tablet, Take 1 tablet (25 mg total) by mouth 3 (three) times a day as needed for dizziness, Disp: 30 tablet, Rfl: 0  •  metolazone (ZAROXOLYN) 5 mg tablet, Take 0.5 tablets (2.5 mg total) by mouth 3 (three) times a week, Disp: , Rfl:   •  mycophenolate (CELLCEPT) 500 mg tablet, Take 500 mg by mouth every 12 (twelve) hours , Disp: , Rfl:   •  potassium chloride (K-DUR,KLOR-CON) 20 mEq tablet, Take 20 mEq by mouth daily, Disp: , Rfl:   •  predniSONE 20 mg tablet, Take 2 tablets (40 mg total) by mouth daily Do not start before August 4, 2023., Disp: 10 tablet, Rfl: 0  •  tamsulosin (FLOMAX) 0.4 mg, , Disp: , Rfl:     PHYSICAL EXAM:  Vitals:    09/26/23 1023   BP: 110/70   Pulse: 80   Resp: 16   Temp: (!) 97.3 °F (36.3 °C)   TempSrc: Temporal   SpO2: 99%   Weight: 83.9 kg (185 lb)   Height: 5' 6" (1.676 m)     Body mass index is 29.86 kg/m². Physical Exam  Constitutional:       General: He is not in acute distress. Comments: Uses walker   HENT:      Right Ear: External ear normal.   Eyes:      Conjunctiva/sclera:      Right eye: No hemorrhage. Neck:      Thyroid: No thyromegaly. Cardiovascular:      Rate and Rhythm: Normal rate. Pulmonary:      Effort: No accessory muscle usage or respiratory distress. Breath sounds: No wheezing. Abdominal:      General: There is no distension. Musculoskeletal:      Right ankle: Swelling present. Left ankle: Swelling present. Comments: Wearing compression stockings   Skin:     General: Skin is warm. Coloration: Skin is not jaundiced. Neurological:      Mental Status: He is alert. He is not disoriented. Psychiatric:         Speech: He is communicative. Behavior: Behavior is not combative.          LAB RESULTS:  Results for orders placed or performed in visit on 09/22/23   Comprehensive metabolic panel   Result Value Ref Range    Sodium 136 135 - 147 mmol/L    Potassium 4.4 3.5 - 5.3 mmol/L    Chloride 105 96 - 108 mmol/L    CO2 25 21 - 32 mmol/L    ANION GAP 6 mmol/L    BUN 6 5 - 25 mg/dL    Creatinine 1.07 0.60 - 1.30 mg/dL    Glucose, Fasting 96 65 - 99 mg/dL    Calcium 9.5 8.4 - 10.2 mg/dL    AST 31 13 - 39 U/L    ALT 18 7 - 52 U/L    Alkaline Phosphatase 107 (H) 34 - 104 U/L    Total Protein 7.9 6.4 - 8.4 g/dL    Albumin 4.4 3.5 - 5.0 g/dL    Total Bilirubin 0.40 0.20 - 1.00 mg/dL    eGFR 77 ml/min/1.73sq m   TSH, 3rd generation with Free T4 reflex   Result Value Ref Range    TSH 3RD GENERATON 3.332 0.450 - 4.500 uIU/mL   Hemoglobin A1C   Result Value Ref Range    Hemoglobin A1C 5.2 Normal 4.0-5.6%; PreDiabetic 5.7-6.4%;  Diabetic >=6.5%; Glycemic control for adults with diabetes <7.0% %     mg/dl   Lipid Panel with Direct LDL reflex   Result Value Ref Range    Cholesterol 106 See Comment mg/dL    Triglycerides 86 See Comment mg/dL    HDL, Direct 37 (L) >=40 mg/dL    LDL Calculated 52 0 - 100 mg/dL   CBC and differential   Result Value Ref Range    WBC 2.44 (L) 4.31 - 10.16 Thousand/uL    RBC 4.78 3.88 - 5.62 Million/uL    Hemoglobin 14.7 12.0 - 17.0 g/dL    Hematocrit 41.9 36.5 - 49.3 %    MCV 88 82 - 98 fL    MCH 30.8 26.8 - 34.3 pg    MCHC 35.1 31.4 - 37.4 g/dL    RDW 14.0 11.6 - 15.1 %    MPV 10.7 8.9 - 12.7 fL    Platelets 627 729 - 493 Thousands/uL    nRBC 0 /100 WBCs    Neutrophils Relative 55 43 - 75 %    Immat GRANS % 0 0 - 2 %    Lymphocytes Relative 26 14 - 44 %    Monocytes Relative 17 (H) 4 - 12 %    Eosinophils Relative 1 0 - 6 %    Basophils Relative 1 0 - 1 %    Neutrophils Absolute 1.35 (L) 1.85 - 7.62 Thousands/µL    Immature Grans Absolute 0.01 0.00 - 0.20 Thousand/uL    Lymphocytes Absolute 0.63 0.60 - 4.47 Thousands/µL    Monocytes Absolute 0.41 0.17 - 1.22 Thousand/µL    Eosinophils Absolute 0.02 0.00 - 0.61 Thousand/µL    Basophils Absolute 0.02 0.00 - 0.10 Thousands/µL   Phosphorus   Result Value Ref Range    Phosphorus 2.3 (L) 2.7 - 4.5 mg/dL   PTH, intact   Result Value Ref Range    PTH 65.6 12.0 - 88.0 pg/mL   Uric acid   Result Value Ref Range    Uric Acid 5.7 3.5 - 8.5 mg/dL   Vitamin D 25 hydroxy   Result Value Ref Range    Vit D, 25-Hydroxy 53.1 30.0 - 100.0 ng/mL       ASSESSMENT and PLAN:  Romero Rodriguez was seen today for chronic kidney disease and follow-up. Diagnoses and all orders for this visit:    Stage 2 chronic kidney disease  -     CBC and differential; Future  -     Basic metabolic panel; Future  -     Urinalysis with microscopic; Future  -     Albumin / creatinine urine ratio; Future    Hypertensive kidney disease with stage 2 chronic kidney disease  -     Basic metabolic panel; Future  -     Urinalysis with microscopic; Future  -     Albumin / creatinine urine ratio; Future    Lupus nephritis (HCC)  -     CBC and differential; Future  -     Basic metabolic panel; Future  -     Urinalysis with microscopic; Future  -     Albumin / creatinine urine ratio; Future  -     C3 complement; Future  -     C4 complement; Future  -     YANIRA Screen w/ Reflex to Titer/Pattern; Future  -     Anti-DNA antibody, double-stranded; Future  -     Hepatic function panel; Future      1. CKD stage II. Multifactorial and suspected due to underlying lupus nephritis on top of hypertensive nephrosclerosis. Upon review of old medical record from [facility] chart review new baseline creatinine seems to fluctuating around 1.0-1.1. During last office visit, Lasix dose was decreased to 40 mg p.o. daily and patient is also taking metolazone 2.5 mg p.o. every other day. Clinically looks euvolemic    Current creatinine is at baseline at 1.07 with EGFR of 77. Management of lupus nephritis and hypertension as mentioned below. Plan to avoid NSAIDs and recheck renal function before next visit. 2.  Lupus nephritis class V. Patient was previously being followed by nephrologist at [facility] and I have personally reviewed their last office note.   Patient was diagnosed with class V membranous lupus nephritis in November 2015 and had received induction therapy with Cytoxan. Due to neutropenia, CellCept dose was reduced. Currently patient is taking CellCept 500 mg p.o. twice daily. Recent urine analysis showed no microscopic hematuria. Urine microalbumin to creatinine ratio is 60 mg. Last complement levels were normal and patient was found to have elevated double-stranded DNA at 16 which was better than before. For time being plan to continue CellCept 500 mg p.o. twice daily and recheck BMP, lupus panel and urine analysis with the next visit. 3.  Hypertension and chronic kidney disease. Today's blood pressures under well control and plan to monitor hypertension with carvedilol 12.5 mg p.o. twice daily today and current dose of diuretic. Returns to nephrology office in 6 months. Future labs + LFT + complements, YANIRA and anti-double-stranded DNA ordered. Portions of the record may have been created with voice recognition software. Occasional wrong word or "sound a like" substitutions may have occurred due to the inherent limitations of voice recognition software. Read the chart carefully and recognize, using context, where substitutions have occurred. If you have any questions, please contact the dictating provider.

## 2023-10-13 ENCOUNTER — OFFICE VISIT (OUTPATIENT)
Dept: INTERNAL MEDICINE CLINIC | Facility: CLINIC | Age: 53
End: 2023-10-13
Payer: COMMERCIAL

## 2023-10-13 VITALS
DIASTOLIC BLOOD PRESSURE: 56 MMHG | WEIGHT: 185 LBS | SYSTOLIC BLOOD PRESSURE: 104 MMHG | OXYGEN SATURATION: 97 % | HEART RATE: 94 BPM | HEIGHT: 66 IN | RESPIRATION RATE: 16 BRPM | BODY MASS INDEX: 29.73 KG/M2

## 2023-10-13 DIAGNOSIS — R80.1 PERSISTENT PROTEINURIA: ICD-10-CM

## 2023-10-13 DIAGNOSIS — E78.2 MODERATE MIXED HYPERLIPIDEMIA NOT REQUIRING STATIN THERAPY: ICD-10-CM

## 2023-10-13 DIAGNOSIS — M62.81 MUSCLE WEAKNESS (GENERALIZED): ICD-10-CM

## 2023-10-13 DIAGNOSIS — M32.14 LUPUS NEPHRITIS (HCC): ICD-10-CM

## 2023-10-13 DIAGNOSIS — M32.9 SYSTEMIC LUPUS ERYTHEMATOSUS, UNSPECIFIED SLE TYPE, UNSPECIFIED ORGAN INVOLVEMENT STATUS (HCC): ICD-10-CM

## 2023-10-13 DIAGNOSIS — M79.2 NEUROPATHIC PAIN: ICD-10-CM

## 2023-10-13 DIAGNOSIS — I12.9 HYPERTENSIVE KIDNEY DISEASE WITH STAGE 3 CHRONIC KIDNEY DISEASE, UNSPECIFIED WHETHER STAGE 3A OR 3B CKD (HCC): ICD-10-CM

## 2023-10-13 DIAGNOSIS — N18.30 HYPERTENSIVE KIDNEY DISEASE WITH STAGE 3 CHRONIC KIDNEY DISEASE, UNSPECIFIED WHETHER STAGE 3A OR 3B CKD (HCC): ICD-10-CM

## 2023-10-13 DIAGNOSIS — M79.671 RIGHT FOOT PAIN: ICD-10-CM

## 2023-10-13 DIAGNOSIS — Z12.5 PROSTATE CANCER SCREENING: ICD-10-CM

## 2023-10-13 DIAGNOSIS — Z86.718 HISTORY OF DVT (DEEP VEIN THROMBOSIS): ICD-10-CM

## 2023-10-13 DIAGNOSIS — R25.2 SPASTICITY: ICD-10-CM

## 2023-10-13 DIAGNOSIS — I10 PRIMARY HYPERTENSION: Primary | ICD-10-CM

## 2023-10-13 DIAGNOSIS — R26.9 GAIT DIFFICULTY: ICD-10-CM

## 2023-10-13 PROCEDURE — 99214 OFFICE O/P EST MOD 30 MIN: CPT | Performed by: INTERNAL MEDICINE

## 2023-10-13 NOTE — PROGRESS NOTES
Assessment/Plan:     Patient is here for routine follow-up we reviewed his chronic medical problems and we reviewed his blood work. I did order blood work for next visit including CBC, CMP, TSH, A1c, lipid panel, PSA. Main issue today is that he is having burning sensation to bilateral soles of the feet and it is radiating up towards the shins. He is following with physiatry. Has an MRI and EMG for both lower extremities pending. He is on maximum dose of gabapentin. Continue medication regimen for HTN, lupus nephritis; follow with nephrology and rheumatology; continue [facility] for DVT. Quality Measures:     BMI Counseling: Body mass index is 29.86 kg/m². The BMI is above normal. Nutrition recommendations include decreasing portion sizes and encouraging healthy choices of fruits and vegetables. Exercise recommendations include moderate physical activity 150 minutes/week. Rationale for BMI follow-up plan is due to patient being overweight or obese. Depression Screening and Follow-up Plan: Patient was screened for depression during today's encounter. They screened negative with a PHQ-2 score of 0. Return in about 4 months (around 2/13/2024). No problem-specific Assessment & Plan notes found for this encounter. Diagnoses and all orders for this visit:    Primary hypertension  -     CBC and differential; Future  -     Comprehensive metabolic panel; Future  -     TSH, 3rd generation with Free T4 reflex; Future    Lupus nephritis (HCC)    Muscle weakness (generalized)    Persistent proteinuria    History of DVT (deep vein thrombosis)    Systemic lupus erythematosus, unspecified SLE type, unspecified organ involvement status ([address])  -     TSH, 3rd generation with Free T4 reflex; Future    Gait difficulty  -     TSH, 3rd generation with Free T4 reflex; Future    Spasticity  -     TSH, 3rd generation with Free T4 reflex;  Future    Neuropathic pain    Right foot pain    Hypertensive kidney disease with stage 3 chronic kidney disease, unspecified whether stage 3a or 3b CKD (HCC)  -     Hemoglobin A1C; Future  -     Lipid Panel with Direct LDL reflex; Future    Moderate mixed hyperlipidemia not requiring statin therapy    Prostate cancer screening  -     PSA, total and free; Future          Subjective:      Patient ID: Chayo Bradley is a 52 y.o. male. Here for follow up.       ALLERGIES:  Allergies   Allergen Reactions   • Sulfa Antibiotics Rash       CURRENT MEDICATIONS:    Current Outpatient Medications:   •  apixaban (ELIQUIS) 5 mg, Take 5 mg by mouth 2 (two) times a day, Disp: , Rfl:   •  azelastine (ASTELIN) 0.1 % nasal spray, ADMINISTER 1 SPRAY INTO NOSTRIL TWICE DAILY, Disp: , Rfl:   •  bacitracin topical ointment 500 units/g topical ointment, APPLY TOPICALLY TO AFFECTED AREA THREE TIMES A DAY AS DIRECTED, Disp: , Rfl:   •  baclofen 20 mg tablet, Take 20 mg by mouth 3 (three) times a day , Disp: , Rfl:   •  betamethasone dipropionate (DIPROSONE) 0.05 % cream, Apply 1 application topically 2 (two) times a day, Disp: , Rfl:   •  betamethasone, augmented, (DIPROLENE) 0.05 % ointment, Apply 1 application topically 2 (two) times a day, Disp: , Rfl:   •  carvedilol (COREG) 12.5 mg tablet, Take 12.5 mg by mouth 2 (two) times a day with meals, Disp: , Rfl:   •  Diclofenac Sodium (VOLTAREN) 1 %, Apply 2 g topically 4 (four) times a day, Disp: 350 g, Rfl: 1  •  escitalopram (LEXAPRO) 10 mg tablet, Take 1 tablet (10 mg total) by mouth daily, Disp: 90 tablet, Rfl: 3  •  furosemide (LASIX) 40 mg tablet, Take 1 tablet (40 mg total) by mouth 2 (two) times a day, Disp: , Rfl: 0  •  gabapentin (NEURONTIN) 300 mg capsule, Take 1 capsule (300 mg total) by mouth 3 (three) times a day, Disp: 270 capsule, Rfl: 3  •  gabapentin (NEURONTIN) 600 MG tablet, Take 1 tablet (600 mg total) by mouth 3 (three) times a day, Disp: 270 tablet, Rfl: 3  •  hydroxychloroquine (PLAQUENIL) 200 mg tablet, Take 400 mg by mouth daily at bedtime, Disp: , Rfl:   •  hydrOXYzine HCL (ATARAX) 10 mg tablet, Take 10 mg by mouth every 6 (six) hours as needed for itching, Disp: , Rfl:   •  lidocaine-prilocaine (EMLA) cream, Apply topically as needed for mild pain, Disp: 30 g, Rfl: 3  •  loratadine (CLARITIN) 10 mg tablet, Take 1 tablet (10 mg total) by mouth daily, Disp: 30 tablet, Rfl: 2  •  meclizine (ANTIVERT) 25 mg tablet, Take 1 tablet (25 mg total) by mouth 3 (three) times a day as needed for dizziness, Disp: 30 tablet, Rfl: 0  •  metolazone (ZAROXOLYN) 5 mg tablet, Take 0.5 tablets (2.5 mg total) by mouth 3 (three) times a week, Disp: , Rfl:   •  mycophenolate (CELLCEPT) 500 mg tablet, Take 500 mg by mouth every 12 (twelve) hours , Disp: , Rfl:   •  potassium chloride (K-DUR,KLOR-CON) 20 mEq tablet, Take 20 mEq by mouth daily, Disp: , Rfl:   •  predniSONE 20 mg tablet, Take 2 tablets (40 mg total) by mouth daily Do not start before August 4, 2023., Disp: 10 tablet, Rfl: 0  •  tamsulosin (FLOMAX) 0.4 mg, , Disp: , Rfl:     ACTIVE PROBLEM LIST:  Patient Active Problem List   Diagnosis   • Lupus (systemic lupus erythematosus) (HCC)   • Lupus nephritis ([address])   • Hypertension   • History of DVT (deep vein thrombosis)   • Stage 2 chronic kidney disease   • Persistent proteinuria   • Anemia in chronic kidney disease   • Muscle weakness (generalized)   • Tremor of both hands   • Spasticity   • Gait difficulty   • Neuropathic pain   • Hypertensive CKD (chronic kidney disease)   • Right foot pain   • DJD (degenerative joint disease), ankle and foot, right       PAST MEDICAL HISTORY:  Past Medical History:   Diagnosis Date   • Anemia    • Arthritis    • Cervical mass    • Chronic kidney disease    • Coronary artery disease    • Depression    • DVT (deep venous thrombosis) ([facility])    • Hypertension    • Lupus ([address])    • Renal disorder        PAST SURGICAL HISTORY:  Past Surgical History:   Procedure Laterality Date   • APPENDECTOMY     • CERVICAL SPINE SURGERY     • CHOLECYSTECTOMY     • COLONOSCOPY N/A 8/23/2018    Procedure: COLONOSCOPY;  Surgeon: Judith Wyatt MD;  Location: [facility]; Service: Gastroenterology   • ESOPHAGOGASTRODUODENOSCOPY N/A 8/22/2018    Procedure: ESOPHAGOGASTRODUODENOSCOPY (EGD); Surgeon: Judith Wyatt MD;  Location: [facility]; Service: Gastroenterology   • IR IVC FILTER PLACEMENT PERMANENT  9/7/2017   • IR IVC FILTER REMOVAL  4/23/2018   • IR LUMBAR PUNCTURE  11/23/2015   • NECK SURGERY     • US GUIDED INJECTION FOR RESEARCH STUDY  4/23/2018   • US GUIDED INJECTION FOR RESEARCH STUDY  9/7/2017   • VASCULAR SURGERY         FAMILY HISTORY:  Family History   Problem Relation Age of Onset   • Heart disease Mother    • No Known Problems Father        SOCIAL HISTORY:  Social History     Socioeconomic History   • Marital status: /Civil Union     Spouse name: Not on file   • Number of children: Not on file   • Years of education: Not on file   • Highest education level: Not on file   Occupational History   • Not on file   Tobacco Use   • Smoking status: Never   • Smokeless tobacco: Never   Vaping Use   • Vaping Use: Never used   Substance and Sexual Activity   • Alcohol use: Never   • Drug use: Never   • Sexual activity: Yes     Partners: Female, Male     Birth control/protection: Rhythm   Other Topics Concern   • Not on file   Social History Narrative    ** Merged History Encounter **          Social Determinants of Health     Financial Resource Strain: Not on file   Food Insecurity: No Food Insecurity (1/1/2023)    Hunger Vital Sign    • Worried About Running Out of Food in the Last Year: Never true    • Ran Out of Food in the Last Year: Never true   Transportation Needs: No Transportation Needs (1/1/2023)    PRAPARE - Transportation    • Lack of Transportation (Medical): No    • Lack of Transportation (Non-Medical):  No   Physical Activity: Not on file   Stress: Not on file   Social Connections: Not on file   Intimate Partner Violence: Not on file   Housing Stability: Low Risk  (1/1/2023)    Housing Stability Vital Sign    • Unable to Pay for Housing in the Last Year: No    • Number of Places Lived in the Last Year: 1    • Unstable Housing in the Last Year: No       Review of Systems   Constitutional:  Negative for chills and fever. HENT:  Negative for ear pain and sore throat. Eyes:  Negative for pain and visual disturbance. Respiratory:  Negative for cough and shortness of breath. Cardiovascular:  Negative for chest pain and palpitations. Gastrointestinal:  Negative for abdominal pain and vomiting. Genitourinary:  Negative for dysuria and hematuria. Musculoskeletal:  Negative for arthralgias and back pain. Skin:  Negative for color change and rash. Neurological:  Negative for seizures and syncope. All other systems reviewed and are negative. Objective:  Vitals:    10/13/23 1019   BP: 104/56   BP Location: Left arm   Patient Position: Sitting   Cuff Size: Adult   Pulse: 94   Resp: 16   SpO2: 97%   Weight: 83.9 kg (185 lb)   Height: 5' 6" (1.676 m)     Body mass index is 29.86 kg/m². Physical Exam  Vitals and nursing note reviewed. Constitutional:       Appearance: Normal appearance. HENT:      Head: Normocephalic and atraumatic. Cardiovascular:      Rate and Rhythm: Normal rate and regular rhythm. Heart sounds: Normal heart sounds. Pulmonary:      Effort: Pulmonary effort is normal.      Breath sounds: Normal breath sounds. Musculoskeletal:         General: Normal range of motion. Cervical back: Normal range of motion. Skin:     General: Skin is warm and dry. Neurological:      Mental Status: He is alert and oriented to person, place, and time. Mental status is at baseline.       Coordination: Coordination abnormal.      Gait: Gait abnormal.   Psychiatric:         Mood and Affect: Mood normal.         RESULTS:  Hemoglobin A1C   Date/Time Value Ref Range Status   09/22/2023 09:52 AM 5.2 Normal 4.0-5.6%; PreDiabetic 5.7-6.4%; Diabetic >=6.5%; Glycemic control for adults with diabetes <7.0% % Final   12/20/2022 08:23 AM 5.3 4.0 - 5.6 % Final     Comment:     The use of HbA1c to monitor glycemic status is based on normal hemoglobin and HbA composition. This test should not be used in patients with abnormal hemoglobin that affects the half life of the red blood cell or the in vivo glycation rates. Cholesterol   Date/Time Value Ref Range Status   09/22/2023 09:52  See Comment mg/dL Final     Comment:     Cholesterol:         Pediatric <18 Years        Desirable          <170 mg/dL      Borderline High    170-199 mg/dL      High               >=200 mg/dL        Adult >=18 Years            Desirable        <200 mg/dL      Borderline High  200-239 mg/dL      High             >239 mg/dL       Triglycerides   Date/Time Value Ref Range Status   09/22/2023 09:52 AM 86 See Comment mg/dL Final     Comment:     Triglyceride:     0-9Y            <75mg/dL     10Y-17Y         <90 mg/dL       >=18Y     Normal          <150 mg/dL     Borderline High 150-199 mg/dL     High            200-499 mg/dL        Very High       >499 mg/dL    Specimen collection should occur prior to Metamizole administration due to the potential for falsely depressed results. HDL, Direct   Date/Time Value Ref Range Status   09/22/2023 09:52 AM 37 (L) >=40 mg/dL Final     LDL Calculated   Date/Time Value Ref Range Status   09/22/2023 09:52 AM 52 0 - 100 mg/dL Final     Comment:     LDL Cholesterol:     Optimal           <100 mg/dl     Near Optimal      100-129 mg/dl     Above Optimal       Borderline High 130-159 mg/dl       High            160-189 mg/dl       Very High       >189 mg/dl         This screening LDL is a calculated result. It does not have the accuracy of the Direct Measured LDL in the monitoring of patients with hyperlipidemia and/or statin therapy.    Direct Measure LDL (OHF580) must be ordered separately in these patients. Hemoglobin   Date/Time Value Ref Range Status   09/22/2023 09:52 AM 14.7 12.0 - 17.0 g/dL Final     Hematocrit   Date/Time Value Ref Range Status   09/22/2023 09:52 AM 41.9 36.5 - 49.3 % Final     Platelets   Date/Time Value Ref Range Status   09/22/2023 09:52  149 - 390 Thousands/uL Final     Prostate Specific Antigen Total   Date/Time Value Ref Range Status   03/30/2023 10:49 AM 0.6 0.0 - 4.0 ng/mL Final     Comment:     Roche ECLIA methodology. According to the American Urological Association, Serum PSA should  decrease and remain at undetectable levels after radical  prostatectomy. The AUA defines biochemical recurrence as an initial  PSA value 0.2 ng/mL or greater followed by a subsequent confirmatory  PSA value 0.2 ng/mL or greater. Values obtained with different assay methods or kits cannot be used  interchangeably. Results cannot be interpreted as absolute evidence  of the presence or absence of malignant disease. TSH 3RD GENERATON   Date/Time Value Ref Range Status   09/22/2023 09:52 AM 3.332 0.450 - 4.500 uIU/mL Final     Comment:     Adult TSH (3rd generation) reference range follows the recommended guidelines of the American Thyroid Association, January, 2020. Sodium   Date/Time Value Ref Range Status   09/22/2023 09:52  135 - 147 mmol/L Final     BUN   Date/Time Value Ref Range Status   09/22/2023 09:52 AM 6 5 - 25 mg/dL Final     Creatinine   Date/Time Value Ref Range Status   09/22/2023 09:52 AM 1.07 0.60 - 1.30 mg/dL Final     Comment:     Standardized to IDMS reference method      In chart    This note was created with voice recognition software. Phonic, grammatical and spelling errors may be present within the note as a result.

## 2023-10-17 ENCOUNTER — TELEPHONE (OUTPATIENT)
Dept: NEPHROLOGY | Facility: CLINIC | Age: 53
End: 2023-10-17

## 2023-10-17 NOTE — TELEPHONE ENCOUNTER
I called and left a message on machine for patient to return our call about rescheduling his nephrology follow up appointment with Dr. Judy Mckenna because of Dr. Judy Mckenna on hospital call.

## 2023-10-20 ENCOUNTER — OFFICE VISIT (OUTPATIENT)
Dept: URGENT CARE | Facility: CLINIC | Age: 53
End: 2023-10-20
Payer: COMMERCIAL

## 2023-10-20 VITALS — OXYGEN SATURATION: 98 % | RESPIRATION RATE: 18 BRPM | TEMPERATURE: 97.1 F | HEART RATE: 79 BPM

## 2023-10-20 DIAGNOSIS — L03.90 CELLULITIS, UNSPECIFIED CELLULITIS SITE: Primary | ICD-10-CM

## 2023-10-20 PROCEDURE — 99213 OFFICE O/P EST LOW 20 MIN: CPT | Performed by: PHYSICIAN ASSISTANT

## 2023-10-20 RX ORDER — CLINDAMYCIN HYDROCHLORIDE 300 MG/1
300 CAPSULE ORAL 3 TIMES DAILY
Qty: 21 CAPSULE | Refills: 0 | Status: SHIPPED | OUTPATIENT
Start: 2023-10-20 | End: 2023-10-27

## 2023-10-20 NOTE — PROGRESS NOTES
[facility]        NAME: Amanda Soni is a 52 y.o. male  : 1970    MRN: [id number]  DATE: 2023  TIME: 11:29 AM    Assessment and Plan   Cellulitis, unspecified cellulitis site [L03.90]  1. Cellulitis, unspecified cellulitis site  clindamycin (CLEOCIN) 300 MG capsule            Patient Instructions   Patient also evaluated by Anayeli Stovall PA-C   Discussed with patient that since he notes his foot/leg redness/ swelling is baseline we will cover for cellulitis due to fungal nail infection and pain. Take clindamycin as prescribed. We discussed should the patient develop increased swelling, increased redness, increased pain or fevers he should report to the ER immediately. Follow up with PCP in 3-5 days. Proceed to  ER if symptoms worsen. Chief Complaint     Chief Complaint   Patient presents with    Toe Pain     Patient here with swelling in his right big toe and some pain which started yesterday. History of Present Illness       HPI  This is a 57-year-old male here complaining of pain around the nail of his right big toe. Patient denies any injury or trauma to the area. Patient notes symptoms started yesterday. They deny fever, chills, shortness of breath, chest pain, vomiting or diarrhea. Upon exam of the patient's legs they are noted to be erythematous and swollen. Patient notes he has neuropathy and venous stasis. He notes the swelling and redness is his baseline. Review of Systems   Review of Systems   Constitutional:  Negative for chills and fever. Respiratory:  Negative for shortness of breath. Cardiovascular:  Negative for chest pain.          Current Medications       Current Outpatient Medications:     apixaban (ELIQUIS) 5 mg, Take 5 mg by mouth 2 (two) times a day, Disp: , Rfl:     baclofen 20 mg tablet, Take 20 mg by mouth 3 (three) times a day , Disp: , Rfl:     betamethasone dipropionate (DIPROSONE) 0.05 % cream, Apply 1 application topically 2 (two) times a day, Disp: , Rfl:     betamethasone, augmented, (DIPROLENE) 0.05 % ointment, Apply 1 application topically 2 (two) times a day, Disp: , Rfl:     carvedilol (COREG) 12.5 mg tablet, Take 12.5 mg by mouth 2 (two) times a day with meals, Disp: , Rfl:     clindamycin (CLEOCIN) 300 MG capsule, Take 1 capsule (300 mg total) by mouth 3 (three) times a day for 7 days, Disp: 21 capsule, Rfl: 0    Diclofenac Sodium (VOLTAREN) 1 %, Apply 2 g topically 4 (four) times a day, Disp: 350 g, Rfl: 1    escitalopram (LEXAPRO) 10 mg tablet, Take 1 tablet (10 mg total) by mouth daily, Disp: 90 tablet, Rfl: 3    furosemide (LASIX) 40 mg tablet, Take 1 tablet (40 mg total) by mouth 2 (two) times a day, Disp: , Rfl: 0    gabapentin (NEURONTIN) 300 mg capsule, Take 1 capsule (300 mg total) by mouth 3 (three) times a day, Disp: 270 capsule, Rfl: 3    gabapentin (NEURONTIN) 600 MG tablet, Take 1 tablet (600 mg total) by mouth 3 (three) times a day, Disp: 270 tablet, Rfl: 3    hydroxychloroquine (PLAQUENIL) 200 mg tablet, Take 400 mg by mouth daily at bedtime, Disp: , Rfl:     hydrOXYzine HCL (ATARAX) 10 mg tablet, Take 10 mg by mouth every 6 (six) hours as needed for itching, Disp: , Rfl:     lidocaine-prilocaine (EMLA) cream, Apply topically as needed for mild pain, Disp: 30 g, Rfl: 3    loratadine (CLARITIN) 10 mg tablet, Take 1 tablet (10 mg total) by mouth daily, Disp: 30 tablet, Rfl: 2    meclizine (ANTIVERT) 25 mg tablet, Take 1 tablet (25 mg total) by mouth 3 (three) times a day as needed for dizziness, Disp: 30 tablet, Rfl: 0    metolazone (ZAROXOLYN) 5 mg tablet, Take 0.5 tablets (2.5 mg total) by mouth 3 (three) times a week, Disp: , Rfl:     mycophenolate (CELLCEPT) 500 mg tablet, Take 500 mg by mouth every 12 (twelve) hours , Disp: , Rfl:     potassium chloride (K-DUR,KLOR-CON) 20 mEq tablet, Take 20 mEq by mouth daily, Disp: , Rfl:     predniSONE 20 mg tablet, Take 2 tablets (40 mg total) by mouth daily Do not start before August 4, 2023., Disp: 10 tablet, Rfl: 0    tamsulosin (FLOMAX) 0.4 mg, , Disp: , Rfl:     azelastine (ASTELIN) 0.1 % nasal spray, ADMINISTER 1 SPRAY INTO NOSTRIL TWICE DAILY, Disp: , Rfl:     bacitracin topical ointment 500 units/g topical ointment, APPLY TOPICALLY TO AFFECTED AREA THREE TIMES A DAY AS DIRECTED, Disp: , Rfl:     Current Allergies     Allergies as of 10/20/2023 - Reviewed 10/20/2023   Allergen Reaction Noted    Sulfa antibiotics Rash 10/27/2020            The following portions of the patient's history were reviewed and updated as appropriate: allergies, current medications, past family history, past medical history, past social history, past surgical history and problem list.     Past Medical History:   Diagnosis Date    Anemia     Arthritis     Cervical mass     Chronic kidney disease     Coronary artery disease     Depression     DVT (deep venous thrombosis) ([address])     Hypertension     Lupus ([address])     Renal disorder        Past Surgical History:   Procedure Laterality Date    APPENDECTOMY      CERVICAL SPINE SURGERY      CHOLECYSTECTOMY      COLONOSCOPY N/A 8/23/2018    Procedure: COLONOSCOPY;  Surgeon: Shane Linton MD;  Location: [facility]; Service: Gastroenterology    ESOPHAGOGASTRODUODENOSCOPY N/A 8/22/2018    Procedure: ESOPHAGOGASTRODUODENOSCOPY (EGD); Surgeon: Shane Linton MD;  Location: [facility]; Service: Gastroenterology    IR IVC FILTER PLACEMENT PERMANENT  9/7/2017    IR IVC FILTER REMOVAL  4/23/2018    IR LUMBAR PUNCTURE  11/23/2015    NECK SURGERY      US GUIDED INJECTION FOR RESEARCH STUDY  4/23/2018    US GUIDED INJECTION FOR RESEARCH STUDY  9/7/2017    VASCULAR SURGERY         Family History   Problem Relation Age of Onset    Heart disease Mother     No Known Problems Father          Medications have been verified.         Objective   Pulse 79   Temp (!) 97.1 °F (36.2 °C)   Resp 18   SpO2 98%        Physical Exam     Physical Exam  Vitals and nursing note reviewed. Constitutional:       Appearance: Normal appearance. Cardiovascular:      Rate and Rhythm: Normal rate and regular rhythm. Pulmonary:      Effort: Pulmonary effort is normal.      Breath sounds: Normal breath sounds. Musculoskeletal:      Right ankle: Swelling present. Left ankle: Swelling present. Right foot: Swelling and tenderness present. Left foot: Swelling present. Comments: Tenderness to palpation along the right great toe nailbed. No bed is fungal.  See photo   Neurological:      Mental Status: He is alert.

## 2023-10-23 ENCOUNTER — OFFICE VISIT (OUTPATIENT)
Dept: INTERNAL MEDICINE CLINIC | Facility: CLINIC | Age: 53
End: 2023-10-23
Payer: COMMERCIAL

## 2023-10-23 VITALS
WEIGHT: 185 LBS | SYSTOLIC BLOOD PRESSURE: 96 MMHG | BODY MASS INDEX: 29.73 KG/M2 | HEIGHT: 66 IN | DIASTOLIC BLOOD PRESSURE: 60 MMHG | RESPIRATION RATE: 18 BRPM | HEART RATE: 87 BPM | OXYGEN SATURATION: 99 %

## 2023-10-23 DIAGNOSIS — L03.031 CELLULITIS OF TOE OF RIGHT FOOT: Primary | ICD-10-CM

## 2023-10-23 PROCEDURE — 99213 OFFICE O/P EST LOW 20 MIN: CPT | Performed by: PHYSICIAN ASSISTANT

## 2023-10-23 RX ORDER — DOXYCYCLINE HYCLATE 100 MG/1
100 CAPSULE ORAL EVERY 12 HOURS SCHEDULED
Qty: 20 CAPSULE | Refills: 0 | Status: SHIPPED | OUTPATIENT
Start: 2023-10-23 | End: 2023-10-25

## 2023-10-23 NOTE — PATIENT INSTRUCTIONS
Stop taking clindamycin, begin doxycycline 100mg q12 hours for 10 days  Can use Epson salt soaks  If redness increases/travels up the foot or you develop fever chills or sweats go to the emergency room.   Follow up with podiatrist

## 2023-10-23 NOTE — PROGRESS NOTES
Assessment/Plan:   Cellulitis right great toe:  Patient with erythema and edema of right great toe for 3 days. He was seen in urgent care and was prescribed clindamycin 300mg tid for 7 days and instructed to follow up with PCP in 3-5 days  Today he reports no improvement  Will d/c clindamycin and begin doxcycline 100mg Q12 hours for 10 days  Can use Epson salt soaks  Podiatry referral placed. Quality Measures:   Depression Screening and Follow-up Plan: Patient was screened for depression during today's encounter. They screened negative with a PHQ-2 score of 0. Return if symptoms worsen or fail to improve. No problem-specific Assessment & Plan notes found for this encounter. Diagnoses and all orders for this visit:    Cellulitis of toe of right foot  -     doxycycline hyclate (VIBRAMYCIN) 100 mg capsule; Take 1 capsule (100 mg total) by mouth every 12 (twelve) hours for 10 days  -     Ambulatory Referral to Podiatry; Future          Subjective:      Patient ID: Yonathan Pathak is a 52 y.o. male. Patient is a 57-year-old male who presents in the office today for evaluation of right great toe pain/redness for 3 days. He was evaluated at the urgent care on 10/20/2023 and diagnosed with cellulitis. He was prescribed clindamycin 300 mg 3 times daily, however he reports after about 9 doses he is not seeing any improvement. He reports pain is constant at 6 out of 10. Pain is worse at night. He denies any systemic symptoms such as fever chills sweats. He reports erythema and edema localized to the great toe has not change. He does not have a history of gout.  He has never had any infections or injury to this toe in the past.         ALLERGIES:  Allergies   Allergen Reactions    Sulfa Antibiotics Rash       CURRENT MEDICATIONS:    Current Outpatient Medications:     apixaban (ELIQUIS) 5 mg, Take 5 mg by mouth 2 (two) times a day, Disp: , Rfl:     azelastine (ASTELIN) 0.1 % nasal spray, ADMINISTER 1 SPRAY INTO NOSTRIL TWICE DAILY, Disp: , Rfl:     bacitracin topical ointment 500 units/g topical ointment, APPLY TOPICALLY TO AFFECTED AREA THREE TIMES A DAY AS DIRECTED, Disp: , Rfl:     baclofen 20 mg tablet, Take 20 mg by mouth 3 (three) times a day , Disp: , Rfl:     betamethasone dipropionate (DIPROSONE) 0.05 % cream, Apply 1 application topically 2 (two) times a day, Disp: , Rfl:     betamethasone, augmented, (DIPROLENE) 0.05 % ointment, Apply 1 application topically 2 (two) times a day, Disp: , Rfl:     carvedilol (COREG) 12.5 mg tablet, Take 12.5 mg by mouth 2 (two) times a day with meals, Disp: , Rfl:     clindamycin (CLEOCIN) 300 MG capsule, Take 1 capsule (300 mg total) by mouth 3 (three) times a day for 7 days, Disp: 21 capsule, Rfl: 0    Diclofenac Sodium (VOLTAREN) 1 %, Apply 2 g topically 4 (four) times a day, Disp: 350 g, Rfl: 1    doxycycline hyclate (VIBRAMYCIN) 100 mg capsule, Take 1 capsule (100 mg total) by mouth every 12 (twelve) hours for 10 days, Disp: 20 capsule, Rfl: 0    escitalopram (LEXAPRO) 10 mg tablet, Take 1 tablet (10 mg total) by mouth daily, Disp: 90 tablet, Rfl: 3    furosemide (LASIX) 40 mg tablet, Take 1 tablet (40 mg total) by mouth 2 (two) times a day, Disp: , Rfl: 0    gabapentin (NEURONTIN) 300 mg capsule, Take 1 capsule (300 mg total) by mouth 3 (three) times a day, Disp: 270 capsule, Rfl: 3    gabapentin (NEURONTIN) 600 MG tablet, Take 1 tablet (600 mg total) by mouth 3 (three) times a day, Disp: 270 tablet, Rfl: 3    hydroxychloroquine (PLAQUENIL) 200 mg tablet, Take 400 mg by mouth daily at bedtime, Disp: , Rfl:     hydrOXYzine HCL (ATARAX) 10 mg tablet, Take 10 mg by mouth every 6 (six) hours as needed for itching, Disp: , Rfl:     lidocaine-prilocaine (EMLA) cream, Apply topically as needed for mild pain, Disp: 30 g, Rfl: 3    loratadine (CLARITIN) 10 mg tablet, Take 1 tablet (10 mg total) by mouth daily, Disp: 30 tablet, Rfl: 2    meclizine (ANTIVERT) 25 mg tablet, Take 1 tablet (25 mg total) by mouth 3 (three) times a day as needed for dizziness, Disp: 30 tablet, Rfl: 0    metolazone (ZAROXOLYN) 5 mg tablet, Take 0.5 tablets (2.5 mg total) by mouth 3 (three) times a week, Disp: , Rfl:     mycophenolate (CELLCEPT) 500 mg tablet, Take 500 mg by mouth every 12 (twelve) hours , Disp: , Rfl:     potassium chloride (K-DUR,KLOR-CON) 20 mEq tablet, Take 20 mEq by mouth daily, Disp: , Rfl:     predniSONE 20 mg tablet, Take 2 tablets (40 mg total) by mouth daily Do not start before August 4, 2023., Disp: 10 tablet, Rfl: 0    tamsulosin (FLOMAX) 0.4 mg, , Disp: , Rfl:     ACTIVE PROBLEM LIST:  Patient Active Problem List   Diagnosis    Lupus (systemic lupus erythematosus) ([address])    Lupus nephritis ([address])    Hypertension    History of DVT (deep vein thrombosis)    Stage 2 chronic kidney disease    Persistent proteinuria    Anemia in chronic kidney disease    Muscle weakness (generalized)    Tremor of both hands    Spasticity    Gait difficulty    Neuropathic pain    Hypertensive CKD (chronic kidney disease)    Right foot pain    DJD (degenerative joint disease), ankle and foot, right    Cellulitis       PAST MEDICAL HISTORY:  Past Medical History:   Diagnosis Date    Anemia     Arthritis     Cervical mass     Chronic kidney disease     Coronary artery disease     Depression     DVT (deep venous thrombosis) ([address])     Hypertension     Lupus ([address])     Renal disorder        PAST SURGICAL HISTORY:  Past Surgical History:   Procedure Laterality Date    APPENDECTOMY      CERVICAL SPINE SURGERY      CHOLECYSTECTOMY      COLONOSCOPY N/A 8/23/2018    Procedure: COLONOSCOPY;  Surgeon: Shane Linton MD;  Location: [facility]; Service: Gastroenterology    ESOPHAGOGASTRODUODENOSCOPY N/A 8/22/2018    Procedure: ESOPHAGOGASTRODUODENOSCOPY (EGD); Surgeon: Shane Linton MD;  Location: [facility];   Service: Gastroenterology    IR IVC FILTER PLACEMENT PERMANENT  9/7/2017    IR IVC FILTER REMOVAL 4/23/2018    IR LUMBAR PUNCTURE  11/23/2015    NECK SURGERY      US GUIDED INJECTION FOR RESEARCH STUDY  4/23/2018    US GUIDED INJECTION FOR RESEARCH STUDY  9/7/2017    VASCULAR SURGERY         FAMILY HISTORY:  Family History   Problem Relation Age of Onset    Heart disease Mother     No Known Problems Father        SOCIAL HISTORY:  Social History     Socioeconomic History    Marital status: /Civil Union     Spouse name: Not on file    Number of children: Not on file    Years of education: Not on file    Highest education level: Not on file   Occupational History    Not on file   Tobacco Use    Smoking status: Never    Smokeless tobacco: Never   Vaping Use    Vaping Use: Never used   Substance and Sexual Activity    Alcohol use: Never    Drug use: Never    Sexual activity: Yes     Partners: Female, Male     Birth control/protection: Rhythm   Other Topics Concern    Not on file   Social History Narrative    ** Merged History Encounter **          Social Determinants of Health     Financial Resource Strain: Not on file   Food Insecurity: No Food Insecurity (1/1/2023)    Hunger Vital Sign     Worried About Running Out of Food in the Last Year: Never true     Ran Out of Food in the Last Year: Never true   Transportation Needs: No Transportation Needs (1/1/2023)    PRAPARE - Transportation     Lack of Transportation (Medical): No     Lack of Transportation (Non-Medical): No   Physical Activity: Not on file   Stress: Not on file   Social Connections: Not on file   Intimate Partner Violence: Not on file   Housing Stability: Low Risk  (1/1/2023)    Housing Stability Vital Sign     Unable to Pay for Housing in the Last Year: No     Number of Places Lived in the Last Year: 1     Unstable Housing in the Last Year: No       Review of Systems   Constitutional:  Negative for chills, diaphoresis and fever. Skin:  Positive for color change and rash. Negative for wound.          Objective:  Vitals:    10/23/23 1130   BP: 96/60   BP Location: Left arm   Patient Position: Sitting   Cuff Size: Adult   Pulse: 87   Resp: 18   SpO2: 99%   Weight: 83.9 kg (185 lb)   Height: 5' 6" (1.676 m)     Body mass index is 29.86 kg/m². Physical Exam  Constitutional:       Appearance: Normal appearance. Musculoskeletal:      Right lower leg: Edema present. Left lower leg: Edema present. Feet:    Skin:     General: Skin is warm and dry. Findings: Erythema present. No bruising or lesion. Neurological:      Mental Status: He is alert. RESULTS:  Hemoglobin A1C   Date/Time Value Ref Range Status   09/22/2023 09:52 AM 5.2 Normal 4.0-5.6%; PreDiabetic 5.7-6.4%; Diabetic >=6.5%; Glycemic control for adults with diabetes <7.0% % Final   12/20/2022 08:23 AM 5.3 4.0 - 5.6 % Final     Comment:     The use of HbA1c to monitor glycemic status is based on normal hemoglobin and HbA composition. This test should not be used in patients with abnormal hemoglobin that affects the half life of the red blood cell or the in vivo glycation rates. Cholesterol   Date/Time Value Ref Range Status   09/22/2023 09:52  See Comment mg/dL Final     Comment:     Cholesterol:         Pediatric <18 Years        Desirable          <170 mg/dL      Borderline High    170-199 mg/dL      High               >=200 mg/dL        Adult >=18 Years            Desirable        <200 mg/dL      Borderline High  200-239 mg/dL      High             >239 mg/dL       Triglycerides   Date/Time Value Ref Range Status   09/22/2023 09:52 AM 86 See Comment mg/dL Final     Comment:     Triglyceride:     0-9Y            <75mg/dL     10Y-17Y         <90 mg/dL       >=18Y     Normal          <150 mg/dL     Borderline High 150-199 mg/dL     High            200-499 mg/dL        Very High       >499 mg/dL    Specimen collection should occur prior to Metamizole administration due to the potential for falsely depressed results.      HDL, Direct   Date/Time Value Ref Range Status   09/22/2023 09:52 AM 37 (L) >=40 mg/dL Final     LDL Calculated   Date/Time Value Ref Range Status   09/22/2023 09:52 AM 52 0 - 100 mg/dL Final     Comment:     LDL Cholesterol:     Optimal           <100 mg/dl     Near Optimal      100-129 mg/dl     Above Optimal       Borderline High 130-159 mg/dl       High            160-189 mg/dl       Very High       >189 mg/dl         This screening LDL is a calculated result. It does not have the accuracy of the Direct Measured LDL in the monitoring of patients with hyperlipidemia and/or statin therapy. Direct Measure LDL (TEQ623) must be ordered separately in these patients. Hemoglobin   Date/Time Value Ref Range Status   09/22/2023 09:52 AM 14.7 12.0 - 17.0 g/dL Final     Hematocrit   Date/Time Value Ref Range Status   09/22/2023 09:52 AM 41.9 36.5 - 49.3 % Final     Platelets   Date/Time Value Ref Range Status   09/22/2023 09:52  149 - 390 Thousands/uL Final     Prostate Specific Antigen Total   Date/Time Value Ref Range Status   03/30/2023 10:49 AM 0.6 0.0 - 4.0 ng/mL Final     Comment:     Roche ECLIA methodology. According to the American Urological Association, Serum PSA should  decrease and remain at undetectable levels after radical  prostatectomy. The AUA defines biochemical recurrence as an initial  PSA value 0.2 ng/mL or greater followed by a subsequent confirmatory  PSA value 0.2 ng/mL or greater. Values obtained with different assay methods or kits cannot be used  interchangeably. Results cannot be interpreted as absolute evidence  of the presence or absence of malignant disease. TSH 3RD GENERATON   Date/Time Value Ref Range Status   09/22/2023 09:52 AM 3.332 0.450 - 4.500 uIU/mL Final     Comment:     Adult TSH (3rd generation) reference range follows the recommended guidelines of the American Thyroid Association, January, 2020.      Sodium   Date/Time Value Ref Range Status   09/22/2023 09:52  135 - 147 mmol/L Final     BUN Date/Time Value Ref Range Status   09/22/2023 09:52 AM 6 5 - 25 mg/dL Final     Creatinine   Date/Time Value Ref Range Status   09/22/2023 09:52 AM 1.07 0.60 - 1.30 mg/dL Final     Comment:     Standardized to IDMS reference method      In chart    This note was created with voice recognition software. Phonic, grammatical and spelling errors may be present within the note as a result.

## 2023-10-25 ENCOUNTER — TELEPHONE (OUTPATIENT)
Dept: INTERNAL MEDICINE CLINIC | Facility: CLINIC | Age: 53
End: 2023-10-25

## 2023-10-25 DIAGNOSIS — L03.031 CELLULITIS OF TOE OF RIGHT FOOT: Primary | ICD-10-CM

## 2023-10-25 RX ORDER — CEPHALEXIN 500 MG/1
500 CAPSULE ORAL 3 TIMES DAILY
Qty: 21 CAPSULE | Refills: 0 | Status: SHIPPED | OUTPATIENT
Start: 2023-10-25 | End: 2023-11-01

## 2023-10-25 NOTE — TELEPHONE ENCOUNTER
Notified patient that he does not have to come into the office and that [facility] sent in a new antibiotic to his pharmacy. If symptoms don't improve please give us a call back to be seen.

## 2023-10-25 NOTE — TELEPHONE ENCOUNTER
Patient said he had an allergic reaction to the medication that he was put on Monday. Patient is having a rash on his legs and arms. Patient did stop taking the medication and would like to know if there is something else that can be sent in for him? Please advise. .. Jenny Byrd

## 2023-11-14 ENCOUNTER — OFFICE VISIT (OUTPATIENT)
Dept: PODIATRY | Facility: CLINIC | Age: 53
End: 2023-11-14
Payer: COMMERCIAL

## 2023-11-14 VITALS
HEIGHT: 66 IN | SYSTOLIC BLOOD PRESSURE: 130 MMHG | DIASTOLIC BLOOD PRESSURE: 90 MMHG | OXYGEN SATURATION: 98 % | HEART RATE: 96 BPM | BODY MASS INDEX: 29.73 KG/M2 | WEIGHT: 185 LBS

## 2023-11-14 DIAGNOSIS — B35.3 TINEA PEDIS OF RIGHT FOOT: Primary | ICD-10-CM

## 2023-11-14 DIAGNOSIS — L03.031 PARONYCHIA OF GREAT TOE OF RIGHT FOOT: ICD-10-CM

## 2023-11-14 PROCEDURE — 99243 OFF/OP CNSLTJ NEW/EST LOW 30: CPT | Performed by: PODIATRIST

## 2023-11-14 RX ORDER — CLOBETASOL PROPIONATE 0.5 MG/G
CREAM TOPICAL
COMMUNITY
Start: 2023-11-08

## 2023-11-14 RX ORDER — FLUTICASONE PROPIONATE 50 MCG
SPRAY, SUSPENSION (ML) NASAL
COMMUNITY
Start: 2023-11-08

## 2023-11-14 RX ORDER — KETOCONAZOLE 20 MG/G
CREAM TOPICAL DAILY
Qty: 60 G | Refills: 1 | Status: SHIPPED | OUTPATIENT
Start: 2023-11-14

## 2023-11-14 RX ORDER — CEPHALEXIN 500 MG/1
500 CAPSULE ORAL EVERY 8 HOURS SCHEDULED
Qty: 21 CAPSULE | Refills: 0 | Status: SHIPPED | OUTPATIENT
Start: 2023-11-14 | End: 2023-11-21

## 2023-11-14 RX ORDER — SUCRALFATE 1 G/1
TABLET ORAL
COMMUNITY
Start: 2023-11-08

## 2023-11-14 NOTE — LETTER
November 14, 2023     [address]  [facility]    Patient: Venessa Farrell   YOB: 1970   Date of Visit: 11/14/2023       Dear Dr. Chase Mai: Thank you for referring Venessa Farrell to me for evaluation. Below are my notes for this consultation. If you have questions, please do not hesitate to call me. I look forward to following your patient along with you. Sincerely,        Yinka Sullivan DPM        CC: No Recipients    Ceci Millard  11/14/2023 12:08 PM  Signed  Assessment/Plan:     The patient's clinical examination today significant for resolving cellulitis of the right great toe. There is very mild residual erythema without any significant edema nor calor. There is still some very mild tenderness palpation to the proximal nail fold. The nail is mycotic. Mycotic changes are also noted to the skin of the nailbed. There is no active drainage no purulence    The patient is right great toe does appear to be improving clinically. There is very mild residual discomfort. There is some very mild tenderness palpation noted today. There is no active drainage. Mycotic changes are noted to the nail with dry scaling skin to the nailbed consistent with tinea pedis. I will put him on a 7-day course of cephalexin 500 mg to be taken 3 times a day for management of some lingering cellulitis. I will also start him on a topical antifungal, ketoconazole was sent to his pharmacy and is to be applied daily to the affected nail bed. Recommend follow-up in 4 weeks. Diagnoses and all orders for this visit:    Tinea pedis of right foot  -     ketoconazole (NIZORAL) 2 % cream; Apply topically daily    Paronychia of great toe of right foot  -     cephalexin (KEFLEX) 500 mg capsule;  Take 1 capsule (500 mg total) by mouth every 8 (eight) hours for 7 days    Other orders  -     clobetasol (TEMOVATE) 0.05 % cream  -     fluticasone (FLONASE) 50 mcg/act nasal spray  -     sucralfate (CARAFATE) 1 g tablet          Subjective:     Patient ID: Jenelle Nelson is a 52 y.o. male. The patient presents today for follow-up of right great toe pain that has been present for several weeks. He was recently seen in the local ED and diagnosed with cellulitis of the right great toe. He was started on a 7-day course of clindamycin which they will help alleviate some of the pain and swelling. He has completed his course of antibiotic but still notes some very mild residual tenderness. Overall, the patient does note good improvement since his ED visit. PAST MEDICAL HISTORY:  Past Medical History:   Diagnosis Date   • Anemia    • Arthritis    • Cervical mass    • Chronic kidney disease    • Coronary artery disease    • Depression    • DVT (deep venous thrombosis) ([facility])    • Hypertension    • Lupus ([address])    • Renal disorder        PAST SURGICAL HISTORY:  Past Surgical History:   Procedure Laterality Date   • APPENDECTOMY     • CERVICAL SPINE SURGERY     • CHOLECYSTECTOMY     • COLONOSCOPY N/A 8/23/2018    Procedure: COLONOSCOPY;  Surgeon: Bee Blank MD;  Location: [facility]; Service: Gastroenterology   • ESOPHAGOGASTRODUODENOSCOPY N/A 8/22/2018    Procedure: ESOPHAGOGASTRODUODENOSCOPY (EGD); Surgeon: Bee Blank MD;  Location: [facility];   Service: Gastroenterology   • IR IVC FILTER PLACEMENT PERMANENT  9/7/2017   • IR IVC FILTER REMOVAL  4/23/2018   • IR LUMBAR PUNCTURE  11/23/2015   • NECK SURGERY     • US GUIDED INJECTION FOR RESEARCH STUDY  4/23/2018   • US GUIDED INJECTION FOR RESEARCH STUDY  9/7/2017   • VASCULAR SURGERY          ALLERGIES:  Doxycycline and Sulfa antibiotics    MEDICATIONS:  Current Outpatient Medications   Medication Sig Dispense Refill   • apixaban (ELIQUIS) 5 mg Take 5 mg by mouth 2 (two) times a day     • baclofen 20 mg tablet Take 20 mg by mouth 3 (three) times a day      • betamethasone dipropionate (DIPROSONE) 0.05 % cream Apply 1 application topically 2 (two) times a day     • carvedilol (COREG) 12.5 mg tablet Take 12.5 mg by mouth 2 (two) times a day with meals     • cephalexin (KEFLEX) 500 mg capsule Take 1 capsule (500 mg total) by mouth every 8 (eight) hours for 7 days 21 capsule 0   • clobetasol (TEMOVATE) 0.05 % cream      • Diclofenac Sodium (VOLTAREN) 1 % Apply 2 g topically 4 (four) times a day 350 g 1   • fluticasone (FLONASE) 50 mcg/act nasal spray      • furosemide (LASIX) 40 mg tablet Take 1 tablet (40 mg total) by mouth 2 (two) times a day  0   • hydroxychloroquine (PLAQUENIL) 200 mg tablet Take 400 mg by mouth daily at bedtime     • hydrOXYzine HCL (ATARAX) 10 mg tablet Take 10 mg by mouth every 6 (six) hours as needed for itching     • ketoconazole (NIZORAL) 2 % cream Apply topically daily 60 g 1   • lidocaine-prilocaine (EMLA) cream Apply topically as needed for mild pain 30 g 3   • meclizine (ANTIVERT) 25 mg tablet Take 1 tablet (25 mg total) by mouth 3 (three) times a day as needed for dizziness 30 tablet 0   • metolazone (ZAROXOLYN) 5 mg tablet Take 0.5 tablets (2.5 mg total) by mouth 3 (three) times a week     • mycophenolate (CELLCEPT) 500 mg tablet Take 500 mg by mouth every 12 (twelve) hours      • potassium chloride (K-DUR,KLOR-CON) 20 mEq tablet Take 20 mEq by mouth daily     • sucralfate (CARAFATE) 1 g tablet      • tamsulosin (FLOMAX) 0.4 mg      • azelastine (ASTELIN) 0.1 % nasal spray ADMINISTER 1 SPRAY INTO NOSTRIL TWICE DAILY     • bacitracin topical ointment 500 units/g topical ointment APPLY TOPICALLY TO AFFECTED AREA THREE TIMES A DAY AS DIRECTED     • betamethasone, augmented, (DIPROLENE) 0.05 % ointment Apply 1 application topically 2 (two) times a day (Patient not taking: Reported on 11/14/2023)     • escitalopram (LEXAPRO) 10 mg tablet Take 1 tablet (10 mg total) by mouth daily 90 tablet 3   • gabapentin (NEURONTIN) 300 mg capsule Take 1 capsule (300 mg total) by mouth 3 (three) times a day 270 capsule 3   • gabapentin (NEURONTIN) 600 MG tablet Take 1 tablet (600 mg total) by mouth 3 (three) times a day 270 tablet 3   • loratadine (CLARITIN) 10 mg tablet Take 1 tablet (10 mg total) by mouth daily 30 tablet 2   • predniSONE 20 mg tablet Take 2 tablets (40 mg total) by mouth daily Do not start before August 4, 2023. (Patient not taking: Reported on 11/14/2023) 10 tablet 0     No current facility-administered medications for this visit. SOCIAL HISTORY:  Social History     Socioeconomic History   • Marital status: /Civil Union     Spouse name: None   • Number of children: None   • Years of education: None   • Highest education level: None   Occupational History   • None   Tobacco Use   • Smoking status: Never   • Smokeless tobacco: Never   Vaping Use   • Vaping Use: Never used   Substance and Sexual Activity   • Alcohol use: Never   • Drug use: Never   • Sexual activity: Yes     Partners: Female, Male     Birth control/protection: Rhythm   Other Topics Concern   • None   Social History Narrative    ** Merged History Encounter **          Social Determinants of Health     Financial Resource Strain: Not on file   Food Insecurity: No Food Insecurity (1/1/2023)    Hunger Vital Sign    • Worried About Running Out of Food in the Last Year: Never true    • Ran Out of Food in the Last Year: Never true   Transportation Needs: No Transportation Needs (1/1/2023)    PRAPARE - Transportation    • Lack of Transportation (Medical): No    • Lack of Transportation (Non-Medical): No   Physical Activity: Not on file   Stress: Not on file   Social Connections: Not on file   Intimate Partner Violence: Not on file   Housing Stability: Low Risk  (1/1/2023)    Housing Stability Vital Sign    • Unable to Pay for Housing in the Last Year: No    • Number of Places Lived in the Last Year: 1    • Unstable Housing in the Last Year: No        Review of Systems   Constitutional: Negative. HENT: Negative. Eyes: Negative. Respiratory: Negative. Cardiovascular: Negative. Endocrine: Negative. Musculoskeletal: Negative. Neurological: Negative. Hematological: Negative. Psychiatric/Behavioral: Negative. Objective:     Physical Exam  Vitals reviewed. Constitutional:       Appearance: Normal appearance. HENT:      Head: Normocephalic and atraumatic. Nose: Nose normal.   Eyes:      Conjunctiva/sclera: Conjunctivae normal.      Pupils: Pupils are equal, round, and reactive to light. Cardiovascular:      Pulses:           Dorsalis pedis pulses are 0 on the right side. Posterior tibial pulses are 0 on the right side. Pulmonary:      Effort: Pulmonary effort is normal.   Musculoskeletal:        Feet:    Feet:      Comments: The patient's clinical examination today significant for resolving cellulitis of the right great toe. There is very mild residual erythema without any significant edema nor calor. There is still some very mild tenderness palpation to the proximal nail fold. The nail is mycotic. Mycotic changes are also noted to the skin of the nailbed. There is no active drainage no purulence  Skin:     General: Skin is warm. Capillary Refill: Capillary refill takes 2 to 3 seconds. Neurological:      General: No focal deficit present. Mental Status: He is alert and oriented to person, place, and time. Psychiatric:         Mood and Affect: Mood normal.         Behavior: Behavior normal.         Thought Content:  Thought content normal.

## 2023-11-14 NOTE — PROGRESS NOTES
Assessment/Plan:     The patient's clinical examination today significant for resolving cellulitis of the right great toe. There is very mild residual erythema without any significant edema nor calor. There is still some very mild tenderness palpation to the proximal nail fold. The nail is mycotic. Mycotic changes are also noted to the skin of the nailbed. There is no active drainage no purulence    The patient is right great toe does appear to be improving clinically. There is very mild residual discomfort. There is some very mild tenderness palpation noted today. There is no active drainage. Mycotic changes are noted to the nail with dry scaling skin to the nailbed consistent with tinea pedis. I will put him on a 7-day course of cephalexin 500 mg to be taken 3 times a day for management of some lingering cellulitis. I will also start him on a topical antifungal, ketoconazole was sent to his pharmacy and is to be applied daily to the affected nail bed. Recommend follow-up in 4 weeks. Diagnoses and all orders for this visit:    Tinea pedis of right foot  -     ketoconazole (NIZORAL) 2 % cream; Apply topically daily    Paronychia of great toe of right foot  -     cephalexin (KEFLEX) 500 mg capsule; Take 1 capsule (500 mg total) by mouth every 8 (eight) hours for 7 days    Other orders  -     clobetasol (TEMOVATE) 0.05 % cream  -     fluticasone (FLONASE) 50 mcg/act nasal spray  -     sucralfate (CARAFATE) 1 g tablet          Subjective:     Patient ID: Jama Herzog is a 52 y.o. male. The patient presents today for follow-up of right great toe pain that has been present for several weeks. He was recently seen in the local ED and diagnosed with cellulitis of the right great toe. He was started on a 7-day course of clindamycin which they will help alleviate some of the pain and swelling. He has completed his course of antibiotic but still notes some very mild residual tenderness.   Overall, the patient does note good improvement since his ED visit. PAST MEDICAL HISTORY:  Past Medical History:   Diagnosis Date    Anemia     Arthritis     Cervical mass     Chronic kidney disease     Coronary artery disease     Depression     DVT (deep venous thrombosis) ([address])     Hypertension     Lupus ([address])     Renal disorder        PAST SURGICAL HISTORY:  Past Surgical History:   Procedure Laterality Date    APPENDECTOMY      CERVICAL SPINE SURGERY      CHOLECYSTECTOMY      COLONOSCOPY N/A 8/23/2018    Procedure: COLONOSCOPY;  Surgeon: Tyrel Walker MD;  Location: [facility]; Service: Gastroenterology    ESOPHAGOGASTRODUODENOSCOPY N/A 8/22/2018    Procedure: ESOPHAGOGASTRODUODENOSCOPY (EGD); Surgeon: Tyrel Walker MD;  Location: [facility];   Service: Gastroenterology    IR IVC FILTER PLACEMENT PERMANENT  9/7/2017    IR IVC FILTER REMOVAL  4/23/2018    IR LUMBAR PUNCTURE  11/23/2015    NECK SURGERY      US GUIDED INJECTION FOR RESEARCH STUDY  4/23/2018    US GUIDED INJECTION FOR RESEARCH STUDY  9/7/2017    VASCULAR SURGERY          ALLERGIES:  Doxycycline and Sulfa antibiotics    MEDICATIONS:  Current Outpatient Medications   Medication Sig Dispense Refill    apixaban (ELIQUIS) 5 mg Take 5 mg by mouth 2 (two) times a day      baclofen 20 mg tablet Take 20 mg by mouth 3 (three) times a day       betamethasone dipropionate (DIPROSONE) 0.05 % cream Apply 1 application topically 2 (two) times a day      carvedilol (COREG) 12.5 mg tablet Take 12.5 mg by mouth 2 (two) times a day with meals      cephalexin (KEFLEX) 500 mg capsule Take 1 capsule (500 mg total) by mouth every 8 (eight) hours for 7 days 21 capsule 0    clobetasol (TEMOVATE) 0.05 % cream       Diclofenac Sodium (VOLTAREN) 1 % Apply 2 g topically 4 (four) times a day 350 g 1    fluticasone (FLONASE) 50 mcg/act nasal spray       furosemide (LASIX) 40 mg tablet Take 1 tablet (40 mg total) by mouth 2 (two) times a day  0    hydroxychloroquine (PLAQUENIL) 200 mg tablet Take 400 mg by mouth daily at bedtime      hydrOXYzine HCL (ATARAX) 10 mg tablet Take 10 mg by mouth every 6 (six) hours as needed for itching      ketoconazole (NIZORAL) 2 % cream Apply topically daily 60 g 1    lidocaine-prilocaine (EMLA) cream Apply topically as needed for mild pain 30 g 3    meclizine (ANTIVERT) 25 mg tablet Take 1 tablet (25 mg total) by mouth 3 (three) times a day as needed for dizziness 30 tablet 0    metolazone (ZAROXOLYN) 5 mg tablet Take 0.5 tablets (2.5 mg total) by mouth 3 (three) times a week      mycophenolate (CELLCEPT) 500 mg tablet Take 500 mg by mouth every 12 (twelve) hours       potassium chloride (K-DUR,KLOR-CON) 20 mEq tablet Take 20 mEq by mouth daily      sucralfate (CARAFATE) 1 g tablet       tamsulosin (FLOMAX) 0.4 mg       azelastine (ASTELIN) 0.1 % nasal spray ADMINISTER 1 SPRAY INTO NOSTRIL TWICE DAILY      bacitracin topical ointment 500 units/g topical ointment APPLY TOPICALLY TO AFFECTED AREA THREE TIMES A DAY AS DIRECTED      betamethasone, augmented, (DIPROLENE) 0.05 % ointment Apply 1 application topically 2 (two) times a day (Patient not taking: Reported on 11/14/2023)      escitalopram (LEXAPRO) 10 mg tablet Take 1 tablet (10 mg total) by mouth daily 90 tablet 3    gabapentin (NEURONTIN) 300 mg capsule Take 1 capsule (300 mg total) by mouth 3 (three) times a day 270 capsule 3    gabapentin (NEURONTIN) 600 MG tablet Take 1 tablet (600 mg total) by mouth 3 (three) times a day 270 tablet 3    loratadine (CLARITIN) 10 mg tablet Take 1 tablet (10 mg total) by mouth daily 30 tablet 2    predniSONE 20 mg tablet Take 2 tablets (40 mg total) by mouth daily Do not start before August 4, 2023. (Patient not taking: Reported on 11/14/2023) 10 tablet 0     No current facility-administered medications for this visit.        SOCIAL HISTORY:  Social History     Socioeconomic History    Marital status: /Civil Union     Spouse name: None    Number of children: None    Years of education: None    Highest education level: None   Occupational History    None   Tobacco Use    Smoking status: Never    Smokeless tobacco: Never   Vaping Use    Vaping Use: Never used   Substance and Sexual Activity    Alcohol use: Never    Drug use: Never    Sexual activity: Yes     Partners: Female, Male     Birth control/protection: Rhythm   Other Topics Concern    None   Social History Narrative    ** Merged History Encounter **          Social Determinants of Health     Financial Resource Strain: Not on file   Food Insecurity: No Food Insecurity (1/1/2023)    Hunger Vital Sign     Worried About Running Out of Food in the Last Year: Never true     Ran Out of Food in the Last Year: Never true   Transportation Needs: No Transportation Needs (1/1/2023)    PRAPARE - Transportation     Lack of Transportation (Medical): No     Lack of Transportation (Non-Medical): No   Physical Activity: Not on file   Stress: Not on file   Social Connections: Not on file   Intimate Partner Violence: Not on file   Housing Stability: Low Risk  (1/1/2023)    Housing Stability Vital Sign     Unable to Pay for Housing in the Last Year: No     Number of Places Lived in the Last Year: 1     Unstable Housing in the Last Year: No        Review of Systems   Constitutional: Negative. HENT: Negative. Eyes: Negative. Respiratory: Negative. Cardiovascular: Negative. Endocrine: Negative. Musculoskeletal: Negative. Neurological: Negative. Hematological: Negative. Psychiatric/Behavioral: Negative. Objective:     Physical Exam  Vitals reviewed. Constitutional:       Appearance: Normal appearance. HENT:      Head: Normocephalic and atraumatic. Nose: Nose normal.   Eyes:      Conjunctiva/sclera: Conjunctivae normal.      Pupils: Pupils are equal, round, and reactive to light. Cardiovascular:      Pulses:           Dorsalis pedis pulses are 0 on the right side.         Posterior tibial pulses are 0 on the right side. Pulmonary:      Effort: Pulmonary effort is normal.   Musculoskeletal:        Feet:    Feet:      Comments: The patient's clinical examination today significant for resolving cellulitis of the right great toe. There is very mild residual erythema without any significant edema nor calor. There is still some very mild tenderness palpation to the proximal nail fold. The nail is mycotic. Mycotic changes are also noted to the skin of the nailbed. There is no active drainage no purulence  Skin:     General: Skin is warm. Capillary Refill: Capillary refill takes 2 to 3 seconds. Neurological:      General: No focal deficit present. Mental Status: He is alert and oriented to person, place, and time. Psychiatric:         Mood and Affect: Mood normal.         Behavior: Behavior normal.         Thought Content:  Thought content normal.

## 2023-11-20 ENCOUNTER — TELEPHONE (OUTPATIENT)
Dept: INTERNAL MEDICINE CLINIC | Facility: CLINIC | Age: 53
End: 2023-11-20

## 2023-11-20 NOTE — TELEPHONE ENCOUNTER
Pt called asking for appt, but I cannot get out of him exactly what's going on. His speaking english but accent is strong, and I asked to repeat a few times but I don't think he's understanding my questions back either b/c he's answering them way off. ... I think he is talking about a " ball in his buttocks? "  Pain right side but I'm not positive.      So I think its a mass of some sort,   maybe an extra set of ears

## 2023-11-21 ENCOUNTER — OFFICE VISIT (OUTPATIENT)
Dept: INTERNAL MEDICINE CLINIC | Facility: CLINIC | Age: 53
End: 2023-11-21
Payer: COMMERCIAL

## 2023-11-21 VITALS
BODY MASS INDEX: 29.73 KG/M2 | WEIGHT: 185 LBS | HEART RATE: 86 BPM | SYSTOLIC BLOOD PRESSURE: 120 MMHG | HEIGHT: 66 IN | RESPIRATION RATE: 16 BRPM | OXYGEN SATURATION: 99 % | DIASTOLIC BLOOD PRESSURE: 66 MMHG | TEMPERATURE: 99.1 F

## 2023-11-21 DIAGNOSIS — K62.89 PERIANAL CYST: Primary | ICD-10-CM

## 2023-11-21 PROCEDURE — 99213 OFFICE O/P EST LOW 20 MIN: CPT | Performed by: PHYSICIAN ASSISTANT

## 2023-11-21 NOTE — PROGRESS NOTES
Assessment/Plan:   Perianal lesion:  Patient with approximately 1 cm firm, mobile, nontender, skin colored, cystic lesion below/right anus  Does not appear to be infectious  Patient is currently taking cephlex 500mg Q8hrs for cellulitus. He is on day 7/14. Will refer to general surgery for evaluation and possible excision     Quality Measures:   Depression Screening and Follow-up Plan: Patient was screened for depression during today's encounter. They screened negative with a PHQ-2 score of 0. Return if symptoms worsen or fail to improve. No problem-specific Assessment & Plan notes found for this encounter. Diagnoses and all orders for this visit:    Perianal cyst  -     Ambulatory Referral to General Surgery; Future          Subjective:      Patient ID: Venessa Farrell is a 52 y.o. male. Patient is a 57-year-old male who presents in the office today for evaluation of a cystic lesion near his anus. He reports that he felt this lesion about 3 days ago. He denies any pain or drainage. He has never had this before. He  has not tried any treatments. He is currently taking Keflex for cellulitis of great toe which he reports is improving.          ALLERGIES:  Allergies   Allergen Reactions    Doxycycline Rash    Sulfa Antibiotics Rash       CURRENT MEDICATIONS:    Current Outpatient Medications:     apixaban (ELIQUIS) 5 mg, Take 5 mg by mouth 2 (two) times a day, Disp: , Rfl:     azelastine (ASTELIN) 0.1 % nasal spray, ADMINISTER 1 SPRAY INTO NOSTRIL TWICE DAILY, Disp: , Rfl:     bacitracin topical ointment 500 units/g topical ointment, APPLY TOPICALLY TO AFFECTED AREA THREE TIMES A DAY AS DIRECTED, Disp: , Rfl:     baclofen 20 mg tablet, Take 20 mg by mouth 3 (three) times a day , Disp: , Rfl:     betamethasone dipropionate (DIPROSONE) 0.05 % cream, Apply 1 application topically 2 (two) times a day, Disp: , Rfl:     carvedilol (COREG) 12.5 mg tablet, Take 12.5 mg by mouth 2 (two) times a day with meals, Disp: , Rfl:     clobetasol (TEMOVATE) 0.05 % cream, , Disp: , Rfl:     Diclofenac Sodium (VOLTAREN) 1 %, Apply 2 g topically 4 (four) times a day, Disp: 350 g, Rfl: 1    escitalopram (LEXAPRO) 10 mg tablet, Take 1 tablet (10 mg total) by mouth daily, Disp: 90 tablet, Rfl: 3    fluticasone (FLONASE) 50 mcg/act nasal spray, , Disp: , Rfl:     furosemide (LASIX) 40 mg tablet, Take 1 tablet (40 mg total) by mouth 2 (two) times a day, Disp: , Rfl: 0    gabapentin (NEURONTIN) 300 mg capsule, Take 1 capsule (300 mg total) by mouth 3 (three) times a day, Disp: 270 capsule, Rfl: 3    gabapentin (NEURONTIN) 600 MG tablet, Take 1 tablet (600 mg total) by mouth 3 (three) times a day, Disp: 270 tablet, Rfl: 3    hydroxychloroquine (PLAQUENIL) 200 mg tablet, Take 400 mg by mouth daily at bedtime, Disp: , Rfl:     hydrOXYzine HCL (ATARAX) 10 mg tablet, Take 10 mg by mouth every 6 (six) hours as needed for itching, Disp: , Rfl:     ketoconazole (NIZORAL) 2 % cream, Apply topically daily, Disp: 60 g, Rfl: 1    lidocaine-prilocaine (EMLA) cream, Apply topically as needed for mild pain, Disp: 30 g, Rfl: 3    loratadine (CLARITIN) 10 mg tablet, Take 1 tablet (10 mg total) by mouth daily, Disp: 30 tablet, Rfl: 2    meclizine (ANTIVERT) 25 mg tablet, Take 1 tablet (25 mg total) by mouth 3 (three) times a day as needed for dizziness, Disp: 30 tablet, Rfl: 0    metolazone (ZAROXOLYN) 5 mg tablet, Take 0.5 tablets (2.5 mg total) by mouth 3 (three) times a week, Disp: , Rfl:     mycophenolate (CELLCEPT) 500 mg tablet, Take 500 mg by mouth every 12 (twelve) hours , Disp: , Rfl:     potassium chloride (K-DUR,KLOR-CON) 20 mEq tablet, Take 20 mEq by mouth daily, Disp: , Rfl:     sucralfate (CARAFATE) 1 g tablet, , Disp: , Rfl:     tamsulosin (FLOMAX) 0.4 mg, , Disp: , Rfl:     betamethasone, augmented, (DIPROLENE) 0.05 % ointment, Apply 1 application topically 2 (two) times a day (Patient not taking: Reported on 11/14/2023), Disp: , Rfl: cephalexin (KEFLEX) 500 mg capsule, Take 1 capsule (500 mg total) by mouth every 8 (eight) hours for 7 days (Patient not taking: Reported on 11/21/2023), Disp: 21 capsule, Rfl: 0    predniSONE 20 mg tablet, Take 2 tablets (40 mg total) by mouth daily Do not start before August 4, 2023. (Patient not taking: Reported on 11/14/2023), Disp: 10 tablet, Rfl: 0    ACTIVE PROBLEM LIST:  Patient Active Problem List   Diagnosis    Lupus (systemic lupus erythematosus) (HCC)    Lupus nephritis (HCC)    Hypertension    History of DVT (deep vein thrombosis)    Stage 2 chronic kidney disease    Persistent proteinuria    Anemia in chronic kidney disease    Muscle weakness (generalized)    Tremor of both hands    Spasticity    Gait difficulty    Neuropathic pain    Hypertensive CKD (chronic kidney disease)    Right foot pain    DJD (degenerative joint disease), ankle and foot, right    Cellulitis       PAST MEDICAL HISTORY:  Past Medical History:   Diagnosis Date    Anemia     Arthritis     Cervical mass     Chronic kidney disease     Coronary artery disease     Depression     DVT (deep venous thrombosis) ([address])     Hypertension     Lupus ([address])     Renal disorder        PAST SURGICAL HISTORY:  Past Surgical History:   Procedure Laterality Date    APPENDECTOMY      CERVICAL SPINE SURGERY      CHOLECYSTECTOMY      COLONOSCOPY N/A 8/23/2018    Procedure: COLONOSCOPY;  Surgeon: Delfino Laughlni MD;  Location: [facility]; Service: Gastroenterology    ESOPHAGOGASTRODUODENOSCOPY N/A 8/22/2018    Procedure: ESOPHAGOGASTRODUODENOSCOPY (EGD); Surgeon: Delfino Laughlin MD;  Location: [facility];   Service: Gastroenterology    IR IVC FILTER PLACEMENT PERMANENT  9/7/2017    IR IVC FILTER REMOVAL  4/23/2018    IR LUMBAR PUNCTURE  11/23/2015    NECK SURGERY      US GUIDED INJECTION FOR RESEARCH STUDY  4/23/2018    US GUIDED INJECTION FOR RESEARCH STUDY  9/7/2017    VASCULAR SURGERY         FAMILY HISTORY:  Family History   Problem Relation Age of Onset    Heart disease Mother     No Known Problems Father        SOCIAL HISTORY:  Social History     Socioeconomic History    Marital status: /Civil Union     Spouse name: Not on file    Number of children: Not on file    Years of education: Not on file    Highest education level: Not on file   Occupational History    Not on file   Tobacco Use    Smoking status: Never    Smokeless tobacco: Never   Vaping Use    Vaping Use: Never used   Substance and Sexual Activity    Alcohol use: Never    Drug use: Never    Sexual activity: Yes     Partners: Female, Male     Birth control/protection: Rhythm   Other Topics Concern    Not on file   Social History Narrative    ** Merged History Encounter **          Social Determinants of Health     Financial Resource Strain: Not on file   Food Insecurity: No Food Insecurity (1/1/2023)    Hunger Vital Sign     Worried About Running Out of Food in the Last Year: Never true     Ran Out of Food in the Last Year: Never true   Transportation Needs: No Transportation Needs (1/1/2023)    PRAPARE - Transportation     Lack of Transportation (Medical): No     Lack of Transportation (Non-Medical): No   Physical Activity: Not on file   Stress: Not on file   Social Connections: Not on file   Intimate Partner Violence: Not on file   Housing Stability: Low Risk  (1/1/2023)    Housing Stability Vital Sign     Unable to Pay for Housing in the Last Year: No     Number of Places Lived in the Last Year: 1     Unstable Housing in the Last Year: No       Review of Systems   Constitutional:  Negative for chills and fever. Gastrointestinal:  Negative for anal bleeding and rectal pain. Objective:  Vitals:    11/21/23 0932   BP: 120/66   BP Location: Right arm   Patient Position: Sitting   Cuff Size: Adult   Pulse: 86   Resp: 16   Temp: 99.1 °F (37.3 °C)   TempSrc: Tympanic   SpO2: 99%   Weight: 83.9 kg (185 lb)   Height: 5' 6" (1.676 m)     Body mass index is 29.86 kg/m².      Physical Exam  Constitutional:       Appearance: Normal appearance. HENT:      Head: Normocephalic and atraumatic. Nose: Nose normal. No congestion. Mouth/Throat:      Mouth: Mucous membranes are moist.   Cardiovascular:      Rate and Rhythm: Normal rate and regular rhythm. Heart sounds: Normal heart sounds. Pulmonary:      Breath sounds: Normal breath sounds. Genitourinary:      Neurological:      Mental Status: He is alert. RESULTS:  Hemoglobin A1C   Date/Time Value Ref Range Status   09/22/2023 09:52 AM 5.2 Normal 4.0-5.6%; PreDiabetic 5.7-6.4%; Diabetic >=6.5%; Glycemic control for adults with diabetes <7.0% % Final   12/20/2022 08:23 AM 5.3 4.0 - 5.6 % Final     Comment:     The use of HbA1c to monitor glycemic status is based on normal hemoglobin and HbA composition. This test should not be used in patients with abnormal hemoglobin that affects the half life of the red blood cell or the in vivo glycation rates. Cholesterol   Date/Time Value Ref Range Status   09/22/2023 09:52  See Comment mg/dL Final     Comment:     Cholesterol:         Pediatric <18 Years        Desirable          <170 mg/dL      Borderline High    170-199 mg/dL      High               >=200 mg/dL        Adult >=18 Years            Desirable        <200 mg/dL      Borderline High  200-239 mg/dL      High             >239 mg/dL       Triglycerides   Date/Time Value Ref Range Status   09/22/2023 09:52 AM 86 See Comment mg/dL Final     Comment:     Triglyceride:     0-9Y            <75mg/dL     10Y-17Y         <90 mg/dL       >=18Y     Normal          <150 mg/dL     Borderline High 150-199 mg/dL     High            200-499 mg/dL        Very High       >499 mg/dL    Specimen collection should occur prior to Metamizole administration due to the potential for falsely depressed results.      HDL, Direct   Date/Time Value Ref Range Status   09/22/2023 09:52 AM 37 (L) >=40 mg/dL Final     LDL Calculated   Date/Time Value Ref Range Status   09/22/2023 09:52 AM 52 0 - 100 mg/dL Final     Comment:     LDL Cholesterol:     Optimal           <100 mg/dl     Near Optimal      100-129 mg/dl     Above Optimal       Borderline High 130-159 mg/dl       High            160-189 mg/dl       Very High       >189 mg/dl         This screening LDL is a calculated result. It does not have the accuracy of the Direct Measured LDL in the monitoring of patients with hyperlipidemia and/or statin therapy. Direct Measure LDL (THI263) must be ordered separately in these patients. Hemoglobin   Date/Time Value Ref Range Status   09/22/2023 09:52 AM 14.7 12.0 - 17.0 g/dL Final     Hematocrit   Date/Time Value Ref Range Status   09/22/2023 09:52 AM 41.9 36.5 - 49.3 % Final     Platelets   Date/Time Value Ref Range Status   09/22/2023 09:52  149 - 390 Thousands/uL Final     Prostate Specific Antigen Total   Date/Time Value Ref Range Status   03/30/2023 10:49 AM 0.6 0.0 - 4.0 ng/mL Final     Comment:     Roche ECLIA methodology. According to the American [employer] Association, Serum PSA should  decrease and remain at undetectable levels after radical  prostatectomy. The AUA defines biochemical recurrence as an initial  PSA value 0.2 ng/mL or greater followed by a subsequent confirmatory  PSA value 0.2 ng/mL or greater. Values obtained with different assay methods or kits cannot be used  interchangeably. Results cannot be interpreted as absolute evidence  of the presence or absence of malignant disease. TSH 3RD GENERATON   Date/Time Value Ref Range Status   09/22/2023 09:52 AM 3.332 0.450 - 4.500 uIU/mL Final     Comment:     Adult TSH (3rd generation) reference range follows the recommended guidelines of the American Thy[employer] Association, January, 2020.      Sodium   Date/Time Value Ref Range Status   09/22/2023 09:52  135 - 147 mmol/L Final     BUN   Date/Time Value Ref Range Status   09/22/2023 09:52 AM 6 5 - 25 mg/dL Final Creatinine   Date/Time Value Ref Range Status   09/22/2023 09:52 AM 1.07 0.60 - 1.30 mg/dL Final     Comment:     Standardized to IDMS reference method      In chart    This note was created with voice recognition software. Phonic, grammatical and spelling errors may be present within the note as a result.

## 2023-11-28 ENCOUNTER — CONSULT (OUTPATIENT)
Dept: SURGERY | Facility: CLINIC | Age: 53
End: 2023-11-28
Payer: COMMERCIAL

## 2023-11-28 VITALS
HEART RATE: 79 BPM | DIASTOLIC BLOOD PRESSURE: 88 MMHG | HEIGHT: 66 IN | TEMPERATURE: 98.1 F | BODY MASS INDEX: 30.08 KG/M2 | WEIGHT: 187.2 LBS | SYSTOLIC BLOOD PRESSURE: 139 MMHG | OXYGEN SATURATION: 98 % | RESPIRATION RATE: 16 BRPM

## 2023-11-28 DIAGNOSIS — K62.89 PERIANAL CYST: ICD-10-CM

## 2023-11-28 PROCEDURE — 99243 OFF/OP CNSLTJ NEW/EST LOW 30: CPT | Performed by: SURGERY

## 2023-11-28 NOTE — PROGRESS NOTES
Consult- General Surgery   Yonathan Pathak 52 y.o. male MRN: [id number]  Unit/Bed#:  Encounter: [id number]    Assessment/Plan     Assessment:  Perirectal cyst, etiology unknown, resolving at this time  History of chronic kidney disease  History of depression  History of DVT  History hypertension  History of lupus nephropathy  Peripheral neuropathy with weakness and the patient uses walker for 1 year  Plan:  No further work-up or intervention is needed from the surgical standpoint. The cyst or lesion on the right perirectal area is almost completely resolved. History of Present Illness     HPI:  Yonathan Pathak is a 52 y.o. male who presents to my office for evaluation of perirectal cyst.  The patient is a Arabic-speaking who stated having this lump approximately 3 days before seeing his PCP, he had no pain, no discomfort, no drainage, no bleeding. He had no prior symptoms in the past and he does not recall any single event unprovoked the lump. The patient was referred to us for surgical evaluation. Patient stated that the lump has slowly decreased in size, again without any pain or drainage. The patient uses walker due to peripheral neuropathy and weakness of the lower extremities. Review of Systems  The rest of the review of system total of 10 were negative except for the HPI. Historical Information   Past Medical History:   Diagnosis Date    Anemia     Arthritis     Cervical mass     Chronic kidney disease     Coronary artery disease     Depression     DVT (deep venous thrombosis) (HCC)     Hypertension     Lupus ([address])     Renal disorder      Past Surgical History:   Procedure Laterality Date    APPENDECTOMY      CERVICAL SPINE SURGERY      CHOLECYSTECTOMY      COLONOSCOPY N/A 8/23/2018    Procedure: COLONOSCOPY;  Surgeon: Chauncey Sims MD;  Location: [facility]; Service: Gastroenterology    ESOPHAGOGASTRODUODENOSCOPY N/A 8/22/2018    Procedure: ESOPHAGOGASTRODUODENOSCOPY (EGD);   Surgeon: Dirk Hand Matty Erickson MD;  Location: [facility];   Service: Gastroenterology    IR IVC FILTER PLACEMENT PERMANENT  9/7/2017    IR IVC FILTER REMOVAL  4/23/2018    IR LUMBAR PUNCTURE  11/23/2015    NECK SURGERY      US GUIDED INJECTION FOR RESEARCH STUDY  4/23/2018    US GUIDED INJECTION FOR RESEARCH STUDY  9/7/2017    VASCULAR SURGERY       Social History   Social History     Substance and Sexual Activity   Alcohol Use Never     Social History     Substance and Sexual Activity   Drug Use Never     Social History     Tobacco Use   Smoking Status Never   Smokeless Tobacco Never     Family History: non-contributory    Meds/Allergies   all medications and allergies reviewed     Current Outpatient Medications:     apixaban (ELIQUIS) 5 mg, Take 5 mg by mouth 2 (two) times a day, Disp: , Rfl:     azelastine (ASTELIN) 0.1 % nasal spray, ADMINISTER 1 SPRAY INTO NOSTRIL TWICE DAILY, Disp: , Rfl:     bacitracin topical ointment 500 units/g topical ointment, APPLY TOPICALLY TO AFFECTED AREA THREE TIMES A DAY AS DIRECTED, Disp: , Rfl:     baclofen 20 mg tablet, Take 20 mg by mouth 3 (three) times a day , Disp: , Rfl:     betamethasone dipropionate (DIPROSONE) 0.05 % cream, Apply 1 application topically 2 (two) times a day, Disp: , Rfl:     carvedilol (COREG) 12.5 mg tablet, Take 12.5 mg by mouth 2 (two) times a day with meals, Disp: , Rfl:     clobetasol (TEMOVATE) 0.05 % cream, , Disp: , Rfl:     Diclofenac Sodium (VOLTAREN) 1 %, Apply 2 g topically 4 (four) times a day, Disp: 350 g, Rfl: 1    escitalopram (LEXAPRO) 10 mg tablet, Take 1 tablet (10 mg total) by mouth daily, Disp: 90 tablet, Rfl: 3    fluticasone (FLONASE) 50 mcg/act nasal spray, , Disp: , Rfl:     furosemide (LASIX) 40 mg tablet, Take 1 tablet (40 mg total) by mouth 2 (two) times a day, Disp: , Rfl: 0    gabapentin (NEURONTIN) 300 mg capsule, Take 1 capsule (300 mg total) by mouth 3 (three) times a day, Disp: 270 capsule, Rfl: 3    gabapentin (NEURONTIN) 600 MG tablet, Take 1 tablet (600 mg total) by mouth 3 (three) times a day, Disp: 270 tablet, Rfl: 3    hydroxychloroquine (PLAQUENIL) 200 mg tablet, Take 400 mg by mouth daily at bedtime, Disp: , Rfl:     hydrOXYzine HCL (ATARAX) 10 mg tablet, Take 10 mg by mouth every 6 (six) hours as needed for itching, Disp: , Rfl:     ketoconazole (NIZORAL) 2 % cream, Apply topically daily, Disp: 60 g, Rfl: 1    lidocaine-prilocaine (EMLA) cream, Apply topically as needed for mild pain, Disp: 30 g, Rfl: 3    loratadine (CLARITIN) 10 mg tablet, Take 1 tablet (10 mg total) by mouth daily, Disp: 30 tablet, Rfl: 2    meclizine (ANTIVERT) 25 mg tablet, Take 1 tablet (25 mg total) by mouth 3 (three) times a day as needed for dizziness, Disp: 30 tablet, Rfl: 0    metolazone (ZAROXOLYN) 5 mg tablet, Take 0.5 tablets (2.5 mg total) by mouth 3 (three) times a week, Disp: , Rfl:     mycophenolate (CELLCEPT) 500 mg tablet, Take 500 mg by mouth every 12 (twelve) hours , Disp: , Rfl:     potassium chloride (K-DUR,KLOR-CON) 20 mEq tablet, Take 20 mEq by mouth daily, Disp: , Rfl:     sucralfate (CARAFATE) 1 g tablet, , Disp: , Rfl:     tamsulosin (FLOMAX) 0.4 mg, , Disp: , Rfl:     betamethasone, augmented, (DIPROLENE) 0.05 % ointment, Apply 1 application topically 2 (two) times a day (Patient not taking: Reported on 11/14/2023), Disp: , Rfl:     predniSONE 20 mg tablet, Take 2 tablets (40 mg total) by mouth daily Do not start before August 4, 2023.  (Patient not taking: Reported on 11/14/2023), Disp: 10 tablet, Rfl: 0  Allergies   Allergen Reactions    Doxycycline Rash    Sulfa Antibiotics Rash       Objective     Current Vitals:   Blood Pressure: 139/88 (11/28/23 0929)  Pulse: 79 (11/28/23 0929)  Temperature: 98.1 °F (36.7 °C) (11/28/23 0929)  Temp Source: Tympanic (11/28/23 0929)  Respirations: 16 (11/28/23 0929)  Height: 5' 6" (167.6 cm) (11/28/23 0929)  Weight - Scale: 84.9 kg (187 lb 3.2 oz) (11/28/23 0929)  SpO2: 98 % (11/28/23 0929)    Physical Exam  Vitals and nursing note reviewed. Constitutional:       General: He is not in acute distress. Cardiovascular:      Rate and Rhythm: Normal rate and regular rhythm. Pulmonary:      Effort: No respiratory distress. Breath sounds: Normal breath sounds. Abdominal:      Palpations: Abdomen is soft. There is no mass. Tenderness: There is no abdominal tenderness. Genitourinary:     Comments: There is less than 25 cm lesion on the right perirectal area, approximately 5 cm from the anal verge, nontender to palpation, no skin color changes, no evidence of infection. Skin:     General: Skin is warm. Coloration: Skin is not jaundiced. Findings: No erythema or rash. Neurological:      Mental Status: He is alert and oriented to person, place, and time. Cranial Nerves: No cranial nerve deficit.    Psychiatric:         Mood and Affect: Mood normal.         Behavior: Behavior normal.

## 2023-12-11 DIAGNOSIS — M19.012 ARTHRITIS OF LEFT ACROMIOCLAVICULAR JOINT: ICD-10-CM

## 2023-12-21 ENCOUNTER — OFFICE VISIT (OUTPATIENT)
Dept: INTERNAL MEDICINE CLINIC | Facility: CLINIC | Age: 53
End: 2023-12-21
Payer: COMMERCIAL

## 2023-12-21 VITALS
HEART RATE: 98 BPM | WEIGHT: 187 LBS | SYSTOLIC BLOOD PRESSURE: 110 MMHG | OXYGEN SATURATION: 97 % | HEIGHT: 66 IN | DIASTOLIC BLOOD PRESSURE: 58 MMHG | BODY MASS INDEX: 30.05 KG/M2 | RESPIRATION RATE: 16 BRPM

## 2023-12-21 DIAGNOSIS — R21 RASH: Primary | ICD-10-CM

## 2023-12-21 DIAGNOSIS — I87.2 VENOUS STASIS DERMATITIS OF BOTH LOWER EXTREMITIES: ICD-10-CM

## 2023-12-21 PROCEDURE — 99214 OFFICE O/P EST MOD 30 MIN: CPT | Performed by: PHYSICIAN ASSISTANT

## 2023-12-21 RX ORDER — METHYLPREDNISOLONE 4 MG/1
TABLET ORAL
Qty: 21 EACH | Refills: 0 | Status: SHIPPED | OUTPATIENT
Start: 2023-12-21

## 2023-12-21 NOTE — PROGRESS NOTES
"Assessment/Plan:   Rash bilateral legs:  Patient with erythematous pruritic rash of bilateral lower extremities  SLE rash vs. 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 They screened negative with a PHQ-2 score of 0.         No follow-ups on file.    No problem-specific Assessment & Plan notes found for this encounter.       Diagnoses and all orders for this visit:    Rash  -     methylPREDNISolone 4 MG tablet therapy pack; Use as directed on package    Venous stasis dermatitis of both lower extremities  -     Ambulatory Referral to Vascular Surgery; Future          Subjective:      Patient ID: Donta Hunter is a 53 y.o. male.    Patient is a 53-year-old male who presents in the office today for evaluation of erythematous, pruritic rash of bilateral lower extremities.  He reports the rash began yesterday.  He has stasis dermatitis which is chronic and at baseline.  Today he complains of new onset of erythema and pruritus.  He has never had this rash before.  He tried clobetasol and cream at home which he reports helped \"a little.\"  He has not started any new medications.  He has not tried any new soaps, lotions, laundry detergents.    Rash        ALLERGIES:  Allergies   Allergen Reactions    Doxycycline Rash    Sulfa Antibiotics Rash       CURRENT MEDICATIONS:    Current Outpatient Medications:     apixaban (ELIQUIS) 5 mg, Take 5 mg by mouth 2 (two) times a day, Disp: , Rfl:     azelastine (ASTELIN) 0.1 % nasal spray, " ADMINISTER 1 SPRAY INTO NOSTRIL TWICE DAILY, Disp: , Rfl:     bacitracin topical ointment 500 units/g topical ointment, APPLY TOPICALLY TO AFFECTED AREA THREE TIMES A DAY AS DIRECTED, Disp: , Rfl:     baclofen 20 mg tablet, Take 20 mg by mouth 3 (three) times a day , Disp: , Rfl:     betamethasone, augmented, (DIPROLENE) 0.05 % ointment, Apply 1 application. topically 2 (two) times a day, Disp: , Rfl:     carvedilol (COREG) 12.5 mg tablet, Take 12.5 mg by mouth 2 (two) times a day with meals, Disp: , Rfl:     clobetasol (TEMOVATE) 0.05 % cream, , Disp: , Rfl:     Diclofenac Sodium (VOLTAREN) 1 %, Apply 2 g topically 4 (four) times a day, Disp: 350 g, Rfl: 1    escitalopram (LEXAPRO) 10 mg tablet, Take 1 tablet (10 mg total) by mouth daily, Disp: 90 tablet, Rfl: 3    fluticasone (FLONASE) 50 mcg/act nasal spray, , Disp: , Rfl:     furosemide (LASIX) 40 mg tablet, Take 1 tablet (40 mg total) by mouth 2 (two) times a day, Disp: , Rfl: 0    gabapentin (NEURONTIN) 300 mg capsule, Take 1 capsule (300 mg total) by mouth 3 (three) times a day, Disp: 270 capsule, Rfl: 3    gabapentin (NEURONTIN) 600 MG tablet, Take 1 tablet (600 mg total) by mouth 3 (three) times a day, Disp: 270 tablet, Rfl: 3    hydroxychloroquine (PLAQUENIL) 200 mg tablet, Take 400 mg by mouth daily at bedtime, Disp: , Rfl:     hydrOXYzine HCL (ATARAX) 10 mg tablet, Take 10 mg by mouth every 6 (six) hours as needed for itching, Disp: , Rfl:     ketoconazole (NIZORAL) 2 % cream, Apply topically daily, Disp: 60 g, Rfl: 1    lidocaine-prilocaine (EMLA) cream, Apply topically as needed for mild pain, Disp: 30 g, Rfl: 3    loratadine (CLARITIN) 10 mg tablet, Take 1 tablet (10 mg total) by mouth daily, Disp: 30 tablet, Rfl: 2    meclizine (ANTIVERT) 25 mg tablet, Take 1 tablet (25 mg total) by mouth 3 (three) times a day as needed for dizziness, Disp: 30 tablet, Rfl: 0    methylPREDNISolone 4 MG tablet therapy pack, Use as directed on package, Disp: 21 each,  Rfl: 0    metolazone (ZAROXOLYN) 5 mg tablet, Take 0.5 tablets (2.5 mg total) by mouth 3 (three) times a week, Disp: , Rfl:     mycophenolate (CELLCEPT) 500 mg tablet, Take 500 mg by mouth every 12 (twelve) hours , Disp: , Rfl:     potassium chloride (K-DUR,KLOR-CON) 20 mEq tablet, Take 20 mEq by mouth daily, Disp: , Rfl:     sucralfate (CARAFATE) 1 g tablet, , Disp: , Rfl:     tamsulosin (FLOMAX) 0.4 mg, , Disp: , Rfl:     betamethasone dipropionate (DIPROSONE) 0.05 % cream, Apply 1 application topically 2 (two) times a day (Patient not taking: Reported on 12/21/2023), Disp: , Rfl:     predniSONE 20 mg tablet, Take 2 tablets (40 mg total) by mouth daily Do not start before August 4, 2023. (Patient not taking: Reported on 11/14/2023), Disp: 10 tablet, Rfl: 0    ACTIVE PROBLEM LIST:  Patient Active Problem List   Diagnosis    Lupus (systemic lupus erythematosus) (HCC)    Lupus nephritis (HCC)    Hypertension    History of DVT (deep vein thrombosis)    Stage 2 chronic kidney disease    Persistent proteinuria    Anemia in chronic kidney disease    Muscle weakness (generalized)    Tremor of both hands    Spasticity    Gait difficulty    Neuropathic pain    Hypertensive CKD (chronic kidney disease)    Right foot pain    DJD (degenerative joint disease), ankle and foot, right    Cellulitis       PAST MEDICAL HISTORY:  Past Medical History:   Diagnosis Date    Anemia     Arthritis     Cervical mass     Chronic kidney disease     Coronary artery disease     Depression     DVT (deep venous thrombosis) ([facility])     Hypertension     Lupus (HCC)     Renal disorder        PAST SURGICAL HISTORY:  Past Surgical History:   Procedure Laterality Date    APPENDECTOMY      CERVICAL SPINE SURGERY      CHOLECYSTECTOMY      COLONOSCOPY N/A 8/23/2018    Procedure: COLONOSCOPY;  Surgeon: James Wise III, MD;  Location: [facility];  Service: Gastroenterology    ESOPHAGOGASTRODUODENOSCOPY N/A 8/22/2018    Procedure:  ESOPHAGOGASTRODUODENOSCOPY (EGD);  Surgeon: James Wise III, MD;  Location: [facility];  Service: Gastroenterology    IR IVC FILTER PLACEMENT PERMANENT  9/7/2017    IR IVC FILTER REMOVAL  4/23/2018    IR LUMBAR PUNCTURE  11/23/2015    NECK SURGERY      US GUIDED INJECTION FOR RESEARCH STUDY  4/23/2018    US GUIDED INJECTION FOR RESEARCH STUDY  9/7/2017    VASCULAR SURGERY         FAMILY HISTORY:  Family History   Problem Relation Age of Onset    Heart disease Mother     No Known Problems Father        SOCIAL HISTORY:  Social History     Socioeconomic History    Marital status: /Civil Union     Spouse name: Not on file    Number of children: Not on file    Years of education: Not on file    Highest education level: Not on file   Occupational History    Not on file   Tobacco Use    Smoking status: Never    Smokeless tobacco: Never   Vaping Use    Vaping status: Never Used   Substance and Sexual Activity    Alcohol use: Never    Drug use: Never    Sexual activity: Yes     Partners: Female, Male     Birth control/protection: Rhythm   Other Topics Concern    Not on file   Social History Narrative    ** Merged History Encounter **          Social Determinants of Health     Financial Resource Strain: Not on file   Food Insecurity: No Food Insecurity (1/1/2023)    Hunger Vital Sign     Worried About Running Out of Food in the Last Year: Never true     Ran Out of Food in the Last Year: Never true   Transportation Needs: No Transportation Needs (1/1/2023)    PRAPARE - Transportation     Lack of Transportation (Medical): No     Lack of Transportation (Non-Medical): No   Physical Activity: Not on file   Stress: Not on file   Social Connections: Not on file   Intimate Partner Violence: Not on file   Housing Stability: Low Risk  (1/1/2023)    Housing Stability Vital Sign     Unable to Pay for Housing in the Last Year: No     Number of Places Lived in the Last Year: 1     Unstable Housing in the Last Year: No  "      Review of Systems   Skin:  Positive for rash.         Objective:  Vitals:    12/21/23 1119   BP: 110/58   BP Location: Left arm   Patient Position: Sitting   Cuff Size: Adult   Pulse: 98   Resp: 16   SpO2: 97%   Weight: 84.8 kg (187 lb)   Height: 5' 6\" (1.676 m)     Body mass index is 30.18 kg/m².     Physical Exam  Constitutional:       Appearance: Normal appearance.   Cardiovascular:      Rate and Rhythm: Normal rate.      Pulses: Normal pulses.   Pulmonary:      Effort: Pulmonary effort is normal.   Musculoskeletal:      Right lower leg: Edema present.      Left lower leg: Edema present.   Skin:     General: Skin is warm and dry.      Findings: Erythema and rash present.      Comments: Erythematous, diffuse, flat, some flalking, rash bilateral lower extremity  Statis dermatits present   Neurological:      Mental Status: He is alert and oriented to person, place, and time.      Gait: Gait abnormal.   Psychiatric:         Mood and Affect: Mood normal.           RESULTS:  Hemoglobin A1C   Date/Time Value Ref Range Status   09/22/2023 09:52 AM 5.2 Normal 4.0-5.6%; PreDiabetic 5.7-6.4%; Diabetic >=6.5%; Glycemic control for adults with diabetes <7.0% % Final   12/20/2022 08:23 AM 5.3 4.0 - 5.6 % Final     Comment:     The use of HbA1c to monitor glycemic status is based on normal hemoglobin and HbA composition. This test should not be used in patients with abnormal hemoglobin that affects the half life of the red blood cell or the in vivo glycation rates.      Cholesterol   Date/Time Value Ref Range Status   09/22/2023 09:52  See Comment mg/dL Final     Comment:     Cholesterol:         Pediatric <18 Years        Desirable          <170 mg/dL      Borderline High    170-199 mg/dL      High               >=200 mg/dL        Adult >=18 Years            Desirable        <200 mg/dL      Borderline High  200-239 mg/dL      High             >239 mg/dL       Triglycerides   Date/Time Value Ref Range Status "   09/22/2023 09:52 AM 86 See Comment mg/dL Final     Comment:     Triglyceride:     0-9Y            <75mg/dL     10Y-17Y         <90 mg/dL       >=18Y     Normal          <150 mg/dL     Borderline High 150-199 mg/dL     High            200-499 mg/dL        Very High       >499 mg/dL    Specimen collection should occur prior to Metamizole administration due to the potential for falsely depressed results.     HDL, Direct   Date/Time Value Ref Range Status   09/22/2023 09:52 AM 37 (L) >=40 mg/dL Final     LDL Calculated   Date/Time Value Ref Range Status   09/22/2023 09:52 AM 52 0 - 100 mg/dL Final     Comment:     LDL Cholesterol:     Optimal           <100 mg/dl     Near Optimal      100-129 mg/dl     Above Optimal       Borderline High 130-159 mg/dl       High            160-189 mg/dl       Very High       >189 mg/dl         This screening LDL is a calculated result.   It does not have the accuracy of the Direct Measured LDL in the monitoring of patients with hyperlipidemia and/or statin therapy.   Direct Measure LDL (VPP358) must be ordered separately in these patients.     Hemoglobin   Date/Time Value Ref Range Status   09/22/2023 09:52 AM 14.7 12.0 - 17.0 g/dL Final     Hematocrit   Date/Time Value Ref Range Status   09/22/2023 09:52 AM 41.9 36.5 - 49.3 % Final     Platelets   Date/Time Value Ref Range Status   09/22/2023 09:52  149 - 390 Thousands/uL Final     Prostate Specific Antigen Total   Date/Time Value Ref Range Status   03/30/2023 10:49 AM 0.6 0.0 - 4.0 ng/mL Final     Comment:     Roche ECLIA methodology.  According to the American [employer] Association, Serum PSA should  decrease and remain at undetectable levels after radical  prostatectomy. The AUA defines biochemical recurrence as an initial  PSA value 0.2 ng/mL or greater followed by a subsequent confirmatory  PSA value 0.2 ng/mL or greater.  Values obtained with different assay methods or kits cannot be used  interchangeably. Results cannot  be interpreted as absolute evidence  of the presence or absence of malignant disease.     TSH 3RD GENERATON   Date/Time Value Ref Range Status   09/22/2023 09:52 AM 3.332 0.450 - 4.500 uIU/mL Final     Comment:     Adult TSH (3rd generation) reference range follows the recommended guidelines of the American Thy[employer] Association, January, 2020.     Sodium   Date/Time Value Ref Range Status   09/22/2023 09:52  135 - 147 mmol/L Final     BUN   Date/Time Value Ref Range Status   09/22/2023 09:52 AM 6 5 - 25 mg/dL Final     Creatinine   Date/Time Value Ref Range Status   09/22/2023 09:52 AM 1.07 0.60 - 1.30 mg/dL Final     Comment:     Standardized to IDMS reference method      In chart    This note was created with voice recognition software.  Phonic, grammatical and spelling errors may be present within the note as a result.     Mart-1 - Positive Histology Text: MART-1 staining demonstrates areas of higher density and clustering of melanocytes with Pagetoid spread upwards within the epidermis. The surgical margins are positive for tumor cells.

## 2024-01-15 ENCOUNTER — TELEPHONE (OUTPATIENT)
Dept: BARIATRICS | Facility: CLINIC | Age: 54
End: 2024-01-15

## 2024-01-29 ENCOUNTER — OFFICE VISIT (OUTPATIENT)
Dept: INTERNAL MEDICINE CLINIC | Facility: CLINIC | Age: 54
End: 2024-01-29
Payer: COMMERCIAL

## 2024-01-29 VITALS
WEIGHT: 183.4 LBS | SYSTOLIC BLOOD PRESSURE: 146 MMHG | RESPIRATION RATE: 18 BRPM | BODY MASS INDEX: 29.47 KG/M2 | TEMPERATURE: 99.5 F | HEART RATE: 94 BPM | DIASTOLIC BLOOD PRESSURE: 76 MMHG | OXYGEN SATURATION: 99 % | HEIGHT: 66 IN

## 2024-01-29 DIAGNOSIS — G51.0 LEFT-SIDED BELL'S PALSY: Primary | ICD-10-CM

## 2024-01-29 PROCEDURE — 99214 OFFICE O/P EST MOD 30 MIN: CPT | Performed by: INTERNAL MEDICINE

## 2024-01-29 RX ORDER — PREDNISONE 20 MG/1
60 TABLET ORAL DAILY
Qty: 10 TABLET | Refills: 0 | Status: SHIPPED | OUTPATIENT
Start: 2024-01-29 | End: 2024-01-29 | Stop reason: SDUPTHER

## 2024-01-29 RX ORDER — PREDNISONE 20 MG/1
60 TABLET ORAL DAILY
Qty: 15 TABLET | Refills: 0 | Status: SHIPPED | OUTPATIENT
Start: 2024-01-29 | End: 2024-02-03

## 2024-01-29 NOTE — PROGRESS NOTES
Assessment/Plan:      Here with left facial droop, inability to close left eye, inability to pucker lips all consistent with left facial bells palsy, will send prednisone 60 mg x 5 days, see neurology, check for lyme, and see ophthalmology, instructed to sleep with goggles to keep left eye moist.    Quality Measures:       Depression Screening and Follow-up Plan: Clincally patient does not have depression. No treatment is required.          Return in about 4 weeks (around 2/26/2024).    No problem-specific Assessment & Plan notes found for this encounter.       Diagnoses and all orders for this visit:    Left-sided Bell's palsy  -     Lyme disease, PCR; Future  -     predniSONE 20 mg tablet; Take 3 tablets (60 mg total) by mouth daily for 5 days  -     Ambulatory Referral to Neurology; Future  -     Ambulatory Referral to Ophthalmology; Future    Other orders  -     Discontinue: predniSONE 20 mg tablet; Take 3 tablets (60 mg total) by mouth daily for 5 days          Subjective:      Patient ID: Dotna Hunter is a 54 y.o. male.    Here with left sided facial weakness.        ALLERGIES:  Allergies   Allergen Reactions    Doxycycline Rash    Sulfa Antibiotics Rash       CURRENT MEDICATIONS:    Current Outpatient Medications:     apixaban (ELIQUIS) 5 mg, Take 5 mg by mouth 2 (two) times a day, Disp: , Rfl:     baclofen 20 mg tablet, Take 20 mg by mouth 3 (three) times a day , Disp: , Rfl:     betamethasone, augmented, (DIPROLENE) 0.05 % ointment, Apply 1 application. topically 2 (two) times a day, Disp: , Rfl:     carvedilol (COREG) 12.5 mg tablet, Take 12.5 mg by mouth 2 (two) times a day with meals, Disp: , Rfl:     clobetasol (TEMOVATE) 0.05 % cream, , Disp: , Rfl:     Diclofenac Sodium (VOLTAREN) 1 %, Apply 2 g topically 4 (four) times a day, Disp: 350 g, Rfl: 1    fluticasone (FLONASE) 50 mcg/act nasal spray, , Disp: , Rfl:     furosemide (LASIX) 40 mg tablet, Take 1 tablet (40 mg total) by mouth 2 (two) times a day,  Disp: , Rfl: 0    hydroxychloroquine (PLAQUENIL) 200 mg tablet, Take 400 mg by mouth daily at bedtime, Disp: , Rfl:     hydrOXYzine HCL (ATARAX) 10 mg tablet, Take 10 mg by mouth every 6 (six) hours as needed for itching, Disp: , Rfl:     ketoconazole (NIZORAL) 2 % cream, Apply topically daily, Disp: 60 g, Rfl: 1    lidocaine-prilocaine (EMLA) cream, Apply topically as needed for mild pain, Disp: 30 g, Rfl: 3    metolazone (ZAROXOLYN) 5 mg tablet, Take 0.5 tablets (2.5 mg total) by mouth 3 (three) times a week, Disp: , Rfl:     mycophenolate (CELLCEPT) 500 mg tablet, Take 500 mg by mouth every 12 (twelve) hours , Disp: , Rfl:     potassium chloride (K-DUR,KLOR-CON) 20 mEq tablet, Take 20 mEq by mouth daily, Disp: , Rfl:     predniSONE 20 mg tablet, Take 3 tablets (60 mg total) by mouth daily for 5 days, Disp: 15 tablet, Rfl: 0    sucralfate (CARAFATE) 1 g tablet, , Disp: , Rfl:     tamsulosin (FLOMAX) 0.4 mg, , Disp: , Rfl:     azelastine (ASTELIN) 0.1 % nasal spray, ADMINISTER 1 SPRAY INTO NOSTRIL TWICE DAILY, Disp: , Rfl:     bacitracin topical ointment 500 units/g topical ointment, APPLY TOPICALLY TO AFFECTED AREA THREE TIMES A DAY AS DIRECTED, Disp: , Rfl:     betamethasone dipropionate (DIPROSONE) 0.05 % cream, Apply 1 application topically 2 (two) times a day (Patient not taking: Reported on 12/21/2023), Disp: , Rfl:     escitalopram (LEXAPRO) 10 mg tablet, Take 1 tablet (10 mg total) by mouth daily, Disp: 90 tablet, Rfl: 3    gabapentin (NEURONTIN) 300 mg capsule, Take 1 capsule (300 mg total) by mouth 3 (three) times a day, Disp: 270 capsule, Rfl: 3    gabapentin (NEURONTIN) 600 MG tablet, Take 1 tablet (600 mg total) by mouth 3 (three) times a day, Disp: 270 tablet, Rfl: 3    loratadine (CLARITIN) 10 mg tablet, Take 1 tablet (10 mg total) by mouth daily, Disp: 30 tablet, Rfl: 2    meclizine (ANTIVERT) 25 mg tablet, Take 1 tablet (25 mg total) by mouth 3 (three) times a day as needed for dizziness (Patient  not taking: Reported on 1/29/2024), Disp: 30 tablet, Rfl: 0    ACTIVE PROBLEM LIST:  Patient Active Problem List   Diagnosis    Lupus (systemic lupus erythematosus) (HCC)    Lupus nephritis (HCC)    Hypertension    History of DVT (deep vein thrombosis)    Stage 2 chronic kidney disease    Persistent proteinuria    Anemia in chronic kidney disease    Muscle weakness (generalized)    Tremor of both hands    Spasticity    Gait difficulty    Neuropathic pain    Hypertensive CKD (chronic kidney disease)    Right foot pain    DJD (degenerative joint disease), ankle and foot, right    Cellulitis       PAST MEDICAL HISTORY:  Past Medical History:   Diagnosis Date    Anemia     Arthritis     Cervical mass     Chronic kidney disease     Coronary artery disease     Depression     DVT (deep venous thrombosis) (HCC)     Hypertension     Lupus (HCC)     Renal disorder        PAST SURGICAL HISTORY:  Past Surgical History:   Procedure Laterality Date    APPENDECTOMY      CERVICAL SPINE SURGERY      CHOLECYSTECTOMY      COLONOSCOPY N/A 8/23/2018    Procedure: COLONOSCOPY;  Surgeon: James Wise III, MD;  Location: [facility];  Service: Gastroenterology    ESOPHAGOGASTRODUODENOSCOPY N/A 8/22/2018    Procedure: ESOPHAGOGASTRODUODENOSCOPY (EGD);  Surgeon: James Wise III, MD;  Location: [facility];  Service: Gastroenterology    IR IVC FILTER PLACEMENT PERMANENT  9/7/2017    IR IVC FILTER REMOVAL  4/23/2018    IR LUMBAR PUNCTURE  11/23/2015    NECK SURGERY      US GUIDED INJECTION FOR RESEARCH STUDY  4/23/2018    US GUIDED INJECTION FOR RESEARCH STUDY  9/7/2017    VASCULAR SURGERY         FAMILY HISTORY:  Family History   Problem Relation Age of Onset    Heart disease Mother     No Known Problems Father        SOCIAL HISTORY:  Social History     Socioeconomic History    Marital status: /Civil Union     Spouse name: Not on file    Number of children: Not on file    Years of education: Not on file    Highest education level:  Not on file   Occupational History    Not on file   Tobacco Use    Smoking status: Never    Smokeless tobacco: Never   Vaping Use    Vaping status: Never Used   Substance and Sexual Activity    Alcohol use: Never    Drug use: Never    Sexual activity: Yes     Partners: Female, Male     Birth control/protection: Rhythm   Other Topics Concern    Not on file   Social History Narrative    ** Merged History Encounter **          Social Determinants of Health     Financial Resource Strain: Not on file   Food Insecurity: No Food Insecurity (1/1/2023)    Hunger Vital Sign     Worried About Running Out of Food in the Last Year: Never true     Ran Out of Food in the Last Year: Never true   Transportation Needs: No Transportation Needs (1/1/2023)    PRAPARE - Transportation     Lack of Transportation (Medical): No     Lack of Transportation (Non-Medical): No   Physical Activity: Not on file   Stress: Not on file   Social Connections: Not on file   Intimate Partner Violence: Not on file   Housing Stability: Low Risk  (1/1/2023)    Housing Stability Vital Sign     Unable to Pay for Housing in the Last Year: No     Number of Places Lived in the Last Year: 1     Unstable Housing in the Last Year: No       Review of Systems   Constitutional:  Negative for chills and fever.   HENT:  Negative for ear pain and sore throat.    Eyes:  Negative for pain and visual disturbance.   Respiratory:  Negative for cough and shortness of breath.    Cardiovascular:  Negative for chest pain and palpitations.   Gastrointestinal:  Negative for abdominal pain and vomiting.   Genitourinary:  Negative for dysuria and hematuria.   Musculoskeletal:  Positive for gait problem. Negative for arthralgias.   Skin:  Negative for color change and rash.   Neurological:  Positive for facial asymmetry. Negative for seizures and syncope.   All other systems reviewed and are negative.        Objective:  Vitals:    01/29/24 1437   BP: 146/76   BP Location: Right arm  "  Patient Position: Sitting   Cuff Size: Standard   Pulse: 94   Resp: 18   Temp: 99.5 °F (37.5 °C)   TempSrc: Tympanic   SpO2: 99%   Weight: 83.2 kg (183 lb 6.4 oz)   Height: 5' 6\" (1.676 m)     Body mass index is 29.6 kg/m².     Physical Exam  Vitals and nursing note reviewed.   Constitutional:       Appearance: Normal appearance.   HENT:      Head: Normocephalic and atraumatic.   Cardiovascular:      Rate and Rhythm: Normal rate.   Pulmonary:      Effort: Pulmonary effort is normal.   Musculoskeletal:         General: Normal range of motion.      Cervical back: Normal range of motion.   Skin:     General: Skin is warm and dry.   Neurological:      General: No focal deficit present.      Mental Status: He is alert and oriented to person, place, and time. Mental status is at baseline.      Cranial Nerves: Cranial nerve deficit and facial asymmetry present.      Coordination: Coordination abnormal.      Gait: Gait abnormal.      Comments: Inability to fully close left eye,   Left lip droop,  Inability to pucker his lips,     Psychiatric:         Mood and Affect: Mood normal.           RESULTS:  Hemoglobin A1C   Date/Time Value Ref Range Status   09/22/2023 09:52 AM 5.2 Normal 4.0-5.6%; PreDiabetic 5.7-6.4%; Diabetic >=6.5%; Glycemic control for adults with diabetes <7.0% % Final   12/20/2022 08:23 AM 5.3 4.0 - 5.6 % Final     Comment:     The use of HbA1c to monitor glycemic status is based on normal hemoglobin and HbA composition. This test should not be used in patients with abnormal hemoglobin that affects the half life of the red blood cell or the in vivo glycation rates.      Cholesterol   Date/Time Value Ref Range Status   09/22/2023 09:52  See Comment mg/dL Final     Comment:     Cholesterol:         Pediatric <18 Years        Desirable          <170 mg/dL      Borderline High    170-199 mg/dL      High               >=200 mg/dL        Adult >=18 Years            Desirable        <200 mg/dL      " Borderline High  200-239 mg/dL      High             >239 mg/dL       Triglycerides   Date/Time Value Ref Range Status   09/22/2023 09:52 AM 86 See Comment mg/dL Final     Comment:     Triglyceride:     0-9Y            <75mg/dL     10Y-17Y         <90 mg/dL       >=18Y     Normal          <150 mg/dL     Borderline High 150-199 mg/dL     High            200-499 mg/dL        Very High       >499 mg/dL    Specimen collection should occur prior to Metamizole administration due to the potential for falsely depressed results.     HDL, Direct   Date/Time Value Ref Range Status   09/22/2023 09:52 AM 37 (L) >=40 mg/dL Final     LDL Calculated   Date/Time Value Ref Range Status   09/22/2023 09:52 AM 52 0 - 100 mg/dL Final     Comment:     LDL Cholesterol:     Optimal           <100 mg/dl     Near Optimal      100-129 mg/dl     Above Optimal       Borderline High 130-159 mg/dl       High            160-189 mg/dl       Very High       >189 mg/dl         This screening LDL is a calculated result.   It does not have the accuracy of the Direct Measured LDL in the monitoring of patients with hyperlipidemia and/or statin therapy.   Direct Measure LDL (TTU819) must be ordered separately in these patients.     Hemoglobin   Date/Time Value Ref Range Status   09/22/2023 09:52 AM 14.7 12.0 - 17.0 g/dL Final     Hematocrit   Date/Time Value Ref Range Status   09/22/2023 09:52 AM 41.9 36.5 - 49.3 % Final     Platelets   Date/Time Value Ref Range Status   09/22/2023 09:52  149 - 390 Thousands/uL Final     Prostate Specific Antigen Total   Date/Time Value Ref Range Status   03/30/2023 10:49 AM 0.6 0.0 - 4.0 ng/mL Final     Comment:     Roche ECLIA methodology.  According to the [employer], Serum PSA should  decrease and remain at undetectable levels after radical  prostatectomy. The AUA defines biochemical recurrence as an initial  PSA value 0.2 ng/mL or greater followed by a subsequent confirmatory  PSA value 0.2  ng/mL or greater.  Values obtained with different assay methods or kits cannot be used  interchangeably. Results cannot be interpreted as absolute evidence  of the presence or absence of malignant disease.     TSH 3RD GENERATON   Date/Time Value Ref Range Status   09/22/2023 09:52 AM 3.332 0.450 - 4.500 uIU/mL Final     Comment:     Adult TSH (3rd generation) reference range follows the recommended guidelines of the [employer], January, 2020.     Sodium   Date/Time Value Ref Range Status   09/22/2023 09:52  135 - 147 mmol/L Final   11/30/2020 09:08  (L) 135 - 146 mmol/L Final     BUN   Date/Time Value Ref Range Status   09/22/2023 09:52 AM 6 5 - 25 mg/dL Final   11/30/2020 09:08 AM 27 (H) 6 - 20 mg/dL Final     Creatinine   Date/Time Value Ref Range Status   09/22/2023 09:52 AM 1.07 0.60 - 1.30 mg/dL Final     Comment:     Standardized to IDMS reference method   11/30/2020 09:08 AM 1.7 (H) 0.6 - 1.2 mg/dL Final      In chart    This note was created with voice recognition software.  Phonic, grammatical and spelling errors may be present within the note as a result.

## 2024-02-07 ENCOUNTER — APPOINTMENT (EMERGENCY)
Dept: CT IMAGING | Facility: HOSPITAL | Age: 54
End: 2024-02-07
Payer: COMMERCIAL

## 2024-02-07 ENCOUNTER — HOSPITAL ENCOUNTER (EMERGENCY)
Facility: HOSPITAL | Age: 54
Discharge: HOME | End: 2024-02-07
Attending: EMERGENCY MEDICINE
Payer: COMMERCIAL

## 2024-02-07 VITALS
HEART RATE: 78 BPM | SYSTOLIC BLOOD PRESSURE: 137 MMHG | DIASTOLIC BLOOD PRESSURE: 77 MMHG | RESPIRATION RATE: 18 BRPM | TEMPERATURE: 97.8 F | OXYGEN SATURATION: 100 %

## 2024-02-07 DIAGNOSIS — K62.89 RECTAL DISCOMFORT: Primary | ICD-10-CM

## 2024-02-07 PROCEDURE — 74176 CT ABD & PELVIS W/O CONTRAST: CPT

## 2024-02-07 PROCEDURE — 99284 EMERGENCY DEPT VISIT MOD MDM: CPT | Performed by: PHYSICIAN ASSISTANT

## 2024-02-07 PROCEDURE — 99284 EMERGENCY DEPT VISIT MOD MDM: CPT

## 2024-02-07 NOTE — DISCHARGE INSTRUCTIONS
Please follow-up with gastroenterology for the inflammation seen in your rectum.    Return to the ER with any worsening symptoms.

## 2024-02-07 NOTE — ED PROVIDER NOTES
History  Chief Complaint   Patient presents with    Foreign Body in Rectum     Pt states was using enema around 0500 and stated upon removal tip of enema was missing. Pt reports rectal discomfort.      55yo male with a history of lupus, hypertension, coronary artery disease, CKD, and DVT on Eliquis presenting for a rectal foreign body. Patient was administering an enema at 4:30am and he believes the plastic tip of the enema got stuck in the rectum. He reports a foreign body sensation. He had a bowel movement after the enema and is not sure if he passed the foreign body. He denies any rectal pain, rectal bleeding, abdominal pain, vomiting. He has no other complaints at this time.       History provided by:  Patient   used: No    Foreign Body in Rectum  Associated symptoms: no abdominal pain, no drooling, no rectal pain, no voice change and no vomiting        Prior to Admission Medications   Prescriptions Last Dose Informant Patient Reported? Taking?   Diclofenac Sodium (VOLTAREN) 1 %   No No   Sig: Apply 2 g topically 4 (four) times a day   apixaban (ELIQUIS) 5 mg  Self Yes No   Sig: Take 5 mg by mouth 2 (two) times a day   azelastine (ASTELIN) 0.1 % nasal spray  Self Yes No   Sig: ADMINISTER 1 SPRAY INTO NOSTRIL TWICE DAILY   bacitracin topical ointment 500 units/g topical ointment  Self Yes No   Sig: APPLY TOPICALLY TO AFFECTED AREA THREE TIMES A DAY AS DIRECTED   baclofen 20 mg tablet  Self Yes No   Sig: Take 20 mg by mouth 3 (three) times a day    betamethasone dipropionate (DIPROSONE) 0.05 % cream  Self Yes No   Sig: Apply 1 application topically 2 (two) times a day   Patient not taking: Reported on 12/21/2023   betamethasone, augmented, (DIPROLENE) 0.05 % ointment  Self Yes No   Sig: Apply 1 application. topically 2 (two) times a day   carvedilol (COREG) 12.5 mg tablet  Self Yes No   Sig: Take 12.5 mg by mouth 2 (two) times a day with meals   clobetasol (TEMOVATE) 0.05 % cream  Self Yes No    escitalopram (LEXAPRO) 10 mg tablet  Self No No   Sig: Take 1 tablet (10 mg total) by mouth daily   fluticasone (FLONASE) 50 mcg/act nasal spray  Self Yes No   furosemide (LASIX) 40 mg tablet  Self No No   Sig: Take 1 tablet (40 mg total) by mouth 2 (two) times a day   gabapentin (NEURONTIN) 300 mg capsule  Self No No   Sig: Take 1 capsule (300 mg total) by mouth 3 (three) times a day   gabapentin (NEURONTIN) 600 MG tablet  Self No No   Sig: Take 1 tablet (600 mg total) by mouth 3 (three) times a day   hydrOXYzine HCL (ATARAX) 10 mg tablet  Self Yes No   Sig: Take 10 mg by mouth every 6 (six) hours as needed for itching   hydroxychloroquine (PLAQUENIL) 200 mg tablet  Self Yes No   Sig: Take 400 mg by mouth daily at bedtime   ketoconazole (NIZORAL) 2 % cream  Self No No   Sig: Apply topically daily   lidocaine-prilocaine (EMLA) cream  Self No No   Sig: Apply topically as needed for mild pain   loratadine (CLARITIN) 10 mg tablet  Self No No   Sig: Take 1 tablet (10 mg total) by mouth daily   meclizine (ANTIVERT) 25 mg tablet  Self No No   Sig: Take 1 tablet (25 mg total) by mouth 3 (three) times a day as needed for dizziness   Patient not taking: Reported on 1/29/2024   metolazone (ZAROXOLYN) 5 mg tablet  Self No No   Sig: Take 0.5 tablets (2.5 mg total) by mouth 3 (three) times a week   mycophenolate (CELLCEPT) 500 mg tablet  Self Yes No   Sig: Take 500 mg by mouth every 12 (twelve) hours    potassium chloride (K-DUR,KLOR-CON) 20 mEq tablet  Self Yes No   Sig: Take 20 mEq by mouth daily   sucralfate (CARAFATE) 1 g tablet  Self Yes No   tamsulosin (FLOMAX) 0.4 mg  Self Yes No      Facility-Administered Medications: None       Past Medical History:   Diagnosis Date    Anemia     Arthritis     Cervical mass     Chronic kidney disease     Coronary artery disease     Depression     DVT (deep venous thrombosis) (HCC)     Hypertension     Lupus (HCC)     Renal disorder        Past Surgical History:   Procedure Laterality  Date    APPENDECTOMY      CERVICAL SPINE SURGERY      CHOLECYSTECTOMY      COLONOSCOPY N/A 8/23/2018    Procedure: COLONOSCOPY;  Surgeon: James Wise III, MD;  Location: [facility];  Service: Gastroenterology    ESOPHAGOGASTRODUODENOSCOPY N/A 8/22/2018    Procedure: ESOPHAGOGASTRODUODENOSCOPY (EGD);  Surgeon: James Wise III, MD;  Location: [facility];  Service: Gastroenterology    IR IVC FILTER PLACEMENT PERMANENT  9/7/2017    IR IVC FILTER REMOVAL  4/23/2018    IR LUMBAR PUNCTURE  11/23/2015    NECK SURGERY      US GUIDED INJECTION FOR RESEARCH STUDY  4/23/2018    US GUIDED INJECTION FOR RESEARCH STUDY  9/7/2017    VASCULAR SURGERY         Family History   Problem Relation Age of Onset    Heart disease Mother     No Known Problems Father      I have reviewed and agree with the history as documented.    E-Cigarette/Vaping    E-Cigarette Use Never User      E-Cigarette/Vaping Substances    Nicotine No     THC No     CBD No     Flavoring No     Other No     Unknown No      Social History     Tobacco Use    Smoking status: Never    Smokeless tobacco: Never   Vaping Use    Vaping status: Never Used   Substance Use Topics    Alcohol use: Never    Drug use: Never       Review of Systems   Constitutional:  Negative for chills and fever.   HENT:  Negative for drooling and voice change.    Eyes:  Negative for discharge and redness.   Respiratory:  Negative for shortness of breath and stridor.    Cardiovascular:  Negative for chest pain and leg swelling.   Gastrointestinal:  Negative for abdominal pain, rectal pain and vomiting.        +Suspected rectal FB   Musculoskeletal:  Negative for neck pain and neck stiffness.   Skin:  Negative for color change and rash.   Neurological:  Negative for seizures and syncope.   Psychiatric/Behavioral:  Negative for confusion. The patient is not nervous/anxious.    All other systems reviewed and are negative.      Physical Exam  Physical Exam  Vitals and nursing note reviewed.    Constitutional:       General: He is not in acute distress.     Appearance: Normal appearance. He is not toxic-appearing.   HENT:      Head: Normocephalic and atraumatic.      Right Ear: External ear normal.      Left Ear: External ear normal.   Eyes:      General: No scleral icterus.        Right eye: No discharge.         Left eye: No discharge.      Conjunctiva/sclera: Conjunctivae normal.   Cardiovascular:      Rate and Rhythm: Normal rate.   Pulmonary:      Effort: Pulmonary effort is normal. No respiratory distress.      Breath sounds: No stridor.   Abdominal:      General: Abdomen is flat. There is no distension.      Tenderness: There is no abdominal tenderness. There is no guarding.   Genitourinary:     Comments: Digital rectal exam performed. No foreign body palpated. No rectal bleeding.   Musculoskeletal:         General: No deformity. Normal range of motion.      Cervical back: Normal range of motion.   Skin:     General: Skin is warm and dry.   Neurological:      General: No focal deficit present.      Mental Status: He is alert. Mental status is at baseline.   Psychiatric:         Mood and Affect: Mood normal.         Behavior: Behavior normal.         Vital Signs  ED Triage Vitals   Temperature Pulse Respirations Blood Pressure SpO2   02/07/24 0558 02/07/24 0557 02/07/24 0556 02/07/24 0557 02/07/24 0557   97.8 °F (36.6 °C) 103 17 147/71 99 %      Temp Source Heart Rate Source Patient Position - Orthostatic VS BP Location FiO2 (%)   02/07/24 0558 02/07/24 0556 -- 02/07/24 0557 --   Oral Monitor  Right arm       Pain Score       --                  Vitals:    02/07/24 0557 02/07/24 0735   BP: 147/71 137/77   Pulse: 103 78         Visual Acuity      ED Medications  Medications - No data to display    Diagnostic Studies  Results Reviewed       Procedure Component Value Units Date/Time    UA w Reflex to Microscopic w Reflex to Culture [[id number]]     Lab Status: No result Specimen: Urine                     CT abdomen pelvis wo contrast   Final Result by Tami Cevallos MD (02/07 0733)      The wall of the rectum appears thickened. There may also be some bowel wall thickening of the distal sigmoid. Clinical correlation and follow-up is recommended.      No evidence of retained radiopaque foreign body within the bowel.      The urinary bladder wall appears mildly thickened. Correlation with urinalysis is recommended.      Atherosclerosis.      Other nonemergent findings as above.         I personally discussed this study with OLE MCGHEE on 2/7/2024 7:23 AM.                  Workstation performed: [id number]                    Procedures  Procedures         ED Course                   Medical Decision Making  54yoM here with a rectal foreign body. He is unsure if a plastic tip of an enema is stuck. He reports FB sensation without pain or bleeding. He is well appearing in no distress. Vitals are stable. No palpable foreign body on rectal exam.    CT abdomen obtained. No foreign body seen on imaging. Urinary bladder wall thickening present although patient denies any urinary symptoms and he is unable to provide a urine sample. There is rectal and bowel wall thickening present. Will refer to GI for abnormal CT findings. ED return precautions discussed. Patient expressed understanding and is agreeable to plan. Patient discharged in stable condition.        Problems Addressed:  Rectal discomfort: acute illness or injury    Amount and/or Complexity of Data Reviewed  Radiology: ordered.             Disposition  Final diagnoses:   Rectal discomfort     Time reflects when diagnosis was documented in both MDM as applicable and the Disposition within this note       Time User Action Codes Description Comment    2/7/2024  7:41 AM Ole Mcghee Add [K62.89] Rectal discomfort           ED Disposition       ED Disposition   Discharge    Condition   Stable    Date/Time   Wed Feb 7, 2024  7:40 AM    Comment   Donta  Dale discharge to home/self care.                   Follow-up Information       Follow up With Specialties Details Why Contact Info Additional Information    [facility] [place] [facility] Schedule an appointment as soon as possible for a visit   [address] [facility] [id number] [phone]  [facility] [place], [address] , [facility], [place], [place], [address] [phone]     [facility] Emergency Department Emergency Medicine  If symptoms worsen [address] [facility] [id number] [phone] [facility] Emergency Department, [address], [place], [address]            Discharge Medication List as of 2/7/2024  7:42 AM        CONTINUE these medications which have NOT CHANGED    Details   apixaban (ELIQUIS) 5 mg Take 5 mg by mouth 2 (two) times a day, Starting Fri 4/8/2022, Historical Med      azelastine (ASTELIN) 0.1 % nasal spray ADMINISTER 1 SPRAY INTO NOSTRIL TWICE DAILY, Historical Med      bacitracin topical ointment 500 units/g topical ointment APPLY TOPICALLY TO AFFECTED AREA THREE TIMES A DAY AS DIRECTED, Historical Med      baclofen 20 mg tablet Take 20 mg by mouth 3 (three) times a day , Historical Med      betamethasone dipropionate (DIPROSONE) 0.05 % cream Apply 1 application topically 2 (two) times a day, Historical Med      betamethasone, augmented, (DIPROLENE) 0.05 % ointment Apply 1 application. topically 2 (two) times a day, Historical Med      carvedilol (COREG) 12.5 mg tablet Take 12.5 mg by mouth 2 (two) times a day with meals, Historical Med      clobetasol (TEMOVATE) 0.05 % cream Historical Med      Diclofenac Sodium (VOLTAREN) 1 % Apply 2 g topically 4 (four) times a day, Starting Mon 12/11/2023, Normal      escitalopram (LEXAPRO) 10 mg tablet Take 1 tablet (10 mg total) by mouth  daily, Starting Tue 4/4/2023, Until Thu 12/21/2023, Normal      fluticasone (FLONASE) 50 mcg/act nasal spray Historical Med      furosemide (LASIX) 40 mg tablet Take 1 tablet (40 mg total) by mouth 2 (two) times a day, Starting Tue 2/14/2023, No Print      gabapentin (NEURONTIN) 300 mg capsule Take 1 capsule (300 mg total) by mouth 3 (three) times a day, Starting Tue 4/4/2023, Until Thu 12/21/2023, Normal      gabapentin (NEURONTIN) 600 MG tablet Take 1 tablet (600 mg total) by mouth 3 (three) times a day, Starting Tue 4/4/2023, Until Thu 12/21/2023, Normal      hydroxychloroquine (PLAQUENIL) 200 mg tablet Take 400 mg by mouth daily at bedtime, Historical Med      hydrOXYzine HCL (ATARAX) 10 mg tablet Take 10 mg by mouth every 6 (six) hours as needed for itching, Historical Med      ketoconazole (NIZORAL) 2 % cream Apply topically daily, Starting Tue 11/14/2023, Normal      lidocaine-prilocaine (EMLA) cream Apply topically as needed for mild pain, Starting Mon 2/27/2023, Normal      loratadine (CLARITIN) 10 mg tablet Take 1 tablet (10 mg total) by mouth daily, Starting Mon 10/31/2022, Until Thu 12/21/2023, Normal      meclizine (ANTIVERT) 25 mg tablet Take 1 tablet (25 mg total) by mouth 3 (three) times a day as needed for dizziness, Starting Wed 7/5/2023, Normal      metolazone (ZAROXOLYN) 5 mg tablet Take 0.5 tablets (2.5 mg total) by mouth 3 (three) times a week, Starting Tue 2/14/2023, No Print      mycophenolate (CELLCEPT) 500 mg tablet Take 500 mg by mouth every 12 (twelve) hours , Historical Med      potassium chloride (K-DUR,KLOR-CON) 20 mEq tablet Take 20 mEq by mouth daily, Historical Med      sucralfate (CARAFATE) 1 g tablet Historical Med      tamsulosin (FLOMAX) 0.4 mg Starting Thu 10/13/2022, Historical Med             No discharge procedures on file.    PDMP Review         Value Time User    PDMP Reviewed  Yes 12/29/2022 12:10 PM Dori Julien PA-C            ED Provider  Electronically Signed  by             Allyson Mcghee PA-C  02/07/24 0838

## 2024-02-09 DIAGNOSIS — M19.012 ARTHRITIS OF LEFT ACROMIOCLAVICULAR JOINT: ICD-10-CM

## 2024-02-14 ENCOUNTER — EVALUATION (OUTPATIENT)
Dept: PHYSICAL THERAPY | Facility: CLINIC | Age: 54
End: 2024-02-14
Payer: COMMERCIAL

## 2024-02-14 ENCOUNTER — CONSULT (OUTPATIENT)
Dept: NEUROLOGY | Facility: CLINIC | Age: 54
End: 2024-02-14
Payer: COMMERCIAL

## 2024-02-14 VITALS
TEMPERATURE: 97.9 F | BODY MASS INDEX: 29.39 KG/M2 | HEART RATE: 91 BPM | DIASTOLIC BLOOD PRESSURE: 90 MMHG | WEIGHT: 182.9 LBS | OXYGEN SATURATION: 99 % | HEIGHT: 66 IN | SYSTOLIC BLOOD PRESSURE: 130 MMHG

## 2024-02-14 DIAGNOSIS — M48.062 SPINAL STENOSIS OF LUMBAR REGION WITH NEUROGENIC CLAUDICATION: ICD-10-CM

## 2024-02-14 DIAGNOSIS — M32.9 SYSTEMIC LUPUS ERYTHEMATOSUS, UNSPECIFIED SLE TYPE, UNSPECIFIED ORGAN INVOLVEMENT STATUS (HCC): ICD-10-CM

## 2024-02-14 DIAGNOSIS — G62.9 SENSORY MOTOR NEUROPATHY: ICD-10-CM

## 2024-02-14 DIAGNOSIS — M21.372 BILATERAL FOOT-DROP: ICD-10-CM

## 2024-02-14 DIAGNOSIS — G51.0 LEFT-SIDED BELL'S PALSY: Primary | ICD-10-CM

## 2024-02-14 DIAGNOSIS — M21.371 BILATERAL FOOT-DROP: ICD-10-CM

## 2024-02-14 DIAGNOSIS — M54.16 LUMBAR RADICULOPATHY: Primary | ICD-10-CM

## 2024-02-14 DIAGNOSIS — M32.14 LUPUS NEPHRITIS (HCC): ICD-10-CM

## 2024-02-14 DIAGNOSIS — G51.0 BELL'S PALSY: ICD-10-CM

## 2024-02-14 PROCEDURE — 97161 PT EVAL LOW COMPLEX 20 MIN: CPT

## 2024-02-14 PROCEDURE — 97110 THERAPEUTIC EXERCISES: CPT

## 2024-02-14 PROCEDURE — 99245 OFF/OP CONSLTJ NEW/EST HI 55: CPT | Performed by: PSYCHIATRY & NEUROLOGY

## 2024-02-14 PROCEDURE — 97112 NEUROMUSCULAR REEDUCATION: CPT

## 2024-02-14 NOTE — LETTER
February 15, 2024    Lesia Crowe MD  [address] [place] [id number]    Patient: Donta Hunter   YOB: 1970   Date of Visit: 2024     Encounter Diagnosis     ICD-10-CM    1. Lumbar radiculopathy  M54.16       2. Bell's palsy  G51.0       3. Systemic lupus erythematosus, unspecified SLE type, unspecified organ involvement status (HCC)  M32.9           Dear Dr. Crowe:    Thank you for your recent referral of Donta Hunter. Please review the attached evaluation summary from Donta's recent visit.     Please verify that you agree with the plan of care by signing the attached order.     If you have any questions or concerns, please do not hesitate to call.     I sincerely appreciate the opportunity to share in the care of one of your patients and hope to have another opportunity to work with you in the near future.       Sincerely,    Fatoumata Caal, PT      Referring Provider:      I certify that I have read the below Plan of Care and certify the need for these services furnished under this plan of treatment while under my care.                    Lesia Crowe MD  [address] [place] [id number]  Via Fax: [phone]          PT Evaluation     Today's date: 2024  Patient name: Donta Hunter  : 1970  MRN: [id number]  Referring provider: Lesia Crowe MD  Dx:   Encounter Diagnosis     ICD-10-CM    1. Lumbar radiculopathy  M54.16       2. Bell's palsy  G51.0       3. Systemic lupus erythematosus, unspecified SLE type, unspecified organ involvement status (HCC)  M32.9             Start Time: 1015  Stop Time: 1100  Total time in clinic (min): 45 minutes    Assessment  Assessment details: Pt a 52 y.o. male who presents with low back pain, decreased LE and facial muscle strength, decreased lumbar ROM, ambulatory dysfunction, postural dysfunction and balance dysfunction. Due to these impairments, Pt has difficulty performing ADL's/self-care and  household chores. PT POC will focus on strengthening, pain reduction, and ROM to improve his daily function. Pt would benefit from skilled physical therapy to address their aforementioned impairments, improve their level of function and to improve their overall quality of life.  Impairments: abnormal coordination, abnormal gait, abnormal muscle tone, abnormal or restricted ROM, abnormal movement, activity intolerance, impaired balance, impaired physical strength, lacks appropriate home exercise program, pain with function, safety issue, weight-bearing intolerance, poor posture  and poor body mechanics  Understanding of Dx/Px/POC: good   Prognosis: fair    Goals  ST WEEKS  1.  Decrease pain by 2 points on VAS at its worst.  2.  Increase CORE/LE by 1/2 MMT grade in all deficient planes.  3. Independent with HEP and/or fitness program.    LT WEEKS  1. Patient to be independent with a/iadls especially walking and progress to least restrictive device  2. Increase functional activities for leisure and home activities to previous LOF.  3. FOTO score will improve from score at initial evaluation by at least 10 points indicating improvements in his performance of functional activities.       Plan  Patient would benefit from: skilled physical therapy and PT eval  Planned modality interventions: cryotherapy, electrical stimulation/Russian stimulation, thermotherapy: hydrocollator packs and unattended electrical stimulation  Planned therapy interventions: activity modification, behavior modification, body mechanics training, aquatic therapy, flexibility, functional ROM exercises, home exercise program, IADL retraining, manual therapy, neuromuscular re-education, patient education, postural training, strengthening, stretching, therapeutic activities, therapeutic exercise, abdominal trunk stabilization, nerve gliding, gait training, joint mobilization, IASTM and kinesiology taping  Frequency: 2x week (2-3x  week)  Duration in weeks: 8  Plan of Care beginning date: 2024  Plan of Care expiration date: 4/10/2024  Treatment plan discussed with: patient        Subjective Evaluation    History of Present Illness  Date of onset: 3/4/2017  Mechanism of injury: Pt reports to physical therapy with complaints of 5 year history of low back pain with no known NELDA. Pt complains of midline low back pain with right LE radiculopathy and paraesthesias. Pt notes he has been ambulating with RW for the past 5 years, uses stockings, and has a neurologic gait. He notes difficulties with ambulation and weakness of his b/l LE's. Patient states goals for PT are to relieve his back pain to improve his daily function.  Quality of life: fair    Patient Goals  Patient goals for therapy: decreased pain, increased motion, increased strength, independence with ADLs/IADLs and improved balance    Pain  Current pain ratin  At best pain ratin  At worst pain ratin  Location: Midline low back w/ R radicular symptoms  Quality: burning, radiating and needle-like  Relieving factors: rest and medications  Aggravating factors: walking  Progression: worsening    Social Support  Steps to enter house: yes  Stairs in house: yes   Lives in: multiple-level home  Lives with: spouse      Diagnostic Tests  X-ray: abnormal  MRI studies: abnormal  Treatments  Previous treatment: injection treatment and home therapy        Objective     Static Posture     Thoracic Spine  Hyperkyphosis.    Palpation   Left   Hypertonic in the erector spinae and lumbar paraspinals.   Tenderness of the erector spinae and lumbar paraspinals.     Right   Hypertonic in the erector spinae and lumbar paraspinals.   Tenderness of the erector spinae and lumbar paraspinals.     Active Range of Motion     Lumbar   Flexion:  with pain Restriction level: minimal  Extension:  with pain Restriction level: moderate  Left lateral flexion:  Restriction level: moderate  Right lateral flexion:   "Restriction level: moderate  Left rotation:  with pain Restriction level: moderate  Right rotation:  with pain Restriction level: moderate    Strength/Myotome Testing     Left Hip   Planes of Motion   Flexion: 3+  Extension: 3+  Abduction: 4-  Adduction: 4-    Right Hip   Planes of Motion   Flexion: 4-  Extension: 3+  Abduction: 4-  Adduction: 4-    Left Knee   Flexion: 4-  Extension: 3+    Right Knee   Flexion: 4-  Extension: 3+    Left Ankle/Foot   Dorsiflexion: 3+  Plantar flexion: 4-    Right Ankle/Foot   Dorsiflexion: 3+  Plantar flexion: 4-    Additional Strength Details  CORE is 3/5    Tests     Lumbar   Positive SIJ compression and sacral spring .     Left   Positive passive SLR.   Negative slump test.     Right   Positive passive SLR.   Negative slump test.     Ambulation   Weight-Bearing Status   Assistive device used: front-wheeled walker    Observational Gait   Gait: antalgic, asymmetric and crouched   Decreased walking speed and stride length.   Left foot contact pattern: toe to heel  Base of support: increased    Additional Observational Gait Details  Uses RW with tremors with left drop foot, right foot slap  Neuro Exam:     Transfers   Sit to stand: minimum assist              Precautions: HTN, fall risk, DVT, cervical SX, Lupus  POC expires Unit limit Auth Expiration date PT/OT + Visit Limit?   4/10 N/A 12/31/24 N/A                 Visit/Unit Tracking  AUTH Status:  Date 2/14              After 24th visit Used 1               Remaining 23                FOTO:       Manuals 2/14                                                                Neuro Re-Ed             Pt education regarding HEP, POC, and dx  5'            Scap squeezes 2x10 3\"                                                                              Ther Ex             Standing lumbar extensions 10x 5\"                                                                 Sit to stands 1x10                                       Ther Activity "                                       Gait Training                                       Modalities

## 2024-02-14 NOTE — PROGRESS NOTES
Patient ID: Donta Hunter is a 54 y.o. male.    Assessment/Plan:           Problem List Items Addressed This Visit          Genitourinary    Lupus nephritis (HCC)    Relevant Orders    MRI brain without contrast       Other    Lupus (systemic lupus erythematosus) (HCC)    Relevant Orders    MRI brain without contrast    MRI lumbar spine without contrast    Ambulatory Referral to Physical Therapy     Other Visit Diagnoses       Left-sided Bell's palsy    -  Primary    Relevant Orders    MRI brain without contrast    Ambulatory Referral to Physical Therapy    Sedimentation rate, automated    C-reactive protein    Vitamin B12    Vitamin D 25 hydroxy    Bilateral foot-drop        Relevant Orders    MRI lumbar spine without contrast    Spinal stenosis of lumbar region with neurogenic claudication        Relevant Orders    MRI lumbar spine without contrast    Sensory motor neuropathy        Relevant Orders    MRI lumbar spine without contrast           Mr. Hunter has presented to [facility] multiple sclerosis center for evaluation of facial weakness.  Patient was diagnosed with mild peripheral cranial nerve VII paralysis in the left side with no difficulties closing his left eye, no signs of changes in sense of taste or hearing.  Patient completed prednisone taper.  Serologic workup for Lyme disease has been pending.  Patient will be advised physical therapy and high expectations to have improvement in his focal deficits made.  Patient is immunocompromise due to use of Plaquenil with CellCept as patient has SLE and lupus nephritis;    Patient has significant ambulatory dysfunction due to remote history of sensorimotor peripheral polyneuropathy with bilateral feet drop.  Prior evaluation for spinal cord pathology was noted consistent with intrinsic cord demyelination or stroke; patient has multilevel degenerative disc disease with osteophytes involving cervical and thoracolumbar regions, with cervical spine surgical  intervention been completed previously.    Patient stated he has ambulatory dysfunction since last 6-7 years ago as he ambulates with a walker.  Patient has brisk reflexes in patella with diminished reflexes in ankles bilaterally.    Last CT scan of the head completed in July 2023 was consistent with cerebellar stroke in addition to T2 hyperintensity and brain atrophy.  Patient will be offered repeating brain MRI with noncontrast advised due to lupus nephritis to follow-up on his radiographic findings.    If patient started developing ischemic changes in brain parenchyma-concern will be high for neurological lupus involving brain parenchyma with secondary vasculitis might be my primary concern at this point.  Patient might benefit from transitioning to infusions including rituximab if clinically indicated.  Patient has his primary rheumatology team at [facility].    Patient will be repeating MRI of the lumbar spine due to advanced spondyloarthropathy and degenerative disc disease reported in 2022 and brain MRI was completed in 2021.    Patient is to follow-up with [facility] neurology 1 week after MRI completed and available for my review or patient is to establish with Katheryn.     # [id number] was part of this encounter.     Subjective: left facial weakness     HPI  Mr. Hunter is a 54-year-old male who presented to [facility] multiple sclerosis center for evaluation of facial weakness.  Patient has establish care with outside neurology for neurologic pain, myelopathy and lumbar radiculopathy with abnormal spastic gait with bilateral foot drop.  Patient has establish care with [facility] neurology and pain management team for neurologic gait dysfunction with chronic pain; multifactorial due to myelopathy (residua from C-spine disease), polyneuropathy, lumbar radiculopathy.  Patient was taking Cymbalta, gabapentin, baclofen, tramadol with epidural injections.  Patient has remote history of lupus  "erythematosus with lupus nephritis as he has been taking Plaquenil and CellCept, although previously he was initiated on infusions, as per the patient.  Patient stated he was fully ambulatory more than 7 years ago with weakness and numbness lower extremities started around that time.    On January 29, 2024 patient received prednisone 60 mg taper for primary care physician and was referred to neurology for left-sided facial paralysis.  Serologic workup for Lyme disease has been pending since then.   Patient stated he has no significant improvement or worsening left-sided weakness, patient states that he has numbness but no significant paralysis of the left side.  Patient has no difficulties closing his left eye.  Patient has questionable pain in left periauricular area.  Patient described he has no visual loss, no double vision, he has no dysphagia or dysarthria; no recent infection, no recent falls.  Patient has multiple neurology/orthopedics/pain management evaluation in the past for diffuse degenerative disc disease and spondyloarthropathy involving cervical thoracic and lumbar regions, patient stated he had cervical spine surgery with excellent outcomes.  EMG 7/13/18  \"INTERPRETATION:   This study does show sensorimotor polyneuropathy, but is unchanged (or even perhaps improved) compared to 2015.  There is no electrophysiologic evidence of myopathy or significant motor axonal loss from lumbar radiculopathy.  Clinically, his gait problem seems more likely from myelopathy.\"     MRI brain w/wo 7/26/21  \"IMPRESSION  1.  No acute intracranial abnormality or significant interval change from prior MR brain 12/15/2016.     2.  Patchy areas of T2/FLAIR signal hyperintensity in the supratentorial white matter are nonspecific and again may reflect chronic microvascular ischemic changes or possibly sequela of a chronic infectious/inflammatory process.\"     MRI L-spine 8/8/19  \"Axial images show the following:     L1-2: No " "significant disc bulge, and no spinal or foraminal stenosis appreciated.  L2-3: There is mild spinal stenosis and bilateral neural foraminal narrowing, right greater than left, secondary to a broad posterior disc bulge and mild facet arthropathy.  L3-4: There is mild spinal stenosis, with moderate right and mild left neural foraminal narrowing secondary to a broad right paramedian disc bulge and bilateral facet arthropathy  L4-5: There is mild-to-moderate spinal stenosis and bilateral foraminal narrowing, right greater than left, secondary to a broad right paramedian disc bulge and bilateral facet arthropathy.  L5-S1:  There is mild spinal stenosis and moderate bilateral foraminal narrowing secondary to a mild broad posterior disc bulge and moderate bilateral facet arthropathy.  There is mild upward subluxation of the S1 superior articular process into the neural foramina bilaterally contributing to the foraminal narrowing.     IMPRESSION  IMPRESSION  1. Moderate degenerative disc and bony changes of the lumbosacral spine associated with mild levoscoliosis, as described above.\"     MRI T-spine 1/23/19  \"IMPRESSION  1.  No acute abnormality of the thoracic spine on non-contrast MR imaging.     2.  Mild anterior wedging of the T7 and T8 vertebral bodies, which appears chronic. Prominent endplate irregularity of the T7 vertebral body, inferiorly more than superiorly, likely represent the presence of Schmorl's nodes and or endplate degenerative change. The appearance may be slightly progressed along the superior endplate of T7, and contributes to the appearance of at least mild central vertebral body height loss.     3.  Artifact limits assessment of the thoracic spinal cord. The unobscured portion of the cord appears grossly normal in signal and caliber.     4.  Multilevel degenerative changes of the thoracic spine, as described above.     5.  An exophytic cystic lesion along the superior medial aspect of the right " "kidney, measuring roughly 3.6 cm (oblique SI).  Associated septation(s) cannot be excluded based upon the incomplete/suboptimal imaging.  There may be mild interval increase in size compared to MRI of 11/19/2015. Additional potential very small cystic foci within the right kidney is not excluded.  Consider renal ultrasound examination for further characterization.\"     MRI C-spine 7/27/16  \"FINDINGS  Substantial motion artifact limits evaluation.  Straightening of known lordosis. Prevertebral soft tissues   are unremarkable. Craniocervical junction is normal.  No significant marrow signal alteration. Multilevel   disc degenerative changes are present with disc height loss, disc signal alteration, posterior disc   protrusions. There several posterior disc protrusions, with largest at C5-6 which contributes to moderate   central canal stenosis.     IMPRESSION  Motion artifact limits evaluation.  Cervical spondylosis greatest at C5-6 where there is moderate central   canal stenosis.  Cord signal is not well evaluated given artifact.\"    PAST MEDICAL HISTORY:  Past Medical History:   Diagnosis Date    Abnormal gait 4/11/2016    Anemia 10/21/2015    Bilateral edema of lower extremity 3/10/2017    Cervical disc disorder with myelopathy 5/12/2017    Cervical myelopathy (HCC) 9/5/2017    Chronic obstructive pulmonary disease (HCC) 8/1/2018    Current use of steroid medication    DDD (degenerative disc disease), cervical 10/5/2016    DDD (degenerative disc disease), lumbar 10/5/2016    DVT (deep venous thrombosis) ([facility])    History of tobacco use    HTN, goal below 140/90 10/20/2014    Leg weakness, bilateral 11/18/2015    Low HDL (under 40) 10/20/2014    Lupus ([facility])    Medical history non-contributory    Membranous nephritis 1/4/2016    Nephrotic range proteinuria 11/18/2015    Renal cyst, right    Spinal stenosis of cervical region 10/5/2016    WBC decreased 7/13/2015     SURGICAL HISTORY:  Past Surgical History: "   Procedure Laterality Date    ARTHRODESIS, ANT INTERBODY, BELOW C-2 N/A 1/25/2018   ARTHRODESIS, ANT INTERBODY, BELOW C-2 performed by Albin Goncalves MD at OR [facility]    COLONOSCOPY 2014    INFORMATION   excision of lymph node - axillae ? 2014    INFORMATION 09/2017   IVC filter insertion    IR FILTER REMOVAL VENA CAVA N/A 4/23/2018   RETRIEVAL (REMOVAL) OF INTRAVASCULAR VENA CAVA FILTER, ENDOVASCULAR INCLUDING VASCULAR ACCESSS AND RADIOLOGICAL S&I performed by Margarita Hicks DO at RADIOLOGY [facility]    LAPAROSCOPY; CHOLECYSTECTOMY N/A 9/17/2020   ROBOTIC LAPAROSCOPIC CHOLECYSTECTOMY performed by Mario Zacarias DO at OR [facility]    REMOVAL OF APPENDIX    REMOVAL OF WRIST BONES Left 1/22/2020   CARPECTOMY ALL BONES performed by Tay Beach MD at OR [facility]    REMOVE VERTEBRAL BODY, NECK, SINGLE N/A 1/25/2018   VERTEBRAL CORPECTOMY ANTERIOR CERVICAL performed by Albin Goncalves MD at OR [facility]    VEIN ABLATION EXTREMITY,ENDOVEN,1ST Bilateral 6/19/2019   ENDOVENOUS RADIOFREQUENCY ABLATION THERAPY FIRST VEIN performed by Tami Delarosa DO at OR [facility]     CTH 7/2023: No intracranial mass, mass effect or midline shift. No CT signs of acute infarction.  No acute parenchymal hemorrhage. Old lacunar infarct right cerebellum.     Age-related cortical atrophy. Periventricular white matter hypodensity which is nonspecific although most compatible with chronic small vessel ischemic disease. Vascular and bilateral basal ganglia calcifications.    MRI brain 7/2021: No acute intracranial abnormality or significant interval change from prior MR brain 12/15/2016.     2.  Patchy areas of T2/FLAIR signal hyperintensity in the supratentorial white matter are nonspecific and again may reflect chronic microvascular ischemic changes or possibly sequela of a chronic infectious/inflammatory process.     The following portions of the patient's history were reviewed and updated as appropriate: He  has a past medical history of  Anemia, Arthritis, Cervical mass, Chronic kidney disease, Coronary artery disease, Depression, DVT (deep venous thrombosis) ([facility]), Hypertension, Lupus ([facility]), and Renal disorder.  He   Patient Active Problem List    Diagnosis Date Noted    Cellulitis 10/20/2023    Right foot pain 03/09/2023    DJD (degenerative joint disease), ankle and foot, right 03/09/2023    Hypertensive CKD (chronic kidney disease) 02/14/2023    Tremor of both hands 02/27/2020    Spasticity 02/27/2020    Gait difficulty 02/27/2020    Neuropathic pain 02/27/2020    Muscle weakness (generalized) 11/04/2019    Anemia in chronic kidney disease 08/21/2018    Lupus (systemic lupus erythematosus) ([facility]) 07/29/2018    Lupus nephritis (HCC) 07/29/2018    Hypertension 07/29/2018    History of DVT (deep vein thrombosis) 07/29/2018    Stage 2 chronic kidney disease 07/29/2018    Persistent proteinuria 07/29/2018     He  has a past surgical history that includes Neck surgery; Cervical spine surgery; Appendectomy; Esophagogastroduodenoscopy (N/A, 8/22/2018); Colonoscopy (N/A, 8/23/2018); Vascular surgery; Cholecystectomy; US guided injection for research study (4/23/2018); IR IVC filter removal (4/23/2018); US guided injection for research study (9/7/2017); IR IVC filter placement permanent (9/7/2017); and IR lumbar puncture (11/23/2015).  His family history includes Heart disease in his mother; No Known Problems in his father.  He  reports that he has never smoked. He has never used smokeless tobacco. He reports that he does not drink alcohol and does not use drugs.  Current Outpatient Medications   Medication Sig Dispense Refill    apixaban (ELIQUIS) 5 mg Take 5 mg by mouth 2 (two) times a day      baclofen 20 mg tablet Take 20 mg by mouth 3 (three) times a day       betamethasone, augmented, (DIPROLENE) 0.05 % ointment Apply 1 application. topically 2 (two) times a day      carvedilol (COREG) 12.5 mg tablet Take 12.5 mg by mouth 2 (two) times a day with  meals      clobetasol (TEMOVATE) 0.05 % cream       Diclofenac Sodium (VOLTAREN) 1 % Apply 2 g topically 4 (four) times a day 350 g 1    fluticasone (FLONASE) 50 mcg/act nasal spray       furosemide (LASIX) 40 mg tablet Take 1 tablet (40 mg total) by mouth 2 (two) times a day  0    gabapentin (NEURONTIN) 300 mg capsule Take 1 capsule (300 mg total) by mouth 3 (three) times a day 270 capsule 3    gabapentin (NEURONTIN) 600 MG tablet Take 1 tablet (600 mg total) by mouth 3 (three) times a day 270 tablet 3    hydroxychloroquine (PLAQUENIL) 200 mg tablet Take 400 mg by mouth daily at bedtime      hydrOXYzine HCL (ATARAX) 10 mg tablet Take 10 mg by mouth every 6 (six) hours as needed for itching      ketoconazole (NIZORAL) 2 % cream Apply topically daily 60 g 1    lidocaine-prilocaine (EMLA) cream Apply topically as needed for mild pain 30 g 3    loratadine (CLARITIN) 10 mg tablet Take 1 tablet (10 mg total) by mouth daily 30 tablet 2    metolazone (ZAROXOLYN) 5 mg tablet Take 0.5 tablets (2.5 mg total) by mouth 3 (three) times a week      mycophenolate (CELLCEPT) 500 mg tablet Take 500 mg by mouth every 12 (twelve) hours       potassium chloride (K-DUR,KLOR-CON) 20 mEq tablet Take 20 mEq by mouth daily      sucralfate (CARAFATE) 1 g tablet       tamsulosin (FLOMAX) 0.4 mg       azelastine (ASTELIN) 0.1 % nasal spray ADMINISTER 1 SPRAY INTO NOSTRIL TWICE DAILY      bacitracin topical ointment 500 units/g topical ointment APPLY TOPICALLY TO AFFECTED AREA THREE TIMES A DAY AS DIRECTED      betamethasone dipropionate (DIPROSONE) 0.05 % cream Apply 1 application topically 2 (two) times a day (Patient not taking: Reported on 12/21/2023)      escitalopram (LEXAPRO) 10 mg tablet Take 1 tablet (10 mg total) by mouth daily 90 tablet 3    meclizine (ANTIVERT) 25 mg tablet Take 1 tablet (25 mg total) by mouth 3 (three) times a day as needed for dizziness (Patient not taking: Reported on 1/29/2024) 30 tablet 0     No current  facility-administered medications for this visit.     Current Outpatient Medications on File Prior to Visit   Medication Sig    apixaban (ELIQUIS) 5 mg Take 5 mg by mouth 2 (two) times a day    baclofen 20 mg tablet Take 20 mg by mouth 3 (three) times a day     betamethasone, augmented, (DIPROLENE) 0.05 % ointment Apply 1 application. topically 2 (two) times a day    carvedilol (COREG) 12.5 mg tablet Take 12.5 mg by mouth 2 (two) times a day with meals    clobetasol (TEMOVATE) 0.05 % cream     Diclofenac Sodium (VOLTAREN) 1 % Apply 2 g topically 4 (four) times a day    fluticasone (FLONASE) 50 mcg/act nasal spray     furosemide (LASIX) 40 mg tablet Take 1 tablet (40 mg total) by mouth 2 (two) times a day    gabapentin (NEURONTIN) 300 mg capsule Take 1 capsule (300 mg total) by mouth 3 (three) times a day    gabapentin (NEURONTIN) 600 MG tablet Take 1 tablet (600 mg total) by mouth 3 (three) times a day    hydroxychloroquine (PLAQUENIL) 200 mg tablet Take 400 mg by mouth daily at bedtime    hydrOXYzine HCL (ATARAX) 10 mg tablet Take 10 mg by mouth every 6 (six) hours as needed for itching    ketoconazole (NIZORAL) 2 % cream Apply topically daily    lidocaine-prilocaine (EMLA) cream Apply topically as needed for mild pain    loratadine (CLARITIN) 10 mg tablet Take 1 tablet (10 mg total) by mouth daily    metolazone (ZAROXOLYN) 5 mg tablet Take 0.5 tablets (2.5 mg total) by mouth 3 (three) times a week    mycophenolate (CELLCEPT) 500 mg tablet Take 500 mg by mouth every 12 (twelve) hours     potassium chloride (K-DUR,KLOR-CON) 20 mEq tablet Take 20 mEq by mouth daily    sucralfate (CARAFATE) 1 g tablet     tamsulosin (FLOMAX) 0.4 mg     azelastine (ASTELIN) 0.1 % nasal spray ADMINISTER 1 SPRAY INTO NOSTRIL TWICE DAILY    bacitracin topical ointment 500 units/g topical ointment APPLY TOPICALLY TO AFFECTED AREA THREE TIMES A DAY AS DIRECTED    betamethasone dipropionate (DIPROSONE) 0.05 % cream Apply 1 application  "topically 2 (two) times a day (Patient not taking: Reported on 12/21/2023)    escitalopram (LEXAPRO) 10 mg tablet Take 1 tablet (10 mg total) by mouth daily    meclizine (ANTIVERT) 25 mg tablet Take 1 tablet (25 mg total) by mouth 3 (three) times a day as needed for dizziness (Patient not taking: Reported on 1/29/2024)     No current facility-administered medications on file prior to visit.     He is allergic to doxycycline and sulfa antibiotics..         Objective:    Blood pressure 130/90, pulse 91, temperature 97.9 °F (36.6 °C), temperature source Temporal, height 5' 6\" (1.676 m), weight 83 kg (182 lb 14.4 oz), SpO2 99%.    Physical Exam    Neurological Exam  CONSTITUTIONAL: NAD, pleasant. NECK: supple, no lymphadenopathy, no thyromegaly, no JVD. CARDIOVASCULAR: RRR, normal S1S2, no murmurs, no rubs. RESP: clear to auscultation bilaterally, no wheezes/rhonchi/rales. ABDOMEN: soft, non tender, non distended. SKIN: no rash or skin lesions. EXTREMITIES: no edema, pulses 2+bilaterally. PSYCH: appropriate mood and affect  NEUROLOGIC COMPREHENSIVE EXAM: Patient is oriented to person, place and time, NAD; appropriate affect. CN II, III, IV, V, VI, VII,VIII,IX,X,XI-XII intact with EOMI, PERRLA, OKN intact, VF grossly intact, fundi poorly visualized secondary to pupillary constriction; mild left facial asymmetry face noted, patient cannot bring left eyebrow up but patient is comfortable to close his left eye completely. Motor: 4/5 UE bilateral symmetric, 5/5 finger extension and wrist flexion;  3/5 hip flexion and 4-/5 knee flexion b/l; 1/5 dorsiflexion b/l ; Sensory: decreased  to light touch and pinprick bilaterally; normal vibration sensation feet bilaterally; Coordination within normal limits on FTN and KRUPA testing; DTR: 2+/4 through,1/4 ankles b/l  no Babinski, no clonus. Tandem gait is abnormal. Romberg: absent.    ROS:    Review of Systems   Constitutional:  Negative for appetite change, fatigue and fever.   HENT: " Negative.  Negative for hearing loss, tinnitus, trouble swallowing and voice change.    Eyes: Negative.  Negative for photophobia, pain and visual disturbance.   Respiratory: Negative.  Negative for shortness of breath.    Cardiovascular: Negative.  Negative for palpitations.   Gastrointestinal: Negative.  Negative for nausea and vomiting.   Endocrine: Negative.  Negative for cold intolerance.   Genitourinary: Negative.  Negative for dysuria, frequency and urgency.   Musculoskeletal:  Negative for back pain, gait problem, myalgias, neck pain and neck stiffness.   Skin: Negative.  Negative for rash.   Allergic/Immunologic: Negative.    Neurological:  Positive for facial asymmetry. Negative for dizziness, tremors, seizures, syncope, speech difficulty, weakness, light-headedness, numbness and headaches.   Hematological: Negative.  Does not bruise/bleed easily.   Psychiatric/Behavioral: Negative.  Negative for confusion, hallucinations and sleep disturbance.

## 2024-02-14 NOTE — PROGRESS NOTES
PT Evaluation     Today's date: 2024  Patient name: Donta Hunter  : 1970  MRN: [id number]  Referring provider: Lesia Crowe MD  Dx:   Encounter Diagnosis     ICD-10-CM    1. Lumbar radiculopathy  M54.16       2. Bell's palsy  G51.0       3. Systemic lupus erythematosus, unspecified SLE type, unspecified organ involvement status (HCC)  M32.9             Start Time: 1015  Stop Time: 1100  Total time in clinic (min): 45 minutes    Assessment  Assessment details: Pt a 52 y.o. male who presents with low back pain, decreased LE and facial muscle strength, decreased lumbar ROM, ambulatory dysfunction, postural dysfunction and balance dysfunction. Due to these impairments, Pt has difficulty performing ADL's/self-care and household chores. PT POC will focus on strengthening, pain reduction, and ROM to improve his daily function. Pt would benefit from skilled physical therapy to address their aforementioned impairments, improve their level of function and to improve their overall quality of life.  Impairments: abnormal coordination, abnormal gait, abnormal muscle tone, abnormal or restricted ROM, abnormal movement, activity intolerance, impaired balance, impaired physical strength, lacks appropriate home exercise program, pain with function, safety issue, weight-bearing intolerance, poor posture  and poor body mechanics  Understanding of Dx/Px/POC: good   Prognosis: fair    Goals  ST WEEKS  1.  Decrease pain by 2 points on VAS at its worst.  2.  Increase CORE/LE by 1/2 MMT grade in all deficient planes.  3. Independent with HEP and/or fitness program.    LT WEEKS  1. Patient to be independent with a/iadls especially walking and progress to least restrictive device  2. Increase functional activities for leisure and home activities to previous LOF.  3. FOTO score will improve from score at initial evaluation by at least 10 points indicating improvements in his performance of functional activities.        Plan  Patient would benefit from: skilled physical therapy and PT eval  Planned modality interventions: cryotherapy, electrical stimulation/Russian stimulation, thermotherapy: hydrocollator packs and unattended electrical stimulation  Planned therapy interventions: activity modification, behavior modification, body mechanics training, aquatic therapy, flexibility, functional ROM exercises, home exercise program, IADL retraining, manual therapy, neuromuscular re-education, patient education, postural training, strengthening, stretching, therapeutic activities, therapeutic exercise, abdominal trunk stabilization, nerve gliding, gait training, joint mobilization, IASTM and kinesiology taping  Frequency: 2x week (2-3x week)  Duration in weeks: 8  Plan of Care beginning date: 2024  Plan of Care expiration date: 4/10/2024  Treatment plan discussed with: patient        Subjective Evaluation    History of Present Illness  Date of onset: 3/4/2017  Mechanism of injury: Pt reports to physical therapy with complaints of 5 year history of low back pain with no known NELDA. Pt complains of midline low back pain with right LE radiculopathy and paraesthesias. Pt notes he has been ambulating with RW for the past 5 years, uses stockings, and has a neurologic gait. He notes difficulties with ambulation and weakness of his b/l LE's. Patient states goals for PT are to relieve his back pain to improve his daily function.  Quality of life: fair    Patient Goals  Patient goals for therapy: decreased pain, increased motion, increased strength, independence with ADLs/IADLs and improved balance    Pain  Current pain ratin  At best pain ratin  At worst pain ratin  Location: Midline low back w/ R radicular symptoms  Quality: burning, radiating and needle-like  Relieving factors: rest and medications  Aggravating factors: walking  Progression: worsening    Social Support  Steps to enter house: yes  Stairs in house: yes    Lives in: multiple-level home  Lives with: spouse      Diagnostic Tests  X-ray: abnormal  MRI studies: abnormal  Treatments  Previous treatment: injection treatment and home therapy        Objective     Static Posture     Thoracic Spine  Hyperkyphosis.    Palpation   Left   Hypertonic in the erector spinae and lumbar paraspinals.   Tenderness of the erector spinae and lumbar paraspinals.     Right   Hypertonic in the erector spinae and lumbar paraspinals.   Tenderness of the erector spinae and lumbar paraspinals.     Active Range of Motion     Lumbar   Flexion:  with pain Restriction level: minimal  Extension:  with pain Restriction level: moderate  Left lateral flexion:  Restriction level: moderate  Right lateral flexion:  Restriction level: moderate  Left rotation:  with pain Restriction level: moderate  Right rotation:  with pain Restriction level: moderate    Strength/Myotome Testing     Left Hip   Planes of Motion   Flexion: 3+  Extension: 3+  Abduction: 4-  Adduction: 4-    Right Hip   Planes of Motion   Flexion: 4-  Extension: 3+  Abduction: 4-  Adduction: 4-    Left Knee   Flexion: 4-  Extension: 3+    Right Knee   Flexion: 4-  Extension: 3+    Left Ankle/Foot   Dorsiflexion: 3+  Plantar flexion: 4-    Right Ankle/Foot   Dorsiflexion: 3+  Plantar flexion: 4-    Additional Strength Details  CORE is 3/5    Tests     Lumbar   Positive SIJ compression and sacral spring .     Left   Positive passive SLR.   Negative slump test.     Right   Positive passive SLR.   Negative slump test.     Ambulation   Weight-Bearing Status   Assistive device used: front-wheeled walker    Observational Gait   Gait: antalgic, asymmetric and crouched   Decreased walking speed and stride length.   Left foot contact pattern: toe to heel  Base of support: increased    Additional Observational Gait Details  Uses RW with tremors with left drop foot, right foot slap  Neuro Exam:     Transfers   Sit to stand: minimum assist             "  Precautions: HTN, fall risk, DVT, cervical SX, Lupus  POC expires Unit limit Auth Expiration date PT/OT + Visit Limit?   4/10 N/A 12/31/24 N/A                 Visit/Unit Tracking  AUTH Status:  Date 2/14              After 24th visit Used 1 Remaining 23                FOTO:       Manuals 2/14                                                                Neuro Re-Ed             Pt education regarding HEP, POC, and dx  5'            Scap squeezes 2x10 3\"                                                                              Ther Ex             Standing lumbar extensions 10x 5\"                                                                 Sit to stands 1x10                                       Ther Activity                                       Gait Training                                       Modalities                                          "

## 2024-02-16 ENCOUNTER — APPOINTMENT (OUTPATIENT)
Dept: PHYSICAL THERAPY | Facility: CLINIC | Age: 54
End: 2024-02-16
Payer: COMMERCIAL

## 2024-02-19 ENCOUNTER — OFFICE VISIT (OUTPATIENT)
Dept: PHYSICAL THERAPY | Facility: CLINIC | Age: 54
End: 2024-02-19
Payer: COMMERCIAL

## 2024-02-19 DIAGNOSIS — M54.16 LUMBAR RADICULOPATHY: Primary | ICD-10-CM

## 2024-02-19 DIAGNOSIS — G51.0 BELL'S PALSY: ICD-10-CM

## 2024-02-19 DIAGNOSIS — M32.9 SYSTEMIC LUPUS ERYTHEMATOSUS, UNSPECIFIED SLE TYPE, UNSPECIFIED ORGAN INVOLVEMENT STATUS (HCC): ICD-10-CM

## 2024-02-19 PROCEDURE — 97530 THERAPEUTIC ACTIVITIES: CPT

## 2024-02-19 PROCEDURE — 97110 THERAPEUTIC EXERCISES: CPT

## 2024-02-19 PROCEDURE — 97112 NEUROMUSCULAR REEDUCATION: CPT

## 2024-02-19 NOTE — PROGRESS NOTES
"Daily Note     Today's date: 2024  Patient name: Donta Hunter  : 1970  MRN: [id number]  Referring provider: Lesia Crowe MD  Dx:   Encounter Diagnosis     ICD-10-CM    1. Lumbar radiculopathy  M54.16       2. Bell's palsy  G51.0       3. Systemic lupus erythematosus, unspecified SLE type, unspecified organ involvement status (HCC)  M32.9           Start Time: 930  Stop Time: 1015  Total time in clinic (min): 45 minutes    Subjective: Patient reports to physical therapy today stating he is feeling okay today. He notes his HEP has been going well.         Objective: See treatment diary below      Assessment: Tolerated treatment well. Patient demonstrated fatigue post treatment, exhibited good technique with therapeutic exercises, and would benefit from continued PT. Patient provided  good effort during performance of exercises this visit. Added h/l clamshells c TB, seated Rito rows, supine hip add squeeze, bridging, LTR, and the Nu step to patient's exercise program to which he responded well. Verbal cues provided to ensure correct form during performance of new exercises. Will continue to assess patient tolerance and progress next visit, as able.         Plan: Continue per plan of care.      Precautions: HTN, fall risk, DVT, cervical SX, Lupus  POC expires Unit limit Auth Expiration date PT/OT + Visit Limit?   4/10 N/A 24 N/A                 Visit/Unit Tracking  AUTH Status:  Date              After  visit Used 1  22               FOTO:       Manuals                                                                Neuro Re-Ed             Pt education regarding HEP, POC, and dx  5' 5'           Scap squeezes 2x10 3\"  2x10 3\"            Seated Malcolm rows  3x10 15#            PPT  nv                                                  Ther Ex             Standing lumbar extensions 10x 5\"  15x 5\"            H/l clamshells c TB  3x10 grn         " "  Supine hip add squeeze  10\" x 20 yellow  ball            Bridging  2x10            LTR  X20 ea                                      Nu step  6'           Ther Activity             STS 1x10  2x10                         Gait Training                                       Modalities                                            "

## 2024-02-22 ENCOUNTER — OFFICE VISIT (OUTPATIENT)
Dept: GASTROENTEROLOGY | Facility: CLINIC | Age: 54
End: 2024-02-22
Payer: COMMERCIAL

## 2024-02-22 ENCOUNTER — HOSPITAL ENCOUNTER (OUTPATIENT)
Dept: MRI IMAGING | Facility: HOSPITAL | Age: 54
End: 2024-02-22
Attending: PSYCHIATRY & NEUROLOGY
Payer: COMMERCIAL

## 2024-02-22 VITALS
HEART RATE: 100 BPM | HEIGHT: 66 IN | OXYGEN SATURATION: 98 % | WEIGHT: 189.8 LBS | DIASTOLIC BLOOD PRESSURE: 82 MMHG | SYSTOLIC BLOOD PRESSURE: 118 MMHG | BODY MASS INDEX: 30.5 KG/M2

## 2024-02-22 DIAGNOSIS — K62.5 RECTAL BLEEDING: ICD-10-CM

## 2024-02-22 DIAGNOSIS — M32.14 LUPUS NEPHRITIS (HCC): ICD-10-CM

## 2024-02-22 DIAGNOSIS — R93.3 ABNORMAL CT SCAN, COLON: Primary | ICD-10-CM

## 2024-02-22 DIAGNOSIS — M32.9 SYSTEMIC LUPUS ERYTHEMATOSUS, UNSPECIFIED SLE TYPE, UNSPECIFIED ORGAN INVOLVEMENT STATUS (HCC): ICD-10-CM

## 2024-02-22 DIAGNOSIS — S36.60XD: ICD-10-CM

## 2024-02-22 DIAGNOSIS — G62.9 SENSORY MOTOR NEUROPATHY: ICD-10-CM

## 2024-02-22 DIAGNOSIS — G51.0 LEFT-SIDED BELL'S PALSY: ICD-10-CM

## 2024-02-22 DIAGNOSIS — M21.371 BILATERAL FOOT-DROP: ICD-10-CM

## 2024-02-22 DIAGNOSIS — M48.062 SPINAL STENOSIS OF LUMBAR REGION WITH NEUROGENIC CLAUDICATION: ICD-10-CM

## 2024-02-22 DIAGNOSIS — M21.372 BILATERAL FOOT-DROP: ICD-10-CM

## 2024-02-22 PROCEDURE — 99204 OFFICE O/P NEW MOD 45 MIN: CPT | Performed by: PHYSICIAN ASSISTANT

## 2024-02-22 PROCEDURE — 72148 MRI LUMBAR SPINE W/O DYE: CPT

## 2024-02-22 PROCEDURE — G1004 CDSM NDSC: HCPCS

## 2024-02-22 PROCEDURE — 70551 MRI BRAIN STEM W/O DYE: CPT

## 2024-02-22 NOTE — H&P (VIEW-ONLY)
[facility]  Donta Hunter 54 y.o. male MRN: [id number]       CC: Post emergency room evaluation    HPI: Donta is a 54-year-old male with history of hypertension, CKD stage II, lower limb neuropathy using a walker, lupus on CellCept, and previous DVT on Eliquis.  Patient presents to the office accompanied by his wife.  My medical assistant, Luis Daniel serves as  today.  Patient reports that he went to the emergency room earlier this month after he used an enema and the tip of the enema was missing.  Prior to going the emergency room, the tip of the enema did get expelled.  However, he had residual rectal discomfort. CT scan without contrast revealing rectal wall thickening, potentially some wall thickening in the distal sigmoid.  No labs were performed.    Last EGD and colonoscopy were performed in 2018 during an inpatient stay for suspected GI bleeding.  EGD laying esophagitis, nonerosive gastritis and erosion in the stomach.  Apices are negative for H. pylori or intestinal metaplasia.  Colonoscopy revealed no blood in the colon, and was recommended outpatient video capsule endoscopy.    Review of Systems:    CONSTITUTIONAL: Denies any fever, chills, or rigors. Good appetite, and no recent weight loss.  HEENT: No earache or tinnitus. Denies hearing loss or visual disturbances.  CARDIOVASCULAR: No chest pain or palpitations.   RESPIRATORY: Denies any cough, hemoptysis, shortness of breath or dyspnea on exertion.  GASTROINTESTINAL: As noted in the History of Present Illness.   GENITOURINARY: No problems with urination. Denies any hematuria or dysuria.  NEUROLOGIC: No dizziness or vertigo, denies headaches.   MUSCULOSKELETAL: Denies any muscle or joint pain.   SKIN: Denies skin rashes or itching.   ENDOCRINE: Denies excessive thirst. Denies intolerance to heat or cold.  PSYCHOSOCIAL: Denies depression or anxiety. Denies any recent memory loss.       Current Outpatient Medications    Medication Sig Dispense Refill    apixaban (ELIQUIS) 5 mg Take 5 mg by mouth 2 (two) times a day      azelastine (ASTELIN) 0.1 % nasal spray ADMINISTER 1 SPRAY INTO NOSTRIL TWICE DAILY      bacitracin topical ointment 500 units/g topical ointment APPLY TOPICALLY TO AFFECTED AREA THREE TIMES A DAY AS DIRECTED      baclofen 20 mg tablet Take 20 mg by mouth 3 (three) times a day       betamethasone dipropionate (DIPROSONE) 0.05 % cream Apply 1 application topically 2 (two) times a day (Patient not taking: Reported on 12/21/2023)      betamethasone, augmented, (DIPROLENE) 0.05 % ointment Apply 1 application. topically 2 (two) times a day      carvedilol (COREG) 12.5 mg tablet Take 12.5 mg by mouth 2 (two) times a day with meals      clobetasol (TEMOVATE) 0.05 % cream       Diclofenac Sodium (VOLTAREN) 1 % Apply 2 g topically 4 (four) times a day 350 g 1    escitalopram (LEXAPRO) 10 mg tablet Take 1 tablet (10 mg total) by mouth daily 90 tablet 3    fluticasone (FLONASE) 50 mcg/act nasal spray       furosemide (LASIX) 40 mg tablet Take 1 tablet (40 mg total) by mouth 2 (two) times a day  0    gabapentin (NEURONTIN) 300 mg capsule Take 1 capsule (300 mg total) by mouth 3 (three) times a day 270 capsule 3    gabapentin (NEURONTIN) 600 MG tablet Take 1 tablet (600 mg total) by mouth 3 (three) times a day 270 tablet 3    hydroxychloroquine (PLAQUENIL) 200 mg tablet Take 400 mg by mouth daily at bedtime      hydrOXYzine HCL (ATARAX) 10 mg tablet Take 10 mg by mouth every 6 (six) hours as needed for itching      ketoconazole (NIZORAL) 2 % cream Apply topically daily 60 g 1    lidocaine-prilocaine (EMLA) cream Apply topically as needed for mild pain 30 g 3    loratadine (CLARITIN) 10 mg tablet Take 1 tablet (10 mg total) by mouth daily 30 tablet 2    meclizine (ANTIVERT) 25 mg tablet Take 1 tablet (25 mg total) by mouth 3 (three) times a day as needed for dizziness (Patient not taking: Reported on 1/29/2024) 30 tablet 0     metolazone (ZAROXOLYN) 5 mg tablet Take 0.5 tablets (2.5 mg total) by mouth 3 (three) times a week      mycophenolate (CELLCEPT) 500 mg tablet Take 500 mg by mouth every 12 (twelve) hours       potassium chloride (K-DUR,KLOR-CON) 20 mEq tablet Take 20 mEq by mouth daily      sucralfate (CARAFATE) 1 g tablet       tamsulosin (FLOMAX) 0.4 mg        No current facility-administered medications for this visit.     Past Medical History:   Diagnosis Date    Anemia     Arthritis     Cervical mass     Chronic kidney disease     Coronary artery disease     Depression     DVT (deep venous thrombosis) (HCC)     Hypertension     Lupus (HCC)     Renal disorder      Past Surgical History:   Procedure Laterality Date    APPENDECTOMY      CERVICAL SPINE SURGERY      CHOLECYSTECTOMY      COLONOSCOPY N/A 8/23/2018    Procedure: COLONOSCOPY;  Surgeon: James Wise III, MD;  Location: [facility];  Service: Gastroenterology    ESOPHAGOGASTRODUODENOSCOPY N/A 8/22/2018    Procedure: ESOPHAGOGASTRODUODENOSCOPY (EGD);  Surgeon: James Wise III, MD;  Location: [facility];  Service: Gastroenterology    IR IVC FILTER PLACEMENT PERMANENT  9/7/2017    IR IVC FILTER REMOVAL  4/23/2018    IR LUMBAR PUNCTURE  11/23/2015    NECK SURGERY      US GUIDED INJECTION FOR RESEARCH STUDY  4/23/2018    US GUIDED INJECTION FOR RESEARCH STUDY  9/7/2017    VASCULAR SURGERY       Social History     Socioeconomic History    Marital status: /Civil Union     Spouse name: Not on file    Number of children: Not on file    Years of education: Not on file    Highest education level: Not on file   Occupational History    Not on file   Tobacco Use    Smoking status: Never    Smokeless tobacco: Never   Vaping Use    Vaping status: Never Used   Substance and Sexual Activity    Alcohol use: Never    Drug use: Never    Sexual activity: Yes     Partners: Female, Male     Birth control/protection: Rhythm   Other Topics Concern    Not on file   Social History  Narrative    ** Merged History Encounter **          Social Determinants of Health     Financial Resource Strain: Not on file   Food Insecurity: No Food Insecurity (1/1/2023)    Hunger Vital Sign     Worried About Running Out of Food in the Last Year: Never true     Ran Out of Food in the Last Year: Never true   Transportation Needs: No Transportation Needs (1/1/2023)    PRAPARE - Transportation     Lack of Transportation (Medical): No     Lack of Transportation (Non-Medical): No   Physical Activity: Not on file   Stress: Not on file   Social Connections: Not on file   Intimate Partner Violence: Not on file   Housing Stability: Low Risk  (1/1/2023)    Housing Stability Vital Sign     Unable to Pay for Housing in the Last Year: No     Number of Places Lived in the Last Year: 1     Unstable Housing in the Last Year: No     Family History   Problem Relation Age of Onset    Heart disease Mother     No Known Problems Father             PHYSICAL EXAM:    There were no vitals filed for this visit.  General Appearance:   Alert and oriented x 3. Cooperative, and in no respiratory distress   HEENT:   Normocephalic, atraumatic, anicteric.     Neck:  Supple, symmetrical, trachea midline   Lungs:   Clear to auscultation bilaterally    Heart::   Regular rate and rhythm   Abdomen:   Soft, non-tender, non-distended; normal bowel sounds; no masses, no organomegaly    Genitalia:   Deferred    Rectal:   Deferred    Extremities:  No cyanosis, clubbing or edema    Pulses:  2+ and symmetric all extremities    Skin:  Skin color, texture, turgor normal, no rashes or lesions    Lymph nodes:  No palpable cervical or supraclavicular lymphadenopathy        Lab Results:   Results from last 6 Months   Lab Units 09/22/23  0952   WBC Thousand/uL 2.44*   HEMOGLOBIN g/dL 14.7   HEMATOCRIT % 41.9   PLATELETS Thousands/uL 196   NEUTROS PCT % 55   LYMPHS PCT % 26   MONOS PCT % 17*   EOS PCT % 1     Results from last 6 Months   Lab Units 10/02/23  1015  "09/22/23  0952   POTASSIUM mmol/L  --  4.4   CHLORIDE mmol/L  --  105   CO2 mmol/L  --  25   BUN mg/dL 9 6   CREATININE mg/dL 1.1 1.07   CALCIUM mg/dL  --  9.5   ALK PHOS U/L  --  107*   ALT U/L  --  18   AST U/L  --  31               Imaging Studies:   CT abdomen pelvis wo contrast    Result Date: 2/7/2024  Impression: The wall of the rectum appears thickened. There may also be some bowel wall thickening of the distal sigmoid. Clinical correlation and follow-up is recommended. No evidence of retained radiopaque foreign body within the bowel. The urinary bladder wall appears mildly thickened. Correlation with urinalysis is recommended. Atherosclerosis. Other nonemergent findings as above. I personally discussed this study with OLE ANAND on 2/7/2024 7:23 AM. Workstation performed: [id number]       ASSESSMENT and PLAN:      1) Rectal and sigmoid thickening on CT, rectal trauma - May have been secondary to enema administration.  Reports that he uses an enema every couple of days and that has been his routine for many years.  The thickening may be secondary to this, however he has not had a colonoscopy in over 5 years.  Would like to rule out underlying lesion, inflammation or polyp.  On chart review, the patient also has history of a perirectal cyst that will come and go.  He last saw Dr. Cortez November for this, but no intervention was recommended since the cyst had resolved.  No mention of this on recent CT report.  Patient and patient's wife agree with plan and voiced understanding.  - GoLytely prep instructions will be given in [place]  - He will hold Eliquis for 2 days prior to his exam      Portions of the record may have been created with voice recognition software.  Occasional wrong word or \"sound a like\" substitutions may have occurred due to the inherent limitations of voice recognition software.  Read the chart carefully and recognize, using context, where substitutions have occurred.  "

## 2024-02-22 NOTE — PATIENT INSTRUCTIONS
Scheduled date of colonoscopy (as of today):3/21/24  Physician performing colonoscopy:Frandy  Location of colonoscopy:[facility]  Bowel prep reviewed with patient:Balta/Miralax  Instructions reviewed with patient by:Yung denis  Clearances:  none

## 2024-02-22 NOTE — PROGRESS NOTES
[facility]  Donta Hunter 54 y.o. male MRN: [id number]       CC: Post emergency room evaluation    HPI: Donta is a 54-year-old male with history of hypertension, CKD stage II, lower limb neuropathy using a walker, lupus on CellCept, and previous DVT on Eliquis.  Patient presents to the office accompanied by his wife.  My medical assistant, Luis Daniel serves as  today.  Patient reports that he went to the emergency room earlier this month after he used an enema and the tip of the enema was missing.  Prior to going the emergency room, the tip of the enema did get expelled.  However, he had residual rectal discomfort. CT scan without contrast revealing rectal wall thickening, potentially some wall thickening in the distal sigmoid.  No labs were performed.    Last EGD and colonoscopy were performed in 2018 during an inpatient stay for suspected GI bleeding.  EGD laying esophagitis, nonerosive gastritis and erosion in the stomach.  Apices are negative for H. pylori or intestinal metaplasia.  Colonoscopy revealed no blood in the colon, and was recommended outpatient video capsule endoscopy.    Review of Systems:    CONSTITUTIONAL: Denies any fever, chills, or rigors. Good appetite, and no recent weight loss.  HEENT: No earache or tinnitus. Denies hearing loss or visual disturbances.  CARDIOVASCULAR: No chest pain or palpitations.   RESPIRATORY: Denies any cough, hemoptysis, shortness of breath or dyspnea on exertion.  GASTROINTESTINAL: As noted in the History of Present Illness.   GENITOURINARY: No problems with urination. Denies any hematuria or dysuria.  NEUROLOGIC: No dizziness or vertigo, denies headaches.   MUSCULOSKELETAL: Denies any muscle or joint pain.   SKIN: Denies skin rashes or itching.   ENDOCRINE: Denies excessive thirst. Denies intolerance to heat or cold.  PSYCHOSOCIAL: Denies depression or anxiety. Denies any recent memory loss.       Current Outpatient Medications    Medication Sig Dispense Refill    apixaban (ELIQUIS) 5 mg Take 5 mg by mouth 2 (two) times a day      azelastine (ASTELIN) 0.1 % nasal spray ADMINISTER 1 SPRAY INTO NOSTRIL TWICE DAILY      bacitracin topical ointment 500 units/g topical ointment APPLY TOPICALLY TO AFFECTED AREA THREE TIMES A DAY AS DIRECTED      baclofen 20 mg tablet Take 20 mg by mouth 3 (three) times a day       betamethasone dipropionate (DIPROSONE) 0.05 % cream Apply 1 application topically 2 (two) times a day (Patient not taking: Reported on 12/21/2023)      betamethasone, augmented, (DIPROLENE) 0.05 % ointment Apply 1 application. topically 2 (two) times a day      carvedilol (COREG) 12.5 mg tablet Take 12.5 mg by mouth 2 (two) times a day with meals      clobetasol (TEMOVATE) 0.05 % cream       Diclofenac Sodium (VOLTAREN) 1 % Apply 2 g topically 4 (four) times a day 350 g 1    escitalopram (LEXAPRO) 10 mg tablet Take 1 tablet (10 mg total) by mouth daily 90 tablet 3    fluticasone (FLONASE) 50 mcg/act nasal spray       furosemide (LASIX) 40 mg tablet Take 1 tablet (40 mg total) by mouth 2 (two) times a day  0    gabapentin (NEURONTIN) 300 mg capsule Take 1 capsule (300 mg total) by mouth 3 (three) times a day 270 capsule 3    gabapentin (NEURONTIN) 600 MG tablet Take 1 tablet (600 mg total) by mouth 3 (three) times a day 270 tablet 3    hydroxychloroquine (PLAQUENIL) 200 mg tablet Take 400 mg by mouth daily at bedtime      hydrOXYzine HCL (ATARAX) 10 mg tablet Take 10 mg by mouth every 6 (six) hours as needed for itching      ketoconazole (NIZORAL) 2 % cream Apply topically daily 60 g 1    lidocaine-prilocaine (EMLA) cream Apply topically as needed for mild pain 30 g 3    loratadine (CLARITIN) 10 mg tablet Take 1 tablet (10 mg total) by mouth daily 30 tablet 2    meclizine (ANTIVERT) 25 mg tablet Take 1 tablet (25 mg total) by mouth 3 (three) times a day as needed for dizziness (Patient not taking: Reported on 1/29/2024) 30 tablet 0     metolazone (ZAROXOLYN) 5 mg tablet Take 0.5 tablets (2.5 mg total) by mouth 3 (three) times a week      mycophenolate (CELLCEPT) 500 mg tablet Take 500 mg by mouth every 12 (twelve) hours       potassium chloride (K-DUR,KLOR-CON) 20 mEq tablet Take 20 mEq by mouth daily      sucralfate (CARAFATE) 1 g tablet       tamsulosin (FLOMAX) 0.4 mg        No current facility-administered medications for this visit.     Past Medical History:   Diagnosis Date    Anemia     Arthritis     Cervical mass     Chronic kidney disease     Coronary artery disease     Depression     DVT (deep venous thrombosis) (HCC)     Hypertension     Lupus (HCC)     Renal disorder      Past Surgical History:   Procedure Laterality Date    APPENDECTOMY      CERVICAL SPINE SURGERY      CHOLECYSTECTOMY      COLONOSCOPY N/A 8/23/2018    Procedure: COLONOSCOPY;  Surgeon: James Wise III, MD;  Location: [facility];  Service: Gastroenterology    ESOPHAGOGASTRODUODENOSCOPY N/A 8/22/2018    Procedure: ESOPHAGOGASTRODUODENOSCOPY (EGD);  Surgeon: James Wise III, MD;  Location: [facility];  Service: Gastroenterology    IR IVC FILTER PLACEMENT PERMANENT  9/7/2017    IR IVC FILTER REMOVAL  4/23/2018    IR LUMBAR PUNCTURE  11/23/2015    NECK SURGERY      US GUIDED INJECTION FOR RESEARCH STUDY  4/23/2018    US GUIDED INJECTION FOR RESEARCH STUDY  9/7/2017    VASCULAR SURGERY       Social History     Socioeconomic History    Marital status: /Civil Union     Spouse name: Not on file    Number of children: Not on file    Years of education: Not on file    Highest education level: Not on file   Occupational History    Not on file   Tobacco Use    Smoking status: Never    Smokeless tobacco: Never   Vaping Use    Vaping status: Never Used   Substance and Sexual Activity    Alcohol use: Never    Drug use: Never    Sexual activity: Yes     Partners: Female, Male     Birth control/protection: Rhythm   Other Topics Concern    Not on file   Social History  Narrative    ** Merged History Encounter **          Social Determinants of Health     Financial Resource Strain: Not on file   Food Insecurity: No Food Insecurity (1/1/2023)    Hunger Vital Sign     Worried About Running Out of Food in the Last Year: Never true     Ran Out of Food in the Last Year: Never true   Transportation Needs: No Transportation Needs (1/1/2023)    PRAPARE - Transportation     Lack of Transportation (Medical): No     Lack of Transportation (Non-Medical): No   Physical Activity: Not on file   Stress: Not on file   Social Connections: Not on file   Intimate Partner Violence: Not on file   Housing Stability: Low Risk  (1/1/2023)    Housing Stability Vital Sign     Unable to Pay for Housing in the Last Year: No     Number of Places Lived in the Last Year: 1     Unstable Housing in the Last Year: No     Family History   Problem Relation Age of Onset    Heart disease Mother     No Known Problems Father             PHYSICAL EXAM:    There were no vitals filed for this visit.  General Appearance:   Alert and oriented x 3. Cooperative, and in no respiratory distress   HEENT:   Normocephalic, atraumatic, anicteric.     Neck:  Supple, symmetrical, trachea midline   Lungs:   Clear to auscultation bilaterally    Heart::   Regular rate and rhythm   Abdomen:   Soft, non-tender, non-distended; normal bowel sounds; no masses, no organomegaly    Genitalia:   Deferred    Rectal:   Deferred    Extremities:  No cyanosis, clubbing or edema    Pulses:  2+ and symmetric all extremities    Skin:  Skin color, texture, turgor normal, no rashes or lesions    Lymph nodes:  No palpable cervical or supraclavicular lymphadenopathy        Lab Results:   Results from last 6 Months   Lab Units 09/22/23  0952   WBC Thousand/uL 2.44*   HEMOGLOBIN g/dL 14.7   HEMATOCRIT % 41.9   PLATELETS Thousands/uL 196   NEUTROS PCT % 55   LYMPHS PCT % 26   MONOS PCT % 17*   EOS PCT % 1     Results from last 6 Months   Lab Units 10/02/23  1015  "09/22/23  0952   POTASSIUM mmol/L  --  4.4   CHLORIDE mmol/L  --  105   CO2 mmol/L  --  25   BUN mg/dL 9 6   CREATININE mg/dL 1.1 1.07   CALCIUM mg/dL  --  9.5   ALK PHOS U/L  --  107*   ALT U/L  --  18   AST U/L  --  31               Imaging Studies:   CT abdomen pelvis wo contrast    Result Date: 2/7/2024  Impression: The wall of the rectum appears thickened. There may also be some bowel wall thickening of the distal sigmoid. Clinical correlation and follow-up is recommended. No evidence of retained radiopaque foreign body within the bowel. The urinary bladder wall appears mildly thickened. Correlation with urinalysis is recommended. Atherosclerosis. Other nonemergent findings as above. I personally discussed this study with OLE ANAND on 2/7/2024 7:23 AM. Workstation performed: [id number]       ASSESSMENT and PLAN:      1) Rectal and sigmoid thickening on CT, rectal trauma - May have been secondary to enema administration.  Reports that he uses an enema every couple of days and that has been his routine for many years.  The thickening may be secondary to this, however he has not had a colonoscopy in over 5 years.  Would like to rule out underlying lesion, inflammation or polyp.  On chart review, the patient also has history of a perirectal cyst that will come and go.  He last saw Dr. Cortez November for this, but no intervention was recommended since the cyst had resolved.  No mention of this on recent CT report.  Patient and patient's wife agree with plan and voiced understanding.  - GoLytely prep instructions will be given in German  - He will hold Eliquis for 2 days prior to his exam      Portions of the record may have been created with voice recognition software.  Occasional wrong word or \"sound a like\" substitutions may have occurred due to the inherent limitations of voice recognition software.  Read the chart carefully and recognize, using context, where substitutions have occurred.  "

## 2024-02-26 ENCOUNTER — APPOINTMENT (OUTPATIENT)
Dept: PHYSICAL THERAPY | Facility: CLINIC | Age: 54
End: 2024-02-26
Payer: COMMERCIAL

## 2024-02-28 ENCOUNTER — APPOINTMENT (OUTPATIENT)
Dept: PHYSICAL THERAPY | Facility: CLINIC | Age: 54
End: 2024-02-28
Payer: COMMERCIAL

## 2024-02-28 ENCOUNTER — APPOINTMENT (OUTPATIENT)
Dept: LAB | Facility: CLINIC | Age: 54
End: 2024-02-28
Payer: COMMERCIAL

## 2024-02-28 DIAGNOSIS — G51.0 LEFT-SIDED BELL'S PALSY: ICD-10-CM

## 2024-02-28 DIAGNOSIS — I12.9 HYPERTENSIVE KIDNEY DISEASE WITH STAGE 3 CHRONIC KIDNEY DISEASE, UNSPECIFIED WHETHER STAGE 3A OR 3B CKD (HCC): ICD-10-CM

## 2024-02-28 DIAGNOSIS — R26.9 GAIT DIFFICULTY: ICD-10-CM

## 2024-02-28 DIAGNOSIS — Z12.5 PROSTATE CANCER SCREENING: ICD-10-CM

## 2024-02-28 DIAGNOSIS — I10 PRIMARY HYPERTENSION: ICD-10-CM

## 2024-02-28 DIAGNOSIS — N18.2 HYPERTENSIVE KIDNEY DISEASE WITH STAGE 2 CHRONIC KIDNEY DISEASE: ICD-10-CM

## 2024-02-28 DIAGNOSIS — I12.9 HYPERTENSIVE KIDNEY DISEASE WITH STAGE 2 CHRONIC KIDNEY DISEASE: ICD-10-CM

## 2024-02-28 DIAGNOSIS — N18.2 STAGE 2 CHRONIC KIDNEY DISEASE: ICD-10-CM

## 2024-02-28 DIAGNOSIS — R25.2 SPASTICITY: ICD-10-CM

## 2024-02-28 DIAGNOSIS — M32.9 SYSTEMIC LUPUS ERYTHEMATOSUS, UNSPECIFIED SLE TYPE, UNSPECIFIED ORGAN INVOLVEMENT STATUS (HCC): ICD-10-CM

## 2024-02-28 DIAGNOSIS — M32.14 LUPUS NEPHRITIS (HCC): ICD-10-CM

## 2024-02-28 DIAGNOSIS — N18.30 HYPERTENSIVE KIDNEY DISEASE WITH STAGE 3 CHRONIC KIDNEY DISEASE, UNSPECIFIED WHETHER STAGE 3A OR 3B CKD (HCC): ICD-10-CM

## 2024-02-28 LAB
25(OH)D3 SERPL-MCNC: 40.2 NG/ML (ref 30–100)
ALBUMIN SERPL BCP-MCNC: 4.3 G/DL (ref 3.5–5)
ALP SERPL-CCNC: 104 U/L (ref 34–104)
ALT SERPL W P-5'-P-CCNC: 25 U/L (ref 7–52)
ANA SER QL IA: POSITIVE
ANION GAP SERPL CALCULATED.3IONS-SCNC: 9 MMOL/L
AST SERPL W P-5'-P-CCNC: 27 U/L (ref 13–39)
BACTERIA UR QL AUTO: ABNORMAL /HPF
BASOPHILS # BLD AUTO: 0.02 THOUSANDS/ÂΜL (ref 0–0.1)
BASOPHILS NFR BLD AUTO: 1 % (ref 0–1)
BILIRUB DIRECT SERPL-MCNC: 0.1 MG/DL (ref 0–0.2)
BILIRUB SERPL-MCNC: 0.44 MG/DL (ref 0.2–1)
BILIRUB UR QL STRIP: NEGATIVE
BUN SERPL-MCNC: 10 MG/DL (ref 5–25)
C3 SERPL-MCNC: 106 MG/DL (ref 87–200)
C4 SERPL-MCNC: 46 MG/DL (ref 19–52)
CALCIUM SERPL-MCNC: 9.1 MG/DL (ref 8.4–10.2)
CHLORIDE SERPL-SCNC: 100 MMOL/L (ref 96–108)
CHOLEST SERPL-MCNC: 131 MG/DL
CLARITY UR: CLEAR
CO2 SERPL-SCNC: 24 MMOL/L (ref 21–32)
COLOR UR: ABNORMAL
CREAT SERPL-MCNC: 1.08 MG/DL (ref 0.6–1.3)
CREAT UR-MCNC: 72.2 MG/DL
CRP SERPL QL: <1 MG/L
EOSINOPHIL # BLD AUTO: 0.02 THOUSAND/ÂΜL (ref 0–0.61)
EOSINOPHIL NFR BLD AUTO: 1 % (ref 0–6)
ERYTHROCYTE [DISTWIDTH] IN BLOOD BY AUTOMATED COUNT: 13.5 % (ref 11.6–15.1)
ERYTHROCYTE [SEDIMENTATION RATE] IN BLOOD: 15 MM/HOUR (ref 0–19)
EST. AVERAGE GLUCOSE BLD GHB EST-MCNC: 94 MG/DL
GFR SERPL CREATININE-BSD FRML MDRD: 76 ML/MIN/1.73SQ M
GLUCOSE P FAST SERPL-MCNC: 83 MG/DL (ref 65–99)
GLUCOSE UR STRIP-MCNC: NEGATIVE MG/DL
HBA1C MFR BLD: 4.9 %
HCT VFR BLD AUTO: 41.1 % (ref 36.5–49.3)
HDLC SERPL-MCNC: 44 MG/DL
HGB BLD-MCNC: 13.7 G/DL (ref 12–17)
HGB UR QL STRIP.AUTO: NEGATIVE
IMM GRANULOCYTES # BLD AUTO: 0.02 THOUSAND/UL (ref 0–0.2)
IMM GRANULOCYTES NFR BLD AUTO: 1 % (ref 0–2)
KETONES UR STRIP-MCNC: NEGATIVE MG/DL
LDLC SERPL CALC-MCNC: 62 MG/DL (ref 0–100)
LEUKOCYTE ESTERASE UR QL STRIP: NEGATIVE
LYMPHOCYTES # BLD AUTO: 0.71 THOUSANDS/ÂΜL (ref 0.6–4.47)
LYMPHOCYTES NFR BLD AUTO: 30 % (ref 14–44)
MCH RBC QN AUTO: 31.2 PG (ref 26.8–34.3)
MCHC RBC AUTO-ENTMCNC: 33.3 G/DL (ref 31.4–37.4)
MCV RBC AUTO: 94 FL (ref 82–98)
MICROALBUMIN UR-MCNC: 15.9 MG/L
MICROALBUMIN/CREAT 24H UR: 22 MG/G CREATININE (ref 0–30)
MONOCYTES # BLD AUTO: 0.47 THOUSAND/ÂΜL (ref 0.17–1.22)
MONOCYTES NFR BLD AUTO: 20 % (ref 4–12)
NEUTROPHILS # BLD AUTO: 1.14 THOUSANDS/ÂΜL (ref 1.85–7.62)
NEUTS SEG NFR BLD AUTO: 47 % (ref 43–75)
NITRITE UR QL STRIP: NEGATIVE
NON-SQ EPI CELLS URNS QL MICRO: ABNORMAL /HPF
NRBC BLD AUTO-RTO: 0 /100 WBCS
PH UR STRIP.AUTO: 6.5 [PH]
PLATELET # BLD AUTO: 176 THOUSANDS/UL (ref 149–390)
PMV BLD AUTO: 10.7 FL (ref 8.9–12.7)
POTASSIUM SERPL-SCNC: 4.5 MMOL/L (ref 3.5–5.3)
PROT SERPL-MCNC: 7.3 G/DL (ref 6.4–8.4)
PROT UR STRIP-MCNC: ABNORMAL MG/DL
RBC # BLD AUTO: 4.39 MILLION/UL (ref 3.88–5.62)
RBC #/AREA URNS AUTO: ABNORMAL /HPF
SODIUM SERPL-SCNC: 133 MMOL/L (ref 135–147)
SP GR UR STRIP.AUTO: 1.01 (ref 1–1.03)
TRIGL SERPL-MCNC: 126 MG/DL
TSH SERPL DL<=0.05 MIU/L-ACNC: 2.86 UIU/ML (ref 0.45–4.5)
UROBILINOGEN UR STRIP-ACNC: <2 MG/DL
VIT B12 SERPL-MCNC: 1629 PG/ML (ref 180–914)
WBC # BLD AUTO: 2.38 THOUSAND/UL (ref 4.31–10.16)
WBC #/AREA URNS AUTO: ABNORMAL /HPF

## 2024-02-28 PROCEDURE — 86225 DNA ANTIBODY NATIVE: CPT

## 2024-02-28 PROCEDURE — 36415 COLL VENOUS BLD VENIPUNCTURE: CPT

## 2024-02-28 PROCEDURE — 86038 ANTINUCLEAR ANTIBODIES: CPT

## 2024-02-28 PROCEDURE — 86039 ANTINUCLEAR ANTIBODIES (ANA): CPT

## 2024-02-28 PROCEDURE — 82043 UR ALBUMIN QUANTITATIVE: CPT

## 2024-02-28 PROCEDURE — 84153 ASSAY OF PSA TOTAL: CPT

## 2024-02-28 PROCEDURE — 86160 COMPLEMENT ANTIGEN: CPT

## 2024-02-28 PROCEDURE — 82248 BILIRUBIN DIRECT: CPT

## 2024-02-28 PROCEDURE — 84154 ASSAY OF PSA FREE: CPT

## 2024-02-28 PROCEDURE — 80053 COMPREHEN METABOLIC PANEL: CPT

## 2024-02-28 PROCEDURE — 80061 LIPID PANEL: CPT

## 2024-02-28 PROCEDURE — 85025 COMPLETE CBC W/AUTO DIFF WBC: CPT

## 2024-02-28 PROCEDURE — 83036 HEMOGLOBIN GLYCOSYLATED A1C: CPT

## 2024-02-28 PROCEDURE — 82570 ASSAY OF URINE CREATININE: CPT

## 2024-02-28 PROCEDURE — 86140 C-REACTIVE PROTEIN: CPT

## 2024-02-28 PROCEDURE — 84443 ASSAY THYROID STIM HORMONE: CPT

## 2024-02-28 PROCEDURE — 85652 RBC SED RATE AUTOMATED: CPT

## 2024-02-28 PROCEDURE — 82607 VITAMIN B-12: CPT

## 2024-02-28 PROCEDURE — 81001 URINALYSIS AUTO W/SCOPE: CPT

## 2024-02-28 PROCEDURE — 87476 LYME DIS DNA AMP PROBE: CPT

## 2024-02-28 PROCEDURE — 82306 VITAMIN D 25 HYDROXY: CPT

## 2024-02-29 ENCOUNTER — OFFICE VISIT (OUTPATIENT)
Dept: INTERNAL MEDICINE CLINIC | Facility: CLINIC | Age: 54
End: 2024-02-29
Payer: COMMERCIAL

## 2024-02-29 VITALS
OXYGEN SATURATION: 97 % | RESPIRATION RATE: 18 BRPM | DIASTOLIC BLOOD PRESSURE: 78 MMHG | TEMPERATURE: 98.4 F | HEART RATE: 96 BPM | HEIGHT: 66 IN | BODY MASS INDEX: 29.73 KG/M2 | SYSTOLIC BLOOD PRESSURE: 120 MMHG | WEIGHT: 185 LBS

## 2024-02-29 DIAGNOSIS — N18.2 HYPERTENSIVE KIDNEY DISEASE WITH STAGE 2 CHRONIC KIDNEY DISEASE: ICD-10-CM

## 2024-02-29 DIAGNOSIS — Z86.718 HISTORY OF DVT (DEEP VEIN THROMBOSIS): ICD-10-CM

## 2024-02-29 DIAGNOSIS — D63.1 ANEMIA IN STAGE 3 CHRONIC KIDNEY DISEASE, UNSPECIFIED WHETHER STAGE 3A OR 3B CKD: ICD-10-CM

## 2024-02-29 DIAGNOSIS — I12.9 HYPERTENSIVE KIDNEY DISEASE WITH STAGE 2 CHRONIC KIDNEY DISEASE: ICD-10-CM

## 2024-02-29 DIAGNOSIS — M62.81 MUSCLE WEAKNESS (GENERALIZED): ICD-10-CM

## 2024-02-29 DIAGNOSIS — R26.9 GAIT DIFFICULTY: ICD-10-CM

## 2024-02-29 DIAGNOSIS — R25.2 SPASTICITY: ICD-10-CM

## 2024-02-29 DIAGNOSIS — R25.1 TREMOR OF BOTH HANDS: ICD-10-CM

## 2024-02-29 DIAGNOSIS — N18.31 CHRONIC KIDNEY DISEASE, STAGE 3A (HCC): Chronic | ICD-10-CM

## 2024-02-29 DIAGNOSIS — I10 PRIMARY HYPERTENSION: Primary | ICD-10-CM

## 2024-02-29 DIAGNOSIS — M32.9 SYSTEMIC LUPUS ERYTHEMATOSUS, UNSPECIFIED SLE TYPE, UNSPECIFIED ORGAN INVOLVEMENT STATUS (HCC): ICD-10-CM

## 2024-02-29 DIAGNOSIS — M32.14 LUPUS NEPHRITIS (HCC): ICD-10-CM

## 2024-02-29 DIAGNOSIS — N18.30 ANEMIA IN STAGE 3 CHRONIC KIDNEY DISEASE, UNSPECIFIED WHETHER STAGE 3A OR 3B CKD: ICD-10-CM

## 2024-02-29 PROBLEM — L03.90 CELLULITIS: Status: RESOLVED | Noted: 2023-10-20 | Resolved: 2024-02-29

## 2024-02-29 PROBLEM — M79.671 RIGHT FOOT PAIN: Status: RESOLVED | Noted: 2023-03-09 | Resolved: 2024-02-29

## 2024-02-29 PROBLEM — R80.1 PERSISTENT PROTEINURIA: Status: RESOLVED | Noted: 2018-07-29 | Resolved: 2024-02-29

## 2024-02-29 LAB
ANA HOMOGEN SER QL IF: NORMAL
ANA HOMOGEN TITR SER: NORMAL {TITER}
DSDNA AB SER-ACNC: 14 IU/ML (ref 0–9)
PSA FREE MFR SERPL: 43.3 %
PSA FREE SERPL-MCNC: 0.26 NG/ML
PSA SERPL-MCNC: 0.6 NG/ML (ref 0–4)

## 2024-02-29 PROCEDURE — 99214 OFFICE O/P EST MOD 30 MIN: CPT | Performed by: INTERNAL MEDICINE

## 2024-02-29 RX ORDER — CARVEDILOL 12.5 MG/1
12.5 TABLET ORAL 2 TIMES DAILY WITH MEALS
Qty: 60 TABLET | Refills: 2 | Status: SHIPPED | OUTPATIENT
Start: 2024-02-29 | End: 2024-05-29

## 2024-02-29 NOTE — PROGRESS NOTES
Assessment/Plan:     [place] palsy is improving, he did see neurology, MRI brain is pending, concern for neurological lupus;    We reviewed his labs, all look good; lyme is still pending.    He has a colonoscopy scheduled for 3/21.    Quality Measures:       Depression Screening and Follow-up Plan: Patient was screened for depression during today's encounter. They screened negative with a PHQ-2 score of 0.         Return in about 3 months (around 5/29/2024).    No problem-specific Assessment & Plan notes found for this encounter.       Diagnoses and all orders for this visit:    Primary hypertension  -     carvedilol (COREG) 12.5 mg tablet; Take 1 tablet (12.5 mg total) by mouth 2 (two) times a day with meals    Lupus nephritis (HCC)    Hypertensive kidney disease with stage 2 chronic kidney disease    Systemic lupus erythematosus, unspecified SLE type, unspecified organ involvement status (HCC)    Spasticity    Tremor of both hands    Chronic kidney disease, stage 3a (HCC)    Anemia in stage 3 chronic kidney disease, unspecified whether stage 3a or 3b CKD     Gait difficulty    Muscle weakness (generalized)    History of DVT (deep vein thrombosis)          Subjective:      Patient ID: Donta Hunter is a 54 y.o. male.    Patient is here for routine follow up, reviewed chronic medical problems, ordered labs for next visit including CBC CMP TSH A1C LIPID. Reviewed labs for this visit.        ALLERGIES:  Allergies   Allergen Reactions    Doxycycline Rash    Sulfa Antibiotics Rash       CURRENT MEDICATIONS:    Current Outpatient Medications:     apixaban (ELIQUIS) 5 mg, Take 5 mg by mouth 2 (two) times a day, Disp: , Rfl:     baclofen 20 mg tablet, Take 20 mg by mouth 3 (three) times a day , Disp: , Rfl:     betamethasone, augmented, (DIPROLENE) 0.05 % ointment, Apply 1 application. topically 2 (two) times a day, Disp: , Rfl:     carvedilol (COREG) 12.5 mg tablet, Take 1 tablet (12.5 mg total) by mouth 2 (two) times a day  with meals, Disp: 60 tablet, Rfl: 2    clobetasol (TEMOVATE) 0.05 % cream, , Disp: , Rfl:     Diclofenac Sodium (VOLTAREN) 1 %, Apply 2 g topically 4 (four) times a day, Disp: 350 g, Rfl: 1    escitalopram (LEXAPRO) 10 mg tablet, Take 1 tablet (10 mg total) by mouth daily, Disp: 90 tablet, Rfl: 3    fluticasone (FLONASE) 50 mcg/act nasal spray, , Disp: , Rfl:     furosemide (LASIX) 40 mg tablet, Take 1 tablet (40 mg total) by mouth 2 (two) times a day, Disp: , Rfl: 0    gabapentin (NEURONTIN) 300 mg capsule, Take 1 capsule (300 mg total) by mouth 3 (three) times a day, Disp: 270 capsule, Rfl: 3    gabapentin (NEURONTIN) 600 MG tablet, Take 1 tablet (600 mg total) by mouth 3 (three) times a day, Disp: 270 tablet, Rfl: 3    hydroxychloroquine (PLAQUENIL) 200 mg tablet, Take 400 mg by mouth daily at bedtime, Disp: , Rfl:     hydrOXYzine HCL (ATARAX) 10 mg tablet, Take 10 mg by mouth every 6 (six) hours as needed for itching, Disp: , Rfl:     ketoconazole (NIZORAL) 2 % cream, Apply topically daily, Disp: 60 g, Rfl: 1    lidocaine-prilocaine (EMLA) cream, Apply topically as needed for mild pain, Disp: 30 g, Rfl: 3    loratadine (CLARITIN) 10 mg tablet, Take 1 tablet (10 mg total) by mouth daily, Disp: 30 tablet, Rfl: 2    metolazone (ZAROXOLYN) 5 mg tablet, Take 0.5 tablets (2.5 mg total) by mouth 3 (three) times a week, Disp: , Rfl:     mycophenolate (CELLCEPT) 500 mg tablet, Take 500 mg by mouth every 12 (twelve) hours , Disp: , Rfl:     potassium chloride (K-DUR,KLOR-CON) 20 mEq tablet, Take 20 mEq by mouth daily, Disp: , Rfl:     tamsulosin (FLOMAX) 0.4 mg, , Disp: , Rfl:     azelastine (ASTELIN) 0.1 % nasal spray, ADMINISTER 1 SPRAY INTO NOSTRIL TWICE DAILY, Disp: , Rfl:     bacitracin topical ointment 500 units/g topical ointment, APPLY TOPICALLY TO AFFECTED AREA THREE TIMES A DAY AS DIRECTED, Disp: , Rfl:     polyethylene glycol (GOLYTELY) 4000 mL solution, Take 4,000 mL by mouth once for 1 dose, Disp: 4000 mL,  Rfl: 0    ACTIVE PROBLEM LIST:  Patient Active Problem List   Diagnosis    Lupus (systemic lupus erythematosus) (HCC)    Lupus nephritis (HCC)    Hypertension    History of DVT (deep vein thrombosis)    Stage 2 chronic kidney disease    Anemia in chronic kidney disease    Muscle weakness (generalized)    Tremor of both hands    Spasticity    Gait difficulty    Neuropathic pain    Hypertensive CKD (chronic kidney disease)    DJD (degenerative joint disease), ankle and foot, right    Chronic kidney disease, stage 3a (HCC)       PAST MEDICAL HISTORY:  Past Medical History:   Diagnosis Date    Anemia     Arthritis     Cervical mass     Chronic kidney disease     Coronary artery disease     Depression     DVT (deep venous thrombosis) (HCC)     Hypertension     Lupus (HCC)     Renal disorder        PAST SURGICAL HISTORY:  Past Surgical History:   Procedure Laterality Date    APPENDECTOMY      CERVICAL SPINE SURGERY      CHOLECYSTECTOMY      COLONOSCOPY N/A 8/23/2018    Procedure: COLONOSCOPY;  Surgeon: James Wise III, MD;  Location: [facility];  Service: Gastroenterology    ESOPHAGOGASTRODUODENOSCOPY N/A 8/22/2018    Procedure: ESOPHAGOGASTRODUODENOSCOPY (EGD);  Surgeon: James Wise III, MD;  Location: [facility];  Service: Gastroenterology    IR IVC FILTER PLACEMENT PERMANENT  9/7/2017    IR IVC FILTER REMOVAL  4/23/2018    IR LUMBAR PUNCTURE  11/23/2015    NECK SURGERY      US GUIDED INJECTION FOR RESEARCH STUDY  4/23/2018    US GUIDED INJECTION FOR RESEARCH STUDY  9/7/2017    VASCULAR SURGERY         FAMILY HISTORY:  Family History   Problem Relation Age of Onset    Heart disease Mother     No Known Problems Father        SOCIAL HISTORY:  Social History     Socioeconomic History    Marital status: /Civil Union     Spouse name: Not on file    Number of children: Not on file    Years of education: Not on file    Highest education level: Not on file   Occupational History    Not on file   Tobacco Use     Smoking status: Never    Smokeless tobacco: Never   Vaping Use    Vaping status: Never Used   Substance and Sexual Activity    Alcohol use: Never    Drug use: Never    Sexual activity: Yes     Partners: Female, Male     Birth control/protection: Rhythm   Other Topics Concern    Not on file   Social History Narrative    ** Merged History Encounter **          Social Determinants of Health     Financial Resource Strain: Not on file   Food Insecurity: No Food Insecurity (1/1/2023)    Hunger Vital Sign     Worried About Running Out of Food in the Last Year: Never true     Ran Out of Food in the Last Year: Never true   Transportation Needs: No Transportation Needs (1/1/2023)    PRAPARE - Transportation     Lack of Transportation (Medical): No     Lack of Transportation (Non-Medical): No   Physical Activity: Not on file   Stress: Not on file   Social Connections: Not on file   Intimate Partner Violence: Not on file   Housing Stability: Low Risk  (1/1/2023)    Housing Stability Vital Sign     Unable to Pay for Housing in the Last Year: No     Number of Places Lived in the Last Year: 1     Unstable Housing in the Last Year: No       Review of Systems   Constitutional:  Negative for chills and fever.   HENT:  Negative for ear pain and sore throat.    Eyes:  Negative for pain and visual disturbance.   Respiratory:  Negative for cough and shortness of breath.    Cardiovascular:  Negative for chest pain and palpitations.   Gastrointestinal:  Negative for abdominal pain and vomiting.   Genitourinary:  Negative for dysuria and hematuria.   Musculoskeletal:  Negative for arthralgias and back pain.   Skin:  Negative for color change and rash.   Neurological:  Negative for seizures and syncope.   All other systems reviewed and are negative.        Objective:  Vitals:    02/29/24 0840   BP: 120/78   BP Location: Left arm   Patient Position: Sitting   Cuff Size: Standard   Pulse: 96   Resp: 18   Temp: 98.4 °F (36.9 °C)   TempSrc:  "Tympanic   SpO2: 97%   Weight: 83.9 kg (185 lb)   Height: 5' 6\" (1.676 m)     Body mass index is 29.86 kg/m².     Physical Exam  Vitals and nursing note reviewed.   Constitutional:       Appearance: Normal appearance. He is obese.   HENT:      Head: Normocephalic and atraumatic.   Cardiovascular:      Rate and Rhythm: Normal rate and regular rhythm.      Heart sounds: Normal heart sounds.   Pulmonary:      Effort: Pulmonary effort is normal.      Breath sounds: Normal breath sounds.   Musculoskeletal:         General: Normal range of motion.      Cervical back: Normal range of motion.   Skin:     General: Skin is warm and dry.   Neurological:      General: No focal deficit present.      Mental Status: He is alert and oriented to person, place, and time. Mental status is at baseline.      Gait: Gait abnormal.   Psychiatric:         Mood and Affect: Mood normal.           RESULTS:  Hemoglobin A1C   Date/Time Value Ref Range Status   02/28/2024 07:31 AM 4.9 Normal 4.0-5.6%; PreDiabetic 5.7-6.4%; Diabetic >=6.5%; Glycemic control for adults with diabetes <7.0% % Final   12/20/2022 08:23 AM 5.3 4.0 - 5.6 % Final     Comment:     The use of HbA1c to monitor glycemic status is based on normal hemoglobin and HbA composition. This test should not be used in patients with abnormal hemoglobin that affects the half life of the red blood cell or the in vivo glycation rates.      Cholesterol   Date/Time Value Ref Range Status   02/28/2024 07:31  See Comment mg/dL Final     Comment:     Cholesterol:         Pediatric <18 Years        Desirable          <170 mg/dL      Borderline High    170-199 mg/dL      High               >=200 mg/dL        Adult >=18 Years            Desirable        <200 mg/dL      Borderline High  200-239 mg/dL      High             >239 mg/dL       Triglycerides   Date/Time Value Ref Range Status   02/28/2024 07:31  See Comment mg/dL Final     Comment:     Triglyceride:     0-9Y            " <75mg/dL     10Y-17Y         <90 mg/dL       >=18Y     Normal          <150 mg/dL     Borderline High 150-199 mg/dL     High            200-499 mg/dL        Very High       >499 mg/dL    Specimen collection should occur prior to Metamizole administration due to the potential for falsely depressed results.     HDL, Direct   Date/Time Value Ref Range Status   02/28/2024 07:31 AM 44 >=40 mg/dL Final     LDL Calculated   Date/Time Value Ref Range Status   02/28/2024 07:31 AM 62 0 - 100 mg/dL Final     Comment:     LDL Cholesterol:     Optimal           <100 mg/dl     Near Optimal      100-129 mg/dl     Above Optimal       Borderline High 130-159 mg/dl       High            160-189 mg/dl       Very High       >189 mg/dl         This screening LDL is a calculated result.   It does not have the accuracy of the Direct Measured LDL in the monitoring of patients with hyperlipidemia and/or statin therapy.   Direct Measure LDL (PCQ213) must be ordered separately in these patients.     Hemoglobin   Date/Time Value Ref Range Status   02/28/2024 07:31 AM 13.7 12.0 - 17.0 g/dL Final     Hematocrit   Date/Time Value Ref Range Status   02/28/2024 07:31 AM 41.1 36.5 - 49.3 % Final     Platelets   Date/Time Value Ref Range Status   02/28/2024 07:31  149 - 390 Thousands/uL Final     Prostate Specific Antigen Total   Date/Time Value Ref Range Status   02/28/2024 07:31 AM 0.6 0.0 - 4.0 ng/mL Final     Comment:     Roche ECLIA methodology.  According to the American [employer] Association, Serum PSA should  decrease and remain at undetectable levels after radical  prostatectomy. The AUA defines biochemical recurrence as an initial  PSA value 0.2 ng/mL or greater followed by a subsequent confirmatory  PSA value 0.2 ng/mL or greater.  Values obtained with different assay methods or kits cannot be used  interchangeably. Results cannot be interpreted as absolute evidence  of the presence or absence of malignant disease.     TSH 3RD  GENERATON   Date/Time Value Ref Range Status   02/28/2024 07:31 AM 2.859 0.450 - 4.500 uIU/mL Final     Comment:     Adult TSH (3rd generation) reference range follows the recommended guidelines of the American Thy[employer] Association, January, 2020.     Sodium   Date/Time Value Ref Range Status   02/28/2024 07:31  (L) 135 - 147 mmol/L Final   12/20/2022 08:23  135 - 146 mmol/L Final     BUN   Date/Time Value Ref Range Status   02/28/2024 07:31 AM 10 5 - 25 mg/dL Final   10/02/2023 10:15 AM 9 6 - 20 mg/dL Final     Creatinine   Date/Time Value Ref Range Status   02/28/2024 07:31 AM 1.08 0.60 - 1.30 mg/dL Final     Comment:     Standardized to IDMS reference method   10/02/2023 10:15 AM 1.1 0.6 - 1.2 mg/dL Final      In chart    This note was created with voice recognition software.  Phonic, grammatical and spelling errors may be present within the note as a result.

## 2024-03-01 ENCOUNTER — OFFICE VISIT (OUTPATIENT)
Dept: NEUROLOGY | Facility: CLINIC | Age: 54
End: 2024-03-01
Payer: COMMERCIAL

## 2024-03-01 VITALS
TEMPERATURE: 97.9 F | HEART RATE: 100 BPM | RESPIRATION RATE: 18 BRPM | WEIGHT: 185 LBS | HEIGHT: 66 IN | SYSTOLIC BLOOD PRESSURE: 102 MMHG | DIASTOLIC BLOOD PRESSURE: 76 MMHG | OXYGEN SATURATION: 97 % | BODY MASS INDEX: 29.73 KG/M2

## 2024-03-01 DIAGNOSIS — K92.1 MELENA: ICD-10-CM

## 2024-03-01 DIAGNOSIS — M32.14 LUPUS NEPHRITIS (HCC): ICD-10-CM

## 2024-03-01 DIAGNOSIS — R25.2 SPASTICITY: ICD-10-CM

## 2024-03-01 DIAGNOSIS — R29.898 LEFT LEG WEAKNESS: ICD-10-CM

## 2024-03-01 DIAGNOSIS — M32.9 SYSTEMIC LUPUS ERYTHEMATOSUS, UNSPECIFIED SLE TYPE, UNSPECIFIED ORGAN INVOLVEMENT STATUS (HCC): Primary | ICD-10-CM

## 2024-03-01 DIAGNOSIS — M62.81 MUSCLE WEAKNESS (GENERALIZED): ICD-10-CM

## 2024-03-01 DIAGNOSIS — G05.3 LUPUS CEREBRITIS: ICD-10-CM

## 2024-03-01 DIAGNOSIS — I63.421 CEREBROVASCULAR ACCIDENT (CVA) DUE TO EMBOLISM OF RIGHT ANTERIOR CEREBRAL ARTERY (HCC): ICD-10-CM

## 2024-03-01 DIAGNOSIS — M32.19 LUPUS CEREBRITIS: ICD-10-CM

## 2024-03-01 DIAGNOSIS — M48.062 SPINAL STENOSIS OF LUMBAR REGION WITH NEUROGENIC CLAUDICATION: ICD-10-CM

## 2024-03-01 PROCEDURE — 99215 OFFICE O/P EST HI 40 MIN: CPT | Performed by: PSYCHIATRY & NEUROLOGY

## 2024-03-01 NOTE — PROGRESS NOTES
Patient ID: Donta Hunter is a 54 y.o. male.    Assessment/Plan:           Problem List Items Addressed This Visit          Digestive    RESOLVED: Melena       Genitourinary    Lupus nephritis (HCC)       Other    Lupus (systemic lupus erythematosus) (HCC) - Primary    Relevant Orders    Echo complete w/ contrast if indicated    Hepatitis B surface antigen    Hepatitis B surface antibody    Hepatitis B core antibody, total    HIV 1/2 AG/AB w Reflex [facility] for 2 yr old and above    Quantiferon TB Gold Plus Assay    IgG, IgA, IgM    CBC and differential    Comprehensive metabolic panel    STRATIFY JCV(TM) AB W/RFX TO INHIBITION ASSAY    STRATIFY JCV(TM) AB W/RFX TO INHIBITION ASSAY    Muscle weakness (generalized)    Relevant Orders    Ambulatory Referral to Orthopedic Surgery    Ambulatory referral to Spine & Pain Management    Spasticity    Relevant Orders    Ambulatory Referral to Orthopedic Surgery    Ambulatory referral to Spine & Pain Management     Other Visit Diagnoses       Spinal stenosis of lumbar region with neurogenic claudication        Relevant Orders    Ambulatory Referral to Orthopedic Surgery    Ambulatory referral to Spine & Pain Management    Left leg weakness        Relevant Orders    Ambulatory Referral to Orthopedic Surgery    Ambulatory referral to Spine & Pain Management    Cerebrovascular accident (CVA) due to embolism of right anterior cerebral artery (HCC)        Relevant Orders    Echo complete w/ contrast if indicated    Hepatitis B surface antigen    Hepatitis B surface antibody    Hepatitis B core antibody, total    HIV 1/2 AG/AB w Reflex [facility] for 2 yr old and above    Quantiferon TB Gold Plus Assay    IgG, IgA, IgM    CBC and differential    Comprehensive metabolic panel    STRATIFY JCV(TM) AB W/RFX TO INHIBITION ASSAY    CTA head w wo contrast    Lupus cerebritis         Relevant Orders    Hepatitis B surface antigen    Hepatitis B surface antibody    Hepatitis B core antibody, total     HIV 1/2 AG/AB w Reflex [facility] for 2 yr old and above    Quantiferon TB Gold Plus Assay    IgG, IgA, IgM    CBC and differential    Comprehensive metabolic panel    STRATIFY JCV(TM) AB W/RFX TO INHIBITION ASSAY    CTA head w wo contrast           Mr. Hunter has presented to [facility] multiple sclerosis center for follow-up on multiple neurological complaint in the setting of remote history of SLE/lupus nephritis.    Patient continue describing ambulatory dysfunction with bilateral lower extremity weakness left worse than right, lower back pain and ambulatory dysfunction as patient required walker for ambulation.    Patient has been taking Plaquenil and CellCept as the 2 main immunosuppressive regimens for lupus and lupus nephritis.    Patient has establish care with rheumatology team in [facility] Dr. Cadena.    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     I brought concern for potential secondary vasculitis and patient case and I will be reaching rheumatology team to discuss if transition patient to rituximab 1000 mg will provide any benefits or control of his management; we discussed specifically nature of recurrent strokes and patient will be considering CT angiogram which may or may not be showing vasculitis based on the sensitivity of the test.    Patient was offered serologic workup to be completed in preparation to rituximab infusion and patient signed consent form as well.    We also discussed patient ambulatory dysfunction is due to spinal stenosis with multilevel severe radiculopathy been appreciated.  Patient has never seen pain management team orthopedic surgical team-referral were offered and patient is to establish with a teams for further management, regardless of proceeding with rituximab  infusion timing.    Patient describes no new sensorimotor dysfunction, no bladder dysfunction, no vision loss.    I have spent a total time of 40 minutes on 3/1/2024 in caring for this patient including Diagnostic results, Prognosis, Risks and benefits of tx options, Instructions for management, Counseling / Coordination of care, Documenting in the medical record, Reviewing / ordering tests, medicine, procedures  , Obtaining or reviewing history  , and Communicating with other healthcare professionals .     Follow-up with [facility] neurology within 3 months.    Subjective: Subacute ischemic stroke on recent brain imaging    HPI    Mr. Hunter has presented to [facility] multiple sclerosis center for evaluation of facial weakness and ambulatory dysfunction  Patient first visit with [facility] neurology took place on February 14, 2024, at that time evaluation was provided for mild peripheral cranial nerve VII paralysis in the left side with no difficulties closing his left eye, no signs of changes in sense of taste or hearing.    Patient stated he has worsening ambulatory dysfunction but no new weakness or sensory loss in his face or his eyes.  Patient has been taking Plaquenil and CellCept for SLE and lupus nephritis, patient has been taking Eliquis since last year and previously he was taking Xarelto with initial treatment offered at the time of the diagnosis of the patient with SLE, per the patient.  Patient has establish care with [facility] rheumatology team Dr.Jonida Cadena.     Patient described recent instance of feeling weak and dizzy and during this evaluation today patient completed brain MRI on February 22, 2024 patient was found to have subacute to early chronic lacunar ischemic changes in the left parasagittal frontal lobe in addition to cortical/subcortical chronic ischemia with right frontal lobe patient has been new ischemic changes since 2019 of his last brain MRI.   Patient had multiple other ischemic  changes involving his cerebrum and cerebellum which also adds to his worsening disability.     Patient has significant ambulatory dysfunction due to remote history of sensorimotor peripheral polyneuropathy with bilateral feet drop.  Prior evaluation for spinal cord pathology was noted consistent with intrinsic cord demyelination or stroke; patient has multilevel degenerative disc disease with osteophytes involving cervical and thoracolumbar regions, with cervical spine surgical intervention been completed previously.  Completed imaging of the lumbar spine with advanced degenerative disc disease noted there as well.  Continue describing his dragging left lower extremity more than usual balance dysfunction.  Patient does not report any bladder dysfunction or saddle anesthesia.     Patient stated he has ambulatory dysfunction since last 6-7 years ago as he ambulates with a walker.  Patient has brisk reflexes in patella with diminished reflexes in ankles bilaterally.     Last CT scan of the head completed in July 2023 was consistent with cerebellar stroke in addition to T2 hyperintensity and brain atrophy.  Patient will be offered repeating brain MRI with noncontrast advised due to lupus nephritis to follow-up on his radiographic findings.       Patient will be repeating MRI of the lumbar spine due to advanced spondyloarthropathy and degenerative disc disease reported in 2022 and brain MRI was completed in 2021.     MRI brain : Interval development of a small focus of recent lacunar ischemia involving the left parasagittal frontal lobe.     Small focus of cortical/subcortical chronic ischemia within the right frontal lobe appears new since the prior examination from 2019.     Multiple additional scattered areas of white matter FLAIR signal hyperintensity are otherwise unchanged and there are stable chronic cerebellar lacunar infarcts.     In this patient with a clinical history of lupus constellation of findings are  likely sequela of cerebral vasculitis and/or SLE related small vessel disease. MR angiogram of the [place] [facility] [place] may be of value for further evaluation if not already   performed.    MRI L-spine: Degenerative changes of the lumbar spine, as described above.     Multifactorial disease results in moderate to severe right foraminal narrowing at L3-L4 with impingement of the exiting right L3 nerve root.     Bulging annulus with a small superimposed left paracentral disc protrusion at L5-S1 with near abutment of the descending left S1 nerve root.        The following portions of the patient's history were reviewed and updated as appropriate: He  has a past medical history of Anemia, Arthritis, Cervical mass, Chronic kidney disease, Coronary artery disease, Depression, DVT (deep venous thrombosis) ([facility]), Hypertension, Lupus ([facility]), and Renal disorder.  He   Patient Active Problem List    Diagnosis Date Noted    DJD (degenerative joint disease), ankle and foot, right 03/09/2023    Hypertensive CKD (chronic kidney disease) 02/14/2023    Tremor of both hands 02/27/2020    Spasticity 02/27/2020    Gait difficulty 02/27/2020    Neuropathic pain 02/27/2020    Muscle weakness (generalized) 11/04/2019    Anemia in chronic kidney disease 08/21/2018    Chronic kidney disease, stage 3a (HCC) 08/21/2018    Lupus (systemic lupus erythematosus) ([facility]) 07/29/2018    Lupus nephritis (HCC) 07/29/2018    Hypertension 07/29/2018    History of DVT (deep vein thrombosis) 07/29/2018    Stage 2 chronic kidney disease 07/29/2018     He  has a past surgical history that includes Neck surgery; Cervical spine surgery; Appendectomy; Esophagogastroduodenoscopy (N/A, 8/22/2018); Colonoscopy (N/A, 8/23/2018); Vascular surgery; Cholecystectomy; US guided injection for research study (4/23/2018); IR IVC filter removal (4/23/2018); US guided injection for research study (9/7/2017); IR IVC filter placement permanent (9/7/2017); and IR lumbar puncture  (11/23/2015).  His family history includes Heart disease in his mother; No Known Problems in his father.  He  reports that he has never smoked. He has never used smokeless tobacco. He reports that he does not drink alcohol and does not use drugs.  Current Outpatient Medications   Medication Sig Dispense Refill    apixaban (ELIQUIS) 5 mg Take 5 mg by mouth 2 (two) times a day      azelastine (ASTELIN) 0.1 % nasal spray ADMINISTER 1 SPRAY INTO NOSTRIL TWICE DAILY      bacitracin topical ointment 500 units/g topical ointment APPLY TOPICALLY TO AFFECTED AREA THREE TIMES A DAY AS DIRECTED      baclofen 20 mg tablet Take 20 mg by mouth 3 (three) times a day       betamethasone, augmented, (DIPROLENE) 0.05 % ointment Apply 1 application. topically 2 (two) times a day      carvedilol (COREG) 12.5 mg tablet Take 1 tablet (12.5 mg total) by mouth 2 (two) times a day with meals 60 tablet 2    clobetasol (TEMOVATE) 0.05 % cream       Diclofenac Sodium (VOLTAREN) 1 % Apply 2 g topically 4 (four) times a day 350 g 1    escitalopram (LEXAPRO) 10 mg tablet Take 1 tablet (10 mg total) by mouth daily 90 tablet 3    fluticasone (FLONASE) 50 mcg/act nasal spray       furosemide (LASIX) 40 mg tablet Take 1 tablet (40 mg total) by mouth 2 (two) times a day  0    gabapentin (NEURONTIN) 300 mg capsule Take 1 capsule (300 mg total) by mouth 3 (three) times a day 270 capsule 3    gabapentin (NEURONTIN) 600 MG tablet Take 1 tablet (600 mg total) by mouth 3 (three) times a day 270 tablet 3    hydroxychloroquine (PLAQUENIL) 200 mg tablet Take 400 mg by mouth daily at bedtime      hydrOXYzine HCL (ATARAX) 10 mg tablet Take 10 mg by mouth every 6 (six) hours as needed for itching      ketoconazole (NIZORAL) 2 % cream Apply topically daily 60 g 1    lidocaine-prilocaine (EMLA) cream Apply topically as needed for mild pain 30 g 3    loratadine (CLARITIN) 10 mg tablet Take 1 tablet (10 mg total) by mouth daily 30 tablet 2    metolazone (ZAROXOLYN) 5  mg tablet Take 0.5 tablets (2.5 mg total) by mouth 3 (three) times a week      mycophenolate (CELLCEPT) 500 mg tablet Take 500 mg by mouth every 12 (twelve) hours       potassium chloride (K-DUR,KLOR-CON) 20 mEq tablet Take 20 mEq by mouth daily      tamsulosin (FLOMAX) 0.4 mg       polyethylene glycol (GOLYTELY) 4000 mL solution Take 4,000 mL by mouth once for 1 dose (Patient not taking: Reported on 3/1/2024) 4000 mL 0     No current facility-administered medications for this visit.     Current Outpatient Medications on File Prior to Visit   Medication Sig    apixaban (ELIQUIS) 5 mg Take 5 mg by mouth 2 (two) times a day    azelastine (ASTELIN) 0.1 % nasal spray ADMINISTER 1 SPRAY INTO NOSTRIL TWICE DAILY    bacitracin topical ointment 500 units/g topical ointment APPLY TOPICALLY TO AFFECTED AREA THREE TIMES A DAY AS DIRECTED    baclofen 20 mg tablet Take 20 mg by mouth 3 (three) times a day     betamethasone, augmented, (DIPROLENE) 0.05 % ointment Apply 1 application. topically 2 (two) times a day    carvedilol (COREG) 12.5 mg tablet Take 1 tablet (12.5 mg total) by mouth 2 (two) times a day with meals    clobetasol (TEMOVATE) 0.05 % cream     Diclofenac Sodium (VOLTAREN) 1 % Apply 2 g topically 4 (four) times a day    escitalopram (LEXAPRO) 10 mg tablet Take 1 tablet (10 mg total) by mouth daily    fluticasone (FLONASE) 50 mcg/act nasal spray     furosemide (LASIX) 40 mg tablet Take 1 tablet (40 mg total) by mouth 2 (two) times a day    gabapentin (NEURONTIN) 300 mg capsule Take 1 capsule (300 mg total) by mouth 3 (three) times a day    gabapentin (NEURONTIN) 600 MG tablet Take 1 tablet (600 mg total) by mouth 3 (three) times a day    hydroxychloroquine (PLAQUENIL) 200 mg tablet Take 400 mg by mouth daily at bedtime    hydrOXYzine HCL (ATARAX) 10 mg tablet Take 10 mg by mouth every 6 (six) hours as needed for itching    ketoconazole (NIZORAL) 2 % cream Apply topically daily    lidocaine-prilocaine (EMLA) cream  "Apply topically as needed for mild pain    loratadine (CLARITIN) 10 mg tablet Take 1 tablet (10 mg total) by mouth daily    metolazone (ZAROXOLYN) 5 mg tablet Take 0.5 tablets (2.5 mg total) by mouth 3 (three) times a week    mycophenolate (CELLCEPT) 500 mg tablet Take 500 mg by mouth every 12 (twelve) hours     potassium chloride (K-DUR,KLOR-CON) 20 mEq tablet Take 20 mEq by mouth daily    tamsulosin (FLOMAX) 0.4 mg     polyethylene glycol (GOLYTELY) 4000 mL solution Take 4,000 mL by mouth once for 1 dose (Patient not taking: Reported on 3/1/2024)     No current facility-administered medications on file prior to visit.     He is allergic to doxycycline and sulfa antibiotics..         Objective:    Blood pressure 102/76, pulse 100, temperature 97.9 °F (36.6 °C), temperature source Temporal, resp. rate 18, height 5' 6\" (1.676 m), weight 83.9 kg (185 lb), SpO2 97%.    Physical Exam    Neurological Exam  CONSTITUTIONAL: NAD, pleasant. NECK: supple, no lymphadenopathy, no thyromegaly, no JVD. CARDIOVASCULAR: RRR, normal S1S2, no murmurs, no rubs. RESP: clear to auscultation bilaterally, no wheezes/rhonchi/rales. ABDOMEN: soft, non tender, non distended. SKIN: no rash or skin lesions. EXTREMITIES: no edema, pulses 2+bilaterally. PSYCH: appropriate mood and affect  NEUROLOGIC COMPREHENSIVE EXAM: Patient is oriented to person, place and time, NAD; appropriate affect. CN II, III, IV, V, VI, VII,VIII,IX,X,XI-XII intact with EOMI, PERRLA, OKN intact, VF grossly intact, fundi poorly visualized secondary to pupillary constriction; mild left facial asymmetry face noted, patient cannot bring left eyebrow up but patient is comfortable to close his left eye completely. Motor: 4/5 UE bilateral symmetric, 5/5 finger extension and wrist flexion;  3/5 hip flexion and 4-/5 knee flexion b/l; 1/5 dorsiflexion b/l ; Sensory: decreased  to light touch and pinprick bilaterally; normal vibration sensation feet bilaterally; Coordination within " normal limits on FTN and KRUPA testing; DTR: 2+/4 through,1/4 ankles b/l  no Babinski, no clonus. Tandem gait is abnormal. Romberg: absent.    ROS:  12 points of review of system was reviewed with the patient and was unremarkable with exception: see HPI  Review of Systems   Constitutional: Negative.    HENT: Negative.     Eyes: Negative.    Respiratory: Negative.     Cardiovascular: Negative.    Gastrointestinal: Negative.    Endocrine: Negative.    Genitourinary: Negative.    Musculoskeletal:  Positive for gait problem (uses a walker).   Skin: Negative.    Allergic/Immunologic: Negative.    Neurological:  Positive for tremors.   Hematological: Negative.    Psychiatric/Behavioral: Negative.

## 2024-03-02 LAB — B BURGDOR DNA SPEC QL NAA+PROBE: NEGATIVE

## 2024-03-04 ENCOUNTER — TELEPHONE (OUTPATIENT)
Dept: NEUROLOGY | Facility: CLINIC | Age: 54
End: 2024-03-04

## 2024-03-04 ENCOUNTER — APPOINTMENT (OUTPATIENT)
Dept: LAB | Facility: CLINIC | Age: 54
End: 2024-03-04
Payer: COMMERCIAL

## 2024-03-04 DIAGNOSIS — M32.9 SYSTEMIC LUPUS ERYTHEMATOSUS, UNSPECIFIED SLE TYPE, UNSPECIFIED ORGAN INVOLVEMENT STATUS (HCC): Primary | ICD-10-CM

## 2024-03-04 DIAGNOSIS — G05.3 LUPUS CEREBRITIS: ICD-10-CM

## 2024-03-04 DIAGNOSIS — I63.421 CEREBROVASCULAR ACCIDENT (CVA) DUE TO EMBOLISM OF RIGHT ANTERIOR CEREBRAL ARTERY (HCC): ICD-10-CM

## 2024-03-04 DIAGNOSIS — M32.19 LUPUS CEREBRITIS: ICD-10-CM

## 2024-03-04 DIAGNOSIS — M32.9 SYSTEMIC LUPUS ERYTHEMATOSUS, UNSPECIFIED SLE TYPE, UNSPECIFIED ORGAN INVOLVEMENT STATUS (HCC): ICD-10-CM

## 2024-03-04 DIAGNOSIS — M32.14 LUPUS NEPHRITIS (HCC): Primary | ICD-10-CM

## 2024-03-04 LAB
ALBUMIN SERPL BCP-MCNC: 4.2 G/DL (ref 3.5–5)
ALP SERPL-CCNC: 111 U/L (ref 34–104)
ALT SERPL W P-5'-P-CCNC: 20 U/L (ref 7–52)
ANION GAP SERPL CALCULATED.3IONS-SCNC: 6 MMOL/L
AST SERPL W P-5'-P-CCNC: 22 U/L (ref 13–39)
BASOPHILS # BLD AUTO: 0.03 THOUSANDS/ÂΜL (ref 0–0.1)
BASOPHILS NFR BLD AUTO: 1 % (ref 0–1)
BILIRUB SERPL-MCNC: 0.42 MG/DL (ref 0.2–1)
BUN SERPL-MCNC: 8 MG/DL (ref 5–25)
CALCIUM SERPL-MCNC: 8.9 MG/DL (ref 8.4–10.2)
CHLORIDE SERPL-SCNC: 100 MMOL/L (ref 96–108)
CO2 SERPL-SCNC: 25 MMOL/L (ref 21–32)
CREAT SERPL-MCNC: 1.03 MG/DL (ref 0.6–1.3)
EOSINOPHIL # BLD AUTO: 0.03 THOUSAND/ÂΜL (ref 0–0.61)
EOSINOPHIL NFR BLD AUTO: 1 % (ref 0–6)
ERYTHROCYTE [DISTWIDTH] IN BLOOD BY AUTOMATED COUNT: 13.6 % (ref 11.6–15.1)
GFR SERPL CREATININE-BSD FRML MDRD: 80 ML/MIN/1.73SQ M
GLUCOSE P FAST SERPL-MCNC: 100 MG/DL (ref 65–99)
HBV CORE AB SER QL: NORMAL
HBV SURFACE AB SER-ACNC: <3 MIU/ML
HBV SURFACE AG SER QL: NORMAL
HCT VFR BLD AUTO: 40.3 % (ref 36.5–49.3)
HGB BLD-MCNC: 13.6 G/DL (ref 12–17)
HIV 1+2 AB+HIV1 P24 AG SERPL QL IA: NORMAL
HIV 2 AB SERPL QL IA: NORMAL
HIV1 AB SERPL QL IA: NORMAL
HIV1 P24 AG SERPL QL IA: NORMAL
IGA SERPL-MCNC: 465 MG/DL (ref 66–433)
IGG SERPL-MCNC: 1207 MG/DL (ref 635–1741)
IGM SERPL-MCNC: 34 MG/DL (ref 45–281)
IMM GRANULOCYTES # BLD AUTO: 0.02 THOUSAND/UL (ref 0–0.2)
IMM GRANULOCYTES NFR BLD AUTO: 1 % (ref 0–2)
LYMPHOCYTES # BLD AUTO: 0.87 THOUSANDS/ÂΜL (ref 0.6–4.47)
LYMPHOCYTES NFR BLD AUTO: 31 % (ref 14–44)
MCH RBC QN AUTO: 31.6 PG (ref 26.8–34.3)
MCHC RBC AUTO-ENTMCNC: 33.7 G/DL (ref 31.4–37.4)
MCV RBC AUTO: 94 FL (ref 82–98)
MONOCYTES # BLD AUTO: 0.43 THOUSAND/ÂΜL (ref 0.17–1.22)
MONOCYTES NFR BLD AUTO: 15 % (ref 4–12)
NEUTROPHILS # BLD AUTO: 1.44 THOUSANDS/ÂΜL (ref 1.85–7.62)
NEUTS SEG NFR BLD AUTO: 51 % (ref 43–75)
NRBC BLD AUTO-RTO: 0 /100 WBCS
PLATELET # BLD AUTO: 191 THOUSANDS/UL (ref 149–390)
PMV BLD AUTO: 11 FL (ref 8.9–12.7)
POTASSIUM SERPL-SCNC: 4.2 MMOL/L (ref 3.5–5.3)
PROT SERPL-MCNC: 7.1 G/DL (ref 6.4–8.4)
RBC # BLD AUTO: 4.31 MILLION/UL (ref 3.88–5.62)
SODIUM SERPL-SCNC: 131 MMOL/L (ref 135–147)
WBC # BLD AUTO: 2.82 THOUSAND/UL (ref 4.31–10.16)

## 2024-03-04 PROCEDURE — 80053 COMPREHEN METABOLIC PANEL: CPT | Performed by: PSYCHIATRY & NEUROLOGY

## 2024-03-04 PROCEDURE — 86706 HEP B SURFACE ANTIBODY: CPT

## 2024-03-04 PROCEDURE — 87389 HIV-1 AG W/HIV-1&-2 AB AG IA: CPT

## 2024-03-04 PROCEDURE — 86704 HEP B CORE ANTIBODY TOTAL: CPT

## 2024-03-04 PROCEDURE — 87340 HEPATITIS B SURFACE AG IA: CPT

## 2024-03-04 PROCEDURE — 86711 JOHN CUNNINGHAM ANTIBODY: CPT

## 2024-03-04 PROCEDURE — 86480 TB TEST CELL IMMUN MEASURE: CPT

## 2024-03-04 PROCEDURE — 82784 ASSAY IGA/IGD/IGG/IGM EACH: CPT

## 2024-03-04 PROCEDURE — 36415 COLL VENOUS BLD VENIPUNCTURE: CPT

## 2024-03-04 PROCEDURE — 85025 COMPLETE CBC W/AUTO DIFF WBC: CPT

## 2024-03-04 NOTE — TELEPHONE ENCOUNTER
"I discussed new CVA with Dr. Malachi Cadena when she returned her call. .   Case discussed and concern was brought that despite 2 immunosuppressive regimens and anticoagulation, patient continues experiencing recurrent CVS.   Rituximab was not recommended.     Patient has comprehensive evaluation [id number] under [facility] team, including Neurology, where he was found to have  positive RPR and positive FTA. However, there are no white blood cells in the spinal fluid. The protein was elevated to 78, which is not a specific finding. The CSF VDRL was negative. The patient was treated with a couple weeks of intravenous penicillin. The patient was found on blood workup to have a positive YANIRA but a negative double-stranded DNA, negative cardiolipin antibody, negative glycoprotein, negative anti-beta 2 glycoprotein, positive type 2 mixed cryoglobulinemia in 2015. Negative Lupus anticoagulant 2023. Negative cardiolipin antibodies, lupus anticoagulant and beta 2 glycoprotein in October 2022. The patient was found prior to admission to have elevated protein in the urine, and a skin biopsy was read as a positive lupus band consistent with a connective tissue disease. \"  Patient established [facility] and MRI from 2/22/024 was consistent with recurrent ischemic cortical CVA in the setting of SLE and lupus nephritis.    CTA and 2DEcho is pending.    Dr. Cadena recommended evaluation for APLS and switching to coumadin if positive.     Case will be discussed with [facility] Stroke team for further plan of care.             "

## 2024-03-05 LAB
GAMMA INTERFERON BACKGROUND BLD IA-ACNC: 0.1 IU/ML
M TB IFN-G BLD-IMP: NEGATIVE
M TB IFN-G CD4+ BCKGRND COR BLD-ACNC: 0 IU/ML
M TB IFN-G CD4+ BCKGRND COR BLD-ACNC: 0.02 IU/ML
MITOGEN IGNF BCKGRD COR BLD-ACNC: 1.06 IU/ML

## 2024-03-05 NOTE — TELEPHONE ENCOUNTER
I appreciate Stroke team input.    Patient has been anticoagulated since SLE diagnosis established. I agree multiple serologic tests completed by [facility] Neurology and negative for APLS, my consideration was secondary vasculitis regardless of CTA head outcomes.     Dorinda - please offer sooner appointment - 1 week after CTA head completed - with Dr. Mulligan in [place] office, 60 min OVL advised.

## 2024-03-05 NOTE — TELEPHONE ENCOUNTER
Yes happy to see the patient, but seems like all the antibodies for APS are negative, why are they considering APS to be the diagnosis.

## 2024-03-11 LAB
GALACTOMANNAN AG SERPL IA-ACNC: 1.33
JCPYV AB SERPL QL IA: ABNORMAL
JCPYV AB SERPL QL IA: POSITIVE
SPECIMEN STATUS: ABNORMAL

## 2024-03-15 ENCOUNTER — HOSPITAL ENCOUNTER (OUTPATIENT)
Dept: CT IMAGING | Facility: HOSPITAL | Age: 54
End: 2024-03-15
Attending: PSYCHIATRY & NEUROLOGY
Payer: COMMERCIAL

## 2024-03-15 DIAGNOSIS — M32.19: ICD-10-CM

## 2024-03-15 DIAGNOSIS — I63.421 CEREBROVASCULAR ACCIDENT (CVA) DUE TO EMBOLISM OF RIGHT ANTERIOR CEREBRAL ARTERY (HCC): ICD-10-CM

## 2024-03-15 DIAGNOSIS — G05.3: ICD-10-CM

## 2024-03-15 PROCEDURE — 70496 CT ANGIOGRAPHY HEAD: CPT

## 2024-03-15 RX ADMIN — IOHEXOL 100 ML: 350 INJECTION, SOLUTION INTRAVENOUS at 15:04

## 2024-03-18 ENCOUNTER — APPOINTMENT (OUTPATIENT)
Dept: RADIOLOGY | Facility: CLINIC | Age: 54
End: 2024-03-18
Payer: COMMERCIAL

## 2024-03-18 ENCOUNTER — HOSPITAL ENCOUNTER (OUTPATIENT)
Dept: MRI IMAGING | Facility: HOSPITAL | Age: 54
Discharge: HOME | End: 2024-03-18
Attending: ORTHOPAEDIC SURGERY
Payer: COMMERCIAL

## 2024-03-18 ENCOUNTER — OFFICE VISIT (OUTPATIENT)
Dept: OBGYN CLINIC | Facility: CLINIC | Age: 54
End: 2024-03-18
Payer: COMMERCIAL

## 2024-03-18 VITALS — WEIGHT: 185 LBS | BODY MASS INDEX: 29.73 KG/M2 | HEIGHT: 66 IN

## 2024-03-18 DIAGNOSIS — R29.898 LEFT LEG WEAKNESS: ICD-10-CM

## 2024-03-18 DIAGNOSIS — R25.2 SPASTICITY: ICD-10-CM

## 2024-03-18 DIAGNOSIS — M62.81 MUSCLE WEAKNESS (GENERALIZED): ICD-10-CM

## 2024-03-18 DIAGNOSIS — M48.062 SPINAL STENOSIS OF LUMBAR REGION WITH NEUROGENIC CLAUDICATION: ICD-10-CM

## 2024-03-18 DIAGNOSIS — M32.9 SYSTEMIC LUPUS ERYTHEMATOSUS, UNSPECIFIED SLE TYPE, UNSPECIFIED ORGAN INVOLVEMENT STATUS (HCC): Primary | ICD-10-CM

## 2024-03-18 PROCEDURE — 72141 MRI NECK SPINE W/O DYE: CPT

## 2024-03-18 PROCEDURE — 99204 OFFICE O/P NEW MOD 45 MIN: CPT | Performed by: ORTHOPAEDIC SURGERY

## 2024-03-18 PROCEDURE — 72110 X-RAY EXAM L-2 SPINE 4/>VWS: CPT

## 2024-03-18 PROCEDURE — 72146 MRI CHEST SPINE W/O DYE: CPT

## 2024-03-18 NOTE — PROGRESS NOTES
[facility] ORTHOPEDIC SPINE SURGERY  DR.AMIR ADAN MD  [address] [facility] [address] [phone]    HISTORY OF PRESENT ILLNESS:  Donta Hutner is a 54 y.o. male with low back pain. The patient has been experiencing low back pain for about 5 years without mechanism of injury. He used to work in a warehouse, and thinks his back pain started from his job. Pain is rated 7 out of ten. He described his as sharp and has numbness and tingling in both lower extremities. He takes gabapentin for symptom management. He attended two physical therapy sessions in February 2024, but feels it is not helping him. He has no previous spine surgery. He did try an injection to his lumbar spine, but he does not remember what it was.         ALLERGIES:   Allergies   Allergen Reactions    Doxycycline Rash    Sulfa Antibiotics Rash       MEDICATIONS:    Current Outpatient Medications:     apixaban (ELIQUIS) 5 mg, Take 5 mg by mouth 2 (two) times a day, Disp: , Rfl:     baclofen 20 mg tablet, Take 20 mg by mouth 3 (three) times a day , Disp: , Rfl:     betamethasone, augmented, (DIPROLENE) 0.05 % ointment, Apply 1 application. topically 2 (two) times a day, Disp: , Rfl:     carvedilol (COREG) 12.5 mg tablet, Take 1 tablet (12.5 mg total) by mouth 2 (two) times a day with meals, Disp: 60 tablet, Rfl: 2    clobetasol (TEMOVATE) 0.05 % cream, , Disp: , Rfl:     Diclofenac Sodium (VOLTAREN) 1 %, Apply 2 g topically 4 (four) times a day, Disp: 350 g, Rfl: 1    fluticasone (FLONASE) 50 mcg/act nasal spray, , Disp: , Rfl:     hydroxychloroquine (PLAQUENIL) 200 mg tablet, Take 400 mg by mouth daily at bedtime, Disp: , Rfl:     hydrOXYzine HCL (ATARAX) 10 mg tablet, Take 10 mg by mouth every 6 (six) hours as needed for itching, Disp: , Rfl:     ketoconazole (NIZORAL) 2 % cream, Apply topically daily, Disp: 60 g, Rfl: 1    lidocaine-prilocaine (EMLA) cream, Apply topically as needed for mild pain, Disp: 30 g, Rfl: 3    metolazone  (ZAROXOLYN) 5 mg tablet, Take 0.5 tablets (2.5 mg total) by mouth 3 (three) times a week, Disp: , Rfl:     mycophenolate (CELLCEPT) 500 mg tablet, Take 500 mg by mouth every 12 (twelve) hours , Disp: , Rfl:     potassium chloride (K-DUR,KLOR-CON) 20 mEq tablet, Take 20 mEq by mouth daily, Disp: , Rfl:     tamsulosin (FLOMAX) 0.4 mg, , Disp: , Rfl:     azelastine (ASTELIN) 0.1 % nasal spray, ADMINISTER 1 SPRAY INTO NOSTRIL TWICE DAILY, Disp: , Rfl:     bacitracin topical ointment 500 units/g topical ointment, APPLY TOPICALLY TO AFFECTED AREA THREE TIMES A DAY AS DIRECTED, Disp: , Rfl:     escitalopram (LEXAPRO) 10 mg tablet, Take 1 tablet (10 mg total) by mouth daily, Disp: 90 tablet, Rfl: 3    furosemide (LASIX) 40 mg tablet, Take 1 tablet (40 mg total) by mouth 2 (two) times a day, Disp: , Rfl: 0    gabapentin (NEURONTIN) 300 mg capsule, Take 1 capsule (300 mg total) by mouth 3 (three) times a day, Disp: 270 capsule, Rfl: 3    gabapentin (NEURONTIN) 600 MG tablet, Take 1 tablet (600 mg total) by mouth 3 (three) times a day, Disp: 270 tablet, Rfl: 3    loratadine (CLARITIN) 10 mg tablet, Take 1 tablet (10 mg total) by mouth daily, Disp: 30 tablet, Rfl: 2    polyethylene glycol (GOLYTELY) 4000 mL solution, Take 4,000 mL by mouth once for 1 dose (Patient not taking: Reported on 3/1/2024), Disp: 4000 mL, Rfl: 0     PAST MEDICAL HISTORY:   Past Medical History:   Diagnosis Date    Anemia     Arthritis     Cervical mass     Chronic kidney disease     Coronary artery disease     Depression     DVT (deep venous thrombosis) (HCC)     Hypertension     Lupus (HCC)     Renal disorder        PAST SURGICAL HISTORY:  Past Surgical History:   Procedure Laterality Date    APPENDECTOMY      CERVICAL SPINE SURGERY      CHOLECYSTECTOMY      COLONOSCOPY N/A 8/23/2018    Procedure: COLONOSCOPY;  Surgeon: James Wise III, MD;  Location: [facility];  Service: Gastroenterology    ESOPHAGOGASTRODUODENOSCOPY N/A 8/22/2018    Procedure:  ESOPHAGOGASTRODUODENOSCOPY (EGD);  Surgeon: James Wise III, MD;  Location: [facility];  Service: Gastroenterology    IR IVC FILTER PLACEMENT PERMANENT  9/7/2017    IR IVC FILTER REMOVAL  4/23/2018    IR LUMBAR PUNCTURE  11/23/2015    NECK SURGERY      US GUIDED INJECTION FOR RESEARCH STUDY  4/23/2018    US GUIDED INJECTION FOR RESEARCH STUDY  9/7/2017    VASCULAR SURGERY         SOCIAL HISTORY:  Social History     Tobacco Use    Smoking status: Never    Smokeless tobacco: Never   Vaping Use    Vaping status: Never Used   Substance Use Topics    Alcohol use: Never    Drug use: Never       ROS:  Review of Systems   Constitutional:  Negative for chills and fever.   HENT:  Negative for ear pain and sore throat.    Eyes:  Negative for pain and visual disturbance.   Respiratory:  Negative for cough and shortness of breath.    Cardiovascular:  Negative for chest pain and palpitations.   Gastrointestinal:  Negative for abdominal pain and vomiting.   Genitourinary:  Negative for dysuria and hematuria.   Musculoskeletal:  Negative for arthralgias and back pain.   Skin:  Negative for color change and rash.   Neurological:  Negative for seizures and syncope.   All other systems reviewed and are negative.       PHYSICAL EXAM:  Patient ambulates without normal gait.   Shaking while walking with walker, leaning forward while standing, unsteady  No TTP over cervical, thoracic, or lumbar spine.    Sensation intact to light touch left L2 through S1 dermatomes.   Sensation intact to light touch right L2 through S1 dermatomes     ROM:  Pain free range of motion bilateral hips, knees, ankles  No atrophy  No deformity  Symmetric deep tendon reflexes bilateral upper extremities    Symmetric deep tendon reflexes bilateral lower extremities     2+ patellar tendon   Absent achilles tendon  Clonus negative  The patient is well perfused distally.      RADIOGRAPHIC STUDIES:  X-ray, L-spine, 3/18/2024: Mild multilevel lumbar degenerative  disc except at L4-5 where there is evidence of moderate degenerative changes, degenerative spondylolisthesis and mild kyphosis.    MRI, L-spine, 2/22/2024: Mild multilevel lumbar degenerative disease most prominent at L4-5.  There is also grade 1 degenerative spondylolisthesis at L4-5 with mild to moderate lateral recess stenosis.  No evidence of central stenosis.  Mild to moderate lateral recess stenosis present at L3-4.    MRI, brain, 2/22/2024:stable chronic cerebellar lacunar infarcts.        ASSESSMENT:  Problem List Items Addressed This Visit          Rheumatology    Lupus (systemic lupus erythematosus) (HCC) - Primary       Orthopedic/Musculoskeletal    Muscle weakness (generalized)    Relevant Orders    XR spine lumbar minimum 4 views non injury    MRI cervical spine wo contrast    MRI thoracic spine wo contrast    Spasticity    Relevant Orders    XR spine lumbar minimum 4 views non injury    MRI cervical spine wo contrast    MRI thoracic spine wo contrast     Other Visit Diagnoses       Spinal stenosis of lumbar region with neurogenic claudication        Relevant Orders    XR spine lumbar minimum 4 views non injury    Left leg weakness        Relevant Orders    XR spine lumbar minimum 4 views non injury    MRI cervical spine wo contrast    MRI thoracic spine wo contrast              PLAN:  54-year-old gentleman who presents for evaluation of back pain and ambulatory dysfunction.  He has had MRI studies of the lumbar spine ordered by neurology.  I also reviewed the neurology report.  He has been diagnosed with lupus in addition to lower extremity weakness.  MRI of the lumbar spine does show evidence of spinal stenosis at L4-5 not significant enough to cause this degree of symptomatology.      His reflexes are within normal limit and there is no evidence of hyperreflexia.  He however was shaking and had difficulty with balance.  He has had an MRI study of the brain which showed stable infarcts.  I did explain  to him that the degenerative spinal listhesis and spondylosis evident in his lumbar spine does explain his back and lower extreme symptoms but does not at explain his amatory dysfunction, tremors and generalized weakness.  I am concerned that he may have spinal cord compression needed in his neck or in his upper back causing his symptoms.  I went ahead and ordered an MRI study of his neck and upper back to rule out spinal cord lesions.      I will see him back following the completion of the studies he most likely will need physical therapy for his back pain.  Unless there are pathologic lesions affecting his spinal cord.      The natural history of the patient's symptoms was discussed today. The best plan of care for the patient is to continue physical therapy. STAT cervical and thoracic MRI studies were ordered in the office today to evaluate for spinal cord and nerve root compression due to the patient's lower extremity weakness.     I will see the patient back following the MRI studies.        Scribe Attestation      I,:  Yesica Chambers am acting as a scribe while in the presence of the attending physician.:       I,:  Malick Barnett MD personally performed the services described in this documentation    as scribed in my presence.:

## 2024-03-21 ENCOUNTER — ANESTHESIA EVENT (OUTPATIENT)
Dept: GASTROENTEROLOGY | Facility: HOSPITAL | Age: 54
End: 2024-03-21

## 2024-03-21 ENCOUNTER — HOSPITAL ENCOUNTER (OUTPATIENT)
Dept: GASTROENTEROLOGY | Facility: HOSPITAL | Age: 54
Setting detail: OUTPATIENT SURGERY
End: 2024-03-21
Payer: COMMERCIAL

## 2024-03-21 ENCOUNTER — TELEPHONE (OUTPATIENT)
Dept: NEUROLOGY | Facility: CLINIC | Age: 54
End: 2024-03-21

## 2024-03-21 ENCOUNTER — ANESTHESIA (OUTPATIENT)
Dept: GASTROENTEROLOGY | Facility: HOSPITAL | Age: 54
End: 2024-03-21

## 2024-03-21 VITALS
RESPIRATION RATE: 18 BRPM | TEMPERATURE: 97.6 F | OXYGEN SATURATION: 98 % | WEIGHT: 182.98 LBS | HEIGHT: 66 IN | DIASTOLIC BLOOD PRESSURE: 81 MMHG | SYSTOLIC BLOOD PRESSURE: 133 MMHG | HEART RATE: 62 BPM | BODY MASS INDEX: 29.41 KG/M2

## 2024-03-21 DIAGNOSIS — K62.5 RECTAL BLEEDING: ICD-10-CM

## 2024-03-21 DIAGNOSIS — R93.3 ABNORMAL CT SCAN, COLON: ICD-10-CM

## 2024-03-21 PROBLEM — I82.409 DVT (DEEP VENOUS THROMBOSIS) (HCC): Status: ACTIVE | Noted: 2024-03-21

## 2024-03-21 PROBLEM — I25.10 CAD (CORONARY ARTERY DISEASE): Status: ACTIVE | Noted: 2024-03-21

## 2024-03-21 PROCEDURE — 45378 DIAGNOSTIC COLONOSCOPY: CPT | Performed by: INTERNAL MEDICINE

## 2024-03-21 RX ORDER — PROPOFOL 10 MG/ML
INJECTION, EMULSION INTRAVENOUS AS NEEDED
Status: DISCONTINUED | OUTPATIENT
Start: 2024-03-21 | End: 2024-03-21

## 2024-03-21 RX ORDER — LIDOCAINE HYDROCHLORIDE 20 MG/ML
INJECTION, SOLUTION EPIDURAL; INFILTRATION; INTRACAUDAL; PERINEURAL AS NEEDED
Status: DISCONTINUED | OUTPATIENT
Start: 2024-03-21 | End: 2024-03-21

## 2024-03-21 RX ORDER — SODIUM CHLORIDE, SODIUM LACTATE, POTASSIUM CHLORIDE, CALCIUM CHLORIDE 600; 310; 30; 20 MG/100ML; MG/100ML; MG/100ML; MG/100ML
INJECTION, SOLUTION INTRAVENOUS CONTINUOUS PRN
Status: DISCONTINUED | OUTPATIENT
Start: 2024-03-21 | End: 2024-03-21

## 2024-03-21 RX ADMIN — PROPOFOL 20 MG: 10 INJECTION, EMULSION INTRAVENOUS at 11:36

## 2024-03-21 RX ADMIN — PROPOFOL 20 MG: 10 INJECTION, EMULSION INTRAVENOUS at 11:34

## 2024-03-21 RX ADMIN — PROPOFOL 100 MG: 10 INJECTION, EMULSION INTRAVENOUS at 11:30

## 2024-03-21 RX ADMIN — LIDOCAINE HYDROCHLORIDE 60 MG: 20 INJECTION, SOLUTION EPIDURAL; INFILTRATION; INTRACAUDAL; PERINEURAL at 11:30

## 2024-03-21 RX ADMIN — SODIUM CHLORIDE, SODIUM LACTATE, POTASSIUM CHLORIDE, AND CALCIUM CHLORIDE: .6; .31; .03; .02 INJECTION, SOLUTION INTRAVENOUS at 11:25

## 2024-03-21 NOTE — TELEPHONE ENCOUNTER
Pt couldn't make it at the time of open appointment next week so he scheduled for 4/2/24 @ 9am VV.

## 2024-03-21 NOTE — ANESTHESIA POSTPROCEDURE EVALUATION
Post-Op Assessment Note    CV Status:  Stable  Pain Score: 0    Pain management: adequate       Mental Status:  Sleepy   Hydration Status:  Euvolemic   PONV Controlled:  Controlled   Airway Patency:  Patent     Post Op Vitals Reviewed: Yes    No anethesia notable event occurred.    Staff: CRNA               BP 94/62 (03/21/24 1144)    Temp   97.5   Pulse 61 (03/21/24 1144)   Resp 16 (03/21/24 1144)    SpO2 99 % (03/21/24 1144)

## 2024-03-21 NOTE — ANESTHESIA PREPROCEDURE EVALUATION
Procedure:  COLONOSCOPY    Relevant Problems   ANESTHESIA (within normal limits)      CARDIO   (+) CAD (coronary artery disease)   (+) DVT (deep venous thrombosis) (HCC)   (+) Hypertension      ENDO (within normal limits)      GI/HEPATIC  Confirmed NPO appropriate  S/p bowel prep      /RENAL   (+) Chronic kidney disease, stage 3a (HCC)   (+) Hypertensive CKD (chronic kidney disease)   (+) Lupus nephritis (HCC)   (+) Stage 2 chronic kidney disease      HEMATOLOGY   (+) Anemia in chronic kidney disease      MUSCULOSKELETAL   (+) DJD (degenerative joint disease), ankle and foot, right      PULMONARY (within normal limits)      Rheumatology   (+) Lupus (systemic lupus erythematosus) (HCC)      Other   (+) History of DVT (deep vein thrombosis)        Physical Exam    Airway    Mallampati score: II  TM Distance: >3 FB  Neck ROM: full     Dental        Cardiovascular  Rhythm: regular, Rate: normal    Pulmonary  Pulmonary exam normal     Other Findings        Anesthesia Plan  ASA Score- 3     Anesthesia Type- IV sedation with anesthesia with ASA Monitors.         Additional Monitors:     Airway Plan:     Comment: I discussed the risks and benefits of IV sedation anesthesia including the possibility of the need to convert to general anesthesia and the potential risk of awareness.  The patient was given the opportunity to ask questions, which were answered.  Informed consent obtained via [employer] , Lacie/Debra #[id number].       Plan Factors-Exercise tolerance (METS): <4 METS.Exercise comment: Functional status limited by mobility issues, ambulates with walker.    Chart reviewed.        Patient is not a current smoker.              Induction- intravenous.    Postoperative Plan-     Informed Consent- Anesthetic plan and risks discussed with patient.  I personally reviewed this patient with the CRNA. Discussed and agreed on the Anesthesia Plan with the CRNA..

## 2024-03-21 NOTE — TELEPHONE ENCOUNTER
Hello,    I have Called pt no answer, LMOM.    Per below notes please schedule an OVL with  for new CVA ..      Thank you,     Dorinda

## 2024-03-21 NOTE — TELEPHONE ENCOUNTER
Called pt with . LMOM for pt to call back to make VV with Dr. Bob.     MD Olivia Rahman; [place]  Please offer VV 30 min OVS with me anytime next week - I would like discuss further plan of care.  Thanks          Previous Messages       ----- Message -----  From: Hanane Bob MD  Sent: 3/20/2024   2:36 PM EDT  To: Hanane Bob MD  Subject: LP

## 2024-03-21 NOTE — INTERVAL H&P NOTE
H&P reviewed. After examining the patient I find no changes in the patients condition since the H&P had been written.    Vitals:    03/21/24 1047   BP: 144/82   Pulse: 75   Resp: 12   Temp: (!) 97 °F (36.1 °C)   SpO2: 99%

## 2024-03-24 DIAGNOSIS — M32.9 SYSTEMIC LUPUS ERYTHEMATOSUS, UNSPECIFIED SLE TYPE, UNSPECIFIED ORGAN INVOLVEMENT STATUS (HCC): ICD-10-CM

## 2024-03-24 DIAGNOSIS — I63.421 CEREBROVASCULAR ACCIDENT (CVA) DUE TO EMBOLISM OF RIGHT ANTERIOR CEREBRAL ARTERY (HCC): Primary | ICD-10-CM

## 2024-03-25 ENCOUNTER — OFFICE VISIT (OUTPATIENT)
Dept: OBGYN CLINIC | Facility: CLINIC | Age: 54
End: 2024-03-25
Payer: COMMERCIAL

## 2024-03-25 VITALS
BODY MASS INDEX: 29.25 KG/M2 | HEART RATE: 96 BPM | HEIGHT: 66 IN | SYSTOLIC BLOOD PRESSURE: 100 MMHG | WEIGHT: 182 LBS | DIASTOLIC BLOOD PRESSURE: 62 MMHG

## 2024-03-25 DIAGNOSIS — M48.062 SPINAL STENOSIS OF LUMBAR REGION WITH NEUROGENIC CLAUDICATION: Primary | ICD-10-CM

## 2024-03-25 DIAGNOSIS — G89.29 CHRONIC BILATERAL LOW BACK PAIN WITH BILATERAL SCIATICA: ICD-10-CM

## 2024-03-25 DIAGNOSIS — M54.42 CHRONIC BILATERAL LOW BACK PAIN WITH BILATERAL SCIATICA: ICD-10-CM

## 2024-03-25 DIAGNOSIS — M54.41 CHRONIC BILATERAL LOW BACK PAIN WITH BILATERAL SCIATICA: ICD-10-CM

## 2024-03-25 PROCEDURE — 99213 OFFICE O/P EST LOW 20 MIN: CPT | Performed by: ORTHOPAEDIC SURGERY

## 2024-03-25 NOTE — PROGRESS NOTES
[facility] ORTHOPEDIC SPINE SURGERY  DR.AMIR ADAN MD  [address] [facility] [address] [phone]    HISTORY OF PRESENT ILLNESS:  Donta Hunter is a 54 y.o. male presents for follow up of lumbar spine with concerns for myelopathy and thoracic and cervical MRI review.  He feels the same.  Today he complains of low back pain with bilateral shin and foot numbness and pain with continued shaking and difficulty with balance.  He does use walker for ambulation.  He does use gabapentin with some benefit.  He did complete did attend physical therapy and states he continues with some at home yet repercussion of increase leg numbness.  He has had lumbar injection in past.  He denies past lumbar surgery yet has history of C4-C6 fusion.  He has worked with neurology in regard to the balance and shaking symptoms.          ALLERGIES:   Allergies   Allergen Reactions    Doxycycline Rash    Sulfa Antibiotics Rash       MEDICATIONS:    Current Outpatient Medications:     apixaban (ELIQUIS) 5 mg, Take 5 mg by mouth 2 (two) times a day, Disp: , Rfl:     betamethasone, augmented, (DIPROLENE) 0.05 % ointment, Apply 1 application. topically 2 (two) times a day, Disp: , Rfl:     clobetasol (TEMOVATE) 0.05 % cream, , Disp: , Rfl:     Diclofenac Sodium (VOLTAREN) 1 %, Apply 2 g topically 4 (four) times a day, Disp: 350 g, Rfl: 1    hydroxychloroquine (PLAQUENIL) 200 mg tablet, Take 400 mg by mouth daily at bedtime, Disp: , Rfl:     ketoconazole (NIZORAL) 2 % cream, Apply topically daily, Disp: 60 g, Rfl: 1    lidocaine-prilocaine (EMLA) cream, Apply topically as needed for mild pain, Disp: 30 g, Rfl: 3    mycophenolate (CELLCEPT) 500 mg tablet, Take 500 mg by mouth every 12 (twelve) hours , Disp: , Rfl:     bacitracin topical ointment 500 units/g topical ointment, APPLY TOPICALLY TO AFFECTED AREA THREE TIMES A DAY AS DIRECTED, Disp: , Rfl:     escitalopram (LEXAPRO) 10 mg tablet, Take 1 tablet (10 mg total) by mouth daily,  Disp: 90 tablet, Rfl: 3    gabapentin (NEURONTIN) 300 mg capsule, Take 1 capsule (300 mg total) by mouth 3 (three) times a day, Disp: 270 capsule, Rfl: 3    gabapentin (NEURONTIN) 600 MG tablet, Take 1 tablet (600 mg total) by mouth 3 (three) times a day, Disp: 270 tablet, Rfl: 3    loratadine (CLARITIN) 10 mg tablet, Take 1 tablet (10 mg total) by mouth daily, Disp: 30 tablet, Rfl: 2     PAST MEDICAL HISTORY:   Past Medical History:   Diagnosis Date    Anemia     Arthritis     Cervical mass     Chronic kidney disease     Coronary artery disease     Depression     DVT (deep venous thrombosis) (HCC)     Hypertension     Lupus (HCC)     Renal disorder        PAST SURGICAL HISTORY:  Past Surgical History:   Procedure Laterality Date    APPENDECTOMY      CERVICAL SPINE SURGERY      CHOLECYSTECTOMY      COLONOSCOPY N/A 8/23/2018    Procedure: COLONOSCOPY;  Surgeon: James Wise III, MD;  Location: [facility];  Service: Gastroenterology    ESOPHAGOGASTRODUODENOSCOPY N/A 8/22/2018    Procedure: ESOPHAGOGASTRODUODENOSCOPY (EGD);  Surgeon: James Wise III, MD;  Location: [facility];  Service: Gastroenterology    IR IVC FILTER PLACEMENT PERMANENT  9/7/2017    IR IVC FILTER REMOVAL  4/23/2018    IR LUMBAR PUNCTURE  11/23/2015    NECK SURGERY      US GUIDED INJECTION FOR RESEARCH STUDY  4/23/2018    US GUIDED INJECTION FOR RESEARCH STUDY  9/7/2017    VASCULAR SURGERY         SOCIAL HISTORY:  Social History     Tobacco Use    Smoking status: Never    Smokeless tobacco: Never   Vaping Use    Vaping status: Never Used   Substance Use Topics    Alcohol use: Never    Drug use: Never       ROS:  Review of Systems   Constitutional:  Negative for chills and fever.   HENT:  Negative for ear pain and sore throat.    Eyes:  Negative for pain and visual disturbance.   Respiratory:  Negative for cough and shortness of breath.    Cardiovascular:  Negative for chest pain and palpitations.   Gastrointestinal:  Negative for abdominal  pain and vomiting.   Genitourinary:  Negative for dysuria and hematuria.   Musculoskeletal:  Negative for arthralgias and back pain.   Skin:  Negative for color change and rash.   Neurological:  Negative for seizures and syncope.   All other systems reviewed and are negative.       PHYSICAL EXAM:  Patient ambulates without normal gait.   Shaking while walking with walker, leaning forward while standing, unsteady  No TTP over cervical, thoracic, or lumbar spine.    Sensation intact to light touch left L2 through S1 dermatomes.   Sensation intact to light touch right L2 through S1 dermatomes     ROM:  Pain free range of motion bilateral hips, knees, ankles  No atrophy  No deformity  Symmetric deep tendon reflexes bilateral upper extremities    Symmetric deep tendon reflexes bilateral lower extremities     2+ patellar tendon   Absent achilles tendon  Clonus negative  The patient is well perfused distally.      RADIOGRAPHIC STUDIES:  X-ray, L-spine, 3/18/2024: Mild multilevel lumbar degenerative disc except at L4-5 where there is evidence of moderate degenerative changes, degenerative spondylolisthesis and mild kyphosis.    MRI, L-spine, 2/22/2024: Mild multilevel lumbar degenerative disease most prominent at L4-5.  There is also grade 1 degenerative spondylolisthesis at L4-5 with mild to moderate lateral recess stenosis.  No evidence of central stenosis.  Mild to moderate lateral recess stenosis present at L3-4.    MRI, brain, 2/22/2024:stable chronic cerebellar lacunar infarcts.    Cervical MRI of 3/18/2024: Prior fusion at C4-5 and C5-6.  The fusion is stable.  DDD at C3-4 and C6-7.  No spinal cord compression or myomalacia.  Mild flattening of the spinal cord at C3-4.    Thoracic MRI of 3/18/2024:  No evidence of cord compression.  Canal is wide open.  No significant deformity.        ASSESSMENT:  Problem List Items Addressed This Visit    None  Visit Diagnoses       Spinal stenosis of lumbar region with neurogenic  claudication    -  Primary    Relevant Orders    Ambulatory referral to Physical Therapy    Chronic bilateral low back pain with bilateral sciatica        Relevant Orders    Ambulatory referral to Physical Therapy                PLAN:  54-year-old gentleman who presents for follow up of back pain and ambulatory dysfunction with cervical and thoracic MRI review.  The patient continues to have pain complaints of low back pain with bilateral shin an foot pain and numbness yet greater concerns of loss of balance with shaking.  He has been using a walker.  I did order MRIs of his neck and upper back to rule out spinal cord compression.  That does not appear to be the case.    Cervical and thoracic MRI were reviewed with patient displaying no evidence of myomalacia or cord compression.      Does have stenosis at L3-4 and to a great extent at L4-5.  There is also evidence of degenerative spinal listhesis at L4-5.  Although these conditions can cause ambulatory dysfunction, it is unlikely to cause the unsteadiness noted in this case.  Despite this I feel that it is important to treat the spinal stenosis.  I suggested a short course of pool therapy which I believe is going to be more effective given the presentation.    The patient will be prescribed aqua-therapy to address the lumbar and leg pain complaints.  He is encouraged to continue care with his neurologist as the cervical and thoracic MRIs do not explain the patient shaking and balance loss.  He should follow up in 6 weeks.         Scribe Attestation      I,:  Donnie Padilla am acting as a scribe while in the presence of the attending physician.:       I,:  Malick Barnett MD personally performed the services described in this documentation    as scribed in my presence.:

## 2024-04-02 ENCOUNTER — TELEMEDICINE (OUTPATIENT)
Dept: NEUROLOGY | Facility: CLINIC | Age: 54
End: 2024-04-02
Payer: COMMERCIAL

## 2024-04-02 ENCOUNTER — PREP FOR PROCEDURE (OUTPATIENT)
Dept: NEUROLOGY | Facility: CLINIC | Age: 54
End: 2024-04-02

## 2024-04-02 VITALS — WEIGHT: 180 LBS | HEIGHT: 66 IN | BODY MASS INDEX: 28.93 KG/M2

## 2024-04-02 DIAGNOSIS — Z86.73 HISTORY OF STROKE IN ADULTHOOD: Primary | ICD-10-CM

## 2024-04-02 DIAGNOSIS — M32.9 SYSTEMIC LUPUS ERYTHEMATOSUS, UNSPECIFIED SLE TYPE, UNSPECIFIED ORGAN INVOLVEMENT STATUS (HCC): ICD-10-CM

## 2024-04-02 DIAGNOSIS — G05.3: ICD-10-CM

## 2024-04-02 DIAGNOSIS — M32.19: ICD-10-CM

## 2024-04-02 DIAGNOSIS — M48.062 SPINAL STENOSIS OF LUMBAR REGION WITH NEUROGENIC CLAUDICATION: ICD-10-CM

## 2024-04-02 DIAGNOSIS — I63.421 CEREBROVASCULAR ACCIDENT (CVA) DUE TO EMBOLISM OF RIGHT ANTERIOR CEREBRAL ARTERY (HCC): Primary | ICD-10-CM

## 2024-04-02 DIAGNOSIS — R25.2 SPASTICITY: ICD-10-CM

## 2024-04-02 DIAGNOSIS — M79.2 NEUROPATHIC PAIN: ICD-10-CM

## 2024-04-02 DIAGNOSIS — M62.81 MUSCLE WEAKNESS (GENERALIZED): ICD-10-CM

## 2024-04-02 PROCEDURE — 99212 OFFICE O/P EST SF 10 MIN: CPT | Performed by: PSYCHIATRY & NEUROLOGY

## 2024-04-02 RX ORDER — POTASSIUM CHLORIDE 20 MEQ/1
TABLET, EXTENDED RELEASE ORAL
COMMUNITY
Start: 2024-03-30

## 2024-04-02 NOTE — PROGRESS NOTES
Virtual Regular Visit    Verification of patient location:    Patient is located at Home in the following state in which I hold an active license [place]      Assessment/Plan:    Problem List Items Addressed This Visit          Rheumatology    Lupus (systemic lupus erythematosus) (HCC)    Relevant Orders    EMG 2 limb lower extremity       Surgery/Wound/Pain    Neuropathic pain    Relevant Orders    EMG 2 limb lower extremity       Orthopedic/Musculoskeletal    Muscle weakness (generalized)    Relevant Orders    EMG 2 limb lower extremity    Spasticity    Relevant Orders    EMG 2 limb lower extremity       Neurology/Sleep    History of stroke in adulthood - Primary     Other Visit Diagnoses       Spinal stenosis of lumbar region with neurogenic claudication        Relevant Orders    EMG 2 limb lower extremity                 Reason for visit is FU  Chief Complaint   Patient presents with    Virtual Regular Visit          Encounter provider Hanane Bob MD    Provider located at [address] [place] [address] [facility] [address] [address]      Recent Visits  No visits were found meeting these conditions.  Showing recent visits within past 7 days and meeting all other requirements  Today's Visits  Date Type Provider Dept   04/02/24 Telemedicine Hanane Bob MD [facility]   Showing today's visits and meeting all other requirements  Future Appointments  No visits were found meeting these conditions.  Showing future appointments within next 150 days and meeting all other requirements       The patient was identified by name and date of birth. Donta Hunter was informed that this is a telemedicine visit and that the visit is being conducted through the Epic Embedded platform. He agrees to proceed..  My office door was closed. No one else was in the room.  He acknowledged consent and understanding of privacy and security of the video platform. The patient has  agreed to participate and understands they can discontinue the visit at any time.    Patient is aware this is a billable service.     Subjective  Donta Hunter is a 54 y.o. male recurrent CVAs, ambulatory dysfunction .      HPI         Mr. Hunter has presented to [facility] for follow-up on multiple neurological complaint in the setting of remote history of SLE/lupus nephritis.    Today's complaints are: patient reports having fatigue and lightheaded, that comes with headaches and weakness throughout his body. He states they are everyday.  He also reports when he wakes up in the morning his RT eye has issues seeing clearly.      Patient continue describing ambulatory dysfunction with bilateral lower extremity weakness left worse than right, lower back pain and ambulatory dysfunction as patient required walker for ambulation.     Patient has been taking Plaquenil and CellCept as the 2 main immunosuppressive regimens for lupus and lupus nephritis.     Patient has establish care with rheumatology team in [facility] Dr. Cadena.  Provider was reached to discuss recurrent strokes in the setting of known lupus and anticoagulation.    Remote history of FTA-ABS positive findings noted.      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        We also discussed patient ambulatory dysfunction is due to spinal stenosis with multilevel severe radiculopathy been appreciated.  Patient has never seen pain management team orthopedic surgical team-referral were offered and patient is to establish with a teams for further management, regardless of proceeding with rituximab infusion timing.     Patient describes no new sensorimotor dysfunction, no bladder dysfunction, no vision loss.     ASSESSMENT AND PLAN      Mr. Hunter has presented to [facility] multiple sclerosis center for follow-up on recurrent strokes and ambulatory dysfunction.    Patient has been taking CellCept and Plaquenil for SLE and lupus nephritis, patient is on Eliquis due to CVI and DVTs; patient has known hypertension, CKD stage IIIa.    Patient has scheduled 2D echo during last visit, study has not been completed yet.  Last 2D echo was done in 2019 consistent with normal left ventricular systolic function with ejection fraction 60 with no regional wall motion abnormalities but no other major pathologies.    CT angiogram hand was consistent with no intracranial vascular pathology with no atherosclerotic changes but CAT scan still suggested focal area of subacute ischemia with left paramedian frontal lobe has been identified on recent MRIs.    Brain MRI was done in February 2024 patient was found to have subacute lacunar ischemic changes left parasagittal frontal lobe with multiple other cortical/subcortical chronic ischemia within right frontal lobe and cerebellum, all changes are new since 2019.    Has been taking Eliquis.    No antiplatelet regimen advised considering patient has no signs of intracranial atherosclerosis.    Patient has close follow-up with rheumatology  and heme oncology Dr. Herrera-workup for antiphospholipid antibody syndrome been repeated and negative.    Considering ongoing discussion about lupus cerebritis versus other causes of patient recurrent strokes, patient was advised to proceed with spinal tap.  Today patient agreed with having spinal tap done-I placed the order and [facility] office will be helping patient to complete his workup.    Patient was also offered repeating EMG b/l LE -order was placed with [facility] neurology.    Patient will have close follow-up with [facility] stroke team Dr. Mulligan -Case was discussed.    Past Medical History:   Diagnosis Date    Anemia     Arthritis     Cervical mass      Chronic kidney disease     Coronary artery disease     Depression     DVT (deep venous thrombosis) (HCC)     Hypertension     Lupus (HCC)     Renal disorder        Past Surgical History:   Procedure Laterality Date    APPENDECTOMY      CERVICAL SPINE SURGERY      CHOLECYSTECTOMY      COLONOSCOPY N/A 8/23/2018    Procedure: COLONOSCOPY;  Surgeon: James Wise III, MD;  Location: [facility];  Service: Gastroenterology    ESOPHAGOGASTRODUODENOSCOPY N/A 8/22/2018    Procedure: ESOPHAGOGASTRODUODENOSCOPY (EGD);  Surgeon: James Wise III, MD;  Location: [facility];  Service: Gastroenterology    IR IVC FILTER PLACEMENT PERMANENT  9/7/2017    IR IVC FILTER REMOVAL  4/23/2018    IR LUMBAR PUNCTURE  11/23/2015    NECK SURGERY      US GUIDED INJECTION FOR RESEARCH STUDY  4/23/2018    US GUIDED INJECTION FOR RESEARCH STUDY  9/7/2017    VASCULAR SURGERY         Current Outpatient Medications   Medication Sig Dispense Refill    apixaban (ELIQUIS) 5 mg Take 5 mg by mouth 2 (two) times a day      betamethasone, augmented, (DIPROLENE) 0.05 % ointment Apply 1 application. topically 2 (two) times a day      clobetasol (TEMOVATE) 0.05 % cream       Diclofenac Sodium (VOLTAREN) 1 % Apply 2 g topically 4 (four) times a day 350 g 1    hydroxychloroquine (PLAQUENIL) 200 mg tablet Take 400 mg by mouth daily at bedtime      ketoconazole (NIZORAL) 2 % cream Apply topically daily 60 g 1    lidocaine-prilocaine (EMLA) cream Apply topically as needed for mild pain 30 g 3    mycophenolate (CELLCEPT) 500 mg tablet Take 500 mg by mouth every 12 (twelve) hours       potassium chloride (Klor-Con M20) 20 mEq tablet       bacitracin topical ointment 500 units/g topical ointment APPLY TOPICALLY TO AFFECTED AREA THREE TIMES A DAY AS DIRECTED      escitalopram (LEXAPRO) 10 mg tablet Take 1 tablet (10 mg total) by mouth daily 90 tablet 3    gabapentin (NEURONTIN) 300 mg capsule Take 1 capsule (300 mg total) by mouth 3 (three) times a day 270 capsule  "3    gabapentin (NEURONTIN) 600 MG tablet Take 1 tablet (600 mg total) by mouth 3 (three) times a day 270 tablet 3    loratadine (CLARITIN) 10 mg tablet Take 1 tablet (10 mg total) by mouth daily 30 tablet 2     No current facility-administered medications for this visit.        Allergies   Allergen Reactions    Doxycycline Rash    Sulfa Antibiotics Rash       Review of Systems   Constitutional:  Positive for fatigue. Negative for appetite change and fever.   HENT: Negative.  Negative for hearing loss, tinnitus, trouble swallowing and voice change.    Eyes: Negative.  Negative for photophobia, pain and visual disturbance.   Respiratory: Negative.  Negative for shortness of breath.    Cardiovascular: Negative.  Negative for palpitations.   Gastrointestinal: Negative.  Negative for nausea and vomiting.   Endocrine: Negative.  Negative for cold intolerance.   Genitourinary: Negative.  Negative for dysuria, frequency and urgency.   Musculoskeletal:  Negative for back pain, gait problem, myalgias, neck pain and neck stiffness.   Skin: Negative.  Negative for rash.   Allergic/Immunologic: Negative.    Neurological:  Positive for weakness and light-headedness. Negative for dizziness, tremors, seizures, syncope, facial asymmetry, speech difficulty, numbness and headaches.   Hematological: Negative.  Does not bruise/bleed easily.   Psychiatric/Behavioral: Negative.  Negative for confusion, hallucinations and sleep disturbance.        Video Exam    Vitals:    04/02/24 0855   Weight: 81.6 kg (180 lb)   Height: 5' 6\" (1.676 m)       Physical Exam     Visit Time  Total Visit Duration: 15 min  I have spent a total time of 15 minutes on 04/02/24 in caring for this patient including Diagnostic results, Counseling / Coordination of care, Reviewing / ordering tests, medicine, procedures  , and Communicating with other healthcare professionals .         "

## 2024-04-03 ENCOUNTER — HOSPITAL ENCOUNTER (OUTPATIENT)
Dept: NON INVASIVE DIAGNOSTICS | Facility: HOSPITAL | Age: 54
Discharge: HOME | End: 2024-04-03
Attending: PSYCHIATRY & NEUROLOGY
Payer: COMMERCIAL

## 2024-04-03 VITALS
HEART RATE: 59 BPM | HEIGHT: 66 IN | BODY MASS INDEX: 28.93 KG/M2 | DIASTOLIC BLOOD PRESSURE: 62 MMHG | SYSTOLIC BLOOD PRESSURE: 100 MMHG | WEIGHT: 180 LBS

## 2024-04-03 DIAGNOSIS — I63.421 CEREBROVASCULAR ACCIDENT (CVA) DUE TO EMBOLISM OF RIGHT ANTERIOR CEREBRAL ARTERY (HCC): ICD-10-CM

## 2024-04-03 DIAGNOSIS — M32.9 SYSTEMIC LUPUS ERYTHEMATOSUS, UNSPECIFIED SLE TYPE, UNSPECIFIED ORGAN INVOLVEMENT STATUS (HCC): ICD-10-CM

## 2024-04-03 LAB
AORTIC ROOT: 3.6 CM
APICAL FOUR CHAMBER EJECTION FRACTION: 56 %
ASCENDING AORTA: 3.7 CM
BSA FOR ECHO PROCEDURE: 1.91 M2
E WAVE DECELERATION TIME: 212 MS
E/A RATIO: 0.83
FRACTIONAL SHORTENING: 27 (ref 28–44)
INTERVENTRICULAR SEPTUM IN DIASTOLE (PARASTERNAL SHORT AXIS VIEW): 1.2 CM
INTERVENTRICULAR SEPTUM: 1.2 CM (ref 0.6–1.1)
LAAS-AP2: 17.3 CM2
LAAS-AP4: 15.7 CM2
LEFT ATRIUM AREA SYSTOLE SINGLE PLANE A4C: 15.5 CM2
LEFT ATRIUM SIZE: 4.2 CM
LEFT ATRIUM VOLUME (MOD BIPLANE): 45 ML
LEFT ATRIUM VOLUME INDEX (MOD BIPLANE): 23.6 ML/M2
LEFT INTERNAL DIMENSION IN SYSTOLE: 3.5 CM (ref 2.1–4)
LEFT VENTRICULAR INTERNAL DIMENSION IN DIASTOLE: 4.8 CM (ref 3.5–6)
LEFT VENTRICULAR POSTERIOR WALL IN END DIASTOLE: 1.3 CM
LEFT VENTRICULAR STROKE VOLUME: 58 ML
LVSV (TEICH): 58 ML
MV E'TISSUE VEL-LAT: 13 CM/S
MV E'TISSUE VEL-SEP: 9 CM/S
MV PEAK A VEL: 0.54 M/S
MV PEAK E VEL: 45 CM/S
MV STENOSIS PRESSURE HALF TIME: 61 MS
MV VALVE AREA P 1/2 METHOD: 3.61
RIGHT ATRIUM AREA SYSTOLE A4C: 17.5 CM2
RIGHT VENTRICLE ID DIMENSION: 2.9 CM
SL CV LEFT ATRIUM LENGTH A2C: 5.9 CM
SL CV LV EF: 55
SL CV PED ECHO LEFT VENTRICLE DIASTOLIC VOLUME (MOD BIPLANE) 2D: 107 ML
SL CV PED ECHO LEFT VENTRICLE SYSTOLIC VOLUME (MOD BIPLANE) 2D: 49 ML
TR MAX PG: 19 MMHG
TR PEAK VELOCITY: 2.2 M/S
TRICUSPID ANNULAR PLANE SYSTOLIC EXCURSION: 2.2 CM
TRICUSPID VALVE PEAK REGURGITATION VELOCITY: 2.16 M/S

## 2024-04-03 PROCEDURE — 93306 TTE W/DOPPLER COMPLETE: CPT

## 2024-04-03 PROCEDURE — 93306 TTE W/DOPPLER COMPLETE: CPT | Performed by: INTERNAL MEDICINE

## 2024-04-08 DIAGNOSIS — M19.012 ARTHRITIS OF LEFT ACROMIOCLAVICULAR JOINT: ICD-10-CM

## 2024-04-09 DIAGNOSIS — M79.2 NEUROPATHIC PAIN: ICD-10-CM

## 2024-04-09 RX ORDER — GABAPENTIN 300 MG/1
300 CAPSULE ORAL 3 TIMES DAILY
Qty: 270 CAPSULE | Refills: 3 | Status: SHIPPED | OUTPATIENT
Start: 2024-04-09 | End: 2024-07-08

## 2024-04-10 ENCOUNTER — TELEPHONE (OUTPATIENT)
Dept: NEPHROLOGY | Facility: CLINIC | Age: 54
End: 2024-04-10

## 2024-04-10 NOTE — TELEPHONE ENCOUNTER
I called [facility] @ [phone] and spoke to Kathleen TOTH () to check eligible for the patient and as of 4/10/2024 this patient has current active coverage as of 5/1/2020. The reference number for this call is: [id number]. Keesha Harris, .

## 2024-04-12 ENCOUNTER — APPOINTMENT (EMERGENCY)
Dept: RADIOLOGY | Facility: HOSPITAL | Age: 54
End: 2024-04-12
Payer: COMMERCIAL

## 2024-04-12 ENCOUNTER — HOSPITAL ENCOUNTER (EMERGENCY)
Facility: HOSPITAL | Age: 54
Discharge: HOME | End: 2024-04-12
Attending: EMERGENCY MEDICINE
Payer: COMMERCIAL

## 2024-04-12 ENCOUNTER — HOSPITAL ENCOUNTER (OUTPATIENT)
Facility: HOSPITAL | Age: 54
Setting detail: OBSERVATION
Discharge: HOME | End: 2024-04-14
Attending: EMERGENCY MEDICINE | Admitting: FAMILY MEDICINE
Payer: COMMERCIAL

## 2024-04-12 VITALS
OXYGEN SATURATION: 100 % | HEART RATE: 64 BPM | SYSTOLIC BLOOD PRESSURE: 134 MMHG | TEMPERATURE: 98 F | DIASTOLIC BLOOD PRESSURE: 66 MMHG | RESPIRATION RATE: 18 BRPM

## 2024-04-12 DIAGNOSIS — M79.671 PAIN IN BOTH FEET: Primary | ICD-10-CM

## 2024-04-12 DIAGNOSIS — M79.672 PAIN IN BOTH FEET: Primary | ICD-10-CM

## 2024-04-12 DIAGNOSIS — E87.1 HYPONATREMIA: Primary | ICD-10-CM

## 2024-04-12 DIAGNOSIS — R00.2 HEART PALPITATIONS: ICD-10-CM

## 2024-04-12 DIAGNOSIS — R07.9 CHEST PAIN: ICD-10-CM

## 2024-04-12 LAB
ALBUMIN SERPL BCP-MCNC: 4.2 G/DL (ref 3.5–5)
ALP SERPL-CCNC: 113 U/L (ref 34–104)
ALT SERPL W P-5'-P-CCNC: 20 U/L (ref 7–52)
ANION GAP SERPL CALCULATED.3IONS-SCNC: 6 MMOL/L (ref 4–13)
APTT PPP: 30 SECONDS (ref 23–37)
AST SERPL W P-5'-P-CCNC: 24 U/L (ref 13–39)
BASOPHILS # BLD AUTO: 0.01 THOUSANDS/ÂΜL (ref 0–0.1)
BASOPHILS NFR BLD AUTO: 1 % (ref 0–1)
BILIRUB DIRECT SERPL-MCNC: 0.09 MG/DL (ref 0–0.2)
BILIRUB SERPL-MCNC: 0.41 MG/DL (ref 0.2–1)
BUN SERPL-MCNC: 9 MG/DL (ref 5–25)
CALCIUM SERPL-MCNC: 8.7 MG/DL (ref 8.4–10.2)
CARDIAC TROPONIN I PNL SERPL HS: 3 NG/L
CHLORIDE SERPL-SCNC: 94 MMOL/L (ref 96–108)
CO2 SERPL-SCNC: 26 MMOL/L (ref 21–32)
CREAT SERPL-MCNC: 0.9 MG/DL (ref 0.6–1.3)
EOSINOPHIL # BLD AUTO: 0.01 THOUSAND/ÂΜL (ref 0–0.61)
EOSINOPHIL NFR BLD AUTO: 1 % (ref 0–6)
ERYTHROCYTE [DISTWIDTH] IN BLOOD BY AUTOMATED COUNT: 12.5 % (ref 11.6–15.1)
FLUAV RNA RESP QL NAA+PROBE: NEGATIVE
FLUBV RNA RESP QL NAA+PROBE: NEGATIVE
GFR SERPL CREATININE-BSD FRML MDRD: 95 ML/MIN/1.73SQ M
GLUCOSE SERPL-MCNC: 99 MG/DL (ref 65–140)
HCT VFR BLD AUTO: 38.1 % (ref 36.5–49.3)
HGB BLD-MCNC: 13.9 G/DL (ref 12–17)
IMM GRANULOCYTES # BLD AUTO: 0.01 THOUSAND/UL (ref 0–0.2)
IMM GRANULOCYTES NFR BLD AUTO: 1 % (ref 0–2)
INR PPP: 1.05 (ref 0.84–1.19)
LIPASE SERPL-CCNC: 62 U/L (ref 11–82)
LYMPHOCYTES # BLD AUTO: 0.64 THOUSANDS/ÂΜL (ref 0.6–4.47)
LYMPHOCYTES NFR BLD AUTO: 38 % (ref 14–44)
MCH RBC QN AUTO: 31.7 PG (ref 26.8–34.3)
MCHC RBC AUTO-ENTMCNC: 36.5 G/DL (ref 31.4–37.4)
MCV RBC AUTO: 87 FL (ref 82–98)
MONOCYTES # BLD AUTO: 0.41 THOUSAND/ÂΜL (ref 0.17–1.22)
MONOCYTES NFR BLD AUTO: 26 % (ref 4–12)
NEUTROPHILS # BLD AUTO: 0.53 THOUSANDS/ÂΜL (ref 1.85–7.62)
NEUTS SEG NFR BLD AUTO: 33 % (ref 43–75)
NRBC BLD AUTO-RTO: 0 /100 WBCS
PLATELET # BLD AUTO: 143 THOUSANDS/UL (ref 149–390)
PMV BLD AUTO: 10.6 FL (ref 8.9–12.7)
POTASSIUM SERPL-SCNC: 4.6 MMOL/L (ref 3.5–5.3)
PROT SERPL-MCNC: 7.4 G/DL (ref 6.4–8.4)
PROTHROMBIN TIME: 14.3 SECONDS (ref 11.6–14.5)
RBC # BLD AUTO: 4.38 MILLION/UL (ref 3.88–5.62)
RSV RNA RESP QL NAA+PROBE: NEGATIVE
SARS-COV-2 RNA RESP QL NAA+PROBE: NEGATIVE
SODIUM SERPL-SCNC: 126 MMOL/L (ref 135–147)
WBC # BLD AUTO: 1.61 THOUSAND/UL (ref 4.31–10.16)

## 2024-04-12 PROCEDURE — 71046 X-RAY EXAM CHEST 2 VIEWS: CPT

## 2024-04-12 PROCEDURE — 96374 THER/PROPH/DIAG INJ IV PUSH: CPT

## 2024-04-12 PROCEDURE — 0241U HB NFCT DS VIR RESP RNA 4 TRGT: CPT | Performed by: EMERGENCY MEDICINE

## 2024-04-12 PROCEDURE — 99285 EMERGENCY DEPT VISIT HI MDM: CPT | Performed by: EMERGENCY MEDICINE

## 2024-04-12 PROCEDURE — 85025 COMPLETE CBC W/AUTO DIFF WBC: CPT | Performed by: EMERGENCY MEDICINE

## 2024-04-12 PROCEDURE — 85610 PROTHROMBIN TIME: CPT | Performed by: EMERGENCY MEDICINE

## 2024-04-12 PROCEDURE — 85730 THROMBOPLASTIN TIME PARTIAL: CPT | Performed by: EMERGENCY MEDICINE

## 2024-04-12 PROCEDURE — 99284 EMERGENCY DEPT VISIT MOD MDM: CPT | Performed by: PHYSICIAN ASSISTANT

## 2024-04-12 PROCEDURE — 99285 EMERGENCY DEPT VISIT HI MDM: CPT

## 2024-04-12 PROCEDURE — 93005 ELECTROCARDIOGRAM TRACING: CPT

## 2024-04-12 PROCEDURE — 80076 HEPATIC FUNCTION PANEL: CPT | Performed by: EMERGENCY MEDICINE

## 2024-04-12 PROCEDURE — 80048 BASIC METABOLIC PNL TOTAL CA: CPT | Performed by: EMERGENCY MEDICINE

## 2024-04-12 PROCEDURE — 84484 ASSAY OF TROPONIN QUANT: CPT | Performed by: EMERGENCY MEDICINE

## 2024-04-12 PROCEDURE — 96361 HYDRATE IV INFUSION ADD-ON: CPT

## 2024-04-12 PROCEDURE — 99284 EMERGENCY DEPT VISIT MOD MDM: CPT

## 2024-04-12 PROCEDURE — 36415 COLL VENOUS BLD VENIPUNCTURE: CPT | Performed by: EMERGENCY MEDICINE

## 2024-04-12 PROCEDURE — C9113 INJ PANTOPRAZOLE SODIUM, VIA: HCPCS | Performed by: EMERGENCY MEDICINE

## 2024-04-12 PROCEDURE — 83690 ASSAY OF LIPASE: CPT | Performed by: EMERGENCY MEDICINE

## 2024-04-12 RX ORDER — SUCRALFATE 1 G/1
1 TABLET ORAL ONCE
Status: COMPLETED | OUTPATIENT
Start: 2024-04-12 | End: 2024-04-12

## 2024-04-12 RX ORDER — MAGNESIUM HYDROXIDE/ALUMINUM HYDROXICE/SIMETHICONE 120; 1200; 1200 MG/30ML; MG/30ML; MG/30ML
30 SUSPENSION ORAL ONCE
Status: COMPLETED | OUTPATIENT
Start: 2024-04-12 | End: 2024-04-12

## 2024-04-12 RX ORDER — PANTOPRAZOLE SODIUM 40 MG/10ML
40 INJECTION, POWDER, LYOPHILIZED, FOR SOLUTION INTRAVENOUS ONCE
Status: COMPLETED | OUTPATIENT
Start: 2024-04-12 | End: 2024-04-12

## 2024-04-12 RX ORDER — OXYCODONE HYDROCHLORIDE AND ACETAMINOPHEN 5; 325 MG/1; MG/1
1 TABLET ORAL ONCE
Status: COMPLETED | OUTPATIENT
Start: 2024-04-12 | End: 2024-04-12

## 2024-04-12 RX ADMIN — ALUMINUM HYDROXIDE, MAGNESIUM HYDROXIDE, AND SIMETHICONE 30 ML: 200; 200; 20 SUSPENSION ORAL at 22:02

## 2024-04-12 RX ADMIN — SUCRALFATE 1 G: 1 TABLET ORAL at 22:02

## 2024-04-12 RX ADMIN — OXYCODONE HYDROCHLORIDE AND ACETAMINOPHEN 1 TABLET: 5; 325 TABLET ORAL at 16:51

## 2024-04-12 RX ADMIN — SODIUM CHLORIDE 1000 ML: 0.9 INJECTION, SOLUTION INTRAVENOUS at 22:02

## 2024-04-12 RX ADMIN — PANTOPRAZOLE SODIUM 40 MG: 40 INJECTION, POWDER, FOR SOLUTION INTRAVENOUS at 21:59

## 2024-04-12 NOTE — ED PROVIDER NOTES
History  Chief Complaint   Patient presents with    Foot Pain     B/l foot pain x 3 days. Hx of neuropathy. Gabapentin Rx     54-year-old with bilateral foot pain.  Ongoing for the past 3 to 4 days.  Has chronic neuropathy but this feels more intense than usual.  He denies any rash redness or new swelling.  He has chronic lower extremity edema for which she wears compression stockings that he has upon arrival.  No trauma or injury.      History provided by:  Patient   used: No        Prior to Admission Medications   Prescriptions Last Dose Informant Patient Reported? Taking?   Diclofenac Sodium (VOLTAREN) 1 %   No No   Sig: Apply 2 g topically 4 (four) times a day   apixaban (ELIQUIS) 5 mg  Self Yes No   Sig: Take 5 mg by mouth 2 (two) times a day   bacitracin topical ointment 500 units/g topical ointment  Self Yes No   Sig: APPLY TOPICALLY TO AFFECTED AREA THREE TIMES A DAY AS DIRECTED   betamethasone, augmented, (DIPROLENE) 0.05 % ointment  Self Yes No   Sig: Apply 1 application. topically 2 (two) times a day   Patient not taking: Reported on 4/13/2024   clobetasol (TEMOVATE) 0.05 % cream  Self Yes No   escitalopram (LEXAPRO) 10 mg tablet  Self No No   Sig: Take 1 tablet (10 mg total) by mouth daily   gabapentin (NEURONTIN) 300 mg capsule   No No   Sig: Take 1 capsule (300 mg total) by mouth 3 (three) times a day   gabapentin (NEURONTIN) 600 MG tablet  Self No No   Sig: Take 1 tablet (600 mg total) by mouth 3 (three) times a day   hydroxychloroquine (PLAQUENIL) 200 mg tablet  Self Yes No   Sig: Take 400 mg by mouth daily at bedtime   ketoconazole (NIZORAL) 2 % cream  Self No No   Sig: Apply topically daily   lidocaine-prilocaine (EMLA) cream  Self No No   Sig: Apply topically as needed for mild pain   Patient not taking: Reported on 4/13/2024   loratadine (CLARITIN) 10 mg tablet  Self No No   Sig: Take 1 tablet (10 mg total) by mouth daily   mycophenolate (CELLCEPT) 500 mg tablet  Self Yes No    Sig: Take 500 mg by mouth every 12 (twelve) hours    potassium chloride (Klor-Con M20) 20 mEq tablet  Self Yes No      Facility-Administered Medications: None       Past Medical History:   Diagnosis Date    Anemia     Arthritis     Cervical mass     Chronic kidney disease     Coronary artery disease     Depression     DVT (deep venous thrombosis) (HCC)     Hypertension     Lupus (HCC)     Renal disorder        Past Surgical History:   Procedure Laterality Date    APPENDECTOMY      CERVICAL SPINE SURGERY      CHOLECYSTECTOMY      COLONOSCOPY N/A 8/23/2018    Procedure: COLONOSCOPY;  Surgeon: James Wise III, MD;  Location: [facility];  Service: Gastroenterology    ESOPHAGOGASTRODUODENOSCOPY N/A 8/22/2018    Procedure: ESOPHAGOGASTRODUODENOSCOPY (EGD);  Surgeon: James Wise III, MD;  Location: [facility];  Service: Gastroenterology    IR IVC FILTER PLACEMENT PERMANENT  9/7/2017    IR IVC FILTER REMOVAL  4/23/2018    IR LUMBAR PUNCTURE  11/23/2015    NECK SURGERY      US GUIDED INJECTION FOR RESEARCH STUDY  4/23/2018    US GUIDED INJECTION FOR RESEARCH STUDY  9/7/2017    VASCULAR SURGERY         Family History   Problem Relation Age of Onset    Heart disease Mother     No Known Problems Father      I have reviewed and agree with the history as documented.    E-Cigarette/Vaping    E-Cigarette Use Never User      E-Cigarette/Vaping Substances    Nicotine No     THC No     CBD No     Flavoring No     Other No     Unknown No      Social History     Tobacco Use    Smoking status: Never    Smokeless tobacco: Never   Vaping Use    Vaping status: Never Used   Substance Use Topics    Alcohol use: Never    Drug use: Never       Review of Systems   Constitutional:  Negative for chills and fever.   HENT:  Negative for ear pain and sore throat.    Eyes:  Negative for pain and visual disturbance.   Respiratory:  Negative for cough and shortness of breath.    Cardiovascular:  Negative for chest pain and palpitations.    Gastrointestinal:  Negative for abdominal pain and vomiting.   Genitourinary:  Negative for dysuria and hematuria.   Musculoskeletal:  Negative for arthralgias and back pain.   Skin:  Negative for color change and rash.   Neurological:  Negative for seizures and syncope.   All other systems reviewed and are negative.      Physical Exam  Physical Exam  Vitals and nursing note reviewed.   Constitutional:       General: He is not in acute distress.     Appearance: He is well-developed.   HENT:      Head: Normocephalic and atraumatic.   Eyes:      Conjunctiva/sclera: Conjunctivae normal.   Cardiovascular:      Rate and Rhythm: Normal rate and regular rhythm.      Pulses:           Dorsalis pedis pulses are 2+ on the right side and 2+ on the left side.      Heart sounds: No murmur heard.  Pulmonary:      Effort: Pulmonary effort is normal. No respiratory distress.      Breath sounds: Normal breath sounds.   Abdominal:      Palpations: Abdomen is soft.      Tenderness: There is no abdominal tenderness.   Musculoskeletal:         General: No swelling.      Cervical back: Neck supple.      Right lower le+ Pitting Edema present.      Left lower le+ Pitting Edema present.   Skin:     General: Skin is warm and dry.      Capillary Refill: Capillary refill takes less than 2 seconds.   Neurological:      Mental Status: He is alert.   Psychiatric:         Mood and Affect: Mood normal.         Vital Signs  ED Triage Vitals [24 1624]   Temperature Pulse Respirations Blood Pressure SpO2   98 °F (36.7 °C) 64 18 134/66 100 %      Temp Source Heart Rate Source Patient Position - Orthostatic VS BP Location FiO2 (%)   Oral Monitor Sitting Right arm --      Pain Score       8           Vitals:    24 1624   BP: 134/66   Pulse: 64   Patient Position - Orthostatic VS: Sitting         Visual Acuity      ED Medications  Medications   oxyCODONE-acetaminophen (PERCOCET) 5-325 mg per tablet 1 tablet (1 tablet Oral Given  4/12/24 5651)       Diagnostic Studies  Results Reviewed       None                   No orders to display              Procedures  Procedures         ED Course                                             Medical Decision Making  Differential diagnosis includes but is not limited to neuropathy, tendinitis, plantar fasciitis, sprain, strain, doubt fracture, doubt DVT, doubt cellulitis. Plan/MDM: Given bilateral involvement and pain localized just to the top of the feet without signs of infection or other acute abnormalities suspect this is pain related to his neuropathy.  We discussed analgesia and follow-up with his family physician as well as podiatry for further evaluation.  We discussed return parameters.  Patient understands and agrees with this plan.    Risk  Prescription drug management.             Disposition  Final diagnoses:   Pain in both feet     Time reflects when diagnosis was documented in both MDM as applicable and the Disposition within this note       Time User Action Codes Description Comment    4/12/2024  4:38 PM Sundar Sharp Add [M79.671,  M79.672] Pain in both feet           ED Disposition       ED Disposition   Discharge    Condition   Stable    Date/Time   Fri Apr 12, 2024  4:38 PM    Comment   Donta Hunter discharge to home/self care.                   Follow-up Information       Follow up With Specialties Details Why Contact Info    [facility]   [facility] [address]  [facility] [address]  [phone]              Discharge Medication List as of 4/12/2024  4:38 PM        CONTINUE these medications which have NOT CHANGED    Details   apixaban (ELIQUIS) 5 mg Take 5 mg by mouth 2 (two) times a day, Starting Fri 4/8/2022, Historical Med      bacitracin topical ointment 500 units/g topical ointment APPLY TOPICALLY TO AFFECTED AREA THREE TIMES A DAY AS DIRECTED, Historical Med      betamethasone, augmented, (DIPROLENE) 0.05 % ointment Apply 1  application. topically 2 (two) times a day, Historical Med      clobetasol (TEMOVATE) 0.05 % cream Historical Med      Diclofenac Sodium (VOLTAREN) 1 % Apply 2 g topically 4 (four) times a day, Starting Mon 4/8/2024, Normal      escitalopram (LEXAPRO) 10 mg tablet Take 1 tablet (10 mg total) by mouth daily, Starting Tue 4/4/2023, Until Fri 3/1/2024, Normal      gabapentin (NEURONTIN) 300 mg capsule Take 1 capsule (300 mg total) by mouth 3 (three) times a day, Starting Tue 4/9/2024, Until Mon 7/8/2024, Normal      gabapentin (NEURONTIN) 600 MG tablet Take 1 tablet (600 mg total) by mouth 3 (three) times a day, Starting Tue 4/4/2023, Until Thu 3/21/2024, Normal      hydroxychloroquine (PLAQUENIL) 200 mg tablet Take 400 mg by mouth daily at bedtime, Historical Med      ketoconazole (NIZORAL) 2 % cream Apply topically daily, Starting Tue 11/14/2023, Normal      lidocaine-prilocaine (EMLA) cream Apply topically as needed for mild pain, Starting Mon 2/27/2023, Normal      loratadine (CLARITIN) 10 mg tablet Take 1 tablet (10 mg total) by mouth daily, Starting Mon 10/31/2022, Until Fri 3/1/2024, Normal      mycophenolate (CELLCEPT) 500 mg tablet Take 500 mg by mouth every 12 (twelve) hours , Historical Med      potassium chloride (Klor-Con M20) 20 mEq tablet Historical Med                 PDMP Review         Value Time User    PDMP Reviewed  Yes 12/29/2022 12:10 PM Dori Julien PA-C            ED Provider  Electronically Signed by             Sundar Sharp PA-C  04/13/24 2055

## 2024-04-13 PROBLEM — K21.9 GASTROESOPHAGEAL REFLUX DISEASE WITHOUT ESOPHAGITIS: Status: ACTIVE | Noted: 2024-04-13

## 2024-04-13 LAB
2HR DELTA HS TROPONIN: 1 NG/L
ANION GAP SERPL CALCULATED.3IONS-SCNC: 4 MMOL/L (ref 4–13)
ANION GAP SERPL CALCULATED.3IONS-SCNC: 5 MMOL/L (ref 4–13)
ANION GAP SERPL CALCULATED.3IONS-SCNC: 6 MMOL/L (ref 4–13)
BACTERIA UR QL AUTO: NORMAL /HPF
BILIRUB UR QL STRIP: NEGATIVE
BUN SERPL-MCNC: 12 MG/DL (ref 5–25)
BUN SERPL-MCNC: 7 MG/DL (ref 5–25)
BUN SERPL-MCNC: 9 MG/DL (ref 5–25)
CALCIUM SERPL-MCNC: 8.7 MG/DL (ref 8.4–10.2)
CALCIUM SERPL-MCNC: 8.8 MG/DL (ref 8.4–10.2)
CALCIUM SERPL-MCNC: 9 MG/DL (ref 8.4–10.2)
CARDIAC TROPONIN I PNL SERPL HS: 4 NG/L
CHLORIDE SERPL-SCNC: 100 MMOL/L (ref 96–108)
CHLORIDE SERPL-SCNC: 98 MMOL/L (ref 96–108)
CHLORIDE SERPL-SCNC: 99 MMOL/L (ref 96–108)
CLARITY UR: CLEAR
CO2 SERPL-SCNC: 23 MMOL/L (ref 21–32)
CO2 SERPL-SCNC: 24 MMOL/L (ref 21–32)
CO2 SERPL-SCNC: 24 MMOL/L (ref 21–32)
COLOR UR: COLORLESS
CREAT SERPL-MCNC: 1.03 MG/DL (ref 0.6–1.3)
CREAT SERPL-MCNC: 1.04 MG/DL (ref 0.6–1.3)
CREAT SERPL-MCNC: 1.07 MG/DL (ref 0.6–1.3)
CREAT UR-MCNC: 57.9 MG/DL
ERYTHROCYTE [DISTWIDTH] IN BLOOD BY AUTOMATED COUNT: 12.7 % (ref 11.6–15.1)
GFR SERPL CREATININE-BSD FRML MDRD: 77 ML/MIN/1.73SQ M
GFR SERPL CREATININE-BSD FRML MDRD: 79 ML/MIN/1.73SQ M
GFR SERPL CREATININE-BSD FRML MDRD: 80 ML/MIN/1.73SQ M
GLUCOSE P FAST SERPL-MCNC: 96 MG/DL (ref 65–99)
GLUCOSE SERPL-MCNC: 100 MG/DL (ref 65–140)
GLUCOSE SERPL-MCNC: 87 MG/DL (ref 65–140)
GLUCOSE SERPL-MCNC: 96 MG/DL (ref 65–140)
GLUCOSE UR STRIP-MCNC: NEGATIVE MG/DL
HCT VFR BLD AUTO: 38 % (ref 36.5–49.3)
HGB BLD-MCNC: 13.3 G/DL (ref 12–17)
HGB UR QL STRIP.AUTO: NEGATIVE
KETONES UR STRIP-MCNC: NEGATIVE MG/DL
LEUKOCYTE ESTERASE UR QL STRIP: NEGATIVE
MCH RBC QN AUTO: 31.4 PG (ref 26.8–34.3)
MCHC RBC AUTO-ENTMCNC: 35 G/DL (ref 31.4–37.4)
MCV RBC AUTO: 90 FL (ref 82–98)
NITRITE UR QL STRIP: NEGATIVE
NON-SQ EPI CELLS URNS QL MICRO: NORMAL /HPF
OSMOLALITY UR: 403 MMOL/KG
PH UR STRIP.AUTO: 7 [PH]
PLATELET # BLD AUTO: 136 THOUSANDS/UL (ref 149–390)
PMV BLD AUTO: 10 FL (ref 8.9–12.7)
POTASSIUM SERPL-SCNC: 4.7 MMOL/L (ref 3.5–5.3)
POTASSIUM SERPL-SCNC: 4.7 MMOL/L (ref 3.5–5.3)
POTASSIUM SERPL-SCNC: 4.8 MMOL/L (ref 3.5–5.3)
PROT UR STRIP-MCNC: ABNORMAL MG/DL
RBC # BLD AUTO: 4.23 MILLION/UL (ref 3.88–5.62)
RBC #/AREA URNS AUTO: NORMAL /HPF
SODIUM 24H UR-SCNC: 117 MOL/L
SODIUM SERPL-SCNC: 127 MMOL/L (ref 135–147)
SODIUM SERPL-SCNC: 127 MMOL/L (ref 135–147)
SODIUM SERPL-SCNC: 129 MMOL/L (ref 135–147)
SP GR UR STRIP.AUTO: 1.01 (ref 1–1.03)
UROBILINOGEN UR STRIP-ACNC: <2 MG/DL
WBC # BLD AUTO: 1.94 THOUSAND/UL (ref 4.31–10.16)
WBC #/AREA URNS AUTO: NORMAL /HPF

## 2024-04-13 PROCEDURE — 99222 1ST HOSP IP/OBS MODERATE 55: CPT | Performed by: FAMILY MEDICINE

## 2024-04-13 PROCEDURE — 84300 ASSAY OF URINE SODIUM: CPT | Performed by: STUDENT IN AN ORGANIZED HEALTH CARE EDUCATION/TRAINING PROGRAM

## 2024-04-13 PROCEDURE — 81001 URINALYSIS AUTO W/SCOPE: CPT | Performed by: INTERNAL MEDICINE

## 2024-04-13 PROCEDURE — 80048 BASIC METABOLIC PNL TOTAL CA: CPT | Performed by: INTERNAL MEDICINE

## 2024-04-13 PROCEDURE — 84484 ASSAY OF TROPONIN QUANT: CPT | Performed by: EMERGENCY MEDICINE

## 2024-04-13 PROCEDURE — 80048 BASIC METABOLIC PNL TOTAL CA: CPT | Performed by: FAMILY MEDICINE

## 2024-04-13 PROCEDURE — 83935 ASSAY OF URINE OSMOLALITY: CPT | Performed by: STUDENT IN AN ORGANIZED HEALTH CARE EDUCATION/TRAINING PROGRAM

## 2024-04-13 PROCEDURE — 82570 ASSAY OF URINE CREATININE: CPT | Performed by: STUDENT IN AN ORGANIZED HEALTH CARE EDUCATION/TRAINING PROGRAM

## 2024-04-13 PROCEDURE — 85027 COMPLETE CBC AUTOMATED: CPT | Performed by: FAMILY MEDICINE

## 2024-04-13 PROCEDURE — 99244 OFF/OP CNSLTJ NEW/EST MOD 40: CPT | Performed by: INTERNAL MEDICINE

## 2024-04-13 RX ORDER — SODIUM CHLORIDE 1 G/1
2 TABLET ORAL
Status: DISCONTINUED | OUTPATIENT
Start: 2024-04-13 | End: 2024-04-14

## 2024-04-13 RX ORDER — POTASSIUM CHLORIDE 20 MEQ/1
20 TABLET, EXTENDED RELEASE ORAL DAILY
Status: DISCONTINUED | OUTPATIENT
Start: 2024-04-13 | End: 2024-04-14 | Stop reason: HOSPADM

## 2024-04-13 RX ORDER — FAMOTIDINE 20 MG/1
20 TABLET, FILM COATED ORAL 2 TIMES DAILY
Status: DISCONTINUED | OUTPATIENT
Start: 2024-04-13 | End: 2024-04-14 | Stop reason: HOSPADM

## 2024-04-13 RX ORDER — SODIUM CHLORIDE 9 MG/ML
75 INJECTION, SOLUTION INTRAVENOUS CONTINUOUS
Status: DISCONTINUED | OUTPATIENT
Start: 2024-04-13 | End: 2024-04-13

## 2024-04-13 RX ORDER — MYCOPHENOLATE MOFETIL 250 MG/1
500 CAPSULE ORAL EVERY 12 HOURS SCHEDULED
Status: DISCONTINUED | OUTPATIENT
Start: 2024-04-13 | End: 2024-04-14 | Stop reason: HOSPADM

## 2024-04-13 RX ORDER — ACETAMINOPHEN 325 MG/1
650 TABLET ORAL EVERY 6 HOURS PRN
Status: DISCONTINUED | OUTPATIENT
Start: 2024-04-13 | End: 2024-04-14 | Stop reason: HOSPADM

## 2024-04-13 RX ORDER — GABAPENTIN 600 MG/1
600 TABLET ORAL 3 TIMES DAILY
Status: DISCONTINUED | OUTPATIENT
Start: 2024-04-13 | End: 2024-04-13

## 2024-04-13 RX ORDER — GABAPENTIN 300 MG/1
900 CAPSULE ORAL 3 TIMES DAILY
Status: DISCONTINUED | OUTPATIENT
Start: 2024-04-13 | End: 2024-04-14 | Stop reason: HOSPADM

## 2024-04-13 RX ORDER — HYDROXYCHLOROQUINE SULFATE 200 MG/1
400 TABLET, FILM COATED ORAL
Status: DISCONTINUED | OUTPATIENT
Start: 2024-04-13 | End: 2024-04-14 | Stop reason: HOSPADM

## 2024-04-13 RX ADMIN — SODIUM CHLORIDE 75 ML/HR: 0.9 INJECTION, SOLUTION INTRAVENOUS at 01:22

## 2024-04-13 RX ADMIN — HYDROXYCHLOROQUINE SULFATE 400 MG: 200 TABLET, FILM COATED ORAL at 01:27

## 2024-04-13 RX ADMIN — GABAPENTIN 900 MG: 300 CAPSULE ORAL at 08:49

## 2024-04-13 RX ADMIN — MYCOPHENOLATE MOFETIL 500 MG: 250 CAPSULE ORAL at 01:26

## 2024-04-13 RX ADMIN — Medication 2 G: at 17:21

## 2024-04-13 RX ADMIN — HYDROXYCHLOROQUINE SULFATE 400 MG: 200 TABLET, FILM COATED ORAL at 21:35

## 2024-04-13 RX ADMIN — GABAPENTIN 900 MG: 300 CAPSULE ORAL at 17:21

## 2024-04-13 RX ADMIN — GABAPENTIN 900 MG: 300 CAPSULE ORAL at 21:35

## 2024-04-13 RX ADMIN — FAMOTIDINE 20 MG: 20 TABLET, FILM COATED ORAL at 08:49

## 2024-04-13 RX ADMIN — MYCOPHENOLATE MOFETIL 500 MG: 250 CAPSULE ORAL at 08:50

## 2024-04-13 RX ADMIN — POTASSIUM CHLORIDE 20 MEQ: 1500 TABLET, EXTENDED RELEASE ORAL at 08:49

## 2024-04-13 RX ADMIN — GABAPENTIN 900 MG: 300 CAPSULE ORAL at 01:27

## 2024-04-13 RX ADMIN — MYCOPHENOLATE MOFETIL 500 MG: 250 CAPSULE ORAL at 21:35

## 2024-04-13 RX ADMIN — Medication 2 G: at 21:35

## 2024-04-13 RX ADMIN — FAMOTIDINE 20 MG: 20 TABLET, FILM COATED ORAL at 17:21

## 2024-04-13 RX ADMIN — APIXABAN 5 MG: 5 TABLET, FILM COATED ORAL at 08:50

## 2024-04-13 RX ADMIN — APIXABAN 5 MG: 5 TABLET, FILM COATED ORAL at 17:21

## 2024-04-13 NOTE — ASSESSMENT & PLAN NOTE
Patient's heart rate is normal, these symptoms are heartburn usually, after eating.  They have resolved with the cocktail that he received the emergency room.

## 2024-04-13 NOTE — ASSESSMENT & PLAN NOTE
Sodium 126, most likely secondary to polydipsia.  The patient drinks 3 L of water.  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  At that time was on salt tablets and that have been discontinued.  He follows with nephrology for lupus nephritis.  Next appointment April 24, 2024    Will do normal saline at 75 cc/h  BMP in the morning  Consult nephrology

## 2024-04-13 NOTE — QUICK NOTE
SLIM update:    Please see H&P for full details regarding presentation and plan.  Brief summary, 54-year-old male presents with hyponatremia which has been an issue in the past previously for which she was on salt tablets.  Also question of excessive water intake.    A/P: 54-year-old male presents with hyponatremia for which she is asymptomatic.    1.  Hyponatremia:   - Discussed with nephrology, urine sodium suggestive of possible SIADH.  Repeat sodium level this afternoon decreasing, so discontinued IV fluids and started salt tablets.  -Recheck sodium q8h, potential discharge home tomorrow on salt tablets if sodium improves

## 2024-04-13 NOTE — CONSULTS
NEPHROLOGY CONSULTATION NOTE    Patient: Donta Hunter               Sex: male          DOA: 4/12/2024  8:58 PM   YOB: 1970         Age: 54 y.o.         LOS:  LOS: 0 days   Encounter Date: 4/13/2024    REFERRING PHYSICIAN: Jose Dangelo MD      REASON FOR THE REFERRAL / CONSULTATION: Further management of hyponatremia    DATE OF CONSULTATION / SERVICE: 4/13/2024    ADMISSION DIAGNOSIS: Hyponatremia     Chief Complaint   Patient presents with    Palpitations     Arrived from home via EMS. Per EMS, Pt was D/C to home for Neuropathy, today. Pt was at home, c/o palpitations and wanted to return to hospital. Vital signs WNL, no CP, no SOB,no pain- per EMS.        HPI     This is 54-year-old male came into the ER with chief complaint of palpitation and on admission patient was found to have low sodium level and nephrology were consulted for further management of hyponatremia.    Patient is known to me and been followed by myself in nephrology office for management of lupus nephritis class V.  Upon old medical record from [facility] chart review, patient had developed hyponatremia in the past which was secondary to SIADH and required salt tablet but was able to wean off salt tablet in the past.  Patient's admission sodium level was 126 which is marginally improved overnight to current sodium 129.  Currently denies any palpitation.    Patient has underlying lupus nephritis class V and currently taking MMF.  According to patient his urine is clear in color.    Patient has underlying hypertension and used to take Coreg in the past but currently not on Coreg and blood pressure is under good control.     Currently patient denies nausea, vomiting, headache, dizziness, abdominal pain, constipation or rash.    PAST MEDICAL HISTORY     Past Medical History:   Diagnosis Date    Anemia     Arthritis     Cervical mass     Chronic kidney disease     Coronary artery disease     Depression     DVT (deep venous thrombosis)  (HCC)     Hypertension     Lupus (HCC)     Renal disorder        PAST SURGICAL HISTORY     Past Surgical History:   Procedure Laterality Date    APPENDECTOMY      CERVICAL SPINE SURGERY      CHOLECYSTECTOMY      COLONOSCOPY N/A 8/23/2018    Procedure: COLONOSCOPY;  Surgeon: James Wise III, MD;  Location: [facility];  Service: Gastroenterology    ESOPHAGOGASTRODUODENOSCOPY N/A 8/22/2018    Procedure: ESOPHAGOGASTRODUODENOSCOPY (EGD);  Surgeon: James Wise III, MD;  Location: [facility];  Service: Gastroenterology    IR IVC FILTER PLACEMENT PERMANENT  9/7/2017    IR IVC FILTER REMOVAL  4/23/2018    IR LUMBAR PUNCTURE  11/23/2015    NECK SURGERY      US GUIDED INJECTION FOR RESEARCH STUDY  4/23/2018    US GUIDED INJECTION FOR RESEARCH STUDY  9/7/2017    VASCULAR SURGERY         ALLERGIES     Allergies   Allergen Reactions    Doxycycline Rash    Sulfa Antibiotics Rash       SOCIAL HISTORY     Social History     Substance and Sexual Activity   Alcohol Use Never     Social History     Substance and Sexual Activity   Drug Use Never     Social History     Tobacco Use   Smoking Status Never   Smokeless Tobacco Never       FAMILY HISTORY     Family History   Problem Relation Age of Onset    Heart disease Mother     No Known Problems Father        CURRENT MEDICATIONS       Current Facility-Administered Medications:     acetaminophen (TYLENOL) tablet 650 mg, 650 mg, Oral, Q6H PRN, Jose Dangelo MD    apixaban (ELIQUIS) tablet 5 mg, 5 mg, Oral, BID, Jose Dangleo MD, 5 mg at 04/13/24 0850    famotidine (PEPCID) tablet 20 mg, 20 mg, Oral, BID, Jose Dangelo MD, 20 mg at 04/13/24 0849    gabapentin (NEURONTIN) capsule 900 mg, 900 mg, Oral, TID, Jose Dangelo MD, 900 mg at 04/13/24 0849    hydroxychloroquine (PLAQUENIL) tablet 400 mg, 400 mg, Oral, HS, Jose Dangelo MD, 400 mg at 04/13/24 0127    mycophenolate (CELLCEPT) capsule 500 mg, 500 mg, Oral, Q12H YOUSUF, Jose Dangelo MD, 500  "mg at 04/13/24 0850    potassium chloride (Klor-Con M20) CR tablet 20 mEq, 20 mEq, Oral, Daily, Jose Dangelo MD, 20 mEq at 04/13/24 0849    sodium chloride 0.9 % infusion, 75 mL/hr, Intravenous, Continuous, Jose Dangelo MD, Last Rate: 75 mL/hr at 04/13/24 0122, 75 mL/hr at 04/13/24 0122    REVIEW OF SYSTEMS     Complete 10 points of review of systems were obtained and discussed in length with patient today.  Complete 10 points of review of systems were negative/unremarkable except mentioned in the HPI section.      OBJECTIVE     Current Weight: Weight - Scale: 92.8 kg (204 lb 9.4 oz)  /78 (BP Location: Right arm)   Pulse 82   Temp 98.1 °F (36.7 °C) (Oral)   Resp 16   Ht 5' 6\" (1.676 m)   Wt 92.8 kg (204 lb 9.4 oz)   SpO2 98%   BMI 33.02 kg/m²   Vitals:    04/13/24 0700   BP:    Pulse: 82   Resp:    Temp:    SpO2:      Body mass index is 33.02 kg/m².    Intake/Output Summary (Last 24 hours) at 4/13/2024 1039  Last data filed at 4/13/2024 0700  Gross per 24 hour   Intake 840 ml   Output 1800 ml   Net -960 ml       PHYSICAL EXAMINATION     Physical Exam  Constitutional:       General: He is not in acute distress.  HENT:      Right Ear: External ear normal.   Eyes:      Conjunctiva/sclera:      Right eye: No hemorrhage.  Neck:      Thyroid: No thyromegaly.   Pulmonary:      Effort: No accessory muscle usage or respiratory distress.   Abdominal:      General: There is no distension.   Skin:     Coloration: Skin is not jaundiced.   Psychiatric:         Behavior: Behavior is not combative.           LAB RESULTS        Results from last 7 days   Lab Units 04/13/24  0633 04/12/24  2156   WBC Thousand/uL 1.94* 1.61*   HEMOGLOBIN g/dL 13.3 13.9   HEMATOCRIT % 38.0 38.1   PLATELETS Thousands/uL 136* 143*   SODIUM mmol/L 129* 126*   POTASSIUM mmol/L 4.8 4.6   CHLORIDE mmol/L 100 94*   CO2 mmol/L 24 26   BUN mg/dL 7 9   CREATININE mg/dL 1.04 0.90   EGFR ml/min/1.73sq m 79 95   CALCIUM mg/dL 8.7 8.7 " "      I have personally reviewed the old medical records and patient's previously known baseline creatinine level is ~ 1.0-1.1    RADIOLOGY RESULTS       XR chest 2 views   ED Interpretation by Deedee Burks DO (04/12 2225)   No acute abnormality in the chest.      Final Result by Douglas Quiroz MD (04/13 0937)      No acute cardiopulmonary disease.            Workstation performed: [id number]             PLAN / RECOMMENDATIONS      1.  Hyponatremia.  Exact onset of hyponatremia is unknown and will consider as a chronic hyponatremia.    In the past workup showed patient had SIADH and required salt tablet but sodium level improved and salt tablet were able to weaned off.  Her admission sodium level was 126 and sodium level overnight has improved to 129 but still below the goal.  Currently asymptomatic from hyponatremia perspective.  Plan to check urine sodium and urine osmolality for further evaluation and plan to check BMP every 8 hours today.  Patient is currently also receiving normal saline at 75 ml/h and if patient is found to have worsening hyponatremia, will consider discontinuation of normal saline.    2.  Lupus nephritis class V.  Currently patient is taking  mg p.o. twice daily.  Plan to check urine analysis to assess hematuria.  Continue MMF at current dose.    Thank you for the consultation to participate in patient's care. I have personally discussed my overall above mentioned plan and recommendation with the current [facility] physician physician and they agreed with my plan of checking urine lites as mentioned earlier.    Genevieve Dangelo MD  Nephrology  4/13/2024        Portions of the record may have been created with voice recognition software. Occasional wrong word or \"sound a like\" substitutions may have occurred due to the inherent limitations of voice recognition software. Read the chart carefully and recognize, using context, where substitutions have occurred.    "

## 2024-04-13 NOTE — H&P
[facility]  H&P  Name: Donta Hunter 54 y.o. male I MRN: [id number]  Unit/Bed#: ED 06 I Date of Admission: 4/12/2024   Date of Service: 4/13/2024 I Hospital Day: 0      Assessment/Plan   * Hyponatremia  Assessment & Plan  Sodium 126, most likely secondary to polydipsia.  The patient drinks 3 L of water.  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  At that time was on salt tablets and that have been discontinued.  He follows with nephrology for lupus nephritis.  Next appointment April 24, 2024    Will do normal saline at 75 cc/h  Fluid restriction of 2 L  BMP in the morning  Consult nephrology    Gastroesophageal reflux disease without esophagitis  Assessment & Plan  New diagnosis for the patient.  Will place him on Pepcid 20 mg twice daily.    Palpitations  Assessment & Plan  Patient's heart rate is normal, these symptoms are heartburn usually, after eating.  They have resolved with the cocktail that he received the emergency room.    Neuropathic pain  Assessment & Plan  Continue gabapentin    Lupus (systemic lupus erythematosus) (HCC)  Assessment & Plan  Continue Plaquenil.  The patient is followed by rheumatology    Lupus nephritis ([facility])  Assessment & Plan  Continue CellCept.  The patient is followed by nephrology    History of DVT (deep vein thrombosis)  Assessment & Plan  Followed by hematology, continue Eliquis       Disposition  #1 normal saline at 75 cc/h  #2 consult nephrology  #3 repeat BMP in the morning          VTE Prophylaxis:   Eliquis  Code Status: Full code      Anticipated Length of Stay:  Patient will be admitted on an Observation basis with an anticipated length of stay of less than 2 midnights.   Justification for Hospital Stay: Please see detailed plans noted above.    Chief Complaint:     Palpitations  History of Present Illness:  Donta Hunter is a 54 y.o. male who has past medical history significant  for lupus, lupus nephritis, history of DVT on Eliquis, history of prior stroke, CKD, depression, who presents to the emergency room due to chest discomfort after eating and drinking.  Patient was actually seen earlier due to neuropathic pain.  He now comes back to the hospital as he was not feeling well.  After receiving antiacid medication the emergency room he is feeling much better.  He does not take medications for heartburn.  Upon checkup of his blood work his sodium returned at 126.  The patient has been drinking 3 L of water at home.      Review of Systems:    Constitutional:  Denies fever or chills   Eyes:  Denies change in visual acuity   HENT:  Denies nasal congestion or sore throat   Respiratory:  Denies cough or shortness of breath   Cardiovascular:  Denies chest pain or edema   GI: Heartburn  :  Denies dysuria   Musculoskeletal:  Denies back pain or joint pain   Integument:  Denies rash   Neurologic:  Denies headache or sensory changes   Endocrine:  Denies polyuria or polydipsia   Lymphatic:  Denies swollen glands   Psychiatric:  Denies depression or anxiety     Past Medical and Surgical History:   Past Medical History:   Diagnosis Date    Anemia     Arthritis     Cervical mass     Chronic kidney disease     Coronary artery disease     Depression     DVT (deep venous thrombosis) (HCC)     Hypertension     Lupus (HCC)     Renal disorder      Past Surgical History:   Procedure Laterality Date    APPENDECTOMY      CERVICAL SPINE SURGERY      CHOLECYSTECTOMY      COLONOSCOPY N/A 8/23/2018    Procedure: COLONOSCOPY;  Surgeon: James Wise III, MD;  Location: [facility];  Service: Gastroenterology    ESOPHAGOGASTRODUODENOSCOPY N/A 8/22/2018    Procedure: ESOPHAGOGASTRODUODENOSCOPY (EGD);  Surgeon: James Wise III, MD;  Location: [facility];  Service: Gastroenterology    IR IVC FILTER PLACEMENT PERMANENT  9/7/2017    IR IVC FILTER REMOVAL  4/23/2018    IR LUMBAR PUNCTURE  11/23/2015    NECK SURGERY       US GUIDED INJECTION FOR RESEARCH STUDY  4/23/2018    US GUIDED INJECTION FOR RESEARCH STUDY  9/7/2017    VASCULAR SURGERY         Meds/Allergies:  Current Facility-Administered Medications on File Prior to Encounter   Medication Dose Route Frequency Provider Last Rate Last Admin    [COMPLETED] oxyCODONE-acetaminophen (PERCOCET) 5-325 mg per tablet 1 tablet  1 tablet Oral Once Sundar Sharp PA-C   1 tablet at 04/12/24 1651     Current Outpatient Medications on File Prior to Encounter   Medication Sig Dispense Refill    apixaban (ELIQUIS) 5 mg Take 5 mg by mouth 2 (two) times a day      bacitracin topical ointment 500 units/g topical ointment APPLY TOPICALLY TO AFFECTED AREA THREE TIMES A DAY AS DIRECTED      betamethasone, augmented, (DIPROLENE) 0.05 % ointment Apply 1 application. topically 2 (two) times a day      clobetasol (TEMOVATE) 0.05 % cream       Diclofenac Sodium (VOLTAREN) 1 % Apply 2 g topically 4 (four) times a day 300 g 1    escitalopram (LEXAPRO) 10 mg tablet Take 1 tablet (10 mg total) by mouth daily 90 tablet 3    gabapentin (NEURONTIN) 300 mg capsule Take 1 capsule (300 mg total) by mouth 3 (three) times a day 270 capsule 3    gabapentin (NEURONTIN) 600 MG tablet Take 1 tablet (600 mg total) by mouth 3 (three) times a day 270 tablet 3    hydroxychloroquine (PLAQUENIL) 200 mg tablet Take 400 mg by mouth daily at bedtime      ketoconazole (NIZORAL) 2 % cream Apply topically daily 60 g 1    lidocaine-prilocaine (EMLA) cream Apply topically as needed for mild pain 30 g 3    loratadine (CLARITIN) 10 mg tablet Take 1 tablet (10 mg total) by mouth daily 30 tablet 2    mycophenolate (CELLCEPT) 500 mg tablet Take 500 mg by mouth every 12 (twelve) hours       potassium chloride (Klor-Con M20) 20 mEq tablet              Allergies:   Allergies   Allergen Reactions    Doxycycline Rash    Sulfa Antibiotics Rash       History:  Marital Status: /Civil Union     Substance Use History:   Social  "History     Substance and Sexual Activity   Alcohol Use Never     Social History     Tobacco Use   Smoking Status Never   Smokeless Tobacco Never     Social History     Substance and Sexual Activity   Drug Use Never       Family History:  Family History   Problem Relation Age of Onset    Heart disease Mother     No Known Problems Father        Physical Exam:     Vitals:   Blood Pressure: 148/84 (04/12/24 2330)  Pulse: 70 (04/12/24 2330)  Temperature: 98.2 °F (36.8 °C) (04/12/24 2109)  Temp Source: Oral (04/12/24 2109)  Respirations: 13 (04/12/24 2330)  Height: 5' 6\" (167.6 cm) (04/12/24 2109)  Weight - Scale: 92.8 kg (204 lb 9.4 oz) (04/12/24 2109)  SpO2: 99 % (04/12/24 2330)    Constitutional:  Non-toxic appearance  Eyes:  EOMI, No scleral icterus   HENT:   oropharynx moist, external ears normal, external nose normal   Respiratory:  No respiratory distress, no wheezing   Cardiovascular:  Normal rate, no murmurs   GI:  Soft, nondistended, no guarding   :  No costovertebral angle tenderness   Musculoskeletal:  no tenderness, no deformities. Back- no tenderness  Integument:  no jaundice, no rash   Neurologic:  Alert &awake, communicative, CN 2-12 normal,  no focal deficits noted   Psychiatric:  Speech and behavior appropriate       Lab Results: I have personally reviewed pertinent reports.   and I have personally reviewed pertinent films in PACS    Results from last 7 days   Lab Units 04/12/24  2156   WBC Thousand/uL 1.61*   HEMOGLOBIN g/dL 13.9   HEMATOCRIT % 38.1   PLATELETS Thousands/uL 143*   SEGS PCT % 33*   LYMPHO PCT % 38   MONO PCT % 26*   EOS PCT % 1     Results from last 7 days   Lab Units 04/12/24  2156   POTASSIUM mmol/L 4.6   CHLORIDE mmol/L 94*   CO2 mmol/L 26   BUN mg/dL 9   CREATININE mg/dL 0.90   CALCIUM mg/dL 8.7   ALK PHOS U/L 113*   ALT U/L 20   AST U/L 24     Results from last 7 days   Lab Units 04/12/24  2156   INR  1.05         Imaging: I have personally reviewed pertinent reports.   and I " have personally reviewed pertinent films in PACS    Echo complete w/ contrast if indicated    Result Date: 4/3/2024  Narrative:   Left Ventricle: Left ventricular cavity size is normal. Wall thickness is normal. The left ventricular ejection fraction is 55%. Systolic function is normal. Wall motion is normal. Diastolic function is mildly abnormal, consistent with grade I (abnormal) relaxation.   Left Atrium: The atrium is mildly dilated.   Mitral Valve: There is mild annular calcification. There is mild regurgitation.   Tricuspid Valve: There is mild regurgitation.   Pulmonic Valve: There is mild regurgitation.     CTA head w wo contrast    Result Date: 3/23/2024  Narrative: CTA BRAIN WITH CONTRAST INDICATION: I63.421: Cerebral infarction due to embolism of right anterior cerebral artery M32.19: Other organ or system involvement in systemic lupus erythematosus G05.3: Encephalitis and encephalomyelitis in diseases classified elsewhere COMPARISON:   MRI dated 2/22/2024 of the brain. CT of the brain dated 7/5/2023. No previous vascular imaging. TECHNIQUE:   Post contrast imaging was performed after administration of iodinated contrast through the neck and brain. Post contrast axial 0.625 mm images timed to opacify the arterial system. 3D rendering was performed on an independent workstation.   MIP reconstructions performed. Coronal reconstructions were performed of the noncontrast portion of the brain. Radiation dose length product (DLP) for this visit:  1151 mGy-cm .  This examination, like all CT scans performed in the [facility], was performed utilizing techniques to minimize radiation dose exposure, including the use of iterative reconstruction and automated exposure control. IV Contrast:  100 mL of iohexol (OMNIPAQUE) IMAGE QUALITY:   Diagnostic FINDINGS: NONCONTRAST BRAIN: PARENCHYMA: Hypodensity within the left paramedian centrum semiovale in the frontal lobe on series 2 image 32 corresponds to  the previous area of subacute ischemia. No mass effect or hemorrhagic transformation. Additional areas of decreased attenuation seen within the cerebral hemispheres consistent with chronic microangiopathic change. Stable small focus of encephalomalacia within the anterior right frontal lobe. No hemorrhage. VENTRICLES AND EXTRA-AXIAL SPACES:  Normal for the patient's age. VISUALIZED ORBITS: Normal visualized orbits. PARANASAL SINUSES: Normal visualized paranasal sinuses. INTRACRANIAL VASCULATURE INTERNAL CAROTID ARTERIES:  Normal enhancement of the intracranial portions of the internal carotid arteries.  Normal ophthalmic artery origins.  Normal ICA terminus. ANTERIOR CIRCULATION:  Symmetric A1 segments and anterior cerebral arteries with normal enhancement.  Normal anterior communicating artery. MIDDLE CEREBRAL ARTERY CIRCULATION:  M1 segment and middle cerebral artery branches demonstrate normal enhancement bilaterally. DISTAL VERTEBRAL ARTERIES:  Normal distal vertebral arteries.  Posterior inferior cerebellar artery origins are normal. Normal vertebral basilar junction. BASILAR ARTERY:  Basilar artery is normal in caliber.  Normal superior cerebellar arteries. POSTERIOR CEREBRAL ARTERIES: Both posterior cerebral arteries arises from the basilar tip.  Both arteries demonstrate normal enhancement.   Normal posterior communicating arteries. DURAL VENOUS SINUSES:  Normal. NON VASCULAR ANATOMY BONY STRUCTURES:  No acute osseous abnormality.     Impression: CT brain: Compared to the recent MRI, there is a focal area of subacute ischemia within the left paramedian frontal lobe with no mass effect or hemorrhagic transformation. Additional chronic microangiopathic change appears stable. CT angiography: Normal intracranial vasculature. Workstation performed: [id number]     Colonoscopy    Result Date: 3/21/2024  Narrative: Table formatting from the original result was not included.  [facility]  [facility] [id number] [phone] DATE OF SERVICE: 3/21/24 PHYSICIAN(S): Attending: James Wise III, MD Fellow: No Staff Documented INDICATION: Rectal bleeding, Abnormal CT scan, colon POST-OP DIAGNOSIS: See the impression below. HISTORY: Prior colonoscopy: 5 years ago. BOWEL PREPARATION: Miralax/Dulcolax PREPROCEDURE: Informed consent was obtained for the procedure, including sedation. Risks including but not limited to bleeding, infection, perforation, adverse drug reaction and aspiration were explained in detail. Also explained about less than 100% sensitivity with the exam and other alternatives. The patient was placed in the left lateral decubitus position. Procedure: Colonoscopy DETAILS OF PROCEDURE: Patient was taken to the procedure room where a time out was performed to confirm correct patient and correct procedure. The patient underwent monitored anesthesia care, which was administered by an anesthesia professional. The patient's blood pressure, heart rate, level of consciousness, oxygen, respirations, ECG and ETCO2 were monitored throughout the procedure. A digital rectal exam was performed. The scope was introduced through the anus and advanced to the cecum. Retroflexion was performed in the rectum. The quality of bowel preparation was evaluated using the [place] Bowel Preparation Scale with scores of: right colon = 2, transverse colon = 2, left colon = 2. The total BBPS score was 6. Bowel prep was adequate. The patient experienced no blood loss. The procedure was not difficult. The patient tolerated the procedure well. There were no apparent adverse events. ANESTHESIA INFORMATION: ASA: III Anesthesia Type: IV Sedation with Anesthesia MEDICATIONS: No administrations occurring from 1127 to 1138 on 03/21/24 FINDINGS: The cecum, ascending colon, hepatic flexure, transverse colon, splenic flexure, descending colon, sigmoid colon, rectosigmoid and rectum appeared normal. Internal  hemorrhoids EVENTS: Procedure Events Event Event Time ENDO CECUM REACHED 3/21/2024 11:34 AM ENDO SCOPE OUT TIME 3/21/2024 11:37 AM SPECIMENS: * No specimens in log * EQUIPMENT: Colonoscope -[id number]     Impression: The cecum, ascending colon, hepatic flexure, transverse colon, splenic flexure, descending colon, sigmoid colon, rectosigmoid and rectum appeared normal. Hemorrhoids RECOMMENDATION:  Repeat screening colonoscopy in 10 years   James Wise III, MD     MRI thoracic spine wo contrast    Result Date: 3/18/2024  Narrative: MRI THORACIC SPINE WITHOUT CONTRAST INDICATION: M62.81: Muscle weakness (generalized) R25.2: Cramp and spasm R29.898: Other symptoms and signs involving the musculoskeletal system. COMPARISON: MRI thoracic spine dated 1/23/2019. TECHNIQUE:  Multiplanar, multisequence imaging of the thoracic spine was performed. . IMAGE QUALITY: Diagnostic. FINDINGS: ALIGNMENT: No subluxation. No scoliosis. No subluxation. Stable mild chronic compression fractures at T7 and T8 with prominent Schmorl's node at the inferior endplate of T7. Vertebral are otherwise normal in height. MARROW SIGNAL:  Normal marrow signal is identified within the visualized bony structures.  No discrete marrow lesion. THORACIC CORD: Normal signal within the thoracic cord. PARAVERTEBRAL SOFT TISSUES:  Normal. THORACIC DEGENERATIVE CHANGE: T10-11: Small disc bulge with tiny central protrusion and facet arthropathy. Mild canal stenosis. Small disc bulges at other levels without significant canal stenosis OTHER FINDINGS:  None.     Impression: No acute abnormality. Mild degenerative spondylosis. Stable mild chronic compression fractures at T7-T8. Workstation performed: [id number]     MRI cervical spine wo contrast    Result Date: 3/18/2024  Narrative: MRI CERVICAL SPINE WITHOUT CONTRAST INDICATION: M62.81: Muscle weakness (generalized) R25.2: Cramp and spasm R29.898: Other symptoms and signs involving the musculoskeletal system.  COMPARISON:  None. TECHNIQUE:  Multiplanar, multisequence imaging of the cervical spine was performed. . IMAGE QUALITY:  Diagnostic FINDINGS: ALIGNMENT: Anterior cervical spine fusion hardware at levels C4-C6. Mild straightening of the cervical spine.  No compression fracture.  No subluxation.  No scoliosis. MARROW SIGNAL:  Normal marrow signal is identified within the visualized bony structures.  No discrete marrow lesion. CERVICAL AND VISUALIZED THORACIC CORD:  Normal signal within the visualized cord. PREVERTEBRAL AND PARASPINAL SOFT TISSUES:  Normal. VISUALIZED POSTERIOR FOSSA:  The visualized posterior fossa demonstrates no abnormal signal. CERVICAL DISC SPACES: Multilevel degenerative disc disease. C2-C3: Mild bulge. No significant canal stenosis or foraminal narrowing. C3-C4: Small central disc protrusion. Mild canal stenosis and mild cord flattening. No significant foraminal narrowing. C4-C5: No significant canal stenosis or foraminal narrowing. C5-C6: No significant canal stenosis or foraminal narrowing. C6-C7: Bulging annulus with a superimposed central disc protrusion. Mild mass effect on the thecal sac. Moderate bilateral foraminal narrowing. C7-T1: Trace bulge without significant canal stenosis or foraminal narrowing. UPPER THORACIC DISC SPACES:  Normal. OTHER FINDINGS:  None.     Impression: Postoperative changes and multilevel cervical spondylosis, as described above. Multifactorial disease results in mild mass effect on the thecal sac and moderate bilateral foraminal narrowing at the adjacent C6-C7 level. Mild cord flattening at C3-C4. Resident: JUANITO Joshua I, the attending radiologist, have reviewed the images and agree with the final report above. Workstation performed: [id number]     XR spine lumbar minimum 4 views non injury    Result Date: 3/18/2024  Narrative: LUMBAR SPINE INDICATION:   Muscle weakness (generalized). Cramp and spasm. Spinal stenosis, lumbar region with neurogenic claudication.  Other symptoms and signs involving the musculoskeletal system. COMPARISON: 2/22/2024 VIEWS:  XR SPINE LUMBAR MINIMUM 4 VIEWS NON INJURY Images: 4 FINDINGS: There are 5 non rib bearing lumbar vertebral bodies. There is no evidence of acute fracture or destructive osseous lesion. Mild scoliotic deformity is noted. Grade 1 anterolisthesis L4-5. Age-appropriate lumbar degenerative changes are seen. The pedicles appear intact. Soft tissues are unremarkable.     Impression: No acute osseous abnormality.  Degenerative changes as described. Electronically signed: 03/18/2024 11:12 AM Noah John, MPH,MD    MDM moderate  Patient requires hospitalization for sodium of 126 secondary to polydipsia  Reviewed CBC, BMP  Reviewed external notes from nephrology hematology  Ordered BMP CBC IV fluids  Consulted nephrology    Epic Records Reviewed as well as Records in Care Everywhere    ** Please Note: Dragon 360 Dictation voice to text software was used in the creation of this document. **

## 2024-04-13 NOTE — ED PROVIDER NOTES
History  Chief Complaint   Patient presents with    Palpitations     Arrived from home via EMS. Per EMS, Pt was D/C to home for Neuropathy, today. Pt was at home, c/o palpitations and wanted to return to hospital. Vital signs WNL, no CP, no SOB,no pain- per EMS.     Patient is a 54-year-old male with past medical history of lupus, prior stroke and history of DVT on Eliquis, chronic kidney disease, chronic anemia, arthritis, depression, hypertension, presents to the emergency department complaining of chest discomfort and feeling as though his heart is racing after eating and drinking today.  Patient states his symptoms started this morning and he noticed that he was getting discomfort in his chest as well as feeling of tachycardia anytime he ate or drink something including water.  He states the discomfort would last about 30 minutes and then go away and it happened tonight as well.  He denies ever having these symptoms before.  He reports waking up with some congestion in the nose but denies any dyspnea otherwise.  Denies any significant coughing, fevers, chills, sweats, headache, dizziness or near syncope, abdominal pain or distention, nausea, vomiting, change in bowel habits, blood per rectum or melena, urinary symptoms, skin rash, color change, new focal neurologic deficits.  Denies any known history of acid reflux.      History provided by:  Patient   used: Yes (English and  used via iPad (Karie))    Palpitations  Associated symptoms: chest pain    Associated symptoms: no back pain, no cough, no diaphoresis, no dizziness, no nausea, no numbness, no shortness of breath, no vomiting and no weakness        Prior to Admission Medications   Prescriptions Last Dose Informant Patient Reported? Taking?   Diclofenac Sodium (VOLTAREN) 1 % Past Week  No Yes   Sig: Apply 2 g topically 4 (four) times a day   apixaban (ELIQUIS) 5 mg 4/13/2024 Self Yes Yes   Sig: Take 5 mg by mouth 2  (two) times a day   bacitracin topical ointment 500 units/g topical ointment  Self Yes No   Sig: APPLY TOPICALLY TO AFFECTED AREA THREE TIMES A DAY AS DIRECTED   betamethasone, augmented, (DIPROLENE) 0.05 % ointment Not Taking Self Yes No   Sig: Apply 1 application. topically 2 (two) times a day   Patient not taking: Reported on 4/13/2024   clobetasol (TEMOVATE) 0.05 % cream 4/12/2024 Self Yes Yes   escitalopram (LEXAPRO) 10 mg tablet  Self No No   Sig: Take 1 tablet (10 mg total) by mouth daily   gabapentin (NEURONTIN) 300 mg capsule 4/13/2024  No Yes   Sig: Take 1 capsule (300 mg total) by mouth 3 (three) times a day   gabapentin (NEURONTIN) 600 MG tablet  Self No No   Sig: Take 1 tablet (600 mg total) by mouth 3 (three) times a day   hydroxychloroquine (PLAQUENIL) 200 mg tablet 4/12/2024 Self Yes Yes   Sig: Take 400 mg by mouth daily at bedtime   ketoconazole (NIZORAL) 2 % cream  Self No No   Sig: Apply topically daily   lidocaine-prilocaine (EMLA) cream Not Taking Self No No   Sig: Apply topically as needed for mild pain   Patient not taking: Reported on 4/13/2024   loratadine (CLARITIN) 10 mg tablet  Self No No   Sig: Take 1 tablet (10 mg total) by mouth daily   mycophenolate (CELLCEPT) 500 mg tablet 4/13/2024 Self Yes Yes   Sig: Take 500 mg by mouth every 12 (twelve) hours    potassium chloride (Klor-Con M20) 20 mEq tablet 4/13/2024 Self Yes Yes      Facility-Administered Medications: None       Past Medical History:   Diagnosis Date    Anemia     Arthritis     Cervical mass     Chronic kidney disease     Coronary artery disease     Depression     DVT (deep venous thrombosis) (HCC)     Hypertension     Lupus (HCC)     Renal disorder        Past Surgical History:   Procedure Laterality Date    APPENDECTOMY      CERVICAL SPINE SURGERY      CHOLECYSTECTOMY      COLONOSCOPY N/A 8/23/2018    Procedure: COLONOSCOPY;  Surgeon: James Wise III, MD;  Location: [facility];  Service: Gastroenterology     ESOPHAGOGASTRODUODENOSCOPY N/A 8/22/2018    Procedure: ESOPHAGOGASTRODUODENOSCOPY (EGD);  Surgeon: James Wise III, MD;  Location: [facility];  Service: Gastroenterology    IR IVC FILTER PLACEMENT PERMANENT  9/7/2017    IR IVC FILTER REMOVAL  4/23/2018    IR LUMBAR PUNCTURE  11/23/2015    NECK SURGERY      US GUIDED INJECTION FOR RESEARCH STUDY  4/23/2018    US GUIDED INJECTION FOR RESEARCH STUDY  9/7/2017    VASCULAR SURGERY         Family History   Problem Relation Age of Onset    Heart disease Mother     No Known Problems Father      I have reviewed and agree with the history as documented.    E-Cigarette/Vaping    E-Cigarette Use Never User      E-Cigarette/Vaping Substances    Nicotine No     THC No     CBD No     Flavoring No     Other No     Unknown No      Social History     Tobacco Use    Smoking status: Never    Smokeless tobacco: Never   Vaping Use    Vaping status: Never Used   Substance Use Topics    Alcohol use: Never    Drug use: Never       Review of Systems   Constitutional:  Negative for chills, diaphoresis and fever.   HENT:  Positive for congestion. Negative for ear pain, rhinorrhea and sore throat.    Respiratory:  Negative for cough, chest tightness, shortness of breath and wheezing.    Cardiovascular:  Positive for chest pain and palpitations. Negative for leg swelling.   Gastrointestinal:  Negative for abdominal pain, blood in stool, constipation, diarrhea, nausea and vomiting.   Genitourinary:  Negative for dysuria, flank pain, frequency and hematuria.   Musculoskeletal:  Negative for back pain, neck pain and neck stiffness.   Skin:  Negative for color change, pallor, rash and wound.   Allergic/Immunologic: Negative for immunocompromised state.   Neurological:  Negative for dizziness, syncope, weakness, light-headedness, numbness and headaches.   Hematological:  Negative for adenopathy. Bruises/bleeds easily.        (Patient on anticoagulant therapy)   Psychiatric/Behavioral:  Negative  for confusion and decreased concentration.    All other systems reviewed and are negative.      Physical Exam  Physical Exam  Vitals and nursing note reviewed.   Constitutional:       General: He is not in acute distress.     Appearance: Normal appearance. He is well-developed. He is not ill-appearing, toxic-appearing or diaphoretic.   HENT:      Head: Normocephalic and atraumatic.      Right Ear: External ear normal.      Left Ear: External ear normal.      Mouth/Throat:      Mouth: Mucous membranes are moist.      Pharynx: Oropharynx is clear.   Eyes:      Extraocular Movements: Extraocular movements intact.      Conjunctiva/sclera: Conjunctivae normal.   Neck:      Vascular: No JVD.   Cardiovascular:      Rate and Rhythm: Normal rate and regular rhythm.      Pulses: Normal pulses.      Heart sounds: Normal heart sounds. No murmur heard.     No friction rub. No gallop.   Pulmonary:      Effort: Pulmonary effort is normal. No respiratory distress.      Breath sounds: Normal breath sounds. No wheezing, rhonchi or rales.   Chest:      Chest wall: No tenderness.   Abdominal:      General: There is no distension.      Palpations: Abdomen is soft.      Tenderness: There is no abdominal tenderness. There is no guarding or rebound.   Musculoskeletal:         General: No swelling or tenderness. Normal range of motion.      Cervical back: Normal range of motion and neck supple. No rigidity.   Skin:     General: Skin is warm and dry.      Coloration: Skin is not pale.      Findings: No erythema or rash.   Neurological:      General: No focal deficit present.      Mental Status: He is alert and oriented to person, place, and time.      Sensory: No sensory deficit.      Motor: No weakness.   Psychiatric:         Mood and Affect: Mood normal.         Behavior: Behavior normal.         Vital Signs  ED Triage Vitals [04/12/24 2109]   Temperature Pulse Respirations Blood Pressure SpO2   98.2 °F (36.8 °C) 64 20 136/79 99 %     "  Temp Source Heart Rate Source Patient Position - Orthostatic VS BP Location FiO2 (%)   Oral Monitor Sitting Right arm --      Pain Score       6         Vitals:    04/12/24 2109 04/12/24 2130 04/12/24 2300 04/12/24 2330   BP: 136/79 129/79 156/89 148/84   BP Location: Right arm Right arm Right arm Right arm   Pulse: 64 70 75 70   Resp: 20 20 22 13   Temp: 98.2 °F (36.8 °C)      TempSrc: Oral      SpO2: 99% 98% 99% 99%   Weight: 92.8 kg (204 lb 9.4 oz)      Height: 5' 6\" (1.676 m)             Visual Acuity      ED Medications  Medications   apixaban (ELIQUIS) tablet 5 mg (has no administration in time range)   gabapentin (NEURONTIN) capsule 900 mg (has no administration in time range)   hydroxychloroquine (PLAQUENIL) tablet 400 mg (has no administration in time range)   mycophenolate (CELLCEPT) capsule 500 mg (has no administration in time range)   potassium chloride (Klor-Con M20) CR tablet 20 mEq (has no administration in time range)   acetaminophen (TYLENOL) tablet 650 mg (has no administration in time range)   sodium chloride 0.9 % infusion (has no administration in time range)   famotidine (PEPCID) tablet 20 mg (has no administration in time range)   sodium chloride 0.9 % bolus 1,000 mL (0 mL Intravenous Stopped 4/12/24 2302)   pantoprazole (PROTONIX) injection 40 mg (40 mg Intravenous Given 4/12/24 2159)   aluminum-magnesium hydroxide-simethicone (MAALOX) oral suspension 30 mL (30 mL Oral Given 4/12/24 2202)   sucralfate (CARAFATE) tablet 1 g (1 g Oral Given 4/12/24 2202)       Diagnostic Studies  Results Reviewed       Procedure Component Value Units Date/Time    FLU/RSV/COVID - if FLU/RSV clinically relevant [[id number]]  (Normal) Collected: 04/12/24 2217    Lab Status: Final result Specimen: Nares from Nose Updated: 04/12/24 2315     SARS-CoV-2 Negative     INFLUENZA A PCR Negative     INFLUENZA B PCR Negative     RSV PCR Negative    Narrative:      FOR PEDIATRIC PATIENTS - copy/paste COVID Guidelines URL " to browser: [email or web address]    SARS-CoV-2 assay is a Nucleic Acid Amplification assay intended for the  qualitative detection of nucleic acid from SARS-CoV-2 in nasopharyngeal  swabs. Results are for the presumptive identification of SARS-CoV-2 RNA.    Positive results are indicative of infection with SARS-CoV-2, the virus  causing COVID-19, but do not rule out bacterial infection or co-infection  with other viruses. Laboratories within the [place] and its  territories are required to report all positive results to the appropriate  public health authorities. Negative results do not preclude SARS-CoV-2  infection and should not be used as the sole basis for treatment or other  patient management decisions. Negative results must be combined with  clinical observations, patient history, and epidemiological information.  This test has not been FDA cleared or approved.    This test has been authorized by FDA under an Emergency Use Authorization  (EUA). This test is only authorized for the duration of time the  declaration that circumstances exist justifying the authorization of the  emergency use of an in vitro diagnostic tests for detection of SARS-CoV-2  virus and/or diagnosis of COVID-19 infection under section 564(b)(1) of  the Act, 21 U.S.C. 360bbb-3(b)(1), unless the authorization is terminated  or revoked sooner. The test has been validated but independent review by FDA  and CLIA is pending.    Test performed using [employer] GeneXpert: This RT-PCR assay targets N2,  a region unique to SARS-CoV-2. A conserved region in the E-gene was chosen  for pan-Sarbecovirus detection which includes SARS-CoV-2.    According to CMS-2020-01-R, this platform meets the definition of high-throughput technology.    Basic metabolic panel [[id number]]  (Abnormal) Collected: 04/12/24 2156    Lab Status: Final result Specimen: Blood from Arm, Right Updated: 04/12/24 4786      Sodium 126 mmol/L      Potassium 4.6 mmol/L      Chloride 94 mmol/L      CO2 26 mmol/L      ANION GAP 6 mmol/L      BUN 9 mg/dL      Creatinine 0.90 mg/dL      Glucose 99 mg/dL      Calcium 8.7 mg/dL      eGFR 95 ml/min/1.73sq m     Narrative:      National Kidney Disease Foundation guidelines for Chronic Kidney Disease (CKD):     Stage 1 with normal or high GFR (GFR > 90 mL/min/1.73 square meters)    Stage 2 Mild CKD (GFR = 60-89 mL/min/1.73 square meters)    Stage 3A Moderate CKD (GFR = 45-59 mL/min/1.73 square meters)    Stage 3B Moderate CKD (GFR = 30-44 mL/min/1.73 square meters)    Stage 4 Severe CKD (GFR = 15-29 mL/min/1.73 square meters)    Stage 5 End Stage CKD (GFR <15 mL/min/1.73 square meters)  Note: GFR calculation is accurate only with a steady state creatinine    Hepatic function panel [[id number]]  (Abnormal) Collected: 04/12/24 2156    Lab Status: Final result Specimen: Blood from Arm, Right Updated: 04/12/24 2310     Total Bilirubin 0.41 mg/dL      Bilirubin, Direct 0.09 mg/dL      Alkaline Phosphatase 113 U/L      AST 24 U/L      ALT 20 U/L      Total Protein 7.4 g/dL      Albumin 4.2 g/dL     Lipase [[id number]]  (Normal) Collected: 04/12/24 2156    Lab Status: Final result Specimen: Blood from Arm, Right Updated: 04/12/24 2310     Lipase 62 u/L     Protime-INR [[id number]]  (Normal) Collected: 04/12/24 2156    Lab Status: Final result Specimen: Blood from Arm, Right Updated: 04/12/24 2240     Protime 14.3 seconds      INR 1.05    APTT [[id number]]  (Normal) Collected: 04/12/24 2156    Lab Status: Final result Specimen: Blood from Arm, Right Updated: 04/12/24 2240     PTT 30 seconds     HS Troponin 0hr (reflex protocol) [[id number]]  (Normal) Collected: 04/12/24 2156    Lab Status: Final result Specimen: Blood from Arm, Right Updated: 04/12/24 2232     hs TnI 0hr 3 ng/L     HS Troponin I 2hr [[id number]]     Lab Status: No result Specimen: Blood     CBC and differential [[id number]]  (Abnormal)  Collected: 04/12/24 2156    Lab Status: Final result Specimen: Blood from Arm, Right Updated: 04/12/24 2208     WBC 1.61 Thousand/uL      RBC 4.38 Million/uL      Hemoglobin 13.9 g/dL      Hematocrit 38.1 %      MCV 87 fL      MCH 31.7 pg      MCHC 36.5 g/dL      RDW 12.5 %      MPV 10.6 fL      Platelets 143 Thousands/uL      nRBC 0 /100 WBCs      Segmented % 33 %      Immature Grans % 1 %      Lymphocytes % 38 %      Monocytes % 26 %      Eosinophils Relative 1 %      Basophils Relative 1 %      Absolute Neutrophils 0.53 Thousands/µL      Absolute Immature Grans 0.01 Thousand/uL      Absolute Lymphocytes 0.64 Thousands/µL      Absolute Monocytes 0.41 Thousand/µL      Eosinophils Absolute 0.01 Thousand/µL      Basophils Absolute 0.01 Thousands/µL                    XR chest 2 views   ED Interpretation by Deedee Burks DO (04/12 2225)   No acute abnormality in the chest.                 Procedures  ECG 12 Lead Documentation Only    Date/Time: 4/12/2024 9:05 PM    Performed by: Deedee Burks DO  Authorized by: Deedee Burks DO    ECG reviewed by me, the ED Provider: yes    Patient location:  ED  Previous ECG:     Previous ECG:  Compared to current    Comparison ECG info:  7-5-2023    Similarity:  No change  Rate:     ECG rate:  67    ECG rate assessment: normal    Rhythm:     Rhythm: sinus rhythm    Ectopy:     Ectopy: none    QRS:     QRS axis:  Normal    QRS intervals:  Normal  Conduction:     Conduction: normal    ST segments:     ST segments:  Normal  T waves:     T waves: normal             ED Course  ED Course as of 04/13/24 0040   Fri Apr 12, 2024   2210 WBC(!): 1.61  Patient has chronic leukopenia.  Baseline WBC count compared to prior labs within the past 1 year is between 2 and 3.   2232 hs TnI 0hr: 3   2340 Updated patient about workup thus far including normal initial cardiac enzyme as well as low sodium level of 126, of unclear etiology.  Recommended observation admission  for correction of sodium and patient is agreeable.             HEART Risk Score      Flowsheet Row Most Recent Value   Heart Score Risk Calculator    History 0 Filed at: 04/13/2024 0040   ECG 0 Filed at: 04/13/2024 0040   Age 1 Filed at: 04/13/2024 0040   Risk Factors 2 Filed at: 04/13/2024 0040   Troponin 0 Filed at: 04/13/2024 0040   HEART Score 3 Filed at: 04/13/2024 0040                          SBIRT 20yo+      Flowsheet Row Most Recent Value   Initial Alcohol Screen: US AUDIT-C     1. How often do you have a drink containing alcohol? 0 Filed at: 04/12/2024 2111   2. How many drinks containing alcohol do you have on a typical day you are drinking?  0 Filed at: 04/12/2024 2111   3a. Male UNDER 65: How often do you have five or more drinks on one occasion? 0 Filed at: 04/12/2024 2111   Audit-C Score 0 Filed at: 04/12/2024 2111   NARCISA: How many times in the past year have you...    Used an illegal drug or used a prescription medication for non-medical reasons? Never Filed at: 04/12/2024 2111                      Medical Decision Making  54-year-old male presents to the ED for postprandial chest discomfort and palpitations starting this morning, occurring for 30 minutes after eating or drinking water.  Suspect GI etiology such as esophagitis, esophageal stricture, achalasia, GERD, pancreatitis or gastritis.  Cardiac etiology also considered and will evaluate with EKG, troponin and basic labs.  Will provide Protonix, Maalox, Carafate for symptom relief in the ED.  If workup unremarkable, will refer to cardiology and GI for further evaluation and possible endoscopy.    Amount and/or Complexity of Data Reviewed  Labs: ordered. Decision-making details documented in ED Course.  Radiology: ordered and independent interpretation performed. Decision-making details documented in ED Course.  ECG/medicine tests: ordered and independent interpretation performed. Decision-making details documented in ED Course.    Risk  OTC  drugs.  Prescription drug management.  Decision regarding hospitalization.             Disposition  Final diagnoses:   Hyponatremia   Chest pain - Acute post-prandial chest pain   Heart palpitations     Time reflects when diagnosis was documented in both MDM as applicable and the Disposition within this note       Time User Action Codes Description Comment    4/12/2024 11:41 PM Deedee Burks Add [E87.1] Hyponatremia     4/12/2024 11:42 PM Deedee Burks Add [R07.9] Chest pain     4/12/2024 11:42 PM Deedee Burks Modify [R07.9] Chest pain Acute post-prandial chest pain    4/12/2024 11:42 PM Deedee Burks Add [R00.2] Heart palpitations           ED Disposition       ED Disposition   Admit    Condition   Stable    Date/Time   Fri Apr 12, 2024 2758    Comment   Case was discussed with KANDY and the patient's admission status was agreed to be Admission Status: observation status to the service of Dr. Dangelo .               Follow-up Information    None         Current Discharge Medication List        CONTINUE these medications which have NOT CHANGED    Details   apixaban (ELIQUIS) 5 mg Take 5 mg by mouth 2 (two) times a day      clobetasol (TEMOVATE) 0.05 % cream       Diclofenac Sodium (VOLTAREN) 1 % Apply 2 g topically 4 (four) times a day  Qty: 300 g, Refills: 1    Associated Diagnoses: Arthritis of left acromioclavicular joint      gabapentin (NEURONTIN) 300 mg capsule Take 1 capsule (300 mg total) by mouth 3 (three) times a day  Qty: 270 capsule, Refills: 3    Associated Diagnoses: Neuropathic pain      hydroxychloroquine (PLAQUENIL) 200 mg tablet Take 400 mg by mouth daily at bedtime      mycophenolate (CELLCEPT) 500 mg tablet Take 500 mg by mouth every 12 (twelve) hours       potassium chloride (Klor-Con M20) 20 mEq tablet       bacitracin topical ointment 500 units/g topical ointment APPLY TOPICALLY TO AFFECTED AREA THREE TIMES A DAY AS DIRECTED      betamethasone, augmented, (DIPROLENE) 0.05 % ointment  Apply 1 application. topically 2 (two) times a day      escitalopram (LEXAPRO) 10 mg tablet Take 1 tablet (10 mg total) by mouth daily  Qty: 90 tablet, Refills: 3    Associated Diagnoses: Depression, unspecified depression type      gabapentin (NEURONTIN) 600 MG tablet Take 1 tablet (600 mg total) by mouth 3 (three) times a day  Qty: 270 tablet, Refills: 3    Associated Diagnoses: Arthritis of left acromioclavicular joint      ketoconazole (NIZORAL) 2 % cream Apply topically daily  Qty: 60 g, Refills: 1    Associated Diagnoses: Tinea pedis of right foot      lidocaine-prilocaine (EMLA) cream Apply topically as needed for mild pain  Qty: 30 g, Refills: 3    Associated Diagnoses: Right foot pain      loratadine (CLARITIN) 10 mg tablet Take 1 tablet (10 mg total) by mouth daily  Qty: 30 tablet, Refills: 2    Associated Diagnoses: Seasonal allergic rhinitis due to pollen             No discharge procedures on file.    PDMP Review         Value Time User    PDMP Reviewed  Yes 12/29/2022 12:10 PM Dori Julien PA-C            ED Provider  Electronically Signed by             Deedee Burks DO  04/13/24 0040

## 2024-04-14 VITALS
HEART RATE: 77 BPM | HEIGHT: 66 IN | OXYGEN SATURATION: 98 % | RESPIRATION RATE: 17 BRPM | DIASTOLIC BLOOD PRESSURE: 81 MMHG | WEIGHT: 204.59 LBS | SYSTOLIC BLOOD PRESSURE: 144 MMHG | BODY MASS INDEX: 32.88 KG/M2 | TEMPERATURE: 97.8 F

## 2024-04-14 LAB
ANION GAP SERPL CALCULATED.3IONS-SCNC: 7 MMOL/L (ref 4–13)
BUN SERPL-MCNC: 11 MG/DL (ref 5–25)
CALCIUM SERPL-MCNC: 9.3 MG/DL (ref 8.4–10.2)
CHLORIDE SERPL-SCNC: 98 MMOL/L (ref 96–108)
CO2 SERPL-SCNC: 24 MMOL/L (ref 21–32)
CREAT SERPL-MCNC: 1.1 MG/DL (ref 0.6–1.3)
GFR SERPL CREATININE-BSD FRML MDRD: 74 ML/MIN/1.73SQ M
GLUCOSE SERPL-MCNC: 97 MG/DL (ref 65–140)
POTASSIUM SERPL-SCNC: 4.5 MMOL/L (ref 3.5–5.3)
SODIUM SERPL-SCNC: 129 MMOL/L (ref 135–147)

## 2024-04-14 PROCEDURE — 99239 HOSP IP/OBS DSCHRG MGMT >30: CPT | Performed by: STUDENT IN AN ORGANIZED HEALTH CARE EDUCATION/TRAINING PROGRAM

## 2024-04-14 PROCEDURE — 99214 OFFICE O/P EST MOD 30 MIN: CPT | Performed by: INTERNAL MEDICINE

## 2024-04-14 PROCEDURE — 80048 BASIC METABOLIC PNL TOTAL CA: CPT | Performed by: INTERNAL MEDICINE

## 2024-04-14 RX ORDER — SODIUM CHLORIDE 1 G/1
2 TABLET ORAL
Qty: 90 TABLET | Refills: 0 | Status: SHIPPED | OUTPATIENT
Start: 2024-04-14 | End: 2024-04-26 | Stop reason: SDUPTHER

## 2024-04-14 RX ORDER — SODIUM CHLORIDE 1 G/1
2 TABLET ORAL
Status: DISCONTINUED | OUTPATIENT
Start: 2024-04-14 | End: 2024-04-14 | Stop reason: HOSPADM

## 2024-04-14 RX ADMIN — POTASSIUM CHLORIDE 20 MEQ: 1500 TABLET, EXTENDED RELEASE ORAL at 08:10

## 2024-04-14 RX ADMIN — FAMOTIDINE 20 MG: 20 TABLET, FILM COATED ORAL at 08:10

## 2024-04-14 RX ADMIN — Medication 2 G: at 08:10

## 2024-04-14 RX ADMIN — GABAPENTIN 900 MG: 300 CAPSULE ORAL at 08:10

## 2024-04-14 RX ADMIN — MYCOPHENOLATE MOFETIL 500 MG: 250 CAPSULE ORAL at 08:10

## 2024-04-14 RX ADMIN — APIXABAN 5 MG: 5 TABLET, FILM COATED ORAL at 08:10

## 2024-04-14 NOTE — DISCHARGE INSTR - AVS FIRST PAGE
Start salt tablets 2g (2 tablets) three times a day. Follow up with Nephrology for further adjustment.

## 2024-04-14 NOTE — ASSESSMENT & PLAN NOTE
Sodium 126, most likely secondary to polydipsia.  The patient drinks 3 L of water.  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  At that time was on salt tablets and that have been discontinued.  He follows with nephrology for lupus nephritis.  Next appointment April 24, 2024    Nephrology was consulted, initially maintained on IV fluids but urine studies suggestive of SIADH.  Sodium decreased slightly, so IV fluids were discontinued and replaced with salt tablets.  Sodium began trending up again.    Discharged home on 2g salt tablets 3 times daily.  He will follow-up with nephrology outpatient as scheduled

## 2024-04-14 NOTE — DISCHARGE SUMMARY
[facility]  Discharge- Donta Hunter 1970, 54 y.o. male MRN: [id number]  Unit/Bed#: -[facility] Encounter: [id number]  Primary Care Provider: Raad Batres MD   Date and time admitted to hospital: 4/12/2024  8:58 PM    Gastroesophageal reflux disease without esophagitis  Assessment & Plan  New diagnosis for the patient.  Will place him on Pepcid 20 mg twice daily.    Palpitations  Assessment & Plan  Patient's heart rate is normal, these symptoms are heartburn usually, after eating.  They have resolved with the cocktail that he received the emergency room.    Neuropathic pain  Assessment & Plan  Continue gabapentin    History of DVT (deep vein thrombosis)  Assessment & Plan  Followed by hematology, continue Eliquis    Lupus nephritis ([facility])  Assessment & Plan  Continue CellCept.  The patient is followed by nephrology    Lupus (systemic lupus erythematosus) ([facility])  Assessment & Plan  Continue Plaquenil.  The patient is followed by rheumatology    * Hyponatremia  Assessment & Plan  Sodium 126, most likely secondary to polydipsia.  The patient drinks 3 L of water.  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  At that time was on salt tablets and that have been discontinued.  He follows with nephrology for lupus nephritis.  Next appointment April 24, 2024    Nephrology was consulted, initially maintained on IV fluids but urine studies suggestive of SIADH.  Sodium decreased slightly, so IV fluids were discontinued and replaced with salt tablets.  Sodium began trending up again.    Discharged home on 2g salt tablets 3 times daily.  He will follow-up with nephrology outpatient as scheduled      Medical Problems       Resolved Problems  Date Reviewed: 4/13/2024   None       Discharging Physician / Practitioner: Telly Hunt MD  PCP: Raad Batres MD  Admission Date:   Admission Orders (From admission, onward)        "Ordered        04/12/24 2342  Place in Observation  Once                          Discharge Date: 04/14/24    Consultations During Hospital Stay:  Nephrology    Procedures Performed:   None    Significant Findings / Test Results:   XR chest 2 views   ED Interpretation by Deedee Burks DO (04/12 2225)   No acute abnormality in the chest.      Final Result by Douglas Quiroz MD (04/13 0937)      No acute cardiopulmonary disease.            Workstation performed: [id number]             Incidental Findings:   None    Test Results Pending at Discharge (will require follow up):   None     Outpatient Tests Requested:  Repeat BMP (timing can be determined by nephrology outpatient)    Complications: None    Reason for Admission: Chest discomfort after eating    Hospital Course:   Donta Hunter is a 54 y.o. male patient who originally presented to the hospital on 4/12/2024 due to chest discomfort after eating.  This resolved with GI cocktail in the ED.  Lab work was significant for sodium of 126.  Patient had been reporting drinking 3 L of water daily at home.  He was admitted on IV fluids for monitoring of sodium level.  Nephrology was consulted.  Urine studies suggestive of SIADH.  IV fluids were discontinued and salt tabs started.  Sodium was trending up at the time of discharge.  He will follow-up outpatient with nephrology as scheduled.  He remained asymptomatic from hyponatremia.    Please see above list of diagnoses and related plan for additional information.     Condition at Discharge: good    Discharge Day Visit / Exam:   Subjective: Patient reports he feels well today.  Requesting to go home.  No new issues overnight.  Vitals: Blood Pressure: 144/81 (04/14/24 0615)  Pulse: 77 (04/14/24 0615)  Temperature: 97.8 °F (36.6 °C) (04/14/24 0615)  Temp Source: Oral (04/13/24 0300)  Respirations: 17 (04/14/24 0615)  Height: 5' 6\" (167.6 cm) (04/12/24 2109)  Weight - Scale: 92.8 kg (204 lb 9.4 oz) (04/12/24 " 2109)  SpO2: 98 % (04/14/24 0741)  Exam:   Physical Exam  Vitals and nursing note reviewed.   Constitutional:       General: He is not in acute distress.     Appearance: He is well-developed.   HENT:      Head: Normocephalic and atraumatic.   Eyes:      Conjunctiva/sclera: Conjunctivae normal.      Pupils: Pupils are equal, round, and reactive to light.   Cardiovascular:      Rate and Rhythm: Normal rate and regular rhythm.      Heart sounds: No murmur heard.  Pulmonary:      Effort: Pulmonary effort is normal. No respiratory distress.      Breath sounds: Normal breath sounds. No wheezing or rales.   Abdominal:      General: Abdomen is flat. Bowel sounds are normal. There is no distension.      Palpations: Abdomen is soft.      Tenderness: There is no abdominal tenderness. There is no guarding or rebound.   Musculoskeletal:         General: Normal range of motion.      Cervical back: Normal range of motion.   Skin:     General: Skin is warm and dry.      Capillary Refill: Capillary refill takes less than 2 seconds.      Comments: Chronic skin changes   Neurological:      General: No focal deficit present.      Mental Status: He is alert. Mental status is at baseline.   Psychiatric:         Mood and Affect: Mood normal.          Discussion with Family: Patient declined call to .     Discharge instructions/Information to patient and family:   See after visit summary for information provided to patient and family.      Provisions for Follow-Up Care:  See after visit summary for information related to follow-up care and any pertinent home health orders.      Mobility at time of Discharge:   Basic Mobility Inpatient Raw Score: 17  JH-HLM Goal: 5: Stand one or more mins  JH-HLM Achieved: 7: Walk 25 feet or more  HLM Goal achieved. Continue to encourage appropriate mobility.     Disposition:   Home    Planned Readmission: None     Discharge Statement:  I spent 35 minutes discharging the patient. This time was  spent on the day of discharge. I had direct contact with the patient on the day of discharge. Greater than 50% of the total time was spent examining patient, answering all patient questions, arranging and discussing plan of care with patient as well as directly providing post-discharge instructions.  Additional time then spent on discharge activities.    Discharge Medications:  See after visit summary for reconciled discharge medications provided to patient and/or family.      **Please Note: This note may have been constructed using a voice recognition system**

## 2024-04-14 NOTE — PROGRESS NOTES
NEPHROLOGY PROGRESS NOTE    Patient: Donta Hunter               Sex: male          DOA: 4/12/2024  8:58 PM   YOB: 1970        Age: 54 y.o.         LOS:  LOS: 0 days   Encounter Date: 4/14/2024    REASON FOR THE CONSULTATION: Further management of hyponatremia and lupus nephritis    HPI     This is a 54 y.o. male admitted for Hyponatremia     SUBJECTIVE     -Started on salt tablet yesterday afternoon and tolerated well in last 24 hours.  Patient denies nausea, vomiting, headache or dizziness today.  - Reviewed last 24 hrs events     CURRENT MEDICATIONS       Current Facility-Administered Medications:     acetaminophen (TYLENOL) tablet 650 mg, 650 mg, Oral, Q6H PRN, Jose Dangelo MD    apixaban (ELIQUIS) tablet 5 mg, 5 mg, Oral, BID, Jose Dangelo MD, 5 mg at 04/14/24 0810    famotidine (PEPCID) tablet 20 mg, 20 mg, Oral, BID, Jose Dangelo MD, 20 mg at 04/14/24 0810    gabapentin (NEURONTIN) capsule 900 mg, 900 mg, Oral, TID, Jose Dangelo MD, 900 mg at 04/14/24 0810    hydroxychloroquine (PLAQUENIL) tablet 400 mg, 400 mg, Oral, HS, Jose Dangelo MD, 400 mg at 04/13/24 2135    mycophenolate (CELLCEPT) capsule 500 mg, 500 mg, Oral, Q12H YOUSUF, Jose Dangelo MD, 500 mg at 04/14/24 0810    potassium chloride (Klor-Con M20) CR tablet 20 mEq, 20 mEq, Oral, Daily, Jose Dangelo MD, 20 mEq at 04/14/24 0810    sodium chloride tablet 2 g, 2 g, Oral, TID With Meals, Genevieve Dangelo MD, 2 g at 04/14/24 0810    Current Outpatient Medications:     apixaban (ELIQUIS) 5 mg, Take 5 mg by mouth 2 (two) times a day, Disp: , Rfl:     clobetasol (TEMOVATE) 0.05 % cream, , Disp: , Rfl:     Diclofenac Sodium (VOLTAREN) 1 %, Apply 2 g topically 4 (four) times a day, Disp: 300 g, Rfl: 1    gabapentin (NEURONTIN) 300 mg capsule, Take 1 capsule (300 mg total) by mouth 3 (three) times a day, Disp: 270 capsule, Rfl: 3    hydroxychloroquine (PLAQUENIL) 200 mg tablet, Take 400 mg by  "mouth daily at bedtime, Disp: , Rfl:     mycophenolate (CELLCEPT) 500 mg tablet, Take 500 mg by mouth every 12 (twelve) hours , Disp: , Rfl:     potassium chloride (Klor-Con M20) 20 mEq tablet, , Disp: , Rfl:     sodium chloride 1 g tablet, Take 2 tablets (2 g total) by mouth 3 (three) times a day with meals, Disp: 90 tablet, Rfl: 0    bacitracin topical ointment 500 units/g topical ointment, APPLY TOPICALLY TO AFFECTED AREA THREE TIMES A DAY AS DIRECTED, Disp: , Rfl:     betamethasone, augmented, (DIPROLENE) 0.05 % ointment, Apply 1 application. topically 2 (two) times a day (Patient not taking: Reported on 4/13/2024), Disp: , Rfl:     escitalopram (LEXAPRO) 10 mg tablet, Take 1 tablet (10 mg total) by mouth daily, Disp: 90 tablet, Rfl: 3    gabapentin (NEURONTIN) 600 MG tablet, Take 1 tablet (600 mg total) by mouth 3 (three) times a day, Disp: 270 tablet, Rfl: 3    ketoconazole (NIZORAL) 2 % cream, Apply topically daily, Disp: 60 g, Rfl: 1    lidocaine-prilocaine (EMLA) cream, Apply topically as needed for mild pain (Patient not taking: Reported on 4/13/2024), Disp: 30 g, Rfl: 3    loratadine (CLARITIN) 10 mg tablet, Take 1 tablet (10 mg total) by mouth daily, Disp: 30 tablet, Rfl: 2    OBJECTIVE     Current Weight: Weight - Scale: 92.8 kg (204 lb 9.4 oz)  /81   Pulse 77   Temp 97.8 °F (36.6 °C)   Resp 17   Ht 5' 6\" (1.676 m)   Wt 92.8 kg (204 lb 9.4 oz)   SpO2 98%   BMI 33.02 kg/m²   Vitals:    04/14/24 0741   BP:    Pulse:    Resp:    Temp:    SpO2: 98%     Body mass index is 33.02 kg/m².    Intake/Output Summary (Last 24 hours) at 4/14/2024 1117  Last data filed at 4/14/2024 0812  Gross per 24 hour   Intake 490 ml   Output 1700 ml   Net -1210 ml       PHYSICAL EXAMINATION     Physical Exam  Constitutional:       General: He is not in acute distress.  HENT:      Right Ear: External ear normal.   Eyes:      Conjunctiva/sclera:      Right eye: No hemorrhage.  Neck:      Thyroid: No thyromegaly. "   Pulmonary:      Effort: No accessory muscle usage or respiratory distress.   Abdominal:      General: There is no distension.   Skin:     Coloration: Skin is not jaundiced.   Psychiatric:         Behavior: Behavior is not combative.           LAB RESULTS     Results from last 7 days   Lab Units 04/14/24  0606 04/13/24  2115 04/13/24  1300 04/13/24  0633 04/12/24  2156   WBC Thousand/uL  --   --   --  1.94* 1.61*   HEMOGLOBIN g/dL  --   --   --  13.3 13.9   HEMATOCRIT %  --   --   --  38.0 38.1   PLATELETS Thousands/uL  --   --   --  136* 143*   SODIUM mmol/L 129* 127* 127* 129* 126*   POTASSIUM mmol/L 4.5 4.7 4.7 4.8 4.6   CHLORIDE mmol/L 98 98 99 100 94*   CO2 mmol/L 24 23 24 24 26   BUN mg/dL 11 12 9 7 9   CREATININE mg/dL 1.10 1.03 1.07 1.04 0.90   EGFR ml/min/1.73sq m 74 80 77 79 95   CALCIUM mg/dL 9.3 9.0 8.8 8.7 8.7       I have personally reviewed the old medical records and patient's previously known baseline creatinine level is ~ 1.0-1.1    RADIOLOGY RESULTS      XR chest 2 views   ED Interpretation by Deedee Burks DO (04/12 2225)   No acute abnormality in the chest.      Final Result by Douglas Quiroz MD (04/13 0937)      No acute cardiopulmonary disease.            Workstation performed: [id number]             PLAN / RECOMMENDATIONS      1. Hyponatremia.  Will consider as a chronic hyponatremia since exact onset of hyponatremia is unknown.    Urine sodium was 117 and urine osmolality of 403 suggestive of SIADH like etiology.  IV fluid was discontinued yesterday and patient was started on salt tablet 2 g p.o. 3 times daily.  Patient's current sodium level is 129 which is better than yesterday afternoon sodium 127.  Plan to continue salt tablet at current dose and monitor sodium level while in the hospital.    2.  Lupus nephritis class V.  Recent urine analysis showed no microscopic hematuria and renal function is also at baseline suggesting patient's lupus nephritis currently in  "remission.  Continue  mg p.o. twice daily at this point.    Disposition: Okay to discharge patient from renal standpoint with salt tablet 2 mg p.o. 3 times daily.  Patient already have outpatient nephrology appointment with me on 4/24/2024 and plan to check BMP before next visit.    Overall above mentioned plan and recommendations were also d/w current [facility] physician and they agreed with my plan of salt tablet 3 g p.o. 3 times daily    Genevieve Dangelo MD  Nephrology  4/14/2024        Portions of the record may have been created with voice recognition software. Occasional wrong word or \"sound a like\" substitutions may have occurred due to the inherent limitations of voice recognition software. Read the chart carefully and recognize, using context, where substitutions have occurred.  "

## 2024-04-15 ENCOUNTER — TRANSITIONAL CARE MANAGEMENT (OUTPATIENT)
Dept: INTERNAL MEDICINE CLINIC | Facility: CLINIC | Age: 54
End: 2024-04-15

## 2024-04-15 ENCOUNTER — TELEMEDICINE (OUTPATIENT)
Dept: INTERNAL MEDICINE CLINIC | Facility: CLINIC | Age: 54
End: 2024-04-15
Payer: COMMERCIAL

## 2024-04-15 VITALS — HEIGHT: 66 IN | BODY MASS INDEX: 33.02 KG/M2

## 2024-04-15 DIAGNOSIS — M32.9 SYSTEMIC LUPUS ERYTHEMATOSUS, UNSPECIFIED SLE TYPE, UNSPECIFIED ORGAN INVOLVEMENT STATUS (HCC): ICD-10-CM

## 2024-04-15 DIAGNOSIS — E87.1 HYPONATREMIA: ICD-10-CM

## 2024-04-15 DIAGNOSIS — R00.2 PALPITATIONS: Primary | ICD-10-CM

## 2024-04-15 DIAGNOSIS — K21.9 GASTROESOPHAGEAL REFLUX DISEASE WITHOUT ESOPHAGITIS: ICD-10-CM

## 2024-04-15 PROBLEM — E21.3 HYPERPARATHYROIDISM (HCC): Status: ACTIVE | Noted: 2019-10-03

## 2024-04-15 PROBLEM — J44.9 COPD, GROUP B, BY GOLD 2017 CLASSIFICATION (HCC): Chronic | Status: ACTIVE | Noted: 2020-07-13

## 2024-04-15 LAB
ATRIAL RATE: 65 BPM
P AXIS: 71 DEGREES
PR INTERVAL: 210 MS
QRS AXIS: -45 DEGREES
QRSD INTERVAL: 102 MS
QT INTERVAL: 384 MS
QTC INTERVAL: 399 MS
T WAVE AXIS: 69 DEGREES
VENTRICULAR RATE: 65 BPM

## 2024-04-15 PROCEDURE — 99496 TRANSJ CARE MGMT HIGH F2F 7D: CPT | Performed by: INTERNAL MEDICINE

## 2024-04-15 PROCEDURE — 93010 ELECTROCARDIOGRAM REPORT: CPT | Performed by: INTERNAL MEDICINE

## 2024-04-15 RX ORDER — FAMOTIDINE 20 MG/1
20 TABLET, FILM COATED ORAL 2 TIMES DAILY
Qty: 180 TABLET | Refills: 3 | Status: SHIPPED | OUTPATIENT
Start: 2024-04-15 | End: 2025-04-10

## 2024-04-15 RX ORDER — BACLOFEN 20 MG/1
20 TABLET ORAL 3 TIMES DAILY
COMMUNITY
Start: 2024-04-08

## 2024-04-15 NOTE — PROGRESS NOTES
Virtual TCM Visit:    Verification of patient location:    Patient is located at Home in the following state in which I hold an active license [place]    Assessment/Plan:          Problem List Items Addressed This Visit       Lupus (systemic lupus erythematosus) (HCC)    Hyponatremia    Palpitations - Primary    Relevant Orders    Ambulatory Referral to Cardiology    Gastroesophageal reflux disease without esophagitis    Relevant Medications    famotidine (PEPCID) 20 mg tablet        Reason for visit is TCM visit.    Encounter provider Raad Batres MD     Provider located at [facility] [place]  [id number]  [place] [id number] [phone]    Recent Visits  No visits were found meeting these conditions.  Showing recent visits within past 7 days and meeting all other requirements  Today's Visits  Date Type Provider Dept   04/15/24 Telemedicine Raad Batres MD  Internal [facility]   Showing today's visits and meeting all other requirements  Future Appointments  No visits were found meeting these conditions.  Showing future appointments within next 150 days and meeting all other requirements       After connecting through [employer], the patient was identified by name and date of birth. Donta Hunter was informed that this is a telemedicine visit and that the visit is being conducted through the Epic Embedded platform. He agrees to proceed..  My office door was closed. No one else was in the room.  He acknowledged consent and understanding of privacy and security of the video platform. The patient has agreed to participate and understands they can discontinue the visit at any time.    Patient is aware this is a billable service.    Transitional Care Management Review:  Dnota Hunter is a 54 y.o. male here for TCM follow up.     During the TCM phone call patient stated:    TCM Call       Date and time call was made  4/15/2024  8:18 AM     "Hospital care reviewed  Records reviewed    Patient was hospitial[facility] at  [facility]    Date of Admission  04/12/24    Date of discharge  04/14/24    Diagnosis  Hyponatremia    Disposition  Home    Were the patients medications reviewed and updated  Yes    Current Symptoms  None          TCM Call       Post hospital issues  None    Should patient be enrolled in anticoag monitoring?  No    Scheduled for follow up?  Yes    Did you obtain your prescribed medications  Yes    Do you need help managing your prescriptions or medications  No    Is transportation to your appointment needed  No    I have advised the patient to call PCP with any new or worsening symptoms  Sarah Caputo, Practice Coordinator    Living Arrangements  Spouse or Significiant other    Are you recieving home care services  Yes    Types of home care services  Home PT          Subjective:     Patient ID: Donta Hunter is a 54 y.o. male.    Here for TCM visit; we reviewed  recent hospitalization, dc summary and dc instructions.    Donta presented to the hospital on 4/12/2024 due to chest discomfort after eating which resolved with GI cocktail in the ED.  Lab work was significant for sodium of 126, he had been drinking 3 L of water daily at home, was admitted on IV fluids for monitoring of sodium level.  Nephrology was consulted.  Urine studies suggestive of SIADH. Salt tabs started.     Dizziness yesterday, started salt tablets, feeling better;     GERD, pepcid BID, he cannot take PPI bc 2/2 effects on other medication.      Anxiety  Patient reports no shortness of breath.       Review of Systems   HENT:  Negative for ear pain and sore throat.    Respiratory:  Negative for cough and shortness of breath.    All other systems reviewed and are negative.        Objective:    Vitals:    04/15/24 1002   Height: 5' 6\" (1.676 m)       Physical Exam  Vitals and nursing note reviewed.   Constitutional:       Appearance: Normal appearance.   Pulmonary:    "   Effort: Pulmonary effort is normal.   Musculoskeletal:      Cervical back: Normal range of motion.   Skin:     General: Skin is dry.   Neurological:      Mental Status: He is alert and oriented to person, place, and time. Mental status is at baseline.         Medications have been reviewed by provider in current encounter    I spent 15 minutes with the patient during this visit.    Raad Batres MD      VIRTUAL VISIT DISCLAIMER    Donta Hunter verbally agrees to participate in Virtual Care Services. Pt is aware that Virtual Care Services could be limited without vital signs or the ability to perform a full hands-on physical exam. Donta Hunter understands he or the provider may request at any time to terminate the video visit and request the patient to seek care or treatment in person.

## 2024-04-16 ENCOUNTER — OFFICE VISIT (OUTPATIENT)
Dept: INTERNAL MEDICINE CLINIC | Facility: CLINIC | Age: 54
End: 2024-04-16
Payer: COMMERCIAL

## 2024-04-16 VITALS
BODY MASS INDEX: 33.02 KG/M2 | OXYGEN SATURATION: 96 % | TEMPERATURE: 99.6 F | SYSTOLIC BLOOD PRESSURE: 118 MMHG | HEIGHT: 66 IN | DIASTOLIC BLOOD PRESSURE: 80 MMHG | HEART RATE: 78 BPM

## 2024-04-16 DIAGNOSIS — L03.039 CELLULITIS OF FIFTH TOE: Primary | ICD-10-CM

## 2024-04-16 PROCEDURE — 99213 OFFICE O/P EST LOW 20 MIN: CPT | Performed by: INTERNAL MEDICINE

## 2024-04-16 RX ORDER — CEPHALEXIN 500 MG/1
500 CAPSULE ORAL 2 TIMES DAILY
Qty: 14 CAPSULE | Refills: 0 | Status: SHIPPED | OUTPATIENT
Start: 2024-04-16 | End: 2024-04-23

## 2024-04-16 NOTE — PROGRESS NOTES
Assessment/Plan:      There is mild swelling to the right fifth toe, very tender, erythematous; will tx for cellulitis; no fever.    Quality Measures:       Depression Screening and Follow-up Plan: Clincally patient does not have depression. No treatment is required.        Return for Next scheduled follow up.    No problem-specific Assessment & Plan notes found for this encounter.       Diagnoses and all orders for this visit:    Cellulitis of fifth toe  -     cephalexin (KEFLEX) 500 mg capsule; Take 1 capsule (500 mg total) by mouth 2 (two) times a day for 7 days          Subjective:      Patient ID: Donta Hunter is a 54 y.o. male.    Toe Pain   The incident occurred 3 to 5 days ago. There was no injury mechanism. Pain location: right fifth toe. The quality of the pain is described as aching. The pain is moderate. The pain has been Constant since onset. Associated symptoms include an inability to bear weight. The symptoms are aggravated by palpation. He has tried nothing for the symptoms.       ALLERGIES:  Allergies   Allergen Reactions   • Doxycycline Rash   • Sulfa Antibiotics Rash       CURRENT MEDICATIONS:    Current Outpatient Medications:   •  apixaban (ELIQUIS) 5 mg, Take 5 mg by mouth 2 (two) times a day, Disp: , Rfl:   •  baclofen 20 mg tablet, Take 20 mg by mouth 3 (three) times a day, Disp: , Rfl:   •  cephalexin (KEFLEX) 500 mg capsule, Take 1 capsule (500 mg total) by mouth 2 (two) times a day for 7 days, Disp: 14 capsule, Rfl: 0  •  clobetasol (TEMOVATE) 0.05 % cream, , Disp: , Rfl:   •  Diclofenac Sodium (VOLTAREN) 1 %, Apply 2 g topically 4 (four) times a day (Patient taking differently: Apply 2 g topically 4 (four) times a day PRN), Disp: 300 g, Rfl: 1  •  famotidine (PEPCID) 20 mg tablet, Take 1 tablet (20 mg total) by mouth 2 (two) times a day, Disp: 180 tablet, Rfl: 3  •  gabapentin (NEURONTIN) 300 mg capsule, Take 1 capsule (300 mg total) by mouth 3 (three) times a day, Disp: 270 capsule, Rfl:  3  •  hydroxychloroquine (PLAQUENIL) 200 mg tablet, Take 400 mg by mouth daily at bedtime, Disp: , Rfl:   •  ketoconazole (NIZORAL) 2 % cream, Apply topically daily, Disp: 60 g, Rfl: 1  •  mycophenolate (CELLCEPT) 500 mg tablet, Take 500 mg by mouth every 12 (twelve) hours , Disp: , Rfl:   •  potassium chloride (Klor-Con M20) 20 mEq tablet, , Disp: , Rfl:   •  sodium chloride 1 g tablet, Take 2 tablets (2 g total) by mouth 3 (three) times a day with meals, Disp: 90 tablet, Rfl: 0  •  bacitracin topical ointment 500 units/g topical ointment, APPLY TOPICALLY TO AFFECTED AREA THREE TIMES A DAY AS DIRECTED, Disp: , Rfl:   •  betamethasone, augmented, (DIPROLENE) 0.05 % ointment, Apply 1 application. topically 2 (two) times a day (Patient not taking: Reported on 4/13/2024), Disp: , Rfl:   •  escitalopram (LEXAPRO) 10 mg tablet, Take 1 tablet (10 mg total) by mouth daily, Disp: 90 tablet, Rfl: 3  •  gabapentin (NEURONTIN) 600 MG tablet, Take 1 tablet (600 mg total) by mouth 3 (three) times a day, Disp: 270 tablet, Rfl: 3  •  lidocaine-prilocaine (EMLA) cream, Apply topically as needed for mild pain (Patient not taking: Reported on 4/13/2024), Disp: 30 g, Rfl: 3  •  loratadine (CLARITIN) 10 mg tablet, Take 1 tablet (10 mg total) by mouth daily, Disp: 30 tablet, Rfl: 2    ACTIVE PROBLEM LIST:  Patient Active Problem List   Diagnosis   • Lupus (systemic lupus erythematosus) ([facility])   • Lupus nephritis ([facility])   • Hypertension   • History of DVT (deep vein thrombosis)   • Stage 2 chronic kidney disease   • Anemia in chronic kidney disease   • Muscle weakness (generalized)   • Tremor of both hands   • Spasticity   • Gait difficulty   • Neuropathic pain   • Hyponatremia   • Hypertensive CKD (chronic kidney disease)   • DJD (degenerative joint disease), ankle and foot, right   • Chronic kidney disease, stage 3a ([facility])   • CAD (coronary artery disease)   • DVT (deep venous thrombosis) ([facility])   • History of stroke in adulthood   •  Palpitations   • Gastroesophageal reflux disease without esophagitis   • COPD, group B, by GOLD 2017 classification ([facility])   • Hyperparathyroidism ([facility])       PAST MEDICAL HISTORY:  Past Medical History:   Diagnosis Date   • Anemia    • Arthritis    • Cervical mass    • Chronic kidney disease    • COPD, group B, by GOLD 2017 classification ([facility]) 7/13/2020   • Coronary artery disease    • Depression    • DVT (deep venous thrombosis) ([facility])    • Hypertension    • Lupus ([facility])    • Renal disorder        PAST SURGICAL HISTORY:  Past Surgical History:   Procedure Laterality Date   • APPENDECTOMY     • CERVICAL SPINE SURGERY     • CHOLECYSTECTOMY     • COLONOSCOPY N/A 8/23/2018    Procedure: COLONOSCOPY;  Surgeon: James Wise III, MD;  Location: [facility];  Service: Gastroenterology   • ESOPHAGOGASTRODUODENOSCOPY N/A 8/22/2018    Procedure: ESOPHAGOGASTRODUODENOSCOPY (EGD);  Surgeon: James Wise III, MD;  Location: [facility];  Service: Gastroenterology   • IR IVC FILTER PLACEMENT PERMANENT  9/7/2017   • IR IVC FILTER REMOVAL  4/23/2018   • IR LUMBAR PUNCTURE  11/23/2015   • NECK SURGERY     • US GUIDED INJECTION FOR RESEARCH STUDY  4/23/2018   • US GUIDED INJECTION FOR RESEARCH STUDY  9/7/2017   • VASCULAR SURGERY         FAMILY HISTORY:  Family History   Problem Relation Age of Onset   • Heart disease Mother    • No Known Problems Father        SOCIAL HISTORY:  Social History     Socioeconomic History   • Marital status: /Civil Union     Spouse name: Not on file   • Number of children: Not on file   • Years of education: Not on file   • Highest education level: Not on file   Occupational History   • Not on file   Tobacco Use   • Smoking status: Never   • Smokeless tobacco: Never   Vaping Use   • Vaping status: Never Used   Substance and Sexual Activity   • Alcohol use: Never   • Drug use: Never   • Sexual activity: Yes     Partners: Female, Male     Birth control/protection: Rhythm   Other Topics Concern  "  • Not on file   Social History Narrative    ** Merged History Encounter **          Social Determinants of Health     Financial Resource Strain: Not on file   Food Insecurity: No Food Insecurity (1/1/2023)    Hunger Vital Sign    • Worried About Running Out of Food in the Last Year: Never true    • Ran Out of Food in the Last Year: Never true   Transportation Needs: No Transportation Needs (1/1/2023)    PRAPARE - Transportation    • Lack of Transportation (Medical): No    • Lack of Transportation (Non-Medical): No   Physical Activity: Not on file   Stress: Not on file   Social Connections: Not on file   Intimate Partner Violence: Not on file   Housing Stability: Low Risk  (1/1/2023)    Housing Stability Vital Sign    • Unable to Pay for Housing in the Last Year: No    • Number of Places Lived in the Last Year: 1    • Unstable Housing in the Last Year: No       Review of Systems   Constitutional:  Negative for chills and fever.   HENT:  Negative for ear pain and sore throat.    Eyes:  Negative for pain and visual disturbance.   Respiratory:  Negative for cough and shortness of breath.    Cardiovascular:  Negative for chest pain and palpitations.   Gastrointestinal:  Negative for abdominal pain and vomiting.   Genitourinary:  Negative for dysuria and hematuria.   Musculoskeletal:  Positive for gait problem. Negative for arthralgias and back pain.   Skin:  Negative for color change and rash.   Neurological:  Negative for seizures and syncope.   All other systems reviewed and are negative.        Objective:  Vitals:    04/16/24 1549   BP: 118/80   BP Location: Left arm   Patient Position: Sitting   Cuff Size: Standard   Pulse: 78   Temp: 99.6 °F (37.6 °C)   TempSrc: Tympanic   SpO2: 96%   Height: 5' 6\" (1.676 m)     Body mass index is 33.02 kg/m².     Physical Exam  Vitals and nursing note reviewed.   Constitutional:       Appearance: Normal appearance.   HENT:      Head: Normocephalic and atraumatic. "   Cardiovascular:      Rate and Rhythm: Normal rate.   Pulmonary:      Effort: Pulmonary effort is normal.   Musculoskeletal:         General: Normal range of motion.      Cervical back: Normal range of motion.   Skin:     General: Skin is warm and dry.      Findings: Erythema present.   Neurological:      General: No focal deficit present.      Mental Status: He is alert and oriented to person, place, and time. Mental status is at baseline.      Gait: Gait abnormal.   Psychiatric:         Mood and Affect: Mood normal.         RESULTS:  Hemoglobin A1C   Date/Time Value Ref Range Status   02/28/2024 07:31 AM 4.9 Normal 4.0-5.6%; PreDiabetic 5.7-6.4%; Diabetic >=6.5%; Glycemic control for adults with diabetes <7.0% % Final   12/20/2022 08:23 AM 5.3 4.0 - 5.6 % Final     Comment:     The use of HbA1c to monitor glycemic status is based on normal hemoglobin and HbA composition. This test should not be used in patients with abnormal hemoglobin that affects the half life of the red blood cell or the in vivo glycation rates.      Cholesterol   Date/Time Value Ref Range Status   02/28/2024 07:31  See Comment mg/dL Final     Comment:     Cholesterol:         Pediatric <18 Years        Desirable          <170 mg/dL      Borderline High    170-199 mg/dL      High               >=200 mg/dL        Adult >=18 Years            Desirable        <200 mg/dL      Borderline High  200-239 mg/dL      High             >239 mg/dL       Triglycerides   Date/Time Value Ref Range Status   02/28/2024 07:31  See Comment mg/dL Final     Comment:     Triglyceride:     0-9Y            <75mg/dL     10Y-17Y         <90 mg/dL       >=18Y     Normal          <150 mg/dL     Borderline High 150-199 mg/dL     High            200-499 mg/dL        Very High       >499 mg/dL    Specimen collection should occur prior to Metamizole administration due to the potential for falsely depressed results.     HDL, Direct   Date/Time Value Ref Range  Status   02/28/2024 07:31 AM 44 >=40 mg/dL Final     LDL Calculated   Date/Time Value Ref Range Status   02/28/2024 07:31 AM 62 0 - 100 mg/dL Final     Comment:     LDL Cholesterol:     Optimal           <100 mg/dl     Near Optimal      100-129 mg/dl     Above Optimal       Borderline High 130-159 mg/dl       High            160-189 mg/dl       Very High       >189 mg/dl         This screening LDL is a calculated result.   It does not have the accuracy of the Direct Measured LDL in the monitoring of patients with hyperlipidemia and/or statin therapy.   Direct Measure LDL (NJC487) must be ordered separately in these patients.     Hemoglobin   Date/Time Value Ref Range Status   04/13/2024 06:33 AM 13.3 12.0 - 17.0 g/dL Final     Hematocrit   Date/Time Value Ref Range Status   04/13/2024 06:33 AM 38.0 36.5 - 49.3 % Final     Platelets   Date/Time Value Ref Range Status   04/13/2024 06:33  (L) 149 - 390 Thousands/uL Final     Comment:     This is an appended report.  These results have been appended to a previously preliminary verified report.     PSA   Date/Time Value Ref Range Status   02/21/2022 08:32 AM 0.78 <3.10 ng/mL Final     Prostate Specific Antigen Total   Date/Time Value Ref Range Status   02/28/2024 07:31 AM 0.6 0.0 - 4.0 ng/mL Final     Comment:     Roche ECLIA methodology.  According to the American Urological Association, Serum PSA should  decrease and remain at undetectable levels after radical  prostatectomy. The AUA defines biochemical recurrence as an initial  PSA value 0.2 ng/mL or greater followed by a subsequent confirmatory  PSA value 0.2 ng/mL or greater.  Values obtained with different assay methods or kits cannot be used  interchangeably. Results cannot be interpreted as absolute evidence  of the presence or absence of malignant disease.     TSH 3RD GENERATON   Date/Time Value Ref Range Status   02/28/2024 07:31 AM 2.859 0.450 - 4.500 uIU/mL Final     Comment:     Adult TSH (3rd  generation) reference range follows the recommended guidelines of the American Thyroid Association, January, 2020.     FREE T4   Date/Time Value Ref Range Status   08/20/2018 03:28 PM NOT APPLICABLE 0.9 - 1.7 ng/dL Final     Sodium   Date/Time Value Ref Range Status   04/14/2024 06:06  (L) 135 - 147 mmol/L Final   12/20/2022 08:23  135 - 146 mmol/L Final     BUN   Date/Time Value Ref Range Status   04/14/2024 06:06 AM 11 5 - 25 mg/dL Final   03/19/2024 11:10 AM 14 6 - 20 mg/dL Final     Creatinine   Date/Time Value Ref Range Status   04/14/2024 06:06 AM 1.10 0.60 - 1.30 mg/dL Final     Comment:     Standardized to IDMS reference method   03/19/2024 11:10 AM 1.2 0.6 - 1.2 mg/dL Final      In chart    This note was created with voice recognition software.  Phonic, grammatical and spelling errors may be present within the note as a result.

## 2024-04-22 ENCOUNTER — APPOINTMENT (OUTPATIENT)
Dept: LAB | Facility: CLINIC | Age: 54
End: 2024-04-22
Payer: COMMERCIAL

## 2024-04-22 DIAGNOSIS — I63.421 CEREBROVASCULAR ACCIDENT (CVA) DUE TO EMBOLISM OF RIGHT ANTERIOR CEREBRAL ARTERY (HCC): ICD-10-CM

## 2024-04-22 DIAGNOSIS — M32.9 SYSTEMIC LUPUS ERYTHEMATOSUS, UNSPECIFIED SLE TYPE, UNSPECIFIED ORGAN INVOLVEMENT STATUS (HCC): ICD-10-CM

## 2024-04-22 PROCEDURE — 85732 THROMBOPLASTIN TIME PARTIAL: CPT

## 2024-04-22 PROCEDURE — 36415 COLL VENOUS BLD VENIPUNCTURE: CPT

## 2024-04-22 PROCEDURE — 86147 CARDIOLIPIN ANTIBODY EA IG: CPT

## 2024-04-22 PROCEDURE — 85705 THROMBOPLASTIN INHIBITION: CPT

## 2024-04-22 PROCEDURE — 85670 THROMBIN TIME PLASMA: CPT

## 2024-04-22 PROCEDURE — 86146 BETA-2 GLYCOPROTEIN ANTIBODY: CPT

## 2024-04-22 PROCEDURE — 85613 RUSSELL VIPER VENOM DILUTED: CPT

## 2024-04-23 ENCOUNTER — TELEPHONE (OUTPATIENT)
Dept: NEPHROLOGY | Facility: CLINIC | Age: 54
End: 2024-04-23

## 2024-04-23 LAB
B2 GLYCOPROT1 IGA SERPL IA-ACNC: 4.1
B2 GLYCOPROT1 IGG SERPL IA-ACNC: <0.8
B2 GLYCOPROT1 IGM SERPL IA-ACNC: <2.4
CARDIOLIPIN IGA SER IA-ACNC: 5.8
CARDIOLIPIN IGG SER IA-ACNC: 1.1
CARDIOLIPIN IGM SER IA-ACNC: 1.2

## 2024-04-24 ENCOUNTER — TELEPHONE (OUTPATIENT)
Age: 54
End: 2024-04-24

## 2024-04-24 DIAGNOSIS — N18.2 STAGE 2 CHRONIC KIDNEY DISEASE: ICD-10-CM

## 2024-04-24 DIAGNOSIS — N18.2 HYPERTENSIVE KIDNEY DISEASE WITH STAGE 2 CHRONIC KIDNEY DISEASE: ICD-10-CM

## 2024-04-24 DIAGNOSIS — N18.2 STAGE 2 CHRONIC KIDNEY DISEASE: Primary | ICD-10-CM

## 2024-04-24 DIAGNOSIS — M32.14 LUPUS NEPHRITIS (HCC): ICD-10-CM

## 2024-04-24 DIAGNOSIS — E87.1 HYPONATREMIA: Primary | ICD-10-CM

## 2024-04-24 DIAGNOSIS — I12.9 HYPERTENSIVE KIDNEY DISEASE WITH STAGE 2 CHRONIC KIDNEY DISEASE: ICD-10-CM

## 2024-04-24 LAB
APTT SCREEN TO CONFIRM RATIO: 1.17 RATIO (ref 0–1.34)
CONFIRM APTT/NORMAL: 49.3 SEC (ref 0–47.6)
LA PPP-IMP: ABNORMAL
SCREEN APTT: 31.5 SEC (ref 0–43.5)
SCREEN DRVVT: 46.4 SEC (ref 0–47)
THROMBIN TIME: 15.7 SEC (ref 0–23)

## 2024-04-24 NOTE — TELEPHONE ENCOUNTER
Spoke with pt on the phone used  [facility] [phone], pt had to cx his appt today due to his ride canceling on him. He is worried as he can't get in until October and he was recently hospitalized bc of his sodium. He is not sure what to do. He also asked if the Dr could put some bloodwork in his mychart to maybe re-check the sodium. Please advise. Pt asked to communicate through [employer] as it is easier for translation. Thank you.

## 2024-04-24 NOTE — TELEPHONE ENCOUNTER
Called spoke with patient advised patient per Dr.Jwalant Antolin MD. that he has placed non-fasting lab orders to reassess sodium level, patient will have labs done tomorrow for the reassessment as requested and has been  scheduled for next available appointment with  for 12/03/24 @ 10:00am. patient verbalized understanding and is okay with it.

## 2024-04-25 ENCOUNTER — HOSPITAL ENCOUNTER (OUTPATIENT)
Dept: NON INVASIVE DIAGNOSTICS | Facility: HOSPITAL | Age: 54
Discharge: HOME | End: 2024-04-25
Attending: PSYCHIATRY & NEUROLOGY
Payer: COMMERCIAL

## 2024-04-25 ENCOUNTER — APPOINTMENT (OUTPATIENT)
Dept: LAB | Facility: CLINIC | Age: 54
End: 2024-04-25
Payer: COMMERCIAL

## 2024-04-25 VITALS
DIASTOLIC BLOOD PRESSURE: 78 MMHG | SYSTOLIC BLOOD PRESSURE: 126 MMHG | HEART RATE: 75 BPM | RESPIRATION RATE: 18 BRPM | BODY MASS INDEX: 29.97 KG/M2 | OXYGEN SATURATION: 99 % | WEIGHT: 186.51 LBS | HEIGHT: 66 IN | TEMPERATURE: 97.5 F

## 2024-04-25 DIAGNOSIS — M32.19: ICD-10-CM

## 2024-04-25 DIAGNOSIS — E87.1 HYPONATREMIA: ICD-10-CM

## 2024-04-25 DIAGNOSIS — N18.2 STAGE 2 CHRONIC KIDNEY DISEASE: ICD-10-CM

## 2024-04-25 DIAGNOSIS — G05.3: ICD-10-CM

## 2024-04-25 DIAGNOSIS — M32.14 LUPUS NEPHRITIS (HCC): ICD-10-CM

## 2024-04-25 LAB
ALBUMIN SERPL BCG-MCNC: 4.2 G/DL (ref 3.5–5)
ALP SERPL-CCNC: 103 U/L (ref 34–104)
ALT SERPL W P-5'-P-CCNC: 16 U/L (ref 7–52)
ANION GAP SERPL CALCULATED.3IONS-SCNC: 8 MMOL/L (ref 4–13)
APPEARANCE CSF: ABNORMAL
AST SERPL W P-5'-P-CCNC: 22 U/L (ref 13–39)
BASOPHILS # BLD AUTO: 0.03 THOUSANDS/ΜL (ref 0–0.1)
BASOPHILS NFR BLD AUTO: 1 % (ref 0–1)
BILIRUB DIRECT SERPL-MCNC: 0.14 MG/DL (ref 0–0.2)
BILIRUB SERPL-MCNC: 0.36 MG/DL (ref 0.2–1)
BUN SERPL-MCNC: 9 MG/DL (ref 5–25)
CALCIUM SERPL-MCNC: 9.1 MG/DL (ref 8.4–10.2)
CHLORIDE SERPL-SCNC: 102 MMOL/L (ref 96–108)
CO2 SERPL-SCNC: 26 MMOL/L (ref 21–32)
CREAT SERPL-MCNC: 0.96 MG/DL (ref 0.6–1.3)
EOSINOPHIL # BLD AUTO: 0.06 THOUSAND/ΜL (ref 0–0.61)
EOSINOPHIL NFR BLD AUTO: 2 % (ref 0–6)
ERYTHROCYTE [DISTWIDTH] IN BLOOD BY AUTOMATED COUNT: 14.1 % (ref 11.6–15.1)
GFR SERPL CREATININE-BSD FRML MDRD: 88 ML/MIN/1.73SQ M
GLUCOSE CSF-MCNC: 48 MG/DL (ref 40–70)
GLUCOSE P FAST SERPL-MCNC: 94 MG/DL (ref 65–99)
GRAM STN SPEC: NORMAL
HCT VFR BLD AUTO: 40.4 % (ref 36.5–49.3)
HGB BLD-MCNC: 13.4 G/DL (ref 12–17)
IMM GRANULOCYTES # BLD AUTO: 0.01 THOUSAND/UL (ref 0–0.2)
IMM GRANULOCYTES NFR BLD AUTO: 0 % (ref 0–2)
INR PPP: 1.03 (ref 0.84–1.19)
LYMPHOCYTES # BLD AUTO: 1.12 THOUSANDS/ΜL (ref 0.6–4.47)
LYMPHOCYTES NFR BLD AUTO: 36 % (ref 14–44)
LYMPHOCYTES NFR CSF MANUAL: 36 %
MCH RBC QN AUTO: 31.2 PG (ref 26.8–34.3)
MCHC RBC AUTO-ENTMCNC: 33.2 G/DL (ref 31.4–37.4)
MCV RBC AUTO: 94 FL (ref 82–98)
MONOCYTES # BLD AUTO: 0.65 THOUSAND/ΜL (ref 0.17–1.22)
MONOCYTES NFR BLD AUTO: 21 % (ref 4–12)
MONONUC CELLS NFR CSF MANUAL: 4 %
NEUTROPHILS # BLD AUTO: 1.27 THOUSANDS/ΜL (ref 1.85–7.62)
NEUTROPHILS NFR CSF MANUAL: 58 %
NEUTS BAND NFR CSF MANUAL: 2 %
NEUTS SEG NFR BLD AUTO: 40 % (ref 43–75)
NRBC BLD AUTO-RTO: 0 /100 WBCS
PLATELET # BLD AUTO: 171 THOUSANDS/UL (ref 149–390)
PLATELET # BLD AUTO: 179 THOUSANDS/UL (ref 149–390)
PMV BLD AUTO: 10 FL (ref 8.9–12.7)
PMV BLD AUTO: 10.4 FL (ref 8.9–12.7)
POTASSIUM SERPL-SCNC: 4.2 MMOL/L (ref 3.5–5.3)
PROT CSF-MCNC: 563 MG/DL (ref 15–45)
PROT SERPL-MCNC: 7.1 G/DL (ref 6.4–8.4)
PROTHROMBIN TIME: 14.1 SECONDS (ref 11.6–14.5)
RBC # BLD AUTO: 4.29 MILLION/UL (ref 3.88–5.62)
RBC # CSF MANUAL: ABNORMAL UL (ref 0–10)
SODIUM SERPL-SCNC: 136 MMOL/L (ref 135–147)
TOTAL CELLS COUNTED BLD: NO
TOTAL CELLS COUNTED SPEC: 100
TUBE # CSF: 1
URATE SERPL-MCNC: 5.6 MG/DL (ref 3.5–8.5)
WBC # BLD AUTO: 3.14 THOUSAND/UL (ref 4.31–10.16)
WBC # CSF AUTO: 404 /UL (ref 0–5)

## 2024-04-25 PROCEDURE — 82945 GLUCOSE OTHER FLUID: CPT | Performed by: PSYCHIATRY & NEUROLOGY

## 2024-04-25 PROCEDURE — 89050 BODY FLUID CELL COUNT: CPT | Performed by: PSYCHIATRY & NEUROLOGY

## 2024-04-25 PROCEDURE — 62328 DX LMBR SPI PNXR W/FLUOR/CT: CPT | Performed by: RADIOLOGY

## 2024-04-25 PROCEDURE — 85610 PROTHROMBIN TIME: CPT | Performed by: STUDENT IN AN ORGANIZED HEALTH CARE EDUCATION/TRAINING PROGRAM

## 2024-04-25 PROCEDURE — 36415 COLL VENOUS BLD VENIPUNCTURE: CPT

## 2024-04-25 PROCEDURE — 87070 CULTURE OTHR SPECIMN AEROBIC: CPT | Performed by: PSYCHIATRY & NEUROLOGY

## 2024-04-25 PROCEDURE — 86039 ANTINUCLEAR ANTIBODIES (ANA): CPT | Performed by: PSYCHIATRY & NEUROLOGY

## 2024-04-25 PROCEDURE — 62270 DX LMBR SPI PNXR: CPT

## 2024-04-25 PROCEDURE — 85025 COMPLETE CBC W/AUTO DIFF WBC: CPT

## 2024-04-25 PROCEDURE — 80048 BASIC METABOLIC PNL TOTAL CA: CPT

## 2024-04-25 PROCEDURE — 85049 AUTOMATED PLATELET COUNT: CPT | Performed by: STUDENT IN AN ORGANIZED HEALTH CARE EDUCATION/TRAINING PROGRAM

## 2024-04-25 PROCEDURE — 84157 ASSAY OF PROTEIN OTHER: CPT | Performed by: PSYCHIATRY & NEUROLOGY

## 2024-04-25 PROCEDURE — 84550 ASSAY OF BLOOD/URIC ACID: CPT

## 2024-04-25 PROCEDURE — 89051 BODY FLUID CELL COUNT: CPT | Performed by: PSYCHIATRY & NEUROLOGY

## 2024-04-25 PROCEDURE — 80076 HEPATIC FUNCTION PANEL: CPT

## 2024-04-25 RX ORDER — LIDOCAINE HYDROCHLORIDE 10 MG/ML
INJECTION, SOLUTION EPIDURAL; INFILTRATION; INTRACAUDAL; PERINEURAL AS NEEDED
Status: COMPLETED | OUTPATIENT
Start: 2024-04-25 | End: 2024-04-25

## 2024-04-25 RX ADMIN — LIDOCAINE HYDROCHLORIDE 5 ML: 10 INJECTION, SOLUTION EPIDURAL; INFILTRATION; INTRACAUDAL; PERINEURAL at 10:58

## 2024-04-25 RX ADMIN — SODIUM CHLORIDE 500 ML: 0.9 INJECTION, SOLUTION INTRAVENOUS at 09:56

## 2024-04-25 RX ADMIN — LIDOCAINE HYDROCHLORIDE 3 ML: 10 INJECTION, SOLUTION EPIDURAL; INFILTRATION; INTRACAUDAL; PERINEURAL at 11:17

## 2024-04-25 RX ADMIN — LIDOCAINE HYDROCHLORIDE 5 ML: 10 INJECTION, SOLUTION EPIDURAL; INFILTRATION; INTRACAUDAL; PERINEURAL at 10:39

## 2024-04-25 NOTE — DISCHARGE INSTRUCTIONS
Lumbar Puncture     WHAT YOU NEED TO KNOW:   Lumbar puncture (LP) is a procedure in which a needle is inserted in your back and into your spinal canal. This is usually done to collect cerebrospinal fluid (CSF) to check for an infection, inflammation, bleeding, or other conditions that affect the brain. CSF is a clear, protective fluid that flows around the brain and inside the spinal canal. LP may also be done to remove CSF to reduce pressure in the brain.     DISCHARGE INSTRUCTIONS:     Follow up with your healthcare provider as directed: Write down your questions so you remember to ask them during your visits.     Post-lumbar puncture headache: You may develop a headache during the first few hours after your LP that may last for several days. The headache may be mild to severe and may get worse when you sit or stand. The following may help ease a post-lumbar puncture headache:  Drink plenty of liquids: You should drink more liquid than usual after your LP. Ask how much liquid is right for you. Caffeine may be used to treat a headache. Drinks, such as coffee, tea, or some sodas, have caffeine. Ask a Do not drink alcohol.    Lie down: If you have a headache after your lumbar puncture, it may be helpful to lie down and rest.  You may have a slight soreness over the LP area. This is normal.  Remove the band aid or dressing in 24 hours.    Contact Interventional Radiology imediately  at [phone] :  You have a severe headache that does not get better after you lie down.  Persistent nausea or vomiting   You have a fever.   You have a stiff neck or have trouble thinking clearly.   Your legs, feet, or other parts below the waist feel numb, tingly, or weak.   You have bleeding or a discharge coming from the area where the needle was put into your back.   You have severe pain in your back or neck.

## 2024-04-25 NOTE — BRIEF OP NOTE (RAD/CATH)
IR LUMBAR PUNCTURE Procedure Note    PATIENT NAME: Donta Hunter  : 1970  MRN: [id number]    Pre-op Diagnosis:   1. Lupus cerebritis  (HCC)      Post-op Diagnosis:   1. Lupus cerebritis  (HCC)        Surgeon:   CORBY Hidalgo  Assistants:     No qualified resident was available, Resident is only observing    Estimated Blood Loss: minimal  Findings: bloody LP. Patient appears to be dehydrated. Unable to obtain opening pressure.    Specimens: 3 ml bloody CSF    Complications:  none immediate.    Would recommend patient rescheduling LP appointment and hydrating the day prior to exam and can schedule repeat LP with conscious sedation as patient was very uncomfortable laying prone.    Anesthesia: local    CORBY Hidalgo     Date: 2024  Time: 11:25 AM

## 2024-04-26 DIAGNOSIS — E87.1 HYPONATREMIA: ICD-10-CM

## 2024-04-26 RX ORDER — SODIUM CHLORIDE 1 G/1
2 TABLET ORAL
Qty: 540 TABLET | Refills: 2 | Status: SHIPPED | OUTPATIENT
Start: 2024-04-26 | End: 2024-07-25

## 2024-04-26 NOTE — TELEPHONE ENCOUNTER
Called spoke with patient advised patient as per Dr.Jwalant Antolin MD. that his blood work from yesterday are normal and his sodium level is also normal, also advised per  that he would like patient to continue salt tablet at his current dose at this point (2 g p.o. 3 times daily). patient verbalized understanding and is okay with it.

## 2024-04-26 NOTE — TELEPHONE ENCOUNTER
----- Message from Genevieve Dangelo MD sent at 4/26/2024  9:22 AM EDT -----  Will you please call and let patient know that blood work from yesterday looks good and his sodium level is also normal and I would like to continue salt tablet at current dose at this point (2 g p.o. 3 times daily).  Please send new prescription of salt tablet to the pharmacy if patient needs a refill.

## 2024-04-28 LAB — BACTERIA CSF CULT: NO GROWTH

## 2024-05-02 LAB
ANA ELISA RESULT: NORMAL RATIO
ANTINUCLEAR ANTIBODY BY ELISA: NORMAL
Lab: NORMAL
RATIO: 0.1

## 2024-05-03 ENCOUNTER — TELEPHONE (OUTPATIENT)
Age: 54
End: 2024-05-03

## 2024-05-03 DIAGNOSIS — M79.2 NEUROPATHIC PAIN: ICD-10-CM

## 2024-05-03 DIAGNOSIS — M19.012 ARTHRITIS OF LEFT ACROMIOCLAVICULAR JOINT: ICD-10-CM

## 2024-05-03 DIAGNOSIS — N40.1 BENIGN PROSTATIC HYPERPLASIA WITH URINARY FREQUENCY: Primary | ICD-10-CM

## 2024-05-03 DIAGNOSIS — R35.0 BENIGN PROSTATIC HYPERPLASIA WITH URINARY FREQUENCY: Primary | ICD-10-CM

## 2024-05-03 DIAGNOSIS — M32.14 LUPUS NEPHRITIS (HCC): Primary | ICD-10-CM

## 2024-05-03 RX ORDER — TAMSULOSIN HYDROCHLORIDE 0.4 MG/1
0.4 CAPSULE ORAL
Qty: 30 CAPSULE | Refills: 5 | Status: SHIPPED | OUTPATIENT
Start: 2024-05-03

## 2024-05-03 RX ORDER — TAMSULOSIN HYDROCHLORIDE 0.4 MG/1
0.4 CAPSULE ORAL
Status: CANCELLED | OUTPATIENT
Start: 2024-05-03

## 2024-05-03 NOTE — TELEPHONE ENCOUNTER
Patient called the RX Refill Line. Message is being forwarded to the office.     Patient is requesting a refill of his tamsulosin (FLOMAX) 0.4 mg  it is not listed on his refillable medications.    Please review and refill if appropriate     Please contact patient at [phone]

## 2024-05-03 NOTE — TELEPHONE ENCOUNTER
Patient called to request a refill for their Gabapentin and potassium advised a refill was requested on 5/3/24 and is pending approval. Patient verbalized understanding and is in agreement.

## 2024-05-05 RX ORDER — GABAPENTIN 600 MG/1
600 TABLET ORAL 3 TIMES DAILY
Qty: 270 TABLET | Refills: 1 | Status: SHIPPED | OUTPATIENT
Start: 2024-05-05 | End: 2024-08-03

## 2024-05-05 RX ORDER — GABAPENTIN 300 MG/1
300 CAPSULE ORAL 3 TIMES DAILY
Qty: 270 CAPSULE | Refills: 1 | Status: SHIPPED | OUTPATIENT
Start: 2024-05-05 | End: 2024-08-03

## 2024-05-06 ENCOUNTER — OFFICE VISIT (OUTPATIENT)
Dept: OBGYN CLINIC | Facility: CLINIC | Age: 54
End: 2024-05-06
Payer: COMMERCIAL

## 2024-05-06 VITALS
BODY MASS INDEX: 29.89 KG/M2 | HEIGHT: 66 IN | WEIGHT: 186 LBS | SYSTOLIC BLOOD PRESSURE: 147 MMHG | HEART RATE: 83 BPM | DIASTOLIC BLOOD PRESSURE: 82 MMHG

## 2024-05-06 DIAGNOSIS — M54.42 CHRONIC BILATERAL LOW BACK PAIN WITH BILATERAL SCIATICA: ICD-10-CM

## 2024-05-06 DIAGNOSIS — G89.29 CHRONIC BILATERAL LOW BACK PAIN WITH BILATERAL SCIATICA: ICD-10-CM

## 2024-05-06 DIAGNOSIS — M48.062 SPINAL STENOSIS OF LUMBAR REGION WITH NEUROGENIC CLAUDICATION: Primary | ICD-10-CM

## 2024-05-06 DIAGNOSIS — M19.012 ARTHRITIS OF LEFT ACROMIOCLAVICULAR JOINT: ICD-10-CM

## 2024-05-06 DIAGNOSIS — M54.41 CHRONIC BILATERAL LOW BACK PAIN WITH BILATERAL SCIATICA: ICD-10-CM

## 2024-05-06 PROCEDURE — 99213 OFFICE O/P EST LOW 20 MIN: CPT | Performed by: ORTHOPAEDIC SURGERY

## 2024-05-06 RX ORDER — POTASSIUM CHLORIDE 20 MEQ/1
20 TABLET, EXTENDED RELEASE ORAL DAILY
Qty: 90 TABLET | Refills: 0 | Status: SHIPPED | OUTPATIENT
Start: 2024-05-06

## 2024-05-06 NOTE — TELEPHONE ENCOUNTER
Patient called requesting refill for Gabapentin 600mg and Gabapentin 300mg Patient made aware medication was refilled on 05/06/24 for 270 with 1 refills to [facility] pharmacy. Patient instructed to contact the pharmacy to obtain refills of medication. Patient verbalized understanding.

## 2024-05-06 NOTE — TELEPHONE ENCOUNTER
Reason for call: NOT A DUPLICATE Patient stated that the pharmacy does not have a refill   [x] Refill   [] Prior Auth  [] Other:     Office:   [] PCP/Provider -   [x] Specialty/Provider - [facility]    Medication: Diclofenac Sodium (VOLTAREN) 1 %      Dose/Frequency:  2 g Topical 4 times daily PRN    Quantity: 300 GR    Pharmacy: [facility], [place] [address]     Does the patient have enough for 3 days?   [] Yes   [x] No - Send as HP to POD

## 2024-05-06 NOTE — PROGRESS NOTES
[facility] ORTHOPEDIC SPINE SURGERY  DR.AMIR ADAN MD  [address] [facility] ([facility]] [id number] [phone]    HISTORY OF PRESENT ILLNESS:    Donta Hunter is a 54 y.o. male presents for follow up of low back pain, lumbar stenosis, ambulatory dysfunction. The patient was last seen in the office on 3/25/24 where he was referred to aquatic therapy and encouraged to continue care with neurologist for shaking and loss of balance. The patient has not been able to attend aqua therapy and states he needs to reschedule it. The patient reports his symptoms are the same. He has numbness into bilateral anterior thighs and into his feet. The patient continues to ambulate with a rolator walker. Pain is managed with gabapentin.       ALLERGIES:   Allergies   Allergen Reactions   • Doxycycline Rash   • Sulfa Antibiotics Rash       MEDICATIONS:    Current Outpatient Medications:   •  apixaban (ELIQUIS) 5 mg, Take 5 mg by mouth 2 (two) times a day, Disp: , Rfl:   •  bacitracin topical ointment 500 units/g topical ointment, APPLY TOPICALLY TO AFFECTED AREA THREE TIMES A DAY AS DIRECTED, Disp: , Rfl:   •  baclofen 20 mg tablet, Take 20 mg by mouth 3 (three) times a day, Disp: , Rfl:   •  betamethasone, augmented, (DIPROLENE) 0.05 % ointment, Apply 1 application. topically 2 (two) times a day, Disp: , Rfl:   •  clobetasol (TEMOVATE) 0.05 % cream, , Disp: , Rfl:   •  Diclofenac Sodium (VOLTAREN) 1 %, Apply 2 g topically 4 (four) times a day (Patient taking differently: Apply 2 g topically 4 (four) times a day PRN), Disp: 300 g, Rfl: 1  •  escitalopram (LEXAPRO) 10 mg tablet, Take 1 tablet (10 mg total) by mouth daily, Disp: 90 tablet, Rfl: 3  •  famotidine (PEPCID) 20 mg tablet, Take 1 tablet (20 mg total) by mouth 2 (two) times a day, Disp: 180 tablet, Rfl: 3  •  gabapentin (NEURONTIN) 300 mg capsule, Take 1 capsule (300 mg total) by mouth 3 (three) times a day, Disp: 270 capsule, Rfl: 1  •  gabapentin (NEURONTIN) 600 MG tablet,  Take 1 tablet (600 mg total) by mouth 3 (three) times a day, Disp: 270 tablet, Rfl: 1  •  hydroxychloroquine (PLAQUENIL) 200 mg tablet, Take 400 mg by mouth daily at bedtime, Disp: , Rfl:   •  ketoconazole (NIZORAL) 2 % cream, Apply topically daily, Disp: 60 g, Rfl: 1  •  loratadine (CLARITIN) 10 mg tablet, Take 1 tablet (10 mg total) by mouth daily, Disp: 30 tablet, Rfl: 2  •  mycophenolate (CELLCEPT) 500 mg tablet, Take 500 mg by mouth every 12 (twelve) hours , Disp: , Rfl:   •  sodium chloride 1 g tablet, Take 2 tablets (2 g total) by mouth 3 (three) times a day with meals, Disp: 540 tablet, Rfl: 2  •  tamsulosin (FLOMAX) 0.4 mg, Take 1 capsule (0.4 mg total) by mouth daily with dinner, Disp: 30 capsule, Rfl: 5  •  lidocaine-prilocaine (EMLA) cream, Apply topically as needed for mild pain (Patient not taking: Reported on 4/13/2024), Disp: 30 g, Rfl: 3  •  potassium chloride (Klor-Con M20) 20 mEq tablet, Take 1 tablet (20 mEq total) by mouth daily, Disp: 90 tablet, Rfl: 0     PAST MEDICAL HISTORY:   Past Medical History:   Diagnosis Date   • Anemia    • Arthritis    • Cervical mass    • Chronic kidney disease    • COPD, group B, by GOLD 2017 classification ([facility]) 7/13/2020   • Coronary artery disease    • Depression    • DVT (deep venous thrombosis) ([facility])    • Hypertension    • Lupus ([facility])    • Renal disorder        PAST SURGICAL HISTORY:  Past Surgical History:   Procedure Laterality Date   • APPENDECTOMY     • CERVICAL SPINE SURGERY     • CHOLECYSTECTOMY     • COLONOSCOPY N/A 8/23/2018    Procedure: COLONOSCOPY;  Surgeon: James Wise III, MD;  Location: [facility];  Service: Gastroenterology   • ESOPHAGOGASTRODUODENOSCOPY N/A 8/22/2018    Procedure: ESOPHAGOGASTRODUODENOSCOPY (EGD);  Surgeon: James Wise III, MD;  Location: [facility];  Service: Gastroenterology   • IR IVC FILTER PLACEMENT PERMANENT  9/7/2017   • IR IVC FILTER REMOVAL  4/23/2018   • IR LUMBAR PUNCTURE  11/23/2015   • IR LUMBAR PUNCTURE   4/25/2024   • NECK SURGERY     • US GUIDED INJECTION FOR RESEARCH STUDY  4/23/2018   • US GUIDED INJECTION FOR RESEARCH STUDY  9/7/2017   • VASCULAR SURGERY         SOCIAL HISTORY:  Social History     Tobacco Use   • Smoking status: Never   • Smokeless tobacco: Never   Vaping Use   • Vaping status: Never Used   Substance Use Topics   • Alcohol use: Never   • Drug use: Never         PHYSICAL EXAM:  54 y.o. male sits comfortably on exam chair in no apparent distress   Ambulates without normal gait, shaking while walking with walker, dragging leg as moving forward, leaning forward while standing, unsteady  No TTP over cervical, thoracic, or lumbar spine.    Sensation intact to light touch left L2 through S1 dermatomes.   Sensation intact to light touch right L2 through S1 dermatomes.    Left Motor: 5-/5 iliopsoas, 5-/5 quadriceps, 4-/5 tibialis anterior, 5/5 extensor hallucis longus  Right Motor: 5-/5 iliopsoas, 5-/5 quadriceps, 5/5 tibialis anterior, 5/5 extensor hallucis longus     ROM:  Pain free range of motion bilateral hips, knees, ankles  No atrophy  No deformity    Clonus negative  The patient is well perfused distally.  Straight leg raise test is negative Bilateral   Rendon's testing is negative bilaterally  The patient is well perfused distally.      RADIOGRAPHIC STUDIES:  X-ray, L-spine, 3/18/2024: Mild multilevel lumbar degenerative disc except at L4-5 where there is evidence of moderate degenerative changes, degenerative spondylolisthesis and mild kyphosis.    MRI, L-spine, 2/22/2024: Mild multilevel lumbar degenerative disease most prominent at L4-5.  There is also grade 1 degenerative spondylolisthesis at L4-5 with mild to moderate lateral recess stenosis.  No evidence of central stenosis.  Mild to moderate lateral recess stenosis present at L3-4.    MRI, brain, 2/22/2024:stable chronic cerebellar lacunar infarcts.    Cervical MRI of 3/18/2024: Prior fusion at C4-5 and C5-6.  The fusion is stable.  DDD  at C3-4 and C6-7.  No spinal cord compression or myomalacia.  Mild flattening of the spinal cord at C3-4.    Thoracic MRI of 3/18/2024:  No evidence of cord compression.  Canal is wide open.  No significant deformity.        ASSESSMENT:  Problem List Items Addressed This Visit    None  Visit Diagnoses     Spinal stenosis of lumbar region with neurogenic claudication    -  Primary    Chronic bilateral low back pain with bilateral sciatica                    PLAN:  Donta Hunter is a 54 y.o. male with low back pain, lumbar stenosis, ambulatory dysfunction.     The patient is mildly weak into his lower extremities and continues to walk imbalanced. The patient is encouraged to attend aqua therapy as prescribed. He met with physical therapy  to be scheduled. The patient was informed he needs to attend at least 5-6 sessions prior to his next visit.     The patient should discuss with his primary care provider the need for additional gabapentin.     The patient continues to see neurology. Per his last visit on 4/2/24 he is being treated at [facility] multiple sclerosis center for recurrent strokes and ambulatory dysfunction. Brain MRI was done in February 2024 patient was found to have subacute lacunar ischemic changes left parasagittal frontal lobe with multiple other cortical/subcortical chronic ischemia within right frontal lobe and cerebellum, all changes are new since 2019. Patient has close follow-up with rheumatology  and heme oncology Dr. Herrera-workup for antiphospholipid antibody syndrome been repeated and negative. Considering ongoing discussion about lupus cerebritis versus other causes of patient recurrent strokes, patient was advised to proceed with spinal tap. tient was also offered repeating EMG b/l LE -order was placed with [facility] neurology. Patient will have close follow-up with [facility] stroke team Dr. Mulligan.     I will see the patient back in approximately 6 weeks for  re-evaluation.             Scribe Attestation    I,:  Marisa Garces am acting as a scribe while in the presence of the attending physician.:       I,:  Malick Barnett MD personally performed the services described in this documentation    as scribed in my presence.:

## 2024-05-07 ENCOUNTER — TELEPHONE (OUTPATIENT)
Age: 54
End: 2024-05-07

## 2024-05-10 ENCOUNTER — OFFICE VISIT (OUTPATIENT)
Dept: NEUROLOGY | Facility: CLINIC | Age: 54
End: 2024-05-10
Payer: COMMERCIAL

## 2024-05-10 VITALS
OXYGEN SATURATION: 100 % | SYSTOLIC BLOOD PRESSURE: 122 MMHG | HEART RATE: 67 BPM | WEIGHT: 186.4 LBS | BODY MASS INDEX: 30.09 KG/M2 | DIASTOLIC BLOOD PRESSURE: 70 MMHG | TEMPERATURE: 97.9 F

## 2024-05-10 DIAGNOSIS — I82.409 DVT (DEEP VENOUS THROMBOSIS) (HCC): ICD-10-CM

## 2024-05-10 DIAGNOSIS — M54.9 BACK PAIN: ICD-10-CM

## 2024-05-10 DIAGNOSIS — M32.9 SLE (SYSTEMIC LUPUS ERYTHEMATOSUS RELATED SYNDROME) (HCC): ICD-10-CM

## 2024-05-10 DIAGNOSIS — M32.14 LUPUS NEPHRITIS (HCC): ICD-10-CM

## 2024-05-10 DIAGNOSIS — I63.9 STROKE (CEREBRUM) (HCC): Primary | ICD-10-CM

## 2024-05-10 PROBLEM — G89.29 CHRONIC BACK PAIN: Status: ACTIVE | Noted: 2024-05-10

## 2024-05-10 PROCEDURE — 99215 OFFICE O/P EST HI 40 MIN: CPT | Performed by: PSYCHIATRY & NEUROLOGY

## 2024-05-10 RX ORDER — CLOPIDOGREL BISULFATE 75 MG/1
75 TABLET ORAL DAILY
Qty: 30 TABLET | Refills: 3 | Status: SHIPPED | OUTPATIENT
Start: 2024-05-10

## 2024-05-10 NOTE — PROGRESS NOTES
Patient ID: Donta Hunter is a 54 y.o. male.    Assessment/Plan:    This is a 53 y/o  Male who is here as as a follow up for hx of  multiple strokes (Left anterior corpus callosum, and right cerebellar with tiny scattered chronic infarcts in the Left cerebellar hemisphere), and DVT and long term anticoagulation with eliquis per hematology/oncology presumably indefinitely. Patient was suspected APS but subsequent testing was negative, and does not meet criteria for APS.      Patient has been maintained on cellcept and plaquenil for treatment of SLE. [facility] and the plan is to start rituximab infusions soon. He had Mri brain 2 months ago which showed new subacute stroke in the left frontal lobe which I reviewed the images of. Majority of his strokes seem more consistent with small vessel disease, rather than embolic strokes, concern for CNS vasculitis vs lupus cerebritis is very high. He needs cerebral angiogram to see if there is any underlying vasculitis, will refer him to neurosurgery. He did have CSF protein of 563 but also elevated RBCs so likely was traumatic at this time.  He also has not had much cardiac workup including JIMMY, so will refer patient to cardiology as well.  Patient does not want to do another LP at this time.     PLAN:      Diagnoses and all orders for this visit:    Stroke (cerebrum) (HCC)  -for secondary stroke prevention, since patient has more small vessel strokes but no evidence of atherosclerosis on cerebral vessels but with risk factors, will consider starting patient on plavix 75mg PO qdaily since aspirin cannot be used in the setting of lupus nephritis.   -Blood Pressure goal < 130/80, BP   -LDL goal <70  -snoring - will consider sleep study in the future.   -needs further cardiac workup with zio patch/loop recorder, will get JIMMY, and recommend cardiology referral as well   -will repeat another Mri brain to see if there have been any  progression/more strokes which will likely advance the timeline of starting rituximab.     Will send patient to neurosurgery for cerebral angiogram to see if there is any evidence of vasculitis.     Counseling/stroke education -   -I advised patient to avoid using NSAIDs for headaches or other pain and to stick to tylenol if needed  -Recommend lifestyle modifications such as mediterranean diet & regular exercise regimen atleast 4-5 times a week for 20-30 minutes.   -I educated patient/family regarding medication compliance  -encourage smoking cessation, and control of diabetes and hypertension; defer management to primary   -     MRI brain without contrast; Future  -     Cancel: Thrombosis Panel; Future  -     JIMMY; Future  -     Ambulatory Referral to Cardiology; Future  -     Ambulatory referral to Neurosurgery; Future  -     clopidogrel (Plavix) 75 mg tablet; Take 1 tablet (75 mg total) by mouth daily    DVT (deep venous thrombosis) (HCC)  -he will need lifelong anticoagulation, has been seeing hematology as well  -there was some confusion regarding whether or not patient has APS, he initially had triple APS but on repeat testing he does not have APS    Lupus nephritis (HCC)    SLE (systemic lupus erythematosus related syndrome) (HCC)  -continue with cellcept and plaquenil for now  -would need rituximab infusions but will hold off until workup is complete (ie angiogram, JIMMY, cardiology referral)    Back pain  -continue with PT  -patient following orthopedic surgery as well        I have spent a total time of 40 minutes on 05/10/24 in caring for this patient including Risks and benefits of tx options, Instructions for management, Importance of tx compliance, Documenting in the medical record, Reviewing / ordering tests, medicine, procedures  , Obtaining or reviewing history  , and Communicating with other healthcare professionals .     Subjective:    HPI    This is a 55 y/o M who is here for management of  stroke.    Patient has been diagnosed with lupus almost 15 years ago he states he has been maintained on CellCept and other immunosuppressive therapy.  He follows up with rheumatology at [facility].  He presented to Dr. Bob few years back for recurrent strokes.  He has had extensive workup for this including MRI brain which showed multiple small vessel subcortical strokes.  He is also had thrombosis panel which initially showed positive antiphospholipid syndrome but upon repeat testing it was negative.  He has also had several episodes of DVT for which she was placed on Eliquis for lifelong anticoagulation.  During the recent time he had another stroke about 2 months ago and his most recent MRI left frontal at this time further workup was done 2D echo was done which was negative.  LP was also attempted which was inconclusive because it was painful and was moving around and was traumatic so there is elevated red blood cells and elevated protein which is inconclusive this time.    Patient is doing well overall. He does have chronic back pain, and uses a walker to ambulate. He is currently doing PT now for his back pain and follows up with orthopedic surgeon.  He is tolerating his Eliquis without any issues no bleeding or bruising at this time.  He has an appointment coming up with rheumatology for possible Rituxan infusion.    The following portions of the patient's history were reviewed and updated as appropriate: He  has a past medical history of Anemia, Arthritis, Cervical mass, Chronic kidney disease, COPD, group B, by GOLD 2017 classification ([facility]) (7/13/2020), Coronary artery disease, Depression, DVT (deep venous thrombosis) ([facility]), Hypertension, Lupus ([facility]), and Renal disorder.  He   Patient Active Problem List    Diagnosis Date Noted    Back pain 05/10/2024    Gastroesophageal reflux disease without esophagitis 04/13/2024    Palpitations 04/12/2024    History of stroke in adulthood 04/02/2024    CAD  (coronary artery disease) 03/21/2024    DVT (deep venous thrombosis) ([facility]) 03/21/2024    DJD (degenerative joint disease), ankle and foot, right 03/09/2023    Hypertensive CKD (chronic kidney disease) 02/14/2023    Hyponatremia 01/04/2023    COPD, group B, by GOLD 2017 classification ([facility]) 07/13/2020    Tremor of both hands 02/27/2020    Spasticity 02/27/2020    Gait difficulty 02/27/2020    Neuropathic pain 02/27/2020    Muscle weakness (generalized) 11/04/2019    Hyperparathyroidism ([facility]) 10/03/2019    Anemia in chronic kidney disease 08/21/2018    Chronic kidney disease, stage 3a ([facility]) 08/21/2018    SLE (systemic lupus erythematosus related syndrome) ([facility]) 07/29/2018    Lupus nephritis ([facility]) 07/29/2018    Hypertension 07/29/2018    History of DVT (deep vein thrombosis) 07/29/2018    Stage 2 chronic kidney disease 07/29/2018     He  has a past surgical history that includes Neck surgery; Cervical spine surgery; Appendectomy; Esophagogastroduodenoscopy (N/A, 8/22/2018); Colonoscopy (N/A, 8/23/2018); Vascular surgery; Cholecystectomy; US guided injection for research study (4/23/2018); IR IVC filter removal (4/23/2018); US guided injection for research study (9/7/2017); IR IVC filter placement permanent (9/7/2017); IR lumbar puncture (11/23/2015); and IR lumbar puncture (4/25/2024).  His family history includes Heart disease in his mother; No Known Problems in his father.  He  reports that he has never smoked. He has never used smokeless tobacco. He reports that he does not drink alcohol and does not use drugs.  Current Outpatient Medications   Medication Sig Dispense Refill    apixaban (ELIQUIS) 5 mg Take 5 mg by mouth 2 (two) times a day      bacitracin topical ointment 500 units/g topical ointment APPLY TOPICALLY TO AFFECTED AREA THREE TIMES A DAY AS DIRECTED      baclofen 20 mg tablet Take 20 mg by mouth 3 (three) times a day      betamethasone, augmented, (DIPROLENE) 0.05 % ointment Apply 1 application.  topically 2 (two) times a day      clobetasol (TEMOVATE) 0.05 % cream       clopidogrel (Plavix) 75 mg tablet Take 1 tablet (75 mg total) by mouth daily 30 tablet 3    Diclofenac Sodium (VOLTAREN) 1 % Apply 2 g topically 4 (four) times a day 300 g 1    escitalopram (LEXAPRO) 10 mg tablet Take 1 tablet (10 mg total) by mouth daily 90 tablet 3    famotidine (PEPCID) 20 mg tablet Take 1 tablet (20 mg total) by mouth 2 (two) times a day 180 tablet 3    gabapentin (NEURONTIN) 300 mg capsule Take 1 capsule (300 mg total) by mouth 3 (three) times a day 270 capsule 1    gabapentin (NEURONTIN) 600 MG tablet Take 1 tablet (600 mg total) by mouth 3 (three) times a day 270 tablet 1    hydroxychloroquine (PLAQUENIL) 200 mg tablet Take 400 mg by mouth daily at bedtime      ketoconazole (NIZORAL) 2 % cream Apply topically daily 60 g 1    lidocaine-prilocaine (EMLA) cream Apply topically as needed for mild pain 30 g 3    loratadine (CLARITIN) 10 mg tablet Take 1 tablet (10 mg total) by mouth daily 30 tablet 2    mycophenolate (CELLCEPT) 500 mg tablet Take 500 mg by mouth every 12 (twelve) hours       potassium chloride (Klor-Con M20) 20 mEq tablet Take 1 tablet (20 mEq total) by mouth daily 90 tablet 0    sodium chloride 1 g tablet Take 2 tablets (2 g total) by mouth 3 (three) times a day with meals 540 tablet 2    tamsulosin (FLOMAX) 0.4 mg Take 1 capsule (0.4 mg total) by mouth daily with dinner 30 capsule 5     No current facility-administered medications for this visit.     Current Outpatient Medications on File Prior to Visit   Medication Sig    apixaban (ELIQUIS) 5 mg Take 5 mg by mouth 2 (two) times a day    bacitracin topical ointment 500 units/g topical ointment APPLY TOPICALLY TO AFFECTED AREA THREE TIMES A DAY AS DIRECTED    baclofen 20 mg tablet Take 20 mg by mouth 3 (three) times a day    betamethasone, augmented, (DIPROLENE) 0.05 % ointment Apply 1 application. topically 2 (two) times a day    clobetasol (TEMOVATE)  0.05 % cream     Diclofenac Sodium (VOLTAREN) 1 % Apply 2 g topically 4 (four) times a day    escitalopram (LEXAPRO) 10 mg tablet Take 1 tablet (10 mg total) by mouth daily    famotidine (PEPCID) 20 mg tablet Take 1 tablet (20 mg total) by mouth 2 (two) times a day    gabapentin (NEURONTIN) 300 mg capsule Take 1 capsule (300 mg total) by mouth 3 (three) times a day    gabapentin (NEURONTIN) 600 MG tablet Take 1 tablet (600 mg total) by mouth 3 (three) times a day    hydroxychloroquine (PLAQUENIL) 200 mg tablet Take 400 mg by mouth daily at bedtime    ketoconazole (NIZORAL) 2 % cream Apply topically daily    lidocaine-prilocaine (EMLA) cream Apply topically as needed for mild pain    loratadine (CLARITIN) 10 mg tablet Take 1 tablet (10 mg total) by mouth daily    mycophenolate (CELLCEPT) 500 mg tablet Take 500 mg by mouth every 12 (twelve) hours     potassium chloride (Klor-Con M20) 20 mEq tablet Take 1 tablet (20 mEq total) by mouth daily    sodium chloride 1 g tablet Take 2 tablets (2 g total) by mouth 3 (three) times a day with meals    tamsulosin (FLOMAX) 0.4 mg Take 1 capsule (0.4 mg total) by mouth daily with dinner     No current facility-administered medications on file prior to visit.     He is allergic to doxycycline and sulfa antibiotics..    Objective:    Blood pressure 122/70, pulse 67, temperature 97.9 °F (36.6 °C), temperature source Temporal, weight 84.6 kg (186 lb 6.4 oz), SpO2 100%.    Physical Exam  General - patient is alert, follows commands   Speech - no dysarthria noted, no aphasia noted.     Neuro:   Cranial nerves: PERRL, EOMI, facial sensation intact to soft touch in V1, V2 and V3, no facial asymmetry noted, uvula/palate midline, tongue midline.   Motor: 5/5 throughout, normal tone, no pronator drift noted.   Sensory - intact to soft touch throughout  Coordination - no ataxia/dysmetria noted  Gait - ambulates with a walker.     ROS:  Reviewed ROS   Review of Systems   Constitutional:  Negative.    HENT: Negative.     Eyes: Negative.    Respiratory: Negative.     Cardiovascular: Negative.    Gastrointestinal: Negative.    Endocrine: Negative.    Genitourinary: Negative.    Musculoskeletal:  Positive for back pain and neck pain.   Skin: Negative.    Allergic/Immunologic: Negative.    Neurological:  Positive for numbness.   Hematological: Negative.    Psychiatric/Behavioral: Negative.

## 2024-05-16 RX ORDER — BACLOFEN 20 MG/1
20 TABLET ORAL 3 TIMES DAILY
Qty: 90 TABLET | Refills: 0 | OUTPATIENT
Start: 2024-05-16

## 2024-05-17 RX ORDER — SODIUM CHLORIDE 9 MG/ML
75 INJECTION, SOLUTION INTRAVENOUS CONTINUOUS
Status: CANCELLED | OUTPATIENT
Start: 2024-05-22

## 2024-05-20 ENCOUNTER — TELEPHONE (OUTPATIENT)
Age: 54
End: 2024-05-20

## 2024-05-20 DIAGNOSIS — R25.2 SPASTICITY: Primary | ICD-10-CM

## 2024-05-20 RX ORDER — BACLOFEN 20 MG/1
20 TABLET ORAL 3 TIMES DAILY
Qty: 90 TABLET | Refills: 1 | Status: SHIPPED | OUTPATIENT
Start: 2024-05-20

## 2024-05-20 NOTE — TELEPHONE ENCOUNTER
Patient called to request a refill for their Baclofen advised a refill was requested on 5/20/24 and is pending approval. Patient verbalized understanding and is in agreement.

## 2024-05-20 NOTE — TELEPHONE ENCOUNTER
Patient called the RX Refill Line. Message is being forwarded to the office.     Patient is requesting   sildenafil (REVATIO) 20 mg tablet. Med is not active on the med list, please advise if appropriate.    Please contact patient at  [phone]

## 2024-05-23 ENCOUNTER — ANESTHESIA EVENT (OUTPATIENT)
Dept: NON INVASIVE DIAGNOSTICS | Facility: HOSPITAL | Age: 54
End: 2024-05-23

## 2024-05-23 ENCOUNTER — HOSPITAL ENCOUNTER (OUTPATIENT)
Dept: NON INVASIVE DIAGNOSTICS | Facility: HOSPITAL | Age: 54
Discharge: HOME | End: 2024-05-23
Attending: PSYCHIATRY & NEUROLOGY
Payer: COMMERCIAL

## 2024-05-23 ENCOUNTER — ANESTHESIA (OUTPATIENT)
Dept: NON INVASIVE DIAGNOSTICS | Facility: HOSPITAL | Age: 54
End: 2024-05-23

## 2024-05-23 VITALS
BODY MASS INDEX: 29.25 KG/M2 | DIASTOLIC BLOOD PRESSURE: 80 MMHG | WEIGHT: 182 LBS | HEART RATE: 136 BPM | SYSTOLIC BLOOD PRESSURE: 139 MMHG | RESPIRATION RATE: 20 BRPM | HEIGHT: 66 IN | OXYGEN SATURATION: 95 % | TEMPERATURE: 97 F

## 2024-05-23 DIAGNOSIS — I63.9 STROKE (CEREBRUM) (HCC): ICD-10-CM

## 2024-05-23 LAB
ANION GAP SERPL CALCULATED.3IONS-SCNC: 6 MMOL/L (ref 4–13)
BUN SERPL-MCNC: 8 MG/DL (ref 5–25)
CALCIUM SERPL-MCNC: 9.4 MG/DL (ref 8.4–10.2)
CHLORIDE SERPL-SCNC: 103 MMOL/L (ref 96–108)
CO2 SERPL-SCNC: 26 MMOL/L (ref 21–32)
CREAT SERPL-MCNC: 0.96 MG/DL (ref 0.6–1.3)
ERYTHROCYTE [DISTWIDTH] IN BLOOD BY AUTOMATED COUNT: 13.3 % (ref 11.6–15.1)
GFR SERPL CREATININE-BSD FRML MDRD: 88 ML/MIN/1.73SQ M
GLUCOSE P FAST SERPL-MCNC: 99 MG/DL (ref 65–99)
GLUCOSE SERPL-MCNC: 99 MG/DL (ref 65–140)
HCT VFR BLD AUTO: 39.9 % (ref 36.5–49.3)
HGB BLD-MCNC: 13.7 G/DL (ref 12–17)
MCH RBC QN AUTO: 31.2 PG (ref 26.8–34.3)
MCHC RBC AUTO-ENTMCNC: 34.3 G/DL (ref 31.4–37.4)
MCV RBC AUTO: 91 FL (ref 82–98)
PLATELET # BLD AUTO: 173 THOUSANDS/UL (ref 149–390)
PMV BLD AUTO: 9.9 FL (ref 8.9–12.7)
POTASSIUM SERPL-SCNC: 4.1 MMOL/L (ref 3.5–5.3)
RBC # BLD AUTO: 4.39 MILLION/UL (ref 3.88–5.62)
SL CV LV EF: 60
SODIUM SERPL-SCNC: 135 MMOL/L (ref 135–147)
WBC # BLD AUTO: 2 THOUSAND/UL (ref 4.31–10.16)

## 2024-05-23 PROCEDURE — 93312 ECHO TRANSESOPHAGEAL: CPT

## 2024-05-23 PROCEDURE — 93312 ECHO TRANSESOPHAGEAL: CPT | Performed by: INTERNAL MEDICINE

## 2024-05-23 PROCEDURE — 80048 BASIC METABOLIC PNL TOTAL CA: CPT | Performed by: INTERNAL MEDICINE

## 2024-05-23 PROCEDURE — 93320 DOPPLER ECHO COMPLETE: CPT | Performed by: INTERNAL MEDICINE

## 2024-05-23 PROCEDURE — 93005 ELECTROCARDIOGRAM TRACING: CPT

## 2024-05-23 PROCEDURE — 85027 COMPLETE CBC AUTOMATED: CPT | Performed by: INTERNAL MEDICINE

## 2024-05-23 PROCEDURE — 93325 DOPPLER ECHO COLOR FLOW MAPG: CPT | Performed by: INTERNAL MEDICINE

## 2024-05-23 RX ORDER — PROPOFOL 10 MG/ML
INJECTION, EMULSION INTRAVENOUS AS NEEDED
Status: DISCONTINUED | OUTPATIENT
Start: 2024-05-23 | End: 2024-05-23

## 2024-05-23 RX ORDER — SODIUM CHLORIDE 9 MG/ML
75 INJECTION, SOLUTION INTRAVENOUS CONTINUOUS
Status: DISCONTINUED | OUTPATIENT
Start: 2024-05-23 | End: 2024-05-24 | Stop reason: HOSPADM

## 2024-05-23 RX ORDER — GLYCOPYRROLATE 0.2 MG/ML
INJECTION INTRAMUSCULAR; INTRAVENOUS AS NEEDED
Status: DISCONTINUED | OUTPATIENT
Start: 2024-05-23 | End: 2024-05-23

## 2024-05-23 RX ORDER — EPHEDRINE SULFATE 50 MG/ML
INJECTION INTRAVENOUS AS NEEDED
Status: DISCONTINUED | OUTPATIENT
Start: 2024-05-23 | End: 2024-05-23

## 2024-05-23 RX ORDER — FENTANYL CITRATE 50 UG/ML
INJECTION, SOLUTION INTRAMUSCULAR; INTRAVENOUS AS NEEDED
Status: DISCONTINUED | OUTPATIENT
Start: 2024-05-23 | End: 2024-05-23

## 2024-05-23 RX ORDER — LIDOCAINE HYDROCHLORIDE 20 MG/ML
INJECTION, SOLUTION EPIDURAL; INFILTRATION; INTRACAUDAL; PERINEURAL AS NEEDED
Status: DISCONTINUED | OUTPATIENT
Start: 2024-05-23 | End: 2024-05-23

## 2024-05-23 RX ADMIN — PROPOFOL 20 MG: 10 INJECTION, EMULSION INTRAVENOUS at 09:39

## 2024-05-23 RX ADMIN — PROPOFOL 30 MG: 10 INJECTION, EMULSION INTRAVENOUS at 09:38

## 2024-05-23 RX ADMIN — GLYCOPYRROLATE 0.2 MG: 0.2 INJECTION, SOLUTION INTRAMUSCULAR; INTRAVENOUS at 09:26

## 2024-05-23 RX ADMIN — FENTANYL CITRATE 50 MCG: 50 INJECTION, SOLUTION INTRAMUSCULAR; INTRAVENOUS at 09:26

## 2024-05-23 RX ADMIN — PROPOFOL 100 MG: 10 INJECTION, EMULSION INTRAVENOUS at 09:29

## 2024-05-23 RX ADMIN — SODIUM CHLORIDE: 0.9 INJECTION, SOLUTION INTRAVENOUS at 09:17

## 2024-05-23 RX ADMIN — LIDOCAINE HYDROCHLORIDE 100 MG: 20 INJECTION, SOLUTION EPIDURAL; INFILTRATION; INTRACAUDAL at 09:29

## 2024-05-23 RX ADMIN — EPHEDRINE SULFATE 10 MG: 50 INJECTION, SOLUTION INTRAVENOUS at 09:45

## 2024-05-23 RX ADMIN — EPHEDRINE SULFATE 10 MG: 50 INJECTION, SOLUTION INTRAVENOUS at 09:47

## 2024-05-23 RX ADMIN — PROPOFOL 20 MG: 10 INJECTION, EMULSION INTRAVENOUS at 09:35

## 2024-05-23 RX ADMIN — PROPOFOL 50 MG: 10 INJECTION, EMULSION INTRAVENOUS at 09:32

## 2024-05-23 NOTE — H&P
History and Physical - Cardiology   Donta Hunter 54 y.o. male MRN: [id number]  Unit/Bed#:  Encounter: [id number]  05/23/24  8:58 AM          Assessment:  1.  CVA    Plan:  Plan for outpatient JIMMY.  Discussed indications, risks, benefits, and alternatives to JIMMY.  Patient verbalizes understanding and wishes to proceed.      History of Present Illness       Principal Problem: CVA    HPI: Donta Hunter is a 54 y.o. year old male who presents for outpatient JIMMY.  Patient with recent CVA evaluated by neurology who recommended outpatient JIMMY to assess for cardioembolic etiology.  Denies dysphagia or loose teeth.  Denies any additional complaints at this time.      Review of Systems:    Review of Systems   Constitutional:  Negative for chills and fever.   HENT:  Negative for ear pain and sore throat.    Eyes:  Negative for pain and visual disturbance.   Respiratory:  Negative for cough and shortness of breath.    Cardiovascular:  Negative for chest pain, palpitations and leg swelling.   Gastrointestinal:  Negative for abdominal pain and vomiting.   Genitourinary:  Negative for dysuria and hematuria.   Musculoskeletal:  Negative for arthralgias and back pain.   Skin:  Negative for color change and rash.   Neurological:  Negative for seizures and syncope.   All other systems reviewed and are negative.      Historical Information   Past Medical History:   Diagnosis Date    Anemia     Arthritis     Cervical mass     Chronic kidney disease     COPD, group B, by GOLD 2017 classification ([facility]) 7/13/2020    Coronary artery disease     Depression     DVT (deep venous thrombosis) ([facility])     Hypertension     Lupus ([facility])     Renal disorder      Past Surgical History:   Procedure Laterality Date    APPENDECTOMY      CERVICAL SPINE SURGERY      CHOLECYSTECTOMY      COLONOSCOPY N/A 8/23/2018    Procedure: COLONOSCOPY;  Surgeon: James Wise III, MD;  Location: [facility];  Service: Gastroenterology    ESOPHAGOGASTRODUODENOSCOPY N/A  "2018    Procedure: ESOPHAGOGASTRODUODENOSCOPY (EGD);  Surgeon: James Wise III, MD;  Location: [facility];  Service: Gastroenterology    IR IVC FILTER PLACEMENT PERMANENT  2017    IR IVC FILTER REMOVAL  2018    IR LUMBAR PUNCTURE  2015    IR LUMBAR PUNCTURE  2024    NECK SURGERY      US GUIDED INJECTION FOR RESEARCH STUDY  2018    US GUIDED INJECTION FOR RESEARCH STUDY  2017    VASCULAR SURGERY       Social History     Substance and Sexual Activity   Alcohol Use Never     Social History     Substance and Sexual Activity   Drug Use Never     Social History     Tobacco Use   Smoking Status Never   Smokeless Tobacco Never       Family History:   Family History   Problem Relation Age of Onset    Heart disease Mother     No Known Problems Father        Meds/Allergies   all current active meds have been reviewed  Allergies   Allergen Reactions    Doxycycline Rash    Sulfa Antibiotics Rash       Objective   Vitals: Blood pressure 170/88, pulse 85, temperature (!) 97.4 °F (36.3 °C), temperature source Temporal, resp. rate 18, height 5' 6\" (1.676 m), weight 82.8 kg (182 lb 8.7 oz), SpO2 99%., Body mass index is 29.46 kg/m².,   Orthostatic Blood Pressures      Flowsheet Row Most Recent Value   Blood Pressure 170/88 filed at 2024 0833            Systolic (24hrs), Av , Min:170 , Max:170     Diastolic (24hrs), Av, Min:88, Max:88      No intake or output data in the 24 hours ending 24 0858    Invasive Devices       None                       Physical Exam:  Physical Exam  Vitals reviewed.   Constitutional:       General: He is not in acute distress.     Appearance: He is not diaphoretic.   HENT:      Head: Normocephalic and atraumatic.      Nose: Nose normal.   Eyes:      Conjunctiva/sclera: Conjunctivae normal.   Cardiovascular:      Rate and Rhythm: Normal rate and regular rhythm.      Pulses: Normal pulses.      Heart sounds: Normal heart sounds. No murmur heard.     " "No friction rub.   Pulmonary:      Effort: Pulmonary effort is normal. No respiratory distress.      Breath sounds: Normal breath sounds. No wheezing or rales.   Chest:      Chest wall: No tenderness.   Abdominal:      Tenderness: There is no guarding.   Musculoskeletal:      Cervical back: Neck supple.      Right lower leg: No edema.      Left lower leg: No edema.   Skin:     General: Skin is warm and dry.      Capillary Refill: Capillary refill takes less than 2 seconds.   Neurological:      Mental Status: He is alert and oriented to person, place, and time.   Psychiatric:         Mood and Affect: Mood normal.         Lab Results:   No visits with results within 1 Day(s) from this visit.   Latest known visit with results is:   Hospital Outpatient Visit on 04/25/2024   Component Date Value    Protime 04/25/2024 14.1     INR 04/25/2024 1.03     Platelets 04/25/2024 171     MPV 04/25/2024 10.0     Appearance, CSF 04/25/2024 bloody     Tube Number, CSF 04/25/2024 1     WBC, CSF 04/25/2024 404 (HH)     Xanthochromia 04/25/2024 No     YANIRA JOEL RESULT 04/25/2024 Comment     Antinuclear Antibody by * 04/25/2024 Comment     RATIO 04/25/2024 0.1     Clinical Relevance 04/25/2024 Notes     ELECTRONICALLY SIGNED BY: 04/25/2024 Comment     Disclaimer 04/25/2024 Notes     Protein, CSF 04/25/2024 563 (HH)     RBC, CSF 04/25/2024 22,825 (H)     Glucose, CSF 04/25/2024 48     Gram Stain Result 04/25/2024 No No organisms seen     Gram Stain Result 04/25/2024 1+ Mononuclear Cells     Gram Stain Result 04/25/2024 1+ Polys     CSF Culture 04/25/2024 No growth     Total Counted 04/25/2024 100     Neutrophils % (CSF) 04/25/2024 58     Bands % (CSF) 04/25/2024 2     Lymphs % CSF 04/25/2024 36     Mononuclear % (CSF) 04/25/2024 4              CBC with diff:       Invalid input(s): \"TOTALCELLSCOUNTED\", \"SEGS%\", \"GRANS%\", \"LYMPHS%\", \"EOS%\", \"BASO%\", \"ABNEUT\", \"ABGRANS\", \"ABLYMPHS\", \"ABMOMOS\", \"ABEOS\", \"ABBASO\"      CMP:      Invalid " "input(s): \"ALBUMIN\"         "

## 2024-05-23 NOTE — ANESTHESIA POSTPROCEDURE EVALUATION
Post-Op Assessment Note    CV Status:  Stable    Pain management: satisfactory to patient       Mental Status:  Awake and sleepy   Hydration Status:  Euvolemic   PONV Controlled:  Controlled   Airway Patency:  Patent     Post Op Vitals Reviewed: Yes    No anethesia notable event occurred.    Staff: Anesthesiologist, CRNA               /62 (05/23/24 0954)    Temp (!) 97 °F (36.1 °C) (05/23/24 0954)    Pulse 76 (05/23/24 0954)   Resp 15 (05/23/24 0954)    SpO2

## 2024-05-23 NOTE — ANESTHESIA PREPROCEDURE EVALUATION
Procedure:  JIMMY    Relevant Problems   ANESTHESIA (within normal limits)      CARDIO   (+) CAD (coronary artery disease)   (+) DVT (deep venous thrombosis) ([facility])   (+) Hypertension      ENDO   (+) Hyperparathyroidism (HCC)      GI/HEPATIC  NPO confirmed  BMI 29.5   (+) Gastroesophageal reflux disease without esophagitis      /RENAL   (+) Chronic kidney disease, stage 3a ([facility])   (+) Hypertensive CKD (chronic kidney disease)   (+) Lupus nephritis (HCC)   (+) Stage 2 chronic kidney disease      HEMATOLOGY   (+) Anemia in chronic kidney disease      MUSCULOSKELETAL   (+) Back pain   (+) DJD (degenerative joint disease), ankle and foot, right      PULMONARY   (+) COPD, group B, by GOLD 2017 classification ([facility])   (-) URI (upper respiratory infection)     Allergies   Allergen Reactions    Doxycycline Rash    Sulfa Antibiotics Rash     Social History     Tobacco Use    Smoking status: Never    Smokeless tobacco: Never   Vaping Use    Vaping status: Never Used   Substance Use Topics    Alcohol use: Never    Drug use: Never     Current Outpatient Medications   Medication Instructions    apixaban (ELIQUIS) 5 mg, Oral, 2 times daily    bacitracin topical ointment 500 units/g topical ointment APPLY TOPICALLY TO AFFECTED AREA THREE TIMES A DAY AS DIRECTED    baclofen 20 mg, Oral, 3 times daily    betamethasone, augmented, (DIPROLENE) 0.05 % ointment 1 application., Topical, 2 times daily    clobetasol (TEMOVATE) 0.05 % cream     clopidogrel (PLAVIX) 75 mg, Oral, Daily    Diclofenac Sodium (VOLTAREN) 2 g, Topical, 4 times daily    escitalopram (LEXAPRO) 10 mg, Oral, Daily    famotidine (PEPCID) 20 mg, Oral, 2 times daily    gabapentin (NEURONTIN) 600 mg, Oral, 3 times daily    gabapentin (NEURONTIN) 300 mg, Oral, 3 times daily    hydroxychloroquine (PLAQUENIL) 400 mg, Oral, Daily at bedtime    ketoconazole (NIZORAL) 2 % cream Topical, Daily    lidocaine-prilocaine (EMLA) cream Topical, As needed    loratadine (CLARITIN) 10 mg,  Oral, Daily    mycophenolate (CELLCEPT) 500 mg, Oral, Every 12 hours scheduled    potassium chloride (Klor-Con M20) 20 mEq tablet 20 mEq, Oral, Daily    sodium chloride 2 g, Oral, 3 times daily with meals    tamsulosin (FLOMAX) 0.4 mg, Oral, Daily with dinner     Lab Results   Component Value Date    WBC 3.14 (L) 04/25/2024    HGB 13.4 04/25/2024    HCT 40.4 04/25/2024     04/25/2024    SODIUM 136 04/25/2024    K 4.2 04/25/2024     04/25/2024    CO2 26 04/25/2024    BUN 14 05/01/2024    CREATININE 1.1 05/01/2024    GLUC 97 04/14/2024    HGBA1C 4.9 02/28/2024    AST 22 04/25/2024    ALT 16 04/25/2024    ALKPHOS 103 04/25/2024    TBILI 0.36 04/25/2024    ALB 4.2 04/25/2024    PROTIME 14.1 04/25/2024    PTT 30 04/12/2024    INR 1.03 04/25/2024     Vitals:    05/23/24 0833   BP: 170/88   Pulse: 85   Resp: 18   Temp: (!) 97.4 °F (36.3 °C)   SpO2: 99%     Echo 4/3/24    Left Ventricle: Left ventricular cavity size is normal. Wall thickness is normal. The left ventricular ejection fraction is 55%. Systolic function is normal. Wall motion is normal. Diastolic function is mildly abnormal, consistent with grade I (abnormal) relaxation.    Left Atrium: The atrium is mildly dilated.    Mitral Valve: There is mild annular calcification. There is mild regurgitation.    Tricuspid Valve: There is mild regurgitation.    Pulmonic Valve: There is mild regurgitation.     Physical Exam    Airway    Mallampati score: II  TM Distance: >3 FB  Neck ROM: full     Dental        Cardiovascular  Rhythm: regular, Rate: normal    Pulmonary  Pulmonary exam normal     Other Findings        Anesthesia Plan  ASA Score- 3     Anesthesia Type- IV sedation with anesthesia with ASA Monitors.         Additional Monitors:     Airway Plan:     Comment: O2 mask, natural airway, EtCO2 monitor. Risks discussed including awareness, aspiration, drug reactions and conversion to GA..       Plan Factors-Exercise tolerance (METS): <4 METS.Exercise  comment: Functional status limited by mobility issues, ambulates with walker.    Chart reviewed.    Patient summary reviewed.    Patient is not a current smoker.              Induction- intravenous.    Postoperative Plan-         Informed Consent- Anesthetic plan and risks discussed with patient.  I personally reviewed this patient with the CRNA. Discussed and agreed on the Anesthesia Plan with the CRNA..

## 2024-05-24 LAB
ATRIAL RATE: 76 BPM
P AXIS: 46 DEGREES
PR INTERVAL: 162 MS
QRS AXIS: -42 DEGREES
QRSD INTERVAL: 96 MS
QT INTERVAL: 372 MS
QTC INTERVAL: 418 MS
T WAVE AXIS: 49 DEGREES
VENTRICULAR RATE: 76 BPM

## 2024-05-24 PROCEDURE — 93010 ELECTROCARDIOGRAM REPORT: CPT | Performed by: INTERNAL MEDICINE

## 2024-05-29 ENCOUNTER — OFFICE VISIT (OUTPATIENT)
Dept: CARDIOLOGY CLINIC | Facility: CLINIC | Age: 54
End: 2024-05-29
Payer: COMMERCIAL

## 2024-05-29 VITALS
RESPIRATION RATE: 16 BRPM | BODY MASS INDEX: 30.05 KG/M2 | HEART RATE: 88 BPM | SYSTOLIC BLOOD PRESSURE: 104 MMHG | OXYGEN SATURATION: 99 % | DIASTOLIC BLOOD PRESSURE: 60 MMHG | WEIGHT: 187 LBS | HEIGHT: 66 IN

## 2024-05-29 DIAGNOSIS — N18.31 CHRONIC KIDNEY DISEASE, STAGE 3A (HCC): Chronic | ICD-10-CM

## 2024-05-29 DIAGNOSIS — R07.9 CHEST PAIN IN ADULT: ICD-10-CM

## 2024-05-29 DIAGNOSIS — I63.9 STROKE (CEREBRUM) (HCC): ICD-10-CM

## 2024-05-29 DIAGNOSIS — I25.10 CORONARY ARTERY DISEASE INVOLVING NATIVE CORONARY ARTERY OF NATIVE HEART WITHOUT ANGINA PECTORIS: ICD-10-CM

## 2024-05-29 DIAGNOSIS — I10 PRIMARY HYPERTENSION: ICD-10-CM

## 2024-05-29 DIAGNOSIS — M32.9 SLE (SYSTEMIC LUPUS ERYTHEMATOSUS RELATED SYNDROME) (HCC): ICD-10-CM

## 2024-05-29 DIAGNOSIS — I63.9 CEREBROVASCULAR ACCIDENT (CVA), UNSPECIFIED MECHANISM (HCC): Primary | ICD-10-CM

## 2024-05-29 PROCEDURE — 99245 OFF/OP CONSLTJ NEW/EST HI 55: CPT | Performed by: INTERNAL MEDICINE

## 2024-05-29 NOTE — PROGRESS NOTES
Cardiology Consultation     Donta Hunter  [id number]  1970  [facility] [address] [place] [address]    Impression:  Multiple CVAs  Hypertension  SLE  History of DVT  Neuropathy  Chest pain adult Zio     Plan:  Given his chest pain we will check a Lexiscan nuclear stress test Lexiscan as he walks with a walker due to neuropathy.  We will check a 30-day cardiac monitor to rule out atrial fibrillation.  Await brain MRI.  We discussed PFO procedure, risks, benefits at length.  He is a good candidate however we will wait for the brain MRI to make sure he does not have vasculitis.  Discussed in detail embryology of PFO, relationship to paradoxical embolism specific to CVA with >50% counseling management options including medical therapy with antiplatelet therapy (which patient is on), anticoagulation and closure.  After discussion, patient wishes to pursue PFO percutaneous closure.  Understands risks which include but not limited to stroke, heart attack, death, need for urgent open-heart or vascular surgery to repair equipment induced injury.  Device migration both early and late which may require surgical removal.  Discussed arrhythmia development such as atrial fibrillation and palpitations.  Patient also understands benefits of closure compared to medical therapy as per RESPECT and REDUCE  trials.  Also understands alternative therapy with medical therapy (antiplatelets or anticoagulants) .    Reviewed JIMMY with [employer]- prob  25 mm Talisman.        The patient is a 54-year-old male with a history of multiple strokes sent for consideration of PFO closure.  He has a past medical history of multiple strokes, left anterior corpus callosum and right cell bur are with tiny scattered chronic infarcts in the left cerebellar hemisphere, DVT with long-term anticoagulation with Eliquis per hematology presumably  indefinitely. He had a cva while on eliquis.  He did not meet criteria for antiphospholipid syndrome.  Hypercoagulable workup appears negative.  He does have a history of SLE.  He was sent to neurology for an angiogram with concern for CNS vasculitis versus lupus cerebritis.  He was referred to cardiology for SHAHBAZ by neurology.  SHAHBAZ results listed below, positive for PFO, personally reviewed.  Repeat MRI of the brain is pending.  He had a lumbar puncture 4/25/2024.  EKGs revealed normal sinus rhythm.    He now feels well. He gets some L and center chest pinching on movement improved with placing a pilllow. He get around with a walker for neuropathy. He also had palpitations prior to his cva.     Echocardiogram 4/3/2024 reveals normal LV function, EF 55%, diastolic dysfunction.  LAE.  Mild MR, mild TR.    Lexiscan nuclear stress test 7/18/2019:  normal study after pharmacologic dilatation.  Left ventricular systolic function was normal.    >65 minutes spent with chart, h and p, reviewing shahbaz etc.       Patient Active Problem List   Diagnosis    SLE (systemic lupus erythematosus related syndrome) ([facility])    Lupus nephritis ([facility])    Hypertension    History of DVT (deep vein thrombosis)    Stage 2 chronic kidney disease    Anemia in chronic kidney disease    Muscle weakness (generalized)    Tremor of both hands    Spasticity    Gait difficulty    Neuropathic pain    Hyponatremia    Hypertensive CKD (chronic kidney disease)    DJD (degenerative joint disease), ankle and foot, right    Chronic kidney disease, stage 3a ([facility])    CAD (coronary artery disease)    DVT (deep venous thrombosis) ([facility])    History of stroke in adulthood    Palpitations    Gastroesophageal reflux disease without esophagitis    COPD, group B, by GOLD 2017 classification ([facility])    Hyperparathyroidism ([facility])    Back pain     Past Medical History:   Diagnosis Date    Anemia     Arthritis     Cervical mass     Chronic kidney disease     COPD, group B, by GOLD  2017 classification ([facility]) 7/13/2020    Coronary artery disease     Depression     DVT (deep venous thrombosis) ([facility])     Hypertension     Lupus ([facility])     Renal disorder      Social History     Socioeconomic History    Marital status: /Civil Union     Spouse name: Not on file    Number of children: Not on file    Years of education: Not on file    Highest education level: Not on file   Occupational History    Not on file   Tobacco Use    Smoking status: Never    Smokeless tobacco: Never   Vaping Use    Vaping status: Never Used   Substance and Sexual Activity    Alcohol use: Never    Drug use: Never    Sexual activity: Yes     Partners: Female, Male     Birth control/protection: Rhythm   Other Topics Concern    Not on file   Social History Narrative    ** Merged History Encounter **          Social Determinants of Health     Financial Resource Strain: Not on file   Food Insecurity: No Food Insecurity (1/1/2023)    Hunger Vital Sign     Worried About Running Out of Food in the Last Year: Never true     Ran Out of Food in the Last Year: Never true   Transportation Needs: No Transportation Needs (1/1/2023)    PRAPARE - Transportation     Lack of Transportation (Medical): No     Lack of Transportation (Non-Medical): No   Physical Activity: Not on file   Stress: Not on file   Social Connections: Not on file   Intimate Partner Violence: Not on file   Housing Stability: Low Risk  (1/1/2023)    Housing Stability Vital Sign     Unable to Pay for Housing in the Last Year: No     Number of Places Lived in the Last Year: 1     Unstable Housing in the Last Year: No      Family History   Problem Relation Age of Onset    Heart disease Mother     No Known Problems Father      Past Surgical History:   Procedure Laterality Date    APPENDECTOMY      CERVICAL SPINE SURGERY      CHOLECYSTECTOMY      COLONOSCOPY N/A 8/23/2018    Procedure: COLONOSCOPY;  Surgeon: James Wise III, MD;  Location: [facility];  Service:  Gastroenterology    ESOPHAGOGASTRODUODENOSCOPY N/A 8/22/2018    Procedure: ESOPHAGOGASTRODUODENOSCOPY (EGD);  Surgeon: James Wise III, MD;  Location: [facility];  Service: Gastroenterology    IR IVC FILTER PLACEMENT PERMANENT  9/7/2017    IR IVC FILTER REMOVAL  4/23/2018    IR LUMBAR PUNCTURE  11/23/2015    IR LUMBAR PUNCTURE  4/25/2024    NECK SURGERY      US GUIDED INJECTION FOR RESEARCH STUDY  4/23/2018    US GUIDED INJECTION FOR RESEARCH STUDY  9/7/2017    VASCULAR SURGERY         Current Outpatient Medications:     apixaban (ELIQUIS) 5 mg, Take 5 mg by mouth 2 (two) times a day, Disp: , Rfl:     baclofen 20 mg tablet, Take 1 tablet (20 mg total) by mouth 3 (three) times a day, Disp: 90 tablet, Rfl: 1    betamethasone, augmented, (DIPROLENE) 0.05 % ointment, Apply 1 application. topically 2 (two) times a day, Disp: , Rfl:     clobetasol (TEMOVATE) 0.05 % cream, , Disp: , Rfl:     clopidogrel (Plavix) 75 mg tablet, Take 1 tablet (75 mg total) by mouth daily, Disp: 30 tablet, Rfl: 3    Diclofenac Sodium (VOLTAREN) 1 %, Apply 2 g topically 4 (four) times a day, Disp: 300 g, Rfl: 1    escitalopram (LEXAPRO) 10 mg tablet, Take 1 tablet (10 mg total) by mouth daily, Disp: 90 tablet, Rfl: 3    famotidine (PEPCID) 20 mg tablet, Take 1 tablet (20 mg total) by mouth 2 (two) times a day, Disp: 180 tablet, Rfl: 3    gabapentin (NEURONTIN) 300 mg capsule, Take 1 capsule (300 mg total) by mouth 3 (three) times a day, Disp: 270 capsule, Rfl: 1    gabapentin (NEURONTIN) 600 MG tablet, Take 1 tablet (600 mg total) by mouth 3 (three) times a day, Disp: 270 tablet, Rfl: 1    hydroxychloroquine (PLAQUENIL) 200 mg tablet, Take 400 mg by mouth daily at bedtime, Disp: , Rfl:     ketoconazole (NIZORAL) 2 % cream, Apply topically daily, Disp: 60 g, Rfl: 1    lidocaine-prilocaine (EMLA) cream, Apply topically as needed for mild pain, Disp: 30 g, Rfl: 3    mycophenolate (CELLCEPT) 500 mg tablet, Take 500 mg by mouth every 12 (twelve)  "hours , Disp: , Rfl:     potassium chloride (Klor-Con M20) 20 mEq tablet, Take 1 tablet (20 mEq total) by mouth daily, Disp: 90 tablet, Rfl: 0    sodium chloride 1 g tablet, Take 2 tablets (2 g total) by mouth 3 (three) times a day with meals, Disp: 540 tablet, Rfl: 2    tamsulosin (FLOMAX) 0.4 mg, Take 1 capsule (0.4 mg total) by mouth daily with dinner, Disp: 30 capsule, Rfl: 5    bacitracin topical ointment 500 units/g topical ointment, APPLY TOPICALLY TO AFFECTED AREA THREE TIMES A DAY AS DIRECTED, Disp: , Rfl:     loratadine (CLARITIN) 10 mg tablet, Take 1 tablet (10 mg total) by mouth daily, Disp: 30 tablet, Rfl: 2  Allergies   Allergen Reactions    Doxycycline Rash    Sulfa Antibiotics Rash     Vitals:    05/29/24 0817   BP: 104/60   BP Location: Left arm   Patient Position: Sitting   Cuff Size: Standard   Pulse: 88   Resp: 16   SpO2: 99%   Weight: 84.8 kg (187 lb)   Height: 5' 6\" (1.676 m)       Labs:  Hospital Outpatient Visit on 05/23/2024   Component Date Value    LV EF 05/23/2024 60     Sodium 05/23/2024 135     Potassium 05/23/2024 4.1     Chloride 05/23/2024 103     CO2 05/23/2024 26     ANION GAP 05/23/2024 6     BUN 05/23/2024 8     Creatinine 05/23/2024 0.96     Glucose 05/23/2024 99     Glucose, Fasting 05/23/2024 99     Calcium 05/23/2024 9.4     eGFR 05/23/2024 88     WBC 05/23/2024 2.00 (L)     RBC 05/23/2024 4.39     Hemoglobin 05/23/2024 13.7     Hematocrit 05/23/2024 39.9     MCV 05/23/2024 91     MCH 05/23/2024 31.2     MCHC 05/23/2024 34.3     RDW 05/23/2024 13.3     Platelets 05/23/2024 173     MPV 05/23/2024 9.9     Ventricular Rate 05/23/2024 76     Atrial Rate 05/23/2024 76     MS Interval 05/23/2024 162     QRSD Interval 05/23/2024 96     QT Interval 05/23/2024 372     QTC Interval 05/23/2024 418     P Axis 05/23/2024 46     QRS Axis 05/23/2024 -42     T Wave [place] 05/23/2024 49    Hospital Outpatient Visit on 04/25/2024   Component Date Value    Protime 04/25/2024 14.1     INR " 04/25/2024 1.03     Platelets 04/25/2024 171     MPV 04/25/2024 10.0     Appearance, CSF 04/25/2024 bloody     Tube Number, CSF 04/25/2024 1     WBC, CSF 04/25/2024 404 (HH)     Xanthochromia 04/25/2024 No     YANIRA JOEL RESULT 04/25/2024 Comment     Antinuclear Antibody by * 04/25/2024 Comment     RATIO 04/25/2024 0.1     Clinical Relevance 04/25/2024 Notes     ELECTRONICALLY SIGNED BY: 04/25/2024 Comment     Disclaimer 04/25/2024 Notes     Protein, CSF 04/25/2024 563 (HH)     RBC, CSF 04/25/2024 22,825 (H)     Glucose, CSF 04/25/2024 48     Gram Stain Result 04/25/2024 No No organisms seen     Gram Stain Result 04/25/2024 1+ Mononuclear Cells     Gram Stain Result 04/25/2024 1+ Polys     CSF Culture 04/25/2024 No growth     Total Counted 04/25/2024 100     Neutrophils % (CSF) 04/25/2024 58     Bands % (CSF) 04/25/2024 2     Lymphs % CSF 04/25/2024 36     Mononuclear % (CSF) 04/25/2024 4    Appointment on 04/25/2024   Component Date Value    Sodium 04/25/2024 136     Potassium 04/25/2024 4.2     Chloride 04/25/2024 102     CO2 04/25/2024 26     ANION GAP 04/25/2024 8     BUN 04/25/2024 9     Creatinine 04/25/2024 0.96     Glucose, Fasting 04/25/2024 94     Calcium 04/25/2024 9.1     eGFR 04/25/2024 88     WBC 04/25/2024 3.14 (L)     RBC 04/25/2024 4.29     Hemoglobin 04/25/2024 13.4     Hematocrit 04/25/2024 40.4     MCV 04/25/2024 94     MCH 04/25/2024 31.2     MCHC 04/25/2024 33.2     RDW 04/25/2024 14.1     MPV 04/25/2024 10.4     Platelets 04/25/2024 179     nRBC 04/25/2024 0     Segmented % 04/25/2024 40 (L)     Immature Grans % 04/25/2024 0     Lymphocytes % 04/25/2024 36     Monocytes % 04/25/2024 21 (H)     Eosinophils Relative 04/25/2024 2     Basophils Relative 04/25/2024 1     Absolute Neutrophils 04/25/2024 1.27 (L)     Absolute Immature Grans 04/25/2024 0.01     Absolute Lymphocytes 04/25/2024 1.12     Absolute Monocytes 04/25/2024 0.65     Eosinophils Absolute 04/25/2024 0.06     Basophils Absolute  04/25/2024 0.03     Uric Acid 04/25/2024 5.6     Total Bilirubin 04/25/2024 0.36     Bilirubin, Direct 04/25/2024 0.14     Alkaline Phosphatase 04/25/2024 103     AST 04/25/2024 22     ALT 04/25/2024 16     Total Protein 04/25/2024 7.1     Albumin 04/25/2024 4.2    Appointment on 04/22/2024   Component Date Value    PTT Lupus Anticoagulant 04/22/2024 31.5     Dilute Viper Venom Time 04/22/2024 46.4     DILUTE PROTHROMBIN TIME(* 04/22/2024 49.3 (H)     THROMBIN TIME (DRVW) 04/22/2024 15.7     DPT CONFIRM RATIO 04/22/2024 1.17     LUPUS REFLEX INTERPRETAT* 04/22/2024 Comment:     BETA 2 GLYCO 1 IGG 04/22/2024 <0.8     BETA 2 GLYCO 1 IGA 04/22/2024 4.1     BETA 2 GLYCO 1 IGM 04/22/2024 <2.4     ANTICARDIOLIPIN IGG ANTI* 04/22/2024 1.1     ANTICARDIOLIPIN IGA ANTI* 04/22/2024 5.8     ANTICARDIOLIPIN IGM ANTI* 04/22/2024 1.2    Admission on 04/12/2024, Discharged on 04/14/2024   Component Date Value    WBC 04/12/2024 1.61 (L)     RBC 04/12/2024 4.38     Hemoglobin 04/12/2024 13.9     Hematocrit 04/12/2024 38.1     MCV 04/12/2024 87     MCH 04/12/2024 31.7     MCHC 04/12/2024 36.5     RDW 04/12/2024 12.5     MPV 04/12/2024 10.6     Platelets 04/12/2024 143 (L)     nRBC 04/12/2024 0     Segmented % 04/12/2024 33 (L)     Immature Grans % 04/12/2024 1     Lymphocytes % 04/12/2024 38     Monocytes % 04/12/2024 26 (H)     Eosinophils Relative 04/12/2024 1     Basophils Relative 04/12/2024 1     Absolute Neutrophils 04/12/2024 0.53 (L)     Absolute Immature Grans 04/12/2024 0.01     Absolute Lymphocytes 04/12/2024 0.64     Absolute Monocytes 04/12/2024 0.41     Eosinophils Absolute 04/12/2024 0.01     Basophils Absolute 04/12/2024 0.01     Sodium 04/12/2024 126 (L)     Potassium 04/12/2024 4.6     Chloride 04/12/2024 94 (L)     CO2 04/12/2024 26     ANION GAP 04/12/2024 6     BUN 04/12/2024 9     Creatinine 04/12/2024 0.90     Glucose 04/12/2024 99     Calcium 04/12/2024 8.7     eGFR 04/12/2024 95     Total Bilirubin 04/12/2024  0.41     Bilirubin, Direct 04/12/2024 0.09     Alkaline Phosphatase 04/12/2024 113 (H)     AST 04/12/2024 24     ALT 04/12/2024 20     Total Protein 04/12/2024 7.4     Albumin 04/12/2024 4.2     Lipase 04/12/2024 62     Protime 04/12/2024 14.3     INR 04/12/2024 1.05     PTT 04/12/2024 30     hs TnI 0hr 04/12/2024 3     SARS-CoV-2 04/12/2024 Negative     INFLUENZA A PCR 04/12/2024 Negative     INFLUENZA B PCR 04/12/2024 Negative     RSV PCR 04/12/2024 Negative     hs TnI 2hr 04/13/2024 4     Delta 2hr hsTnI 04/13/2024 1     Sodium 04/13/2024 129 (L)     Potassium 04/13/2024 4.8     Chloride 04/13/2024 100     CO2 04/13/2024 24     ANION GAP 04/13/2024 5     BUN 04/13/2024 7     Creatinine 04/13/2024 1.04     Glucose 04/13/2024 96     Glucose, Fasting 04/13/2024 96     Calcium 04/13/2024 8.7     eGFR 04/13/2024 79     WBC 04/13/2024 1.94 (L)     RBC 04/13/2024 4.23     Hemoglobin 04/13/2024 13.3     Hematocrit 04/13/2024 38.0     MCV 04/13/2024 90     MCH 04/13/2024 31.4     MCHC 04/13/2024 35.0     RDW 04/13/2024 12.7     Platelets 04/13/2024 136 (L)     MPV 04/13/2024 10.0     Sodium, Ur 04/13/2024 117     Creatinine, Ur 04/13/2024 57.9     Osmolality, Ur 04/13/2024 403     Color, UA 04/13/2024 Colorless     Clarity, UA 04/13/2024 Clear     Specific Gravity, UA 04/13/2024 1.011     pH, UA 04/13/2024 7.0     Leukocytes, UA 04/13/2024 Negative     Nitrite, UA 04/13/2024 Negative     Protein, UA 04/13/2024 Trace (A)     Glucose, UA 04/13/2024 Negative     Ketones, UA 04/13/2024 Negative     Urobilinogen, UA 04/13/2024 <2.0     Bilirubin, UA 04/13/2024 Negative     Occult Blood, UA 04/13/2024 Negative     Sodium 04/13/2024 127 (L)     Potassium 04/13/2024 4.7     Chloride 04/13/2024 99     CO2 04/13/2024 24     ANION GAP 04/13/2024 4     BUN 04/13/2024 9     Creatinine 04/13/2024 1.07     Glucose 04/13/2024 100     Calcium 04/13/2024 8.8     eGFR 04/13/2024 77     RBC, UA 04/13/2024 None Seen     WBC, UA 04/13/2024  None Seen     Epithelial Cells 04/13/2024 Occasional     Bacteria, UA 04/13/2024 Occasional     Sodium 04/13/2024 127 (L)     Potassium 04/13/2024 4.7     Chloride 04/13/2024 98     CO2 04/13/2024 23     ANION GAP 04/13/2024 6     BUN 04/13/2024 12     Creatinine 04/13/2024 1.03     Glucose 04/13/2024 87     Calcium 04/13/2024 9.0     eGFR 04/13/2024 80     Sodium 04/14/2024 129 (L)     Potassium 04/14/2024 4.5     Chloride 04/14/2024 98     CO2 04/14/2024 24     ANION GAP 04/14/2024 7     BUN 04/14/2024 11     Creatinine 04/14/2024 1.10     Glucose 04/14/2024 97     Calcium 04/14/2024 9.3     eGFR 04/14/2024 74    Admission on 04/12/2024, Discharged on 04/12/2024   Component Date Value    Ventricular Rate 04/12/2024 65     Atrial Rate 04/12/2024 65     ND Interval 04/12/2024 210     QRSD Interval 04/12/2024 102     QT Interval 04/12/2024 384     QTC Interval 04/12/2024 399     P Axis 04/12/2024 71     QRS Axis 04/12/2024 -45     T Wave [place] 04/12/2024 69    Hospital Outpatient Visit on 04/03/2024   Component Date Value    BSA 04/03/2024 1.91     A4C EF 04/03/2024 56     LVIDd 04/03/2024 4.80     LVIDS 04/03/2024 3.50     IVSd 04/03/2024 1.20     LVPWd 04/03/2024 1.30     FS 04/03/2024 27     MV E' Tissue Velocity Se* 04/03/2024 9     MV E' Tissue Velocity La* 04/03/2024 13     LA Volume Index (BP) 04/03/2024 23.6     E/A ratio 04/03/2024 0.83     E wave deceleration time 04/03/2024 212     MV Peak E Vitaliy 04/03/2024 45     MV Peak A Vitaliy 04/03/2024 0.54     RVID d 04/03/2024 2.9     Tricuspid annular plane * 04/03/2024 2.20     LA size 04/03/2024 4.2     LA length (A2C) 04/03/2024 5.90     CARLOS A4C 04/03/2024 15.5     LA volume (BP) 04/03/2024 45     RAA A4C 04/03/2024 17.5     MV stenosis pressure 1/2* 04/03/2024 61     MV valve area p 1/2 meth* 04/03/2024 3.61     TR Peak Vitaliy 04/03/2024 2.2     Triscuspid Valve Regurgi* 04/03/2024 19.0     Ao root 04/03/2024 3.60     Asc Ao 04/03/2024 3.7     Tricuspid valve peak  reg* 04/03/2024 2.16     Left ventricular stroke * 04/03/2024 58.00     IVS 04/03/2024 1.2     LEFT VENTRICLE SYSTOLIC * 04/03/2024 49     LV DIASTOLIC VOLUME (MOD* 04/03/2024 107     Left Atrium Area-systoli* 04/03/2024 15.7     Left Atrium Area-systoli* 04/03/2024 17.3     LVSV, 2D 04/03/2024 58     LV EF 04/03/2024 55    Appointment on 03/04/2024   Component Date Value    Hepatitis B Surface Ag 03/04/2024 Non-reactive     Hep B S Ab 03/04/2024 <3.00     Hep B Core Total Ab 03/04/2024 Non-reactive     HIV-1 p24 Antigen 03/04/2024 Non-Reactive     HIV-1 Antibody 03/04/2024 Non-Reactive     HIV-2 Antibody 03/04/2024 Non-Reactive     HIV Ag-Ab 5th Gen 03/04/2024 Non-Reactive     IGA 03/04/2024 465 (H)     IGG 03/04/2024 1,207     IGM 03/04/2024 34 (L)     WBC 03/04/2024 2.82 (L)     RBC 03/04/2024 4.31     Hemoglobin 03/04/2024 13.6     Hematocrit 03/04/2024 40.3     MCV 03/04/2024 94     MCH 03/04/2024 31.6     MCHC 03/04/2024 33.7     RDW 03/04/2024 13.6     MPV 03/04/2024 11.0     Platelets 03/04/2024 191     nRBC 03/04/2024 0     Segmented % 03/04/2024 51     Immature Grans % 03/04/2024 1     Lymphocytes % 03/04/2024 31     Monocytes % 03/04/2024 15 (H)     Eosinophils Relative 03/04/2024 1     Basophils Relative 03/04/2024 1     Absolute Neutrophils 03/04/2024 1.44 (L)     Absolute Immature Grans 03/04/2024 0.02     Absolute Lymphocytes 03/04/2024 0.87     Absolute Monocytes 03/04/2024 0.43     Eosinophils Absolute 03/04/2024 0.03     Basophils Absolute 03/04/2024 0.03     INDEX VALUE 03/04/2024 1.33     JCV Antibody 03/04/2024 Positive (A)     Result 03/04/2024 Comment     Specimen Status 03/04/2024 Comment     QFT Nil 03/04/2024 0.10     QFT TB1-NIL 03/04/2024 0.00     QFT TB2-NIL 03/04/2024 0.02     QFT Mitogen-NIL 03/04/2024 1.06     QFT Final Interpretation 03/04/2024 Negative    Office Visit on 03/01/2024   Component Date Value    Sodium 03/04/2024 131 (L)     Potassium 03/04/2024 4.2     Chloride  03/04/2024 100     CO2 03/04/2024 25     ANION GAP 03/04/2024 6     BUN 03/04/2024 8     Creatinine 03/04/2024 1.03     Glucose, Fasting 03/04/2024 100 (H)     Calcium 03/04/2024 8.9     AST 03/04/2024 22     ALT 03/04/2024 20     Alkaline Phosphatase 03/04/2024 111 (H)     Total Protein 03/04/2024 7.1     Albumin 03/04/2024 4.2     Total Bilirubin 03/04/2024 0.42     eGFR 03/04/2024 80    Appointment on 02/28/2024   Component Date Value    WBC 02/28/2024 2.38 (L)     RBC 02/28/2024 4.39     Hemoglobin 02/28/2024 13.7     Hematocrit 02/28/2024 41.1     MCV 02/28/2024 94     MCH 02/28/2024 31.2     MCHC 02/28/2024 33.3     RDW 02/28/2024 13.5     MPV 02/28/2024 10.7     Platelets 02/28/2024 176     nRBC 02/28/2024 0     Segmented % 02/28/2024 47     Immature Grans % 02/28/2024 1     Lymphocytes % 02/28/2024 30     Monocytes % 02/28/2024 20 (H)     Eosinophils Relative 02/28/2024 1     Basophils Relative 02/28/2024 1     Absolute Neutrophils 02/28/2024 1.14 (L)     Absolute Immature Grans 02/28/2024 0.02     Absolute Lymphocytes 02/28/2024 0.71     Absolute Monocytes 02/28/2024 0.47     Eosinophils Absolute 02/28/2024 0.02     Basophils Absolute 02/28/2024 0.02     Color, UA 02/28/2024 Light Yellow     Clarity, UA 02/28/2024 Clear     Specific Gravity, UA 02/28/2024 1.011     pH, UA 02/28/2024 6.5     Leukocytes, UA 02/28/2024 Negative     Nitrite, UA 02/28/2024 Negative     Protein, UA 02/28/2024 Trace (A)     Glucose, UA 02/28/2024 Negative     Ketones, UA 02/28/2024 Negative     Urobilinogen, UA 02/28/2024 <2.0     Bilirubin, UA 02/28/2024 Negative     Occult Blood, UA 02/28/2024 Negative     RBC, UA 02/28/2024 1-2     WBC, UA 02/28/2024 None Seen     Epithelial Cells 02/28/2024 Occasional     Bacteria, UA 02/28/2024 None Seen     Creatinine, Ur 02/28/2024 72.2     Albumin,U,Random 02/28/2024 15.9     Albumin Creat Ratio 02/28/2024 22     C3 Complement 02/28/2024 106     C4, COMPLEMENT 02/28/2024 46     YANIRA  02/28/2024 Positive (A)     ds DNA Ab 02/28/2024 14 (H)     Sodium 02/28/2024 133 (L)     Potassium 02/28/2024 4.5     Chloride 02/28/2024 100     CO2 02/28/2024 24     ANION GAP 02/28/2024 9     BUN 02/28/2024 10     Creatinine 02/28/2024 1.08     Glucose, Fasting 02/28/2024 83     Calcium 02/28/2024 9.1     AST 02/28/2024 27     ALT 02/28/2024 25     Alkaline Phosphatase 02/28/2024 104     Total Protein 02/28/2024 7.3     Albumin 02/28/2024 4.3     Total Bilirubin 02/28/2024 0.44     eGFR 02/28/2024 76     TSH 3RD GENERATON 02/28/2024 2.859     Prostate Specific Antige* 02/28/2024 0.6     PSA, Free 02/28/2024 0.26     PSA, Free Pct 02/28/2024 43.3     Hemoglobin A1C 02/28/2024 4.9     EAG 02/28/2024 94     Cholesterol 02/28/2024 131     Triglycerides 02/28/2024 126     HDL, Direct 02/28/2024 44     LDL Calculated 02/28/2024 62     Lyme Disease([place]* 02/28/2024 Negative     Sed Rate 02/28/2024 15     CRP 02/28/2024 <1.0     Vitamin B-12 02/28/2024 1,629 (H)     Vit D, 25-Hydroxy 02/28/2024 40.2     Bilirubin, Direct 02/28/2024 0.10     YANIRA Titer 1 02/28/2024 Titer of 40     YANIRA Pattern 1 02/28/2024 Un-typeable pattern    There may be more visits with results that are not included.     Imaging: [email or web address]    Result Date: 5/23/2024  Narrative:   Left Ventricle: Left ventricular cavity size is normal. Wall thickness is normal. The left ventricular ejection fraction is 60%. Systolic function is normal. Wall motion is normal.   Left Atrium: The atrium is mildly dilated.   Atrial Septum: There is a patent foramen ovale confirmed with provocation (abdominal compression) with predominant right to left shunting using saline contrast.   Left Atrial Appendage: There is normal function. There is no thrombus.       Review of Systems:  Review of Systems negative except for HPI    Physical Exam:  Physical Exam  GEN: Alert and oriented x 3, in no acute distress.  Well appearing and well nourished.   HEENT: Sclera anicteric,  conjunctivae pink, mucous membranes moist. Oropharynx clear.   NECK: Supple, no carotid bruits, no significant JVD. Trachea midline, no thyromegaly.   HEART: Regular rhythm, normal S1 and S2, no murmurs, clicks, gallops or rubs. PMI nondisplaced, no thrills.   LUNGS: Clear to auscultation bilaterally; no wheezes, rales, or rhonchi. No increased work of breathing or signs of respiratory distress.   ABDOMEN: Soft, nontender, nondistended, normoactive bowel sounds.   EXTREMITIES: Skin warm and well perfused, no clubbing, cyanosis, or edema.  NEURO: No focal findings. Normal speech. Mood and affect normal.   SKIN: Normal without suspicious lesions on exposed skin.       1. Cerebrovascular accident (CVA), unspecified mechanism ([facility])  AMB extended holter monitor      2. Primary hypertension        3. Coronary artery disease involving native coronary artery of native heart without angina pectoris        4. SLE (systemic lupus erythematosus related syndrome) ([facility])        5. Chronic kidney disease, stage 3a ([facility])        6. Chest pain in adult  NM myocardial perfusion spect (rx stress and/or rest)      6.  DVT  7.   PFO

## 2024-05-31 ENCOUNTER — OFFICE VISIT (OUTPATIENT)
Dept: NEUROLOGY | Facility: CLINIC | Age: 54
End: 2024-05-31
Payer: COMMERCIAL

## 2024-05-31 VITALS
DIASTOLIC BLOOD PRESSURE: 70 MMHG | WEIGHT: 184 LBS | HEIGHT: 66 IN | TEMPERATURE: 97.7 F | SYSTOLIC BLOOD PRESSURE: 92 MMHG | BODY MASS INDEX: 29.57 KG/M2 | OXYGEN SATURATION: 96 % | HEART RATE: 82 BPM

## 2024-05-31 DIAGNOSIS — R25.1 TREMOR: ICD-10-CM

## 2024-05-31 DIAGNOSIS — R53.1 WEAKNESS: ICD-10-CM

## 2024-05-31 DIAGNOSIS — M54.9 BACK PAIN: Primary | ICD-10-CM

## 2024-05-31 DIAGNOSIS — M32.9 SLE (SYSTEMIC LUPUS ERYTHEMATOSUS RELATED SYNDROME) (HCC): ICD-10-CM

## 2024-05-31 DIAGNOSIS — R25.1 TREMOR OF BOTH HANDS: ICD-10-CM

## 2024-05-31 DIAGNOSIS — Z86.73 HISTORY OF MULTIPLE STROKES: ICD-10-CM

## 2024-05-31 PROCEDURE — 99215 OFFICE O/P EST HI 40 MIN: CPT | Performed by: PSYCHIATRY & NEUROLOGY

## 2024-05-31 NOTE — PROGRESS NOTES
Patient ID: Donta Hunter is a 54 y.o. male.    Assessment/Plan:  This is a 53 y/o  Male with hx of SLE, DVT and multiple SVT remains on eliquis 5mg PO BID long term indefinitely who is here as as a follow up for hx of multiple strokes (Left anterior corpus callosum, and right cerebellar with tiny scattered chronic infarcts in the Left cerebellar hemisphere).   Patient has been maintained on cellcept and plaquenil for treatment of SLE. Patient has been seeing rheumatology at [facility], and the plan is to start rituximab infusions soon. He does need another MRI brain at this point which he is awaiting in a few weeks.  He is awaiting neurosurgery appt for IR angiogram to look for vasculitis (signs of vasculitis). JIMMY showed PFO, which he has seen cardiology for and was recommended PFO closure which I agree with. He is awaiting to see IR cardiology for PFO closure. He does not want to repeat LP at this time. Rheumatology does not believe there is concern for SLE cerebritis.     PLAN:      Diagnoses and all orders for this visit:    Back pain    Weakness  -refer patient to OT for hand therapy   -     Ambulatory Referral to Occupational Therapy; Future    Tremor    Tremor of both hands  -might have essential tremor, but does not want any medications at this time.     History of multiple strokes  -for secondary stroke prevention, recommend continuation of combination of eliquis, plavix   -Blood Pressure goal < 130/80, BP   -LDL goal <70  -snoring - does not want sleep study at this time.   -needs further cardiac workup with zio patch/loop recorder    Counseling/stroke education -   -I advised patient to avoid using NSAIDs for headaches or other pain and to stick to tylenol if needed  -Recommend lifestyle modifications such as mediterranean diet & regular exercise regimen atleast 4-5 times a week for 20-30 minutes.   -I educated patient/family regarding medication compliance  -encourage smoking cessation, and  control of diabetes and hypertension; defer management to primary     SLE (systemic lupus erythematosus related syndrome) (HCC)  -patient is following rheumatology  -if MRI shows further strokes, then considering ritixan infusions.        Follow up in 1 month     I would be happy to see the patient sooner if any new questions/concerns arise.  Patient/Guardian was advised to the call the office if they have any questions and concerns in the meantime.     Patient/Guardian does understand that if they have any new stroke like symptoms such as facial droop on one side, weakness/paralysis on either side, speech trouble, numbness on one side, balance issues, any vision changes, extreme dizziness or any new headache, to call 9-1-1 immediately or to proceed to the nearest ER immediately.      I have spent a total time of 40 minutes on 05/31/24 in caring for this patient including Risks and benefits of tx options, Instructions for management, Counseling / Coordination of care, Documenting in the medical record, Reviewing / ordering tests, medicine, procedures  , Obtaining or reviewing history  , and Communicating with other healthcare professionals .      Subjective:    HPI    54-year-old gentleman with history of SLE who follows rheumatology clinic is here for follow-up of multiple strokes of unclear etiology at this point workup is in progress.    He is currently maintained on Eliquis and Plavix and is doing well is for stroke prevention is concerned has not had any further symptoms no new TIAs or CVA-like symptoms.  He has chronic back pain issues and is starting therapy for that soon.  He has an MRI brain scheduled in a few weeks and is following up with rheumatology as well in a few weeks.  He is pending appointment with neurosurgery as well in a few weeks.  At this point he is maintained on Plaquenil and CellCept..  Overall he is pretty stable today and does not have any new symptoms.  The only thing he is complaining  about is tremor in bilateral lower extremities but at this point does not want any medications and he is occasionally dropping things with that but he states that right now is not bothering him he wants to know the workup for his stroke.  He did state that he saw cardiology and they are recommending PFO closure surgery and he is trying to decide whether or not he should do the procedure.  I encouraged him strongly to consider doing the PFO closure surgery.    The following portion of the patient's history were reviewed and updated as appropriate: He  has a past medical history of Anemia, Arthritis, Cervical mass, Chronic kidney disease, COPD, group B, by GOLD 2017 classification ([facility]) (7/13/2020), Coronary artery disease, Depression, DVT (deep venous thrombosis) ([facility]), Hypertension, Lupus ([facility]), and Renal disorder.  He   Patient Active Problem List    Diagnosis Date Noted    Back pain 05/10/2024    Gastroesophageal reflux disease without esophagitis 04/13/2024    Palpitations 04/12/2024    History of multiple strokes 04/02/2024    CAD (coronary artery disease) 03/21/2024    DVT (deep venous thrombosis) ([facility]) 03/21/2024    DJD (degenerative joint disease), ankle and foot, right 03/09/2023    Hypertensive CKD (chronic kidney disease) 02/14/2023    Hyponatremia 01/04/2023    COPD, group B, by GOLD 2017 classification ([facility]) 07/13/2020    Tremor of both hands 02/27/2020    Spasticity 02/27/2020    Gait difficulty 02/27/2020    Neuropathic pain 02/27/2020    Muscle weakness (generalized) 11/04/2019    Hyperparathyroidism ([facility]) 10/03/2019    Anemia in chronic kidney disease 08/21/2018    Chronic kidney disease, stage 3a ([facility]) 08/21/2018    SLE (systemic lupus erythematosus related syndrome) ([facility]) 07/29/2018    Lupus nephritis ([facility]) 07/29/2018    Hypertension 07/29/2018    History of DVT (deep vein thrombosis) 07/29/2018    Stage 2 chronic kidney disease 07/29/2018     He  has a past surgical history that includes Neck  surgery; Cervical spine surgery; Appendectomy; Esophagogastroduodenoscopy (N/A, 8/22/2018); Colonoscopy (N/A, 8/23/2018); Vascular surgery; Cholecystectomy; US guided injection for research study (4/23/2018); IR IVC filter removal (4/23/2018); US guided injection for research study (9/7/2017); IR IVC filter placement permanent (9/7/2017); IR lumbar puncture (11/23/2015); and IR lumbar puncture (4/25/2024).  His family history includes Heart disease in his mother; No Known Problems in his father.  He  reports that he has never smoked. He has never used smokeless tobacco. He reports that he does not drink alcohol and does not use drugs.  Current Outpatient Medications   Medication Sig Dispense Refill    apixaban (ELIQUIS) 5 mg Take 5 mg by mouth 2 (two) times a day      bacitracin topical ointment 500 units/g topical ointment APPLY TOPICALLY TO AFFECTED AREA THREE TIMES A DAY AS DIRECTED      baclofen 20 mg tablet Take 1 tablet (20 mg total) by mouth 3 (three) times a day 90 tablet 1    betamethasone, augmented, (DIPROLENE) 0.05 % ointment Apply 1 application. topically 2 (two) times a day      clobetasol (TEMOVATE) 0.05 % cream       clopidogrel (Plavix) 75 mg tablet Take 1 tablet (75 mg total) by mouth daily 30 tablet 3    Diclofenac Sodium (VOLTAREN) 1 % Apply 2 g topically 4 (four) times a day 300 g 1    escitalopram (LEXAPRO) 10 mg tablet Take 1 tablet (10 mg total) by mouth daily 90 tablet 3    famotidine (PEPCID) 20 mg tablet Take 1 tablet (20 mg total) by mouth 2 (two) times a day 180 tablet 3    gabapentin (NEURONTIN) 300 mg capsule Take 1 capsule (300 mg total) by mouth 3 (three) times a day 270 capsule 1    gabapentin (NEURONTIN) 600 MG tablet Take 1 tablet (600 mg total) by mouth 3 (three) times a day 270 tablet 1    hydroxychloroquine (PLAQUENIL) 200 mg tablet Take 400 mg by mouth daily at bedtime      ketoconazole (NIZORAL) 2 % cream Apply topically daily 60 g 1    lidocaine-prilocaine (EMLA) cream Apply  topically as needed for mild pain 30 g 3    loratadine (CLARITIN) 10 mg tablet Take 1 tablet (10 mg total) by mouth daily 30 tablet 2    mycophenolate (CELLCEPT) 500 mg tablet Take 500 mg by mouth every 12 (twelve) hours       potassium chloride (Klor-Con M20) 20 mEq tablet Take 1 tablet (20 mEq total) by mouth daily 90 tablet 0    sodium chloride 1 g tablet Take 2 tablets (2 g total) by mouth 3 (three) times a day with meals 540 tablet 2    tamsulosin (FLOMAX) 0.4 mg Take 1 capsule (0.4 mg total) by mouth daily with dinner 30 capsule 5     No current facility-administered medications for this visit.     Current Outpatient Medications on File Prior to Visit   Medication Sig    apixaban (ELIQUIS) 5 mg Take 5 mg by mouth 2 (two) times a day    bacitracin topical ointment 500 units/g topical ointment APPLY TOPICALLY TO AFFECTED AREA THREE TIMES A DAY AS DIRECTED    baclofen 20 mg tablet Take 1 tablet (20 mg total) by mouth 3 (three) times a day    betamethasone, augmented, (DIPROLENE) 0.05 % ointment Apply 1 application. topically 2 (two) times a day    clobetasol (TEMOVATE) 0.05 % cream     clopidogrel (Plavix) 75 mg tablet Take 1 tablet (75 mg total) by mouth daily    Diclofenac Sodium (VOLTAREN) 1 % Apply 2 g topically 4 (four) times a day    escitalopram (LEXAPRO) 10 mg tablet Take 1 tablet (10 mg total) by mouth daily    famotidine (PEPCID) 20 mg tablet Take 1 tablet (20 mg total) by mouth 2 (two) times a day    gabapentin (NEURONTIN) 300 mg capsule Take 1 capsule (300 mg total) by mouth 3 (three) times a day    gabapentin (NEURONTIN) 600 MG tablet Take 1 tablet (600 mg total) by mouth 3 (three) times a day    hydroxychloroquine (PLAQUENIL) 200 mg tablet Take 400 mg by mouth daily at bedtime    ketoconazole (NIZORAL) 2 % cream Apply topically daily    lidocaine-prilocaine (EMLA) cream Apply topically as needed for mild pain    loratadine (CLARITIN) 10 mg tablet Take 1 tablet (10 mg total) by mouth daily     "mycophenolate (CELLCEPT) 500 mg tablet Take 500 mg by mouth every 12 (twelve) hours     potassium chloride (Klor-Con M20) 20 mEq tablet Take 1 tablet (20 mEq total) by mouth daily    sodium chloride 1 g tablet Take 2 tablets (2 g total) by mouth 3 (three) times a day with meals    tamsulosin (FLOMAX) 0.4 mg Take 1 capsule (0.4 mg total) by mouth daily with dinner     No current facility-administered medications on file prior to visit.     He is allergic to doxycycline and sulfa antibiotics..    Objective:    Blood pressure 92/70, pulse 82, temperature 97.7 °F (36.5 °C), temperature source Temporal, height 5' 6\" (1.676 m), weight 83.5 kg (184 lb), SpO2 96%.    Physical Exam  General - patient is alert   Speech - no dysarthria noted, no aphasia noted.     Neuro:   Cranial nerves: PERRL, EOMI, facial sensation intact to soft touch in V1, V2 and V3, no facial asymmetry noted, uvula/palate midline, tongue midline.   Motor: 5/5 throughout, normal tone, no pronator drift noted.   Sensory - intact to soft touch throughout  Reflexes - 2+ throughout  Coordination - no ataxia/dysmetria noted  Gait - normal      ROS:  Reviewed ROS   Review of Systems   Constitutional: Negative.    HENT: Negative.     Eyes: Negative.    Respiratory: Negative.     Cardiovascular: Negative.    Gastrointestinal: Negative.    Endocrine: Negative.    Genitourinary: Negative.    Musculoskeletal:  Positive for gait problem (uses a walker).   Skin: Negative.    Allergic/Immunologic: Negative.    Hematological: Negative.    Psychiatric/Behavioral: Negative.                     "

## 2024-06-03 ENCOUNTER — TELEPHONE (OUTPATIENT)
Age: 54
End: 2024-06-03

## 2024-06-03 NOTE — TELEPHONE ENCOUNTER
Caller: Donta Hunter    Doctor: Dr. Mojica    Reason for call: appt/checked chart/last seen 2023    Call back#: NA

## 2024-06-06 ENCOUNTER — HOSPITAL ENCOUNTER (OUTPATIENT)
Dept: NON INVASIVE DIAGNOSTICS | Facility: CLINIC | Age: 54
Discharge: HOME | End: 2024-06-06

## 2024-06-06 DIAGNOSIS — R07.9 CHEST PAIN IN ADULT: ICD-10-CM

## 2024-06-07 ENCOUNTER — OFFICE VISIT (OUTPATIENT)
Dept: NEUROSURGERY | Facility: CLINIC | Age: 54
End: 2024-06-07

## 2024-06-07 VITALS
BODY MASS INDEX: 29.41 KG/M2 | OXYGEN SATURATION: 96 % | SYSTOLIC BLOOD PRESSURE: 132 MMHG | WEIGHT: 183 LBS | HEART RATE: 87 BPM | RESPIRATION RATE: 17 BRPM | HEIGHT: 66 IN | DIASTOLIC BLOOD PRESSURE: 74 MMHG

## 2024-06-07 DIAGNOSIS — M32.9 SLE (SYSTEMIC LUPUS ERYTHEMATOSUS RELATED SYNDROME) (HCC): Primary | ICD-10-CM

## 2024-06-07 DIAGNOSIS — I63.9 STROKE (CEREBRUM) (HCC): ICD-10-CM

## 2024-06-07 DIAGNOSIS — Z01.812 PRE-OPERATIVE LABORATORY EXAMINATION: ICD-10-CM

## 2024-06-07 NOTE — PROGRESS NOTES
Assessment/Plan:    SLE (systemic lupus erythematosus related syndrome) (HCC)  As addressed in HPI.  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   Mild late effects of CVA documented in PE.    Imaging-  As documented in HPI    Plan  Dr. Johnson met with patient to consent forbrain angiogram. Refer to attestation note for details.        Diagnoses and all orders for this visit:    Stroke (cerebrum) (HCC)  -     Ambulatory referral to Neurosurgery        Subjective: He presents as a referral from neurology for CT angio of the brain for abnormal brain MRI and CTA head.    Patient ID: Donta Hunter is a 54 y.o. male PM/SH: neurologic pain, myelopathy and lumbar radiculopathy lupus erythematosus with lupus nephritis , DVT , CKD3a, HTN,     HPI   He is under care of neurology and has multiple neurologic conditions in the setting of SLE/lupus nephritis.  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  CT angiogram of head was consistent with no intracranial vascular pathology with no atherosclerosis changes but CT scan still suggest focal area of subacute ischemia with left paramedian frontal lobe has been identified on recent MRIs.    Imagining   3/15/2024-CT angiogram hand was consistent with no intracranial vascular pathology with no atherosclerotic changes but CAT scan still suggested focal area of subacute ischemia with left paramedian frontal lobe has been identified on recent MRIs.     2/22/2024 Brain MRI was done in February 2024 patient was found to have subacute lacunar ischemic changes  left parasagittal frontal lobe with multiple other cortical/subcortical chronic ischemia within right frontal lobe and cerebellum, all changes are new since 2019.    4/25/2024 IR Lumbar Puncture-Opening pressure was unable to be measured.  3.  Patient with frequent movement and appeared to be dehydrated, would recommend repeating lumbar puncture with conscious sedation and adequate hydration the day prior to procedure.    Over concern for lupus  as a culprit for recurrent strokes patient is referred to interventional neurosurgery for CT brain angio.      Social    lives with his wife .  Has 2 adult children , do not live at home.  Is unemployed on [employer] disability. Reports his wife works.    Patient continues to drive and drove self to appointment from [facility] about 50 miles , 1 hour away.     Patient presents today for initial interventional neurosurgery visit.      REVIEW OF SYSTEMS  Review of Systems   HENT:  Positive for sinus pain (frontal pain L/R temporal lobes, occasional). Negative for tinnitus.    Eyes:  Negative for visual disturbance (occasionally blurry]).   Musculoskeletal:  Positive for gait problem (ambulates w/ walker).   Neurological:  Positive for numbness (shooting pains L/R feet, daily). Negative for dizziness, facial asymmetry, speech difficulty, weakness and headaches.   Psychiatric/Behavioral:  Negative for confusion.    All other systems reviewed and are negative.        Meds/Allergies     Current Outpatient Medications   Medication Sig Dispense Refill    apixaban (ELIQUIS) 5 mg Take 5 mg by mouth 2 (two) times a day      bacitracin topical ointment 500 units/g topical ointment APPLY TOPICALLY TO AFFECTED AREA THREE TIMES A DAY AS DIRECTED      baclofen 20 mg tablet Take 1 tablet (20 mg total) by mouth 3 (three) times a day 90 tablet 1    betamethasone, augmented, (DIPROLENE) 0.05 % ointment Apply 1 application. topically 2 (two) times a day      clobetasol (TEMOVATE) 0.05 % cream        clopidogrel (Plavix) 75 mg tablet Take 1 tablet (75 mg total) by mouth daily 30 tablet 3    Diclofenac Sodium (VOLTAREN) 1 % Apply 2 g topically 4 (four) times a day 300 g 1    escitalopram (LEXAPRO) 10 mg tablet Take 1 tablet (10 mg total) by mouth daily 90 tablet 3    famotidine (PEPCID) 20 mg tablet Take 1 tablet (20 mg total) by mouth 2 (two) times a day 180 tablet 3    gabapentin (NEURONTIN) 300 mg capsule Take 1 capsule (300 mg total) by mouth 3 (three) times a day 270 capsule 1    gabapentin (NEURONTIN) 600 MG tablet Take 1 tablet (600 mg total) by mouth 3 (three) times a day 270 tablet 1    hydroxychloroquine (PLAQUENIL) 200 mg tablet Take 400 mg by mouth daily at bedtime      ketoconazole (NIZORAL) 2 % cream Apply topically daily 60 g 1    lidocaine-prilocaine (EMLA) cream Apply topically as needed for mild pain 30 g 3    loratadine (CLARITIN) 10 mg tablet Take 1 tablet (10 mg total) by mouth daily 30 tablet 2    mycophenolate (CELLCEPT) 500 mg tablet Take 500 mg by mouth every 12 (twelve) hours       potassium chloride (Klor-Con M20) 20 mEq tablet Take 1 tablet (20 mEq total) by mouth daily 90 tablet 0    sodium chloride 1 g tablet Take 2 tablets (2 g total) by mouth 3 (three) times a day with meals 540 tablet 2    tamsulosin (FLOMAX) 0.4 mg Take 1 capsule (0.4 mg total) by mouth daily with dinner 30 capsule 5     No current facility-administered medications for this visit.       Allergies   Allergen Reactions    Doxycycline Rash    Sulfa Antibiotics Rash       PAST HISTORY    Past Medical History:   Diagnosis Date    Anemia     Arthritis     Cervical mass     Chronic kidney disease     COPD, group B, by GOLD 2017 classification ([facility]) 7/13/2020    Coronary artery disease     Depression     DVT (deep venous thrombosis) ([facility])     Hypertension     Lupus ([facility])     Renal disorder     Stroke (cerebrum) ([facility]) 6/7/2024       Past Surgical History:   Procedure Laterality Date    APPENDECTOMY      CERVICAL  "SPINE SURGERY      CHOLECYSTECTOMY      COLONOSCOPY N/A 8/23/2018    Procedure: COLONOSCOPY;  Surgeon: James Wise III, MD;  Location: [facility];  Service: Gastroenterology    ESOPHAGOGASTRODUODENOSCOPY N/A 8/22/2018    Procedure: ESOPHAGOGASTRODUODENOSCOPY (EGD);  Surgeon: James Wise III, MD;  Location: [facility];  Service: Gastroenterology    IR IVC FILTER PLACEMENT PERMANENT  9/7/2017    IR IVC FILTER REMOVAL  4/23/2018    IR LUMBAR PUNCTURE  11/23/2015    IR LUMBAR PUNCTURE  4/25/2024    NECK SURGERY      US GUIDED INJECTION FOR RESEARCH STUDY  4/23/2018    US GUIDED INJECTION FOR RESEARCH STUDY  9/7/2017    VASCULAR SURGERY         Social History     Tobacco Use    Smoking status: Never    Smokeless tobacco: Never   Vaping Use    Vaping status: Never Used   Substance Use Topics    Alcohol use: Never    Drug use: Never       Family History   Problem Relation Age of Onset    Heart disease Mother     No Known Problems Father        The following portions of the patient's history were reviewed and updated as appropriate: allergies, current medications, past family history, past medical history, past social history, past surgical history and problem list.      EXAM    Vitals:Blood pressure 132/74, pulse 87, resp. rate 17, height 5' 6\" (1.676 m), weight 83 kg (183 lb), SpO2 96%.,Body mass index is 29.54 kg/m².     Physical Exam  Vitals reviewed.   Constitutional:       General: He is not in acute distress.     Appearance: Normal appearance. He is not ill-appearing.   HENT:      Head: Normocephalic and atraumatic.   Eyes:      General: No scleral icterus.        Right eye: No discharge.         Left eye: No discharge.      Extraocular Movements: EOM normal.      Conjunctiva/sclera: Conjunctivae normal.      Pupils: Pupils are equal, round, and reactive to light.   Pulmonary:      Effort: Pulmonary effort is normal. No respiratory distress.   Musculoskeletal:      Right lower leg: Edema present.      Left " lower leg: Edema present.      Comments: Wearing support stockings both lower extremities.   Skin:     General: Skin is warm and dry.   Neurological:      Mental Status: He is alert and oriented to person, place, and time.      Sensory: No sensory deficit.      Motor: Weakness (LLE  vs right) present.      Deep Tendon Reflexes:      Reflex Scores:       Tricep reflexes are 2+ on the right side and 2+ on the left side.       Bicep reflexes are 2+ on the right side and 2+ on the left side.       Patellar reflexes are 3+ on the right side and 3+ on the left side.       Achilles reflexes are 2+ on the right side and 2+ on the left side.  Psychiatric:         Mood and Affect: Mood normal.         Speech: Speech normal.         Behavior: Behavior normal.         Neurologic Exam     Mental Status   Oriented to person, place, and time.   Oriented to person.   Oriented to place. Oriented to country, city, area, street and number.   Oriented to time. Oriented to year, month, date, day and season.   Attention: normal. Concentration: normal.   Speech: speech is normal   Level of consciousness: alert  Knowledge: good. Able to perform simple calculations.     Cranial Nerves     CN III, IV, VI   Pupils are equal, round, and reactive to light.  Extraocular motions are normal.   Right pupil: Size: 2 mm. Shape: regular.   Left pupil: Size: 2 mm. Shape: regular.   Nystagmus: none   Diplopia: none  Ophthalmoparesis: none  Upgaze: normal  Downgaze: normal    CN XI   Right sternocleidomastoid strength: normal  Left sternocleidomastoid strength: normal  Right trapezius strength: normal  Left trapezius strength: normal    CN XII   Tongue: not atrophic  Fasciculations: absent  Tongue deviation: none    Motor Exam   Muscle bulk: normal  Overall muscle tone: increased    Strength   Right deltoid: 5/5  Left deltoid: 5/5  Right biceps: 5/5  Left biceps: 5/5  Right triceps: 5/5  Left triceps: 5/5  Right wrist flexion: 5/5  Left wrist flexion:  5/5  Right wrist extension: 5/5  Left wrist extension: 5/5  Right iliopsoas: 5/5  Left iliopsoas: 4/5  Right quadriceps: 5/5  Left quadriceps: 4/5  Right hamstrin/5  Left hamstrin/5  Right glutei: 5/5  Left glutei: 4/5  Right anterior tibial: 5/5  Left anterior tibial: 3/5  Right posterior tibial: 5/5  Left posterior tibial: 3/5  Right gastroc: 5/5  Left gastroc: 3/5    Sensory Exam   Light touch normal.     Gait, Coordination, and Reflexes     Gait  Gait: (walker/slightly spastic gait)    Reflexes   Right biceps: 2+  Left biceps: 2+  Right triceps: 2+  Left triceps: 2+  Right patellar: 3+  Left patellar: 3+  Right achilles: 2+  Left achilles: 2+  Right Rendon: absent  Left Rendon: absent  Right ankle clonus: absent  Left ankle clonus: absentHyperreflexive       Imaging Studies  JIMMY    Result Date: 2024  Narrative:   Left Ventricle: Left ventricular cavity size is normal. Wall thickness is normal. The left ventricular ejection fraction is 60%. Systolic function is normal. Wall motion is normal.   Left Atrium: The atrium is mildly dilated.   Atrial Septum: There is a patent foramen ovale confirmed with provocation (abdominal compression) with predominant right to left shunting using saline contrast.   Left Atrial Appendage: There is normal function. There is no thrombus.       I have personally reviewed pertinent reports.   and I have personally reviewed pertinent films in PACS    I have spent a total time of 45 minutes on 24 in caring for this patient including Diagnostic results, Risks and benefits of tx options, Instructions for management, Patient and family education, Importance of tx compliance, Risk factor reductions, Impressions, Counseling / Coordination of care, Documenting in the medical record, Reviewing / ordering tests, medicine, procedures  , Obtaining or reviewing history  , and Communicating with other healthcare professionals .     PLEASE NOTE:  This encounter may have been  completed utilizing the [employer] Direct Speech Voice Recognition Software. Grammatical errors, random word insertions, pronoun errors and incomplete sentences are occasional consequences of the system due to software limitations, ambient noise and hardware issues.These may be missed even after proof reading prior to affixing electronic signature. Please do not hesitate to contact me directly if you have any questions or concerns about the content, text or information contained within the body of this dictation.

## 2024-06-07 NOTE — ASSESSMENT & PLAN NOTE
As addressed in HPI.  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   Mild late effects of CVA documented in PE.    Imaging-  As documented in HPI    Plan  Dr. Johnson met with patient to consent forbrain angiogram. Refer to attestation note for details.

## 2024-06-08 RX ORDER — SODIUM CHLORIDE 9 MG/ML
75 INJECTION, SOLUTION INTRAVENOUS CONTINUOUS
OUTPATIENT
Start: 2024-06-08

## 2024-06-08 NOTE — ADDENDUM NOTE
Addended by: JOSE MARTIN GLORIA on: 6/8/2024 06:22 PM     Modules accepted: Orders, Level of Service

## 2024-06-10 ENCOUNTER — OFFICE VISIT (OUTPATIENT)
Dept: INTERNAL MEDICINE CLINIC | Facility: CLINIC | Age: 54
End: 2024-06-10
Payer: COMMERCIAL

## 2024-06-10 VITALS
BODY MASS INDEX: 29.54 KG/M2 | DIASTOLIC BLOOD PRESSURE: 70 MMHG | OXYGEN SATURATION: 98 % | SYSTOLIC BLOOD PRESSURE: 118 MMHG | HEART RATE: 81 BPM | HEIGHT: 66 IN

## 2024-06-10 DIAGNOSIS — I63.9 CEREBROVASCULAR ACCIDENT (CVA), UNSPECIFIED MECHANISM (HCC): ICD-10-CM

## 2024-06-10 DIAGNOSIS — R26.9 GAIT DIFFICULTY: ICD-10-CM

## 2024-06-10 DIAGNOSIS — I25.10 CORONARY ARTERY DISEASE INVOLVING NATIVE CORONARY ARTERY OF NATIVE HEART WITHOUT ANGINA PECTORIS: ICD-10-CM

## 2024-06-10 DIAGNOSIS — M32.14 LUPUS NEPHRITIS (HCC): ICD-10-CM

## 2024-06-10 DIAGNOSIS — I12.9 HYPERTENSIVE KIDNEY DISEASE WITH STAGE 3A CHRONIC KIDNEY DISEASE (HCC): ICD-10-CM

## 2024-06-10 DIAGNOSIS — K21.9 GASTROESOPHAGEAL REFLUX DISEASE WITHOUT ESOPHAGITIS: ICD-10-CM

## 2024-06-10 DIAGNOSIS — Z86.718 HISTORY OF DVT (DEEP VEIN THROMBOSIS): ICD-10-CM

## 2024-06-10 DIAGNOSIS — I10 PRIMARY HYPERTENSION: ICD-10-CM

## 2024-06-10 DIAGNOSIS — N52.9 ERECTILE DYSFUNCTION, UNSPECIFIED ERECTILE DYSFUNCTION TYPE: ICD-10-CM

## 2024-06-10 DIAGNOSIS — Z86.73 HISTORY OF MULTIPLE STROKES: Primary | ICD-10-CM

## 2024-06-10 DIAGNOSIS — M32.9 SLE (SYSTEMIC LUPUS ERYTHEMATOSUS RELATED SYNDROME) (HCC): ICD-10-CM

## 2024-06-10 DIAGNOSIS — N18.31 HYPERTENSIVE KIDNEY DISEASE WITH STAGE 3A CHRONIC KIDNEY DISEASE (HCC): ICD-10-CM

## 2024-06-10 PROBLEM — R00.2 PALPITATIONS: Status: RESOLVED | Noted: 2024-04-12 | Resolved: 2024-06-10

## 2024-06-10 PROBLEM — M54.9 BACK PAIN: Status: RESOLVED | Noted: 2024-05-10 | Resolved: 2024-06-10

## 2024-06-10 PROBLEM — E87.1 HYPONATREMIA: Status: RESOLVED | Noted: 2023-01-04 | Resolved: 2024-06-10

## 2024-06-10 PROCEDURE — 99214 OFFICE O/P EST MOD 30 MIN: CPT | Performed by: INTERNAL MEDICINE

## 2024-06-10 RX ORDER — SILDENAFIL 50 MG/1
50 TABLET, FILM COATED ORAL DAILY PRN
Qty: 10 TABLET | Refills: 0 | Status: SHIPPED | OUTPATIENT
Start: 2024-06-10

## 2024-06-10 NOTE — ASSESSMENT & PLAN NOTE
Continue follow up with rheumatology. Continue plaquenil 400 mg daily, his Cellcept was increased.

## 2024-06-10 NOTE — ASSESSMENT & PLAN NOTE
Continue follow up with nephrology. Cr appears stable.    Lab Results   Component Value Date    EGFR 82 05/25/2024    EGFR 88 05/23/2024    EGFR 77 05/01/2024    CREATININE 1.1 05/25/2024    CREATININE 0.96 05/23/2024    CREATININE 1.1 05/01/2024

## 2024-06-10 NOTE — ASSESSMENT & PLAN NOTE
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    Continue follow up with neurology. He just saw neuro surgery, there is a concern for CNS vasculitis. Cerebral angiography is scheduled.

## 2024-06-10 NOTE — PROGRESS NOTES
Ambulatory Visit  Name: Donta Hunter      : 1970      MRN: [id number]  Encounter Provider: Raad Batres MD  Encounter Date: 6/10/2024   Encounter department: [facility] INTERNAL MEDICINE [place]    Assessment & Plan     1. History of multiple strokes  2. History of DVT (deep vein thrombosis)  3. Gait difficulty  4. Lupus nephritis (HCC)  5. Hypertensive kidney disease with stage 3a chronic kidney disease ([facility])  Assessment & Plan:  Continue follow up with nephrology. Cr appears stable.    Lab Results   Component Value Date    EGFR 82 2024    EGFR 88 2024    EGFR 77 2024    CREATININE 1.1 2024    CREATININE 0.96 2024    CREATININE 1.1 2024     6. Primary hypertension  -     CBC and differential; Future  -     Basic metabolic panel; Future  -     TSH, 3rd generation with Free T4 reflex; Future  7. Coronary artery disease involving native coronary artery of native heart without angina pectoris  8. Gastroesophageal reflux disease without esophagitis  9. Erectile dysfunction, unspecified erectile dysfunction type  -     sildenafil (VIAGRA) 50 MG tablet; Take 1 tablet (50 mg total) by mouth daily as needed for erectile dysfunction  10. 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    Continue follow up with neurology. He just saw neuro surgery, there is a concern for CNS vasculitis. Cerebral angiography is scheduled.  11. SLE (systemic lupus erythematosus related syndrome) ([facility])  Assessment & Plan:  Continue follow up with rheumatology. Continue plaquenil 400 mg daily, his Cellcept was increased.        Depression Screening and Follow-up Plan: Patient was screened for depression during today's encounter. They screened negative with a PHQ-2 score of 0.Clincally patient does not have  "depression. No treatment is required.       History of Present Illness     Patient is here for routine follow up, reviewed chronic medical problems, ordered labs for next visit including CBC CMP TSH A1C LIPID. Reviewed labs for this visit.        Review of Systems   Constitutional:  Negative for chills and fever.   HENT:  Negative for ear pain and sore throat.    Eyes:  Negative for pain and visual disturbance.   Respiratory:  Negative for cough and shortness of breath.    Cardiovascular:  Negative for chest pain and palpitations.   Gastrointestinal:  Negative for abdominal pain and vomiting.   Genitourinary:  Negative for dysuria and hematuria.   Musculoskeletal:  Positive for gait problem. Negative for arthralgias and back pain.   Skin:  Negative for color change and rash.   Neurological:  Negative for seizures and syncope.   All other systems reviewed and are negative.      Pertinent Medical History       Objective     /70 (BP Location: Right arm, Patient Position: Sitting, Cuff Size: Standard)   Pulse 81   Ht 5' 6\" (1.676 m)   SpO2 98%   BMI 29.54 kg/m²     Physical Exam  Vitals and nursing note reviewed.   Constitutional:       General: He is not in acute distress.     Appearance: Normal appearance. He is well-developed.   HENT:      Head: Normocephalic and atraumatic.   Eyes:      Conjunctiva/sclera: Conjunctivae normal.   Cardiovascular:      Rate and Rhythm: Normal rate and regular rhythm.      Heart sounds: No murmur heard.  Pulmonary:      Effort: Pulmonary effort is normal. No respiratory distress.      Breath sounds: Normal breath sounds.   Abdominal:      Tenderness: There is no abdominal tenderness.   Musculoskeletal:         General: No swelling.      Cervical back: Neck supple.   Skin:     General: Skin is warm and dry.      Capillary Refill: Capillary refill takes less than 2 seconds.   Neurological:      Mental Status: He is alert.      Gait: Gait abnormal.   Psychiatric:         Mood and " Affect: Mood normal.         Administrative Statements     I have spent a total time of 20 minutes on 06/10/24 In caring for this patient including Risks and benefits of tx options, Instructions for management, Patient and family education, Importance of tx compliance, Risk factor reductions, Impressions, Counseling / Coordination of care, Documenting in the medical record, Reviewing / ordering tests, medicine, procedures  , and Obtaining or reviewing history  .

## 2024-06-13 ENCOUNTER — EVALUATION (OUTPATIENT)
Dept: PHYSICAL THERAPY | Age: 54
End: 2024-06-13
Payer: COMMERCIAL

## 2024-06-13 DIAGNOSIS — M54.41 CHRONIC BILATERAL LOW BACK PAIN WITH BILATERAL SCIATICA: ICD-10-CM

## 2024-06-13 DIAGNOSIS — M48.062 SPINAL STENOSIS OF LUMBAR REGION WITH NEUROGENIC CLAUDICATION: ICD-10-CM

## 2024-06-13 DIAGNOSIS — M54.42 CHRONIC BILATERAL LOW BACK PAIN WITH BILATERAL SCIATICA: ICD-10-CM

## 2024-06-13 DIAGNOSIS — G89.29 CHRONIC BILATERAL LOW BACK PAIN WITH BILATERAL SCIATICA: ICD-10-CM

## 2024-06-13 PROCEDURE — 97110 THERAPEUTIC EXERCISES: CPT | Performed by: PHYSICAL THERAPIST

## 2024-06-13 PROCEDURE — 97162 PT EVAL MOD COMPLEX 30 MIN: CPT | Performed by: PHYSICAL THERAPIST

## 2024-06-13 NOTE — PROGRESS NOTES
PT Evaluation     Today's date: 2024  Patient name: Donta Hunter  : 1970  MRN: [id number]  Referring provider: Malick Barnett MD  Dx:   Encounter Diagnosis     ICD-10-CM    1. Spinal stenosis of lumbar region with neurogenic claudication  M48.062 Ambulatory referral to Physical Therapy      2. Chronic bilateral low back pain with bilateral sciatica  M54.42 Ambulatory referral to Physical Therapy    M54.41     G89.29           Start Time: 1429  Stop Time: 1525  Total time in clinic (min): 56 minutes    Assessment  Impairments: abnormal gait, abnormal or restricted ROM, activity intolerance, impaired balance, impaired physical strength, lacks appropriate home exercise program, pain with function, weight-bearing intolerance, poor posture  and poor body mechanics  Symptom irritability: moderate    Assessment details: Donta Hunter is a 54 y.o. male who presents with pain, decreased strength, decreased ROM, and ambulatory dysfunction. Due to these impairments, Patient has difficulty performing a/iadls and recreational activities. Patient's clinical presentation is consistent with their referring diagnosis of chronic LBP with sciatica, spinal stenosis.  Patient declined to begin aquatic physical therapy today as he did not have water shoes.  Patient was not instructed/reviewed home program to do both sitting and standing exercises using his rolling walker for support.  Patient was familiar with exercises as he states he does not daily at home.  Patient would benefit from skilled physical therapy to address their aforementioned impairments, improve their level of function and to improve their overall quality of life.  Understanding of Dx/Px/POC: good     Prognosis: fair    Goals  ST-4 WEEKS  1.  Decrease LB pain < 8/10 on VAS at its worst.  2.  Increase ROM by > 5 deg in all deficients planes.  3.  Increase core strength by 1/2 MMT grade.  4. Increase tolerance to activity and pool therapy > 45 min.   5.   Ambulate with improved technique on level surfaces using least assistive device.    LT-6 WEEKS  1. Patient to be independent with a/iadls.  2. Increase functional activities for leisure and home activities to previous LOF.  3. Independent with HEP and/or fitness program.    Plan  Patient would benefit from: skilled physical therapy  Planned modality interventions: cryotherapy, thermotherapy: hydrocollator packs and unattended electrical stimulation    Planned therapy interventions: activity modification, behavior modification, body mechanics training, aquatic therapy, flexibility, functional ROM exercises, home exercise program, IADL retraining, joint mobilization, manual therapy, neuromuscular re-education, patient education, postural training, strengthening, stretching, therapeutic activities, therapeutic exercise, abdominal trunk stabilization, balance/weight bearing training and gait training    Frequency: 2-3x week.  Duration in weeks: 12  Plan of Care beginning date: 2024  Plan of Care expiration date: 2024  Treatment plan discussed with: patient        Subjective Evaluation    History of Present Illness  Mechanism of injury: Patient reports he has had a history of back pain for many years.  Patient reports in  he began with bilateral drop feet.  He tried line physical therapy and injections with no relief.  Patient was referred for aquatic physical therapy.   Patient complains of pain in his low back and pain down bilateral lower extremities mostly his thighs and into his feet.  He also complains of numbness in both feet.  PMH: Lupus CKD, CAD, HTN, cervical spine fusion , stroke , COPD, history of blood clots.          Recurrent probem    Patient Goals  Patient goals for therapy: decreased pain, increased motion, improved balance, increased strength, decreased edema and independence with ADLs/IADLs  Patient goal: able to walk better  Pain  Current pain ratin  At worst pain  ratin  Location: LB  Quality: radiating and sharp  Relieving factors: relaxation, change in position and medications  Aggravating factors: walking and standing  Progression: no change    Social Support  Steps to enter house: yes  Stairs in house: yes   Lives in: multiple-level home  Lives with: spouse and young children    Employment status: not working    Diagnostic Tests  MRI studies: abnormal  Treatments  Previous treatment: injection treatment, physical therapy and medication        Objective     Static Posture   General Observations  Asymmetrical weight bearing.     Comments  BP: 132/81  HR: 86  Increased swelling bilateral distal lower extremities left greater than the right  Bilateral drop feet  Patient currently wearing a heart monitor device that he must remove to go into the pool.  Patient also wearing bilateral distal lower extremity compression socks.    Postural Observations  Seated posture: fair  Standing posture: fair      Palpation   Left   Hypertonic in the erector spinae.     Right   Hypertonic in the erector spinae.     Neurological Testing     Sensation     Lumbar   Left   Intact: light touch    Right   Intact: light touch    Reflexes   Left   Patellar (L4): brisk (3+)  Achilles (S1): absent (0)    Right   Patellar (L4): brisk (3+)  Achilles (S1): absent (0)    Active Range of Motion     Lumbar   Flexion:  Restriction level: moderate  Extension: 10 degrees  Restriction level: minimal  Left lateral flexion: Active left lumbar lateral flexion: tips to mid shins with support.    WFL  Right lateral flexion:  WFL    Additional Active Range of Motion Details  Forward flexion difficult due to lack of balance on his forefeet.    Strength/Myotome Testing     Left Hip   Planes of Motion   Flexion: 4-  Extension: 3+  Abduction: 4-  Adduction: 4    Right Hip   Planes of Motion   Flexion: 4-  Extension: 3+  Abduction: 4-  Adduction: 4    Left Knee   Flexion: 4  Extension: 4    Right Knee   Flexion:  4  Extension: 4    Left Ankle/Foot   Dorsiflexion: 3+  Plantar flexion: 3+    Right Ankle/Foot   Dorsiflexion: 3+  Plantar flexion: 3+    Tests     Left Hip   Negative FAISAL and FADIR.     Right Hip   Negative FAISAL and FADIR.     Ambulation   Weight-Bearing Status   Assistive device used: two-wheeled walker    Ambulation: Level Surfaces   Ambulation with assistive device: independent    Ambulation: Stairs   Pattern: non-reciprocal  Railings: two rails  Pattern: non-reciprocal  Railings: two rails    Observational Gait   Gait: asymmetric   Decreased walking speed and stride length.     Quality of Movement During Gait   Trunk  Forward lean.     Ankle    Ankle (Left): Positive foot drop.   Ankle (Right): Positive foot drop.     Functional Assessment        Comments  TU seconds with RW    General Comments:      Ankle/Foot Comments   Tight bilateral dorsiflexion due to drop feet             POC expires Unit limit Auth Expiration date PT/OT + Visit Limit?   24 na na 24                           Visit/Unit Tracking  AUTH Status:  Date               Auth after visit 24 Used 1               Remaining  23               FOTO-NO               Precautions:Lupus, CAD, COPD, HTN, Stroke , c/s fusion , hx blood clots, B drop feet, CKD  Daily Treatment Diary:    Manuals                                                                 Neuro Re-Ed                                                                                                        Ther Ex             HEP 23'                                                                                                       Ther Activity                                       Gait Training                                       Modalities

## 2024-06-17 ENCOUNTER — APPOINTMENT (OUTPATIENT)
Dept: PHYSICAL THERAPY | Age: 54
End: 2024-06-17
Payer: COMMERCIAL

## 2024-06-19 ENCOUNTER — OFFICE VISIT (OUTPATIENT)
Dept: PHYSICAL THERAPY | Age: 54
End: 2024-06-19
Payer: COMMERCIAL

## 2024-06-19 DIAGNOSIS — M48.062 SPINAL STENOSIS OF LUMBAR REGION WITH NEUROGENIC CLAUDICATION: Primary | ICD-10-CM

## 2024-06-19 DIAGNOSIS — M54.42 CHRONIC BILATERAL LOW BACK PAIN WITH BILATERAL SCIATICA: ICD-10-CM

## 2024-06-19 DIAGNOSIS — G89.29 CHRONIC BILATERAL LOW BACK PAIN WITH BILATERAL SCIATICA: ICD-10-CM

## 2024-06-19 DIAGNOSIS — M54.41 CHRONIC BILATERAL LOW BACK PAIN WITH BILATERAL SCIATICA: ICD-10-CM

## 2024-06-19 PROCEDURE — 97150 GROUP THERAPEUTIC PROCEDURES: CPT

## 2024-06-19 PROCEDURE — 97113 AQUATIC THERAPY/EXERCISES: CPT

## 2024-06-19 NOTE — PROGRESS NOTES
Daily Note     Today's date: 2024  Patient name: Donta Hunter  : 1970  MRN: [id number]  Referring provider: Malick Barnett MD  Dx:   Encounter Diagnosis     ICD-10-CM    1. Spinal stenosis of lumbar region with neurogenic claudication  M48.062       2. Chronic bilateral low back pain with bilateral sciatica  M54.42     M54.41     G89.29           Start Time: 1400  Stop Time: 1500  Total time in clinic (min): 60 minutes    Subjective: Patient reports pain in LB and down both lower extremities.       Objective: See treatment diary below      Assessment: Tolerated treatment fairly well with initial pool session, tolerated light ex program. No c/o pain with TE.  Patient demonstrated fatigue post treatment and would benefit from continued PT      Plan: Continue per plan of care.  Progress treatment as tolerated.      Patient seen 1:1 for 30 minutes and rest of time group setting.       POC expires Unit limit Auth Expiration date PT/OT + Visit Limit?   24 na na 24                           Visit/Unit Tracking  AUTH Status:  Date              Auth after visit 24 Used 1 2              Remaining  23 22              FOTO-NO               Precautions:Lupus, CAD, COPD, HTN, Stroke , c/s fusion , hx blood clots, B drop feet, CKD  Daily Treatment Diary:    Precautions:  Daily Treatment Diary     Date           Patient education           Water Walk (FW, BW, side) x10’            LE work at wall               Hip FF/ext swing  2            Toe/heel  1            Hip ABD/ADD  2                        Knee flexion & extension 1            Squats 1          UE noodle x3             Push/pull  1            Rotate  1            Row forward & back  2            OH side bend            UE/Core (AROM, paddles, MB)              ABD/ADD              Horizontal Pec Fly              Alt. arm swing (shld flex/ext)              Push pull              Single paddle rotation           SLS (EO, EC, ball  toss)            Aqua Step (FW, lat, eccentric)            Core work:              MF press (red, blue, blk)             Kickboard press (blue, red)              Row/ext w/ tubing (red, blue, blk)           Seated on bench             ankle DF/PF              March              ABD/ADD              Knee flexion/extension            DW TX - hang in the deep water  5' 5'            DW ABD/ADD  1            DW Bicycle  3            DW Scissor hip flexion/extension              DW DKTC            Stretches              HS at step              Wall stretches              Calf stretch at wall              Other:            Hot Tub - 10 minutes only  no                 Manuals 6/13 6/19                                                               Neuro Re-Ed                                                                                                        Ther Ex             HEP 23'                                                                                                       Ther Activity                                       Gait Training                                       Modalities

## 2024-06-25 ENCOUNTER — TELEPHONE (OUTPATIENT)
Dept: NEUROLOGY | Facility: CLINIC | Age: 54
End: 2024-06-25

## 2024-06-25 ENCOUNTER — APPOINTMENT (OUTPATIENT)
Dept: PHYSICAL THERAPY | Age: 54
End: 2024-06-25
Payer: COMMERCIAL

## 2024-06-27 ENCOUNTER — APPOINTMENT (OUTPATIENT)
Dept: PHYSICAL THERAPY | Age: 54
End: 2024-06-27
Payer: COMMERCIAL

## 2024-06-28 DIAGNOSIS — M32.9 SLE (SYSTEMIC LUPUS ERYTHEMATOSUS RELATED SYNDROME) (HCC): Primary | ICD-10-CM

## 2024-06-28 RX ORDER — HYDROXYCHLOROQUINE SULFATE 200 MG/1
400 TABLET, FILM COATED ORAL
Qty: 180 TABLET | Refills: 1 | Status: SHIPPED | OUTPATIENT
Start: 2024-06-28 | End: 2024-12-25

## 2024-06-28 NOTE — TELEPHONE ENCOUNTER
Reason for call:   [x] Refill   [] Prior Auth  [x] Other: Given to Pt at hospital. Pt is requesting Dr. Batres, to please refill.    Office:   [x] PCP/Provider - Raad Batres MD   [] Specialty/Provider -     Medication: (PLAQUENIL) 200 mg     Dose/Frequency: take 2 tabs at bedtime / 180 tabs      Pharmacy: [facility], [place] - [address]      Does the patient have enough for 3 days?   [x] Yes   [] No - Send as HP to POD

## 2024-07-01 ENCOUNTER — OFFICE VISIT (OUTPATIENT)
Dept: PHYSICAL THERAPY | Age: 54
End: 2024-07-01
Payer: COMMERCIAL

## 2024-07-01 DIAGNOSIS — M54.41 CHRONIC BILATERAL LOW BACK PAIN WITH BILATERAL SCIATICA: ICD-10-CM

## 2024-07-01 DIAGNOSIS — M48.062 SPINAL STENOSIS OF LUMBAR REGION WITH NEUROGENIC CLAUDICATION: Primary | ICD-10-CM

## 2024-07-01 DIAGNOSIS — G89.29 CHRONIC BILATERAL LOW BACK PAIN WITH BILATERAL SCIATICA: ICD-10-CM

## 2024-07-01 DIAGNOSIS — M54.42 CHRONIC BILATERAL LOW BACK PAIN WITH BILATERAL SCIATICA: ICD-10-CM

## 2024-07-01 PROCEDURE — 97113 AQUATIC THERAPY/EXERCISES: CPT

## 2024-07-01 NOTE — PROGRESS NOTES
Daily Note     Today's date: 2024  Patient name: Donta Hunter  : 1970  MRN: [id number]  Referring provider: Malick Barnett MD  Dx:   Encounter Diagnosis     ICD-10-CM    1. Spinal stenosis of lumbar region with neurogenic claudication  M48.062       2. Chronic bilateral low back pain with bilateral sciatica  M54.42     M54.41     G89.29           Start Time: 1500  Stop Time: 1600  Total time in clinic (min): 60 minutes    Subjective: pt offered no new complaints today.       Objective: See treatment diary below  Pt seen 1:1 for 30 min and group therapy for remainder    Assessment: Tolerated treatment fair. Pt did well w/ standing ex's for LE's but struggled w/ balance using Ue's. Patient would benefit from continued PT      Plan: Continue per plan of care.        POC expires Unit limit Auth Expiration date PT/OT + Visit Limit?   24 na na 24                           Visit/Unit Tracking  AUTH Status:  Date             Auth after visit 24 Used 1 2 3             Remaining  23 22 21             FOTO-NO               Precautions:Lupus, CAD, COPD, HTN, Stroke , c/s fusion , hx blood clots, B drop feet, CKD  Daily Treatment Diary:    Precautions:  Daily Treatment Diary     Date          Patient education           Water Walk (FW, BW, side) x10’            LE work at wall               Hip FF/ext swing  2 2           Toe/heel  1 1           Hip ABD/ADD  2 2            1           Knee flexion & extension 1 1           Squats 1 1         UE noodle x3             Push/pull  1 1           Rotate  1 1           Row forward & back  2 2 sm N           OH side bend            UE/Core (AROM, paddles, MB)   green           ABD/ADD   1           Horizontal Pec Fly              Alt. arm swing (shld flex/ext)   1           Push pull   1           Single paddle rotation           SLS (EO, EC, ball toss)            Aqua Step (FW, lat, eccentric)            Core work:              DWAIN  press (red, blue, blk)             Kickboard press (blue, red)              Row/ext w/ tubing (red, blue, blk)           Seated on bench             ankle DF/PF              March              ABD/ADD              Knee flexion/extension            DW TX - hang in the deep water  5' 5' 10,5           DW ABD/ADD  1 1           DW Bicycle  3 5           DW Scissor hip flexion/extension   1           DW DKTC   1         Stretches              HS at step              Wall stretches              Calf stretch at wall              Other:            Hot Tub - 10 minutes only  no                 Manuals 6/13 6/19                                                               Neuro Re-Ed                                                                                                        Ther Ex             HEP 23'                                                                                                       Ther Activity                                       Gait Training                                       Modalities

## 2024-07-03 ENCOUNTER — OFFICE VISIT (OUTPATIENT)
Dept: PHYSICAL THERAPY | Age: 54
End: 2024-07-03
Payer: COMMERCIAL

## 2024-07-03 DIAGNOSIS — M54.42 CHRONIC BILATERAL LOW BACK PAIN WITH BILATERAL SCIATICA: ICD-10-CM

## 2024-07-03 DIAGNOSIS — M54.41 CHRONIC BILATERAL LOW BACK PAIN WITH BILATERAL SCIATICA: ICD-10-CM

## 2024-07-03 DIAGNOSIS — M48.062 SPINAL STENOSIS OF LUMBAR REGION WITH NEUROGENIC CLAUDICATION: Primary | ICD-10-CM

## 2024-07-03 DIAGNOSIS — G89.29 CHRONIC BILATERAL LOW BACK PAIN WITH BILATERAL SCIATICA: ICD-10-CM

## 2024-07-03 PROCEDURE — 97113 AQUATIC THERAPY/EXERCISES: CPT

## 2024-07-03 NOTE — PROGRESS NOTES
Daily Note     Today's date: 7/3/2024  Patient name: Donta Hunter  : 1970  MRN: [id number]  Referring provider: Malick Barnett MD  Dx:   Encounter Diagnosis     ICD-10-CM    1. Spinal stenosis of lumbar region with neurogenic claudication  M48.062       2. Chronic bilateral low back pain with bilateral sciatica  M54.42     M54.41     G89.29           Start Time: 1100  Stop Time: 1200  Total time in clinic (min): 60 minutes    Subjective: Patient reports that he doing well with pool.       Objective: See treatment diary below      Assessment: Tolerated treatment fairly well, challenges in the water, anna balance. Consider adding core activation ex nv. Patient demonstrated fatigue post treatment and would benefit from continued PT      Plan: Continue per plan of care.      POC expires Unit limit Auth Expiration date PT/OT + Visit Limit?   24 na na 24                           Visit/Unit Tracking  AUTH Status:  Date 6/13 6/19 7/1 7/3           Auth after visit 24 Used 1 2 3 4            Remaining  23 22 21 20            FOTO-NO               Precautions:Lupus, CAD, COPD, HTN, Stroke , c/s fusion , hx blood clots, B drop feet, CKD  Daily Treatment Diary:    Precautions:  Daily Treatment Diary     Date 6/19 7/1 7/3        Patient education           Water Walk (FW, BW, side) x10’            LE work at wall               Hip FF/ext swing  2 2 2          Toe/heel  1 1 1          Hip ABD/ADD  2 2 2           1 1          Knee flexion & extension 1 1 1          Squats 1 1 1        UE noodle x3             Push/pull  1 1 1          Rotate  1 1 1          Row forward & back  2 2 sm N 2' sm N          OH side bend            UE/Core (AROM, paddles, MB)   green green          ABD/ADD   1 1          Horizontal Pec Fly              Alt. arm swing (shld flex/ext)   1 1          Push pull   1 1          Single paddle rotation           SLS (EO, EC, ball toss)            Aqua Step (FW, lat, eccentric)             Core work:              MF press (red, blue, blk)             Kickboard press (blue, red)              Row/ext w/ tubing (red, blue, blk)           Seated on bench             ankle DF/PF              March              ABD/ADD              Knee flexion/extension            DW TX - hang in the deep water  5' 5' 10,5 10 5          DW ABD/ADD  1 1 1          DW Bicycle  3 5 5          DW Scissor hip flexion/extension   1 1          DW DKTC   1 1        Stretches              HS at step              Wall stretches              Calf stretch at wall              Other:            Hot Tub - 10 minutes only  no                 Manuals 6/13 6/19                                                               Neuro Re-Ed                                                                                                        Ther Ex             HEP 23'                                                                                                       Ther Activity                                       Gait Training                                       Modalities

## 2024-07-05 ENCOUNTER — HOSPITAL ENCOUNTER (OUTPATIENT)
Dept: NON INVASIVE DIAGNOSTICS | Facility: CLINIC | Age: 54
Discharge: HOME | End: 2024-07-05
Payer: COMMERCIAL

## 2024-07-05 VITALS
HEIGHT: 66 IN | WEIGHT: 183 LBS | BODY MASS INDEX: 29.41 KG/M2 | SYSTOLIC BLOOD PRESSURE: 136 MMHG | OXYGEN SATURATION: 99 % | DIASTOLIC BLOOD PRESSURE: 84 MMHG | HEART RATE: 63 BPM

## 2024-07-05 LAB
NUC STRESS DIASTOLIC VOLUME INDEX: 94 ML/M2
NUC STRESS EJECTION FRACTION: 59 %
NUC STRESS SYSTOLIC VOLUME INDEX: 39 ML/M2
RATE PRESSURE PRODUCT: NORMAL
SL CV REST NUCLEAR ISOTOPE DOSE: 10.17 MCI
SL CV STRESS NUCLEAR ISOTOPE DOSE: 29.6 MCI
SL CV STRESS RECOVERY BP: NORMAL MMHG
SL CV STRESS RECOVERY HR: 77 BPM
SL CV STRESS RECOVERY O2 SAT: 100 %
STRESS ANGINA INDEX: 0
STRESS BASELINE BP: NORMAL MMHG
STRESS BASELINE HR: 63 BPM
STRESS O2 SAT REST: 99 %
STRESS PEAK HR: 96 BPM
STRESS POST O2 SAT PEAK: 100 %
STRESS POST PEAK BP: 146 MMHG
STRESS/REST PERFUSION RATIO: 1.06

## 2024-07-05 PROCEDURE — 93016 CV STRESS TEST SUPVJ ONLY: CPT | Performed by: INTERNAL MEDICINE

## 2024-07-05 PROCEDURE — 93017 CV STRESS TEST TRACING ONLY: CPT

## 2024-07-05 PROCEDURE — 78452 HT MUSCLE IMAGE SPECT MULT: CPT

## 2024-07-05 PROCEDURE — 93018 CV STRESS TEST I&R ONLY: CPT | Performed by: INTERNAL MEDICINE

## 2024-07-05 PROCEDURE — A9502 TC99M TETROFOSMIN: HCPCS

## 2024-07-05 PROCEDURE — 78452 HT MUSCLE IMAGE SPECT MULT: CPT | Performed by: INTERNAL MEDICINE

## 2024-07-05 RX ORDER — REGADENOSON 0.08 MG/ML
0.4 INJECTION, SOLUTION INTRAVENOUS ONCE
Status: COMPLETED | OUTPATIENT
Start: 2024-07-05 | End: 2024-07-05

## 2024-07-05 RX ADMIN — REGADENOSON 0.4 MG: 0.08 INJECTION, SOLUTION INTRAVENOUS at 09:13

## 2024-07-06 LAB
CHEST PAIN STATEMENT: NORMAL
MAX DIASTOLIC BP: 78 MMHG
MAX PREDICTED HEART RATE: 164 BPM
PROTOCOL NAME: NORMAL
REASON FOR TERMINATION: NORMAL
STRESS POST EXERCISE DUR MIN: 3 MIN
STRESS POST EXERCISE DUR SEC: 0 SEC
STRESS POST PEAK HR: 96 BPM
STRESS POST PEAK SYSTOLIC BP: 146 MMHG
TARGET HR FORMULA: NORMAL
TEST INDICATION: NORMAL

## 2024-07-08 ENCOUNTER — TELEPHONE (OUTPATIENT)
Dept: CARDIOLOGY CLINIC | Facility: CLINIC | Age: 54
End: 2024-07-08

## 2024-07-08 NOTE — TELEPHONE ENCOUNTER
----- Message from Dominic Schneider MD sent at 7/5/2024  2:35 PM EDT -----  please call the patient.  Stress test has mild defect but no definite coronary blockage.

## 2024-07-10 ENCOUNTER — OFFICE VISIT (OUTPATIENT)
Dept: CARDIOLOGY CLINIC | Facility: CLINIC | Age: 54
End: 2024-07-10
Payer: COMMERCIAL

## 2024-07-10 VITALS
DIASTOLIC BLOOD PRESSURE: 76 MMHG | HEART RATE: 89 BPM | SYSTOLIC BLOOD PRESSURE: 120 MMHG | WEIGHT: 179 LBS | OXYGEN SATURATION: 98 % | BODY MASS INDEX: 28.77 KG/M2 | HEIGHT: 66 IN | RESPIRATION RATE: 16 BRPM

## 2024-07-10 DIAGNOSIS — I63.9 CEREBROVASCULAR ACCIDENT (CVA), UNSPECIFIED MECHANISM (HCC): ICD-10-CM

## 2024-07-10 DIAGNOSIS — Z86.718 HISTORY OF DVT (DEEP VEIN THROMBOSIS): ICD-10-CM

## 2024-07-10 DIAGNOSIS — I10 PRIMARY HYPERTENSION: Primary | ICD-10-CM

## 2024-07-10 DIAGNOSIS — I25.10 CORONARY ARTERY DISEASE INVOLVING NATIVE CORONARY ARTERY OF NATIVE HEART WITHOUT ANGINA PECTORIS: ICD-10-CM

## 2024-07-10 PROCEDURE — 99214 OFFICE O/P EST MOD 30 MIN: CPT | Performed by: INTERNAL MEDICINE

## 2024-07-10 NOTE — PROGRESS NOTES
Cardiology Consultation     Donta Hunter  [id number]  1970  [address] SILVA [facility] [place] [address]    Impression:  Multiple CVAs  Hypertension  SLE  History of DVT  Neuropathy  Chest pain adult     Plan:   He walks with a walker due to neuropathy, still with rare atypical pain. No need for heart cath at this time.   Lexiscan nuclear stress test reviewed with patient, no significant ischemia, ef 59%. We will check a 30-day cardiac monitor to rule out atrial fibrillation, he has not gotten monitor yet.  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  Neuro has ordered another and await repeat brain MRI.  He follows with rheum, neurology, PCP and recently saw neurosurgery who brought up concern for CNS vasculitis and cerebral angiography was ordered.  Proceed with neurosurgical evaluation.    We discussed PFO procedure, risks, benefits at length.  He is a good candidate however brain MRI appears to show vasculitis so benefit of PFO closure is uncertain.  Discussed in detail embryology of PFO, relationship to paradoxical embolism specific to CVA with >50% counseling management options including medical therapy with antiplatelet therapy (which patient is on), anticoagulation and closure.  After discussion, patient wishes to pursue PFO percutaneous closure.  Understands risks which include but not limited to stroke, heart attack, death, need for urgent open-heart or vascular surgery to repair equipment induced injury.  Device migration both early and late which may require surgical removal.  Discussed arrhythmia development such as atrial fibrillation and palpitations.  Patient also understands benefits of closure compared to medical therapy as per RESPECT and REDUCE  trials.  Also understands alternative therapy with medical therapy (antiplatelets or  anticoagulants) .    Reviewed JIMMY 5/23/24 with [employer]- prob  25 mm Talisman.  For now given his cerebral vasculitis we will hold off on PFO closure.  All questions answered.     He still gets a rare episode of mild mid sternal chest pain, ? Muscular with exercise with walker. Continue to follow.     Ov 5/29/24: The patient is a 54-year-old male with a history of multiple strokes sent for consideration of PFO closure.  He has a past medical history of multiple strokes, left anterior corpus callosum and right cerebellar are with tiny scattered chronic infarcts in the left cerebellar hemisphere, DVT with long-term anticoagulation with Eliquis per hematology presumably indefinitely. He had a cva while on eliquis.  He did not meet criteria for antiphospholipid syndrome.  Hypercoagulable workup appears negative.  He does have a history of SLE.  He was sent to neurology for an angiogram with concern for CNS vasculitis versus lupus cerebritis.  He was referred to cardiology for JIMMY by neurology.  JIMMY results listed below, positive for PFO, personally reviewed.  Repeat MRI of the brain is pending.  He had a lumbar puncture 4/25/2024.  EKGs revealed normal sinus rhythm.    He now feels well. He gets some L and center chest pinching on movement improved with placing a pilllow. He get around with a walker for neuropathy. He also had palpitations prior to his cva.     Echocardiogram 4/3/2024 reveals normal LV function, EF 55%, diastolic dysfunction.  LAE.  Mild MR, mild TR.    Lexiscan nuclear stress test 7/18/2019:  normal study after pharmacologic dilatation.  Left ventricular systolic function was normal.      Patient Active Problem List   Diagnosis    SLE (systemic lupus erythematosus related syndrome) (HCC)    Lupus nephritis (HCC)    Hypertension    History of DVT (deep vein thrombosis)    Stage 2 chronic kidney disease    Anemia in chronic kidney disease    Muscle weakness (generalized)    Tremor of both hands     Spasticity    Gait difficulty    Neuropathic pain    Hypertensive kidney disease with stage 3a chronic kidney disease ([facility])    DJD (degenerative joint disease), ankle and foot, right    Chronic kidney disease, stage 3a ([facility])    CAD (coronary artery disease)    DVT (deep venous thrombosis) ([facility])    History of multiple strokes    Gastroesophageal reflux disease without esophagitis    COPD, group B, by GOLD 2017 classification ([facility])    Hyperparathyroidism ([facility])    Stroke (cerebrum) ([facility])     Past Medical History:   Diagnosis Date    Anemia     Arthritis     Cervical mass     Chronic kidney disease     COPD, group B, by GOLD 2017 classification ([facility]) 7/13/2020    Coronary artery disease     Depression     DVT (deep venous thrombosis) ([facility])     Hypertension     Lupus ([facility])     Renal disorder     Stroke (cerebrum) ([facility]) 6/7/2024     Social History     Socioeconomic History    Marital status: /Civil Union     Spouse name: Not on file    Number of children: Not on file    Years of education: Not on file    Highest education level: Not on file   Occupational History    Not on file   Tobacco Use    Smoking status: Never    Smokeless tobacco: Never   Vaping Use    Vaping status: Never Used   Substance and Sexual Activity    Alcohol use: Never    Drug use: Never    Sexual activity: Yes     Partners: Female, Male     Birth control/protection: Rhythm   Other Topics Concern    Not on file   Social History Narrative    ** Merged History Encounter **          Social Determinants of Health     Financial Resource Strain: Not on file   Food Insecurity: No Food Insecurity (1/1/2023)    Hunger Vital Sign     Worried About Running Out of Food in the Last Year: Never true     Ran Out of Food in the Last Year: Never true   Transportation Needs: No Transportation Needs (1/1/2023)    PRAPARE - Transportation     Lack of Transportation (Medical): No     Lack of Transportation (Non-Medical): No   Physical Activity: Not on file   Stress:  Not on file   Social Connections: Unknown (6/18/2024)    Received from "[employer]     How often do you feel lonely or isolated from those around you? (Adult - for ages 18 years and over): Not on file   Intimate Partner Violence: Not on file   Housing Stability: Low Risk  (1/1/2023)    Housing Stability Vital Sign     Unable to Pay for Housing in the Last Year: No     Number of Places Lived in the Last Year: 1     Unstable Housing in the Last Year: No      Family History   Problem Relation Age of Onset    Heart disease Mother     No Known Problems Father      Past Surgical History:   Procedure Laterality Date    APPENDECTOMY      CERVICAL SPINE SURGERY      CHOLECYSTECTOMY      COLONOSCOPY N/A 8/23/2018    Procedure: COLONOSCOPY;  Surgeon: James Wise III, MD;  Location: [facility];  Service: Gastroenterology    ESOPHAGOGASTRODUODENOSCOPY N/A 8/22/2018    Procedure: ESOPHAGOGASTRODUODENOSCOPY (EGD);  Surgeon: James Wise III, MD;  Location: [facility];  Service: Gastroenterology    IR IVC FILTER PLACEMENT PERMANENT  9/7/2017    IR IVC FILTER REMOVAL  4/23/2018    IR LUMBAR PUNCTURE  11/23/2015    IR LUMBAR PUNCTURE  4/25/2024    NECK SURGERY      US GUIDED INJECTION FOR RESEARCH STUDY  4/23/2018    US GUIDED INJECTION FOR RESEARCH STUDY  9/7/2017    VASCULAR SURGERY         Current Outpatient Medications:     apixaban (ELIQUIS) 5 mg, Take 5 mg by mouth 2 (two) times a day, Disp: , Rfl:     bacitracin topical ointment 500 units/g topical ointment, APPLY TOPICALLY TO AFFECTED AREA THREE TIMES A DAY AS DIRECTED, Disp: , Rfl:     baclofen 20 mg tablet, Take 1 tablet (20 mg total) by mouth 3 (three) times a day, Disp: 90 tablet, Rfl: 1    betamethasone, augmented, (DIPROLENE) 0.05 % ointment, Apply 1 application. topically 2 (two) times a day, Disp: , Rfl:     clobetasol (TEMOVATE) 0.05 % cream, , Disp: , Rfl:     clopidogrel (Plavix) 75 mg tablet, Take 1 tablet (75 mg total) by mouth daily,  "Disp: 30 tablet, Rfl: 3    Diclofenac Sodium (VOLTAREN) 1 %, Apply 2 g topically 4 (four) times a day, Disp: 300 g, Rfl: 1    escitalopram (LEXAPRO) 10 mg tablet, Take 1 tablet (10 mg total) by mouth daily, Disp: 90 tablet, Rfl: 3    famotidine (PEPCID) 20 mg tablet, Take 1 tablet (20 mg total) by mouth 2 (two) times a day, Disp: 180 tablet, Rfl: 3    gabapentin (NEURONTIN) 300 mg capsule, Take 1 capsule (300 mg total) by mouth 3 (three) times a day, Disp: 270 capsule, Rfl: 1    gabapentin (NEURONTIN) 600 MG tablet, Take 1 tablet (600 mg total) by mouth 3 (three) times a day, Disp: 270 tablet, Rfl: 1    hydroxychloroquine (PLAQUENIL) 200 mg tablet, Take 2 tablets (400 mg total) by mouth daily at bedtime for 180 doses, Disp: 180 tablet, Rfl: 1    ketoconazole (NIZORAL) 2 % cream, Apply topically daily, Disp: 60 g, Rfl: 1    lidocaine-prilocaine (EMLA) cream, Apply topically as needed for mild pain, Disp: 30 g, Rfl: 3    loratadine (CLARITIN) 10 mg tablet, Take 1 tablet (10 mg total) by mouth daily, Disp: 30 tablet, Rfl: 2    mycophenolate (CELLCEPT) 500 mg tablet, Take 500 mg by mouth every 12 (twelve) hours , Disp: , Rfl:     potassium chloride (Klor-Con M20) 20 mEq tablet, Take 1 tablet (20 mEq total) by mouth daily, Disp: 90 tablet, Rfl: 0    sildenafil (VIAGRA) 50 MG tablet, Take 1 tablet (50 mg total) by mouth daily as needed for erectile dysfunction, Disp: 10 tablet, Rfl: 0    sodium chloride 1 g tablet, Take 2 tablets (2 g total) by mouth 3 (three) times a day with meals, Disp: 540 tablet, Rfl: 2    tamsulosin (FLOMAX) 0.4 mg, Take 1 capsule (0.4 mg total) by mouth daily with dinner, Disp: 30 capsule, Rfl: 5  Allergies   Allergen Reactions    Doxycycline Rash    Sulfa Antibiotics Rash     Vitals:    07/10/24 1259   BP: (!) 0/0   BP Location: Left arm   Patient Position: Sitting   Cuff Size: Standard   Resp: 16   Height: 5' 6\" (1.676 m)       Labs:  Hospital Outpatient Visit on 07/05/2024   Component Date Value "    Protocol Name 07/05/2024 LEXISCAN-SIT     Exercise duration (min) 07/05/2024 3     Exercise duration (sec) 07/05/2024 0     Post Peak Systolic BP 07/05/2024 146     Max Diastolic Bp 07/05/2024 78     Peak HR 07/05/2024 96     Max Predicted Heart Rate 07/05/2024 164     Reason for Termination 07/05/2024 Protocol Complete     Test Indication 07/05/2024 CHEST PAIN     Target Hr Formular 07/05/2024 (220 - Age)*85%     Chest Pain Statement 07/05/2024 none    Hospital Outpatient Visit on 06/06/2024   Component Date Value    Baseline HR 07/05/2024 63     Baseline BP 07/05/2024 136/84     O2 sat rest 07/05/2024 99     Stress peak HR 07/05/2024 96     Post peak BP 07/05/2024 146     O2 sat peak 07/05/2024 100     Recovery HR 07/05/2024 77     Recovery BP 07/05/2024 124/76     O2 sat recovery 07/05/2024 100     Rate Pressure Product 07/05/2024 14,016.0     Angina Index 07/05/2024 0     Rest Nuclear Isotope Dose 07/05/2024 10.17     Stress Nuclear Isotope D* 07/05/2024 29.60     Stress/rest perfusion ra* 07/05/2024 1.06     End diastolic index (mL/* 07/05/2024 94.0     EF (%) 07/05/2024 59     End systolic index (mL/m* 07/05/2024 39.0    Hospital Outpatient Visit on 05/23/2024   Component Date Value    LV EF 05/23/2024 60     Sodium 05/23/2024 135     Potassium 05/23/2024 4.1     Chloride 05/23/2024 103     CO2 05/23/2024 26     ANION GAP 05/23/2024 6     BUN 05/23/2024 8     Creatinine 05/23/2024 0.96     Glucose 05/23/2024 99     Glucose, Fasting 05/23/2024 99     Calcium 05/23/2024 9.4     eGFR 05/23/2024 88     WBC 05/23/2024 2.00 (L)     RBC 05/23/2024 4.39     Hemoglobin 05/23/2024 13.7     Hematocrit 05/23/2024 39.9     MCV 05/23/2024 91     MCH 05/23/2024 31.2     MCHC 05/23/2024 34.3     RDW 05/23/2024 13.3     Platelets 05/23/2024 173     MPV 05/23/2024 9.9     Ventricular Rate 05/23/2024 76     Atrial Rate 05/23/2024 76     WI Interval 05/23/2024 162     QRSD Interval 05/23/2024 96     QT Interval 05/23/2024 372      QTC Interval 05/23/2024 418     P Axis 05/23/2024 46     QRS Axis 05/23/2024 -42     T Wave [place] 05/23/2024 49    Hospital Outpatient Visit on 04/25/2024   Component Date Value    Protime 04/25/2024 14.1     INR 04/25/2024 1.03     Platelets 04/25/2024 171     MPV 04/25/2024 10.0     Appearance, CSF 04/25/2024 bloody     Tube Number, CSF 04/25/2024 1     WBC, CSF 04/25/2024 404 (HH)     Xanthochromia 04/25/2024 No     YANIRA JOEL RESULT 04/25/2024 Comment     Antinuclear Antibody by * 04/25/2024 Comment     RATIO 04/25/2024 0.1     Clinical Relevance 04/25/2024 Notes     ELECTRONICALLY SIGNED BY: 04/25/2024 Comment     Disclaimer 04/25/2024 Notes     Protein, CSF 04/25/2024 563 (HH)     RBC, CSF 04/25/2024 22,825 (H)     Glucose, CSF 04/25/2024 48     Gram Stain Result 04/25/2024 No No organisms seen     Gram Stain Result 04/25/2024 1+ Mononuclear Cells     Gram Stain Result 04/25/2024 1+ Polys     CSF Culture 04/25/2024 No growth     Total Counted 04/25/2024 100     Neutrophils % (CSF) 04/25/2024 58     Bands % (CSF) 04/25/2024 2     Lymphs % CSF 04/25/2024 36     Mononuclear % (CSF) 04/25/2024 4    Appointment on 04/25/2024   Component Date Value    Sodium 04/25/2024 136     Potassium 04/25/2024 4.2     Chloride 04/25/2024 102     CO2 04/25/2024 26     ANION GAP 04/25/2024 8     BUN 04/25/2024 9     Creatinine 04/25/2024 0.96     Glucose, Fasting 04/25/2024 94     Calcium 04/25/2024 9.1     eGFR 04/25/2024 88     WBC 04/25/2024 3.14 (L)     RBC 04/25/2024 4.29     Hemoglobin 04/25/2024 13.4     Hematocrit 04/25/2024 40.4     MCV 04/25/2024 94     MCH 04/25/2024 31.2     MCHC 04/25/2024 33.2     RDW 04/25/2024 14.1     MPV 04/25/2024 10.4     Platelets 04/25/2024 179     nRBC 04/25/2024 0     Segmented % 04/25/2024 40 (L)     Immature Grans % 04/25/2024 0     Lymphocytes % 04/25/2024 36     Monocytes % 04/25/2024 21 (H)     Eosinophils Relative 04/25/2024 2     Basophils Relative 04/25/2024 1     Absolute  Neutrophils 04/25/2024 1.27 (L)     Absolute Immature Grans 04/25/2024 0.01     Absolute Lymphocytes 04/25/2024 1.12     Absolute Monocytes 04/25/2024 0.65     Eosinophils Absolute 04/25/2024 0.06     Basophils Absolute 04/25/2024 0.03     Uric Acid 04/25/2024 5.6     Total Bilirubin 04/25/2024 0.36     Bilirubin, Direct 04/25/2024 0.14     Alkaline Phosphatase 04/25/2024 103     AST 04/25/2024 22     ALT 04/25/2024 16     Total Protein 04/25/2024 7.1     Albumin 04/25/2024 4.2    Appointment on 04/22/2024   Component Date Value    PTT Lupus Anticoagulant 04/22/2024 31.5     Dilute Viper Venom Time 04/22/2024 46.4     DILUTE PROTHROMBIN TIME(* 04/22/2024 49.3 (H)     THROMBIN TIME (DRVW) 04/22/2024 15.7     DPT CONFIRM RATIO 04/22/2024 1.17     LUPUS REFLEX INTERPRETAT* 04/22/2024 Comment:     BETA 2 GLYCO 1 IGG 04/22/2024 <0.8     BETA 2 GLYCO 1 IGA 04/22/2024 4.1     BETA 2 GLYCO 1 IGM 04/22/2024 <2.4     ANTICARDIOLIPIN IGG ANTI* 04/22/2024 1.1     ANTICARDIOLIPIN IGA ANTI* 04/22/2024 5.8     ANTICARDIOLIPIN IGM ANTI* 04/22/2024 1.2    Admission on 04/12/2024, Discharged on 04/14/2024   Component Date Value    WBC 04/12/2024 1.61 (L)     RBC 04/12/2024 4.38     Hemoglobin 04/12/2024 13.9     Hematocrit 04/12/2024 38.1     MCV 04/12/2024 87     MCH 04/12/2024 31.7     MCHC 04/12/2024 36.5     RDW 04/12/2024 12.5     MPV 04/12/2024 10.6     Platelets 04/12/2024 143 (L)     nRBC 04/12/2024 0     Segmented % 04/12/2024 33 (L)     Immature Grans % 04/12/2024 1     Lymphocytes % 04/12/2024 38     Monocytes % 04/12/2024 26 (H)     Eosinophils Relative 04/12/2024 1     Basophils Relative 04/12/2024 1     Absolute Neutrophils 04/12/2024 0.53 (L)     Absolute Immature Grans 04/12/2024 0.01     Absolute Lymphocytes 04/12/2024 0.64     Absolute Monocytes 04/12/2024 0.41     Eosinophils Absolute 04/12/2024 0.01     Basophils Absolute 04/12/2024 0.01     Sodium 04/12/2024 126 (L)     Potassium 04/12/2024 4.6     Chloride  04/12/2024 94 (L)     CO2 04/12/2024 26     ANION GAP 04/12/2024 6     BUN 04/12/2024 9     Creatinine 04/12/2024 0.90     Glucose 04/12/2024 99     Calcium 04/12/2024 8.7     eGFR 04/12/2024 95     Total Bilirubin 04/12/2024 0.41     Bilirubin, Direct 04/12/2024 0.09     Alkaline Phosphatase 04/12/2024 113 (H)     AST 04/12/2024 24     ALT 04/12/2024 20     Total Protein 04/12/2024 7.4     Albumin 04/12/2024 4.2     Lipase 04/12/2024 62     Protime 04/12/2024 14.3     INR 04/12/2024 1.05     PTT 04/12/2024 30     hs TnI 0hr 04/12/2024 3     SARS-CoV-2 04/12/2024 Negative     INFLUENZA A PCR 04/12/2024 Negative     INFLUENZA B PCR 04/12/2024 Negative     RSV PCR 04/12/2024 Negative     hs TnI 2hr 04/13/2024 4     Delta 2hr hsTnI 04/13/2024 1     Sodium 04/13/2024 129 (L)     Potassium 04/13/2024 4.8     Chloride 04/13/2024 100     CO2 04/13/2024 24     ANION GAP 04/13/2024 5     BUN 04/13/2024 7     Creatinine 04/13/2024 1.04     Glucose 04/13/2024 96     Glucose, Fasting 04/13/2024 96     Calcium 04/13/2024 8.7     eGFR 04/13/2024 79     WBC 04/13/2024 1.94 (L)     RBC 04/13/2024 4.23     Hemoglobin 04/13/2024 13.3     Hematocrit 04/13/2024 38.0     MCV 04/13/2024 90     MCH 04/13/2024 31.4     MCHC 04/13/2024 35.0     RDW 04/13/2024 12.7     Platelets 04/13/2024 136 (L)     MPV 04/13/2024 10.0     Sodium, Ur 04/13/2024 117     Creatinine, Ur 04/13/2024 57.9     Osmolality, Ur 04/13/2024 403     Color, UA 04/13/2024 Colorless     Clarity, UA 04/13/2024 Clear     Specific Gravity, UA 04/13/2024 1.011     pH, UA 04/13/2024 7.0     Leukocytes, UA 04/13/2024 Negative     Nitrite, UA 04/13/2024 Negative     Protein, UA 04/13/2024 Trace (A)     Glucose, UA 04/13/2024 Negative     Ketones, UA 04/13/2024 Negative     Urobilinogen, UA 04/13/2024 <2.0     Bilirubin, UA 04/13/2024 Negative     Occult Blood, UA 04/13/2024 Negative     Sodium 04/13/2024 127 (L)     Potassium 04/13/2024 4.7     Chloride 04/13/2024 99     CO2  04/13/2024 24     ANION GAP 04/13/2024 4     BUN 04/13/2024 9     Creatinine 04/13/2024 1.07     Glucose 04/13/2024 100     Calcium 04/13/2024 8.8     eGFR 04/13/2024 77     RBC, UA 04/13/2024 None Seen     WBC, UA 04/13/2024 None Seen     Epithelial Cells 04/13/2024 Occasional     Bacteria, UA 04/13/2024 Occasional     Sodium 04/13/2024 127 (L)     Potassium 04/13/2024 4.7     Chloride 04/13/2024 98     CO2 04/13/2024 23     ANION GAP 04/13/2024 6     BUN 04/13/2024 12     Creatinine 04/13/2024 1.03     Glucose 04/13/2024 87     Calcium 04/13/2024 9.0     eGFR 04/13/2024 80     Sodium 04/14/2024 129 (L)     Potassium 04/14/2024 4.5     Chloride 04/14/2024 98     CO2 04/14/2024 24     ANION GAP 04/14/2024 7     BUN 04/14/2024 11     Creatinine 04/14/2024 1.10     Glucose 04/14/2024 97     Calcium 04/14/2024 9.3     eGFR 04/14/2024 74    Admission on 04/12/2024, Discharged on 04/12/2024   Component Date Value    Ventricular Rate 04/12/2024 65     Atrial Rate 04/12/2024 65     [place] Interval 04/12/2024 210     QRSD Interval 04/12/2024 102     QT Interval 04/12/2024 384     QTC Interval 04/12/2024 399     P Axis 04/12/2024 71     QRS Axis 04/12/2024 -45     T Wave [place] 04/12/2024 69    Hospital Outpatient Visit on 04/03/2024   Component Date Value    BSA 04/03/2024 1.91     A4C EF 04/03/2024 56     LVIDd 04/03/2024 4.80     LVIDS 04/03/2024 3.50     IVSd 04/03/2024 1.20     LVPWd 04/03/2024 1.30     FS 04/03/2024 27     MV E' Tissue Velocity Se* 04/03/2024 9     MV E' Tissue Velocity La* 04/03/2024 13     LA Volume Index (BP) 04/03/2024 23.6     E/A ratio 04/03/2024 0.83     E wave deceleration time 04/03/2024 212     MV Peak E Vitaliy 04/03/2024 45     MV Peak A Vitaliy 04/03/2024 0.54     RVID d 04/03/2024 2.9     Tricuspid annular plane * 04/03/2024 2.20     LA size 04/03/2024 4.2     LA length (A2C) 04/03/2024 5.90     CARLOS A4C 04/03/2024 15.5     LA volume (BP) 04/03/2024 45     RAA A4C 04/03/2024 17.5     MV stenosis  pressure 1/2* 04/03/2024 61     MV valve area p 1/2 meth* 04/03/2024 3.61     TR Peak Vitaliy 04/03/2024 2.2     Triscuspid Valve Regurgi* 04/03/2024 19.0     Ao root 04/03/2024 3.60     Asc Ao 04/03/2024 3.7     Tricuspid valve peak reg* 04/03/2024 2.16     Left ventricular stroke * 04/03/2024 58.00     IVS 04/03/2024 1.2     LEFT VENTRICLE SYSTOLIC * 04/03/2024 49     LV DIASTOLIC VOLUME (MOD* 04/03/2024 107     Left Atrium Area-systoli* 04/03/2024 15.7     Left Atrium Area-systoli* 04/03/2024 17.3     LVSV, 2D 04/03/2024 58     LV EF 04/03/2024 55    Appointment on 03/04/2024   Component Date Value    Hepatitis B Surface Ag 03/04/2024 Non-reactive     Hep B S Ab 03/04/2024 <3.00     Hep B Core Total Ab 03/04/2024 Non-reactive     HIV-1 p24 Antigen 03/04/2024 Non-Reactive     HIV-1 Antibody 03/04/2024 Non-Reactive     HIV-2 Antibody 03/04/2024 Non-Reactive     HIV Ag-Ab 5th Gen 03/04/2024 Non-Reactive     IGA 03/04/2024 465 (H)     IGG 03/04/2024 1,207     IGM 03/04/2024 34 (L)     WBC 03/04/2024 2.82 (L)     RBC 03/04/2024 4.31     Hemoglobin 03/04/2024 13.6     Hematocrit 03/04/2024 40.3     MCV 03/04/2024 94     MCH 03/04/2024 31.6     MCHC 03/04/2024 33.7     RDW 03/04/2024 13.6     MPV 03/04/2024 11.0     Platelets 03/04/2024 191     nRBC 03/04/2024 0     Segmented % 03/04/2024 51     Immature Grans % 03/04/2024 1     Lymphocytes % 03/04/2024 31     Monocytes % 03/04/2024 15 (H)     Eosinophils Relative 03/04/2024 1     Basophils Relative 03/04/2024 1     Absolute Neutrophils 03/04/2024 1.44 (L)     Absolute Immature Grans 03/04/2024 0.02     Absolute Lymphocytes 03/04/2024 0.87     Absolute Monocytes 03/04/2024 0.43     Eosinophils Absolute 03/04/2024 0.03     Basophils Absolute 03/04/2024 0.03     INDEX VALUE 03/04/2024 1.33     JCV Antibody 03/04/2024 Positive (A)     Result 03/04/2024 Comment     Specimen Status 03/04/2024 Comment     QFT Nil 03/04/2024 0.10     QFT TB1-NIL 03/04/2024 0.00     QFT TB2-NIL  03/04/2024 0.02     QFT Mitogen-NIL 03/04/2024 1.06     QFT Final Interpretation 03/04/2024 Negative    There may be more visits with results that are not included.     Imaging: [email or web address]    Result Date: 5/23/2024  Narrative:   Left Ventricle: Left ventricular cavity size is normal. Wall thickness is normal. The left ventricular ejection fraction is 60%. Systolic function is normal. Wall motion is normal.   Left Atrium: The atrium is mildly dilated.   Atrial Septum: There is a patent foramen ovale confirmed with provocation (abdominal compression) with predominant right to left shunting using saline contrast.   Left Atrial Appendage: There is normal function. There is no thrombus.       Review of Systems:  Review of Systems negative except for HPI    Physical Exam:  Physical Exam  GEN: Alert and oriented x 3, in no acute distress.  Well appearing and well nourished.   HEENT: Sclera anicteric, conjunctivae pink, mucous membranes moist. Oropharynx clear.   NECK: Supple, no carotid bruits, no significant JVD. Trachea midline, no thyromegaly.   HEART: Regular rhythm, normal S1 and S2, no murmurs, clicks, gallops or rubs. PMI nondisplaced, no thrills.   LUNGS: Clear to auscultation bilaterally; no wheezes, rales, or rhonchi. No increased work of breathing or signs of respiratory distress.   ABDOMEN: Soft, nontender, nondistended, normoactive bowel sounds.   EXTREMITIES: Skin warm and well perfused, no clubbing, cyanosis, or edema.  NEURO: No focal findings. Normal speech. Mood and affect normal.   SKIN: Normal without suspicious lesions on exposed skin.       1. Primary hypertension        2. Coronary artery disease involving native coronary artery of native heart without angina pectoris        3. Cerebrovascular accident (CVA), unspecified mechanism (HCC)        4. History of DVT (deep vein thrombosis)        6.  DVT  7.   PFO

## 2024-07-12 ENCOUNTER — OFFICE VISIT (OUTPATIENT)
Dept: PHYSICAL THERAPY | Age: 54
End: 2024-07-12
Payer: COMMERCIAL

## 2024-07-12 DIAGNOSIS — M48.062 SPINAL STENOSIS OF LUMBAR REGION WITH NEUROGENIC CLAUDICATION: Primary | ICD-10-CM

## 2024-07-12 DIAGNOSIS — M54.42 CHRONIC BILATERAL LOW BACK PAIN WITH BILATERAL SCIATICA: ICD-10-CM

## 2024-07-12 DIAGNOSIS — G89.29 CHRONIC BILATERAL LOW BACK PAIN WITH BILATERAL SCIATICA: ICD-10-CM

## 2024-07-12 DIAGNOSIS — M54.41 CHRONIC BILATERAL LOW BACK PAIN WITH BILATERAL SCIATICA: ICD-10-CM

## 2024-07-12 PROCEDURE — 97113 AQUATIC THERAPY/EXERCISES: CPT

## 2024-07-15 ENCOUNTER — CLINICAL SUPPORT (OUTPATIENT)
Dept: CARDIOLOGY CLINIC | Facility: CLINIC | Age: 54
End: 2024-07-15
Payer: COMMERCIAL

## 2024-07-15 DIAGNOSIS — I63.9 CEREBROVASCULAR ACCIDENT (CVA), UNSPECIFIED MECHANISM (HCC): ICD-10-CM

## 2024-07-15 PROCEDURE — 93228 REMOTE 30 DAY ECG REV/REPORT: CPT | Performed by: INTERNAL MEDICINE

## 2024-07-16 ENCOUNTER — OFFICE VISIT (OUTPATIENT)
Dept: PHYSICAL THERAPY | Age: 54
End: 2024-07-16
Payer: COMMERCIAL

## 2024-07-16 ENCOUNTER — TELEPHONE (OUTPATIENT)
Dept: RADIOLOGY | Facility: HOSPITAL | Age: 54
End: 2024-07-16

## 2024-07-16 DIAGNOSIS — M54.42 CHRONIC BILATERAL LOW BACK PAIN WITH BILATERAL SCIATICA: ICD-10-CM

## 2024-07-16 DIAGNOSIS — M48.062 SPINAL STENOSIS OF LUMBAR REGION WITH NEUROGENIC CLAUDICATION: Primary | ICD-10-CM

## 2024-07-16 DIAGNOSIS — M54.41 CHRONIC BILATERAL LOW BACK PAIN WITH BILATERAL SCIATICA: ICD-10-CM

## 2024-07-16 DIAGNOSIS — G89.29 CHRONIC BILATERAL LOW BACK PAIN WITH BILATERAL SCIATICA: ICD-10-CM

## 2024-07-16 PROCEDURE — 97113 AQUATIC THERAPY/EXERCISES: CPT | Performed by: PHYSICAL THERAPIST

## 2024-07-16 PROCEDURE — 97113 AQUATIC THERAPY/EXERCISES: CPT

## 2024-07-16 NOTE — PROGRESS NOTES
Daily Note     Today's date: 2024  Patient name: Donta Hunter  : 1970  MRN: [id number]  Referring provider: Malick Barnett MD  Dx:   Encounter Diagnosis     ICD-10-CM    1. Spinal stenosis of lumbar region with neurogenic claudication  M48.062       2. Chronic bilateral low back pain with bilateral sciatica  M54.42     M54.41     G89.29           Start Time: 1515  Stop Time: 1615  Total time in clinic (min): 60 minutes    Subjective: Reports 8/10 pain upon arrival       Objective: See treatment diary below      Assessment:  Cues for carryover of program and demonstrations for proper performance of TE. Monitoring of time to ensure proper dosage is completed. Guarded movement in water. Some challenge during UE core work due to balance. Emphasis on core engagement and posture t/o. Some relief post session with decreased pain to 6/10. Patient would benefit from continued PT      Plan: Continue per plan of care.      POC expires Unit limit Auth Expiration date PT/OT + Visit Limit?   24 na na 24                           Visit/Unit Tracking  AUTH Status:  Date 6/13 6/19 7/1 7/3 7/12 7/16         Auth after visit 24 Used 1 2 3 4 5 6          Remaining  23 22 21 20 19 18          FOTO-NO               Precautions:Lupus, CAD, COPD, HTN, Stroke , c/s fusion , hx blood clots, B drop feet, CKD  Daily Treatment Diary:    Precautions:  Daily Treatment Diary     Date 6/19 7/1 7/3 7/12 7/16      Patient education           Water Walk (FW, BW, side) x10’            LE work at wall               Hip FF/ext swing  2 2 2 2 2'        Toe/heel  1 1 1 1 1'        Hip ABD/ADD  2 2 2 2 2'        March 1 1 1 1 1'        Knee flexion & extension 1 1 1 1 1'        Squats 1 1 1 1 1'      UE noodle x3             Push/pull  1 1 1 1 1'        Rotate  1 1 1 1 1'        Row forward & back  2 2 sm N 2' sm N 2' 2'        OH side bend            UE/Core (AROM, paddles, MB)   green green green green        ABD/ADD   1 1 1 1'         Horizontal Pec Fly     1 1'        Alt. arm swing (shld flex/ext)   1 1 1 1'        Push pull   1 1 1 1'        Single paddle rotation           SLS (EO, EC, ball toss)            Aqua Step (FW, lat, eccentric)     F 10x ea       Core work:              MF press (red, blue, blk)             Kickboard press (blue, red)              Row/ext w/ tubing (red, blue, blk)           Seated on bench             ankle DF/PF              March              ABD/ADD              Knee flexion/extension            DW TX - hang in the deep water  5' 5' 10,5 10 5 5' 10' 5', 10'        DW ABD/ADD  1 1 1 1 2'        DW Bicycle  3 5 5 5 5'        DW Scissor hip flexion/extension   1 1 1 2'        DW DKTC   1 1 1 10x       Stretches              HS at step              Wall stretches              Calf stretch at wall              Other:            Hot Tub - 10 minutes only  no

## 2024-07-16 NOTE — PRE-PROCEDURE INSTRUCTIONS
Pre-procedure Instructions for Interventional Radiology  [address] [address]  INTERVENTIONAL RADIOLOGY [phone]    You are scheduled for a/an Cerebral Angiogram.    On Wednesday 7/24/24.    Your tentative arrival time is 8:15 AM.  [facility] will notify you the day before your procedure with the exact arrival time and the location to arrive.    To prepare for your procedure:  Please arrange for someone to drive you home after the procedure and stay with you until the next morning if you are instructed to do so.  This is typically for patients receiving some type of sedative or anesthetic for the procedure.  DO NOT EAT OR DRINK ANYTHING after midnight on the evening before your procedure including candy & gum.  ONLY SIPS OF WATER with your medications are allowed on the morning of your procedure.  TAKE ALL OF YOUR REGULAR MEDICATIONS THE MORNING OF YOUR PROCEDURE with sips of water!  We may call you to stop some of your blood sugar, blood pressure and blood thinning medications depending on the procedure.  Please take all of these medications unless we instruct you to stop them.  If you have an allergy to x-ray dye, please contact Interventional Radiology for an x-ray dye preparation which usually consists of an oral steroid and Benadryl.    The day of your procedure:  Bring a list of the medications you take at home.  Bring medications you take for breathing problems (such as inhalers), medications for chest pain, or both.  Bring a case for your glasses or contacts.  Bring your insurance card and a form of photo ID.  Please leave all valuables such as credit cards and jewelry at home.  Report to the admitting office to the left of the registration desk in the main lobby at the [facility], Entrance B.  You will then be directed to the Short Stay Center.  While your procedure is being performed, your family may wait in the Radiology Waiting Room on the 1st floor  in Radiology.  if they need to leave, they may provide a number to be called following the procedure.   Be prepared to stay overnight just in case. Sometimes procedures will indicate the need for further observation or treatment.   If you are scheduled for a follow-up visit with the Interventional Radiologist after your procedure, you will be called with a date and time.    Special Instructions (Medications to stop taking before your procedure etc.):  Eliquis last dose 7/21/24 and restart 7/25/24. Outpatient labs to be drawn tomorrow 7/17/24.

## 2024-07-17 ENCOUNTER — TELEPHONE (OUTPATIENT)
Dept: CARDIOLOGY CLINIC | Facility: CLINIC | Age: 54
End: 2024-07-17

## 2024-07-17 ENCOUNTER — OFFICE VISIT (OUTPATIENT)
Dept: PODIATRY | Facility: CLINIC | Age: 54
End: 2024-07-17
Payer: COMMERCIAL

## 2024-07-17 VITALS
HEIGHT: 66 IN | DIASTOLIC BLOOD PRESSURE: 79 MMHG | BODY MASS INDEX: 30.22 KG/M2 | SYSTOLIC BLOOD PRESSURE: 139 MMHG | HEART RATE: 79 BPM | WEIGHT: 188 LBS | OXYGEN SATURATION: 98 %

## 2024-07-17 DIAGNOSIS — M79.671 PAIN IN BOTH FEET: ICD-10-CM

## 2024-07-17 DIAGNOSIS — M77.41 METATARSALGIA OF BOTH FEET: Primary | ICD-10-CM

## 2024-07-17 DIAGNOSIS — M77.42 METATARSALGIA OF BOTH FEET: Primary | ICD-10-CM

## 2024-07-17 DIAGNOSIS — M79.672 PAIN IN BOTH FEET: ICD-10-CM

## 2024-07-17 PROCEDURE — 99213 OFFICE O/P EST LOW 20 MIN: CPT | Performed by: PODIATRIST

## 2024-07-17 RX ORDER — METHYLPREDNISOLONE 4 MG/1
TABLET ORAL
Qty: 21 TABLET | Refills: 0 | Status: SHIPPED | OUTPATIENT
Start: 2024-07-17

## 2024-07-17 NOTE — PROGRESS NOTES
Assessment/Plan:     The patient's clinical examination today significant for mild, diffuse tenderness palpation to the dorsal midfoot joints bilaterally.  There is no erythema nor edema no counter ecchymosis.  InterDigital spaces are clear without maceration.  Tinea to both feet has resolved since his last visit.  Pedal nail plates remain mycotic but are asymptomatic.    Prior x-rays of the patient's right foot and left foot were reviewed once again.  It does show mild arthritic changes across the midfoot and forefoot bilaterally.    On clinical examination today, symptoms are likely multifactorial.  I believe that some components of his discomfort can be related to metatarsalgia and mild DJD of the midfoot bilaterally.  His tenderness can be present when he is on or off his feet, though it is exacerbated typically with prolonged peers of weightbearing ambulation and especially after his PT sessions.  I believe he would benefit from pair of custom molded foot orthoses and he was referred to physical therapy for this.  He may also benefit from a short course of an anti-inflammatory.  A Medrol Dosepak was sent to his pharmacy for this purpose.    Recommend follow-up with me after he has had his orthotics for 4 to 6 weeks to assess their efficacy.     Diagnoses and all orders for this visit:    Metatarsalgia of both feet  -     Ambulatory referral to Physical Therapy; Future  -     methylPREDNISolone 4 MG tablet therapy pack; Use as directed on package    Pain in both feet  -     Ambulatory Referral to Podiatry  -     Ambulatory referral to Physical Therapy; Future  -     methylPREDNISolone 4 MG tablet therapy pack; Use as directed on package          Subjective:     Patient ID: Donta Hunter is a 54 y.o. male.    The patient presents today with a chief complaint of bilateral midfoot pain that has been present now for several weeks.  He notes that the tenderness is typically exacerbated after his physical therapy  sessions.  He does note that the tenderness can be present whether he is on or off his feet.  He does have a history of chronic low back pain and radiculopathy and is going to physical therapy for this.  He does have a stroke history and neuropathy as well for which she is currently on gabapentin.      PAST MEDICAL HISTORY:  Past Medical History:   Diagnosis Date    Anemia     Arthritis     Cervical mass     Chronic kidney disease     COPD, group B, by GOLD 2017 classification ([facility]) 7/13/2020    Coronary artery disease     Depression     DVT (deep venous thrombosis) ([facility])     Hypertension     Lupus ([facility])     Renal disorder     Stroke (cerebrum) ([facility]) 6/7/2024       PAST SURGICAL HISTORY:  Past Surgical History:   Procedure Laterality Date    APPENDECTOMY      CERVICAL SPINE SURGERY      CHOLECYSTECTOMY      COLONOSCOPY N/A 8/23/2018    Procedure: COLONOSCOPY;  Surgeon: James Wise III, MD;  Location: [facility];  Service: Gastroenterology    ESOPHAGOGASTRODUODENOSCOPY N/A 8/22/2018    Procedure: ESOPHAGOGASTRODUODENOSCOPY (EGD);  Surgeon: James Wise III, MD;  Location: [facility];  Service: Gastroenterology    IR IVC FILTER PLACEMENT PERMANENT  9/7/2017    IR IVC FILTER REMOVAL  4/23/2018    IR LUMBAR PUNCTURE  11/23/2015    IR LUMBAR PUNCTURE  4/25/2024    NECK SURGERY      US GUIDED INJECTION FOR RESEARCH STUDY  4/23/2018    US GUIDED INJECTION FOR RESEARCH STUDY  9/7/2017    VASCULAR SURGERY          ALLERGIES:  Doxycycline and Sulfa antibiotics    MEDICATIONS:  Current Outpatient Medications   Medication Sig Dispense Refill    apixaban (ELIQUIS) 5 mg Take 5 mg by mouth 2 (two) times a day      baclofen 20 mg tablet Take 1 tablet (20 mg total) by mouth 3 (three) times a day 90 tablet 1    betamethasone, augmented, (DIPROLENE) 0.05 % ointment Apply 1 application. topically 2 (two) times a day      clobetasol (TEMOVATE) 0.05 % cream       clopidogrel (Plavix) 75 mg tablet Take 1 tablet (75 mg total) by  mouth daily 30 tablet 3    Diclofenac Sodium (VOLTAREN) 1 % Apply 2 g topically 4 (four) times a day 300 g 1    escitalopram (LEXAPRO) 10 mg tablet Take 1 tablet (10 mg total) by mouth daily 90 tablet 3    famotidine (PEPCID) 20 mg tablet Take 1 tablet (20 mg total) by mouth 2 (two) times a day 180 tablet 3    gabapentin (NEURONTIN) 300 mg capsule Take 1 capsule (300 mg total) by mouth 3 (three) times a day 270 capsule 1    gabapentin (NEURONTIN) 600 MG tablet Take 1 tablet (600 mg total) by mouth 3 (three) times a day 270 tablet 1    hydroxychloroquine (PLAQUENIL) 200 mg tablet Take 2 tablets (400 mg total) by mouth daily at bedtime for 180 doses 180 tablet 1    ketoconazole (NIZORAL) 2 % cream Apply topically daily 60 g 1    lidocaine-prilocaine (EMLA) cream Apply topically as needed for mild pain 30 g 3    loratadine (CLARITIN) 10 mg tablet Take 1 tablet (10 mg total) by mouth daily 30 tablet 2    methylPREDNISolone 4 MG tablet therapy pack Use as directed on package 21 tablet 0    mycophenolate (CELLCEPT) 500 mg tablet Take 500 mg by mouth every 12 (twelve) hours       potassium chloride (Klor-Con M20) 20 mEq tablet Take 1 tablet (20 mEq total) by mouth daily 90 tablet 0    sildenafil (VIAGRA) 50 MG tablet Take 1 tablet (50 mg total) by mouth daily as needed for erectile dysfunction 10 tablet 0    sodium chloride 1 g tablet Take 2 tablets (2 g total) by mouth 3 (three) times a day with meals 540 tablet 2    tamsulosin (FLOMAX) 0.4 mg Take 1 capsule (0.4 mg total) by mouth daily with dinner 30 capsule 5    bacitracin topical ointment 500 units/g topical ointment APPLY TOPICALLY TO AFFECTED AREA THREE TIMES A DAY AS DIRECTED       No current facility-administered medications for this visit.       SOCIAL HISTORY:  Social History     Socioeconomic History    Marital status: /Civil Union     Spouse name: None    Number of children: None    Years of education: None    Highest education level: None    Occupational History    None   Tobacco Use    Smoking status: Never    Smokeless tobacco: Never   Vaping Use    Vaping status: Never Used   Substance and Sexual Activity    Alcohol use: Never    Drug use: Never    Sexual activity: Yes     Partners: Female, Male     Birth control/protection: Rhythm   Other Topics Concern    None   Social History Narrative    ** Merged History Encounter **          Social Determinants of Health     Financial Resource Strain: Not on file   Food Insecurity: No Food Insecurity (1/1/2023)    Hunger Vital Sign     Worried About Running Out of Food in the Last Year: Never true     Ran Out of Food in the Last Year: Never true   Transportation Needs: No Transportation Needs (1/1/2023)    PRAPARE - Transportation     Lack of Transportation (Medical): No     Lack of Transportation (Non-Medical): No   Physical Activity: Not on file   Stress: Not on file   Social Connections: Unknown (6/18/2024)    Received from [facility]     How often do you feel lonely or isolated from those around you? (Adult - for ages 18 years and over): Not on file   Intimate Partner Violence: Not on file   Housing Stability: Low Risk  (1/1/2023)    Housing Stability Vital Sign     Unable to Pay for Housing in the Last Year: No     Number of Places Lived in the Last Year: 1     Unstable Housing in the Last Year: No        Review of Systems   Constitutional: Negative.    HENT: Negative.     Eyes: Negative.    Respiratory: Negative.     Cardiovascular: Negative.    Endocrine: Negative.    Musculoskeletal: Negative.    Neurological: Negative.    Hematological: Negative.    Psychiatric/Behavioral: Negative.           Objective:     Physical Exam  Vitals reviewed.   Constitutional:       Appearance: Normal appearance.   HENT:      Head: Normocephalic and atraumatic.      Nose: Nose normal.   Eyes:      Conjunctiva/sclera: Conjunctivae normal.      Pupils: Pupils are equal, round, and reactive to light.    Cardiovascular:      Pulses:           Dorsalis pedis pulses are 1+ on the right side and 1+ on the left side.        Posterior tibial pulses are 1+ on the right side and 1+ on the left side.   Pulmonary:      Effort: Pulmonary effort is normal.   Feet:      Right foot:      Skin integrity: Skin integrity normal.      Left foot:      Skin integrity: Skin integrity normal.      Comments: The patient's clinical examination today significant for mild, diffuse tenderness palpation to the dorsal midfoot joints bilaterally.  There is no erythema nor edema no counter ecchymosis.  InterDigital spaces are clear without maceration.  Tinea to both feet has resolved since his last visit.  Pedal nail plates remain mycotic but are asymptomatic.  Skin:     General: Skin is warm.      Capillary Refill: Capillary refill takes less than 2 seconds.   Neurological:      General: No focal deficit present.      Mental Status: He is alert and oriented to person, place, and time.   Psychiatric:         Mood and Affect: Mood normal.         Behavior: Behavior normal.         Thought Content: Thought content normal.

## 2024-07-17 NOTE — TELEPHONE ENCOUNTER
----- Message from Dominic Schneider MD sent at 7/16/2024 10:05 AM EDT -----  24-day ZIO cardiac monitor 5/29/2024:   Patient monitored for 24d 0h 12m 22 events were transmitted. 2 patient triggered; 20 auto triggered PACs were not found during the monitoring period 11,422 PVCs with PVC burden of 1%     Please call the patient and tell him there are some extra heartbeats from the bottom of the heart but no atrial fibrillation.

## 2024-07-19 ENCOUNTER — HOSPITAL ENCOUNTER (OUTPATIENT)
Dept: RADIOLOGY | Age: 54
Discharge: HOME | End: 2024-07-19
Payer: COMMERCIAL

## 2024-07-19 ENCOUNTER — APPOINTMENT (OUTPATIENT)
Dept: LAB | Facility: CLINIC | Age: 54
End: 2024-07-19
Payer: COMMERCIAL

## 2024-07-19 DIAGNOSIS — I63.9 STROKE (CEREBRUM) (HCC): ICD-10-CM

## 2024-07-19 DIAGNOSIS — I10 PRIMARY HYPERTENSION: ICD-10-CM

## 2024-07-19 DIAGNOSIS — Z01.812 PRE-OPERATIVE LABORATORY EXAMINATION: ICD-10-CM

## 2024-07-19 DIAGNOSIS — M32.9 SLE (SYSTEMIC LUPUS ERYTHEMATOSUS RELATED SYNDROME) (HCC): ICD-10-CM

## 2024-07-19 LAB
ANION GAP SERPL CALCULATED.3IONS-SCNC: 8 MMOL/L (ref 4–13)
APTT PPP: 26 SECONDS (ref 23–37)
BACTERIA UR QL AUTO: ABNORMAL /HPF
BASOPHILS # BLD AUTO: 0.03 THOUSANDS/ÂΜL (ref 0–0.1)
BASOPHILS NFR BLD AUTO: 1 % (ref 0–1)
BILIRUB UR QL STRIP: NEGATIVE
BUN SERPL-MCNC: 16 MG/DL (ref 5–25)
CALCIUM SERPL-MCNC: 9.5 MG/DL (ref 8.4–10.2)
CHLORIDE SERPL-SCNC: 105 MMOL/L (ref 96–108)
CLARITY UR: CLEAR
CO2 SERPL-SCNC: 25 MMOL/L (ref 21–32)
COLOR UR: ABNORMAL
CREAT SERPL-MCNC: 1.13 MG/DL (ref 0.6–1.3)
CREAT UR-MCNC: 103.5 MG/DL
EOSINOPHIL # BLD AUTO: 0.07 THOUSAND/ÂΜL (ref 0–0.61)
EOSINOPHIL NFR BLD AUTO: 2 % (ref 0–6)
ERYTHROCYTE [DISTWIDTH] IN BLOOD BY AUTOMATED COUNT: 13.9 % (ref 11.6–15.1)
EST. AVERAGE GLUCOSE BLD GHB EST-MCNC: 108 MG/DL
GFR SERPL CREATININE-BSD FRML MDRD: 72 ML/MIN/1.73SQ M
GLUCOSE P FAST SERPL-MCNC: 86 MG/DL (ref 65–99)
GLUCOSE UR STRIP-MCNC: NEGATIVE MG/DL
HBA1C MFR BLD: 5.4 %
HCT VFR BLD AUTO: 43.4 % (ref 36.5–49.3)
HGB BLD-MCNC: 14.7 G/DL (ref 12–17)
HGB UR QL STRIP.AUTO: NEGATIVE
IMM GRANULOCYTES # BLD AUTO: 0.01 THOUSAND/UL (ref 0–0.2)
IMM GRANULOCYTES NFR BLD AUTO: 0 % (ref 0–2)
INR PPP: 1.12 (ref 0.84–1.19)
KETONES UR STRIP-MCNC: NEGATIVE MG/DL
LEUKOCYTE ESTERASE UR QL STRIP: NEGATIVE
LYMPHOCYTES # BLD AUTO: 1.27 THOUSANDS/ÂΜL (ref 0.6–4.47)
LYMPHOCYTES NFR BLD AUTO: 39 % (ref 14–44)
MCH RBC QN AUTO: 30.4 PG (ref 26.8–34.3)
MCHC RBC AUTO-ENTMCNC: 33.9 G/DL (ref 31.4–37.4)
MCV RBC AUTO: 90 FL (ref 82–98)
MICROALBUMIN UR-MCNC: 18.8 MG/L
MICROALBUMIN/CREAT 24H UR: 18 MG/G CREATININE (ref 0–30)
MONOCYTES # BLD AUTO: 0.46 THOUSAND/ÂΜL (ref 0.17–1.22)
MONOCYTES NFR BLD AUTO: 14 % (ref 4–12)
NEUTROPHILS # BLD AUTO: 1.39 THOUSANDS/ÂΜL (ref 1.85–7.62)
NEUTS SEG NFR BLD AUTO: 44 % (ref 43–75)
NITRITE UR QL STRIP: NEGATIVE
NON-SQ EPI CELLS URNS QL MICRO: ABNORMAL /HPF
NRBC BLD AUTO-RTO: 0 /100 WBCS
PH UR STRIP.AUTO: 6.5 [PH]
PLATELET # BLD AUTO: 176 THOUSANDS/UL (ref 149–390)
PMV BLD AUTO: 11.1 FL (ref 8.9–12.7)
POTASSIUM SERPL-SCNC: 4.8 MMOL/L (ref 3.5–5.3)
PROT UR STRIP-MCNC: ABNORMAL MG/DL
PROTHROMBIN TIME: 14.3 SECONDS (ref 11.6–14.5)
RBC # BLD AUTO: 4.84 MILLION/UL (ref 3.88–5.62)
RBC #/AREA URNS AUTO: ABNORMAL /HPF
SODIUM SERPL-SCNC: 138 MMOL/L (ref 135–147)
SP GR UR STRIP.AUTO: 1.02 (ref 1–1.03)
TSH SERPL DL<=0.05 MIU/L-ACNC: 2.42 UIU/ML (ref 0.45–4.5)
UROBILINOGEN UR STRIP-ACNC: <2 MG/DL
WBC # BLD AUTO: 3.23 THOUSAND/UL (ref 4.31–10.16)
WBC #/AREA URNS AUTO: ABNORMAL /HPF

## 2024-07-19 PROCEDURE — 82043 UR ALBUMIN QUANTITATIVE: CPT

## 2024-07-19 PROCEDURE — 85610 PROTHROMBIN TIME: CPT

## 2024-07-19 PROCEDURE — 83036 HEMOGLOBIN GLYCOSYLATED A1C: CPT

## 2024-07-19 PROCEDURE — 82570 ASSAY OF URINE CREATININE: CPT

## 2024-07-19 PROCEDURE — 36415 COLL VENOUS BLD VENIPUNCTURE: CPT

## 2024-07-19 PROCEDURE — 85025 COMPLETE CBC W/AUTO DIFF WBC: CPT

## 2024-07-19 PROCEDURE — 84443 ASSAY THYROID STIM HORMONE: CPT

## 2024-07-19 PROCEDURE — 81001 URINALYSIS AUTO W/SCOPE: CPT

## 2024-07-19 PROCEDURE — 70553 MRI BRAIN STEM W/O & W/DYE: CPT

## 2024-07-19 PROCEDURE — 80048 BASIC METABOLIC PNL TOTAL CA: CPT

## 2024-07-19 PROCEDURE — A9585 GADOBUTROL INJECTION: HCPCS | Performed by: RADIOLOGY

## 2024-07-19 PROCEDURE — 85730 THROMBOPLASTIN TIME PARTIAL: CPT

## 2024-07-19 RX ORDER — GADOBUTROL 604.72 MG/ML
9 INJECTION INTRAVENOUS
Status: COMPLETED | OUTPATIENT
Start: 2024-07-19 | End: 2024-07-19

## 2024-07-19 RX ADMIN — GADOBUTROL 9 ML: 604.72 INJECTION INTRAVENOUS at 12:22

## 2024-07-24 ENCOUNTER — HOSPITAL ENCOUNTER (OUTPATIENT)
Dept: RADIOLOGY | Facility: HOSPITAL | Age: 54
Discharge: HOME | End: 2024-07-24
Attending: RADIOLOGY
Payer: COMMERCIAL

## 2024-07-24 ENCOUNTER — ANESTHESIA (OUTPATIENT)
Dept: RADIOLOGY | Facility: HOSPITAL | Age: 54
End: 2024-07-24

## 2024-07-24 ENCOUNTER — ANESTHESIA EVENT (OUTPATIENT)
Dept: RADIOLOGY | Facility: HOSPITAL | Age: 54
End: 2024-07-24

## 2024-07-24 VITALS
TEMPERATURE: 97 F | WEIGHT: 180 LBS | DIASTOLIC BLOOD PRESSURE: 74 MMHG | RESPIRATION RATE: 17 BRPM | HEART RATE: 55 BPM | SYSTOLIC BLOOD PRESSURE: 109 MMHG | BODY MASS INDEX: 28.93 KG/M2 | HEIGHT: 66 IN | OXYGEN SATURATION: 99 %

## 2024-07-24 DIAGNOSIS — I63.9 STROKE (CEREBRUM) (HCC): ICD-10-CM

## 2024-07-24 DIAGNOSIS — M32.9 SLE (SYSTEMIC LUPUS ERYTHEMATOSUS RELATED SYNDROME) (HCC): ICD-10-CM

## 2024-07-24 PROCEDURE — C1769 GUIDE WIRE: HCPCS

## 2024-07-24 PROCEDURE — 36225 PLACE CATH SUBCLAVIAN ART: CPT

## 2024-07-24 PROCEDURE — 76937 US GUIDE VASCULAR ACCESS: CPT | Performed by: RADIOLOGY

## 2024-07-24 PROCEDURE — 76937 US GUIDE VASCULAR ACCESS: CPT

## 2024-07-24 PROCEDURE — 36223 PLACE CATH CAROTID/INOM ART: CPT | Performed by: RADIOLOGY

## 2024-07-24 PROCEDURE — C1894 INTRO/SHEATH, NON-LASER: HCPCS

## 2024-07-24 PROCEDURE — 36226 PLACE CATH VERTEBRAL ART: CPT | Performed by: RADIOLOGY

## 2024-07-24 PROCEDURE — 36223 PLACE CATH CAROTID/INOM ART: CPT

## 2024-07-24 PROCEDURE — 36226 PLACE CATH VERTEBRAL ART: CPT

## 2024-07-24 PROCEDURE — C1887 CATHETER, GUIDING: HCPCS

## 2024-07-24 RX ORDER — FENTANYL CITRATE 50 UG/ML
INJECTION, SOLUTION INTRAMUSCULAR; INTRAVENOUS AS NEEDED
Status: DISCONTINUED | OUTPATIENT
Start: 2024-07-24 | End: 2024-07-24

## 2024-07-24 RX ORDER — IODIXANOL 320 MG/ML
200 INJECTION, SOLUTION INTRAVASCULAR
Status: COMPLETED | OUTPATIENT
Start: 2024-07-24 | End: 2024-07-24

## 2024-07-24 RX ORDER — PROPOFOL 10 MG/ML
INJECTION, EMULSION INTRAVENOUS AS NEEDED
Status: DISCONTINUED | OUTPATIENT
Start: 2024-07-24 | End: 2024-07-24

## 2024-07-24 RX ORDER — SODIUM CHLORIDE 9 MG/ML
INJECTION, SOLUTION INTRAVENOUS CONTINUOUS PRN
Status: DISCONTINUED | OUTPATIENT
Start: 2024-07-24 | End: 2024-07-24

## 2024-07-24 RX ORDER — SODIUM CHLORIDE 9 MG/ML
75 INJECTION, SOLUTION INTRAVENOUS CONTINUOUS
Status: DISCONTINUED | OUTPATIENT
Start: 2024-07-24 | End: 2024-07-25 | Stop reason: HOSPADM

## 2024-07-24 RX ORDER — LIDOCAINE HYDROCHLORIDE 10 MG/ML
INJECTION, SOLUTION EPIDURAL; INFILTRATION; INTRACAUDAL; PERINEURAL AS NEEDED
Status: DISCONTINUED | OUTPATIENT
Start: 2024-07-24 | End: 2024-07-24

## 2024-07-24 RX ADMIN — LIDOCAINE HYDROCHLORIDE 50 MG: 10 INJECTION, SOLUTION EPIDURAL; INFILTRATION; INTRACAUDAL; PERINEURAL at 10:02

## 2024-07-24 RX ADMIN — FENTANYL CITRATE 50 MCG: 50 INJECTION INTRAMUSCULAR; INTRAVENOUS at 10:02

## 2024-07-24 RX ADMIN — PROPOFOL 80 MG: 10 INJECTION, EMULSION INTRAVENOUS at 10:02

## 2024-07-24 RX ADMIN — SODIUM CHLORIDE 75 ML/HR: 0.9 INJECTION, SOLUTION INTRAVENOUS at 08:15

## 2024-07-24 RX ADMIN — IODIXANOL 75 ML: 320 INJECTION, SOLUTION INTRAVASCULAR at 11:12

## 2024-07-24 RX ADMIN — SODIUM CHLORIDE: 0.9 INJECTION, SOLUTION INTRAVENOUS at 09:55

## 2024-07-24 RX ADMIN — PROPOFOL 20 MG: 10 INJECTION, EMULSION INTRAVENOUS at 10:28

## 2024-07-24 RX ADMIN — FENTANYL CITRATE 25 MCG: 50 INJECTION INTRAMUSCULAR; INTRAVENOUS at 10:25

## 2024-07-24 RX ADMIN — PROPOFOL 50 MG: 10 INJECTION, EMULSION INTRAVENOUS at 10:25

## 2024-07-24 RX ADMIN — FENTANYL CITRATE 25 MCG: 50 INJECTION INTRAMUSCULAR; INTRAVENOUS at 10:48

## 2024-07-24 NOTE — ANESTHESIA PREPROCEDURE EVALUATION
Procedure:  IR CEREBRAL ANGIOGRAPHY  Concern for vasculitis  Eliquis last dose 7/21  Plavix last dose 7/21  Not currently on steroids  NPO appropriate     used ID [id number]    1.  Resting EKG is normal and there is no significant change with Lexiscan  2.  No chest discomfort with Lexiscan  3.  There is a small mild intensity mixed apical defect most likely related to apical cleft  4.  Normal wall motion calculated ejection fraction of 59%.    24-day ZIO cardiac monitor 5/29/2024:  Patient monitored for 24d 0h 12m 22 events were transmitted. 2 patient triggered; 20 auto triggered PACs were not found during the monitoring period 11,422 PVCs with PVC burden of 1%  Relevant Problems   CARDIO   (+) CAD (coronary artery disease)   (+) DVT (deep venous thrombosis) ([facility])   (+) Hypertension      ENDO   (+) Hyperparathyroidism ([facility])      GI/HEPATIC   (+) Gastroesophageal reflux disease without esophagitis      /RENAL   (+) Chronic kidney disease, stage 3a ([facility])   (+) Hypertensive kidney disease with stage 3a chronic kidney disease ([facility])   (+) Lupus nephritis ([facility])   (+) Stage 2 chronic kidney disease      HEMATOLOGY   (+) Anemia in chronic kidney disease      MUSCULOSKELETAL   (+) DJD (degenerative joint disease), ankle and foot, right      NEURO/PSYCH   (+) Stroke (cerebrum) ([facility])      PULMONARY   (+) COPD, group B, by GOLD 2017 classification ([facility])        Physical Exam    Airway    Mallampati score: II  TM Distance: >3 FB  Neck ROM: full     Dental   No notable dental hx     Cardiovascular  Cardiovascular exam normal    Pulmonary  Pulmonary exam normal     Other Findings        Anesthesia Plan  ASA Score- 3     Anesthesia Type- IV sedation with anesthesia with ASA Monitors.         Additional Monitors:     Airway Plan:            Plan Factors-    Chart reviewed.   Existing labs reviewed. Patient summary reviewed.    Patient is not a current smoker.              Induction- intravenous.    Postoperative  Plan-         Informed Consent- Anesthetic plan and risks discussed with patient.  I personally reviewed this patient with the CRNA. Discussed and agreed on the Anesthesia Plan with the CRNA..

## 2024-07-24 NOTE — DISCHARGE INSTRUCTIONS
Today, you underwent a diagnostic cerebral angiogram under the care of Dr. Johnson for evaluation of SLE.  ?  The following instructions will help you care for yourself, or be cared for upon your return home today. These are guidelines for your care right after your surgery only.   ?  Notify Your Doctor or Nurse if you have any of the following:  ?  SYMPTOMS OF WOUND INFECTION--   Increased pain in or around the incision   Swelling around the incision  Any drainage from the incision  Incision separates or opens up  Warmth in the tissues around the incision  Redness or tenderness on the skin near the incision   Fever (temperature greater than 101 degrees F)   ?  NEUROLOGICAL CHANGES--  Change in alertness  Increased sleepiness   Nausea and vomiting   New onset of numbness or weakness in arms or legs   New problems with your bowels or bladder  New or worse problems with balance or walking  Seizures, new or worsening  ?  UNRELIEVED HEADACHE PAIN--  New or increased pain unrelieved with pain medications   Pain associated with nausea and vomiting   Pain associated with other symptoms  ?  QUESTIONS OR PROBLEMS--  Any questions or problems that you are unsure about  Wound Care:  Keep Incision Clean and Dry   You may shower daily, but do not soak incision. Pat dry after showering.   No tub baths, soaking, swimming for 1 week after angiogram.   You do not need to cover the incision. Mild to moderate bruising and tenderness to the site is expected and may last up to 1-2 weeks after your procedure.   ?  A closure device was placed at the catheter insertion site. This is MRI compatible. Remove the dressing 24 hours after your procedure.   If your groin site is bleeding, apply firm pressure for 10 minutes. Reinforce dressing rather than removing and checking frequently. If continues to bleed through the dressing after 1 hour, contact your neurosurgeon's office.   Anticipatory Education:  ?  PAIN MED W/ Acetaminophen  (Tylenol)  --IF a prescription for pain medicine has been sent home with you:  --Narcotic pain medication may cause constipation. Be sure to take stool softeners or laxatives while you are on narcotic pain medication.   --Do not drive after taking prescription pain medicine.   ?  If this medicine is too strong, or no longer necessary, or we did NOT recommend/prescribe oral narcotics, you may take:   - Tylenol Extra-strength/Acetaminophen, 2 tablets every 4-6 hours as needed for mild pain. DO NOT TAKE MORE THAN 4000MG PER DAY from combined sources. NOTE: Remember to eat when taking pain medicines in order to avoid nausea. Watch for constipation. Eat plenty of fruits, vegetables, juices, and drink 6-8 glasses of water each day.   Constipation: Stay active and drink at least 6-8 cups of fluid each day to prevent constipation. If you need a laxative or stool softener follow the package directions or consult with your local pharmacists if you have questions.  ?  After anesthesia, rest for 24 hours. Do not drive, drink alcohol beverages or make any important decisions during this time. General anesthesia may cause sore throat, jaw discomfort or muscle aches. These symptoms can last for one or two days.  Activity: Please follow these instructions:  Advance your activity as you can tolerate. You may do light house work; nothing strenuous   You may walk all you want. You may go up and down the steps. Use the railing for support  Do not do excessive bending, straining or heavy lifting for 48 hours after your procedure  Do not drive or return to work until you are instructed   It is normal for your energy level and sleep patterns to change after surgery.   Get extra sleep at night and take naps during the day to help you feel less tired.   Take rest periods during the day.   Complete recovery may take several weeks.  ?  You may resume driving after 24-48 hours recovery.   You may return to work after 48 hours of recovery.    ?  Diet:  Your doctor has recommended that you follow these diet instructions at home. Refer to the patient education materials you received during your hospital stay. If you would like more nutrition counseling, ask your doctor about making an appointment with an outpatient dietitian.  Resume your home diet  ?  Medications:  Please resume your home medications as instructed.   ?  Home Supplies and Equipment:  none  Additional Contacts:  ?  CONTACTS FOR NEUROSURGERY: You may call your neurosurgeon’s office if you have questions between 8:30 am and 4:30 pm. You may request to speak to the nurse practitioner who is available Monday through Friday.   ?  For off hours or the weekend you may call your neurosurgeon's office to leave a message.

## 2024-07-25 DIAGNOSIS — R25.2 SPASTICITY: ICD-10-CM

## 2024-07-25 RX ORDER — BACLOFEN 20 MG/1
20 TABLET ORAL 2 TIMES DAILY
Qty: 90 TABLET | Refills: 0 | Status: SHIPPED | OUTPATIENT
Start: 2024-07-25

## 2024-07-25 NOTE — TELEPHONE ENCOUNTER
Reason for call:   [x] Refill   [] Prior Auth  [] Other:     Office:   [x] PCP/Provider -   [] Specialty/Provider -     Medication: Baclofen    Dose/Frequency: 20    Quantity: 90    Pharmacy:   [facility] [place] - [address]  [address] [place] [address]  Phone: [phone]  Fax: [phone]     Does the patient have enough for 3 days?   [x] Yes   [] No - Send as HP to POD

## 2024-07-30 NOTE — PROGRESS NOTES
[facility] ORTHOPEDIC SPINE SURGERY  DR.AMIR ADAN MD  [address] [facility] [address] [phone]    HISTORY OF PRESENT ILLNESS:    Donta Hunter is a 54 y.o. male presents for follow up of low back pain, lumbar stenosis, ambulatory dysfunction. The patient was last seen in the office on 5/06/2024 where patient was advised to continue with aqua therapy. Patient reports aqua therapy was good but he continues to have pain.       ALLERGIES:   Allergies   Allergen Reactions    Doxycycline Rash    Sulfa Antibiotics Rash       MEDICATIONS:    Current Outpatient Medications:     apixaban (ELIQUIS) 5 mg, Take 5 mg by mouth 2 (two) times a day, Disp: , Rfl:     bacitracin topical ointment 500 units/g topical ointment, APPLY TOPICALLY TO AFFECTED AREA THREE TIMES A DAY AS DIRECTED, Disp: , Rfl:     baclofen 20 mg tablet, Take 1 tablet (20 mg total) by mouth 2 (two) times a day, Disp: 90 tablet, Rfl: 0    betamethasone, augmented, (DIPROLENE) 0.05 % ointment, Apply 1 application. topically 2 (two) times a day, Disp: , Rfl:     clobetasol (TEMOVATE) 0.05 % cream, , Disp: , Rfl:     clopidogrel (Plavix) 75 mg tablet, Take 1 tablet (75 mg total) by mouth daily, Disp: 30 tablet, Rfl: 3    Diclofenac Sodium (VOLTAREN) 1 %, Apply 2 g topically 4 (four) times a day, Disp: 300 g, Rfl: 1    escitalopram (LEXAPRO) 10 mg tablet, Take 1 tablet (10 mg total) by mouth daily, Disp: 90 tablet, Rfl: 3    famotidine (PEPCID) 20 mg tablet, Take 1 tablet (20 mg total) by mouth 2 (two) times a day, Disp: 180 tablet, Rfl: 3    furosemide (LASIX) 40 mg tablet, , Disp: , Rfl:     gabapentin (NEURONTIN) 300 mg capsule, Take 1 capsule (300 mg total) by mouth 3 (three) times a day, Disp: 270 capsule, Rfl: 1    gabapentin (NEURONTIN) 600 MG tablet, Take 1 tablet (600 mg total) by mouth 3 (three) times a day, Disp: 270 tablet, Rfl: 1    hydroxychloroquine (PLAQUENIL) 200 mg tablet, Take 2 tablets (400 mg total) by mouth daily at bedtime for  180 doses, Disp: 180 tablet, Rfl: 1    ketoconazole (NIZORAL) 2 % cream, Apply topically daily, Disp: 60 g, Rfl: 1    lidocaine-prilocaine (EMLA) cream, Apply topically as needed for mild pain, Disp: 30 g, Rfl: 3    loratadine (CLARITIN) 10 mg tablet, Take 1 tablet (10 mg total) by mouth daily, Disp: 30 tablet, Rfl: 2    methylPREDNISolone 4 MG tablet therapy pack, Use as directed on package, Disp: 21 tablet, Rfl: 0    mycophenolate (CELLCEPT) 500 mg tablet, Take 500 mg by mouth every 12 (twelve) hours , Disp: , Rfl:     potassium chloride (Klor-Con M20) 20 mEq tablet, Take 1 tablet (20 mEq total) by mouth daily, Disp: 90 tablet, Rfl: 0    sildenafil (VIAGRA) 50 MG tablet, Take 1 tablet (50 mg total) by mouth daily as needed for erectile dysfunction, Disp: 10 tablet, Rfl: 0    sodium chloride 1 g tablet, Take 2 tablets (2 g total) by mouth 3 (three) times a day with meals, Disp: 540 tablet, Rfl: 2    tamsulosin (FLOMAX) 0.4 mg, Take 1 capsule (0.4 mg total) by mouth daily with dinner, Disp: 30 capsule, Rfl: 5     PAST MEDICAL HISTORY:   Past Medical History:   Diagnosis Date    Anemia     Arthritis     Cervical mass     Chronic kidney disease     COPD, group B, by GOLD 2017 classification ([facility]) 7/13/2020    Coronary artery disease     Depression     DVT (deep venous thrombosis) ([facility])     Hypertension     Lupus ([facility])     Renal disorder     Stroke (cerebrum) ([facility]) 6/7/2024       PAST SURGICAL HISTORY:  Past Surgical History:   Procedure Laterality Date    APPENDECTOMY      CERVICAL SPINE SURGERY      CHOLECYSTECTOMY      COLONOSCOPY N/A 8/23/2018    Procedure: COLONOSCOPY;  Surgeon: James Wise III, MD;  Location: [facility];  Service: Gastroenterology    ESOPHAGOGASTRODUODENOSCOPY N/A 8/22/2018    Procedure: ESOPHAGOGASTRODUODENOSCOPY (EGD);  Surgeon: James Wise III, MD;  Location: [facility];  Service: Gastroenterology    IR CEREBRAL ANGIOGRAPHY  7/24/2024    IR IVC FILTER PLACEMENT PERMANENT  9/7/2017     IR IVC FILTER REMOVAL  4/23/2018    IR LUMBAR PUNCTURE  11/23/2015    IR LUMBAR PUNCTURE  4/25/2024    NECK SURGERY      US GUIDED INJECTION FOR RESEARCH STUDY  4/23/2018    US GUIDED INJECTION FOR RESEARCH STUDY  9/7/2017    VASCULAR SURGERY         SOCIAL HISTORY:  Social History     Tobacco Use    Smoking status: Never    Smokeless tobacco: Never   Vaping Use    Vaping status: Never Used   Substance Use Topics    Alcohol use: Never    Drug use: Never         PHYSICAL EXAM:  54 y.o. male sits comfortably on exam chair in no apparent distress   To go time standing up.  Uses a walker for ambulation.  Ambulates with slow gait, leaning forward with use of walker.  He drags both feet while taking steps.  Able to go up on toes and heels  Straight leg raise negative bilaterally      RADIOGRAPHIC STUDIES:  X-ray, L-spine, 3/18/2024: Mild multilevel lumbar degenerative disc except at L4-5 where there is evidence of moderate degenerative changes, degenerative spondylolisthesis and mild kyphosis.    MRI, L-spine, 2/22/2024: Mild multilevel lumbar degenerative disease most prominent at L4-5.  There is also grade 1 degenerative spondylolisthesis at L4-5 with mild to moderate lateral recess stenosis.  No evidence of central stenosis.  Mild to moderate lateral recess stenosis present at L3-4.    MRI, brain, 2/22/2024:stable chronic cerebellar lacunar infarcts.    Cervical MRI of 3/18/2024: Prior fusion at C4-5 and C5-6.  The fusion is stable.  DDD at C3-4 and C6-7.  No spinal cord compression or myomalacia.  Mild flattening of the spinal cord at C3-4.    Thoracic MRI of 3/18/2024:  No evidence of cord compression.  Canal is wide open.  No significant deformity.        ASSESSMENT:  Problem List Items Addressed This Visit    None  Visit Diagnoses       Spinal stenosis of lumbar region with neurogenic claudication    -  Primary    Relevant Orders    Ambulatory referral to Spine & Pain Management    Chronic bilateral low back pain  with bilateral sciatica        Relevant Orders    Ambulatory referral to Spine & Pain Management            PLAN:  Donta Hunter is a 54 y.o. male with low back pain, lumbar stenosis, ambulatory dysfunction.     Patient has tried around six sessions of aqua therapy at this time and remains symptomatic at this time. Patient is advised to continue with aqua therapy at this time. We will also refer patient to Dr. Salinas to discuss possible injections.  If the physical therapy and injections do not help, surgery can be discussed with respect to back pain.  He does have significant amatory dysfunction due to prior stroke.  Surgery would be primary to improve his back and lower extremity symptoms.    I did explain to the patient that surgery is an elective procedure.  At this time is not a surgical candidate and he does need to exhaust conservative measures prior to pursuing surgery.  The decision to pursue surgery rest with the patient.    Patient will follow-up in 6 weeks for re-evaluation.      Scribe Attestation      I,:  Geraldine Lewis PA-C am acting as a scribe while in the presence of the attending physician.:       I,:  Malick Barnett MD personally performed the services described in this documentation    as scribed in my presence.:

## 2024-07-31 ENCOUNTER — TELEPHONE (OUTPATIENT)
Dept: NEUROSURGERY | Facility: CLINIC | Age: 54
End: 2024-07-31

## 2024-07-31 ENCOUNTER — OFFICE VISIT (OUTPATIENT)
Dept: OBGYN CLINIC | Facility: CLINIC | Age: 54
End: 2024-07-31
Payer: COMMERCIAL

## 2024-07-31 ENCOUNTER — OFFICE VISIT (OUTPATIENT)
Dept: PHYSICAL THERAPY | Age: 54
End: 2024-07-31
Payer: COMMERCIAL

## 2024-07-31 VITALS
BODY MASS INDEX: 29.05 KG/M2 | HEIGHT: 66 IN | DIASTOLIC BLOOD PRESSURE: 80 MMHG | SYSTOLIC BLOOD PRESSURE: 130 MMHG | HEART RATE: 80 BPM

## 2024-07-31 DIAGNOSIS — M54.41 CHRONIC BILATERAL LOW BACK PAIN WITH BILATERAL SCIATICA: ICD-10-CM

## 2024-07-31 DIAGNOSIS — M54.42 CHRONIC BILATERAL LOW BACK PAIN WITH BILATERAL SCIATICA: ICD-10-CM

## 2024-07-31 DIAGNOSIS — G89.29 CHRONIC BILATERAL LOW BACK PAIN WITH BILATERAL SCIATICA: ICD-10-CM

## 2024-07-31 DIAGNOSIS — M48.062 SPINAL STENOSIS OF LUMBAR REGION WITH NEUROGENIC CLAUDICATION: Primary | ICD-10-CM

## 2024-07-31 PROCEDURE — 99213 OFFICE O/P EST LOW 20 MIN: CPT | Performed by: ORTHOPAEDIC SURGERY

## 2024-07-31 PROCEDURE — 97113 AQUATIC THERAPY/EXERCISES: CPT

## 2024-07-31 NOTE — PROGRESS NOTES
Daily Note     Today's date: 2024  Patient name: Donta Hunter  : 1970  MRN: [id number]  Referring provider: Malick Barnett MD  Dx:   Encounter Diagnosis     ICD-10-CM    1. Spinal stenosis of lumbar region with neurogenic claudication  M48.062       2. Chronic bilateral low back pain with bilateral sciatica  M54.42     M54.41     G89.29           Start Time: 1500  Stop Time: 1600  Total time in clinic (min): 60 minutes    Subjective: pt notes LBP continues today. Some relief while in the water.       Objective: See treatment diary below      Assessment: Tolerated treatment fair. Added 4# MB MF press outs today to work on back strengthening. Also added resistive bands today for posture rows and core strengthening. Pt has poor balance in the water w/ difficulty w/ walking in the water needs to hold onto the wall. Pt needed me to keep my hands on him for balance to do paddle ex's today due to poor balance and core activation. Difficulty w/ stairs to due weakness and drop foot in B feet. Patient would benefit from continued PT      Plan: Continue per plan of care.      POC expires Unit limit Auth Expiration date PT/OT + Visit Limit?   24 na na 24                           Visit/Unit Tracking  AUTH Status:  Date 6/13 6/19 7/1 7/3 7/12 7/16 7/31        Auth after visit 24 Used 1 2 3 4 5 6 7         Remaining  23 22 21 20 19 18 17         FOTO-NO               Precautions:Lupus, CAD, COPD, HTN, Stroke , c/s fusion , hx blood clots, B drop feet, CKD  Daily Treatment Diary:    Precautions:  Daily Treatment Diary     Date 6/19 7/1 7/3 7/12 7/16 7/31     Patient education           Water Walk (FW, BW, side) x10’            LE work at wall               Hip FF/ext swing  2 2 2 2 2' 2       Toe/heel  1 1 1 1        Hip ABD/ADD  2 2 2 2 2' 2       March 1 1 1 1 '        Knee flexion & extension 1 1 1 1 ' 1       Squats 1 1 1 1 '      UE noodle x3             Push/pull  1 1 1 1 '        Rotate  1  1 1 1 1' 1       Row forward & back  2 2 sm N 2' sm N 2' 2' 2       OH side bend            UE/Core (AROM, paddles, MB)   green green green green black       ABD/ADD   1 1 1 1' 1       Horizontal Pec Fly     1 1' 1       Alt. arm swing (shld flex/ext)   1 1 1 1' 1       Push pull   1 1 1 1' 1       Single paddle rotation           SLS (EO, EC, ball toss)            Aqua Step (FW, lat, eccentric)     F 10x ea       Core work:              MF press (red, blue, blk)      4#mb x10 3''       Kickboard press (blue, red)              Row/ext w/ tubing (red, blue, blk)      X10ea Y     Seated on bench             ankle DF/PF              March              ABD/ADD              Knee flexion/extension            DW TX - hang in the deep water  5' 5' 10,5 10 5 5' 10' 5', 10' 5,10       DW ABD/ADD  1 1 1 1 2' 2       DW Bicycle  3 5 5 5 5' 5       DW Scissor hip flexion/extension   1 1 1 2' 2       DW DKTC   1 1 1 10x  x10     Stretches              HS at step              Wall stretches              Calf stretch at wall              Other:            Hot Tub - 10 minutes only  no

## 2024-07-31 NOTE — TELEPHONE ENCOUNTER
1st attempt - called Donta Hunter on primary number s/p angio performed 7/24/2024. There was no answer left message to call back direct line. Will make second attempt if no callback is received.

## 2024-08-05 NOTE — TELEPHONE ENCOUNTER
Completed post angio callback to Donta Hunter at primary contact number. The patient denies any pain, swelling, drainage, fevers at the puncture site. Reports mild headaches and dizziness initially but this has subsided. Advised that it is normal to experience fatigue and mild headaches for a few days after the procedure. Explained that he should contact the office if he experiences any pain, swelling, or drainage from the site, and report to the ER or call 911 if he experiences WHOL, visual disturbance, of confusion/disorientation, slurred speech, ambulatory dysfunction. Reminded of post procedure follow-up scheduled on 8/9/2024. Patient appreciative of call.

## 2024-08-09 NOTE — TELEPHONE ENCOUNTER
Pt called today to r/s his f/u with Dr. Johnson @ 12:00 due to the weather.     Can you please assist with the r/s as there are no open appts available.     Thank you

## 2024-08-15 DIAGNOSIS — R25.2 SPASTICITY: ICD-10-CM

## 2024-08-15 RX ORDER — BACLOFEN 20 MG/1
20 TABLET ORAL 2 TIMES DAILY
Qty: 90 TABLET | Refills: 0 | Status: SHIPPED | OUTPATIENT
Start: 2024-08-15

## 2024-08-15 NOTE — TELEPHONE ENCOUNTER
Reason for call:   [x] Refill   [] Prior Auth  [] Other:     Office:   [x] PCP/Provider - Raad Batres MD   [] Specialty/Provider -     Medication: baclofen 20 mg tablet / Take 1 tablet (20 mg total) by mouth 2 (two) times a day    Pharmacy: [facility] [place] - [address]     Does the patient have enough for 3 days?   [x] Yes   [] No - Send as HP to POD

## 2024-08-16 ENCOUNTER — OFFICE VISIT (OUTPATIENT)
Dept: NEUROSURGERY | Facility: CLINIC | Age: 54
End: 2024-08-16
Payer: COMMERCIAL

## 2024-08-16 VITALS
RESPIRATION RATE: 19 BRPM | DIASTOLIC BLOOD PRESSURE: 60 MMHG | HEIGHT: 66 IN | HEART RATE: 64 BPM | SYSTOLIC BLOOD PRESSURE: 112 MMHG | OXYGEN SATURATION: 98 % | BODY MASS INDEX: 29.05 KG/M2 | TEMPERATURE: 98.3 F

## 2024-08-16 DIAGNOSIS — I63.9 STROKE (CEREBRUM) (HCC): Primary | ICD-10-CM

## 2024-08-16 DIAGNOSIS — M32.9 SLE (SYSTEMIC LUPUS ERYTHEMATOSUS RELATED SYNDROME) (HCC): ICD-10-CM

## 2024-08-16 PROCEDURE — 99212 OFFICE O/P EST SF 10 MIN: CPT | Performed by: RADIOLOGY

## 2024-08-16 NOTE — PROGRESS NOTES
Assessment/Plan:     Diagnoses and all orders for this visit:    Stroke (cerebrum) ([facility])    SLE (systemic lupus erythematosus related syndrome) ([facility])        Discussion Summary:   Donta is doing relatively well following angiography however does continue to suffer from baseline complaints of headaches and weakness.  Angiography revealed normal intracranial vasculature.  I have a very low suspicion for CNS vasculitis.  However ultimately I will defer further workup and management to his neurologist.  Otherwise he will follow-up as needed with our office.    Chief Complaint: Follow-up      Patient ID: Donta Hunter is a 54 y.o. male    HPI    Mr. Hunter is approximately 3 weeks status post cerebral angiography via a right radial approach. He denies any new hand or wrist pain or numbness. No puncture site bruising, swelling or drainage. No fevers. No vision changes, arm or leg weakness or numbness. No new stroke-like changes.      Review of Systems   Constitutional:  Positive for fatigue (occ).   HENT: Negative.     Respiratory: Negative.     Cardiovascular: Negative.    Gastrointestinal: Negative.    Endocrine: Positive for cold intolerance (feels cold sensation on head at times  left side of head).   Genitourinary: Negative.    Musculoskeletal:  Positive for back pain (lower back pain) and gait problem (uses walker legs weak).   Skin: Negative.    Allergic/Immunologic: Negative.    Neurological:  Positive for dizziness (occ), speech difficulty (slow to respond), weakness (legs), numbness (legs feet toes) and headaches (occ). Negative for seizures and light-headedness.   Hematological:  Bruises/bleeds easily (medication).   Psychiatric/Behavioral:  Negative for confusion and sleep disturbance.        I have personally reviewed the MA's review of systems and made changes as necessary.    The following portions of the patient's history were reviewed and updated as appropriate: allergies, current medications, past family  history, past medical history, past social history, past surgical history, and problem list.      Active Ambulatory Problems     Diagnosis Date Noted    SLE (systemic lupus erythematosus related syndrome) ([facility]) 07/29/2018    Lupus nephritis ([facility]) 07/29/2018    Hypertension 07/29/2018    History of DVT (deep vein thrombosis) 07/29/2018    Stage 2 chronic kidney disease 07/29/2018    Anemia in chronic kidney disease 08/21/2018    Muscle weakness (generalized) 11/04/2019    Tremor of both hands 02/27/2020    Spasticity 02/27/2020    Gait difficulty 02/27/2020    Neuropathic pain 02/27/2020    Hypertensive kidney disease with stage 3a chronic kidney disease ([facility]) 02/14/2023    DJD (degenerative joint disease), ankle and foot, right 03/09/2023    Chronic kidney disease, stage 3a ([facility]) 08/21/2018    CAD (coronary artery disease) 03/21/2024    DVT (deep venous thrombosis) ([facility]) 03/21/2024    History of multiple strokes 04/02/2024    Gastroesophageal reflux disease without esophagitis 04/13/2024    COPD, group B, by GOLD 2017 classification ([facility]) 07/13/2020    Hyperparathyroidism ([facility]) 10/03/2019    Stroke (cerebrum) ([facility]) 06/07/2024     Resolved Ambulatory Problems     Diagnosis Date Noted    Hyperkalemia 07/29/2018    Cellulitis and abscess of left lower extremity 07/29/2018    MARK (acute kidney injury) ([facility]) 07/29/2018    Persistent proteinuria 07/29/2018    Acute-on-chronic kidney injury  ([facility]) 08/20/2018    Melena 08/21/2018    Acute bronchitis 05/08/2019    Chest pain 07/18/2019    Lower back pain 11/04/2019    Cellulitis of left lower extremity 03/24/2022    SIRS (systemic inflammatory response syndrome) ([facility]) 03/24/2022    Cellulitis of right lower extremity 12/29/2022    Hyponatremia 01/04/2023    Right foot pain 03/09/2023    Cellulitis 10/20/2023    Palpitations 04/12/2024    Back pain 05/10/2024     Past Medical History:   Diagnosis Date    Anemia     Arthritis     Cervical mass     Chronic kidney disease      Coronary artery disease     Depression     Lupus (HCC)     Renal disorder        Past Surgical History:   Procedure Laterality Date    APPENDECTOMY      CERVICAL SPINE SURGERY      CHOLECYSTECTOMY      COLONOSCOPY N/A 8/23/2018    Procedure: COLONOSCOPY;  Surgeon: James Wise III, MD;  Location: [facility];  Service: Gastroenterology    ESOPHAGOGASTRODUODENOSCOPY N/A 8/22/2018    Procedure: ESOPHAGOGASTRODUODENOSCOPY (EGD);  Surgeon: James Wise III, MD;  Location: [facility];  Service: Gastroenterology    IR CEREBRAL ANGIOGRAPHY  7/24/2024    IR IVC FILTER PLACEMENT PERMANENT  9/7/2017    IR IVC FILTER REMOVAL  4/23/2018    IR LUMBAR PUNCTURE  11/23/2015    IR LUMBAR PUNCTURE  4/25/2024    NECK SURGERY      US GUIDED INJECTION FOR RESEARCH STUDY  4/23/2018    US GUIDED INJECTION FOR RESEARCH STUDY  9/7/2017    VASCULAR SURGERY         Current Outpatient Medications   Medication Sig Dispense Refill    apixaban (ELIQUIS) 5 mg Take 5 mg by mouth 2 (two) times a day      bacitracin topical ointment 500 units/g topical ointment APPLY TOPICALLY TO AFFECTED AREA THREE TIMES A DAY AS DIRECTED      baclofen 20 mg tablet Take 1 tablet (20 mg total) by mouth 2 (two) times a day 90 tablet 0    betamethasone, augmented, (DIPROLENE) 0.05 % ointment Apply 1 application. topically 2 (two) times a day      clobetasol (TEMOVATE) 0.05 % cream       Diclofenac Sodium (VOLTAREN) 1 % Apply 2 g topically 4 (four) times a day 300 g 1    escitalopram (LEXAPRO) 10 mg tablet Take 1 tablet (10 mg total) by mouth daily 90 tablet 3    famotidine (PEPCID) 20 mg tablet Take 1 tablet (20 mg total) by mouth 2 (two) times a day 180 tablet 3    furosemide (LASIX) 40 mg tablet       gabapentin (NEURONTIN) 300 mg capsule Take 1 capsule (300 mg total) by mouth 3 (three) times a day 270 capsule 1    hydroxychloroquine (PLAQUENIL) 200 mg tablet Take 2 tablets (400 mg total) by mouth daily at bedtime for 180 doses 180 tablet 1    ketoconazole  (NIZORAL) 2 % cream Apply topically daily 60 g 1    lidocaine-prilocaine (EMLA) cream Apply topically as needed for mild pain 30 g 3    methylPREDNISolone 4 MG tablet therapy pack Use as directed on package 21 tablet 0    mycophenolate (CELLCEPT) 500 mg tablet Take 500 mg by mouth every 12 (twelve) hours       potassium chloride (Klor-Con M20) 20 mEq tablet Take 1 tablet (20 mEq total) by mouth daily 90 tablet 0    sildenafil (VIAGRA) 50 MG tablet Take 1 tablet (50 mg total) by mouth daily as needed for erectile dysfunction 10 tablet 0    tamsulosin (FLOMAX) 0.4 mg Take 1 capsule (0.4 mg total) by mouth daily with dinner 30 capsule 5    clopidogrel (Plavix) 75 mg tablet Take 1 tablet (75 mg total) by mouth daily (Patient not taking: Reported on 8/16/2024) 30 tablet 3    gabapentin (NEURONTIN) 600 MG tablet Take 1 tablet (600 mg total) by mouth 3 (three) times a day 270 tablet 1    loratadine (CLARITIN) 10 mg tablet Take 1 tablet (10 mg total) by mouth daily 30 tablet 2    sodium chloride 1 g tablet Take 2 tablets (2 g total) by mouth 3 (three) times a day with meals 540 tablet 2     No current facility-administered medications for this visit.       Vitals:    08/16/24 0949   BP: 112/60   Pulse: 64   Resp: 19   Temp: 98.3 °F (36.8 °C)   SpO2: 98%         Objective:    Physical Exam  Constitutional:       Appearance: Normal appearance.   HENT:      Head: Normocephalic and atraumatic.   Eyes:      Pupils: Pupils are equal, round, and reactive to light.   Cardiovascular:      Pulses: Normal pulses.   Musculoskeletal:         General: Normal range of motion.   Neurological:      Mental Status: He is alert. Mental status is at baseline.      Motor: Weakness present.      Gait: Gait abnormal.   Psychiatric:         Mood and Affect: Mood normal.         Behavior: Behavior normal.         Thought Content: Thought content normal.         Judgment: Judgment normal.       Neurologic Exam     Cranial Nerves     CN III, IV, VI    Pupils are equal, round, and reactive to light.      Results/Data:  I have reviewed the results and images from the angiogram in detail with the patient.

## 2024-08-20 ENCOUNTER — OFFICE VISIT (OUTPATIENT)
Dept: INTERNAL MEDICINE CLINIC | Facility: CLINIC | Age: 54
End: 2024-08-20
Payer: COMMERCIAL

## 2024-08-20 VITALS
DIASTOLIC BLOOD PRESSURE: 76 MMHG | HEIGHT: 66 IN | WEIGHT: 187 LBS | SYSTOLIC BLOOD PRESSURE: 134 MMHG | HEART RATE: 78 BPM | BODY MASS INDEX: 30.05 KG/M2 | RESPIRATION RATE: 17 BRPM | OXYGEN SATURATION: 98 %

## 2024-08-20 DIAGNOSIS — Z86.718 HISTORY OF DVT (DEEP VEIN THROMBOSIS): ICD-10-CM

## 2024-08-20 DIAGNOSIS — N18.31 CHRONIC KIDNEY DISEASE, STAGE 3A (HCC): ICD-10-CM

## 2024-08-20 DIAGNOSIS — Z86.73 HISTORY OF MULTIPLE STROKES: ICD-10-CM

## 2024-08-20 DIAGNOSIS — I25.10 CORONARY ARTERY DISEASE INVOLVING NATIVE CORONARY ARTERY OF NATIVE HEART WITHOUT ANGINA PECTORIS: ICD-10-CM

## 2024-08-20 DIAGNOSIS — I82.5Y9 CHRONIC DEEP VEIN THROMBOSIS (DVT) OF PROXIMAL VEIN OF LOWER EXTREMITY, UNSPECIFIED LATERALITY (HCC): ICD-10-CM

## 2024-08-20 DIAGNOSIS — I12.9 HYPERTENSIVE KIDNEY DISEASE WITH STAGE 3A CHRONIC KIDNEY DISEASE (HCC): ICD-10-CM

## 2024-08-20 DIAGNOSIS — M32.9 SLE (SYSTEMIC LUPUS ERYTHEMATOSUS RELATED SYNDROME) (HCC): Primary | ICD-10-CM

## 2024-08-20 DIAGNOSIS — N18.31 HYPERTENSIVE KIDNEY DISEASE WITH STAGE 3A CHRONIC KIDNEY DISEASE (HCC): ICD-10-CM

## 2024-08-20 DIAGNOSIS — M81.0 OSTEOPOROSIS WITHOUT CURRENT PATHOLOGICAL FRACTURE, UNSPECIFIED OSTEOPOROSIS TYPE: ICD-10-CM

## 2024-08-20 PROCEDURE — 99214 OFFICE O/P EST MOD 30 MIN: CPT | Performed by: INTERNAL MEDICINE

## 2024-08-20 NOTE — ASSESSMENT & PLAN NOTE
Continue follow up with rheumatology. Continue plaquenil 400 mg daily, his Cellcept was increased. He would like to find a closer rheumatologist that is in the area. As per his rheumatologist, his imaging studies MRI/CTA have not shown specific findings supporting definitive evidence of lupus cerebritis. CTA head was also not suggestive of intracranial vasculopathy.

## 2024-08-20 NOTE — PROGRESS NOTES
Ambulatory Visit  Name: Donta Hunter      : 1970      MRN: [id number]  Encounter Provider: Raad Batres MD  Encounter Date: 2024   Encounter department: [facility]    Assessment & Plan   1. SLE (systemic lupus erythematosus related syndrome) (HCC)  Assessment & Plan:  Continue follow up with rheumatology. Continue plaquenil 400 mg daily, his Cellcept was increased. He would like to find a closer rheumatologist that is in the area. As per his rheumatologist, his imaging studies MRI/CTA have not shown specific findings supporting definitive evidence of lupus cerebritis. CTA head was also not suggestive of intracranial vasculopathy.  Orders:  -     Ambulatory Referral to Rheumatology; Future  -     CBC and differential; Future  -     Basic metabolic panel; Future  2. History of multiple strokes  -     CBC and differential; Future  -     Basic metabolic panel; Future  -     TSH, 3rd generation with Free T4 reflex; Future  3. History of DVT (deep vein thrombosis)  Assessment & Plan:  Followed by hematology, continue Eliquis  Orders:  -     CBC and differential; Future  -     Basic metabolic panel; Future  -     TSH, 3rd generation with Free T4 reflex; Future  4. Coronary artery disease involving native coronary artery of native heart without angina pectoris  -     CBC and differential; Future  -     Basic metabolic panel; Future  -     TSH, 3rd generation with Free T4 reflex; Future  5. Chronic kidney disease, stage 3a (HCC)  6. Hypertensive kidney disease with stage 3a chronic kidney disease (HCC)  Assessment & Plan:  Continue follow up with nephrology. Cr appears stable.    Lab Results   Component Value Date    EGFR 72 2024    EGFR 82 2024    EGFR 88 2024    CREATININE 1.13 2024    CREATININE 1.1 2024    CREATININE 0.96 2024         Lab Results   Component Value Date    EGFR 72 2024    EGFR 82 2024    EGFR 88 2024     CREATININE 1.13 07/19/2024    CREATININE 1.1 05/25/2024    CREATININE 0.96 05/23/2024     7. Chronic deep vein thrombosis (DVT) of proximal vein of lower extremity, unspecified laterality ([facility])  Assessment & Plan:  Continue eliquis. Denies any pain/swelling.  8. Osteoporosis without current pathological fracture, unspecified osteoporosis type  Assessment & Plan:  Being followed by rheum. Will be starting reclast.      Depression Screening and Follow-up Plan: Patient was screened for depression during today's encounter. They screened negative with a PHQ-2 score of 0.      History of Present Illness   Patient is here for routine follow up, reviewed chronic medical problems, ordered labs for next visit including CBC CMP TSH A1C LIPID. Reviewed labs for this visit.        Review of Systems   Constitutional:  Negative for chills and fever.   HENT:  Negative for ear pain and sore throat.    Eyes:  Negative for pain and visual disturbance.   Respiratory:  Negative for cough and shortness of breath.    Cardiovascular:  Negative for chest pain and palpitations.   Gastrointestinal:  Negative for abdominal pain and vomiting.   Genitourinary:  Negative for dysuria and hematuria.   Musculoskeletal:  Positive for arthralgias and gait problem. Negative for back pain.   Skin:  Negative for color change and rash.   Neurological:  Negative for seizures and syncope.   All other systems reviewed and are negative.    Past Medical History   Past Medical History:   Diagnosis Date    Anemia     Arthritis     Cervical mass     Chronic kidney disease     COPD, group B, by GOLD 2017 classification ([facility]) 7/13/2020    Coronary artery disease     Depression     DVT (deep venous thrombosis) ([facility])     Hypertension     Lupus ([facility])     Renal disorder     Stroke (cerebrum) ([facility]) 6/7/2024     Past Surgical History:   Procedure Laterality Date    APPENDECTOMY      CERVICAL SPINE SURGERY      CHOLECYSTECTOMY      COLONOSCOPY N/A 8/23/2018     Procedure: COLONOSCOPY;  Surgeon: James Wise III, MD;  Location: [facility];  Service: Gastroenterology    ESOPHAGOGASTRODUODENOSCOPY N/A 8/22/2018    Procedure: ESOPHAGOGASTRODUODENOSCOPY (EGD);  Surgeon: James Wise III, MD;  Location: [facility];  Service: Gastroenterology    IR CEREBRAL ANGIOGRAPHY  7/24/2024    IR IVC FILTER PLACEMENT PERMANENT  9/7/2017    IR IVC FILTER REMOVAL  4/23/2018    IR LUMBAR PUNCTURE  11/23/2015    IR LUMBAR PUNCTURE  4/25/2024    NECK SURGERY      US GUIDED INJECTION FOR RESEARCH STUDY  4/23/2018    US GUIDED INJECTION FOR RESEARCH STUDY  9/7/2017    VASCULAR SURGERY       Family History   Problem Relation Age of Onset    Heart disease Mother     No Known Problems Father      Current Outpatient Medications on File Prior to Visit   Medication Sig Dispense Refill    apixaban (ELIQUIS) 5 mg Take 5 mg by mouth 2 (two) times a day      bacitracin topical ointment 500 units/g topical ointment APPLY TOPICALLY TO AFFECTED AREA THREE TIMES A DAY AS DIRECTED      baclofen 20 mg tablet Take 1 tablet (20 mg total) by mouth 2 (two) times a day 90 tablet 0    betamethasone, augmented, (DIPROLENE) 0.05 % ointment Apply 1 application. topically 2 (two) times a day      clobetasol (TEMOVATE) 0.05 % cream       Diclofenac Sodium (VOLTAREN) 1 % Apply 2 g topically 4 (four) times a day 300 g 1    escitalopram (LEXAPRO) 10 mg tablet Take 1 tablet (10 mg total) by mouth daily 90 tablet 3    famotidine (PEPCID) 20 mg tablet Take 1 tablet (20 mg total) by mouth 2 (two) times a day 180 tablet 3    furosemide (LASIX) 40 mg tablet       gabapentin (NEURONTIN) 300 mg capsule Take 1 capsule (300 mg total) by mouth 3 (three) times a day 270 capsule 1    gabapentin (NEURONTIN) 600 MG tablet Take 1 tablet (600 mg total) by mouth 3 (three) times a day 270 tablet 1    hydroxychloroquine (PLAQUENIL) 200 mg tablet Take 2 tablets (400 mg total) by mouth daily at bedtime for 180 doses 180 tablet 1     "ketoconazole (NIZORAL) 2 % cream Apply topically daily 60 g 1    lidocaine-prilocaine (EMLA) cream Apply topically as needed for mild pain 30 g 3    loratadine (CLARITIN) 10 mg tablet Take 1 tablet (10 mg total) by mouth daily 30 tablet 2    methylPREDNISolone 4 MG tablet therapy pack Use as directed on package 21 tablet 0    mycophenolate (CELLCEPT) 500 mg tablet Take 500 mg by mouth every 12 (twelve) hours       potassium chloride (Klor-Con M20) 20 mEq tablet Take 1 tablet (20 mEq total) by mouth daily 90 tablet 0    sildenafil (VIAGRA) 50 MG tablet Take 1 tablet (50 mg total) by mouth daily as needed for erectile dysfunction 10 tablet 0    sodium chloride 1 g tablet Take 2 tablets (2 g total) by mouth 3 (three) times a day with meals 540 tablet 2    tamsulosin (FLOMAX) 0.4 mg Take 1 capsule (0.4 mg total) by mouth daily with dinner 30 capsule 5    clopidogrel (Plavix) 75 mg tablet Take 1 tablet (75 mg total) by mouth daily (Patient not taking: Reported on 8/16/2024) 30 tablet 3     No current facility-administered medications on file prior to visit.     Allergies   Allergen Reactions    Doxycycline Rash    Sulfa Antibiotics Rash      Objective   /76 (BP Location: Right arm, Patient Position: Sitting, Cuff Size: Adult)   Pulse 78   Resp 17   Ht 5' 6\" (1.676 m)   Wt 84.8 kg (187 lb)   SpO2 98%   BMI 30.18 kg/m²     Physical Exam  Vitals and nursing note reviewed.   Constitutional:       General: He is not in acute distress.     Appearance: He is well-developed.   HENT:      Head: Normocephalic and atraumatic.   Cardiovascular:      Rate and Rhythm: Normal rate and regular rhythm.      Heart sounds: No murmur heard.  Pulmonary:      Effort: Pulmonary effort is normal. No respiratory distress.      Breath sounds: Normal breath sounds.   Abdominal:      Tenderness: There is no abdominal tenderness.   Musculoskeletal:         General: No swelling.      Cervical back: Neck supple.   Skin:     General: Skin " is warm and dry.      Capillary Refill: Capillary refill takes less than 2 seconds.   Neurological:      Mental Status: He is alert.      Gait: Gait abnormal.   Psychiatric:         Mood and Affect: Mood normal.       I have spent a total time of 15 minutes in caring for this patient on the day of the visit/encounter including Patient and family education, Risk factor reductions, Documenting in the medical record, and Obtaining or reviewing history    .

## 2024-08-20 NOTE — ASSESSMENT & PLAN NOTE
Continue follow up with nephrology. Cr appears stable.    Lab Results   Component Value Date    EGFR 72 07/19/2024    EGFR 82 05/25/2024    EGFR 88 05/23/2024    CREATININE 1.13 07/19/2024    CREATININE 1.1 05/25/2024    CREATININE 0.96 05/23/2024         Lab Results   Component Value Date    EGFR 72 07/19/2024    EGFR 82 05/25/2024    EGFR 88 05/23/2024    CREATININE 1.13 07/19/2024    CREATININE 1.1 05/25/2024    CREATININE 0.96 05/23/2024

## 2024-08-27 ENCOUNTER — APPOINTMENT (OUTPATIENT)
Dept: LAB | Facility: CLINIC | Age: 54
End: 2024-08-27
Payer: COMMERCIAL

## 2024-08-27 ENCOUNTER — OFFICE VISIT (OUTPATIENT)
Dept: URGENT CARE | Facility: CLINIC | Age: 54
End: 2024-08-27
Payer: COMMERCIAL

## 2024-08-27 ENCOUNTER — APPOINTMENT (OUTPATIENT)
Dept: RADIOLOGY | Facility: CLINIC | Age: 54
End: 2024-08-27
Payer: COMMERCIAL

## 2024-08-27 VITALS
DIASTOLIC BLOOD PRESSURE: 90 MMHG | HEART RATE: 80 BPM | TEMPERATURE: 97.3 F | OXYGEN SATURATION: 98 % | SYSTOLIC BLOOD PRESSURE: 135 MMHG | RESPIRATION RATE: 16 BRPM

## 2024-08-27 DIAGNOSIS — M32.9 SLE (SYSTEMIC LUPUS ERYTHEMATOSUS RELATED SYNDROME) (HCC): ICD-10-CM

## 2024-08-27 DIAGNOSIS — L97.521 SKIN ULCER OF TOE OF LEFT FOOT, LIMITED TO BREAKDOWN OF SKIN (HCC): Primary | ICD-10-CM

## 2024-08-27 DIAGNOSIS — L08.9 INFECTION OF TOE: ICD-10-CM

## 2024-08-27 DIAGNOSIS — I25.10 CORONARY ARTERY DISEASE INVOLVING NATIVE CORONARY ARTERY OF NATIVE HEART WITHOUT ANGINA PECTORIS: ICD-10-CM

## 2024-08-27 DIAGNOSIS — Z86.73 HISTORY OF MULTIPLE STROKES: ICD-10-CM

## 2024-08-27 DIAGNOSIS — M79.672 PAIN IN LEFT FOOT: ICD-10-CM

## 2024-08-27 DIAGNOSIS — Z86.718 HISTORY OF DVT (DEEP VEIN THROMBOSIS): ICD-10-CM

## 2024-08-27 LAB
ANION GAP SERPL CALCULATED.3IONS-SCNC: 9 MMOL/L (ref 4–13)
BASOPHILS # BLD AUTO: 0.03 THOUSANDS/ÂΜL (ref 0–0.1)
BASOPHILS NFR BLD AUTO: 1 % (ref 0–1)
BUN SERPL-MCNC: 11 MG/DL (ref 5–25)
CALCIUM SERPL-MCNC: 9.9 MG/DL (ref 8.4–10.2)
CHLORIDE SERPL-SCNC: 103 MMOL/L (ref 96–108)
CO2 SERPL-SCNC: 27 MMOL/L (ref 21–32)
CREAT SERPL-MCNC: 1.11 MG/DL (ref 0.6–1.3)
EOSINOPHIL # BLD AUTO: 0.08 THOUSAND/ÂΜL (ref 0–0.61)
EOSINOPHIL NFR BLD AUTO: 2 % (ref 0–6)
ERYTHROCYTE [DISTWIDTH] IN BLOOD BY AUTOMATED COUNT: 14 % (ref 11.6–15.1)
GFR SERPL CREATININE-BSD FRML MDRD: 73 ML/MIN/1.73SQ M
GLUCOSE P FAST SERPL-MCNC: 82 MG/DL (ref 65–99)
HCT VFR BLD AUTO: 48 % (ref 36.5–49.3)
HGB BLD-MCNC: 15.9 G/DL (ref 12–17)
IMM GRANULOCYTES # BLD AUTO: 0.01 THOUSAND/UL (ref 0–0.2)
IMM GRANULOCYTES NFR BLD AUTO: 0 % (ref 0–2)
LYMPHOCYTES # BLD AUTO: 1.46 THOUSANDS/ÂΜL (ref 0.6–4.47)
LYMPHOCYTES NFR BLD AUTO: 41 % (ref 14–44)
MCH RBC QN AUTO: 30.9 PG (ref 26.8–34.3)
MCHC RBC AUTO-ENTMCNC: 33.1 G/DL (ref 31.4–37.4)
MCV RBC AUTO: 93 FL (ref 82–98)
MONOCYTES # BLD AUTO: 0.55 THOUSAND/ÂΜL (ref 0.17–1.22)
MONOCYTES NFR BLD AUTO: 16 % (ref 4–12)
NEUTROPHILS # BLD AUTO: 1.4 THOUSANDS/ÂΜL (ref 1.85–7.62)
NEUTS SEG NFR BLD AUTO: 40 % (ref 43–75)
NRBC BLD AUTO-RTO: 0 /100 WBCS
PLATELET # BLD AUTO: 169 THOUSANDS/UL (ref 149–390)
PMV BLD AUTO: 11.5 FL (ref 8.9–12.7)
POTASSIUM SERPL-SCNC: 4.8 MMOL/L (ref 3.5–5.3)
RBC # BLD AUTO: 5.15 MILLION/UL (ref 3.88–5.62)
SODIUM SERPL-SCNC: 139 MMOL/L (ref 135–147)
TSH SERPL DL<=0.05 MIU/L-ACNC: 4.37 UIU/ML (ref 0.45–4.5)
WBC # BLD AUTO: 3.53 THOUSAND/UL (ref 4.31–10.16)

## 2024-08-27 PROCEDURE — 84443 ASSAY THYROID STIM HORMONE: CPT

## 2024-08-27 PROCEDURE — 73630 X-RAY EXAM OF FOOT: CPT

## 2024-08-27 PROCEDURE — 99214 OFFICE O/P EST MOD 30 MIN: CPT | Performed by: PHYSICIAN ASSISTANT

## 2024-08-27 PROCEDURE — 80048 BASIC METABOLIC PNL TOTAL CA: CPT

## 2024-08-27 PROCEDURE — 85025 COMPLETE CBC W/AUTO DIFF WBC: CPT

## 2024-08-27 PROCEDURE — 36415 COLL VENOUS BLD VENIPUNCTURE: CPT

## 2024-08-27 RX ORDER — CEPHALEXIN 500 MG/1
500 CAPSULE ORAL EVERY 8 HOURS SCHEDULED
Qty: 21 CAPSULE | Refills: 0 | Status: SHIPPED | OUTPATIENT
Start: 2024-08-27 | End: 2024-09-03

## 2024-08-27 NOTE — PROGRESS NOTES
[facility]        NAME: Donta Hunter is a 54 y.o. male  : 1970    MRN: [id number]  DATE: 2024  TIME: 8:36 AM    Assessment and Plan   Skin ulcer of toe of left foot, limited to breakdown of skin (HCC) [L97.521]  1. Skin ulcer of toe of left foot, limited to breakdown of skin (HCC)  cephalexin (KEFLEX) 500 mg capsule    Ambulatory Referral to Podiatry      2. Infection of toe  cephalexin (KEFLEX) 500 mg capsule    Ambulatory Referral to Podiatry      3. Pain in left foot  XR foot 3+ vw left            Patient Instructions     Patient Instructions   Xray provider read- no acute findings identified.  No obvious sign of osteomyelitis.    Recommended oral antibiotics.   Patient advised to wash the toe daily and apply vaseline or neosporin with a small gauze wrap.     Recommended referral to podiatry.     Follow up with PCP in 3-5 days.  Proceed to  ER if symptoms worsen.    If tests are performed, our office will contact you with results only if changes need to made to the care plan discussed with you at the visit. [facility]        Chief Complaint     Chief Complaint   Patient presents with    Toe Pain     Pt c/o pain to left foot 3rd metatarsal that started yesterday          History of Present Illness       Toe Pain   The incident occurred 12 to 24 hours ago. The incident occurred at home. There was no injury mechanism. The pain is present in the left toes. The quality of the pain is described as aching. The pain is mild. The pain has been Constant since onset. Associated symptoms include an inability to bear weight. Pertinent negatives include no loss of motion or loss of sensation. He reports no foreign bodies present. The symptoms are aggravated by movement, palpation and weight bearing. He has tried nothing for the symptoms.       Review of Systems   Review of Systems   All other systems reviewed and are negative.        Current Medications        Current Outpatient Medications:     apixaban (ELIQUIS) 5 mg, Take 5 mg by mouth 2 (two) times a day, Disp: , Rfl:     baclofen 20 mg tablet, Take 1 tablet (20 mg total) by mouth 2 (two) times a day, Disp: 90 tablet, Rfl: 0    betamethasone, augmented, (DIPROLENE) 0.05 % ointment, Apply 1 application. topically 2 (two) times a day, Disp: , Rfl:     cephalexin (KEFLEX) 500 mg capsule, Take 1 capsule (500 mg total) by mouth every 8 (eight) hours for 7 days, Disp: 21 capsule, Rfl: 0    clobetasol (TEMOVATE) 0.05 % cream, , Disp: , Rfl:     Diclofenac Sodium (VOLTAREN) 1 %, Apply 2 g topically 4 (four) times a day, Disp: 300 g, Rfl: 1    escitalopram (LEXAPRO) 10 mg tablet, Take 1 tablet (10 mg total) by mouth daily, Disp: 90 tablet, Rfl: 3    famotidine (PEPCID) 20 mg tablet, Take 1 tablet (20 mg total) by mouth 2 (two) times a day, Disp: 180 tablet, Rfl: 3    furosemide (LASIX) 40 mg tablet, , Disp: , Rfl:     gabapentin (NEURONTIN) 300 mg capsule, Take 1 capsule (300 mg total) by mouth 3 (three) times a day, Disp: 270 capsule, Rfl: 1    gabapentin (NEURONTIN) 600 MG tablet, Take 1 tablet (600 mg total) by mouth 3 (three) times a day, Disp: 270 tablet, Rfl: 1    hydroxychloroquine (PLAQUENIL) 200 mg tablet, Take 2 tablets (400 mg total) by mouth daily at bedtime for 180 doses, Disp: 180 tablet, Rfl: 1    ketoconazole (NIZORAL) 2 % cream, Apply topically daily, Disp: 60 g, Rfl: 1    loratadine (CLARITIN) 10 mg tablet, Take 1 tablet (10 mg total) by mouth daily, Disp: 30 tablet, Rfl: 2    mycophenolate (CELLCEPT) 500 mg tablet, Take 500 mg by mouth every 12 (twelve) hours , Disp: , Rfl:     potassium chloride (Klor-Con M20) 20 mEq tablet, Take 1 tablet (20 mEq total) by mouth daily, Disp: 90 tablet, Rfl: 0    sildenafil (VIAGRA) 50 MG tablet, Take 1 tablet (50 mg total) by mouth daily as needed for erectile dysfunction, Disp: 10 tablet, Rfl: 0    sodium chloride 1 g tablet, Take 2 tablets (2 g total) by mouth 3  (three) times a day with meals, Disp: 540 tablet, Rfl: 2    tamsulosin (FLOMAX) 0.4 mg, Take 1 capsule (0.4 mg total) by mouth daily with dinner, Disp: 30 capsule, Rfl: 5    bacitracin topical ointment 500 units/g topical ointment, APPLY TOPICALLY TO AFFECTED AREA THREE TIMES A DAY AS DIRECTED, Disp: , Rfl:     clopidogrel (Plavix) 75 mg tablet, Take 1 tablet (75 mg total) by mouth daily (Patient not taking: Reported on 8/16/2024), Disp: 30 tablet, Rfl: 3    lidocaine-prilocaine (EMLA) cream, Apply topically as needed for mild pain, Disp: 30 g, Rfl: 3    methylPREDNISolone 4 MG tablet therapy pack, Use as directed on package (Patient not taking: Reported on 8/27/2024), Disp: 21 tablet, Rfl: 0    Current Allergies     Allergies as of 08/27/2024 - Reviewed 08/27/2024   Allergen Reaction Noted    Doxycycline Rash 10/25/2023    Sulfa antibiotics Rash 10/27/2020            The following portions of the patient's history were reviewed and updated as appropriate: allergies, current medications, past family history, past medical history, past social history, past surgical history and problem list.     Past Medical History:   Diagnosis Date    Anemia     Arthritis     Cervical mass     Chronic kidney disease     COPD, group B, by GOLD 2017 classification ([facility]) 7/13/2020    Coronary artery disease     Depression     DVT (deep venous thrombosis) ([facility])     Hypertension     Lupus ([facility])     Renal disorder     Stroke (cerebrum) ([facility]) 6/7/2024       Past Surgical History:   Procedure Laterality Date    APPENDECTOMY      CERVICAL SPINE SURGERY      CHOLECYSTECTOMY      COLONOSCOPY N/A 8/23/2018    Procedure: COLONOSCOPY;  Surgeon: James Wise III, MD;  Location: [facility];  Service: Gastroenterology    ESOPHAGOGASTRODUODENOSCOPY N/A 8/22/2018    Procedure: ESOPHAGOGASTRODUODENOSCOPY (EGD);  Surgeon: James Wise III, MD;  Location: [facility];  Service: Gastroenterology    IR CEREBRAL ANGIOGRAPHY  7/24/2024    IR IVC FILTER  PLACEMENT PERMANENT  9/7/2017    IR IVC FILTER REMOVAL  4/23/2018    IR LUMBAR PUNCTURE  11/23/2015    IR LUMBAR PUNCTURE  4/25/2024    NECK SURGERY      US GUIDED INJECTION FOR RESEARCH STUDY  4/23/2018    US GUIDED INJECTION FOR RESEARCH STUDY  9/7/2017    VASCULAR SURGERY         Family History   Problem Relation Age of Onset    Heart disease Mother     No Known Problems Father          Medications have been verified.        Objective   /90   Pulse 80   Temp (!) 97.3 °F (36.3 °C) (Temporal)   Resp 16   SpO2 98%        Physical Exam     Physical Exam  Vitals and nursing note reviewed.   Constitutional:       Appearance: Normal appearance.   Skin:     Comments: Left third toe pea sized ulcer to the lateral, distal aspect of the toe. No drainage. Tender to palpation.   Small area of purulent drainage to the lateral aspect of the nail on the third toe.    Neurological:      General: No focal deficit present.      Mental Status: He is alert and oriented to person, place, and time.   Psychiatric:         Mood and Affect: Mood normal.         Behavior: Behavior normal.

## 2024-08-27 NOTE — PATIENT INSTRUCTIONS
Xray provider read- no acute findings identified.  No obvious sign of osteomyelitis.    Recommended oral antibiotics.   Patient advised to wash the toe daily and apply vaseline or neosporin with a small gauze wrap.     Recommended referral to podiatry.     Follow up with PCP in 3-5 days.  Proceed to  ER if symptoms worsen.    If tests are performed, our office will contact you with results only if changes need to made to the care plan discussed with you at the visit. You can review your full results on [facility].

## 2024-09-05 DIAGNOSIS — M19.012 ARTHRITIS OF LEFT ACROMIOCLAVICULAR JOINT: ICD-10-CM

## 2024-09-05 RX ORDER — GABAPENTIN 600 MG/1
600 TABLET ORAL 3 TIMES DAILY
Qty: 270 TABLET | Refills: 1 | Status: SHIPPED | OUTPATIENT
Start: 2024-09-05 | End: 2025-03-04

## 2024-09-05 NOTE — TELEPHONE ENCOUNTER
Reason for call:   [x] Refill   [] Prior Auth  [] Other:     Office: [facility]  [x] PCP/Provider - Raad Batres   [] Specialty/Provider -     Medication: gabapentin     Dose/Frequency: 600 mg/ 1 tab 3 times daily     Quantity: 90 day supply     Ph[facility]     Does the patient have enough for 3 days?   [] Yes   [x] No - Send as HP to POD- patient is out completely.

## 2024-09-05 NOTE — TELEPHONE ENCOUNTER
Patient calling for RF of gabapentin 600mg. Reviewed chart with patient and made aware there should still be a 90 day refill on file at pharmacy. Patient is going to call pharmacy to confirm.

## 2024-09-18 NOTE — PROGRESS NOTES
Ambulatory Visit  Name: Donta Hunter      : 1970      MRN: [id number]  Encounter Provider: Sravani Narayanan MD  Encounter Date: 2024   Encounter department: [facility] [address] [place] [address]    Assessment & Plan  SLE (systemic lupus erythematosus related syndrome) (HCC)  53 y/o M with longstanding history of SLE with hx of class IV lupus nephritis, chronic leukopenia, rash, and prior vasculitis neuropathy. He has been maintained on HCQ + MMF 1.5g daily with prior exposure to CYC. Recently in 2024, he was found to have multiple strokes on brain MRI. While there was initially concern for SLE related process as the etiology, subsequent work-up including cerebral angiogram did not reveal findings of vasculitis. Per review of Neuro notes, strokes appear more embolic in nature and prior APLS work-up has been negative x 2. At this time, lower suspicion for SLE as the driving process. Follow-up with Neurology scheduled for this week.     For now we will plan to continue with HCQ 400mg daily and place referral for him to get OCT monitoring done. We will continue MMF 1.5g daily for now and repeat CBC with diff. Discussed that patients with SLE can have leukopenia at baseline and appears he has had leukopenia since at least /. If there is evidence of worsening leukopenia, can consider decreasing MMF to 1g daily though he will likely always have some amount of leukopenia. We will arrange follow-up in 3 months.     Orders:    Ambulatory Referral to Rheumatology    C3 complement; Future    C4 complement; Future    dsDNA Antibody by IFA, Brandon lucmckenzie, with Reflex to Titer; Future    Ambulatory Referral to Ophthalmology; Future    CBC and differential; Future    Gait difficulty  Patient with hx of gait difficulty and ambulates with walker. Likely a combination of factors including prior mononeuritis multiplex and spinal disease and outlined below. Continue to follow with Ortho spine and pain  "management.        DJD (degenerative joint disease), ankle and foot, right         Cerebrovascular accident (CVA), unspecified mechanism (HCC)         Long-term use of immunosuppressant medication         Leukopenia, unspecified type         Lupus nephritis (HCC)           History of Present Illness     Donta Hunter is a 54 y.o. male who presents for evaluation of SLE.     Permanent History  Patient was diagnosed in 2015 when he was presented with YANIRA 1:160 nucleolar, renal failure, anemia, leukopenia and skin rash. He is of [place] descent. He underwent renal biopsy showing Class V lupus nephritis (membranous nephropathy). Skin biopsy showing interface dermatitis. He was started on HCQ and was planned to start MMF. 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 Sural nerve biopsy not performed as patient had already been on steroids for the lupus nephritis. He was ultimately started on induction therapy with IV CYC monthly (2/16-5/16) x 4 doses, then transitioned to MMF 3g daily for maintenance. He had some cytopenias, so dose was increased to MMF 2g daily and then eventually 1.5mg daily.     He had a DVT in 2017, but negative APLA panel in 2015 so thought unlikely to be related to his SLE.     He also had severe cervical compressive myelopathy and underwent C4-C6 anterior cervical diskectomy with central and foraminal decompression 1/25/2018. He underwent a repeat EMG in 2018 showing \"sensorimotor polyneuropathy, but is unchanged (or even perhaps improved) compared to 2015. No myopathy or significant motor axonal loss from lumbar radiculopathy. Clinically, his gait problem seems more likely from myelopathy.\"    In Feb 2024, he developed L side facial dropping. He underwent MRI brain showing new lacunar ischemia in the frontal lobes and chronic cerebellar lacunar infarcts. MRI C/T/L spine was reported with DJD C spine with mass effect " C6-7/cord flattening C3-4, compression fractures C7-8, chronic compression fx T7-T8, severe NF narrowing L3-4 with L3 nerve root impingement, bulging L5-S1. TTE without vegetations, JIMMY showing patent foramen ovale. CTA brain showing normal intra-cranial vasculature. Repeat APLA testing negative. FTA mildly reactive, but previously had been treated for syphilis. He underwent repeat LP, but it was a traumatic tap and not able to interpret results. He underwent cerebral angiogram 7/2024 which was normal and without vasculitis. Lupus cerebritis was felt unlikely to be the etiology of his symptoms so no additional immunosuppression was recommended.     DXA 2/2024 showing indeterminate risk.    Interval History  - Reviewed Neuro notes from May 2024 - Awaiting repeat MRI brain to determine next steps. Repeat MRI brain 7/2024 showed no findings of vasculitis. Follow-up scheduled for 9/26.     - Reviewed last Nephro note from 9/2023, follow-up in Dec 2024    - Patient reports he has lumbar back pain which has been ongoing. He has seen Ortho Spine (Dr. Barnett) and has done PT/aquatherapy. He will be seeing pain med for injections next week. They have discussed surgery, but right now there are no plans for surgery.     - Patient continues to have weakness of the L leg. Requires a walker to ambulate. He reports he has weakness like this for at least 7 years. He reports he was told this was due to his lumbar disease. Per review of chart, he also has history of bilateral sensorimotor polyneuropathy which also contributes to his gait issues.       Review of Systems  Complete ROS conducted as per HPI. In addition,   Fever denies  Rash - intermittent rash present on b/l forarms, not photosensistive  Oral/nasal/genital ulcers denies  Muscle weakness - present in L leg x 7 years  Uveitis denies  Dactylitis denies  Dysphagia/odynophagia denies  Pleurisy denies  Gross hematuria denies  Foamy urine denies  Raynaud's denies  Joint  "issues other than noted above - intermittent swelling of PIP of R 5th digit      Objective     /50   Ht 5' 6\" (1.676 m)   Wt 84.8 kg (187 lb)   BMI 30.18 kg/m²     Physical Exam  Physical Exam  Constitutional: well appearing, no acute distress  HEENT: normocephalic, sclera clear, no visible oral or nasal ulcers  Neck: supple, no palpable cervical adenopathy  CV: regular rate and rhythm, no murmur  Pulm: normal respiratory effort, lungs clear to auscultation b/l  Skin: +hyperpigmented lesions on b/l forearm extensor surface from prior rash; venous stasis on b/l legs, vitiligo on hands  Extremities: warm and well perfused, +2 edema  MSK: no synovitis or effusion    Labs and Imaging  I have personally reviewed pertinent labs and imaging.   dsDNA persistently positive - most recent 14 in Feb  RNP, Sm negative  C3, C4 nml    Labs 8/2024 - Cr 1.1, leukopenia  UA 7/2024 - no significant bld or protein    Hep B, C and Quant gold negative 3/2024  "

## 2024-09-23 ENCOUNTER — CONSULT (OUTPATIENT)
Age: 54
End: 2024-09-23
Payer: COMMERCIAL

## 2024-09-23 VITALS
BODY MASS INDEX: 30.05 KG/M2 | WEIGHT: 187 LBS | HEIGHT: 66 IN | DIASTOLIC BLOOD PRESSURE: 50 MMHG | SYSTOLIC BLOOD PRESSURE: 110 MMHG

## 2024-09-23 DIAGNOSIS — D72.819 LEUKOPENIA, UNSPECIFIED TYPE: ICD-10-CM

## 2024-09-23 DIAGNOSIS — Z79.60 LONG-TERM USE OF IMMUNOSUPPRESSANT MEDICATION: ICD-10-CM

## 2024-09-23 DIAGNOSIS — M32.14 LUPUS NEPHRITIS (HCC): ICD-10-CM

## 2024-09-23 DIAGNOSIS — I63.9 CEREBROVASCULAR ACCIDENT (CVA), UNSPECIFIED MECHANISM (HCC): ICD-10-CM

## 2024-09-23 DIAGNOSIS — M32.9 SLE (SYSTEMIC LUPUS ERYTHEMATOSUS RELATED SYNDROME) (HCC): Primary | Chronic | ICD-10-CM

## 2024-09-23 DIAGNOSIS — M19.071 DJD (DEGENERATIVE JOINT DISEASE), ANKLE AND FOOT, RIGHT: ICD-10-CM

## 2024-09-23 DIAGNOSIS — R26.9 GAIT DIFFICULTY: ICD-10-CM

## 2024-09-23 PROCEDURE — 99204 OFFICE O/P NEW MOD 45 MIN: CPT | Performed by: STUDENT IN AN ORGANIZED HEALTH CARE EDUCATION/TRAINING PROGRAM

## 2024-09-23 NOTE — ASSESSMENT & PLAN NOTE
55 y/o M with longstanding history of SLE with hx of class IV lupus nephritis, chronic leukopenia, rash, and prior vasculitis neuropathy. He has been maintained on HCQ + MMF 1.5g daily with prior exposure to CYC. Recently in Feb 2024, he was found to have multiple strokes on brain MRI. While there was initially concern for SLE related process as the etiology, subsequent work-up including cerebral angiogram did not reveal findings of vasculitis. Per review of Neuro notes, strokes appear more embolic in nature and prior APLS work-up has been negative x 2. At this time, lower suspicion for SLE as the driving process. Follow-up with Neurology scheduled for this week.     For now we will plan to continue with HCQ 400mg daily and place referral for him to get OCT monitoring done. We will continue MMF 1.5g daily for now and repeat CBC with diff. Discussed that patients with SLE can have leukopenia at baseline and appears he has had leukopenia since at least 2017/2018. If there is evidence of worsening leukopenia, can consider decreasing MMF to 1g daily though he will likely always have some amount of leukopenia. We will arrange follow-up in 3 months.     Orders:    Ambulatory Referral to Rheumatology    C3 complement; Future    C4 complement; Future    dsDNA Antibody by IFA, Brandon noyola, with Reflex to Titer; Future    Ambulatory Referral to Ophthalmology; Future    CBC and differential; Future

## 2024-09-23 NOTE — ASSESSMENT & PLAN NOTE
Patient with hx of gait difficulty and ambulates with walker. Likely a combination of factors including prior mononeuritis multiplex and spinal disease and outlined below. Continue to follow with Ortho spine and pain management.

## 2024-09-25 ENCOUNTER — TELEPHONE (OUTPATIENT)
Dept: NEUROLOGY | Facility: CLINIC | Age: 54
End: 2024-09-25

## 2024-09-25 NOTE — TELEPHONE ENCOUNTER
Hello, I will like to verify with  before switching to a VR visit. Will call with 's response, thanks

## 2024-09-25 NOTE — TELEPHONE ENCOUNTER
9/25/24 talked to pt and informed pt that  is ok with switching to VR for tommorows appt! Iformed pt I would be sending the link to join visit!

## 2024-09-25 NOTE — TELEPHONE ENCOUNTER
Pt called with help of  he is requesting the appointment with Dr Mulligan tomorrow change to video. He doesn't have a ride to go to the appointment.    Pt will use his cell number to access the video.

## 2024-09-26 ENCOUNTER — TELEMEDICINE (OUTPATIENT)
Dept: NEUROLOGY | Facility: CLINIC | Age: 54
End: 2024-09-26
Payer: COMMERCIAL

## 2024-09-26 ENCOUNTER — TELEMEDICINE (OUTPATIENT)
Dept: GASTROENTEROLOGY | Facility: CLINIC | Age: 54
End: 2024-09-26
Payer: COMMERCIAL

## 2024-09-26 ENCOUNTER — TELEPHONE (OUTPATIENT)
Dept: NEUROLOGY | Facility: CLINIC | Age: 54
End: 2024-09-26

## 2024-09-26 VITALS
DIASTOLIC BLOOD PRESSURE: 70 MMHG | HEART RATE: 86 BPM | HEIGHT: 66 IN | SYSTOLIC BLOOD PRESSURE: 110 MMHG | BODY MASS INDEX: 30.05 KG/M2 | OXYGEN SATURATION: 95 % | WEIGHT: 187 LBS

## 2024-09-26 DIAGNOSIS — I63.9 CEREBROVASCULAR ACCIDENT (CVA), UNSPECIFIED MECHANISM (HCC): Primary | ICD-10-CM

## 2024-09-26 DIAGNOSIS — M32.9 SLE (SYSTEMIC LUPUS ERYTHEMATOSUS RELATED SYNDROME) (HCC): ICD-10-CM

## 2024-09-26 DIAGNOSIS — R26.9 GAIT DIFFICULTY: ICD-10-CM

## 2024-09-26 DIAGNOSIS — I10 PRIMARY HYPERTENSION: ICD-10-CM

## 2024-09-26 DIAGNOSIS — K21.00 GASTROESOPHAGEAL REFLUX DISEASE WITH ESOPHAGITIS WITHOUT HEMORRHAGE: Primary | ICD-10-CM

## 2024-09-26 PROCEDURE — 99213 OFFICE O/P EST LOW 20 MIN: CPT | Performed by: PHYSICIAN ASSISTANT

## 2024-09-26 PROCEDURE — 99215 OFFICE O/P EST HI 40 MIN: CPT | Performed by: PSYCHIATRY & NEUROLOGY

## 2024-09-26 RX ORDER — ASPIRIN 81 MG/1
81 TABLET, CHEWABLE ORAL DAILY
Qty: 30 TABLET | Refills: 3 | Status: SHIPPED | OUTPATIENT
Start: 2024-09-26

## 2024-09-26 NOTE — ASSESSMENT & PLAN NOTE
Patient is having significant difficulties due to his back pain and has transitioned from a cane to a walker now.  Patient has an appointment with pain management.    Plan:  Recommend that the patient attend the appointment and discuss appropriate management for his pain.

## 2024-09-26 NOTE — TELEPHONE ENCOUNTER
9/26/24 during visit today pt showed concern about hematuria which led to stopping PLAVIX. Nephrology was consulted on the intake of aspirin as pt must be on  secondary antiplatelet medication, nephrology approved and pt was called to inform medication was approved and ready to be picked up! Pt must take  aspirin 81mg once a day, pls refer to this encounter if pt calls back! Thank you! (Kyrgyz speaking )

## 2024-09-26 NOTE — PROGRESS NOTES
Virtual Regular Visit  Name: Donta Hunter      : 1970      MRN: [id number]  Encounter Provider: Alice Will PA-C  Encounter Date: 2024   Encounter department: [facility] [place]    Verification of patient location:    Patient is located at Home in the following state in which I hold an active license [place]    Assessment & Plan  Gastroesophageal reflux disease with esophagitis without hemorrhage  - Pt reports reflux and regurgitation symptoms occasionally which is controlled on pepcid 20 mg PRN; he uses this around lunch time maybe twice per week  - He denies alarm symptoms such as dysphagia, nausea, vomiting  - EGD in 2018 showed LA grade A reflux esophagitis, gastritis, and duodenitis; path neg for H. pylori             Encounter provider Alice Will PA-C    The patient was identified by name and date of birth. Donta Hunter was informed that this is a telemedicine visit and that the visit is being conducted through the Epic Embedded platform. He agrees to proceed..  My office door was closed. No one else was in the room.  He acknowledged consent and understanding of privacy and security of the video platform. The patient has agreed to participate and understands they can discontinue the visit at any time.    Patient is aware this is a billable service.     History of Present Illness     Donta Hunter is a 54 y.o. male who presents for evaluation of reflux symptoms.  He reports that he will get heartburn and regurgitation a few times a week but it is not severe.  Has been taking Pepcid as needed and takes this maybe twice a week which controls his symptoms.  He has no nausea, vomiting, or dysphagia.  He has no abdominal pain.  He had endoscopy in 2018 when he was hospitalized for critical anemia and concern for upper GI bleed and needed endoscopy at that time showing LA grade a reflux esophagitis, gastritis, and duodenitis.  He denies any problems with his bowel  "movements currently or rectal pain.  He was previously seen earlier this year for some rectal discomfort after he was using an enema and the tip became displaced and he had a CAT scan imaging showing possible proctitis.    History obtained from : patient  Review of Systems  Medical History Reviewed by provider this encounter:  Tobacco  Allergies  Meds  Problems  Med Hx  Surg Hx  Fam Hx           Objective     /70 Comment: reported by pt for virtual visit  Pulse 86 Comment: reported by pt for virtual visit  Ht 5' 6\" (1.676 m)   Wt 84.8 kg (187 lb) Comment: reported by pt for virtual visit  SpO2 95% Comment: reported by pt for virtual visit  BMI 30.18 kg/m²   Physical Exam    Visit Time  Total Visit Duration: 10 minutes        "

## 2024-09-26 NOTE — ASSESSMENT & PLAN NOTE
Continue taking all hypertension medications.      Plan:  Recommend blood pressure goal less than 130/80.

## 2024-09-26 NOTE — ASSESSMENT & PLAN NOTE
Continue Plaquenil as per rheumatology.  As per patient, no plan for initiating rituximab at this time.    Plan:  Continue follow-up with rheumatology    Orders:    MRI brain without contrast; Future    Ambulatory Referral to Cardiology; Future

## 2024-09-26 NOTE — ASSESSMENT & PLAN NOTE
Assessment:  Donta Hunter is a 54 y.o. male who presents virtually in regards to follow-up for his multiple strokes.  Patient has a past medical history of strokes, SLE, hypertension, spasticity, DVT, neuropathic pain, lupus nephritis, DJD, hyperparathyroidism, and CAD.    At this time, patient's examination was extremely limited due to the virtual visit.  Given that, all management is based off of patient's reported history.  Patient abruptly stopped his Plavix on his own due to brownish urine approximately 15 days ago.  Since then, patient has had new onset right arm numbness and weakness which is unable to be assessed at this time given the virtual visit.  As a result, we will order an MRI brain without contrast to evaluate for new stroke given patient's prior history of multiple strokes.  It is okay for the patient to hold off on the Plavix given that there was resolution of the brownish urine after the Plavix was stopped.  We messaged patient's nephrologist in regards to possibly starting aspirin 81 mg in the setting of lupus nephritis.  Patient's nephrologist is okay with aspirin 81 mg so at this time, the nursing team will give the patient a call to initiate this medication.    All of patient's vasculitis workup has been negative at this time so there is a high suspicion that there is another process leading to patient's embolic strokes.  To visit cardiology once again to revisit the idea of Zio patch/loop recorder/PFO closure.    Due to patient's complicated history, patient needs to be in person for future visits. NO VIRTUAL VISIT.       Plan:   MRI brain without contrast ordered  For stroke prevention continue with: Eliquis 5 mg twice daily and aspirin 81 mg daily  At this time, patient can hold off on the Plavix due to the brownish urine that patient reported  In regards to being on antiplatelet medication, messaged patient's nephrologist in regards to patient's lupus nephritis and the possibility of  taking aspirin 81 mg daily  Patient cleared to take aspirin 81 mg daily  Recommend appropriate blood pressure control with goal of <130/80 and LDL goal <70  Recommend mediterranean diet & regular exercise regimen atleast 4-5 times a week for 20-30 minutes.   Patient given another referral for cardiology due to all vasculitic workup being negative.  Monitor for worsening symptoms  Call for questions or concerns    Orders:    MRI brain without contrast; Future    Ambulatory Referral to Cardiology; Future

## 2024-09-26 NOTE — PROGRESS NOTES
Virtual Regular Visit  Name: Donta Hunter      : 1970      MRN: [id number]  Encounter Provider: Suzanna Mulligan MD  Encounter Date: 2024   Encounter department: [facility] [place]    Verification of patient location:  Patient is located at Home in the following state in which I hold an active license [place]    Assessment & Plan  Cerebrovascular accident (CVA), unspecified mechanism (HCC)  Assessment:  Donta Hunter is a 54 y.o. male who presents virtually in regards to follow-up for his multiple strokes.  Patient has a past medical history of strokes, SLE, hypertension, spasticity, DVT, neuropathic pain, lupus nephritis, DJD, hyperparathyroidism, and CAD.    At this time, patient's examination was extremely limited due to the virtual visit.  Given that, all management is based off of patient's reported history.  Patient abruptly stopped his Plavix on his own due to brownish urine approximately 15 days ago.  Since then, patient has had new onset right arm numbness and weakness which is unable to be assessed at this time given the virtual visit.  As a result, we will order an MRI brain without contrast to evaluate for new stroke given patient's prior history of multiple strokes.  It is okay for the patient to hold off on the Plavix given that there was resolution of the brownish urine after the Plavix was stopped.  We messaged patient's nephrologist in regards to possibly starting aspirin 81 mg in the setting of lupus nephritis.  Patient's nephrologist is okay with aspirin 81 mg so at this time, the nursing team will give the patient a call to initiate this medication.    All of patient's vasculitis workup has been negative at this time so there is a high suspicion that there is another process leading to patient's embolic strokes.  To visit cardiology once again to revisit the idea of Zio patch/loop recorder/PFO closure.    Due to patient's complicated history, patient needs to be in  person for future visits. NO VIRTUAL VISIT.       Plan:   MRI brain without contrast ordered  For stroke prevention continue with: Eliquis 5 mg twice daily and aspirin 81 mg daily  At this time, patient can hold off on the Plavix due to the brownish urine that patient reported  In regards to being on antiplatelet medication, messaged patient's nephrologist in regards to patient's lupus nephritis and the possibility of taking aspirin 81 mg daily  Patient cleared to take aspirin 81 mg daily  Recommend appropriate blood pressure control with goal of <130/80 and LDL goal <70  Recommend mediterranean diet & regular exercise regimen atleast 4-5 times a week for 20-30 minutes.   Patient given another referral for cardiology due to all vasculitic workup being negative.  Monitor for worsening symptoms  Call for questions or concerns    Orders:    MRI brain without contrast; Future    Ambulatory Referral to Cardiology; Future    SLE (systemic lupus erythematosus related syndrome) (HCC)  Continue Plaquenil as per rheumatology.  As per patient, no plan for initiating rituximab at this time.    Plan:  Continue follow-up with rheumatology    Orders:    MRI brain without contrast; Future    Ambulatory Referral to Cardiology; Future    Primary hypertension  Continue taking all hypertension medications.      Plan:  Recommend blood pressure goal less than 130/80.       Gait difficulty  Patient is having significant difficulties due to his back pain and has transitioned from a cane to a walker now.  Patient has an appointment with pain management.    Plan:  Recommend that the patient attend the appointment and discuss appropriate management for his pain.         The patient indicates understanding of these issues and agrees with the plan.    Return in about 2 weeks (around 10/10/2024).    This patient case was discussed with Dr. Mulligan.      Encounter provider Suzanna Mulligan MD    The patient was identified by name and date of  birth. Donta Hunter was informed that this is a telemedicine visit and that the visit is being conducted through the Epic Embedded platform. He agrees to proceed..  My office door was closed. The patient was notified the following individuals were present in the room, .  He acknowledged consent and understanding of privacy and security of the video platform. The patient has agreed to participate and understands they can discontinue the visit at any time.    Patient is aware this is a billable service.     History of Present Illness       Donta Hunter is a 54 y.o. male who presents virtually in regards to follow-up for his multiple strokes.  Patient has a past medical history of strokes, SLE, hypertension, spasticity, DVT, neuropathic pain, lupus nephritis, DJD, hyperparathyroidism, and CAD.  Patient was last seen in the office on 5/31/2024 by Dr. Mulligan.    Patient states that he is taking the Eliquis 5 mg twice daily but has stopped taking the Plavix 75 mg approximately 2 weeks ago.  Patient states that he did not communicate with anybody else but he stopped taking it on his own due to brownish color in his urine.  Also, since the last 15 days patient has had this new right-sided arm numbness and weakness.  Other than that, patient denies any other new neurological symptoms.    In addition, patient is complaining about severe back pain which is worsening and has resulted in him using a walker compared to a cane.  Patient does state he has an appointment with pain management in October.  Of note, patient was given a referral for occupational therapy due to his back pain but he never initiated that.    In regards to his SLE, patient states that he is still on the Plaquenil and there is no plan of initiating rituximab.    Patient has seen cardiology as well and they do not feel the need to proceed with a loop recorder at this time given that his strokes are most likely secondary to SLE.    There are no other questions  or complaints at this time.    History obtained from : patient  Review of Systems   Constitutional: Negative.    HENT: Negative.     Eyes: Negative.    Respiratory: Negative.     Cardiovascular: Negative.    Gastrointestinal: Negative.    Endocrine: Negative.    Genitourinary:  Positive for hematuria (brown like color).   Musculoskeletal:  Positive for back pain (lower back).   Skin: Negative.    Allergic/Immunologic: Negative.    Neurological:  Positive for weakness and numbness (legs & feet). Negative for dizziness, seizures, syncope, facial asymmetry, speech difficulty, light-headedness and headaches.   Psychiatric/Behavioral: Negative.     All other systems reviewed and are negative.    Pertinent Medical History     Medical History Reviewed by provider this encounter:       Past Medical History   Past Medical History:   Diagnosis Date    Anemia     Arthritis     Cervical mass     Chronic kidney disease     COPD, group B, by GOLD 2017 classification ([facility]) 7/13/2020    Coronary artery disease     Depression     DVT (deep venous thrombosis) ([facility])     Hypertension     Lupus ([facility])     Renal disorder     Stroke (cerebrum) ([facility]) 6/7/2024     Past Surgical History:   Procedure Laterality Date    APPENDECTOMY      CERVICAL SPINE SURGERY      CHOLECYSTECTOMY      COLONOSCOPY N/A 8/23/2018    Procedure: COLONOSCOPY;  Surgeon: James Wise III, MD;  Location: [facility];  Service: Gastroenterology    ESOPHAGOGASTRODUODENOSCOPY N/A 8/22/2018    Procedure: ESOPHAGOGASTRODUODENOSCOPY (EGD);  Surgeon: James Wise III, MD;  Location: [facility];  Service: Gastroenterology    IR CEREBRAL ANGIOGRAPHY  7/24/2024    IR IVC FILTER PLACEMENT PERMANENT  9/7/2017    IR IVC FILTER REMOVAL  4/23/2018    IR LUMBAR PUNCTURE  11/23/2015    IR LUMBAR PUNCTURE  4/25/2024    NECK SURGERY      US GUIDED INJECTION FOR RESEARCH STUDY  4/23/2018    US GUIDED INJECTION FOR RESEARCH STUDY  9/7/2017    VASCULAR SURGERY       Family History    Problem Relation Age of Onset    Heart disease Mother     No Known Problems Father      Current Outpatient Medications on File Prior to Visit   Medication Sig Dispense Refill    apixaban (ELIQUIS) 5 mg Take 5 mg by mouth 2 (two) times a day      baclofen 20 mg tablet Take 1 tablet (20 mg total) by mouth 2 (two) times a day 90 tablet 0    betamethasone, augmented, (DIPROLENE) 0.05 % ointment Apply 1 application. topically 2 (two) times a day      clobetasol (TEMOVATE) 0.05 % cream       Diclofenac Sodium (VOLTAREN) 1 % Apply 2 g topically 4 (four) times a day 300 g 1    famotidine (PEPCID) 20 mg tablet Take 1 tablet (20 mg total) by mouth 2 (two) times a day 180 tablet 3    furosemide (LASIX) 40 mg tablet       gabapentin (NEURONTIN) 600 MG tablet Take 1 tablet (600 mg total) by mouth 3 (three) times a day 270 tablet 1    hydroxychloroquine (PLAQUENIL) 200 mg tablet Take 2 tablets (400 mg total) by mouth daily at bedtime for 180 doses 180 tablet 1    ketoconazole (NIZORAL) 2 % cream Apply topically daily 60 g 1    lidocaine-prilocaine (EMLA) cream Apply topically as needed for mild pain 30 g 3    mycophenolate (CELLCEPT) 500 mg tablet Take 500 mg by mouth every 12 (twelve) hours       potassium chloride (Klor-Con M20) 20 mEq tablet Take 1 tablet (20 mEq total) by mouth daily 90 tablet 0    sildenafil (VIAGRA) 50 MG tablet Take 1 tablet (50 mg total) by mouth daily as needed for erectile dysfunction 10 tablet 0    tamsulosin (FLOMAX) 0.4 mg Take 1 capsule (0.4 mg total) by mouth daily with dinner 30 capsule 5    bacitracin topical ointment 500 units/g topical ointment APPLY TOPICALLY TO AFFECTED AREA THREE TIMES A DAY AS DIRECTED      clopidogrel (Plavix) 75 mg tablet Take 1 tablet (75 mg total) by mouth daily (Patient not taking: Reported on 8/16/2024) 30 tablet 3    escitalopram (LEXAPRO) 10 mg tablet Take 1 tablet (10 mg total) by mouth daily 90 tablet 3    gabapentin (NEURONTIN) 300 mg capsule Take 1 capsule  (300 mg total) by mouth 3 (three) times a day 270 capsule 1    loratadine (CLARITIN) 10 mg tablet Take 1 tablet (10 mg total) by mouth daily 30 tablet 2    methylPREDNISolone 4 MG tablet therapy pack Use as directed on package (Patient not taking: Reported on 8/27/2024) 21 tablet 0    sodium chloride 1 g tablet Take 2 tablets (2 g total) by mouth 3 (three) times a day with meals 540 tablet 2     No current facility-administered medications on file prior to visit.     Allergies   Allergen Reactions    Doxycycline Rash    Sulfa Antibiotics Rash      Current Outpatient Medications on File Prior to Visit   Medication Sig Dispense Refill    apixaban (ELIQUIS) 5 mg Take 5 mg by mouth 2 (two) times a day      baclofen 20 mg tablet Take 1 tablet (20 mg total) by mouth 2 (two) times a day 90 tablet 0    betamethasone, augmented, (DIPROLENE) 0.05 % ointment Apply 1 application. topically 2 (two) times a day      clobetasol (TEMOVATE) 0.05 % cream       Diclofenac Sodium (VOLTAREN) 1 % Apply 2 g topically 4 (four) times a day 300 g 1    famotidine (PEPCID) 20 mg tablet Take 1 tablet (20 mg total) by mouth 2 (two) times a day 180 tablet 3    furosemide (LASIX) 40 mg tablet       gabapentin (NEURONTIN) 600 MG tablet Take 1 tablet (600 mg total) by mouth 3 (three) times a day 270 tablet 1    hydroxychloroquine (PLAQUENIL) 200 mg tablet Take 2 tablets (400 mg total) by mouth daily at bedtime for 180 doses 180 tablet 1    ketoconazole (NIZORAL) 2 % cream Apply topically daily 60 g 1    lidocaine-prilocaine (EMLA) cream Apply topically as needed for mild pain 30 g 3    mycophenolate (CELLCEPT) 500 mg tablet Take 500 mg by mouth every 12 (twelve) hours       potassium chloride (Klor-Con M20) 20 mEq tablet Take 1 tablet (20 mEq total) by mouth daily 90 tablet 0    sildenafil (VIAGRA) 50 MG tablet Take 1 tablet (50 mg total) by mouth daily as needed for erectile dysfunction 10 tablet 0    tamsulosin (FLOMAX) 0.4 mg Take 1 capsule (0.4  mg total) by mouth daily with dinner 30 capsule 5    bacitracin topical ointment 500 units/g topical ointment APPLY TOPICALLY TO AFFECTED AREA THREE TIMES A DAY AS DIRECTED      clopidogrel (Plavix) 75 mg tablet Take 1 tablet (75 mg total) by mouth daily (Patient not taking: Reported on 8/16/2024) 30 tablet 3    escitalopram (LEXAPRO) 10 mg tablet Take 1 tablet (10 mg total) by mouth daily 90 tablet 3    gabapentin (NEURONTIN) 300 mg capsule Take 1 capsule (300 mg total) by mouth 3 (three) times a day 270 capsule 1    loratadine (CLARITIN) 10 mg tablet Take 1 tablet (10 mg total) by mouth daily 30 tablet 2    methylPREDNISolone 4 MG tablet therapy pack Use as directed on package (Patient not taking: Reported on 8/27/2024) 21 tablet 0    sodium chloride 1 g tablet Take 2 tablets (2 g total) by mouth 3 (three) times a day with meals 540 tablet 2     No current facility-administered medications on file prior to visit.      Social History     Tobacco Use    Smoking status: Never    Smokeless tobacco: Never   Vaping Use    Vaping status: Never Used   Substance and Sexual Activity    Alcohol use: Never    Drug use: Never    Sexual activity: Yes     Partners: Female, Male     Birth control/protection: Rhythm         Objective     There were no vitals taken for this visit.  Physical Exam  Vitals reviewed.   Constitutional:       Appearance: Normal appearance.   HENT:      Head: Normocephalic and atraumatic.   Eyes:      Extraocular Movements: Extraocular movements intact.   Pulmonary:      Effort: Pulmonary effort is normal.   Skin:     General: Skin is warm.   Neurological:      Mental Status: He is alert.      Cranial Nerves: No cranial nerve deficit (No facial asymmetry appreciated, patient able to stick out his tongue and wiggle it side-to-side.).      Coordination: Coordination normal (Finger-to-nose intact).      Comments: Patient using a walker to ambulate   Psychiatric:         Mood and Affect: Mood normal.          Behavior: Behavior normal.         Visit Time  Total Visit Duration: 40 minutes

## 2024-10-01 ENCOUNTER — APPOINTMENT (OUTPATIENT)
Dept: LAB | Facility: CLINIC | Age: 54
End: 2024-10-01
Payer: COMMERCIAL

## 2024-10-01 DIAGNOSIS — M32.9 SLE (SYSTEMIC LUPUS ERYTHEMATOSUS RELATED SYNDROME) (HCC): Chronic | ICD-10-CM

## 2024-10-01 LAB
BASOPHILS # BLD AUTO: 0.03 THOUSANDS/ÂΜL (ref 0–0.1)
BASOPHILS NFR BLD AUTO: 1 % (ref 0–1)
C3 SERPL-MCNC: 108 MG/DL (ref 87–200)
EOSINOPHIL # BLD AUTO: 0.06 THOUSAND/ÂΜL (ref 0–0.61)
EOSINOPHIL NFR BLD AUTO: 2 % (ref 0–6)
ERYTHROCYTE [DISTWIDTH] IN BLOOD BY AUTOMATED COUNT: 14 % (ref 11.6–15.1)
HCT VFR BLD AUTO: 44.3 % (ref 36.5–49.3)
HGB BLD-MCNC: 15.1 G/DL (ref 12–17)
IMM GRANULOCYTES # BLD AUTO: 0.01 THOUSAND/UL (ref 0–0.2)
IMM GRANULOCYTES NFR BLD AUTO: 0 % (ref 0–2)
LYMPHOCYTES # BLD AUTO: 1.34 THOUSANDS/ÂΜL (ref 0.6–4.47)
LYMPHOCYTES NFR BLD AUTO: 41 % (ref 14–44)
MCH RBC QN AUTO: 30.5 PG (ref 26.8–34.3)
MCHC RBC AUTO-ENTMCNC: 34.1 G/DL (ref 31.4–37.4)
MCV RBC AUTO: 90 FL (ref 82–98)
MONOCYTES # BLD AUTO: 0.54 THOUSAND/ÂΜL (ref 0.17–1.22)
MONOCYTES NFR BLD AUTO: 16 % (ref 4–12)
NEUTROPHILS # BLD AUTO: 1.32 THOUSANDS/ÂΜL (ref 1.85–7.62)
NEUTS SEG NFR BLD AUTO: 40 % (ref 43–75)
NRBC BLD AUTO-RTO: 0 /100 WBCS
PLATELET # BLD AUTO: 167 THOUSANDS/UL (ref 149–390)
PMV BLD AUTO: 10.9 FL (ref 8.9–12.7)
RBC # BLD AUTO: 4.95 MILLION/UL (ref 3.88–5.62)
WBC # BLD AUTO: 3.3 THOUSAND/UL (ref 4.31–10.16)

## 2024-10-01 PROCEDURE — 86160 COMPLEMENT ANTIGEN: CPT

## 2024-10-01 PROCEDURE — 36415 COLL VENOUS BLD VENIPUNCTURE: CPT

## 2024-10-01 PROCEDURE — 85025 COMPLETE CBC W/AUTO DIFF WBC: CPT

## 2024-10-01 PROCEDURE — 86225 DNA ANTIBODY NATIVE: CPT

## 2024-10-01 NOTE — H&P (VIEW-ONLY)
Assessment:  1. Spinal stenosis of lumbar region, unspecified whether neurogenic claudication present    2. Polyneuropathy    3. Lumbar facet arthropathy        Plan:  Orders Placed This Encounter   Procedures    FL spine and pain procedure     Standing Status:   Future     Standing Expiration Date:   10/2/2028     Order Specific Question:   Reason for Exam:     Answer:   b/l L3-4 L4-5 and L5-S1 MBB #1     Order Specific Question:   Anticoagulant hold needed?     Answer:   NO       New Medications Ordered This Visit   Medications    capsicum (ZOSTRIX) 0.075 % topical cream     Sig: Apply topically 3 (three) times a day Wash hands before and after use.     Dispense:  57 g     Refill:  0       My impressions and treatment recommendations were discussed in detail with the patient, who verbalized understanding and had no further questions.    54M with history of stroke and SLE presents with notable axial back pain and paresthesias in both feet and neuropathic pain below the legs. He has two separate issues.    He has axial back pain secondary to facet arthropathy. Despite canal narrowing and multilevel foraminal stenosis, denies clear radicular  features. He has had LESI 2 years ago without relief. We will perform  LUMBAR mbb for diagnostic measures. If >80% relief, then will move forward to RFA.    hE Also is having symptoms related to polyneuropathy with numbness, burning, tingling in stocking distribution. On high dose gabapentin with no rleief. Also tried cymbalta in the past. Will order capsaicin cream. If no relief, then we can consider patches.     [place] Prescription Drug Monitoring Program report was reviewed and was appropriate     Complete risks and benefits including bleeding, infection, tissue reaction, nerve injury and allergic reaction were discussed. The approach was demonstrated using models and literature was provided. Verbal and written consent was obtained.     Discharge instructions were  provided. I personally saw and examined the patient and I agree with the above discussed plan of care.    History of Present Illness:    Donta Hunter is a 54 y.o. male who presents to [facility] for initial evaluation of the above stated pain complaints. The patient has a past medical and chronic pain history as outlined in the assessment section. He was referred by Hanane Bob MD  [address] [id number] .    Patient here with chief complaint of lower back pain for several years.  Reports symptoms stem from an injury at work in 2014.  Current pain score 8 out of 10.  Constant.  Evening nighttime.  Burning, numbness in nature.  There is subjective weakness of the lower extremities.  Also reports bilateral leg paresthesias and pain.    Patient does have history of stroke in the past.  Also has history of lupus.    No tobacco or marijuana use.  Not allergic to latex or contrast dye.    Patient also has history of cervical fusion.    He has EMG diagnosed polyneuropathy of the lower extremities.    He has seen different pain management specialist in the past.  Most recently saw Dr. Crowe    Review of Systems:    Review of Systems   Cardiovascular:  Positive for leg swelling.   Musculoskeletal:  Positive for arthralgias, back pain and gait problem.   Skin:  Positive for rash.   Neurological:  Positive for numbness.           Past Medical History:   Diagnosis Date    Anemia     Arthritis     Cervical mass     Chronic kidney disease     COPD, group B, by GOLD 2017 classification ([facility]) 7/13/2020    Coronary artery disease     Depression     DVT (deep venous thrombosis) ([facility])     Hypertension     Lupus     Renal disorder     Stroke (cerebrum) ([facility]) 6/7/2024       Past Surgical History:   Procedure Laterality Date    APPENDECTOMY      CERVICAL SPINE SURGERY      CHOLECYSTECTOMY      COLONOSCOPY N/A 8/23/2018    Procedure: COLONOSCOPY;  Surgeon: James Wise III, MD;   Location: [facility];  Service: Gastroenterology    ESOPHAGOGASTRODUODENOSCOPY N/A 8/22/2018    Procedure: ESOPHAGOGASTRODUODENOSCOPY (EGD);  Surgeon: James Wise III, MD;  Location: [facility];  Service: Gastroenterology    IR CEREBRAL ANGIOGRAPHY  7/24/2024    IR IVC FILTER PLACEMENT PERMANENT  9/7/2017    IR IVC FILTER REMOVAL  4/23/2018    IR LUMBAR PUNCTURE  11/23/2015    IR LUMBAR PUNCTURE  4/25/2024    NECK SURGERY      US GUIDED INJECTION FOR RESEARCH STUDY  4/23/2018    US GUIDED INJECTION FOR RESEARCH STUDY  9/7/2017    VASCULAR SURGERY         Family History   Problem Relation Age of Onset    Heart disease Mother     No Known Problems Father        Social History     Occupational History    Not on file   Tobacco Use    Smoking status: Never    Smokeless tobacco: Never   Vaping Use    Vaping status: Never Used   Substance and Sexual Activity    Alcohol use: Never    Drug use: Never    Sexual activity: Yes     Partners: Female, Male     Birth control/protection: Rhythm         Current Outpatient Medications:     apixaban (ELIQUIS) 5 mg, Take 5 mg by mouth 2 (two) times a day, Disp: , Rfl:     aspirin 81 mg chewable tablet, Chew 1 tablet (81 mg total) daily, Disp: 30 tablet, Rfl: 3    baclofen 20 mg tablet, Take 1 tablet (20 mg total) by mouth 2 (two) times a day, Disp: 90 tablet, Rfl: 0    betamethasone, augmented, (DIPROLENE) 0.05 % ointment, Apply 1 application. topically 2 (two) times a day, Disp: , Rfl:     capsicum (ZOSTRIX) 0.075 % topical cream, Apply topically 3 (three) times a day Wash hands before and after use., Disp: 57 g, Rfl: 0    clobetasol (TEMOVATE) 0.05 % cream, , Disp: , Rfl:     Diclofenac Sodium (VOLTAREN) 1 %, Apply 2 g topically 4 (four) times a day, Disp: 300 g, Rfl: 1    famotidine (PEPCID) 20 mg tablet, Take 1 tablet (20 mg total) by mouth 2 (two) times a day, Disp: 180 tablet, Rfl: 3    furosemide (LASIX) 40 mg tablet, , Disp: , Rfl:     gabapentin (NEURONTIN) 300 mg capsule,  "Take 1 capsule (300 mg total) by mouth 3 (three) times a day, Disp: 270 capsule, Rfl: 1    gabapentin (NEURONTIN) 600 MG tablet, Take 1 tablet (600 mg total) by mouth 3 (three) times a day, Disp: 270 tablet, Rfl: 1    hydroxychloroquine (PLAQUENIL) 200 mg tablet, Take 2 tablets (400 mg total) by mouth daily at bedtime for 180 doses, Disp: 180 tablet, Rfl: 1    ketoconazole (NIZORAL) 2 % cream, Apply topically daily, Disp: 60 g, Rfl: 1    lidocaine-prilocaine (EMLA) cream, Apply topically as needed for mild pain, Disp: 30 g, Rfl: 3    mycophenolate (CELLCEPT) 500 mg tablet, Take 500 mg by mouth every 12 (twelve) hours , Disp: , Rfl:     potassium chloride (Klor-Con M20) 20 mEq tablet, Take 1 tablet (20 mEq total) by mouth daily, Disp: 90 tablet, Rfl: 0    sildenafil (VIAGRA) 50 MG tablet, Take 1 tablet (50 mg total) by mouth daily as needed for erectile dysfunction, Disp: 10 tablet, Rfl: 0    tamsulosin (FLOMAX) 0.4 mg, Take 1 capsule (0.4 mg total) by mouth daily with dinner, Disp: 30 capsule, Rfl: 5    bacitracin topical ointment 500 units/g topical ointment, APPLY TOPICALLY TO AFFECTED AREA THREE TIMES A DAY AS DIRECTED, Disp: , Rfl:     clopidogrel (Plavix) 75 mg tablet, Take 1 tablet (75 mg total) by mouth daily (Patient not taking: Reported on 8/16/2024), Disp: 30 tablet, Rfl: 3    escitalopram (LEXAPRO) 10 mg tablet, Take 1 tablet (10 mg total) by mouth daily, Disp: 90 tablet, Rfl: 3    loratadine (CLARITIN) 10 mg tablet, Take 1 tablet (10 mg total) by mouth daily, Disp: 30 tablet, Rfl: 2    methylPREDNISolone 4 MG tablet therapy pack, Use as directed on package (Patient not taking: Reported on 8/27/2024), Disp: 21 tablet, Rfl: 0    sodium chloride 1 g tablet, Take 2 tablets (2 g total) by mouth 3 (three) times a day with meals, Disp: 540 tablet, Rfl: 2    Allergies   Allergen Reactions    Doxycycline Rash    Sulfa Antibiotics Rash       Physical Exam:    /85   Pulse 72   Ht 5' 6\" (1.676 m)   Wt 84.8 " kg (187 lb)   BMI 30.18 kg/m²     Constitutional: normal, well developed, well nourished, alert, in no distress and non-toxic and no overt pain behavior.  Eyes: anicteric  HEENT: grossly intact  Neck: supple, symmetric, trachea midline and no masses   Pulmonary:even and unlabored  Cardiovascular:No edema or pitting edema present  Skin:Normal without rashes or lesions and well hydrated  Psychiatric:Mood and affect appropriate  Neurologic:Cranial Nerves II-XII grossly intact  Musculoskeletal:normal    Lumbar Spine Exam    Appearance:  Normal lordosis  Palpation/Tenderness:  Ttp bilateral lumbar facet joints  Sensory:  no sensory deficits noted  Range of Motion:  Notable pain with lumbar extension  Motor Strength:  Left hip flexion:  4/5  Left hip extension:  5/5  Right hip flexion: 4/5  Right hip extension:  5/5  Left knee flexion:  5/5  Left knee extension:  5/5  Right knee flexion:  5/5  Right knee extension:  5/5  Left foot dorsiflexion:  5/5  Left foot plantar flexion:  5/5  Right foot dorsiflexion:  5/5  Right foot plantar flexion:  5/5  Reflexes:  Left Patellar: 3+   Right Patellar:  3+   Left Achilles:  3+   Right Achilles: 3+           Imaging    MRI THORACIC SPINE WITHOUT CONTRAST              3/18/24     INDICATION: M62.81: Muscle weakness (generalized)  R25.2: Cramp and spasm  R29.898: Other symptoms and signs involving the musculoskeletal system.     COMPARISON: MRI thoracic spine dated 1/23/2019.     TECHNIQUE:  Multiplanar, multisequence imaging of the thoracic spine was performed. .     IMAGE QUALITY: Diagnostic.     FINDINGS:     ALIGNMENT: No subluxation. No scoliosis. No subluxation. Stable mild chronic compression fractures at T7 and T8 with prominent Schmorl's node at the inferior endplate of T7. Vertebral are otherwise normal in height.     MARROW SIGNAL:  Normal marrow signal is identified within the visualized bony structures.  No discrete marrow lesion.     THORACIC CORD: Normal signal within  the thoracic cord.     PARAVERTEBRAL SOFT TISSUES:  Normal.     THORACIC DEGENERATIVE CHANGE: T10-11: Small disc bulge with tiny central protrusion and facet arthropathy. Mild canal stenosis. Small disc bulges at other levels without significant canal stenosis     OTHER FINDINGS:  None.     IMPRESSION:     No acute abnormality. Mild degenerative spondylosis. Stable mild chronic compression fractures at T7-T8.       LUMBAR SPINE         3/18/24     INDICATION:   Muscle weakness (generalized). Cramp and spasm. Spinal stenosis, lumbar region with neurogenic claudication. Other symptoms and signs involving the musculoskeletal system.      COMPARISON: 2/22/2024     VIEWS:  XR SPINE LUMBAR MINIMUM 4 VIEWS NON INJURY  Images: 4     FINDINGS:     There are 5 non rib bearing lumbar vertebral bodies.      There is no evidence of acute fracture or destructive osseous lesion.     Mild scoliotic deformity is noted. Grade 1 anterolisthesis L4-5.      Age-appropriate lumbar degenerative changes are seen.     The pedicles appear intact.     Soft tissues are unremarkable.     IMPRESSION:        No acute osseous abnormality.       Degenerative changes as described.  FL spine and pain procedure    (Results Pending)       Orders Placed This Encounter   Procedures    FL spine and pain procedure

## 2024-10-01 NOTE — PROGRESS NOTES
Assessment:  1. Spinal stenosis of lumbar region, unspecified whether neurogenic claudication present    2. Polyneuropathy    3. Lumbar facet arthropathy        Plan:  Orders Placed This Encounter   Procedures    FL spine and pain procedure     Standing Status:   Future     Standing Expiration Date:   10/2/2028     Order Specific Question:   Reason for Exam:     Answer:   b/l L3-4 L4-5 and L5-S1 MBB #1     Order Specific Question:   Anticoagulant hold needed?     Answer:   NO       New Medications Ordered This Visit   Medications    capsicum (ZOSTRIX) 0.075 % topical cream     Sig: Apply topically 3 (three) times a day Wash hands before and after use.     Dispense:  57 g     Refill:  0       My impressions and treatment recommendations were discussed in detail with the patient, who verbalized understanding and had no further questions.    54M with history of stroke and SLE presents with notable axial back pain and paresthesias in both feet and neuropathic pain below the legs. He has two separate issues.    He has axial back pain secondary to facet arthropathy. Despite canal narrowing and multilevel foraminal stenosis, denies clear radicular  features. He has had LESI 2 years ago without relief. We will perform  LUMBAR mbb for diagnostic measures. If >80% relief, then will move forward to RFA.    hE Also is having symptoms related to polyneuropathy with numbness, burning, tingling in stocking distribution. On high dose gabapentin with no rleief. Also tried cymbalta in the past. Will order capsaicin cream. If no relief, then we can consider patches.     [place] Prescription Drug Monitoring Program report was reviewed and was appropriate     Complete risks and benefits including bleeding, infection, tissue reaction, nerve injury and allergic reaction were discussed. The approach was demonstrated using models and literature was provided. Verbal and written consent was obtained.     Discharge instructions were  provided. I personally saw and examined the patient and I agree with the above discussed plan of care.    History of Present Illness:    Donta Hunter is a 54 y.o. male who presents to [facility] for initial evaluation of the above stated pain complaints. The patient has a past medical and chronic pain history as outlined in the assessment section. He was referred by Hanane Bob MD  [address] [id number] .    Patient here with chief complaint of lower back pain for several years.  Reports symptoms stem from an injury at work in 2014.  Current pain score 8 out of 10.  Constant.  Evening nighttime.  Burning, numbness in nature.  There is subjective weakness of the lower extremities.  Also reports bilateral leg paresthesias and pain.    Patient does have history of stroke in the past.  Also has history of lupus.    No tobacco or marijuana use.  Not allergic to latex or contrast dye.    Patient also has history of cervical fusion.    He has EMG diagnosed polyneuropathy of the lower extremities.    He has seen different pain management specialist in the past.  Most recently saw Dr. Crowe    Review of Systems:    Review of Systems   Cardiovascular:  Positive for leg swelling.   Musculoskeletal:  Positive for arthralgias, back pain and gait problem.   Skin:  Positive for rash.   Neurological:  Positive for numbness.           Past Medical History:   Diagnosis Date    Anemia     Arthritis     Cervical mass     Chronic kidney disease     COPD, group B, by GOLD 2017 classification ([facility]) 7/13/2020    Coronary artery disease     Depression     DVT (deep venous thrombosis) ([facility])     Hypertension     Lupus     Renal disorder     Stroke (cerebrum) ([facility]) 6/7/2024       Past Surgical History:   Procedure Laterality Date    APPENDECTOMY      CERVICAL SPINE SURGERY      CHOLECYSTECTOMY      COLONOSCOPY N/A 8/23/2018    Procedure: COLONOSCOPY;  Surgeon: James Wise III, MD;   Location: [facility];  Service: Gastroenterology    ESOPHAGOGASTRODUODENOSCOPY N/A 8/22/2018    Procedure: ESOPHAGOGASTRODUODENOSCOPY (EGD);  Surgeon: James Wise III, MD;  Location: [facility];  Service: Gastroenterology    IR CEREBRAL ANGIOGRAPHY  7/24/2024    IR IVC FILTER PLACEMENT PERMANENT  9/7/2017    IR IVC FILTER REMOVAL  4/23/2018    IR LUMBAR PUNCTURE  11/23/2015    IR LUMBAR PUNCTURE  4/25/2024    NECK SURGERY      US GUIDED INJECTION FOR RESEARCH STUDY  4/23/2018    US GUIDED INJECTION FOR RESEARCH STUDY  9/7/2017    VASCULAR SURGERY         Family History   Problem Relation Age of Onset    Heart disease Mother     No Known Problems Father        Social History     Occupational History    Not on file   Tobacco Use    Smoking status: Never    Smokeless tobacco: Never   Vaping Use    Vaping status: Never Used   Substance and Sexual Activity    Alcohol use: Never    Drug use: Never    Sexual activity: Yes     Partners: Female, Male     Birth control/protection: Rhythm         Current Outpatient Medications:     apixaban (ELIQUIS) 5 mg, Take 5 mg by mouth 2 (two) times a day, Disp: , Rfl:     aspirin 81 mg chewable tablet, Chew 1 tablet (81 mg total) daily, Disp: 30 tablet, Rfl: 3    baclofen 20 mg tablet, Take 1 tablet (20 mg total) by mouth 2 (two) times a day, Disp: 90 tablet, Rfl: 0    betamethasone, augmented, (DIPROLENE) 0.05 % ointment, Apply 1 application. topically 2 (two) times a day, Disp: , Rfl:     capsicum (ZOSTRIX) 0.075 % topical cream, Apply topically 3 (three) times a day Wash hands before and after use., Disp: 57 g, Rfl: 0    clobetasol (TEMOVATE) 0.05 % cream, , Disp: , Rfl:     Diclofenac Sodium (VOLTAREN) 1 %, Apply 2 g topically 4 (four) times a day, Disp: 300 g, Rfl: 1    famotidine (PEPCID) 20 mg tablet, Take 1 tablet (20 mg total) by mouth 2 (two) times a day, Disp: 180 tablet, Rfl: 3    furosemide (LASIX) 40 mg tablet, , Disp: , Rfl:     gabapentin (NEURONTIN) 300 mg capsule,  "Take 1 capsule (300 mg total) by mouth 3 (three) times a day, Disp: 270 capsule, Rfl: 1    gabapentin (NEURONTIN) 600 MG tablet, Take 1 tablet (600 mg total) by mouth 3 (three) times a day, Disp: 270 tablet, Rfl: 1    hydroxychloroquine (PLAQUENIL) 200 mg tablet, Take 2 tablets (400 mg total) by mouth daily at bedtime for 180 doses, Disp: 180 tablet, Rfl: 1    ketoconazole (NIZORAL) 2 % cream, Apply topically daily, Disp: 60 g, Rfl: 1    lidocaine-prilocaine (EMLA) cream, Apply topically as needed for mild pain, Disp: 30 g, Rfl: 3    mycophenolate (CELLCEPT) 500 mg tablet, Take 500 mg by mouth every 12 (twelve) hours , Disp: , Rfl:     potassium chloride (Klor-Con M20) 20 mEq tablet, Take 1 tablet (20 mEq total) by mouth daily, Disp: 90 tablet, Rfl: 0    sildenafil (VIAGRA) 50 MG tablet, Take 1 tablet (50 mg total) by mouth daily as needed for erectile dysfunction, Disp: 10 tablet, Rfl: 0    tamsulosin (FLOMAX) 0.4 mg, Take 1 capsule (0.4 mg total) by mouth daily with dinner, Disp: 30 capsule, Rfl: 5    bacitracin topical ointment 500 units/g topical ointment, APPLY TOPICALLY TO AFFECTED AREA THREE TIMES A DAY AS DIRECTED, Disp: , Rfl:     clopidogrel (Plavix) 75 mg tablet, Take 1 tablet (75 mg total) by mouth daily (Patient not taking: Reported on 8/16/2024), Disp: 30 tablet, Rfl: 3    escitalopram (LEXAPRO) 10 mg tablet, Take 1 tablet (10 mg total) by mouth daily, Disp: 90 tablet, Rfl: 3    loratadine (CLARITIN) 10 mg tablet, Take 1 tablet (10 mg total) by mouth daily, Disp: 30 tablet, Rfl: 2    methylPREDNISolone 4 MG tablet therapy pack, Use as directed on package (Patient not taking: Reported on 8/27/2024), Disp: 21 tablet, Rfl: 0    sodium chloride 1 g tablet, Take 2 tablets (2 g total) by mouth 3 (three) times a day with meals, Disp: 540 tablet, Rfl: 2    Allergies   Allergen Reactions    Doxycycline Rash    Sulfa Antibiotics Rash       Physical Exam:    /85   Pulse 72   Ht 5' 6\" (1.676 m)   Wt 84.8 " kg (187 lb)   BMI 30.18 kg/m²     Constitutional: normal, well developed, well nourished, alert, in no distress and non-toxic and no overt pain behavior.  Eyes: anicteric  HEENT: grossly intact  Neck: supple, symmetric, trachea midline and no masses   Pulmonary:even and unlabored  Cardiovascular:No edema or pitting edema present  Skin:Normal without rashes or lesions and well hydrated  Psychiatric:Mood and affect appropriate  Neurologic:Cranial Nerves II-XII grossly intact  Musculoskeletal:normal    Lumbar Spine Exam    Appearance:  Normal lordosis  Palpation/Tenderness:  Ttp bilateral lumbar facet joints  Sensory:  no sensory deficits noted  Range of Motion:  Notable pain with lumbar extension  Motor Strength:  Left hip flexion:  4/5  Left hip extension:  5/5  Right hip flexion: 4/5  Right hip extension:  5/5  Left knee flexion:  5/5  Left knee extension:  5/5  Right knee flexion:  5/5  Right knee extension:  5/5  Left foot dorsiflexion:  5/5  Left foot plantar flexion:  5/5  Right foot dorsiflexion:  5/5  Right foot plantar flexion:  5/5  Reflexes:  Left Patellar: 3+   Right Patellar:  3+   Left Achilles:  3+   Right Achilles: 3+           Imaging    MRI THORACIC SPINE WITHOUT CONTRAST              3/18/24     INDICATION: M62.81: Muscle weakness (generalized)  R25.2: Cramp and spasm  R29.898: Other symptoms and signs involving the musculoskeletal system.     COMPARISON: MRI thoracic spine dated 1/23/2019.     TECHNIQUE:  Multiplanar, multisequence imaging of the thoracic spine was performed. .     IMAGE QUALITY: Diagnostic.     FINDINGS:     ALIGNMENT: No subluxation. No scoliosis. No subluxation. Stable mild chronic compression fractures at T7 and T8 with prominent Schmorl's node at the inferior endplate of T7. Vertebral are otherwise normal in height.     MARROW SIGNAL:  Normal marrow signal is identified within the visualized bony structures.  No discrete marrow lesion.     THORACIC CORD: Normal signal within  the thoracic cord.     PARAVERTEBRAL SOFT TISSUES:  Normal.     THORACIC DEGENERATIVE CHANGE: T10-11: Small disc bulge with tiny central protrusion and facet arthropathy. Mild canal stenosis. Small disc bulges at other levels without significant canal stenosis     OTHER FINDINGS:  None.     IMPRESSION:     No acute abnormality. Mild degenerative spondylosis. Stable mild chronic compression fractures at T7-T8.       LUMBAR SPINE         3/18/24     INDICATION:   Muscle weakness (generalized). Cramp and spasm. Spinal stenosis, lumbar region with neurogenic claudication. Other symptoms and signs involving the musculoskeletal system.      COMPARISON: 2/22/2024     VIEWS:  XR SPINE LUMBAR MINIMUM 4 VIEWS NON INJURY  Images: 4     FINDINGS:     There are 5 non rib bearing lumbar vertebral bodies.      There is no evidence of acute fracture or destructive osseous lesion.     Mild scoliotic deformity is noted. Grade 1 anterolisthesis L4-5.      Age-appropriate lumbar degenerative changes are seen.     The pedicles appear intact.     Soft tissues are unremarkable.     IMPRESSION:        No acute osseous abnormality.       Degenerative changes as described.  FL spine and pain procedure    (Results Pending)       Orders Placed This Encounter   Procedures    FL spine and pain procedure

## 2024-10-02 ENCOUNTER — CONSULT (OUTPATIENT)
Dept: PAIN MEDICINE | Facility: CLINIC | Age: 54
End: 2024-10-02
Payer: COMMERCIAL

## 2024-10-02 ENCOUNTER — PATIENT MESSAGE (OUTPATIENT)
Dept: RADIOLOGY | Facility: CLINIC | Age: 54
End: 2024-10-02

## 2024-10-02 VITALS
WEIGHT: 187 LBS | SYSTOLIC BLOOD PRESSURE: 124 MMHG | HEART RATE: 72 BPM | HEIGHT: 66 IN | DIASTOLIC BLOOD PRESSURE: 85 MMHG | BODY MASS INDEX: 30.05 KG/M2

## 2024-10-02 DIAGNOSIS — M47.816 LUMBAR FACET ARTHROPATHY: ICD-10-CM

## 2024-10-02 DIAGNOSIS — G62.9 POLYNEUROPATHY: ICD-10-CM

## 2024-10-02 DIAGNOSIS — M48.061 SPINAL STENOSIS OF LUMBAR REGION, UNSPECIFIED WHETHER NEUROGENIC CLAUDICATION PRESENT: Primary | ICD-10-CM

## 2024-10-02 LAB — C4 SERPL-MCNC: 48 MG/DL (ref 19–52)

## 2024-10-02 PROCEDURE — 99204 OFFICE O/P NEW MOD 45 MIN: CPT | Performed by: STUDENT IN AN ORGANIZED HEALTH CARE EDUCATION/TRAINING PROGRAM

## 2024-10-02 RX ORDER — CAPSAICIN 0.75 MG/G
CREAM TOPICAL 3 TIMES DAILY
Qty: 57 G | Refills: 0 | Status: SHIPPED | OUTPATIENT
Start: 2024-10-02

## 2024-10-02 NOTE — PATIENT INSTRUCTIONS
Patient Education     Capsaicin (sheryl SAY sin)   Nombres comerciales: EE. UU. Alivio [OTC]; Allevess [OTC]; Arthritis Pain Relieving [OTC]; Asperflex Hot Pain Relieving [OTC]; Axsain [OTC] [DSC]; Bio-Therm Pain Relieving [OTC] [DSC]; Capsaicin HP [OTC]; Capsaicin Topical Pain Patch [OTC]; Capsiderm [OTC]; Capzix [OTC]; Castiva Warming [OTC]; Circata [OTC]; Dendracin Neurodendraxcin [OTC]; DermacinRx Circatrix [OTC]; DermacinRx Penetral [OTC]; Douleurin [OTC] [DSC]; Levatio; Limencin [OTC]; MenCaps [OTC]; Menthozen Hydrogel [OTC]; New Terocin [OTC]; Qutenza; Qutenza (2 Patch); Qutenza (4 Patch); Reliever [OTC]; [place] [DSC]; Salonpas Pain Rel Gel-Ptch Hot [OTC]; Salonpas-Hot [OTC]; Sure Result SR Relief [OTC]; Zostrix HP [OTC]; Zostrix Natural Pain Relief [OTC]; Zostrix-HP [OTC] [DSC]   ¿Para qué se utiliza chau medicamento?   Se utiliza para aliviar los meche musculares y de articulaciones.  Se utiliza para tratar el dolor neurálgico.  Se le puede recetar chau medicamento por otras razones. Consulte son parikh médico.  ¿Qué necesito decirle a mi médico ANTES de cj chau medicamento?   Si es alérgico a chau medicamento, a algún componente de chau medicamento, o a otros medicamentos, alimentos o sustancias. Informe a parikh médico acerca de esta alergia y qué síntomas ha presentado.  Si usa otro medicamento lindsay chau. Si no está seguro, pregúntele al médico o al farmacéutico.  Esta no es gonzalo lista de todos los medicamentos o trastornos que pueden interactuar con chau medicamento.  Informe a parikh médico y farmacéutico acerca de todos los medicamentos que tome (gabi estos recetados o de venta jesus manuel, productos naturales, vitaminas) y los trastornos que tenga. Debe verificar que sea seguro para usted cj chau medicamento junto con todos patience otros medicamentos y trastornos. No empiece, detenga ni modifique la dosis de ningún medicamento sin consultar antes al médico.  ¿Qué geronimo saber o hacer mientras jose chau medicamento?    Todas las presentaciones:   Avise a todos patience proveedores de atención médica que annabel chau medicamento. [place] incluye a los médicos, enfermeras, farmacéuticos y dentistas.  No cubra el área (con vendas, apósitos) a menos que se lo indique el médico.  Evite el uso de sharma de calor (lindsay lámparas merry, chad merry, almohadillas calientes, mantas eléctricas, lámparas de calor, saunas, jacuzzis, chad de [place], etc.). Evite cj largos timothy calientes y exponerse al sol. Parikh temperatura podría aumentar y podría ingresar gonzalo gran cantidad de medicamento a parikh organismo.  Retire el medicamento de la piel si siente ardor o picazón.  No inhale chau medicamento. Puede causar irritación de la nariz o la garganta, lindsay toser o estornudar. Busque asistencia médica de inmediato si inhala chau medicamento y siente falta de aire.  Informe a parikh médico si está embarazada, tiene intención de embarazarse o está amamantando. Tendrá que hablar acerca de los riesgos y beneficios para usted y el bebé.  Qutenza:   Parikh piel puede ser sensible al calor mckenna unos días después de usar chau medicamento. [place] incluye sharma de calor, lindsay duchas o timothy calientes, minda solar, lámparas merry, chad de bronceado, almohadillas térmicas, mantas eléctricas, lámparas de calor, saunas, jacuzzis, chad de agua calentadas. También incluye el calor del ejercicio.  Crema, gel, líquido o loción:   Tenga cuidado con la piel si el área afectada es muy extensa. Hable con el médico.  Algunos de estos medicamentos pueden prenderse ana lilia. No lo utilice cerca de gonzalo llama abierta o mientras fuma.  Chau medicamento puede causar daño si se traga. Si traga chau medicamento, llame a un médico o un centro de toxicología de inmediato.  Parche:   Es posible que chau medicamento cause daños si se mastica o se traga. También se aplica a parches usados. Si chau medicamento llega a la boca por accidente, llame a un médico o un centro de toxicología de  inmediato.  ¿Cuáles son los efectos secundarios por los que geronimo llamar a mi médico de inmediato?   ADVERTENCIA/PRECAUCIÓN: A pesar de que es muy poco frecuente, algunas personas pueden sufrir efectos secundarios muy graves, que causen incluso la muerte, al cj un medicamento. Si presenta alguno de los siguientes signos o síntomas que puedan estar relacionados con un efecto secundario muy grave, infórmelo a parikh médico o busque asistencia médica de inmediato:  Todas las presentaciones:   Signos de reacción alérgica tales lindsay sarpullido; urticaria; picazón; piel enrojecida, hinchada, con ampollas o descamada, con o sin fiebre; sibilancia; opresión en el pecho o la garganta; problemas para respirar, tragar o hablar; ronquera inusual; o hinchazón de la boca, el mary, los labios, la lengua o la garganta.  Qutenza:   Signos de erica presión lindsay meche de amanda o mareos muy graves, desmayos o cambios en la visión.  Pérdida de sensación o sentido del tacto que es nueva o empeora.  ¿Qué otros efectos secundarios tiene chau medicamento?   Todos los medicamentos pueden tener efectos secundarios. Sin embargo, muchas personas no tienen ningún efecto secundario o tienen solamente efectos secundarios menores. Llame a parikh médico o busque asistencia médica si le molesta alguno de estos efectos secundarios o no desaparece:  Todas las presentaciones:   Sensación de ardor o picazón. Por lo general, esto desaparecerá después de algunos días.  Irritación en la carlitos en que se utilizó el medicamento.  Qutenza:   Incomodidad en el área en la que se lo ha colocado.  Estos no son todos los efectos secundarios que podrían ocurrir. Si tiene preguntas acerca de los efectos secundarios, llame al médico. Llame al médico para que lo aconseje acerca de los efectos secundarios.  Puede informar los efectos secundarios al organismo de arturo de parikh país.  Puede informar sobre los efectos secundarios a la FDA al [phone]. También puede informar  sobre los efectos secundarios en https://www.fda.gov/medwatch.  ¿Cómo se annabel mejor chau medicamento?   [place] chau medicamento según las indicaciones de parikh médico. [place] toda la información que se le brinde. Siga todas las instrucciones con atención.  Qutenza:   Parikh proveedor de arturo médica lo aplicará.  No toque el parche mientras se encuentra sobre la piel.  No toque el carlitos de la piel donde se colocó chau medicamento.  Todas las demás presentaciones:   No tome chau medicamento por vía oral. Solo para uso cutáneo. No lo acerque a la boca, la nariz ni los ojos (puede arder).  Limpie la carlitos afectada antes de usar. Asegúrese de secarla padmini.  Puede ponerse guantes para aplicarlo.  No lo aplique a la piel irritada.  No aplique sobre sampson, raspones o piel dañada.  Crema, gel, líquido, loción o parche dérmico:   No se bañe, duche o nade inmediatamente después de la aplicación.  No aplicar inmediatamente después de gonzalo ducha o baño.  Si chau medicamento entra en patience ojos, enjuague con agua fría.  Crema, gel, líquido o loción:   Lávese las rere antes y después de usar. Si parikh mano es el área tratada, no lave parikh mano después de parikh uso.  Aplique gonzalo capa jocelyn en la piel afectada y frote suavemente.  Si el tratamiento es en las rere, no se lave las rere mckenna los 30 minutos posteriores a la aplicación del medicamento. No se toque los ojos, la nariz, la boca, los genitales, la piel que esté irritada o los lentes de contacto hasta que se haya lavado las rere.  Parche cutáneo:   Lávese las rere antes y después de usar.  Aplique en la piel limpia, seca y jodi.  ¿Qué geronimo hacer si no jose gonzalo dosis?   Si annabel chau medicamento con regularidad, tome la dosis que falta tan pronto lindsay lo recuerde.  Si ya claudine es hora de la dosis siguiente, sáltese la dosis faltante y continúe con el horario habitual.  No aplique doble dosis ni dosis adicionales.  Muchas veces chau medicamento se utiliza según sea necesario. No lo utilice con  gonzalo frecuencia mayor a la indicada por el médico.  ¿Cómo almaceno o descarto chau medicamento?   Qutenza:   Chau medicamento se le administrará en un hospital o consultorio médico. No conservará chau medicamento en parikh casa.  Todas las demás presentaciones:   Conserve a temperatura ambiente.  Crema, gel, líquido o loción:   Proteja del calor o del ana lilia.  Parche cutáneo:   Proteja del calor y alyson solar.  Todas las presentaciones:   Guarde los medicamentos en un lugar seguro. Mantenga todo medicamento fuera del alcance de los niños y las mascotas.  Deseche los medicamentos sin usar o que hayan expirado. No los tire por el retrete ni los vierta al desagüe a menos que así se lo indiquen. Consulte con el farmacéutico si tiene preguntas sobre la mejor manera de desechar medicamentos. Pueden existir programas de devolución de medicamentos en parikh área.  Datos generales sobre el medicamento   Si patience síntomas o trastornos no mejoran o si empeoran, llame al médico.  No comparta parikh medicamento con otras personas ni tome el medicamento de ninguna otra persona.  Algunos medicamentos pueden tener otro folleto informativo para el paciente. Si tiene alguna pregunta sobre chau medicamento, hable con parikh médico, enfermera, farmacéutico u otro proveedor de atención médica.  Algunos medicamentos pueden tener otro folleto informativo para el paciente. Consulte al farmacéutico. Si tiene alguna pregunta sobre chau medicamento, hable con parikh médico, enfermera, farmacéutico u otro proveedor de atención médica.  Si beny que ha habido gonzalo sobredosis, llame al centro de toxicología local o busque atención médica de inmediato. Prepárese para responder qué se ingirió, qué cantidad y cuándo.  Uso de la información por el consumidor y exención de responsabilidad   Esta información general es un resumen limitado de la información sobre el diagnóstico, el tratamiento y/o la medicación. No pretende ser exhaustivo y debe utilizarse lindsay gonzalo  "herramienta para ayudar al usuario a comprender y/o evaluar las posibles opciones de diagnóstico y tratamiento. NO incluye toda la información sobre las enfermedades, los tratamientos, los medicamentos, los efectos secundarios o los riesgos que pueden aplicarse a un paciente específico. No pretende ser un consejo médico ni un sustituto del consejo médico, el diagnóstico o el tratamiento de un proveedor de atención médica basado en el examen y la evaluación del proveedor de atención médica de las circunstancias específicas y únicas de un paciente. Los pacientes deben hablar con un proveedor de atención médica para obtener información completa sobre parikh arturo, preguntas médicas y opciones de tratamiento, incluidos los riesgos o beneficios relacionados con el uso de medicamentos. Esta información no respalda ningún tratamiento o medicamento lindsay seguro, eficaz o aprobado para tratar a un paciente específico. [employer] y patience afiliados renuncian a cualquier garantía o responsabilidad relacionada con esta información o con el uso que se keshia de esta. El uso de esta información se rige por las Condiciones de uso, disponibles en https://www.[employer].com/en/know/clinical-effectiveness-terms.  Última fecha de revisión   2023-03-09  Derechos de autor   © 2024 [employer] y patience licenciantes y/o afiliados. Todos los derechos reservados.  Patient Education     Inyección epidural   Conceptos Básicos   Redactado por los médicos y editores de [employer]   ¿Qué es gonzalo inyección epidural? -- Se puede utilizar gonzalo inyección epidural para tratar un padecimiento llamado \"radiculopatía\". Alla es el término médico para referirse al dolor, debilidad, adormecimiento u hormigueo que se produce cuando algo prensa o daña los nervios de la médula noonan.  El médico inyecta medicinas en el espacio fuera del recubrimiento de la médula noonan (figura 1). [place] es similar a gonzalo inyección \"epidural\" que se usa para aliviar el dolor mckenna " "el trabajo de parto y el parto.  Las inyecciones epidurales se pueden poner en diferentes partes de la espalda:   Inyección epidural cervical - Se utiliza para ayudar con el dolor de amanda o de brazos.   Inyección epidural torácica - Se utiliza para ayudar con el dolor en la parte superior o media de la espalda.   Inyección epidural lumbar - Se utiliza para ayudar con el dolor en la parte baja de la espalda o en las piernas.  ¿Cómo me preparo para gonzalo inyección epidural? -- El médico o enfermero le dirá si debe hacer algo especial para prepararse. Antes de parikh procedimiento, parikh médico le hará un examen. Podría enviarlo a hacerse pruebas, tales lindsay:   Radiografías, ultrasonido u otros estudios de imagen - Estos estudios crean imágenes del interior del cuerpo.  Parikh médico también le preguntará sobre patience \"antecedentes de arturo\". [place] implica hacerle preguntas sobre cualquier problema de arturo que tenga o haya tenido en el pasado, cirugías anteriores y cualquier medicina que tome. Cuéntele sobre:   Cualquier medicina que esté tomando - [place] incluye cualquier medicina recetada o \"de venta jesus manuel\" que use, además de cualquier suplemento a base de hierbas que tome. Es útil anotar y llevar gonzalo lista de todas las medicinas que annabel, o llevar gonzalo bolsa con todas patience medicinas.   Cualquier alergia que tenga   Cualquier problema de sangrado que tenga - Ciertas medicinas, entre las que se incluyen algunas hierbas y suplementos, pueden aumentar el riesgo de sangrado. Algunos padecimientos de arturo también incrementan chau riesgo.  También recibirá información sobre lo siguiente:   Hallie y beber antes del procedimiento - En algunos casos, es posible que deba estar en \"ayunas\" antes del procedimiento. [place] significa no comer ni beber nada mckenna un tiempo. En otros casos, posiblemente pueda beber líquidos hasta un rato antes del procedimiento. El requisito de ayunar y la duración del ayuno dependen del procedimiento que se vaya a " "realizar.   Qué ayuda necesitará cuando regrese a parikh casa - Por ejemplo, quizás necesite que lo lleven a parikh casa o que alguien se quede con usted mckenna algún tiempo mientras se recupera.  Pregunte al médico o enfermero si tiene dudas o si hay algo que no entiende.  ¿Qué sucede mckenna gonzalo inyección epidural? -- Cuando llega el momento del procedimiento:   Podrían colocarle gonzalo \"vía intravenosa\", que es un tubo colin que se coloca en gonzalo vena. [place] para darle líquidos y medicinas.   Le darán medicinas anestésicas para adormecer el área donde el médico le aplicará la inyección. [place] permite garantizar que no sienta dolor mckenna el procedimiento. También es posible que reciba medicinas para relajarse y sentir sueño, llamadas \"sedantes\".   Los médicos y enfermeros le revisarán la respiración, la presión arterial y la frecuencia cardíaca mckenna el procedimiento.   El médico podría utilizar gonzalo radiografía continua llamada \"fluoroscopia\". [place] es para ayudar a garantizar que las medicinas se inyecten en el lugar correcto. El médico también podría inyectar un tinte para louie dónde administrar la medicina.   El médico colocará gonzalo aguja a través de parikh piel e inyectará la medicina en un espacio cerca de parikh columna. Luego retirará la aguja y cubrirá la carlitos con gonzalo venda limpia.   El procedimiento dura de 15 a 30 minutos.  ¿Qué sucede después de gonzalo inyección epidural? -- Después del procedimiento, el personal médico lo vigilará estrechamente mckenna un tiempo breve. Es posible que pasen algunos días antes de que sienta los efectos de la inyección epidural.  Antes de regresar a parikh casa, asegúrese de saber a cuáles problemas prestar atención y cuándo llamar al médico. Asegúrese de comprender las instrucciones del médico o enfermero. [place] preguntas sobre cualquier cosa que no entienda.  Mckenna el norm del día después de parikh procedimiento:   Intente descansar. Limite actividades lindsay hacer ejercicio o conducir.   El " "médico podría recomendar gonzalo medicina para el dolor de venta sin receta. Estas medicinas incluyen paracetamol (acetaminofén) (ejemplo de lizbet comercial: Tylenol), ibuprofeno (ejemplos de marcas comerciales: Advil, Motrin) y  naproxeno (ejemplo de lizbet comercial: Aleve).   El hielo puede ayudar a aliviar el dolor y la inflamación. Coloque un paquete de gel frío, gonzalo bolsa de hielo o gonzalo bolsa de verduras congeladas en la carlitos de la inyección cada hora a cada 2 horas, mckenna 15 minutos cada vez. Coloque gonzalo toalla delgada entre el hielo (o el objeto frío) y la piel.  ¿Cuáles son los riesgos de gonzalo inyección epidural? -- Dan médico le hablará sobre todos los riesgos posibles, y responderá a patience preguntas. Los riesgos posibles son, entre otros:   Sangrado   Infección   Dolor de amanda   Lesión en los nervios  ¿Cuándo geronimo llamar al médico? -- Pida ayuda de emergencia de inmediato (en . . y [place], llame al 9-1-1) si:   No puede  los brazos o las piernas.  Llame para pedir asesoramiento si:   Tiene fiebre de 100.4 °F (38 °C) o más, o escalofríos.   Tiene enrojecimiento o inflamación alrededor del lugar de la inyección.   Tiene dolor de amanda.   Tiene adormecimiento, debilidad u hormigueo en las piernas o los brazos.  Todos los artículos se actualizan a medida que se descubre nueva evidencia y culmina nuestro proceso de evaluación por homólogos   Alla artículo se recuperó de UpToDate el: May 15, 2024.  Artículo [id number] Versión [id number]  Release: 32.4.3 - C32.134  © 2024 [employer] Todos los derechos reservados.  figura 1: Inyección epidural     Mckenna gonzalo inyección epidural, el médico inserta gonzalo aguja entre dos de los huesos que drea la columna. Luego, inyectan medicamentos en el área de alrededor de la médula noonan. Esta es gonzalo ilustración de gonzalo inyección epidural \"lumbar\", que se aplica en la parte baja de la espalda. El médico puede colocar la aguja en otras áreas para tratar otros tipos de " cinthia.  [facility] [id number] Versión 1.0  Exención de responsabilidad y uso de la información del consumidor   Descargo de responsabilidad: esta información generalizada es un resumen limitado de información sobre el diagnóstico, el tratamiento y/o los medicamentos. No pretende ser exhaustiva y se debe utilizar lindsay herramienta para ayudar al usuario a comprender y/o evaluar las posibles opciones de diagnóstico y tratamiento. No incluye toda la información sobre afecciones, tratamientos, medicamentos, efectos secundarios o riesgos puedan ser aplicables a un paciente específico. No tiene el propósito de servir lindsay recomendación médica ni de sustituir la recomendación médica, el diagnóstico o el tratamiento de un profesional de atención médica que se base en el examen y la evaluación de chau profesional de la arturo respecto a las circunstancias específicas y únicas del paciente. Los pacientes deben hablar con un profesional de atención médica para obtener información completa sobre parikh arturo, cuestiones médicas y opciones de tratamiento, incluidos los riesgos o los beneficios relacionados con el uso de medicamentos. Esta información no certifica que los tratamientos o medicamentos gabi seguros, eficaces o estén aprobados para tratar a un paciente específico. [place] y patience afiliados renuncian a cualquier garantía o responsabilidad relacionada con esta información o el uso de la misma.El uso de esta información está sujeto a las Condiciones de uso, disponibles en https://www."[employer]er.com/en/know/clinical-effectiveness-terms. 2024© UpToDate[employer] Inc. y patience afiliados y/o licenciantes. Todos los derechos reservados.  Copyright   © 2024 [employer]Date, Inc. Todos los derechos reservados.

## 2024-10-02 NOTE — PATIENT COMMUNICATION
Pt is scheduled for MBB with Dr Salinas on 10/15/24    Pt is not diabetic and reports he does not have a pacemaker or defibrillator    Pt given instructions in office and via myc message    Have you completed PT/HEP/Chiro in the past 6 months for dedicated area? Pt completed AT with [facility] from 6/13/24 thru 7/31/24 -- per chart, pt reported to Dr Barnett on 7/31/24 that his pain continued even after AT  If yes, how long did you complete?  What was the frequency?  Did it provide relief?  If no, reason therapy was not completed?

## 2024-10-03 LAB — DSDNA AB SER QL CLIF: NEGATIVE

## 2024-10-07 ENCOUNTER — OFFICE VISIT (OUTPATIENT)
Dept: INTERNAL MEDICINE CLINIC | Facility: CLINIC | Age: 54
End: 2024-10-07
Payer: COMMERCIAL

## 2024-10-07 VITALS
OXYGEN SATURATION: 97 % | DIASTOLIC BLOOD PRESSURE: 76 MMHG | HEIGHT: 66 IN | HEART RATE: 66 BPM | BODY MASS INDEX: 30.05 KG/M2 | WEIGHT: 187 LBS | SYSTOLIC BLOOD PRESSURE: 124 MMHG | RESPIRATION RATE: 16 BRPM

## 2024-10-07 DIAGNOSIS — M79.675 PAIN OF TOE OF LEFT FOOT: Primary | ICD-10-CM

## 2024-10-07 DIAGNOSIS — Z12.5 SCREENING FOR MALIGNANT NEOPLASM OF PROSTATE: ICD-10-CM

## 2024-10-07 DIAGNOSIS — R25.2 SPASTICITY: ICD-10-CM

## 2024-10-07 DIAGNOSIS — N52.9 ERECTILE DYSFUNCTION, UNSPECIFIED ERECTILE DYSFUNCTION TYPE: ICD-10-CM

## 2024-10-07 PROCEDURE — 99214 OFFICE O/P EST MOD 30 MIN: CPT | Performed by: INTERNAL MEDICINE

## 2024-10-07 RX ORDER — BACLOFEN 20 MG/1
20 TABLET ORAL 2 TIMES DAILY
Qty: 90 TABLET | Refills: 0 | Status: SHIPPED | OUTPATIENT
Start: 2024-10-07

## 2024-10-07 RX ORDER — BACLOFEN 20 MG/1
20 TABLET ORAL 2 TIMES DAILY
Qty: 90 TABLET | Refills: 0 | Status: CANCELLED | OUTPATIENT
Start: 2024-10-07

## 2024-10-07 RX ORDER — SILDENAFIL 50 MG/1
50 TABLET, FILM COATED ORAL DAILY PRN
Qty: 10 TABLET | Refills: 0 | Status: SHIPPED | OUTPATIENT
Start: 2024-10-07

## 2024-10-07 NOTE — PROGRESS NOTES
Ambulatory Visit  Name: Donta Hunter      : 1970      MRN: [id number]  Encounter Provider: Raad Batres MD  Encounter Date: 10/7/2024   Encounter department: [facility] INTERNAL MEDICINE [place]    Assessment & Plan  Pain of toe of left foot    Patient reports left third toe pain, was seen in urgent care end of August for this, will be seeing podiatry on .  No open wound noted.  No signs of infection.  But he does note significant pain.  X-ray at the time was normal.  We will obtain another x-ray, ESR. There is a left third toe pea sized ulcer to the lateral, distal aspect of the toe. No drainage. Tender to palpation.     Orders:    Sedimentation rate, automated; Future    XR foot 2 vw left; Future    XR toe left third min 2 views; Future    Spasticity    Orders:    baclofen 20 mg tablet; Take 1 tablet (20 mg total) by mouth 2 (two) times a day    Erectile dysfunction, unspecified erectile dysfunction type    Orders:    sildenafil (VIAGRA) 50 MG tablet; Take 1 tablet (50 mg total) by mouth daily as needed for erectile dysfunction    Screening for malignant neoplasm of prostate    Orders:    PSA, total and free; Future      Depression Screening and Follow-up Plan: Clincally patient does not have depression. No treatment is required.     History of Present Illness   Here with concerns about his feet.      History obtained from : patient  Review of Systems   Constitutional:  Negative for chills and fever.   HENT:  Negative for ear pain and sore throat.    Eyes:  Negative for pain and visual disturbance.   Respiratory:  Negative for cough and shortness of breath.    Cardiovascular:  Negative for chest pain and palpitations.   Gastrointestinal:  Negative for abdominal pain and vomiting.   Genitourinary:  Negative for dysuria and hematuria.   Musculoskeletal:  Negative for arthralgias and back pain.   Skin:  Negative for color change and rash.   Neurological:  Negative for seizures and  syncope.   All other systems reviewed and are negative.    Past Medical History   Past Medical History:   Diagnosis Date    Anemia     Arthritis     Cervical mass     Chronic kidney disease     COPD, group B, by GOLD 2017 classification ([facility]) 7/13/2020    Coronary artery disease     Depression     DVT (deep venous thrombosis) ([facility])     Hypertension     Lupus     Renal disorder     Stroke (cerebrum) ([facility]) 6/7/2024     Past Surgical History:   Procedure Laterality Date    APPENDECTOMY      CERVICAL SPINE SURGERY      CHOLECYSTECTOMY      COLONOSCOPY N/A 8/23/2018    Procedure: COLONOSCOPY;  Surgeon: James Wise III, MD;  Location: [facility];  Service: Gastroenterology    ESOPHAGOGASTRODUODENOSCOPY N/A 8/22/2018    Procedure: ESOPHAGOGASTRODUODENOSCOPY (EGD);  Surgeon: James Wise III, MD;  Location: [facility];  Service: Gastroenterology    IR CEREBRAL ANGIOGRAPHY  7/24/2024    IR IVC FILTER PLACEMENT PERMANENT  9/7/2017    IR IVC FILTER REMOVAL  4/23/2018    IR LUMBAR PUNCTURE  11/23/2015    IR LUMBAR PUNCTURE  4/25/2024    NECK SURGERY      US GUIDED INJECTION FOR RESEARCH STUDY  4/23/2018    US GUIDED INJECTION FOR RESEARCH STUDY  9/7/2017    VASCULAR SURGERY       Family History   Problem Relation Age of Onset    Heart disease Mother     No Known Problems Father      Current Outpatient Medications on File Prior to Visit   Medication Sig Dispense Refill    apixaban (ELIQUIS) 5 mg Take 5 mg by mouth 2 (two) times a day      aspirin 81 mg chewable tablet Chew 1 tablet (81 mg total) daily 30 tablet 3    bacitracin topical ointment 500 units/g topical ointment APPLY TOPICALLY TO AFFECTED AREA THREE TIMES A DAY AS DIRECTED      betamethasone, augmented, (DIPROLENE) 0.05 % ointment Apply 1 application. topically 2 (two) times a day      capsicum (ZOSTRIX) 0.075 % topical cream Apply topically 3 (three) times a day Wash hands before and after use. 57 g 0    clobetasol (TEMOVATE) 0.05 % cream       Diclofenac  Sodium (VOLTAREN) 1 % Apply 2 g topically 4 (four) times a day 300 g 1    escitalopram (LEXAPRO) 10 mg tablet Take 1 tablet (10 mg total) by mouth daily 90 tablet 3    famotidine (PEPCID) 20 mg tablet Take 1 tablet (20 mg total) by mouth 2 (two) times a day 180 tablet 3    furosemide (LASIX) 40 mg tablet       gabapentin (NEURONTIN) 300 mg capsule Take 1 capsule (300 mg total) by mouth 3 (three) times a day 270 capsule 1    gabapentin (NEURONTIN) 600 MG tablet Take 1 tablet (600 mg total) by mouth 3 (three) times a day 270 tablet 1    hydroxychloroquine (PLAQUENIL) 200 mg tablet Take 2 tablets (400 mg total) by mouth daily at bedtime for 180 doses 180 tablet 1    ketoconazole (NIZORAL) 2 % cream Apply topically daily 60 g 1    lidocaine-prilocaine (EMLA) cream Apply topically as needed for mild pain 30 g 3    loratadine (CLARITIN) 10 mg tablet Take 1 tablet (10 mg total) by mouth daily 30 tablet 2    mycophenolate (CELLCEPT) 500 mg tablet Take 500 mg by mouth every 12 (twelve) hours       potassium chloride (Klor-Con M20) 20 mEq tablet Take 1 tablet (20 mEq total) by mouth daily 90 tablet 0    sodium chloride 1 g tablet Take 2 tablets (2 g total) by mouth 3 (three) times a day with meals 540 tablet 2    tamsulosin (FLOMAX) 0.4 mg Take 1 capsule (0.4 mg total) by mouth daily with dinner 30 capsule 5    [DISCONTINUED] baclofen 20 mg tablet Take 1 tablet (20 mg total) by mouth 2 (two) times a day 90 tablet 0    [DISCONTINUED] sildenafil (VIAGRA) 50 MG tablet Take 1 tablet (50 mg total) by mouth daily as needed for erectile dysfunction 10 tablet 0    clopidogrel (Plavix) 75 mg tablet Take 1 tablet (75 mg total) by mouth daily (Patient not taking: Reported on 8/16/2024) 30 tablet 3    methylPREDNISolone 4 MG tablet therapy pack Use as directed on package (Patient not taking: Reported on 8/27/2024) 21 tablet 0     No current facility-administered medications on file prior to visit.     Allergies   Allergen Reactions     "Doxycycline Rash    Sulfa Antibiotics Rash          Objective   /76 (BP Location: Left arm, Patient Position: Sitting, Cuff Size: Adult)   Pulse 66   Resp 16   Ht 5' 6\" (1.676 m)   Wt 84.8 kg (187 lb)   SpO2 97%   BMI 30.18 kg/m²     Physical Exam  Vitals and nursing note reviewed.   Constitutional:       General: He is not in acute distress.     Appearance: He is well-developed.   HENT:      Head: Normocephalic and atraumatic.   Eyes:      Conjunctiva/sclera: Conjunctivae normal.   Cardiovascular:      Rate and Rhythm: Normal rate.      Heart sounds: No murmur heard.  Pulmonary:      Effort: Pulmonary effort is normal. No respiratory distress.   Abdominal:      Tenderness: There is no abdominal tenderness.   Musculoskeletal:         General: No swelling.   Skin:     General: Skin is warm and dry.      Capillary Refill: Capillary refill takes less than 2 seconds.      Comments: Left third toe pea sized ulcer to the lateral, distal aspect of the toe. No drainage. Tender to palpation.        Neurological:      Mental Status: He is alert.      Gait: Gait abnormal.   Psychiatric:         Mood and Affect: Mood normal.       I have spent a total time of 15 minutes in caring for this patient on the day of the visit/encounter including Instructions for management, Risk factor reductions, Documenting in the medical record, and Reviewing / ordering tests, medicine, procedures  .  "

## 2024-10-08 ENCOUNTER — TELEPHONE (OUTPATIENT)
Age: 54
End: 2024-10-08

## 2024-10-08 ENCOUNTER — APPOINTMENT (OUTPATIENT)
Dept: RADIOLOGY | Facility: CLINIC | Age: 54
End: 2024-10-08
Payer: COMMERCIAL

## 2024-10-08 DIAGNOSIS — M79.675 PAIN OF TOE OF LEFT FOOT: ICD-10-CM

## 2024-10-08 PROCEDURE — 73630 X-RAY EXAM OF FOOT: CPT

## 2024-10-08 NOTE — TELEPHONE ENCOUNTER
Pt called and was given x ray results          XR foot 3+ vw left: Patient Communication     Append Comments   Seen    Appears normal   Written by Raad Batres MD on 10/8/2024  9:15 AM EDT  Seen by patient Donta Hunter on 10/8/2024 10:41 AM

## 2024-10-08 NOTE — TELEPHONE ENCOUNTER
Patient was informed per provider she will send in a steriod and patient can take OVC  Tylenol patient verbally acknowledged.

## 2024-10-09 ENCOUNTER — OFFICE VISIT (OUTPATIENT)
Dept: URGENT CARE | Facility: CLINIC | Age: 54
End: 2024-10-09
Payer: COMMERCIAL

## 2024-10-09 ENCOUNTER — APPOINTMENT (OUTPATIENT)
Dept: RADIOLOGY | Facility: CLINIC | Age: 54
End: 2024-10-09
Payer: COMMERCIAL

## 2024-10-09 VITALS
WEIGHT: 185 LBS | TEMPERATURE: 97 F | BODY MASS INDEX: 29.86 KG/M2 | OXYGEN SATURATION: 99 % | DIASTOLIC BLOOD PRESSURE: 75 MMHG | SYSTOLIC BLOOD PRESSURE: 124 MMHG | HEART RATE: 88 BPM | RESPIRATION RATE: 18 BRPM

## 2024-10-09 DIAGNOSIS — I70.229 ATHEROSCLEROSIS OF ARTERY OF EXTREMITY WITH REST PAIN (HCC): Primary | ICD-10-CM

## 2024-10-09 DIAGNOSIS — M79.671 RIGHT FOOT PAIN: ICD-10-CM

## 2024-10-09 PROCEDURE — 99214 OFFICE O/P EST MOD 30 MIN: CPT | Performed by: NURSE PRACTITIONER

## 2024-10-09 PROCEDURE — 73630 X-RAY EXAM OF FOOT: CPT

## 2024-10-15 ENCOUNTER — HOSPITAL ENCOUNTER (OUTPATIENT)
Dept: RADIOLOGY | Facility: CLINIC | Age: 54
Discharge: HOME | End: 2024-10-15
Payer: COMMERCIAL

## 2024-10-15 VITALS
OXYGEN SATURATION: 96 % | SYSTOLIC BLOOD PRESSURE: 114 MMHG | RESPIRATION RATE: 20 BRPM | DIASTOLIC BLOOD PRESSURE: 72 MMHG | HEART RATE: 76 BPM

## 2024-10-15 DIAGNOSIS — M47.816 LUMBAR FACET ARTHROPATHY: ICD-10-CM

## 2024-10-15 PROCEDURE — 64493 INJ PARAVERT F JNT L/S 1 LEV: CPT | Performed by: STUDENT IN AN ORGANIZED HEALTH CARE EDUCATION/TRAINING PROGRAM

## 2024-10-15 PROCEDURE — 64494 INJ PARAVERT F JNT L/S 2 LEV: CPT | Performed by: STUDENT IN AN ORGANIZED HEALTH CARE EDUCATION/TRAINING PROGRAM

## 2024-10-15 PROCEDURE — 64495 INJ PARAVERT F JNT L/S 3 LEV: CPT | Performed by: STUDENT IN AN ORGANIZED HEALTH CARE EDUCATION/TRAINING PROGRAM

## 2024-10-15 RX ORDER — BUPIVACAINE HCL/PF 2.5 MG/ML
4 VIAL (ML) INJECTION ONCE
Status: COMPLETED | OUTPATIENT
Start: 2024-10-15 | End: 2024-10-15

## 2024-10-15 RX ADMIN — BUPIVACAINE HYDROCHLORIDE 4 ML: 2.5 INJECTION, SOLUTION EPIDURAL; INFILTRATION; INTRACAUDAL at 11:08

## 2024-10-15 NOTE — INTERVAL H&P NOTE
Update: (This section must be completed if the H&P was completed greater than 24 hrs to procedure or admission)    H&P reviewed. After examining the patient, I find no changed to the H&P since it had been written.    Patient re-evaluated. Accept as history and physical.    Vasquez Salinas MD/October 15, 2024/10:52 AM

## 2024-10-15 NOTE — DISCHARGE INSTR - LAB
INSTRUCCIONES PARA EL KENYA DEL BLOQUEO DE LA NUPUR MEDIAL    ACTIVIDAD   Realice las actividades que normalmente provocarían el dolor que estamos midiendo. Por ejemplo, si cuando aspira o camina siente más dolor, keshia eso. [place] le brindará respuestas más precisas para el diario.   Puede ducharse, luly no tome timothy en tinas ni se aplique calor por hoy.    CUIDADO DEL ÁREA DE APLICACIÓN DE LA INYECCIÓN   Esta área puede estar entumecida mckenna varias horas después de la inyección.   Informe a [facility] si se presenta alguno de estos síntomas: enrojecimiento, secreción, inflamación o fiebre superior a 100 °F.    INSTRUCCIONES ESPECIALES  Envíe de regreso el diario de bloqueo de la nupur medial por correo, fax o pase a dejarlo.    MEDICAMENTOS   No tome analgésicos de acción a corto plazo o intermitente mckenna las 8 horas posteriores  al bloqueo.   Continúe tomando todos patience medicamentos de rutina.

## 2024-10-17 ENCOUNTER — OFFICE VISIT (OUTPATIENT)
Dept: PODIATRY | Facility: CLINIC | Age: 54
End: 2024-10-17
Payer: COMMERCIAL

## 2024-10-17 VITALS
HEART RATE: 97 BPM | SYSTOLIC BLOOD PRESSURE: 117 MMHG | HEIGHT: 66 IN | BODY MASS INDEX: 29.86 KG/M2 | DIASTOLIC BLOOD PRESSURE: 82 MMHG | OXYGEN SATURATION: 99 %

## 2024-10-17 DIAGNOSIS — B35.3 TINEA PEDIS OF BOTH FEET: Primary | ICD-10-CM

## 2024-10-17 DIAGNOSIS — L97.521 SKIN ULCER OF TOE OF LEFT FOOT, LIMITED TO BREAKDOWN OF SKIN (HCC): ICD-10-CM

## 2024-10-17 DIAGNOSIS — L08.9 INFECTION OF TOE: ICD-10-CM

## 2024-10-17 PROCEDURE — 99213 OFFICE O/P EST LOW 20 MIN: CPT | Performed by: PODIATRIST

## 2024-10-17 RX ORDER — ECONAZOLE NITRATE 10 MG/G
CREAM TOPICAL DAILY
Qty: 85 G | Refills: 1 | Status: SHIPPED | OUTPATIENT
Start: 2024-10-17 | End: 2024-10-24

## 2024-10-17 RX ORDER — CEPHALEXIN 500 MG/1
500 CAPSULE ORAL EVERY 8 HOURS SCHEDULED
Qty: 21 CAPSULE | Refills: 1 | Status: ON HOLD | OUTPATIENT
Start: 2024-10-17 | End: 2024-10-24

## 2024-10-17 NOTE — PROGRESS NOTES
Assessment/Plan:     The patient's clinical examination today significant for mild erythema to the plantar sulcus of the left forefoot encompassing the areas of the second third and fourth digits.  There is mild tenderness to palpation.  There are no open lesions.  There is some mild dry scaling skin consistent with an interdigital tinea pedis with secondary cellulitis.    I will start the patient on topical antifungal therapy with the econazole to be applied to the affected areas in both feet twice daily for 4 to 6 weeks.  Also started on a course of cephalexin 500 mg 3 times a day for management of his left foot cellulitis.    Follow-up in 2 weeks.         Diagnoses and all orders for this visit:    Tinea pedis of both feet  -     econazole nitrate 1 % cream; Apply topically daily    Skin ulcer of toe of left foot, limited to breakdown of skin (HCC)  -     Ambulatory Referral to Podiatry  -     cephalexin (KEFLEX) 500 mg capsule; Take 1 capsule (500 mg total) by mouth every 8 (eight) hours for 14 days    Infection of toe  -     Ambulatory Referral to Podiatry  -     cephalexin (KEFLEX) 500 mg capsule; Take 1 capsule (500 mg total) by mouth every 8 (eight) hours for 14 days          Subjective:     Patient ID: Donta Hunter is a 54 y.o. male.    The patient presents today with a chief complaint of left forefoot pain that has been present for a couple of weeks.  The patient denies any history of injury or trauma.  He is not having issues with his right foot.  He does have a history of chronic low back pain and recently underwent epidural injections in his back.        Review of Systems   Constitutional: Negative.    HENT: Negative.     Eyes: Negative.    Respiratory: Negative.     Cardiovascular: Negative.    Endocrine: Negative.    Musculoskeletal: Negative.    Neurological: Negative.    Hematological: Negative.    Psychiatric/Behavioral: Negative.           Objective:     Physical Exam  Vitals reviewed.    Constitutional:       Appearance: Normal appearance.   HENT:      Head: Normocephalic and atraumatic.      Nose: Nose normal.   Eyes:      Conjunctiva/sclera: Conjunctivae normal.      Pupils: Pupils are equal, round, and reactive to light.   Cardiovascular:      Pulses:           Dorsalis pedis pulses are 1+ on the right side and 1+ on the left side.        Posterior tibial pulses are 0 on the right side and 0 on the left side.   Pulmonary:      Effort: Pulmonary effort is normal.   Musculoskeletal:        Feet:    Feet:      Right foot:      Skin integrity: Dry skin present.      Left foot:      Skin integrity: Dry skin present.      Comments: The patient's clinical examination today significant for mild erythema to the plantar sulcus of the left forefoot encompassing the areas of the second third and fourth digits.  There is mild tenderness to palpation.  There are no open lesions.  There is some mild dry scaling skin consistent with an interdigital tinea pedis with secondary cellulitis.  Skin:     General: Skin is warm.      Capillary Refill: Capillary refill takes 2 to 3 seconds.   Neurological:      General: No focal deficit present.      Mental Status: He is alert and oriented to person, place, and time.   Psychiatric:         Mood and Affect: Mood normal.         Behavior: Behavior normal.         Thought Content: Thought content normal.

## 2024-10-18 ENCOUNTER — DOCUMENTATION (OUTPATIENT)
Dept: PAIN MEDICINE | Facility: CLINIC | Age: 54
End: 2024-10-18

## 2024-10-18 ENCOUNTER — TELEPHONE (OUTPATIENT)
Dept: RADIOLOGY | Facility: CLINIC | Age: 54
End: 2024-10-18

## 2024-10-18 ENCOUNTER — TELEPHONE (OUTPATIENT)
Dept: PAIN MEDICINE | Facility: CLINIC | Age: 54
End: 2024-10-18

## 2024-10-18 NOTE — TELEPHONE ENCOUNTER
Please schedule ov to discuss next step in treatment plan    Failed MBB #1. Pt had less than 80% relief throughout trial.  No f/u scheduled

## 2024-10-21 ENCOUNTER — APPOINTMENT (EMERGENCY)
Dept: VASCULAR ULTRASOUND | Facility: HOSPITAL | Age: 54
DRG: 383 | End: 2024-10-21
Payer: COMMERCIAL

## 2024-10-21 ENCOUNTER — HOSPITAL ENCOUNTER (INPATIENT)
Facility: HOSPITAL | Age: 54
LOS: 3 days | Discharge: HOME HEALTH | DRG: 383 | End: 2024-10-24
Attending: STUDENT IN AN ORGANIZED HEALTH CARE EDUCATION/TRAINING PROGRAM | Admitting: STUDENT IN AN ORGANIZED HEALTH CARE EDUCATION/TRAINING PROGRAM
Payer: COMMERCIAL

## 2024-10-21 DIAGNOSIS — L03.119 LOWER EXTREMITY CELLULITIS: ICD-10-CM

## 2024-10-21 DIAGNOSIS — B35.3 TINEA PEDIS OF BOTH FEET: ICD-10-CM

## 2024-10-21 DIAGNOSIS — B35.3 TINEA PEDIS OF LEFT FOOT: ICD-10-CM

## 2024-10-21 DIAGNOSIS — L97.521 SKIN ULCER OF TOE OF LEFT FOOT, LIMITED TO BREAKDOWN OF SKIN (HCC): ICD-10-CM

## 2024-10-21 DIAGNOSIS — L08.9 INFECTION OF TOE: ICD-10-CM

## 2024-10-21 DIAGNOSIS — L03.116 CELLULITIS OF LEFT LOWER EXTREMITY: Primary | ICD-10-CM

## 2024-10-21 LAB
ANION GAP SERPL CALCULATED.3IONS-SCNC: 8 MMOL/L (ref 4–13)
APTT PPP: 28 SECONDS (ref 23–34)
BASOPHILS # BLD MANUAL: 0 THOUSAND/UL (ref 0–0.1)
BASOPHILS NFR MAR MANUAL: 0 % (ref 0–1)
BUN SERPL-MCNC: 14 MG/DL (ref 5–25)
CALCIUM SERPL-MCNC: 9.2 MG/DL (ref 8.4–10.2)
CHLORIDE SERPL-SCNC: 104 MMOL/L (ref 96–108)
CO2 SERPL-SCNC: 25 MMOL/L (ref 21–32)
CREAT SERPL-MCNC: 1.04 MG/DL (ref 0.6–1.3)
EOSINOPHIL # BLD MANUAL: 0 THOUSAND/UL (ref 0–0.4)
EOSINOPHIL NFR BLD MANUAL: 0 % (ref 0–6)
ERYTHROCYTE [DISTWIDTH] IN BLOOD BY AUTOMATED COUNT: 13.2 % (ref 11.6–15.1)
GFR SERPL CREATININE-BSD FRML MDRD: 79 ML/MIN/1.73SQ M
GLUCOSE SERPL-MCNC: 99 MG/DL (ref 65–140)
HCT VFR BLD AUTO: 40.2 % (ref 36.5–49.3)
HGB BLD-MCNC: 13.9 G/DL (ref 12–17)
INR PPP: 1.08 (ref 0.85–1.19)
LACTATE SERPL-SCNC: 1 MMOL/L (ref 0.5–2)
LYMPHOCYTES # BLD AUTO: 0.98 THOUSAND/UL (ref 0.6–4.47)
LYMPHOCYTES # BLD AUTO: 35 % (ref 14–44)
MCH RBC QN AUTO: 30.5 PG (ref 26.8–34.3)
MCHC RBC AUTO-ENTMCNC: 34.6 G/DL (ref 31.4–37.4)
MCV RBC AUTO: 88 FL (ref 82–98)
MONOCYTES # BLD AUTO: 0.37 THOUSAND/UL (ref 0–1.22)
MONOCYTES NFR BLD: 13 % (ref 4–12)
NEUTROPHILS # BLD MANUAL: 1.46 THOUSAND/UL (ref 1.85–7.62)
NEUTS SEG NFR BLD AUTO: 52 % (ref 43–75)
PLATELET # BLD AUTO: 161 THOUSANDS/UL (ref 149–390)
PLATELET BLD QL SMEAR: ADEQUATE
PMV BLD AUTO: 10 FL (ref 8.9–12.7)
POTASSIUM SERPL-SCNC: 3.9 MMOL/L (ref 3.5–5.3)
PROTHROMBIN TIME: 14.7 SECONDS (ref 12.3–15)
RBC # BLD AUTO: 4.55 MILLION/UL (ref 3.88–5.62)
SODIUM SERPL-SCNC: 137 MMOL/L (ref 135–147)
WBC # BLD AUTO: 2.81 THOUSAND/UL (ref 4.31–10.16)

## 2024-10-21 PROCEDURE — 99223 1ST HOSP IP/OBS HIGH 75: CPT | Performed by: STUDENT IN AN ORGANIZED HEALTH CARE EDUCATION/TRAINING PROGRAM

## 2024-10-21 PROCEDURE — 96374 THER/PROPH/DIAG INJ IV PUSH: CPT

## 2024-10-21 PROCEDURE — 87040 BLOOD CULTURE FOR BACTERIA: CPT | Performed by: PHYSICIAN ASSISTANT

## 2024-10-21 PROCEDURE — 96375 TX/PRO/DX INJ NEW DRUG ADDON: CPT

## 2024-10-21 PROCEDURE — 85610 PROTHROMBIN TIME: CPT | Performed by: PHYSICIAN ASSISTANT

## 2024-10-21 PROCEDURE — 83605 ASSAY OF LACTIC ACID: CPT | Performed by: PHYSICIAN ASSISTANT

## 2024-10-21 PROCEDURE — 99284 EMERGENCY DEPT VISIT MOD MDM: CPT

## 2024-10-21 PROCEDURE — 93971 EXTREMITY STUDY: CPT | Performed by: SURGERY

## 2024-10-21 PROCEDURE — 93971 EXTREMITY STUDY: CPT

## 2024-10-21 PROCEDURE — 85027 COMPLETE CBC AUTOMATED: CPT | Performed by: PHYSICIAN ASSISTANT

## 2024-10-21 PROCEDURE — 99285 EMERGENCY DEPT VISIT HI MDM: CPT | Performed by: PHYSICIAN ASSISTANT

## 2024-10-21 PROCEDURE — 85730 THROMBOPLASTIN TIME PARTIAL: CPT | Performed by: PHYSICIAN ASSISTANT

## 2024-10-21 PROCEDURE — 80048 BASIC METABOLIC PNL TOTAL CA: CPT | Performed by: PHYSICIAN ASSISTANT

## 2024-10-21 PROCEDURE — 85007 BL SMEAR W/DIFF WBC COUNT: CPT | Performed by: PHYSICIAN ASSISTANT

## 2024-10-21 PROCEDURE — 36415 COLL VENOUS BLD VENIPUNCTURE: CPT | Performed by: PHYSICIAN ASSISTANT

## 2024-10-21 RX ORDER — BACLOFEN 10 MG/1
20 TABLET ORAL 2 TIMES DAILY
Status: DISCONTINUED | OUTPATIENT
Start: 2024-10-21 | End: 2024-10-24 | Stop reason: HOSPADM

## 2024-10-21 RX ORDER — TAMSULOSIN HYDROCHLORIDE 0.4 MG/1
0.4 CAPSULE ORAL
Status: DISCONTINUED | OUTPATIENT
Start: 2024-10-21 | End: 2024-10-24 | Stop reason: HOSPADM

## 2024-10-21 RX ORDER — ACETAMINOPHEN 325 MG/1
650 TABLET ORAL EVERY 6 HOURS PRN
Status: DISCONTINUED | OUTPATIENT
Start: 2024-10-21 | End: 2024-10-24 | Stop reason: HOSPADM

## 2024-10-21 RX ORDER — ESCITALOPRAM OXALATE 10 MG/1
10 TABLET ORAL DAILY
Status: DISCONTINUED | OUTPATIENT
Start: 2024-10-21 | End: 2024-10-24 | Stop reason: HOSPADM

## 2024-10-21 RX ORDER — CEFAZOLIN SODIUM 2 G/50ML
2000 SOLUTION INTRAVENOUS EVERY 8 HOURS
Status: DISCONTINUED | OUTPATIENT
Start: 2024-10-21 | End: 2024-10-24 | Stop reason: HOSPADM

## 2024-10-21 RX ORDER — ONDANSETRON 2 MG/ML
4 INJECTION INTRAMUSCULAR; INTRAVENOUS EVERY 6 HOURS PRN
Status: DISCONTINUED | OUTPATIENT
Start: 2024-10-21 | End: 2024-10-24 | Stop reason: HOSPADM

## 2024-10-21 RX ORDER — ASPIRIN 81 MG/1
81 TABLET, CHEWABLE ORAL DAILY
Status: DISCONTINUED | OUTPATIENT
Start: 2024-10-21 | End: 2024-10-24 | Stop reason: HOSPADM

## 2024-10-21 RX ORDER — POTASSIUM CHLORIDE 1500 MG/1
20 TABLET, EXTENDED RELEASE ORAL DAILY
Status: DISCONTINUED | OUTPATIENT
Start: 2024-10-22 | End: 2024-10-24 | Stop reason: HOSPADM

## 2024-10-21 RX ORDER — CEFAZOLIN SODIUM 2 G/50ML
2000 SOLUTION INTRAVENOUS ONCE
Status: COMPLETED | OUTPATIENT
Start: 2024-10-21 | End: 2024-10-21

## 2024-10-21 RX ORDER — MYCOPHENOLATE MOFETIL 250 MG/1
500 CAPSULE ORAL EVERY 12 HOURS SCHEDULED
Status: DISCONTINUED | OUTPATIENT
Start: 2024-10-21 | End: 2024-10-24 | Stop reason: HOSPADM

## 2024-10-21 RX ORDER — LORATADINE 10 MG/1
10 TABLET ORAL DAILY
Status: DISCONTINUED | OUTPATIENT
Start: 2024-10-21 | End: 2024-10-24 | Stop reason: HOSPADM

## 2024-10-21 RX ORDER — SODIUM CHLORIDE 1 G/1
2 TABLET ORAL
Status: DISCONTINUED | OUTPATIENT
Start: 2024-10-21 | End: 2024-10-24 | Stop reason: HOSPADM

## 2024-10-21 RX ORDER — FUROSEMIDE 40 MG/1
40 TABLET ORAL DAILY
Status: DISCONTINUED | OUTPATIENT
Start: 2024-10-22 | End: 2024-10-24 | Stop reason: HOSPADM

## 2024-10-21 RX ORDER — FAMOTIDINE 20 MG/1
20 TABLET, FILM COATED ORAL 2 TIMES DAILY
Status: DISCONTINUED | OUTPATIENT
Start: 2024-10-21 | End: 2024-10-24 | Stop reason: HOSPADM

## 2024-10-21 RX ORDER — KETOROLAC TROMETHAMINE 30 MG/ML
30 INJECTION, SOLUTION INTRAMUSCULAR; INTRAVENOUS ONCE
Status: COMPLETED | OUTPATIENT
Start: 2024-10-21 | End: 2024-10-21

## 2024-10-21 RX ORDER — HYDROXYCHLOROQUINE SULFATE 200 MG/1
400 TABLET, FILM COATED ORAL
Status: DISCONTINUED | OUTPATIENT
Start: 2024-10-21 | End: 2024-10-24 | Stop reason: HOSPADM

## 2024-10-21 RX ORDER — GABAPENTIN 400 MG/1
400 CAPSULE ORAL 3 TIMES DAILY
Status: DISCONTINUED | OUTPATIENT
Start: 2024-10-21 | End: 2024-10-24 | Stop reason: HOSPADM

## 2024-10-21 RX ADMIN — KETOROLAC TROMETHAMINE 30 MG: 30 INJECTION, SOLUTION INTRAMUSCULAR at 07:58

## 2024-10-21 RX ADMIN — GABAPENTIN 400 MG: 400 CAPSULE ORAL at 10:09

## 2024-10-21 RX ADMIN — FAMOTIDINE 20 MG: 20 TABLET ORAL at 10:09

## 2024-10-21 RX ADMIN — BACLOFEN 20 MG: 10 TABLET ORAL at 10:09

## 2024-10-21 RX ADMIN — ASPIRIN 81 MG: 81 TABLET, CHEWABLE ORAL at 10:09

## 2024-10-21 RX ADMIN — APIXABAN 5 MG: 5 TABLET, FILM COATED ORAL at 10:09

## 2024-10-21 RX ADMIN — GABAPENTIN 400 MG: 400 CAPSULE ORAL at 16:22

## 2024-10-21 RX ADMIN — MYCOPHENOLATE MOFETIL 500 MG: 250 CAPSULE ORAL at 20:06

## 2024-10-21 RX ADMIN — Medication 2 G: at 10:09

## 2024-10-21 RX ADMIN — CEFAZOLIN SODIUM 2000 MG: 2 SOLUTION INTRAVENOUS at 08:57

## 2024-10-21 RX ADMIN — MYCOPHENOLATE MOFETIL 500 MG: 250 CAPSULE ORAL at 10:09

## 2024-10-21 RX ADMIN — HYDROXYCHLOROQUINE SULFATE 400 MG: 200 TABLET, FILM COATED ORAL at 21:53

## 2024-10-21 RX ADMIN — Medication 2 G: at 16:22

## 2024-10-21 RX ADMIN — ACETAMINOPHEN 650 MG: 325 TABLET, FILM COATED ORAL at 11:49

## 2024-10-21 RX ADMIN — APIXABAN 5 MG: 5 TABLET, FILM COATED ORAL at 17:25

## 2024-10-21 RX ADMIN — GABAPENTIN 400 MG: 400 CAPSULE ORAL at 20:08

## 2024-10-21 RX ADMIN — BACLOFEN 20 MG: 10 TABLET ORAL at 17:25

## 2024-10-21 RX ADMIN — LORATADINE 10 MG: 10 TABLET ORAL at 10:09

## 2024-10-21 RX ADMIN — CEFAZOLIN SODIUM 2000 MG: 2 SOLUTION INTRAVENOUS at 16:25

## 2024-10-21 RX ADMIN — ACETAMINOPHEN 650 MG: 325 TABLET, FILM COATED ORAL at 20:06

## 2024-10-21 NOTE — PLAN OF CARE
Problem: PAIN - ADULT  Goal: Verbalizes/displays adequate comfort level or baseline comfort level  Description: Interventions:  - Encourage patient to monitor pain and request assistance  - Assess pain using appropriate pain scale  - Administer analgesics based on type and severity of pain and evaluate response  - Implement non-pharmacological measures as appropriate and evaluate response  - Consider cultural and social influences on pain and pain management  - Notify physician/advanced practitioner if interventions unsuccessful or patient reports new pain  Outcome: Progressing     Problem: INFECTION - ADULT  Goal: Absence or prevention of progression during hospitalization  Description: INTERVENTIONS:  - Assess and monitor for signs and symptoms of infection  - Monitor lab/diagnostic results  - Monitor all insertion sites, i.e. indwelling lines, tubes, and drains  - Monitor endotracheal if appropriate and nasal secretions for changes in amount and color  - [place] appropriate cooling/warming therapies per order  - Administer medications as ordered  - Instruct and encourage patient and family to use good hand hygiene technique  - Identify and instruct in appropriate isolation precautions for identified infection/condition  Outcome: Progressing  Goal: Absence of fever/infection during neutropenic period  Description: INTERVENTIONS:  - Monitor WBC    Outcome: Progressing     Problem: SAFETY ADULT  Goal: Patient will remain free of falls  Description: INTERVENTIONS:  - Educate patient/family on patient safety including physical limitations  - Instruct patient to call for assistance with activity   - Consult OT/PT to assist with strengthening/mobility   - Keep Call bell within reach  - Keep bed low and locked with side rails adjusted as appropriate  - Keep care items and personal belongings within reach  - Initiate and maintain comfort rounds  - Make Fall Risk Sign visible to staff  - Offer Toileting every  Hours,  in advance of need  - Initiate/Maintain alarm  - Obtain necessary fall risk management equipment:   - Apply yellow socks and bracelet for high fall risk patients  - Consider moving patient to room near nurses station  Outcome: Progressing  Goal: Maintain or return to baseline ADL function  Description: INTERVENTIONS:  -  Assess patient's ability to carry out ADLs; assess patient's baseline for ADL function and identify physical deficits which impact ability to perform ADLs (bathing, care of mouth/teeth, toileting, grooming, dressing, etc.)  - Assess/evaluate cause of self-care deficits   - Assess range of motion  - Assess patient's mobility; develop plan if impaired  - Assess patient's need for assistive devices and provide as appropriate  - Encourage maximum independence but intervene and supervise when necessary  - Involve family in performance of ADLs  - Assess for home care needs following discharge   - Consider OT consult to assist with ADL evaluation and planning for discharge  - Provide patient education as appropriate  Outcome: Progressing  Goal: Maintains/Returns to pre admission functional level  Description: INTERVENTIONS:  - Perform AM-PAC 6 Click Basic Mobility/ Daily Activity assessment daily.  - Set and communicate daily mobility goal to care team and patient/family/caregiver.   - Collaborate with rehabilitation services on mobility goals if consulted  - Perform Range of Motion  times a day.  - Reposition patient every  hours.  - Dangle patient  times a day  - Stand patient  times a day  - Ambulate patient  times a day  - Out of bed to chair  times a day   - Out of bed for meals  times a day  - Out of bed for toileting  - Record patient progress and toleration of activity level   Outcome: Progressing     Problem: DISCHARGE PLANNING  Goal: Discharge to home or other facility with appropriate resources  Description: INTERVENTIONS:  - Identify barriers to discharge w/patient and caregiver  - Arrange for  needed discharge resources and transportation as appropriate  - Identify discharge learning needs (meds, wound care, etc.)  - Arrange for interpretive services to assist at discharge as needed  - Refer to Case Management Department for coordinating discharge planning if the patient needs post-hospital services based on physician/advanced practitioner order or complex needs related to functional status, cognitive ability, or social support system  Outcome: Progressing     Problem: Knowledge Deficit  Goal: Patient/family/caregiver demonstrates understanding of disease process, treatment plan, medications, and discharge instructions  Description: Complete learning assessment and assess knowledge base.  Interventions:  - Provide teaching at level of understanding  - Provide teaching via preferred learning methods  Outcome: Progressing

## 2024-10-21 NOTE — ED NOTES
Pt. Prefer to take his medications with food. Breakfast ordered.     Alysha Sanford RN  10/21/24 [id number]

## 2024-10-21 NOTE — H&P
H&P - Hospitalist   Name: Donta Hunter 54 y.o. male I MRN: [id number]  Unit/Bed#: ED 07 I Date of Admission: 10/21/2024   Date of Service: 10/21/2024 I Hospital Day: 0     Assessment & Plan  Cellulitis of left lower extremity  54-year-old male with PMH of SLE, lupus nephritis, DVT presented with worsening left lower extremity swelling, erythema  Recently seen on PCP on the 17th, prescribed Keflex without improvement over several days  Duplex ultrasound in the ED was negative for DVT  Hold Keflex, start patient on IV Ancef  Podiatry consult to further evaluate  Repeat labs in AM.  WBC 2.81, chronic neutropenia  SLE (systemic lupus erythematosus related syndrome) (HCC)  Continue Plaquenil  Lupus nephritis (HCC)  Continue CellCept  History of DVT (deep vein thrombosis)  Continue Eliquis  Neuropathic pain  Had been on gabapentin 300 mg 3 times daily, increase to 400 mg 3 times daily      VTE Pharmacologic Prophylaxis:   High Risk (Score >/= 5) - Pharmacological DVT Prophylaxis Ordered: apixaban (Eliquis). Sequential Compression Devices Ordered.  Code Status: Level 1 - Full Code   Discussion with family: Patient declined call to .     Anticipated Length of Stay: Patient will be admitted on an inpatient basis with an anticipated length of stay of greater than 2 midnights secondary to IV abx.    History of Present Illness   Chief Complaint: Left Lower Extremity Pain    Donta Hunter is a 54 y.o. male with a PMH of SLE, lupus nephritis and DVT who presents with worsening left lower extremity swelling.  Recently saw his PCP on the 17th, diagnosed with cellulitis and sent home with cream/ointments with Keflex.  After several days he did not have any significant improvement with continued pain and swelling of his left foot.  Duplex ultrasound in the ED was negative for DVT.  Reports compliance with his medications including Eliquis.  Denies any fevers, chills, chest pain or shortness of breath.    Review of Systems    Musculoskeletal:  Positive for joint swelling.   Skin:  Positive for color change.   All other systems reviewed and are negative.      Historical Information   Past Medical History:   Diagnosis Date    Anemia     Arthritis     Cervical mass     Chronic kidney disease     COPD, group B, by GOLD 2017 classification ([facility]) 7/13/2020    Coronary artery disease     Depression     DVT (deep venous thrombosis) ([facility])     Hypertension     Lupus     Renal disorder     Stroke (cerebrum) ([facility]) 6/7/2024     Past Surgical History:   Procedure Laterality Date    APPENDECTOMY      CERVICAL SPINE SURGERY      CHOLECYSTECTOMY      COLONOSCOPY N/A 8/23/2018    Procedure: COLONOSCOPY;  Surgeon: James iWse III, MD;  Location: [facility];  Service: Gastroenterology    ESOPHAGOGASTRODUODENOSCOPY N/A 8/22/2018    Procedure: ESOPHAGOGASTRODUODENOSCOPY (EGD);  Surgeon: James Wise III, MD;  Location: [facility];  Service: Gastroenterology    IR CEREBRAL ANGIOGRAPHY  7/24/2024    IR IVC FILTER PLACEMENT PERMANENT  9/7/2017    IR IVC FILTER REMOVAL  4/23/2018    IR LUMBAR PUNCTURE  11/23/2015    IR LUMBAR PUNCTURE  4/25/2024    NECK SURGERY      US GUIDED INJECTION FOR RESEARCH STUDY  4/23/2018    US GUIDED INJECTION FOR RESEARCH STUDY  9/7/2017    VASCULAR SURGERY       Social History     Tobacco Use    Smoking status: Never    Smokeless tobacco: Never   Vaping Use    Vaping status: Never Used   Substance and Sexual Activity    Alcohol use: Never    Drug use: Never    Sexual activity: Yes     Partners: Female, Male     Birth control/protection: Rhythm     E-Cigarette/Vaping    E-Cigarette Use Never User      E-Cigarette/Vaping Substances    Nicotine No     THC No     CBD No     Flavoring No     Other No     Unknown No      Family history non-contributory  Social History:  Marital Status: /Civil Union   Occupation:   Patient Pre-hospital Living Situation: Home  Patient Pre-hospital Level of Mobility: walks  Patient  Pre-hospital Diet Restrictions: none    Meds/Allergies   I have reviewed home medications with patient personally.  Prior to Admission medications    Medication Sig Start Date End Date Taking? Authorizing Provider   apixaban (ELIQUIS) 5 mg Take 5 mg by mouth 2 (two) times a day 4/8/22   Historical Provider, MD   aspirin 81 mg chewable tablet Chew 1 tablet (81 mg total) daily 9/26/24   Suzanna Mulligan MD   bacitracin topical ointment 500 units/g topical ointment APPLY TOPICALLY TO AFFECTED AREA THREE TIMES A DAY AS DIRECTED 4/27/22 10/7/24  Historical Provider, MD   baclofen 20 mg tablet Take 1 tablet (20 mg total) by mouth 2 (two) times a day 10/7/24   Raad Batres MD   betamethasone, augmented, (DIPROLENE) 0.05 % ointment Apply 1 application. topically 2 (two) times a day    Historical Provider, MD   capsicum (ZOSTRIX) 0.075 % topical cream Apply topically 3 (three) times a day Wash hands before and after use. 10/2/24   Vasquez Salinas MD   cephalexin (KEFLEX) 500 mg capsule Take 1 capsule (500 mg total) by mouth every 8 (eight) hours for 14 days 10/17/24 10/31/24  Jeovanny Mojica DPM   clobetasol (TEMOVATE) 0.05 % cream  11/8/23   Historical Provider, MD   clopidogrel (Plavix) 75 mg tablet Take 1 tablet (75 mg total) by mouth daily  Patient not taking: Reported on 8/16/2024 5/10/24   Suzanna Mulligan MD   Diclofenac Sodium (VOLTAREN) 1 % Apply 2 g topically 4 (four) times a day 5/6/24   Malick Barnett MD   econazole nitrate 1 % cream Apply topically daily 10/17/24   Jeovanny Mojica DPM   escitalopram (LEXAPRO) 10 mg tablet Take 1 tablet (10 mg total) by mouth daily 4/4/23 10/7/24  Raad Batres MD   famotidine (PEPCID) 20 mg tablet Take 1 tablet (20 mg total) by mouth 2 (two) times a day 4/15/24 4/10/25  Raad Batres MD   furosemide (LASIX) 40 mg tablet  6/21/24   Historical Provider, MD   gabapentin (NEURONTIN) 300 mg capsule Take 1 capsule (300 mg total) by mouth 3 (three) times a day  5/5/24 10/31/24  Raad Batres MD   gabapentin (NEURONTIN) 600 MG tablet Take 1 tablet (600 mg total) by mouth 3 (three) times a day 9/5/24 3/4/25  Raad Batres MD   hydroxychloroquine (PLAQUENIL) 200 mg tablet Take 2 tablets (400 mg total) by mouth daily at bedtime for 180 doses 6/28/24 12/25/24  Raad Batres MD   ketoconazole (NIZORAL) 2 % cream Apply topically daily 11/14/23   Jeovanny Mojica DPM   lidocaine-prilocaine (EMLA) cream Apply topically as needed for mild pain 2/27/23   Raad Batres MD   loratadine (CLARITIN) 10 mg tablet Take 1 tablet (10 mg total) by mouth daily 10/31/22 10/7/24  Raad Batres MD   methylPREDNISolone 4 MG tablet therapy pack Use as directed on package  Patient not taking: Reported on 8/27/2024 7/17/24   Jeovanny Mojica DPM   mycophenolate (CELLCEPT) 500 mg tablet Take 500 mg by mouth every 12 (twelve) hours     Historical Provider, MD   potassium chloride (Klor-Con M20) 20 mEq tablet Take 1 tablet (20 mEq total) by mouth daily 5/6/24   Raad Batres MD   sildenafil (VIAGRA) 50 MG tablet Take 1 tablet (50 mg total) by mouth daily as needed for erectile dysfunction 10/7/24   Raad Batres MD   sodium chloride 1 g tablet Take 2 tablets (2 g total) by mouth 3 (three) times a day with meals 4/26/24 10/7/24  Geneiveve Dangelo MD   tamsulosin (FLOMAX) 0.4 mg Take 1 capsule (0.4 mg total) by mouth daily with dinner 5/3/24   Raad Batres MD     Allergies   Allergen Reactions    Doxycycline Rash    Sulfa Antibiotics Rash       Objective :  Temp:  [97.9 °F (36.6 °C)] 97.9 °F (36.6 °C)  HR:  [76] 76  BP: (161)/(83) 161/83  Resp:  [20] 20  SpO2:  [99 %] 99 %  O2 Device: None (Room air)    Physical Exam  Vitals and nursing note reviewed.   Constitutional:       Appearance: Normal appearance.   HENT:      Head: Normocephalic.   Eyes:      Conjunctiva/sclera: Conjunctivae normal.   Cardiovascular:      Rate and Rhythm: Normal rate.   Pulmonary:       Effort: Pulmonary effort is normal.      Breath sounds: No wheezing.   Abdominal:      General: Bowel sounds are normal. There is no distension.      Palpations: Abdomen is soft.   Musculoskeletal:         General: No swelling.      Right lower leg: No edema.      Left lower leg: No edema.   Skin:     General: Skin is warm and dry.      Comments: Left lower extremity erythema, redness with mild tenderness on palpation   Neurological:      General: No focal deficit present.      Mental Status: He is alert. Mental status is at baseline.          Lines/Drains:            Lab Results: I have reviewed the following results:  Results from last 7 days   Lab Units 10/21/24  0721   WBC Thousand/uL 2.81*   HEMOGLOBIN g/dL 13.9   HEMATOCRIT % 40.2   PLATELETS Thousands/uL 161   LYMPHO PCT % 35   MONO PCT % 13*   EOS PCT % 0     Results from last 7 days   Lab Units 10/21/24  0721   SODIUM mmol/L 137   POTASSIUM mmol/L 3.9   CHLORIDE mmol/L 104   CO2 mmol/L 25   BUN mg/dL 14   CREATININE mg/dL 1.04   ANION GAP mmol/L 8   CALCIUM mg/dL 9.2   GLUCOSE RANDOM mg/dL 99     Results from last 7 days   Lab Units 10/21/24  0721   INR  1.08         Lab Results   Component Value Date    HGBA1C 5.4 07/19/2024    HGBA1C 4.9 02/28/2024    HGBA1C 5.2 09/22/2023     Results from last 7 days   Lab Units 10/21/24  0721   LACTIC ACID mmol/L 1.0       Imaging Results Review: I personally reviewed the following image studies/reports in PACS and discussed pertinent findings with Radiology: VAS duplex. My interpretation of the radiology images/reports is: negative for DVT.  Other Study Results Review: No additional pertinent studies reviewed.    ** Please Note: This note has been constructed using a voice recognition system. **

## 2024-10-21 NOTE — ASSESSMENT & PLAN NOTE
54-year-old male with PMH of SLE, lupus nephritis, DVT presented with worsening left lower extremity swelling, erythema  Recently seen on PCP on the 17th, prescribed Keflex without improvement over several days  Duplex ultrasound in the ED was negative for DVT  Hold Keflex, start patient on IV Ancef  Podiatry consult to further evaluate  Repeat labs in AM.  WBC 2.81, chronic neutropenia

## 2024-10-21 NOTE — ED PROVIDER NOTES
Time reflects when diagnosis was documented in both MDM as applicable and the Disposition within this note       Time User Action Codes Description Comment    10/21/2024  9:04 AM Bo Arriaga Add [L03.116] Cellulitis of left lower extremity     10/21/2024  9:15 AM Lam Lee Add [L03.119] Lower extremity cellulitis     10/21/2024  9:16 AM Lam Lee [B35.3] Tinea pedis of left foot           ED Disposition       ED Disposition   Admit    Condition   Stable    Date/Time   Mon Oct 21, 2024  9:15 AM    Comment   Case was discussed with Dr. Arriaga and the patient's admission status was agreed to be Admission Status: observation status to the service of Dr. Arriaga .               Assessment & Plan       Medical Decision Making  Medical Decision Makin y.o. male presents to ED for evaluation of worsening left lower extremity pain, swelling and infection despite 4 days of oral antibiotic treatment with cephalexin and topical fungal treatment with econazole.      Ddx: suspect cellulitis, tinea infection, consider DVT, lupus, erythema nodosum, osteomyelitis, gout, eczema, drug reaction, vasculitis,lymphedema, consider but doubt DRES, SJS/TENS, necrotizing fasciitis, toxic shock syndrome or gangrene.      Plan workup including labs to evaluate for infection, VAS to evaluate for DVT, Will give IV antibiotics and consider admit for cellulitis with failure of outpatient treatment.     On re-evaluation: well appearing in NAD, vital signs are normal. A&O x 3, airway patent, no chest pain or respiratory distress, Abdomen non distended, non tender. M/S no gross deformity, left lower leg erythematous and swollen, but distal neurovascular motor intact, GCS 15     Final Evaluation:  (see ED course for additional MDM)  Left lower extremity cellulitis with superimposed tinea pedis infection  VAS negative for DVT but did demonstrate Inguinal lymphadenopathy  Leukopenic WBC on CBC with History of Lupus    Will admit for  treatment of lower extremity cellulitis symptoms worsening despite 4 days of cephalexin and treatment for tinea pedis.  Vital signs stable.  No signs of renal failure on lab evaluation.   On Eliquis for prior DVT.  Case discussed with KANDY Piedra regarding admission.              Amount and/or Complexity of Data Reviewed  Labs: ordered.    Risk  Prescription drug management.        ED Course as of 10/21/24 0921   Mon Oct 21, 2024   0731 Patient reports pain in foot. Requesting something for pain. Will give IV toradol and reassess.    0822 CBC and differential(!)  Abnormal, white count low at 2.81.  No anemia or thrombocytopenia   0822 Protime-INR  Normal, INR 1.08   0822 Lactic acid, plasma (w/reflex if result > 2.0)  Normal 1.0   0822 Basic metabolic panel  Normal, no renal failure or electrolyte abnormality   0822 VAS VENOUS DUPLEX -LOWER LIMB UNILATERAL  Abnormal, no DVT, positive inguinal lymph nodes suggestive of infection    0830 Reevaluation: Vital signs stable.  Leukopenic white blood cell count.  2 g IV cefazolin given for worsening lower extremity cellulitis symptoms.  Text to SLIM re admission given worsening symptoms and failure of outpatient treatment with antibiotics.        Medications   ceFAZolin (ANCEF) IVPB (premix in dextrose) 2,000 mg 50 mL (2,000 mg Intravenous New Bag 10/21/24 0857)   acetaminophen (TYLENOL) tablet 650 mg (has no administration in time range)   ondansetron (ZOFRAN) injection 4 mg (has no administration in time range)   ceFAZolin (ANCEF) IVPB (premix in dextrose) 2,000 mg 50 mL (has no administration in time range)   apixaban (ELIQUIS) tablet 5 mg (has no administration in time range)   aspirin chewable tablet 81 mg (has no administration in time range)   baclofen tablet 20 mg (has no administration in time range)   escitalopram (LEXAPRO) tablet 10 mg (has no administration in time range)   famotidine (PEPCID) tablet 20 mg (has no administration in time range)   gabapentin  (NEURONTIN) capsule 400 mg (has no administration in time range)   loratadine (CLARITIN) tablet 10 mg (has no administration in time range)   potassium chloride (Klor-Con M20) CR tablet 20 mEq (has no administration in time range)   furosemide (LASIX) tablet 40 mg (has no administration in time range)   sodium chloride tablet 2 g (has no administration in time range)   tamsulosin (FLOMAX) capsule 0.4 mg (has no administration in time range)   hydroxychloroquine (PLAQUENIL) tablet 400 mg (has no administration in time range)   mycophenolate (CELLCEPT) capsule 500 mg (has no administration in time range)   ketorolac (TORADOL) injection 30 mg (30 mg Intravenous Given 10/21/24 0758)       ED Risk Strat Scores                           SBIRT 22yo+      Flowsheet Row Most Recent Value   Initial Alcohol Screen: US AUDIT-C     1. How often do you have a drink containing alcohol? 0 Filed at: 10/21/2024 0637   2. How many drinks containing alcohol do you have on a typical day you are drinking?  0 Filed at: 10/21/2024 0637   3a. Male UNDER 65: How often do you have five or more drinks on one occasion? 0 Filed at: 10/21/2024 0637   Audit-C Score 0 Filed at: 10/21/2024 0637   NARCISA: How many times in the past year have you...    Used an illegal drug or used a prescription medication for non-medical reasons? Never Filed at: 10/21/2024 0637              Wells' Criteria for DVT      Flowsheet Row Most Recent Value   Wells' Criteria for DVT    Active cancer Treatment or palliation within 6 months 0 Filed at: 10/21/2024 0920   Bedridden recently >3 days or major surgery within 12 weeks 0 Filed at: 10/21/2024 0920   Calf swelling >3 cm compared to the other leg 0 Filed at: 10/21/2024 0920   Entire leg swollen 1 Filed at: 10/21/2024 0920   Collateral (nonvaricose) superficial veins present 1 Filed at: 10/21/2024 0920   Localized tenderness along the deep venous system 0 Filed at: 10/21/2024 0920   Pitting edema, confined to symptomatic  leg 1 Filed at: 10/21/2024 0920   Paralysis, paresis, or recent plaster immobilization of the lower extremity 0 Filed at: 10/21/2024 0920   Previously documented DVT 1 Filed at: 10/21/2024 0920   Alternative diagnosis to DVT as likely or more likely 0 Filed at: 10/21/2024 0920   Wells DVT Critera Score 4 Filed at: 10/21/2024 0920                      History of Present Illness       Chief Complaint   Patient presents with    Foot Pain     Pt arrived via ems with c/o left foot pain. Pt is on an antibiotic for an infection in that foot and states he is in  a lot of pain       Past Medical History:   Diagnosis Date    Anemia     Arthritis     Cervical mass     Chronic kidney disease     COPD, group B, by GOLD 2017 classification ([facility]) 7/13/2020    Coronary artery disease     Depression     DVT (deep venous thrombosis) ([facility])     Hypertension     Lupus     Renal disorder     Stroke (cerebrum) ([facility]) 6/7/2024      Past Surgical History:   Procedure Laterality Date    APPENDECTOMY      CERVICAL SPINE SURGERY      CHOLECYSTECTOMY      COLONOSCOPY N/A 8/23/2018    Procedure: COLONOSCOPY;  Surgeon: James Wise III, MD;  Location: [facility];  Service: Gastroenterology    ESOPHAGOGASTRODUODENOSCOPY N/A 8/22/2018    Procedure: ESOPHAGOGASTRODUODENOSCOPY (EGD);  Surgeon: James Wise III, MD;  Location: [facility];  Service: Gastroenterology    IR CEREBRAL ANGIOGRAPHY  7/24/2024    IR IVC FILTER PLACEMENT PERMANENT  9/7/2017    IR IVC FILTER REMOVAL  4/23/2018    IR LUMBAR PUNCTURE  11/23/2015    IR LUMBAR PUNCTURE  4/25/2024    NECK SURGERY      US GUIDED INJECTION FOR RESEARCH STUDY  4/23/2018    US GUIDED INJECTION FOR RESEARCH STUDY  9/7/2017    VASCULAR SURGERY        Family History   Problem Relation Age of Onset    Heart disease Mother     No Known Problems Father       Social History     Tobacco Use    Smoking status: Never    Smokeless tobacco: Never   Vaping Use    Vaping status: Never Used   Substance Use Topics     Alcohol use: Never    Drug use: Never      E-Cigarette/Vaping    E-Cigarette Use Never User       E-Cigarette/Vaping Substances    Nicotine No     THC No     CBD No     Flavoring No     Other No     Unknown No       I have reviewed and agree with the history as documented.     54 y.o. male with past medical history significant for lupus, DVT, CAD, hypertension and recent diagnosis of tinea pedis and cellulitis of the lower extremities treated with ketoconazole and cephalexin presents to the emergency department via EMS for evaluation of worsening left lower extremity and left foot pain and swelling.  Patient states symptoms have been present for the last 2 weeks, initially to both lower legs.  The patient denies any history of injury or trauma.  Currently reports minimal symptoms in his right foot.  Denies fevers, chills, nausea, vomiting, diarrhea, chest pain, SOB, back pain or syncope.  Patient states he has been taking antibiotics as prescribed without improvement in symptoms.        History provided by:  Patient and EMS personnel   used: No        Review of Systems   Constitutional:  Negative for chills, diaphoresis, fever and unexpected weight change.   Eyes:  Negative for visual disturbance.   Respiratory:  Negative for chest tightness, shortness of breath and stridor.    Cardiovascular:  Positive for leg swelling. Negative for chest pain.   Gastrointestinal:  Negative for abdominal pain, nausea and vomiting.   Genitourinary:  Negative for decreased urine volume, difficulty urinating, hematuria and urgency.   Musculoskeletal:  Positive for arthralgias and myalgias. Negative for gait problem.   Skin:  Positive for rash.   Neurological:  Negative for seizures, syncope, facial asymmetry, weakness and numbness.   All other systems reviewed and are negative.          Objective       ED Triage Vitals   Temperature Pulse Blood Pressure Respirations SpO2 Patient Position - Orthostatic VS    10/21/24 0638 10/21/24 0637 10/21/24 0637 10/21/24 0637 10/21/24 0637 10/21/24 0637   97.9 °F (36.6 °C) 76 161/83 20 99 % Lying      Temp Source Heart Rate Source BP Location FiO2 (%) Pain Score    10/21/24 0638 10/21/24 0637 10/21/24 0637 -- 10/21/24 0758    Oral Monitor Right arm  Med Not Given for Pain - for MAR use only      Vitals      Date and Time Temp Pulse SpO2 Resp BP Pain Score FACES Pain Rating User   10/21/24 0758 -- -- -- -- -- Med Not Given for Pain - for MAR use only -- LM   10/21/24 0638 97.9 °F (36.6 °C) -- -- -- -- -- -- [place]   10/21/24 0637 -- 76 99 % 20 161/83 -- -- [place]            Physical Exam  Vitals and nursing note reviewed.   Constitutional:       General: He is not in acute distress.     Appearance: Normal appearance.   HENT:      Head: Normocephalic and atraumatic.      Right Ear: External ear normal.      Left Ear: External ear normal.      Nose: Nose normal.      Mouth/Throat:      Mouth: Mucous membranes are moist.   Eyes:      General: No scleral icterus.  Cardiovascular:      Rate and Rhythm: Normal rate and regular rhythm.      Pulses: Normal pulses.   Pulmonary:      Effort: Pulmonary effort is normal.      Breath sounds: Normal breath sounds.   Abdominal:      Tenderness: There is no abdominal tenderness. There is no guarding or rebound.   Musculoskeletal:         General: Swelling and tenderness present. No deformity or signs of injury.      Cervical back: Normal range of motion and neck supple.      Left lower leg: Edema present.   Skin:     General: Skin is dry.      Coloration: Skin is not jaundiced.      Findings: Erythema and rash present.      Comments: There is mild diffuse flat erythema with overlying dry scaling skin to the bilateral lower extremities in stocking distribution, there is interdigital scaling to the second third and fourth toes of the left foot. .  There is left greater than right foot swelling and the LLE is swollen and tender to palpation diffusely.   Distal neurovascular motor intact to both feet.  All compartments of bilateral lower extremities are warm, soft and well perfused. There are no open lesions.    Neurological:      General: No focal deficit present.      Mental Status: He is alert and oriented to person, place, and time. Mental status is at baseline.      Gait: Gait abnormal.   Psychiatric:         Mood and Affect: Mood normal.         Behavior: Behavior normal.         Thought Content: Thought content normal.         Results Reviewed       Procedure Component Value Units Date/Time    Blood culture #2 [[id number]] Collected: 10/21/24 0650    Lab Status: In process Specimen: Blood from Arm, Right Updated: 10/21/24 0903    Blood culture #1 [[id number]] Collected: 10/21/24 0854    Lab Status: In process Specimen: Blood from Arm, Left Updated: 10/21/24 0903    CBC and differential [[id number]]  (Abnormal) Collected: 10/21/24 0721    Lab Status: Final result Specimen: Blood from Arm, Right Updated: 10/21/24 0801     WBC 2.81 Thousand/uL      RBC 4.55 Million/uL      Hemoglobin 13.9 g/dL      Hematocrit 40.2 %      MCV 88 fL      MCH 30.5 pg      MCHC 34.6 g/dL      RDW 13.2 %      MPV 10.0 fL      Platelets 161 Thousands/uL     Narrative:      This is an appended report.  These results have been appended to a previously verified report.    Manual Differential(PHLEBS Do Not Order) [[id number]]  (Abnormal) Collected: 10/21/24 0721    Lab Status: Final result Specimen: Blood from Arm, Right Updated: 10/21/24 0801     Segmented % 52 %      Lymphocytes % 35 %      Monocytes % 13 %      Eosinophils % 0 %      Basophils % 0 %      Absolute Neutrophils 1.46 Thousand/uL      Absolute Lymphocytes 0.98 Thousand/uL      Absolute Monocytes 0.37 Thousand/uL      Absolute Eosinophils 0.00 Thousand/uL      Absolute Basophils 0.00 Thousand/uL      Total Counted --     Platelet Estimate Adequate    Protime-INR [[id number]]  (Normal) Collected: 10/21/24 0721    Lab Status: Final  result Specimen: Blood from Arm, Right Updated: 10/21/24 0749     Protime 14.7 seconds      INR 1.08    Narrative:      INR Therapeutic Range    Indication                                             INR Range      Atrial Fibrillation                                               2.0-3.0  Hypercoagulable State                                    2.0.2.3  Left Ventricular Asist Device                            2.0-3.0  Mechanical Heart Valve                                  -    Aortic(with afib, MI, embolism, HF, LA enlargement,    and/or coagulopathy)                                     2.0-3.0 (2.5-3.5)     Mitral                                                             2.5-3.5  Prosthetic/Bioprosthetic Heart Valve               2.0-3.0  Venous thromboembolism (VTE: VT, PE        2.0-3.0    APTT [[id number]]  (Normal) Collected: 10/21/24 0721    Lab Status: Final result Specimen: Blood from Arm, Right Updated: 10/21/24 0749     PTT 28 seconds     Basic metabolic panel [[id number]] Collected: 10/21/24 0721    Lab Status: Final result Specimen: Blood from Arm, Right Updated: 10/21/24 0746     Sodium 137 mmol/L      Potassium 3.9 mmol/L      Chloride 104 mmol/L      CO2 25 mmol/L      ANION GAP 8 mmol/L      BUN 14 mg/dL      Creatinine 1.04 mg/dL      Glucose 99 mg/dL      Calcium 9.2 mg/dL      eGFR 79 ml/min/1.73sq m     Narrative:      National Kidney Disease Foundation guidelines for Chronic Kidney Disease (CKD):     Stage 1 with normal or high GFR (GFR > 90 mL/min/1.73 square meters)    Stage 2 Mild CKD (GFR = 60-89 mL/min/1.73 square meters)    Stage 3A Moderate CKD (GFR = 45-59 mL/min/1.73 square meters)    Stage 3B Moderate CKD (GFR = 30-44 mL/min/1.73 square meters)    Stage 4 Severe CKD (GFR = 15-29 mL/min/1.73 square meters)    Stage 5 End Stage CKD (GFR <15 mL/min/1.73 square meters)  Note: GFR calculation is accurate only with a steady state creatinine    Lactic acid, plasma (w/reflex if result > 2.0)  [[id number]]  (Normal) Collected: 10/21/24 0721    Lab Status: Final result Specimen: Blood from Arm, Right Updated: 10/21/24 0745     LACTIC ACID 1.0 mmol/L     Narrative:      Result may be elevated if tourniquet was used during collection.            VAS VENOUS DUPLEX -LOWER LIMB UNILATERAL    (Results Pending)       Procedures    ED Medication and Procedure Management   Prior to Admission Medications   Prescriptions Last Dose Informant Patient Reported? Taking?   Diclofenac Sodium (VOLTAREN) 1 %  Self No No   Sig: Apply 2 g topically 4 (four) times a day   apixaban (ELIQUIS) 5 mg  Self Yes No   Sig: Take 5 mg by mouth 2 (two) times a day   aspirin 81 mg chewable tablet  Self No No   Sig: Chew 1 tablet (81 mg total) daily   bacitracin topical ointment 500 units/g topical ointment  Self Yes No   Sig: APPLY TOPICALLY TO AFFECTED AREA THREE TIMES A DAY AS DIRECTED   baclofen 20 mg tablet  Self No No   Sig: Take 1 tablet (20 mg total) by mouth 2 (two) times a day   betamethasone, augmented, (DIPROLENE) 0.05 % ointment  Self Yes No   Sig: Apply 1 application. topically 2 (two) times a day   capsicum (ZOSTRIX) 0.075 % topical cream  Self No No   Sig: Apply topically 3 (three) times a day Wash hands before and after use.   cephalexin (KEFLEX) 500 mg capsule   No No   Sig: Take 1 capsule (500 mg total) by mouth every 8 (eight) hours for 14 days   clobetasol (TEMOVATE) 0.05 % cream  Self Yes No   clopidogrel (Plavix) 75 mg tablet  Self No No   Sig: Take 1 tablet (75 mg total) by mouth daily   Patient not taking: Reported on 8/16/2024   econazole nitrate 1 % cream   No No   Sig: Apply topically daily   escitalopram (LEXAPRO) 10 mg tablet  Self No No   Sig: Take 1 tablet (10 mg total) by mouth daily   famotidine (PEPCID) 20 mg tablet  Self No No   Sig: Take 1 tablet (20 mg total) by mouth 2 (two) times a day   furosemide (LASIX) 40 mg tablet  Self Yes No   gabapentin (NEURONTIN) 300 mg capsule  Self No No   Sig: Take 1  capsule (300 mg total) by mouth 3 (three) times a day   gabapentin (NEURONTIN) 600 MG tablet  Self No No   Sig: Take 1 tablet (600 mg total) by mouth 3 (three) times a day   hydroxychloroquine (PLAQUENIL) 200 mg tablet  Self No No   Sig: Take 2 tablets (400 mg total) by mouth daily at bedtime for 180 doses   ketoconazole (NIZORAL) 2 % cream  Self No No   Sig: Apply topically daily   lidocaine-prilocaine (EMLA) cream  Self No No   Sig: Apply topically as needed for mild pain   loratadine (CLARITIN) 10 mg tablet  Self No No   Sig: Take 1 tablet (10 mg total) by mouth daily   methylPREDNISolone 4 MG tablet therapy pack  Self No No   Sig: Use as directed on package   Patient not taking: Reported on 8/27/2024   mycophenolate (CELLCEPT) 500 mg tablet  Self Yes No   Sig: Take 500 mg by mouth every 12 (twelve) hours    potassium chloride (Klor-Con M20) 20 mEq tablet  Self No No   Sig: Take 1 tablet (20 mEq total) by mouth daily   sildenafil (VIAGRA) 50 MG tablet  Self No No   Sig: Take 1 tablet (50 mg total) by mouth daily as needed for erectile dysfunction   sodium chloride 1 g tablet  Self No No   Sig: Take 2 tablets (2 g total) by mouth 3 (three) times a day with meals   tamsulosin (FLOMAX) 0.4 mg  Self No No   Sig: Take 1 capsule (0.4 mg total) by mouth daily with dinner      Facility-Administered Medications: None     Patient's Medications   Discharge Prescriptions    No medications on file     No discharge procedures on file.  ED SEPSIS DOCUMENTATION   Time reflects when diagnosis was documented in both MDM as applicable and the Disposition within this note       Time User Action Codes Description Comment    10/21/2024  9:04 AM Bo Arriaga Add [L03.116] Cellulitis of left lower extremity     10/21/2024  9:15 AM Lam Lee [L03.119] Lower extremity cellulitis     10/21/2024  9:16 AM Lam Lee [B35.3] Tinea pedis of left foot                  Lam Lee PA-C  10/21/24 0921

## 2024-10-22 LAB
ANION GAP SERPL CALCULATED.3IONS-SCNC: 5 MMOL/L (ref 4–13)
BUN SERPL-MCNC: 12 MG/DL (ref 5–25)
CALCIUM SERPL-MCNC: 8.9 MG/DL (ref 8.4–10.2)
CHLORIDE SERPL-SCNC: 107 MMOL/L (ref 96–108)
CO2 SERPL-SCNC: 27 MMOL/L (ref 21–32)
CREAT SERPL-MCNC: 1 MG/DL (ref 0.6–1.3)
ERYTHROCYTE [DISTWIDTH] IN BLOOD BY AUTOMATED COUNT: 13.3 % (ref 11.6–15.1)
GFR SERPL CREATININE-BSD FRML MDRD: 83 ML/MIN/1.73SQ M
GLUCOSE SERPL-MCNC: 89 MG/DL (ref 65–140)
HCT VFR BLD AUTO: 38.5 % (ref 36.5–49.3)
HGB BLD-MCNC: 13.3 G/DL (ref 12–17)
MCH RBC QN AUTO: 30.6 PG (ref 26.8–34.3)
MCHC RBC AUTO-ENTMCNC: 34.5 G/DL (ref 31.4–37.4)
MCV RBC AUTO: 89 FL (ref 82–98)
PLATELET # BLD AUTO: 155 THOUSANDS/UL (ref 149–390)
PMV BLD AUTO: 9.7 FL (ref 8.9–12.7)
POTASSIUM SERPL-SCNC: 3.9 MMOL/L (ref 3.5–5.3)
RBC # BLD AUTO: 4.35 MILLION/UL (ref 3.88–5.62)
SODIUM SERPL-SCNC: 139 MMOL/L (ref 135–147)
WBC # BLD AUTO: 3.13 THOUSAND/UL (ref 4.31–10.16)

## 2024-10-22 PROCEDURE — 99232 SBSQ HOSP IP/OBS MODERATE 35: CPT | Performed by: NURSE PRACTITIONER

## 2024-10-22 PROCEDURE — 85027 COMPLETE CBC AUTOMATED: CPT | Performed by: STUDENT IN AN ORGANIZED HEALTH CARE EDUCATION/TRAINING PROGRAM

## 2024-10-22 PROCEDURE — 80048 BASIC METABOLIC PNL TOTAL CA: CPT | Performed by: STUDENT IN AN ORGANIZED HEALTH CARE EDUCATION/TRAINING PROGRAM

## 2024-10-22 RX ORDER — POLYETHYLENE GLYCOL 3350 17 G/17G
17 POWDER, FOR SOLUTION ORAL DAILY
Status: DISCONTINUED | OUTPATIENT
Start: 2024-10-22 | End: 2024-10-24 | Stop reason: HOSPADM

## 2024-10-22 RX ADMIN — GABAPENTIN 400 MG: 400 CAPSULE ORAL at 08:17

## 2024-10-22 RX ADMIN — Medication 2 G: at 12:05

## 2024-10-22 RX ADMIN — Medication 2 G: at 16:46

## 2024-10-22 RX ADMIN — APIXABAN 5 MG: 5 TABLET, FILM COATED ORAL at 17:30

## 2024-10-22 RX ADMIN — CEFAZOLIN SODIUM 2000 MG: 2 SOLUTION INTRAVENOUS at 23:53

## 2024-10-22 RX ADMIN — FAMOTIDINE 20 MG: 20 TABLET ORAL at 17:30

## 2024-10-22 RX ADMIN — POTASSIUM CHLORIDE 20 MEQ: 1500 TABLET, EXTENDED RELEASE ORAL at 08:17

## 2024-10-22 RX ADMIN — FAMOTIDINE 20 MG: 20 TABLET ORAL at 08:17

## 2024-10-22 RX ADMIN — BACLOFEN 20 MG: 10 TABLET ORAL at 08:17

## 2024-10-22 RX ADMIN — FUROSEMIDE 40 MG: 40 TABLET ORAL at 08:18

## 2024-10-22 RX ADMIN — HYDROXYCHLOROQUINE SULFATE 400 MG: 200 TABLET, FILM COATED ORAL at 20:51

## 2024-10-22 RX ADMIN — MYCOPHENOLATE MOFETIL 500 MG: 250 CAPSULE ORAL at 08:18

## 2024-10-22 RX ADMIN — MYCOPHENOLATE MOFETIL 500 MG: 250 CAPSULE ORAL at 20:50

## 2024-10-22 RX ADMIN — ACETAMINOPHEN 650 MG: 325 TABLET, FILM COATED ORAL at 23:51

## 2024-10-22 RX ADMIN — ESCITALOPRAM OXALATE 10 MG: 10 TABLET ORAL at 08:18

## 2024-10-22 RX ADMIN — TAMSULOSIN HYDROCHLORIDE 0.4 MG: 0.4 CAPSULE ORAL at 16:46

## 2024-10-22 RX ADMIN — GABAPENTIN 400 MG: 400 CAPSULE ORAL at 20:50

## 2024-10-22 RX ADMIN — GABAPENTIN 400 MG: 400 CAPSULE ORAL at 16:46

## 2024-10-22 RX ADMIN — LORATADINE 10 MG: 10 TABLET ORAL at 08:17

## 2024-10-22 RX ADMIN — POLYETHYLENE GLYCOL 3350 17 G: 17 POWDER, FOR SOLUTION ORAL at 14:43

## 2024-10-22 RX ADMIN — CEFAZOLIN SODIUM 2000 MG: 2 SOLUTION INTRAVENOUS at 08:17

## 2024-10-22 RX ADMIN — CEFAZOLIN SODIUM 2000 MG: 2 SOLUTION INTRAVENOUS at 16:46

## 2024-10-22 RX ADMIN — Medication 2 G: at 08:17

## 2024-10-22 RX ADMIN — APIXABAN 5 MG: 5 TABLET, FILM COATED ORAL at 08:17

## 2024-10-22 RX ADMIN — BACLOFEN 20 MG: 10 TABLET ORAL at 17:30

## 2024-10-22 RX ADMIN — ASPIRIN 81 MG: 81 TABLET, CHEWABLE ORAL at 08:17

## 2024-10-22 RX ADMIN — CEFAZOLIN SODIUM 2000 MG: 2 SOLUTION INTRAVENOUS at 00:02

## 2024-10-22 NOTE — PLAN OF CARE
Problem: INFECTION - ADULT  Goal: Absence or prevention of progression during hospitalization  Description: INTERVENTIONS:  - Assess and monitor for signs and symptoms of infection  - Monitor lab/diagnostic results  - Monitor all insertion sites, i.e. indwelling lines, tubes, and drains  - Monitor endotracheal if appropriate and nasal secretions for changes in amount and color  - [place] appropriate cooling/warming therapies per order  - Administer medications as ordered  - Instruct and encourage patient and family to use good hand hygiene technique  - Identify and instruct in appropriate isolation precautions for identified infection/condition  Outcome: Progressing     Problem: INFECTION - ADULT  Goal: Absence of fever/infection during neutropenic period  Description: INTERVENTIONS:  - Monitor WBC    Outcome: Progressing

## 2024-10-22 NOTE — PLAN OF CARE
Problem: PAIN - ADULT  Goal: Verbalizes/displays adequate comfort level or baseline comfort level  Description: Interventions:  - Encourage patient to monitor pain and request assistance  - Assess pain using appropriate pain scale  - Administer analgesics based on type and severity of pain and evaluate response  - Implement non-pharmacological measures as appropriate and evaluate response  - Consider cultural and social influences on pain and pain management  - Notify physician/advanced practitioner if interventions unsuccessful or patient reports new pain  Outcome: Progressing     Problem: INFECTION - ADULT  Goal: Absence or prevention of progression during hospitalization  Description: INTERVENTIONS:  - Assess and monitor for signs and symptoms of infection  - Monitor lab/diagnostic results  - Monitor all insertion sites, i.e. indwelling lines, tubes, and drains  - Monitor endotracheal if appropriate and nasal secretions for changes in amount and color  - [place] appropriate cooling/warming therapies per order  - Administer medications as ordered  - Instruct and encourage patient and family to use good hand hygiene technique  - Identify and instruct in appropriate isolation precautions for identified infection/condition  Outcome: Progressing     Problem: SAFETY ADULT  Goal: Maintains/Returns to pre admission functional level  Description: INTERVENTIONS:  - Perform AM-PAC 6 Click Basic Mobility/ Daily Activity assessment daily.  - Set and communicate daily mobility goal to care team and patient/family/caregiver.   - Collaborate with rehabilitation services on mobility goals if consulted  - Perform Range of Motion 3 times a day.  - Reposition patient every  hours.  - Dangle patient 3 times a day  - Stand patient 3 times a day  - Ambulate patient 3 times a day  - Out of bed to chair 3 times a day   - Out of bed for meals 3 times a day  - Out of bed for toileting  - Record patient progress and toleration of  activity level   Outcome: Progressing     Problem: SAFETY ADULT  Goal: Patient will remain free of falls  Description: INTERVENTIONS:  - Educate patient/family on patient safety including physical limitations  - Instruct patient to call for assistance with activity   - Consult OT/PT to assist with strengthening/mobility   - Keep Call bell within reach  - Keep bed low and locked with side rails adjusted as appropriate  - Keep care items and personal belongings within reach  - Initiate and maintain comfort rounds  - Make Fall Risk Sign visible to staff  - Offer Toileting every  Hours, in advance of need  - Initiate/Maintain bed alarm  - Obtain necessary fall risk management equipment: walker  - Apply yellow socks and bracelet for high fall risk patients  - Consider moving patient to room near nurses station  Outcome: Progressing

## 2024-10-22 NOTE — UTILIZATION REVIEW
Initial Clinical Review    Admission: Date/Time/Statement:   Admission Orders (From admission, onward)       Ordered        10/21/24 0901  Inpatient Admission  Once                          Orders Placed This Encounter   Procedures    Inpatient Admission     Standing Status:   Standing     Number of Occurrences:   1     Order Specific Question:   Level of Care     Answer:   Med Surg [16]     Order Specific Question:   Estimated length of stay     Answer:   More than 2 Midnights     Order Specific Question:   Certification     Answer:   I certify that inpatient services are medically necessary for this patient for a duration of greater than two midnights. See H&P and MD Progress Notes for additional information about the patient's course of treatment.     ED Arrival Information       Expected   -    Arrival   10/21/2024 06:27    Acuity   Less Urgent              Means of arrival   Ambulance    Escorted by   [facility]   Hospitalist    Admission type   Emergency              Arrival complaint   Toe Pain             Chief Complaint   Patient presents with    Foot Pain     Pt arrived via ems with c/o left foot pain. Pt is on an antibiotic for an infection in that foot and states he is in  a lot of pain       Initial Presentation: 54 y.o. male to ED from home via EMS w/  PMH of SLE, lupus nephritis and DVT who presents with worsening left lower extremity swelling.  Recently saw his PCP on the 17th, diagnosed with cellulitis and sent home with cream/ointments with Keflex.  After several days he did not have any significant improvement with continued pain and swelling of his left foot.  Duplex ultrasound in the ED was negative for DVT.  Admitted IP status w/ cellulitis LLE plan to start iv ancef , podiatry consult , repeat labs in am . Wbc 2.81 w/ chronic neutropenia . SLE cont plaquenil . Lupus cont cellCept . Hx DVT cont eliquis . Neuropathic pain on gabapentin .     Anticipated Length of  Stay/Certification Statement:  Patient will be admitted on an inpatient basis with an anticipated length of stay of greater than 2 midnights secondary to IV abx.       Date: 10/22   Day 2: Reports improvement of the swelling and the rednes . Podiatry consult pending . Wbc inc to 3.13 from  2.81 . IV ancef started , keflex on hold . Duplex neg for DVT .     ED Treatment-Medication Administration from 10/21/2024 0627 to 10/21/2024 1028         Date/Time Order Dose Route Action     10/21/2024 0758 ketorolac (TORADOL) injection 30 mg 30 mg Intravenous Given     10/21/2024 0857 ceFAZolin (ANCEF) IVPB (premix in dextrose) 2,000 mg 50 mL 2,000 mg Intravenous New Bag     10/21/2024 1009 apixaban (ELIQUIS) tablet 5 mg 5 mg Oral Given     10/21/2024 1009 aspirin chewable tablet 81 mg 81 mg Oral Given     10/21/2024 1009 baclofen tablet 20 mg 20 mg Oral Given     10/21/2024 1009 famotidine (PEPCID) tablet 20 mg 20 mg Oral Given     10/21/2024 1009 gabapentin (NEURONTIN) capsule 400 mg 400 mg Oral Given     10/21/2024 1009 loratadine (CLARITIN) tablet 10 mg 10 mg Oral Given     10/21/2024 1009 sodium chloride tablet 2 g 2 g Oral Given     10/21/2024 1009 mycophenolate (CELLCEPT) capsule 500 mg 500 mg Oral Given            Scheduled Medications:  apixaban, 5 mg, Oral, BID  aspirin, 81 mg, Oral, Daily  baclofen, 20 mg, Oral, BID  cefazolin, 2,000 mg, Intravenous, Q8H  escitalopram, 10 mg, Oral, Daily  famotidine, 20 mg, Oral, BID  furosemide, 40 mg, Oral, Daily  gabapentin, 400 mg, Oral, TID  hydroxychloroquine, 400 mg, Oral, HS  loratadine, 10 mg, Oral, Daily  mycophenolate, 500 mg, Oral, Q12H YOUSUF  potassium chloride, 20 mEq, Oral, Daily  sodium chloride, 2 g, Oral, TID With Meals  tamsulosin, 0.4 mg, Oral, Daily With Dinner      Continuous IV Infusions:     PRN Meds:  acetaminophen, 650 mg, Oral, Q6H PRN  ondansetron, 4 mg, Intravenous, Q6H PRN      ED Triage Vitals   Temperature Pulse Respirations Blood Pressure SpO2 Pain  Score   10/21/24 0638 10/21/24 0637 10/21/24 0637 10/21/24 0637 10/21/24 0637 10/21/24 0758   97.9 °F (36.6 °C) 76 20 161/83 99 % Med Not Given for Pain - for MAR use only     Weight (last 2 days)       Date/Time Weight    10/21/24 10:46:03 82.3 (181.44)    Comment rows:    OBSERV: kg at 10/21/24 1046            Vital Signs (last 3 days)       Date/Time Temp Pulse Resp BP MAP (mmHg) SpO2 O2 Device Patient Position - Orthostatic VS Pain    10/22/24 07:15:57 98 °F (36.7 °C) 64 18 145/87 106 99 % -- -- --    10/21/24 22:50:42 97.6 °F (36.4 °C) 58 16 128/79 95 100 % -- -- --    10/21/24 2006 -- -- -- -- -- -- None (Room air) -- 9    10/21/24 15:24:46 97.6 °F (36.4 °C) 72 16 126/69 88 94 % -- -- --    10/21/24 1149 -- -- -- -- -- -- -- -- 8    10/21/24 10:46:03 98.1 °F (36.7 °C) 73 15 142/89 107 99 % -- -- --    OBSERV: kg at 10/21/24 1046    10/21/24 1023 -- 66 18 156/83 -- 99 % None (Room air) Lying --    10/21/24 0930 -- 56 16 132/79 101 97 % None (Room air) Sitting --    10/21/24 0758 -- -- -- -- -- -- -- -- Med Not Given for Pain - for MAR use only    10/21/24 0638 97.9 °F (36.6 °C) -- -- -- -- -- -- -- --    10/21/24 0637 -- 76 20 161/83 -- 99 % None (Room air) Lying --              Pertinent Labs/Diagnostic Test Results:   Radiology:  VAS VENOUS DUPLEX -LOWER LIMB UNILATERAL   Final Interpretation by Jing Espino DO (10/21 7471)      RIGHT LOWER LIMB LIMITED:  Evaluation shows no evidence of thrombus in the common femoral vein.  Doppler evaluation shows a normal response to augmentation maneuvers.     LEFT LOWER LIMB:  No evidence of acute or chronic deep vein thrombosis  No evidence of superficial thrombophlebitis noted.  Doppler evaluation shows a normal response to augmentation maneuvers.  Popliteal, posterior tibial and anterior tibial arterial Doppler waveforms are  biphasic/monophasic.  Tech Note:  There is an echogenic structure located in the Left inguinal region suggestive  of enlarged lymphatic  channels.  Cardiology:  No orders to display     GI:  No orders to display           Results from last 7 days   Lab Units 10/22/24  0518 10/21/24  0721   WBC Thousand/uL 3.13* 2.81*   HEMOGLOBIN g/dL 13.3 13.9   HEMATOCRIT % 38.5 40.2   PLATELETS Thousands/uL 155 161         Results from last 7 days   Lab Units 10/22/24  0518 10/21/24  0721   SODIUM mmol/L 139 137   POTASSIUM mmol/L 3.9 3.9   CHLORIDE mmol/L 107 104   CO2 mmol/L 27 25   ANION GAP mmol/L 5 8   BUN mg/dL 12 14   CREATININE mg/dL 1.00 1.04   EGFR ml/min/1.73sq m 83 79   CALCIUM mg/dL 8.9 9.2       Results from last 7 days   Lab Units 10/22/24  0518 10/21/24  0721   GLUCOSE RANDOM mg/dL 89 99       Results from last 7 days   Lab Units 10/21/24  0721   PROTIME seconds 14.7   INR  1.08   PTT seconds 28         Results from last 7 days   Lab Units 10/21/24  0721   LACTIC ACID mmol/L 1.0           Results from last 7 days   Lab Units 10/21/24  0854 10/21/24  0650   BLOOD CULTURE  Received in Microbiology Lab. Culture in Progress. Received in Microbiology Lab. Culture in Progress.         Past Medical History:   Diagnosis Date    Anemia     Arthritis     Cervical mass     Chronic kidney disease     COPD, group B, by GOLD 2017 classification (formerly [facility]) 7/13/2020    Coronary artery disease     Depression     DVT (deep venous thrombosis) (formerly [facility])     Hypertension     Lupus     Renal disorder     Stroke (cerebrum) (formerly [facility]) 6/7/2024     Present on Admission:   Cellulitis of left lower extremity   SLE (systemic lupus erythematosus related syndrome) (formerly [facility])   Lupus nephritis (formerly [facility])   Neuropathic pain      Admitting Diagnosis: Toe pain [M79.676]  Tinea pedis of left foot [B35.3]  Lower extremity cellulitis [L03.119]  Cellulitis of left lower extremity [L03.116]  Age/Sex: 54 y.o. male    Network Utilization Review Department  ATTENTION: Please call with any questions or concerns to [phone] and carefully listen to the prompts so that you are directed to the right person. All  voicemails are confidential.   For Discharge needs, contact Care Management DC Support Team at [phone] opt. 2  Send all requests for admission clinical reviews, approved or denied determinations and any other requests to dedicated fax number below belonging to the campus where the patient is receiving treatment. List of dedicated fax numbers for the Facilities:  FACILITY NAME UR FAX NUMBER   ADMISSION DENIALS (Administrative/Medical Necessity) [phone]   DISCHARGE SUPPORT TEAM (NETWORK) [phone]   PARENT CHILD HEALTH (Maternity/NICU/Pediatrics) [phone]   [facility] [phone]   [facility] [phone]   [facility] [phone]   [facility] [phone]   [facility] [phone]   [facility] [phone]   [facility] [phone]   [facility] [phone]   [facility] [phone]   [facility] [phone]   [facility] [phone]   [facility] [phone]

## 2024-10-22 NOTE — PROGRESS NOTES
Progress Note - Hospitalist   Name: Donta Hunter 54 y.o. male I MRN: [id number]  Unit/Bed#: -01 I Date of Admission: 10/21/2024   Date of Service: 10/22/2024 I Hospital Day: 1    Assessment & Plan  Cellulitis of left lower extremity  54-year-old male with PMH of SLE, lupus nephritis, DVT presented with worsening left lower extremity swelling, erythema  Recently seen on PCP on the 17th, prescribed Keflex without improvement over several days  Duplex ultrasound in the ED was negative for DVT  Hold Keflex, start patient on IV Ancef  Podiatry consult to further evaluate  Repeat labs in AM.  WBC 2.81, chronic neutropenia  SLE (systemic lupus erythematosus related syndrome) (HCC)  Continue Plaquenil  Lupus nephritis (HCC)  Continue CellCept  History of DVT (deep vein thrombosis)  Continue Eliquis  Neuropathic pain  Had been on gabapentin 300 mg 3 times daily, increase to 400 mg 3 times daily    VTE Pharmacologic Prophylaxis:   Moderate Risk (Score 3-4) - Pharmacological DVT Prophylaxis Ordered: apixaban (Eliquis).    Mobility:   Basic Mobility Inpatient Raw Score: 20  JH-HLM Goal: 6: Walk 10 steps or more  JH-HLM Achieved: 7: Walk 25 feet or more  JH-HLM Goal achieved. Continue to encourage appropriate mobility.    Patient Centered Rounds: I performed bedside rounds with nursing staff today.   Discussions with Specialists or Other Care Team Provider: Reviewed notes    Education and Discussions with Family / Patient: Discussed with patient denies any new questions or concerns at this time    Current Length of Stay: 1 day(s)  Current Patient Status: Inpatient   Certification Statement: The patient will continue to require additional inpatient hospital stay due to podiatry consult continued antibiotics  Discharge Plan: Anticipate discharge in 24-48 hrs to home.    Code Status: Level 1 - Full Code    Subjective   Reports improvement of the swelling and the redness denies any chest pain chest tightness shortness of breath  or difficulty breathing is ambulating out in the room offers no acute complaints    Objective :  Temp:  [97.6 °F (36.4 °C)-98.1 °F (36.7 °C)] 98 °F (36.7 °C)  HR:  [58-73] 64  BP: (126-145)/(69-89) 145/87  Resp:  [15-18] 18  SpO2:  [94 %-100 %] 99 %  O2 Device: None (Room air)    Body mass index is 29.28 kg/m².     Input and Output Summary (last 24 hours):     Intake/Output Summary (Last 24 hours) at 10/22/2024 1034  Last data filed at 10/22/2024 0817  Gross per 24 hour   Intake 780 ml   Output 1970 ml   Net -1190 ml       Physical Exam  Constitutional:       General: He is not in acute distress.     Appearance: He is not ill-appearing.   Pulmonary:      Effort: No respiratory distress.   Abdominal:      Palpations: Abdomen is soft.   Musculoskeletal:         General: Swelling present.      Right lower leg: Edema present.      Left lower leg: Edema present.   Skin:     Findings: Erythema present.   Neurological:      Mental Status: He is alert. Mental status is at baseline.   Psychiatric:         Mood and Affect: Mood normal.           Lines/Drains:              Lab Results: I have reviewed the following results:   Results from last 7 days   Lab Units 10/22/24  0518 10/21/24  0721   WBC Thousand/uL 3.13* 2.81*   HEMOGLOBIN g/dL 13.3 13.9   HEMATOCRIT % 38.5 40.2   PLATELETS Thousands/uL 155 161   LYMPHO PCT %  --  35   MONO PCT %  --  13*   EOS PCT %  --  0     Results from last 7 days   Lab Units 10/22/24  0518   SODIUM mmol/L 139   POTASSIUM mmol/L 3.9   CHLORIDE mmol/L 107   CO2 mmol/L 27   BUN mg/dL 12   CREATININE mg/dL 1.00   ANION GAP mmol/L 5   CALCIUM mg/dL 8.9   GLUCOSE RANDOM mg/dL 89     Results from last 7 days   Lab Units 10/21/24  0721   INR  1.08             Results from last 7 days   Lab Units 10/21/24  0721   LACTIC ACID mmol/L 1.0       Recent Cultures (last 7 days):   Results from last 7 days   Lab Units 10/21/24  0854 10/21/24  0650   BLOOD CULTURE  Received in Microbiology Lab. Culture in  Progress. Received in Microbiology Lab. Culture in Progress.       Imaging Results Review: No pertinent imaging studies reviewed.  Other Study Results Review: No additional pertinent studies reviewed.    Last 24 Hours Medication List:     Current Facility-Administered Medications:     acetaminophen (TYLENOL) tablet 650 mg, Q6H PRN    apixaban (ELIQUIS) tablet 5 mg, BID    aspirin chewable tablet 81 mg, Daily    baclofen tablet 20 mg, BID    ceFAZolin (ANCEF) IVPB (premix in dextrose) 2,000 mg 50 mL, Q8H, Last Rate: 2,000 mg (10/22/24 0817)    escitalopram (LEXAPRO) tablet 10 mg, Daily    famotidine (PEPCID) tablet 20 mg, BID    furosemide (LASIX) tablet 40 mg, Daily    gabapentin (NEURONTIN) capsule 400 mg, TID    hydroxychloroquine (PLAQUENIL) tablet 400 mg, HS    loratadine (CLARITIN) tablet 10 mg, Daily    mycophenolate (CELLCEPT) capsule 500 mg, Q12H YOUSUF    ondansetron (ZOFRAN) injection 4 mg, Q6H PRN    potassium chloride (Klor-Con M20) CR tablet 20 mEq, Daily    sodium chloride tablet 2 g, TID With Meals    tamsulosin (FLOMAX) capsule 0.4 mg, Daily With Dinner    Administrative Statements   Today, Patient Was Seen By: CORBY Smith  I have spent a total time of 35 minutes in caring for this patient on the day of the visit/encounter including Diagnostic results, Instructions for management, Importance of tx compliance, Risk factor reductions, Counseling / Coordination of care, Documenting in the medical record, Reviewing / ordering tests, medicine, procedures  , and Obtaining or reviewing history  .    **Please Note: This note may have been constructed using a voice recognition system.**

## 2024-10-23 LAB
ANION GAP SERPL CALCULATED.3IONS-SCNC: 4 MMOL/L (ref 4–13)
BUN SERPL-MCNC: 13 MG/DL (ref 5–25)
CALCIUM SERPL-MCNC: 9.2 MG/DL (ref 8.4–10.2)
CHLORIDE SERPL-SCNC: 105 MMOL/L (ref 96–108)
CO2 SERPL-SCNC: 29 MMOL/L (ref 21–32)
CREAT SERPL-MCNC: 0.98 MG/DL (ref 0.6–1.3)
ERYTHROCYTE [DISTWIDTH] IN BLOOD BY AUTOMATED COUNT: 13.5 % (ref 11.6–15.1)
GFR SERPL CREATININE-BSD FRML MDRD: 85 ML/MIN/1.73SQ M
GLUCOSE SERPL-MCNC: 89 MG/DL (ref 65–140)
HCT VFR BLD AUTO: 38.8 % (ref 36.5–49.3)
HGB BLD-MCNC: 13.3 G/DL (ref 12–17)
MAGNESIUM SERPL-MCNC: 1.6 MG/DL (ref 1.9–2.7)
MCH RBC QN AUTO: 30.6 PG (ref 26.8–34.3)
MCHC RBC AUTO-ENTMCNC: 34.3 G/DL (ref 31.4–37.4)
MCV RBC AUTO: 89 FL (ref 82–98)
PLATELET # BLD AUTO: 160 THOUSANDS/UL (ref 149–390)
PMV BLD AUTO: 10.3 FL (ref 8.9–12.7)
POTASSIUM SERPL-SCNC: 4 MMOL/L (ref 3.5–5.3)
RBC # BLD AUTO: 4.35 MILLION/UL (ref 3.88–5.62)
SODIUM SERPL-SCNC: 138 MMOL/L (ref 135–147)
WBC # BLD AUTO: 3.57 THOUSAND/UL (ref 4.31–10.16)

## 2024-10-23 PROCEDURE — 85027 COMPLETE CBC AUTOMATED: CPT | Performed by: NURSE PRACTITIONER

## 2024-10-23 PROCEDURE — 99232 SBSQ HOSP IP/OBS MODERATE 35: CPT | Performed by: INTERNAL MEDICINE

## 2024-10-23 PROCEDURE — 83735 ASSAY OF MAGNESIUM: CPT | Performed by: NURSE PRACTITIONER

## 2024-10-23 PROCEDURE — 80048 BASIC METABOLIC PNL TOTAL CA: CPT | Performed by: NURSE PRACTITIONER

## 2024-10-23 RX ORDER — MAGNESIUM SULFATE HEPTAHYDRATE 40 MG/ML
4 INJECTION, SOLUTION INTRAVENOUS ONCE
Status: COMPLETED | OUTPATIENT
Start: 2024-10-23 | End: 2024-10-23

## 2024-10-23 RX ORDER — NYSTATIN 100000 U/G
OINTMENT TOPICAL 2 TIMES DAILY
Status: DISCONTINUED | OUTPATIENT
Start: 2024-10-23 | End: 2024-10-24 | Stop reason: HOSPADM

## 2024-10-23 RX ADMIN — APIXABAN 5 MG: 5 TABLET, FILM COATED ORAL at 08:24

## 2024-10-23 RX ADMIN — MAGNESIUM SULFATE 4 G: 4 INJECTION INTRAVENOUS at 12:54

## 2024-10-23 RX ADMIN — Medication 2 G: at 07:42

## 2024-10-23 RX ADMIN — GABAPENTIN 400 MG: 400 CAPSULE ORAL at 21:32

## 2024-10-23 RX ADMIN — POTASSIUM CHLORIDE 20 MEQ: 1500 TABLET, EXTENDED RELEASE ORAL at 08:24

## 2024-10-23 RX ADMIN — LORATADINE 10 MG: 10 TABLET ORAL at 08:24

## 2024-10-23 RX ADMIN — APIXABAN 5 MG: 5 TABLET, FILM COATED ORAL at 17:25

## 2024-10-23 RX ADMIN — GABAPENTIN 400 MG: 400 CAPSULE ORAL at 15:26

## 2024-10-23 RX ADMIN — MYCOPHENOLATE MOFETIL 500 MG: 250 CAPSULE ORAL at 21:32

## 2024-10-23 RX ADMIN — HYDROXYCHLOROQUINE SULFATE 400 MG: 200 TABLET, FILM COATED ORAL at 21:32

## 2024-10-23 RX ADMIN — ACETAMINOPHEN 650 MG: 325 TABLET, FILM COATED ORAL at 15:26

## 2024-10-23 RX ADMIN — BACLOFEN 20 MG: 10 TABLET ORAL at 08:26

## 2024-10-23 RX ADMIN — FUROSEMIDE 40 MG: 40 TABLET ORAL at 08:24

## 2024-10-23 RX ADMIN — FAMOTIDINE 20 MG: 20 TABLET ORAL at 17:25

## 2024-10-23 RX ADMIN — BACLOFEN 20 MG: 10 TABLET ORAL at 17:25

## 2024-10-23 RX ADMIN — GABAPENTIN 400 MG: 400 CAPSULE ORAL at 08:24

## 2024-10-23 RX ADMIN — NYSTATIN: 100000 OINTMENT TOPICAL at 17:28

## 2024-10-23 RX ADMIN — ASPIRIN 81 MG: 81 TABLET, CHEWABLE ORAL at 08:24

## 2024-10-23 RX ADMIN — TAMSULOSIN HYDROCHLORIDE 0.4 MG: 0.4 CAPSULE ORAL at 16:41

## 2024-10-23 RX ADMIN — Medication 2 G: at 16:41

## 2024-10-23 RX ADMIN — MYCOPHENOLATE MOFETIL 500 MG: 250 CAPSULE ORAL at 08:23

## 2024-10-23 RX ADMIN — CEFAZOLIN SODIUM 2000 MG: 2 SOLUTION INTRAVENOUS at 08:23

## 2024-10-23 RX ADMIN — ACETAMINOPHEN 650 MG: 325 TABLET, FILM COATED ORAL at 21:37

## 2024-10-23 RX ADMIN — NYSTATIN: 100000 OINTMENT TOPICAL at 11:55

## 2024-10-23 RX ADMIN — CEFAZOLIN SODIUM 2000 MG: 2 SOLUTION INTRAVENOUS at 16:41

## 2024-10-23 RX ADMIN — ESCITALOPRAM OXALATE 10 MG: 10 TABLET ORAL at 08:23

## 2024-10-23 RX ADMIN — FAMOTIDINE 20 MG: 20 TABLET ORAL at 08:23

## 2024-10-23 RX ADMIN — Medication 2 G: at 11:55

## 2024-10-23 NOTE — PROGRESS NOTES
Progress Note - Hospitalist   Name: Donta Hunter 54 y.o. male I MRN: [id number]  Unit/Bed#: -01 I Date of Admission: 10/21/2024   Date of Service: 10/23/2024 I Hospital Day: 2    Assessment & Plan  Cellulitis of left lower extremity  54-year-old male with PMH of SLE, lupus nephritis, DVT presented with worsening left lower extremity swelling, erythema  Failed outpatient treatment with po keflex  Duplex ultrasound in the ED was negative for DVT  Cont on IV ancef for an additional 24 hours, anticipate discharge home tmw on po abx  SLE (systemic lupus erythematosus related syndrome) (HCC)  Continue Plaquenil  Lupus nephritis (HCC)  Continue CellCept  History of DVT (deep vein thrombosis)  Continue Eliquis  Neuropathic pain  Had been on gabapentin 300 mg 3 times daily, increased to 400 mg 3 times daily    VTE Pharmacologic Prophylaxis:   Moderate Risk (Score 3-4) - Pharmacological DVT Prophylaxis Ordered: apixaban (Eliquis).    Mobility:   Basic Mobility Inpatient Raw Score: 20  JH-HLM Goal: 6: Walk 10 steps or more  JH-HLM Achieved: 6: Walk 10 steps or more  JH-HLM Goal achieved. Continue to encourage appropriate mobility.    Patient Centered Rounds: I performed bedside rounds with nursing staff today.   Discussions with Specialists or Other Care Team Provider: KATHERYN.     Education and Discussions with Family / Patient: Patient declined call to .     Current Length of Stay: 2 day(s)  Current Patient Status: Inpatient   Certification Statement: The patient will continue to require additional inpatient hospital stay due to IV abx, meidcation hydration, serial lab monitoring, clinical monitoring  Discharge Plan: Anticipate discharge tomorrow to home.    Code Status: Level 1 - Full Code    Subjective   Patient seen and examined.  He feels like his cellulitis in his foot has improved.  Denies any fever or feelings of fever overnight.  No events overnight.  No new complaints other than his ongoing  neuropathy    Objective :  Temp:  [97.5 °F (36.4 °C)-98.1 °F (36.7 °C)] 98.1 °F (36.7 °C)  HR:  [64-72] 72  BP: (138-148)/() 148/119  Resp:  [16-18] 16  SpO2:  [99 %-100 %] 99 %  O2 Device: None (Room air)    Body mass index is 29.28 kg/m².     Input and Output Summary (last 24 hours):     Intake/Output Summary (Last 24 hours) at 10/23/2024 1539  Last data filed at 10/23/2024 1507  Gross per 24 hour   Intake 1020 ml   Output 1860 ml   Net -840 ml       PHYSICAL EXAM:    Vitals signs reviewed  Constitutional   Awake and cooperative. NAD.   Head/Neck   Normocephalic. Atraumatic.   HEENT   No scleral icterus. EOMI.   Heart   Regular rate and rhythm. No murmers.   Lungs   Clear to auscultation bilaterally. Respirations unlaboured.   Abdomen   Soft. Nontender. Nondistended.    Skin   Skin color normal. No rashes.   Extremities   No deformities. No peripheral edema. Mild erythema over the dorsum of the right foot.    Neuro   Alert and oriented. No new deficits.   Psych   Mood stable. Affect normal.         Lines/Drains:              Lab Results: I have reviewed the following results:   Results from last 7 days   Lab Units 10/23/24  0529 10/22/24  0518 10/21/24  0721   WBC Thousand/uL 3.57*   < > 2.81*   HEMOGLOBIN g/dL 13.3   < > 13.9   HEMATOCRIT % 38.8   < > 40.2   PLATELETS Thousands/uL 160   < > 161   LYMPHO PCT %  --   --  35   MONO PCT %  --   --  13*   EOS PCT %  --   --  0    < > = values in this interval not displayed.     Results from last 7 days   Lab Units 10/23/24  0529   SODIUM mmol/L 138   POTASSIUM mmol/L 4.0   CHLORIDE mmol/L 105   CO2 mmol/L 29   BUN mg/dL 13   CREATININE mg/dL 0.98   ANION GAP mmol/L 4   CALCIUM mg/dL 9.2   GLUCOSE RANDOM mg/dL 89     Results from last 7 days   Lab Units 10/21/24  0721   INR  1.08             Results from last 7 days   Lab Units 10/21/24  0721   LACTIC ACID mmol/L 1.0       Recent Cultures (last 7 days):   Results from last 7 days   Lab Units 10/21/24  0830  10/21/24  0650   BLOOD CULTURE  No Growth at 48 hrs. No Growth at 48 hrs.       Imaging Results Review: No pertinent imaging studies reviewed.  Other Study Results Review: No additional pertinent studies reviewed.    Last 24 Hours Medication List:     Current Facility-Administered Medications:     acetaminophen (TYLENOL) tablet 650 mg, Q6H PRN    apixaban (ELIQUIS) tablet 5 mg, BID    aspirin chewable tablet 81 mg, Daily    baclofen tablet 20 mg, BID    ceFAZolin (ANCEF) IVPB (premix in dextrose) 2,000 mg 50 mL, Q8H, Last Rate: 2,000 mg (10/23/24 0823)    escitalopram (LEXAPRO) tablet 10 mg, Daily    famotidine (PEPCID) tablet 20 mg, BID    furosemide (LASIX) tablet 40 mg, Daily    gabapentin (NEURONTIN) capsule 400 mg, TID    hydroxychloroquine (PLAQUENIL) tablet 400 mg, HS    loratadine (CLARITIN) tablet 10 mg, Daily    magnesium sulfate 4 g/100 mL IVPB (premix) 4 g, Once, Last Rate: 4 g (10/23/24 1254)    mycophenolate (CELLCEPT) capsule 500 mg, Q12H YOUSUF    nystatin (MYCOSTATIN) ointment, BID    ondansetron (ZOFRAN) injection 4 mg, Q6H PRN    polyethylene glycol (MIRALAX) packet 17 g, Daily    potassium chloride (Klor-Con M20) CR tablet 20 mEq, Daily    sodium chloride tablet 2 g, TID With Meals    tamsulosin (FLOMAX) capsule 0.4 mg, Daily With Dinner    Administrative Statements   Today, Patient Was Seen By: Curtis Jeffery, DO  I have spent a total time of 36 minutes in caring for this patient on the day of the visit/encounter including Diagnostic results, Prognosis, Patient and family education, Importance of tx compliance, Impressions, Counseling / Coordination of care, Documenting in the medical record, Reviewing / ordering tests, medicine, procedures  , and Obtaining or reviewing history  .    **Please Note: This note may have been constructed using a voice recognition system.**

## 2024-10-23 NOTE — CONSULTS
Consultation - Podiatric Surgery    Donta Hunter 54 y.o. male MRN: [id number]  Unit/Bed#: -01 Encounter: [id number]      Assessment & Plan     Assessment:  Left lower extremity cellulitis.     Plan:  The Xray was reviewed   Can start nystatin to the B/L feet for 6 weeks to treat tinea pedis. Possibly small cracks original source of the cellulitis.   Would likely benefit from another day of IV abx, hard to track infection with labs given low WBC count.     History of Present Illness   Physician Requesting Consult: Alec Ulloa MD  Reason for Consult / Principal Problem:   HPI: Donta Hunter is a 54 y.o. year old male who presents with PMH of SLE, lupus nephritis and DVT who came in for worsening and redness of the LLE. Saw PCP on the 17th, diagnosed with cellulitis and sent home with cream/ointments with Keflex. After several days he did not have any significant improvement and came to ED.     Inpatient consult to Podiatry  Consult performed by: Chelsey Ladd DPM  Consult ordered by: Bo Arriaga MD          Review of Systems    Historical Information   Past Medical History:   Diagnosis Date    Anemia     Arthritis     Cervical mass     Chronic kidney disease     COPD, group B, by GOLD 2017 classification ([facility]) 7/13/2020    Coronary artery disease     Depression     DVT (deep venous thrombosis) ([facility])     Hypertension     Lupus     Renal disorder     Stroke (cerebrum) ([facility]) 6/7/2024     Past Surgical History:   Procedure Laterality Date    APPENDECTOMY      CERVICAL SPINE SURGERY      CHOLECYSTECTOMY      COLONOSCOPY N/A 8/23/2018    Procedure: COLONOSCOPY;  Surgeon: James Wise III, MD;  Location: [facility];  Service: Gastroenterology    ESOPHAGOGASTRODUODENOSCOPY N/A 8/22/2018    Procedure: ESOPHAGOGASTRODUODENOSCOPY (EGD);  Surgeon: James Wise III, MD;  Location: [facility];  Service: Gastroenterology    IR CEREBRAL ANGIOGRAPHY  7/24/2024    IR IVC FILTER PLACEMENT PERMANENT  9/7/2017     "IR IVC FILTER REMOVAL  4/23/2018    IR LUMBAR PUNCTURE  11/23/2015    IR LUMBAR PUNCTURE  4/25/2024    NECK SURGERY      US GUIDED INJECTION FOR RESEARCH STUDY  4/23/2018    US GUIDED INJECTION FOR RESEARCH STUDY  9/7/2017    VASCULAR SURGERY       Social History   Social History     Substance and Sexual Activity   Alcohol Use Never     Social History     Substance and Sexual Activity   Drug Use Never     E-Cigarette/Vaping    E-Cigarette Use Never User      E-Cigarette/Vaping Substances    Nicotine No     THC No     CBD No     Flavoring No     Other No     Unknown No      Social History     Tobacco Use   Smoking Status Never   Smokeless Tobacco Never       Meds/Allergies   all current active meds have been reviewed  Allergies   Allergen Reactions    Doxycycline Rash    Sulfa Antibiotics Rash       Objective   Vitals: Blood pressure 138/82, pulse 71, temperature 97.5 °F (36.4 °C), resp. rate 18, height 5' 6\" (1.676 m), weight 82.3 kg (181 lb 7 oz), SpO2 100%.,Body mass index is 29.28 kg/m².      Intake/Output Summary (Last 24 hours) at 10/23/2024 0738  Last data filed at 10/23/2024 0501  Gross per 24 hour   Intake 720 ml   Output 1420 ml   Net -700 ml     I/O last 24 hours:  In: 900 [P.O.:900]  Out: 1420 [Urine:1420]    Invasive Devices       Peripheral Intravenous Line  Duration             Peripheral IV 10/21/24 Distal;Right;Upper;Ventral (anterior) Arm 2 days                    Physical Exam  Ortho Exam    Vascular:   -DP pulse is non palpable B/L, PT pulse is faintly palpable B/L   -Capillary refill time is less than 3 seconds B/L  -Pedal hair growth is diminished B/L  -Temperature is warm to warm  from ankle to toes B/L   -LLE slightly more edematous compared to the RLE.         Derm:  The left forefoot and digits are more erythematous compared to the right. There is a small scab to the 3rd digit. There is peeling to the B/L plantar feet.       Lab Results:   Results from last 7 days   Lab Units 10/23/24  0529 " 10/22/24  0518 10/21/24  0721   WBC Thousand/uL 3.57*   < > 2.81*   HEMOGLOBIN g/dL 13.3   < > 13.9   HEMATOCRIT % 38.8   < > 40.2   PLATELETS Thousands/uL 160   < > 161   LYMPHO PCT %  --   --  35   MONO PCT %  --   --  13*   EOS PCT %  --   --  0    < > = values in this interval not displayed.           Code Status: Level 1 - Full Code    Chelsey Ladd DPM

## 2024-10-23 NOTE — ASSESSMENT & PLAN NOTE
54-year-old male with PMH of SLE, lupus nephritis, DVT presented with worsening left lower extremity swelling, erythema  Failed outpatient treatment with po keflex  Duplex ultrasound in the ED was negative for DVT  Cont on IV ancef for an additional 24 hours, anticipate discharge home tmw on po abx

## 2024-10-23 NOTE — PLAN OF CARE
Problem: PAIN - ADULT  Goal: Verbalizes/displays adequate comfort level or baseline comfort level  Description: Interventions:  - Encourage patient to monitor pain and request assistance  - Assess pain using appropriate pain scale  - Administer analgesics based on type and severity of pain and evaluate response  - Implement non-pharmacological measures as appropriate and evaluate response  - Consider cultural and social influences on pain and pain management  - Notify physician/advanced practitioner if interventions unsuccessful or patient reports new pain  Outcome: Progressing     Problem: INFECTION - ADULT  Goal: Absence or prevention of progression during hospitalization  Description: INTERVENTIONS:  - Assess and monitor for signs and symptoms of infection  - Monitor lab/diagnostic results  - Monitor all insertion sites, i.e. indwelling lines, tubes, and drains  - Monitor endotracheal if appropriate and nasal secretions for changes in amount and color  - [place] appropriate cooling/warming therapies per order  - Administer medications as ordered  - Instruct and encourage patient and family to use good hand hygiene technique  - Identify and instruct in appropriate isolation precautions for identified infection/condition  Outcome: Progressing     Problem: MUSCULOSKELETAL - ADULT  Goal: Maintain or return mobility to safest level of function  Description: INTERVENTIONS:  - Assess patient's ability to carry out ADLs; assess patient's baseline for ADL function and identify physical deficits which impact ability to perform ADLs (bathing, care of mouth/teeth, toileting, grooming, dressing, etc.)  - Assess/evaluate cause of self-care deficits   - Assess range of motion  - Assess patient's mobility  - Assess patient's need for assistive devices and provide as appropriate  - Encourage maximum independence but intervene and supervise when necessary  - Involve family in performance of ADLs  - Assess for home care needs  following discharge   - Consider OT consult to assist with ADL evaluation and planning for discharge  - Provide patient education as appropriate  Outcome: Progressing

## 2024-10-23 NOTE — PLAN OF CARE
Problem: INFECTION - ADULT  Goal: Absence or prevention of progression during hospitalization  Description: INTERVENTIONS:  - Assess and monitor for signs and symptoms of infection  - Monitor lab/diagnostic results  - Monitor all insertion sites, i.e. indwelling lines, tubes, and drains  - Monitor endotracheal if appropriate and nasal secretions for changes in amount and color  - [place] appropriate cooling/warming therapies per order  - Administer medications as ordered  - Instruct and encourage patient and family to use good hand hygiene technique  - Identify and instruct in appropriate isolation precautions for identified infection/condition  Outcome: Progressing

## 2024-10-23 NOTE — CASE MANAGEMENT
Case Management Assessment & Discharge Planning Note    Patient name Donta Hunter  Location /-01 MRN [id number]  : 1970 Date 10/23/2024       Current Admission Date: 10/21/2024  Current Admission Diagnosis:Cellulitis of left lower extremity   Patient Active Problem List    Diagnosis Date Noted Date Diagnosed    Stroke (cerebrum) ([facility]) 2024     Osteoporosis 2024     Gastroesophageal reflux disease without esophagitis 2024     History of multiple strokes 2024     CAD (coronary artery disease) 2024     DVT (deep venous thrombosis) ([facility]) 2024     DJD (degenerative joint disease), ankle and foot, right 2023     Hypertensive kidney disease with stage 3a chronic kidney disease ([facility]) 2023     Cellulitis of left lower extremity 2022     COPD, group B, by GOLD 2017 classification ([facility]) 2020     Tremor of both hands 2020     Spasticity 2020     Gait difficulty 2020     Neuropathic pain 2020     Muscle weakness (generalized) 2019     Hyperparathyroidism ([facility]) 10/03/2019     Anemia in chronic kidney disease 2018     Chronic kidney disease, stage 3a ([facility]) 2018     SLE (systemic lupus erythematosus related syndrome) ([facility]) 2018     Lupus nephritis ([facility]) 2018     Hypertension 2018     History of DVT (deep vein thrombosis) 2018     Stage 2 chronic kidney disease 2018       LOS (days): 2  Geometric Mean LOS (GMLOS) (days): 3.1  Days to GMLOS:0.8     OBJECTIVE:    Risk of Unplanned Readmission Score: 14.54         Current admission status: Inpatient       Preferred Pharmacy:   [facility] - [place], [place] - [address]  [address]  [place] [address]  Phone: [phone] Fax: [phone]    Primary Care Provider: Raad Batres MD    Primary Insurance: [facility]  Secondary Insurance:     ASSESSMENT:  Active  Health Care Proxies       Angela Michel Health Care Representative - Spouse   Primary Phone: [phone] (Mobile)                 Advance Directives  Does patient have a Health Care POA?: No  Was patient offered paperwork?: Yes (Spouse to make decisions.)  Does patient currently have a Health Care decision maker?: Yes, please see Health Care Proxy section  Does patient have Advance Directives?: No  Was patient offered paperwork?: Yes (Spouse to make decisions.)  Primary Contact: Angela, rehana         Readmission Root Cause  30 Day Readmission: No    Patient Information  Admitted from:: Home  Mental Status: Alert  During Assessment patient was accompanied by: Not accompanied during assessment  Assessment information provided by:: Patient  Primary Caregiver: Self  Support Systems: Spouse/significant other, Self  County of Residence: [place]  What city do you live in?: [place]  Home entry access options. Select all that apply.: Stairs  Type of Current Residence: [address] [place] [address] [address]  Upon entering residence, is there a bedroom on the main floor (no further steps)?: Yes  Upon entering residence, is there a bathroom on the main floor (no further steps)?: Yes  Is patient a ?: No    Activities of Daily Living Prior to Admission  Functional Status: Assistance  Completes ADLs independently?: No  Level of ADL dependence: Assistance  Ambulates independently?: No  Level of ambulatory dependence: Assistance  Does patient use assisted devices?: Yes  Assisted Devices (DME) used: Walker, Wheelchair, Shower Chair, Straight Cane  Does patient currently own DME?: Yes  What DME does the patient currently own?: Wheelchair, Walker, Straight Cane, Shower Chair  Does patient have a history of Outpatient Therapy (PT/OT)?: No  Does the patient have a history of Short-Term Rehab?: No  Does patient have a history of HHC?: Yes  Does patient currently have HHC?: Yes ([facility])    Current Home Health Care  Type of Current Home Care  Services: Home health aide  Home Health Agency Name::  ([facility])  Current Home Health Follow-Up Provider:: PCP    Patient Information Continued  Income Source: SSI/SSD  Does patient have prescription coverage?: Yes  Does patient receive dialysis treatments?: No  Does patient have a history of substance abuse?: No  Does patient have a history of Mental Health Diagnosis?: No         Means of Transportation  Means of Transport to Appts:: Family transport (Spouse and aide)      Social Determinants of Health (SDOH)      Flowsheet Row Most Recent Value   Housing Stability    In the last 12 months, was there a time when you were not able to pay the mortgage or rent on time? N   In the past 12 months, how many times have you moved where you were living? 0   At any time in the past 12 months, were you homeless or living in a shelter (including now)? N   Transportation Needs    In the past 12 months, has lack of transportation kept you from medical appointments or from getting medications? no   In the past 12 months, has lack of transportation kept you from meetings, work, or from getting things needed for daily living? No   Food Insecurity    Within the past 12 months, you worried that your food would run out before you got the money to buy more. Never true   Within the past 12 months, the food you bought just didn't last and you didn't have money to get more. Never true   Utilities    In the past 12 months has the electric, gas, oil, or water company threatened to shut off services in your home? No            DISCHARGE DETAILS:    Discharge planning discussed with:: Patient at bedside  Freedom of Choice: Yes  Comments - Freedom of Choice: CM discussed discharge planning and freedom of choice if services are recommended. Patient will be resuming [facility] with [facility] services upon discharge.  CM contacted family/caregiver?: No- see comments (Patient declined)  Were Treatment Team discharge recommendations reviewed with  patient/caregiver?: Yes  Did patient/caregiver verbalize understanding of patient care needs?: Yes  Were patient/caregiver advised of the risks associated with not following Treatment Team discharge recommendations?: Yes         Requested Home Health Care         Is the patient interested in HHC at discharge?: Yes  Home Health Discipline requested:: Home Health Aide  Home Health Agency Name::  ([facility])  HHA External Referral Reason (only applicable if external HHA name selected): Patient has established relationship with provider  Home Health Follow-Up Provider:: PCP    DME Referral Provided  Referral made for DME?: No    Other Referral/Resources/Interventions Provided:  Interventions: HHC, HHA    Would you like to participate in our [facility] service program?  : No - Declined    Treatment Team Recommendation: Home with Home Health Care  Discharge Destination Plan:: Home with Home Health Care  Transport at Discharge : Family

## 2024-10-24 ENCOUNTER — TRANSITIONAL CARE MANAGEMENT (OUTPATIENT)
Dept: INTERNAL MEDICINE CLINIC | Facility: CLINIC | Age: 54
End: 2024-10-24

## 2024-10-24 VITALS
WEIGHT: 181.44 LBS | BODY MASS INDEX: 29.16 KG/M2 | DIASTOLIC BLOOD PRESSURE: 79 MMHG | HEART RATE: 64 BPM | HEIGHT: 66 IN | TEMPERATURE: 98.1 F | RESPIRATION RATE: 16 BRPM | OXYGEN SATURATION: 100 % | SYSTOLIC BLOOD PRESSURE: 129 MMHG

## 2024-10-24 LAB
ANION GAP SERPL CALCULATED.3IONS-SCNC: 6 MMOL/L (ref 4–13)
BUN SERPL-MCNC: 14 MG/DL (ref 5–25)
CALCIUM SERPL-MCNC: 8.6 MG/DL (ref 8.4–10.2)
CHLORIDE SERPL-SCNC: 105 MMOL/L (ref 96–108)
CO2 SERPL-SCNC: 26 MMOL/L (ref 21–32)
CREAT SERPL-MCNC: 0.95 MG/DL (ref 0.6–1.3)
ERYTHROCYTE [DISTWIDTH] IN BLOOD BY AUTOMATED COUNT: 13.4 % (ref 11.6–15.1)
GFR SERPL CREATININE-BSD FRML MDRD: 89 ML/MIN/1.73SQ M
GLUCOSE SERPL-MCNC: 83 MG/DL (ref 65–140)
HCT VFR BLD AUTO: 39.2 % (ref 36.5–49.3)
HGB BLD-MCNC: 13.4 G/DL (ref 12–17)
MAGNESIUM SERPL-MCNC: 2.1 MG/DL (ref 1.9–2.7)
MCH RBC QN AUTO: 30.4 PG (ref 26.8–34.3)
MCHC RBC AUTO-ENTMCNC: 34.2 G/DL (ref 31.4–37.4)
MCV RBC AUTO: 89 FL (ref 82–98)
PLATELET # BLD AUTO: 151 THOUSANDS/UL (ref 149–390)
PMV BLD AUTO: 9.8 FL (ref 8.9–12.7)
POTASSIUM SERPL-SCNC: 4.3 MMOL/L (ref 3.5–5.3)
RBC # BLD AUTO: 4.41 MILLION/UL (ref 3.88–5.62)
SODIUM SERPL-SCNC: 137 MMOL/L (ref 135–147)
WBC # BLD AUTO: 3.28 THOUSAND/UL (ref 4.31–10.16)

## 2024-10-24 PROCEDURE — 83735 ASSAY OF MAGNESIUM: CPT | Performed by: INTERNAL MEDICINE

## 2024-10-24 PROCEDURE — 80048 BASIC METABOLIC PNL TOTAL CA: CPT | Performed by: INTERNAL MEDICINE

## 2024-10-24 PROCEDURE — 85027 COMPLETE CBC AUTOMATED: CPT | Performed by: INTERNAL MEDICINE

## 2024-10-24 PROCEDURE — 99239 HOSP IP/OBS DSCHRG MGMT >30: CPT | Performed by: INTERNAL MEDICINE

## 2024-10-24 RX ORDER — CEPHALEXIN 500 MG/1
500 CAPSULE ORAL EVERY 6 HOURS SCHEDULED
Qty: 16 CAPSULE | Refills: 0 | Status: SHIPPED | OUTPATIENT
Start: 2024-10-24 | End: 2024-10-28

## 2024-10-24 RX ORDER — NYSTATIN 100000 U/G
OINTMENT TOPICAL 2 TIMES DAILY
Qty: 30 G | Refills: 0 | Status: SHIPPED | OUTPATIENT
Start: 2024-10-24 | End: 2024-12-03

## 2024-10-24 RX ADMIN — Medication 2 G: at 08:34

## 2024-10-24 RX ADMIN — APIXABAN 5 MG: 5 TABLET, FILM COATED ORAL at 08:34

## 2024-10-24 RX ADMIN — MYCOPHENOLATE MOFETIL 500 MG: 250 CAPSULE ORAL at 08:34

## 2024-10-24 RX ADMIN — CEFAZOLIN SODIUM 2000 MG: 2 SOLUTION INTRAVENOUS at 08:36

## 2024-10-24 RX ADMIN — GABAPENTIN 400 MG: 400 CAPSULE ORAL at 08:34

## 2024-10-24 RX ADMIN — ASPIRIN 81 MG: 81 TABLET, CHEWABLE ORAL at 08:34

## 2024-10-24 RX ADMIN — POTASSIUM CHLORIDE 20 MEQ: 1500 TABLET, EXTENDED RELEASE ORAL at 08:34

## 2024-10-24 RX ADMIN — BACLOFEN 20 MG: 10 TABLET ORAL at 08:34

## 2024-10-24 RX ADMIN — NYSTATIN: 100000 OINTMENT TOPICAL at 08:34

## 2024-10-24 RX ADMIN — CEFAZOLIN SODIUM 2000 MG: 2 SOLUTION INTRAVENOUS at 02:08

## 2024-10-24 NOTE — PLAN OF CARE
Problem: PAIN - ADULT  Goal: Verbalizes/displays adequate comfort level or baseline comfort level  Description: Interventions:  - Encourage patient to monitor pain and request assistance  - Assess pain using appropriate pain scale  - Administer analgesics based on type and severity of pain and evaluate response  - Implement non-pharmacological measures as appropriate and evaluate response  - Consider cultural and social influences on pain and pain management  - Notify physician/advanced practitioner if interventions unsuccessful or patient reports new pain  Outcome: Progressing     Problem: INFECTION - ADULT  Goal: Absence or prevention of progression during hospitalization  Description: INTERVENTIONS:  - Assess and monitor for signs and symptoms of infection  - Monitor lab/diagnostic results  - Monitor all insertion sites, i.e. indwelling lines, tubes, and drains  - Monitor endotracheal if appropriate and nasal secretions for changes in amount and color  - [place] appropriate cooling/warming therapies per order  - Administer medications as ordered  - Instruct and encourage patient and family to use good hand hygiene technique  - Identify and instruct in appropriate isolation precautions for identified infection/condition  Outcome: Progressing  Goal: Absence of fever/infection during neutropenic period  Description: INTERVENTIONS:  - Monitor WBC    Outcome: Progressing     Problem: SAFETY ADULT  Goal: Patient will remain free of falls  Description: INTERVENTIONS:  - Educate patient/family on patient safety including physical limitations  - Instruct patient to call for assistance with activity   - Consult OT/PT to assist with strengthening/mobility   - Keep Call bell within reach  - Keep bed low and locked with side rails adjusted as appropriate  - Keep care items and personal belongings within reach  - Initiate and maintain comfort rounds  - Make Fall Risk Sign visible to staff  - Offer Toileting every 2  Hours,  in advance of need  - Initiate/Maintain bed alarm  - Obtain necessary fall risk management equipment: bed alarm   - Apply yellow socks and bracelet for high fall risk patients  - Consider moving patient to room near nurses station  Outcome: Progressing  Goal: Maintain or return to baseline ADL function  Description: INTERVENTIONS:  -  Assess patient's ability to carry out ADLs; assess patient's baseline for ADL function and identify physical deficits which impact ability to perform ADLs (bathing, care of mouth/teeth, toileting, grooming, dressing, etc.)  - Assess/evaluate cause of self-care deficits   - Assess range of motion  - Assess patient's mobility; develop plan if impaired  - Assess patient's need for assistive devices and provide as appropriate  - Encourage maximum independence but intervene and supervise when necessary  - Involve family in performance of ADLs  - Assess for home care needs following discharge   - Consider OT consult to assist with ADL evaluation and planning for discharge  - Provide patient education as appropriate  Outcome: Progressing  Goal: Maintains/Returns to pre admission functional level  Description: INTERVENTIONS:  - Perform AM-PAC 6 Click Basic Mobility/ Daily Activity assessment daily.  - Set and communicate daily mobility goal to care team and patient/family/caregiver.   - Collaborate with rehabilitation services on mobility goals if consulted  - Perform Range of Motion 3 times a day.  - Reposition patient every 3 hours.  - Dangle patient 3 times a day  - Stand patient 3 times a day  - Ambulate patient 3 times a day  - Out of bed to chair 3 times a day   - Out of bed for meals 3 times a day  - Out of bed for toileting  - Record patient progress and toleration of activity level   Outcome: Progressing     Problem: DISCHARGE PLANNING  Goal: Discharge to home or other facility with appropriate resources  Description: INTERVENTIONS:  - Identify barriers to discharge w/patient and  caregiver  - Arrange for needed discharge resources and transportation as appropriate  - Identify discharge learning needs (meds, wound care, etc.)  - Arrange for interpretive services to assist at discharge as needed  - Refer to Case Management Department for coordinating discharge planning if the patient needs post-hospital services based on physician/advanced practitioner order or complex needs related to functional status, cognitive ability, or social support system  Outcome: Progressing     Problem: Knowledge Deficit  Goal: Patient/family/caregiver demonstrates understanding of disease process, treatment plan, medications, and discharge instructions  Description: Complete learning assessment and assess knowledge base.  Interventions:  - Provide teaching at level of understanding  - Provide teaching via preferred learning methods  Outcome: Progressing     Problem: MUSCULOSKELETAL - ADULT  Goal: Maintain or return mobility to safest level of function  Description: INTERVENTIONS:  - Assess patient's ability to carry out ADLs; assess patient's baseline for ADL function and identify physical deficits which impact ability to perform ADLs (bathing, care of mouth/teeth, toileting, grooming, dressing, etc.)  - Assess/evaluate cause of self-care deficits   - Assess range of motion  - Assess patient's mobility  - Assess patient's need for assistive devices and provide as appropriate  - Encourage maximum independence but intervene and supervise when necessary  - Involve family in performance of ADLs  - Assess for home care needs following discharge   - Consider OT consult to assist with ADL evaluation and planning for discharge  - Provide patient education as appropriate  Outcome: Progressing

## 2024-10-24 NOTE — PLAN OF CARE
Problem: PAIN - ADULT  Goal: Verbalizes/displays adequate comfort level or baseline comfort level  Description: Interventions:  - Encourage patient to monitor pain and request assistance  - Assess pain using appropriate pain scale  - Administer analgesics based on type and severity of pain and evaluate response  - Implement non-pharmacological measures as appropriate and evaluate response  - Consider cultural and social influences on pain and pain management  - Notify physician/advanced practitioner if interventions unsuccessful or patient reports new pain  Outcome: Adequate for Discharge     Problem: INFECTION - ADULT  Goal: Absence or prevention of progression during hospitalization  Description: INTERVENTIONS:  - Assess and monitor for signs and symptoms of infection  - Monitor lab/diagnostic results  - Monitor all insertion sites, i.e. indwelling lines, tubes, and drains  - Monitor endotracheal if appropriate and nasal secretions for changes in amount and color  - [place] appropriate cooling/warming therapies per order  - Administer medications as ordered  - Instruct and encourage patient and family to use good hand hygiene technique  - Identify and instruct in appropriate isolation precautions for identified infection/condition  Outcome: Adequate for Discharge  Goal: Absence of fever/infection during neutropenic period  Description: INTERVENTIONS:  - Monitor WBC    Outcome: Adequate for Discharge     Problem: SAFETY ADULT  Goal: Patient will remain free of falls  Description: INTERVENTIONS:  - Educate patient/family on patient safety including physical limitations  - Instruct patient to call for assistance with activity   - Consult OT/PT to assist with strengthening/mobility   - Keep Call bell within reach  - Keep bed low and locked with side rails adjusted as appropriate  - Keep care items and personal belongings within reach  - Initiate and maintain comfort rounds  - Make Fall Risk Sign visible to  staff  - Offer Toileting every  Hours, in advance of need  - Initiate/Maintain alarm  - Obtain necessary fall risk management equipment:   - Apply yellow socks and bracelet for high fall risk patients  - Consider moving patient to room near nurses station  Outcome: Adequate for Discharge  Goal: Maintain or return to baseline ADL function  Description: INTERVENTIONS:  -  Assess patient's ability to carry out ADLs; assess patient's baseline for ADL function and identify physical deficits which impact ability to perform ADLs (bathing, care of mouth/teeth, toileting, grooming, dressing, etc.)  - Assess/evaluate cause of self-care deficits   - Assess range of motion  - Assess patient's mobility; develop plan if impaired  - Assess patient's need for assistive devices and provide as appropriate  - Encourage maximum independence but intervene and supervise when necessary  - Involve family in performance of ADLs  - Assess for home care needs following discharge   - Consider OT consult to assist with ADL evaluation and planning for discharge  - Provide patient education as appropriate  Outcome: Adequate for Discharge  Goal: Maintains/Returns to pre admission functional level  Description: INTERVENTIONS:  - Perform AM-PAC 6 Click Basic Mobility/ Daily Activity assessment daily.  - Set and communicate daily mobility goal to care team and patient/family/caregiver.   - Collaborate with rehabilitation services on mobility goals if consulted  - Perform Range of Motion  times a day.  - Reposition patient every  hours.  - Dangle patient  times a day  - Stand patient  times a day  - Ambulate patient  times a day  - Out of bed to chair  times a day   - Out of bed for meals  times a day  - Out of bed for toileting  - Record patient progress and toleration of activity level   Outcome: Adequate for Discharge     Problem: DISCHARGE PLANNING  Goal: Discharge to home or other facility with appropriate resources  Description:  INTERVENTIONS:  - Identify barriers to discharge w/patient and caregiver  - Arrange for needed discharge resources and transportation as appropriate  - Identify discharge learning needs (meds, wound care, etc.)  - Arrange for interpretive services to assist at discharge as needed  - Refer to Case Management Department for coordinating discharge planning if the patient needs post-hospital services based on physician/advanced practitioner order or complex needs related to functional status, cognitive ability, or social support system  Outcome: Adequate for Discharge     Problem: Knowledge Deficit  Goal: Patient/family/caregiver demonstrates understanding of disease process, treatment plan, medications, and discharge instructions  Description: Complete learning assessment and assess knowledge base.  Interventions:  - Provide teaching at level of understanding  - Provide teaching via preferred learning methods  Outcome: Adequate for Discharge     Problem: MUSCULOSKELETAL - ADULT  Goal: Maintain or return mobility to safest level of function  Description: INTERVENTIONS:  - Assess patient's ability to carry out ADLs; assess patient's baseline for ADL function and identify physical deficits which impact ability to perform ADLs (bathing, care of mouth/teeth, toileting, grooming, dressing, etc.)  - Assess/evaluate cause of self-care deficits   - Assess range of motion  - Assess patient's mobility  - Assess patient's need for assistive devices and provide as appropriate  - Encourage maximum independence but intervene and supervise when necessary  - Involve family in performance of ADLs  - Assess for home care needs following discharge   - Consider OT consult to assist with ADL evaluation and planning for discharge  - Provide patient education as appropriate  Outcome: Adequate for Discharge

## 2024-10-24 NOTE — ASSESSMENT & PLAN NOTE
54-year-old male with PMH of SLE, lupus nephritis, DVT presented with worsening left lower extremity swelling, erythema  Failed outpatient treatment with po keflex  Duplex ultrasound in the ED was negative for DVT    Improved with IV cefazolin while inpatient and nystatin cream  Will continue p.o. Keflex on discharge complete a 7-day course of antibiotic therapy  Will continue with nystatin cream on discharge to complete 6 weeks of therapy  Outpatient follow-up with podiatry

## 2024-10-24 NOTE — NURSING NOTE
AVS reviewed with pt and pt's wife. All questions answered at this time. IV site removed. All belongings with pt and accounted for. IV site removed. Pt's wife to transport home.

## 2024-10-24 NOTE — DISCHARGE SUMMARY
Discharge Summary - Hospitalist   Name: Donta Hunter 54 y.o. male I MRN: [id number]  Unit/Bed#: -01 I Date of Admission: 10/21/2024   Date of Service: 10/24/2024 I Hospital Day: 3     Assessment & Plan  Cellulitis of left lower extremity  54-year-old male with PMH of SLE, lupus nephritis, DVT presented with worsening left lower extremity swelling, erythema  Failed outpatient treatment with po keflex  Duplex ultrasound in the ED was negative for DVT    Improved with IV cefazolin while inpatient and nystatin cream  Will continue p.o. Keflex on discharge complete a 7-day course of antibiotic therapy  Will continue with nystatin cream on discharge to complete 6 weeks of therapy  Outpatient follow-up with podiatry  SLE (systemic lupus erythematosus related syndrome) (HCC)  Continue Plaquenil  Lupus nephritis (HCC)  Continue CellCept  History of DVT (deep vein thrombosis)  Continue Eliquis  Neuropathic pain  Had been on gabapentin 300 mg 3 times daily, increased to 400 mg 3 times daily     Medical Problems       Resolved Problems  Date Reviewed: 10/24/2024   None       Discharging Physician / Practitioner: Curtis Jeffery DO  PCP: Raad Batres MD  Admission Date:   Admission Orders (From admission, onward)       Ordered        10/21/24 0901  Inpatient Admission  Once                          Discharge Date: 10/24/24    Consultations During Hospital Stay:  Podiatry    Procedures Performed:   none    Significant Findings / Test Results:   Left foot cellulitis    Incidental Findings:   none     Test Results Pending at Discharge (will require follow up):   none     Outpatient Tests Requested:  none    Complications:  none    Reason for Admission: cellulitis    Hospital Course:   Donta Hunter is a 54 y.o. male patient who originally presented to the hospital on 10/21/2024 due to left foot cellulitis.  He had been seen by his podiatry several days prior and was placed on Keflex and a antifungal cream.   "Symptoms did not improve so he came to the ER for evaluation.  He was admitted to the hospital service for further evaluation.  He was treated with IV cefazolin while inpatient, in addition to nystatin cream for his tinea pedis.  He gradually improved over the course of 3 days.  He will be discharged home on p.o. Keflex to complete a 7-day course of antibiotic therapy, as well as a prescription for nystatin cream for his tinea pedis.  Podiatry saw the patient while inpatient and will be arranging outpatient follow-up.  Otherwise patient was feeling well at the time of discharge.  All questions and concerns were addressed.  He remained afebrile throughout his hospital stay.  He was eager to return home.    Please see above list of diagnoses and related plan for additional information.     Condition at Discharge: good    Discharge Day Visit / Exam:   Subjective: Patient seen and examined on the day of discharge.  Remains afebrile.  No events overnight.  No new complaints.  Vitals: Blood Pressure: 129/79 (10/24/24 0707)  Pulse: 64 (10/24/24 0707)  Temperature: 98.1 °F (36.7 °C) (10/24/24 0707)  Temp Source: Oral (10/23/24 1507)  Respirations: 16 (10/24/24 0707)  Height: 5' 6\" (167.6 cm) (10/21/24 1046)  Weight - Scale: 82.3 kg (181 lb 7 oz) (10/21/24 1046)  SpO2: 100 % (10/24/24 0707)  PHYSICAL EXAM:    Vitals signs reviewed  Constitutional   Awake and cooperative. NAD.   Head/Neck   Normocephalic. Atraumatic.   HEENT   No scleral icterus. EOMI.   Heart   Regular rate and rhythm. No murmers.   Lungs   Clear to auscultation bilaterally. Respirations unlaboured.   Abdomen   Soft. Nontender. Nondistended.    Skin   Skin color normal. No rashes.   Extremities   No deformities.  Minimal erythema of the left forefoot.  No open sores or oozing.  No areas of fluctuance or induration.   Neuro   Alert and oriented. No new deficits.   Psych   Mood stable. Affect normal.         Discussion with Family: Patient declined call to " .     Discharge instructions/Information to patient and family:   See after visit summary for information provided to patient and family.      Provisions for Follow-Up Care:  See after visit summary for information related to follow-up care and any pertinent home health orders.      Mobility at time of Discharge:   Basic Mobility Inpatient Raw Score: 20  JH-HLM Goal: 6: Walk 10 steps or more  JH-HLM Achieved: 7: Walk 25 feet or more  HLM Goal achieved. Continue to encourage appropriate mobility.     Disposition:   Home    Planned Readmission: No    Discharge Medications:  See after visit summary for reconciled discharge medications provided to patient and/or family.      Administrative Statements   Discharge Statement:  I have spent a total time of 35 minutes in caring for this patient on the day of the visit/encounter. >30 minutes of time was spent on: Diagnostic results, Prognosis, Instructions for management, Patient and family education, Importance of tx compliance, Impressions, Counseling / Coordination of care, Documenting in the medical record, Reviewing / ordering tests, medicine, procedures  , and Communicating with other healthcare professionals .    **Please Note: This note may have been constructed using a voice recognition system**

## 2024-10-25 ENCOUNTER — PATIENT OUTREACH (OUTPATIENT)
Dept: CASE MANAGEMENT | Facility: OTHER | Age: 54
End: 2024-10-25

## 2024-10-25 DIAGNOSIS — Z71.89 COMPLEX CARE COORDINATION: Primary | ICD-10-CM

## 2024-10-25 NOTE — UTILIZATION REVIEW
NOTIFICATION OF ADMISSION DISCHARGE   This is a Notification of Discharge from [facility]. Please be advised that this patient has been discharge from our facility. Below you will find the admission and discharge date and time including the patient’s disposition.   UTILIZATION REVIEW CONTACT:  Laxmi Torres  Utilization   Network Utilization Review Department  Phone: [phone] x carefully listen to the prompts. All voicemails are confidential.  Email: [email or web address]     ADMISSION INFORMATION  PRESENTATION DATE: 10/21/2024  6:27 AM  OBERVATION ADMISSION DATE: N/A  INPATIENT ADMISSION DATE: 10/21/24  9:01 AM   DISCHARGE DATE: 10/24/2024 11:23 AM   DISPOSITION:Home/Self Care    Network Utilization Review Department  ATTENTION: Please call with any questions or concerns to [phone] and carefully listen to the prompts so that you are directed to the right person. All voicemails are confidential.   For Discharge needs, contact Care Management DC Support Team at [phone] opt. 2  Send all requests for admission clinical reviews, approved or denied determinations and any other requests to dedicated fax number below belonging to the campus where the patient is receiving treatment. List of dedicated fax numbers for the Facilities:  FACILITY NAME UR FAX NUMBER   ADMISSION DENIALS (Administrative/Medical Necessity) [phone]   DISCHARGE SUPPORT TEAM ([facility]) [phone]   PARENT CHILD HEALTH (Maternity/NICU/Pediatrics) [phone]   [facility] [phone]   [facility] [phone]   [facility] [phone]   [facility] [phone]   [facility] [phone]   [facility] [phone]   [facility] [phone]   [facility] [phone]    [facility] [phone]   [facility] [phone]   [facility] [phone]   [facility] [phone]

## 2024-10-25 NOTE — PROGRESS NOTES
I spoke with Donta who reports he is feeling better. He stated he was diagnosed with lupus in 2014.   He has all of his medications and is taking them as directed.  He has appointments with his PCP and podiatrist which we reviewed.  Patient has a home health aide with [facility]. Patient uses a walker or straight cane. He has a wheelchair and shower chair.  His spouse or his aide take him to appointments. He has no needs at this time.  He did consent to another call.

## 2024-10-25 NOTE — PROGRESS NOTES
In basket message received with a patient referral from the HRR report. Chart reviewed.  Patient was admitted to [facility] 10/21-10/24 with cellulitis of the left lower extremity, systemic lupus erythematosus, lupus nephritis, history of DVT and neuropathic pain.  He discharged home to self care.    With the assistance of a  I called Donta and left a message on his voicemail with my contact information.

## 2024-10-26 LAB
BACTERIA BLD CULT: NORMAL
BACTERIA BLD CULT: NORMAL

## 2024-10-27 NOTE — PROGRESS NOTES
Transition of Care Visit  Name: Donta Hunter      : 1970      MRN: [id number]  Encounter Provider: Raad Batres MD  Encounter Date: 10/28/2024   Encounter department: [facility] [place]    Assessment & Plan  Cellulitis of left lower extremity  Donta presents to the hospital with left foot swelling and redness, seen by podiatry and given Keflex for cellulitis,  Presents to the ED with worsening redness, ultrasound was negative for DVT.  This improved with IV antibiotics.  He completed full 7-day course of antibiotic.  Will follow-up with podiatry.    Lupus nephritis (HCC)  Continue nephrology follow up.  Orders:  •  potassium chloride (Klor-Con M20) 20 mEq tablet; Take 1 tablet (20 mEq total) by mouth daily    Chronic deep vein thrombosis (DVT) of proximal vein of lower extremity, unspecified laterality (HCC)  Continue eliquis. Denies any pain/swelling.         Pain of toe of left foot  Recently hospitalized. Patient states it appears about the same.        Pain in finger of left hand  Fifth digit pain, some swelling compared to the right.  Orders:  •  XR finger right fifth digit-pinkie; Future    Neutropenia, unspecified type (HCC)  Chronic. Consider hematology evaluation.         Depression Screening and Follow-up Plan: Patient was screened for depression during today's encounter. They screened negative with a PHQ-2 score of 0.      History of Present Illness   Transitional Care Management Review:   Donta Hunter is a 54 y.o. male here for TCM follow up.     During the TCM phone call patient stated:  TCM Call       Date and time call was made  4/15/2024  8:18 AM    Hospital care reviewed  Records reviewed    Patient was hospitialized at  [facility]    Date of Admission  10/21/24    Date of discharge  10/24/24    Diagnosis  Cellulitis of left lower extremity    Disposition  Home    Were the patients medications reviewed and updated  Yes    Current Symptoms  Swelling       "    TCM Call       Clinical progress swelling  Improving    Post hospital issues  None    Should patient be enrolled in anticoag monitoring?  No    Scheduled for follow up?  Yes    Did you obtain your prescribed medications  Yes    Do you need help managing your prescriptions or medications  No    Is transportation to your appointment needed  No    I have advised the patient to call PCP with any new or worsening symptoms  Carlos CARRERO EMT    Living Arrangements  Spouse or Significiant other    Are you recieving any outpatient services  No    Are you recieving home care services  No    Types of home care services  Home PT    Are you using any community resources  No    Current waiver services  No    Have you fallen in the last 12 months  No    Interperter language line needed  No    Counseling  Patient          Here for TCM visit; we reviewed  recent hospitalization, dc summary and dc instructions.    Review of Systems   Constitutional:  Negative for chills and fever.   HENT:  Negative for ear pain and sore throat.    Eyes:  Negative for pain and visual disturbance.   Respiratory:  Negative for cough and shortness of breath.    Cardiovascular:  Negative for chest pain and palpitations.   Gastrointestinal:  Negative for abdominal pain and vomiting.   Genitourinary:  Negative for dysuria and hematuria.   Musculoskeletal:  Positive for gait problem. Negative for arthralgias and back pain.   Skin:  Negative for color change and rash.   Neurological:  Negative for seizures and syncope.   All other systems reviewed and are negative.    Objective   /76 (BP Location: Left arm, Patient Position: Sitting, Cuff Size: Adult)   Pulse 87   Resp 16   Ht 5' 6\" (1.676 m)   Wt 82.1 kg (181 lb)   SpO2 99%   BMI 29.21 kg/m²     Physical Exam  Vitals and nursing note reviewed.   Constitutional:       General: He is not in acute distress.     Appearance: He is well-developed.   HENT:      Head: Normocephalic and " atraumatic.   Cardiovascular:      Rate and Rhythm: Normal rate and regular rhythm.      Heart sounds: No murmur heard.  Pulmonary:      Effort: Pulmonary effort is normal. No respiratory distress.      Breath sounds: Normal breath sounds.   Abdominal:      Tenderness: There is no abdominal tenderness.   Musculoskeletal:         General: No swelling.      Cervical back: Neck supple.   Skin:     General: Skin is warm and dry.      Capillary Refill: Capillary refill takes less than 2 seconds.      Findings: Erythema present.   Neurological:      Mental Status: He is alert and oriented to person, place, and time.      Gait: Gait abnormal.     Medications have been reviewed by provider in current encounter      I have spent a total time of 25 minutes in caring for this patient on the day of the visit/encounter including Patient and family education, Risk factor reductions, and Counseling / Coordination of care.

## 2024-10-27 NOTE — ASSESSMENT & PLAN NOTE
Donta presents to the hospital with left foot swelling and redness, seen by podiatry and given Keflex for cellulitis,  Presents to the ED with worsening redness, ultrasound was negative for DVT.  This improved with IV antibiotics.  He completed full 7-day course of antibiotic.  Will follow-up with podiatry.

## 2024-10-28 ENCOUNTER — OFFICE VISIT (OUTPATIENT)
Dept: INTERNAL MEDICINE CLINIC | Facility: CLINIC | Age: 54
End: 2024-10-28
Payer: COMMERCIAL

## 2024-10-28 VITALS
DIASTOLIC BLOOD PRESSURE: 76 MMHG | RESPIRATION RATE: 16 BRPM | OXYGEN SATURATION: 99 % | WEIGHT: 181 LBS | BODY MASS INDEX: 29.09 KG/M2 | HEIGHT: 66 IN | HEART RATE: 87 BPM | SYSTOLIC BLOOD PRESSURE: 118 MMHG

## 2024-10-28 DIAGNOSIS — M32.14 LUPUS NEPHRITIS (HCC): ICD-10-CM

## 2024-10-28 DIAGNOSIS — M79.675 PAIN OF TOE OF LEFT FOOT: ICD-10-CM

## 2024-10-28 DIAGNOSIS — M79.645 PAIN IN FINGER OF LEFT HAND: ICD-10-CM

## 2024-10-28 DIAGNOSIS — D70.9 NEUTROPENIA, UNSPECIFIED TYPE (HCC): ICD-10-CM

## 2024-10-28 DIAGNOSIS — I82.5Y9 CHRONIC DEEP VEIN THROMBOSIS (DVT) OF PROXIMAL VEIN OF LOWER EXTREMITY, UNSPECIFIED LATERALITY (HCC): ICD-10-CM

## 2024-10-28 DIAGNOSIS — L03.116 CELLULITIS OF LEFT LOWER EXTREMITY: Primary | ICD-10-CM

## 2024-10-28 PROCEDURE — 99496 TRANSJ CARE MGMT HIGH F2F 7D: CPT | Performed by: INTERNAL MEDICINE

## 2024-10-28 RX ORDER — POTASSIUM CHLORIDE 1500 MG/1
20 TABLET, EXTENDED RELEASE ORAL DAILY
Qty: 90 TABLET | Refills: 0 | Status: SHIPPED | OUTPATIENT
Start: 2024-10-28

## 2024-10-28 NOTE — ASSESSMENT & PLAN NOTE
Continue nephrology follow up.  Orders:    potassium chloride (Klor-Con M20) 20 mEq tablet; Take 1 tablet (20 mEq total) by mouth daily

## 2024-10-29 ENCOUNTER — APPOINTMENT (OUTPATIENT)
Dept: LAB | Facility: CLINIC | Age: 54
End: 2024-10-29
Payer: COMMERCIAL

## 2024-10-29 ENCOUNTER — TELEPHONE (OUTPATIENT)
Age: 54
End: 2024-10-29

## 2024-10-29 DIAGNOSIS — L03.116 CELLULITIS OF LEFT LOWER EXTREMITY: Primary | ICD-10-CM

## 2024-10-29 DIAGNOSIS — D70.9 NEUTROPENIA, UNSPECIFIED TYPE (HCC): ICD-10-CM

## 2024-10-29 DIAGNOSIS — E87.1 HYPONATREMIA: ICD-10-CM

## 2024-10-29 DIAGNOSIS — I12.9 HYPERTENSIVE KIDNEY DISEASE WITH STAGE 2 CHRONIC KIDNEY DISEASE: ICD-10-CM

## 2024-10-29 DIAGNOSIS — N18.2 STAGE 2 CHRONIC KIDNEY DISEASE: ICD-10-CM

## 2024-10-29 DIAGNOSIS — L03.116 CELLULITIS OF LEFT LOWER EXTREMITY: ICD-10-CM

## 2024-10-29 DIAGNOSIS — Z12.5 SCREENING FOR MALIGNANT NEOPLASM OF PROSTATE: ICD-10-CM

## 2024-10-29 DIAGNOSIS — N18.2 HYPERTENSIVE KIDNEY DISEASE WITH STAGE 2 CHRONIC KIDNEY DISEASE: ICD-10-CM

## 2024-10-29 DIAGNOSIS — M79.675 PAIN OF TOE OF LEFT FOOT: ICD-10-CM

## 2024-10-29 DIAGNOSIS — M32.14 LUPUS NEPHRITIS (HCC): ICD-10-CM

## 2024-10-29 LAB
ANA SER QL IA: POSITIVE
ANION GAP SERPL CALCULATED.3IONS-SCNC: 9 MMOL/L (ref 4–13)
BACTERIA UR QL AUTO: NORMAL /HPF
BASOPHILS # BLD AUTO: 0.02 THOUSANDS/ΜL (ref 0–0.1)
BASOPHILS NFR BLD AUTO: 1 % (ref 0–1)
BILIRUB UR QL STRIP: NEGATIVE
BUN SERPL-MCNC: 10 MG/DL (ref 5–25)
C3 SERPL-MCNC: 130 MG/DL (ref 87–200)
C4 SERPL-MCNC: 58 MG/DL (ref 19–52)
CALCIUM SERPL-MCNC: 9.1 MG/DL (ref 8.4–10.2)
CHLORIDE SERPL-SCNC: 101 MMOL/L (ref 96–108)
CLARITY UR: CLEAR
CO2 SERPL-SCNC: 25 MMOL/L (ref 21–32)
COLOR UR: COLORLESS
CREAT SERPL-MCNC: 0.86 MG/DL (ref 0.6–1.3)
CREAT UR-MCNC: 22.8 MG/DL
EOSINOPHIL # BLD AUTO: 0.02 THOUSAND/ΜL (ref 0–0.61)
EOSINOPHIL NFR BLD AUTO: 1 % (ref 0–6)
ERYTHROCYTE [DISTWIDTH] IN BLOOD BY AUTOMATED COUNT: 13.9 % (ref 11.6–15.1)
ERYTHROCYTE [SEDIMENTATION RATE] IN BLOOD: 35 MM/HOUR (ref 0–19)
GFR SERPL CREATININE-BSD FRML MDRD: 96 ML/MIN/1.73SQ M
GLUCOSE P FAST SERPL-MCNC: 98 MG/DL (ref 65–99)
GLUCOSE UR STRIP-MCNC: NEGATIVE MG/DL
HCT VFR BLD AUTO: 39.5 % (ref 36.5–49.3)
HGB BLD-MCNC: 13.3 G/DL (ref 12–17)
HGB UR QL STRIP.AUTO: NEGATIVE
IMM GRANULOCYTES # BLD AUTO: 0.01 THOUSAND/UL (ref 0–0.2)
IMM GRANULOCYTES NFR BLD AUTO: 0 % (ref 0–2)
KETONES UR STRIP-MCNC: NEGATIVE MG/DL
LEUKOCYTE ESTERASE UR QL STRIP: NEGATIVE
LYMPHOCYTES # BLD AUTO: 0.87 THOUSANDS/ΜL (ref 0.6–4.47)
LYMPHOCYTES NFR BLD AUTO: 32 % (ref 14–44)
MCH RBC QN AUTO: 30.7 PG (ref 26.8–34.3)
MCHC RBC AUTO-ENTMCNC: 33.7 G/DL (ref 31.4–37.4)
MCV RBC AUTO: 91 FL (ref 82–98)
MICROALBUMIN UR-MCNC: 58.1 MG/L
MICROALBUMIN/CREAT 24H UR: 255 MG/G CREATININE (ref 0–30)
MONOCYTES # BLD AUTO: 0.41 THOUSAND/ΜL (ref 0.17–1.22)
MONOCYTES NFR BLD AUTO: 15 % (ref 4–12)
NEUTROPHILS # BLD AUTO: 1.42 THOUSANDS/ΜL (ref 1.85–7.62)
NEUTS SEG NFR BLD AUTO: 51 % (ref 43–75)
NITRITE UR QL STRIP: NEGATIVE
NON-SQ EPI CELLS URNS QL MICRO: NORMAL /HPF
NRBC BLD AUTO-RTO: 0 /100 WBCS
PH UR STRIP.AUTO: 7 [PH]
PLATELET # BLD AUTO: 185 THOUSANDS/UL (ref 149–390)
PMV BLD AUTO: 10.7 FL (ref 8.9–12.7)
POTASSIUM SERPL-SCNC: 3.9 MMOL/L (ref 3.5–5.3)
PROT UR STRIP-MCNC: NEGATIVE MG/DL
PSA FREE MFR SERPL: 48.47 %
PSA FREE SERPL-MCNC: 0.22 NG/ML
PSA SERPL-MCNC: 0.46 NG/ML (ref 0–4)
RBC # BLD AUTO: 4.33 MILLION/UL (ref 3.88–5.62)
RBC #/AREA URNS AUTO: NORMAL /HPF
SODIUM SERPL-SCNC: 135 MMOL/L (ref 135–147)
SP GR UR STRIP.AUTO: 1.01 (ref 1–1.03)
UROBILINOGEN UR STRIP-ACNC: <2 MG/DL
WBC # BLD AUTO: 2.75 THOUSAND/UL (ref 4.31–10.16)
WBC #/AREA URNS AUTO: NORMAL /HPF

## 2024-10-29 PROCEDURE — 82570 ASSAY OF URINE CREATININE: CPT

## 2024-10-29 PROCEDURE — 81001 URINALYSIS AUTO W/SCOPE: CPT

## 2024-10-29 PROCEDURE — 82043 UR ALBUMIN QUANTITATIVE: CPT

## 2024-10-29 PROCEDURE — 86039 ANTINUCLEAR ANTIBODIES (ANA): CPT

## 2024-10-29 PROCEDURE — 36415 COLL VENOUS BLD VENIPUNCTURE: CPT

## 2024-10-29 PROCEDURE — 80048 BASIC METABOLIC PNL TOTAL CA: CPT

## 2024-10-29 PROCEDURE — 86160 COMPLEMENT ANTIGEN: CPT

## 2024-10-29 PROCEDURE — 84154 ASSAY OF PSA FREE: CPT

## 2024-10-29 PROCEDURE — 86038 ANTINUCLEAR ANTIBODIES: CPT

## 2024-10-29 PROCEDURE — 85025 COMPLETE CBC W/AUTO DIFF WBC: CPT

## 2024-10-29 PROCEDURE — 84153 ASSAY OF PSA TOTAL: CPT

## 2024-10-29 PROCEDURE — 85652 RBC SED RATE AUTOMATED: CPT

## 2024-10-29 PROCEDURE — 86225 DNA ANTIBODY NATIVE: CPT

## 2024-10-29 NOTE — TELEPHONE ENCOUNTER
"Spoke w/patient provided recommendations:    \"He recently had lupus labs on 10/1, will defer to rheum for those, they all looked okay    I will order a CBC to recheck his blood counts, if still abnormal, would like him to see hematology\"    Patient verbally acknowledged and will go to  labs today for CBC, he will await results.    " Addended by: VALERIA LEAVITT on: 6/19/2017 03:14 PM     Modules accepted: Orders

## 2024-10-29 NOTE — TELEPHONE ENCOUNTER
"[facility] -  #[id number] Moi Narayanan    Requesting \"labs\" for his S/S  R Knee Swollen & Painful  L 5th Digit Swollen & Painful  L \"Foot Infection\" - No open wounds, but burning, painful, swollen, and red.    Pt taking medications are prescription.     Please advise further.      "

## 2024-10-29 NOTE — TELEPHONE ENCOUNTER
Pt called in requesting a .  Pt and I connected with  [phone] who assisted us  with the call.      Pt states he had an appointment yesterday and is asking if he can have some labs drawn for lupus and also for his infected left foot.    Please advise

## 2024-10-30 ENCOUNTER — OFFICE VISIT (OUTPATIENT)
Age: 54
End: 2024-10-30
Payer: COMMERCIAL

## 2024-10-30 VITALS
SYSTOLIC BLOOD PRESSURE: 110 MMHG | OXYGEN SATURATION: 98 % | HEART RATE: 58 BPM | TEMPERATURE: 98.9 F | HEIGHT: 66 IN | DIASTOLIC BLOOD PRESSURE: 84 MMHG | BODY MASS INDEX: 29.6 KG/M2 | WEIGHT: 184.2 LBS

## 2024-10-30 DIAGNOSIS — M32.14 LUPUS NEPHRITIS (HCC): ICD-10-CM

## 2024-10-30 DIAGNOSIS — M32.9 SLE (SYSTEMIC LUPUS ERYTHEMATOSUS RELATED SYNDROME) (HCC): ICD-10-CM

## 2024-10-30 DIAGNOSIS — M25.469 KNEE SWELLING: Primary | ICD-10-CM

## 2024-10-30 DIAGNOSIS — Z79.60 LONG-TERM USE OF IMMUNOSUPPRESSANT MEDICATION: ICD-10-CM

## 2024-10-30 LAB
ANA HOMOGEN SER QL IF: NORMAL
ANA HOMOGEN TITR SER: NORMAL {TITER}

## 2024-10-30 PROCEDURE — 99213 OFFICE O/P EST LOW 20 MIN: CPT | Performed by: STUDENT IN AN ORGANIZED HEALTH CARE EDUCATION/TRAINING PROGRAM

## 2024-10-30 RX ORDER — PREDNISONE 10 MG/1
TABLET ORAL
Qty: 30 TABLET | Refills: 0 | Status: SHIPPED | OUTPATIENT
Start: 2024-10-30 | End: 2024-11-11

## 2024-10-30 NOTE — PROGRESS NOTES
Rheumatology Follow-up Visit  Name: Donta Hunter      : 1970      MRN: [id number]  Encounter Provider: Sravani Narayanan MD  Encounter Date: 10/30/2024   Encounter department: [facility] [address] [place] [address]    Assessment & Plan  Knee swelling  53 y/o M who presents for acute visit for R knee swelling which started suddenly 2 days ago. Symptoms have improved, but still with residual knee effusion which is contributing to gait issues. Highest suspicion for pseudogout > gout, possibly precipitated by recent hospitalization. Lower suspicion for SLE flare given absence of other joint involvement and complements normal. Infection also considered but given symptoms have started to improve and no fever, chills, systemic symptoms also feel this is less likely. We discussed option for aspiration to confirm diagnosis although patient would like to try to avoid this for now. 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 Patient aware that if his symptoms get worse on prednisone, he needs to stop medication and will need to aspirate knee/go to ED. We discussed if he gets a second occurrence in future, would also recommend aspiration to confirm diagnosis. Patient expressed understanding. Prednisone taper sent. Patient will follow-up next months as scheduled.   Orders:    predniSONE 10 mg tablet; Take 4 tablets (40 mg total) by mouth daily for 3 days, THEN 3 tablets (30 mg total) daily for 3 days, THEN 2 tablets (20 mg total) daily for 3 days, THEN 1 tablet (10 mg total) daily for 3 days.    SLE (systemic lupus erythematosus related syndrome) (HCC)         Lupus nephritis (HCC)         Long-term use of immunosuppressant medication           History of Present Illness     Donta Hunter is a 54 y.o. male who presents for acute visit.     Interval History:  Patient reports 2 days ago he awoke with sudden onset pain and swelling of the R knee. It was very swollen and pain. He applied ice which  "helped somewhat. He reports that the swelling has gone down the past 2 days, but still not back to normal. He has not had fever or chills. Admitted 10/21 for L foot cellulitis - completed course of IV cefazolin while inpatient. He reports no symptoms currently related to this. A few weeks ago his R 5th finger was swollen just along the MCP palmar side. This self resolved after a few days.    Permanent History:  Patient was diagnosed with SLE in 2015 when he was presented with YANIRA 1:160 nucleolar, renal failure, anemia, leukopenia and skin rash. He is of [facility] descent. He underwent renal biopsy showing Class V lupus nephritis (membranous nephropathy). Skin biopsy showing interface dermatitis. He was started on HCQ and was planned to start MMF. 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 Sural nerve biopsy not performed as patient had already been on steroids for the lupus nephritis. He was ultimately started on induction therapy with IV CYC monthly (2/16-5/16) x 4 doses, then transitioned to MMF 3g daily for maintenance. He had some cytopenias, so dose was increased to MMF 2g daily and then eventually 1.5mg daily.      He had a DVT in 2017, but negative APLA panel in 2015 so thought unlikely to be related to his SLE.      He also had severe cervical compressive myelopathy and underwent C4-C6 anterior cervical diskectomy with central and foraminal decompression 1/25/2018. He underwent a repeat EMG in 2018 showing \"sensorimotor polyneuropathy, but is unchanged (or even perhaps improved) compared to 2015. No myopathy or significant motor axonal loss from lumbar radiculopathy. Clinically, his gait problem seems more likely from myelopathy.\"     In Feb 2024, he developed L side facial dropping. He underwent MRI brain showing new lacunar ischemia in the frontal lobes and chronic cerebellar lacunar infarcts. MRI C/T/L spine was reported with " "DJD C spine with mass effect C6-7/cord flattening C3-4, compression fractures C7-8, chronic compression fx T7-T8, severe NF narrowing L3-4 with L3 nerve root impingement, bulging L5-S1. TTE without vegetations, JIMMY showing patent foramen ovale. CTA brain showing normal intra-cranial vasculature. Repeat APLA testing negative. FTA mildly reactive, but previously had been treated for syphilis. He underwent repeat LP, but it was a traumatic tap and not able to interpret results. He underwent cerebral angiogram 7/2024 which was normal and without vasculitis. Lupus cerebritis was felt unlikely to be the etiology of his symptoms so no additional immunosuppression was recommended.       Review of Systems  Complete ROS conducted as per HPI. In addition, denies:  Fever  Chills  Nigh sweats  Other joint pain and swelling      Objective     /84 (BP Location: Left arm, Patient Position: Sitting, Cuff Size: Adult)   Pulse 58   Temp 98.9 °F (37.2 °C) (Tympanic)   Ht 5' 6\" (1.676 m)   Wt 83.6 kg (184 lb 3.2 oz)   SpO2 98%   BMI 29.73 kg/m²     Physical Exam  Physical Exam  Constitutional: well appearing, no acute distress  HEENT: normocephalic, sclera clear, no visible oral or nasal ulcers  Neck: supple, no palpable cervical adenopathy  CV: regular rate and rhythm, no murmur  Pulm: normal respiratory effort, lungs clear to auscultation b/l  Skin: no rashes, no skin thickening  Extremities: warm and well perfused, no edema  MSK:+small warm R knee effusion; otherwise no synovitis or effussions    Labs and Imaging  I have personally reviewed pertinent labs and imaging.     "

## 2024-10-31 LAB — DSDNA AB SER-ACNC: 14 IU/ML (ref 0–9)

## 2024-11-01 ENCOUNTER — OFFICE VISIT (OUTPATIENT)
Dept: PODIATRY | Facility: CLINIC | Age: 54
End: 2024-11-01
Payer: COMMERCIAL

## 2024-11-01 VITALS
HEIGHT: 66 IN | HEART RATE: 87 BPM | WEIGHT: 184 LBS | DIASTOLIC BLOOD PRESSURE: 86 MMHG | OXYGEN SATURATION: 99 % | BODY MASS INDEX: 29.57 KG/M2 | SYSTOLIC BLOOD PRESSURE: 127 MMHG

## 2024-11-01 DIAGNOSIS — B35.3 TINEA PEDIS OF BOTH FEET: Primary | ICD-10-CM

## 2024-11-01 DIAGNOSIS — L08.9 INFECTION OF TOE: ICD-10-CM

## 2024-11-01 PROCEDURE — 99213 OFFICE O/P EST LOW 20 MIN: CPT | Performed by: PODIATRIST

## 2024-11-01 NOTE — PROGRESS NOTES
Assessment/Plan:     The patient's clinical examination today was significant for resolved tenderness palpation of the left forefoot.  The interdigital spaces are clear without maceration.  There are some dependent rubor noted to the bilateral lower extremities, left more so than the right.  This is likely secondary to venous congestion/insufficiency.  There are no open lesions.      The patient seems to be doing well from a clinical standpoint regarding his feet.  Recommend continued completing his course of oral antibiotic therapy as prescribed after discharge from the hospital.    Follow-up with me in 6 to 8 weeks.             Diagnoses and all orders for this visit:    Tinea pedis of both feet    Infection of toe          Subjective:     Patient ID: Donta Hunter is a 54 y.o. male.    The patient presents today with a chief complaint of left forefoot pain.  He does note that after his last visit he noticed increased pain and swelling in his left foot and was subsequently admitted to the hospital for several days of bed rest and IV antibiotics.  He was ultimately discharged back on cephalexin and is currently still taking it.  He does note good interval improvement and notes that the pain to his left foot has largely resolved.      PAST MEDICAL HISTORY:  Past Medical History:   Diagnosis Date    Anemia     Arthritis     Cervical mass     Chronic kidney disease     COPD, group B, by GOLD 2017 classification ([facility]) 7/13/2020    Coronary artery disease     Depression     DVT (deep venous thrombosis) ([facility])     Hypertension     Lupus     Renal disorder     Stroke (cerebrum) ([facility]) 6/7/2024       PAST SURGICAL HISTORY:  Past Surgical History:   Procedure Laterality Date    APPENDECTOMY      CERVICAL SPINE SURGERY      CHOLECYSTECTOMY      COLONOSCOPY N/A 8/23/2018    Procedure: COLONOSCOPY;  Surgeon: James Wise III, MD;  Location: [facility];  Service: Gastroenterology    ESOPHAGOGASTRODUODENOSCOPY N/A 8/22/2018     Procedure: ESOPHAGOGASTRODUODENOSCOPY (EGD);  Surgeon: James Wise III, MD;  Location: [facility];  Service: Gastroenterology    IR CEREBRAL ANGIOGRAPHY  7/24/2024    IR IVC FILTER PLACEMENT PERMANENT  9/7/2017    IR IVC FILTER REMOVAL  4/23/2018    IR LUMBAR PUNCTURE  11/23/2015    IR LUMBAR PUNCTURE  4/25/2024    NECK SURGERY      US GUIDED INJECTION FOR RESEARCH STUDY  4/23/2018    US GUIDED INJECTION FOR RESEARCH STUDY  9/7/2017    VASCULAR SURGERY          ALLERGIES:  Doxycycline and Sulfa antibiotics    MEDICATIONS:  Current Outpatient Medications   Medication Sig Dispense Refill    apixaban (ELIQUIS) 5 mg Take 5 mg by mouth 2 (two) times a day      aspirin 81 mg chewable tablet Chew 1 tablet (81 mg total) daily 30 tablet 3    baclofen 20 mg tablet Take 1 tablet (20 mg total) by mouth 2 (two) times a day 90 tablet 0    capsicum (ZOSTRIX) 0.075 % topical cream Apply topically 3 (three) times a day Wash hands before and after use. 57 g 0    clobetasol (TEMOVATE) 0.05 % cream       Diclofenac Sodium (VOLTAREN) 1 % Apply 2 g topically 4 (four) times a day 300 g 1    famotidine (PEPCID) 20 mg tablet Take 1 tablet (20 mg total) by mouth 2 (two) times a day 180 tablet 3    gabapentin (NEURONTIN) 600 MG tablet Take 1 tablet (600 mg total) by mouth 3 (three) times a day 270 tablet 1    hydroxychloroquine (PLAQUENIL) 200 mg tablet Take 2 tablets (400 mg total) by mouth daily at bedtime for 180 doses 180 tablet 1    lidocaine-prilocaine (EMLA) cream Apply topically as needed for mild pain 30 g 3    mycophenolate (CELLCEPT) 500 mg tablet Take 500 mg by mouth every 12 (twelve) hours       nystatin (MYCOSTATIN) ointment Apply topically 2 (two) times a day 30 g 0    potassium chloride (Klor-Con M20) 20 mEq tablet Take 1 tablet (20 mEq total) by mouth daily 90 tablet 0    predniSONE 10 mg tablet Take 4 tablets (40 mg total) by mouth daily for 3 days, THEN 3 tablets (30 mg total) daily for 3 days, THEN 2 tablets (20 mg  total) daily for 3 days, THEN 1 tablet (10 mg total) daily for 3 days. 30 tablet 0    sildenafil (VIAGRA) 50 MG tablet Take 1 tablet (50 mg total) by mouth daily as needed for erectile dysfunction 10 tablet 0    sodium chloride 1 g tablet Take 2 tablets (2 g total) by mouth 3 (three) times a day with meals 540 tablet 2     No current facility-administered medications for this visit.       SOCIAL HISTORY:  Social History     Socioeconomic History    Marital status: /Civil Union     Spouse name: None    Number of children: None    Years of education: None    Highest education level: None   Occupational History    None   Tobacco Use    Smoking status: Never    Smokeless tobacco: Never   Vaping Use    Vaping status: Never Used   Substance and Sexual Activity    Alcohol use: Never    Drug use: Never    Sexual activity: Yes     Partners: Female, Male     Birth control/protection: Rhythm   Other Topics Concern    None   Social History Narrative    ** Merged History Encounter **          Social Determinants of Health     Financial Resource Strain: Not on file   Food Insecurity: No Food Insecurity (10/23/2024)    Hunger Vital Sign     Worried About Running Out of Food in the Last Year: Never true     Ran Out of Food in the Last Year: Never true   Transportation Needs: No Transportation Needs (10/23/2024)    PRAPARE - Transportation     Lack of Transportation (Medical): No     Lack of Transportation (Non-Medical): No   Physical Activity: Not on file   Stress: Not on file   Social Connections: Unknown (6/18/2024)    Received from [employer]    Social [employer]     How often do you feel lonely or isolated from those around you? (Adult - for ages 18 years and over): Not on file   Intimate Partner Violence: Unknown (10/21/2024)    Nursing IPS     Feels Physically and Emotionally Safe: Not on file     Physically Hurt by Someone: Not on file     Humiliated or Emotionally Abused by Someone: Not on file     Physically Hurt by  Someone: 2     Hurt or Threatened by Someone: 2   Housing Stability: Low Risk  (10/23/2024)    Housing Stability Vital Sign     Unable to Pay for Housing in the Last Year: No     Number of Times Moved in the Last Year: 0     Homeless in the Last Year: No        Review of Systems   Constitutional: Negative.    HENT: Negative.     Eyes: Negative.    Respiratory: Negative.     Cardiovascular: Negative.    Endocrine: Negative.    Musculoskeletal: Negative.    Neurological: Negative.    Hematological: Negative.    Psychiatric/Behavioral: Negative.           Objective:     Physical Exam  Vitals reviewed.   Constitutional:       Appearance: Normal appearance.   HENT:      Head: Normocephalic and atraumatic.      Nose: Nose normal.   Eyes:      Conjunctiva/sclera: Conjunctivae normal.      Pupils: Pupils are equal, round, and reactive to light.   Cardiovascular:      Pulses:           Dorsalis pedis pulses are 1+ on the right side and 1+ on the left side.        Posterior tibial pulses are 1+ on the right side and 1+ on the left side.   Pulmonary:      Effort: Pulmonary effort is normal.   Feet:      Comments: The patient's clinical examination today was significant for resolved tenderness palpation of the left forefoot.  The interdigital spaces are clear without maceration.  There are some dependent rubor noted to the bilateral lower extremities, left more so than the right.  This is likely secondary to venous congestion/insufficiency.  There are no open lesions.      Skin:     General: Skin is warm.      Capillary Refill: Capillary refill takes 2 to 3 seconds.   Neurological:      General: No focal deficit present.      Mental Status: He is alert and oriented to person, place, and time.   Psychiatric:         Mood and Affect: Mood normal.         Behavior: Behavior normal.         Thought Content: Thought content normal.

## 2024-11-04 ENCOUNTER — TELEPHONE (OUTPATIENT)
Dept: INTERNAL MEDICINE CLINIC | Facility: CLINIC | Age: 54
End: 2024-11-04

## 2024-11-04 ENCOUNTER — PATIENT OUTREACH (OUTPATIENT)
Dept: CASE MANAGEMENT | Facility: OTHER | Age: 54
End: 2024-11-04

## 2024-11-04 DIAGNOSIS — M19.012 ARTHRITIS OF LEFT ACROMIOCLAVICULAR JOINT: ICD-10-CM

## 2024-11-04 RX ORDER — GABAPENTIN 600 MG/1
600 TABLET ORAL 3 TIMES DAILY
Qty: 270 TABLET | Refills: 1 | Status: SHIPPED | OUTPATIENT
Start: 2024-11-04 | End: 2024-11-05 | Stop reason: SDUPTHER

## 2024-11-04 NOTE — PROGRESS NOTES
I spoke with Donta who reports he did make an appointment with [facility] on [facility].   He has no needs.  He thanked me for calling but declined further outreach.  He stated he will call me if he needs assistance.

## 2024-11-04 NOTE — PROGRESS NOTES
With the assistance of a  ID #[id number] I called Donta. A message was left by the  with my contact information.

## 2024-11-04 NOTE — TELEPHONE ENCOUNTER
Patient called the RX Refill Line. Message is being forwarded to the office.     Patient is requesting a prescription for gabapentin (NEURONTIN) 300 mg capsule. Patient states he is out of medication     Please contact patient at [phone]

## 2024-11-04 NOTE — TELEPHONE ENCOUNTER
[facility] pharmacy called, asking to verify the total dosing for his gabapentin medication please?     I told Angel at [facility] we would research this out daelina and figure out what is what with it

## 2024-11-05 DIAGNOSIS — N52.9 ERECTILE DYSFUNCTION, UNSPECIFIED ERECTILE DYSFUNCTION TYPE: ICD-10-CM

## 2024-11-05 DIAGNOSIS — M19.012 ARTHRITIS OF LEFT ACROMIOCLAVICULAR JOINT: ICD-10-CM

## 2024-11-05 RX ORDER — GABAPENTIN 300 MG/1
300 CAPSULE ORAL 3 TIMES DAILY
Qty: 270 CAPSULE | Refills: 3 | Status: SHIPPED | OUTPATIENT
Start: 2024-11-05 | End: 2025-10-31

## 2024-11-05 RX ORDER — GABAPENTIN 600 MG/1
600 TABLET ORAL 3 TIMES DAILY
Qty: 270 TABLET | Refills: 1 | Status: SHIPPED | OUTPATIENT
Start: 2024-11-05 | End: 2025-05-04

## 2024-11-05 NOTE — TELEPHONE ENCOUNTER
Patient is requesting Gabapentin 300mg 1 cap tid. The pharmacy told patient the 300mg prescription was canceled. Patient said he takes a total of 900mg 3 times daily. Please review and advise.   Patient uses [facility] AT [facility], [place] [address] [phone]

## 2024-11-05 NOTE — TELEPHONE ENCOUNTER
[facility] called in requesting for Dr. Batres to please send new scripts for the following:    gabapentin (NEURONTIN) 300 MG tablet   gabapentin (NEURONTIN) 600 MG tablet     To please send ASAP, as pt is currently with them at the pharmacy.     Please advise & call pt back with update on his scripts. Thank you!    Donta Hunter: [phone]

## 2024-11-06 RX ORDER — SILDENAFIL 50 MG/1
50 TABLET, FILM COATED ORAL DAILY PRN
Qty: 10 TABLET | Refills: 0 | Status: SHIPPED | OUTPATIENT
Start: 2024-11-06

## 2024-11-12 ENCOUNTER — OFFICE VISIT (OUTPATIENT)
Dept: INTERNAL MEDICINE CLINIC | Facility: CLINIC | Age: 54
End: 2024-11-12
Payer: COMMERCIAL

## 2024-11-12 VITALS
SYSTOLIC BLOOD PRESSURE: 124 MMHG | BODY MASS INDEX: 29.25 KG/M2 | OXYGEN SATURATION: 100 % | HEART RATE: 76 BPM | DIASTOLIC BLOOD PRESSURE: 76 MMHG | RESPIRATION RATE: 18 BRPM | TEMPERATURE: 98.5 F | WEIGHT: 182 LBS | HEIGHT: 66 IN

## 2024-11-12 DIAGNOSIS — I82.5Y9 CHRONIC DEEP VEIN THROMBOSIS (DVT) OF PROXIMAL VEIN OF LOWER EXTREMITY, UNSPECIFIED LATERALITY (HCC): Primary | ICD-10-CM

## 2024-11-12 DIAGNOSIS — N18.31 HYPERTENSIVE KIDNEY DISEASE WITH STAGE 3A CHRONIC KIDNEY DISEASE (HCC): ICD-10-CM

## 2024-11-12 DIAGNOSIS — I12.9 HYPERTENSIVE KIDNEY DISEASE WITH STAGE 3A CHRONIC KIDNEY DISEASE (HCC): ICD-10-CM

## 2024-11-12 DIAGNOSIS — I10 PRIMARY HYPERTENSION: ICD-10-CM

## 2024-11-12 DIAGNOSIS — D70.8 OTHER NEUTROPENIA (HCC): ICD-10-CM

## 2024-11-12 PROCEDURE — 99214 OFFICE O/P EST MOD 30 MIN: CPT | Performed by: INTERNAL MEDICINE

## 2024-11-12 NOTE — PROGRESS NOTES
Ambulatory Visit  Name: Donta Hunter      : 1970      MRN: [id number]  Encounter Provider: Raad Batres MD  Encounter Date: 2024   Encounter department: [facility] [place]    Assessment & Plan  Chronic deep vein thrombosis (DVT) of proximal vein of lower extremity, unspecified laterality (HCC)  Continue eliquis. Denies any pain/swelling.               Primary hypertension  Continue taking all hypertension medications.      Plan:  Recommend blood pressure goal less than 130/80.         Hypertensive kidney disease with stage 3a chronic kidney disease (HCC)  Continue follow up with nephrology. Cr appears stable.    Lab Results   Component Value Date    EGFR 96 10/29/2024    EGFR 89 10/24/2024    EGFR 85 10/23/2024    CREATININE 0.86 10/29/2024    CREATININE 0.95 10/24/2024    CREATININE 0.98 10/23/2024                  Other neutropenia (HCC)  Chronic.              Depression Screening and Follow-up Plan: Patient was screened for depression during today's encounter. They screened negative with a PHQ-2 score of 0.      History of Present Illness   Patient is here for routine follow up, reviewed chronic medical problems.        History obtained from : patient  Review of Systems   Constitutional:  Negative for chills and fever.   HENT:  Negative for ear pain and sore throat.    Eyes:  Negative for pain and visual disturbance.   Respiratory:  Negative for cough and shortness of breath.    Cardiovascular:  Negative for chest pain and palpitations.   Gastrointestinal:  Negative for abdominal pain and vomiting.   Genitourinary:  Negative for dysuria and hematuria.   Musculoskeletal:  Positive for arthralgias and gait problem. Negative for back pain.   Skin:  Negative for color change and rash.   Neurological:  Negative for seizures and syncope.   All other systems reviewed and are negative.    Past Medical History   Past Medical History:   Diagnosis Date    Anemia     Arthritis      Cervical mass     Chronic kidney disease     COPD, group B, by GOLD 2017 classification ([facility]) 7/13/2020    Coronary artery disease     Depression     DVT (deep venous thrombosis) ([facility])     Hypertension     Lupus     Renal disorder     Stroke (cerebrum) ([facility]) 6/7/2024     Past Surgical History:   Procedure Laterality Date    APPENDECTOMY      CERVICAL SPINE SURGERY      CHOLECYSTECTOMY      COLONOSCOPY N/A 8/23/2018    Procedure: COLONOSCOPY;  Surgeon: James Wise III, MD;  Location: [facility];  Service: Gastroenterology    ESOPHAGOGASTRODUODENOSCOPY N/A 8/22/2018    Procedure: ESOPHAGOGASTRODUODENOSCOPY (EGD);  Surgeon: James Wise III, MD;  Location: [facility];  Service: Gastroenterology    IR CEREBRAL ANGIOGRAPHY  7/24/2024    IR IVC FILTER PLACEMENT PERMANENT  9/7/2017    IR IVC FILTER REMOVAL  4/23/2018    IR LUMBAR PUNCTURE  11/23/2015    IR LUMBAR PUNCTURE  4/25/2024    NECK SURGERY      US GUIDED INJECTION FOR RESEARCH STUDY  4/23/2018    US GUIDED INJECTION FOR RESEARCH STUDY  9/7/2017    VASCULAR SURGERY       Family History   Problem Relation Age of Onset    Heart disease Mother     No Known Problems Father      Current Outpatient Medications on File Prior to Visit   Medication Sig Dispense Refill    apixaban (ELIQUIS) 5 mg Take 5 mg by mouth 2 (two) times a day      aspirin 81 mg chewable tablet Chew 1 tablet (81 mg total) daily 30 tablet 3    baclofen 20 mg tablet Take 1 tablet (20 mg total) by mouth 2 (two) times a day 90 tablet 0    capsicum (ZOSTRIX) 0.075 % topical cream Apply topically 3 (three) times a day Wash hands before and after use. 57 g 0    clobetasol (TEMOVATE) 0.05 % cream       Diclofenac Sodium (VOLTAREN) 1 % Apply 2 g topically 4 (four) times a day 300 g 1    famotidine (PEPCID) 20 mg tablet Take 1 tablet (20 mg total) by mouth 2 (two) times a day 180 tablet 3    gabapentin (NEURONTIN) 300 mg capsule Take 1 capsule (300 mg total) by mouth 3 (three) times a day 270 capsule 3  "   gabapentin (NEURONTIN) 600 MG tablet Take 1 tablet (600 mg total) by mouth 3 (three) times a day 270 tablet 1    hydroxychloroquine (PLAQUENIL) 200 mg tablet Take 2 tablets (400 mg total) by mouth daily at bedtime for 180 doses 180 tablet 1    lidocaine-prilocaine (EMLA) cream Apply topically as needed for mild pain 30 g 3    mycophenolate (CELLCEPT) 500 mg tablet Take 500 mg by mouth every 12 (twelve) hours       nystatin (MYCOSTATIN) ointment Apply topically 2 (two) times a day 30 g 0    potassium chloride (Klor-Con M20) 20 mEq tablet Take 1 tablet (20 mEq total) by mouth daily 90 tablet 0    sildenafil (VIAGRA) 50 MG tablet Take 1 tablet (50 mg total) by mouth daily as needed for erectile dysfunction 10 tablet 0    [] predniSONE 10 mg tablet Take 4 tablets (40 mg total) by mouth daily for 3 days, THEN 3 tablets (30 mg total) daily for 3 days, THEN 2 tablets (20 mg total) daily for 3 days, THEN 1 tablet (10 mg total) daily for 3 days. 30 tablet 0    sodium chloride 1 g tablet Take 2 tablets (2 g total) by mouth 3 (three) times a day with meals 540 tablet 2     No current facility-administered medications on file prior to visit.     Allergies   Allergen Reactions    Doxycycline Rash    Sulfa Antibiotics Rash          Objective   /76 (BP Location: Left arm, Patient Position: Sitting, Cuff Size: Standard)   Pulse 76   Temp 98.5 °F (36.9 °C) (Tympanic)   Resp 18   Ht 5' 6\" (1.676 m)   Wt 82.6 kg (182 lb)   SpO2 100%   BMI 29.38 kg/m²     Physical Exam  Vitals and nursing note reviewed.   Constitutional:       General: He is not in acute distress.     Appearance: He is well-developed.   HENT:      Head: Normocephalic and atraumatic.   Cardiovascular:      Rate and Rhythm: Normal rate and regular rhythm.      Heart sounds: No murmur heard.  Pulmonary:      Effort: Pulmonary effort is normal. No respiratory distress.      Breath sounds: Normal breath sounds.   Abdominal:      Tenderness: There is " no abdominal tenderness.   Musculoskeletal:         General: No swelling.      Cervical back: Neck supple.   Skin:     General: Skin is warm and dry.      Capillary Refill: Capillary refill takes less than 2 seconds.   Neurological:      Mental Status: He is alert.   Psychiatric:         Mood and Affect: Mood normal.       I have spent a total time of 15 minutes in caring for this patient on the day of the visit/encounter including Instructions for management, Patient and family education, Risk factor reductions, and Documenting in the medical record.

## 2024-11-12 NOTE — ASSESSMENT & PLAN NOTE
Continue follow up with nephrology. Cr appears stable.    Lab Results   Component Value Date    EGFR 96 10/29/2024    EGFR 89 10/24/2024    EGFR 85 10/23/2024    CREATININE 0.86 10/29/2024    CREATININE 0.95 10/24/2024    CREATININE 0.98 10/23/2024

## 2024-11-14 ENCOUNTER — HOSPITAL ENCOUNTER (OUTPATIENT)
Dept: MRI IMAGING | Facility: HOSPITAL | Age: 54
End: 2024-11-14
Payer: COMMERCIAL

## 2024-11-14 DIAGNOSIS — M32.9 SLE (SYSTEMIC LUPUS ERYTHEMATOSUS RELATED SYNDROME) (HCC): ICD-10-CM

## 2024-11-14 DIAGNOSIS — I63.9 CEREBROVASCULAR ACCIDENT (CVA), UNSPECIFIED MECHANISM (HCC): ICD-10-CM

## 2024-11-14 PROCEDURE — 70551 MRI BRAIN STEM W/O DYE: CPT

## 2024-11-21 ENCOUNTER — TELEPHONE (OUTPATIENT)
Dept: INTERNAL MEDICINE CLINIC | Facility: CLINIC | Age: 54
End: 2024-11-21

## 2024-11-21 NOTE — TELEPHONE ENCOUNTER
----- Message from Raad Batres MD sent at 11/20/2024  8:09 PM EST -----  Regarding: FW: orthopedic shoes  Please call pt and let him know about best option for shoes    His best option would be to go to [facility] in [place] to be sized and fitted for a sneaker or comfortoable shoe. They should have off the shelf shoes for him to choose from.  ----- Message -----  From: Jeovanny Mojica DPM  Sent: 11/20/2024   4:50 PM EST  To: Raad Batres MD  Subject: RE: orthopedic shoes                             His best option would be to go to [facility] in [place] to be sized and fitted for a sneaker or comfortoable shoe. They should have off the shelf shoes for him to choose from.  ----- Message -----  From: Raad Batres MD  Sent: 11/12/2024  10:08 AM EST  To: Jeovanny Mojica DPM  Subject: orthopedic shoes                                 Good morning, Dr. Mojica,    I hope you are doing well, I am seeing Donta for follow up, he says that special orthopedic shoes are not covered by insurance and he would have to pay out of pocket, do you have any recommendations as to where he can go and what to ask for?

## 2024-11-21 NOTE — TELEPHONE ENCOUNTER
Spoke w/patient provide [facility] phone number for proper measurement of orthopedic shoes.    [email or web address]  [facility] [place] [address], [place] [address] [address]  [phone]  Patient verbally acknowledged

## 2024-11-27 DIAGNOSIS — R25.2 SPASTICITY: ICD-10-CM

## 2024-11-27 RX ORDER — BACLOFEN 20 MG/1
20 TABLET ORAL 2 TIMES DAILY
Qty: 90 TABLET | Refills: 0 | Status: SHIPPED | OUTPATIENT
Start: 2024-11-27

## 2024-11-27 NOTE — TELEPHONE ENCOUNTER
Reason for call:   [x] Refill   [] Prior Auth  [] Other:     Office:   [x] PCP/Provider -   [] Specialty/Provider -     Medication: Baclofen    Dose/Frequency: 20 mg    Quantity: 90 tablets    Pharmacy: [facility], [place] [address]     Does the patient have enough for 3 days?   [] Yes   [x] No - Send as HP to POD

## 2024-11-29 ENCOUNTER — OFFICE VISIT (OUTPATIENT)
Dept: URGENT CARE | Facility: CLINIC | Age: 54
End: 2024-11-29
Payer: COMMERCIAL

## 2024-11-29 VITALS
OXYGEN SATURATION: 98 % | DIASTOLIC BLOOD PRESSURE: 84 MMHG | RESPIRATION RATE: 18 BRPM | TEMPERATURE: 97 F | SYSTOLIC BLOOD PRESSURE: 130 MMHG | HEART RATE: 102 BPM

## 2024-11-29 DIAGNOSIS — L03.032 PARONYCHIA OF SECOND TOE OF LEFT FOOT: Primary | ICD-10-CM

## 2024-11-29 PROCEDURE — 99214 OFFICE O/P EST MOD 30 MIN: CPT | Performed by: PHYSICIAN ASSISTANT

## 2024-11-29 RX ORDER — FUROSEMIDE 40 MG/1
TABLET ORAL
COMMUNITY
Start: 2024-11-04

## 2024-11-29 RX ORDER — CEFADROXIL 500 MG/1
500 CAPSULE ORAL EVERY 12 HOURS SCHEDULED
Qty: 14 CAPSULE | Refills: 0 | Status: SHIPPED | OUTPATIENT
Start: 2024-11-29 | End: 2024-12-06

## 2024-11-29 NOTE — PROGRESS NOTES
[facility]        NAME: Donta Hunter is a 54 y.o. male  : 1970    MRN: [id number]  DATE: 2024  TIME: 2:12 PM    Assessment and Plan   Paronychia of second toe of left foot [L03.032]  1. Paronychia of second toe of left foot  cefadroxil (DURICEF) 500 mg capsule            Patient Instructions   Take Duricef as prescribed.  We discussed doing warm Epsom salt soaks 3 times a day.  Should you develop increased redness, swelling, tenderness, fevers or feeling ill you should report to the ER immediately.  We discussed in detail the redness of the patient's currently is his baseline.  He swears up and down without the ER I think the number for the for his life but  If tests have been performed at [facility], our office will contact you with results if changes need to be made to the care plan discussed with you at the visit.  You can review your full results on [facility]  Follow up with PCP in 3-5 days.  Proceed to  ER if symptoms worsen.    Chief Complaint     Chief Complaint   Patient presents with    Foot Pain     Patient here with pain of second toe on his left foot that started last night. Denies any injury          History of Present Illness      services used.  HPI  This is a 54-year-old male complaining of pain to his left second toe.  He notes the pain to last night.  He denies any injury or trauma to the area.  He notes at rest and walking the toe is not uncomfortable.  He only notes pain when applying his compression sock.  He does note there is some crusting around his toenail and a small bubble at the base of his toenail which is new.  He did any nausea, vomiting, diarrhea, fever, shortness of breath, chest pain.  Patient denies history of gout.  Patient is taken Tylenol for symptoms.  Review of Systems   Review of Systems   Constitutional:  Negative for fever.   Respiratory:  Negative for shortness of breath.    Cardiovascular:  Negative for chest pain.    Gastrointestinal:  Negative for diarrhea and vomiting.   Musculoskeletal:  Positive for arthralgias.         Current Medications       Current Outpatient Medications:     apixaban (ELIQUIS) 5 mg, Take 5 mg by mouth 2 (two) times a day, Disp: , Rfl:     aspirin 81 mg chewable tablet, Chew 1 tablet (81 mg total) daily, Disp: 30 tablet, Rfl: 3    baclofen 20 mg tablet, Take 1 tablet (20 mg total) by mouth 2 (two) times a day, Disp: 90 tablet, Rfl: 0    capsicum (ZOSTRIX) 0.075 % topical cream, Apply topically 3 (three) times a day Wash hands before and after use., Disp: 57 g, Rfl: 0    cefadroxil (DURICEF) 500 mg capsule, Take 1 capsule (500 mg total) by mouth every 12 (twelve) hours for 7 days, Disp: 14 capsule, Rfl: 0    clobetasol (TEMOVATE) 0.05 % cream, , Disp: , Rfl:     Diclofenac Sodium (VOLTAREN) 1 %, Apply 2 g topically 4 (four) times a day, Disp: 300 g, Rfl: 1    famotidine (PEPCID) 20 mg tablet, Take 1 tablet (20 mg total) by mouth 2 (two) times a day, Disp: 180 tablet, Rfl: 3    furosemide (LASIX) 40 mg tablet, , Disp: , Rfl:     gabapentin (NEURONTIN) 300 mg capsule, Take 1 capsule (300 mg total) by mouth 3 (three) times a day, Disp: 270 capsule, Rfl: 3    gabapentin (NEURONTIN) 600 MG tablet, Take 1 tablet (600 mg total) by mouth 3 (three) times a day, Disp: 270 tablet, Rfl: 1    hydroxychloroquine (PLAQUENIL) 200 mg tablet, Take 2 tablets (400 mg total) by mouth daily at bedtime for 180 doses, Disp: 180 tablet, Rfl: 1    lidocaine-prilocaine (EMLA) cream, Apply topically as needed for mild pain, Disp: 30 g, Rfl: 3    mycophenolate (CELLCEPT) 500 mg tablet, Take 500 mg by mouth every 12 (twelve) hours , Disp: , Rfl:     nystatin (MYCOSTATIN) ointment, Apply topically 2 (two) times a day, Disp: 30 g, Rfl: 0    potassium chloride (Klor-Con M20) 20 mEq tablet, Take 1 tablet (20 mEq total) by mouth daily, Disp: 90 tablet, Rfl: 0    sildenafil (VIAGRA) 50 MG tablet, Take 1 tablet (50 mg total) by mouth  daily as needed for erectile dysfunction, Disp: 10 tablet, Rfl: 0    sodium chloride 1 g tablet, Take 2 tablets (2 g total) by mouth 3 (three) times a day with meals, Disp: 540 tablet, Rfl: 2    Current Allergies     Allergies as of 11/29/2024 - Reviewed 11/29/2024   Allergen Reaction Noted    Doxycycline Rash 10/25/2023    Sulfa antibiotics Rash 10/27/2020            The following portions of the patient's history were reviewed and updated as appropriate: allergies, current medications, past family history, past medical history, past social history, past surgical history and problem list.     Past Medical History:   Diagnosis Date    Anemia     Arthritis     Cervical mass     Chronic kidney disease     COPD, group B, by GOLD 2017 classification ([facility]) 7/13/2020    Coronary artery disease     Depression     DVT (deep venous thrombosis) ([facility])     Hypertension     Lupus     Renal disorder     Stroke (cerebrum) ([facility]) 6/7/2024       Past Surgical History:   Procedure Laterality Date    APPENDECTOMY      CERVICAL SPINE SURGERY      CHOLECYSTECTOMY      COLONOSCOPY N/A 8/23/2018    Procedure: COLONOSCOPY;  Surgeon: James Wise III, MD;  Location: [facility];  Service: Gastroenterology    ESOPHAGOGASTRODUODENOSCOPY N/A 8/22/2018    Procedure: ESOPHAGOGASTRODUODENOSCOPY (EGD);  Surgeon: James Wise III, MD;  Location: [facility];  Service: Gastroenterology    IR CEREBRAL ANGIOGRAPHY  7/24/2024    IR IVC FILTER PLACEMENT PERMANENT  9/7/2017    IR IVC FILTER REMOVAL  4/23/2018    IR LUMBAR PUNCTURE  11/23/2015    IR LUMBAR PUNCTURE  4/25/2024    NECK SURGERY      US GUIDED INJECTION FOR RESEARCH STUDY  4/23/2018    US GUIDED INJECTION FOR RESEARCH STUDY  9/7/2017    VASCULAR SURGERY         Family History   Problem Relation Age of Onset    Heart disease Mother     No Known Problems Father          Medications have been verified.        Objective   /84   Pulse 102   Temp (!) 97 °F (36.1 °C)   Resp 18   SpO2  98%        Physical Exam     Physical Exam  Vitals and nursing note reviewed.   Constitutional:       General: He is not in acute distress.     Appearance: Normal appearance. He is not ill-appearing, toxic-appearing or diaphoretic.   Cardiovascular:      Rate and Rhythm: Normal rate and regular rhythm.   Pulmonary:      Effort: Pulmonary effort is normal.      Breath sounds: Normal breath sounds.   Musculoskeletal:      Comments: There is yellow crust discharge around the nailbed of the left second toe.  There is also swelling of the cuticle base.  Patient's foot is red however he notes this is his baseline to venous stasis.  Patient denies any increased redness or swelling of the foot.  There is only pain to palpation over the edematous area at the base of the nailbed.  There is no other swelling of the foot or toe.  No warmth.  See photo.   Neurological:      Mental Status: He is alert.

## 2024-12-02 ENCOUNTER — TELEPHONE (OUTPATIENT)
Dept: NEPHROLOGY | Facility: CLINIC | Age: 54
End: 2024-12-02

## 2024-12-11 ENCOUNTER — OFFICE VISIT (OUTPATIENT)
Dept: PODIATRY | Facility: CLINIC | Age: 54
End: 2024-12-11
Payer: COMMERCIAL

## 2024-12-11 ENCOUNTER — TELEPHONE (OUTPATIENT)
Dept: NEPHROLOGY | Facility: CLINIC | Age: 54
End: 2024-12-11

## 2024-12-11 VITALS
OXYGEN SATURATION: 97 % | BODY MASS INDEX: 29.38 KG/M2 | HEIGHT: 66 IN | HEART RATE: 90 BPM | DIASTOLIC BLOOD PRESSURE: 86 MMHG | SYSTOLIC BLOOD PRESSURE: 122 MMHG

## 2024-12-11 DIAGNOSIS — L03.116 CELLULITIS OF LEFT FOOT: ICD-10-CM

## 2024-12-11 DIAGNOSIS — L08.9 INFECTION OF TOE: Primary | ICD-10-CM

## 2024-12-11 PROCEDURE — 99213 OFFICE O/P EST LOW 20 MIN: CPT | Performed by: PODIATRIST

## 2024-12-11 RX ORDER — CEPHALEXIN 500 MG/1
500 CAPSULE ORAL EVERY 8 HOURS SCHEDULED
Qty: 42 CAPSULE | Refills: 0 | Status: SHIPPED | OUTPATIENT
Start: 2024-12-11 | End: 2024-12-25

## 2024-12-11 NOTE — PROGRESS NOTES
Assessment/Plan:     The patient's clinical examination today is significant for a small superficial fibrous wound to the proximal/medial border of his left second toenail plate.  There is associated erythema of the left forefoot consistent with a localized cellulitis.  There are no interdigital macerations.  No other open lesions to his left lower extremity.  Pedal pulses are nonpalpable.    The patient had previously responded well to left foot cellulitis with a course of cephalexin.  I will restart him on a 2-week course.  Due to the edema in his left lower extremity, I believe the longer course is necessary to adequately treat his cellulitis.  I would recommend follow-up in 10 to 14 days.  The left second toe wound was dressed with triple antibiotic ointment and a dry sterile Band-Aid.  I recommend the patient do the same at home until the lesion is healed.       Diagnoses and all orders for this visit:    Infection of toe  -     cephalexin (KEFLEX) 500 mg capsule; Take 1 capsule (500 mg total) by mouth every 8 (eight) hours for 14 days    Cellulitis of left foot  -     cephalexin (KEFLEX) 500 mg capsule; Take 1 capsule (500 mg total) by mouth every 8 (eight) hours for 14 days          Subjective:     Patient ID: Donta Hunter is a 54 y.o. male.    The patient presents today for chief complaint of redness and tenderness to his left foot.  He feels that the infection to his left foot has returned.  He did note that for a while he was doing well without any redness or pain.  He previously was treated with a course of cephalexin for left foot cellulitis and tolerated that course well.      PAST MEDICAL HISTORY:  Past Medical History:   Diagnosis Date    Anemia     Arthritis     Cervical mass     Chronic kidney disease     COPD, group B, by GOLD 2017 classification ([facility]) 7/13/2020    Coronary artery disease     Depression     DVT (deep venous thrombosis) ([facility])     Hypertension     Lupus     Renal disorder     Stroke  (cerebrum) ([facility]) 6/7/2024       PAST SURGICAL HISTORY:  Past Surgical History:   Procedure Laterality Date    APPENDECTOMY      CERVICAL SPINE SURGERY      CHOLECYSTECTOMY      COLONOSCOPY N/A 8/23/2018    Procedure: COLONOSCOPY;  Surgeon: James Wise III, MD;  Location: [facility];  Service: Gastroenterology    ESOPHAGOGASTRODUODENOSCOPY N/A 8/22/2018    Procedure: ESOPHAGOGASTRODUODENOSCOPY (EGD);  Surgeon: James Wise III, MD;  Location: [facility];  Service: Gastroenterology    IR CEREBRAL ANGIOGRAPHY  7/24/2024    IR IVC FILTER PLACEMENT PERMANENT  9/7/2017    IR IVC FILTER REMOVAL  4/23/2018    IR LUMBAR PUNCTURE  11/23/2015    IR LUMBAR PUNCTURE  4/25/2024    NECK SURGERY      US GUIDED INJECTION FOR RESEARCH STUDY  4/23/2018    US GUIDED INJECTION FOR RESEARCH STUDY  9/7/2017    VASCULAR SURGERY          ALLERGIES:  Doxycycline and Sulfa antibiotics    MEDICATIONS:  Current Outpatient Medications   Medication Sig Dispense Refill    apixaban (ELIQUIS) 5 mg Take 5 mg by mouth 2 (two) times a day      aspirin 81 mg chewable tablet Chew 1 tablet (81 mg total) daily 30 tablet 3    baclofen 20 mg tablet Take 1 tablet (20 mg total) by mouth 2 (two) times a day 90 tablet 0    capsicum (ZOSTRIX) 0.075 % topical cream Apply topically 3 (three) times a day Wash hands before and after use. 57 g 0    cephalexin (KEFLEX) 500 mg capsule Take 1 capsule (500 mg total) by mouth every 8 (eight) hours for 14 days 42 capsule 0    clobetasol (TEMOVATE) 0.05 % cream       Diclofenac Sodium (VOLTAREN) 1 % Apply 2 g topically 4 (four) times a day 300 g 1    famotidine (PEPCID) 20 mg tablet Take 1 tablet (20 mg total) by mouth 2 (two) times a day 180 tablet 3    furosemide (LASIX) 40 mg tablet       gabapentin (NEURONTIN) 300 mg capsule Take 1 capsule (300 mg total) by mouth 3 (three) times a day 270 capsule 3    gabapentin (NEURONTIN) 600 MG tablet Take 1 tablet (600 mg total) by mouth 3 (three) times a day 270 tablet 1     hydroxychloroquine (PLAQUENIL) 200 mg tablet Take 2 tablets (400 mg total) by mouth daily at bedtime for 180 doses 180 tablet 1    lidocaine-prilocaine (EMLA) cream Apply topically as needed for mild pain 30 g 3    mycophenolate (CELLCEPT) 500 mg tablet Take 500 mg by mouth every 12 (twelve) hours       potassium chloride (Klor-Con M20) 20 mEq tablet Take 1 tablet (20 mEq total) by mouth daily 90 tablet 0    sildenafil (VIAGRA) 50 MG tablet Take 1 tablet (50 mg total) by mouth daily as needed for erectile dysfunction 10 tablet 0    sodium chloride 1 g tablet Take 2 tablets (2 g total) by mouth 3 (three) times a day with meals 540 tablet 2    nystatin (MYCOSTATIN) ointment Apply topically 2 (two) times a day 30 g 0     No current facility-administered medications for this visit.       SOCIAL HISTORY:  Social History     Socioeconomic History    Marital status: /Civil Union     Spouse name: None    Number of children: None    Years of education: None    Highest education level: None   Occupational History    None   Tobacco Use    Smoking status: Never    Smokeless tobacco: Never   Vaping Use    Vaping status: Never Used   Substance and Sexual Activity    Alcohol use: Never    Drug use: Never    Sexual activity: Yes     Partners: Female, Male     Birth control/protection: Rhythm   Other Topics Concern    None   Social History Narrative    ** Merged History Encounter **          Social Drivers of Health     Financial Resource Strain: Not on file   Food Insecurity: No Food Insecurity (10/23/2024)    Hunger Vital Sign     Worried About Running Out of Food in the Last Year: Never true     Ran Out of Food in the Last Year: Never true   Transportation Needs: No Transportation Needs (10/23/2024)    PRAPARE - Transportation     Lack of Transportation (Medical): No     Lack of Transportation (Non-Medical): No   Physical Activity: Not on file   Stress: Not on file   Social Connections: Unknown (6/18/2024)    Received from  [facility]    Social Connections     How often do you feel lonely or isolated from those around you? (Adult - for ages 18 years and over): Not on file   Intimate Partner Violence: Unknown (10/21/2024)    Nursing IPS     Feels Physically and Emotionally Safe: Not on file     Physically Hurt by Someone: Not on file     Humiliated or Emotionally Abused by Someone: Not on file     Physically Hurt by Someone: 2     Hurt or Threatened by Someone: 2   Housing Stability: Low Risk  (10/23/2024)    Housing Stability Vital Sign     Unable to Pay for Housing in the Last Year: No     Number of Times Moved in the Last Year: 0     Homeless in the Last Year: No        Review of Systems   Constitutional: Negative.    HENT: Negative.     Eyes: Negative.    Respiratory: Negative.     Cardiovascular: Negative.    Endocrine: Negative.    Musculoskeletal: Negative.    Neurological: Negative.    Hematological: Negative.    Psychiatric/Behavioral: Negative.           Objective:     Physical Exam  Vitals reviewed.   Constitutional:       Appearance: Normal appearance.   HENT:      Head: Normocephalic and atraumatic.      Nose: Nose normal.   Eyes:      Conjunctiva/sclera: Conjunctivae normal.      Pupils: Pupils are equal, round, and reactive to light.   Cardiovascular:      Pulses:           Dorsalis pedis pulses are 0 on the left side.        Posterior tibial pulses are 0 on the left side.   Pulmonary:      Effort: Pulmonary effort is normal.   Feet:      Left foot:      Skin integrity: Skin breakdown, erythema and warmth present.      Comments: The patient's clinical examination today is significant for a small superficial fibrous wound to the proximal/medial border of his left second toenail plate.  There is associated erythema of the left forefoot consistent with a localized cellulitis.  There are no interdigital macerations.  No other open lesions to his left lower extremity.  Pedal pulses are nonpalpable.    Skin:     General: Skin is  warm.      Capillary Refill: Capillary refill takes less than 2 seconds.   Neurological:      General: No focal deficit present.      Mental Status: He is alert and oriented to person, place, and time.   Psychiatric:         Mood and Affect: Mood normal.         Behavior: Behavior normal.         Thought Content: Thought content normal.

## 2024-12-11 NOTE — TELEPHONE ENCOUNTER
Patient had an appointment with Dr. Dangelo on Monday 12/09/2024 that patient no showed for. I called and left message on patients answering machine for patient to call back the office to reschedule appointment. No show letter send to patient

## 2024-12-23 ENCOUNTER — OFFICE VISIT (OUTPATIENT)
Dept: PODIATRY | Facility: CLINIC | Age: 54
End: 2024-12-23
Payer: COMMERCIAL

## 2024-12-23 ENCOUNTER — OFFICE VISIT (OUTPATIENT)
Age: 54
End: 2024-12-23
Payer: COMMERCIAL

## 2024-12-23 VITALS
SYSTOLIC BLOOD PRESSURE: 127 MMHG | BODY MASS INDEX: 29.38 KG/M2 | HEIGHT: 66 IN | HEART RATE: 58 BPM | DIASTOLIC BLOOD PRESSURE: 86 MMHG | OXYGEN SATURATION: 99 %

## 2024-12-23 VITALS
BODY MASS INDEX: 28.77 KG/M2 | DIASTOLIC BLOOD PRESSURE: 84 MMHG | TEMPERATURE: 96 F | SYSTOLIC BLOOD PRESSURE: 124 MMHG | HEIGHT: 66 IN | WEIGHT: 179 LBS

## 2024-12-23 DIAGNOSIS — M32.14 LUPUS NEPHRITIS (HCC): ICD-10-CM

## 2024-12-23 DIAGNOSIS — R29.898 WEAKNESS OF BOTH LOWER EXTREMITIES: ICD-10-CM

## 2024-12-23 DIAGNOSIS — Z79.60 LONG-TERM USE OF IMMUNOSUPPRESSANT MEDICATION: ICD-10-CM

## 2024-12-23 DIAGNOSIS — M32.9 SLE (SYSTEMIC LUPUS ERYTHEMATOSUS RELATED SYNDROME) (HCC): Primary | ICD-10-CM

## 2024-12-23 DIAGNOSIS — L97.521 SKIN ULCER OF TOE OF LEFT FOOT, LIMITED TO BREAKDOWN OF SKIN (HCC): ICD-10-CM

## 2024-12-23 DIAGNOSIS — L03.116 CELLULITIS OF LEFT FOOT: Primary | ICD-10-CM

## 2024-12-23 PROCEDURE — 99213 OFFICE O/P EST LOW 20 MIN: CPT | Performed by: PODIATRIST

## 2024-12-23 PROCEDURE — 99214 OFFICE O/P EST MOD 30 MIN: CPT | Performed by: STUDENT IN AN ORGANIZED HEALTH CARE EDUCATION/TRAINING PROGRAM

## 2024-12-23 NOTE — PROGRESS NOTES
Rheumatology Follow-up Visit  Name: Donta Hunter      : 1970      MRN: [id number]  Encounter Provider: Sravani Narayanan MD  Encounter Date: 2024   Encounter department: [facility]    Assessment & Plan  SLE (systemic lupus erythematosus related syndrome) (HCC)  54-year-old male who presents for follow-up of lupus with prior manifestation of lupus nephritis.  Currently, his lupus remains quiet on therapy with hydroxychloroquine for 100 mg daily and CellCept 1 g daily.  His monitoring labs are within normal limits - chronic neutropenia is stable. Plan to continue with current therapy at this time with monitoring labs every 3 months.  His main symptom continues to be lower extremity weakness with neuropathy for which he has previously had EMGs and neuroimaging.  Symptoms are unchanged, but patient would be interested in physical therapy to help work on strengthening his lower extremities.  Physical therapy referral was placed today for patient.  Follow-up in 3 to 4 months.  Orders:    CBC and differential; Standing    Comprehensive metabolic panel; Standing    C3 complement; Standing    C4 complement; Standing    Anti-dsDNA (Double-stranded) Ab by Miguel Angel method (RDL); Standing    Protein / creatinine ratio, urine; Standing    Urinalysis with microscopic; Standing    Weakness of both lower extremities    Orders:    Ambulatory Referral to Physical Therapy; Future    Lupus nephritis (HCC)         Long-term use of immunosuppressant medication           History of Present Illness     Donta Hunter is a 54 y.o. male who presents for follow-up.    Interval History:  Patient reports overall he is been doing well.  The knee swelling resolved and has not returned.  No other new joint symptoms.  Raynaud's is stable.  He reports ongoing numbness from the knees down to the feet.  He continues to require a walker to ambulate.  He does inquire if physical therapy would be beneficial.    Permanent  "History:  Patient was diagnosed with SLE in 2015 when he was presented with YANIRA 1:160 nucleolar, renal failure, anemia, leukopenia and skin rash. He is of [place] descent. He underwent renal biopsy showing Class V lupus nephritis (membranous nephropathy). Skin biopsy showing interface dermatitis. He was started on HCQ and was planned to start MMF. 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 Sural nerve biopsy not performed as patient had already been on steroids for the lupus nephritis. He was ultimately started on induction therapy with IV CYC monthly (2/16-5/16) x 4 doses, then transitioned to MMF 3g daily for maintenance. He had some cytopenias, so dose was increased to MMF 2g daily and then eventually 1.5mg daily.      He had a DVT in 2017, but negative APLA panel in 2015 so thought unlikely to be related to his SLE.      He also had severe cervical compressive myelopathy and underwent C4-C6 anterior cervical diskectomy with central and foraminal decompression 1/25/2018. He underwent a repeat EMG in 2018 showing \"sensorimotor polyneuropathy, but is unchanged (or even perhaps improved) compared to 2015. No myopathy or significant motor axonal loss from lumbar radiculopathy. Clinically, his gait problem seems more likely from myelopathy.\"     In Feb 2024, he developed L side facial dropping. He underwent MRI brain showing new lacunar ischemia in the frontal lobes and chronic cerebellar lacunar infarcts. MRI C/T/L spine was reported with DJD C spine with mass effect C6-7/cord flattening C3-4, compression fractures C7-8, chronic compression fx T7-T8, severe NF narrowing L3-4 with L3 nerve root impingement, bulging L5-S1. TTE without vegetations, JIMMY showing patent foramen ovale. CTA brain showing normal intra-cranial vasculature. Repeat APLA testing negative. FTA mildly reactive, but previously had been treated for syphilis. He " "underwent repeat LP, but it was a traumatic tap and not able to interpret results. He underwent cerebral angiogram 7/2024 which was normal and without vasculitis. Lupus cerebritis was felt unlikely to be the etiology of his symptoms so no additional immunosuppression was recommended.       Review of Systems  Complete ROS conducted as per HPI. In addition, denies:  Fever  Chills  Nigh sweats  Skin rash  Oral ulcerations  Chest pain  Shortness of breath  Other joint pain and swelling      Objective     /84 (BP Location: Right arm, Patient Position: Sitting, Cuff Size: Adult)   Temp (!) 96 °F (35.6 °C) (Tympanic)   Ht 5' 6\" (1.676 m)   Wt 81.2 kg (179 lb)   BMI 28.89 kg/m²     Physical Exam  Physical Exam  Constitutional: well appearing, no acute distress  HEENT: normocephalic, sclera clear, no visible oral or nasal ulcers  Neck: supple, no palpable cervical adenopathy  CV: regular rate and rhythm  Pulm: normal respiratory effort, breathing comfortably on room air  Skin: no rashes, no skin thickening  Extremities: warm and well perfused, no edema  MSK: No synovitis or effusions    Labs and Imaging  I have personally reviewed pertinent labs and imaging.     "

## 2024-12-23 NOTE — PROGRESS NOTES
Assessment/Plan:     The patient's clinical examination today is significant for a noninfected, partial-thickness eschar to the proximal/medial border of his left second toenail plate.  Cellulitis to the left foot has resolved nicely.  There are no signs of infection noted today.  There is no calor.  There is no tenderness to palpation.  There are no interdigital macerations.  There are no other open lesions to his left lower extremity.  Pedal pulses are nonpalpable.     The left second toe wound was dressed with triple antibiotic ointment and a dry sterile Band-Aid.      I recommend the patient do the same at home until the lesion is healed.     Diagnoses and all orders for this visit:    Cellulitis of left foot    Skin ulcer of toe of left foot, limited to breakdown of skin ([facility])          Subjective:     Patient ID: Donta Hunter is a 54 y.o. male.    The patient presents today for follow up of redness and tenderness to his left foot consistent with cellulitis.  He was started on oral cephalexin at his last visit.    PAST MEDICAL HISTORY:  Past Medical History:   Diagnosis Date   • Anemia    • Arthritis    • Cervical mass    • Chronic kidney disease    • COPD, group B, by GOLD 2017 classification ([facility]) 7/13/2020   • Coronary artery disease    • Depression    • DVT (deep venous thrombosis) ([facility])    • Hypertension    • Lupus    • Renal disorder    • Stroke (cerebrum) ([facility]) 6/7/2024       PAST SURGICAL HISTORY:  Past Surgical History:   Procedure Laterality Date   • APPENDECTOMY     • CERVICAL SPINE SURGERY     • CHOLECYSTECTOMY     • COLONOSCOPY N/A 8/23/2018    Procedure: COLONOSCOPY;  Surgeon: James Wise III, MD;  Location: [facility];  Service: Gastroenterology   • ESOPHAGOGASTRODUODENOSCOPY N/A 8/22/2018    Procedure: ESOPHAGOGASTRODUODENOSCOPY (EGD);  Surgeon: James Wise III, MD;  Location: [facility];  Service: Gastroenterology   • IR CEREBRAL ANGIOGRAPHY  7/24/2024   • IR IVC FILTER PLACEMENT  PERMANENT  9/7/2017   • IR IVC FILTER REMOVAL  4/23/2018   • IR LUMBAR PUNCTURE  11/23/2015   • IR LUMBAR PUNCTURE  4/25/2024   • NECK SURGERY     • US GUIDED INJECTION FOR RESEARCH STUDY  4/23/2018   • US GUIDED INJECTION FOR RESEARCH STUDY  9/7/2017   • VASCULAR SURGERY          ALLERGIES:  Doxycycline and Sulfa antibiotics    MEDICATIONS:  Current Outpatient Medications   Medication Sig Dispense Refill   • apixaban (ELIQUIS) 5 mg Take 5 mg by mouth 2 (two) times a day     • aspirin 81 mg chewable tablet Chew 1 tablet (81 mg total) daily 30 tablet 3   • baclofen 20 mg tablet Take 1 tablet (20 mg total) by mouth 2 (two) times a day 90 tablet 0   • capsicum (ZOSTRIX) 0.075 % topical cream Apply topically 3 (three) times a day Wash hands before and after use. 57 g 0   • cephalexin (KEFLEX) 500 mg capsule Take 1 capsule (500 mg total) by mouth every 8 (eight) hours for 14 days 42 capsule 0   • clobetasol (TEMOVATE) 0.05 % cream      • Diclofenac Sodium (VOLTAREN) 1 % Apply 2 g topically 4 (four) times a day 300 g 1   • famotidine (PEPCID) 20 mg tablet Take 1 tablet (20 mg total) by mouth 2 (two) times a day 180 tablet 3   • furosemide (LASIX) 40 mg tablet      • gabapentin (NEURONTIN) 300 mg capsule Take 1 capsule (300 mg total) by mouth 3 (three) times a day 270 capsule 3   • gabapentin (NEURONTIN) 600 MG tablet Take 1 tablet (600 mg total) by mouth 3 (three) times a day 270 tablet 1   • hydroxychloroquine (PLAQUENIL) 200 mg tablet Take 2 tablets (400 mg total) by mouth daily at bedtime for 180 doses 180 tablet 1   • lidocaine-prilocaine (EMLA) cream Apply topically as needed for mild pain 30 g 3   • mycophenolate (CELLCEPT) 500 mg tablet Take 500 mg by mouth every 12 (twelve) hours      • nystatin (MYCOSTATIN) ointment Apply topically 2 (two) times a day 30 g 0   • potassium chloride (Klor-Con M20) 20 mEq tablet Take 1 tablet (20 mEq total) by mouth daily 90 tablet 0   • sildenafil (VIAGRA) 50 MG tablet Take 1  tablet (50 mg total) by mouth daily as needed for erectile dysfunction 10 tablet 0   • sodium chloride 1 g tablet Take 2 tablets (2 g total) by mouth 3 (three) times a day with meals 540 tablet 2     No current facility-administered medications for this visit.       SOCIAL HISTORY:  Social History     Socioeconomic History   • Marital status: /Civil Union     Spouse name: Not on file   • Number of children: Not on file   • Years of education: Not on file   • Highest education level: Not on file   Occupational History   • Not on file   Tobacco Use   • Smoking status: Never   • Smokeless tobacco: Never   Vaping Use   • Vaping status: Never Used   Substance and Sexual Activity   • Alcohol use: Never   • Drug use: Never   • Sexual activity: Yes     Partners: Female, Male     Birth control/protection: Rhythm   Other Topics Concern   • Not on file   Social History Narrative    ** Merged History Encounter **          Social Drivers of Health     Financial Resource Strain: Not on file   Food Insecurity: No Food Insecurity (10/23/2024)    Hunger Vital Sign    • Worried About Running Out of Food in the Last Year: Never true    • Ran Out of Food in the Last Year: Never true   Transportation Needs: No Transportation Needs (10/23/2024)    PRAPARE - Transportation    • Lack of Transportation (Medical): No    • Lack of Transportation (Non-Medical): No   Physical Activity: Not on file   Stress: Not on file   Social Connections: Unknown (6/18/2024)    Received from [facility]    Social Connections    • How often do you feel lonely or isolated from those around you? (Adult - for ages 18 years and over): Not on file   Intimate Partner Violence: Unknown (10/21/2024)    Nursing IPS    • Feels Physically and Emotionally Safe: Not on file    • Physically Hurt by Someone: Not on file    • Humiliated or Emotionally Abused by Someone: Not on file    • Physically Hurt by Someone: 2    • Hurt or Threatened by Someone: 2   Housing  Stability: Low Risk  (10/23/2024)    Housing Stability Vital Sign    • Unable to Pay for Housing in the Last Year: No    • Number of Times Moved in the Last Year: 0    • Homeless in the Last Year: No        Review of Systems   Constitutional: Negative.    HENT: Negative.     Eyes: Negative.    Respiratory: Negative.     Cardiovascular: Negative.    Endocrine: Negative.    Musculoskeletal: Negative.    Neurological: Negative.    Hematological: Negative.    Psychiatric/Behavioral: Negative.           Objective:     Physical Exam  Vitals reviewed.   Constitutional:       Appearance: Normal appearance.   HENT:      Head: Normocephalic and atraumatic.      Nose: Nose normal.   Eyes:      Conjunctiva/sclera: Conjunctivae normal.      Pupils: Pupils are equal, round, and reactive to light.   Cardiovascular:      Pulses:           Dorsalis pedis pulses are 1+ on the left side.        Posterior tibial pulses are 0 on the left side.   Pulmonary:      Effort: Pulmonary effort is normal.   Feet:      Comments: The patient's clinical examination today is significant for a small superficial fibrous wound to the proximal/medial border of his left second toenail plate.  There is associated erythema of the left forefoot consistent with a localized cellulitis.  There are no interdigital macerations.  No other open lesions to his left lower extremity.  Pedal pulses are nonpalpable.  Skin:     General: Skin is warm.      Capillary Refill: Capillary refill takes less than 2 seconds.   Neurological:      General: No focal deficit present.      Mental Status: He is alert and oriented to person, place, and time.   Psychiatric:         Mood and Affect: Mood normal.         Behavior: Behavior normal.         Thought Content: Thought content normal.

## 2024-12-23 NOTE — ASSESSMENT & PLAN NOTE
54-year-old male who presents for follow-up of lupus with prior manifestation of lupus nephritis.  Currently, his lupus remains quiet on therapy with hydroxychloroquine for 100 mg daily and CellCept 1 g daily.  His monitoring labs are within normal limits - chronic neutropenia is stable. Plan to continue with current therapy at this time with monitoring labs every 3 months.  His main symptom continues to be lower extremity weakness with neuropathy for which he has previously had EMGs and neuroimaging.  Symptoms are unchanged, but patient would be interested in physical therapy to help work on strengthening his lower extremities.  Physical therapy referral was placed today for patient.  Follow-up in 3 to 4 months.  Orders:    CBC and differential; Standing    Comprehensive metabolic panel; Standing    C3 complement; Standing    C4 complement; Standing    Anti-dsDNA (Double-stranded) Ab by Miguel Angel method (RDL); Standing    Protein / creatinine ratio, urine; Standing    Urinalysis with microscopic; Standing

## 2024-12-30 ENCOUNTER — APPOINTMENT (OUTPATIENT)
Dept: LAB | Facility: CLINIC | Age: 54
End: 2024-12-30
Payer: COMMERCIAL

## 2024-12-30 DIAGNOSIS — M32.9 SLE (SYSTEMIC LUPUS ERYTHEMATOSUS RELATED SYNDROME) (HCC): ICD-10-CM

## 2024-12-30 LAB
ALBUMIN SERPL BCG-MCNC: 4.4 G/DL (ref 3.5–5)
ALP SERPL-CCNC: 102 U/L (ref 34–104)
ALT SERPL W P-5'-P-CCNC: 21 U/L (ref 7–52)
ANION GAP SERPL CALCULATED.3IONS-SCNC: 8 MMOL/L (ref 4–13)
AST SERPL W P-5'-P-CCNC: 27 U/L (ref 13–39)
BACTERIA UR QL AUTO: NORMAL /HPF
BASOPHILS # BLD AUTO: 0.02 THOUSANDS/ΜL (ref 0–0.1)
BASOPHILS NFR BLD AUTO: 1 % (ref 0–1)
BILIRUB SERPL-MCNC: 0.45 MG/DL (ref 0.2–1)
BILIRUB UR QL STRIP: NEGATIVE
BUN SERPL-MCNC: 13 MG/DL (ref 5–25)
C3 SERPL-MCNC: 112 MG/DL (ref 87–200)
C4 SERPL-MCNC: 46 MG/DL (ref 19–52)
CALCIUM SERPL-MCNC: 9.8 MG/DL (ref 8.4–10.2)
CHLORIDE SERPL-SCNC: 102 MMOL/L (ref 96–108)
CLARITY UR: CLEAR
CO2 SERPL-SCNC: 25 MMOL/L (ref 21–32)
COLOR UR: COLORLESS
CREAT SERPL-MCNC: 1.12 MG/DL (ref 0.6–1.3)
CREAT UR-MCNC: 61.1 MG/DL
EOSINOPHIL # BLD AUTO: 0.03 THOUSAND/ΜL (ref 0–0.61)
EOSINOPHIL NFR BLD AUTO: 1 % (ref 0–6)
ERYTHROCYTE [DISTWIDTH] IN BLOOD BY AUTOMATED COUNT: 13.4 % (ref 11.6–15.1)
GFR SERPL CREATININE-BSD FRML MDRD: 73 ML/MIN/1.73SQ M
GLUCOSE P FAST SERPL-MCNC: 94 MG/DL (ref 65–99)
GLUCOSE UR STRIP-MCNC: NEGATIVE MG/DL
HCT VFR BLD AUTO: 43.8 % (ref 36.5–49.3)
HGB BLD-MCNC: 14.8 G/DL (ref 12–17)
HGB UR QL STRIP.AUTO: NEGATIVE
IMM GRANULOCYTES # BLD AUTO: 0.01 THOUSAND/UL (ref 0–0.2)
IMM GRANULOCYTES NFR BLD AUTO: 0 % (ref 0–2)
KETONES UR STRIP-MCNC: NEGATIVE MG/DL
LEUKOCYTE ESTERASE UR QL STRIP: NEGATIVE
LYMPHOCYTES # BLD AUTO: 1.24 THOUSANDS/ΜL (ref 0.6–4.47)
LYMPHOCYTES NFR BLD AUTO: 52 % (ref 14–44)
MCH RBC QN AUTO: 30.7 PG (ref 26.8–34.3)
MCHC RBC AUTO-ENTMCNC: 33.8 G/DL (ref 31.4–37.4)
MCV RBC AUTO: 91 FL (ref 82–98)
MONOCYTES # BLD AUTO: 0.42 THOUSAND/ΜL (ref 0.17–1.22)
MONOCYTES NFR BLD AUTO: 17 % (ref 4–12)
NEUTROPHILS # BLD AUTO: 0.71 THOUSANDS/ΜL (ref 1.85–7.62)
NEUTS SEG NFR BLD AUTO: 29 % (ref 43–75)
NITRITE UR QL STRIP: NEGATIVE
NON-SQ EPI CELLS URNS QL MICRO: NORMAL /HPF
NRBC BLD AUTO-RTO: 0 /100 WBCS
PH UR STRIP.AUTO: 7 [PH]
PLATELET # BLD AUTO: 159 THOUSANDS/UL (ref 149–390)
PMV BLD AUTO: 10.7 FL (ref 8.9–12.7)
POTASSIUM SERPL-SCNC: 4.6 MMOL/L (ref 3.5–5.3)
PROT SERPL-MCNC: 7.7 G/DL (ref 6.4–8.4)
PROT UR STRIP-MCNC: NEGATIVE MG/DL
PROT UR-MCNC: 7.8 MG/DL
PROT/CREAT UR: 0.1 MG/G{CREAT} (ref 0–0.1)
RBC # BLD AUTO: 4.82 MILLION/UL (ref 3.88–5.62)
RBC #/AREA URNS AUTO: NORMAL /HPF
SODIUM SERPL-SCNC: 135 MMOL/L (ref 135–147)
SP GR UR STRIP.AUTO: 1.01 (ref 1–1.03)
UROBILINOGEN UR STRIP-ACNC: <2 MG/DL
WBC # BLD AUTO: 2.43 THOUSAND/UL (ref 4.31–10.16)
WBC #/AREA URNS AUTO: NORMAL /HPF

## 2024-12-30 PROCEDURE — 86160 COMPLEMENT ANTIGEN: CPT

## 2024-12-30 PROCEDURE — 85025 COMPLETE CBC W/AUTO DIFF WBC: CPT

## 2024-12-30 PROCEDURE — 82570 ASSAY OF URINE CREATININE: CPT

## 2024-12-30 PROCEDURE — 36415 COLL VENOUS BLD VENIPUNCTURE: CPT

## 2024-12-30 PROCEDURE — 84156 ASSAY OF PROTEIN URINE: CPT

## 2024-12-30 PROCEDURE — 81001 URINALYSIS AUTO W/SCOPE: CPT

## 2024-12-30 PROCEDURE — 86225 DNA ANTIBODY NATIVE: CPT

## 2024-12-30 PROCEDURE — 80053 COMPREHEN METABOLIC PANEL: CPT

## 2025-01-07 LAB — MISCELLANEOUS LAB TEST RESULT: NORMAL

## 2025-01-08 ENCOUNTER — RESULTS FOLLOW-UP (OUTPATIENT)
Age: 55
End: 2025-01-08

## 2025-01-08 DIAGNOSIS — M32.9 SLE (SYSTEMIC LUPUS ERYTHEMATOSUS RELATED SYNDROME) (HCC): Primary | ICD-10-CM

## 2025-01-08 NOTE — TELEPHONE ENCOUNTER
Please let patient know his lupus monitoring labs overall are stable. Urine shows no blood or protein and kidney function is good. His white blood cell count is slightly lower than prior. I would like him to get repeat CBC drawn about 1 month from prior, so end of January. Order placed for this - can go to any [facility] lab and no fasting needed.

## 2025-01-14 DIAGNOSIS — R25.2 SPASTICITY: ICD-10-CM

## 2025-01-14 RX ORDER — BACLOFEN 20 MG/1
20 TABLET ORAL 2 TIMES DAILY
Qty: 90 TABLET | Refills: 0 | Status: SHIPPED | OUTPATIENT
Start: 2025-01-14

## 2025-01-17 ENCOUNTER — OFFICE VISIT (OUTPATIENT)
Dept: INTERNAL MEDICINE CLINIC | Facility: CLINIC | Age: 55
End: 2025-01-17
Payer: COMMERCIAL

## 2025-01-17 VITALS
BODY MASS INDEX: 28.93 KG/M2 | DIASTOLIC BLOOD PRESSURE: 76 MMHG | TEMPERATURE: 98.2 F | OXYGEN SATURATION: 99 % | HEART RATE: 76 BPM | SYSTOLIC BLOOD PRESSURE: 116 MMHG | WEIGHT: 180 LBS | HEIGHT: 66 IN

## 2025-01-17 DIAGNOSIS — Z99.89 USES WALKER: ICD-10-CM

## 2025-01-17 DIAGNOSIS — S91.109S OPEN TOE WOUND, SEQUELA: ICD-10-CM

## 2025-01-17 DIAGNOSIS — M32.9 SYSTEMIC LUPUS ERYTHEMATOSUS, UNSPECIFIED SLE TYPE, UNSPECIFIED ORGAN INVOLVEMENT STATUS (HCC): ICD-10-CM

## 2025-01-17 DIAGNOSIS — M79.675 PAIN OF TOE OF LEFT FOOT: Primary | ICD-10-CM

## 2025-01-17 PROCEDURE — 99214 OFFICE O/P EST MOD 30 MIN: CPT | Performed by: INTERNAL MEDICINE

## 2025-01-17 NOTE — PROGRESS NOTES
Name: Donta Hunter      : 1970      MRN: [id number]  Encounter Provider: Raad Batres MD  Encounter Date: 2025   Encounter department: [facility] INTERNAL MEDICINE [place]  :  Assessment & Plan  Pain of toe of left foot  See below. See podiatry note.  Orders:  •  Ambulatory Referral to Wound Care; Future  •  Uric acid; Future    Open toe wound, sequela  Following with podiatry, no signs of infection, patient would like to see wound care, although he is following with podiatry, discussed they may not see him.  Orders:  •  Ambulatory Referral to Wound Care; Future    Systemic lupus erythematosus, unspecified SLE type, unspecified organ involvement status (HCC)  Continue rheum follow up.   Orders:  •  Uric acid; Future    Uses walker  Noted.             Depression Screening and Follow-up Plan: Patient was screened for depression during today's encounter. They screened negative with a PHQ-2 score of 0.  Patient with underlying depression and was advised to continue current medications as prescribed.     History of Present Illness     Patient is here for routine follow up, reviewed chronic medical problems.    Review of Systems   Constitutional:  Negative for chills and fever.   HENT:  Negative for ear pain and sore throat.    Eyes:  Negative for pain and visual disturbance.   Respiratory:  Negative for cough and shortness of breath.    Cardiovascular:  Negative for chest pain and palpitations.   Gastrointestinal:  Negative for abdominal pain and vomiting.   Genitourinary:  Negative for dysuria and hematuria.   Musculoskeletal:  Positive for arthralgias, back pain and gait problem.   Skin:  Positive for color change and wound. Negative for rash.   Neurological:  Negative for seizures and syncope.   All other systems reviewed and are negative.      Objective   /76 (BP Location: Right arm, Patient Position: Sitting, Cuff Size: Standard)   Pulse 76   Temp 98.2 °F (36.8 °C) (Temporal)   Ht  "5' 6\" (1.676 m)   Wt 81.6 kg (180 lb)   SpO2 99%   BMI 29.05 kg/m²      Physical Exam  Vitals and nursing note reviewed.   Constitutional:       General: He is not in acute distress.     Appearance: He is well-developed.   Cardiovascular:      Rate and Rhythm: Normal rate.      Heart sounds: No murmur heard.  Pulmonary:      Effort: Pulmonary effort is normal. No respiratory distress.   Abdominal:      Tenderness: There is no abdominal tenderness.   Musculoskeletal:         General: No swelling.   Skin:     General: Skin is warm and dry.      Capillary Refill: Capillary refill takes less than 2 seconds.      Findings: Lesion present.   Neurological:      Mental Status: He is alert.      Gait: Gait abnormal.   Psychiatric:         Mood and Affect: Mood normal.       "

## 2025-01-20 DIAGNOSIS — M79.671 RIGHT FOOT PAIN: ICD-10-CM

## 2025-01-21 RX ORDER — LIDOCAINE/PRILOCAINE 2.5 %-2.5%
CREAM (GRAM) TOPICAL AS NEEDED
Qty: 30 G | Refills: 3 | Status: SHIPPED | OUTPATIENT
Start: 2025-01-21

## 2025-01-21 NOTE — TELEPHONE ENCOUNTER
Requested Prescriptions     Pending Prescriptions Disp Refills    lidocaine-prilocaine (EMLA) cream 30 g 3     Sig: Apply topically as needed for mild pain       LAST SEEN: 1/17/25  NEXT APPT: 4/24/25  LAST FILLED: 2/27/23

## 2025-02-03 ENCOUNTER — APPOINTMENT (OUTPATIENT)
Dept: LAB | Facility: CLINIC | Age: 55
End: 2025-02-03
Payer: COMMERCIAL

## 2025-02-03 DIAGNOSIS — M32.9 SYSTEMIC LUPUS ERYTHEMATOSUS, UNSPECIFIED SLE TYPE, UNSPECIFIED ORGAN INVOLVEMENT STATUS (HCC): ICD-10-CM

## 2025-02-03 DIAGNOSIS — M32.9 SLE (SYSTEMIC LUPUS ERYTHEMATOSUS RELATED SYNDROME) (HCC): ICD-10-CM

## 2025-02-03 DIAGNOSIS — M79.675 PAIN OF TOE OF LEFT FOOT: ICD-10-CM

## 2025-02-03 LAB
BACTERIA UR QL AUTO: ABNORMAL /HPF
BASOPHILS # BLD AUTO: 0.03 THOUSANDS/ΜL (ref 0–0.1)
BASOPHILS NFR BLD AUTO: 1 % (ref 0–1)
BILIRUB UR QL STRIP: NEGATIVE
CLARITY UR: CLEAR
COLOR UR: ABNORMAL
EOSINOPHIL # BLD AUTO: 0.01 THOUSAND/ΜL (ref 0–0.61)
EOSINOPHIL NFR BLD AUTO: 1 % (ref 0–6)
ERYTHROCYTE [DISTWIDTH] IN BLOOD BY AUTOMATED COUNT: 12.9 % (ref 11.6–15.1)
GLUCOSE UR STRIP-MCNC: NEGATIVE MG/DL
HCT VFR BLD AUTO: 42 % (ref 36.5–49.3)
HGB BLD-MCNC: 14.6 G/DL (ref 12–17)
HGB UR QL STRIP.AUTO: NEGATIVE
IMM GRANULOCYTES # BLD AUTO: 0.01 THOUSAND/UL (ref 0–0.2)
IMM GRANULOCYTES NFR BLD AUTO: 1 % (ref 0–2)
KETONES UR STRIP-MCNC: NEGATIVE MG/DL
LEUKOCYTE ESTERASE UR QL STRIP: NEGATIVE
LYMPHOCYTES # BLD AUTO: 0.9 THOUSANDS/ΜL (ref 0.6–4.47)
LYMPHOCYTES NFR BLD AUTO: 42 % (ref 14–44)
MCH RBC QN AUTO: 30.7 PG (ref 26.8–34.3)
MCHC RBC AUTO-ENTMCNC: 34.8 G/DL (ref 31.4–37.4)
MCV RBC AUTO: 88 FL (ref 82–98)
MONOCYTES # BLD AUTO: 0.35 THOUSAND/ΜL (ref 0.17–1.22)
MONOCYTES NFR BLD AUTO: 17 % (ref 4–12)
NEUTROPHILS # BLD AUTO: 0.81 THOUSANDS/ΜL (ref 1.85–7.62)
NEUTS SEG NFR BLD AUTO: 38 % (ref 43–75)
NITRITE UR QL STRIP: NEGATIVE
NON-SQ EPI CELLS URNS QL MICRO: ABNORMAL /HPF
NRBC BLD AUTO-RTO: 0 /100 WBCS
PH UR STRIP.AUTO: 6.5 [PH]
PLATELET # BLD AUTO: 166 THOUSANDS/UL (ref 149–390)
PMV BLD AUTO: 10.7 FL (ref 8.9–12.7)
PROT UR STRIP-MCNC: ABNORMAL MG/DL
RBC # BLD AUTO: 4.75 MILLION/UL (ref 3.88–5.62)
RBC #/AREA URNS AUTO: ABNORMAL /HPF
SP GR UR STRIP.AUTO: 1.01 (ref 1–1.03)
UROBILINOGEN UR STRIP-ACNC: <2 MG/DL
WBC # BLD AUTO: 2.11 THOUSAND/UL (ref 4.31–10.16)
WBC #/AREA URNS AUTO: ABNORMAL /HPF

## 2025-02-03 PROCEDURE — 36415 COLL VENOUS BLD VENIPUNCTURE: CPT

## 2025-02-03 PROCEDURE — 86160 COMPLEMENT ANTIGEN: CPT

## 2025-02-03 PROCEDURE — 84156 ASSAY OF PROTEIN URINE: CPT

## 2025-02-03 PROCEDURE — 82570 ASSAY OF URINE CREATININE: CPT

## 2025-02-03 PROCEDURE — 84550 ASSAY OF BLOOD/URIC ACID: CPT

## 2025-02-03 PROCEDURE — 85025 COMPLETE CBC W/AUTO DIFF WBC: CPT

## 2025-02-03 PROCEDURE — 81001 URINALYSIS AUTO W/SCOPE: CPT

## 2025-02-03 PROCEDURE — 86225 DNA ANTIBODY NATIVE: CPT

## 2025-02-03 PROCEDURE — 80053 COMPREHEN METABOLIC PANEL: CPT

## 2025-02-04 LAB
ALBUMIN SERPL BCG-MCNC: 4.3 G/DL (ref 3.5–5)
ALP SERPL-CCNC: 105 U/L (ref 34–104)
ALT SERPL W P-5'-P-CCNC: 18 U/L (ref 7–52)
ANION GAP SERPL CALCULATED.3IONS-SCNC: 8 MMOL/L (ref 4–13)
AST SERPL W P-5'-P-CCNC: 26 U/L (ref 13–39)
BILIRUB SERPL-MCNC: 0.45 MG/DL (ref 0.2–1)
BUN SERPL-MCNC: 12 MG/DL (ref 5–25)
C3 SERPL-MCNC: 101 MG/DL (ref 87–200)
C4 SERPL-MCNC: 45 MG/DL (ref 19–52)
CALCIUM SERPL-MCNC: 9.4 MG/DL (ref 8.4–10.2)
CHLORIDE SERPL-SCNC: 105 MMOL/L (ref 96–108)
CO2 SERPL-SCNC: 25 MMOL/L (ref 21–32)
CREAT SERPL-MCNC: 0.92 MG/DL (ref 0.6–1.3)
CREAT UR-MCNC: 83 MG/DL
DSDNA IGG SERPL IA-ACNC: 16 IU/ML (ref ?–15)
GFR SERPL CREATININE-BSD FRML MDRD: 91 ML/MIN/1.73SQ M
GLUCOSE P FAST SERPL-MCNC: 86 MG/DL (ref 65–99)
POTASSIUM SERPL-SCNC: 4.5 MMOL/L (ref 3.5–5.3)
PROT SERPL-MCNC: 7.3 G/DL (ref 6.4–8.4)
PROT UR-MCNC: 15.3 MG/DL
PROT/CREAT UR: 0.2 MG/G{CREAT} (ref 0–0.1)
SODIUM SERPL-SCNC: 138 MMOL/L (ref 135–147)
URATE SERPL-MCNC: 6.9 MG/DL (ref 3.5–8.5)

## 2025-02-05 ENCOUNTER — OFFICE VISIT (OUTPATIENT)
Dept: CARDIOLOGY CLINIC | Facility: CLINIC | Age: 55
End: 2025-02-05
Payer: COMMERCIAL

## 2025-02-05 VITALS
OXYGEN SATURATION: 98 % | BODY MASS INDEX: 26.95 KG/M2 | WEIGHT: 167 LBS | SYSTOLIC BLOOD PRESSURE: 140 MMHG | HEART RATE: 99 BPM | RESPIRATION RATE: 20 BRPM | DIASTOLIC BLOOD PRESSURE: 78 MMHG

## 2025-02-05 DIAGNOSIS — I63.9 CEREBROVASCULAR ACCIDENT (CVA), UNSPECIFIED MECHANISM (HCC): Primary | ICD-10-CM

## 2025-02-05 PROCEDURE — 99213 OFFICE O/P EST LOW 20 MIN: CPT | Performed by: STUDENT IN AN ORGANIZED HEALTH CARE EDUCATION/TRAINING PROGRAM

## 2025-02-05 NOTE — PROGRESS NOTES
[facility] Cardiology  Follow up note  Donta Hunter 55 y.o. male MRN: [id number]        1. Cerebrovascular accident (CVA), unspecified mechanism (HCC)      Assessment & Plan  Cerebrovascular accident (CVA), unspecified mechanism (HCC)  Will reach out to patient's cardiologist to discuss candidacy for PFO closure        HPI:   Donta Hunter is a 55 y.o. year old male with multiple CVAs with PFO, SLE, HTN, Neuropathy     Rope score 5    Patient currently has no symptoms of dizziness, lightheadedness, palpitations.  Reports pinching sensation in the anterior chest for which she obtained nuclear stress testing in July of last year with no signs of ischemia, likely MSK.  Followed up with rheumatology/neurology who seem not concerned for cerebral vasculitis as etiology of stroke.    Echocardiogram 4/3/2024 reveals normal LV function, EF 55%, diastolic dysfunction.  LAE.  Mild MR, mild TR.    7/5/2024 patient obtain nuclear SPECT imaging without obvious signs of ischemia.  Subsequently had 24-day event monitor without any evidence of atrial fibrillation    Review of Systems    Past Medical History:   Diagnosis Date    Anemia     Arthritis     Cervical mass     Chronic kidney disease     COPD, group B, by GOLD 2017 classification ([facility]) 7/13/2020    Coronary artery disease     Depression     DVT (deep venous thrombosis) ([facility])     Hypertension     Lupus     Renal disorder     Stroke (cerebrum) ([facility]) 6/7/2024     Social History     Substance and Sexual Activity   Alcohol Use Never     Social History     Substance and Sexual Activity   Drug Use Never     Social History     Tobacco Use   Smoking Status Never   Smokeless Tobacco Never       Allergies:  Allergies   Allergen Reactions    Doxycycline Rash    Sulfa Antibiotics Rash       Medications:     Current Outpatient Medications:     apixaban (ELIQUIS) 5 mg, Take 5 mg by mouth 2 (two) times a day, Disp: , Rfl:     aspirin 81 mg chewable tablet, Chew 1 tablet (81 mg total) daily,  Disp: 30 tablet, Rfl: 3    baclofen 20 mg tablet, Take 1 tablet (20 mg total) by mouth 2 (two) times a day, Disp: 90 tablet, Rfl: 0    capsicum (ZOSTRIX) 0.075 % topical cream, Apply topically 3 (three) times a day Wash hands before and after use., Disp: 57 g, Rfl: 0    clobetasol (TEMOVATE) 0.05 % cream, , Disp: , Rfl:     Diclofenac Sodium (VOLTAREN) 1 %, Apply 2 g topically 4 (four) times a day, Disp: 300 g, Rfl: 1    famotidine (PEPCID) 20 mg tablet, Take 1 tablet (20 mg total) by mouth 2 (two) times a day, Disp: 180 tablet, Rfl: 3    furosemide (LASIX) 40 mg tablet, Take 40 mg by mouth daily, Disp: , Rfl:     gabapentin (NEURONTIN) 300 mg capsule, Take 1 capsule (300 mg total) by mouth 3 (three) times a day, Disp: 270 capsule, Rfl: 3    gabapentin (NEURONTIN) 600 MG tablet, Take 1 tablet (600 mg total) by mouth 3 (three) times a day, Disp: 270 tablet, Rfl: 1    hydroxychloroquine (PLAQUENIL) 200 mg tablet, Take 2 tablets (400 mg total) by mouth daily at bedtime for 180 doses, Disp: 180 tablet, Rfl: 1    lidocaine-prilocaine (EMLA) cream, Apply topically as needed for mild pain, Disp: 30 g, Rfl: 3    mycophenolate (CELLCEPT) 500 mg tablet, Take 500 mg by mouth every 12 (twelve) hours , Disp: , Rfl:     nystatin (MYCOSTATIN) ointment, Apply topically 2 (two) times a day, Disp: 30 g, Rfl: 0    potassium chloride (Klor-Con M20) 20 mEq tablet, Take 1 tablet (20 mEq total) by mouth daily, Disp: 90 tablet, Rfl: 0    sildenafil (VIAGRA) 50 MG tablet, Take 1 tablet (50 mg total) by mouth daily as needed for erectile dysfunction, Disp: 10 tablet, Rfl: 0    sodium chloride 1 g tablet, Take 2 tablets (2 g total) by mouth 3 (three) times a day with meals, Disp: 540 tablet, Rfl: 2      Vitals:    02/05/25 1533   BP: 140/78   Pulse: 99   Resp: 20   SpO2: 98%     Weight (last 2 days)       Date/Time Weight    02/05/25 1533 75.8 (167)          Physical Exam  Constitutional:       Appearance: Normal appearance.   Cardiovascular:  "     Rate and Rhythm: Normal rate and regular rhythm.      Pulses: Normal pulses.      Heart sounds: Normal heart sounds.   Pulmonary:      Effort: Pulmonary effort is normal.      Breath sounds: Normal breath sounds.   Neurological:      Mental Status: He is alert.         Laboratory Studies:    Laboratory studies personally reviewed    Cardiac testing:     See above     Carol Dallas MD    Portions of the record may have been created with voice recognition software.  Occasional wrong word or \"sound a like\" substitutions may have occurred due to the inherent limitations of voice recognition software.  Read the chart carefully and recognize, using context, where substitutions have occurred.   "

## 2025-02-05 NOTE — TELEPHONE ENCOUNTER
Please let patient know that his repeat blood work shows that his white blood cell count is stable compared to prior.  His other lupus monitoring labs remain normal.  We will continue with same plan and no changes are needed right now.  He will not be due for next set of labs for another 3 months.

## 2025-02-07 ENCOUNTER — OFFICE VISIT (OUTPATIENT)
Dept: WOUND CARE | Facility: CLINIC | Age: 55
End: 2025-02-07
Payer: COMMERCIAL

## 2025-02-07 VITALS
HEART RATE: 99 BPM | SYSTOLIC BLOOD PRESSURE: 140 MMHG | RESPIRATION RATE: 18 BRPM | TEMPERATURE: 97 F | DIASTOLIC BLOOD PRESSURE: 75 MMHG

## 2025-02-07 DIAGNOSIS — S91.105A OPEN WOUND OF SECOND TOE OF LEFT FOOT, INITIAL ENCOUNTER: Primary | ICD-10-CM

## 2025-02-07 PROCEDURE — 11042 DBRDMT SUBQ TIS 1ST 20SQCM/<: CPT | Performed by: ORTHOPAEDIC SURGERY

## 2025-02-07 PROCEDURE — 99204 OFFICE O/P NEW MOD 45 MIN: CPT | Performed by: ORTHOPAEDIC SURGERY

## 2025-02-07 PROCEDURE — 99213 OFFICE O/P EST LOW 20 MIN: CPT | Performed by: ORTHOPAEDIC SURGERY

## 2025-02-07 RX ORDER — LIDOCAINE 40 MG/G
CREAM TOPICAL ONCE
Status: COMPLETED | OUTPATIENT
Start: 2025-02-07 | End: 2025-02-07

## 2025-02-07 RX ADMIN — LIDOCAINE 1 APPLICATION: 40 CREAM TOPICAL at 08:35

## 2025-02-07 NOTE — PROGRESS NOTES
Patient ID: Donta Hunter is a 55 y.o. male Date of Birth 1970       Chief Complaint   Patient presents with    New Patient Visit     Left 2nd toe       Allergies:  Doxycycline and Sulfa antibiotics    Diagnosis:      Diagnosis ICD-10-CM Associated Orders   1. Open wound of second toe of left foot, initial encounter  S91.105A lidocaine (LMX) 4 % cream     Wound cleansing and dressings Other (comment) Left Toe D2, second     Ambulatory Referral to Vascular Surgery     collagenase (SANTYL) ointment     Wound Procedure Treatment Other (comment) Left Toe D2, second            Assessment and Plan :  Initial Evaluation open wound on 2nd left toe. No signs of infection today.  ABIs in office today with normal on Left .74 and non-compressible  on the right (Prior 1.20).  Debrided as below  Wound management with Medihoney until Santyl arrives. See wound orders below   No harsh cleansers such as alcohol, peroxide, or antibacterial soap, do not submerge in water such as bathtub, hot tub, swimming pool, ocean, etc.   Can cleanse with mild soap and water at dressing changes.   Counseled on importance of frequent elevation of leg for wound healing.  Avoid direct pressure to Wound site.   Referred to vascular for evaluation   Followup in 1 week(s) or call sooner with questions or concerns or any signs of infection such as redness, swelling, increased/purulent drainage, fever, chills, increased severe pain.     Subjective:   2/7: Patient is a 55 y.o. male with pmhx SLE, Lupus nephritis, HTN, CVA with PFO (Eliquis, ASA 81), h/o DVT (deep vein thrombosis), CAD, stage III CKD,  Anemia, Tremor of both hands, Spasticity, Gait difficulty, Neuropathic pain, DJD, GERD, COPD, Hyperparathyroidism and Osteoporosis who presents for initial eval of open wound on 2nd Left toe which has been present for about 3 months. Since then pt was treated with 2 courses of Keflex.  Pt has been covering with a bandaid. Does not have an odor. No fever. No  significant drainage.  No diabetes.  No smoking, ETOH or drug use.   Pt denies any sob, fatigue, N/V, CP, fevers or chills.        The following portions of the patient's history were reviewed and updated as appropriate:   Patient Active Problem List   Diagnosis    SLE (systemic lupus erythematosus related syndrome) (HCC)    Lupus nephritis (HCC)    Hypertension    History of DVT (deep vein thrombosis)    Stage 2 chronic kidney disease    Anemia in chronic kidney disease    Muscle weakness (generalized)    Tremor of both hands    Spasticity    Gait difficulty    Neuropathic pain    Cellulitis of left lower extremity    Hypertensive kidney disease with stage 3a chronic kidney disease (HCC)    DJD (degenerative joint disease), ankle and foot, right    Chronic kidney disease, stage 3a ([facility])    CAD (coronary artery disease)    DVT (deep venous thrombosis) ([facility])    History of multiple strokes    Gastroesophageal reflux disease without esophagitis    COPD, group B, by GOLD 2017 classification ([facility])    Hyperparathyroidism (HCC)    Stroke (cerebrum) ([facility])    Osteoporosis    Systemic lupus erythematosus ([facility])    Neutropenia ([facility])     Past Medical History:   Diagnosis Date    Anemia     Arthritis     Cervical mass     Chronic kidney disease     COPD, group B, by GOLD 2017 classification ([facility]) 7/13/2020    Coronary artery disease     Depression     DVT (deep venous thrombosis) ([facility])     Hypertension     Lupus     Renal disorder     Stroke (cerebrum) ([facility]) 6/7/2024     Past Surgical History:   Procedure Laterality Date    APPENDECTOMY      CERVICAL SPINE SURGERY      CHOLECYSTECTOMY      COLONOSCOPY N/A 8/23/2018    Procedure: COLONOSCOPY;  Surgeon: James Wise III, MD;  Location: [facility];  Service: Gastroenterology    ESOPHAGOGASTRODUODENOSCOPY N/A 8/22/2018    Procedure: ESOPHAGOGASTRODUODENOSCOPY (EGD);  Surgeon: James Wise III, MD;  Location: [facility];  Service: Gastroenterology    IR CEREBRAL ANGIOGRAPHY   7/24/2024    IR IVC FILTER PLACEMENT PERMANENT  9/7/2017    IR IVC FILTER REMOVAL  4/23/2018    IR LUMBAR PUNCTURE  11/23/2015    IR LUMBAR PUNCTURE  4/25/2024    NECK SURGERY      US GUIDED INJECTION FOR RESEARCH STUDY  4/23/2018    US GUIDED INJECTION FOR RESEARCH STUDY  9/7/2017    VASCULAR SURGERY       Family History   Problem Relation Age of Onset    Heart disease Mother     No Known Problems Father       Social History     Socioeconomic History    Marital status: /Civil Union     Spouse name: None    Number of children: 2    Years of education: None    Highest education level: GED or equivalent   Occupational History    None   Tobacco Use    Smoking status: Never    Smokeless tobacco: Never   Vaping Use    Vaping status: Never Used   Substance and Sexual Activity    Alcohol use: Never    Drug use: Never    Sexual activity: Yes     Partners: Female, Male     Birth control/protection: Rhythm   Other Topics Concern    None   Social History Narrative    ** Merged History Encounter **          Social Drivers of Health     Financial Resource Strain: Not on file   Food Insecurity: No Food Insecurity (10/23/2024)    Hunger Vital Sign     Worried About Running Out of Food in the Last Year: Never true     Ran Out of Food in the Last Year: Never true   Transportation Needs: No Transportation Needs (10/23/2024)    PRAPARE - Transportation     Lack of Transportation (Medical): No     Lack of Transportation (Non-Medical): No   Physical Activity: Not on file   Stress: Not on file   Social Connections: Unknown (6/18/2024)    Received from [employer]    Social SecretSales     How often do you feel lonely or isolated from those around you? (Adult - for ages 18 years and over): Not on file   Intimate Partner Violence: Unknown (10/21/2024)    Nursing IPS     Feels Physically and Emotionally Safe: Not on file     Physically Hurt by Someone: Not on file     Humiliated or Emotionally Abused by Someone: Not on file      Physically Hurt by Someone: No     Hurt or Threatened by Someone: No   Housing Stability: Low Risk  (10/23/2024)    Housing Stability Vital Sign     Unable to Pay for Housing in the Last Year: No     Number of Times Moved in the Last Year: 0     Homeless in the Last Year: No        Current Outpatient Medications:     collagenase (SANTYL) ointment, Apply topically daily, Disp: 30 g, Rfl: 2    apixaban (ELIQUIS) 5 mg, Take 5 mg by mouth 2 (two) times a day, Disp: , Rfl:     aspirin 81 mg chewable tablet, Chew 1 tablet (81 mg total) daily, Disp: 30 tablet, Rfl: 3    baclofen 20 mg tablet, Take 1 tablet (20 mg total) by mouth 2 (two) times a day, Disp: 90 tablet, Rfl: 0    capsicum (ZOSTRIX) 0.075 % topical cream, Apply topically 3 (three) times a day Wash hands before and after use., Disp: 57 g, Rfl: 0    clobetasol (TEMOVATE) 0.05 % cream, , Disp: , Rfl:     Diclofenac Sodium (VOLTAREN) 1 %, Apply 2 g topically 4 (four) times a day, Disp: 300 g, Rfl: 1    famotidine (PEPCID) 20 mg tablet, Take 1 tablet (20 mg total) by mouth 2 (two) times a day, Disp: 180 tablet, Rfl: 3    furosemide (LASIX) 40 mg tablet, Take 40 mg by mouth daily, Disp: , Rfl:     gabapentin (NEURONTIN) 300 mg capsule, Take 1 capsule (300 mg total) by mouth 3 (three) times a day, Disp: 270 capsule, Rfl: 3    gabapentin (NEURONTIN) 600 MG tablet, Take 1 tablet (600 mg total) by mouth 3 (three) times a day, Disp: 270 tablet, Rfl: 1    hydroxychloroquine (PLAQUENIL) 200 mg tablet, Take 2 tablets (400 mg total) by mouth daily at bedtime for 180 doses, Disp: 180 tablet, Rfl: 1    lidocaine-prilocaine (EMLA) cream, Apply topically as needed for mild pain, Disp: 30 g, Rfl: 3    mycophenolate (CELLCEPT) 500 mg tablet, Take 500 mg by mouth every 12 (twelve) hours , Disp: , Rfl:     nystatin (MYCOSTATIN) ointment, Apply topically 2 (two) times a day, Disp: 30 g, Rfl: 0    potassium chloride (Klor-Con M20) 20 mEq tablet, Take 1 tablet (20 mEq total) by mouth  daily, Disp: 90 tablet, Rfl: 0    sildenafil (VIAGRA) 50 MG tablet, Take 1 tablet (50 mg total) by mouth daily as needed for erectile dysfunction, Disp: 10 tablet, Rfl: 0    sodium chloride 1 g tablet, Take 2 tablets (2 g total) by mouth 3 (three) times a day with meals, Disp: 540 tablet, Rfl: 2  No current facility-administered medications for this visit.    Review of Systems   Constitutional:  Negative for appetite change, chills, fatigue, fever and unexpected weight change.   HENT:  Negative for congestion, hearing loss and postnasal drip.    Respiratory:  Negative for cough and shortness of breath.    Cardiovascular:  Negative for leg swelling.   Musculoskeletal:  Positive for gait problem (ambulates with walker).   Skin:  Positive for wound (2nd left toe). Negative for rash.   Neurological:  Negative for numbness.   Hematological:  Does not bruise/bleed easily.   Psychiatric/Behavioral: Negative.         Objective:  /75   Pulse 99   Temp (!) 97 °F (36.1 °C) (Temporal)   Resp 18   Pain Score: 0-No pain     Physical Exam  Vitals reviewed.   Constitutional:       General: He is not in acute distress.     Appearance: Normal appearance. He is well-developed. He is obese.   HENT:      Head: Normocephalic and atraumatic.   Cardiovascular:      Rate and Rhythm: Normal rate.      Pulses:           Dorsalis pedis pulses are detected w/ Doppler on the left side.        Posterior tibial pulses are detected w/ Doppler on the left side.   Pulmonary:      Effort: Pulmonary effort is normal.   Musculoskeletal:         General: No deformity.      Right lower leg: No edema.      Left lower leg: No edema.        Feet:    Feet:      Comments: Dry brown eschar with necrotic tissue on wound bed and periwound callus.  Skin:     General: Skin is warm and dry.      Findings: Wound present.      Comments: See wound assessment   Neurological:      General: No focal deficit present.      Mental Status: He is alert and oriented to  "person, place, and time.      Gait: Gait abnormal.   Psychiatric:         Mood and Affect: Mood and affect normal.         Behavior: Behavior normal. Behavior is cooperative.              Wound 02/07/25 Other (comment) Toe D2, second Left (Active)   Wound Description Brown;Eschar 02/07/25 0824   Cecilia-wound Assessment Dry 02/07/25 0824   Wound Length (cm) 0.8 cm 02/07/25 0824   Wound Width (cm) 1 cm 02/07/25 0824   Wound Depth (cm) 0.1 cm 02/07/25 0824   Wound Surface Area (cm^2) 0.8 cm^2 02/07/25 0824   Wound Volume (cm^3) 0.08 cm^3 02/07/25 0824   Calculated Wound Volume (cm^3) 0.08 cm^3 02/07/25 0824   Drainage Amount None 02/07/25 0824   Non-staged Wound Description Full thickness 02/07/25 0824     Debridement    Universal Protocol:  procedure performed by consultantConsent: Verbal consent obtained. Written consent obtained.  Risks and benefits: risks, benefits and alternatives were discussed  Consent given by: patient  Time out: Immediately prior to procedure a \"time out\" was called to verify the correct patient, procedure, equipment, support staff and site/side marked as required.  Patient understanding: patient states understanding of the procedure being performed  Patient identity confirmed: verbally with patient    Debridement Details  Performed by: PA  Debridement type: surgical  Level of debridement: subcutaneous tissue  Pain control: lidocaine 4%      Post-debridement measurements  Length (cm): 0.8  Width (cm): 1  Depth (cm): 0.3  Percent debrided: 100%  Surface Area (cm^2): 0.8  Area Debrided (cm^2): 0.8  Volume (cm^3): 0.24    Tissue and other material debrided: subcutaneous tissue  Devitalized tissue debrided: callus, necrotic debris, slough and eschar  Instrument(s) utilized: curette  Bleeding: small  Hemostasis obtained with: pressure  Procedural pain (0-10): 0  Post-procedural pain: 0   Response to treatment: procedure was tolerated well                 Wound Instructions:  Orders Placed This " "Encounter   Procedures    Wound cleansing and dressings Other (comment) Left Toe D2, second     Wash your hands with soap and water.  Remove old dressing, discard into plastic bag and place in trash.  Cleanse the wound with mild soap and water prior to applying a clean dressing. Do not use tissue or cotton balls. Do not scrub the wound. Pat dry using gauze.  Shower yes   Apply medihoney to the left toe wound (Apply until santyl is received - then apply santyl nickel thick daily instead of medihoney) .  Cover with gauze  Secure with spandage (elastic netting)  Change dressing daily    Compression Stocking    Remove compression stockings every night HS and re-apply first thing qAM. Follow daily skin care as instructed.  Avoid prolonged standing in one place.  Elevate leg(s) above the level of the heart when sitting or as much as possible.    Off-loading Instructions:    Please keep pressure off wound as much as possible.     Standing Status:   Future     Expiration Date:   2/14/2025    Wound Procedure Treatment Other (comment) Left Toe D2, second     This order was created via procedure documentation    Debridement     This order was created via procedure documentation    Ambulatory Referral to Vascular Surgery     Standing Status:   Future     Expiration Date:   2/7/2026     Referral Priority:   Routine     Referral Type:   Consult - AMB     Referral Reason:   Specialty Services Required     Requested Specialty:   Vascular Surgery     Number of Visits Requested:   1     Expiration Date:   2/7/2026       Total time spent today:  45 minutes.  This includes reviewing the patient's chart, pertinent physician records Dr. Dallas, Cardiology, 2/5/25, Dr. Ramires, Internal Medicine, 1/17/25 and Dr. Mojica, podiatry, 12/23/24, 12/11/24 and 11/1/24.      Ne Do PA-C, [facility]    Portions of the record may have been created with voice recognition software. Occasional wrong word or \"sound alike\" substitutions may have occurred " due to the inherent limitations of voice recognition software. Read the chart carefully and recognize, using context, where substitutions have occurred.

## 2025-02-07 NOTE — PATIENT INSTRUCTIONS
Orders Placed This Encounter   Procedures    Wound cleansing and dressings Other (comment) Left Toe D2, second     Wash your hands with soap and water.  Remove old dressing, discard into plastic bag and place in trash.  Cleanse the wound with mild soap and water prior to applying a clean dressing. Do not use tissue or cotton balls. Do not scrub the wound. Pat dry using gauze.  Shower yes   Apply medihoney to the left toe wound (Apply until santyl is received - then apply santyl nickel thick daily instead of medihoney) .  Cover with gauze  Secure with spandage (elastic netting)  Change dressing daily    Compression Stocking    Remove compression stockings every night HS and re-apply first thing qAM. Follow daily skin care as instructed.  Avoid prolonged standing in one place.  Elevate leg(s) above the level of the heart when sitting or as much as possible.    Off-loading Instructions:    Please keep pressure off wound as much as possible.     Standing Status:   Future     Expiration Date:   2/14/2025

## 2025-02-07 NOTE — PROGRESS NOTES
Wound Procedure Treatment Other (comment) Left Toe D2, second    Performed by: Key Bello RN  Authorized by: Ne Do PA-C    Associated wounds:   Wound 02/07/25 Other (comment) Toe D2, second Left  Wound cleansed with:  NSS  Applied Topical: Medihoney gel    Applied secondary dressing:  Gauze  Dressing secured with:  Elastic tubular stocking

## 2025-02-13 ENCOUNTER — TELEPHONE (OUTPATIENT)
Dept: CARDIOLOGY CLINIC | Facility: CLINIC | Age: 55
End: 2025-02-13

## 2025-02-13 NOTE — TELEPHONE ENCOUNTER
----- Message from Dominic Schneider MD sent at 2/12/2025  4:29 PM EST -----  Regarding: FW: PFO closure  Please make sure this patient has an appointment with me in the near future to discuss PFO closure.  ----- Message -----  From: Suzanna Mulligan MD  Sent: 2/6/2025   4:22 PM EST  To: Carol Dallas MD; Dominic Schneider MD; #  Subject: RE: PFO closure                                  Vasculitis is pretty low in the differential especially after we discovered PFO.  ----- Message -----  From: Carol Dallas MD  Sent: 2/6/2025  10:00 AM EST  To: Suzanna Mulligan MD; Dominic Schneider MD; #  Subject: PFO closure                                      Good morning all,    This is a patient of Dr. Schneiedr's I saw yesterday, I just want to reconfirm that the patient's stroke history seems to not be secondary to vasculitis  with neurology/rheumatology but more likely embolic potentially related to his PFO. He had a event monitor last year for almost a month which showed no evidence of AFIB. I think it would be reasonable to revisit his candidacy for PFO closure.     Take care,  -Ali

## 2025-03-04 DIAGNOSIS — R25.2 SPASTICITY: ICD-10-CM

## 2025-03-04 RX ORDER — BACLOFEN 20 MG/1
20 TABLET ORAL 2 TIMES DAILY
Qty: 90 TABLET | Refills: 0 | Status: SHIPPED | OUTPATIENT
Start: 2025-03-04

## 2025-03-10 ENCOUNTER — APPOINTMENT (OUTPATIENT)
Dept: LAB | Facility: CLINIC | Age: 55
End: 2025-03-10
Payer: COMMERCIAL

## 2025-03-10 DIAGNOSIS — M32.9 SLE (SYSTEMIC LUPUS ERYTHEMATOSUS RELATED SYNDROME) (HCC): ICD-10-CM

## 2025-03-10 LAB
ALBUMIN SERPL BCG-MCNC: 4.1 G/DL (ref 3.5–5)
ALP SERPL-CCNC: 101 U/L (ref 34–104)
ALT SERPL W P-5'-P-CCNC: 16 U/L (ref 7–52)
ANION GAP SERPL CALCULATED.3IONS-SCNC: 6 MMOL/L (ref 4–13)
AST SERPL W P-5'-P-CCNC: 22 U/L (ref 13–39)
BACTERIA UR QL AUTO: NORMAL /HPF
BILIRUB SERPL-MCNC: 0.46 MG/DL (ref 0.2–1)
BILIRUB UR QL STRIP: NEGATIVE
BUN SERPL-MCNC: 9 MG/DL (ref 5–25)
C3 SERPL-MCNC: 108 MG/DL (ref 87–200)
C4 SERPL-MCNC: 48 MG/DL (ref 19–52)
CALCIUM SERPL-MCNC: 9.3 MG/DL (ref 8.4–10.2)
CHLORIDE SERPL-SCNC: 102 MMOL/L (ref 96–108)
CLARITY UR: CLEAR
CO2 SERPL-SCNC: 26 MMOL/L (ref 21–32)
COLOR UR: COLORLESS
CREAT SERPL-MCNC: 0.9 MG/DL (ref 0.6–1.3)
CREAT UR-MCNC: 45.5 MG/DL
GFR SERPL CREATININE-BSD FRML MDRD: 94 ML/MIN/1.73SQ M
GLUCOSE P FAST SERPL-MCNC: 90 MG/DL (ref 65–99)
GLUCOSE UR STRIP-MCNC: NEGATIVE MG/DL
HGB UR QL STRIP.AUTO: NEGATIVE
KETONES UR STRIP-MCNC: NEGATIVE MG/DL
LEUKOCYTE ESTERASE UR QL STRIP: NEGATIVE
NITRITE UR QL STRIP: NEGATIVE
NON-SQ EPI CELLS URNS QL MICRO: NORMAL /HPF
PH UR STRIP.AUTO: 7 [PH]
POTASSIUM SERPL-SCNC: 4.3 MMOL/L (ref 3.5–5.3)
PROT SERPL-MCNC: 7.4 G/DL (ref 6.4–8.4)
PROT UR STRIP-MCNC: NEGATIVE MG/DL
PROT UR-MCNC: 4 MG/DL
PROT/CREAT UR: 0.1 MG/G{CREAT} (ref 0–0.1)
RBC #/AREA URNS AUTO: NORMAL /HPF
SODIUM SERPL-SCNC: 134 MMOL/L (ref 135–147)
SP GR UR STRIP.AUTO: 1.01 (ref 1–1.03)
UROBILINOGEN UR STRIP-ACNC: <2 MG/DL
WBC #/AREA URNS AUTO: NORMAL /HPF

## 2025-03-10 PROCEDURE — 81001 URINALYSIS AUTO W/SCOPE: CPT

## 2025-03-10 PROCEDURE — 86225 DNA ANTIBODY NATIVE: CPT

## 2025-03-10 PROCEDURE — 80053 COMPREHEN METABOLIC PANEL: CPT

## 2025-03-10 PROCEDURE — 86160 COMPLEMENT ANTIGEN: CPT

## 2025-03-10 PROCEDURE — 82570 ASSAY OF URINE CREATININE: CPT

## 2025-03-10 PROCEDURE — 36415 COLL VENOUS BLD VENIPUNCTURE: CPT

## 2025-03-10 PROCEDURE — 85007 BL SMEAR W/DIFF WBC COUNT: CPT

## 2025-03-10 PROCEDURE — 85027 COMPLETE CBC AUTOMATED: CPT

## 2025-03-10 PROCEDURE — 84156 ASSAY OF PROTEIN URINE: CPT

## 2025-03-11 LAB
ANISOCYTOSIS BLD QL SMEAR: PRESENT
BASOPHILS # BLD MANUAL: 0.06 THOUSAND/UL (ref 0–0.1)
BASOPHILS NFR MAR MANUAL: 2 % (ref 0–1)
DIFFERENTIAL COMMENT: ABNORMAL
EOSINOPHIL # BLD MANUAL: 0.08 THOUSAND/UL (ref 0–0.4)
EOSINOPHIL NFR BLD MANUAL: 3 % (ref 0–6)
ERYTHROCYTE [DISTWIDTH] IN BLOOD BY AUTOMATED COUNT: 14 % (ref 11.6–15.1)
HCT VFR BLD AUTO: 43.5 % (ref 36.5–49.3)
HGB BLD-MCNC: 14.7 G/DL (ref 12–17)
LYMPHOCYTES # BLD AUTO: 1.09 THOUSAND/UL (ref 0.6–4.47)
LYMPHOCYTES # BLD AUTO: 15 % (ref 14–44)
MCH RBC QN AUTO: 30.6 PG (ref 26.8–34.3)
MCHC RBC AUTO-ENTMCNC: 33.8 G/DL (ref 31.4–37.4)
MCV RBC AUTO: 90 FL (ref 82–98)
METAMYELOCYTE ABSOLUTE CT: 0.03 THOUSAND/UL (ref 0–0.1)
METAMYELOCYTES NFR BLD MANUAL: 1 % (ref 0–1)
MONOCYTES # BLD AUTO: 0.42 THOUSAND/UL (ref 0–1.22)
MONOCYTES NFR BLD: 15 % (ref 4–12)
NEUTROPHILS # BLD MANUAL: 1.12 THOUSAND/UL (ref 1.85–7.62)
NEUTS BAND NFR BLD MANUAL: 10 % (ref 0–8)
NEUTS SEG NFR BLD AUTO: 30 % (ref 43–75)
OVALOCYTES BLD QL SMEAR: PRESENT
PATHOLOGY REVIEW: YES
PLATELET # BLD AUTO: 167 THOUSANDS/UL (ref 149–390)
PLATELET BLD QL SMEAR: ADEQUATE
PMV BLD AUTO: 10.8 FL (ref 8.9–12.7)
POIKILOCYTOSIS BLD QL SMEAR: PRESENT
RBC # BLD AUTO: 4.81 MILLION/UL (ref 3.88–5.62)
RBC MORPH BLD: PRESENT
VARIANT LYMPHS # BLD AUTO: 24 %
WBC # BLD AUTO: 2.79 THOUSAND/UL (ref 4.31–10.16)

## 2025-03-11 PROCEDURE — 85060 BLOOD SMEAR INTERPRETATION: CPT | Performed by: STUDENT IN AN ORGANIZED HEALTH CARE EDUCATION/TRAINING PROGRAM

## 2025-03-11 NOTE — TELEPHONE ENCOUNTER
Please let patient know his lupus monitoring labs overall are stable. Urine shows no blood or protein and kidney function is good. His blood cell counts are stable from prior. Lupus labs are also normal. Thanks!

## 2025-03-13 ENCOUNTER — TELEPHONE (OUTPATIENT)
Dept: NEPHROLOGY | Facility: CLINIC | Age: 55
End: 2025-03-13

## 2025-03-13 ENCOUNTER — TELEMEDICINE (OUTPATIENT)
Dept: NEPHROLOGY | Facility: CLINIC | Age: 55
End: 2025-03-13
Payer: COMMERCIAL

## 2025-03-13 ENCOUNTER — TELEPHONE (OUTPATIENT)
Age: 55
End: 2025-03-13

## 2025-03-13 VITALS — WEIGHT: 175 LBS | BODY MASS INDEX: 28.12 KG/M2 | HEIGHT: 66 IN | TEMPERATURE: 97.3 F | RESPIRATION RATE: 16 BRPM

## 2025-03-13 DIAGNOSIS — N18.2 STAGE 2 CHRONIC KIDNEY DISEASE: Primary | ICD-10-CM

## 2025-03-13 DIAGNOSIS — N18.2 HYPERTENSIVE KIDNEY DISEASE WITH STAGE 2 CHRONIC KIDNEY DISEASE: ICD-10-CM

## 2025-03-13 DIAGNOSIS — E87.1 HYPONATREMIA: ICD-10-CM

## 2025-03-13 DIAGNOSIS — M32.14 LUPUS NEPHRITIS (HCC): ICD-10-CM

## 2025-03-13 DIAGNOSIS — I12.9 HYPERTENSIVE KIDNEY DISEASE WITH STAGE 2 CHRONIC KIDNEY DISEASE: ICD-10-CM

## 2025-03-13 PROCEDURE — 99214 OFFICE O/P EST MOD 30 MIN: CPT | Performed by: INTERNAL MEDICINE

## 2025-03-13 NOTE — ASSESSMENT & PLAN NOTE
Patient used to take carvedilol in the past but no longer taking any blood pressure medication at this point.  Her hypertension is currently under well control and monitor hypertension without any antihypertensive medications.

## 2025-03-13 NOTE — TELEPHONE ENCOUNTER
I called and spoke to the patient and per Dr. SHWETA Dangelo patient was schedule for his 6 month nephology follow up appointment with Dr. SHWETA Dangelo. Patient understood the day and time of his next appointment and patient also understands that his labs are to done 1 week prior to his next appointment and patient summary report is in the patient chart. Patient understood and was okay with it.

## 2025-03-13 NOTE — ASSESSMENT & PLAN NOTE
Patient was diagnosed with hyponatremia and workup suggestive of SIADH like etiology.    Patient is now taking salt tablet 2 g p.o. 3 times daily with current sodium level is 134 which is acceptable and will plan to continue salt tablet at current dose and recheck sodium level with the next visit.

## 2025-03-13 NOTE — PROGRESS NOTES
Virtual Regular VisitName: Donta Hunter      : 1970      MRN: [id number]  Encounter Provider: Genevieve Dangelo MD  Encounter Date: 3/13/2025   Encounter department: [facility]  :  Assessment & Plan  Stage 2 chronic kidney disease    Multifactorial and suspected due to underlying lupus nephritis on top of hypertensive nephrosclerosis.     Upon review of old medical record from [facility] chart review new baseline creatinine seems to fluctuating around 1.0-1.1 and in the interim renal function has remained at baseline with current creatinine of 0.9 with EGFR of 94.    According to patient his breathing and leg swelling is under well control and now takes Lasix 40 mg p.o. daily.     Management of lupus nephritis and hypertension as mentioned below.     Plan to avoid NSAIDs and recheck renal function before next visit.  Lupus nephritis (HCC)    Patient was previously followed by nephrologist at [facility] and was diagnosed with class V membranous lupus nephritis in 2015 and had received induction therapy with Cytoxan. Due to neutropenia, CellCept dose was reduced.  Currently patient is taking CellCept 500 mg p.o. twice daily.    Recent urine analysis showed no microscopic hematuria and current urine protein to creatinine ratio is 0.1.  Complements are also normal but patient is persistently having elevated anti-double-stranded DNA level at 16.  Since renal function is stable and there is no underlying microscopic hematuria or proteinuria, we will plan to continue CellCept 500 mg p.o. twice daily today and plan to recheck BMP, lupus panel along with LFT and urine analysis with the next visit.  Hypertensive kidney disease with stage 2 chronic kidney disease    Patient used to take carvedilol in the past but no longer taking any blood pressure medication at this point.  Her hypertension is currently under well control and monitor hypertension without any  antihypertensive medications.  Hyponatremia    Patient was diagnosed with hyponatremia and workup suggestive of SIADH like etiology.    Patient is now taking salt tablet 2 g p.o. 3 times daily with current sodium level is 134 which is acceptable and will plan to continue salt tablet at current dose and recheck sodium level with the next visit.    Stage 2 chronic kidney disease  Lab Results   Component Value Date    EGFR 94 03/10/2025    EGFR 91 02/03/2025    EGFR 73 12/30/2024    CREATININE 0.90 03/10/2025    CREATININE 0.92 02/03/2025    CREATININE 1.12 12/30/2024          Lupus nephritis (HCC)         Hypertensive kidney disease with stage 2 chronic kidney disease  Lab Results   Component Value Date    EGFR 94 03/10/2025    EGFR 91 02/03/2025    EGFR 73 12/30/2024    CREATININE 0.90 03/10/2025    CREATININE 0.92 02/03/2025    CREATININE 1.12 12/30/2024          Hyponatremia          Returns to nephrology office in 6 months.  Future labs + LFT + complements, YANIRA and anti-double-stranded DNA ordered.        History of Present Illness     This is a 55-year-old male with past medical history significant for lupus nephritis, hypertension, DVT, had virtual nephrology video visit today for further management of lupus nephritis and chronic kidney disease stage II.     Upon review of old medical record from [facility] chart, new baseline creatinine seems to be fluctuating around 1.0-1.1.    Patient has underlying hyponatremia which was suspected due to SIADH like etiology.  Currently taking salt tablet 2 g p.o. 3 times daily.     Patient has underlying hypertension and has been compliant with his antihypertensive medications.     Patient was diagnosed with lupus nephritis based on renal biopsy in November 2015.  Patient was previously being followed by nephrologist at [facility] and I have personally reviewed their office note and patient was diagnosed with class V membranous lupus nephritis and initially received  "induction therapy with Cytoxan.  Patient is now taking CellCept [dose was reduced earlier due to neutropenia]     Patient also has underlying SLE and currently also taking Plaquenil.     Patient had developed leg swelling in the past and currently taking Lasix     Patient also have DVT and currently taking Eliquis.     Patient takes all the medications as prescribed and denies use of NSAIDs.        Review of Systems   Constitutional:  Negative for chills and fever.   HENT:  Negative for nosebleeds.    Eyes:  Negative for photophobia and pain.   Respiratory:  Negative for choking.    Cardiovascular:  Negative for palpitations.   Gastrointestinal:  Negative for blood in stool.   Genitourinary:  Negative for hematuria.   Musculoskeletal:  Negative for neck stiffness.   Neurological:  Negative for seizures.   Psychiatric/Behavioral:  Negative for confusion and suicidal ideas.        Objective   Temp (!) 97.3 °F (36.3 °C) (Temporal)   Resp 16   Ht 5' 6\" (1.676 m)   Wt 79.4 kg (175 lb)   BMI 28.25 kg/m²     Physical Exam  Constitutional:       General: He is not in acute distress.  HENT:      Right Ear: External ear normal.   Eyes:      Conjunctiva/sclera:      Right eye: No hemorrhage.  Pulmonary:      Effort: No accessory muscle usage or respiratory distress.   Abdominal:      General: There is no distension.   Skin:     Coloration: Skin is not jaundiced.   Psychiatric:         Behavior: Behavior is not combative.         Administrative Statements   Encounter provider Genevieve Dangelo MD    The Patient is located at Home and in the following state in which I hold an active license [place].    The patient was identified by name and date of birth. Donta Hunter was informed that this is a telemedicine visit and that the visit is being conducted through the Epic Embedded platform. He agrees to proceed..  My office door was closed. No one else was in the room.  He acknowledged consent and understanding of privacy and security " of the video platform. The patient has agreed to participate and understands they can discontinue the visit at any time.    I have spent a total time of 15 minutes in caring for this patient on the day of the visit/encounter including Diagnostic results, Prognosis, Risks and benefits of tx options, Instructions for management, Patient and family education, Importance of tx compliance, Risk factor reductions, Impressions, Counseling / Coordination of care, Documenting in the medical record, Reviewing/placing orders in the medical record (including tests, medications, and/or procedures), and Obtaining or reviewing history  , not including the time spent for establishing the audio/video connection.

## 2025-03-13 NOTE — ASSESSMENT & PLAN NOTE
Multifactorial and suspected due to underlying lupus nephritis on top of hypertensive nephrosclerosis.     Upon review of old medical record from [facility] chart review new baseline creatinine seems to fluctuating around 1.0-1.1 and in the interim renal function has remained at baseline with current creatinine of 0.9 with EGFR of 94.    According to patient his breathing and leg swelling is under well control and now takes Lasix 40 mg p.o. daily.     Management of lupus nephritis and hypertension as mentioned below.     Plan to avoid NSAIDs and recheck renal function before next visit.

## 2025-03-13 NOTE — TELEPHONE ENCOUNTER
"Patient calling in regards to appointment scheduled today at 2:30pm.    States \"I would like to know if I can do a virtual appointment because I do not have a ride\"    Patient requesting virtual appointment.  Please advise asap.  "

## 2025-03-13 NOTE — ASSESSMENT & PLAN NOTE
Patient was previously followed by nephrologist at [facility] and was diagnosed with class V membranous lupus nephritis in November 2015 and had received induction therapy with Cytoxan. Due to neutropenia, CellCept dose was reduced.  Currently patient is taking CellCept 500 mg p.o. twice daily.    Recent urine analysis showed no microscopic hematuria and current urine protein to creatinine ratio is 0.1.  Complements are also normal but patient is persistently having elevated anti-double-stranded DNA level at 16.  Since renal function is stable and there is no underlying microscopic hematuria or proteinuria, we will plan to continue CellCept 500 mg p.o. twice daily today and plan to recheck BMP, lupus panel along with LFT and urine analysis with the next visit.

## 2025-03-14 ENCOUNTER — TELEPHONE (OUTPATIENT)
Age: 55
End: 2025-03-14

## 2025-03-14 NOTE — TELEPHONE ENCOUNTER
Using  Darnell [id number]:    Patient states he is in need of a letter stating that he needs 10 hours home healthcare while his wife is at work. He states he is unable to be alone but the insurance is trying to cut his hours. He is requesting the letter be sent via his [employer]. Patient requests call back with any further questions.

## 2025-03-16 LAB — DSDNA IGG SERPL IA-ACNC: 14 IU/ML (ref ?–15)

## 2025-03-24 ENCOUNTER — OFFICE VISIT (OUTPATIENT)
Dept: PODIATRY | Facility: CLINIC | Age: 55
End: 2025-03-24

## 2025-03-24 VITALS — HEIGHT: 66 IN | BODY MASS INDEX: 28.25 KG/M2 | HEART RATE: 85 BPM | OXYGEN SATURATION: 98 %

## 2025-03-24 DIAGNOSIS — L97.521 SKIN ULCER OF TOE OF LEFT FOOT, LIMITED TO BREAKDOWN OF SKIN (HCC): Primary | ICD-10-CM

## 2025-03-24 NOTE — PROGRESS NOTES
Podiatry Clinic Visit  Donta Hunter 55 y.o. male MRN: [id number]  Encounter: [id number]    Assessment & Plan        Diagnoses and all orders for this visit:    Skin ulcer of toe of left foot, limited to breakdown of skin ([facility])           Plan:  Patient was examined, evaluated, and treated with all questions and concerns addressed.  He presents for follow-up on eschar located just proximal to the left second digit nail plate. Callus debrided.  The area is stable, there are no acute clinical signs of infection. Left open to air  Patient was re-appointed for 2 months, patient should call to be seen sooner if he notices any local or systemic signs of infection    - Dr. Mojica  was available/present for entirety of patient encounter and present for all procedures.    History of Present Illness     HPI: Donta Hunter is a 55 y.o. male who presents for evaluation of left second digit wound.  Patient states that it has looked the same for several months now.  Denies any pus or drainage from the area.  He states he has not been putting a dressing on it, just leaving it open to air.  Patient denies any nausea, vomiting, fever, chills, chest pain, shortness of breath.. The patient has no further podiatric complaints at this time.    Review of Systems   Constitutional: Negative.    HENT: Negative.    Eyes: Negative.    Respiratory: Negative.    Cardiovascular: Negative.    Gastrointestinal: Negative.    Musculoskeletal: Negative  Skin: see physical exam  Neurological: Negative.        Historical Information   Past Medical History:   Diagnosis Date    Anemia     Arthritis     Cervical mass     Chronic kidney disease     COPD, group B, by GOLD 2017 classification ([facility]) 7/13/2020    Coronary artery disease     Depression     DVT (deep venous thrombosis) ([facility])     Hypertension     Lupus     Renal disorder     Stroke (cerebrum) ([facility]) 6/7/2024     Past Surgical History:   Procedure Laterality Date    APPENDECTOMY      CERVICAL SPINE  "SURGERY      CHOLECYSTECTOMY      COLONOSCOPY N/A 8/23/2018    Procedure: COLONOSCOPY;  Surgeon: James Wise III, MD;  Location: [facility];  Service: Gastroenterology    ESOPHAGOGASTRODUODENOSCOPY N/A 8/22/2018    Procedure: ESOPHAGOGASTRODUODENOSCOPY (EGD);  Surgeon: James Wise III, MD;  Location: [facility];  Service: Gastroenterology    IR CEREBRAL ANGIOGRAPHY  7/24/2024    IR IVC FILTER PLACEMENT PERMANENT  9/7/2017    IR IVC FILTER REMOVAL  4/23/2018    IR LUMBAR PUNCTURE  11/23/2015    IR LUMBAR PUNCTURE  4/25/2024    NECK SURGERY      US GUIDED INJECTION FOR RESEARCH STUDY  4/23/2018    US GUIDED INJECTION FOR RESEARCH STUDY  9/7/2017    VASCULAR SURGERY       Social History   Social History     Substance and Sexual Activity   Alcohol Use Never     Social History     Substance and Sexual Activity   Drug Use Never     Social History     Tobacco Use   Smoking Status Never   Smokeless Tobacco Never     Family History:   Family History   Problem Relation Age of Onset    Heart disease Mother     No Known Problems Father        Meds/Allergies   Not in a hospital admission.  Allergies   Allergen Reactions    Doxycycline Rash    Sulfa Antibiotics Rash       Objective     Current Vitals:   Pulse: 85 (03/24/25 0819)  Height: 5' 6\" (167.6 cm) (03/24/25 0819)  SpO2: 98 % (03/24/25 0819)        Pulse 85   Ht 5' 6\" (1.676 m)   SpO2 98%   BMI 28.25 kg/m²       Lower Extremity Exam:    Foot Exam    Musculoskeletal:  MMT is 4/5 to all compartments of the LE B/L, +1/4 edema B/L, no Pain on  palpation of L 2nd toe wound, Equinus is present. No pain with passive ROM of pedal joints.     Vascular:   DP pulses are weak bilaterally and PT pulses are absent bilaterally., CFT< 3sec to all digits. Pedal hair is Absent. Skin temperature warm to warm B/L.     Dermatological:  Skin of the LE is normal texture, temperature, turgor B/L. Interdigital maceration is not present.  There is a superficial eschar just proximal to the " nail plate of the left second digit measuring approximately 1 cm X1 centimeter X0.1.  No drainage or purulence, no malodor, no crepitus or fluctuance, no erythema       Neurologic:  Gross sensation is intact. Monofilament sensation is Intact. Sharp sensation is present.

## 2025-03-31 ENCOUNTER — TELEPHONE (OUTPATIENT)
Age: 55
End: 2025-03-31

## 2025-03-31 NOTE — TELEPHONE ENCOUNTER
Patient is calling letter completed for him is incorrect.  He says that he requires 10 hours of home healthcare per day not per week.  He says that he would need this letter by tomorrow please.  Please contact him when ready.  Thank you.

## 2025-04-02 ENCOUNTER — TELEPHONE (OUTPATIENT)
Dept: INTERNAL MEDICINE CLINIC | Facility: CLINIC | Age: 55
End: 2025-04-02

## 2025-04-02 DIAGNOSIS — M32.9 SLE (SYSTEMIC LUPUS ERYTHEMATOSUS RELATED SYNDROME) (HCC): ICD-10-CM

## 2025-04-02 NOTE — TELEPHONE ENCOUNTER
Patient dropped off a Disability for to be completed by Dr Batres,    Please call patient when form is completed.    Put in MA bin

## 2025-04-02 NOTE — TELEPHONE ENCOUNTER
Patient called regarding disability forms he dropped off, stating he needs completed form to list all his medical conditions.     He would appreciate a call back when forms are ready. Thank you. [phone]

## 2025-04-03 RX ORDER — HYDROXYCHLOROQUINE SULFATE 200 MG/1
400 TABLET, FILM COATED ORAL
Qty: 180 TABLET | Refills: 0 | Status: SHIPPED | OUTPATIENT
Start: 2025-04-03 | End: 2026-04-03

## 2025-04-03 RX ORDER — MYCOPHENOLATE MOFETIL 500 MG/1
TABLET ORAL
Qty: 90 TABLET | Refills: 2 | Status: SHIPPED | OUTPATIENT
Start: 2025-04-03

## 2025-04-07 DIAGNOSIS — M32.9 SLE (SYSTEMIC LUPUS ERYTHEMATOSUS RELATED SYNDROME) (HCC): Primary | ICD-10-CM

## 2025-04-07 NOTE — TELEPHONE ENCOUNTER
Spoke with patient and advised form is complete, he will come pick it up.     I also advised patient this form is requesting his medical records from all providers and his labs/imaging studies. I advised he will need to fill out a medical release form. He will do this when he picks up the form.     Copy made for chart and original left in  accordion.

## 2025-04-07 NOTE — TELEPHONE ENCOUNTER
Patient called to follow up completion of form.    Advised of 7-10 turn-around-time, and office would call when form is ready for .  Patient expressed understanding.

## 2025-04-16 NOTE — PROGRESS NOTES
Rheumatology Follow-up Visit  Name: Donta Hunter      : 1970      MRN: [id number]  Encounter Provider: Sravani Narayanan MD  Encounter Date: 2025   Encounter department: [facility]    Assessment & Plan  SLE (systemic lupus erythematosus related syndrome) (HCC)  54-year-old male who presents for follow-up of lupus with prior manifestation of lupus nephritis.  Currently, his lupus remains quiet on therapy with hydroxychloroquine for 100 mg daily and CellCept 1 g daily.  His monitoring labs from today are pending. Plan to continue with current therapy at this time with monitoring labs every 3 months.  His main symptom continues to be lower extremity weakness with neuropathy for which he has previously had EMGs and neuroimaging.  Follow-up in 6 months.   Orders:    TSH, 3rd generation with Free T4 reflex; Future    mycophenolate (CELLCEPT) 500 mg tablet; Take 1 tablet (500 mg total) by mouth every 12 (twelve) hours    hydroxychloroquine (PLAQUENIL) 200 mg tablet; Take 2 tablets (400 mg total) by mouth daily at bedtime    Leukopenia, unspecified type  Chronic leukopenia which has been attributed to immunosuppressive medications as well as underlying lupus.  Of note, on recent CBC DIF there was noted increase in atypical lymphocytes by pathology.  Additionally, he reports that he has been losing weight since last visit.  Recommend referral to hematology to rule out other potential etiologies of the leukopenia.  Orders:    Ambulatory Referral to Hematology / Oncology; Future    Weight loss  As above, will refer to hematology.  Patient will follow-up with his PCP tomorrow to ensure he is up-to-date on all age-appropriate cancer screenings.  Will also check thyroid panel.       Lupus nephritis (HCC)  Remains in clinical remission.       Long-term use of immunosuppressant medication         High risk medication use           History of Present Illness     Donta Hunter is a 55 y.o. male  "who presents for follow-up.    Interval History:  Patient reports main concern today is that he has been losing weight.  He reports he has been eating the same and his appetite has been good.  However, reports has lost about 15 pounds.  He denies any fevers or night sweats.    Patient also recently seen by podiatry and vascular.  Wound on his toe has healed.  He is now undergoing workup with vascular for possible lymphedema and PAD.    Permanent History:  Patient was diagnosed with SLE in 2015 when he was presented with YANIRA 1:160 nucleolar, renal failure, anemia, leukopenia and skin rash. He is of [facility] descent. He underwent renal biopsy showing Class V lupus nephritis (membranous nephropathy). Skin biopsy showing interface dermatitis. He was started on HCQ and was planned to start MMF. 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 Sural nerve biopsy not performed as patient had already been on steroids for the lupus nephritis. He was ultimately started on induction therapy with IV CYC monthly (2/16-5/16) x 4 doses, then transitioned to MMF 3g daily for maintenance. He had some cytopenias, so dose was increased to MMF 2g daily and then eventually 1.5mg daily.      He had a DVT in 2017, but negative APLA panel in 2015 so thought unlikely to be related to his SLE.      He also had severe cervical compressive myelopathy and underwent C4-C6 anterior cervical diskectomy with central and foraminal decompression 1/25/2018. He underwent a repeat EMG in 2018 showing \"sensorimotor polyneuropathy, but is unchanged (or even perhaps improved) compared to 2015. No myopathy or significant motor axonal loss from lumbar radiculopathy. Clinically, his gait problem seems more likely from myelopathy.\"     In Feb 2024, he developed L side facial dropping. He underwent MRI brain showing new lacunar ischemia in the frontal lobes and chronic cerebellar " "lacunar infarcts. MRI C/T/L spine was reported with DJD C spine with mass effect C6-7/cord flattening C3-4, compression fractures C7-8, chronic compression fx T7-T8, severe NF narrowing L3-4 with L3 nerve root impingement, bulging L5-S1. TTE without vegetations, JIMMY showing patent foramen ovale. CTA brain showing normal intra-cranial vasculature. Repeat APLA testing negative. FTA mildly reactive, but previously had been treated for syphilis. He underwent repeat LP, but it was a traumatic tap and not able to interpret results. He underwent cerebral angiogram 7/2024 which was normal and without vasculitis. Lupus cerebritis was felt unlikely to be the etiology of his symptoms so no additional immunosuppression was recommended.       Review of Systems  Complete ROS conducted as per HPI. In addition, denies:  Fever  Chills  Night sweats  Oral ulcerations  Chest pain  Cough  Shortness of breath  Other joint pain and swelling      Objective     /76 (BP Location: Left arm, Patient Position: Sitting, Cuff Size: Standard)   Pulse 70   Temp 97.6 °F (36.4 °C) (Tympanic)   Ht 5' 6\" (1.676 m)   Wt 7.348 kg (16 lb 3.2 oz)   SpO2 98%   BMI 2.61 kg/m²     Physical Exam  Physical Exam  Constitutional: well appearing, no acute distress  HEENT: normocephalic, sclera clear, no visible oral or nasal ulcers  Neck: supple, no palpable cervical adenopathy  CV: regular rate and rhythm  Pulm: normal respiratory effort, breathing comfortably on room air  Skin: no rashes, no skin thickening  Extremities: warm and well perfused, no edema  MSK: No synovitis or effusions, muscle strength and upper extremities is 5 out of 5, muscle strength in lower extremities at baseline 4-/5    Labs and Imaging  I have personally reviewed pertinent labs and imaging.     "

## 2025-04-21 ENCOUNTER — OFFICE VISIT (OUTPATIENT)
Dept: VASCULAR SURGERY | Facility: CLINIC | Age: 55
End: 2025-04-21
Payer: COMMERCIAL

## 2025-04-21 VITALS
WEIGHT: 162.2 LBS | OXYGEN SATURATION: 99 % | HEART RATE: 73 BPM | DIASTOLIC BLOOD PRESSURE: 72 MMHG | SYSTOLIC BLOOD PRESSURE: 116 MMHG | BODY MASS INDEX: 26.18 KG/M2

## 2025-04-21 DIAGNOSIS — S91.105A OPEN WOUND OF SECOND TOE OF LEFT FOOT, INITIAL ENCOUNTER: ICD-10-CM

## 2025-04-21 DIAGNOSIS — I82.5Y9 CHRONIC DEEP VEIN THROMBOSIS (DVT) OF PROXIMAL VEIN OF LOWER EXTREMITY, UNSPECIFIED LATERALITY (HCC): Primary | ICD-10-CM

## 2025-04-21 DIAGNOSIS — R09.89 DIMINISHED PULSES IN LOWER EXTREMITY: ICD-10-CM

## 2025-04-21 PROBLEM — I89.0 LYMPHEDEMA: Status: ACTIVE | Noted: 2025-04-21

## 2025-04-21 PROCEDURE — 99243 OFF/OP CNSLTJ NEW/EST LOW 30: CPT | Performed by: NURSE PRACTITIONER

## 2025-04-21 NOTE — ASSESSMENT & PLAN NOTE
10/21/24 LEV demonstrates:  Lt: No DVT/ SVT. Popliteal, posterior tibial and anterior tibial arterial Doppler waveforms are  biphasic/monophasic.  -B/L leg swelling +2 and severe venous stasis dermatitis  -Wearing b/l leg compression daily  -b/l legs appear to have lymphedema changes. Reports of b/l anterior shin wound that occurs spontaneously. Will consider lymph pumps pending LEAD          Plan  -Complete LEAD and return to the office for review.  -Continue with b/l leg compression at this time. Discussed pending results of LEAD pt may benefit from lymph pump system.   -Leg elevation  -Low sodium diet.  -Call the office for any new or worsening symptoms  -Continue with ASA 81mg and Eliquis (Hx of DVT) daily      Orders:    Compression Stocking

## 2025-04-21 NOTE — PROGRESS NOTES
Name: Donta Hunter      : 1970      MRN: [id number]  Encounter Provider: CORBY Carolina  Encounter Date: 2025   Encounter department: [facility] [place]    56y/o M with hx of HTN, CAD, Stroke, COPD, GERD, hyperparathyroidism, SLE, Osteoporosis, CKD Stage 2, anemia, Systemic lupus erythematosus, neuropathic pain, hx of DVT (Eliquis) is a new consult to the office for toe wound.   Assessment & Plan  Chronic deep vein thrombosis (DVT) of proximal vein of lower extremity, unspecified laterality (HCC)  10/21/24 LEV demonstrates:  Lt: No DVT/ SVT. Popliteal, posterior tibial and anterior tibial arterial Doppler waveforms are  biphasic/monophasic.  -B/L leg swelling +2 and severe venous stasis dermatitis  -Wearing b/l leg compression daily  -b/l legs appear to have lymphedema changes. Reports of b/l anterior shin wound that occurs spontaneously. Will consider lymph pumps pending LEAD          Plan  -Complete LEAD and return to the office for review.  -Continue with b/l leg compression at this time. Discussed pending results of LEAD pt may benefit from lymph pump system.   -Leg elevation  -Low sodium diet.  -Call the office for any new or worsening symptoms  -Continue with ASA 81mg and Eliquis (Hx of DVT) daily      Orders:    Compression Stocking    Diminished pulses in lower extremity  Pt denies claudication with ambulation, reports occasional b/l calf claudication at night, reports he had L foot 2nd toe infection that was treated and has since healed for 2 months.  -Upon assessment   -No arterial testing for review  -Will order further arterial testing to determine if compression is safe and wound healing capability.  -Advised to utilize b/l leg moisturizer      Plan  -Complete LEAD and return to the office for review.  -Continue with ASA daily  -Will consider statin pending LEAD results.  -Call the office with any new or worsening symptoms.     Orders:    VAS ARTERIAL DUPLEX- LOWER  LIMB BILATERAL; Future    Open wound of second toe of left foot, initial encounter    Orders:    Ambulatory Referral to Vascular Surgery    VAS ARTERIAL DUPLEX- LOWER LIMB BILATERAL; Future    Compression Stocking        History of Present Illness   HPI  Donta Hunter is a 55 y.o. male who presents with hx of HTN, CAD, Stroke, COPD, GERD, hyperparathyroidism, SLE, Osteoporosis, CKD 2, anemia, lupus, neuropathic pain, hx of DVT is a new consult for L foot 2nd toe wound.   Pt presents to the office alone. Ambulates with walker.  -Discoloration in b/l legs  started 5-6 years ago. Wears compression stockings daily.  -L foot 2nd toe small superficial wound stated 3 months ago. Pt reports area was infected and states that this has much improved since treatment.   -Quit smoking 10 years ago.  -Pt is Iranian speaking only and interpretor services was utilized for translation for the entirety of this visit with  # [id number] (Erica)    New patient referred by wound care. Pt presents with L 2nd toe ulcer for about a month. Pt has swelling, discoloration, numbness, coldness, rest pain. Pt notes burning pain on bottom of feet when walking. Pt has wounds on R leg and L 2nd toe.   History obtained from: patient    Review of Systems   Cardiovascular:  Positive for leg swelling.   Skin:  Positive for color change and wound.   Neurological:  Positive for numbness.     Current Outpatient Medications on File Prior to Visit   Medication Sig Dispense Refill    apixaban (ELIQUIS) 5 mg Take 5 mg by mouth 2 (two) times a day      aspirin 81 mg chewable tablet Chew 1 tablet (81 mg total) daily 30 tablet 3    baclofen 20 mg tablet Take 1 tablet (20 mg total) by mouth 2 (two) times a day 90 tablet 0    capsicum (ZOSTRIX) 0.075 % topical cream Apply topically 3 (three) times a day Wash hands before and after use. 57 g 0    clobetasol (TEMOVATE) 0.05 % cream       collagenase (SANTYL) ointment Apply topically daily 30 g 2    Diclofenac  Sodium (VOLTAREN) 1 % Apply 2 g topically 4 (four) times a day 300 g 1    famotidine (PEPCID) 20 mg tablet Take 1 tablet (20 mg total) by mouth 2 (two) times a day 180 tablet 3    furosemide (LASIX) 40 mg tablet Take 40 mg by mouth daily      gabapentin (NEURONTIN) 300 mg capsule Take 1 capsule (300 mg total) by mouth 3 (three) times a day 270 capsule 3    gabapentin (NEURONTIN) 600 MG tablet Take 1 tablet (600 mg total) by mouth 3 (three) times a day 270 tablet 1    hydroxychloroquine (PLAQUENIL) 200 mg tablet Take 2 tablets (400 mg total) by mouth daily at bedtime 180 tablet 0    lidocaine-prilocaine (EMLA) cream Apply topically as needed for mild pain 30 g 3    mycophenolate (CELLCEPT) 500 mg tablet Take 2 tablets by mouth in the morning and 1 tablet at night 90 tablet 2    nystatin (MYCOSTATIN) ointment Apply topically 2 (two) times a day 30 g 0    potassium chloride (Klor-Con M20) 20 mEq tablet Take 1 tablet (20 mEq total) by mouth daily 90 tablet 0    sildenafil (VIAGRA) 50 MG tablet Take 1 tablet (50 mg total) by mouth daily as needed for erectile dysfunction 10 tablet 0    sodium chloride 1 g tablet Take 2 tablets (2 g total) by mouth 3 (three) times a day with meals 540 tablet 2     No current facility-administered medications on file prior to visit.         Objective   /72 (BP Location: Right arm, Patient Position: Sitting, Cuff Size: Standard)   Pulse 73   Wt 73.6 kg (162 lb 3.2 oz)   SpO2 99%   BMI 26.18 kg/m²      Physical Exam  Vitals reviewed.   Constitutional:       General: He is not in acute distress.     Appearance: Normal appearance. He is not ill-appearing.   Cardiovascular:      Rate and Rhythm: Normal rate and regular rhythm.      Pulses:           Radial pulses are 2+ on the right side and 2+ on the left side.        Dorsalis pedis pulses are 2+ on the right side and detected w/ Doppler on the left side.        Posterior tibial pulses are detected w/ Doppler on the right side and  detected w/ Doppler on the left side.      Heart sounds: Murmur heard.   Pulmonary:      Effort: Pulmonary effort is normal. No respiratory distress.   Musculoskeletal:         General: Swelling present.      Right lower le+ Edema present.      Left lower le+ Edema present.   Skin:     Findings: Erythema and rash (venous stasis dermatitis) present.   Neurological:      Mental Status: He is alert.      Sensory: Sensory deficit present.      Gait: Gait abnormal (walker).   Psychiatric:         Mood and Affect: Mood normal.         Behavior: Behavior normal.       Administrative Statements   I have spent a total time of 25 minutes in caring for this patient on the day of the visit/encounter including Instructions for management, Patient and family education, Importance of tx compliance, Documenting in the medical record, and Obtaining or reviewing history  .

## 2025-04-21 NOTE — ASSESSMENT & PLAN NOTE
Orders:    Ambulatory Referral to Vascular Surgery    VAS ARTERIAL DUPLEX- LOWER LIMB BILATERAL; Future    Compression Stocking

## 2025-04-21 NOTE — ASSESSMENT & PLAN NOTE
10/21/24 LEV demonstrates:  Lt: No DVT/ SVT. Popliteal, posterior tibial and anterior tibial arterial Doppler waveforms are  biphasic/monophasic.    Currently on ASA 81mg and Eliquis daily

## 2025-04-23 ENCOUNTER — OFFICE VISIT (OUTPATIENT)
Age: 55
End: 2025-04-23
Payer: COMMERCIAL

## 2025-04-23 ENCOUNTER — APPOINTMENT (OUTPATIENT)
Dept: LAB | Facility: CLINIC | Age: 55
End: 2025-04-23
Payer: COMMERCIAL

## 2025-04-23 VITALS
OXYGEN SATURATION: 98 % | WEIGHT: 16.2 LBS | DIASTOLIC BLOOD PRESSURE: 76 MMHG | HEIGHT: 66 IN | TEMPERATURE: 97.6 F | BODY MASS INDEX: 2.6 KG/M2 | HEART RATE: 70 BPM | SYSTOLIC BLOOD PRESSURE: 126 MMHG

## 2025-04-23 DIAGNOSIS — D72.819 LEUKOPENIA, UNSPECIFIED TYPE: ICD-10-CM

## 2025-04-23 DIAGNOSIS — M32.9 SLE (SYSTEMIC LUPUS ERYTHEMATOSUS RELATED SYNDROME) (HCC): Primary | ICD-10-CM

## 2025-04-23 DIAGNOSIS — Z79.60 LONG-TERM USE OF IMMUNOSUPPRESSANT MEDICATION: ICD-10-CM

## 2025-04-23 DIAGNOSIS — M32.9 SLE (SYSTEMIC LUPUS ERYTHEMATOSUS RELATED SYNDROME) (HCC): ICD-10-CM

## 2025-04-23 DIAGNOSIS — Z79.899 HIGH RISK MEDICATION USE: ICD-10-CM

## 2025-04-23 DIAGNOSIS — R63.4 WEIGHT LOSS: ICD-10-CM

## 2025-04-23 DIAGNOSIS — M32.14 LUPUS NEPHRITIS (HCC): ICD-10-CM

## 2025-04-23 LAB
ALBUMIN SERPL BCG-MCNC: 4.3 G/DL (ref 3.5–5)
ALP SERPL-CCNC: 123 U/L (ref 34–104)
ALT SERPL W P-5'-P-CCNC: 21 U/L (ref 7–52)
ANION GAP SERPL CALCULATED.3IONS-SCNC: 7 MMOL/L (ref 4–13)
AST SERPL W P-5'-P-CCNC: 27 U/L (ref 13–39)
BACTERIA UR QL AUTO: NORMAL /HPF
BASOPHILS # BLD MANUAL: 0.05 THOUSAND/UL (ref 0–0.1)
BASOPHILS NFR MAR MANUAL: 2 % (ref 0–1)
BILIRUB SERPL-MCNC: 0.48 MG/DL (ref 0.2–1)
BILIRUB UR QL STRIP: NEGATIVE
BUN SERPL-MCNC: 9 MG/DL (ref 5–25)
C3 SERPL-MCNC: 108 MG/DL (ref 87–200)
C4 SERPL-MCNC: 44 MG/DL (ref 19–52)
CALCIUM SERPL-MCNC: 9.5 MG/DL (ref 8.4–10.2)
CHLORIDE SERPL-SCNC: 103 MMOL/L (ref 96–108)
CLARITY UR: CLEAR
CO2 SERPL-SCNC: 28 MMOL/L (ref 21–32)
COLOR UR: NORMAL
CREAT SERPL-MCNC: 0.92 MG/DL (ref 0.6–1.3)
CREAT UR-MCNC: 55.7 MG/DL
EOSINOPHIL # BLD MANUAL: 0.07 THOUSAND/UL (ref 0–0.4)
EOSINOPHIL NFR BLD MANUAL: 3 % (ref 0–6)
ERYTHROCYTE [DISTWIDTH] IN BLOOD BY AUTOMATED COUNT: 14.2 % (ref 11.6–15.1)
GFR SERPL CREATININE-BSD FRML MDRD: 91 ML/MIN/1.73SQ M
GIANT PLATELETS BLD QL SMEAR: PRESENT
GLUCOSE P FAST SERPL-MCNC: 80 MG/DL (ref 65–99)
GLUCOSE UR STRIP-MCNC: NEGATIVE MG/DL
HCT VFR BLD AUTO: 42.7 % (ref 36.5–49.3)
HGB BLD-MCNC: 14.6 G/DL (ref 12–17)
HGB UR QL STRIP.AUTO: NEGATIVE
KETONES UR STRIP-MCNC: NEGATIVE MG/DL
LEUKOCYTE ESTERASE UR QL STRIP: NEGATIVE
LYMPHOCYTES # BLD AUTO: 0.97 THOUSAND/UL (ref 0.6–4.47)
LYMPHOCYTES # BLD AUTO: 13 % (ref 14–44)
MCH RBC QN AUTO: 31.4 PG (ref 26.8–34.3)
MCHC RBC AUTO-ENTMCNC: 34.2 G/DL (ref 31.4–37.4)
MCV RBC AUTO: 92 FL (ref 82–98)
MONOCYTES # BLD AUTO: 0.48 THOUSAND/UL (ref 0–1.22)
MONOCYTES NFR BLD: 21 % (ref 4–12)
NEUTROPHILS # BLD MANUAL: 0.74 THOUSAND/UL (ref 1.85–7.62)
NEUTS SEG NFR BLD AUTO: 32 % (ref 43–75)
NITRITE UR QL STRIP: NEGATIVE
NON-SQ EPI CELLS URNS QL MICRO: NORMAL /HPF
PH UR STRIP.AUTO: 7 [PH]
PLATELET # BLD AUTO: 175 THOUSANDS/UL (ref 149–390)
PLATELET BLD QL SMEAR: ADEQUATE
PMV BLD AUTO: 10.6 FL (ref 8.9–12.7)
POIKILOCYTOSIS BLD QL SMEAR: PRESENT
POTASSIUM SERPL-SCNC: 3.9 MMOL/L (ref 3.5–5.3)
PROT SERPL-MCNC: 7.6 G/DL (ref 6.4–8.4)
PROT UR STRIP-MCNC: NEGATIVE MG/DL
PROT UR-MCNC: 7.6 MG/DL
PROT/CREAT UR: 0.1 MG/G{CREAT}
RBC # BLD AUTO: 4.65 MILLION/UL (ref 3.88–5.62)
RBC #/AREA URNS AUTO: NORMAL /HPF
RBC MORPH BLD: PRESENT
SODIUM SERPL-SCNC: 138 MMOL/L (ref 135–147)
SP GR UR STRIP.AUTO: 1.01 (ref 1–1.03)
TSH SERPL DL<=0.05 MIU/L-ACNC: 1.5 UIU/ML (ref 0.45–4.5)
UROBILINOGEN UR STRIP-ACNC: <2 MG/DL
VARIANT LYMPHS # BLD AUTO: 29 %
WBC # BLD AUTO: 2.3 THOUSAND/UL (ref 4.31–10.16)
WBC #/AREA URNS AUTO: NORMAL /HPF

## 2025-04-23 PROCEDURE — 86160 COMPLEMENT ANTIGEN: CPT

## 2025-04-23 PROCEDURE — 84443 ASSAY THYROID STIM HORMONE: CPT

## 2025-04-23 PROCEDURE — 82570 ASSAY OF URINE CREATININE: CPT

## 2025-04-23 PROCEDURE — 85007 BL SMEAR W/DIFF WBC COUNT: CPT

## 2025-04-23 PROCEDURE — 85027 COMPLETE CBC AUTOMATED: CPT

## 2025-04-23 PROCEDURE — 81001 URINALYSIS AUTO W/SCOPE: CPT

## 2025-04-23 PROCEDURE — 86225 DNA ANTIBODY NATIVE: CPT

## 2025-04-23 PROCEDURE — 99214 OFFICE O/P EST MOD 30 MIN: CPT | Performed by: STUDENT IN AN ORGANIZED HEALTH CARE EDUCATION/TRAINING PROGRAM

## 2025-04-23 PROCEDURE — 84156 ASSAY OF PROTEIN URINE: CPT

## 2025-04-23 PROCEDURE — 36415 COLL VENOUS BLD VENIPUNCTURE: CPT

## 2025-04-23 PROCEDURE — 80053 COMPREHEN METABOLIC PANEL: CPT

## 2025-04-23 RX ORDER — HYDROXYCHLOROQUINE SULFATE 200 MG/1
400 TABLET, FILM COATED ORAL
Qty: 180 TABLET | Refills: 0 | Status: SHIPPED | OUTPATIENT
Start: 2025-04-23 | End: 2026-04-23

## 2025-04-23 RX ORDER — MYCOPHENOLATE MOFETIL 500 MG/1
500 TABLET ORAL EVERY 12 HOURS SCHEDULED
Qty: 90 TABLET | Refills: 2 | Status: SHIPPED | OUTPATIENT
Start: 2025-04-23

## 2025-04-23 NOTE — ASSESSMENT & PLAN NOTE
54-year-old male who presents for follow-up of lupus with prior manifestation of lupus nephritis.  Currently, his lupus remains quiet on therapy with hydroxychloroquine for 100 mg daily and CellCept 1 g daily.  His monitoring labs from today are pending. Plan to continue with current therapy at this time with monitoring labs every 3 months.  His main symptom continues to be lower extremity weakness with neuropathy for which he has previously had EMGs and neuroimaging.  Follow-up in 6 months.   Orders:    TSH, 3rd generation with Free T4 reflex; Future    mycophenolate (CELLCEPT) 500 mg tablet; Take 1 tablet (500 mg total) by mouth every 12 (twelve) hours    hydroxychloroquine (PLAQUENIL) 200 mg tablet; Take 2 tablets (400 mg total) by mouth daily at bedtime

## 2025-04-24 ENCOUNTER — RESULTS FOLLOW-UP (OUTPATIENT)
Age: 55
End: 2025-04-24

## 2025-04-24 ENCOUNTER — OFFICE VISIT (OUTPATIENT)
Dept: INTERNAL MEDICINE CLINIC | Facility: CLINIC | Age: 55
End: 2025-04-24
Payer: COMMERCIAL

## 2025-04-24 VITALS
HEART RATE: 98 BPM | OXYGEN SATURATION: 98 % | RESPIRATION RATE: 16 BRPM | SYSTOLIC BLOOD PRESSURE: 126 MMHG | WEIGHT: 162 LBS | HEIGHT: 66 IN | DIASTOLIC BLOOD PRESSURE: 78 MMHG | BODY MASS INDEX: 26.03 KG/M2

## 2025-04-24 DIAGNOSIS — Z86.718 HISTORY OF DVT (DEEP VEIN THROMBOSIS): ICD-10-CM

## 2025-04-24 DIAGNOSIS — Z00.00 ANNUAL PHYSICAL EXAM: Primary | ICD-10-CM

## 2025-04-24 DIAGNOSIS — M32.9 SYSTEMIC LUPUS ERYTHEMATOSUS, UNSPECIFIED SLE TYPE, UNSPECIFIED ORGAN INVOLVEMENT STATUS (HCC): Chronic | ICD-10-CM

## 2025-04-24 DIAGNOSIS — Z12.5 SCREENING FOR PROSTATE CANCER: ICD-10-CM

## 2025-04-24 DIAGNOSIS — I10 PRIMARY HYPERTENSION: ICD-10-CM

## 2025-04-24 DIAGNOSIS — R63.4 WEIGHT LOSS: ICD-10-CM

## 2025-04-24 DIAGNOSIS — M81.0 OSTEOPOROSIS WITHOUT CURRENT PATHOLOGICAL FRACTURE, UNSPECIFIED OSTEOPOROSIS TYPE: ICD-10-CM

## 2025-04-24 PROCEDURE — 99214 OFFICE O/P EST MOD 30 MIN: CPT | Performed by: INTERNAL MEDICINE

## 2025-04-24 PROCEDURE — 99396 PREV VISIT EST AGE 40-64: CPT | Performed by: INTERNAL MEDICINE

## 2025-04-24 RX ORDER — CLOBETASOL PROPIONATE 0.5 MG/G
CREAM TOPICAL
Refills: 0 | OUTPATIENT
Start: 2025-04-24

## 2025-04-24 NOTE — PROGRESS NOTES
Adult Annual Physical  Name: Donta Hunter      : 1970      MRN: [id number]  Encounter Provider: Raad Batres MD  Encounter Date: 2025   Encounter department: [facility] [place]    :  Assessment & Plan  Annual physical exam  Completed.       Primary hypertension  Continue taking all hypertension medications.      Plan:  Recommend blood pressure goal less than 130/80.           Osteoporosis without current pathological fracture, unspecified osteoporosis type  Being followed by rheum. Will be starting reclast.         Systemic lupus erythematosus, unspecified SLE type, unspecified organ involvement status (HCC)  Continue rheum follow up.          History of DVT (deep vein thrombosis)  Continue Eliquis         Weight loss  180 in 25-- today, 162, he is up to date with colonoscopy, PSA, all age appropriate screen, denies n/v/d/c.  He eats 2 eggs, avocado for breakfast  Lunch: rice beans chicken  Dinner: fruit like strawberry, apples,  He does not eat anything in between  Orders:    Urinalysis, Screen; Future    Occult Blood, Fecal, IA; Future    Vitamin B12; Future    Vitamin D 25 hydroxy; Future    HIV 1/2 AG/AB w Reflex [facility] for 2 yr old and above    Hepatitis C antibody; Future    CT chest abdomen pelvis w contrast; Future    Iron Panel (Includes Ferritin, Iron Sat%, Iron, and TIBC); Future    Screening for prostate cancer    Orders:    PSA, total and free; Future        Preventive Screenings:  - Diabetes Screening: screening up-to-date  - Hepatitis C screening: screening up-to-date   - HIV screening: screening up-to-date   - Colon cancer screening: screening up-to-date   - Lung cancer screening: screening not indicated   - Prostate cancer screening: screening up-to-date   - Osteoporosis screening: has osteoporosis and screening not indicated     Immunizations:  - Immunizations due: Influenza, Prevnar 20, Tdap, Zoster (Shingrix) and Hepatitis  A    Counseling/Anticipatory Guidance:    - Diet: discussed recommendations for a healthy/well-balanced diet.   - Exercise: the importance of regular exercise/physical activity was discussed. Recommend exercise 3-5 times per week for at least 30 minutes.   - Injury prevention: discussed safety/seat belts, safety helmets, smoke detectors, carbon monoxide detectors, and smoking near bedding or upholstery.       Depression Screening and Follow-up Plan: Patient was screened for depression during today's encounter. They screened negative with a PHQ-2 score of 0.        History of Present Illness     Adult Annual Physical:  Patient presents for annual physical.     Diet and Physical Activity:  - Diet/Nutrition: no special diet.  - Exercise: no formal exercise.    Depression Screening:  - PHQ-2 Score: 0    General Health:  - Sleep: sleeps well.  - Hearing: normal hearing bilateral ears.  - Vision: no vision problems.  - Dental: regular dental visits.     Health:  - History of STDs: no.   - Urinary symptoms: none.     Advanced Care Planning:  - Has an advanced directive?: no    - Has a durable medical POA?: no    - ACP document given to patient?: no      Review of Systems   Constitutional:  Positive for unexpected weight change. Negative for chills and fever.   HENT:  Negative for ear pain and sore throat.    Eyes:  Negative for pain and visual disturbance.   Respiratory:  Negative for cough and shortness of breath.    Cardiovascular:  Negative for chest pain and palpitations.   Gastrointestinal:  Negative for abdominal pain and vomiting.   Genitourinary:  Negative for dysuria and hematuria.   Musculoskeletal:  Negative for arthralgias and back pain.   Skin:  Negative for color change and rash.   Neurological:  Negative for seizures and syncope.   All other systems reviewed and are negative.    Past Medical History   Past Medical History:   Diagnosis Date    Anemia     Arthritis     Cervical mass     Chronic kidney  disease     COPD, group B, by GOLD 2017 classification ([facility]) 7/13/2020    Coronary artery disease     Depression     DVT (deep venous thrombosis) ([facility])     Hypertension     Lupus     Renal disorder     Stroke (cerebrum) ([facility]) 6/7/2024     Past Surgical History:   Procedure Laterality Date    APPENDECTOMY      CERVICAL SPINE SURGERY      CHOLECYSTECTOMY      COLONOSCOPY N/A 8/23/2018    Procedure: COLONOSCOPY;  Surgeon: James Wise III, MD;  Location: [facility];  Service: Gastroenterology    ESOPHAGOGASTRODUODENOSCOPY N/A 8/22/2018    Procedure: ESOPHAGOGASTRODUODENOSCOPY (EGD);  Surgeon: James Wise III, MD;  Location: [facility];  Service: Gastroenterology    IR CEREBRAL ANGIOGRAPHY  7/24/2024    IR IVC FILTER PLACEMENT PERMANENT  9/7/2017    IR IVC FILTER REMOVAL  4/23/2018    IR LUMBAR PUNCTURE  11/23/2015    IR LUMBAR PUNCTURE  4/25/2024    NECK SURGERY      US GUIDED INJECTION FOR RESEARCH STUDY  4/23/2018    US GUIDED INJECTION FOR RESEARCH STUDY  9/7/2017    VASCULAR SURGERY       Family History   Problem Relation Age of Onset    Heart disease Mother     No Known Problems Father       reports that he has never smoked. He has never used smokeless tobacco. He reports that he does not drink alcohol and does not use drugs.  Current Outpatient Medications   Medication Instructions    apixaban (ELIQUIS) 5 mg, 2 times daily    aspirin 81 mg, Oral, Daily    baclofen 20 mg, Oral, 2 times daily    capsicum (ZOSTRIX) 0.075 % topical cream Topical, 3 times daily, Wash hands before and after use.    clobetasol (TEMOVATE) 0.05 % cream     collagenase (SANTYL) ointment Topical, Daily    Diclofenac Sodium (VOLTAREN) 2 g, Topical, 4 times daily    famotidine (PEPCID) 20 mg, Oral, 2 times daily    furosemide (LASIX) 40 mg, Daily    gabapentin (NEURONTIN) 600 mg, Oral, 3 times daily    gabapentin (NEURONTIN) 300 mg, Oral, 3 times daily    hydroxychloroquine (PLAQUENIL) 400 mg, Oral, Daily at bedtime     "lidocaine-prilocaine (EMLA) cream Topical, As needed    mycophenolate (CELLCEPT) 500 mg, Oral, Every 12 hours scheduled    nystatin (MYCOSTATIN) ointment Topical, 2 times daily    potassium chloride (Klor-Con M20) 20 mEq tablet 20 mEq, Oral, Daily    sildenafil (VIAGRA) 50 mg, Oral, Daily PRN    sodium chloride 2 g, Oral, 3 times daily with meals     Allergies   Allergen Reactions    Doxycycline Rash    Sulfa Antibiotics Rash        Objective   /78 (BP Location: Left arm, Patient Position: Sitting, Cuff Size: Adult)   Pulse 98   Resp 16   Ht 5' 6\" (1.676 m)   Wt 73.5 kg (162 lb)   SpO2 98%   BMI 26.15 kg/m²     Physical Exam  Vitals and nursing note reviewed.   Constitutional:       General: He is not in acute distress.     Appearance: He is well-developed.   HENT:      Head: Normocephalic and atraumatic.   Eyes:      Conjunctiva/sclera: Conjunctivae normal.   Cardiovascular:      Rate and Rhythm: Normal rate and regular rhythm.      Heart sounds: No murmur heard.  Pulmonary:      Effort: Pulmonary effort is normal. No respiratory distress.      Breath sounds: Normal breath sounds.   Abdominal:      Tenderness: There is no abdominal tenderness.   Musculoskeletal:         General: No swelling.      Cervical back: Neck supple.   Skin:     General: Skin is warm and dry.   Neurological:      Mental Status: He is alert and oriented to person, place, and time. Mental status is at baseline.      Gait: Gait abnormal.   Psychiatric:         Mood and Affect: Mood normal.         "

## 2025-04-24 NOTE — TELEPHONE ENCOUNTER
Please let patient know that overall his lupus monitoring labs are stable. However, as we discussed at his visit he still has the presence of atypical white blood cells. I placed a referral for the Hematology doctors at [facility] which looks like it is scheduled in June. He also has an appt with Hematology at [facility] in May so he should also bring this to his [facility] Hematologist's attention when he sees her in May (Dr. Herrera). Also, his thyroid function is normal.

## 2025-04-25 ENCOUNTER — APPOINTMENT (OUTPATIENT)
Dept: LAB | Facility: CLINIC | Age: 55
End: 2025-04-25
Payer: COMMERCIAL

## 2025-04-25 ENCOUNTER — NURSE TRIAGE (OUTPATIENT)
Age: 55
End: 2025-04-25

## 2025-04-25 DIAGNOSIS — Z12.5 SCREENING FOR PROSTATE CANCER: ICD-10-CM

## 2025-04-25 DIAGNOSIS — R63.4 WEIGHT LOSS: ICD-10-CM

## 2025-04-25 LAB
25(OH)D3 SERPL-MCNC: 46.4 NG/ML (ref 30–100)
BACTERIA UR QL AUTO: ABNORMAL /HPF
BILIRUB UR QL STRIP: NEGATIVE
CLARITY UR: CLEAR
COLOR UR: ABNORMAL
DSDNA IGG SERPL IA-ACNC: 14 IU/ML (ref ?–15)
FERRITIN SERPL-MCNC: 143 NG/ML (ref 30–336)
GLUCOSE UR STRIP-MCNC: NEGATIVE MG/DL
HGB UR QL STRIP.AUTO: NEGATIVE
IRON SATN MFR SERPL: 34 % (ref 15–50)
IRON SERPL-MCNC: 103 UG/DL (ref 50–212)
KETONES UR STRIP-MCNC: NEGATIVE MG/DL
LEUKOCYTE ESTERASE UR QL STRIP: NEGATIVE
NITRITE UR QL STRIP: NEGATIVE
NON-SQ EPI CELLS URNS QL MICRO: ABNORMAL /HPF
PH UR STRIP.AUTO: 7 [PH]
PROT UR STRIP-MCNC: ABNORMAL MG/DL
PSA FREE MFR SERPL: 58.57 %
PSA FREE SERPL-MCNC: 0.27 NG/ML
PSA SERPL-MCNC: 0.46 NG/ML (ref 0–4)
RBC #/AREA URNS AUTO: ABNORMAL /HPF
SP GR UR STRIP.AUTO: 1.01 (ref 1–1.03)
TIBC SERPL-MCNC: 301 UG/DL (ref 250–450)
TRANSFERRIN SERPL-MCNC: 215 MG/DL (ref 203–362)
UIBC SERPL-MCNC: 198 UG/DL (ref 155–355)
UROBILINOGEN UR STRIP-ACNC: <2 MG/DL
VIT B12 SERPL-MCNC: 650 PG/ML (ref 180–914)
WBC #/AREA URNS AUTO: ABNORMAL /HPF

## 2025-04-25 PROCEDURE — 82306 VITAMIN D 25 HYDROXY: CPT

## 2025-04-25 PROCEDURE — 81001 URINALYSIS AUTO W/SCOPE: CPT

## 2025-04-25 PROCEDURE — 36415 COLL VENOUS BLD VENIPUNCTURE: CPT | Performed by: INTERNAL MEDICINE

## 2025-04-25 PROCEDURE — 82728 ASSAY OF FERRITIN: CPT

## 2025-04-25 PROCEDURE — 86803 HEPATITIS C AB TEST: CPT

## 2025-04-25 PROCEDURE — 83540 ASSAY OF IRON: CPT

## 2025-04-25 PROCEDURE — 84154 ASSAY OF PSA FREE: CPT

## 2025-04-25 PROCEDURE — 83550 IRON BINDING TEST: CPT

## 2025-04-25 PROCEDURE — 84153 ASSAY OF PSA TOTAL: CPT

## 2025-04-25 PROCEDURE — 87389 HIV-1 AG W/HIV-1&-2 AB AG IA: CPT | Performed by: INTERNAL MEDICINE

## 2025-04-25 PROCEDURE — 82607 VITAMIN B-12: CPT

## 2025-04-25 NOTE — TELEPHONE ENCOUNTER
Patient returning call for results, advised per Dr. Narayanan,   Please let patient know that overall his lupus monitoring labs are stable. However, as we discussed at his visit he still has the presence of atypical white blood cells. I placed a referral for the Hematology doctors at [facility] which looks like it is scheduled in June. He also has an appt with Hematology at [facility] in May so he should also bring this to his [facility] Hematologist's attention when he sees her in May (Dr. Herrera). Also, his thyroid function is normal.           No further questions at this time. Used  Lucio #[id number].

## 2025-04-25 NOTE — TELEPHONE ENCOUNTER
"FOLLOW UP: UC now    REASON FOR CONVERSATION: Foot Pain    SYMPTOMS: red area on top of foot occasional numbness, slightly warm to touch    OTHER: patient had previous infection on toes ~3 mo ago that looked similar. Agreeable to UC.    DISPOSITION: Go to ED/UCC Now (Or to Office with PCP Approval)      Reason for Disposition   Red area or streak and fever    Answer Assessment - Initial Assessment Questions  1. ONSET: \"When did the pain start?\"       1 week ago    2. LOCATION: \"Where is the pain located?\"       L foot - top    3. PAIN: \"How bad is the pain?\"    (Scale 1-10; or mild, moderate, severe)      6/10    4. WORK OR EXERCISE: \"Has there been any recent work or exercise that involved this part of the body?\"       Denies    5. CAUSE: \"What do you think is causing the foot pain?\"      Infection?    6. OTHER SYMPTOMS: \"Do you have any other symptoms?\" (e.g., leg pain, rash, fever, numbness)      Redness on top of foot, numbness comes and goes    Protocols used: Foot Pain-Adult-[place]    "

## 2025-04-26 LAB
HCV AB SER QL: NORMAL
HIV 1+2 AB+HIV1 P24 AG SERPL QL IA: NORMAL

## 2025-04-30 DIAGNOSIS — E87.1 HYPONATREMIA: ICD-10-CM

## 2025-04-30 DIAGNOSIS — M19.012 ARTHRITIS OF LEFT ACROMIOCLAVICULAR JOINT: ICD-10-CM

## 2025-04-30 DIAGNOSIS — L03.116 CELLULITIS OF LEFT LOWER EXTREMITY: Primary | ICD-10-CM

## 2025-04-30 RX ORDER — GABAPENTIN 600 MG/1
600 TABLET ORAL 3 TIMES DAILY
Qty: 270 TABLET | Refills: 1 | Status: SHIPPED | OUTPATIENT
Start: 2025-04-30 | End: 2025-10-27

## 2025-04-30 RX ORDER — SODIUM CHLORIDE 1 G/1
TABLET ORAL
Qty: 540 TABLET | Refills: 1 | Status: SHIPPED | OUTPATIENT
Start: 2025-04-30

## 2025-04-30 RX ORDER — CLOBETASOL PROPIONATE 0.5 MG/G
CREAM TOPICAL 2 TIMES DAILY
Qty: 60 G | Refills: 0 | Status: SHIPPED | OUTPATIENT
Start: 2025-04-30

## 2025-04-30 NOTE — TELEPHONE ENCOUNTER
Patient called to request a refill for their sodium chloride advised a refill was requested on 4/30 and is pending approval. Patient verbalized understanding and is in agreement.     Does the patient have enough for 3 days?   [] Yes   [x] No - Send as HP to POD - pt said he'll be out of this tomorrow

## 2025-04-30 NOTE — TELEPHONE ENCOUNTER
Reason for call:   [x] Refill   [] Prior Auth  [] Other: rx history doesn't show it but pt insisted that pcp is who prescribes the cream    Office:   [x] PCP/Provider - Dr Batres   [] Specialty/Provider -     Medication: gabapentin 600 mg take 1 tablet 3 times a day #270    Clobetasol 0.05 % cream       Pharmacy: [facility]   Does the patient have enough for 3 days?   [] Yes   [x] No - Send as HP to POD- he's out of the cream    Mail Away Pharmacy   Does the patient have enough for 10 days?   [] Yes   [] No - Send as HP to POD

## 2025-05-01 ENCOUNTER — OFFICE VISIT (OUTPATIENT)
Dept: INTERNAL MEDICINE CLINIC | Facility: CLINIC | Age: 55
End: 2025-05-01
Payer: COMMERCIAL

## 2025-05-01 ENCOUNTER — HOSPITAL ENCOUNTER (OUTPATIENT)
Dept: CT IMAGING | Facility: HOSPITAL | Age: 55
End: 2025-05-01
Attending: INTERNAL MEDICINE
Payer: COMMERCIAL

## 2025-05-01 VITALS
BODY MASS INDEX: 25.75 KG/M2 | DIASTOLIC BLOOD PRESSURE: 84 MMHG | RESPIRATION RATE: 16 BRPM | WEIGHT: 160.2 LBS | HEART RATE: 73 BPM | OXYGEN SATURATION: 99 % | SYSTOLIC BLOOD PRESSURE: 130 MMHG | HEIGHT: 66 IN

## 2025-05-01 DIAGNOSIS — R21 RASH: Primary | ICD-10-CM

## 2025-05-01 DIAGNOSIS — R63.4 WEIGHT LOSS: ICD-10-CM

## 2025-05-01 PROCEDURE — 74177 CT ABD & PELVIS W/CONTRAST: CPT

## 2025-05-01 PROCEDURE — 99213 OFFICE O/P EST LOW 20 MIN: CPT

## 2025-05-01 PROCEDURE — 71260 CT THORAX DX C+: CPT

## 2025-05-01 RX ORDER — METHYLPREDNISOLONE 4 MG/1
TABLET ORAL
Qty: 21 EACH | Refills: 0 | Status: SHIPPED | OUTPATIENT
Start: 2025-05-01 | End: 2025-05-01

## 2025-05-01 RX ORDER — TRIAMCINOLONE ACETONIDE 1 MG/G
CREAM TOPICAL 2 TIMES DAILY
Qty: 15 G | Refills: 0 | Status: SHIPPED | OUTPATIENT
Start: 2025-05-01

## 2025-05-01 RX ORDER — FEXOFENADINE HCL 180 MG/1
180 TABLET ORAL DAILY
Qty: 90 TABLET | Refills: 0 | Status: SHIPPED | OUTPATIENT
Start: 2025-05-01 | End: 2025-05-01

## 2025-05-01 RX ADMIN — IOHEXOL 100 ML: 350 INJECTION, SOLUTION INTRAVENOUS at 07:52

## 2025-05-01 NOTE — PROGRESS NOTES
Name: Donta Hunter      : 1970      MRN: [id number]  Encounter Provider: CORBY Doshi  Encounter Date: 2025   Encounter department: [facility] [place]  :  Assessment & Plan  Rash  Urticarial rash to the right forearm, patient will use Kenalog cream to this area.  The erythematous papules on his chest look to be inflamed ingrown hairs.  Multiple ingrown hairs visualized on chest.  Directed the patient to keep his chest clean and dry, use mild soap.  Orders:  •  triamcinolone (KENALOG) 0.1 % cream; Apply topically 2 (two) times a day           History of Present Illness {?Quick Links Encounters * My Last Note * Last Note in Specialty * Snapshot * Since Last Visit * History :09000}  Donta is here today with complaints of a rash that has been present since yesterday on the dorsal side of his right forearm. It is very itchy and slightly edematous. The itch has not changed, he tries not to scratch. There is no discharge from the rash, he has not changed any of his detergents or soaps, no new foods, he does report being outside yesterday but he's unsure if he touched anything irritating. He also reports a rash to his chest that is not itchy. There are multiple little bumps on his chest with just localized redness to each bump. He denies any excess sweating recently, shaving, or waxing. He is also hoping to review his labs.    Rash  Pertinent negatives include no congestion, cough, diarrhea, fever, rhinorrhea, shortness of breath, sore throat or vomiting.     Review of Systems   Constitutional:  Negative for chills and fever.   HENT:  Negative for congestion, ear pain, rhinorrhea, sinus pressure and sore throat.    Eyes:  Negative for pain and visual disturbance.   Respiratory:  Negative for cough, chest tightness and shortness of breath.    Cardiovascular:  Negative for chest pain, palpitations and leg swelling.   Gastrointestinal:  Negative for abdominal pain, constipation,  "diarrhea, nausea and vomiting.   Endocrine: Negative.    Genitourinary:  Negative for dysuria, frequency and hematuria.   Musculoskeletal:  Negative for arthralgias, back pain and myalgias.   Skin:  Positive for rash. Negative for color change.   Allergic/Immunologic: Negative.    Neurological:  Negative for dizziness, seizures, syncope, light-headedness and headaches.   Hematological: Negative.    Psychiatric/Behavioral: Negative.     All other systems reviewed and are negative.      Objective {?Quick Links Trend Vitals * Enter New Vitals * Results Review * Timeline (Adult) * Labs * Imaging * Cardiology * Procedures * Lung Cancer Screening * Surgical eConsent :71744}  /84 (BP Location: Left arm, Patient Position: Sitting, Cuff Size: Adult)   Pulse 73   Resp 16   Ht 5' 6\" (1.676 m)   Wt 72.7 kg (160 lb 3.2 oz)   SpO2 99%   BMI 25.86 kg/m²      Physical Exam  Vitals and nursing note reviewed.   Constitutional:       General: He is not in acute distress.     Appearance: He is well-developed.   Cardiovascular:      Rate and Rhythm: Normal rate and regular rhythm.      Pulses: Normal pulses.      Heart sounds: Normal heart sounds. No murmur heard.  Pulmonary:      Effort: Pulmonary effort is normal. No respiratory distress.      Breath sounds: Normal breath sounds.   Abdominal:      General: Bowel sounds are normal.      Palpations: Abdomen is soft.      Tenderness: There is no abdominal tenderness.   Skin:     General: Skin is warm and dry.      Findings: Rash present. Rash is papular and urticarial.          Neurological:      Mental Status: He is alert.         "

## 2025-05-02 ENCOUNTER — RESULTS FOLLOW-UP (OUTPATIENT)
Dept: INTERNAL MEDICINE CLINIC | Facility: CLINIC | Age: 55
End: 2025-05-02

## 2025-05-07 ENCOUNTER — TELEPHONE (OUTPATIENT)
Dept: CARDIOLOGY CLINIC | Facility: CLINIC | Age: 55
End: 2025-05-07

## 2025-05-07 NOTE — TELEPHONE ENCOUNTER
----- Message from Dominic Schneider MD sent at 5/6/2025  3:55 PM EDT -----  Regarding: RE: PFO closure  Is pt scheduled with me to discuss pfo again?  ----- Message -----  From: Suzanna Mulligan MD  Sent: 2/6/2025   4:22 PM EDT  To: Carol Dallas MD; Dominic Schneider MD; #  Subject: RE: PFO closure                                  Vasculitis is pretty low in the differential especially after we discovered PFO.  ----- Message -----  From: Carol Dallas MD  Sent: 2/6/2025  10:00 AM EST  To: Suzanna Mulligan MD; Dominic Schneider MD; #  Subject: PFO closure                                      Good morning all,    This is a patient of Dr. Schneider's I saw yesterday, I just want to reconfirm that the patient's stroke history seems to not be secondary to vasculitis  with neurology/rheumatology but more likely embolic potentially related to his PFO. He had a event monitor last year for almost a month which showed no evidence of AFIB. I think it would be reasonable to revisit his candidacy for PFO closure.     Take care,  -Ali

## 2025-05-14 DIAGNOSIS — R25.2 SPASTICITY: ICD-10-CM

## 2025-05-14 RX ORDER — BACLOFEN 20 MG/1
20 TABLET ORAL 2 TIMES DAILY
Qty: 90 TABLET | Refills: 0 | Status: SHIPPED | OUTPATIENT
Start: 2025-05-14

## 2025-05-14 NOTE — TELEPHONE ENCOUNTER
Reason for call:   [x] Refill   [] Prior Auth  [] Other:     Office:   [x] PCP/Provider - Raad Batres,   [] Specialty/Provider -     Medication: baclofen 20 mg     Dose/Frequency: Take 1 tablet (20 mg total) by mouth 2 (two) times a day,     Quantity: 60    Pharmacy: [facility]   Does the patient have enough for 3 days?   [] Yes   [x] No - Send as HP to POD    Mail Away Pharmacy   Does the patient have enough for 10 days?   [] Yes   [] No - Send as HP to POD

## 2025-05-20 ENCOUNTER — APPOINTMENT (OUTPATIENT)
Dept: LAB | Facility: CLINIC | Age: 55
End: 2025-05-20
Payer: COMMERCIAL

## 2025-05-20 LAB
ALBUMIN SERPL BCG-MCNC: 4.4 G/DL (ref 3.5–5)
ALP SERPL-CCNC: 133 U/L (ref 34–104)
ALT SERPL W P-5'-P-CCNC: 26 U/L (ref 7–52)
ANION GAP SERPL CALCULATED.3IONS-SCNC: 10 MMOL/L (ref 4–13)
AST SERPL W P-5'-P-CCNC: 26 U/L (ref 13–39)
BACTERIA UR QL AUTO: NORMAL /HPF
BASOPHILS # BLD AUTO: 0.03 THOUSANDS/ÂΜL (ref 0–0.1)
BASOPHILS NFR BLD AUTO: 1 % (ref 0–1)
BILIRUB SERPL-MCNC: 0.4 MG/DL (ref 0.2–1)
BILIRUB UR QL STRIP: NEGATIVE
BUN SERPL-MCNC: 10 MG/DL (ref 5–25)
C3 SERPL-MCNC: 116 MG/DL (ref 87–200)
C4 SERPL-MCNC: 44 MG/DL (ref 19–52)
CALCIUM SERPL-MCNC: 9.5 MG/DL (ref 8.4–10.2)
CHLORIDE SERPL-SCNC: 100 MMOL/L (ref 96–108)
CLARITY UR: CLEAR
CO2 SERPL-SCNC: 26 MMOL/L (ref 21–32)
COLOR UR: NORMAL
CREAT SERPL-MCNC: 0.91 MG/DL (ref 0.6–1.3)
CREAT UR-MCNC: 61.6 MG/DL
EOSINOPHIL # BLD AUTO: 0.03 THOUSAND/ÂΜL (ref 0–0.61)
EOSINOPHIL NFR BLD AUTO: 1 % (ref 0–6)
ERYTHROCYTE [DISTWIDTH] IN BLOOD BY AUTOMATED COUNT: 13.3 % (ref 11.6–15.1)
GFR SERPL CREATININE-BSD FRML MDRD: 93 ML/MIN/1.73SQ M
GLUCOSE P FAST SERPL-MCNC: 84 MG/DL (ref 65–99)
GLUCOSE UR STRIP-MCNC: NEGATIVE MG/DL
HCT VFR BLD AUTO: 45 % (ref 36.5–49.3)
HGB BLD-MCNC: 15.4 G/DL (ref 12–17)
HGB UR QL STRIP.AUTO: NEGATIVE
IMM GRANULOCYTES # BLD AUTO: 0.02 THOUSAND/UL (ref 0–0.2)
IMM GRANULOCYTES NFR BLD AUTO: 1 % (ref 0–2)
KETONES UR STRIP-MCNC: NEGATIVE MG/DL
LEUKOCYTE ESTERASE UR QL STRIP: NEGATIVE
LYMPHOCYTES # BLD AUTO: 1.5 THOUSANDS/ÂΜL (ref 0.6–4.47)
LYMPHOCYTES NFR BLD AUTO: 52 % (ref 14–44)
MCH RBC QN AUTO: 31.3 PG (ref 26.8–34.3)
MCHC RBC AUTO-ENTMCNC: 34.2 G/DL (ref 31.4–37.4)
MCV RBC AUTO: 92 FL (ref 82–98)
MONOCYTES # BLD AUTO: 0.45 THOUSAND/ÂΜL (ref 0.17–1.22)
MONOCYTES NFR BLD AUTO: 16 % (ref 4–12)
NEUTROPHILS # BLD AUTO: 0.81 THOUSANDS/ÂΜL (ref 1.85–7.62)
NEUTS SEG NFR BLD AUTO: 29 % (ref 43–75)
NITRITE UR QL STRIP: NEGATIVE
NON-SQ EPI CELLS URNS QL MICRO: NORMAL /HPF
NRBC BLD AUTO-RTO: 0 /100 WBCS
PH UR STRIP.AUTO: 7 [PH]
PLATELET # BLD AUTO: 164 THOUSANDS/UL (ref 149–390)
PMV BLD AUTO: 11.3 FL (ref 8.9–12.7)
POTASSIUM SERPL-SCNC: 4.8 MMOL/L (ref 3.5–5.3)
PROT SERPL-MCNC: 8.1 G/DL (ref 6.4–8.4)
PROT UR STRIP-MCNC: NEGATIVE MG/DL
PROT UR-MCNC: 4.7 MG/DL
PROT/CREAT UR: 0.1 MG/G{CREAT}
RBC # BLD AUTO: 4.92 MILLION/UL (ref 3.88–5.62)
RBC #/AREA URNS AUTO: NORMAL /HPF
SODIUM SERPL-SCNC: 136 MMOL/L (ref 135–147)
SP GR UR STRIP.AUTO: 1.01 (ref 1–1.03)
UROBILINOGEN UR STRIP-ACNC: <2 MG/DL
WBC # BLD AUTO: 2.84 THOUSAND/UL (ref 4.31–10.16)
WBC #/AREA URNS AUTO: NORMAL /HPF

## 2025-05-20 PROCEDURE — 85025 COMPLETE CBC W/AUTO DIFF WBC: CPT

## 2025-05-20 PROCEDURE — 80053 COMPREHEN METABOLIC PANEL: CPT

## 2025-05-20 PROCEDURE — 81001 URINALYSIS AUTO W/SCOPE: CPT

## 2025-05-20 PROCEDURE — 86160 COMPLEMENT ANTIGEN: CPT

## 2025-05-20 PROCEDURE — 82570 ASSAY OF URINE CREATININE: CPT

## 2025-05-20 PROCEDURE — 84156 ASSAY OF PROTEIN URINE: CPT

## 2025-05-21 ENCOUNTER — RESULTS FOLLOW-UP (OUTPATIENT)
Age: 55
End: 2025-05-21

## 2025-05-21 LAB — DSDNA IGG SERPL IA-ACNC: 14 IU/ML (ref ?–15)

## 2025-05-21 NOTE — TELEPHONE ENCOUNTER
Please let patient know that his lupus monitoring labs are stable. His white blood cell count is still low, but it is stable from prior. This will be further investigated by Hematology at his upcoming appointment.

## 2025-06-02 DIAGNOSIS — Z86.718 HISTORY OF DVT (DEEP VEIN THROMBOSIS): Primary | ICD-10-CM

## 2025-06-02 DIAGNOSIS — M79.671 RIGHT FOOT PAIN: ICD-10-CM

## 2025-06-02 DIAGNOSIS — N52.9 ERECTILE DYSFUNCTION, UNSPECIFIED ERECTILE DYSFUNCTION TYPE: ICD-10-CM

## 2025-06-02 DIAGNOSIS — K21.9 GASTROESOPHAGEAL REFLUX DISEASE WITHOUT ESOPHAGITIS: ICD-10-CM

## 2025-06-02 DIAGNOSIS — M32.9 SLE (SYSTEMIC LUPUS ERYTHEMATOSUS RELATED SYNDROME) (HCC): ICD-10-CM

## 2025-06-02 DIAGNOSIS — M32.14 LUPUS NEPHRITIS (HCC): ICD-10-CM

## 2025-06-02 RX ORDER — MYCOPHENOLATE MOFETIL 500 MG/1
500 TABLET ORAL EVERY 12 HOURS SCHEDULED
Qty: 90 TABLET | Refills: 0 | Status: SHIPPED | OUTPATIENT
Start: 2025-06-02

## 2025-06-02 RX ORDER — HYDROXYCHLOROQUINE SULFATE 200 MG/1
400 TABLET, FILM COATED ORAL
Qty: 180 TABLET | Refills: 1 | Status: SHIPPED | OUTPATIENT
Start: 2025-06-02 | End: 2026-06-02

## 2025-06-03 RX ORDER — FUROSEMIDE 40 MG/1
40 TABLET ORAL DAILY
Qty: 90 TABLET | Refills: 0 | Status: SHIPPED | OUTPATIENT
Start: 2025-06-03

## 2025-06-03 RX ORDER — FAMOTIDINE 20 MG/1
20 TABLET, FILM COATED ORAL 2 TIMES DAILY
Qty: 180 TABLET | Refills: 0 | Status: SHIPPED | OUTPATIENT
Start: 2025-06-03 | End: 2026-05-29

## 2025-06-03 RX ORDER — LIDOCAINE AND PRILOCAINE 25; 25 MG/G; MG/G
CREAM TOPICAL AS NEEDED
Qty: 30 G | Refills: 0 | Status: SHIPPED | OUTPATIENT
Start: 2025-06-03

## 2025-06-03 RX ORDER — SILDENAFIL 50 MG/1
50 TABLET, FILM COATED ORAL DAILY PRN
Qty: 10 TABLET | Refills: 0 | Status: SHIPPED | OUTPATIENT
Start: 2025-06-03

## 2025-06-03 RX ORDER — POTASSIUM CHLORIDE 1500 MG/1
20 TABLET, EXTENDED RELEASE ORAL DAILY
Qty: 90 TABLET | Refills: 0 | Status: SHIPPED | OUTPATIENT
Start: 2025-06-03

## 2025-06-06 ENCOUNTER — OFFICE VISIT (OUTPATIENT)
Dept: INTERNAL MEDICINE CLINIC | Facility: CLINIC | Age: 55
End: 2025-06-06
Payer: COMMERCIAL

## 2025-06-06 VITALS
WEIGHT: 160.4 LBS | OXYGEN SATURATION: 98 % | BODY MASS INDEX: 25.78 KG/M2 | HEIGHT: 66 IN | RESPIRATION RATE: 16 BRPM | SYSTOLIC BLOOD PRESSURE: 130 MMHG | HEART RATE: 86 BPM | DIASTOLIC BLOOD PRESSURE: 84 MMHG

## 2025-06-06 DIAGNOSIS — R21 RASH: Primary | ICD-10-CM

## 2025-06-06 DIAGNOSIS — M79.2 NEUROPATHIC PAIN: ICD-10-CM

## 2025-06-06 DIAGNOSIS — R63.4 WEIGHT LOSS: ICD-10-CM

## 2025-06-06 DIAGNOSIS — I10 PRIMARY HYPERTENSION: ICD-10-CM

## 2025-06-06 PROCEDURE — 99214 OFFICE O/P EST MOD 30 MIN: CPT | Performed by: INTERNAL MEDICINE

## 2025-06-06 NOTE — PROGRESS NOTES
"Name: Donta Hunter      : 1970      MRN: [id number]  Encounter Provider: Raad Batres MD  Encounter Date: 2025   Encounter department: [facility] INTERNAL MEDICINE [place]  :  Assessment & Plan  Rash  Urticarial rash to the right forearm, patient will use Kenalog cream to this area.   Orders:    Ambulatory Referral to Dermatology; Future    Primary hypertension  Controlled with current regimen.       Neuropathic pain  Not controlled.  Saw neurology in September  Orders:    Ambulatory Referral to Neurology; Future    Weight loss  Stable.              Depression Screening and Follow-up Plan: Patient was screened for depression during today's encounter. They screened negative with a PHQ-2 score of 0.        History of Present Illness   Routine f/u.      Review of Systems   Constitutional:  Negative for chills and fever.   HENT:  Negative for ear pain and sore throat.    Eyes:  Negative for pain and visual disturbance.   Respiratory:  Negative for cough and shortness of breath.    Cardiovascular:  Negative for chest pain and palpitations.   Gastrointestinal:  Negative for abdominal pain and vomiting.   Genitourinary:  Negative for dysuria and hematuria.   Musculoskeletal:  Positive for arthralgias, back pain and gait problem.   Skin:  Negative for color change and rash.   Neurological:  Negative for seizures and syncope.   All other systems reviewed and are negative.      Objective   /84 (BP Location: Right arm, Patient Position: Sitting, Cuff Size: Adult)   Pulse 86   Resp 16   Ht 5' 6\" (1.676 m)   Wt 72.8 kg (160 lb 6.4 oz)   SpO2 98%   BMI 25.89 kg/m²      Physical Exam  Vitals and nursing note reviewed.   Constitutional:       General: He is not in acute distress.     Appearance: He is well-developed.   HENT:      Head: Normocephalic and atraumatic.     Eyes:      Conjunctiva/sclera: Conjunctivae normal.       Cardiovascular:      Rate and Rhythm: Normal rate and regular rhythm. "      Heart sounds: No murmur heard.  Pulmonary:      Effort: Pulmonary effort is normal. No respiratory distress.      Breath sounds: Normal breath sounds.   Abdominal:      Tenderness: There is no abdominal tenderness.     Musculoskeletal:         General: No swelling.     Skin:     General: Skin is dry.     Neurological:      Mental Status: He is alert. Mental status is at baseline.      Gait: Gait abnormal.     Psychiatric:         Mood and Affect: Mood normal.

## 2025-06-06 NOTE — ASSESSMENT & PLAN NOTE
Not controlled.  Saw neurology in September  Orders:    Ambulatory Referral to Neurology; Future

## 2025-06-13 ENCOUNTER — HOSPITAL ENCOUNTER (OUTPATIENT)
Dept: VASCULAR ULTRASOUND | Facility: HOSPITAL | Age: 55
Discharge: HOME | End: 2025-06-13
Attending: NURSE PRACTITIONER
Payer: COMMERCIAL

## 2025-06-13 DIAGNOSIS — S91.105A OPEN WOUND OF SECOND TOE OF LEFT FOOT, INITIAL ENCOUNTER: ICD-10-CM

## 2025-06-13 DIAGNOSIS — R09.89 DIMINISHED PULSES IN LOWER EXTREMITY: ICD-10-CM

## 2025-06-13 PROCEDURE — 93923 UPR/LXTR ART STDY 3+ LVLS: CPT

## 2025-06-13 PROCEDURE — 93925 LOWER EXTREMITY STUDY: CPT

## 2025-06-13 PROCEDURE — 93925 LOWER EXTREMITY STUDY: CPT | Performed by: SURGERY

## 2025-06-13 PROCEDURE — 93922 UPR/L XTREMITY ART 2 LEVELS: CPT | Performed by: SURGERY

## 2025-06-16 ENCOUNTER — RESULTS FOLLOW-UP (OUTPATIENT)
Dept: OTHER | Facility: HOSPITAL | Age: 55
End: 2025-06-16

## 2025-06-16 ENCOUNTER — OFFICE VISIT (OUTPATIENT)
Dept: HEMATOLOGY ONCOLOGY | Facility: CLINIC | Age: 55
End: 2025-06-16
Attending: STUDENT IN AN ORGANIZED HEALTH CARE EDUCATION/TRAINING PROGRAM
Payer: COMMERCIAL

## 2025-06-16 VITALS
OXYGEN SATURATION: 99 % | HEIGHT: 66 IN | HEART RATE: 76 BPM | RESPIRATION RATE: 17 BRPM | DIASTOLIC BLOOD PRESSURE: 78 MMHG | TEMPERATURE: 97.9 F | SYSTOLIC BLOOD PRESSURE: 118 MMHG | WEIGHT: 159 LBS | BODY MASS INDEX: 25.55 KG/M2

## 2025-06-16 DIAGNOSIS — R26.9 GAIT DIFFICULTY: Primary | ICD-10-CM

## 2025-06-16 DIAGNOSIS — R74.8 ELEVATED ALKALINE PHOSPHATASE LEVEL: ICD-10-CM

## 2025-06-16 DIAGNOSIS — R63.4 ABNORMAL WEIGHT LOSS: ICD-10-CM

## 2025-06-16 DIAGNOSIS — M54.42 ACUTE MIDLINE LOW BACK PAIN WITH BILATERAL SCIATICA: ICD-10-CM

## 2025-06-16 DIAGNOSIS — M19.071 DJD (DEGENERATIVE JOINT DISEASE), ANKLE AND FOOT, RIGHT: ICD-10-CM

## 2025-06-16 DIAGNOSIS — Z74.2 ASSISTANCE NEEDED AT HOME: ICD-10-CM

## 2025-06-16 DIAGNOSIS — D70.2 OTHER DRUG-INDUCED NEUTROPENIA (HCC): Primary | ICD-10-CM

## 2025-06-16 DIAGNOSIS — M54.41 ACUTE MIDLINE LOW BACK PAIN WITH BILATERAL SCIATICA: ICD-10-CM

## 2025-06-16 PROCEDURE — 99215 OFFICE O/P EST HI 40 MIN: CPT | Performed by: PHYSICIAN ASSISTANT

## 2025-06-16 NOTE — PROGRESS NOTES
Name: Donta Hunter      : 1970      MRN: [id number]  Encounter Provider: Sejal Rm PA-C  Encounter Date: 2025   Encounter department: [facility] H[facility]AT[facility] [place]  :  Assessment & Plan  Other drug-induced neutropenia (HCC)  On chart review, he has had a chronic leukopenia dating back to at least .  Unfortunately there are no labs prior to this.  It is unclear if he had already started treatment with cyclophosphamide for SLE at this time based on documentation.      The the common etiologies for leukocytopenia include antibiotic use, anti-epileptics, hyperthyroidism,  B12 deficiency, bone marrow disorders including MDS, Chronic infection (HIV) or immune disease, alcohol abuse, cirrhosis, and splenomegaly   - He had CT CAP recently that did not reveal any splenomegaly or adenopathy.  Physical exam today is unremarkable.  - He currently is on hydroxychloroquine 100 mg daily and CellCept 1 g daily.    - Last B12, TSH levels were normal.    - HIV/Hep C 1m ago were negative     I suspect his chronic leukopenia is secondary to immune induced neutropenia versus immunosuppressive medications/drug induced neutropenia, particular Cellcept. Per UTD guidelines, for management of neutropenia related to cellcept, dose reduction vs interruption should be considered to see if this can improve his neutrophil count. He is not currently experiencing any issues with frequent infection. We can consider G-CSF 5 mcg/kg/day PRN for ANC <500 in the future if above work up is negative     -The patient is aware of risk of serious and life-threatening infection with ANC less than 1.0 (as is currently the case) and especially for ANC less than 0.5. In the case of fever i.e. temperature 100.5 or higher, chills, cough and mucus production, sinus stuffiness, abdominal pain, loose bowel movements or other signs of serious infection, broad-spectrum antibiotic therapy should be initiated  empirically and promptly. In the case of infection with ANC <0.5 and/or presence of sepsis IV antibiotic therapy should be administered in a hospital setting.      Orders:    LD,Blood; Future    Protein electrophoresis, serum; Future    IgG, IgA, IgM; Future    Copper Level; Future    CBC and differential; Future    Kappa/Lambda Light Chains Free With Ratio, Serum; Future    Leukemia/Lymphoma flow cytometry; Future    Peripheral Smear; Future    Abnormal weight loss  Unclear etiology. Per patient, no medication changes   Colonoscopy 03/2024 was unremarkable  Recent CT CAP without occult malignancy.  Enlarged prostate is present however PSA is normal.  Recommend peripheral flow cytometry, peripheral smear, SPEP to evaluate for hematological malignancy  If above workup is negative, we may consider bone marrow biopsy    Orders:    LD,Blood; Future    Protein electrophoresis, serum; Future    IgG, IgA, IgM; Future    Copper Level; Future    CBC and differential; Future    Leukemia/Lymphoma flow cytometry; Future    Peripheral Smear; Future    Elevated alkaline phosphatase level  Check isoenzymes  Recommend follow-up with pain management for worsening back pain  Orders:    Alkaline phosphatase, isoenzymes; Future    Assistance needed at home  Social work referral placed per patient request.  He is interested in aquatic physical therapy, I will reach out to his PCP to see if this can be ordered  Orders:    Ambulatory Referral to Social Work Care Management Program; Future    Acute midline low back pain with bilateral sciatica  ack pain has been getting worse over past couple months. He has burning in bilateral feet. Pain now worse with standing  Repeat Lumbar MRI   Follow up with pain management   Orders:    MRI lumbar spine w wo contrast; Future      Assessment & Plan        Return in about 4 weeks (around 7/14/2025) for Office Visit.    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    Patient reports low white blood count since starting medication for lupus around 10 years ago.   He denies frequent infections, fever, night sweats, or adenopathy new lumps or bumps.   He had infection of right foot approximately 7-8 months. No other infections in the past year.   He admits to unintentional weight loss 20-30lbs in past 3 months.  He has had no changes in diet. No changes in activity. He has not had any medication changes.   He had rash on right arm recently treated with steroid cream with completely resolution.   No history of HIV or hepatitis.     He does have history of SLE managed by rheumatology. Current on hydroxychloroquine for 100 mg daily and CellCept 1 g daily. Disease is complicated by lupus nephritis and neuropathy. Neuropathy managed by neurology. He is on gabapentin. Prior SPEP 10 years ago was normal. Prior lyme screen 2024 negative.     He has chronic back pain for many years. Ambulates with walker. Back pain has been getting worse over past couple months. He has burning in bilateral feet. Pain now worse with standing. He has had spine injection previously which did not improve his symptoms.     He has history of right posterior tibial vein DVT diagnosed by duplex on 7/14/2017. Reportedly unprovoked. The patient reports that he has been treated with Xarelto since that time, though there are notes in the interim which note ASA 81mg only. He also has history of ischemic strokes seen on MRI at [facility]: Was tested for APS and testing was negative. Reports that he had another thrombotic episode and underwent thrombectomy in June 2024. He was found to have PFO and was evaluate by cardiology. He  has remained on Eliquis     No personal history of cancer   No family history of cancer. No family members have lupus.   Both parents are [place].   No tobacco or alcohol use     Labs   WBC 10/2014: 4.39 - unclear if he was on any tx for Lupus at this time.   WBC 11/2014: 2.85   WBC 06/2015: 2.21. SPEP negative.   WBC11/2015: 2.69. Bence chaitanya protein negative. Cryoglobulin POS, lupus anticoagulant negative   WBC 01/2016: WBC 3.81   WBC (Thousand/uL)   Date Value   05/20/2025 2.84 (L)   04/23/2025 2.30 (L)   03/10/2025 2.79 (L)   02/03/2025 2.11 (L)   12/30/2024 2.43 (L)   10/29/2024 2.75 (L)   10/24/2024 3.28 (L)   10/23/2024 3.57 (L)   10/22/2024 3.13 (L)   10/21/2024 2.81 (L)   10/01/2024 3.30 (L)   08/27/2024 3.53 (L)   07/19/2024 3.23 (L)   05/23/2024 2.00 (L)   04/25/2024 3.14 (L)   04/13/2024 1.94 (L)   04/12/2024 1.61 (L)   03/04/2024 2.82 (L)   02/28/2024 2.38 (L)   09/22/2023 2.44 (L)   07/05/2023 3.15 (L)   06/30/2023 3.29 (L)   03/30/2023 4.50   03/27/2023 4.86   02/13/2023 3.84 (L)   01/09/2023 3.97 (L)   01/05/2023 4.39   01/04/2023 4.54   01/01/2023 4.17 (L)   12/30/2022 4.97         Imaging   05/2025: CT CAP WC   IMPRESSION:  1. No thoracic or abdominal lymphadenopathy. No pulmonary lesions. No intra-abdominal mass. Normal enhancing pancreas without ductal dilation or parenchymal atrophy.  2. Normal caliber bowel loops. No foci of abnormal bowel wall thickening.  3. Prostate hypertrophy.     02/2024: MRI lumbar spine   IMPRESSION:  Degenerative changes of the lumbar spine, as described above.  Multifactorial disease results in moderate to severe right foraminal narrowing at L3-L4 with impingement of the exiting right L3 nerve root.  Bulging annulus with a small superimposed left paracentral disc protrusion at L5-S1 with near abutment of the descending left S1 nerve root.    03/2024: MRI thoracic spine   IMPRESSION:  No acute abnormality. Mild degenerative spondylosis. Stable mild chronic compression  "fractures at T7-T8.       Pertinent Medical History     06/16/25: as below      Review of Systems   Constitutional:  Positive for unexpected weight change. Negative for fatigue and fever.        No night sweats    Respiratory:  Negative for shortness of breath.    Cardiovascular:  Negative for chest pain.   Gastrointestinal:  Negative for blood in stool.        No melena    Genitourinary:  Negative for hematuria.   Skin:  Negative for rash.   Hematological:  Negative for adenopathy.   All other systems reviewed and are negative.          Objective   /78 (BP Location: Left arm, Patient Position: Sitting, Cuff Size: Adult)   Pulse 76   Temp 97.9 °F (36.6 °C) (Temporal)   Resp 17   Ht 5' 6\" (1.676 m)   Wt 72.1 kg (159 lb)   SpO2 99%   BMI 25.66 kg/m²     Physical Exam  Physical Exam      Results    Labs: I have reviewed the following labs:  Results for orders placed or performed in visit on 04/25/25   Vitamin B12   Result Value Ref Range    Vitamin B-12 650 180 - 914 pg/mL   Vitamin D 25 hydroxy   Result Value Ref Range    Vit D, 25-Hydroxy 46.4 30.0 - 100.0 ng/mL   Hepatitis C antibody   Result Value Ref Range    Hepatitis C Ab Non-reactive Non-Reactive   PSA, total and free   Result Value Ref Range    PSA, Diagnostic 0.461 0.000 - 4.000 ng/mL    PSA, Free 0.270 ng/mL    PSA % Free 58.568 %   Anti-DNA antibody, double-stranded   Result Value Ref Range    Anti-dsDNA Ab 14.0 See Comment IU/mL   Urinalysis with microscopic   Result Value Ref Range    Color, UA Light Yellow     Clarity, UA Clear     Specific Gravity, UA 1.013 1.003 - 1.030    pH, UA 7.0 4.5, 5.0, 5.5, 6.0, 6.5, 7.0, 7.5, 8.0    Leukocytes, UA Negative Negative    Nitrite, UA Negative Negative    Protein, UA Trace (A) Negative mg/dl    Glucose, UA Negative Negative mg/dl    Ketones, UA Negative Negative mg/dl    Urobilinogen, UA <2.0 <2.0 mg/dl mg/dl    Bilirubin, UA Negative Negative    Occult Blood, UA Negative Negative    RBC, UA None Seen " None Seen, 1-2 /hpf    WBC, UA None Seen None Seen, 1-2 /hpf    Epithelial Cells None Seen None Seen, Occasional /hpf    Bacteria, UA None Seen None Seen, Occasional /hpf   TIBC Panel (incl. Iron, TIBC, % Iron Saturation)   Result Value Ref Range    Iron Saturation 34 15 - 50 %    TIBC 301 250 - 450 ug/dL    Iron 103 50 - 212 ug/dL    Transferrin 215 203 - 362 mg/dL    UIBC 198 155 - 355 ug/dL   Result Value Ref Range    Ferritin 143 30 - 336 ng/mL     *Note: Due to a large number of results and/or encounters for the requested time period, some results have not been displayed. A complete set of results can be found in Results Review.

## 2025-06-16 NOTE — ASSESSMENT & PLAN NOTE
On chart review, he has had a chronic leukopenia dating back to at least 2014.  Unfortunately there are no labs prior to this.  It is unclear if he had already started treatment with cyclophosphamide for SLE at this time based on documentation.      The the common etiologies for leukocytopenia include antibiotic use, anti-epileptics, hyperthyroidism,  B12 deficiency, bone marrow disorders including MDS, Chronic infection (HIV) or immune disease, alcohol abuse, cirrhosis, and splenomegaly   - He had CT CAP recently that did not reveal any splenomegaly or adenopathy.  Physical exam today is unremarkable.  - He currently is on hydroxychloroquine 100 mg daily and CellCept 1 g daily.    - Last B12, TSH levels were normal.    - HIV/Hep C 1m ago were negative     I suspect his chronic leukopenia is secondary to immune induced neutropenia versus immunosuppressive medications/drug induced neutropenia, particular Cellcept. Per UTD guidelines, for management of neutropenia related to cellcept, dose reduction vs interruption should be considered to see if this can improve his neutrophil count. He is not currently experiencing any issues with frequent infection. We can consider G-CSF 5 mcg/kg/day PRN for ANC <500 in the future if above work up is negative     -The patient is aware of risk of serious and life-threatening infection with ANC less than 1.0 (as is currently the case) and especially for ANC less than 0.5. In the case of fever i.e. temperature 100.5 or higher, chills, cough and mucus production, sinus stuffiness, abdominal pain, loose bowel movements or other signs of serious infection, broad-spectrum antibiotic therapy should be initiated empirically and promptly. In the case of infection with ANC <0.5 and/or presence of sepsis IV antibiotic therapy should be administered in a hospital setting.      Orders:    LD,Blood; Future    Protein electrophoresis, serum; Future    IgG, IgA, IgM; Future    Copper Level; Future     CBC and differential; Future    Kappa/Lambda Light Chains Free With Ratio, Serum; Future    Leukemia/Lymphoma flow cytometry; Future    Peripheral Smear; Future

## 2025-06-18 ENCOUNTER — APPOINTMENT (OUTPATIENT)
Dept: LAB | Facility: CLINIC | Age: 55
End: 2025-06-18
Payer: COMMERCIAL

## 2025-06-18 ENCOUNTER — PATIENT OUTREACH (OUTPATIENT)
Dept: CASE MANAGEMENT | Facility: OTHER | Age: 55
End: 2025-06-18

## 2025-06-18 DIAGNOSIS — R63.4 ABNORMAL WEIGHT LOSS: ICD-10-CM

## 2025-06-18 DIAGNOSIS — D70.2 OTHER DRUG-INDUCED NEUTROPENIA (HCC): ICD-10-CM

## 2025-06-18 DIAGNOSIS — R74.8 ELEVATED ALKALINE PHOSPHATASE LEVEL: ICD-10-CM

## 2025-06-18 LAB
ERYTHROCYTE [DISTWIDTH] IN BLOOD BY AUTOMATED COUNT: 13.3 % (ref 11.6–15.1)
HCT VFR BLD AUTO: 42.3 % (ref 36.5–49.3)
HGB BLD-MCNC: 15 G/DL (ref 12–17)
IGA SERPL-MCNC: 499 MG/DL (ref 66–433)
IGG SERPL-MCNC: 1371 MG/DL (ref 635–1741)
IGM SERPL-MCNC: 35 MG/DL (ref 45–281)
LDH SERPL-CCNC: 155 U/L (ref 140–271)
MCH RBC QN AUTO: 31.3 PG (ref 26.8–34.3)
MCHC RBC AUTO-ENTMCNC: 35.5 G/DL (ref 31.4–37.4)
MCV RBC AUTO: 88 FL (ref 82–98)
PLATELET # BLD AUTO: 159 THOUSANDS/UL (ref 149–390)
PMV BLD AUTO: 11 FL (ref 8.9–12.7)
RBC # BLD AUTO: 4.8 MILLION/UL (ref 3.88–5.62)
WBC # BLD AUTO: 2.59 THOUSAND/UL (ref 4.31–10.16)

## 2025-06-18 PROCEDURE — 83615 LACTATE (LD) (LDH) ENZYME: CPT

## 2025-06-18 PROCEDURE — 83521 IG LIGHT CHAINS FREE EACH: CPT

## 2025-06-18 PROCEDURE — 36415 COLL VENOUS BLD VENIPUNCTURE: CPT

## 2025-06-18 PROCEDURE — 84080 ASSAY ALKALINE PHOSPHATASES: CPT

## 2025-06-18 PROCEDURE — 85027 COMPLETE CBC AUTOMATED: CPT

## 2025-06-18 PROCEDURE — 82525 ASSAY OF COPPER: CPT

## 2025-06-18 PROCEDURE — 84075 ASSAY ALKALINE PHOSPHATASE: CPT

## 2025-06-18 PROCEDURE — 86334 IMMUNOFIX E-PHORESIS SERUM: CPT

## 2025-06-18 PROCEDURE — 82784 ASSAY IGA/IGD/IGG/IGM EACH: CPT

## 2025-06-18 PROCEDURE — 84165 PROTEIN E-PHORESIS SERUM: CPT

## 2025-06-18 PROCEDURE — 88185 FLOWCYTOMETRY/TC ADD-ON: CPT | Performed by: PHYSICIAN ASSISTANT

## 2025-06-18 PROCEDURE — 85007 BL SMEAR W/DIFF WBC COUNT: CPT

## 2025-06-18 PROCEDURE — 88184 FLOWCYTOMETRY/ TC 1 MARKER: CPT

## 2025-06-18 NOTE — PROGRESS NOTES
JUNIOR CAMPA received a referral from patient's PCP as patient is interested in in home care. JUNIOR CAMPA reviewed patient's chart. Patient s [place] speaking. JUNIOR CAMPA assigned self to referral and utilized [employer]  ([phone]) and spoke with  Mayela (ID # [id number]) who assisted JUNIOR CAMPA with calling patient ([phone]). Patient did not answer. JUNIOR CAMPA left a message including JUNIOR CAMPA contact information and requested a call back. JUNIOR CAMPA will attempt to outreach again at a later date.

## 2025-06-19 ENCOUNTER — TELEPHONE (OUTPATIENT)
Age: 55
End: 2025-06-19

## 2025-06-19 LAB
KAPPA LC FREE SER-MCNC: 64.5 MG/L (ref 3.3–19.4)
KAPPA LC FREE/LAMBDA FREE SER: 1.24 {RATIO} (ref 0.26–1.65)
LAMBDA LC FREE SERPL-MCNC: 51.9 MG/L (ref 5.7–26.3)

## 2025-06-19 NOTE — TELEPHONE ENCOUNTER
Patient will be in tomorrow to  a copy of his referral to physical therapy.    Please advise, thank you.

## 2025-06-20 ENCOUNTER — PATIENT OUTREACH (OUTPATIENT)
Dept: CASE MANAGEMENT | Facility: OTHER | Age: 55
End: 2025-06-20

## 2025-06-20 NOTE — LETTER
[address] [facility] [address]  [phone]    Re: Unable to Reach   6/20/2025       Dear Donta,    I am a [facility]  and wanted to be certain you had information to contact me should you desire assistance with or have questions about non-medical aspects of your care such as [but not limited to] medical insurance, housing, transportation, material needs, or emergency needs.  If I do not have an answer I will assist you in finding the appropriate agency or individual who can help.      Please feel free to contact me at [phone] Thank You.    Sincerely,         Rand Senior LCSW

## 2025-06-20 NOTE — PROGRESS NOTES
JUNIOR CAMPA received a referral from patient's PCP as patient is interested in in home care. JUNIOR CAMPA reviewed patient's chart. Patient is [place] sor[place] speaking. JUNIOR CAMPA  utilized [employer]  ([phone]) and spoke with  Matti (ID # [id number]) who assisted JUNIOR CAMPA with calling patient ([phone]) a second time. Patient did not answer. JUNIOR Campa left a message including JUNIOR CAMPA contact information and requested a call back. JUNIOR CAMPA will send unable to reach letter via [employer] and close. Please re consult JUNIOR CAMPA as needed.

## 2025-06-21 LAB
ALP BONE CFR SERPL: 30 % (ref 12–68)
ALP INTEST CFR SERPL: 1 % (ref 0–18)
ALP LIVER CFR SERPL: 69 % (ref 13–88)
ALP SERPL-CCNC: 136 IU/L (ref 44–121)
SCAN RESULT: NORMAL

## 2025-06-22 LAB
ALBUMIN SERPL ELPH-MCNC: 3.81 G/DL (ref 3.2–5.1)
ALBUMIN SERPL ELPH-MCNC: 54.4 % (ref 48–70)
ALPHA1 GLOB SERPL ELPH-MCNC: 0.27 G/DL (ref 0.15–0.47)
ALPHA1 GLOB SERPL ELPH-MCNC: 3.9 % (ref 1.8–7)
ALPHA2 GLOB SERPL ELPH-MCNC: 0.63 G/DL (ref 0.42–1.04)
ALPHA2 GLOB SERPL ELPH-MCNC: 9 % (ref 5.9–14.9)
BETA GLOB ABNORMAL SERPL ELPH-MCNC: 0.39 G/DL (ref 0.31–0.57)
BETA1 GLOB SERPL ELPH-MCNC: 5.5 % (ref 4.7–7.7)
BETA2 GLOB SERPL ELPH-MCNC: 7.3 % (ref 3.1–7.9)
BETA2+GAMMA GLOB SERPL ELPH-MCNC: 0.51 G/DL (ref 0.2–0.58)
COPPER SERPL-MCNC: 87 UG/DL (ref 69–132)
GAMMA GLOB ABNORMAL SERPL ELPH-MCNC: 1.39 G/DL (ref 0.4–1.66)
GAMMA GLOB SERPL ELPH-MCNC: 19.9 % (ref 6.9–22.3)
IGG/ALB SER: 1.19 {RATIO} (ref 1.1–1.8)
PROT SERPL-MCNC: 7 G/DL (ref 6.4–8.4)

## 2025-06-22 PROCEDURE — 84165 PROTEIN E-PHORESIS SERUM: CPT | Performed by: STUDENT IN AN ORGANIZED HEALTH CARE EDUCATION/TRAINING PROGRAM

## 2025-06-22 PROCEDURE — 86334 IMMUNOFIX E-PHORESIS SERUM: CPT | Performed by: STUDENT IN AN ORGANIZED HEALTH CARE EDUCATION/TRAINING PROGRAM

## 2025-06-23 LAB
BASOPHILS # BLD MANUAL: 0.03 THOUSAND/UL (ref 0–0.1)
BASOPHILS NFR MAR MANUAL: 1 % (ref 0–1)
DIFFERENTIAL COMMENT: ABNORMAL
EOSINOPHIL # BLD MANUAL: 0 THOUSAND/UL (ref 0–0.4)
EOSINOPHIL NFR BLD MANUAL: 0 % (ref 0–6)
ERYTHROCYTE [DISTWIDTH] IN BLOOD BY AUTOMATED COUNT: 13.3 % (ref 11.6–15.1)
HCT VFR BLD AUTO: 42.3 % (ref 36.5–49.3)
HGB BLD-MCNC: 15 G/DL (ref 12–17)
LYMPHOCYTES # BLD AUTO: 1.55 THOUSAND/UL (ref 0.6–4.47)
LYMPHOCYTES # BLD AUTO: 45 % (ref 14–44)
MCH RBC QN AUTO: 31.3 PG (ref 26.8–34.3)
MCHC RBC AUTO-ENTMCNC: 35.5 G/DL (ref 31.4–37.4)
MCV RBC AUTO: 88 FL (ref 82–98)
METAMYELOCYTE ABSOLUTE CT: 0.03 THOUSAND/UL (ref 0–0.1)
METAMYELOCYTES NFR BLD MANUAL: 1 % (ref 0–1)
MONOCYTES # BLD AUTO: 0.1 THOUSAND/UL (ref 0–1.22)
MONOCYTES NFR BLD: 4 % (ref 4–12)
NEUTROPHILS # BLD MANUAL: 0.88 THOUSAND/UL (ref 1.85–7.62)
NEUTS BAND NFR BLD MANUAL: 2 % (ref 0–8)
NEUTS SEG NFR BLD AUTO: 32 % (ref 43–75)
PATHOLOGY REVIEW: YES
PLATELET # BLD AUTO: 159 THOUSANDS/UL (ref 149–390)
PLATELET BLD QL SMEAR: ADEQUATE
PMV BLD AUTO: 11 FL (ref 8.9–12.7)
RBC # BLD AUTO: 4.8 MILLION/UL (ref 3.88–5.62)
RBC MORPH BLD: NORMAL
VARIANT LYMPHS # BLD AUTO: 15 %
WBC # BLD AUTO: 2.59 THOUSAND/UL (ref 4.31–10.16)

## 2025-06-23 PROCEDURE — 85060 BLOOD SMEAR INTERPRETATION: CPT | Performed by: STUDENT IN AN ORGANIZED HEALTH CARE EDUCATION/TRAINING PROGRAM

## 2025-06-23 NOTE — PROGRESS NOTES
Name: Donta Hunter      : 1970      MRN: [id number]  Encounter Provider: Tammie Mccarthy PA-C  Encounter Date: 2025   Encounter department: [facility]  :  Assessment & Plan  Peripheral arterial disease (HCC)  -Ambulatory dysfx due to neuropathy in feet with decreased pulses    -JOSE ANTONIO 25:     R NC/113/61, NL PVR, dampened PPG, monophasic ankle. Diffuse Fem-Pop, evidence of T-P dz    L NC/84/35, NL PVR, dampened PPG, monophasic ankle. Diffuse, calcified Fem-Pop and T-P dz without focal stenosis    Plan:  -Significant atherosclerosis of bilateral lower extremities.  However, this does not explain his neuropathy or ambulatory dysfunction.  Initiation medical therapy.  He is already on aspirin.  Will start atorvastatin 20 with refills and monitoring per PCP.  -Review of dopplers show diffuse atherosclerosis throughout both extremities involving the femoral popliteal and tibioperoneal arteries.  On the right there is a high-grade stenosis/occlusion of the distal posterior tibial.  Right LARA is noncompressible with metatarsal and toe pressures 113 and 61 mmHg.  Left LARA is noncompressible with metatarsal and toe pressures 84 and 35 mmHg.  -Toe pressures are above healing but given neuropathy could place him at risk for wounds.   -Wear good protective footwear, avoid wounds and check feet daily for any wounds or changes  -Discussed the pathophysiology and options for treatment of PAD, including indications for endovascular/ surgical interventions.   -We discussed the benefits of regular walking for at least 30 minutes for 5 days weekly.  -Maintain good blood pressure, cholesterol and glucose control  -OMT with aspirin and statin. Order for atorva 20 with refills per PCP  -Follow up LEAD in 1 year with office visit, but discussed reasons to be seen sooner.  Orders:    Lipid panel; Future    atorvastatin (LIPITOR) 20 mg tablet; Take 1 tablet (20 mg total) by mouth daily    VAS  ARTERIAL DUPLEX- LOWER LIMB BILATERAL; Future    Lymphedema  -Venous insufficiency with chronic LE edema  -Chronic moderate LE edema and R lateral thigh discomfort  -Hx L GSV and B  ablation '19 at outside facility  -Compliant with compression  -Reports intermittent spontaneous wounds to the anterior shins    -Exam: 2+ LE edema.  Significant venous stasis dermatitis/ poor skin quality. No open wounds or weeping.     -LEV 10/21/24: No LLE DVT/SVT. Dopplers biphasic/ monophasic    Plan:  -Evidence of venous insufficiency with secondary lymphedema  -Currently no open wounds or weeping but appears at risk given poor skin integrity  -Continue with compression stocking  -Compression, elevation, low Na  -Continue with medical therapy with aspirin and Eliquis (Hx reported recurrent DVT)    Sent to [facility]  Orders:    Compression Stocking    Pneumatic compression pumps    Chronic deep vein thrombosis (DVT) of proximal vein of lower extremity, unspecified laterality (HCC)  -As above under lymphedema       History of DVT (deep vein thrombosis)  -As above under lymphedema  Orders:    Compression Stocking    Pneumatic compression pumps    Primary hypertension  - RF modification and maintenance of good blood pressure control per primary care       Anemia in stage 3 chronic kidney disease, unspecified whether stage 3a or 3b CKD  (HCC)           History of Present Illness   CC: Patient presents today to review JOSE ANTONIO done 6/13/25. Reports walking difficulty at baseline secondary to chronic low back pain and lupus. Denies claudication at his level of activity. Toe wound now healed.     EDE Hunter is a 55 y.o. male COPD, GERD, osteoporosis, SLE, Hx stroke, HTN, CAD, hyperparathyroidism, SLE, CKD II, ambulatory dysfunction/uses walker, neuropathy both feet, Hx recurrent DVT (Eliquis), chronic lower extremity edema, venous insufficiency, s/p L GSV and bilateral  ablation '19.  Patient presents to the vascular  clinic for follow-up and review of recent arterial duplex study.    Patient was recently seen by other vascular provider for small L 2nd toe wound which has no healed.  We reviewed his history.     TASHA Gutierres 4/21/25:   Pt presents to the office alone. Ambulates with walker.  -Discoloration in b/l legs  started 5-6 years ago. Wears compression stockings daily.  -L foot 2nd toe small superficial wound stated 3 months ago. Pt reports area was infected and states that this has much improved since treatment.   -Quit smoking 10 years ago.  -Pt is [place] speaking only and interpretor services was utilized for translation for the entirety of this visit with  # [id number] ([place])     New patient referred by wound care. Pt presents with L 2nd toe ulcer for about a month. Pt has swelling, discoloration, numbness, coldness, rest pain. Pt notes burning pain on bottom of feet when walking. Pt has wounds on R leg and L 2nd toe.       6/24/25: Patient returns for vascular follow-up and review of dates.  He reports he does not walk burning, painful feet and legs.  He does use a walker.  Fortunately, second toe wound is healed.    He carries chronic lower extremity edema with history of DVT.  He wears compression stockings.  He complains that he easily gets small wounds on the legs.  Also, legs feel tired and heavy.      We reviewed his lower extremity arterial study.  There is diffuse atherosclerosis throughout both extremities involving the femoral popliteal and tibioperoneal arteries.  On the right there is a high-grade stenosis/occlusion of the distal posterior tibial.  Right LARA is noncompressible with metatarsal and toe pressures 113 and 61 mmHg.  Left LARA is noncompressible with metatarsal and toe pressures 84 and 35 mmHg.    We discussed the importance of medical therapy in PAD, continued podiatry care, preventing foot wounds and monitoring his feet for any wounds or changes.  We discussed that although Dopplers do  "not look too bad, given that significant neuropathy in his feet, this could place him at higher risk for wounds with underlying PAD which may make it difficult to heal.    Additionally, leg swelling appears uncontrolled despite compression.  Given his history, we discussed the possibility of lymphedema pumps which he would have to do a treatment at least once a day for 60 minutes.  He is agreeable to this.  Will work on obtaining lymphedema pumps.  Will plan to see him back in 1 month for reevaluation and measurements.    Medical therapy includes aspirin 81, apixaban 5 twice a day, furosemide 40, gabapentin 300 mg 3 times a day, etc.    Add atorvastatin 20/ day. Check lipids.       -L foot 2nd toe infection with healed ulceration  -Occasional B calf claudication    -neuropathia, dos peirnas, HS  -painful / hot feet and unable to walk d/t numbness  -wearing cmpression stockings  -no foot wounds  -podiatry; [facility]      To assist with the visit and complete understanding.   services were utilized.  Deep [id number]      Review of Systems   Constitutional: Negative.    HENT: Negative.     Eyes: Negative.    Respiratory: Negative.     Cardiovascular:  Positive for leg swelling.   Gastrointestinal: Negative.    Endocrine: Negative.    Genitourinary: Negative.    Musculoskeletal:  Positive for back pain and gait problem.   Skin: Negative.    Allergic/Immunologic: Negative.    Neurological:  Positive for weakness.   Hematological: Negative.    Psychiatric/Behavioral: Negative.         Objective   /86 (BP Location: Left arm, Patient Position: Sitting)   Pulse 94   Ht 5' 6\" (1.676 m)   Wt 70.8 kg (156 lb)   BMI 25.18 kg/m²      2+ lower extremity and pedal pitting edema. Chronic stasis changes. Nail fungus. No wounds or weeping.  No easily palp pulses. DP/PT Doppler signals in the feet.     Ambulates with walker      Physical Exam  Vitals and nursing note reviewed.   Constitutional:       Appearance: He is " well-developed.   HENT:      Head: Normocephalic and atraumatic.     Eyes:      Pupils: Pupils are equal, round, and reactive to light.     Neck:      Thyroid: No thyromegaly.      Vascular: No JVD.      Trachea: Trachea normal.     Cardiovascular:      Rate and Rhythm: Normal rate and regular rhythm.      Pulses:           Carotid pulses are 2+ on the right side and 2+ on the left side.       Radial pulses are 2+ on the right side and 2+ on the left side.        Dorsalis pedis pulses are detected w/ Doppler on the right side and detected w/ Doppler on the left side.        Posterior tibial pulses are detected w/ Doppler on the right side and detected w/ Doppler on the left side.      Heart sounds: S1 normal and S2 normal. Murmur heard.      Systolic murmur is present.      No friction rub. No gallop.   Pulmonary:      Effort: Pulmonary effort is normal. No accessory muscle usage or respiratory distress.      Breath sounds: Normal breath sounds. No wheezing or rales.   Abdominal:      General: Bowel sounds are normal. There is no distension.      Palpations: Abdomen is soft.      Tenderness: There is no abdominal tenderness.     Musculoskeletal:         General: No deformity. Normal range of motion.      Cervical back: Neck supple.      Right lower leg: Edema present.      Left lower leg: Edema present.     Skin:     General: Skin is warm and dry.      Findings: No lesion or rash.      Nails: There is no clubbing.     Neurological:      Mental Status: He is alert and oriented to person, place, and time.      Comments: Grossly normal    Psychiatric:         Behavior: Behavior is cooperative.       JOSE ANTONIO 6/13/25  Indications:   Patient presents with decreased pedal pulses on the left with abnormal arterial doppler signals on recent LEV. Patient reports JOSE ANTONIO 6/13/25He denies claudication symptoms at this time.    Operative History:   07/24/2024 IR cerebral angiography.   04/23/2018 IVC filter removal.   09/07/2017 IVC  filter placement   Risk Factors:   The patient reports hypertension, renal disease, CAD, Prior CVA and COPD.     Clinical:   Right BP: 117/, Left BP: 115/      FINDINGS:     Main                                             Segment Impression  PSV (cm/s)    Waveform  Pressure   LARA  LARA                                                                                                       Right Ant.Tib. (Ankle)  Artery                                          Monophasic       255  2.18     Right Metatarsal                                                                         113  0.97     Great Toe                                                                                 61  0.52     Left Ant.Tib. (Ankle)  Artery                                           Monophasic       255  2.18     Left Metatarsal                                                                           84  0.72     Great Toe                                                                                 35  0.52  Right                                                                                                     Right Common Femoral Artery                                        124                                 Right Profunda Femoris Artery                                       46                                 Prox SFA                                                            76                                 Mid SFA                                                             53                                 Dist SFA                                                            36                                 Proximal Pop                                                        55                                 Distal Pop                                                          68                                 Tibioperoneal                                                       42                                 Right Distal  Posterior Tibial Artery              Occluded           0                                 Right Distal Anterior Tibial Artery                                 65                              Left                                                                                                      Com Femoral                                                         55                                 Left Profunda Femoris Artery                                        31                                 Prox SFA                                                            70                                 Mid SFA                                                             44                                 Dist SFA                                                            41                                 Proximal Pop                                                        37                                 Distal Pop                                                          44                                 Tibioperoneal                                                       51                                 Left Distal Posterior Tibial Artery                                 68                                 Left Distal Anterior Tibial Artery                                  23                                 CONCLUSION:     RIGHT LOWER LIMB:   Diffuse calcified disease noted throughout the femoral-popliteal arteries without significant focal stenosis.   Evidence of tibioperoneal disease with high grade stenosis vs occlusion of the distal posterior tibial artery.    Ankle/Brachial Index: Non-compressible.    PVR tracings are normal PPG tracings are dampened. Monophasic waveforms noted at the ankle.    Metatarsal pressure: 113 mm Hg.   Great toe pressure: 61 mm Hg, which is within the healing range.        LEFT LOWER LIMB:   Diffuse calcified disease noted throughout the femoral-popliteal and tibioperoneal arteries  "without significant focal stenosis.   Ankle/Brachial Index: Non-compressible.    PVR tracings are normal PPG tracings are dampened. Monophasic waveforms noted at the ankle.    Metatarsal pressure: 84 mm Hg.   Great toe pressure: 35 mm Hg, which is within the healing range.        I have reviewed and made appropriate changes to the review of systems input by the medical assistant.    Vitals:    06/24/25 0819   BP: 136/86   BP Location: Left arm   Patient Position: Sitting   Pulse: 94   Weight: 70.8 kg (156 lb)   Height: 5' 6\" (1.676 m)       Problem List[1]    Past Surgical History[2]    Family History[3]    Social History     Socioeconomic History    Marital status: /Civil Union     Spouse name: Not on file    Number of children: 2    Years of education: Not on file    Highest education level: GED or equivalent   Occupational History    Not on file   Tobacco Use    Smoking status: Never    Smokeless tobacco: Never   Vaping Use    Vaping status: Never Used   Substance and Sexual Activity    Alcohol use: Never    Drug use: Never    Sexual activity: Yes     Partners: Female, Male     Birth control/protection: Rhythm   Other Topics Concern    Not on file   Social History Narrative    ** Merged History Encounter **          Social Drivers of Health     Financial Resource Strain: Not on file   Food Insecurity: No Food Insecurity (4/24/2025)    Nursing - Inadequate Food Risk Classification     Worried About Running Out of Food in the Last Year: Never true     Ran Out of Food in the Last Year: Never true     Ran Out of Food in the Last Year: Never true   Transportation Needs: No Transportation Needs (4/24/2025)    PRAPARE - Transportation     Lack of Transportation (Medical): No     Lack of Transportation (Non-Medical): No   Physical Activity: Not on file   Stress: Not on file   Social Connections: Unknown (6/18/2024)    Received from [facility]    Social Connections     How often do you feel lonely or isolated from " those around you? (Adult - for ages 18 years and over): Not on file   Intimate Partner Violence: Unknown (10/21/2024)    Nursing IPS     Feels Physically and Emotionally Safe: Not on file     Physically Hurt by Someone: Not on file     Humiliated or Emotionally Abused by Someone: Not on file     Physically Hurt by Someone: No     Hurt or Threatened by Someone: No   Housing Stability: Low Risk  (4/24/2025)    Housing Stability Vital Sign     Unable to Pay for Housing in the Last Year: No     Number of Times Moved in the Last Year: 0     Homeless in the Last Year: No       Allergies[4]    Current Medications[5]         [1]   Patient Active Problem List  Diagnosis    SLE (systemic lupus erythematosus related syndrome) (HCC)    Lupus nephritis (HCC)    Hypertension    History of DVT (deep vein thrombosis)    Stage 2 chronic kidney disease    Anemia in chronic kidney disease    Muscle weakness (generalized)    Tremor of both hands    Spasticity    Gait difficulty    Neuropathic pain    Cellulitis of left lower extremity    Hypertensive CKD (chronic kidney disease)    DJD (degenerative joint disease), ankle and foot, right    Chronic kidney disease, stage 3a (HCC)    CAD (coronary artery disease)    DVT (deep venous thrombosis) (HCC)    History of multiple strokes    Gastroesophageal reflux disease without esophagitis    COPD, group B, by GOLD 2017 classification (HCC)    Hyperparathyroidism (HCC)    Stroke (cerebrum) (HCC)    Osteoporosis    Systemic lupus erythematosus (HCC)    Neutropenia (HCC)    Hyponatremia    Skin ulcer of toe of left foot, limited to breakdown of skin (HCC)    Lymphedema    Open wound of second toe of left foot, initial encounter    Chronic deep vein thrombosis (DVT) of proximal vein of lower extremity, unspecified laterality (HCC)   [2]   Past Surgical History:  Procedure Laterality Date    APPENDECTOMY      CERVICAL SPINE SURGERY      CHOLECYSTECTOMY      COLONOSCOPY N/A 8/23/2018    Procedure:  COLONOSCOPY;  Surgeon: James Wise III, MD;  Location: [facility];  Service: Gastroenterology    ESOPHAGOGASTRODUODENOSCOPY N/A 8/22/2018    Procedure: ESOPHAGOGASTRODUODENOSCOPY (EGD);  Surgeon: James Wise III, MD;  Location: [facility];  Service: Gastroenterology    IR CEREBRAL ANGIOGRAPHY  7/24/2024    IR IVC FILTER PLACEMENT PERMANENT  9/7/2017    IR IVC FILTER REMOVAL  4/23/2018    IR LUMBAR PUNCTURE  11/23/2015    IR LUMBAR PUNCTURE  4/25/2024    NECK SURGERY      US GUIDED INJECTION FOR RESEARCH STUDY  4/23/2018    US GUIDED INJECTION FOR RESEARCH STUDY  9/7/2017    VASCULAR SURGERY     [3]   Family History  Problem Relation Name Age of Onset    Heart disease Mother Sakshi     No Known Problems Father     [4]   Allergies  Allergen Reactions    Doxycycline Rash    Sulfa Antibiotics Rash   [5]   Current Outpatient Medications:     apixaban (ELIQUIS) 5 mg, Take 1 tablet (5 mg total) by mouth 2 (two) times a day, Disp: 180 tablet, Rfl: 0    aspirin 81 mg chewable tablet, Chew 1 tablet (81 mg total) daily, Disp: 30 tablet, Rfl: 3    atorvastatin (LIPITOR) 20 mg tablet, Take 1 tablet (20 mg total) by mouth daily, Disp: 90 tablet, Rfl: 0    baclofen 20 mg tablet, Take 1 tablet (20 mg total) by mouth 2 (two) times a day, Disp: 90 tablet, Rfl: 0    capsicum (ZOSTRIX) 0.075 % topical cream, Apply topically 3 (three) times a day Wash hands before and after use., Disp: 57 g, Rfl: 0    clobetasol (TEMOVATE) 0.05 % cream, Apply topically 2 (two) times a day, Disp: 60 g, Rfl: 0    collagenase (SANTYL) ointment, Apply topically daily, Disp: 30 g, Rfl: 2    Diclofenac Sodium (VOLTAREN) 1 %, Apply 2 g topically 4 (four) times a day, Disp: 300 g, Rfl: 1    famotidine (PEPCID) 20 mg tablet, Take 1 tablet (20 mg total) by mouth 2 (two) times a day, Disp: 180 tablet, Rfl: 0    furosemide (LASIX) 40 mg tablet, Take 1 tablet (40 mg total) by mouth daily, Disp: 90 tablet, Rfl: 0    gabapentin (NEURONTIN) 300 mg capsule, Take  1 capsule (300 mg total) by mouth 3 (three) times a day, Disp: 270 capsule, Rfl: 3    gabapentin (NEURONTIN) 600 MG tablet, Take 1 tablet (600 mg total) by mouth 3 (three) times a day, Disp: 270 tablet, Rfl: 1    hydroxychloroquine (PLAQUENIL) 200 mg tablet, Take 2 tablets (400 mg total) by mouth daily at bedtime, Disp: 180 tablet, Rfl: 1    lidocaine-prilocaine (EMLA) cream, Apply topically as needed for mild pain, Disp: 30 g, Rfl: 0    mycophenolate (CELLCEPT) 500 mg tablet, Take 1 tablet (500 mg total) by mouth every 12 (twelve) hours, Disp: 90 tablet, Rfl: 0    nystatin (MYCOSTATIN) ointment, Apply topically 2 (two) times a day, Disp: 30 g, Rfl: 0    potassium chloride (Klor-Con M20) 20 mEq tablet, Take 1 tablet (20 mEq total) by mouth daily, Disp: 90 tablet, Rfl: 0    sildenafil (VIAGRA) 50 MG tablet, Take 1 tablet (50 mg total) by mouth daily as needed for erectile dysfunction, Disp: 10 tablet, Rfl: 0    sodium chloride 1 g tablet, Take 2 tablets by mouth 3 (three) times a day with meals, Disp: 540 tablet, Rfl: 1    triamcinolone (KENALOG) 0.1 % cream, Apply topically 2 (two) times a day, Disp: 15 g, Rfl: 0

## 2025-06-24 ENCOUNTER — OFFICE VISIT (OUTPATIENT)
Dept: VASCULAR SURGERY | Facility: CLINIC | Age: 55
End: 2025-06-24
Payer: COMMERCIAL

## 2025-06-24 VITALS
HEIGHT: 66 IN | SYSTOLIC BLOOD PRESSURE: 136 MMHG | BODY MASS INDEX: 25.07 KG/M2 | DIASTOLIC BLOOD PRESSURE: 86 MMHG | WEIGHT: 156 LBS | HEART RATE: 94 BPM

## 2025-06-24 DIAGNOSIS — Z86.718 HISTORY OF DVT (DEEP VEIN THROMBOSIS): ICD-10-CM

## 2025-06-24 DIAGNOSIS — I89.0 LYMPHEDEMA: ICD-10-CM

## 2025-06-24 DIAGNOSIS — I10 PRIMARY HYPERTENSION: ICD-10-CM

## 2025-06-24 DIAGNOSIS — D63.1 ANEMIA IN STAGE 3 CHRONIC KIDNEY DISEASE, UNSPECIFIED WHETHER STAGE 3A OR 3B CKD  (HCC): ICD-10-CM

## 2025-06-24 DIAGNOSIS — N18.30 ANEMIA IN STAGE 3 CHRONIC KIDNEY DISEASE, UNSPECIFIED WHETHER STAGE 3A OR 3B CKD  (HCC): ICD-10-CM

## 2025-06-24 DIAGNOSIS — I82.5Y9 CHRONIC DEEP VEIN THROMBOSIS (DVT) OF PROXIMAL VEIN OF LOWER EXTREMITY, UNSPECIFIED LATERALITY (HCC): ICD-10-CM

## 2025-06-24 DIAGNOSIS — I73.9 PERIPHERAL ARTERIAL DISEASE (HCC): Primary | ICD-10-CM

## 2025-06-24 PROCEDURE — 99214 OFFICE O/P EST MOD 30 MIN: CPT | Performed by: PHYSICIAN ASSISTANT

## 2025-06-24 RX ORDER — ATORVASTATIN CALCIUM 20 MG/1
20 TABLET, FILM COATED ORAL DAILY
Qty: 90 TABLET | Refills: 0 | Status: SHIPPED | OUTPATIENT
Start: 2025-06-24

## 2025-06-24 NOTE — LETTER
"June 25, 2025     [facility]  [address]  [id number]  [place] [address]    Patient: Donta Hunter   YOB: 1970   Date of Visit: 6/24/2025       [facility]:    Donta Hunter was seen by me in the office.  Has longstanding venous insufficiency with chronic leg swelling and secondary lymphedema which has been chronic and progressive really worsening despite compression stockings.  Would like to try to obtain lymphedema pumps for him.    Of note, he does speak some English, but primarily Thai speaker.    Sincerely,        Tammie Mccarthy PA-C        CC: No Recipients    Tammie Mccarthy PA-C  6/25/2025  8:34 AM  Sign when Signing Visit  :  Assessment & Plan        History of Present Illness    Donta Hunter is a 55 y.o. male   HPI  Review of Systems  Objective  /86 (BP Location: Left arm, Patient Position: Sitting)   Pulse 94   Ht 5' 6\" (1.676 m)   Wt 70.8 kg (156 lb)   BMI 25.18 kg/m²      Physical Exam      [facility]   [facility] Antonio Talamantes   Phone: [phone]  Fax: [phone]   E-mail: [email or web address]    Ordering Provider:  Tammie Howard (NPI: [id number])  [facility]  [address] [place] [address]  Phone: [phone]  Fax: [phone]      Patient Information  MRN: [id number]   Donta Hunter  1970  [address] [place] [address]  [place] [address] [phone]     Insurance Information  Payor: [employer] [facility] HE[employer]ICES / Plan: [employer] [facility] HLTHCHOICE / Product Type: Medicaid HMO /      [id number] - (Managed Medicaid)     Patient Height and Weight 5' 6\" (1.676 m)    Wt Readings from Last 1 Encounters:   06/24/25 70.8 kg (156 lb)       Compression Lymphedema Pump Prescription Form      [x] Patient utilized Compression Garment >= 30 mmHg distally OR Gradient Wrap System    Appliance:     Legs:    [x] Right    [x] Left   Arms:    [] " Right    [] Left     []Chest garment    []Abdominal garment     Length of need: 99 months    Protocol:  [x] Std: 40 mmHg, TID/ BID,  60 minutes  [] Other:   Pressures: __ mmHg    Frequency: __ / Day   Minutes/ Session: __ minutes    Patient History and Prognosis:  [x] Patient was diagnosed for the chronic disorder with reported on-set __  [x] Attempts at elevation and compression have not improved patients' condition and is now at risk of lymphatic disorder progressing to the next stage/ grade  [x] Patient's physical condition/ range of motion limited for exercise  [x] Patient/ Caregiver experienced difficulty applying 30 mmHg distal compression garments  [x] Patient compression stocking/ wrap tolerance limited due to pain/ reduced circulation  [x] Patient advised to reduce salt intake and adhere to a daily regiment of elevation, compression, and lymphatic exercises  [x] Patient's severe condition will not improve without further treatment interventions  [x] Patient has noted skin changes such as marked hyperkeratosis with hyperplasia and hyperpigmentation, papillomatosis cutis lymphostatica, elephantiasis, and/ or skin breakdown with persisting lymphorrhea    Symptoms/ Observation/ Evaluation/ Plan of Care for Lymphedema / Venous Compression Pumps     Conservative Care >= 4 weeks for severe lymphedema (check all that apply):  Patient instructions for DAILY use of conservative therapies;  [x] Elevate extremities daily and nightly to reduce swelling  [x] Exercise and ambulate / range of motion (ROM) daily to increase fluid flow and reduce swelling  [x] Wear 30-mmHg distal compression garments / wraps daily to reduce / control swelling  [x] Manual lymph drainage (MLD)  [x] On-set date of lymphedema / ulcers: at least 2018      Initial Measurements      Body Part Right Left   Ankle / Forearm 24 cm 24 cm   Calf / Elbow  36 cm 34.5 cm   Knee / Bicep  Not Applicable (N/A) Not Applicable (N/A)   Mid-Thigh / Axilla  47.5 cm  46 cm         Tammie Mccarthy PA-C  2025  8:32 AM  Sign when Signing Visit  Name: Donta Hunter      : 1970      MRN: [id number]  Encounter Provider: Tammie Mccarthy PA-C  Encounter Date: 2025   Enc[facility] [place]  :  Assessment & Plan  Peripheral arterial disease (HCC)  -Ambulatory dysfx due to neuropathy in feet with decreased pulses    -JOSE ANTONIO 25:     R NC/113/61, NL PVR, dampened PPG, monophasic ankle. Diffuse Fem-Pop, evidence of T-P dz    L NC/84/35, NL PVR, dampened PPG, monophasic ankle. Diffuse, calcified Fem-Pop and T-P dz without focal stenosis    Plan:  -Significant atherosclerosis of bilateral lower extremities.  However, this does not explain his neuropathy or ambulatory dysfunction.  Initiation medical therapy.  He is already on aspirin.  Will start atorvastatin 20 with refills and monitoring per PCP.  -Review of dopplers show diffuse atherosclerosis throughout both extremities involving the femoral popliteal and tibioperoneal arteries.  On the right there is a high-grade stenosis/occlusion of the distal posterior tibial.  Right LARA is noncompressible with metatarsal and toe pressures 113 and 61 mmHg.  Left LARA is noncompressible with metatarsal and toe pressures 84 and 35 mmHg.  -Toe pressures are above healing but given neuropathy could place him at risk for wounds.   -Wear good protective footwear, avoid wounds and check feet daily for any wounds or changes  -Discussed the pathophysiology and options for treatment of PAD, including indications for endovascular/ surgical interventions.   -We discussed the benefits of regular walking for at least 30 minutes for 5 days weekly.  -Maintain good blood pressure, cholesterol and glucose control  -OMT with aspirin and statin. Order for atorva 20 with refills per PCP  -Follow up LEAD in 1 year with office visit, but discussed reasons to be seen sooner.  Orders:  •  Lipid panel; Future  •  atorvastatin  (LIPITOR) 20 mg tablet; Take 1 tablet (20 mg total) by mouth daily  •  VAS ARTERIAL DUPLEX- LOWER LIMB BILATERAL; Future    Lymphedema  -Venous insufficiency with chronic LE edema  -Chronic moderate LE edema and R lateral thigh discomfort  -Hx L GSV and B  ablation '19 at outside facility  -Compliant with compression  -Reports intermittent spontaneous wounds to the anterior shins    -Exam: 2+ LE edema.  Significant venous stasis dermatitis/ poor skin quality. No open wounds or weeping.     -LEV 10/21/24: No LLE DVT/SVT. Dopplers biphasic/ monophasic    Plan:  -Evidence of venous insufficiency with secondary lymphedema  -Currently no open wounds or weeping but appears at risk given poor skin integrity  -Continue with compression stocking  -Compression, elevation, low Na  -Continue with medical therapy with aspirin and Eliquis (Hx reported recurrent DVT)    Sent to [facility]  Orders:  •  Compression Stocking  •  Pneumatic compression pumps    Chronic deep vein thrombosis (DVT) of proximal vein of lower extremity, unspecified laterality (HCC)  -As above under lymphedema       History of DVT (deep vein thrombosis)  -As above under lymphedema  Orders:  •  Compression Stocking  •  Pneumatic compression pumps    Primary hypertension  - RF modification and maintenance of good blood pressure control per primary care       Anemia in stage 3 chronic kidney disease, unspecified whether stage 3a or 3b CKD  (HCC)           History of Present Illness   CC: Patient presents today to review JOSE ANTONIO done 6/13/25. Reports walking difficulty at baseline secondary to chronic low back pain and lupus. Denies claudication at his level of activity. Toe wound now healed.     HPI   Donta Hunter is a 55 y.o. male COPD, GERD, osteoporosis, SLE, Hx stroke, HTN, CAD, hyperparathyroidism, SLE, CKD II, ambulatory dysfunction/uses walker, neuropathy both feet, Hx recurrent DVT (Eliquis), chronic lower extremity edema, venous insufficiency, s/p  L GSV and bilateral  ablation '19.  Patient presents to the vascular clinic for follow-up and review of recent arterial duplex study.    Patient was recently seen by other vascular provider for small L 2nd toe wound which has no healed.  We reviewed his history.     TASHA Gutierres 4/21/25:   Pt presents to the office alone. Ambulates with walker.  -Discoloration in b/l legs  started 5-6 years ago. Wears compression stockings daily.  -L foot 2nd toe small superficial wound stated 3 months ago. Pt reports area was infected and states that this has much improved since treatment.   -Quit smoking 10 years ago.  -Pt is Guyanese speaking only and interpretor services was utilized for translation for the entirety of this visit with  # [id number] ([place])     New patient referred by wound care. Pt presents with L 2nd toe ulcer for about a month. Pt has swelling, discoloration, numbness, coldness, rest pain. Pt notes burning pain on bottom of feet when walking. Pt has wounds on R leg and L 2nd toe.       6/24/25: Patient returns for vascular follow-up and review of dates.  He reports he does not walk burning, painful feet and legs.  He does use a walker.  Fortunately, second toe wound is healed.    He carries chronic lower extremity edema with history of DVT.  He wears compression stockings.  He complains that he easily gets small wounds on the legs.  Also, legs feel tired and heavy.      We reviewed his lower extremity arterial study.  There is diffuse atherosclerosis throughout both extremities involving the femoral popliteal and tibioperoneal arteries.  On the right there is a high-grade stenosis/occlusion of the distal posterior tibial.  Right LARA is noncompressible with metatarsal and toe pressures 113 and 61 mmHg.  Left LARA is noncompressible with metatarsal and toe pressures 84 and 35 mmHg.    We discussed the importance of medical therapy in PAD, continued podiatry care, preventing foot wounds and  "monitoring his feet for any wounds or changes.  We discussed that although Dopplers do not look too bad, given that significant neuropathy in his feet, this could place him at higher risk for wounds with underlying PAD which may make it difficult to heal.    Additionally, leg swelling appears uncontrolled despite compression.  Given his history, we discussed the possibility of lymphedema pumps which he would have to do a treatment at least once a day for 60 minutes.  He is agreeable to this.  Will work on obtaining lymphedema pumps.  Will plan to see him back in 1 month for reevaluation and measurements.    Medical therapy includes aspirin 81, apixaban 5 twice a day, furosemide 40, gabapentin 300 mg 3 times a day, etc.    Add atorvastatin 20/ day. Check lipids.       -L foot 2nd toe infection with healed ulceration  -Occasional B calf claudication    -neuropathia, dos peirnas, HS  -painful / hot feet and unable to walk d/t numbness  -wearing cmpression stockings  -no foot wounds  -podiatry; [facility]      To assist with the visit and complete understanding.   services were utilized.  Deep [id number]      Review of Systems   Constitutional: Negative.    HENT: Negative.     Eyes: Negative.    Respiratory: Negative.     Cardiovascular:  Positive for leg swelling.   Gastrointestinal: Negative.    Endocrine: Negative.    Genitourinary: Negative.    Musculoskeletal:  Positive for back pain and gait problem.   Skin: Negative.    Allergic/Immunologic: Negative.    Neurological:  Positive for weakness.   Hematological: Negative.    Psychiatric/Behavioral: Negative.         Objective   /86 (BP Location: Left arm, Patient Position: Sitting)   Pulse 94   Ht 5' 6\" (1.676 m)   Wt 70.8 kg (156 lb)   BMI 25.18 kg/m²      2+ lower extremity and pedal pitting edema. Chronic stasis changes. Nail fungus. No wounds or weeping.  No easily palp pulses. DP/PT Doppler signals in the feet.     Ambulates with walker      Physical " Exam  Vitals and nursing note reviewed.   Constitutional:       Appearance: He is well-developed.   HENT:      Head: Normocephalic and atraumatic.     Eyes:      Pupils: Pupils are equal, round, and reactive to light.     Neck:      Thyroid: No thyromegaly.      Vascular: No JVD.      Trachea: Trachea normal.     Cardiovascular:      Rate and Rhythm: Normal rate and regular rhythm.      Pulses:           Carotid pulses are 2+ on the right side and 2+ on the left side.       Radial pulses are 2+ on the right side and 2+ on the left side.        Dorsalis pedis pulses are detected w/ Doppler on the right side and detected w/ Doppler on the left side.        Posterior tibial pulses are detected w/ Doppler on the right side and detected w/ Doppler on the left side.      Heart sounds: S1 normal and S2 normal. Murmur heard.      Systolic murmur is present.      No friction rub. No gallop.   Pulmonary:      Effort: Pulmonary effort is normal. No accessory muscle usage or respiratory distress.      Breath sounds: Normal breath sounds. No wheezing or rales.   Abdominal:      General: Bowel sounds are normal. There is no distension.      Palpations: Abdomen is soft.      Tenderness: There is no abdominal tenderness.     Musculoskeletal:         General: No deformity. Normal range of motion.      Cervical back: Neck supple.      Right lower leg: Edema present.      Left lower leg: Edema present.     Skin:     General: Skin is warm and dry.      Findings: No lesion or rash.      Nails: There is no clubbing.     Neurological:      Mental Status: He is alert and oriented to person, place, and time.      Comments: Grossly normal    Psychiatric:         Behavior: Behavior is cooperative.       JOSE ANTONIO 6/13/25  Indications:   Patient presents with decreased pedal pulses on the left with abnormal arterial doppler signals on recent LEV. Patient reports JOSE ANTONIO 6/13/25He denies claudication symptoms at this time.    Operative History:    07/24/2024 IR cerebral angiography.   04/23/2018 IVC filter removal.   09/07/2017 IVC filter placement   Risk Factors:   The patient reports hypertension, renal disease, CAD, Prior CVA and COPD.     Clinical:   Right BP: 117/, Left BP: 115/      FINDINGS:     Main                                             Segment Impression  PSV (cm/s)    Waveform  Pressure   LARA  LARA                                                                                                       Right Ant.Tib. (Ankle)  Artery                                          Monophasic       255  2.18     Right Metatarsal                                                                         113  0.97     Great Toe                                                                                 61  0.52     Left Ant.Tib. (Ankle)  Artery                                           Monophasic       255  2.18     Left Metatarsal                                                                           84  0.72     Great Toe                                                                                 35  0.52  Right                                                                                                     Right Common Femoral Artery                                        124                                 Right Profunda Femoris Artery                                       46                                 Prox SFA                                                            76                                 Mid SFA                                                             53                                 Dist SFA                                                            36                                 Proximal Pop                                                        55                                 Distal Pop                                                          68                                 Tibioperoneal                                                        42                                 Right Distal Posterior Tibial Artery              Occluded           0                                 Right Distal Anterior Tibial Artery                                 65                              Left                                                                                                      Com Femoral                                                         55                                 Left Profunda Femoris Artery                                        31                                 Prox SFA                                                            70                                 Mid SFA                                                             44                                 Dist SFA                                                            41                                 Proximal Pop                                                        37                                 Distal Pop                                                          44                                 Tibioperoneal                                                       51                                 Left Distal Posterior Tibial Artery                                 68                                 Left Distal Anterior Tibial Artery                                  23                                 CONCLUSION:     RIGHT LOWER LIMB:   Diffuse calcified disease noted throughout the femoral-popliteal arteries without significant focal stenosis.   Evidence of tibioperoneal disease with high grade stenosis vs occlusion of the distal posterior tibial artery.    Ankle/Brachial Index: Non-compressible.    PVR tracings are normal PPG tracings are dampened. Monophasic waveforms noted at the ankle.    Metatarsal pressure: 113 mm Hg.   Great toe pressure: 61 mm Hg, which is within the healing range.        LEFT LOWER LIMB:   Diffuse  "calcified disease noted throughout the femoral-popliteal and tibioperoneal arteries without significant focal stenosis.   Ankle/Brachial Index: Non-compressible.    PVR tracings are normal PPG tracings are dampened. Monophasic waveforms noted at the ankle.    Metatarsal pressure: 84 mm Hg.   Great toe pressure: 35 mm Hg, which is within the healing range.        I have reviewed and made appropriate changes to the review of systems input by the medical assistant.    Vitals:    06/24/25 0819   BP: 136/86   BP Location: Left arm   Patient Position: Sitting   Pulse: 94   Weight: 70.8 kg (156 lb)   Height: 5' 6\" (1.676 m)       Problem List[1]    Past Surgical History[2]    Family History[3]    Social History     Socioeconomic History   • Marital status: /Civil Union     Spouse name: Not on file   • Number of children: 2   • Years of education: Not on file   • Highest education level: GED or equivalent   Occupational History   • Not on file   Tobacco Use   • Smoking status: Never   • Smokeless tobacco: Never   Vaping Use   • Vaping status: Never Used   Substance and Sexual Activity   • Alcohol use: Never   • Drug use: Never   • Sexual activity: Yes     Partners: Female, Male     Birth control/protection: Rhythm   Other Topics Concern   • Not on file   Social History Narrative    ** Merged History Encounter **          Social Drivers of Health     Financial Resource Strain: Not on file   Food Insecurity: No Food Insecurity (4/24/2025)    Nursing - Inadequate Food Risk Classification    • Worried About Running Out of Food in the Last Year: Never true    • Ran Out of Food in the Last Year: Never true    • Ran Out of Food in the Last Year: Never true   Transportation Needs: No Transportation Needs (4/24/2025)    PRAPARE - Transportation    • Lack of Transportation (Medical): No    • Lack of Transportation (Non-Medical): No   Physical Activity: Not on file   Stress: Not on file   Social Connections: Unknown (6/18/2024) "    Received from [facility]    Social [employer]    • How often do you feel lonely or isolated from those around you? (Adult - for ages 18 years and over): Not on file   Intimate Partner Violence: Unknown (10/21/2024)    Nursing IPS    • Feels Physically and Emotionally Safe: Not on file    • Physically Hurt by Someone: Not on file    • Humiliated or Emotionally Abused by Someone: Not on file    • Physically Hurt by Someone: No    • Hurt or Threatened by Someone: No   Housing Stability: Low Risk  (4/24/2025)    Housing Stability Vital Sign    • Unable to Pay for Housing in the Last Year: No    • Number of Times Moved in the Last Year: 0    • Homeless in the Last Year: No       Allergies[4]    Current Medications[5]         [1]   Patient Active Problem List  Diagnosis   • SLE (systemic lupus erythematosus related syndrome) ([facility])   • Lupus nephritis ([facility])   • Hypertension   • History of DVT (deep vein thrombosis)   • Stage 2 chronic kidney disease   • Anemia in chronic kidney disease   • Muscle weakness (generalized)   • Tremor of both hands   • Spasticity   • Gait difficulty   • Neuropathic pain   • Cellulitis of left lower extremity   • Hypertensive CKD (chronic kidney disease)   • DJD (degenerative joint disease), ankle and foot, right   • Chronic kidney disease, stage 3a ([facility])   • CAD (coronary artery disease)   • DVT (deep venous thrombosis) ([facility])   • History of multiple strokes   • Gastroesophageal reflux disease without esophagitis   • COPD, group B, by GOLD 2017 classification ([facility])   • Hyperparathyroidism ([facility])   • Stroke (cerebrum) ([facility])   • Osteoporosis   • Systemic lupus erythematosus ([facility])   • Neutropenia ([facility])   • Hyponatremia   • Skin ulcer of toe of left foot, limited to breakdown of skin ([facility])   • Lymphedema   • Open wound of second toe of left foot, initial encounter   • Chronic deep vein thrombosis (DVT) of proximal vein of lower extremity, unspecified laterality ([facility])   [2]   Past Surgical  History:  Procedure Laterality Date   • APPENDECTOMY     • CERVICAL SPINE SURGERY     • CHOLECYSTECTOMY     • COLONOSCOPY N/A 8/23/2018    Procedure: COLONOSCOPY;  Surgeon: James Wise III, MD;  Location: [facility];  Service: Gastroenterology   • ESOPHAGOGASTRODUODENOSCOPY N/A 8/22/2018    Procedure: ESOPHAGOGASTRODUODENOSCOPY (EGD);  Surgeon: James Wise III, MD;  Location: [facility];  Service: Gastroenterology   • IR CEREBRAL ANGIOGRAPHY  7/24/2024   • IR IVC FILTER PLACEMENT PERMANENT  9/7/2017   • IR IVC FILTER REMOVAL  4/23/2018   • IR LUMBAR PUNCTURE  11/23/2015   • IR LUMBAR PUNCTURE  4/25/2024   • NECK SURGERY     • US GUIDED INJECTION FOR RESEARCH STUDY  4/23/2018   • US GUIDED INJECTION FOR RESEARCH STUDY  9/7/2017   • VASCULAR SURGERY     [3]   Family History  Problem Relation Name Age of Onset   • Heart disease Mother Sakshi    • No Known Problems Father     [4]   Allergies  Allergen Reactions   • Doxycycline Rash   • Sulfa Antibiotics Rash   [5]   Current Outpatient Medications:   •  apixaban (ELIQUIS) 5 mg, Take 1 tablet (5 mg total) by mouth 2 (two) times a day, Disp: 180 tablet, Rfl: 0  •  aspirin 81 mg chewable tablet, Chew 1 tablet (81 mg total) daily, Disp: 30 tablet, Rfl: 3  •  atorvastatin (LIPITOR) 20 mg tablet, Take 1 tablet (20 mg total) by mouth daily, Disp: 90 tablet, Rfl: 0  •  baclofen 20 mg tablet, Take 1 tablet (20 mg total) by mouth 2 (two) times a day, Disp: 90 tablet, Rfl: 0  •  capsicum (ZOSTRIX) 0.075 % topical cream, Apply topically 3 (three) times a day Wash hands before and after use., Disp: 57 g, Rfl: 0  •  clobetasol (TEMOVATE) 0.05 % cream, Apply topically 2 (two) times a day, Disp: 60 g, Rfl: 0  •  collagenase (SANTYL) ointment, Apply topically daily, Disp: 30 g, Rfl: 2  •  Diclofenac Sodium (VOLTAREN) 1 %, Apply 2 g topically 4 (four) times a day, Disp: 300 g, Rfl: 1  •  famotidine (PEPCID) 20 mg tablet, Take 1 tablet (20 mg total) by mouth 2 (two) times a day,  Disp: 180 tablet, Rfl: 0  •  furosemide (LASIX) 40 mg tablet, Take 1 tablet (40 mg total) by mouth daily, Disp: 90 tablet, Rfl: 0  •  gabapentin (NEURONTIN) 300 mg capsule, Take 1 capsule (300 mg total) by mouth 3 (three) times a day, Disp: 270 capsule, Rfl: 3  •  gabapentin (NEURONTIN) 600 MG tablet, Take 1 tablet (600 mg total) by mouth 3 (three) times a day, Disp: 270 tablet, Rfl: 1  •  hydroxychloroquine (PLAQUENIL) 200 mg tablet, Take 2 tablets (400 mg total) by mouth daily at bedtime, Disp: 180 tablet, Rfl: 1  •  lidocaine-prilocaine (EMLA) cream, Apply topically as needed for mild pain, Disp: 30 g, Rfl: 0  •  mycophenolate (CELLCEPT) 500 mg tablet, Take 1 tablet (500 mg total) by mouth every 12 (twelve) hours, Disp: 90 tablet, Rfl: 0  •  nystatin (MYCOSTATIN) ointment, Apply topically 2 (two) times a day, Disp: 30 g, Rfl: 0  •  potassium chloride (Klor-Con M20) 20 mEq tablet, Take 1 tablet (20 mEq total) by mouth daily, Disp: 90 tablet, Rfl: 0  •  sildenafil (VIAGRA) 50 MG tablet, Take 1 tablet (50 mg total) by mouth daily as needed for erectile dysfunction, Disp: 10 tablet, Rfl: 0  •  sodium chloride 1 g tablet, Take 2 tablets by mouth 3 (three) times a day with meals, Disp: 540 tablet, Rfl: 1  •  triamcinolone (KENALOG) 0.1 % cream, Apply topically 2 (two) times a day, Disp: 15 g, Rfl: 0

## 2025-06-24 NOTE — PATIENT INSTRUCTIONS
i      PAD    -We discussed the benefits of regular walking for at least 30 minutes for 5 days weekly.  -Maintain good blood pressure, cholesterol and glucose control  -Continue with medical therapy on aspirin and statin  -Wear good protective footwear, avoid wounds and check feet daily for any wounds or changes  -Follow up LEAD in 1 year with office visit, but discussed reasons to be seen sooner.      Leg swelling  -wear compression each day  -will work on lymph pumps

## 2025-06-24 NOTE — ASSESSMENT & PLAN NOTE
-Venous insufficiency with chronic LE edema  -Chronic moderate LE edema and R lateral thigh discomfort  -Hx L GSV and B  ablation '19 at outside facility  -Compliant with compression  -Reports intermittent spontaneous wounds to the anterior shins    -Exam: 2+ LE edema.  Significant venous stasis dermatitis/ poor skin quality. No open wounds or weeping.     -LEV 10/21/24: No LLE DVT/SVT. 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

## 2025-06-25 ENCOUNTER — TELEPHONE (OUTPATIENT)
Dept: GYNECOLOGIC ONCOLOGY | Facility: CLINIC | Age: 55
End: 2025-06-25

## 2025-06-25 DIAGNOSIS — M54.41 ACUTE MIDLINE LOW BACK PAIN WITH BILATERAL SCIATICA: Primary | ICD-10-CM

## 2025-06-25 DIAGNOSIS — M54.42 ACUTE MIDLINE LOW BACK PAIN WITH BILATERAL SCIATICA: Primary | ICD-10-CM

## 2025-06-25 NOTE — TELEPHONE ENCOUNTER
Called patient in regards of in basket message to cance MRI and have patient go for XR . MRI appt canceled and patient aware to go and do XR  per provider request

## 2025-07-02 ENCOUNTER — APPOINTMENT (OUTPATIENT)
Dept: RADIOLOGY | Facility: CLINIC | Age: 55
End: 2025-07-02
Payer: COMMERCIAL

## 2025-07-02 ENCOUNTER — TELEPHONE (OUTPATIENT)
Age: 55
End: 2025-07-02

## 2025-07-02 ENCOUNTER — APPOINTMENT (OUTPATIENT)
Dept: LAB | Facility: CLINIC | Age: 55
End: 2025-07-02
Payer: COMMERCIAL

## 2025-07-02 ENCOUNTER — RESULTS FOLLOW-UP (OUTPATIENT)
Dept: HEMATOLOGY ONCOLOGY | Facility: CLINIC | Age: 55
End: 2025-07-02

## 2025-07-02 DIAGNOSIS — M54.41 ACUTE MIDLINE LOW BACK PAIN WITH BILATERAL SCIATICA: ICD-10-CM

## 2025-07-02 DIAGNOSIS — M54.42 ACUTE MIDLINE LOW BACK PAIN WITH BILATERAL SCIATICA: ICD-10-CM

## 2025-07-02 DIAGNOSIS — I73.9 PERIPHERAL ARTERIAL DISEASE (HCC): ICD-10-CM

## 2025-07-02 PROCEDURE — 72110 X-RAY EXAM L-2 SPINE 4/>VWS: CPT

## 2025-07-02 NOTE — TELEPHONE ENCOUNTER
"Pt called stating he has a \"wound\" on left side near his hip that began itching approx size of a quarter.   Denies any pain, or trauma to the area.   After triaging with a  I learned area is not open or draining, just appears reddened. Pt stated he was told to call the office any time he develops a wound and will send over a pic.   This does not sound vascular and advised the office will call back after receiving the picture    "

## 2025-07-02 NOTE — TELEPHONE ENCOUNTER
Called and s/w pt, Pt states the wound is itchy, but no pain. Pt is unsure how he got it, but states he was scratching his leg prior. Told pt to call PCP for concerns or if wound gets worse. Pt verbalized understanding.

## 2025-07-02 NOTE — TELEPHONE ENCOUNTER
-Wound to the upper thigh. Image reviewed. It does not look open or infected and should settle down.  -How did he get this wound?  -Is there still itching? Pain?  -If he is concerned or it gets worse, may see PCP

## 2025-07-03 ENCOUNTER — OFFICE VISIT (OUTPATIENT)
Dept: INTERNAL MEDICINE CLINIC | Facility: CLINIC | Age: 55
End: 2025-07-03
Payer: COMMERCIAL

## 2025-07-03 VITALS
DIASTOLIC BLOOD PRESSURE: 70 MMHG | HEART RATE: 98 BPM | HEIGHT: 66 IN | OXYGEN SATURATION: 97 % | SYSTOLIC BLOOD PRESSURE: 122 MMHG | BODY MASS INDEX: 25.18 KG/M2

## 2025-07-03 DIAGNOSIS — M51.362 DEGENERATION OF INTERVERTEBRAL DISC OF LUMBAR REGION WITH DISCOGENIC BACK PAIN AND LOWER EXTREMITY PAIN: ICD-10-CM

## 2025-07-03 DIAGNOSIS — L98.9 SKIN LESION: Primary | ICD-10-CM

## 2025-07-03 DIAGNOSIS — Z99.89 USES WALKER: ICD-10-CM

## 2025-07-03 PROCEDURE — 99214 OFFICE O/P EST MOD 30 MIN: CPT | Performed by: INTERNAL MEDICINE

## 2025-07-03 NOTE — TELEPHONE ENCOUNTER
Attempted to call patient and left voicemail regarding xray results.   He was relayed message of  degenerative changes in the spine. No acute fracture. Follow up with PCP for further management   Hope line number was left to return our call at their earliest convenience.

## 2025-07-03 NOTE — TELEPHONE ENCOUNTER
Called and provided information that I had left in vm earlier today. Pt will be seeing PCP in follow up today

## 2025-07-03 NOTE — PROGRESS NOTES
"Name: Donta Hunter      : 1970      MRN: [id number]  Encounter Provider: Raad Batres MD  Encounter Date: 7/3/2025   Encounter department: [facility] INTERNAL MEDICINE [place]  :  Assessment & Plan  Skin lesion  There is a lesion to his left upper thigh, changing in appearance as per pt. It is irregularly shapes, dark pink with areas of scabbing. Will have him see derm for eval given his h/o Lupus and immunosuppression  Orders:    Ambulatory Referral to Dermatology; Future    Degeneration of intervertebral disc of lumbar region with discogenic back pain and lower extremity pain  Back pain has been getting worse over past couple months. He has burning in bilateral feet. Pain now worse with standing     Recently seen on xray, see ortho. MRI ordered but denied.  Orders:    Ambulatory Referral to Orthopedic Surgery; Future    Uses walker  Noted.             Depression Screening and Follow-up Plan:   Patient with underlying depression and was advised to continue current medications as prescribed.     History of Present Illness   Skin lesion and back pain      Review of Systems   Constitutional:  Negative for chills and fever.   HENT:  Negative for ear pain and sore throat.    Eyes:  Negative for pain and visual disturbance.   Respiratory:  Negative for cough and shortness of breath.    Cardiovascular:  Negative for chest pain and palpitations.   Gastrointestinal:  Negative for abdominal pain and vomiting.   Genitourinary:  Negative for dysuria and hematuria.   Musculoskeletal:  Positive for arthralgias, back pain and gait problem.   Skin:  Positive for color change. Negative for rash.   Neurological:  Negative for seizures and syncope.   All other systems reviewed and are negative.      Objective   /70 (BP Location: Left arm, Patient Position: Sitting, Cuff Size: Standard)   Pulse 98   Ht 5' 6\" (1.676 m)   SpO2 97%   BMI 25.18 kg/m²      Physical Exam  Vitals and nursing note reviewed. "   Constitutional:       General: He is not in acute distress.     Appearance: He is well-developed.   HENT:      Head: Normocephalic and atraumatic.     Eyes:      Conjunctiva/sclera: Conjunctivae normal.       Cardiovascular:      Rate and Rhythm: Normal rate and regular rhythm.      Heart sounds: No murmur heard.  Pulmonary:      Effort: Pulmonary effort is normal. No respiratory distress.      Breath sounds: Normal breath sounds.   Abdominal:      Tenderness: There is no abdominal tenderness.     Musculoskeletal:         General: No swelling.     Skin:     General: Skin is warm and dry.      Capillary Refill: Capillary refill takes less than 2 seconds.     Neurological:      Mental Status: He is alert.      Gait: Gait abnormal.     Psychiatric:         Mood and Affect: Mood normal.

## 2025-07-11 ENCOUNTER — HOSPITAL ENCOUNTER (OUTPATIENT)
Dept: NON INVASIVE DIAGNOSTICS | Facility: CLINIC | Age: 55
Discharge: HOME | End: 2025-07-11
Payer: COMMERCIAL

## 2025-07-11 DIAGNOSIS — I63.421 CEREBROVASCULAR ACCIDENT (CVA) DUE TO EMBOLISM OF RIGHT ANTERIOR CEREBRAL ARTERY (HCC): ICD-10-CM

## 2025-07-11 DIAGNOSIS — M32.9 SYSTEMIC LUPUS ERYTHEMATOSUS, UNSPECIFIED SLE TYPE, UNSPECIFIED ORGAN INVOLVEMENT STATUS (HCC): ICD-10-CM

## 2025-07-11 PROCEDURE — 93880 EXTRACRANIAL BILAT STUDY: CPT

## 2025-07-11 PROCEDURE — 93880 EXTRACRANIAL BILAT STUDY: CPT | Performed by: SURGERY

## 2025-07-14 ENCOUNTER — RESULTS FOLLOW-UP (OUTPATIENT)
Dept: OTHER | Facility: HOSPITAL | Age: 55
End: 2025-07-14

## 2025-07-15 ENCOUNTER — TELEPHONE (OUTPATIENT)
Dept: HEMATOLOGY ONCOLOGY | Facility: CLINIC | Age: 55
End: 2025-07-15

## 2025-07-22 ENCOUNTER — TELEPHONE (OUTPATIENT)
Dept: HEMATOLOGY ONCOLOGY | Facility: CLINIC | Age: 55
End: 2025-07-22

## 2025-07-22 ENCOUNTER — TELEPHONE (OUTPATIENT)
Age: 55
End: 2025-07-22

## 2025-07-22 NOTE — TELEPHONE ENCOUNTER
Called pt to notify that upcoming appt has been approved for virtual    Pt aware     Explained how to access appt, pt aware

## 2025-07-22 NOTE — TELEPHONE ENCOUNTER
So I did not realize, this seems to be either an error or a transfer of care.  I need to see him in person and I will need a 40-minute slot never seen this patient before.

## 2025-07-22 NOTE — TELEPHONE ENCOUNTER
Pt is requesting a virtual for todays appt for today because he can't get a ride to the office.  Please call him back to confirm

## 2025-07-23 ENCOUNTER — TELEMEDICINE (OUTPATIENT)
Dept: HEMATOLOGY ONCOLOGY | Facility: CLINIC | Age: 55
End: 2025-07-23
Payer: COMMERCIAL

## 2025-07-23 DIAGNOSIS — D70.2 OTHER DRUG-INDUCED NEUTROPENIA (HCC): Primary | ICD-10-CM

## 2025-07-23 DIAGNOSIS — R03.0 ELEVATED BLOOD PRESSURE READING: ICD-10-CM

## 2025-07-23 PROCEDURE — 99213 OFFICE O/P EST LOW 20 MIN: CPT | Performed by: PHYSICIAN ASSISTANT

## 2025-07-23 NOTE — ASSESSMENT & PLAN NOTE
CT CAP recently that did not reveal any splenomegaly or adenopathy.  Physical exam today is unremarkable.  Last B12, TSH levels were normal.    HIV/Hep C 1m ago were negative   currently is on hydroxychloroquine 100 mg daily and CellCept 1 g daily.       Peripheral flow cytometry decrease T-cell CD4-CD8 ratio (0.70) with no immunophenotypic abnormalities detected  No other cytopenias or constitutional symptoms  suspect chronic leukopenia is secondary to immune induced neutropenia versus immunosuppressive medications/drug induced neutropenia, particular Cellcept. Per UTD guidelines, for management of neutropenia related to cellcept, dose reduction vs interruption should be considered to see if this can improve his neutrophil count with goal ANC >1000. He is not currently experiencing any issues with frequent infection. We can consider G-CSF 5 mcg/kg/day PRN for ANC <500    Orders:    CBC and differential; Future    CBC and differential; Standing

## 2025-07-23 NOTE — LETTER
2025     Sravani Narayanan MD  [address]  [address]  [place] [address]    Patient: Donta Hunter   YOB: 1970   Date of Visit: 2025       Dear Dr. Sravani Narayanan MD:    Thank you for referring Donta Hunter to me for evaluation. I suspect chronic leukopenia is secondary to immune induced neutropenia versus immunosuppressive medications/drug induced neutropenia, particular Cellcept. Dose reduction of cellcept should be considered to see if this can improve his neutrophil count with goal ANC >1000.         Sincerely,        Sejal Rm PA-C        CC: No Recipients    Sejal Rm PA-C  2025  1:26 PM  Sign when Signing Visit  Name: Donta Hunter      : 1970      MRN: [id number]  Encounter Provider: Sejal Rm PA-C  Encounter Date: 2025   Encounter department: [facility] H[facility]ATOLOGY [facility] [place]  :  Assessment & Plan  Other drug-induced neutropenia (HCC)  CT CAP recently that did not reveal any splenomegaly or adenopathy.  Physical exam today is unremarkable.  Last B12, TSH levels were normal.    HIV/Hep C 1m ago were negative   currently is on hydroxychloroquine 100 mg daily and CellCept 1 g daily.       Peripheral flow cytometry decrease T-cell CD4-CD8 ratio (0.70) with no immunophenotypic abnormalities detected  No other cytopenias or constitutional symptoms  suspect chronic leukopenia is secondary to immune induced neutropenia versus immunosuppressive medications/drug induced neutropenia, particular Cellcept. Per UTD guidelines, for management of neutropenia related to cellcept, dose reduction vs interruption should be considered to see if this can improve his neutrophil count with goal ANC >1000. He is not currently experiencing any issues with frequent infection. We can consider G-CSF 5 mcg/kg/day PRN for ANC <500    Orders:  •  CBC and differential; Future  •  CBC and differential; Standing    Elevated  blood pressure reading  One time reading. Advised to keep BP log checking BP 1-2x per day.   Advised to review elevated BP reading with nephrologist, may be reasonable to cut back on sodium tablets if hypertension persists          Assessment & Plan        Return in about 3 months (around 10/23/2025).    Administrative Statements  Encounter provider Sejal Rm PA-C    The Patient is located at Home and in the following state in which I hold an active license [place].    The patient was identified by name and date of birth. Donta Hunter was informed that this is a telemedicine visit and that the visit is being conducted through the Epic Embedded platform. He agrees to proceed..  My office door was closed. No one else was in the room.  He acknowledged consent and understanding of privacy and security of the video platform. The patient has agreed to participate and understands they can discontinue the visit at any time.    I have spent a total time of 12 minutes in caring for this patient on the day of the visit/encounter including Diagnostic results, Documenting in the medical record, Reviewing/placing orders in the medical record (including tests, medications, and/or procedures), and Obtaining or reviewing history  , not including the time spent for establishing the audio/video connection.        History of Present Illness  No chief complaint on file.    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     Patient reports low white blood count since starting medication for lupus around 10 years ago.   He denies frequent infections, fever, night sweats, or adenopathy new lumps or bumps.   He had infection of right foot approximately 7-8 months. No other infections in the past year.   He admits to  "unintentional weight loss 20-30lbs in past 3 months.  He has had no changes in diet. No changes in activity. He has not had any medication changes.   He had rash on right arm recently treated with steroid cream with completely resolution.   No history of HIV or hepatitis.      He does have history of SLE managed by rheumatology. Current on hydroxychloroquine for 100 mg daily and CellCept 1 g daily. Disease is complicated by lupus nephritis and neuropathy. Neuropathy managed by neurology. He is on gabapentin. Prior SPEP 10 years ago was normal. Prior lyme screen 2024 negative.      He has chronic back pain for many years. Ambulates with walker. Back pain has been getting worse over past couple months. He has burning in bilateral feet. Pain now worse with standing. He has had spine injection previously which did not improve his symptoms.      He has history of right posterior tibial vein DVT diagnosed by duplex on 7/14/2017. Reportedly unprovoked. The patient reports that he has been treated with Xarelto since that time, though there are notes in the interim which note ASA 81mg only. He also has history of ischemic strokes seen on MRI at [facility]: Was tested for APS and testing was negative. Reports that he had another thrombotic episode and underwent thrombectomy in June 2024. He was found to have PFO and was evaluate by cardiology. He has remained on Eliquis      No personal history of cancer   No family history of cancer. No family members have lupus.   Both parents are Samo[place].   No tobacco or alcohol use      Labs   WBC 10/2014: 4.39 - unclear if he was on any tx for Lupus at this time.   WBC 11/2014: 2.85   WBC 06/2015: 2.21. SPEP negative.   WBC11/2015: 2.69. Bence chaitanya protein negative. Cryoglobulin POS, lupus anticoagulant negative   WBC 01/2016: WBC 3.81       Pertinent Medical History  06/16/25: as below     07/23/25: feels \"so, so.\" Blood pressure abeba to 147 after eating yesterday which he is " concerned about. Has had no recent medication changes. He does not know when he will see rheumatology again. Follows with Dr. Narayanan.    No fever, night sweats  Weight has been stable at 160lb   He has not started aquatic therapy      used during today's visit       Review of Systems   All other systems reviewed and are negative.          Objective  There were no vitals taken for this visit.    Physical Exam  Constitutional:       General: He is not in acute distress.    Neurological:      Mental Status: He is alert.       Physical Exam      Results    Labs: I have reviewed the following labs:  Results for orders placed or performed in visit on 06/18/25   LD,Blood   Result Value Ref Range     140 - 271 U/L   Protein electrophoresis, serum   Result Value Ref Range    A/G Ratio 1.19 1.10 - 1.80    Albumin % 54.4 48.0 - 70.0 %    Albumin 3.81 3.20 - 5.10 g/dl    Alpha-1 Globulin % 3.9 1.8 - 7.0 %    Alpha-1 Globulin 0.27 0.15 - 0.47 g/dL    Alpha-2 Globulin % 9.0 5.9 - 14.9 %    Alpha-2 Globulin 0.63 0.42 - 1.04 g/dL    Beta-1 Globulin % 5.5 4.7 - 7.7 %    Beta-1 Globulin 0.39 0.31 - 0.57 g/dL    Beta-2 Globulin % 7.3 3.1 - 7.9 %    Beta-2 Globulin 0.51 0.20 - 0.58 g/dL    Gamma Globulin % 19.9 6.9 - 22.3 %    Gamma Globulin 1.39 0.40 - 1.66 g/dL    Total Protein 7.0 6.4 - 8.4 g/dL   IgG, IgA, IgM   Result Value Ref Range     (H) 66 - 433 mg/dL    IGG 1,371 635 - 1,741 mg/dL    IGM 35 (L) 45 - 281 mg/dL   Copper Level   Result Value Ref Range    Copper 87 69 - 132 ug/dL   CBC and differential   Result Value Ref Range    WBC 2.59 (L) 4.31 - 10.16 Thousand/uL    RBC 4.80 3.88 - 5.62 Million/uL    Hemoglobin 15.0 12.0 - 17.0 g/dL    Hematocrit 42.3 36.5 - 49.3 %    MCV 88 82 - 98 fL    MCH 31.3 26.8 - 34.3 pg    MCHC 35.5 31.4 - 37.4 g/dL    RDW 13.3 11.6 - 15.1 %    MPV 11.0 8.9 - 12.7 fL    Platelets 159 149 - 390 Thousands/uL   Alkaline phosphatase, isoenzymes   Result Value Ref Range     Alk Phos Isoenzymes 136 (H) 44 - 121 IU/L    Alk Phos Liver Fract 69 13 - 88 %    Alk Phos Bone Fract 30 12 - 68 %    Alk Phos Intestine Fract 1 0 - 18 %   Leukemia/Lymphoma flow cytometry   Result Value Ref Range    Scan Result SEE WRITTEN REPORT    Immunoglobulin free LT chains blood   Result Value Ref Range    Ig Kappa Free Light Chain 64.5 (H) 3.3 - 19.4 mg/L    Ig Lambda Free Light Chain 51.9 (H) 5.7 - 26.3 mg/L    Kappa/Lambda FluidC Ratio 1.24 0.26 - 1.65   Path Interpretation, Serum Protein Electrophoresis   Result Value Ref Range    Case Report       Electrophoresis Case                              Case: [id number]                                   Authorizing Provider:  Sejal Rm, Collected:           06/18/2025 0747                                     [facility]                                                                         Ordering Location:     [facility]      Received:            06/18/2025 1657                                     Services [place]                                                       Pathologist:           Albin Quintanilla MD                                                                           Specimen:                                                                                               SPEP Interpretation       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   Immunofixation to be performed.       SPEP Immunofixation Interpretation       Serum immunofixation shows no monoclonal immunoglobulins.     PRABHAKAR Quintanilla MD  Interpretation performed at [facility] [place] [id number].      Path Slide Review   Result Value Ref Range    Case Report       Clinical Pathology Report                         Case: [id number]                                  Authorizing Provider:  Sejal Rm  Collected:           06/18/2025 0747                                     [facility]                                                                         Ordering Location:     [facility]      Received:            06/18/2025 1921                                     Services [place]                                                       Pathologist:           Albin Quintanilla MD                                                                           Specimen:                                                                                               Path Slide       PERIPHERAL BLOOD SMEAR: NEUTROPENIA AND ATYPICAL LYMPHOCYTES; SEE IMPRESSION    IMPRESSION:  The peripheral blood smear shows leukopenia (WBC 2.59 K/uL) with neutropenia (ANC 0.95 K/uL) and relative atypical lymphocytosis (60%). Atypical lymphocytes show abnormal nuclear contours, variably increased basophilic cytoplasm and a subset of plasmacytoid lymphocytes. Erythrocytes are adequate in number, normocytic and normochromic, without significant morphologic abnormalities. Platelets are adequate in number and normal in morphology, without evidence of platelet clumping or satellitism. There is no circulating blast population, significant dysplasia, rouleaux formation, infectious organisms or evidence of hemolysis.     Flow cytometry reportedly shows that the lymphocytes are polytypic B-cells and mixed T-cell subsets, including CD8-skewed T-cells and lambda-skewed plasma cells. The overall findings are nonspecific and favored to be reactive in the setting of autoimmune disease, medication / drug-effects and other complex chronic underlying medical conditions. An underlying primary bone marrow process cannot be entirely ruled out on peripheral blood morphology, alone, however, and if cytopenias progress or there is clinical concern for a potential primary bone  marrow process, a bone marrow biopsy would be required, as clinically indicated.       FLOW CYTOMETRY ANALYSIS: [facility] #[id number]; see separate report       PRABHAKAR Quintanilla MD  [place] [address].       Manual Differential(PHLEBS Do Not Order)   Result Value Ref Range    Segmented % 32 (L) 43 - 75 %    Bands % 2 0 - 8 %    Lymphocytes % 45 (H) 14 - 44 %    Monocytes % 4 4 - 12 %    Eosinophils % 0 0 - 6 %    Basophils % 1 0 - 1 %    Metamyelocytes % 1 0 - 1 %    Atypical Lymphocytes % 15 (H) <=0 %    Absolute Neutrophils 0.88 (L) 1.85 - 7.62 Thousand/uL    Absolute Lymphocytes 1.55 0.60 - 4.47 Thousand/uL    Absolute Monocytes 0.10 0.00 - 1.22 Thousand/uL    Absolute Eosinophils 0.00 0.00 - 0.40 Thousand/uL    Absolute Basophils 0.03 0.00 - 0.10 Thousand/uL    Absolute Metamyelocytes 0.03 0.00 - 0.10 Thousand/uL    Total Counted      RBC Morphology Normal     Platelet Estimate Adequate Adequate    Pathology Review Yes (A) No    Differential Comment see note

## 2025-07-23 NOTE — PROGRESS NOTES
Name: Donta Hunter      : 1970      MRN: [id number]  Encounter Provider: Sejal Rm PA-C  Encounter Date: 2025   Encounter department: [facility] [place]  :  Assessment & Plan  Other drug-induced neutropenia (HCC)  CT CAP recently that did not reveal any splenomegaly or adenopathy.  Physical exam today is unremarkable.  Last B12, TSH levels were normal.    HIV/Hep C 1m ago were negative   currently is on hydroxychloroquine 100 mg daily and CellCept 1 g daily.       Peripheral flow cytometry decrease T-cell CD4-CD8 ratio (0.70) with no immunophenotypic abnormalities detected  No other cytopenias or constitutional symptoms  suspect chronic leukopenia is secondary to immune induced neutropenia versus immunosuppressive medications/drug induced neutropenia, particular Cellcept. Per UTD guidelines, for management of neutropenia related to cellcept, dose reduction vs interruption should be considered to see if this can improve his neutrophil count with goal ANC >1000. He is not currently experiencing any issues with frequent infection. We can consider G-CSF 5 mcg/kg/day PRN for ANC <500    Orders:    CBC and differential; Future    CBC and differential; Standing    Elevated blood pressure reading  One time reading. Advised to keep BP log checking BP 1-2x per day.   Advised to review elevated BP reading with nephrologist, may be reasonable to cut back on sodium tablets if hypertension persists          Assessment & Plan        Return in about 3 months (around 10/23/2025).    Administrative Statements   Encounter provider Sejal Rm PA-C    The Patient is located at Home and in the following state in which I hold an active license [place].    The patient was identified by name and date of birth. Donta Hunter was informed that this is a telemedicine visit and that the visit is being conducted through the Epic Embedded platform. He agrees to proceed..   My office door was closed. No one else was in the room.  He acknowledged consent and understanding of privacy and security of the video platform. The patient has agreed to participate and understands they can discontinue the visit at any time.    I have spent a total time of 12 minutes in caring for this patient on the day of the visit/encounter including Diagnostic results, Documenting in the medical record, Reviewing/placing orders in the medical record (including tests, medications, and/or procedures), and Obtaining or reviewing history  , not including the time spent for establishing the audio/video connection.        History of Present Illness   No chief complaint on file.    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     Patient reports low white blood count since starting medication for lupus around 10 years ago.   He denies frequent infections, fever, night sweats, or adenopathy new lumps or bumps.   He had infection of right foot approximately 7-8 months. No other infections in the past year.   He admits to unintentional weight loss 20-30lbs in past 3 months.  He has had no changes in diet. No changes in activity. He has not had any medication changes.   He had rash on right arm recently treated with steroid cream with completely resolution.   No history of HIV or hepatitis.      He does have history of SLE managed by rheumatology. Current on hydroxychloroquine for 100 mg daily and CellCept 1 g daily. Disease is complicated by lupus nephritis and neuropathy. Neuropathy managed by neurology. He is on gabapentin. Prior SPEP 10 years ago was normal. Prior lyme screen 2024 negative.      He has chronic back pain for many years. Ambulates with walker. Back pain has been getting worse over past couple  "months. He has burning in bilateral feet. Pain now worse with standing. He has had spine injection previously which did not improve his symptoms.      He has history of right posterior tibial vein DVT diagnosed by duplex on 7/14/2017. Reportedly unprovoked. The patient reports that he has been treated with Xarelto since that time, though there are notes in the interim which note ASA 81mg only. He also has history of ischemic strokes seen on MRI at [facility]: Was tested for APS and testing was negative. Reports that he had another thrombotic episode and underwent thrombectomy in June 2024. He was found to have PFO and was evaluate by cardiology. He has remained on Eliquis      No personal history of cancer   No family history of cancer. No family members have lupus.   Both parents are [place].   No tobacco or alcohol use      Labs   WBC 10/2014: 4.39 - unclear if he was on any tx for Lupus at this time.   WBC 11/2014: 2.85   WBC 06/2015: 2.21. SPEP negative.   WBC11/2015: 2.69. Bence chaitanya protein negative. Cryoglobulin POS, lupus anticoagulant negative   WBC 01/2016: WBC 3.81       Pertinent Medical History   06/16/25: as below     07/23/25: feels \"so, so.\" Blood pressure abeba to 147 after eating yesterday which he is concerned about. Has had no recent medication changes. He does not know when he will see rheumatology again. Follows with Dr. Narayanan.    No fever, night sweats  Weight has been stable at 160lb   He has not started aquatic therapy      used during today's visit       Review of Systems   All other systems reviewed and are negative.          Objective   There were no vitals taken for this visit.    Physical Exam  Constitutional:       General: He is not in acute distress.    Neurological:      Mental Status: He is alert.       Physical Exam      Results    Labs: I have reviewed the following labs:  Results for orders placed or performed in visit on 06/18/25   LD,Blood   Result Value " Ref Range     140 - 271 U/L   Protein electrophoresis, serum   Result Value Ref Range    A/G Ratio 1.19 1.10 - 1.80    Albumin % 54.4 48.0 - 70.0 %    Albumin 3.81 3.20 - 5.10 g/dl    Alpha-1 Globulin % 3.9 1.8 - 7.0 %    Alpha-1 Globulin 0.27 0.15 - 0.47 g/dL    Alpha-2 Globulin % 9.0 5.9 - 14.9 %    Alpha-2 Globulin 0.63 0.42 - 1.04 g/dL    Beta-1 Globulin % 5.5 4.7 - 7.7 %    Beta-1 Globulin 0.39 0.31 - 0.57 g/dL    Beta-2 Globulin % 7.3 3.1 - 7.9 %    Beta-2 Globulin 0.51 0.20 - 0.58 g/dL    Gamma Globulin % 19.9 6.9 - 22.3 %    Gamma Globulin 1.39 0.40 - 1.66 g/dL    Total Protein 7.0 6.4 - 8.4 g/dL   IgG, IgA, IgM   Result Value Ref Range     (H) 66 - 433 mg/dL    IGG 1,371 635 - 1,741 mg/dL    IGM 35 (L) 45 - 281 mg/dL   Copper Level   Result Value Ref Range    Copper 87 69 - 132 ug/dL   CBC and differential   Result Value Ref Range    WBC 2.59 (L) 4.31 - 10.16 Thousand/uL    RBC 4.80 3.88 - 5.62 Million/uL    Hemoglobin 15.0 12.0 - 17.0 g/dL    Hematocrit 42.3 36.5 - 49.3 %    MCV 88 82 - 98 fL    MCH 31.3 26.8 - 34.3 pg    MCHC 35.5 31.4 - 37.4 g/dL    RDW 13.3 11.6 - 15.1 %    MPV 11.0 8.9 - 12.7 fL    Platelets 159 149 - 390 Thousands/uL   Alkaline phosphatase, isoenzymes   Result Value Ref Range    Alk Phos Isoenzymes 136 (H) 44 - 121 IU/L    Alk Phos Liver Fract 69 13 - 88 %    Alk Phos Bone Fract 30 12 - 68 %    Alk Phos Intestine Fract 1 0 - 18 %   Leukemia/Lymphoma flow cytometry   Result Value Ref Range    Scan Result SEE WRITTEN REPORT    Immunoglobulin free LT chains blood   Result Value Ref Range    Ig Kappa Free Light Chain 64.5 (H) 3.3 - 19.4 mg/L    Ig Lambda Free Light Chain 51.9 (H) 5.7 - 26.3 mg/L    Kappa/Lambda FluidC Ratio 1.24 0.26 - 1.65   Path Interpretation, Serum Protein Electrophoresis   Result Value Ref Range    Case Report       Electrophoresis Case                              Case: [id number]                                   Authorizing Provider:  Sejal Christie  Sujey, Collected:           06/18/2025 0747                                     PA-C                                                                         Ordering Location:     [facility]      Received:            06/18/2025 1657                                     [facility] [place]                                                       Pathologist:           Albin Quintanilla MD                                                                           Specimen:                                                                                               SPEP Interpretation       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   Immunofixation to be performed.       SPEP Immunofixation Interpretation       Serum immunofixation shows no monoclonal immunoglobulins.     PRABHAKAR Quintanilla MD  Interpretation performed at [facility], [facility] [place] [address].      Path Slide Review   Result Value Ref Range    Case Report       Clinical Pathology Report                         Case: [id number]                                  Authorizing Provider:  Sejal Rm, Collected:           06/18/2025 0747                                     PA-C                                                                         Ordering Location:     [facility]      Received:            06/18/2025 1921                                     [facility] [place]                                                       Pathologist:           Albin Quintanilla MD                                                                           Specimen:                                                                                               Path Slide       PERIPHERAL BLOOD SMEAR:  NEUTROPENIA AND ATYPICAL LYMPHOCYTES; SEE IMPRESSION    IMPRESSION:  The peripheral blood smear shows leukopenia (WBC 2.59 K/uL) with neutropenia (ANC 0.95 K/uL) and relative atypical lymphocytosis (60%). Atypical lymphocytes show abnormal nuclear contours, variably increased basophilic cytoplasm and a subset of plasmacytoid lymphocytes. Erythrocytes are adequate in number, normocytic and normochromic, without significant morphologic abnormalities. Platelets are adequate in number and normal in morphology, without evidence of platelet clumping or satellitism. There is no circulating blast population, significant dysplasia, rouleaux formation, infectious organisms or evidence of hemolysis.     Flow cytometry reportedly shows that the lymphocytes are polytypic B-cells and mixed T-cell subsets, including CD8-skewed T-cells and lambda-skewed plasma cells. The overall findings are nonspecific and favored to be reactive in the setting of autoimmune disease, medication / drug-effects and other complex chronic underlying medical conditions. An underlying primary bone marrow process cannot be entirely ruled out on peripheral blood morphology, alone, however, and if cytopenias progress or there is clinical concern for a potential primary bone marrow process, a bone marrow biopsy would be required, as clinically indicated.       FLOW CYTOMETRY ANALYSIS: [facility] #[id number]; see separate report       PRABHAKAR Quintanilla MD  Interpretation performed at [facility], [address] [place] [address].       Manual Differential(PHLEBS Do Not Order)   Result Value Ref Range    Segmented % 32 (L) 43 - 75 %    Bands % 2 0 - 8 %    Lymphocytes % 45 (H) 14 - 44 %    Monocytes % 4 4 - 12 %    Eosinophils % 0 0 - 6 %    Basophils % 1 0 - 1 %    Metamyelocytes % 1 0 - 1 %    Atypical Lymphocytes % 15 (H) <=0 %    Absolute Neutrophils 0.88 (L) 1.85 - 7.62 Thousand/uL    Absolute Lymphocytes 1.55 0.60 - 4.47 Thousand/uL     Absolute Monocytes 0.10 0.00 - 1.22 Thousand/uL    Absolute Eosinophils 0.00 0.00 - 0.40 Thousand/uL    Absolute Basophils 0.03 0.00 - 0.10 Thousand/uL    Absolute Metamyelocytes 0.03 0.00 - 0.10 Thousand/uL    Total Counted      RBC Morphology Normal     Platelet Estimate Adequate Adequate    Pathology Review Yes (A) No    Differential Comment see note

## 2025-07-24 ENCOUNTER — APPOINTMENT (OUTPATIENT)
Dept: LAB | Facility: CLINIC | Age: 55
End: 2025-07-24
Payer: COMMERCIAL

## 2025-07-24 ENCOUNTER — RESULTS FOLLOW-UP (OUTPATIENT)
Dept: VASCULAR SURGERY | Facility: CLINIC | Age: 55
End: 2025-07-24

## 2025-07-24 ENCOUNTER — NURSE TRIAGE (OUTPATIENT)
Age: 55
End: 2025-07-24

## 2025-07-24 DIAGNOSIS — D70.2 OTHER DRUG-INDUCED NEUTROPENIA (HCC): ICD-10-CM

## 2025-07-24 LAB
BASOPHILS # BLD AUTO: 0.03 THOUSANDS/ÂΜL (ref 0–0.1)
BASOPHILS NFR BLD AUTO: 1 % (ref 0–1)
CHOLEST SERPL-MCNC: 131 MG/DL (ref ?–200)
EOSINOPHIL # BLD AUTO: 0.03 THOUSAND/ÂΜL (ref 0–0.61)
EOSINOPHIL NFR BLD AUTO: 1 % (ref 0–6)
ERYTHROCYTE [DISTWIDTH] IN BLOOD BY AUTOMATED COUNT: 13.4 % (ref 11.6–15.1)
HCT VFR BLD AUTO: 43.8 % (ref 36.5–49.3)
HDLC SERPL-MCNC: 45 MG/DL
HGB BLD-MCNC: 15 G/DL (ref 12–17)
IMM GRANULOCYTES # BLD AUTO: 0.01 THOUSAND/UL (ref 0–0.2)
IMM GRANULOCYTES NFR BLD AUTO: 0 % (ref 0–2)
LDLC SERPL CALC-MCNC: 72 MG/DL (ref 0–100)
LYMPHOCYTES # BLD AUTO: 1.29 THOUSANDS/ÂΜL (ref 0.6–4.47)
LYMPHOCYTES NFR BLD AUTO: 48 % (ref 14–44)
MCH RBC QN AUTO: 30.9 PG (ref 26.8–34.3)
MCHC RBC AUTO-ENTMCNC: 34.2 G/DL (ref 31.4–37.4)
MCV RBC AUTO: 90 FL (ref 82–98)
MONOCYTES # BLD AUTO: 0.48 THOUSAND/ÂΜL (ref 0.17–1.22)
MONOCYTES NFR BLD AUTO: 18 % (ref 4–12)
NEUTROPHILS # BLD AUTO: 0.86 THOUSANDS/ÂΜL (ref 1.85–7.62)
NEUTS SEG NFR BLD AUTO: 32 % (ref 43–75)
NONHDLC SERPL-MCNC: 86 MG/DL
NRBC BLD AUTO-RTO: 0 /100 WBCS
PLATELET # BLD AUTO: 167 THOUSANDS/UL (ref 149–390)
PMV BLD AUTO: 11 FL (ref 8.9–12.7)
RBC # BLD AUTO: 4.85 MILLION/UL (ref 3.88–5.62)
TRIGL SERPL-MCNC: 69 MG/DL (ref ?–150)
WBC # BLD AUTO: 2.7 THOUSAND/UL (ref 4.31–10.16)

## 2025-07-24 PROCEDURE — 85025 COMPLETE CBC W/AUTO DIFF WBC: CPT

## 2025-07-24 PROCEDURE — 36415 COLL VENOUS BLD VENIPUNCTURE: CPT

## 2025-07-24 PROCEDURE — 80061 LIPID PANEL: CPT

## 2025-07-24 NOTE — TELEPHONE ENCOUNTER
"Regarding: Blood Pressure concerns  ----- Message from [employer] sent at 7/24/2025  1:30 PM EDT -----  Patient calling in regard to blood pressure. States it \"it goes high and then goes low\"    "

## 2025-07-24 NOTE — TELEPHONE ENCOUNTER
[facility] interpretor # [id number]  Patient called stating he is concerned with the fluctuation of his blood pressure recently. At time of the call his blood pressure is 130/76. He states when he wakes up his blood pressure is high around 130/108 and then it comes down around 103/74. He denies any symptoms. He does not take any blood pressure medications. He has a follow up scheduled for 9/16, no earlier appointments available. Added patient to the wait list. Patient will be sending a blood pressure log via [employer] for the doctors review. He is asking for the doctors advice/recommendations.   Please advise

## 2025-07-28 NOTE — TELEPHONE ENCOUNTER
LM advising to the patient to continue monitor blood pressure for now. He does not need any new medication. Keep that appointment, per Dr Dangelo. Asked pt to call us back if any questions or concerns.

## 2025-07-30 NOTE — TELEPHONE ENCOUNTER
Called pt by utilizing the [employer]  services. LMOM (detailed) with VAS US results. Provided office number to call back with any questions.

## 2025-08-01 DIAGNOSIS — R25.2 SPASTICITY: ICD-10-CM

## 2025-08-01 RX ORDER — BACLOFEN 20 MG/1
20 TABLET ORAL 2 TIMES DAILY
Qty: 90 TABLET | Refills: 4 | Status: SHIPPED | OUTPATIENT
Start: 2025-08-01

## 2025-08-06 DIAGNOSIS — D72.821 MONOCYTOSIS: Primary | ICD-10-CM

## 2025-08-18 ENCOUNTER — TELEPHONE (OUTPATIENT)
Dept: HEMATOLOGY ONCOLOGY | Facility: CLINIC | Age: 55
End: 2025-08-18